# Patient Record
Sex: MALE | Race: WHITE | Employment: OTHER | URBAN - METROPOLITAN AREA
[De-identification: names, ages, dates, MRNs, and addresses within clinical notes are randomized per-mention and may not be internally consistent; named-entity substitution may affect disease eponyms.]

---

## 2017-01-10 ENCOUNTER — OFFICE VISIT (OUTPATIENT)
Dept: FAMILY MEDICINE CLINIC | Age: 82
End: 2017-01-10

## 2017-01-10 VITALS
WEIGHT: 281.2 LBS | RESPIRATION RATE: 16 BRPM | SYSTOLIC BLOOD PRESSURE: 120 MMHG | HEART RATE: 60 BPM | BODY MASS INDEX: 44.04 KG/M2 | DIASTOLIC BLOOD PRESSURE: 62 MMHG | TEMPERATURE: 97.8 F

## 2017-01-10 DIAGNOSIS — G47.33 OSA ON CPAP: ICD-10-CM

## 2017-01-10 DIAGNOSIS — I73.9 CLAUDICATION (HCC): ICD-10-CM

## 2017-01-10 DIAGNOSIS — M79.604 PAIN IN BOTH LOWER EXTREMITIES: ICD-10-CM

## 2017-01-10 DIAGNOSIS — R05.9 COUGH: ICD-10-CM

## 2017-01-10 DIAGNOSIS — M25.511 BILATERAL SHOULDER PAIN, UNSPECIFIED CHRONICITY: ICD-10-CM

## 2017-01-10 DIAGNOSIS — E11.65 CONTROLLED TYPE 2 DIABETES MELLITUS WITH HYPERGLYCEMIA, WITHOUT LONG-TERM CURRENT USE OF INSULIN (HCC): Primary | ICD-10-CM

## 2017-01-10 DIAGNOSIS — E66.01 MORBID OBESITY WITH BMI OF 40.0-44.9, ADULT (HCC): ICD-10-CM

## 2017-01-10 DIAGNOSIS — M79.605 PAIN IN BOTH LOWER EXTREMITIES: ICD-10-CM

## 2017-01-10 DIAGNOSIS — D50.9 IRON DEFICIENCY ANEMIA, UNSPECIFIED IRON DEFICIENCY ANEMIA TYPE: ICD-10-CM

## 2017-01-10 DIAGNOSIS — M25.512 BILATERAL SHOULDER PAIN, UNSPECIFIED CHRONICITY: ICD-10-CM

## 2017-01-10 DIAGNOSIS — N40.1 BENIGN NON-NODULAR PROSTATIC HYPERPLASIA WITH LOWER URINARY TRACT SYMPTOMS: ICD-10-CM

## 2017-01-10 DIAGNOSIS — Z79.01 ANTICOAGULATED ON COUMADIN: ICD-10-CM

## 2017-01-10 DIAGNOSIS — E78.2 MIXED HYPERLIPIDEMIA: ICD-10-CM

## 2017-01-10 DIAGNOSIS — I10 ESSENTIAL HYPERTENSION: ICD-10-CM

## 2017-01-10 DIAGNOSIS — Z99.89 OSA ON CPAP: ICD-10-CM

## 2017-01-10 PROCEDURE — 99214 OFFICE O/P EST MOD 30 MIN: CPT | Performed by: FAMILY MEDICINE

## 2017-01-10 RX ORDER — CEFDINIR 300 MG/1
300 CAPSULE ORAL 2 TIMES DAILY
Qty: 20 CAPSULE | Refills: 0 | Status: SHIPPED | OUTPATIENT
Start: 2017-01-10 | End: 2017-01-20

## 2017-01-10 ASSESSMENT — ENCOUNTER SYMPTOMS
VOMITING: 0
CONSTIPATION: 0
ABDOMINAL PAIN: 0
BLOOD IN STOOL: 0
NAUSEA: 0
CHEST TIGHTNESS: 0
ANAL BLEEDING: 0
SHORTNESS OF BREATH: 1
DIARRHEA: 0

## 2017-01-13 ENCOUNTER — CARE COORDINATION (OUTPATIENT)
Dept: CARE COORDINATION | Age: 82
End: 2017-01-13

## 2017-01-17 ENCOUNTER — ANTI-COAG VISIT (OUTPATIENT)
Dept: OTHER | Age: 82
End: 2017-01-17

## 2017-01-17 ENCOUNTER — TELEPHONE (OUTPATIENT)
Dept: FAMILY MEDICINE CLINIC | Age: 82
End: 2017-01-17

## 2017-01-17 VITALS
HEART RATE: 62 BPM | DIASTOLIC BLOOD PRESSURE: 50 MMHG | WEIGHT: 281 LBS | BODY MASS INDEX: 44.01 KG/M2 | SYSTOLIC BLOOD PRESSURE: 138 MMHG

## 2017-01-17 DIAGNOSIS — Z86.718 HISTORY OF DVT (DEEP VEIN THROMBOSIS): ICD-10-CM

## 2017-01-17 DIAGNOSIS — R19.7 DIARRHEA, UNSPECIFIED TYPE: Primary | ICD-10-CM

## 2017-01-17 DIAGNOSIS — I82.501 CHRONIC DEEP VEIN THROMBOSIS (DVT) OF RIGHT LOWER EXTREMITY, UNSPECIFIED VEIN (HCC): ICD-10-CM

## 2017-01-17 DIAGNOSIS — E89.0 HYPOTHYROIDISM, POSTABLATIVE: ICD-10-CM

## 2017-01-17 LAB — POC INR: 2.1 (ref 0.8–1.2)

## 2017-01-17 PROCEDURE — G8419 CALC BMI OUT NRM PARAM NOF/U: HCPCS | Performed by: NURSE PRACTITIONER

## 2017-01-17 PROCEDURE — 99999 PR OFFICE/OUTPT VISIT,PROCEDURE ONLY: CPT | Performed by: NURSE PRACTITIONER

## 2017-01-17 PROCEDURE — 4040F PNEUMOC VAC/ADMIN/RCVD: CPT | Performed by: NURSE PRACTITIONER

## 2017-01-17 PROCEDURE — 85610 PROTHROMBIN TIME: CPT | Performed by: NURSE PRACTITIONER

## 2017-01-17 PROCEDURE — G8427 DOCREV CUR MEDS BY ELIG CLIN: HCPCS | Performed by: NURSE PRACTITIONER

## 2017-01-17 RX ORDER — LEVOTHYROXINE SODIUM 0.07 MG/1
TABLET ORAL
Qty: 225 TABLET | Refills: 1 | Status: SHIPPED | OUTPATIENT
Start: 2017-01-17 | End: 2017-04-25 | Stop reason: SDUPTHER

## 2017-01-17 ASSESSMENT — ENCOUNTER SYMPTOMS
DIARRHEA: 1
BLOOD IN STOOL: 0
SHORTNESS OF BREATH: 0
CONSTIPATION: 0

## 2017-01-18 LAB — CLOSTRIDIUM DIFFICILE, PCR: NEGATIVE

## 2017-01-18 RX ORDER — WARFARIN SODIUM 5 MG/1
TABLET ORAL
Qty: 45 TABLET | Refills: 11 | Status: SHIPPED | OUTPATIENT
Start: 2017-01-18 | End: 2018-02-09 | Stop reason: SDUPTHER

## 2017-01-19 ENCOUNTER — TELEPHONE (OUTPATIENT)
Dept: FAMILY MEDICINE CLINIC | Age: 82
End: 2017-01-19

## 2017-01-27 ENCOUNTER — OFFICE VISIT (OUTPATIENT)
Dept: UROLOGY | Age: 82
End: 2017-01-27

## 2017-01-27 VITALS
HEIGHT: 67 IN | SYSTOLIC BLOOD PRESSURE: 120 MMHG | BODY MASS INDEX: 43.95 KG/M2 | DIASTOLIC BLOOD PRESSURE: 45 MMHG | WEIGHT: 280 LBS

## 2017-01-27 DIAGNOSIS — N40.1 BENIGN NON-NODULAR PROSTATIC HYPERPLASIA WITH LOWER URINARY TRACT SYMPTOMS: Primary | ICD-10-CM

## 2017-01-27 LAB
BILIRUBIN URINE: NEGATIVE
BLOOD URINE, POC: ABNORMAL
CHARACTER, URINE: CLEAR
COLOR, URINE: YELLOW
GLUCOSE URINE: NEGATIVE MG/DL
KETONES, URINE: ABNORMAL
LEUKOCYTE CLUMPS, URINE: ABNORMAL
NITRITE, URINE: NEGATIVE
PH, URINE: 5
POST VOID RESIDUAL (PVR): 0 ML
PROTEIN, URINE: 30 MG/DL
SPECIFIC GRAVITY, URINE: >= 1.03 (ref 1–1.03)
UROBILINOGEN, URINE: 0.2 EU/DL

## 2017-01-27 PROCEDURE — G8484 FLU IMMUNIZE NO ADMIN: HCPCS | Performed by: UROLOGY

## 2017-01-27 PROCEDURE — G8419 CALC BMI OUT NRM PARAM NOF/U: HCPCS | Performed by: UROLOGY

## 2017-01-27 PROCEDURE — 1036F TOBACCO NON-USER: CPT | Performed by: UROLOGY

## 2017-01-27 PROCEDURE — 51798 US URINE CAPACITY MEASURE: CPT | Performed by: UROLOGY

## 2017-01-27 PROCEDURE — G8427 DOCREV CUR MEDS BY ELIG CLIN: HCPCS | Performed by: UROLOGY

## 2017-01-27 PROCEDURE — 81003 URINALYSIS AUTO W/O SCOPE: CPT | Performed by: UROLOGY

## 2017-01-27 PROCEDURE — 1123F ACP DISCUSS/DSCN MKR DOCD: CPT | Performed by: UROLOGY

## 2017-01-27 PROCEDURE — 99203 OFFICE O/P NEW LOW 30 MIN: CPT | Performed by: UROLOGY

## 2017-01-27 PROCEDURE — 4040F PNEUMOC VAC/ADMIN/RCVD: CPT | Performed by: UROLOGY

## 2017-02-07 ENCOUNTER — CARE COORDINATION (OUTPATIENT)
Dept: CARE COORDINATION | Age: 82
End: 2017-02-07

## 2017-02-14 ENCOUNTER — ANTI-COAG VISIT (OUTPATIENT)
Dept: OTHER | Age: 82
End: 2017-02-14

## 2017-02-14 VITALS
WEIGHT: 277 LBS | SYSTOLIC BLOOD PRESSURE: 127 MMHG | BODY MASS INDEX: 43.38 KG/M2 | DIASTOLIC BLOOD PRESSURE: 60 MMHG | HEART RATE: 66 BPM

## 2017-02-14 DIAGNOSIS — Z86.718 HISTORY OF DVT (DEEP VEIN THROMBOSIS): ICD-10-CM

## 2017-02-14 LAB — POC INR: 1.9 (ref 0.8–1.2)

## 2017-02-14 PROCEDURE — 1036F TOBACCO NON-USER: CPT | Performed by: NURSE PRACTITIONER

## 2017-02-14 PROCEDURE — 1123F ACP DISCUSS/DSCN MKR DOCD: CPT | Performed by: NURSE PRACTITIONER

## 2017-02-14 PROCEDURE — G8427 DOCREV CUR MEDS BY ELIG CLIN: HCPCS | Performed by: NURSE PRACTITIONER

## 2017-02-14 PROCEDURE — 4040F PNEUMOC VAC/ADMIN/RCVD: CPT | Performed by: NURSE PRACTITIONER

## 2017-02-14 PROCEDURE — 85610 PROTHROMBIN TIME: CPT | Performed by: NURSE PRACTITIONER

## 2017-02-14 PROCEDURE — G8484 FLU IMMUNIZE NO ADMIN: HCPCS | Performed by: NURSE PRACTITIONER

## 2017-02-14 PROCEDURE — G8417 CALC BMI ABV UP PARAM F/U: HCPCS | Performed by: NURSE PRACTITIONER

## 2017-02-14 PROCEDURE — 99212 OFFICE O/P EST SF 10 MIN: CPT | Performed by: NURSE PRACTITIONER

## 2017-02-14 ASSESSMENT — ENCOUNTER SYMPTOMS
BACK PAIN: 1
SHORTNESS OF BREATH: 1
CONSTIPATION: 0
DIARRHEA: 0
BLOOD IN STOOL: 0

## 2017-03-01 ENCOUNTER — OFFICE VISIT (OUTPATIENT)
Dept: PULMONOLOGY | Age: 82
End: 2017-03-01

## 2017-03-01 VITALS
HEIGHT: 67 IN | OXYGEN SATURATION: 92 % | SYSTOLIC BLOOD PRESSURE: 132 MMHG | HEART RATE: 60 BPM | TEMPERATURE: 98.4 F | DIASTOLIC BLOOD PRESSURE: 68 MMHG | BODY MASS INDEX: 43.7 KG/M2 | WEIGHT: 278.4 LBS

## 2017-03-01 DIAGNOSIS — G47.33 OSA ON CPAP: ICD-10-CM

## 2017-03-01 DIAGNOSIS — J43.1 PANLOBULAR EMPHYSEMA (HCC): Primary | ICD-10-CM

## 2017-03-01 DIAGNOSIS — Z99.89 OSA ON CPAP: ICD-10-CM

## 2017-03-01 PROCEDURE — 1123F ACP DISCUSS/DSCN MKR DOCD: CPT | Performed by: INTERNAL MEDICINE

## 2017-03-01 PROCEDURE — G8484 FLU IMMUNIZE NO ADMIN: HCPCS | Performed by: INTERNAL MEDICINE

## 2017-03-01 PROCEDURE — G8427 DOCREV CUR MEDS BY ELIG CLIN: HCPCS | Performed by: INTERNAL MEDICINE

## 2017-03-01 PROCEDURE — G8417 CALC BMI ABV UP PARAM F/U: HCPCS | Performed by: INTERNAL MEDICINE

## 2017-03-01 PROCEDURE — G8926 SPIRO NO PERF OR DOC: HCPCS | Performed by: INTERNAL MEDICINE

## 2017-03-01 PROCEDURE — 4040F PNEUMOC VAC/ADMIN/RCVD: CPT | Performed by: INTERNAL MEDICINE

## 2017-03-01 PROCEDURE — 3023F SPIROM DOC REV: CPT | Performed by: INTERNAL MEDICINE

## 2017-03-01 PROCEDURE — 99213 OFFICE O/P EST LOW 20 MIN: CPT | Performed by: INTERNAL MEDICINE

## 2017-03-01 PROCEDURE — 1036F TOBACCO NON-USER: CPT | Performed by: INTERNAL MEDICINE

## 2017-03-01 ASSESSMENT — ENCOUNTER SYMPTOMS
SHORTNESS OF BREATH: 1
COUGH: 1
APNEA: 1

## 2017-03-06 ENCOUNTER — OFFICE VISIT (OUTPATIENT)
Dept: ENDOCRINOLOGY | Age: 82
End: 2017-03-06

## 2017-03-06 VITALS
BODY MASS INDEX: 44.26 KG/M2 | DIASTOLIC BLOOD PRESSURE: 55 MMHG | HEIGHT: 67 IN | WEIGHT: 282 LBS | SYSTOLIC BLOOD PRESSURE: 115 MMHG | RESPIRATION RATE: 20 BRPM | HEART RATE: 63 BPM

## 2017-03-06 DIAGNOSIS — C73 THYROID CANCER (HCC): ICD-10-CM

## 2017-03-06 DIAGNOSIS — E11.8 TYPE 2 DIABETES MELLITUS WITH COMPLICATION, WITHOUT LONG-TERM CURRENT USE OF INSULIN (HCC): ICD-10-CM

## 2017-03-06 DIAGNOSIS — E89.0 POST-SURGICAL HYPOTHYROIDISM: Primary | ICD-10-CM

## 2017-03-06 PROCEDURE — G8427 DOCREV CUR MEDS BY ELIG CLIN: HCPCS | Performed by: CLINICAL NURSE SPECIALIST

## 2017-03-06 PROCEDURE — G8484 FLU IMMUNIZE NO ADMIN: HCPCS | Performed by: CLINICAL NURSE SPECIALIST

## 2017-03-06 PROCEDURE — 99214 OFFICE O/P EST MOD 30 MIN: CPT | Performed by: CLINICAL NURSE SPECIALIST

## 2017-03-06 PROCEDURE — 1036F TOBACCO NON-USER: CPT | Performed by: CLINICAL NURSE SPECIALIST

## 2017-03-06 PROCEDURE — 1123F ACP DISCUSS/DSCN MKR DOCD: CPT | Performed by: CLINICAL NURSE SPECIALIST

## 2017-03-06 PROCEDURE — G8417 CALC BMI ABV UP PARAM F/U: HCPCS | Performed by: CLINICAL NURSE SPECIALIST

## 2017-03-06 PROCEDURE — 4040F PNEUMOC VAC/ADMIN/RCVD: CPT | Performed by: CLINICAL NURSE SPECIALIST

## 2017-03-06 ASSESSMENT — ENCOUNTER SYMPTOMS
DIARRHEA: 0
CONSTIPATION: 0
EYE REDNESS: 0
COUGH: 1
CHEST TIGHTNESS: 0
SHORTNESS OF BREATH: 1
VOICE CHANGE: 0
NAUSEA: 0
WHEEZING: 0
ABDOMINAL PAIN: 0

## 2017-03-07 ENCOUNTER — ANTI-COAG VISIT (OUTPATIENT)
Dept: OTHER | Age: 82
End: 2017-03-07

## 2017-03-07 VITALS
DIASTOLIC BLOOD PRESSURE: 67 MMHG | WEIGHT: 280 LBS | BODY MASS INDEX: 43.95 KG/M2 | SYSTOLIC BLOOD PRESSURE: 138 MMHG | HEART RATE: 64 BPM

## 2017-03-07 DIAGNOSIS — Z86.718 HISTORY OF DVT (DEEP VEIN THROMBOSIS): ICD-10-CM

## 2017-03-07 LAB — POC INR: 3.3 (ref 0.8–1.2)

## 2017-03-07 PROCEDURE — 85610 PROTHROMBIN TIME: CPT

## 2017-03-07 PROCEDURE — G8427 DOCREV CUR MEDS BY ELIG CLIN: HCPCS

## 2017-03-07 PROCEDURE — 99999 PR OFFICE/OUTPT VISIT,PROCEDURE ONLY: CPT

## 2017-03-07 PROCEDURE — 4040F PNEUMOC VAC/ADMIN/RCVD: CPT

## 2017-03-07 PROCEDURE — G8417 CALC BMI ABV UP PARAM F/U: HCPCS

## 2017-03-07 ASSESSMENT — ENCOUNTER SYMPTOMS
BLOOD IN STOOL: 0
DIARRHEA: 0
SHORTNESS OF BREATH: 0
CONSTIPATION: 0

## 2017-03-13 ENCOUNTER — CARE COORDINATION (OUTPATIENT)
Dept: CARE COORDINATION | Age: 82
End: 2017-03-13

## 2017-03-21 ENCOUNTER — ANTI-COAG VISIT (OUTPATIENT)
Dept: OTHER | Age: 82
End: 2017-03-21

## 2017-03-21 VITALS
HEART RATE: 60 BPM | SYSTOLIC BLOOD PRESSURE: 146 MMHG | BODY MASS INDEX: 43.32 KG/M2 | WEIGHT: 276 LBS | DIASTOLIC BLOOD PRESSURE: 57 MMHG

## 2017-03-21 DIAGNOSIS — Z86.718 HISTORY OF DVT (DEEP VEIN THROMBOSIS): Primary | ICD-10-CM

## 2017-03-21 DIAGNOSIS — I82.5Z1 CHRONIC DEEP VEIN THROMBOSIS (DVT) OF DISTAL VEIN OF RIGHT LOWER EXTREMITY (HCC): ICD-10-CM

## 2017-03-21 LAB
INR BLD: 2.6
POC INR: 2.6 (ref 0.8–1.2)

## 2017-03-21 ASSESSMENT — ENCOUNTER SYMPTOMS
DIARRHEA: 0
BLOOD IN STOOL: 0
SHORTNESS OF BREATH: 0
CONSTIPATION: 0

## 2017-04-03 RX ORDER — SIMVASTATIN 80 MG
TABLET ORAL
Qty: 90 TABLET | Refills: 1 | Status: SHIPPED | OUTPATIENT
Start: 2017-04-03 | End: 2017-06-26 | Stop reason: SDUPTHER

## 2017-04-04 ENCOUNTER — ANTI-COAG VISIT (OUTPATIENT)
Dept: OTHER | Age: 82
End: 2017-04-04

## 2017-04-04 VITALS
BODY MASS INDEX: 43.19 KG/M2 | WEIGHT: 275.2 LBS | SYSTOLIC BLOOD PRESSURE: 158 MMHG | HEART RATE: 74 BPM | DIASTOLIC BLOOD PRESSURE: 67 MMHG

## 2017-04-04 DIAGNOSIS — Z86.718 HISTORY OF DVT (DEEP VEIN THROMBOSIS): ICD-10-CM

## 2017-04-04 DIAGNOSIS — I82.5Z1 CHRONIC DEEP VEIN THROMBOSIS (DVT) OF DISTAL VEIN OF RIGHT LOWER EXTREMITY (HCC): ICD-10-CM

## 2017-04-04 LAB — POC INR: 2.7 (ref 0.8–1.2)

## 2017-04-04 ASSESSMENT — ENCOUNTER SYMPTOMS
SHORTNESS OF BREATH: 0
CONSTIPATION: 0
BLOOD IN STOOL: 0
DIARRHEA: 0

## 2017-04-12 ENCOUNTER — CARE COORDINATION (OUTPATIENT)
Dept: CARE COORDINATION | Age: 82
End: 2017-04-12

## 2017-04-19 ENCOUNTER — OFFICE VISIT (OUTPATIENT)
Dept: FAMILY MEDICINE CLINIC | Age: 82
End: 2017-04-19

## 2017-04-19 VITALS
HEART RATE: 64 BPM | DIASTOLIC BLOOD PRESSURE: 58 MMHG | SYSTOLIC BLOOD PRESSURE: 118 MMHG | BODY MASS INDEX: 43.63 KG/M2 | TEMPERATURE: 98.1 F | RESPIRATION RATE: 20 BRPM | WEIGHT: 278 LBS

## 2017-04-19 DIAGNOSIS — R05.9 COUGH: ICD-10-CM

## 2017-04-19 DIAGNOSIS — R09.82 POSTNASAL DRIP: ICD-10-CM

## 2017-04-19 DIAGNOSIS — J30.9 ALLERGIC RHINITIS, UNSPECIFIED ALLERGIC RHINITIS TRIGGER, UNSPECIFIED RHINITIS SEASONALITY: Primary | ICD-10-CM

## 2017-04-19 PROCEDURE — 4040F PNEUMOC VAC/ADMIN/RCVD: CPT | Performed by: NURSE PRACTITIONER

## 2017-04-19 PROCEDURE — G8427 DOCREV CUR MEDS BY ELIG CLIN: HCPCS | Performed by: NURSE PRACTITIONER

## 2017-04-19 PROCEDURE — G8417 CALC BMI ABV UP PARAM F/U: HCPCS | Performed by: NURSE PRACTITIONER

## 2017-04-19 PROCEDURE — 1123F ACP DISCUSS/DSCN MKR DOCD: CPT | Performed by: NURSE PRACTITIONER

## 2017-04-19 PROCEDURE — 1036F TOBACCO NON-USER: CPT | Performed by: NURSE PRACTITIONER

## 2017-04-19 PROCEDURE — 99213 OFFICE O/P EST LOW 20 MIN: CPT | Performed by: NURSE PRACTITIONER

## 2017-04-19 RX ORDER — GUAIFENESIN 100 MG/5ML
200 SYRUP ORAL 3 TIMES DAILY PRN
Qty: 300 ML | Refills: 0 | Status: SHIPPED | OUTPATIENT
Start: 2017-04-19 | End: 2017-04-29

## 2017-04-19 RX ORDER — CETIRIZINE HYDROCHLORIDE 10 MG/1
5 TABLET ORAL DAILY
Qty: 15 TABLET | Refills: 0 | Status: SHIPPED | OUTPATIENT
Start: 2017-04-19 | End: 2017-05-19

## 2017-04-19 ASSESSMENT — ENCOUNTER SYMPTOMS
CONSTIPATION: 0
VOMITING: 0
ABDOMINAL DISTENTION: 0
DIARRHEA: 0
SORE THROAT: 0
BLOOD IN STOOL: 0
RHINORRHEA: 1
PHOTOPHOBIA: 0
VOICE CHANGE: 0
WHEEZING: 0
APNEA: 0
TROUBLE SWALLOWING: 0
BACK PAIN: 1
SHORTNESS OF BREATH: 0
ABDOMINAL PAIN: 0
COUGH: 1
NAUSEA: 0
ANAL BLEEDING: 0

## 2017-04-24 ENCOUNTER — CARE COORDINATION (OUTPATIENT)
Dept: CARE COORDINATION | Age: 82
End: 2017-04-24

## 2017-04-25 ENCOUNTER — ANTI-COAG VISIT (OUTPATIENT)
Dept: OTHER | Age: 82
End: 2017-04-25

## 2017-04-25 VITALS
BODY MASS INDEX: 43.73 KG/M2 | DIASTOLIC BLOOD PRESSURE: 83 MMHG | SYSTOLIC BLOOD PRESSURE: 137 MMHG | HEART RATE: 66 BPM | WEIGHT: 278.6 LBS

## 2017-04-25 DIAGNOSIS — Z86.718 HISTORY OF DVT (DEEP VEIN THROMBOSIS): ICD-10-CM

## 2017-04-25 DIAGNOSIS — I82.5Z1 CHRONIC DEEP VEIN THROMBOSIS (DVT) OF DISTAL VEIN OF RIGHT LOWER EXTREMITY (HCC): ICD-10-CM

## 2017-04-25 DIAGNOSIS — E89.0 HYPOTHYROIDISM, POSTABLATIVE: ICD-10-CM

## 2017-04-25 LAB — POC INR: 4 (ref 0.8–1.2)

## 2017-04-25 RX ORDER — LEVOTHYROXINE SODIUM 0.07 MG/1
TABLET ORAL
Qty: 225 TABLET | Refills: 1 | Status: SHIPPED | OUTPATIENT
Start: 2017-04-25 | End: 2017-07-06 | Stop reason: ALTCHOICE

## 2017-04-25 ASSESSMENT — ENCOUNTER SYMPTOMS
DIARRHEA: 0
COUGH: 1
CONSTIPATION: 0
BLOOD IN STOOL: 0
SHORTNESS OF BREATH: 1

## 2017-05-04 ENCOUNTER — ANTI-COAG VISIT (OUTPATIENT)
Dept: OTHER | Age: 82
End: 2017-05-04

## 2017-05-04 VITALS
BODY MASS INDEX: 43.32 KG/M2 | HEART RATE: 66 BPM | SYSTOLIC BLOOD PRESSURE: 125 MMHG | WEIGHT: 276 LBS | DIASTOLIC BLOOD PRESSURE: 56 MMHG

## 2017-05-04 DIAGNOSIS — Z86.718 HISTORY OF DVT (DEEP VEIN THROMBOSIS): ICD-10-CM

## 2017-05-04 DIAGNOSIS — I82.5Z1 CHRONIC DEEP VEIN THROMBOSIS (DVT) OF DISTAL VEIN OF RIGHT LOWER EXTREMITY (HCC): ICD-10-CM

## 2017-05-04 LAB — POC INR: 2.2 (ref 0.8–1.2)

## 2017-05-04 ASSESSMENT — ENCOUNTER SYMPTOMS
BLOOD IN STOOL: 0
CONSTIPATION: 0
DIARRHEA: 0

## 2017-05-08 ENCOUNTER — TELEPHONE (OUTPATIENT)
Dept: FAMILY MEDICINE CLINIC | Age: 82
End: 2017-05-08

## 2017-05-10 ENCOUNTER — OFFICE VISIT (OUTPATIENT)
Dept: FAMILY MEDICINE CLINIC | Age: 82
End: 2017-05-10

## 2017-05-10 VITALS
BODY MASS INDEX: 44.1 KG/M2 | OXYGEN SATURATION: 94 % | SYSTOLIC BLOOD PRESSURE: 126 MMHG | RESPIRATION RATE: 24 BRPM | HEART RATE: 72 BPM | DIASTOLIC BLOOD PRESSURE: 58 MMHG | WEIGHT: 281 LBS | TEMPERATURE: 97.9 F

## 2017-05-10 DIAGNOSIS — R09.89 CHEST CONGESTION: ICD-10-CM

## 2017-05-10 DIAGNOSIS — R05.3 PERSISTENT COUGH FOR 3 WEEKS OR LONGER: Primary | ICD-10-CM

## 2017-05-10 DIAGNOSIS — M25.512 ACUTE PAIN OF BOTH SHOULDERS: ICD-10-CM

## 2017-05-10 DIAGNOSIS — M25.511 ACUTE PAIN OF BOTH SHOULDERS: ICD-10-CM

## 2017-05-10 DIAGNOSIS — R53.83 FATIGUE, UNSPECIFIED TYPE: ICD-10-CM

## 2017-05-10 DIAGNOSIS — R06.02 SHORTNESS OF BREATH: ICD-10-CM

## 2017-05-10 DIAGNOSIS — I10 ESSENTIAL HYPERTENSION: ICD-10-CM

## 2017-05-10 DIAGNOSIS — R63.5 WEIGHT GAIN: ICD-10-CM

## 2017-05-10 PROCEDURE — G8417 CALC BMI ABV UP PARAM F/U: HCPCS | Performed by: NURSE PRACTITIONER

## 2017-05-10 PROCEDURE — 99213 OFFICE O/P EST LOW 20 MIN: CPT | Performed by: NURSE PRACTITIONER

## 2017-05-10 PROCEDURE — G8427 DOCREV CUR MEDS BY ELIG CLIN: HCPCS | Performed by: NURSE PRACTITIONER

## 2017-05-10 PROCEDURE — 1036F TOBACCO NON-USER: CPT | Performed by: NURSE PRACTITIONER

## 2017-05-10 PROCEDURE — 4040F PNEUMOC VAC/ADMIN/RCVD: CPT | Performed by: NURSE PRACTITIONER

## 2017-05-10 PROCEDURE — 1123F ACP DISCUSS/DSCN MKR DOCD: CPT | Performed by: NURSE PRACTITIONER

## 2017-05-10 RX ORDER — CEFDINIR 300 MG/1
300 CAPSULE ORAL EVERY 12 HOURS
Qty: 20 CAPSULE | Refills: 0 | Status: SHIPPED | OUTPATIENT
Start: 2017-05-10 | End: 2017-05-20

## 2017-05-10 RX ORDER — LOSARTAN POTASSIUM 50 MG/1
50 TABLET ORAL 2 TIMES DAILY
Qty: 180 TABLET | Refills: 0 | Status: SHIPPED | OUTPATIENT
Start: 2017-05-10 | End: 2017-08-14

## 2017-05-10 ASSESSMENT — ENCOUNTER SYMPTOMS
DIARRHEA: 0
SHORTNESS OF BREATH: 1
NAUSEA: 0
ANAL BLEEDING: 0
PHOTOPHOBIA: 0
COUGH: 1
VOICE CHANGE: 0
TROUBLE SWALLOWING: 0
ABDOMINAL DISTENTION: 0
CONSTIPATION: 0
WHEEZING: 1
BLOOD IN STOOL: 0
VOMITING: 0
APNEA: 0
ABDOMINAL PAIN: 0

## 2017-05-11 ENCOUNTER — TELEPHONE (OUTPATIENT)
Dept: OTHER | Age: 82
End: 2017-05-11

## 2017-05-11 RX ORDER — DOXAZOSIN MESYLATE 4 MG/1
4 TABLET ORAL NIGHTLY
Qty: 90 TABLET | Refills: 2 | Status: SHIPPED | OUTPATIENT
Start: 2017-05-11 | End: 2018-01-30 | Stop reason: SDUPTHER

## 2017-05-18 ENCOUNTER — ANTI-COAG VISIT (OUTPATIENT)
Dept: OTHER | Age: 82
End: 2017-05-18

## 2017-05-18 VITALS
DIASTOLIC BLOOD PRESSURE: 60 MMHG | HEART RATE: 70 BPM | SYSTOLIC BLOOD PRESSURE: 142 MMHG | BODY MASS INDEX: 43.51 KG/M2 | WEIGHT: 277.2 LBS

## 2017-05-18 DIAGNOSIS — I82.5Z1 CHRONIC DEEP VEIN THROMBOSIS (DVT) OF DISTAL VEIN OF RIGHT LOWER EXTREMITY (HCC): ICD-10-CM

## 2017-05-18 DIAGNOSIS — Z86.718 HISTORY OF DVT (DEEP VEIN THROMBOSIS): ICD-10-CM

## 2017-05-18 LAB — POC INR: 2.3 (ref 0.8–1.2)

## 2017-05-18 ASSESSMENT — ENCOUNTER SYMPTOMS
DIARRHEA: 0
CONSTIPATION: 0
SHORTNESS OF BREATH: 0
BLOOD IN STOOL: 0

## 2017-06-06 ENCOUNTER — OFFICE VISIT (OUTPATIENT)
Dept: FAMILY MEDICINE CLINIC | Age: 82
End: 2017-06-06

## 2017-06-06 ENCOUNTER — TELEPHONE (OUTPATIENT)
Dept: FAMILY MEDICINE CLINIC | Age: 82
End: 2017-06-06

## 2017-06-06 VITALS
RESPIRATION RATE: 20 BRPM | TEMPERATURE: 98.4 F | BODY MASS INDEX: 44.1 KG/M2 | HEART RATE: 68 BPM | DIASTOLIC BLOOD PRESSURE: 62 MMHG | SYSTOLIC BLOOD PRESSURE: 124 MMHG | WEIGHT: 281 LBS | OXYGEN SATURATION: 92 %

## 2017-06-06 DIAGNOSIS — R06.02 SOB (SHORTNESS OF BREATH): ICD-10-CM

## 2017-06-06 DIAGNOSIS — R09.89 CHEST CONGESTION: ICD-10-CM

## 2017-06-06 DIAGNOSIS — R05.9 COUGH: Primary | ICD-10-CM

## 2017-06-06 PROCEDURE — 1036F TOBACCO NON-USER: CPT | Performed by: FAMILY MEDICINE

## 2017-06-06 PROCEDURE — 4040F PNEUMOC VAC/ADMIN/RCVD: CPT | Performed by: FAMILY MEDICINE

## 2017-06-06 PROCEDURE — G8427 DOCREV CUR MEDS BY ELIG CLIN: HCPCS | Performed by: FAMILY MEDICINE

## 2017-06-06 PROCEDURE — 99213 OFFICE O/P EST LOW 20 MIN: CPT | Performed by: FAMILY MEDICINE

## 2017-06-06 PROCEDURE — 1123F ACP DISCUSS/DSCN MKR DOCD: CPT | Performed by: FAMILY MEDICINE

## 2017-06-06 PROCEDURE — G8417 CALC BMI ABV UP PARAM F/U: HCPCS | Performed by: FAMILY MEDICINE

## 2017-06-06 ASSESSMENT — ENCOUNTER SYMPTOMS
SINUS PRESSURE: 0
RHINORRHEA: 0
COUGH: 1
NAUSEA: 0
EYE PAIN: 0
CONSTIPATION: 0
SHORTNESS OF BREATH: 1
VOMITING: 0
EYE REDNESS: 0
ABDOMINAL PAIN: 0
CHEST TIGHTNESS: 0
EYE ITCHING: 0
ANAL BLEEDING: 0
SORE THROAT: 1
BLOOD IN STOOL: 0
EYE DISCHARGE: 0
DIARRHEA: 0

## 2017-06-07 ENCOUNTER — TELEPHONE (OUTPATIENT)
Dept: FAMILY MEDICINE CLINIC | Age: 82
End: 2017-06-07

## 2017-06-07 ENCOUNTER — CARE COORDINATION (OUTPATIENT)
Dept: CARE COORDINATION | Age: 82
End: 2017-06-07

## 2017-06-07 ENCOUNTER — TELEPHONE (OUTPATIENT)
Dept: PULMONOLOGY | Age: 82
End: 2017-06-07

## 2017-06-09 ENCOUNTER — OFFICE VISIT (OUTPATIENT)
Dept: PULMONOLOGY | Age: 82
End: 2017-06-09

## 2017-06-09 VITALS
TEMPERATURE: 98 F | DIASTOLIC BLOOD PRESSURE: 66 MMHG | WEIGHT: 279.4 LBS | OXYGEN SATURATION: 94 % | BODY MASS INDEX: 43.85 KG/M2 | SYSTOLIC BLOOD PRESSURE: 116 MMHG | HEIGHT: 67 IN | HEART RATE: 68 BPM

## 2017-06-09 DIAGNOSIS — Z99.89 OSA ON CPAP: ICD-10-CM

## 2017-06-09 DIAGNOSIS — J41.1 MUCOPURULENT CHRONIC BRONCHITIS (HCC): ICD-10-CM

## 2017-06-09 DIAGNOSIS — J96.11 HYPOXEMIC RESPIRATORY FAILURE, CHRONIC (HCC): ICD-10-CM

## 2017-06-09 DIAGNOSIS — J43.1 PANLOBULAR EMPHYSEMA (HCC): ICD-10-CM

## 2017-06-09 DIAGNOSIS — G47.33 OSA ON CPAP: ICD-10-CM

## 2017-06-09 DIAGNOSIS — R05.9 COUGH: Primary | ICD-10-CM

## 2017-06-09 PROCEDURE — 4040F PNEUMOC VAC/ADMIN/RCVD: CPT | Performed by: INTERNAL MEDICINE

## 2017-06-09 PROCEDURE — 1036F TOBACCO NON-USER: CPT | Performed by: INTERNAL MEDICINE

## 2017-06-09 PROCEDURE — G8926 SPIRO NO PERF OR DOC: HCPCS | Performed by: INTERNAL MEDICINE

## 2017-06-09 PROCEDURE — 3023F SPIROM DOC REV: CPT | Performed by: INTERNAL MEDICINE

## 2017-06-09 PROCEDURE — 99213 OFFICE O/P EST LOW 20 MIN: CPT | Performed by: INTERNAL MEDICINE

## 2017-06-09 PROCEDURE — 1123F ACP DISCUSS/DSCN MKR DOCD: CPT | Performed by: INTERNAL MEDICINE

## 2017-06-09 PROCEDURE — G8427 DOCREV CUR MEDS BY ELIG CLIN: HCPCS | Performed by: INTERNAL MEDICINE

## 2017-06-09 PROCEDURE — G8417 CALC BMI ABV UP PARAM F/U: HCPCS | Performed by: INTERNAL MEDICINE

## 2017-06-09 RX ORDER — AZITHROMYCIN 250 MG/1
TABLET, FILM COATED ORAL
Qty: 1 PACKET | Refills: 0 | Status: SHIPPED | OUTPATIENT
Start: 2017-06-09 | End: 2017-06-15

## 2017-06-09 RX ORDER — PREDNISONE 10 MG/1
TABLET ORAL
Qty: 30 TABLET | Refills: 0 | Status: SHIPPED | OUTPATIENT
Start: 2017-06-09 | End: 2017-06-19

## 2017-06-09 ASSESSMENT — ENCOUNTER SYMPTOMS
APNEA: 1
STRIDOR: 0
SHORTNESS OF BREATH: 1
COUGH: 1

## 2017-06-15 ENCOUNTER — ANTI-COAG VISIT (OUTPATIENT)
Dept: OTHER | Age: 82
End: 2017-06-15

## 2017-06-15 VITALS
DIASTOLIC BLOOD PRESSURE: 66 MMHG | HEART RATE: 71 BPM | WEIGHT: 279.2 LBS | SYSTOLIC BLOOD PRESSURE: 120 MMHG | BODY MASS INDEX: 43.73 KG/M2

## 2017-06-15 DIAGNOSIS — Z86.718 HISTORY OF DVT (DEEP VEIN THROMBOSIS): ICD-10-CM

## 2017-06-15 DIAGNOSIS — I82.5Z1 CHRONIC DEEP VEIN THROMBOSIS (DVT) OF DISTAL VEIN OF RIGHT LOWER EXTREMITY (HCC): ICD-10-CM

## 2017-06-15 LAB — POC INR: 2.2 (ref 0.8–1.2)

## 2017-06-15 ASSESSMENT — ENCOUNTER SYMPTOMS
DIARRHEA: 0
BLOOD IN STOOL: 0
SHORTNESS OF BREATH: 1
CONSTIPATION: 0

## 2017-06-26 RX ORDER — SIMVASTATIN 80 MG
TABLET ORAL
Qty: 90 TABLET | Refills: 1 | Status: SHIPPED | OUTPATIENT
Start: 2017-06-26 | End: 2018-05-10 | Stop reason: SDUPTHER

## 2017-06-29 ENCOUNTER — ANTI-COAG VISIT (OUTPATIENT)
Dept: OTHER | Age: 82
End: 2017-06-29

## 2017-06-29 VITALS
WEIGHT: 278.8 LBS | DIASTOLIC BLOOD PRESSURE: 53 MMHG | HEART RATE: 62 BPM | SYSTOLIC BLOOD PRESSURE: 137 MMHG | BODY MASS INDEX: 43.67 KG/M2

## 2017-06-29 DIAGNOSIS — I82.5Z1 CHRONIC DEEP VEIN THROMBOSIS (DVT) OF DISTAL VEIN OF RIGHT LOWER EXTREMITY (HCC): ICD-10-CM

## 2017-06-29 DIAGNOSIS — Z86.718 HISTORY OF DVT (DEEP VEIN THROMBOSIS): ICD-10-CM

## 2017-06-29 LAB — POC INR: 2.4 (ref 0.8–1.2)

## 2017-06-29 ASSESSMENT — ENCOUNTER SYMPTOMS
CONSTIPATION: 0
BLOOD IN STOOL: 0
DIARRHEA: 0
SHORTNESS OF BREATH: 1

## 2017-07-03 ENCOUNTER — CARE COORDINATION (OUTPATIENT)
Dept: CARE COORDINATION | Age: 82
End: 2017-07-03

## 2017-07-06 ENCOUNTER — OFFICE VISIT (OUTPATIENT)
Dept: ENDOCRINOLOGY | Age: 82
End: 2017-07-06

## 2017-07-06 VITALS
SYSTOLIC BLOOD PRESSURE: 130 MMHG | RESPIRATION RATE: 14 BRPM | DIASTOLIC BLOOD PRESSURE: 60 MMHG | HEIGHT: 67 IN | HEART RATE: 65 BPM | BODY MASS INDEX: 43.71 KG/M2 | WEIGHT: 278.5 LBS

## 2017-07-06 DIAGNOSIS — E11.9 TYPE 2 DIABETES MELLITUS WITHOUT COMPLICATION, WITHOUT LONG-TERM CURRENT USE OF INSULIN (HCC): Primary | ICD-10-CM

## 2017-07-06 DIAGNOSIS — E78.00 PURE HYPERCHOLESTEROLEMIA: ICD-10-CM

## 2017-07-06 DIAGNOSIS — E89.0 POSTOPERATIVE HYPOTHYROIDISM: ICD-10-CM

## 2017-07-06 DIAGNOSIS — M62.81 MUSCLE WEAKNESS: ICD-10-CM

## 2017-07-06 DIAGNOSIS — C73 THYROID CANCER (HCC): ICD-10-CM

## 2017-07-06 PROCEDURE — G8417 CALC BMI ABV UP PARAM F/U: HCPCS | Performed by: INTERNAL MEDICINE

## 2017-07-06 PROCEDURE — G8427 DOCREV CUR MEDS BY ELIG CLIN: HCPCS | Performed by: INTERNAL MEDICINE

## 2017-07-06 PROCEDURE — 1036F TOBACCO NON-USER: CPT | Performed by: INTERNAL MEDICINE

## 2017-07-06 PROCEDURE — 4040F PNEUMOC VAC/ADMIN/RCVD: CPT | Performed by: INTERNAL MEDICINE

## 2017-07-06 PROCEDURE — 99214 OFFICE O/P EST MOD 30 MIN: CPT | Performed by: INTERNAL MEDICINE

## 2017-07-06 PROCEDURE — 1123F ACP DISCUSS/DSCN MKR DOCD: CPT | Performed by: INTERNAL MEDICINE

## 2017-07-06 RX ORDER — LEVOTHYROXINE SODIUM 175 UG/1
187 TABLET ORAL DAILY
Status: ON HOLD | COMMUNITY
End: 2017-08-11

## 2017-07-18 ENCOUNTER — HOSPITAL ENCOUNTER (OUTPATIENT)
Dept: PHARMACY | Age: 82
Setting detail: THERAPIES SERIES
Discharge: HOME | End: 2017-07-18
Payer: MEDICARE

## 2017-07-18 ENCOUNTER — TELEPHONE (OUTPATIENT)
Dept: PULMONOLOGY | Age: 82
End: 2017-07-18

## 2017-07-18 ENCOUNTER — TELEPHONE (OUTPATIENT)
Dept: PHARMACY | Age: 82
End: 2017-07-18

## 2017-07-18 VITALS
HEART RATE: 72 BPM | WEIGHT: 283 LBS | BODY MASS INDEX: 44.42 KG/M2 | DIASTOLIC BLOOD PRESSURE: 59 MMHG | SYSTOLIC BLOOD PRESSURE: 135 MMHG

## 2017-07-18 DIAGNOSIS — I82.5Z1 CHRONIC DEEP VEIN THROMBOSIS (DVT) OF DISTAL VEIN OF RIGHT LOWER EXTREMITY (HCC): ICD-10-CM

## 2017-07-18 LAB — POC INR: 2.7 (ref 0.8–1.2)

## 2017-07-18 PROCEDURE — 36416 COLLJ CAPILLARY BLOOD SPEC: CPT

## 2017-07-18 PROCEDURE — 85610 PROTHROMBIN TIME: CPT

## 2017-07-18 PROCEDURE — 99211 OFF/OP EST MAY X REQ PHY/QHP: CPT

## 2017-07-18 PROCEDURE — 99213 OFFICE O/P EST LOW 20 MIN: CPT

## 2017-07-18 ASSESSMENT — ENCOUNTER SYMPTOMS
BLOOD IN STOOL: 0
CONSTIPATION: 0
DIARRHEA: 0
SHORTNESS OF BREATH: 1

## 2017-07-19 ENCOUNTER — CARE COORDINATION (OUTPATIENT)
Dept: CARE COORDINATION | Age: 82
End: 2017-07-19

## 2017-07-19 ASSESSMENT — ENCOUNTER SYMPTOMS: DYSPNEA ASSOCIATED WITH: EXERTION

## 2017-07-31 ENCOUNTER — HOSPITAL ENCOUNTER (OUTPATIENT)
Dept: PHARMACY | Age: 82
Setting detail: THERAPIES SERIES
Discharge: HOME | End: 2017-07-31
Payer: MEDICARE

## 2017-07-31 VITALS
WEIGHT: 285.6 LBS | BODY MASS INDEX: 44.83 KG/M2 | DIASTOLIC BLOOD PRESSURE: 55 MMHG | HEART RATE: 67 BPM | SYSTOLIC BLOOD PRESSURE: 129 MMHG

## 2017-07-31 DIAGNOSIS — I82.5Z1 CHRONIC DEEP VEIN THROMBOSIS (DVT) OF DISTAL VEIN OF RIGHT LOWER EXTREMITY (HCC): ICD-10-CM

## 2017-07-31 LAB — POC INR: 2.2 (ref 0.8–1.2)

## 2017-07-31 PROCEDURE — 36416 COLLJ CAPILLARY BLOOD SPEC: CPT

## 2017-07-31 PROCEDURE — 99211 OFF/OP EST MAY X REQ PHY/QHP: CPT

## 2017-08-02 ENCOUNTER — OFFICE VISIT (OUTPATIENT)
Dept: PULMONOLOGY | Age: 82
End: 2017-08-02
Payer: MEDICARE

## 2017-08-02 VITALS
OXYGEN SATURATION: 96 % | BODY MASS INDEX: 46.28 KG/M2 | SYSTOLIC BLOOD PRESSURE: 124 MMHG | HEIGHT: 66 IN | TEMPERATURE: 98.7 F | DIASTOLIC BLOOD PRESSURE: 60 MMHG | WEIGHT: 288 LBS | HEART RATE: 71 BPM

## 2017-08-02 DIAGNOSIS — J41.1 MUCOPURULENT CHRONIC BRONCHITIS (HCC): Primary | ICD-10-CM

## 2017-08-02 PROCEDURE — G8417 CALC BMI ABV UP PARAM F/U: HCPCS | Performed by: INTERNAL MEDICINE

## 2017-08-02 PROCEDURE — 99213 OFFICE O/P EST LOW 20 MIN: CPT | Performed by: INTERNAL MEDICINE

## 2017-08-02 PROCEDURE — 1123F ACP DISCUSS/DSCN MKR DOCD: CPT | Performed by: INTERNAL MEDICINE

## 2017-08-02 PROCEDURE — G8926 SPIRO NO PERF OR DOC: HCPCS | Performed by: INTERNAL MEDICINE

## 2017-08-02 PROCEDURE — 3023F SPIROM DOC REV: CPT | Performed by: INTERNAL MEDICINE

## 2017-08-02 PROCEDURE — G8427 DOCREV CUR MEDS BY ELIG CLIN: HCPCS | Performed by: INTERNAL MEDICINE

## 2017-08-02 PROCEDURE — 4040F PNEUMOC VAC/ADMIN/RCVD: CPT | Performed by: INTERNAL MEDICINE

## 2017-08-02 PROCEDURE — 1036F TOBACCO NON-USER: CPT | Performed by: INTERNAL MEDICINE

## 2017-08-02 ASSESSMENT — ENCOUNTER SYMPTOMS
WHEEZING: 0
APNEA: 1
SHORTNESS OF BREATH: 1
COUGH: 1
CHOKING: 0

## 2017-08-07 ENCOUNTER — CARE COORDINATION (OUTPATIENT)
Dept: CARE COORDINATION | Age: 82
End: 2017-08-07

## 2017-08-09 ENCOUNTER — HOSPITAL ENCOUNTER (INPATIENT)
Age: 82
LOS: 2 days | Discharge: HOME HEALTH | DRG: 191 | End: 2017-08-11
Attending: FAMILY MEDICINE | Admitting: INTERNAL MEDICINE
Payer: MEDICARE

## 2017-08-09 ENCOUNTER — APPOINTMENT (OUTPATIENT)
Dept: GENERAL RADIOLOGY | Age: 82
DRG: 191 | End: 2017-08-09
Payer: MEDICARE

## 2017-08-09 ENCOUNTER — OFFICE VISIT (OUTPATIENT)
Dept: FAMILY MEDICINE CLINIC | Age: 82
End: 2017-08-09
Payer: MEDICARE

## 2017-08-09 VITALS
TEMPERATURE: 97.8 F | OXYGEN SATURATION: 98 % | DIASTOLIC BLOOD PRESSURE: 60 MMHG | RESPIRATION RATE: 24 BRPM | BODY MASS INDEX: 46.9 KG/M2 | HEART RATE: 76 BPM | SYSTOLIC BLOOD PRESSURE: 146 MMHG | WEIGHT: 290.6 LBS

## 2017-08-09 DIAGNOSIS — J44.1 COPD EXACERBATION (HCC): Primary | ICD-10-CM

## 2017-08-09 DIAGNOSIS — R05.9 COUGH: ICD-10-CM

## 2017-08-09 DIAGNOSIS — R13.10 DYSPHAGIA, UNSPECIFIED TYPE: ICD-10-CM

## 2017-08-09 LAB
ALBUMIN SERPL-MCNC: 3.7 G/DL (ref 3.5–5.1)
ALLEN TEST: POSITIVE
ALP BLD-CCNC: 42 U/L (ref 38–126)
ALT SERPL-CCNC: 17 U/L (ref 11–66)
ANION GAP SERPL CALCULATED.3IONS-SCNC: 10 MEQ/L (ref 8–16)
ANISOCYTOSIS: ABNORMAL
AST SERPL-CCNC: 23 U/L (ref 5–40)
BASE EXCESS (CALCULATED): 4.7 MMOL/L (ref -2.5–2.5)
BASOPHILS # BLD: 0.7 %
BASOPHILS ABSOLUTE: 0.1 THOU/MM3 (ref 0–0.1)
BILIRUB SERPL-MCNC: 0.4 MG/DL (ref 0.3–1.2)
BUN BLDV-MCNC: 11 MG/DL (ref 7–22)
CALCIUM SERPL-MCNC: 8.8 MG/DL (ref 8.5–10.5)
CHLORIDE BLD-SCNC: 99 MEQ/L (ref 98–111)
CO2: 29 MEQ/L (ref 23–33)
COLLECTED BY:: ABNORMAL
CREAT SERPL-MCNC: 0.8 MG/DL (ref 0.4–1.2)
DEVICE: ABNORMAL
EKG ATRIAL RATE: 65 BPM
EKG P AXIS: 68 DEGREES
EKG P-R INTERVAL: 234 MS
EKG Q-T INTERVAL: 384 MS
EKG QRS DURATION: 94 MS
EKG QTC CALCULATION (BAZETT): 399 MS
EKG R AXIS: 73 DEGREES
EKG T AXIS: 41 DEGREES
EKG VENTRICULAR RATE: 65 BPM
EOSINOPHIL # BLD: 2.3 %
EOSINOPHILS ABSOLUTE: 0.2 THOU/MM3 (ref 0–0.4)
GFR SERPL CREATININE-BSD FRML MDRD: > 90 ML/MIN/1.73M2
GLUCOSE BLD-MCNC: 198 MG/DL (ref 70–108)
GLUCOSE BLD-MCNC: 262 MG/DL (ref 70–108)
GLUCOSE BLD-MCNC: 294 MG/DL (ref 70–108)
HCO3: 31 MMOL/L (ref 23–28)
HCT VFR BLD CALC: 39.6 % (ref 42–52)
HEMOGLOBIN: 13.3 GM/DL (ref 14–18)
IFIO2: 2
INR BLD: 2.69 (ref 0.85–1.13)
LYMPHOCYTES # BLD: 20.6 %
LYMPHOCYTES ABSOLUTE: 1.5 THOU/MM3 (ref 1–4.8)
MCH RBC QN AUTO: 27.5 PG (ref 27–31)
MCHC RBC AUTO-ENTMCNC: 33.6 GM/DL (ref 33–37)
MCV RBC AUTO: 82 FL (ref 80–94)
MONOCYTES # BLD: 10 %
MONOCYTES ABSOLUTE: 0.7 THOU/MM3 (ref 0.4–1.3)
NUCLEATED RED BLOOD CELLS: 0 /100 WBC
O2 SATURATION: 97 %
OSMOLALITY CALCULATION: 280.6 MOSMOL/KG (ref 275–300)
PCO2: 51 MMHG (ref 35–45)
PDW BLD-RTO: 14.9 % (ref 11.5–14.5)
PH BLOOD GAS: 7.39 (ref 7.35–7.45)
PLATELET # BLD: 191 THOU/MM3 (ref 130–400)
PMV BLD AUTO: 8.9 MCM (ref 7.4–10.4)
PO2: 89 MMHG (ref 71–104)
POTASSIUM SERPL-SCNC: 4.3 MEQ/L (ref 3.5–5.2)
PROCALCITONIN: 0.04 NG/ML (ref 0.01–0.09)
RBC # BLD: 4.83 MILL/MM3 (ref 4.7–6.1)
RBC # BLD: NORMAL 10*6/UL
SEG NEUTROPHILS: 66.4 %
SEGMENTED NEUTROPHILS ABSOLUTE COUNT: 4.8 THOU/MM3 (ref 1.8–7.7)
SODIUM BLD-SCNC: 138 MEQ/L (ref 135–145)
SOURCE, BLOOD GAS: ABNORMAL
TOTAL PROTEIN: 6.6 G/DL (ref 6.1–8)
TROPONIN T: 0.01 NG/ML
TROPONIN T: 0.03 NG/ML
TSH SERPL DL<=0.05 MIU/L-ACNC: 3.76 UIU/ML (ref 0.4–4.2)
WBC # BLD: 7.3 THOU/MM3 (ref 4.8–10.8)

## 2017-08-09 PROCEDURE — 6370000000 HC RX 637 (ALT 250 FOR IP): Performed by: INTERNAL MEDICINE

## 2017-08-09 PROCEDURE — G8427 DOCREV CUR MEDS BY ELIG CLIN: HCPCS | Performed by: NURSE PRACTITIONER

## 2017-08-09 PROCEDURE — 6360000002 HC RX W HCPCS: Performed by: FAMILY MEDICINE

## 2017-08-09 PROCEDURE — 94660 CPAP INITIATION&MGMT: CPT

## 2017-08-09 PROCEDURE — 94644 CONT INHLJ TX 1ST HOUR: CPT

## 2017-08-09 PROCEDURE — 1036F TOBACCO NON-USER: CPT | Performed by: NURSE PRACTITIONER

## 2017-08-09 PROCEDURE — 2580000003 HC RX 258: Performed by: INTERNAL MEDICINE

## 2017-08-09 PROCEDURE — 4040F PNEUMOC VAC/ADMIN/RCVD: CPT | Performed by: NURSE PRACTITIONER

## 2017-08-09 PROCEDURE — 96374 THER/PROPH/DIAG INJ IV PUSH: CPT

## 2017-08-09 PROCEDURE — 94645 CONT INHLJ TX EACH ADDL HOUR: CPT

## 2017-08-09 PROCEDURE — 84145 PROCALCITONIN (PCT): CPT

## 2017-08-09 PROCEDURE — 3023F SPIROM DOC REV: CPT | Performed by: NURSE PRACTITIONER

## 2017-08-09 PROCEDURE — 94640 AIRWAY INHALATION TREATMENT: CPT

## 2017-08-09 PROCEDURE — 6360000002 HC RX W HCPCS: Performed by: INTERNAL MEDICINE

## 2017-08-09 PROCEDURE — 36415 COLL VENOUS BLD VENIPUNCTURE: CPT

## 2017-08-09 PROCEDURE — 84484 ASSAY OF TROPONIN QUANT: CPT

## 2017-08-09 PROCEDURE — 82803 BLOOD GASES ANY COMBINATION: CPT

## 2017-08-09 PROCEDURE — 1200000003 HC TELEMETRY R&B

## 2017-08-09 PROCEDURE — 94760 N-INVAS EAR/PLS OXIMETRY 1: CPT

## 2017-08-09 PROCEDURE — 93005 ELECTROCARDIOGRAM TRACING: CPT

## 2017-08-09 PROCEDURE — 85610 PROTHROMBIN TIME: CPT

## 2017-08-09 PROCEDURE — 99222 1ST HOSP IP/OBS MODERATE 55: CPT | Performed by: INTERNAL MEDICINE

## 2017-08-09 PROCEDURE — G8417 CALC BMI ABV UP PARAM F/U: HCPCS | Performed by: NURSE PRACTITIONER

## 2017-08-09 PROCEDURE — 71010 XR CHEST PORTABLE: CPT

## 2017-08-09 PROCEDURE — 80050 GENERAL HEALTH PANEL: CPT

## 2017-08-09 PROCEDURE — G8926 SPIRO NO PERF OR DOC: HCPCS | Performed by: NURSE PRACTITIONER

## 2017-08-09 PROCEDURE — 6370000000 HC RX 637 (ALT 250 FOR IP): Performed by: FAMILY MEDICINE

## 2017-08-09 PROCEDURE — 1123F ACP DISCUSS/DSCN MKR DOCD: CPT | Performed by: NURSE PRACTITIONER

## 2017-08-09 PROCEDURE — 99213 OFFICE O/P EST LOW 20 MIN: CPT | Performed by: NURSE PRACTITIONER

## 2017-08-09 PROCEDURE — 82948 REAGENT STRIP/BLOOD GLUCOSE: CPT

## 2017-08-09 PROCEDURE — 36600 WITHDRAWAL OF ARTERIAL BLOOD: CPT

## 2017-08-09 PROCEDURE — 99285 EMERGENCY DEPT VISIT HI MDM: CPT

## 2017-08-09 RX ORDER — SIMVASTATIN 40 MG
40 TABLET ORAL NIGHTLY
Status: DISCONTINUED | OUTPATIENT
Start: 2017-08-09 | End: 2017-08-11 | Stop reason: HOSPADM

## 2017-08-09 RX ORDER — IPRATROPIUM BROMIDE AND ALBUTEROL SULFATE 2.5; .5 MG/3ML; MG/3ML
1 SOLUTION RESPIRATORY (INHALATION)
Status: DISCONTINUED | OUTPATIENT
Start: 2017-08-09 | End: 2017-08-11 | Stop reason: HOSPADM

## 2017-08-09 RX ORDER — BUDESONIDE 0.5 MG/2ML
500 INHALANT ORAL 2 TIMES DAILY
Status: DISCONTINUED | OUTPATIENT
Start: 2017-08-09 | End: 2017-08-09

## 2017-08-09 RX ORDER — WARFARIN SODIUM 5 MG/1
5 TABLET ORAL ONCE
Status: COMPLETED | OUTPATIENT
Start: 2017-08-09 | End: 2017-08-09

## 2017-08-09 RX ORDER — FORMOTEROL FUMARATE 20 UG/2ML
20 SOLUTION RESPIRATORY (INHALATION) 2 TIMES DAILY
Status: DISCONTINUED | OUTPATIENT
Start: 2017-08-09 | End: 2017-08-11 | Stop reason: HOSPADM

## 2017-08-09 RX ORDER — IPRATROPIUM BROMIDE AND ALBUTEROL SULFATE 2.5; .5 MG/3ML; MG/3ML
1 SOLUTION RESPIRATORY (INHALATION) ONCE
Status: COMPLETED | OUTPATIENT
Start: 2017-08-09 | End: 2017-08-09

## 2017-08-09 RX ORDER — PREDNISONE 20 MG/1
40 TABLET ORAL DAILY
Status: DISCONTINUED | OUTPATIENT
Start: 2017-08-09 | End: 2017-08-10

## 2017-08-09 RX ORDER — ACETAMINOPHEN 325 MG/1
650 TABLET ORAL EVERY 4 HOURS PRN
Status: DISCONTINUED | OUTPATIENT
Start: 2017-08-09 | End: 2017-08-11 | Stop reason: HOSPADM

## 2017-08-09 RX ORDER — LOSARTAN POTASSIUM 50 MG/1
50 TABLET ORAL 2 TIMES DAILY
Status: DISCONTINUED | OUTPATIENT
Start: 2017-08-09 | End: 2017-08-11 | Stop reason: HOSPADM

## 2017-08-09 RX ORDER — DOXAZOSIN MESYLATE 4 MG/1
4 TABLET ORAL NIGHTLY
Status: DISCONTINUED | OUTPATIENT
Start: 2017-08-09 | End: 2017-08-11 | Stop reason: HOSPADM

## 2017-08-09 RX ORDER — BENZONATATE 100 MG/1
100 CAPSULE ORAL 3 TIMES DAILY PRN
Status: DISCONTINUED | OUTPATIENT
Start: 2017-08-09 | End: 2017-08-11 | Stop reason: HOSPADM

## 2017-08-09 RX ORDER — WARFARIN SODIUM 7.5 MG/1
7.5 TABLET ORAL
Status: ON HOLD | COMMUNITY
End: 2017-08-11

## 2017-08-09 RX ORDER — SODIUM CHLORIDE 0.9 % (FLUSH) 0.9 %
10 SYRINGE (ML) INJECTION PRN
Status: DISCONTINUED | OUTPATIENT
Start: 2017-08-09 | End: 2017-08-11 | Stop reason: HOSPADM

## 2017-08-09 RX ORDER — DEXTROSE MONOHYDRATE 25 G/50ML
12.5 INJECTION, SOLUTION INTRAVENOUS PRN
Status: DISCONTINUED | OUTPATIENT
Start: 2017-08-09 | End: 2017-08-11 | Stop reason: HOSPADM

## 2017-08-09 RX ORDER — SODIUM CHLORIDE 0.9 % (FLUSH) 0.9 %
10 SYRINGE (ML) INJECTION EVERY 12 HOURS SCHEDULED
Status: DISCONTINUED | OUTPATIENT
Start: 2017-08-09 | End: 2017-08-11 | Stop reason: HOSPADM

## 2017-08-09 RX ORDER — ONDANSETRON 2 MG/ML
4 INJECTION INTRAMUSCULAR; INTRAVENOUS EVERY 6 HOURS PRN
Status: DISCONTINUED | OUTPATIENT
Start: 2017-08-09 | End: 2017-08-11 | Stop reason: HOSPADM

## 2017-08-09 RX ORDER — METHYLPREDNISOLONE SODIUM SUCCINATE 125 MG/2ML
125 INJECTION, POWDER, LYOPHILIZED, FOR SOLUTION INTRAMUSCULAR; INTRAVENOUS ONCE
Status: COMPLETED | OUTPATIENT
Start: 2017-08-09 | End: 2017-08-09

## 2017-08-09 RX ORDER — GUAIFENESIN 600 MG/1
600 TABLET, EXTENDED RELEASE ORAL 2 TIMES DAILY
Status: DISCONTINUED | OUTPATIENT
Start: 2017-08-09 | End: 2017-08-11 | Stop reason: HOSPADM

## 2017-08-09 RX ORDER — DEXTROSE MONOHYDRATE 50 MG/ML
100 INJECTION, SOLUTION INTRAVENOUS PRN
Status: DISCONTINUED | OUTPATIENT
Start: 2017-08-09 | End: 2017-08-11 | Stop reason: HOSPADM

## 2017-08-09 RX ORDER — ASPIRIN 81 MG/1
81 TABLET ORAL DAILY
Status: DISCONTINUED | OUTPATIENT
Start: 2017-08-10 | End: 2017-08-11 | Stop reason: HOSPADM

## 2017-08-09 RX ORDER — NICOTINE POLACRILEX 4 MG
15 LOZENGE BUCCAL PRN
Status: DISCONTINUED | OUTPATIENT
Start: 2017-08-09 | End: 2017-08-11 | Stop reason: HOSPADM

## 2017-08-09 RX ORDER — DORZOLAMIDE HCL 20 MG/ML
1 SOLUTION/ DROPS OPHTHALMIC 3 TIMES DAILY
Status: DISCONTINUED | OUTPATIENT
Start: 2017-08-09 | End: 2017-08-11 | Stop reason: HOSPADM

## 2017-08-09 RX ADMIN — IPRATROPIUM BROMIDE AND ALBUTEROL SULFATE 1 AMPULE: .5; 3 SOLUTION RESPIRATORY (INHALATION) at 12:44

## 2017-08-09 RX ADMIN — Medication 10 ML: at 20:44

## 2017-08-09 RX ADMIN — LOSARTAN POTASSIUM 50 MG: 50 TABLET, FILM COATED ORAL at 20:43

## 2017-08-09 RX ADMIN — ALBUTEROL SULFATE 15 MG: 2.5 SOLUTION RESPIRATORY (INHALATION) at 12:55

## 2017-08-09 RX ADMIN — IPRATROPIUM BROMIDE AND ALBUTEROL SULFATE 1 AMPULE: .5; 3 SOLUTION RESPIRATORY (INHALATION) at 16:05

## 2017-08-09 RX ADMIN — BENZONATATE 100 MG: 100 CAPSULE ORAL at 21:22

## 2017-08-09 RX ADMIN — METFORMIN HYDROCHLORIDE 1000 MG: 500 TABLET ORAL at 17:53

## 2017-08-09 RX ADMIN — DOXAZOSIN 4 MG: 4 TABLET ORAL at 20:43

## 2017-08-09 RX ADMIN — Medication 3 UNITS: at 17:52

## 2017-08-09 RX ADMIN — IPRATROPIUM BROMIDE AND ALBUTEROL SULFATE 1 AMPULE: .5; 3 SOLUTION RESPIRATORY (INHALATION) at 12:45

## 2017-08-09 RX ADMIN — GUAIFENESIN 600 MG: 600 TABLET, EXTENDED RELEASE ORAL at 20:43

## 2017-08-09 RX ADMIN — WARFARIN SODIUM 5 MG: 5 TABLET ORAL at 20:42

## 2017-08-09 RX ADMIN — FORMOTEROL FUMARATE DIHYDRATE 20 MCG: 20 SOLUTION RESPIRATORY (INHALATION) at 22:15

## 2017-08-09 RX ADMIN — METOPROLOL TARTRATE 25 MG: 25 TABLET ORAL at 20:43

## 2017-08-09 RX ADMIN — INSULIN LISPRO 2 UNITS: 100 INJECTION, SOLUTION INTRAVENOUS; SUBCUTANEOUS at 20:43

## 2017-08-09 RX ADMIN — SIMVASTATIN 40 MG: 40 TABLET, FILM COATED ORAL at 20:43

## 2017-08-09 RX ADMIN — METHYLPREDNISOLONE SODIUM SUCCINATE 125 MG: 125 INJECTION, POWDER, FOR SOLUTION INTRAMUSCULAR; INTRAVENOUS at 12:59

## 2017-08-09 RX ADMIN — DORZOLAMIDE HYDROCHLORIDE 1 DROP: 20 SOLUTION/ DROPS OPHTHALMIC at 20:42

## 2017-08-09 RX ADMIN — DORZOLAMIDE HYDROCHLORIDE 1 DROP: 20 SOLUTION/ DROPS OPHTHALMIC at 17:54

## 2017-08-09 RX ADMIN — IPRATROPIUM BROMIDE AND ALBUTEROL SULFATE 1 AMPULE: .5; 3 SOLUTION RESPIRATORY (INHALATION) at 22:03

## 2017-08-09 RX ADMIN — PREDNISONE 40 MG: 20 TABLET ORAL at 17:53

## 2017-08-09 ASSESSMENT — ENCOUNTER SYMPTOMS
EYE PAIN: 0
TROUBLE SWALLOWING: 1
NAUSEA: 0
NAUSEA: 0
ABDOMINAL PAIN: 0
VOMITING: 0
SHORTNESS OF BREATH: 1
COUGH: 1
BACK PAIN: 1
WHEEZING: 1
PHOTOPHOBIA: 0
BACK PAIN: 0
CHEST TIGHTNESS: 1
VOICE CHANGE: 0
ABDOMINAL PAIN: 0
COLOR CHANGE: 0
APNEA: 0
WHEEZING: 1
ANAL BLEEDING: 0
DIARRHEA: 0
STRIDOR: 0
VOMITING: 0
SHORTNESS OF BREATH: 1
EYE REDNESS: 0
CONSTIPATION: 0
COUGH: 1
ABDOMINAL DISTENTION: 0
DIARRHEA: 0
BLOOD IN STOOL: 0

## 2017-08-09 ASSESSMENT — PAIN SCALES - GENERAL: PAINLEVEL_OUTOF10: 0

## 2017-08-10 LAB
GLUCOSE BLD-MCNC: 213 MG/DL (ref 70–108)
GLUCOSE BLD-MCNC: 240 MG/DL (ref 70–108)
GLUCOSE BLD-MCNC: 258 MG/DL (ref 70–108)
GLUCOSE BLD-MCNC: 281 MG/DL (ref 70–108)
INR BLD: 2.38 (ref 0.85–1.13)

## 2017-08-10 PROCEDURE — 6360000002 HC RX W HCPCS: Performed by: INTERNAL MEDICINE

## 2017-08-10 PROCEDURE — 6370000000 HC RX 637 (ALT 250 FOR IP): Performed by: INTERNAL MEDICINE

## 2017-08-10 PROCEDURE — 99232 SBSQ HOSP IP/OBS MODERATE 35: CPT | Performed by: INTERNAL MEDICINE

## 2017-08-10 PROCEDURE — 82948 REAGENT STRIP/BLOOD GLUCOSE: CPT

## 2017-08-10 PROCEDURE — 1200000003 HC TELEMETRY R&B

## 2017-08-10 PROCEDURE — 94640 AIRWAY INHALATION TREATMENT: CPT

## 2017-08-10 PROCEDURE — 85610 PROTHROMBIN TIME: CPT

## 2017-08-10 PROCEDURE — 99233 SBSQ HOSP IP/OBS HIGH 50: CPT | Performed by: INTERNAL MEDICINE

## 2017-08-10 PROCEDURE — 2580000003 HC RX 258: Performed by: INTERNAL MEDICINE

## 2017-08-10 PROCEDURE — 36415 COLL VENOUS BLD VENIPUNCTURE: CPT

## 2017-08-10 PROCEDURE — 6370000000 HC RX 637 (ALT 250 FOR IP)

## 2017-08-10 PROCEDURE — 2700000000 HC OXYGEN THERAPY PER DAY

## 2017-08-10 RX ORDER — METHYLPREDNISOLONE SODIUM SUCCINATE 40 MG/ML
40 INJECTION, POWDER, LYOPHILIZED, FOR SOLUTION INTRAMUSCULAR; INTRAVENOUS EVERY 12 HOURS
Status: DISCONTINUED | OUTPATIENT
Start: 2017-08-10 | End: 2017-08-11 | Stop reason: HOSPADM

## 2017-08-10 RX ORDER — WARFARIN SODIUM 7.5 MG/1
7.5 TABLET ORAL
Status: COMPLETED | OUTPATIENT
Start: 2017-08-10 | End: 2017-08-10

## 2017-08-10 RX ADMIN — GUAIFENESIN 600 MG: 600 TABLET, EXTENDED RELEASE ORAL at 09:09

## 2017-08-10 RX ADMIN — Medication 6 UNITS: at 09:09

## 2017-08-10 RX ADMIN — SIMVASTATIN 40 MG: 40 TABLET, FILM COATED ORAL at 20:28

## 2017-08-10 RX ADMIN — METOPROLOL TARTRATE 25 MG: 25 TABLET ORAL at 09:09

## 2017-08-10 RX ADMIN — Medication 9 UNITS: at 18:42

## 2017-08-10 RX ADMIN — DORZOLAMIDE HYDROCHLORIDE 1 DROP: 20 SOLUTION/ DROPS OPHTHALMIC at 20:28

## 2017-08-10 RX ADMIN — METOPROLOL TARTRATE 25 MG: 25 TABLET ORAL at 20:28

## 2017-08-10 RX ADMIN — LOSARTAN POTASSIUM 50 MG: 50 TABLET, FILM COATED ORAL at 09:09

## 2017-08-10 RX ADMIN — ASPIRIN 81 MG: 81 TABLET, COATED ORAL at 09:09

## 2017-08-10 RX ADMIN — PREDNISONE 40 MG: 20 TABLET ORAL at 09:09

## 2017-08-10 RX ADMIN — IPRATROPIUM BROMIDE AND ALBUTEROL SULFATE 1 AMPULE: .5; 3 SOLUTION RESPIRATORY (INHALATION) at 12:15

## 2017-08-10 RX ADMIN — IPRATROPIUM BROMIDE AND ALBUTEROL SULFATE 1 AMPULE: .5; 3 SOLUTION RESPIRATORY (INHALATION) at 20:32

## 2017-08-10 RX ADMIN — LEVOTHYROXINE SODIUM 187 MCG: 50 TABLET ORAL at 06:37

## 2017-08-10 RX ADMIN — LOSARTAN POTASSIUM 50 MG: 50 TABLET, FILM COATED ORAL at 20:28

## 2017-08-10 RX ADMIN — BENZONATATE 100 MG: 100 CAPSULE ORAL at 06:37

## 2017-08-10 RX ADMIN — DORZOLAMIDE HYDROCHLORIDE 1 DROP: 20 SOLUTION/ DROPS OPHTHALMIC at 16:32

## 2017-08-10 RX ADMIN — IPRATROPIUM BROMIDE AND ALBUTEROL SULFATE 1 AMPULE: .5; 3 SOLUTION RESPIRATORY (INHALATION) at 17:11

## 2017-08-10 RX ADMIN — FORMOTEROL FUMARATE DIHYDRATE 20 MCG: 20 SOLUTION RESPIRATORY (INHALATION) at 20:32

## 2017-08-10 RX ADMIN — Medication 6 UNITS: at 12:11

## 2017-08-10 RX ADMIN — DOXAZOSIN 4 MG: 4 TABLET ORAL at 20:28

## 2017-08-10 RX ADMIN — Medication 10 ML: at 20:28

## 2017-08-10 RX ADMIN — DORZOLAMIDE HYDROCHLORIDE 1 DROP: 20 SOLUTION/ DROPS OPHTHALMIC at 09:09

## 2017-08-10 RX ADMIN — FORMOTEROL FUMARATE DIHYDRATE 20 MCG: 20 SOLUTION RESPIRATORY (INHALATION) at 08:15

## 2017-08-10 RX ADMIN — WARFARIN SODIUM 7.5 MG: 7.5 TABLET ORAL at 18:41

## 2017-08-10 RX ADMIN — METFORMIN HYDROCHLORIDE 1000 MG: 500 TABLET ORAL at 16:37

## 2017-08-10 RX ADMIN — BENZONATATE 100 MG: 100 CAPSULE ORAL at 12:10

## 2017-08-10 RX ADMIN — METFORMIN HYDROCHLORIDE 1000 MG: 500 TABLET ORAL at 09:09

## 2017-08-10 RX ADMIN — INSULIN LISPRO 5 UNITS: 100 INJECTION, SOLUTION INTRAVENOUS; SUBCUTANEOUS at 20:28

## 2017-08-10 RX ADMIN — Medication 10 ML: at 09:17

## 2017-08-10 RX ADMIN — METHYLPREDNISOLONE SODIUM SUCCINATE 40 MG: 40 INJECTION, POWDER, FOR SOLUTION INTRAMUSCULAR; INTRAVENOUS at 18:41

## 2017-08-10 RX ADMIN — IPRATROPIUM BROMIDE AND ALBUTEROL SULFATE 1 AMPULE: .5; 3 SOLUTION RESPIRATORY (INHALATION) at 08:15

## 2017-08-10 RX ADMIN — GUAIFENESIN 600 MG: 600 TABLET, EXTENDED RELEASE ORAL at 20:28

## 2017-08-10 ASSESSMENT — PAIN DESCRIPTION - LOCATION: LOCATION: KNEE

## 2017-08-10 ASSESSMENT — PAIN DESCRIPTION - PROGRESSION: CLINICAL_PROGRESSION: GRADUALLY IMPROVING

## 2017-08-10 ASSESSMENT — PAIN DESCRIPTION - DESCRIPTORS: DESCRIPTORS: ACHING

## 2017-08-10 ASSESSMENT — PAIN DESCRIPTION - FREQUENCY: FREQUENCY: INTERMITTENT

## 2017-08-10 ASSESSMENT — PAIN SCALES - GENERAL
PAINLEVEL_OUTOF10: 0
PAINLEVEL_OUTOF10: 2

## 2017-08-10 ASSESSMENT — PAIN DESCRIPTION - PAIN TYPE: TYPE: ACUTE PAIN

## 2017-08-10 ASSESSMENT — PAIN DESCRIPTION - ORIENTATION: ORIENTATION: LEFT

## 2017-08-11 ENCOUNTER — TELEPHONE (OUTPATIENT)
Dept: FAMILY MEDICINE CLINIC | Age: 82
End: 2017-08-11

## 2017-08-11 VITALS
HEART RATE: 81 BPM | SYSTOLIC BLOOD PRESSURE: 157 MMHG | HEIGHT: 66 IN | BODY MASS INDEX: 45.76 KG/M2 | DIASTOLIC BLOOD PRESSURE: 69 MMHG | WEIGHT: 284.7 LBS | TEMPERATURE: 98.8 F | OXYGEN SATURATION: 92 % | RESPIRATION RATE: 18 BRPM

## 2017-08-11 LAB
GLUCOSE BLD-MCNC: 237 MG/DL (ref 70–108)
GLUCOSE BLD-MCNC: 244 MG/DL (ref 70–108)
INR BLD: 2.78 (ref 0.85–1.13)

## 2017-08-11 PROCEDURE — 6370000000 HC RX 637 (ALT 250 FOR IP): Performed by: INTERNAL MEDICINE

## 2017-08-11 PROCEDURE — 94640 AIRWAY INHALATION TREATMENT: CPT

## 2017-08-11 PROCEDURE — 36415 COLL VENOUS BLD VENIPUNCTURE: CPT

## 2017-08-11 PROCEDURE — 99239 HOSP IP/OBS DSCHRG MGMT >30: CPT | Performed by: INTERNAL MEDICINE

## 2017-08-11 PROCEDURE — 85610 PROTHROMBIN TIME: CPT

## 2017-08-11 PROCEDURE — 82948 REAGENT STRIP/BLOOD GLUCOSE: CPT

## 2017-08-11 PROCEDURE — 6360000002 HC RX W HCPCS: Performed by: INTERNAL MEDICINE

## 2017-08-11 PROCEDURE — 2580000003 HC RX 258: Performed by: INTERNAL MEDICINE

## 2017-08-11 RX ORDER — BENZONATATE 100 MG/1
100 CAPSULE ORAL 3 TIMES DAILY PRN
Qty: 30 CAPSULE | Refills: 0 | Status: SHIPPED | OUTPATIENT
Start: 2017-08-11 | End: 2017-08-18

## 2017-08-11 RX ORDER — GUAIFENESIN 600 MG/1
600 TABLET, EXTENDED RELEASE ORAL 2 TIMES DAILY
Qty: 60 TABLET | Refills: 1 | Status: SHIPPED | OUTPATIENT
Start: 2017-08-11 | End: 2017-08-11

## 2017-08-11 RX ORDER — PREDNISONE 20 MG/1
20 TABLET ORAL DAILY
Qty: 5 TABLET | Refills: 0 | Status: SHIPPED | OUTPATIENT
Start: 2017-08-11 | End: 2017-08-11

## 2017-08-11 RX ORDER — GUAIFENESIN 600 MG/1
600 TABLET, EXTENDED RELEASE ORAL 2 TIMES DAILY
Qty: 60 TABLET | Refills: 1 | Status: SHIPPED | OUTPATIENT
Start: 2017-08-11 | End: 2017-08-14

## 2017-08-11 RX ORDER — PREDNISONE 20 MG/1
20 TABLET ORAL DAILY
Qty: 5 TABLET | Refills: 0 | Status: SHIPPED | OUTPATIENT
Start: 2017-08-11 | End: 2017-08-16 | Stop reason: ALTCHOICE

## 2017-08-11 RX ORDER — BENZONATATE 100 MG/1
100 CAPSULE ORAL 3 TIMES DAILY PRN
Qty: 30 CAPSULE | Refills: 0 | Status: SHIPPED | OUTPATIENT
Start: 2017-08-11 | End: 2017-08-11

## 2017-08-11 RX ORDER — LEVOTHYROXINE SODIUM 0.07 MG/1
175 TABLET ORAL DAILY
COMMUNITY
End: 2017-10-31 | Stop reason: SDUPTHER

## 2017-08-11 RX ADMIN — IPRATROPIUM BROMIDE AND ALBUTEROL SULFATE 1 AMPULE: .5; 3 SOLUTION RESPIRATORY (INHALATION) at 14:06

## 2017-08-11 RX ADMIN — BENZONATATE 100 MG: 100 CAPSULE ORAL at 09:03

## 2017-08-11 RX ADMIN — DORZOLAMIDE HYDROCHLORIDE 1 DROP: 20 SOLUTION/ DROPS OPHTHALMIC at 09:03

## 2017-08-11 RX ADMIN — LOSARTAN POTASSIUM 50 MG: 50 TABLET, FILM COATED ORAL at 09:03

## 2017-08-11 RX ADMIN — METOPROLOL TARTRATE 25 MG: 25 TABLET ORAL at 09:03

## 2017-08-11 RX ADMIN — Medication 6 UNITS: at 13:01

## 2017-08-11 RX ADMIN — Medication 10 ML: at 09:12

## 2017-08-11 RX ADMIN — IPRATROPIUM BROMIDE AND ALBUTEROL SULFATE 1 AMPULE: .5; 3 SOLUTION RESPIRATORY (INHALATION) at 10:28

## 2017-08-11 RX ADMIN — FORMOTEROL FUMARATE DIHYDRATE 20 MCG: 20 SOLUTION RESPIRATORY (INHALATION) at 10:36

## 2017-08-11 RX ADMIN — ASPIRIN 81 MG: 81 TABLET, COATED ORAL at 09:03

## 2017-08-11 RX ADMIN — GUAIFENESIN 600 MG: 600 TABLET, EXTENDED RELEASE ORAL at 09:03

## 2017-08-11 RX ADMIN — METFORMIN HYDROCHLORIDE 1000 MG: 500 TABLET ORAL at 09:03

## 2017-08-11 RX ADMIN — METHYLPREDNISOLONE SODIUM SUCCINATE 40 MG: 40 INJECTION, POWDER, FOR SOLUTION INTRAMUSCULAR; INTRAVENOUS at 05:24

## 2017-08-11 RX ADMIN — Medication 6 UNITS: at 09:05

## 2017-08-11 RX ADMIN — LEVOTHYROXINE SODIUM 187 MCG: 50 TABLET ORAL at 05:24

## 2017-08-11 ASSESSMENT — PAIN SCALES - GENERAL: PAINLEVEL_OUTOF10: 0

## 2017-08-14 ENCOUNTER — HOSPITAL ENCOUNTER (OUTPATIENT)
Dept: PHARMACY | Age: 82
Setting detail: THERAPIES SERIES
Discharge: HOME | End: 2017-08-14
Payer: MEDICARE

## 2017-08-14 ENCOUNTER — TELEPHONE (OUTPATIENT)
Dept: PHARMACY | Facility: CLINIC | Age: 82
End: 2017-08-14

## 2017-08-14 ENCOUNTER — TELEPHONE (OUTPATIENT)
Dept: FAMILY MEDICINE CLINIC | Age: 82
End: 2017-08-14

## 2017-08-14 VITALS
DIASTOLIC BLOOD PRESSURE: 65 MMHG | WEIGHT: 282 LBS | BODY MASS INDEX: 45.52 KG/M2 | HEART RATE: 67 BPM | SYSTOLIC BLOOD PRESSURE: 131 MMHG

## 2017-08-14 DIAGNOSIS — I82.5Z1 CHRONIC DEEP VEIN THROMBOSIS (DVT) OF DISTAL VEIN OF RIGHT LOWER EXTREMITY (HCC): ICD-10-CM

## 2017-08-14 LAB — POC INR: 2.2 (ref 0.8–1.2)

## 2017-08-14 PROCEDURE — 99211 OFF/OP EST MAY X REQ PHY/QHP: CPT

## 2017-08-14 PROCEDURE — 36416 COLLJ CAPILLARY BLOOD SPEC: CPT

## 2017-08-14 PROCEDURE — 85610 PROTHROMBIN TIME: CPT

## 2017-08-14 RX ORDER — LISINOPRIL 20 MG/1
1 TABLET ORAL 2 TIMES DAILY
COMMUNITY
Start: 2017-08-04 | End: 2017-10-05

## 2017-08-14 RX ORDER — ASCORBIC ACID 500 MG
1000 TABLET ORAL DAILY
COMMUNITY
End: 2017-10-05

## 2017-08-14 RX ORDER — VIT C/B6/B5/MAGNESIUM/HERB 173 50-5-6-5MG
1 CAPSULE ORAL DAILY
COMMUNITY
End: 2018-01-30 | Stop reason: ALTCHOICE

## 2017-08-15 ENCOUNTER — CARE COORDINATION (OUTPATIENT)
Dept: CASE MANAGEMENT | Age: 82
End: 2017-08-15

## 2017-08-15 ENCOUNTER — TELEPHONE (OUTPATIENT)
Dept: PULMONOLOGY | Age: 82
End: 2017-08-15

## 2017-08-15 DIAGNOSIS — J43.1 PANLOBULAR EMPHYSEMA (HCC): Primary | ICD-10-CM

## 2017-08-16 ENCOUNTER — OFFICE VISIT (OUTPATIENT)
Dept: FAMILY MEDICINE CLINIC | Age: 82
End: 2017-08-16
Payer: MEDICARE

## 2017-08-16 VITALS
TEMPERATURE: 98.5 F | BODY MASS INDEX: 45.48 KG/M2 | WEIGHT: 283 LBS | SYSTOLIC BLOOD PRESSURE: 118 MMHG | HEART RATE: 72 BPM | HEIGHT: 66 IN | RESPIRATION RATE: 20 BRPM | DIASTOLIC BLOOD PRESSURE: 56 MMHG | OXYGEN SATURATION: 93 %

## 2017-08-16 DIAGNOSIS — D64.9 ANEMIA, UNSPECIFIED TYPE: ICD-10-CM

## 2017-08-16 DIAGNOSIS — J44.1 CHRONIC OBSTRUCTIVE PULMONARY DISEASE WITH ACUTE EXACERBATION (HCC): ICD-10-CM

## 2017-08-16 DIAGNOSIS — E66.01 MORBID OBESITY DUE TO EXCESS CALORIES (HCC): ICD-10-CM

## 2017-08-16 DIAGNOSIS — Z91.81 RISK FOR FALLS: ICD-10-CM

## 2017-08-16 DIAGNOSIS — Z09 HOSPITAL DISCHARGE FOLLOW-UP: Primary | ICD-10-CM

## 2017-08-16 PROCEDURE — 99496 TRANSJ CARE MGMT HIGH F2F 7D: CPT | Performed by: NURSE PRACTITIONER

## 2017-08-16 PROCEDURE — G8510 SCR DEP NEG, NO PLAN REQD: HCPCS | Performed by: NURSE PRACTITIONER

## 2017-08-16 ASSESSMENT — ENCOUNTER SYMPTOMS
COUGH: 1
VOICE CHANGE: 0
ABDOMINAL PAIN: 0
CONSTIPATION: 0
VOMITING: 0
BACK PAIN: 1
DIARRHEA: 0
BLOOD IN STOOL: 0
NAUSEA: 0
PHOTOPHOBIA: 0
APNEA: 0
ABDOMINAL DISTENTION: 0
ANAL BLEEDING: 0
WHEEZING: 0
TROUBLE SWALLOWING: 0
SHORTNESS OF BREATH: 1

## 2017-08-16 ASSESSMENT — PATIENT HEALTH QUESTIONNAIRE - PHQ9
2. FEELING DOWN, DEPRESSED OR HOPELESS: 0
1. LITTLE INTEREST OR PLEASURE IN DOING THINGS: 0
SUM OF ALL RESPONSES TO PHQ9 QUESTIONS 1 & 2: 0
SUM OF ALL RESPONSES TO PHQ QUESTIONS 1-9: 0

## 2017-08-18 ENCOUNTER — CARE COORDINATION (OUTPATIENT)
Dept: CASE MANAGEMENT | Age: 82
End: 2017-08-18

## 2017-08-21 ENCOUNTER — HOSPITAL ENCOUNTER (OUTPATIENT)
Age: 82
Setting detail: SPECIMEN
Discharge: HOME | End: 2017-08-21
Payer: MEDICARE

## 2017-08-21 LAB — INR BLD: 2.43 (ref 0.85–1.13)

## 2017-08-21 PROCEDURE — 85610 PROTHROMBIN TIME: CPT

## 2017-08-22 ENCOUNTER — CARE COORDINATION (OUTPATIENT)
Dept: CASE MANAGEMENT | Age: 82
End: 2017-08-22

## 2017-08-22 ENCOUNTER — ANTI-COAG VISIT (OUTPATIENT)
Dept: PHARMACY | Age: 82
End: 2017-08-22

## 2017-08-22 DIAGNOSIS — I82.5Z1 CHRONIC DEEP VEIN THROMBOSIS (DVT) OF DISTAL VEIN OF RIGHT LOWER EXTREMITY (HCC): ICD-10-CM

## 2017-08-24 ENCOUNTER — TELEPHONE (OUTPATIENT)
Dept: FAMILY MEDICINE CLINIC | Age: 82
End: 2017-08-24

## 2017-08-24 DIAGNOSIS — R30.0 DYSURIA: Primary | ICD-10-CM

## 2017-08-24 DIAGNOSIS — R35.0 FREQUENT URINATION: ICD-10-CM

## 2017-08-25 ENCOUNTER — HOSPITAL ENCOUNTER (OUTPATIENT)
Age: 82
Discharge: HOME | End: 2017-08-25
Payer: MEDICARE

## 2017-08-25 ENCOUNTER — OFFICE VISIT (OUTPATIENT)
Dept: PULMONOLOGY | Age: 82
End: 2017-08-25
Payer: MEDICARE

## 2017-08-25 VITALS
OXYGEN SATURATION: 94 % | WEIGHT: 286 LBS | SYSTOLIC BLOOD PRESSURE: 130 MMHG | DIASTOLIC BLOOD PRESSURE: 74 MMHG | HEIGHT: 66 IN | HEART RATE: 74 BPM | BODY MASS INDEX: 45.96 KG/M2

## 2017-08-25 DIAGNOSIS — R35.0 FREQUENT URINATION: ICD-10-CM

## 2017-08-25 DIAGNOSIS — R05.9 COUGH: Primary | ICD-10-CM

## 2017-08-25 DIAGNOSIS — R30.0 DYSURIA: ICD-10-CM

## 2017-08-25 DIAGNOSIS — J41.1 MUCOPURULENT CHRONIC BRONCHITIS (HCC): ICD-10-CM

## 2017-08-25 LAB
BACTERIA: ABNORMAL /HPF
BILIRUBIN URINE: NEGATIVE
BLOOD, URINE: ABNORMAL
CASTS 2: ABNORMAL /LPF
CASTS UA: ABNORMAL /LPF
CHARACTER, URINE: CLEAR
COLOR: YELLOW
CRYSTALS, UA: ABNORMAL
EPITHELIAL CELLS, UA: ABNORMAL /HPF
GLUCOSE URINE: NEGATIVE MG/DL
KETONES, URINE: NEGATIVE
LEUKOCYTE ESTERASE, URINE: ABNORMAL
MISCELLANEOUS 2: ABNORMAL
NITRITE, URINE: NEGATIVE
PH UA: 6
PROTEIN UA: NEGATIVE
RBC URINE: ABNORMAL /HPF
RENAL EPITHELIAL, UA: ABNORMAL
SPECIFIC GRAVITY, URINE: 1.01 (ref 1–1.03)
UROBILINOGEN, URINE: 0.2 EU/DL
WBC UA: ABNORMAL /HPF
YEAST: ABNORMAL

## 2017-08-25 PROCEDURE — 81001 URINALYSIS AUTO W/SCOPE: CPT

## 2017-08-25 PROCEDURE — 3023F SPIROM DOC REV: CPT | Performed by: INTERNAL MEDICINE

## 2017-08-25 PROCEDURE — 1111F DSCHRG MED/CURRENT MED MERGE: CPT | Performed by: INTERNAL MEDICINE

## 2017-08-25 PROCEDURE — 87077 CULTURE AEROBIC IDENTIFY: CPT

## 2017-08-25 PROCEDURE — G8926 SPIRO NO PERF OR DOC: HCPCS | Performed by: INTERNAL MEDICINE

## 2017-08-25 PROCEDURE — 4040F PNEUMOC VAC/ADMIN/RCVD: CPT | Performed by: INTERNAL MEDICINE

## 2017-08-25 PROCEDURE — G8427 DOCREV CUR MEDS BY ELIG CLIN: HCPCS | Performed by: INTERNAL MEDICINE

## 2017-08-25 PROCEDURE — 87086 URINE CULTURE/COLONY COUNT: CPT

## 2017-08-25 PROCEDURE — 87186 SC STD MICRODIL/AGAR DIL: CPT

## 2017-08-25 PROCEDURE — 1123F ACP DISCUSS/DSCN MKR DOCD: CPT | Performed by: INTERNAL MEDICINE

## 2017-08-25 PROCEDURE — 1036F TOBACCO NON-USER: CPT | Performed by: INTERNAL MEDICINE

## 2017-08-25 PROCEDURE — G8417 CALC BMI ABV UP PARAM F/U: HCPCS | Performed by: INTERNAL MEDICINE

## 2017-08-25 PROCEDURE — 99214 OFFICE O/P EST MOD 30 MIN: CPT | Performed by: INTERNAL MEDICINE

## 2017-08-25 RX ORDER — CIPROFLOXACIN 500 MG/1
500 TABLET, FILM COATED ORAL 2 TIMES DAILY
Qty: 20 TABLET | Refills: 0 | Status: SHIPPED | OUTPATIENT
Start: 2017-08-25 | End: 2017-09-04

## 2017-08-25 ASSESSMENT — ENCOUNTER SYMPTOMS
SHORTNESS OF BREATH: 1
CHEST TIGHTNESS: 1
COUGH: 1
APNEA: 1
WHEEZING: 1

## 2017-08-27 LAB
ORGANISM: ABNORMAL
URINE CULTURE REFLEX: ABNORMAL

## 2017-08-28 ENCOUNTER — CARE COORDINATION (OUTPATIENT)
Dept: CASE MANAGEMENT | Age: 82
End: 2017-08-28

## 2017-08-28 ENCOUNTER — TELEPHONE (OUTPATIENT)
Dept: FAMILY MEDICINE CLINIC | Age: 82
End: 2017-08-28

## 2017-08-29 ENCOUNTER — HOSPITAL ENCOUNTER (OUTPATIENT)
Age: 82
Setting detail: OUTPATIENT SURGERY
Discharge: HOME | End: 2017-08-29
Attending: INTERNAL MEDICINE | Admitting: INTERNAL MEDICINE
Payer: MEDICARE

## 2017-08-29 VITALS
DIASTOLIC BLOOD PRESSURE: 52 MMHG | RESPIRATION RATE: 24 BRPM | TEMPERATURE: 97.1 F | HEIGHT: 66 IN | BODY MASS INDEX: 45.88 KG/M2 | SYSTOLIC BLOOD PRESSURE: 155 MMHG | OXYGEN SATURATION: 96 % | WEIGHT: 285.5 LBS | HEART RATE: 84 BPM

## 2017-08-29 LAB
AFB SMEAR: NORMAL
CHARACTER, BODY FLUID: ABNORMAL
COLOR: ABNORMAL
EOSINOPHIL FLUID: 3 % (ref 0–5)
LYMPHOCYTES, BODY FLUID: 19 % (ref 25–100)
MONOCYTE, FLUID: 10 % (ref 25–100)
RBC FLUID: ABNORMAL /CUMM (ref 0–100)
SEGMENTED NEUTROPHILS, BODY FLUID: 68 % (ref 0–25)
SPECIMEN: ABNORMAL
WBC FLUID: 362 /MM3 (ref 0–500)

## 2017-08-29 PROCEDURE — 87206 SMEAR FLUORESCENT/ACID STAI: CPT

## 2017-08-29 PROCEDURE — 89051 BODY FLUID CELL COUNT: CPT

## 2017-08-29 PROCEDURE — 3609027000 HC BRONCHOSCOPY: Performed by: INTERNAL MEDICINE

## 2017-08-29 PROCEDURE — 2580000003 HC RX 258: Performed by: INTERNAL MEDICINE

## 2017-08-29 PROCEDURE — 87077 CULTURE AEROBIC IDENTIFY: CPT

## 2017-08-29 PROCEDURE — 6360000002 HC RX W HCPCS: Performed by: INTERNAL MEDICINE

## 2017-08-29 PROCEDURE — 87205 SMEAR GRAM STAIN: CPT

## 2017-08-29 PROCEDURE — 2500000003 HC RX 250 WO HCPCS

## 2017-08-29 PROCEDURE — 99152 MOD SED SAME PHYS/QHP 5/>YRS: CPT | Performed by: INTERNAL MEDICINE

## 2017-08-29 PROCEDURE — 31645 BRNCHSC W/THER ASPIR 1ST: CPT | Performed by: INTERNAL MEDICINE

## 2017-08-29 PROCEDURE — 7100000001 HC PACU RECOVERY - ADDTL 15 MIN: Performed by: INTERNAL MEDICINE

## 2017-08-29 PROCEDURE — 7100000000 HC PACU RECOVERY - FIRST 15 MIN: Performed by: INTERNAL MEDICINE

## 2017-08-29 PROCEDURE — 87070 CULTURE OTHR SPECIMN AEROBIC: CPT

## 2017-08-29 PROCEDURE — 6370000000 HC RX 637 (ALT 250 FOR IP)

## 2017-08-29 RX ORDER — MIDAZOLAM HYDROCHLORIDE 1 MG/ML
1 INJECTION INTRAMUSCULAR; INTRAVENOUS PRN
Status: DISCONTINUED | OUTPATIENT
Start: 2017-08-29 | End: 2017-08-29 | Stop reason: HOSPADM

## 2017-08-29 RX ORDER — SODIUM CHLORIDE 450 MG/100ML
INJECTION, SOLUTION INTRAVENOUS CONTINUOUS
Status: DISCONTINUED | OUTPATIENT
Start: 2017-08-29 | End: 2017-08-29 | Stop reason: HOSPADM

## 2017-08-29 RX ORDER — FENTANYL CITRATE 50 UG/ML
INJECTION, SOLUTION INTRAMUSCULAR; INTRAVENOUS PRN
Status: DISCONTINUED | OUTPATIENT
Start: 2017-08-29 | End: 2017-08-29 | Stop reason: HOSPADM

## 2017-08-29 RX ORDER — MIDAZOLAM HYDROCHLORIDE 1 MG/ML
INJECTION INTRAMUSCULAR; INTRAVENOUS PRN
Status: DISCONTINUED | OUTPATIENT
Start: 2017-08-29 | End: 2017-08-29 | Stop reason: HOSPADM

## 2017-08-29 RX ORDER — LIDOCAINE HYDROCHLORIDE 20 MG/ML
5 INJECTION, SOLUTION INFILTRATION; PERINEURAL ONCE
Status: DISCONTINUED | OUTPATIENT
Start: 2017-08-29 | End: 2017-08-29 | Stop reason: HOSPADM

## 2017-08-29 RX ORDER — OXYMETAZOLINE HYDROCHLORIDE 0.05 G/100ML
2 SPRAY NASAL 2 TIMES DAILY
Status: DISCONTINUED | OUTPATIENT
Start: 2017-08-29 | End: 2017-08-29 | Stop reason: HOSPADM

## 2017-08-29 RX ADMIN — SODIUM CHLORIDE: 4.5 INJECTION, SOLUTION INTRAVENOUS at 10:07

## 2017-08-29 ASSESSMENT — PAIN - FUNCTIONAL ASSESSMENT: PAIN_FUNCTIONAL_ASSESSMENT: 0-10

## 2017-08-29 ASSESSMENT — PAIN SCALES - GENERAL
PAINLEVEL_OUTOF10: 0
PAINLEVEL_OUTOF10: 0

## 2017-08-30 ENCOUNTER — CARE COORDINATION (OUTPATIENT)
Dept: CARE COORDINATION | Age: 82
End: 2017-08-30

## 2017-09-01 ENCOUNTER — OFFICE VISIT (OUTPATIENT)
Dept: PULMONOLOGY | Age: 82
End: 2017-09-01
Payer: MEDICARE

## 2017-09-01 VITALS
TEMPERATURE: 98.6 F | DIASTOLIC BLOOD PRESSURE: 64 MMHG | SYSTOLIC BLOOD PRESSURE: 124 MMHG | HEIGHT: 66 IN | WEIGHT: 285.2 LBS | BODY MASS INDEX: 45.83 KG/M2 | OXYGEN SATURATION: 94 % | HEART RATE: 83 BPM

## 2017-09-01 DIAGNOSIS — J43.1 PANLOBULAR EMPHYSEMA (HCC): Primary | ICD-10-CM

## 2017-09-01 PROCEDURE — G8417 CALC BMI ABV UP PARAM F/U: HCPCS | Performed by: INTERNAL MEDICINE

## 2017-09-01 PROCEDURE — G8427 DOCREV CUR MEDS BY ELIG CLIN: HCPCS | Performed by: INTERNAL MEDICINE

## 2017-09-01 PROCEDURE — 99212 OFFICE O/P EST SF 10 MIN: CPT | Performed by: INTERNAL MEDICINE

## 2017-09-01 PROCEDURE — 4040F PNEUMOC VAC/ADMIN/RCVD: CPT | Performed by: INTERNAL MEDICINE

## 2017-09-01 PROCEDURE — 1123F ACP DISCUSS/DSCN MKR DOCD: CPT | Performed by: INTERNAL MEDICINE

## 2017-09-01 PROCEDURE — 1036F TOBACCO NON-USER: CPT | Performed by: INTERNAL MEDICINE

## 2017-09-01 PROCEDURE — 1111F DSCHRG MED/CURRENT MED MERGE: CPT | Performed by: INTERNAL MEDICINE

## 2017-09-01 PROCEDURE — 3023F SPIROM DOC REV: CPT | Performed by: INTERNAL MEDICINE

## 2017-09-01 PROCEDURE — G8926 SPIRO NO PERF OR DOC: HCPCS | Performed by: INTERNAL MEDICINE

## 2017-09-02 LAB
GRAM STAIN RESULT: NORMAL
RESPIRATORY CULTURE: NORMAL

## 2017-09-05 ENCOUNTER — HOSPITAL ENCOUNTER (OUTPATIENT)
Dept: PHARMACY | Age: 82
Setting detail: THERAPIES SERIES
Discharge: HOME | End: 2017-09-05
Payer: MEDICARE

## 2017-09-05 VITALS
HEART RATE: 90 BPM | SYSTOLIC BLOOD PRESSURE: 151 MMHG | BODY MASS INDEX: 44.87 KG/M2 | WEIGHT: 278 LBS | DIASTOLIC BLOOD PRESSURE: 64 MMHG

## 2017-09-05 DIAGNOSIS — I82.5Z1 CHRONIC DEEP VEIN THROMBOSIS (DVT) OF DISTAL VEIN OF RIGHT LOWER EXTREMITY (HCC): ICD-10-CM

## 2017-09-05 LAB — POC INR: 1.9 (ref 0.8–1.2)

## 2017-09-05 PROCEDURE — 36416 COLLJ CAPILLARY BLOOD SPEC: CPT

## 2017-09-05 PROCEDURE — 85610 PROTHROMBIN TIME: CPT

## 2017-09-05 PROCEDURE — 99211 OFF/OP EST MAY X REQ PHY/QHP: CPT

## 2017-09-05 ASSESSMENT — ENCOUNTER SYMPTOMS
DIARRHEA: 0
SHORTNESS OF BREATH: 0
CONSTIPATION: 0
BLOOD IN STOOL: 0

## 2017-09-06 ENCOUNTER — CARE COORDINATION (OUTPATIENT)
Dept: CASE MANAGEMENT | Age: 82
End: 2017-09-06

## 2017-09-19 ENCOUNTER — HOSPITAL ENCOUNTER (OUTPATIENT)
Dept: PHARMACY | Age: 82
Setting detail: THERAPIES SERIES
Discharge: HOME | End: 2017-09-19
Payer: MEDICARE

## 2017-09-19 VITALS
DIASTOLIC BLOOD PRESSURE: 60 MMHG | HEART RATE: 68 BPM | SYSTOLIC BLOOD PRESSURE: 113 MMHG | WEIGHT: 279.6 LBS | BODY MASS INDEX: 45.13 KG/M2

## 2017-09-19 DIAGNOSIS — I82.5Z1 CHRONIC DEEP VEIN THROMBOSIS (DVT) OF DISTAL VEIN OF RIGHT LOWER EXTREMITY (HCC): ICD-10-CM

## 2017-09-19 LAB — POC INR: 1.8 (ref 0.8–1.2)

## 2017-09-19 PROCEDURE — 85610 PROTHROMBIN TIME: CPT

## 2017-09-19 PROCEDURE — 36416 COLLJ CAPILLARY BLOOD SPEC: CPT

## 2017-09-19 PROCEDURE — 99211 OFF/OP EST MAY X REQ PHY/QHP: CPT

## 2017-09-26 ENCOUNTER — HOSPITAL ENCOUNTER (OUTPATIENT)
Age: 82
Discharge: HOME | End: 2017-09-26
Payer: MEDICARE

## 2017-09-26 DIAGNOSIS — E11.8 TYPE 2 DIABETES MELLITUS WITH COMPLICATION, WITHOUT LONG-TERM CURRENT USE OF INSULIN (HCC): ICD-10-CM

## 2017-09-26 DIAGNOSIS — E89.0 POST-SURGICAL HYPOTHYROIDISM: ICD-10-CM

## 2017-09-26 LAB
ALBUMIN SERPL-MCNC: 4.2 G/DL (ref 3.5–5.1)
ALP BLD-CCNC: 42 U/L (ref 38–126)
ALT SERPL-CCNC: 15 U/L (ref 11–66)
ANION GAP SERPL CALCULATED.3IONS-SCNC: 17 MEQ/L (ref 8–16)
ANISOCYTOSIS: ABNORMAL
AST SERPL-CCNC: 17 U/L (ref 5–40)
AVERAGE GLUCOSE: 189 MG/DL (ref 70–126)
BASOPHILS # BLD: 0.5 %
BASOPHILS ABSOLUTE: 0 THOU/MM3 (ref 0–0.1)
BILIRUB SERPL-MCNC: 0.3 MG/DL (ref 0.3–1.2)
BILIRUBIN DIRECT: < 0.2 MG/DL (ref 0–0.3)
BUN BLDV-MCNC: 15 MG/DL (ref 7–22)
CALCIUM SERPL-MCNC: 8.9 MG/DL (ref 8.5–10.5)
CHLORIDE BLD-SCNC: 98 MEQ/L (ref 98–111)
CHOLESTEROL, TOTAL: 152 MG/DL (ref 100–199)
CO2: 28 MEQ/L (ref 23–33)
CREAT SERPL-MCNC: 0.8 MG/DL (ref 0.4–1.2)
EOSINOPHIL # BLD: 2.8 %
EOSINOPHILS ABSOLUTE: 0.2 THOU/MM3 (ref 0–0.4)
GFR SERPL CREATININE-BSD FRML MDRD: > 90 ML/MIN/1.73M2
GLUCOSE BLD-MCNC: 211 MG/DL (ref 70–108)
HBA1C MFR BLD: 8.3 % (ref 4.4–6.4)
HCT VFR BLD CALC: 40 % (ref 42–52)
HDLC SERPL-MCNC: 36 MG/DL
HEMOGLOBIN: 13.4 GM/DL (ref 14–18)
LDL CHOLESTEROL CALCULATED: 59 MG/DL
LYMPHOCYTES # BLD: 24.9 %
LYMPHOCYTES ABSOLUTE: 2.1 THOU/MM3 (ref 1–4.8)
MCH RBC QN AUTO: 27.1 PG (ref 27–31)
MCHC RBC AUTO-ENTMCNC: 33.4 GM/DL (ref 33–37)
MCV RBC AUTO: 81.3 FL (ref 80–94)
MONOCYTES # BLD: 11.2 %
MONOCYTES ABSOLUTE: 0.9 THOU/MM3 (ref 0.4–1.3)
NUCLEATED RED BLOOD CELLS: 0 /100 WBC
PDW BLD-RTO: 14.5 % (ref 11.5–14.5)
PLATELET # BLD: 197 THOU/MM3 (ref 130–400)
PMV BLD AUTO: 9.1 MCM (ref 7.4–10.4)
POTASSIUM SERPL-SCNC: 4.4 MEQ/L (ref 3.5–5.2)
RBC # BLD: 4.92 MILL/MM3 (ref 4.7–6.1)
RBC # BLD: NORMAL 10*6/UL
SEG NEUTROPHILS: 60.6 %
SEGMENTED NEUTROPHILS ABSOLUTE COUNT: 5 THOU/MM3 (ref 1.8–7.7)
SODIUM BLD-SCNC: 143 MEQ/L (ref 135–145)
T4 FREE: 1.43 NG/DL (ref 0.93–1.76)
TOTAL PROTEIN: 6.9 G/DL (ref 6.1–8)
TRIGL SERPL-MCNC: 285 MG/DL (ref 0–199)
TSH SERPL DL<=0.05 MIU/L-ACNC: 1.26 UIU/ML (ref 0.4–4.2)
WBC # BLD: 8.3 THOU/MM3 (ref 4.8–10.8)

## 2017-09-26 PROCEDURE — 84443 ASSAY THYROID STIM HORMONE: CPT

## 2017-09-26 PROCEDURE — 84439 ASSAY OF FREE THYROXINE: CPT

## 2017-09-26 PROCEDURE — 36415 COLL VENOUS BLD VENIPUNCTURE: CPT

## 2017-09-26 PROCEDURE — 82248 BILIRUBIN DIRECT: CPT

## 2017-09-26 PROCEDURE — 85025 COMPLETE CBC W/AUTO DIFF WBC: CPT

## 2017-09-26 PROCEDURE — 80053 COMPREHEN METABOLIC PANEL: CPT

## 2017-09-26 PROCEDURE — 83036 HEMOGLOBIN GLYCOSYLATED A1C: CPT

## 2017-09-26 PROCEDURE — 80061 LIPID PANEL: CPT

## 2017-09-27 ENCOUNTER — CARE COORDINATION (OUTPATIENT)
Dept: CARE COORDINATION | Age: 82
End: 2017-09-27

## 2017-09-27 ASSESSMENT — ENCOUNTER SYMPTOMS: DYSPNEA ASSOCIATED WITH: EXERTION

## 2017-10-02 ENCOUNTER — OFFICE VISIT (OUTPATIENT)
Dept: ENDOCRINOLOGY | Age: 82
End: 2017-10-02
Payer: MEDICARE

## 2017-10-02 VITALS
HEIGHT: 67 IN | SYSTOLIC BLOOD PRESSURE: 119 MMHG | WEIGHT: 281 LBS | HEART RATE: 67 BPM | BODY MASS INDEX: 44.1 KG/M2 | RESPIRATION RATE: 16 BRPM | DIASTOLIC BLOOD PRESSURE: 56 MMHG

## 2017-10-02 DIAGNOSIS — E89.0 POST-SURGICAL HYPOTHYROIDISM: ICD-10-CM

## 2017-10-02 DIAGNOSIS — E11.65 TYPE 2 DIABETES MELLITUS WITH HYPERGLYCEMIA, WITHOUT LONG-TERM CURRENT USE OF INSULIN (HCC): ICD-10-CM

## 2017-10-02 DIAGNOSIS — C73 THYROID CANCER (HCC): Primary | ICD-10-CM

## 2017-10-02 PROCEDURE — 99214 OFFICE O/P EST MOD 30 MIN: CPT | Performed by: CLINICAL NURSE SPECIALIST

## 2017-10-02 PROCEDURE — G8484 FLU IMMUNIZE NO ADMIN: HCPCS | Performed by: CLINICAL NURSE SPECIALIST

## 2017-10-02 PROCEDURE — 1123F ACP DISCUSS/DSCN MKR DOCD: CPT | Performed by: CLINICAL NURSE SPECIALIST

## 2017-10-02 PROCEDURE — G8417 CALC BMI ABV UP PARAM F/U: HCPCS | Performed by: CLINICAL NURSE SPECIALIST

## 2017-10-02 PROCEDURE — 4040F PNEUMOC VAC/ADMIN/RCVD: CPT | Performed by: CLINICAL NURSE SPECIALIST

## 2017-10-02 PROCEDURE — G8427 DOCREV CUR MEDS BY ELIG CLIN: HCPCS | Performed by: CLINICAL NURSE SPECIALIST

## 2017-10-02 PROCEDURE — 1036F TOBACCO NON-USER: CPT | Performed by: CLINICAL NURSE SPECIALIST

## 2017-10-02 ASSESSMENT — ENCOUNTER SYMPTOMS
TROUBLE SWALLOWING: 1
CONSTIPATION: 0
SHORTNESS OF BREATH: 0
CHEST TIGHTNESS: 0
DIARRHEA: 0
EYE REDNESS: 0
WHEEZING: 0
ABDOMINAL PAIN: 0
NAUSEA: 0
COUGH: 0
VOICE CHANGE: 0

## 2017-10-02 NOTE — PROGRESS NOTES
[facility] PROFESSIONAL SERVS  ENDOCRINE DIABETES METABOLISM HAMIDA  [address]  [address]  [address] [address]  Dept: [phone]  Dept Fax: [phone]  Loc: [phone]    Adilene Nicholas is a 80 y.o. male who presents today for follow up evaluation for type 2 diabetes, follicular thyroid cancer and hypothyroidism. last visit 7/2017 with Dr. Holly Dumont. Patient was diagnosed with follicular thyroid cancer in 2008 and was treated with radioactive iodine after undergoing thyroidectomy. He is maintained and reports daily & correct administration of levothyroxine 75 mcg daily. 9/26/2017 TSH 1.2, FT4 at 1.43.  3/2017. thyroglobulin Ab <0.9, thyroglobulin < 0.1. Thyroid US 8/2016 without evidence of residual or recurrent malignancy. Patient reports dysphagia - and states had recent bronchoscopy and following with pulmonary. History of COPD     Type 2 diabetes and is taking metformin 1000 mg at breakfast he reports. Minimal BS checks - four at fasting 193, 196, 201, 213. - A1c 8.3% 9/2017 - he does have some dietary indiscretions at times and minimal exercise/activity. Weight gain 3 lbs, BMI 44. Eye exam within past year without retinopathy or current visual disturbance. Denies foot problems, no numbness, tingling, rash or lesion. He declined foot exam today. He is taking statin for hyperlipidemia and complains of leg pain. Previous note indicated patient to hold statin two weeks and report effect to PCP, Dr. Robert Soto. He did not hold statin but states he will now. Chief Complaint   Patient presents with    Diabetes     Type 2     Eye Exam     6/15/16    Foot Problem     Denies any problems.      Hypothyroidism    Thyroid Problem     Thyroid cancer    Discuss Labs     9/26/17       HPI:     HPI    Past Medical History:   Diagnosis Date    Anxiety     CAD (coronary artery disease)     leaking valve    Chronic back pain     COPD (chronic obstructive pulmonary disease) (HCC)     Detached retina     Diabetes Take 1 tablet by mouth nightly Indications: Enlarged Prostate 90 tablet 2    Multiple Vitamins-Minerals (PRESERVISION/LUTEIN PO) Take by mouth daily Indications: Disease of the Eye       metFORMIN (GLUCOPHAGE) 1000 MG tablet Take 1 tablet by mouth two  times daily with meals 180 tablet 4    warfarin (COUMADIN) 5 MG tablet Take as directed by [facility]. #45=30 days (Patient taking differently: Take 5 mg by mouth three times a week Mondays, Wednesdays, and Fridays) 45 tablet 11    ferrous sulfate 325 (65 FE) MG tablet Take 325 mg by mouth 3 times daily (with meals) Indications: Iron Deficiency       Cholecalciferol (VITAMIN D3) 2000 UNITS CAPS Take 2,000 Units by mouth daily Indications: pt self-initiated      Misc Natural Products (APPLE CIDER VINEGAR) TABS Take 1 tablet by mouth daily Indications: pt self-initiated      dorzolamide (TRUSOPT) 2 % ophthalmic solution Place 1 drop into both eyes 3 times daily Indications: Disease of the Eye       glucose blood VI test strips (ACCU-CHEK CHE) strip Check blood sugar 2 x daily 100 each 5    albuterol (PROVENTIL HFA;VENTOLIN HFA) 108 (90 BASE) MCG/ACT inhaler Inhale 2 puffs into the lungs every 6 hours as needed for Wheezing      OXYGEN Inhale into the lungs as needed (Uses at night only) Indications: Difficulty Breathing, Oxygen Therapy 3 liters      metoprolol (LOPRESSOR) 25 MG tablet Take 25 mg by mouth 2 times daily Indications: Raised Blood Pressure       aspirin 81 MG EC tablet Take 81 mg by mouth daily Indications: Treatment to Prevent Blood Clotting Take with food.  simvastatin (ZOCOR) 80 MG tablet TAKE 1 TABLET BY MOUTH  NIGHTLY 90 tablet 1     No current facility-administered medications for this visit. Allergies   Allergen Reactions   [place] Vanessa Pinto prefers pt not use Macrobid due to pulmonary side effects.     Adhesive Tape Rash    Alphagan [Brimonidine Tartrate] Hives     Eyes red metformin 1000 mg with supper    Continue levothyroxine 75 mg daily. Take in AM with water at least one half hour before food or other medication        Return in about 4 weeks (around 10/30/2017).        Electronically signed by MAEGAN Mckeon on 10/2/2017 at 12:11 PM

## 2017-10-02 NOTE — MR AVS SNAPSHOT
After Visit Summary             Blanquita Hampton   10/2/2017 10:30 AM   Office Visit    Description:  Male : 1931   Provider:  MAEGAN Patel   Department:  Endocrine Diabetes Metabolism [facility]              Your Follow-Up and Future Appointments         Below is a list of your follow-up and future appointments. This may not be a complete list as you may have made appointments directly with providers that we are not aware of or your providers may have made some for you. Please call your providers to confirm appointments. It is important to keep your appointments. Please bring your current insurance card, photo ID, co-pay, and all medication bottles to your appointment. If self-pay, payment is expected at the time of service. Your To-Do List     Future Appointments Provider Department Dept Phone    10/3/2017 10:15 AM Lazara Echavarria [address] Medication Management [phone]    10/5/2017 10:30 AM Mckayla Alves [facility] [place] [phone]    Please arrive 15 minutes prior to appointment time, bring insurance card and photo ID. Please arrive 15 minutes prior to appointment time, bring insurance card and photo ID.    10/31/2017 11:00 AM MAEGAN Patel Endocrine Diabetes Metabolism [facility] [phone]    Please arrive 15 minutes prior to appointment, bring photo ID and insurance card. Please arrive 15 minutes prior to appointment, bring photo ID and insurance card. 2017 12:45 PM Mallory Krause MD [facility] [phone]    Please arrive 15 minutes prior to appointment, bring photo ID and insurance card. 2018 10:40 AM Ayo Gutierrez MD Endocrine Diabetes Metabolism [facility] [phone]    Please arrive 15 minutes prior to appointment, bring photo ID and insurance card. Please arrive 15 minutes prior to appointment, bring photo ID and insurance card.     2018 11:00 AM MD SYLVESTER Santamaria AM, II.[facility] [phone] Please arrive 15 minutes prior to appointment, bring photo ID and insurance card. Please arrive 15 minutes prior to appointment, bring photo ID and insurance card. Future Orders Complete By Expires    Thyroglobulin and anti-Thyroglobulin AB [HZT5581 Custom]  12/31/2017 10/3/2018     Head Neck Soft Tissue Thyroid [[id number] Custom]  12/31/2017 10/3/2018    Follow-Up    Return in about 4 weeks (around 10/30/2017). Information from Your Visit        Department     Name Address Phone Fax    Endocrine Diabetes Metabolism [facility] [address]  [address] [place] [address] Rd      You Were Seen for:         Comments    Thyroid cancer [facility])   [[id number]]         Vital Signs     Blood Pressure Pulse Respirations Height Weight Body Mass Index    119/56 (Site: Right Arm) 67 16 5' 7\" (1.702 m) 281 lb (127.5 kg) 44.01 kg/m2    Smoking Status                   Never Smoker           Additional Information about your Body Mass Index (BMI)           Your BMI as listed above is considered obese (30 or more). BMI is an estimate of body fat, calculated from your height and weight. The higher your BMI, the greater your risk of heart disease, high blood pressure, type 2 diabetes, stroke, gallstones, arthritis, sleep apnea, and certain cancers. BMI is not perfect. It may overestimate body fat in athletes and people who are more muscular. Even a small weight loss (between 5 and 10 percent of your current weight) by decreasing your calorie intake and becoming more physically active will help lower your risk of developing or worsening diseases associated with obesity. Learn more at: [employer]          Instructions    Hold cholesterol medication for two weeks - simvastatin (Zocor) 80 mg - report to PCP Dr. Mehdi Jones if muscle aches improve or stay the same.     Check blood sugar 3 times daily - before breakfast and alternate before other meals and bedtime Blood sugar goal 100 to 140  take metformin 1000 mg with supper    Continue levothyroxine 75 mg daily (thyroid medication) Take in AM with water at least one half hour before food or other medication    Thyroid ultrasound & thyroid labs before follow up    Follow up in 4 weeks            Medications and Orders      Your Current Medications Are              lisinopril (PRINIVIL;ZESTRIL) 20 MG tablet Take 1 tablet by mouth 2 times daily    Glycopyrrolate-Formoterol (BEVESPI AEROSPHERE) 9-4.8 MCG/ACT AERO Inhale 2 puffs into the lungs 2 times daily    Turmeric 500 MG CAPS Take 1 capsule by mouth daily    Ascorbic Acid (VITAMIN C) 500 MG tablet Take 1,000 mg by mouth daily    NONFORMULARY Take 4 capsules by mouth daily Ashitava Slim    Carboxymethylcellulose Sodium (THERATEARS OP) Apply 1-2 drops to eye daily as needed (dry eyes)    levothyroxine (SYNTHROID) 75 MCG tablet Take 187.5 mcg by mouth Daily    doxazosin (CARDURA) 4 MG tablet Take 1 tablet by mouth nightly Indications: Enlarged Prostate    Multiple Vitamins-Minerals (PRESERVISION/LUTEIN PO) Take by mouth daily Indications: Disease of the Eye     metFORMIN (GLUCOPHAGE) 1000 MG tablet Take 1 tablet by mouth two  times daily with meals    warfarin (COUMADIN) 5 MG tablet Take as directed by [facility].  #45=30 days    ferrous sulfate 325 (65 FE) MG tablet Take 325 mg by mouth 3 times daily (with meals) Indications: Iron Deficiency     Cholecalciferol (VITAMIN D3) 2000 UNITS CAPS Take 2,000 Units by mouth daily Indications: pt self-initiated    Misc Natural Products (APPLE CIDER VINEGAR) TABS Take 1 tablet by mouth daily Indications: pt self-initiated    dorzolamide (TRUSOPT) 2 % ophthalmic solution Place 1 drop into both eyes 3 times daily Indications: Disease of the Eye     glucose blood VI test strips (ACCU-CHEK CHE) strip Check blood sugar 2 x daily    albuterol (PROVENTIL HFA;VENTOLIN HFA) 108 (90 BASE) MCG/ACT inhaler Inhale 2 puffs into the lungs every 6 hours as needed for Wheezing    OXYGEN Inhale into the lungs as needed (Uses at night only) Indications: Difficulty Breathing, Oxygen Therapy 3 liters    metoprolol (LOPRESSOR) 25 MG tablet Take 25 mg by mouth 2 times daily Indications: Raised Blood Pressure     aspirin 81 MG EC tablet Take 81 mg by mouth daily Indications: Treatment to Prevent Blood Clotting Take with food. simvastatin (ZOCOR) 80 MG tablet TAKE 1 TABLET BY MOUTH  NIGHTLY      Allergies              Macrobid [Nitrofurantoin Azael Cyr prefers pt not use Macrobid due to pulmonary side effects. Adhesive Tape Rash    Alphagan [Brimonidine Tartrate] Hives    Eyes red         Additional Information        Basic Information     Date Of Birth Sex Race Ethnicity Preferred Language Preferred Written Language    7/17/1931 Male White Non-/Non  English English      Problem List as of 10/2/2017  Date Reviewed: 10/2/2017                ROBER on CPAP    Chronic bronchitis (HCC)    Stage 3 severe COPD by GOLD classification ([address]    Muscle weakness    Controlled type 2 diabetes mellitus with hyperglycemia, without long-term current use of insulin (HCC)    Anticoagulated on Coumadin    RUQ abdominal pain    Chronic deep vein thrombosis (DVT) of right lower extremity (HCC)    Essential hypertension    Chronic blood loss anemia    History of DVT (deep vein thrombosis)    BPH (benign prostatic hyperplasia)    Morbid obesity with BMI of 40.0-44.9, adult    COPD exacerbation ([address]      Your Goals as of 10/2/2017 at 11:40 AM              9/27/17 9/26/17 8/30/17       Care Coordination    Conditions and Symptoms   Improving    No change    Notes    I will follow my Zone Management tool to seek urgent or emergent care. Barriers: lack of support, overwhelmed by complexity of regimen and stress  Plan for overcoming my barriers:  Will discuss needs with office or CC  Confidence: 9/10 Anticipated Goal Completion Date: 9/19/17           Exercise    Exercise daily (30 min per time)              Result Component    HEMOGLOBIN A1C < 7.5     8.3         Weight    Weight < 200 lb (90.719 kg)             Immunizations as of 10/2/2017     Name Date    Influenza Virus Vaccine 9/29/2015, 11/11/2014, 10/31/2013, 11/5/2012, 11/1/2011    Influenza Whole 10/1/2010    Influenza, Triv, 3 years and older, IM 9/26/2016    Pneumococcal 13-valent Conjugate ([id number]) 12/19/2014    Pneumococcal Polysaccharide ([id number]) 1/1/2003    Td 6/8/2010      Preventive Care        Date Due    Tetanus Combination Vaccine (1 - Tdap) 6/9/2010    Yearly Flu Vaccine (1) 9/1/2017            [employer] Signup           Our records indicate that you have an active [employer] account. You can view your After Visit Summary by going to [email or web address] and logging in with your [employer] username and password. If you don't have a [employer] username and password but a parent or guardian has access to your record, the parent or guardian should login with their own [employer] username and password and access your record to view the After Visit Summary. Additional Information  If you have questions, please contact the physician practice where you receive care. Remember, [employer] is NOT to be used for urgent needs. For medical emergencies, dial 911. For questions regarding your [employer] account call [phone]. If you have a clinical question, please call your doctor's office.

## 2017-10-03 ENCOUNTER — HOSPITAL ENCOUNTER (OUTPATIENT)
Dept: PHARMACY | Age: 82
Setting detail: THERAPIES SERIES
Discharge: HOME | End: 2017-10-03
Payer: MEDICARE

## 2017-10-03 VITALS
WEIGHT: 278.4 LBS | BODY MASS INDEX: 43.6 KG/M2 | DIASTOLIC BLOOD PRESSURE: 59 MMHG | SYSTOLIC BLOOD PRESSURE: 116 MMHG | HEART RATE: 68 BPM

## 2017-10-03 DIAGNOSIS — I82.5Z1 CHRONIC DEEP VEIN THROMBOSIS (DVT) OF DISTAL VEIN OF RIGHT LOWER EXTREMITY (HCC): ICD-10-CM

## 2017-10-03 LAB — POC INR: 1.9 (ref 0.8–1.2)

## 2017-10-03 PROCEDURE — 99211 OFF/OP EST MAY X REQ PHY/QHP: CPT

## 2017-10-03 PROCEDURE — 36416 COLLJ CAPILLARY BLOOD SPEC: CPT

## 2017-10-03 PROCEDURE — 85610 PROTHROMBIN TIME: CPT

## 2017-10-03 ASSESSMENT — ENCOUNTER SYMPTOMS
CONSTIPATION: 0
DIARRHEA: 0
SHORTNESS OF BREATH: 0
BLOOD IN STOOL: 0

## 2017-10-03 NOTE — PROGRESS NOTES
Attending addendum:  I have reviewed NP note and relevant data in detail. I agree with findings and plans of care as documented in NP nurse note.

## 2017-10-03 NOTE — IP AVS SNAPSHOT
Please arrive 15 minutes prior to appointment, bring photo ID and insurance card. Please arrive 15 minutes prior to appointment, bring photo ID and insurance card. 1/26/2018 11:00 AM MD SYLVESTER Waggoner AM, II.[facility] [phone]    Please arrive 15 minutes prior to appointment, bring photo ID and insurance card. Please arrive 15 minutes prior to appointment, bring photo ID and insurance card. Information from Your Visit        Department     Name Address Phone Fax    [place] [facility] [address]  [address] [address]      You Were Seen for:         Comments    Chronic deep vein thrombosis (DVT) of distal vein of right lower extremity [facility])   [[id number]]         Anticoagulation Summary as of 10/3/2017              Today's INR 1.90! Next INR check 10/17/2017      Vital Signs     Blood Pressure Pulse Weight Body Mass Index Smoking Status       116/59 (Site: Left Arm, Position: Sitting, Cuff Size: Medium Adult) 68 278 lb 6.4 oz (126.3 kg) 43.6 kg/m2 Never Smoker       Additional Information about your Body Mass Index (BMI)           Your BMI as listed above is considered obese (30 or more). BMI is an estimate of body fat, calculated from your height and weight. The higher your BMI, the greater your risk of heart disease, high blood pressure, type 2 diabetes, stroke, gallstones, arthritis, sleep apnea, and certain cancers. BMI is not perfect. It may overestimate body fat in athletes and people who are more muscular. Even a small weight loss (between 5 and 10 percent of your current weight) by decreasing your calorie intake and becoming more physically active will help lower your risk of developing or worsening diseases associated with obesity.      Learn more at: [employer]             Medications and Orders      Your Current Medications Are 7      7.5 mg           8      7.5 mg         9      7.5 mg         10      7.5 mg         11      7.5 mg         12      7.5 mg         13      7.5 mg         14      7.5 mg           15      7.5 mg         16      7.5 mg         17      7.5 mg         18               19               20               21                 22               23               24               25               26               27               28                 29               30               31                    Date Details   10/03 This INR check       Date of next INR:  10/17/2017         How to take your warfarin dose              To take:  7.5 mg Take 1.5 of the 5 mg tablets.

## 2017-10-03 NOTE — PROGRESS NOTES
The Medication Management [facility]  Anticoagulation Clinic  [phone] (phone)           [phone] (fax)    Visit Date: 10/3/2017     Subjective:       Patient ID: Graham Mattson, 80 y.o., male    HPI  Patient seen in clinic for anticoagulation management due to DVT with a goal INR of 2.0-3.0. Patient last seen 2 week(s) ago. INR ordered and reviewed today. Patient denies any new Rx medications. Patient denies any OTC medications or products. Patient denies any missed doses. Patient denies change in diet. Patient denies change in tobacco/alcohol use. Patient denies upcoming surgeries. Review of Systems   Constitutional: Negative for activity change, appetite change and fatigue. HENT: Negative for nosebleeds. Respiratory: Negative for shortness of breath. Cardiovascular: Negative for chest pain and leg swelling. Gastrointestinal: Negative for blood in stool, constipation and diarrhea. Genitourinary: Negative for hematuria. Neurological: Negative for weakness, light-headedness and headaches. Hematological: Does not bruise/bleed easily.        Objective:   Physical Exam   Vitals:    10/03/17 1029   BP: (!) 116/59   Pulse: 68       Physical Exam    Assessment:      Lab Results   Component Value Date    INR 1.90 (H) 10/03/2017    INR 1.80 (H) 09/19/2017    INR 1.90 (H) 09/05/2017     INR subtherapeutic   Recent Labs      10/03/17   1027   INR  1.90*                               Plan:      Increase Coumadin to 7.5mg Daily. Recheck INR 2 weeks. Patient reminded to call the Anticoagulation Clinic with signs or symptoms of bleeding or with any medication changes. Patient given instructions utilizing the teach back method. Discharged ambulatory in no apparent distress.

## 2017-10-05 ENCOUNTER — OFFICE VISIT (OUTPATIENT)
Dept: PULMONOLOGY | Age: 82
End: 2017-10-05
Payer: MEDICARE

## 2017-10-05 VITALS
BODY MASS INDEX: 45.32 KG/M2 | OXYGEN SATURATION: 96 % | WEIGHT: 282 LBS | DIASTOLIC BLOOD PRESSURE: 58 MMHG | HEART RATE: 70 BPM | HEIGHT: 66 IN | SYSTOLIC BLOOD PRESSURE: 102 MMHG | TEMPERATURE: 98.3 F

## 2017-10-05 DIAGNOSIS — G47.33 OSA ON CPAP: Primary | ICD-10-CM

## 2017-10-05 DIAGNOSIS — J41.1 MUCOPURULENT CHRONIC BRONCHITIS (HCC): ICD-10-CM

## 2017-10-05 DIAGNOSIS — Z99.89 OSA ON CPAP: Primary | ICD-10-CM

## 2017-10-05 DIAGNOSIS — E66.01 MORBID OBESITY WITH BMI OF 40.0-44.9, ADULT (HCC): ICD-10-CM

## 2017-10-05 DIAGNOSIS — J96.11 CHRONIC RESPIRATORY FAILURE WITH HYPOXIA (HCC): ICD-10-CM

## 2017-10-05 PROCEDURE — 3023F SPIROM DOC REV: CPT | Performed by: PHYSICIAN ASSISTANT

## 2017-10-05 PROCEDURE — G8427 DOCREV CUR MEDS BY ELIG CLIN: HCPCS | Performed by: PHYSICIAN ASSISTANT

## 2017-10-05 PROCEDURE — G8926 SPIRO NO PERF OR DOC: HCPCS | Performed by: PHYSICIAN ASSISTANT

## 2017-10-05 PROCEDURE — G8484 FLU IMMUNIZE NO ADMIN: HCPCS | Performed by: PHYSICIAN ASSISTANT

## 2017-10-05 PROCEDURE — 4040F PNEUMOC VAC/ADMIN/RCVD: CPT | Performed by: PHYSICIAN ASSISTANT

## 2017-10-05 PROCEDURE — 99214 OFFICE O/P EST MOD 30 MIN: CPT | Performed by: PHYSICIAN ASSISTANT

## 2017-10-05 PROCEDURE — G8417 CALC BMI ABV UP PARAM F/U: HCPCS | Performed by: PHYSICIAN ASSISTANT

## 2017-10-05 PROCEDURE — 1123F ACP DISCUSS/DSCN MKR DOCD: CPT | Performed by: PHYSICIAN ASSISTANT

## 2017-10-05 PROCEDURE — 1036F TOBACCO NON-USER: CPT | Performed by: PHYSICIAN ASSISTANT

## 2017-10-05 RX ORDER — BUDESONIDE 0.25 MG/2ML
1 INHALANT ORAL 2 TIMES DAILY
COMMUNITY
End: 2020-06-09

## 2017-10-05 RX ORDER — ARFORMOTEROL TARTRATE 15 UG/2ML
1 SOLUTION RESPIRATORY (INHALATION) 2 TIMES DAILY
COMMUNITY
End: 2020-06-09

## 2017-10-05 NOTE — PROGRESS NOTES
simvastatin (ZOCOR) 80 MG tablet TAKE 1 TABLET BY MOUTH  NIGHTLY 90 tablet 1    doxazosin (CARDURA) 4 MG tablet Take 1 tablet by mouth nightly Indications: Enlarged Prostate 90 tablet 2    Multiple Vitamins-Minerals (PRESERVISION/LUTEIN PO) Take by mouth daily Indications: Disease of the Eye       metFORMIN (GLUCOPHAGE) 1000 MG tablet Take 1 tablet by mouth two  times daily with meals 180 tablet 4    warfarin (COUMADIN) 5 MG tablet Take as directed by [facility]. #45=30 days (Patient taking differently: Take 5 mg by mouth three times a week Mondays, Wednesdays, and Fridays) 45 tablet 11    ferrous sulfate 325 (65 FE) MG tablet Take 325 mg by mouth 3 times daily (with meals) Indications: Iron Deficiency       Cholecalciferol (VITAMIN D3) 2000 UNITS CAPS Take 2,000 Units by mouth daily Indications: pt self-initiated      Misc Natural Products (APPLE CIDER VINEGAR) TABS Take 1 tablet by mouth daily Indications: pt self-initiated      dorzolamide (TRUSOPT) 2 % ophthalmic solution Place 1 drop into both eyes 3 times daily Indications: Disease of the Eye       glucose blood VI test strips (ACCU-CHEK CHE) strip Check blood sugar 2 x daily 100 each 5    albuterol (PROVENTIL HFA;VENTOLIN HFA) 108 (90 BASE) MCG/ACT inhaler Inhale 2 puffs into the lungs every 6 hours as needed for Wheezing      OXYGEN Inhale into the lungs as needed (Uses at night only) Indications: Difficulty Breathing, Oxygen Therapy 3 liters      metoprolol (LOPRESSOR) 25 MG tablet Take 25 mg by mouth 2 times daily Indications: Raised Blood Pressure       aspirin 81 MG EC tablet Take 81 mg by mouth daily Indications: Treatment to Prevent Blood Clotting Take with food. No current facility-administered medications for this visit.         Family History   Problem Relation Age of Onset    Other Mother      Complications of Childbirth    Other Father [de-identified]     Natural causes        Review of Systems -   General ROS: stable / for earlier appointment if needed for worsening of sleep symptoms.   - He was instructed on weight loss  - Carmelina Shay was educated about my impression and plan. Patient verbalizes understanding.   We will see Jenna Austin back in: 1 year with download    Ciarra Quintero PA-C, MPAS  10/5/2017

## 2017-10-05 NOTE — MR AVS SNAPSHOT
insurance card. Please arrive 15 minutes prior to appointment, bring photo ID and insurance card. 10/8/2018 10:30 AM Brenda Cortez, [address] [facility]    Please arrive 15 minutes prior to appointment time, bring insurance card and photo ID. Please arrive 15 minutes prior to appointment time, bring insurance card and photo ID. Follow-Up    Return in about 1 year (around 10/5/2018). Information from Your Visit        Department     Name Address [address] [place] [address]  SYLVESTER MARINANDO BAKER.[place] [address] [phone]      You Were Seen for:         Comments    ROBER on CPAP   [[id number]]         Vital Signs     Blood Pressure Pulse Temperature Height Weight Oxygen Saturation    102/58 (Site: Right Arm) 70 98.3 °F (36.8 °C) 5' 6\" (1.676 m) 282 lb (127.9 kg) 96%    Body Mass Index Smoking Status                45.52 kg/m2 Never Smoker          Additional Information about your Body Mass Index (BMI)           Your BMI as listed above is considered obese (30 or more). BMI is an estimate of body fat, calculated from your height and weight. The higher your BMI, the greater your risk of heart disease, high blood pressure, type 2 diabetes, stroke, gallstones, arthritis, sleep apnea, and certain cancers. BMI is not perfect. It may overestimate body fat in athletes and people who are more muscular. Even a small weight loss (between 5 and 10 percent of your current weight) by decreasing your calorie intake and becoming more physically active will help lower your risk of developing or worsening diseases associated with obesity. Learn more at: [employer]          Instructions    Use Acapella (Green pickle) every 2-4 hours            Today's Medication Changes          These changes are accurate as of: 10/5/17 10:53 AM.  If you have any questions, ask your nurse or doctor. Inhale 2 puffs into the lungs every 6 hours as needed for Wheezing    OXYGEN Inhale into the lungs as needed (Uses at night only) Indications: Difficulty Breathing, Oxygen Therapy 3 liters    metoprolol (LOPRESSOR) 25 MG tablet Take 25 mg by mouth 2 times daily Indications: Raised Blood Pressure     aspirin 81 MG EC tablet Take 81 mg by mouth daily Indications: Treatment to Prevent Blood Clotting Take with food. Rodrigo Cyr prefers pt not use Macrobid due to pulmonary side effects. Adhesive Tape Rash    Alphagan [Brimonidine Tartrate] Hives    Eyes red      We Ordered/Performed the following           DME Order for CPAP as OP     Comments:    Heated Humidifier   Full Face Mask 1 per 3 months and Cushions/Seals 2 per month. Headgear 1 per 6 months, Chin Strap 1 per 6 months, Disposable Filters 2 per month, Non-disposable Filters 1 per 6 months, Tubing 1 per 3 months, Chambers 1 per 6 months, Tubing with Integrated Heating  Element 1 per 3 months and Other Related Supplies. Replace supplies and accessories as needed. Patient may choose another interface for compliance and comfort.    Comments: Pt Choice   Diagnosis: ROBER     Length of need: 18 Months         Additional Information        Basic Information     Date Of Birth Sex Race Ethnicity Preferred Language Preferred Written Language    7/17/1931 Male White Non-/Non  English English      Problem List as of 10/5/2017  Date Reviewed: 10/3/2017                ROBER on CPAP    Chronic bronchitis (HCC)    Stage 3 severe COPD by GOLD classification ([facility] [address]    Muscle weakness    Controlled type 2 diabetes mellitus with hyperglycemia, without long-term current use of insulin (HCC)    Anticoagulated on Coumadin    RUQ abdominal pain    Chronic deep vein thrombosis (DVT) of right lower extremity (HCC)    Essential hypertension    Chronic blood loss anemia

## 2017-10-10 ENCOUNTER — HOSPITAL ENCOUNTER (OUTPATIENT)
Dept: ULTRASOUND IMAGING | Age: 82
Discharge: HOME | End: 2017-10-10
Payer: MEDICARE

## 2017-10-10 DIAGNOSIS — C73 THYROID CANCER (HCC): ICD-10-CM

## 2017-10-10 PROCEDURE — 76536 US EXAM OF HEAD AND NECK: CPT

## 2017-10-11 ENCOUNTER — TELEPHONE (OUTPATIENT)
Dept: ENDOCRINOLOGY | Age: 82
End: 2017-10-11

## 2017-10-17 ENCOUNTER — HOSPITAL ENCOUNTER (OUTPATIENT)
Dept: PHARMACY | Age: 82
Setting detail: THERAPIES SERIES
Discharge: HOME | End: 2017-10-17
Payer: MEDICARE

## 2017-10-17 VITALS
WEIGHT: 277 LBS | DIASTOLIC BLOOD PRESSURE: 58 MMHG | BODY MASS INDEX: 44.71 KG/M2 | HEART RATE: 68 BPM | SYSTOLIC BLOOD PRESSURE: 126 MMHG

## 2017-10-17 DIAGNOSIS — I82.5Z1 CHRONIC DEEP VEIN THROMBOSIS (DVT) OF DISTAL VEIN OF RIGHT LOWER EXTREMITY (HCC): ICD-10-CM

## 2017-10-17 LAB — POC INR: 2.3 (ref 0.8–1.2)

## 2017-10-17 PROCEDURE — 36416 COLLJ CAPILLARY BLOOD SPEC: CPT

## 2017-10-17 PROCEDURE — 85610 PROTHROMBIN TIME: CPT

## 2017-10-17 PROCEDURE — 99211 OFF/OP EST MAY X REQ PHY/QHP: CPT

## 2017-10-17 ASSESSMENT — ENCOUNTER SYMPTOMS
SHORTNESS OF BREATH: 1
VOMITING: 0
DIARRHEA: 0
CONSTIPATION: 0
NAUSEA: 0
BLOOD IN STOOL: 0

## 2017-10-17 NOTE — PROGRESS NOTES
[facility]  Anticoagulation Clinic  [phone] (phone)           [phone] (fax)    Visit Date: 10/17/2017     Subjective:       Patient ID: Casey Farris, 80 y.o., male    HPI  Patient presents for 2 week INR check. 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 INR ordered and reviewed today. Patient reports the following:    Medication changes: none  Tablet strength per patient: 5mg  Patient reported dosing regimen over last 1 week: 7.5mg [id number]  Missed doses in the last 1-2 weeks: none  Extra doses in the last 1-2 weeks: none  Any problems with bleeding/bruising? No  Any recent falls? none   Any signs or symptoms of DVT/PE or stroke? No  Alcohol use: one glass of wine every day with dinner  Tobacco use: none  Diet changes as follows: No changes   Green leafy intake: patient states consistency throughout the weeks with broccoli and spinach  Upcoming surgeries or procedures: none  Appointment preference: AM 10:30 - 11:00am      Review of Systems   Constitutional: Negative for activity change, appetite change and fatigue. HENT: Negative for nosebleeds. Respiratory: Positive for shortness of breath (nothing \"out of the ordinary\" from baseline per the patient). Cardiovascular: Negative for chest pain and leg swelling. Gastrointestinal: Negative for blood in stool, constipation, diarrhea, nausea and vomiting. Genitourinary: Negative for hematuria. Neurological: Negative for weakness, light-headedness and headaches.    Hematological: Does not bruise/bleed easily.        Objective:   Physical Exam   Vitals:    10/17/17 1024   BP: (!) 126/58   Pulse: 68       Physical Exam    Assessment:      Lab Results   Component Value Date    INR 2.30 (H) 10/17/2017    INR 1.90 (H) 10/03/2017    INR 1.80 (H) 09/19/2017     INR therapeutic   Recent Labs      10/17/17   1023   INR  2.30*                               Plan:

## 2017-10-26 ENCOUNTER — HOSPITAL ENCOUNTER (OUTPATIENT)
Age: 82
Discharge: HOME | End: 2017-10-26
Payer: MEDICARE

## 2017-10-26 DIAGNOSIS — C73 THYROID CANCER (HCC): ICD-10-CM

## 2017-10-26 PROCEDURE — 36415 COLL VENOUS BLD VENIPUNCTURE: CPT

## 2017-10-26 PROCEDURE — 84432 ASSAY OF THYROGLOBULIN: CPT

## 2017-10-26 PROCEDURE — 86800 THYROGLOBULIN ANTIBODY: CPT

## 2017-10-27 LAB — THYROGLOBULIN: NORMAL

## 2017-10-31 ENCOUNTER — TELEPHONE (OUTPATIENT)
Dept: ENDOCRINOLOGY | Age: 82
End: 2017-10-31

## 2017-10-31 ENCOUNTER — OFFICE VISIT (OUTPATIENT)
Dept: ENDOCRINOLOGY | Age: 82
End: 2017-10-31
Payer: MEDICARE

## 2017-10-31 VITALS
DIASTOLIC BLOOD PRESSURE: 48 MMHG | BODY MASS INDEX: 46.22 KG/M2 | WEIGHT: 287.6 LBS | HEIGHT: 66 IN | HEART RATE: 80 BPM | SYSTOLIC BLOOD PRESSURE: 116 MMHG

## 2017-10-31 DIAGNOSIS — E11.9 TYPE 2 DIABETES MELLITUS WITHOUT COMPLICATION, WITHOUT LONG-TERM CURRENT USE OF INSULIN (HCC): ICD-10-CM

## 2017-10-31 DIAGNOSIS — E78.2 MIXED HYPERLIPIDEMIA: ICD-10-CM

## 2017-10-31 DIAGNOSIS — C73 THYROID CANCER (HCC): Primary | ICD-10-CM

## 2017-10-31 DIAGNOSIS — E89.0 POST-SURGICAL HYPOTHYROIDISM: ICD-10-CM

## 2017-10-31 PROCEDURE — G8427 DOCREV CUR MEDS BY ELIG CLIN: HCPCS | Performed by: CLINICAL NURSE SPECIALIST

## 2017-10-31 PROCEDURE — 99214 OFFICE O/P EST MOD 30 MIN: CPT | Performed by: CLINICAL NURSE SPECIALIST

## 2017-10-31 PROCEDURE — 4040F PNEUMOC VAC/ADMIN/RCVD: CPT | Performed by: CLINICAL NURSE SPECIALIST

## 2017-10-31 PROCEDURE — G8484 FLU IMMUNIZE NO ADMIN: HCPCS | Performed by: CLINICAL NURSE SPECIALIST

## 2017-10-31 PROCEDURE — 1036F TOBACCO NON-USER: CPT | Performed by: CLINICAL NURSE SPECIALIST

## 2017-10-31 PROCEDURE — G8417 CALC BMI ABV UP PARAM F/U: HCPCS | Performed by: CLINICAL NURSE SPECIALIST

## 2017-10-31 PROCEDURE — 1123F ACP DISCUSS/DSCN MKR DOCD: CPT | Performed by: CLINICAL NURSE SPECIALIST

## 2017-10-31 RX ORDER — LEVOTHYROXINE SODIUM 175 UG/1
175 TABLET ORAL DAILY
COMMUNITY
Start: 2017-10-31 | End: 2018-01-12 | Stop reason: SDUPTHER

## 2017-10-31 RX ORDER — LEVOTHYROXINE SODIUM 175 UG/1
175 TABLET ORAL DAILY
COMMUNITY
End: 2017-10-31

## 2017-10-31 RX ORDER — PEN NEEDLE, DIABETIC 31 GX5/16"
1 NEEDLE, DISPOSABLE MISCELLANEOUS DAILY
Qty: 100 EACH | Refills: 1 | Status: SHIPPED | OUTPATIENT
Start: 2017-10-31 | End: 2018-09-17 | Stop reason: SDUPTHER

## 2017-10-31 RX ORDER — PEN NEEDLE, DIABETIC 31 GX5/16"
1 NEEDLE, DISPOSABLE MISCELLANEOUS DAILY
Qty: 30 EACH | Refills: 1 | Status: SHIPPED | OUTPATIENT
Start: 2017-10-31 | End: 2017-11-06 | Stop reason: SDUPTHER

## 2017-10-31 RX ORDER — LANCETS
EACH MISCELLANEOUS
Qty: 200 EACH | Refills: 3 | Status: SHIPPED | OUTPATIENT
Start: 2017-10-31 | End: 2018-11-06 | Stop reason: SDUPTHER

## 2017-10-31 ASSESSMENT — ENCOUNTER SYMPTOMS
WHEEZING: 0
COUGH: 0
ABDOMINAL PAIN: 0
EYE REDNESS: 0
SHORTNESS OF BREATH: 0
DIARRHEA: 0
CONSTIPATION: 0
CHEST TIGHTNESS: 0
VOICE CHANGE: 0
NAUSEA: 0

## 2017-10-31 NOTE — PROGRESS NOTES
[facility] PROFESSIONAL SERVS  ENDOCRINE DIABETES METABOLISM [facility]  [address]  [address]  [address] [address]  Dept: [phone]  Dept Fax: [phone]  Loc: [phone]    Haritha Marshall is a 80 y.o. male who presents today for follow up evaluation for  follow up evaluation for type 2 diabetes, follicular thyroid cancer and hypothyroidism.  last visit 10/2017. Patient was diagnosed with follicular thyroid cancer in 2008 and was treated with radioactive iodine after undergoing thyroidectomy. He is maintained and reports daily & correct administration of levothyroxine 75 mcg tablet - 2 1/2 tablets daily  thyroid US 10/2017 without evidence of residual thyroid tissue or recurrent malignancy. Thyroglobulin Ab <0.9, thyroglobulin < 0.1 this month. Type 2 diabetes and is taking metformin 1000 mg at breakfast he reports. He is checking BS twice daily - they continue to be above goal 178 - 212 at fasting 192 - 200's  in PM. He reported at previous visit that he felt he needed insulin and was supposed to follow up with more frequent monitoring. He reports he did take prednisone for a couple of days a few weeks ago. He does not recall why they were prescribed. He does have some dietary indiscretions at times and minimal exercise/activity. Weight increase 6 lbs, BMI 46.4. Eye exam within past year without retinopathy or current visual disturbance. Denies foot problems, no numbness, tingling, rash or lesion. He declined foot exam today. He has stopped statin for hyperlipidemia as he experienced leg pain with improvement - following with PCP, Dr. Stephen Christianson.     Chief Complaint   Patient presents with    Other     thyroid cancer    Diabetes    Hypothyroidism       HPI:     HPI    Past Medical History:   Diagnosis Date    Anxiety     CAD (coronary artery disease)     leaking valve    Chronic back pain     COPD (chronic obstructive pulmonary disease) ([facility] [address]     Detached retina     Diabetes mellitus ([facility] [address] Turmeric 500 MG CAPS Take 1 capsule by mouth daily      Carboxymethylcellulose Sodium (THERATEARS OP) Apply 1-2 drops to eye daily as needed (dry eyes)      simvastatin (ZOCOR) 80 MG tablet TAKE 1 TABLET BY MOUTH  NIGHTLY 90 tablet 1    doxazosin (CARDURA) 4 MG tablet Take 1 tablet by mouth nightly Indications: Enlarged Prostate 90 tablet 2    Multiple Vitamins-Minerals (PRESERVISION/LUTEIN PO) Take by mouth daily Indications: Disease of the Eye       metFORMIN (GLUCOPHAGE) 1000 MG tablet Take 1 tablet by mouth two  times daily with meals 180 tablet 4    warfarin (COUMADIN) 5 MG tablet Take as directed by [facility]. #45=30 days (Patient taking differently: Take 5 mg by mouth three times a week Mondays, Wednesdays, and Fridays) 45 tablet 11    ferrous sulfate 325 (65 FE) MG tablet Take 325 mg by mouth 3 times daily (with meals) Indications: Iron Deficiency       Cholecalciferol (VITAMIN D3) 2000 UNITS CAPS Take 2,000 Units by mouth daily Indications: pt self-initiated      Misc Natural Products (APPLE CIDER VINEGAR) TABS Take 1 tablet by mouth daily Indications: pt self-initiated      dorzolamide (TRUSOPT) 2 % ophthalmic solution Place 1 drop into both eyes 3 times daily Indications: Disease of the Eye       glucose blood VI test strips (ACCU-CHEK CHE) strip Check blood sugar 2 x daily 100 each 5    albuterol (PROVENTIL HFA;VENTOLIN HFA) 108 (90 BASE) MCG/ACT inhaler Inhale 2 puffs into the lungs every 6 hours as needed for Wheezing      OXYGEN Inhale into the lungs as needed (Uses at night only) Indications: Difficulty Breathing, Oxygen Therapy 3 liters      metoprolol (LOPRESSOR) 25 MG tablet Take 25 mg by mouth 2 times daily Indications: Raised Blood Pressure       aspirin 81 MG EC tablet Take 81 mg by mouth daily Indications: Treatment to Prevent Blood Clotting Take with food. No current facility-administered medications for this visit.       Allergies   Allergen Reactions    Job Nyhan Dr. Lanette Kays prefers pt not use Macrobid due to pulmonary side effects.  Adhesive Tape Rash    Alphagan [Brimonidine Tartrate] Hives     Eyes red       Subjective:      Review of Systems   Constitutional: Negative for appetite change, chills, fatigue and unexpected weight change. HENT: Negative for hearing loss, tinnitus and voice change. Eyes: Negative for redness and visual disturbance. Respiratory: Negative for cough, chest tightness, shortness of breath and wheezing. Cardiovascular: Negative for chest pain, palpitations and leg swelling. Gastrointestinal: Negative for abdominal pain, constipation, diarrhea and nausea. Endocrine: Negative. Genitourinary: Negative for difficulty urinating, dysuria, frequency and hematuria. Musculoskeletal: Negative for gait problem, myalgias and neck pain. Skin: Negative for rash. Neurological: Negative for dizziness, tremors, facial asymmetry, weakness, numbness and headaches. Hematological: Negative for adenopathy. Psychiatric/Behavioral: Negative for agitation, confusion, sleep disturbance and suicidal ideas. The patient is not nervous/anxious and is not hyperactive. Objective:     Physical Exam   Constitutional: He is oriented to person, place, and time. He appears well-developed and well-nourished. No distress. HENT:   Head: Normocephalic and atraumatic. Right Ear: External ear normal.   Left Ear: External ear normal.   Nose: Nose normal.   Eyes: Conjunctivae and EOM are normal. Pupils are equal, round, and reactive to light. Right eye exhibits no discharge. Left eye exhibits no discharge. No scleral icterus. Neck: Normal range of motion. Neck supple. No thyromegaly present. Cardiovascular: Normal rate, regular rhythm, normal heart sounds and intact distal pulses. No murmur heard. Pulmonary/Chest: Effort normal and breath sounds normal. No stridor. No respiratory distress.  He has no wheezes. He has no rales. Abdominal: Soft. Bowel sounds are normal. There is no tenderness. Musculoskeletal: Normal range of motion. He exhibits no edema or tenderness. Lymphadenopathy:     He has no cervical adenopathy. Neurological: He is alert and oriented to person, place, and time. No cranial nerve deficit. Coordination normal.   Skin: Skin is warm and dry. He is not diaphoretic. Psychiatric: He has a normal mood and affect. His behavior is normal. Judgment and thought content normal.     Assessment:      BP (!) 116/48 (Site: Right Arm)   Pulse 80   Ht 5' 5.98\" (1.676 m)   Wt 287 lb 9.6 oz (130.5 kg)   BMI 46.44 kg/m²   1. 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 Post-surgical hypothyroidism -      3. Type 2 diabetes mellitus without complication, without long-term current use of insulin (HCC) - readings above goal - will start low dose once daily basal - discussed glycemic goals, monitoring - request readings one week sooner lows/concerns. Discussed symptoms, treatment and prevention of hypoglycemia     4. Mixed hyperlipidemia        Thyroid cancer -  Post surgical hypothyroidism    Type 2 diabetes mellitus - 9/2017 BUN 15, creat 0.8, GFR > 90 on ACE    Hypertension - controlled with medication.     Hyperlipidemia - simvastatin 80 mg daily - patient stopped since previous visit due to muscle aches - 9/2017 chol 152, trig 285, HDL 36, LDL 59,  9/2017 liver panel normal   following with PCP - will monitor        Plan:      Check blood sugar 3 times daily - before breakfast and alternate before other meals and bedtime  Blood sugar goal 100 to 140  CALL IN BLOOD SUGAR READINGS IN ONE WEEK SOONER FOR LOWS/CONCERNS    Start Lantus insulin 8 units once daily at bedtime    continue metformin 1000 mg with supper     Continue levothyroxine 75 mcg tablet -

## 2017-11-03 ENCOUNTER — CARE COORDINATION (OUTPATIENT)
Dept: CARE COORDINATION | Age: 82
End: 2017-11-03

## 2017-11-03 NOTE — CARE COORDINATION
Historical Provider, MD   Turmeric 500 MG CAPS Take 1 capsule by mouth daily    Historical Provider, MD   Carboxymethylcellulose Sodium (THERATEARS OP) Apply 1-2 drops to eye daily as needed (dry eyes)    Historical Provider, MD   simvastatin (ZOCOR) 80 MG tablet TAKE 1 TABLET BY MOUTH  NIGHTLY 6/26/17   Preeti Mora CNP   doxazosin (CARDURA) 4 MG tablet Take 1 tablet by mouth nightly Indications: Enlarged Prostate 5/11/17   Gideon Chacko MD   Multiple Vitamins-Minerals (PRESERVISION/LUTEIN PO) Take by mouth daily Indications: Disease of the Eye     Historical Provider, MD   metFORMIN (GLUCOPHAGE) 1000 MG tablet Take 1 tablet by mouth two  times daily with meals 3/28/17   MAEGAN El   warfarin (COUMADIN) 5 MG tablet Take as directed by [facility].  #45=30 days  Patient taking differently: Take 5 mg by mouth three times a week Mondays, Wednesdays, and Fridays 1/18/17   Ashlie Sorto CNP   ferrous sulfate 325 (65 FE) MG tablet Take 325 mg by mouth 3 times daily (with meals) Indications: Iron Deficiency     Historical Provider, MD   Cholecalciferol (VITAMIN D3) 2000 UNITS CAPS Take 2,000 Units by mouth daily Indications: pt self-initiated    Historical Provider, MD   Misc Natural Products (APPLE CIDER VINEGAR) TABS Take 1 tablet by mouth daily Indications: pt self-initiated    Historical Provider, MD   dorzolamide (TRUSOPT) 2 % ophthalmic solution Place 1 drop into both eyes 3 times daily Indications: Disease of the Eye     Historical Provider, MD   glucose blood VI test strips (ACCU-CHEK CHE) strip Check blood sugar 2 x daily 5/18/16   MAEGAN El   albuterol (PROVENTIL HFA;VENTOLIN HFA) 108 (90 BASE) MCG/ACT inhaler Inhale 2 puffs into the lungs every 6 hours as needed for Wheezing    Historical Provider, MD   OXYGEN Inhale into the lungs as needed (Uses at night only) Indications: Difficulty Breathing, Oxygen Therapy 3 liters    Historical Provider, MD   metoprolol

## 2017-11-06 ENCOUNTER — TELEPHONE (OUTPATIENT)
Dept: PHARMACY | Facility: CLINIC | Age: 82
End: 2017-11-06

## 2017-11-06 ASSESSMENT — ENCOUNTER SYMPTOMS: DYSPNEA ASSOCIATED WITH: EXERTION

## 2017-11-06 NOTE — TELEPHONE ENCOUNTER
Ruthann Mendes MD, comprehensive medication review completed with patient  - Noted pt's DBPs have been trending low (less than 60 mmHg x last 5 readings). If continues low, would patient benefit from change in regimen, such as changing from doxazosin to tamsulosin (appears using for BPH, tamsulosin more uroselective)? He does follow with urology, next OV 1/26/18    Has OV with you 11/17/17, please discuss with patient or advise as appropriate. Thank you! Sepideh Arias, PharmD, [facility] [address] Pharmacist  Direct: [phone]  Dept: [phone] (toll free [phone], option 7)   ==============================================================  CLINICAL PHARMACY NOTE - Medication Review  Patient outreach to review medications - Spoke with patient. SUBJECTIVE/OBJECTIVE:   Franchesca Amaya is a 80 y.o. male referred to a clinical pharmacy specialist given their history of COPD and/or HF and number of home medications.     Medications:  Medication Sig    Accu-Chek Softclix Lancets MISC Check sugars twice daily  DxE11.65    insulin glargine (LANTUS SOLOSTAR) 100 UNIT/ML injection pen Inject 8 Units into the skin nightly    Insulin Pen Needle (PEN NEEDLES 31GX5/16\") 31G X 8 MM MISC 1 each by Does not apply route daily Injecting once daily    Insulin Pen Needle (PEN NEEDLES 31GX5/16\") 31G X 8 MM MISC 1 each by Does not apply route daily Inject once daily    levothyroxine (SYNTHROID) 75 MCG tablet Take 2.5 tablets by mouth Daily    Arformoterol Tartrate (BROVANA) 15 MCG/2ML NEBU Take 1 ampule by nebulization 2 times daily    budesonide (PULMICORT) 0.25 MG/2ML nebulizer suspension Take 1 ampule by nebulization 2 times daily    Turmeric 500 MG CAPS Take 1 capsule by mouth daily    Carboxymethylcellulose Sodium (THERATEARS OP) Apply 1-2 drops to eye daily as needed (dry eyes)    simvastatin (ZOCOR) 80 MG tablet TAKE 1 TABLET BY MOUTH  NIGHTLY  - states no longer taking, was stopped by endocrinologist?  - per 10/31/17, improvement in leg pain    doxazosin (CARDURA) 4 MG tablet Take 1 tablet by mouth nightly Indications: Enlarged Prostate    Multiple Vitamins-Minerals (PRESERVISION/LUTEIN PO) Take by mouth daily Indications: Disease of the Eye   - 1 tab BID    metFORMIN (GLUCOPHAGE) 1000 MG tablet Take 1 tablet by mouth two  times daily with meals  - has been doing once daily for a few weeks  - states has started doing this on his own, just thought the dose of the medication was too high, based on information he was reading in magazines and onlines  - willing to increase back to BID if provider finds appropriate    warfarin (COUMADIN) 5 MG tablet Take as directed by [facility]. #45=30 days (Patient taking differently: Take 5 mg by mouth three times a week Mondays, Wednesdays, and Fridays)    ferrous sulfate 325 (65 FE) MG tablet Take 325 mg by mouth 3 times daily (with meals) Indications: Iron Deficiency   - taking BID, denies ADRs    Cholecalciferol (VITAMIN D3) 2000 UNITS CAPS Take 2,000 Units by mouth daily Indications: pt self-initiated    Misc Natural Products (APPLE CIDER VINEGAR) TABS Take 1 tablet by mouth daily Indications: pt self-initiated    dorzolamide (TRUSOPT) 2 % ophthalmic solution Place 1 drop into both eyes 3 times daily Indications: Disease of the Eye     glucose blood VI test strips (ACCU-CHEK CHE) strip Check blood sugar 2 x daily    albuterol (PROVENTIL HFA;VENTOLIN HFA) 108 (90 BASE) MCG/ACT inhaler Inhale 2 puffs into the lungs every 6 hours as needed for Wheezing  - Ventolin    OXYGEN Inhale into the lungs as needed (Uses at night only) Indications: Difficulty Breathing, Oxygen Therapy 3 liters    metoprolol (LOPRESSOR) 25 MG tablet Take 25 mg by mouth 2 times daily Indications: Raised Blood Pressure     aspirin 81 MG EC tablet Take 81 mg by mouth daily Indications: Treatment to Prevent Blood Clotting Take with food.      Additional Medications (including OTC/Herbal Supplements):  · Denies    Allergies: Allergies   Allergen Reactions   Kevin Caballero prefers pt not use Macrobid due to pulmonary side effects.  Adhesive Tape Rash    Alphagan [Brimonidine Tartrate] Hives     Eyes red     Vitals: Wt Readings from Last 3 Encounters:   10/31/17 287 lb 9.6 oz (130.5 kg)   10/17/17 277 lb (125.6 kg)   10/05/17 282 lb (127.9 kg)     BP Readings from Last 3 Encounters:   10/31/17 (!) 116/48   10/17/17 (!) 126/58   10/05/17 (!) 102/58     Pulse Readings from Last 3 Encounters:   10/31/17 80   10/17/17 68   10/05/17 70     Tobacco History:   History   Smoking Status    Never Smoker   Smokeless Tobacco    Never Used     Immunizations:   Most Recent Immunizations   Administered Date(s) Administered    Influenza Virus Vaccine 09/29/2015    Influenza Whole 10/01/2010    Influenza, Triv, 3 years and older, IM 09/26/2016    Pneumococcal 13-valent Conjugate ([id number]) 12/19/2014    Pneumococcal Polysaccharide ([id number]) 01/01/2003    Td 06/08/2010       Pulmonary Functions Testing Results:  Completed 10/14/2016. F/w pulmonology      Lab Results   Component Value Date    TSH 1.260 09/26/2017      Lab Results   Component Value Date    LABA1C 8.3 (H) 09/26/2017     Lab Results   Component Value Date    CREATININE 0.8 09/26/2017      Estimated Creatinine Clearance: 85 mL/min (based on SCr of 0.8 mg/dL). Component      Latest Ref Rng & Units 9/26/2017          10:00 AM   Est, Glom Filt Rate      ml/min/1.73m2 >90     Lab Results   Component Value Date    ALT 15 09/26/2017    AST 17 09/26/2017    ALKPHOS 42 09/26/2017    BILITOT 0.3 09/26/2017       ASSESSMENT/PLAN:   - Hypertension: Is at BP target of less than 140/90 mmHg. DBP trending low, pt denies ADRs of dizziness/lightheadedness. BP recheck at upcoming visit with PCP, future consideration to change from doxazosin to tamsulosin (more uroselective)?  Pt does follow with urology  - Lipids: Patient is older than 76years of age and does not appear to have ASCVD and therefore statin therapy is not recommended. Could consider statin since pt has DM, however per recent notes had muscle pain that resolved with statin discontinuation.   - Renal Dosing: No renal adjustments necessary.  - Non-formulary or medications with cost-effective alternatives: none reported/identified.  - Medication Interactions: The following clinically significant interactions were identified via [employer] Interaction Analysis as category D or higher:   · Turmeric, ASA, warfarin: bleeding risk. Edu to patient, recommended to discontinue turmeric, he will consider.  - Glycemic Goals: A1c less than 8% or PCP discretion Is not at blood glucose goal but follows with endocrinology. Recently started on Lantus. Unable to check blood sugars currently, waiting on testing supplies from mail order pharmacy. Denies s/sx hypoglycemia, no readings < 70 mg/dL when he was able to check. Chose to only take metformin 1000 mg once daily, would be willing to take BID if endo felt appropriate. He will discuss at his next appt after he is able to monitor sugars. Per last endo notes, aware pt is only taking 1000 mg once daily & plan to continue that dose.

## 2017-11-07 ENCOUNTER — HOSPITAL ENCOUNTER (OUTPATIENT)
Dept: PHARMACY | Age: 82
Setting detail: THERAPIES SERIES
Discharge: HOME | End: 2017-11-07
Payer: MEDICARE

## 2017-11-07 VITALS
BODY MASS INDEX: 45.47 KG/M2 | SYSTOLIC BLOOD PRESSURE: 119 MMHG | HEART RATE: 66 BPM | DIASTOLIC BLOOD PRESSURE: 64 MMHG | WEIGHT: 281.6 LBS

## 2017-11-07 DIAGNOSIS — I82.5Z1 CHRONIC DEEP VEIN THROMBOSIS (DVT) OF DISTAL VEIN OF RIGHT LOWER EXTREMITY (HCC): ICD-10-CM

## 2017-11-07 LAB — POC INR: 2 (ref 0.8–1.2)

## 2017-11-07 PROCEDURE — 36416 COLLJ CAPILLARY BLOOD SPEC: CPT

## 2017-11-07 PROCEDURE — 85610 PROTHROMBIN TIME: CPT

## 2017-11-07 PROCEDURE — 99211 OFF/OP EST MAY X REQ PHY/QHP: CPT

## 2017-11-07 ASSESSMENT — ENCOUNTER SYMPTOMS
BLOOD IN STOOL: 0
DIARRHEA: 0
CONSTIPATION: 0
SHORTNESS OF BREATH: 0

## 2017-11-10 DIAGNOSIS — Z79.4 UNCONTROLLED TYPE 2 DIABETES MELLITUS WITH HYPERGLYCEMIA, WITH LONG-TERM CURRENT USE OF INSULIN (HCC): ICD-10-CM

## 2017-11-10 DIAGNOSIS — E11.65 UNCONTROLLED TYPE 2 DIABETES MELLITUS WITH HYPERGLYCEMIA, WITH LONG-TERM CURRENT USE OF INSULIN (HCC): ICD-10-CM

## 2017-11-17 ENCOUNTER — OFFICE VISIT (OUTPATIENT)
Dept: FAMILY MEDICINE CLINIC | Age: 82
End: 2017-11-17
Payer: MEDICARE

## 2017-11-17 VITALS
RESPIRATION RATE: 24 BRPM | DIASTOLIC BLOOD PRESSURE: 58 MMHG | HEART RATE: 64 BPM | TEMPERATURE: 98.8 F | BODY MASS INDEX: 46.22 KG/M2 | SYSTOLIC BLOOD PRESSURE: 108 MMHG | WEIGHT: 286.2 LBS

## 2017-11-17 DIAGNOSIS — E11.8 TYPE 2 DIABETES MELLITUS WITH COMPLICATION, WITH LONG-TERM CURRENT USE OF INSULIN (HCC): Primary | ICD-10-CM

## 2017-11-17 DIAGNOSIS — I10 ESSENTIAL HYPERTENSION: ICD-10-CM

## 2017-11-17 DIAGNOSIS — Z23 NEEDS FLU SHOT: ICD-10-CM

## 2017-11-17 DIAGNOSIS — E78.2 MIXED HYPERLIPIDEMIA: ICD-10-CM

## 2017-11-17 DIAGNOSIS — Z79.4 TYPE 2 DIABETES MELLITUS WITH COMPLICATION, WITH LONG-TERM CURRENT USE OF INSULIN (HCC): Primary | ICD-10-CM

## 2017-11-17 DIAGNOSIS — E66.01 MORBID OBESITY WITH BMI OF 40.0-44.9, ADULT (HCC): ICD-10-CM

## 2017-11-17 DIAGNOSIS — N40.1 BENIGN PROSTATIC HYPERPLASIA WITH LOWER URINARY TRACT SYMPTOMS, SYMPTOM DETAILS UNSPECIFIED: ICD-10-CM

## 2017-11-17 DIAGNOSIS — Z79.01 ANTICOAGULATED ON COUMADIN: ICD-10-CM

## 2017-11-17 DIAGNOSIS — J41.1 MUCOPURULENT CHRONIC BRONCHITIS (HCC): ICD-10-CM

## 2017-11-17 PROCEDURE — 4040F PNEUMOC VAC/ADMIN/RCVD: CPT | Performed by: FAMILY MEDICINE

## 2017-11-17 PROCEDURE — 1123F ACP DISCUSS/DSCN MKR DOCD: CPT | Performed by: FAMILY MEDICINE

## 2017-11-17 PROCEDURE — 3023F SPIROM DOC REV: CPT | Performed by: FAMILY MEDICINE

## 2017-11-17 PROCEDURE — G8484 FLU IMMUNIZE NO ADMIN: HCPCS | Performed by: FAMILY MEDICINE

## 2017-11-17 PROCEDURE — G8926 SPIRO NO PERF OR DOC: HCPCS | Performed by: FAMILY MEDICINE

## 2017-11-17 PROCEDURE — 90688 IIV4 VACCINE SPLT 0.5 ML IM: CPT | Performed by: FAMILY MEDICINE

## 2017-11-17 PROCEDURE — G8417 CALC BMI ABV UP PARAM F/U: HCPCS | Performed by: FAMILY MEDICINE

## 2017-11-17 PROCEDURE — 99214 OFFICE O/P EST MOD 30 MIN: CPT | Performed by: FAMILY MEDICINE

## 2017-11-17 PROCEDURE — G8427 DOCREV CUR MEDS BY ELIG CLIN: HCPCS | Performed by: FAMILY MEDICINE

## 2017-11-17 PROCEDURE — 1036F TOBACCO NON-USER: CPT | Performed by: FAMILY MEDICINE

## 2017-11-17 PROCEDURE — G0008 ADMIN INFLUENZA VIRUS VAC: HCPCS | Performed by: FAMILY MEDICINE

## 2017-11-17 ASSESSMENT — ENCOUNTER SYMPTOMS
CHEST TIGHTNESS: 0
VOMITING: 0
CONSTIPATION: 0
NAUSEA: 0
ABDOMINAL PAIN: 0
BLOOD IN STOOL: 0
ANAL BLEEDING: 0
DIARRHEA: 0
SHORTNESS OF BREATH: 1

## 2017-11-17 NOTE — PROGRESS NOTES
06/09/2010    Flu vaccine (1) 09/01/2017    Zostavax vaccine  Addressed    Pneumococcal low/med risk  Completed

## 2017-11-17 NOTE — PROGRESS NOTES
After obtaining consent, and per orders of Dr. Susan Justice MD, injection of Fluzone 0.5mL IM given in Left deltoid by Mitesh Carter. Patient instructed to report any adverse reaction to me immediately. Patient tolerated well.

## 2017-11-17 NOTE — PROGRESS NOTES
[facility]  [address] [place] [address] Rd., [address] [id number]  Dept: [phone]  Dept Fax: [phone]    CHERISE Mattson is a 80 y.o.male    Pt feeling ok since last visit-     Pt continues with chronic cough for which he has seen Pulmonology for on a chronic,regular basis. No worsening symptoms at this time per pt report. Pt also recently had Bronchoscopy per Dr. Cassy Cruz (8/29/2017)- no worrisome findings. Glucometer readings at home are running \"high\". Pt recently ran out of Lancets  The home BP readings have not been checked regularly.      Patient Active Problem List   Diagnosis    COPD exacerbation ([facility] [address]    Primary thyroid follicular carcinoma    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    Breast lump    Morbid obesity with BMI of 40.0-44.9, adult    BPH (benign prostatic hyperplasia)    ROBER on CPAP    Essential hypertension    Chronic blood loss anemia    History of DVT (deep vein thrombosis)    Supplemental oxygen dependent    Shortness of breath    Hypoxemia    Chronic respiratory failure with hypoxia (HCC)    Mixed hyperlipidemia    Chronic deep vein thrombosis (DVT) of right lower extremity (HCC)    RUQ abdominal pain    Anticoagulated on Coumadin    Type 2 diabetes mellitus with complication, with long-term current use of insulin (HCC)    Muscle weakness    Chronic bronchitis (HCC)    Stage 3 severe COPD by GOLD classification ([facility])       Current Outpatient Prescriptions   Medication Sig Dispense Refill    glucose blood VI test strips (ACCU-CHEK CHE) strip Check blood sugar 2 x daily (dx E11.9) 200 each 1    Accu-Chek Softclix Lancets MISC Check sugars twice daily  DxE11.65 200 each 3    insulin glargine (LANTUS SOLOSTAR) 100 UNIT/ML injection pen Inject 8 Units into the skin nightly 5 Pen 1    Insulin Pen Needle (PEN NEEDLES 31GX5/16\") 31G X 8 MM MISC 1 each by Does not apply route daily Injecting once daily 100 each 1    levothyroxine (SYNTHROID) 75 MCG tablet Take 2.5 tablets by mouth Daily      Arformoterol Tartrate (BROVANA) 15 MCG/2ML NEBU Take 1 ampule by nebulization 2 times daily      budesonide (PULMICORT) 0.25 MG/2ML nebulizer suspension Take 1 ampule by nebulization 2 times daily      Turmeric 500 MG CAPS Take 1 capsule by mouth daily      Carboxymethylcellulose Sodium (THERATEARS OP) Apply 1-2 drops to eye daily as needed (dry eyes)      simvastatin (ZOCOR) 80 MG tablet TAKE 1 TABLET BY MOUTH  NIGHTLY 90 tablet 1    doxazosin (CARDURA) 4 MG tablet Take 1 tablet by mouth nightly Indications: Enlarged Prostate 90 tablet 2    Multiple Vitamins-Minerals (PRESERVISION/LUTEIN PO) Take 1 tablet by mouth 2 times daily       metFORMIN (GLUCOPHAGE) 1000 MG tablet Take 1 tablet by mouth two  times daily with meals (Patient taking differently: Take 1 tablet by mouth one time daily) 180 tablet 4    warfarin (COUMADIN) 5 MG tablet Take as directed by [facility].  #45=30 days (Patient taking differently: Take 5 mg by mouth Take as directed by [facility] coumadin clinic.) 45 tablet 11    ferrous sulfate 325 (65 FE) MG tablet Take 325 mg by mouth 2 times daily (with meals)       Cholecalciferol (VITAMIN D3) 2000 UNITS CAPS Take 2,000 Units by mouth daily Indications: pt self-initiated      Misc Natural Products (APPLE CIDER VINEGAR) TABS Take 1 tablet by mouth daily Indications: pt self-initiated      dorzolamide (TRUSOPT) 2 % ophthalmic solution Place 1 drop into both eyes 3 times daily Indications: Disease of the Eye       albuterol (PROVENTIL HFA;VENTOLIN HFA) 108 (90 BASE) MCG/ACT inhaler Inhale 2 puffs into the lungs every 6 hours as needed for Wheezing      OXYGEN Inhale into the lungs as needed (Uses at night only) Indications: Difficulty Breathing, Oxygen Therapy 3 liters      metoprolol (LOPRESSOR) 25 MG tablet Take 25 mg by mouth 2 times daily Indications: Raised Blood Pressure       aspirin 81 MG EC tablet Take 81 mg by mouth daily Indications: Treatment to [facility]       ASSESSMENT      1. Type 2 diabetes mellitus with complication, with long-term current use of insulin ([facility] [address]     2. Essential hypertension     3. Mixed hyperlipidemia     4. Mucopurulent chronic bronchitis ([facility] [address]     5. Morbid obesity with BMI of 40.0-44.9, adult     6. Benign prostatic hyperplasia with lower urinary tract symptoms, symptom details unspecified     7. Anticoagulated on Coumadin     8. Needs flu shot  INFLUENZA, QUADV, 3 YRS AND OLDER, IM, MDV, 0.5ML (FLUZONE QUADV)       PLAN      FLU SHOT today. Encouraged TETANUS SHOT (TdaP/ ADACEL/ [address]) per personal pharmacy. Pt declined SHINGLES SHOT at this time. (updated 11/17/2017)  Pt had COLONOSCOPY with Dr. Tamanna Almodovar on 3/22/05- ok- 10 year follow up overdue- pt declines at this time. (updated 11/17/2017)   Pt declines SFOBT/ FIT testing at this time (updated 11/17/2017)   Will hold on further CHETNA/PSA at this time due to age. (updated 11/17/2017)   DILATED EYE EXAM- done with Dr. Annie Aragon regularly- \"He's quite happy with me\"  Diabetes/ Thyroid management per Endocrinology (Dr. Richard Mcfarland, etc)   Follow up with Dr. Deborah Romero (CAD), Guerrero Spann (COPD), Dr. Sherel Mcburney (DM, Thyroid), Dr. Elijah Devries (Urinary symptoms) as scheduled. Will hold on lab tests at this time as will wait to see results as ordered by Dr. Sherel Mcburney. Continue current medicines   No refills needed today. Lancet samples provided to pt today.    Follow up in 3 months        Electronically signed by Bethany Garcia MD on 11/17/2017 at 1:51 PM

## 2017-11-17 NOTE — PATIENT INSTRUCTIONS
You may receive a survey about your visit with us today. The feedback from our patients helps us identify what is working well and where the service to all patients can be enhanced. Thank you! FLU SHOT today. Encouraged TETANUS SHOT (TdaP/ ADACEL/ [address]) per personal pharmacy. Pt declined SHINGLES SHOT at this time. (updated 11/17/2017)  Pt had COLONOSCOPY with Dr. Regla Rasmussen on 3/22/05- ok- 10 year follow up overdue- pt declines at this time. (updated 11/17/2017)   Pt declines SFOBT/ FIT testing at this time (updated 11/17/2017)   Will hold on further CHETNA/PSA at this time due to age. (updated 11/17/2017)   DILATED EYE EXAM- done with Dr. Colby Whipple regularly- \"He's quite happy with me\"  Diabetes/ Thyroid management per Endocrinology (Dr. Shyann Knight, etc)   Follow up with Dr. Heidy Sanchez (CAD), Maira aVldez (COPD), Dr. Stefanie Cameron (DM, Thyroid), Dr. Praveen Song (Urinary symptoms) as scheduled. Will hold on lab tests at this time as will wait to see results as ordered by Dr. Stefanie Cameron. Continue current medicines   No refills needed today. Lancet samples provided to pt today.    Follow up in 3 months

## 2017-11-21 DIAGNOSIS — Z79.4 UNCONTROLLED TYPE 2 DIABETES MELLITUS WITH HYPERGLYCEMIA, WITH LONG-TERM CURRENT USE OF INSULIN (HCC): ICD-10-CM

## 2017-11-21 DIAGNOSIS — E11.65 UNCONTROLLED TYPE 2 DIABETES MELLITUS WITH HYPERGLYCEMIA, WITH LONG-TERM CURRENT USE OF INSULIN (HCC): ICD-10-CM

## 2017-12-05 ENCOUNTER — HOSPITAL ENCOUNTER (OUTPATIENT)
Dept: PHARMACY | Age: 82
Setting detail: THERAPIES SERIES
Discharge: HOME | End: 2017-12-05
Payer: MEDICARE

## 2017-12-05 DIAGNOSIS — I82.5Z1 CHRONIC DEEP VEIN THROMBOSIS (DVT) OF DISTAL VEIN OF RIGHT LOWER EXTREMITY (HCC): ICD-10-CM

## 2017-12-05 LAB — POC INR: 4.1 (ref 0.8–1.2)

## 2017-12-05 PROCEDURE — 85610 PROTHROMBIN TIME: CPT

## 2017-12-05 PROCEDURE — 99211 OFF/OP EST MAY X REQ PHY/QHP: CPT

## 2017-12-05 PROCEDURE — 36416 COLLJ CAPILLARY BLOOD SPEC: CPT

## 2017-12-05 ASSESSMENT — ENCOUNTER SYMPTOMS
SHORTNESS OF BREATH: 0
BLOOD IN STOOL: 0
CONSTIPATION: 0
DIARRHEA: 0

## 2017-12-15 ENCOUNTER — TELEPHONE (OUTPATIENT)
Dept: FAMILY MEDICINE CLINIC | Age: 82
End: 2017-12-15

## 2017-12-15 ENCOUNTER — OFFICE VISIT (OUTPATIENT)
Dept: FAMILY MEDICINE CLINIC | Age: 82
End: 2017-12-15
Payer: MEDICARE

## 2017-12-15 ENCOUNTER — HOSPITAL ENCOUNTER (OUTPATIENT)
Age: 82
Discharge: HOME | End: 2017-12-15
Payer: MEDICARE

## 2017-12-15 ENCOUNTER — HOSPITAL ENCOUNTER (OUTPATIENT)
Dept: GENERAL RADIOLOGY | Age: 82
Discharge: HOME | End: 2017-12-15
Payer: MEDICARE

## 2017-12-15 VITALS
HEIGHT: 66 IN | WEIGHT: 286.7 LBS | BODY MASS INDEX: 46.07 KG/M2 | RESPIRATION RATE: 24 BRPM | HEART RATE: 66 BPM | SYSTOLIC BLOOD PRESSURE: 134 MMHG | OXYGEN SATURATION: 93 % | DIASTOLIC BLOOD PRESSURE: 60 MMHG | TEMPERATURE: 99.2 F

## 2017-12-15 DIAGNOSIS — J98.11 ATELECTASIS: ICD-10-CM

## 2017-12-15 DIAGNOSIS — R06.02 SOB (SHORTNESS OF BREATH): ICD-10-CM

## 2017-12-15 DIAGNOSIS — R94.31 ABNORMAL EKG: ICD-10-CM

## 2017-12-15 DIAGNOSIS — R07.9 CHEST PAIN, UNSPECIFIED TYPE: ICD-10-CM

## 2017-12-15 DIAGNOSIS — R06.02 SOB (SHORTNESS OF BREATH): Primary | ICD-10-CM

## 2017-12-15 DIAGNOSIS — R06.00 DYSPNEA, UNSPECIFIED TYPE: ICD-10-CM

## 2017-12-15 DIAGNOSIS — R05.9 COUGH: ICD-10-CM

## 2017-12-15 DIAGNOSIS — I48.91 NEW ONSET ATRIAL FIBRILLATION (HCC): ICD-10-CM

## 2017-12-15 DIAGNOSIS — R49.0 VOICE HOARSENESS: ICD-10-CM

## 2017-12-15 DIAGNOSIS — J20.9 ACUTE BRONCHITIS, UNSPECIFIED ORGANISM: Primary | ICD-10-CM

## 2017-12-15 DIAGNOSIS — M94.0 COSTOCHONDRITIS, ACUTE: ICD-10-CM

## 2017-12-15 LAB — PRO-BNP: 52.1 PG/ML (ref 0–1800)

## 2017-12-15 PROCEDURE — G8417 CALC BMI ABV UP PARAM F/U: HCPCS | Performed by: NURSE PRACTITIONER

## 2017-12-15 PROCEDURE — G8427 DOCREV CUR MEDS BY ELIG CLIN: HCPCS | Performed by: NURSE PRACTITIONER

## 2017-12-15 PROCEDURE — 1036F TOBACCO NON-USER: CPT | Performed by: NURSE PRACTITIONER

## 2017-12-15 PROCEDURE — 83880 ASSAY OF NATRIURETIC PEPTIDE: CPT

## 2017-12-15 PROCEDURE — 1123F ACP DISCUSS/DSCN MKR DOCD: CPT | Performed by: NURSE PRACTITIONER

## 2017-12-15 PROCEDURE — 36415 COLL VENOUS BLD VENIPUNCTURE: CPT

## 2017-12-15 PROCEDURE — G8484 FLU IMMUNIZE NO ADMIN: HCPCS | Performed by: NURSE PRACTITIONER

## 2017-12-15 PROCEDURE — 71020 XR CHEST STANDARD TWO VW: CPT

## 2017-12-15 PROCEDURE — 93000 ELECTROCARDIOGRAM COMPLETE: CPT | Performed by: NURSE PRACTITIONER

## 2017-12-15 PROCEDURE — 4040F PNEUMOC VAC/ADMIN/RCVD: CPT | Performed by: NURSE PRACTITIONER

## 2017-12-15 PROCEDURE — 99214 OFFICE O/P EST MOD 30 MIN: CPT | Performed by: NURSE PRACTITIONER

## 2017-12-15 RX ORDER — PREDNISONE 10 MG/1
TABLET ORAL
Qty: 30 TABLET | Refills: 0 | Status: SHIPPED | OUTPATIENT
Start: 2017-12-15 | End: 2018-01-02 | Stop reason: CLARIF

## 2017-12-15 RX ORDER — CEFDINIR 300 MG/1
300 CAPSULE ORAL EVERY 12 HOURS
Qty: 20 CAPSULE | Refills: 0 | Status: SHIPPED | OUTPATIENT
Start: 2017-12-15 | End: 2017-12-25

## 2017-12-15 ASSESSMENT — ENCOUNTER SYMPTOMS
SHORTNESS OF BREATH: 1
VOICE CHANGE: 1
VOMITING: 0
ABDOMINAL PAIN: 0
FACIAL SWELLING: 0
WHEEZING: 1
ANAL BLEEDING: 0
COUGH: 1
CONSTIPATION: 0
DIARRHEA: 0
SORE THROAT: 1
RHINORRHEA: 0
APNEA: 0
NAUSEA: 0
TROUBLE SWALLOWING: 1
BLOOD IN STOOL: 0
PHOTOPHOBIA: 0
ABDOMINAL DISTENTION: 0

## 2017-12-15 NOTE — TELEPHONE ENCOUNTER
C/O his throat feeling \"restricted\", like there is something stuck in his throat. This has been flaring up for the past several weeks, SOB getting worse recently. Also c/o some chest discomfort for the past 2 days off/on. Pt has had a cough and URI symptoms for the past few weeks and has been taking cough medicine and Hamida [place] Cold. No fever. He is able to eat and drink normally. Per BRADY the pt was scheduled an appt with her today at 4:30 PM. Per BRADY, orders for STAT CXR and STAT BNP were faxed to [address] [place] [address] and the pt will get them done prior to his appt.

## 2017-12-15 NOTE — PROGRESS NOTES
Patient is scheduled for a Stress Test, Tamiko Jackson for R07.9 chest pain unspecified    Patient has an appointment for Monday 12/18/2017 @8:30am with Dr. Fanny Anderson at [address]    Patient notified of appointment and verbalized understanding.
External ear normal.   Nose: Nose normal.   Mouth/Throat: Oropharynx is clear and moist. No oropharyngeal exudate. Posterior pharynx erythematous. Eyes: Conjunctivae and EOM are normal. Pupils are equal, round, and reactive to light. No scleral icterus. Neck: Normal range of motion. Neck supple. No JVD present. No thyromegaly present. Cardiovascular: Normal rate and normal heart sounds. Exam reveals no gallop and no friction rub. No murmur heard. Irregularly irregular rhythm   Pulmonary/Chest: Effort normal. No respiratory distress. He has no wheezes. He exhibits tenderness. Harsh, scattered rhonchi. Abdominal: Soft. Bowel sounds are normal. He exhibits no distension and no mass. There is no tenderness. Musculoskeletal: Normal range of motion. He exhibits edema and tenderness. No CVA tenderness to percussion. Tender to palpation left anterior chest region. 1+ edema to mid calves bilaterally. Lymphadenopathy:     He has no cervical adenopathy. Neurological: He is alert and oriented to person, place, and time. Coordination normal.   Skin: Skin is warm and dry. No rash noted. Psychiatric: He has a normal mood and affect. His behavior is normal.   Nursing note and vitals reviewed. Results for orders placed or performed during the hospital encounter of 12/15/17   Brain Natriuretic Peptide   Result Value Ref Range    Pro-BNP 52.1 0.0 - 1800.0 pg/mL       ASSESSMENT      1. Acute bronchitis, unspecified organism  cefdinir (OMNICEF) 300 MG capsule   2. Cough     3. Chest pain, unspecified type  EKG 12 Lead    Stress test, lexiscan    predniSONE (DELTASONE) 10 MG tablet   4. Voice hoarseness     5. Dyspnea, unspecified type  [facility] Seb Rousseau MD   6. Costochondritis, acute  predniSONE (DELTASONE) 10 MG tablet   7. Abnormal EKG     8.  New onset atrial fibrillation [facility] Seb Rousseau MD       PLAN

## 2017-12-15 NOTE — PATIENT INSTRUCTIONS
You may receive a survey about your visit with us today. The feedback from our patients helps us identify what is working well and where the service to all patients can be enhanced. Thank you! Orders Placed:  Orders Placed This Encounter   Procedures    Impraise of Ashley Archer MD     Referral Priority:   Urgent     Referral Type:   Consult for Advice and Opinion     Referral Reason:   Specialty Services Required     Referred to Provider:   Vijay Conway MD     Requested Specialty:   Cardiology     Number of Visits Requested:   1    Stress test, lexiscan     Standing Status:   Future     Standing Expiration Date:   12/15/2018     Scheduling Instructions:      Dyspnea     Order Specific Question:   Reason for Exam?     Answer:   Chest pain    EKG 12 Lead     Order Specific Question:   Reason for Exam?     Answer:   Chest pain     Medications Prescribed:  Orders Placed This Encounter   Medications    predniSONE (DELTASONE) 10 MG tablet     Sig: Take 4 tabs p.o. qd x 3 days then 3 tabs qd x 3 days then 2 tabs qd x 3 days then 1 tab qd x 3 days. Take with food. Dispense:  30 tablet     Refill:  0    cefdinir (OMNICEF) 300 MG capsule     Sig: Take 1 capsule by mouth every 12 hours for 10 days     Dispense:  20 capsule     Refill:  0       An EKG was obtained in the office today. The EKG revealed rate controlled a fib. Results (as noted above) were reviewed with the patient. Continue Coumadin as prescribed. Continue all current medications. Take Prednisone (10 mg. tablets) with food. Take 4 tablets (40 mg. total dose) daily on days 1 through 3. Take 3 tablets (30 mg. total dose) daily on days 4 through 6. Take 2 tablets (20 mg. total dose) daily on days 7 through 9. Take 1 tablet (10 mg. dose) daily on days 10 through 12. Avoid taking NSAIDs (Ibuprofen/Advil/Motrin, Naproxen/Naprosyn/Aleve, etc.) while taking Prednisone.   Monitor stools for signs of care is a key part of your treatment and safety. Be sure to make and go to all appointments, and call your doctor if you are having problems. It's also a good idea to know your test results and keep a list of the medicines you take. How can you care for yourself at home? Medicines  ? · Take your medicines exactly as prescribed. Call your doctor if you think you are having a problem with your medicine. You will get more details on the specific medicines your doctor prescribes. ? · If your doctor has given you a blood thinner to prevent a stroke, be sure you get instructions about how to take your medicine safely. Blood thinners can cause serious bleeding problems. ? · Do not take any vitamins, over-the-counter drugs, or herbal products without talking to your doctor first.   ? Lifestyle changes  ? · Do not smoke. Smoking can increase your chance of a stroke and heart attack. If you need help quitting, talk to your doctor about stop-smoking programs and medicines. These can increase your chances of quitting for good. ? · Eat a heart-healthy diet. ? · Stay at a healthy weight. Lose weight if you need to.   ? · Limit alcohol to 2 drinks a day for men and 1 drink a day for women. Too much alcohol can cause health problems. ? · Avoid colds and flu. Get a pneumococcal vaccine shot. If you have had one before, ask your doctor whether you need another dose. Get a flu shot every year. If you must be around people with colds or flu, wash your hands often. Activity  ? · If your doctor recommends it, get more exercise. Walking is a good choice. Bit by bit, increase the amount you walk every day. Try for at least 30 minutes on most days of the week. You also may want to swim, bike, or do other activities. Your doctor may suggest that you join a cardiac rehabilitation program so that you can have help increasing your physical activity safely. ? · Start light exercise if your doctor says it is okay.  Even a small amount will help you get stronger, have more energy, and manage stress. Walking is an easy way to get exercise. Start out by walking a little more than you did in the hospital. Gradually increase the amount you walk. ? · When you exercise, watch for signs that your heart is working too hard. You are pushing too hard if you cannot talk while you are exercising. If you become short of breath or dizzy or have chest pain, sit down and rest immediately. ? · Check your pulse regularly. Place two fingers on the artery at the palm side of your wrist, in line with your thumb. If your heartbeat seems uneven or fast, talk to your doctor. When should you call for help? Call 911 anytime you think you may need emergency care. For example, call if:  ? · You have symptoms of a heart attack. These may include:  ¨ Chest pain or pressure, or a strange feeling in the chest.  ¨ Sweating. ¨ Shortness of breath. ¨ Nausea or vomiting. ¨ Pain, pressure, or a strange feeling in the back, neck, jaw, or upper belly or in one or both shoulders or arms. ¨ Lightheadedness or sudden weakness. ¨ A fast or irregular heartbeat. After you call 911, the  may tell you to chew 1 adult-strength or 2 to 4 low-dose aspirin. Wait for an ambulance. Do not try to drive yourself. ? · You have symptoms of a stroke. These may include:  ¨ Sudden numbness, tingling, weakness, or loss of movement in your face, arm, or leg, especially on only one side of your body. ¨ Sudden vision changes. ¨ Sudden trouble speaking. ¨ Sudden confusion or trouble understanding simple statements. ¨ Sudden problems with walking or balance. ¨ A sudden, severe headache that is different from past headaches. ? · You passed out (lost consciousness). ?Call your doctor now or seek immediate medical care if:  ? · You have new or increased shortness of breath. ? · You feel dizzy or lightheaded, or you feel like you may faint.    ? · Your heart rate becomes irregular. ? · You can feel your heart flutter in your chest or skip heartbeats. Tell your doctor if these symptoms are new or worse. ? Watch closely for changes in your health, and be sure to contact your doctor if you have any problems. Where can you learn more? Go to https://chpepiceweb.Fragegg. org and sign in to your [employer] account. Enter U020 in the Group 47 box to learn more about \"Atrial Fibrillation: Care Instructions. \"     If you do not have an account, please click on the \"Sign Up Now\" link. Current as of: September 21, 2016  Content Version: 11.4  © 6053-1273 [employer]. Care instructions adapted under license by [employer] [facility] ([facility]). If you have questions about a medical condition or this instruction, always ask your healthcare professional. [place] [address] any warranty or liability for your use of this information. Patient Education        Cough: Care Instructions  Your Care Instructions    A cough is your body's response to something that bothers your throat or airways. Many things can cause a cough. You might cough because of a cold or the flu, bronchitis, or asthma. Smoking, postnasal drip, allergies, and stomach acid that backs up into your throat also can cause coughs. A cough is a symptom, not a disease. Most coughs stop when the cause, such as a cold, goes away. You can take a few steps at home to cough less and feel better. Follow-up care is a key part of your treatment and safety. Be sure to make and go to all appointments, and call your doctor if you are having problems. It's also a good idea to know your test results and keep a list of the medicines you take. How can you care for yourself at home? · Drink lots of water and other fluids. This helps thin the mucus and soothes a dry or sore throat. Honey or lemon juice in hot water or tea may ease a dry cough. · Take cough medicine as directed by your doctor.   · Prop up your head on pillows to help you breathe and ease a dry cough. · Try cough drops to soothe a dry or sore throat. Cough drops don't stop a cough. Medicine-flavored cough drops are no better than candy-flavored drops or hard candy. · Do not smoke. Avoid secondhand smoke. If you need help quitting, talk to your doctor about stop-smoking programs and medicines. These can increase your chances of quitting for good. When should you call for help? Call 911 anytime you think you may need emergency care. For example, call if:  ? · You have severe trouble breathing. ?Call your doctor now or seek immediate medical care if:  ? · You cough up blood. ? · You have new or worse trouble breathing. ? · You have a new or higher fever. ? · You have a new rash. ? Watch closely for changes in your health, and be sure to contact your doctor if:  ? · You cough more deeply or more often, especially if you notice more mucus or a change in the color of your mucus. ? · You have new symptoms, such as a sore throat, an earache, or sinus pain. ? · You do not get better as expected. Where can you learn more? Go to https://BudgetSimple.MoneyLion. org and sign in to your [employer] account. Enter D279 in the Cameo box to learn more about \"Cough: Care Instructions. \"     If you do not have an account, please click on the \"Sign Up Now\" link. Current as of: May 12, 2017  Content Version: 11.4  © 6202-1592 [employer]. Care instructions adapted under license by [facility] [address] If you have questions about a medical condition or this instruction, always ask your healthcare professional. [place] [address] any warranty or liability for your use of this information. Patient Education        Costochondritis: Care Instructions  Your Care Instructions  You have chest pain because the cartilage of your rib cage is inflamed. This problem is called costochondritis.  This type of chest wall pain may last from days to weeks. It is not a heart problem. Sometimes costochondritis occurs with a cold or the flu, and other times the exact cause is not known. Follow-up care is a key part of your treatment and safety. Be sure to make and go to all appointments, and call your doctor if you are having problems. It's also a good idea to know your test results and keep a list of the medicines you take. How can you care for yourself at home? · Take medicines for pain and inflammation exactly as directed. ¨ If the doctor gave you a prescription medicine, take it as prescribed. ¨ If you are not taking a prescription pain medicine, ask your doctor if you can take an over-the-counter medicine. ¨ Do not take two or more pain medicines at the same time unless the doctor told you to. Many pain medicines have acetaminophen, which is Tylenol. Too much acetaminophen (Tylenol) can be harmful. · It may help to use a warm compress or heating pad (set on low) on your chest. You can also try alternating heat and ice. Put ice or a cold pack on the area for 10 to 20 minutes at a time. Put a thin cloth between the ice and your skin. · Avoid any activity that strains the chest area. As your pain gets better, you can slowly return to your normal activities. · Do not use tape, an elastic bandage, a \"rib belt,\" or anything else that restricts your chest wall motion. When should you call for help? Call 911 anytime you think you may need emergency care. For example, call if:  ? · You have new or different chest pain or pressure. This may occur with:  ¨ Sweating. ¨ Shortness of breath. ¨ Nausea or vomiting. ¨ Pain that spreads from the chest to the neck, jaw, or one or both shoulders or arms. ¨ Dizziness or lightheadedness. ¨ A fast or uneven pulse. After calling 911, chew 1 adult-strength aspirin. Wait for an ambulance. Do not try to drive yourself. ? · You have severe trouble breathing.    ?Call your doctor now or seek

## 2017-12-18 ENCOUNTER — HOSPITAL ENCOUNTER (OUTPATIENT)
Dept: NON INVASIVE DIAGNOSTICS | Age: 82
Discharge: HOME | End: 2017-12-18
Payer: MEDICARE

## 2017-12-18 DIAGNOSIS — R07.9 CHEST PAIN, UNSPECIFIED TYPE: ICD-10-CM

## 2017-12-18 PROCEDURE — 6360000002 HC RX W HCPCS

## 2017-12-18 PROCEDURE — 78452 HT MUSCLE IMAGE SPECT MULT: CPT

## 2017-12-18 PROCEDURE — 3430000000 HC RX DIAGNOSTIC RADIOPHARMACEUTICAL: Performed by: NURSE PRACTITIONER

## 2017-12-18 PROCEDURE — A9500 TC99M SESTAMIBI: HCPCS | Performed by: NURSE PRACTITIONER

## 2017-12-18 PROCEDURE — 93017 CV STRESS TEST TRACING ONLY: CPT

## 2017-12-18 RX ADMIN — Medication 32.7 MILLICURIE: at 10:12

## 2017-12-18 RX ADMIN — Medication 9.6 MILLICURIE: at 08:34

## 2017-12-19 ENCOUNTER — HOSPITAL ENCOUNTER (OUTPATIENT)
Dept: PHARMACY | Age: 82
Setting detail: THERAPIES SERIES
Discharge: HOME | End: 2017-12-19
Payer: MEDICARE

## 2017-12-19 ENCOUNTER — TELEPHONE (OUTPATIENT)
Dept: FAMILY MEDICINE CLINIC | Age: 82
End: 2017-12-19

## 2017-12-19 ENCOUNTER — TELEPHONE (OUTPATIENT)
Dept: CARDIOLOGY CLINIC | Age: 82
End: 2017-12-19

## 2017-12-19 DIAGNOSIS — I82.5Z1 CHRONIC DEEP VEIN THROMBOSIS (DVT) OF DISTAL VEIN OF RIGHT LOWER EXTREMITY (HCC): ICD-10-CM

## 2017-12-19 LAB — POC INR: 3 (ref 0.8–1.2)

## 2017-12-19 PROCEDURE — 99211 OFF/OP EST MAY X REQ PHY/QHP: CPT

## 2017-12-19 PROCEDURE — 36416 COLLJ CAPILLARY BLOOD SPEC: CPT

## 2017-12-19 PROCEDURE — 85610 PROTHROMBIN TIME: CPT

## 2017-12-19 NOTE — TELEPHONE ENCOUNTER
[facility] [place] at [facility] called this morning, Dr Fanny Anderson performed stress test on pt yesterday and it was abnormal, they don't have him as a current patient. Result in epic  After looking in pts chart, referral was placed on 12/15/17 to Dr Raimundo Millan at [facility]. Pt has not been set up with appt yet so I called the nurse line at [facility] back. I spoke to one of the nurses at [facility] to schedule an appt, Dr Raimundo Millan first appt is 1/2/18. However, Dr Nancy Vernon has an opening tomorrow 12/20/17 at 115pm. This appt with Dr Nancy Vernon was taken and will call them back if pt is unable to make it.     Attempted to call pt but no answer and had to leave message to call back

## 2017-12-19 NOTE — TELEPHONE ENCOUNTER
Geofm Mayaguez from [facility] called back   appt made with Sadia Armendariz tomorrow

## 2017-12-20 ENCOUNTER — CARE COORDINATION (OUTPATIENT)
Dept: CARE COORDINATION | Age: 82
End: 2017-12-20

## 2017-12-20 ENCOUNTER — OFFICE VISIT (OUTPATIENT)
Dept: CARDIOLOGY CLINIC | Age: 82
End: 2017-12-20
Payer: MEDICARE

## 2017-12-20 VITALS
WEIGHT: 281.2 LBS | DIASTOLIC BLOOD PRESSURE: 62 MMHG | SYSTOLIC BLOOD PRESSURE: 144 MMHG | HEART RATE: 64 BPM | HEIGHT: 67 IN | BODY MASS INDEX: 44.13 KG/M2

## 2017-12-20 DIAGNOSIS — R94.39 ABNORMAL STRESS TEST: ICD-10-CM

## 2017-12-20 DIAGNOSIS — R06.09 DYSPNEA ON EXERTION: ICD-10-CM

## 2017-12-20 DIAGNOSIS — E66.09 CLASS 2 OBESITY DUE TO EXCESS CALORIES WITHOUT SERIOUS COMORBIDITY IN ADULT, UNSPECIFIED BMI: ICD-10-CM

## 2017-12-20 DIAGNOSIS — E78.00 PURE HYPERCHOLESTEROLEMIA: ICD-10-CM

## 2017-12-20 DIAGNOSIS — I10 ESSENTIAL HYPERTENSION: Primary | ICD-10-CM

## 2017-12-20 PROCEDURE — 1036F TOBACCO NON-USER: CPT | Performed by: NUCLEAR MEDICINE

## 2017-12-20 PROCEDURE — G8484 FLU IMMUNIZE NO ADMIN: HCPCS | Performed by: NUCLEAR MEDICINE

## 2017-12-20 PROCEDURE — 1123F ACP DISCUSS/DSCN MKR DOCD: CPT | Performed by: NUCLEAR MEDICINE

## 2017-12-20 PROCEDURE — 4040F PNEUMOC VAC/ADMIN/RCVD: CPT | Performed by: NUCLEAR MEDICINE

## 2017-12-20 PROCEDURE — G8417 CALC BMI ABV UP PARAM F/U: HCPCS | Performed by: NUCLEAR MEDICINE

## 2017-12-20 PROCEDURE — G8427 DOCREV CUR MEDS BY ELIG CLIN: HCPCS | Performed by: NUCLEAR MEDICINE

## 2017-12-20 PROCEDURE — 99204 OFFICE O/P NEW MOD 45 MIN: CPT | Performed by: NUCLEAR MEDICINE

## 2017-12-20 RX ORDER — ISOSORBIDE MONONITRATE 30 MG/1
30 TABLET, EXTENDED RELEASE ORAL DAILY
Qty: 90 TABLET | Refills: 1 | Status: SHIPPED | OUTPATIENT
Start: 2017-12-20 | End: 2018-08-14

## 2017-12-20 RX ORDER — ISOSORBIDE MONONITRATE 30 MG/1
30 TABLET, EXTENDED RELEASE ORAL DAILY
COMMUNITY
End: 2017-12-20 | Stop reason: SDUPTHER

## 2017-12-20 ASSESSMENT — ENCOUNTER SYMPTOMS
DIARRHEA: 0
SHORTNESS OF BREATH: 1
NAUSEA: 0
FACIAL SWELLING: 0
VOMITING: 0
ANAL BLEEDING: 0
CHEST TIGHTNESS: 1
CONSTIPATION: 0
ABDOMINAL DISTENTION: 0
DYSPNEA ASSOCIATED WITH: MINIMAL EXERTION
COLOR CHANGE: 0
ABDOMINAL PAIN: 0
RECTAL PAIN: 0
BLOOD IN STOOL: 0
BACK PAIN: 1
PHOTOPHOBIA: 0

## 2017-12-20 NOTE — CARE COORDINATION
Ambulatory Care Coordination Note  12/20/2017  CM Risk Score: 3  [place] Mortality Risk Score: 36.19    ACC: Frannie Rutledge, RN    Summary Note: Dry throat, cant cough anything up. Cough sound moist but he states nothing will come up. Has been a constant complaint for him for months. Had a bronch in September but it did not help that complaint. Has an appt with Dr Olamide Austin this afternoon due to an abnormal stress test. Is on Prednisone, BS running elevated. Around 200. BS in am around 176 but goes over 200 during the day. Is currently monitoring twice daily. Taking 8 units of Lantus. Care Coordination Interventions    Program Enrollment:  Complex Care  Referral from Primary Care Provider:  No  Suggested Interventions and Community Resources  Diabetes Education:  Completed  Fall Risk Prevention: In Process  Medi Set or Pill Pack:  Completed  Zone Management Tools: In Process         Goals Addressed             Most Recent     Conditions and Symptoms   No change (12/20/2017)             I will follow my Zone Management tool to seek urgent or emergent care. Barriers: lack of support, overwhelmed by complexity of regimen and stress  Plan for overcoming my barriers: Will discuss needs with office or CC  Confidence: 9/10  Anticipated Goal Completion Date: 9/19/17              Prior to Admission medications    Medication Sig Start Date End Date Taking? Authorizing Provider   predniSONE (DELTASONE) 10 MG tablet Take 4 tabs p.o. qd x 3 days then 3 tabs qd x 3 days then 2 tabs qd x 3 days then 1 tab qd x 3 days. Take with food.  12/15/17   Ila Kasper CNP   cefdinir (OMNICEF) 300 MG capsule Take 1 capsule by mouth every 12 hours for 10 days 12/15/17 12/25/17  Ila Kasper CNP   glucose blood VI test strips (ACCU-CHEK CHE) strip Check blood sugar 2 x daily (dx E11.9) 11/21/17   MAEGAN Claire   Accu-Chek Softclix Lancets MISC Check sugars twice daily  DxE11.65 10/31/17   MAEGAN Claire   insulin glargine (LANTUS SOLOSTAR) 100 UNIT/ML injection pen Inject 8 Units into the skin nightly 10/31/17   MAEGAN Esparza   Insulin Pen Needle (PEN NEEDLES 31GX5/16\") 31G X 8 MM MISC 1 each by Does not apply route daily Injecting once daily 10/31/17   MAEGAN Esparza   levothyroxine (SYNTHROID) 75 MCG tablet Take 2.5 tablets by mouth Daily 10/31/17   MAEGAN Esparza   Arformoterol Tartrate (BROVANA) 15 MCG/2ML NEBU Take 1 ampule by nebulization 2 times daily    Historical Provider, MD   budesonide (PULMICORT) 0.25 MG/2ML nebulizer suspension Take 1 ampule by nebulization 2 times daily    Historical Provider, MD   Turmeric 500 MG CAPS Take 1 capsule by mouth daily    Historical Provider, MD   Carboxymethylcellulose Sodium (THERATEARS OP) Apply 1-2 drops to eye daily as needed (dry eyes)    Historical Provider, MD   simvastatin (ZOCOR) 80 MG tablet TAKE 1 TABLET BY MOUTH  NIGHTLY 6/26/17   Gilmer Mora, CNP   doxazosin (CARDURA) 4 MG tablet Take 1 tablet by mouth nightly Indications: Enlarged Prostate 5/11/17   Jordan Ortez MD   Multiple Vitamins-Minerals (PRESERVISION/LUTEIN PO) Take 1 tablet by mouth 2 times daily     Historical Provider, MD   metFORMIN (GLUCOPHAGE) 1000 MG tablet Take 1 tablet by mouth two  times daily with meals  Patient taking differently: Take 1 tablet by mouth one time daily 3/28/17   MAEGAN Esparza   warfarin (COUMADIN) 5 MG tablet Take as directed by [facility]. #45=30 days  Patient taking differently: Take 5 mg by mouth Take as directed by [facility] coumadin clinic.  1/18/17   Kevin Peraza, BC   ferrous sulfate 325 (65 FE) MG tablet Take 325 mg by mouth 2 times daily (with meals)     Historical Provider, MD   Cholecalciferol (VITAMIN D3) 2000 UNITS CAPS Take 2,000 Units by mouth daily Indications: pt self-initiated    Historical Provider, MD   Misc Natural Products (APPLE CIDER VINEGAR) TABS Take 1 tablet by mouth daily Indications: pt self-initiated

## 2017-12-20 NOTE — PROGRESS NOTES
[facility]  [facility]  [address]  [address]  [place] [address]  Dept: [phone]  Dept Fax: [phone]  Loc: [phone]    Visit Date: 12/20/2017    Adrian Kaiser is a 80 y.o. male who presents today for:  Chief Complaint   Patient presents with   [facility] Cardiologist     referred by Dr. Nelson Rutledge Results     follow-up on stress test    Chest Pain    Hypertension    Obesity    Shortness of Breath    Hyperlipidemia     Here for the first time  Known multiple risk factors for CAd  Lately with some chest pain   Not exertional   Some associated dyspnea  In fact he has had dyspnea for a while  Very limited patient  Very obese man   Possible sleep apnea  Some prostate issues  Also a DVT lately   On coumadin   Does have signs of sleep apnea   Has gained weight lately   No smoking   Dm for many years  Family history of CAD    HPI:  HPI  Past Medical History:   Diagnosis Date    Anxiety     CAD (coronary artery disease)     leaking valve    Chronic back pain     COPD (chronic obstructive pulmonary disease) ([address]     Detached retina     Diabetes mellitus ([address]     NIDDM    DVT (deep venous thrombosis) ([address]     RLE    DVT (deep venous thrombosis) ([address] 11/9/15    LLE    Glaucoma     Hyperlipidemia     Hypertension     Mass of chest     benign chest mass, Dr Jocelyn Zacarias    Movement disorder     spinal stenosis    Obesity     Osteoarthritis     right ankle, right hand    Pneumonia     Sleep apnea 1993    on BiPap, Dr Borjas Rising Thyroid cancer [facility])     s/p thyroid resection    Urinary incontinence       Past Surgical History:   Procedure Laterality Date   [facility] SURGERY  9734,[id number], 2014    BRONCHOSCOPY N/A 8/29/2017    BRONCHOSCOPY AIRWAY ONLY performed by Berkley Jenkins MD at [facility]    EKG 12-LEAD  11/10/2015         FRACTURE SURGERY      right hand    OTHER SURGICAL HISTORY      spinal epidurals    RETINAL DETACHMENT SURGERY      SPINE SURGERY  2004    Lumbar laminectomy    THYROIDECTOMY       Family History   Problem Relation Age of Onset    Other Mother      Complications of Childbirth    Other Father [de-identified]     Natural causes     Social History   Substance Use Topics    Smoking status: Never Smoker    Smokeless tobacco: Never Used    Alcohol use 0.6 oz/week     1 Glasses of wine per week      Comment: Glass of wine with dinner. Current Outpatient Prescriptions   Medication Sig Dispense Refill    predniSONE (DELTASONE) 10 MG tablet Take 4 tabs p.o. qd x 3 days then 3 tabs qd x 3 days then 2 tabs qd x 3 days then 1 tab qd x 3 days. Take with food.  30 tablet 0    cefdinir (OMNICEF) 300 MG capsule Take 1 capsule by mouth every 12 hours for 10 days 20 capsule 0    glucose blood VI test strips (ACCU-CHEK CHE) strip Check blood sugar 2 x daily (dx E11.9) 200 each 1    Accu-Chek Softclix Lancets MISC Check sugars twice daily  DxE11.65 200 each 3    insulin glargine (LANTUS SOLOSTAR) 100 UNIT/ML injection pen Inject 8 Units into the skin nightly 5 Pen 1    Insulin Pen Needle (PEN NEEDLES 31GX5/16\") 31G X 8 MM MISC 1 each by Does not apply route daily Injecting once daily 100 each 1    levothyroxine (SYNTHROID) 175 MCG tablet Take 175 mcg by mouth Daily       Arformoterol Tartrate (BROVANA) 15 MCG/2ML NEBU Take 1 ampule by nebulization 2 times daily      budesonide (PULMICORT) 0.25 MG/2ML nebulizer suspension Take 1 ampule by nebulization 2 times daily      Turmeric 500 MG CAPS Take 1 capsule by mouth daily      Carboxymethylcellulose Sodium (THERATEARS OP) Apply 1-2 drops to eye daily as needed (dry eyes)      simvastatin (ZOCOR) 80 MG tablet TAKE 1 TABLET BY MOUTH  NIGHTLY 90 tablet 1    doxazosin (CARDURA) 4 MG tablet Take 1 tablet by mouth nightly Indications: Enlarged Prostate 90 tablet 2    Multiple Vitamins-Minerals (PRESERVISION/LUTEIN PO) Take 1 tablet by mouth 2 times daily       metFORMIN (GLUCOPHAGE) 1000 MG tablet Take 1 tablet by mouth two  times daily with meals (Patient taking differently: Take 1 tablet by mouth one time daily) 180 tablet 4    warfarin (COUMADIN) 5 MG tablet Take as directed by [facility]. #45=30 days (Patient taking differently: Take 5 mg by mouth Take as directed by [facility] coumadin clinic.) 45 tablet 11    ferrous sulfate 325 (65 FE) MG tablet Take 325 mg by mouth 2 times daily (with meals)       Cholecalciferol (VITAMIN D3) 2000 UNITS CAPS Take 2,000 Units by mouth daily Indications: pt self-initiated      Misc Natural Products (APPLE CIDER VINEGAR) TABS Take 1 tablet by mouth daily Indications: pt self-initiated      dorzolamide (TRUSOPT) 2 % ophthalmic solution Place 1 drop into both eyes 3 times daily Indications: Disease of the Eye       albuterol (PROVENTIL HFA;VENTOLIN HFA) 108 (90 BASE) MCG/ACT inhaler Inhale 2 puffs into the lungs every 6 hours as needed for Wheezing      OXYGEN Inhale into the lungs as needed (Uses at night only) Indications: Difficulty Breathing, Oxygen Therapy 3 liters      metoprolol (LOPRESSOR) 25 MG tablet Take 25 mg by mouth 2 times daily Indications: Raised Blood Pressure       aspirin 81 MG EC tablet Take 81 mg by mouth daily Indications: Treatment to Prevent Blood Clotting Take with food. No current facility-administered medications for this visit. Allergies   Allergen Reactions   Jeevan Perales prefers pt not use Macrobid due to pulmonary side effects.  Adhesive Tape Rash    Alphagan [Brimonidine Tartrate] Hives     Eyes red     Health Maintenance   Topic Date Due    DTaP/Tdap/Td vaccine (1 - Tdap) 06/09/2010    Zostavax vaccine  Addressed    Flu vaccine  Completed    Pneumococcal low/med risk  Completed       Subjective:  Review of Systems   Constitutional: Positive for fatigue. Negative for chills and diaphoresis. HENT: Negative for facial swelling and nosebleeds.     Eyes: Negative for hypertension     2. Pure hypercholesterolemia     3. Class 2 obesity due to excess calories without serious comorbidity in adult, unspecified BMI     4. Dyspnea on exertion     5. Abnormal stress test     very likely underlying CAD  Many years of DM   He is with abnormal stress test     Plan:  No Follow-up on file. We discussed options  He is very reluctant about a cath   He understands the risk of not having a cath and as well medical Rx options  Will be on a statin and a beta blocker  Add nitrates if BP allows  An echo   Continue risk factor modification and medical management  Thank you for allowing me to participate in the care of your patient. Please don't hesitate to contact me regarding any further issues related to the patient care    Orders Placed:  No orders of the defined types were placed in this encounter. Medications Prescribed:  No orders of the defined types were placed in this encounter. Discussed use, benefit, and side effects of prescribed medications. All patient questions answered. Pt voiced understanding. Instructed to continue current medications, diet and exercise. Continue risk factor modification and medical management. Patient agreed with treatment plan. Follow up as directed.     Electronically signed by Mc Montilla MD on 12/20/2017 at 1:26 PM

## 2017-12-21 ENCOUNTER — HOSPITAL ENCOUNTER (OUTPATIENT)
Dept: NON INVASIVE DIAGNOSTICS | Age: 82
Discharge: HOME | End: 2017-12-21
Payer: MEDICARE

## 2017-12-21 DIAGNOSIS — R06.09 DYSPNEA ON EXERTION: ICD-10-CM

## 2017-12-21 DIAGNOSIS — I10 ESSENTIAL HYPERTENSION: ICD-10-CM

## 2017-12-21 DIAGNOSIS — R94.39 ABNORMAL STRESS TEST: ICD-10-CM

## 2017-12-21 LAB
LV EF: 48 %
LVEF MODALITY: NORMAL

## 2017-12-21 PROCEDURE — 93306 TTE W/DOPPLER COMPLETE: CPT

## 2017-12-22 ENCOUNTER — TELEPHONE (OUTPATIENT)
Dept: CARDIOLOGY CLINIC | Age: 82
End: 2017-12-22

## 2018-01-02 ENCOUNTER — HOSPITAL ENCOUNTER (OUTPATIENT)
Dept: PHARMACY | Age: 83
Setting detail: THERAPIES SERIES
Discharge: HOME | End: 2018-01-02
Payer: MEDICARE

## 2018-01-02 DIAGNOSIS — I82.5Z1 CHRONIC DEEP VEIN THROMBOSIS (DVT) OF DISTAL VEIN OF RIGHT LOWER EXTREMITY (HCC): ICD-10-CM

## 2018-01-02 LAB — POC INR: 4.3 (ref 0.8–1.2)

## 2018-01-02 PROCEDURE — 85610 PROTHROMBIN TIME: CPT

## 2018-01-02 PROCEDURE — 36416 COLLJ CAPILLARY BLOOD SPEC: CPT

## 2018-01-02 PROCEDURE — 99211 OFF/OP EST MAY X REQ PHY/QHP: CPT

## 2018-01-09 ENCOUNTER — TELEPHONE (OUTPATIENT)
Dept: CARDIOLOGY CLINIC | Age: 83
End: 2018-01-09

## 2018-01-09 NOTE — TELEPHONE ENCOUNTER
Darya Quezada from Dr. Cohn Friends office called wanting to know if there is any reason pt should not be taking lisinopril   Was not on med list but pt states he has always taken it   Dr. Cohn Friends will refill if Dr. Love Memory is ok with that   Please call Doyce Friends office at [phone]

## 2018-01-10 RX ORDER — LISINOPRIL 20 MG/1
TABLET ORAL
Qty: 180 TABLET | Refills: 1 | Status: SHIPPED | OUTPATIENT
Start: 2018-01-10 | End: 2018-02-09

## 2018-01-12 ENCOUNTER — HOSPITAL ENCOUNTER (OUTPATIENT)
Dept: PHARMACY | Age: 83
Setting detail: THERAPIES SERIES
Discharge: HOME | End: 2018-01-12
Payer: MEDICARE

## 2018-01-12 ENCOUNTER — OFFICE VISIT (OUTPATIENT)
Dept: ENDOCRINOLOGY | Age: 83
End: 2018-01-12
Payer: MEDICARE

## 2018-01-12 VITALS
WEIGHT: 282.5 LBS | HEART RATE: 73 BPM | RESPIRATION RATE: 16 BRPM | BODY MASS INDEX: 44.34 KG/M2 | HEIGHT: 67 IN | DIASTOLIC BLOOD PRESSURE: 65 MMHG | SYSTOLIC BLOOD PRESSURE: 140 MMHG

## 2018-01-12 DIAGNOSIS — C73 THYROID CANCER (HCC): ICD-10-CM

## 2018-01-12 DIAGNOSIS — E66.01 MORBID OBESITY WITH BMI OF 40.0-44.9, ADULT (HCC): ICD-10-CM

## 2018-01-12 DIAGNOSIS — Z79.4 TYPE 2 DIABETES MELLITUS WITH COMPLICATION, WITH LONG-TERM CURRENT USE OF INSULIN (HCC): Primary | ICD-10-CM

## 2018-01-12 DIAGNOSIS — E89.0 POSTOPERATIVE HYPOTHYROIDISM: ICD-10-CM

## 2018-01-12 DIAGNOSIS — E11.8 TYPE 2 DIABETES MELLITUS WITH COMPLICATION, WITH LONG-TERM CURRENT USE OF INSULIN (HCC): Primary | ICD-10-CM

## 2018-01-12 DIAGNOSIS — I82.5Z1 CHRONIC DEEP VEIN THROMBOSIS (DVT) OF DISTAL VEIN OF RIGHT LOWER EXTREMITY (HCC): ICD-10-CM

## 2018-01-12 LAB — POC INR: 2.2 (ref 0.8–1.2)

## 2018-01-12 PROCEDURE — G8427 DOCREV CUR MEDS BY ELIG CLIN: HCPCS | Performed by: INTERNAL MEDICINE

## 2018-01-12 PROCEDURE — 99211 OFF/OP EST MAY X REQ PHY/QHP: CPT

## 2018-01-12 PROCEDURE — 4040F PNEUMOC VAC/ADMIN/RCVD: CPT | Performed by: INTERNAL MEDICINE

## 2018-01-12 PROCEDURE — 85610 PROTHROMBIN TIME: CPT

## 2018-01-12 PROCEDURE — G8417 CALC BMI ABV UP PARAM F/U: HCPCS | Performed by: INTERNAL MEDICINE

## 2018-01-12 PROCEDURE — 1123F ACP DISCUSS/DSCN MKR DOCD: CPT | Performed by: INTERNAL MEDICINE

## 2018-01-12 PROCEDURE — 99214 OFFICE O/P EST MOD 30 MIN: CPT | Performed by: INTERNAL MEDICINE

## 2018-01-12 PROCEDURE — 36416 COLLJ CAPILLARY BLOOD SPEC: CPT

## 2018-01-12 PROCEDURE — 1036F TOBACCO NON-USER: CPT | Performed by: INTERNAL MEDICINE

## 2018-01-12 PROCEDURE — G8484 FLU IMMUNIZE NO ADMIN: HCPCS | Performed by: INTERNAL MEDICINE

## 2018-01-12 RX ORDER — LEVOTHYROXINE SODIUM 175 UG/1
175 TABLET ORAL DAILY
Qty: 30 TABLET | Refills: 5 | Status: SHIPPED | OUTPATIENT
Start: 2018-01-12 | End: 2018-05-02 | Stop reason: SDUPTHER

## 2018-01-12 NOTE — PROGRESS NOTES
and no distress   Oropharynx: normal findings: lips normal without lesions, buccal mucosa normal, gums healthy and tongue midline and normal    Eyes:  negative findings: lids and lashes normal, conjunctivae and sclerae normal, corneas clear and pupils equal, round, reactive to light and accomodation       Neck: no adenopathy, no carotid bruit, no JVD and supple, symmetrical, trachea midline   Thyroid:  s/p thyroidectomy   Lung: clear to auscultation bilaterally   Heart:  regular rate and rhythm, S1, S2 normal, no murmur, click, rub or gallop and normal apical impulse   Abdomen: soft, non-tender; bowel sounds normal; no masses,  no organomegaly   Extremities: extremities normal, atraumatic, no cyanosis or edema and Homans sign is negative, no sign of DVT   Pulses: 2+ and symmetric   Skin: warm and dry, no hyperpigmentation, vitiligo, or suspicious lesions   Neuro: normal without focal findings, mental status, speech normal, alert and oriented x3, SELENA, cranial nerves 2-12 intact, muscle tone and strength normal and symmetric. Patient was asked to remove their socks and shoes and a full foot exam was performed. The following was found during the patient's foot exam:   [x]   Normal Exam  Monofilament sensation   []   Normal      [x]   Abnormal   Pulses   [x]   Normal      []   Abnormal    Comments:  Light touch diminished lower 1/2 legs and feet  LNS: no cervical/supraclavicular or submental adenopathy  Psych: awake, alert and oriented, with good eye contact and normal behavior. Affect is normal with no anxious or depressed mood. Insight is appropriate with no hallucinations/delusions or SI  M/S: no muscular hypertrophy or tenderness. There is no joint swelling/tenderness/redness  Assessment/Plan:     1. Type 2 diabetes mellitus with complication, with long-term current use of insulin ([address]): Uncontrolled. I will increase Lantus to 12 units.   The patient was advised to check blood sugar 3-4 times a day and send

## 2018-01-12 NOTE — LETTER
Endocrine Diabetes Metabolism [facility]  [address]  [address]  Phone: [phone]  Fax: [phone]    Heri Brownlee MD      01/13/18    Dr. Shirlene Dockery    Patient: William Herrmann  MR Number: [id number]  YOB: 1931  Date of Visit: 1/12/2018      Dear Dr. Park Docena you for the request for consultation for William Herrmann to me for the evaluation of   Chief Complaint   Patient presents with    Diabetes     type 2    Foot Problem     both big toes are very sensitive     Eye Exam     12/2017    Thyroid Problem     cancer    Hypothyroidism    Hyperlipidemia   . Below are the relevant portions of my assessment and plan of care. Subjective:   63-year-old male being followed for thyroid cancer, hypothyroidism and type 2 diabetes mellitus. He was diagnosed with follicular thyroid cancer in 2008 and underwent thyroidectomy followed by radioactive iodine therapy. The patient is currently being treated with 175 µg of Synthroid and she denies cold intolerance, constipation and depression. Patient has not palpated any lesions in his neck, with no hoarseness of the voice or any problems swallowing. Surveillance studies included thyroglobulin measurement in November of last year, which was undetectable. In addition, a thyroid ultrasound showed no evidence of disease recurrence. For diabetes is taking metformin 1000 mg twice a day and Lantus 8 units daily. The patient is checking blood sugars one to 2 times a day with predominantly hyperglycemia. He has mild polyuria and polydipsia. Vision has not changed and he had his most recent eye exam 2 months ago. The patient denies having any open sores or blisters in his feet. He is not very clear on his diet but states that he has cut down on portions. He is obviously not counting carbs. Patient is getting very minimal exercise due to joint problems. He is unable to lose weight.   There is no chest pain or shortness of breath. Past Medical History:   Diagnosis Date    Anxiety     CAD (coronary artery disease)     leaking valve    Chronic back pain     COPD (chronic obstructive pulmonary disease) (HCC)     Detached retina     Diabetes mellitus (HCC)     NIDDM    DVT [facility]deep venous thrombosis) ([facility])     RLE    DVT (deep venous thrombosis) ([facility] 11/9/15    LLE    Glaucoma     Hyperlipidemia     Hypertension     Mass of chest     benign chest mass, Dr Mary Ann Scales Movement disorder     spinal stenosis    Obesity     Osteoarthritis     right ankle, right hand    Pneumonia     Sleep apnea 1993    on BiPap, Dr Fina Blackburn Thyroid cancer [facility])     s/p thyroid resection    Urinary incontinence       Past Surgical History:   Procedure Laterality Date   [facility] SURGERY  2006,2011, 2014    BRONCHOSCOPY N/A 8/29/2017    BRONCHOSCOPY AIRWAY ONLY performed by Praveen Adan MD at [address] [place] [address] EKG 12-LEAD  11/10/2015         FRACTURE SURGERY      right hand    OTHER SURGICAL HISTORY      spinal epidurals    RETINAL DETACHMENT SURGERY      SPINE SURGERY  2004    Lumbar laminectomy    THYROIDECTOMY         Family History   Problem Relation Age of Onset    Other Mother      Complications of Childbirth    Other Father [de-identified]     Natural causes     Social History   Substance Use Topics    Smoking status: Never Smoker    Smokeless tobacco: Never Used    Alcohol use 0.6 oz/week     1 Glasses of wine per week      Comment: Glass of wine with dinner.       Current Outpatient Prescriptions   Medication Sig Dispense Refill    insulin glargine (LANTUS SOLOSTAR) 100 UNIT/ML injection pen Inject 12 Units into the skin nightly 5 pen 1    levothyroxine (SYNTHROID) 175 MCG tablet Take 1 tablet by mouth Daily 30 tablet 5    lisinopril (PRINIVIL;ZESTRIL) 20 MG tablet TAKE 1 TABLET BY MOUTH TWO  TIMES DAILY (Patient taking differently: TAKE 1 TABLET BY MOUTH ONCE DAILY) 180 tablet 1  isosorbide mononitrate (IMDUR) 30 MG extended release tablet Take 1 tablet by mouth daily 90 tablet 1    glucose blood VI test strips (ACCU-CHEK CHE) strip Check blood sugar 2 x daily (dx E11.9) 200 each 1    Accu-Chek Softclix Lancets MISC Check sugars twice daily  DxE11.65 200 each 3    Insulin Pen Needle (PEN NEEDLES 31GX5/16\") 31G X 8 MM MISC 1 each by Does not apply route daily Injecting once daily 100 each 1    Arformoterol Tartrate (BROVANA) 15 MCG/2ML NEBU Take 1 ampule by nebulization 2 times daily      budesonide (PULMICORT) 0.25 MG/2ML nebulizer suspension Take 1 ampule by nebulization 2 times daily      Turmeric 500 MG CAPS Take 1 capsule by mouth daily      Carboxymethylcellulose Sodium (THERATEARS OP) Apply 1-2 drops to eye daily as needed (dry eyes)      simvastatin (ZOCOR) 80 MG tablet TAKE 1 TABLET BY MOUTH  NIGHTLY 90 tablet 1    doxazosin (CARDURA) 4 MG tablet Take 1 tablet by mouth nightly Indications: Enlarged Prostate 90 tablet 2    Multiple Vitamins-Minerals (PRESERVISION/LUTEIN PO) Take 1 tablet by mouth 2 times daily       metFORMIN (GLUCOPHAGE) 1000 MG tablet Take 1 tablet by mouth two  times daily with meals (Patient taking differently: Take 1 tablet by mouth one time daily) 180 tablet 4    warfarin (COUMADIN) 5 MG tablet Take as directed by [facility].  #45=30 days (Patient taking differently: Take 5 mg by mouth Take as directed by [facility] coumadin [facility].) 45 tablet 11    ferrous sulfate 325 (65 FE) MG tablet Take 325 mg by mouth 2 times daily (with meals)       Cholecalciferol (VITAMIN D3) 2000 UNITS CAPS Take 2,000 Units by mouth daily Indications: pt self-initiated      Misc Natural Products (APPLE CIDER VINEGAR) TABS Take 1 tablet by mouth daily Indications: pt self-initiated      dorzolamide (TRUSOPT) 2 % ophthalmic solution Place 1 drop into both eyes 3 times daily Indications: Disease of the Eye Respiratory: negative for cough, shortness of breath and wheezing  Cardiovascular: negative for chest pain, irregular heart beat and palpitations    The remainder of systems were reviewed and negative. Objective:   BP (!) 140/65   Pulse 73   Resp 16   Ht 5' 7.01\" (1.702 m)   Wt 282 lb 8 oz (128.1 kg)   BMI 44.24 kg/m²      General:  alert, appears stated age, cooperative and no distress   Oropharynx: normal findings: lips normal without lesions, buccal mucosa normal, gums healthy and tongue midline and normal    Eyes:  negative findings: lids and lashes normal, conjunctivae and sclerae normal, corneas clear and pupils equal, round, reactive to light and accomodation       Neck: no adenopathy, no carotid bruit, no JVD and supple, symmetrical, trachea midline   Thyroid:  s/p thyroidectomy   Lung: clear to auscultation bilaterally   Heart:  regular rate and rhythm, S1, S2 normal, no murmur, click, rub or gallop and normal apical impulse   Abdomen: soft, non-tender; bowel sounds normal; no masses,  no organomegaly   Extremities: extremities normal, atraumatic, no cyanosis or edema and Homans sign is negative, no sign of DVT   Pulses: 2+ and symmetric   Skin: warm and dry, no hyperpigmentation, vitiligo, or suspicious lesions   Neuro: normal without focal findings, mental status, speech normal, alert and oriented x3, SELENA, cranial nerves 2-12 intact, muscle tone and strength normal and symmetric. Patient was asked to remove their socks and shoes and a full foot exam was performed. The following was found during the patient's foot exam:      Normal Exam  Monofilament sensation      Normal         Abnormal   Pulses      Normal         Abnormal    Comments:  Light touch diminished lower 1/2 legs and feet  LNS: no cervical/supraclavicular or submental adenopathy  Psych: awake, alert and oriented, with good eye contact and normal behavior. Affect is normal with no anxious or depressed mood.  Insight is

## 2018-01-23 ENCOUNTER — CARE COORDINATION (OUTPATIENT)
Dept: CARE COORDINATION | Age: 83
End: 2018-01-23

## 2018-01-30 ENCOUNTER — OFFICE VISIT (OUTPATIENT)
Dept: UROLOGY | Age: 83
End: 2018-01-30
Payer: MEDICARE

## 2018-01-30 VITALS
HEIGHT: 66 IN | WEIGHT: 280 LBS | BODY MASS INDEX: 45 KG/M2 | SYSTOLIC BLOOD PRESSURE: 138 MMHG | DIASTOLIC BLOOD PRESSURE: 62 MMHG

## 2018-01-30 DIAGNOSIS — N40.1 BENIGN PROSTATIC HYPERPLASIA WITH LOWER URINARY TRACT SYMPTOMS, SYMPTOM DETAILS UNSPECIFIED: Primary | ICD-10-CM

## 2018-01-30 LAB
BILIRUBIN URINE: NEGATIVE
BLOOD URINE, POC: NORMAL
CHARACTER, URINE: CLEAR
COLOR, URINE: YELLOW
GLUCOSE URINE: NEGATIVE MG/DL
KETONES, URINE: NEGATIVE
LEUKOCYTE CLUMPS, URINE: NORMAL
NITRITE, URINE: NEGATIVE
PH, URINE: 5.5
POST VOID RESIDUAL (PVR): 0 ML
PROTEIN, URINE: NEGATIVE MG/DL
SPECIFIC GRAVITY, URINE: >= 1.03 (ref 1–1.03)
UROBILINOGEN, URINE: 0.2 EU/DL

## 2018-01-30 PROCEDURE — 1123F ACP DISCUSS/DSCN MKR DOCD: CPT | Performed by: NURSE PRACTITIONER

## 2018-01-30 PROCEDURE — 99213 OFFICE O/P EST LOW 20 MIN: CPT | Performed by: NURSE PRACTITIONER

## 2018-01-30 PROCEDURE — G8484 FLU IMMUNIZE NO ADMIN: HCPCS | Performed by: NURSE PRACTITIONER

## 2018-01-30 PROCEDURE — G8428 CUR MEDS NOT DOCUMENT: HCPCS | Performed by: NURSE PRACTITIONER

## 2018-01-30 PROCEDURE — 4040F PNEUMOC VAC/ADMIN/RCVD: CPT | Performed by: NURSE PRACTITIONER

## 2018-01-30 PROCEDURE — 1036F TOBACCO NON-USER: CPT | Performed by: NURSE PRACTITIONER

## 2018-01-30 PROCEDURE — G8417 CALC BMI ABV UP PARAM F/U: HCPCS | Performed by: NURSE PRACTITIONER

## 2018-01-30 PROCEDURE — 81003 URINALYSIS AUTO W/O SCOPE: CPT | Performed by: NURSE PRACTITIONER

## 2018-01-30 PROCEDURE — 51798 US URINE CAPACITY MEASURE: CPT | Performed by: NURSE PRACTITIONER

## 2018-01-30 RX ORDER — DOXAZOSIN MESYLATE 4 MG/1
4 TABLET ORAL NIGHTLY
Qty: 90 TABLET | Refills: 2 | Status: SHIPPED | OUTPATIENT
Start: 2018-01-30 | End: 2018-11-14 | Stop reason: SDUPTHER

## 2018-01-30 ASSESSMENT — ENCOUNTER SYMPTOMS
ABDOMINAL PAIN: 0
NAUSEA: 0
VOMITING: 0

## 2018-01-30 NOTE — PROGRESS NOTES
Continue Cardura 4 mg nightly. Symptoms are stable. Increase water intake. If dysuria continues then he will let us know. Follow-up in 1 year or sooner if symptoms warrant.

## 2018-02-02 ENCOUNTER — APPOINTMENT (OUTPATIENT)
Dept: PHARMACY | Age: 83
End: 2018-02-02
Payer: MEDICARE

## 2018-02-08 ENCOUNTER — HOSPITAL ENCOUNTER (OUTPATIENT)
Dept: PHARMACY | Age: 83
Setting detail: THERAPIES SERIES
Discharge: HOME | End: 2018-02-08
Payer: MEDICARE

## 2018-02-08 DIAGNOSIS — I82.5Z1 CHRONIC DEEP VEIN THROMBOSIS (DVT) OF DISTAL VEIN OF RIGHT LOWER EXTREMITY (HCC): ICD-10-CM

## 2018-02-08 LAB — POC INR: 2.7 (ref 0.8–1.2)

## 2018-02-08 PROCEDURE — 36416 COLLJ CAPILLARY BLOOD SPEC: CPT

## 2018-02-08 PROCEDURE — 99211 OFF/OP EST MAY X REQ PHY/QHP: CPT

## 2018-02-08 PROCEDURE — 85610 PROTHROMBIN TIME: CPT

## 2018-02-09 ENCOUNTER — OFFICE VISIT (OUTPATIENT)
Dept: FAMILY MEDICINE CLINIC | Age: 83
End: 2018-02-09
Payer: MEDICARE

## 2018-02-09 VITALS
DIASTOLIC BLOOD PRESSURE: 56 MMHG | HEART RATE: 78 BPM | WEIGHT: 284.4 LBS | HEIGHT: 67 IN | BODY MASS INDEX: 44.64 KG/M2 | TEMPERATURE: 98.4 F | SYSTOLIC BLOOD PRESSURE: 112 MMHG | RESPIRATION RATE: 22 BRPM

## 2018-02-09 DIAGNOSIS — I82.5Z1 CHRONIC DEEP VEIN THROMBOSIS (DVT) OF DISTAL VEIN OF RIGHT LOWER EXTREMITY (HCC): ICD-10-CM

## 2018-02-09 DIAGNOSIS — J96.11 CHRONIC RESPIRATORY FAILURE WITH HYPOXIA (HCC): ICD-10-CM

## 2018-02-09 DIAGNOSIS — Z79.01 ANTICOAGULATED ON COUMADIN: ICD-10-CM

## 2018-02-09 DIAGNOSIS — Z79.4 TYPE 2 DIABETES MELLITUS WITH COMPLICATION, WITH LONG-TERM CURRENT USE OF INSULIN (HCC): Primary | ICD-10-CM

## 2018-02-09 DIAGNOSIS — I10 ESSENTIAL HYPERTENSION: ICD-10-CM

## 2018-02-09 DIAGNOSIS — G47.33 OSA ON CPAP: ICD-10-CM

## 2018-02-09 DIAGNOSIS — E78.2 MIXED HYPERLIPIDEMIA: ICD-10-CM

## 2018-02-09 DIAGNOSIS — E66.01 MORBID OBESITY WITH BMI OF 40.0-44.9, ADULT (HCC): ICD-10-CM

## 2018-02-09 DIAGNOSIS — Z99.89 OSA ON CPAP: ICD-10-CM

## 2018-02-09 DIAGNOSIS — I48.91 NEW ONSET ATRIAL FIBRILLATION (HCC): ICD-10-CM

## 2018-02-09 DIAGNOSIS — E11.8 TYPE 2 DIABETES MELLITUS WITH COMPLICATION, WITH LONG-TERM CURRENT USE OF INSULIN (HCC): Primary | ICD-10-CM

## 2018-02-09 PROCEDURE — G8427 DOCREV CUR MEDS BY ELIG CLIN: HCPCS | Performed by: FAMILY MEDICINE

## 2018-02-09 PROCEDURE — 99214 OFFICE O/P EST MOD 30 MIN: CPT | Performed by: FAMILY MEDICINE

## 2018-02-09 PROCEDURE — 1036F TOBACCO NON-USER: CPT | Performed by: FAMILY MEDICINE

## 2018-02-09 PROCEDURE — G8417 CALC BMI ABV UP PARAM F/U: HCPCS | Performed by: FAMILY MEDICINE

## 2018-02-09 PROCEDURE — 4040F PNEUMOC VAC/ADMIN/RCVD: CPT | Performed by: FAMILY MEDICINE

## 2018-02-09 PROCEDURE — 1123F ACP DISCUSS/DSCN MKR DOCD: CPT | Performed by: FAMILY MEDICINE

## 2018-02-09 PROCEDURE — G8484 FLU IMMUNIZE NO ADMIN: HCPCS | Performed by: FAMILY MEDICINE

## 2018-02-09 RX ORDER — WARFARIN SODIUM 5 MG/1
TABLET ORAL
Qty: 45 TABLET | Refills: 11 | Status: SHIPPED | OUTPATIENT
Start: 2018-02-09 | End: 2019-01-31 | Stop reason: SDUPTHER

## 2018-02-09 ASSESSMENT — ENCOUNTER SYMPTOMS
CHEST TIGHTNESS: 0
ANAL BLEEDING: 0
CONSTIPATION: 0
VOMITING: 0
DIARRHEA: 0
SHORTNESS OF BREATH: 1
ABDOMINAL PAIN: 0
BLOOD IN STOOL: 0
NAUSEA: 0

## 2018-02-09 NOTE — PROGRESS NOTES
levothyroxine (SYNTHROID) 175 MCG tablet Take 1 tablet by mouth Daily 30 tablet 5    isosorbide mononitrate (IMDUR) 30 MG extended release tablet Take 1 tablet by mouth daily 90 tablet 1    glucose blood VI test strips (ACCU-CHEK CHE) strip Check blood sugar 2 x daily (dx E11.9) 200 each 1    Accu-Chek Softclix Lancets MISC Check sugars twice daily  DxE11.65 200 each 3    Insulin Pen Needle (PEN NEEDLES 31GX5/16\") 31G X 8 MM MISC 1 each by Does not apply route daily Injecting once daily 100 each 1    Arformoterol Tartrate (BROVANA) 15 MCG/2ML NEBU Take 1 ampule by nebulization 2 times daily      budesonide (PULMICORT) 0.25 MG/2ML nebulizer suspension Take 1 ampule by nebulization 2 times daily      Carboxymethylcellulose Sodium (THERATEARS OP) Apply 1-2 drops to eye daily as needed (dry eyes)      simvastatin (ZOCOR) 80 MG tablet TAKE 1 TABLET BY MOUTH  NIGHTLY 90 tablet 1    Multiple Vitamins-Minerals (PRESERVISION/LUTEIN PO) Take 1 tablet by mouth 2 times daily       metFORMIN (GLUCOPHAGE) 1000 MG tablet Take 1 tablet by mouth two  times daily with meals (Patient taking differently: Take 1 tablet by mouth one time daily) 180 tablet 4    ferrous sulfate 325 (65 FE) MG tablet Take 325 mg by mouth 2 times daily (with meals)       Cholecalciferol (VITAMIN D3) 2000 UNITS CAPS Take 2,000 Units by mouth daily Indications: pt self-initiated      Misc Natural Products (APPLE CIDER VINEGAR) TABS Take 1 tablet by mouth daily Indications: pt self-initiated      dorzolamide (TRUSOPT) 2 % ophthalmic solution Place 1 drop into both eyes 3 times daily Indications: Disease of the Eye       albuterol (PROVENTIL HFA;VENTOLIN HFA) 108 (90 BASE) MCG/ACT inhaler Inhale 2 puffs into the lungs every 6 hours as needed for Wheezing      OXYGEN Inhale into the lungs as needed (Uses at night only) Indications: Difficulty Breathing, Oxygen Therapy 3 liters      metoprolol (LOPRESSOR) 25 MG tablet Take 25 mg by mouth 2 times as scheduled.   Encouraged increased water intake and limited  fruit juice intake to help with lightheaded feeling and to help control your sugars   Check H/H, TG, and SPOT MA at this time  Continue current medicines   Refill Coumadin   Follow up in 3 months      Electronically signed by Marques Perez MD on 2/9/2018 at 1:17 PM

## 2018-02-12 ENCOUNTER — HOSPITAL ENCOUNTER (OUTPATIENT)
Age: 83
Discharge: HOME | End: 2018-02-12
Payer: MEDICARE

## 2018-02-12 ENCOUNTER — TELEPHONE (OUTPATIENT)
Dept: FAMILY MEDICINE CLINIC | Age: 83
End: 2018-02-12

## 2018-02-12 DIAGNOSIS — I48.91 NEW ONSET ATRIAL FIBRILLATION (HCC): ICD-10-CM

## 2018-02-12 DIAGNOSIS — E11.8 TYPE 2 DIABETES MELLITUS WITH COMPLICATION, WITH LONG-TERM CURRENT USE OF INSULIN (HCC): ICD-10-CM

## 2018-02-12 DIAGNOSIS — Z79.01 ANTICOAGULATED ON COUMADIN: ICD-10-CM

## 2018-02-12 DIAGNOSIS — G47.33 OSA ON CPAP: ICD-10-CM

## 2018-02-12 DIAGNOSIS — E78.2 MIXED HYPERLIPIDEMIA: Primary | ICD-10-CM

## 2018-02-12 DIAGNOSIS — J96.11 CHRONIC RESPIRATORY FAILURE WITH HYPOXIA (HCC): ICD-10-CM

## 2018-02-12 DIAGNOSIS — E78.2 MIXED HYPERLIPIDEMIA: ICD-10-CM

## 2018-02-12 DIAGNOSIS — Z99.89 OSA ON CPAP: ICD-10-CM

## 2018-02-12 DIAGNOSIS — Z79.4 TYPE 2 DIABETES MELLITUS WITH COMPLICATION, WITH LONG-TERM CURRENT USE OF INSULIN (HCC): ICD-10-CM

## 2018-02-12 LAB
CREATININE, URINE: 168.2 MG/DL
HCT VFR BLD CALC: 41.5 % (ref 42–52)
HEMOGLOBIN: 14.1 GM/DL (ref 14–18)
MICROALBUMIN UR-MCNC: 3.77 MG/DL
MICROALBUMIN/CREAT UR-RTO: 22 MG/G (ref 0–30)
TRIGL SERPL-MCNC: 429 MG/DL (ref 0–199)

## 2018-02-12 PROCEDURE — 85014 HEMATOCRIT: CPT

## 2018-02-12 PROCEDURE — 36415 COLL VENOUS BLD VENIPUNCTURE: CPT

## 2018-02-12 PROCEDURE — 84478 ASSAY OF TRIGLYCERIDES: CPT

## 2018-02-12 PROCEDURE — 82043 UR ALBUMIN QUANTITATIVE: CPT

## 2018-02-12 PROCEDURE — 85018 HEMOGLOBIN: CPT

## 2018-02-12 RX ORDER — LISINOPRIL 20 MG/1
TABLET ORAL
Qty: 180 TABLET | Refills: 0 | COMMUNITY
Start: 2018-02-12 | End: 2018-05-07 | Stop reason: ALTCHOICE

## 2018-02-20 ENCOUNTER — CARE COORDINATION (OUTPATIENT)
Dept: CARE COORDINATION | Age: 83
End: 2018-02-20

## 2018-03-12 ENCOUNTER — HOSPITAL ENCOUNTER (OUTPATIENT)
Dept: PHARMACY | Age: 83
Setting detail: THERAPIES SERIES
Discharge: HOME | End: 2018-03-12
Payer: MEDICARE

## 2018-03-12 DIAGNOSIS — I82.5Z1 CHRONIC DEEP VEIN THROMBOSIS (DVT) OF DISTAL VEIN OF RIGHT LOWER EXTREMITY (HCC): ICD-10-CM

## 2018-03-12 LAB — POC INR: 2.5 (ref 0.8–1.2)

## 2018-03-12 PROCEDURE — 85610 PROTHROMBIN TIME: CPT

## 2018-03-12 PROCEDURE — 36416 COLLJ CAPILLARY BLOOD SPEC: CPT

## 2018-03-12 PROCEDURE — 99211 OFF/OP EST MAY X REQ PHY/QHP: CPT

## 2018-03-30 ENCOUNTER — TELEPHONE (OUTPATIENT)
Dept: FAMILY MEDICINE CLINIC | Age: 83
End: 2018-03-30

## 2018-03-30 ENCOUNTER — CARE COORDINATION (OUTPATIENT)
Dept: CARE COORDINATION | Age: 83
End: 2018-03-30

## 2018-03-30 DIAGNOSIS — R05.9 COUGH: Primary | ICD-10-CM

## 2018-03-30 NOTE — CARE COORDINATION
Take 1 tablet by mouth Daily 1/12/18  Yes Vidal Hodgkin, MD   isosorbide mononitrate (IMDUR) 30 MG extended release tablet Take 1 tablet by mouth daily 12/20/17  Yes Augustine Garcia MD   glucose blood VI test strips (ACCU-CHEK CHE) strip Check blood sugar 2 x daily (dx E11.9) 11/21/17  Yes MAGEAN Hurtado   Accu-Chek Softclix Lancets MISC Check sugars twice daily  DxE11.65 10/31/17  Yes MAEGAN Hurtado   Insulin Pen Needle (PEN NEEDLES 31GX5/16\") 31G X 8 MM MISC 1 each by Does not apply route daily Injecting once daily 10/31/17  Yes MAEGAN Hurtado   Arformoterol Tartrate (BROVANA) 15 MCG/2ML NEBU Take 1 ampule by nebulization 2 times daily   Yes Historical Provider, MD   budesonide (PULMICORT) 0.25 MG/2ML nebulizer suspension Take 1 ampule by nebulization 2 times daily   Yes Historical Provider, MD   Carboxymethylcellulose Sodium (THERATEARS OP) Apply 1-2 drops to eye daily as needed (dry eyes)   Yes Historical Provider, MD   simvastatin (ZOCOR) 80 MG tablet TAKE 1 TABLET BY MOUTH  NIGHTLY 6/26/17  Yes Julia Mora, CNP   Multiple Vitamins-Minerals (PRESERVISION/LUTEIN PO) Take 1 tablet by mouth 2 times daily    Yes Historical Provider, MD   metFORMIN (GLUCOPHAGE) 1000 MG tablet Take 1 tablet by mouth two  times daily with meals  Patient taking differently: Take 1 tablet by mouth one time daily 3/28/17  Yes MAEGAN Hurtado   ferrous sulfate 325 (65 FE) MG tablet Take 325 mg by mouth 2 times daily (with meals)    Yes Historical Provider, MD   Cholecalciferol (VITAMIN D3) 2000 UNITS CAPS Take 2,000 Units by mouth daily Indications: pt self-initiated   Yes Historical Provider, MD   Misc Natural Products (APPLE CIDER VINEGAR) TABS Take 1 tablet by mouth daily Indications: pt self-initiated   Yes Historical Provider, MD   dorzolamide (TRUSOPT) 2 % ophthalmic solution Place 1 drop into both eyes 3 times daily Indications: Disease of the Eye    Yes Historical Provider, MD   albuterol (PROVENTIL HFA;VENTOLIN HFA) 108 (90 BASE) MCG/ACT inhaler Inhale 2 puffs into the lungs every 6 hours as needed for Wheezing   Yes Historical Provider, MD   OXYGEN Inhale into the lungs as needed (Uses at night only) Indications: Difficulty Breathing, Oxygen Therapy 3 liters   Yes Historical Provider, MD   metoprolol (LOPRESSOR) 25 MG tablet Take 25 mg by mouth 2 times daily Indications: Raised Blood Pressure    Yes Historical Provider, MD   aspirin 81 MG EC tablet Take 81 mg by mouth daily Indications: Treatment to Prevent Blood Clotting Take with food.    Yes Historical Provider, MD       Future Appointments  Date Time Provider Gretchen Joslyn   4/9/2018 10:20 AM Flo Deleon RN [facility] [place]   4/19/2018 10:30 AM BC Cardenas [facility] [place]   5/7/2018 2:30 PM Senaida Mcardle, MD [address]   6/13/2018 12:30 PM Hali Mohamud MD [address] [facility] - [place]   10/8/2018 10:30 AM Della Garcia, [address]   1/30/2019 10:00 AM CHRISTINA SerratoEIN [place] [facility] - [place]

## 2018-04-09 ENCOUNTER — HOSPITAL ENCOUNTER (OUTPATIENT)
Dept: PHARMACY | Age: 83
Setting detail: THERAPIES SERIES
Discharge: HOME | End: 2018-04-09
Payer: MEDICARE

## 2018-04-09 DIAGNOSIS — I82.5Z1 CHRONIC DEEP VEIN THROMBOSIS (DVT) OF DISTAL VEIN OF RIGHT LOWER EXTREMITY (HCC): ICD-10-CM

## 2018-04-09 LAB — POC INR: 2.1 (ref 0.8–1.2)

## 2018-04-09 PROCEDURE — 85610 PROTHROMBIN TIME: CPT

## 2018-04-09 PROCEDURE — 36416 COLLJ CAPILLARY BLOOD SPEC: CPT

## 2018-04-09 PROCEDURE — 99211 OFF/OP EST MAY X REQ PHY/QHP: CPT

## 2018-04-09 RX ORDER — BRINZOLAMIDE 1 %
1 SUSPENSION, DROPS(FINAL DOSAGE FORM)(ML) OPHTHALMIC (EYE) 3 TIMES DAILY
Status: ON HOLD | COMMUNITY
Start: 2018-01-16 | End: 2020-03-12 | Stop reason: HOSPADM

## 2018-04-16 RX ORDER — LEVOTHYROXINE SODIUM 0.07 MG/1
TABLET ORAL
Qty: 225 TABLET | Refills: 1 | Status: SHIPPED | OUTPATIENT
Start: 2018-04-16 | End: 2018-04-19 | Stop reason: DRUGHIGH

## 2018-04-19 ENCOUNTER — TELEPHONE (OUTPATIENT)
Dept: ENDOCRINOLOGY | Age: 83
End: 2018-04-19

## 2018-04-19 ENCOUNTER — OFFICE VISIT (OUTPATIENT)
Dept: INTERNAL MEDICINE CLINIC | Age: 83
End: 2018-04-19
Payer: MEDICARE

## 2018-04-19 VITALS
BODY MASS INDEX: 43.79 KG/M2 | DIASTOLIC BLOOD PRESSURE: 65 MMHG | WEIGHT: 279 LBS | RESPIRATION RATE: 20 BRPM | HEART RATE: 71 BPM | SYSTOLIC BLOOD PRESSURE: 135 MMHG | HEIGHT: 67 IN

## 2018-04-19 DIAGNOSIS — C73 THYROID CANCER (HCC): ICD-10-CM

## 2018-04-19 DIAGNOSIS — E11.65 TYPE 2 DIABETES MELLITUS WITH HYPERGLYCEMIA, WITH LONG-TERM CURRENT USE OF INSULIN (HCC): ICD-10-CM

## 2018-04-19 DIAGNOSIS — E66.01 MORBID OBESITY (HCC): ICD-10-CM

## 2018-04-19 DIAGNOSIS — E89.0 POSTOPERATIVE HYPOTHYROIDISM: Primary | ICD-10-CM

## 2018-04-19 DIAGNOSIS — Z79.4 TYPE 2 DIABETES MELLITUS WITH HYPERGLYCEMIA, WITH LONG-TERM CURRENT USE OF INSULIN (HCC): ICD-10-CM

## 2018-04-19 PROCEDURE — 1036F TOBACCO NON-USER: CPT | Performed by: NURSE PRACTITIONER

## 2018-04-19 PROCEDURE — G8417 CALC BMI ABV UP PARAM F/U: HCPCS | Performed by: NURSE PRACTITIONER

## 2018-04-19 PROCEDURE — 1123F ACP DISCUSS/DSCN MKR DOCD: CPT | Performed by: NURSE PRACTITIONER

## 2018-04-19 PROCEDURE — 99214 OFFICE O/P EST MOD 30 MIN: CPT | Performed by: NURSE PRACTITIONER

## 2018-04-19 PROCEDURE — 4040F PNEUMOC VAC/ADMIN/RCVD: CPT | Performed by: NURSE PRACTITIONER

## 2018-04-19 PROCEDURE — G8427 DOCREV CUR MEDS BY ELIG CLIN: HCPCS | Performed by: NURSE PRACTITIONER

## 2018-04-19 ASSESSMENT — ENCOUNTER SYMPTOMS
CONSTIPATION: 0
TROUBLE SWALLOWING: 0
NAUSEA: 0
VOMITING: 0
COUGH: 1
SHORTNESS OF BREATH: 1
ABDOMINAL PAIN: 0
VOICE CHANGE: 0

## 2018-05-02 ENCOUNTER — HOSPITAL ENCOUNTER (OUTPATIENT)
Age: 83
Discharge: HOME | End: 2018-05-02
Payer: MEDICARE

## 2018-05-02 ENCOUNTER — HOSPITAL ENCOUNTER (OUTPATIENT)
Dept: GENERAL RADIOLOGY | Age: 83
Discharge: HOME | End: 2018-05-02
Payer: MEDICARE

## 2018-05-02 ENCOUNTER — TELEPHONE (OUTPATIENT)
Dept: ENDOCRINOLOGY | Age: 83
End: 2018-05-02

## 2018-05-02 DIAGNOSIS — E89.0 POSTOPERATIVE HYPOTHYROIDISM: ICD-10-CM

## 2018-05-02 DIAGNOSIS — E78.2 MIXED HYPERLIPIDEMIA: ICD-10-CM

## 2018-05-02 DIAGNOSIS — E89.0 POSTOPERATIVE HYPOTHYROIDISM: Primary | ICD-10-CM

## 2018-05-02 DIAGNOSIS — R06.02 SOB (SHORTNESS OF BREATH): ICD-10-CM

## 2018-05-02 DIAGNOSIS — C73 THYROID CANCER (HCC): ICD-10-CM

## 2018-05-02 DIAGNOSIS — Z79.4 TYPE 2 DIABETES MELLITUS WITH HYPERGLYCEMIA, WITH LONG-TERM CURRENT USE OF INSULIN (HCC): ICD-10-CM

## 2018-05-02 DIAGNOSIS — E11.65 TYPE 2 DIABETES MELLITUS WITH HYPERGLYCEMIA, WITH LONG-TERM CURRENT USE OF INSULIN (HCC): ICD-10-CM

## 2018-05-02 LAB
ALBUMIN SERPL-MCNC: 4.1 G/DL (ref 3.5–5.1)
ALP BLD-CCNC: 40 U/L (ref 38–126)
ALT SERPL-CCNC: 30 U/L (ref 11–66)
ANION GAP SERPL CALCULATED.3IONS-SCNC: 15 MEQ/L (ref 8–16)
ANISOCYTOSIS: ABNORMAL
AST SERPL-CCNC: 27 U/L (ref 5–40)
AVERAGE GLUCOSE: 168 MG/DL (ref 70–126)
BASOPHILS # BLD: 0.5 %
BASOPHILS ABSOLUTE: 0 THOU/MM3 (ref 0–0.1)
BILIRUB SERPL-MCNC: 0.5 MG/DL (ref 0.3–1.2)
BILIRUBIN DIRECT: < 0.2 MG/DL (ref 0–0.3)
BUN BLDV-MCNC: 13 MG/DL (ref 7–22)
CALCIUM SERPL-MCNC: 8.9 MG/DL (ref 8.5–10.5)
CHLORIDE BLD-SCNC: 102 MEQ/L (ref 98–111)
CHOLESTEROL, TOTAL: 278 MG/DL (ref 100–199)
CO2: 27 MEQ/L (ref 23–33)
CREAT SERPL-MCNC: 0.7 MG/DL (ref 0.4–1.2)
EOSINOPHIL # BLD: 1.7 %
EOSINOPHILS ABSOLUTE: 0.1 THOU/MM3 (ref 0–0.4)
GFR SERPL CREATININE-BSD FRML MDRD: > 90 ML/MIN/1.73M2
GLUCOSE BLD-MCNC: 193 MG/DL (ref 70–108)
HBA1C MFR BLD: 7.6 % (ref 4.4–6.4)
HCT VFR BLD CALC: 40 % (ref 42–52)
HDLC SERPL-MCNC: 36 MG/DL
HEMOGLOBIN: 13.6 GM/DL (ref 14–18)
LDL CHOLESTEROL CALCULATED: 170 MG/DL
LYMPHOCYTES # BLD: 24 %
LYMPHOCYTES ABSOLUTE: 1.6 THOU/MM3 (ref 1–4.8)
MCH RBC QN AUTO: 27.7 PG (ref 27–31)
MCHC RBC AUTO-ENTMCNC: 33.9 GM/DL (ref 33–37)
MCV RBC AUTO: 81.8 FL (ref 80–94)
MONOCYTES # BLD: 10.3 %
MONOCYTES ABSOLUTE: 0.7 THOU/MM3 (ref 0.4–1.3)
NUCLEATED RED BLOOD CELLS: 0 /100 WBC
PDW BLD-RTO: 14.6 % (ref 11.5–14.5)
PLATELET # BLD: 209 THOU/MM3 (ref 130–400)
PMV BLD AUTO: 8.4 FL (ref 7.4–10.4)
POTASSIUM SERPL-SCNC: 4.1 MEQ/L (ref 3.5–5.2)
RBC # BLD: 4.89 MILL/MM3 (ref 4.7–6.1)
SEG NEUTROPHILS: 63.5 %
SEGMENTED NEUTROPHILS ABSOLUTE COUNT: 4.3 THOU/MM3 (ref 1.8–7.7)
SODIUM BLD-SCNC: 144 MEQ/L (ref 135–145)
T4 FREE: 1.42 NG/DL (ref 0.93–1.76)
TOTAL PROTEIN: 7.1 G/DL (ref 6.1–8)
TRIGL SERPL-MCNC: 359 MG/DL (ref 0–199)
TSH SERPL DL<=0.05 MIU/L-ACNC: 2.35 UIU/ML (ref 0.4–4.2)
WBC # BLD: 6.8 THOU/MM3 (ref 4.8–10.8)

## 2018-05-02 PROCEDURE — 84439 ASSAY OF FREE THYROXINE: CPT

## 2018-05-02 PROCEDURE — 84443 ASSAY THYROID STIM HORMONE: CPT

## 2018-05-02 PROCEDURE — 83036 HEMOGLOBIN GLYCOSYLATED A1C: CPT

## 2018-05-02 PROCEDURE — 82248 BILIRUBIN DIRECT: CPT

## 2018-05-02 PROCEDURE — 71046 X-RAY EXAM CHEST 2 VIEWS: CPT

## 2018-05-02 PROCEDURE — 36415 COLL VENOUS BLD VENIPUNCTURE: CPT

## 2018-05-02 PROCEDURE — 80061 LIPID PANEL: CPT

## 2018-05-02 PROCEDURE — 85025 COMPLETE CBC W/AUTO DIFF WBC: CPT

## 2018-05-02 PROCEDURE — 80053 COMPREHEN METABOLIC PANEL: CPT

## 2018-05-02 RX ORDER — LEVOTHYROXINE SODIUM 175 UG/1
TABLET ORAL
Qty: 30 TABLET | Refills: 5 | Status: SHIPPED | OUTPATIENT
Start: 2018-05-02 | End: 2018-10-29 | Stop reason: SDUPTHER

## 2018-05-04 ENCOUNTER — TELEPHONE (OUTPATIENT)
Dept: INTERNAL MEDICINE CLINIC | Age: 83
End: 2018-05-04

## 2018-05-04 DIAGNOSIS — E78.2 MIXED HYPERLIPIDEMIA: Primary | ICD-10-CM

## 2018-05-07 ENCOUNTER — HOSPITAL ENCOUNTER (OUTPATIENT)
Dept: PHARMACY | Age: 83
Setting detail: THERAPIES SERIES
Discharge: HOME | End: 2018-05-07
Payer: MEDICARE

## 2018-05-07 ENCOUNTER — CARE COORDINATION (OUTPATIENT)
Dept: CARE COORDINATION | Age: 83
End: 2018-05-07

## 2018-05-07 ENCOUNTER — OFFICE VISIT (OUTPATIENT)
Dept: FAMILY MEDICINE CLINIC | Age: 83
End: 2018-05-07
Payer: MEDICARE

## 2018-05-07 VITALS
DIASTOLIC BLOOD PRESSURE: 56 MMHG | OXYGEN SATURATION: 94 % | RESPIRATION RATE: 24 BRPM | SYSTOLIC BLOOD PRESSURE: 116 MMHG | BODY MASS INDEX: 43.84 KG/M2 | HEART RATE: 72 BPM | WEIGHT: 280 LBS | TEMPERATURE: 98.3 F

## 2018-05-07 DIAGNOSIS — E78.2 MIXED HYPERLIPIDEMIA: ICD-10-CM

## 2018-05-07 DIAGNOSIS — Z79.01 ANTICOAGULATED ON COUMADIN: ICD-10-CM

## 2018-05-07 DIAGNOSIS — Z99.89 OSA ON CPAP: ICD-10-CM

## 2018-05-07 DIAGNOSIS — N40.1 BENIGN PROSTATIC HYPERPLASIA WITH LOWER URINARY TRACT SYMPTOMS, SYMPTOM DETAILS UNSPECIFIED: ICD-10-CM

## 2018-05-07 DIAGNOSIS — J44.9 STAGE 3 SEVERE COPD BY GOLD CLASSIFICATION (HCC): ICD-10-CM

## 2018-05-07 DIAGNOSIS — J96.11 CHRONIC RESPIRATORY FAILURE WITH HYPOXIA (HCC): ICD-10-CM

## 2018-05-07 DIAGNOSIS — E66.01 MORBID OBESITY WITH BMI OF 40.0-44.9, ADULT (HCC): ICD-10-CM

## 2018-05-07 DIAGNOSIS — G47.33 OSA ON CPAP: ICD-10-CM

## 2018-05-07 DIAGNOSIS — E11.8 TYPE 2 DIABETES MELLITUS WITH COMPLICATION, WITH LONG-TERM CURRENT USE OF INSULIN (HCC): Primary | ICD-10-CM

## 2018-05-07 DIAGNOSIS — Z79.4 TYPE 2 DIABETES MELLITUS WITH COMPLICATION, WITH LONG-TERM CURRENT USE OF INSULIN (HCC): Primary | ICD-10-CM

## 2018-05-07 DIAGNOSIS — I10 ESSENTIAL HYPERTENSION: ICD-10-CM

## 2018-05-07 DIAGNOSIS — I82.5Z1 CHRONIC DEEP VEIN THROMBOSIS (DVT) OF DISTAL VEIN OF RIGHT LOWER EXTREMITY (HCC): ICD-10-CM

## 2018-05-07 LAB — POC INR: 2.6 (ref 0.8–1.2)

## 2018-05-07 PROCEDURE — 99214 OFFICE O/P EST MOD 30 MIN: CPT | Performed by: FAMILY MEDICINE

## 2018-05-07 PROCEDURE — 1036F TOBACCO NON-USER: CPT | Performed by: FAMILY MEDICINE

## 2018-05-07 PROCEDURE — 4040F PNEUMOC VAC/ADMIN/RCVD: CPT | Performed by: FAMILY MEDICINE

## 2018-05-07 PROCEDURE — G8427 DOCREV CUR MEDS BY ELIG CLIN: HCPCS | Performed by: FAMILY MEDICINE

## 2018-05-07 PROCEDURE — 1123F ACP DISCUSS/DSCN MKR DOCD: CPT | Performed by: FAMILY MEDICINE

## 2018-05-07 PROCEDURE — G8926 SPIRO NO PERF OR DOC: HCPCS | Performed by: FAMILY MEDICINE

## 2018-05-07 PROCEDURE — 99211 OFF/OP EST MAY X REQ PHY/QHP: CPT

## 2018-05-07 PROCEDURE — 85610 PROTHROMBIN TIME: CPT

## 2018-05-07 PROCEDURE — G8417 CALC BMI ABV UP PARAM F/U: HCPCS | Performed by: FAMILY MEDICINE

## 2018-05-07 PROCEDURE — 36416 COLLJ CAPILLARY BLOOD SPEC: CPT

## 2018-05-07 PROCEDURE — 3023F SPIROM DOC REV: CPT | Performed by: FAMILY MEDICINE

## 2018-05-07 ASSESSMENT — ENCOUNTER SYMPTOMS
SHORTNESS OF BREATH: 0
BLOOD IN STOOL: 0
ANAL BLEEDING: 0
ABDOMINAL PAIN: 0
CHEST TIGHTNESS: 1
VOMITING: 0
NAUSEA: 0
CONSTIPATION: 0
DIARRHEA: 0

## 2018-05-10 RX ORDER — SIMVASTATIN 80 MG
TABLET ORAL
Qty: 90 TABLET | Refills: 1 | Status: SHIPPED | OUTPATIENT
Start: 2018-05-10 | End: 2018-08-24 | Stop reason: ALTCHOICE

## 2018-06-04 ENCOUNTER — HOSPITAL ENCOUNTER (OUTPATIENT)
Dept: PHARMACY | Age: 83
Setting detail: THERAPIES SERIES
Discharge: HOME | End: 2018-06-04
Payer: MEDICARE

## 2018-06-04 DIAGNOSIS — I82.5Z1 CHRONIC DEEP VEIN THROMBOSIS (DVT) OF DISTAL VEIN OF RIGHT LOWER EXTREMITY (HCC): ICD-10-CM

## 2018-06-04 LAB — POC INR: 3 (ref 0.8–1.2)

## 2018-06-04 PROCEDURE — 85610 PROTHROMBIN TIME: CPT

## 2018-06-04 PROCEDURE — 99211 OFF/OP EST MAY X REQ PHY/QHP: CPT

## 2018-06-04 PROCEDURE — 36416 COLLJ CAPILLARY BLOOD SPEC: CPT

## 2018-06-14 ENCOUNTER — OFFICE VISIT (OUTPATIENT)
Dept: ENT CLINIC | Age: 83
End: 2018-06-14
Payer: MEDICARE

## 2018-06-14 VITALS
HEART RATE: 72 BPM | TEMPERATURE: 97.9 F | RESPIRATION RATE: 12 BRPM | BODY MASS INDEX: 43.95 KG/M2 | HEIGHT: 67 IN | DIASTOLIC BLOOD PRESSURE: 72 MMHG | SYSTOLIC BLOOD PRESSURE: 150 MMHG | WEIGHT: 280 LBS

## 2018-06-14 DIAGNOSIS — R05.9 COUGH: Primary | ICD-10-CM

## 2018-06-14 DIAGNOSIS — R05.8 ACE-INHIBITOR COUGH: ICD-10-CM

## 2018-06-14 DIAGNOSIS — H61.22 IMPACTED CERUMEN OF LEFT EAR: ICD-10-CM

## 2018-06-14 DIAGNOSIS — T46.4X5A ACE-INHIBITOR COUGH: ICD-10-CM

## 2018-06-14 PROCEDURE — 4040F PNEUMOC VAC/ADMIN/RCVD: CPT | Performed by: OTOLARYNGOLOGY

## 2018-06-14 PROCEDURE — 99203 OFFICE O/P NEW LOW 30 MIN: CPT | Performed by: OTOLARYNGOLOGY

## 2018-06-14 PROCEDURE — 1123F ACP DISCUSS/DSCN MKR DOCD: CPT | Performed by: OTOLARYNGOLOGY

## 2018-06-14 PROCEDURE — G8417 CALC BMI ABV UP PARAM F/U: HCPCS | Performed by: OTOLARYNGOLOGY

## 2018-06-14 PROCEDURE — 31575 DIAGNOSTIC LARYNGOSCOPY: CPT | Performed by: OTOLARYNGOLOGY

## 2018-06-14 PROCEDURE — 1036F TOBACCO NON-USER: CPT | Performed by: OTOLARYNGOLOGY

## 2018-06-14 PROCEDURE — 69210 REMOVE IMPACTED EAR WAX UNI: CPT | Performed by: OTOLARYNGOLOGY

## 2018-06-14 PROCEDURE — G8427 DOCREV CUR MEDS BY ELIG CLIN: HCPCS | Performed by: OTOLARYNGOLOGY

## 2018-06-14 ASSESSMENT — ENCOUNTER SYMPTOMS
RHINORRHEA: 0
SINUS PRESSURE: 0
TROUBLE SWALLOWING: 0
VOICE CHANGE: 0
CHOKING: 0
WHEEZING: 0
COLOR CHANGE: 0
SORE THROAT: 0
COUGH: 0
CHEST TIGHTNESS: 0
NAUSEA: 0
STRIDOR: 0
DIARRHEA: 0
APNEA: 0
FACIAL SWELLING: 0
SHORTNESS OF BREATH: 0
ABDOMINAL PAIN: 0
VOMITING: 0

## 2018-07-02 ENCOUNTER — HOSPITAL ENCOUNTER (OUTPATIENT)
Dept: PHARMACY | Age: 83
Setting detail: THERAPIES SERIES
Discharge: HOME | End: 2018-07-02
Payer: MEDICARE

## 2018-07-02 ENCOUNTER — CARE COORDINATION (OUTPATIENT)
Dept: CARE COORDINATION | Age: 83
End: 2018-07-02

## 2018-07-02 DIAGNOSIS — I82.5Z1 CHRONIC DEEP VEIN THROMBOSIS (DVT) OF DISTAL VEIN OF RIGHT LOWER EXTREMITY (HCC): ICD-10-CM

## 2018-07-02 DIAGNOSIS — I48.91 NEW ONSET ATRIAL FIBRILLATION (HCC): Primary | ICD-10-CM

## 2018-07-02 LAB
HCT VFR BLD CALC: 40.5 % (ref 42–52)
HEMOGLOBIN: 13.3 GM/DL (ref 14–18)
INR BLD: 4.28 (ref 0.85–1.13)

## 2018-07-02 PROCEDURE — 85610 PROTHROMBIN TIME: CPT

## 2018-07-02 PROCEDURE — 85014 HEMATOCRIT: CPT

## 2018-07-02 PROCEDURE — 85018 HEMOGLOBIN: CPT

## 2018-07-02 PROCEDURE — 99211 OFF/OP EST MAY X REQ PHY/QHP: CPT

## 2018-07-02 NOTE — CARE COORDINATION
Attempted to follow up with Kirby Clemens. States that he is currently at the hospital getting blood work. Would appreciate a call on a different day.

## 2018-07-02 NOTE — PROGRESS NOTES
Medication Management [facility]  Anticoagulation Clinic  [phone] (phone)  [phone] (fax)    Mr. Skip Bliss is a 80 y.o.  male with history of DVT who presents today for anticoagulation monitoring and adjustment. Patient verifies current dosing regimen and tablet strength. No missed or extra doses. Already took today's coumadin. Patient denies s/s bleeding/bruising/swelling/chest pain. Usual SOB. No blood in urine or stool. No dietary changes. No changes in medication/OTC agents/Herbals. No change in alcohol use or tobacco use. No change in activity level. Patient denies headaches/dizziness/lightheadedness/falls. No vomiting/diarrhea or acute illness. No procedures scheduled in the future at this time. Assessment:   Lab Results   Component Value Date    INR 4.28 (H) 07/02/2018    INR 3.00 (H) 06/04/2018    INR 2.60 (H) 05/07/2018     INR supratherapeutic   Recent Labs      07/02/18   1414   INR  4.28*     Plan: POCT INR ordered and result reviewed with JOSE ALFREDO Guerrero PharmCarson WHARTON. Order received and verified to draw venous INR and H&H; drawn by . Result reviewed with JOSE ALFREDO Guerrero PharmCarosn WHARTON. Order received and verified to hold coumadin today and tomorrow, take coumadin 5 mg po on 7-5-18, then continue Coumadin 5 mg po MF, 7.5 mg po TWTHSS. Recheck INR 1 week. Patient reminded to call the Anticoagulation Clinic with any signs or symptoms of bleeding or with any medication changes. Patient given instructions utilizing the teach back method. Discharged via wheelchair in no apparent distress. After visit summary printed and reviewed with patient.       Medications reviewed and updated on home medication list Yes    Influenza vaccine:     [] given    [] declined   [x] received previously   [] plans to receive at a later time   [] refused    [x] documented in EPIC

## 2018-07-09 ENCOUNTER — HOSPITAL ENCOUNTER (OUTPATIENT)
Dept: PHARMACY | Age: 83
Setting detail: THERAPIES SERIES
Discharge: HOME | End: 2018-07-09
Payer: MEDICARE

## 2018-07-09 DIAGNOSIS — I82.5Z1 CHRONIC DEEP VEIN THROMBOSIS (DVT) OF DISTAL VEIN OF RIGHT LOWER EXTREMITY (HCC): ICD-10-CM

## 2018-07-09 LAB — POC INR: 1.4 (ref 0.8–1.2)

## 2018-07-09 PROCEDURE — 99211 OFF/OP EST MAY X REQ PHY/QHP: CPT

## 2018-07-09 PROCEDURE — 36416 COLLJ CAPILLARY BLOOD SPEC: CPT

## 2018-07-09 PROCEDURE — 85610 PROTHROMBIN TIME: CPT

## 2018-07-09 NOTE — PROGRESS NOTES
Medication Management [facility]  Anticoagulation Clinic  [phone] (phone)  [phone] (fax)    Mr. Akil Moore is a 80 y.o.  male with history of DVT who presents today for anticoagulation monitoring and adjustment. Patient verifies current dosing regimen and tablet strength. No missed or extra doses. Patient denies s/s bleeding/bruising/swelling/chest pain. Usual SOB. No blood in urine or stool. No dietary changes. No changes in medication/OTC agents/Herbals. No change in alcohol use or tobacco use. No change in activity level. Patient denies headaches/dizziness/lightheadedness/falls. No vomiting/diarrhea or acute illness. No procedures scheduled in the future at this time. Assessment:   Lab Results   Component Value Date    INR 1.40 (H) 07/09/2018    INR 4.28 (H) 07/02/2018    INR 3.00 (H) 06/04/2018     INR subtherapeutic   Recent Labs      07/09/18   1346   INR  1.40*     Plan: POCT INR ordered and result reviewed with JOSE ALFREDO Guerrero, Pharm. D. Order received and verified to take coumadin 7.5 mg po today, then continue Coumadin 5 mg po MF, 7.5 mg po TWTHSS. Recheck INR 10 days. Patient reminded to call the Anticoagulation Clinic with any signs or symptoms of bleeding or with any medication changes. Patient given instructions utilizing the teach back method. Discharged via wheelchair in no apparent distress. After visit summary printed and reviewed with patient.       Medications reviewed and updated on home medication list Yes    Influenza vaccine:     [] given    [] declined   [x] received previously   [] plans to receive at a later time   [] refused    [x] documented in EPIC

## 2018-07-19 ENCOUNTER — HOSPITAL ENCOUNTER (OUTPATIENT)
Dept: PHARMACY | Age: 83
Setting detail: THERAPIES SERIES
Discharge: HOME | End: 2018-07-19
Payer: MEDICARE

## 2018-07-19 LAB — POC INR: 4.5 (ref 0.8–1.2)

## 2018-07-19 PROCEDURE — 99211 OFF/OP EST MAY X REQ PHY/QHP: CPT

## 2018-07-19 PROCEDURE — 36416 COLLJ CAPILLARY BLOOD SPEC: CPT

## 2018-07-19 PROCEDURE — 85610 PROTHROMBIN TIME: CPT

## 2018-07-26 ENCOUNTER — CARE COORDINATION (OUTPATIENT)
Dept: CARE COORDINATION | Age: 83
End: 2018-07-26

## 2018-07-26 NOTE — CARE COORDINATION
Ambulatory Care Coordination Note  7/26/2018  CM Risk Score: 6  [place] Mortality Risk Score: 31.39    ACC: Doll Dandy    Summary Note: I spoke with the patient for continued Care Coordination follow up and education. Patient denies chest pain, tightness or palpations. Admits to SOB. Instructed patient on importance of early symptom recognition to prevent hospitalization and ED visits. We discussed Zone management tools for chronic disease(s) and patient denies current questions re: s/sx at this time. Patient continues to have a cough. Patient was seen in June by Dr. Wilbert Puente for the cough- a fiberoptic laryngoscopy was performed under topical anesthesia. Advised patient to call Dr. Aletha Moon office for any needs or concerns. Patient voiced understanding. I advised patient to contact PCP office if needed. No further needs at this time. Care Coordination Interventions    Program Enrollment:  Complex Care  Referral from Primary Care Provider:  No  Suggested Interventions and Community Resources  Diabetes Education:  Completed  Fall Risk Prevention: In Process  Medi Set or Pill Pack:  Completed  Zone Management Tools: In Process           Prior to Admission medications    Medication Sig Start Date End Date Taking?  Authorizing Provider   metFORMIN (GLUCOPHAGE) 1000 MG tablet TAKE 1 TABLET BY MOUTH TWO  TIMES DAILY WITH MEALS 7/12/18   MAEGAN Huerta CNP   simvastatin (ZOCOR) 80 MG tablet TAKE 1 TABLET BY MOUTH  NIGHTLY 5/10/18   MAEGAN Huerta CNP   levothyroxine (SYNTHROID) 175 MCG tablet Take 1 tab for  mon-sat and 1.5 tabs sunday 5/2/18   MAEGAN Milligan CNP   glucose blood VI test strips (ACCU-CHEK CHE) strip Check blood sugar 2 x daily (dx E11.9) 4/20/18   MAEGAN Milligan CNP   AZOPT 1 % ophthalmic suspension Place 1 drop into both eyes 3 times daily  1/16/18   Historical Provider, MD   warfarin (COUMADIN) 5 MG tablet Take as

## 2018-07-30 ENCOUNTER — HOSPITAL ENCOUNTER (OUTPATIENT)
Dept: PHARMACY | Age: 83
Setting detail: THERAPIES SERIES
Discharge: HOME | End: 2018-07-30
Payer: MEDICARE

## 2018-07-30 DIAGNOSIS — I82.591 CHRONIC DEEP VEIN THROMBOSIS (DVT) OF OTHER VEIN OF RIGHT LOWER EXTREMITY (HCC): ICD-10-CM

## 2018-07-30 LAB — POC INR: 2.5 (ref 0.8–1.2)

## 2018-07-30 PROCEDURE — 99211 OFF/OP EST MAY X REQ PHY/QHP: CPT

## 2018-07-30 PROCEDURE — 36416 COLLJ CAPILLARY BLOOD SPEC: CPT

## 2018-07-30 PROCEDURE — 85610 PROTHROMBIN TIME: CPT

## 2018-07-30 NOTE — PROGRESS NOTES
Medication Management [facility]  Anticoagulation Clinic  [phone] (phone)  [phone] (fax)      Mr. Tomasa Hopson is a 80 y.o.  male with history of recurrent DVT, per Dr. Cecilia Espinosa referral, who presents today for Warfarin monitoring and adjustment (11 day visit after decreasing dose by 7.5 mg/week). Patient verifies tablet strength. Followed printed instructions for dose. No missed (except as ordered last visit) or extra doses. Patient denies bleeding/bruising/chest pain. Has usual SHAH/feet swelling  No blood in urine or stool. No dietary changes. No changes in medication/OTC agents/herbals. No change in alcohol use or tobacco use. No change in activity level. Patient denies headaches/lightheadedness/falls. Has new dizziness with getting out of chair. No vomiting/diarrhea or acute illness. No procedures scheduled in the future at this time. Assessment:   Lab Results   Component Value Date    INR 2.50 (H) 07/30/2018    INR 4.50 (H) 07/19/2018    INR 1.40 (H) 07/09/2018     INR therapeutic - goal 2-3. Recent Labs      07/30/18   1357   INR  2.50*       Plan:  POCT INR ordered/performed/result reviewed. Continue PO Coumadin 7.5 mg MF, 5 mg TWThSS. Recheck INR in 2-3 weeks. (Report given - orders entered by JOSE ALFREDO Herrera, PharmD.)  Patient reminded to call the Anticoagulation Clinic with any signs or symptoms of bleeding or with any medication changes. Patient given instructions utilizing the teach back method. Discharged via transport chair in no apparent distress, per CMA. After visit summary printed and reviewed with patient.       Medications reviewed and updated on home medication list.    Influenza vaccine:     [] given    [x] declined   [x] received previously   [] plans to receive at a later time   [] refused    [x] documented in EPIC

## 2018-08-14 ENCOUNTER — OFFICE VISIT (OUTPATIENT)
Dept: FAMILY MEDICINE CLINIC | Age: 83
End: 2018-08-14
Payer: MEDICARE

## 2018-08-14 VITALS
HEIGHT: 67 IN | RESPIRATION RATE: 18 BRPM | SYSTOLIC BLOOD PRESSURE: 138 MMHG | BODY MASS INDEX: 43.35 KG/M2 | DIASTOLIC BLOOD PRESSURE: 70 MMHG | WEIGHT: 276.2 LBS | HEART RATE: 72 BPM | TEMPERATURE: 98.1 F

## 2018-08-14 DIAGNOSIS — E66.01 MORBID OBESITY WITH BMI OF 40.0-44.9, ADULT (HCC): ICD-10-CM

## 2018-08-14 DIAGNOSIS — J44.9 STAGE 3 SEVERE COPD BY GOLD CLASSIFICATION (HCC): ICD-10-CM

## 2018-08-14 DIAGNOSIS — Z79.01 ANTICOAGULATED ON COUMADIN: ICD-10-CM

## 2018-08-14 DIAGNOSIS — E78.2 MIXED HYPERLIPIDEMIA: ICD-10-CM

## 2018-08-14 DIAGNOSIS — N40.1 BENIGN PROSTATIC HYPERPLASIA WITH LOWER URINARY TRACT SYMPTOMS, SYMPTOM DETAILS UNSPECIFIED: ICD-10-CM

## 2018-08-14 DIAGNOSIS — I10 ESSENTIAL HYPERTENSION: ICD-10-CM

## 2018-08-14 DIAGNOSIS — E11.8 TYPE 2 DIABETES MELLITUS WITH COMPLICATION, WITH LONG-TERM CURRENT USE OF INSULIN (HCC): Primary | ICD-10-CM

## 2018-08-14 DIAGNOSIS — J41.1 MUCOPURULENT CHRONIC BRONCHITIS (HCC): ICD-10-CM

## 2018-08-14 DIAGNOSIS — D64.9 ANEMIA, UNSPECIFIED TYPE: ICD-10-CM

## 2018-08-14 DIAGNOSIS — Z79.4 TYPE 2 DIABETES MELLITUS WITH COMPLICATION, WITH LONG-TERM CURRENT USE OF INSULIN (HCC): Primary | ICD-10-CM

## 2018-08-14 PROCEDURE — 99214 OFFICE O/P EST MOD 30 MIN: CPT | Performed by: FAMILY MEDICINE

## 2018-08-14 PROCEDURE — 1101F PT FALLS ASSESS-DOCD LE1/YR: CPT | Performed by: FAMILY MEDICINE

## 2018-08-14 PROCEDURE — 4040F PNEUMOC VAC/ADMIN/RCVD: CPT | Performed by: FAMILY MEDICINE

## 2018-08-14 PROCEDURE — G8417 CALC BMI ABV UP PARAM F/U: HCPCS | Performed by: FAMILY MEDICINE

## 2018-08-14 PROCEDURE — G8427 DOCREV CUR MEDS BY ELIG CLIN: HCPCS | Performed by: FAMILY MEDICINE

## 2018-08-14 PROCEDURE — 1123F ACP DISCUSS/DSCN MKR DOCD: CPT | Performed by: FAMILY MEDICINE

## 2018-08-14 PROCEDURE — 3023F SPIROM DOC REV: CPT | Performed by: FAMILY MEDICINE

## 2018-08-14 PROCEDURE — 1036F TOBACCO NON-USER: CPT | Performed by: FAMILY MEDICINE

## 2018-08-14 PROCEDURE — G8926 SPIRO NO PERF OR DOC: HCPCS | Performed by: FAMILY MEDICINE

## 2018-08-14 ASSESSMENT — ENCOUNTER SYMPTOMS
VOMITING: 0
CHEST TIGHTNESS: 0
DIARRHEA: 0
NAUSEA: 0
COUGH: 1
ANAL BLEEDING: 0
BLOOD IN STOOL: 0
CONSTIPATION: 0
SHORTNESS OF BREATH: 1
ABDOMINAL PAIN: 0

## 2018-08-14 NOTE — PROGRESS NOTES
[facility]  [address]  Dept: [phone]  Dept Fax: [phone]    CHERISE Zimmerman is a 80 y. o.male    Pt presents for follow up of T2DM, HTN. Pt feeling ok since last visit- no new complaints, issues, or problems, except as noted below:     Pt continues to complain of abdominal girth- pt states all symptoms began after Lovenox many years ago- previous work-up including CT of Abdomen and Pelvis all negative. Pt admits to not watching diet or being able to exercise regularly. The home BP readings have been in the 130's / 80's range. Glucometer readings at home are running 155-200. Checking daily. Managed per Endo at this time. Pt saw ENT (Dr. Jalene Cooks) for persisting chronic cough- stopped Lisinopril- no change in cough. Pt continues to see Pulmonology regularly- to follow up 10/8/2018. Pt continues on Brovana and Pulmicort at this time. Weight decreased 4# since last visit.      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    Breast lump    Morbid obesity with BMI of 40.0-44.9, adult    BPH (benign prostatic hyperplasia)    ROBER on CPAP    Essential hypertension    Chronic blood loss anemia    History of DVT (deep vein thrombosis)    Supplemental oxygen dependent    Shortness of breath    Hypoxemia    Chronic respiratory failure with hypoxia (HCC)    Mixed hyperlipidemia    Chronic deep vein thrombosis (DVT) of right lower extremity ([address]    RUQ abdominal pain    Anticoagulated on Coumadin    Type 2 diabetes mellitus with complication, with long-term current use of insulin (HCC)    Muscle weakness    Chronic bronchitis (HCC)    Stage 3 severe COPD by GOLD classification ([address]    New onset atrial fibrillation (HCC)       Current Outpatient Prescriptions   Medication Sig Dispense Refill    metFORMIN (GLUCOPHAGE) 1000 MG tablet TAKE 1 TABLET BY MOUTH TWO  TIMES DAILY WITH aspirin 81 MG EC tablet Take 81 mg by mouth daily Indications: Treatment to Prevent Blood Clotting Take with food. No current facility-administered medications for this visit. Review of Systems   Constitutional: Negative for chills, diaphoresis, fatigue, fever and unexpected weight change. Eyes: Negative for visual disturbance. Respiratory: Positive for cough (chronic, stable) and shortness of breath (chronic, stable). Negative for chest tightness. Cardiovascular: Positive for leg swelling (occasional, chronic, stable). Negative for chest pain and palpitations. Gastrointestinal: Negative for abdominal pain, anal bleeding, blood in stool, constipation, diarrhea, nausea and vomiting. Genitourinary: Negative for dysuria and hematuria. Musculoskeletal: Negative for neck pain. Neurological: Positive for light-headedness (occasional). Negative for dizziness and headaches. OBJECTIVE     /70 (Site: Left Arm, Position: Sitting, Cuff Size: Large Adult)   Pulse 72   Temp 98.1 °F (36.7 °C) (Oral)   Resp 18   Ht 5' 7.01\" (1.702 m)   Wt 276 lb 3.2 oz (125.3 kg)   BMI 43.25 kg/m²     Wt Readings from Last 3 Encounters:   08/14/18 276 lb 3.2 oz (125.3 kg)   06/14/18 280 lb (127 kg)   05/07/18 280 lb (127 kg)     Body mass index is 43.25 kg/m². Physical Exam   Constitutional: He is oriented to person, place, and time. He appears well-developed and well-nourished. HENT:   Head: Normocephalic and atraumatic. Right Ear: External ear normal.   Left Ear: External ear normal.   Nose: Nose normal.   Mouth/Throat: Oropharynx is clear and moist.   Eyes: Pupils are equal, round, and reactive to light. Conjunctivae and EOM are normal.   Neck: Normal range of motion. Neck supple. Cardiovascular: Normal rate, regular rhythm and intact distal pulses. Pulmonary/Chest: Effort normal and breath sounds normal.   Scattered rhonchi which clear with cough. Abdominal: Soft.  Bowel sounds Endocrinologgy (DM, Thyroid), Urology (Urinary symptoms) as scheduled. Continue to work on diet and remain as active as possible for weight loss for optimal cardiovascular health. Check HGA1C, FLP, LFT's, and H/H at this time. Continue current medicines   No refills needed today per pt report.    Follow up in 3 months          Electronically signed by Estee Rivas MD on 8/14/2018 at 3:44 PM

## 2018-08-15 ENCOUNTER — HOSPITAL ENCOUNTER (OUTPATIENT)
Age: 83
Discharge: HOME | End: 2018-08-15
Payer: MEDICARE

## 2018-08-15 DIAGNOSIS — Z79.4 TYPE 2 DIABETES MELLITUS WITH COMPLICATION, WITH LONG-TERM CURRENT USE OF INSULIN (HCC): ICD-10-CM

## 2018-08-15 DIAGNOSIS — E11.8 TYPE 2 DIABETES MELLITUS WITH COMPLICATION, WITH LONG-TERM CURRENT USE OF INSULIN (HCC): ICD-10-CM

## 2018-08-15 DIAGNOSIS — D64.9 ANEMIA, UNSPECIFIED TYPE: ICD-10-CM

## 2018-08-15 DIAGNOSIS — E78.2 MIXED HYPERLIPIDEMIA: ICD-10-CM

## 2018-08-15 LAB
ALBUMIN SERPL-MCNC: 4 G/DL (ref 3.5–5.1)
ALP BLD-CCNC: 44 U/L (ref 38–126)
ALT SERPL-CCNC: 25 U/L (ref 11–66)
AST SERPL-CCNC: 27 U/L (ref 5–40)
AVERAGE GLUCOSE: 174 MG/DL (ref 70–126)
BILIRUB SERPL-MCNC: 0.5 MG/DL (ref 0.3–1.2)
BILIRUBIN DIRECT: < 0.2 MG/DL (ref 0–0.3)
CHOLESTEROL, TOTAL: 235 MG/DL (ref 100–199)
HBA1C MFR BLD: 7.8 % (ref 4.4–6.4)
HCT VFR BLD CALC: 42.5 % (ref 42–52)
HDLC SERPL-MCNC: 35 MG/DL
HEMOGLOBIN: 14.2 GM/DL (ref 14–18)
LDL CHOLESTEROL CALCULATED: 129 MG/DL
TOTAL PROTEIN: 6.7 G/DL (ref 6.1–8)
TRIGL SERPL-MCNC: 356 MG/DL (ref 0–199)

## 2018-08-15 PROCEDURE — 85014 HEMATOCRIT: CPT

## 2018-08-15 PROCEDURE — 85018 HEMOGLOBIN: CPT

## 2018-08-15 PROCEDURE — 36415 COLL VENOUS BLD VENIPUNCTURE: CPT

## 2018-08-15 PROCEDURE — 80061 LIPID PANEL: CPT

## 2018-08-15 PROCEDURE — 83036 HEMOGLOBIN GLYCOSYLATED A1C: CPT

## 2018-08-15 PROCEDURE — 80076 HEPATIC FUNCTION PANEL: CPT

## 2018-08-16 ENCOUNTER — HOSPITAL ENCOUNTER (OUTPATIENT)
Dept: PHARMACY | Age: 83
Setting detail: THERAPIES SERIES
Discharge: HOME | End: 2018-08-16
Payer: MEDICARE

## 2018-08-16 LAB
POC INR: 1.7 (ref 0.8–1.2)
POC INR: 1.8 (ref 0.8–1.2)

## 2018-08-16 PROCEDURE — 99211 OFF/OP EST MAY X REQ PHY/QHP: CPT

## 2018-08-16 PROCEDURE — 36416 COLLJ CAPILLARY BLOOD SPEC: CPT

## 2018-08-16 PROCEDURE — 85610 PROTHROMBIN TIME: CPT

## 2018-08-16 NOTE — PROGRESS NOTES
Medication Management [facility]  Anticoagulation Clinic  [phone] (phone)  [phone] (fax)      Mr. Chani Saleh is a 80 y.o.  male with history of DVT who presents today for anticoagulation monitoring and adjustment. Patient verifies current dosing regimen and tablet strength. No missed or extra doses. Patient denies s/s bleeding/bruising/swelling/SOB/chest pain  No blood in urine or stool. No dietary changes. No changes in medication/OTC agents/Herbals. No change in alcohol use or tobacco use. No change in activity level. Patient denies headaches/dizziness/lightheadedness/falls. No vomiting/diarrhea or acute illness. No Procedures scheduled in the future at this time. Assessment:   Lab Results   Component Value Date    INR 1.80 (H) 08/16/2018    INR 1.70 (H) 08/16/2018    INR 2.50 (H) 07/30/2018     INR subtherapeutic   Recent Labs      08/16/18   1437   INR  1.80*     Plan:  Take 7.5 mg today then increase Coumadin 7.5 mg MoWeFr and 5 mg SuTuThSa (6% increase). Recheck INR 2 weeks. Patient reminded to call the Anticoagulation Clinic with signs or symptoms of bleeding or with any medication changes. Patient given instructions utilizing the teach back method. Discharged ambulatory in no apparent distress. After visit summary printed and reviewed with patient.       Medications reviewed and updated on home medication list Yes    Influenza vaccine:     [] given    [] declined   [] received previously   [x] plans to receive at a later time   [] refused    [] documented in EPIC

## 2018-08-17 ENCOUNTER — TELEPHONE (OUTPATIENT)
Dept: FAMILY MEDICINE CLINIC | Age: 83
End: 2018-08-17

## 2018-08-17 DIAGNOSIS — E78.5 HYPERLIPIDEMIA, UNSPECIFIED HYPERLIPIDEMIA TYPE: Primary | ICD-10-CM

## 2018-08-17 NOTE — TELEPHONE ENCOUNTER
His numbers are actually better than previous values. Would recommend pt bring ALL of his medicines for nurse to check so we can sort out exactly which medicine pt is taking. Can also check at his pharmacy as to when medication was last filled. Thanks.  ES

## 2018-08-17 NOTE — TELEPHONE ENCOUNTER
Discussed with patient. He believes that Endo (was not entirely sure it was them) instructed him to discontinue the Simvastatin a few months ago. He was unsure of why they recommended this but he has not taken for several months. Please advise.

## 2018-08-21 NOTE — TELEPHONE ENCOUNTER
Copy of med list from Dr. Wilkins Ranks office scanned and attached to this encounter. This list does reference a change from simvastatin 80 mg over to lipitor 80 mg on 5/11/18 due to labs. Please review against med bottles on Thursday.

## 2018-08-23 NOTE — TELEPHONE ENCOUNTER
Patient in for nurse visit to review medications. He has brought bottles of each medication that he is currently taking. Confirmed that he is currently not taking Lipitor or Simvastatin. Dr. Ivanna Preston office switched him to Lipitor back in May due to his labs and rx was sent to [facility]. Confirmed with pharmacy that patient never picked this medication up--they currently have rx for 80 mg Lipitor, #90/3. Another discrepancy was with patient's thyroid medication. His medication bottle and our medication list states that he should be taking 175 mcg, 1 tab qd except for 1.5 tabs on Sunday. Patient states that he has been taking this as 1 tab daily for the past several months. His last TFTs were done on 5/2/18--results in epic. This was when his dose was supposed to have been changed. He was to have repeat labs in 4 weeks but patient does not think that he completed them--no results in chart. Patient confirmed that he is now taking 15 units of Lantus qhs so I did update this on his med list.   I also added Vit D3 5,000 units qd along with Super C bid. Otherwise, all other meds matched up. Please advise.

## 2018-08-24 RX ORDER — ATORVASTATIN CALCIUM 80 MG/1
80 TABLET, FILM COATED ORAL DAILY
COMMUNITY
End: 2018-10-22 | Stop reason: ALTCHOICE

## 2018-08-24 NOTE — TELEPHONE ENCOUNTER
Patient notified and instructed. Verbalized understanding. He will  medication from [facility] later today.

## 2018-08-30 ENCOUNTER — HOSPITAL ENCOUNTER (OUTPATIENT)
Dept: PHARMACY | Age: 83
Setting detail: THERAPIES SERIES
Discharge: HOME | End: 2018-08-30
Payer: MEDICARE

## 2018-08-30 DIAGNOSIS — I82.591 CHRONIC DEEP VEIN THROMBOSIS (DVT) OF OTHER VEIN OF RIGHT LOWER EXTREMITY (HCC): ICD-10-CM

## 2018-08-30 LAB — POC INR: 2 (ref 0.8–1.2)

## 2018-08-30 PROCEDURE — 99211 OFF/OP EST MAY X REQ PHY/QHP: CPT

## 2018-08-30 PROCEDURE — 36416 COLLJ CAPILLARY BLOOD SPEC: CPT

## 2018-08-30 PROCEDURE — 85610 PROTHROMBIN TIME: CPT

## 2018-08-30 NOTE — PROGRESS NOTES
Medication Management [facility]  Anticoagulation Clinic  [phone] (phone)  [phone] (fax)      Mr. Hector Morris is a 80 y.o.  male with history of DVT who presents today for anticoagulation monitoring and adjustment. Patient verifies current dosing regimen and tablet strength. No missed or extra doses. Patient denies s/s bleeding/bruising/swelling/chest pain SOB at baseline  No blood in urine or stool. No dietary changes. States he is taking atorvastatin daily now. No change in alcohol use or tobacco use. No change in activity level. Patient denies headaches/dizziness/falls. Lightheadedness if gets up or turns too quickly. We had discussion about fall prevention. No vomiting/diarrhea or acute illness. No Procedures scheduled in the future at this time. Assessment:   Lab Results   Component Value Date    INR 2.00 (H) 08/30/2018    INR 1.80 (H) 08/16/2018    INR 1.70 (H) 08/16/2018     INR therapeutic   Recent Labs      08/30/18   1045   INR  2.00*     Plan:  Continue Coumadin 7.5 mg MoWeFr and 5 mg SuTuThSa. Recheck INR 3 weeks. Patient reminded to call the Anticoagulation Clinic with any signs or symptoms of bleeding or with any medication changes. Patient given instructions utilizing the teach back method. Discharged ambulatory in no apparent distress. After visit summary printed and reviewed with patient.       Medications reviewed and updated on home medication list Yes    Influenza vaccine:     [] given    [] declined   [] received previously   [x] plans to receive at a later time   [] refused    [] documented in EPIC

## 2018-09-08 ENCOUNTER — CARE COORDINATION (OUTPATIENT)
Dept: CARE COORDINATION | Age: 83
End: 2018-09-08

## 2018-09-08 ENCOUNTER — TELEPHONE (OUTPATIENT)
Dept: FAMILY MEDICINE CLINIC | Age: 83
End: 2018-09-08

## 2018-09-08 DIAGNOSIS — R26.81 GAIT INSTABILITY: ICD-10-CM

## 2018-09-08 DIAGNOSIS — R53.1 WEAKNESS: Primary | ICD-10-CM

## 2018-09-08 ASSESSMENT — ENCOUNTER SYMPTOMS: DYSPNEA ASSOCIATED WITH: EXERTION

## 2018-09-08 NOTE — TELEPHONE ENCOUNTER
Patient would like an order for therapy with [facility]. Legs continue to be very weak which is affecting his mobility. Thank you.

## 2018-09-11 ENCOUNTER — HOSPITAL ENCOUNTER (OUTPATIENT)
Dept: ULTRASOUND IMAGING | Age: 83
Discharge: HOME | End: 2018-09-11
Payer: MEDICARE

## 2018-09-11 DIAGNOSIS — C73 THYROID CANCER (HCC): ICD-10-CM

## 2018-09-11 PROCEDURE — 76536 US EXAM OF HEAD AND NECK: CPT

## 2018-09-12 ENCOUNTER — TELEPHONE (OUTPATIENT)
Dept: FAMILY MEDICINE CLINIC | Age: 83
End: 2018-09-12

## 2018-09-12 NOTE — LETTER
[address] [address]  Phone: [phone]  Fax: [phone]    Isacc Hernandez MD        September 13, 2018     Patient: Shane Sanchez   YOB: 1931     To Whom It May Concern: It is my medical opinion that Shane Sanchez requires a disability parking placard for the following reasons:  Shortness of breath    Duration of need: 5 years    If you have any questions or concerns, please don't hesitate to call.     Sincerely,        Isacc Hernandez MD

## 2018-09-17 RX ORDER — PEN NEEDLE, DIABETIC 31 GX5/16"
1 NEEDLE, DISPOSABLE MISCELLANEOUS DAILY
Qty: 100 EACH | Refills: 3 | Status: ON HOLD | OUTPATIENT
Start: 2018-09-17 | End: 2019-03-19 | Stop reason: HOSPADM

## 2018-09-21 ENCOUNTER — HOSPITAL ENCOUNTER (OUTPATIENT)
Dept: PHARMACY | Age: 83
Setting detail: THERAPIES SERIES
Discharge: HOME | End: 2018-09-21
Payer: MEDICARE

## 2018-09-21 VITALS — HEART RATE: 63 BPM | DIASTOLIC BLOOD PRESSURE: 69 MMHG | SYSTOLIC BLOOD PRESSURE: 143 MMHG

## 2018-09-21 DIAGNOSIS — I48.91 NEW ONSET ATRIAL FIBRILLATION (HCC): Primary | ICD-10-CM

## 2018-09-21 DIAGNOSIS — I82.591 CHRONIC DEEP VEIN THROMBOSIS (DVT) OF OTHER VEIN OF RIGHT LOWER EXTREMITY (HCC): ICD-10-CM

## 2018-09-21 LAB
HCT VFR BLD CALC: 41.7 % (ref 42–52)
HEMOGLOBIN: 13.8 GM/DL (ref 14–18)
INR BLD: 3.83 (ref 0.85–1.13)

## 2018-09-21 PROCEDURE — 99211 OFF/OP EST MAY X REQ PHY/QHP: CPT

## 2018-09-21 PROCEDURE — 85610 PROTHROMBIN TIME: CPT

## 2018-09-21 PROCEDURE — 85018 HEMOGLOBIN: CPT

## 2018-09-21 PROCEDURE — 85014 HEMATOCRIT: CPT

## 2018-09-27 ENCOUNTER — HOSPITAL ENCOUNTER (OUTPATIENT)
Dept: PHARMACY | Age: 83
Setting detail: THERAPIES SERIES
Discharge: HOME | End: 2018-09-27
Payer: MEDICARE

## 2018-09-27 DIAGNOSIS — I82.591 CHRONIC DEEP VEIN THROMBOSIS (DVT) OF OTHER VEIN OF RIGHT LOWER EXTREMITY (HCC): ICD-10-CM

## 2018-09-27 LAB — POC INR: 2.7 (ref 0.8–1.2)

## 2018-09-27 PROCEDURE — 6360000002 HC RX W HCPCS: Performed by: INTERNAL MEDICINE

## 2018-09-27 PROCEDURE — G0463 HOSPITAL OUTPT CLINIC VISIT: HCPCS

## 2018-09-27 PROCEDURE — 36416 COLLJ CAPILLARY BLOOD SPEC: CPT

## 2018-09-27 PROCEDURE — 85610 PROTHROMBIN TIME: CPT

## 2018-09-27 PROCEDURE — G0008 ADMIN INFLUENZA VIRUS VAC: HCPCS | Performed by: INTERNAL MEDICINE

## 2018-09-27 PROCEDURE — 90686 IIV4 VACC NO PRSV 0.5 ML IM: CPT | Performed by: INTERNAL MEDICINE

## 2018-09-27 RX ADMIN — INFLUENZA A VIRUS A/MICHIGAN/45/2015 X-275 (H1N1) ANTIGEN (FORMALDEHYDE INACTIVATED), INFLUENZA A VIRUS A/SINGAPORE/INFIMH-16-0019/2016 IVR-186 (H3N2) ANTIGEN (FORMALDEHYDE INACTIVATED), INFLUENZA B VIRUS B/PHUKET/3073/2013 ANTIGEN (FORMALDEHYDE INACTIVATED), AND INFLUENZA B VIRUS B/MARYLAND/15/2016 BX-69A ANTIGEN (FORMALDEHYDE INACTIVATED) 0.5 ML: 15; 15; 15; 15 INJECTION, SUSPENSION INTRAMUSCULAR at 11:53

## 2018-09-27 NOTE — PROGRESS NOTES
Medication Management [facility]  Anticoagulation Clinic  [phone] (phone)  [phone] (fax)      Mr. Shailesh Tobar is a 80 y.o.  male with history of DVT who presents today for anticoagulation monitoring and adjustment. Patient verifies current dosing regimen and tablet strength. No missed or extra doses. Patient denies s/s bleeding/bruising/swelling/SOB/chest pain  No blood in urine or stool. No dietary changes. No changes in medication/OTC agents/Herbals. No change in alcohol use or tobacco use. Started physical therapy last week. It is 3 days per week. Patient denies headaches/dizziness/lightheadedness/falls. No vomiting/diarrhea or acute illness. No Procedures scheduled in the future at this time. Assessment:   Lab Results   Component Value Date    INR 2.70 (H) 09/27/2018    INR 3.83 (H) 09/21/2018    INR 2.00 (H) 08/30/2018     INR therapeutic   Recent Labs      09/27/18   1129   INR  2.70*       Plan:  Continue Coumadin 7.5 mg MoWeFr and 5 mg SuTuThSa. Recheck INR 2 weeks. Patient reminded to call the Anticoagulation Clinic with any signs or symptoms of bleeding or with any medication changes. Patient given instructions utilizing the teach back method. Pt received flu shot today in the left deltoid, tolerated procedure well. Discharged in blue transport chair in no apparent distress. After visit summary printed and reviewed with patient.       Medications reviewed and updated on home medication list Yes    Influenza vaccine:     [x] given    [] declined   [] received previously   [] plans to receive at a later time   [] refused    [] documented in EPIC

## 2018-10-03 ENCOUNTER — OFFICE VISIT (OUTPATIENT)
Dept: FAMILY MEDICINE CLINIC | Age: 83
End: 2018-10-03
Payer: MEDICARE

## 2018-10-03 VITALS
TEMPERATURE: 98.2 F | WEIGHT: 265 LBS | SYSTOLIC BLOOD PRESSURE: 130 MMHG | RESPIRATION RATE: 18 BRPM | DIASTOLIC BLOOD PRESSURE: 58 MMHG | HEART RATE: 70 BPM | BODY MASS INDEX: 41.49 KG/M2

## 2018-10-03 DIAGNOSIS — K14.6 TONGUE BURNING SENSATION: ICD-10-CM

## 2018-10-03 DIAGNOSIS — M54.50 ACUTE LEFT-SIDED LOW BACK PAIN WITHOUT SCIATICA: Primary | ICD-10-CM

## 2018-10-03 DIAGNOSIS — K12.0 CANKER SORES ORAL: ICD-10-CM

## 2018-10-03 PROCEDURE — G8417 CALC BMI ABV UP PARAM F/U: HCPCS | Performed by: NURSE PRACTITIONER

## 2018-10-03 PROCEDURE — G8482 FLU IMMUNIZE ORDER/ADMIN: HCPCS | Performed by: NURSE PRACTITIONER

## 2018-10-03 PROCEDURE — 4040F PNEUMOC VAC/ADMIN/RCVD: CPT | Performed by: NURSE PRACTITIONER

## 2018-10-03 PROCEDURE — 1101F PT FALLS ASSESS-DOCD LE1/YR: CPT | Performed by: NURSE PRACTITIONER

## 2018-10-03 PROCEDURE — 1036F TOBACCO NON-USER: CPT | Performed by: NURSE PRACTITIONER

## 2018-10-03 PROCEDURE — G8427 DOCREV CUR MEDS BY ELIG CLIN: HCPCS | Performed by: NURSE PRACTITIONER

## 2018-10-03 PROCEDURE — 1123F ACP DISCUSS/DSCN MKR DOCD: CPT | Performed by: NURSE PRACTITIONER

## 2018-10-03 PROCEDURE — 99213 OFFICE O/P EST LOW 20 MIN: CPT | Performed by: NURSE PRACTITIONER

## 2018-10-03 RX ORDER — METHYLPREDNISOLONE 4 MG/1
TABLET ORAL
Qty: 1 KIT | Refills: 0 | Status: SHIPPED | OUTPATIENT
Start: 2018-10-03 | End: 2018-10-09

## 2018-10-03 RX ORDER — TIZANIDINE 4 MG/1
4 TABLET ORAL EVERY 8 HOURS PRN
Qty: 30 TABLET | Refills: 0 | Status: SHIPPED | OUTPATIENT
Start: 2018-10-03 | End: 2018-10-11 | Stop reason: ALTCHOICE

## 2018-10-03 ASSESSMENT — PATIENT HEALTH QUESTIONNAIRE - PHQ9
SUM OF ALL RESPONSES TO PHQ QUESTIONS 1-9: 0
SUM OF ALL RESPONSES TO PHQ9 QUESTIONS 1 & 2: 0
SUM OF ALL RESPONSES TO PHQ QUESTIONS 1-9: 0
2. FEELING DOWN, DEPRESSED OR HOPELESS: 0
1. LITTLE INTEREST OR PLEASURE IN DOING THINGS: 0

## 2018-10-03 ASSESSMENT — ENCOUNTER SYMPTOMS
ABDOMINAL PAIN: 0
BOWEL INCONTINENCE: 0
BACK PAIN: 1
BLOOD IN STOOL: 0

## 2018-10-03 NOTE — PROGRESS NOTES
[facility]  [address] [address]  Dept: [phone]  Dept Fax: [phone]: [phone]     Visit Date:  10/3/2018      Patient:  Melissa Quintana  YOB: 1931    HPI:     Chief Complaint   Patient presents with    Back Pain     Here today for lower back/lumbar pain that started monday. Not taking anything for the pain.  Other     C/O tongue burning x 1 week. Back Pain   This is a recurrent problem. Episode onset: 3 days ago. The problem occurs constantly. The problem has been gradually worsening since onset. The pain is present in the lumbar spine. The pain does not radiate. The pain is at a severity of 10/10. The pain is the same all the time. The symptoms are aggravated by bending and position. Pertinent negatives include no abdominal pain, bladder incontinence, bowel incontinence, chest pain, dysuria, fever, headaches, leg pain, numbness, paresis, perianal numbness, tingling, weakness or weight loss. Risk factors include lack of exercise, obesity and sedentary lifestyle. He has tried home exercises (pt was unable to do PT today because of the pain) for the symptoms. Pt denies any trauma to his back and states it feels like a flare up of his chronic back pain. Pt has had multiple surgeries on his back and most recently had an epidural injection with Dr Sisi Anthony, but that has been a while ago. Pt is also having a burning sensation on his tongue. THis has been going on for 1 week. He is not eating like normal because of the discomfort. He denies any trauma to tongue.       Medications    Current Outpatient Prescriptions:     tiZANidine (ZANAFLEX) 4 MG tablet, Take 1 tablet by mouth every 8 hours as needed (back pain), Disp: 30 tablet, Rfl: 0    methylPREDNISolone (MEDROL DOSEPACK) 4 MG tablet, Take by mouth., Disp: 1 kit, Rfl: 0    Magic Mouthwash (MIRACLE MOUTHWASH), Swish and spit 5 mLs 4 times Right Upper Arm, Cuff Size: Large Adult)   Pulse 70   Temp 98.2 °F (36.8 °C) (Oral)   Resp 18   Wt 265 lb (120.2 kg)   BMI 41.49 kg/m²     Physical Exam   Constitutional: He is oriented to person, place, and time. He appears well-developed and well-nourished. No distress. HENT:   Head: Normocephalic and atraumatic. Mouth/Throat: Uvula is midline and oropharynx is clear and moist. Oral lesions present. Pulmonary/Chest: Effort normal. No respiratory distress. Musculoskeletal:        Lumbar back: He exhibits decreased range of motion, tenderness and pain. He exhibits no swelling. Arms:  Neurological: He is alert and oriented to person, place, and time. Coordination normal.   Skin: Skin is warm and dry. Psychiatric: He has a normal mood and affect. His behavior is normal. Judgment and thought content normal.   Vitals reviewed. Assessment/Plan:      Jonathan Amin was seen today for back pain and other. Diagnoses and all orders for this visit:    Acute left-sided low back pain without sciatica  -     tiZANidine (ZANAFLEX) 4 MG tablet; Take 1 tablet by mouth every 8 hours as needed (back pain)  -     methylPREDNISolone (MEDROL DOSEPACK) 4 MG tablet; Take by mouth. Tongue burning sensation  -     Magic Mouthwash (MIRACLE MOUTHWASH); Swish and spit 5 mLs 4 times daily as needed for Irritation or Pain Combine equal parts of liquid benadryl, mylanta, and viscous lidocaine    Canker sores oral  -     Magic Mouthwash (MIRACLE MOUTHWASH); Swish and spit 5 mLs 4 times daily as needed for Irritation or Pain Combine equal parts of liquid benadryl, mylanta, and viscous lidocaine    - Rest   - Monitor blood sugars while on medrol dose pack  - Will try zanaflex and steroid for back pain. Will refer back to Dr Francisco Hayes if pain not getting better. - Call office with any questions or concerns, or if symptoms are getting worse or changing    Return if symptoms worsen or fail to improve.     Patient

## 2018-10-08 ENCOUNTER — OFFICE VISIT (OUTPATIENT)
Dept: PULMONOLOGY | Age: 83
End: 2018-10-08
Payer: MEDICARE

## 2018-10-08 VITALS
DIASTOLIC BLOOD PRESSURE: 60 MMHG | SYSTOLIC BLOOD PRESSURE: 102 MMHG | OXYGEN SATURATION: 94 % | HEIGHT: 67 IN | BODY MASS INDEX: 41.28 KG/M2 | WEIGHT: 263 LBS | HEART RATE: 77 BPM

## 2018-10-08 DIAGNOSIS — G47.33 OSA ON CPAP: Primary | ICD-10-CM

## 2018-10-08 DIAGNOSIS — Z99.89 OSA ON CPAP: Primary | ICD-10-CM

## 2018-10-08 PROCEDURE — 1036F TOBACCO NON-USER: CPT | Performed by: PHYSICIAN ASSISTANT

## 2018-10-08 PROCEDURE — G8417 CALC BMI ABV UP PARAM F/U: HCPCS | Performed by: PHYSICIAN ASSISTANT

## 2018-10-08 PROCEDURE — G8482 FLU IMMUNIZE ORDER/ADMIN: HCPCS | Performed by: PHYSICIAN ASSISTANT

## 2018-10-08 PROCEDURE — 4040F PNEUMOC VAC/ADMIN/RCVD: CPT | Performed by: PHYSICIAN ASSISTANT

## 2018-10-08 PROCEDURE — G8427 DOCREV CUR MEDS BY ELIG CLIN: HCPCS | Performed by: PHYSICIAN ASSISTANT

## 2018-10-08 PROCEDURE — 1123F ACP DISCUSS/DSCN MKR DOCD: CPT | Performed by: PHYSICIAN ASSISTANT

## 2018-10-08 PROCEDURE — 99213 OFFICE O/P EST LOW 20 MIN: CPT | Performed by: PHYSICIAN ASSISTANT

## 2018-10-08 PROCEDURE — 1101F PT FALLS ASSESS-DOCD LE1/YR: CPT | Performed by: PHYSICIAN ASSISTANT

## 2018-10-08 ASSESSMENT — ENCOUNTER SYMPTOMS
HEARTBURN: 0
WHEEZING: 0
SINUS PAIN: 0
SPUTUM PRODUCTION: 1
ORTHOPNEA: 0
SORE THROAT: 0
SHORTNESS OF BREATH: 1
GASTROINTESTINAL NEGATIVE: 1
NAUSEA: 0
EYES NEGATIVE: 1
COUGH: 1

## 2018-10-08 NOTE — PROGRESS NOTES
[facility]      Lori Jimenez                                         [id number]  10/8/2018   Chief Complaint   Patient presents with    Sleep Apnea     ROBER 12-17 month download         Pt of Dr. Rowan Bell  PAP Download:   Original or initial  AHI: 77.6     Date of initial study: 8/5/08     [x] Compliant  100%   []  Noncompliant  %     PAP Type CPAP   Level  10   Avg Hrs/Day 9 hrs 47 min  AHI: 0.5   Recorded compliance date 9/3/18  Machine/Mfg: ResMEd Interface: Nasal    Provider:  [x]-HME  []Pavan  []Dev  []Dixie         [][facility] []Other:   Neck Size: 19.5  Mallampati 4  ESS:  6    Here is a scan of the most recent download:            Presentation:   Tiffanie Wolff presents for sleep medicine follow up for obstructive sleep apnea  Since the last visit, Tiffanie Wolff is doing reasonably well with their sleep machine. Other comments: he is doing well with PAP. Complains of dry mouth  Equipment issues: The pressure is  acceptable, the mask is acceptable and he  is  using the humidity. Sleep issues:  Do you feel better? Yes  More rested? Yes   Better concentration? yes    Progress History:   Since last visit any new medical issues? Yes URI  New ER or hospitlal visits? No  Any new or changes in medicines? No  Any new sleep medicines?  No        Past Medical History:   Diagnosis Date    Anxiety     CAD (coronary artery disease)     leaking valve    Chronic back pain     COPD (chronic obstructive pulmonary disease) (HCC)     Detached retina     Diabetes mellitus (HCC)     NIDDM    DVT [facility]deep venous thrombosis) ([facility])     RLE    DVT (deep venous thrombosis) ([facility] [address] 11/9/15    LLE    Glaucoma     Hyperlipidemia     Hypertension     Mass of chest     benign chest mass, Dr Inocencia Dunham    Movement disorder     spinal stenosis    Obesity     Osteoarthritis     right ankle, right hand    Pneumonia     Sleep apnea 1993    on BiPap, Dr Raheel Lamb Thyroid cancer [facility])     s/p thyroid 1 tab for  mon-sat and 1.5 tabs sunday) 30 tablet 5    glucose blood VI test strips (ACCU-CHEK CHE) strip Check blood sugar 2 x daily (dx E11.9) 200 each 1    AZOPT 1 % ophthalmic suspension Place 1 drop into both eyes 3 times daily       warfarin (COUMADIN) 5 MG tablet Take as directed by [facility]. #45=30 days 45 tablet 11    doxazosin (CARDURA) 4 MG tablet Take 1 tablet by mouth nightly 90 tablet 2    insulin glargine (LANTUS SOLOSTAR) 100 UNIT/ML injection pen Inject 12 Units into the skin nightly (Patient taking differently: Inject 15 Units into the skin nightly ) 5 pen 1    Accu-Chek Softclix Lancets MISC Check sugars twice daily  DxE11.65 200 each 3    Arformoterol Tartrate (BROVANA) 15 MCG/2ML NEBU Take 1 ampule by nebulization 2 times daily       budesonide (PULMICORT) 0.25 MG/2ML nebulizer suspension Take 1 ampule by nebulization 2 times daily       Multiple Vitamins-Minerals (PRESERVISION/LUTEIN PO) Take 1 tablet by mouth 2 times daily       ferrous sulfate 325 (65 FE) MG tablet Take 325 mg by mouth 2 times daily (with meals)       Misc Natural Products (APPLE CIDER VINEGAR) TABS Take 1 tablet by mouth daily Indications: pt self-initiated      albuterol (PROVENTIL HFA;VENTOLIN HFA) 108 (90 BASE) MCG/ACT inhaler Inhale 2 puffs into the lungs every 6 hours as needed for Wheezing      OXYGEN Inhale into the lungs as needed (Uses at night only) Indications: Difficulty Breathing, Oxygen Therapy 3 liters      metoprolol (LOPRESSOR) 25 MG tablet Take 25 mg by mouth 2 times daily Indications: Raised Blood Pressure       aspirin 81 MG EC tablet Take 81 mg by mouth daily Indications: Treatment to Prevent Blood Clotting Take with food. No current facility-administered medications for this visit.         Family History   Problem Relation Age of Onset    Other Mother         Complications of Childbirth    Other Father [de-identified]        Natural causes        Review of Systems -   Review of

## 2018-10-10 ENCOUNTER — HOSPITAL ENCOUNTER (OUTPATIENT)
Age: 83
Discharge: HOME | End: 2018-10-10
Payer: MEDICARE

## 2018-10-10 DIAGNOSIS — E78.5 HYPERLIPIDEMIA, UNSPECIFIED HYPERLIPIDEMIA TYPE: ICD-10-CM

## 2018-10-10 LAB
ALT SERPL-CCNC: 26 U/L (ref 11–66)
AST SERPL-CCNC: 20 U/L (ref 5–40)
CHOLESTEROL, TOTAL: 128 MG/DL (ref 100–199)
LDL CHOLESTEROL DIRECT: 69.29 MG/DL
TRIGL SERPL-MCNC: 180 MG/DL (ref 0–199)

## 2018-10-10 PROCEDURE — 83721 ASSAY OF BLOOD LIPOPROTEIN: CPT

## 2018-10-10 PROCEDURE — 84450 TRANSFERASE (AST) (SGOT): CPT

## 2018-10-10 PROCEDURE — 36415 COLL VENOUS BLD VENIPUNCTURE: CPT

## 2018-10-10 PROCEDURE — 84460 ALANINE AMINO (ALT) (SGPT): CPT

## 2018-10-10 PROCEDURE — 84478 ASSAY OF TRIGLYCERIDES: CPT

## 2018-10-10 PROCEDURE — 82465 ASSAY BLD/SERUM CHOLESTEROL: CPT

## 2018-10-11 ENCOUNTER — HOSPITAL ENCOUNTER (OUTPATIENT)
Dept: PHARMACY | Age: 83
Setting detail: THERAPIES SERIES
Discharge: HOME | End: 2018-10-11
Payer: MEDICARE

## 2018-10-11 DIAGNOSIS — I82.591 CHRONIC DEEP VEIN THROMBOSIS (DVT) OF OTHER VEIN OF RIGHT LOWER EXTREMITY (HCC): ICD-10-CM

## 2018-10-11 LAB — POC INR: 3.8 (ref 0.8–1.2)

## 2018-10-11 PROCEDURE — 85610 PROTHROMBIN TIME: CPT

## 2018-10-11 PROCEDURE — 36416 COLLJ CAPILLARY BLOOD SPEC: CPT

## 2018-10-11 PROCEDURE — 99211 OFF/OP EST MAY X REQ PHY/QHP: CPT

## 2018-10-17 ENCOUNTER — CARE COORDINATION (OUTPATIENT)
Dept: CARE COORDINATION | Age: 83
End: 2018-10-17

## 2018-10-17 NOTE — CARE COORDINATION
Wheezing    Historical Provider, MD   OXYGEN Inhale into the lungs as needed (Uses at night only) Indications: Difficulty Breathing, Oxygen Therapy 3 liters    Historical Provider, MD   metoprolol (LOPRESSOR) 25 MG tablet Take 25 mg by mouth 2 times daily Indications: Raised Blood Pressure     Historical Provider, MD   aspirin 81 MG EC tablet Take 81 mg by mouth daily Indications: Treatment to Prevent Blood Clotting Take with food.     Historical Provider, MD       Future Appointments  Date Time Provider Gretchen Joslyn   10/22/2018 9:15 AM Ted Whaley MD [address]   10/25/2018 10:00 AM EVELIO Bull [facility] [place] [address]   1/30/2019 10:00 AM Julio Cesar Sumner [facility] [place] [address]   10/8/2019 10:15 AM Blaise Fang, [address] [facility]

## 2018-10-17 NOTE — PATIENT INSTRUCTIONS
Patient Education        Dry Mouth: Care Instructions  Your Care Instructions    When you have dry mouth, or xerostomia (say \"zee-ruh-STO-lanny-uh\"), your mouth does not make enough saliva. Saliva helps you chew, swallow, and digest your food. It also neutralizes the acids that form in your mouth. If you have ongoing dry mouth, it can lead to mouth infections, gum disease, and tooth decay. It can also make it hard to swallow or talk. Your doctor or dentist may diagnose dry mouth. It is often a side effect of medicines like diuretics, decongestants, and antidepressants. Cancer treatments or damage to the salivary glands can also cause dry mouth. To help fight the effects of dry mouth, your dentist may apply extra fluoride to your teeth. This can help prevent tooth decay. He or she may also give you mouthwash to fight bacteria. You may need to have dental checkups more often. Treatment may include medicine to help you make more saliva. Or, if other medicines you take are making your mouth dry, your doctor may change the type or dosage of the medicine. Follow-up care is a key part of your treatment and safety. Be sure to make and go to all appointments, and call your doctor if you are having problems. It's also a good idea to know your test results and keep a list of the medicines you take. How can you care for yourself at home? · Take frequent sips of liquid throughout the day. Water is best.  · Use ice chips, sugar-free candy, or gum to help keep your mouth moist. (Candy or gum sweetened with xylitol can help prevent tooth decay.)  · Avoid spicy or salty foods. They may cause pain in a dry mouth. · Brush your teeth twice a day, morning and night. Floss once a day. · Schedule checkups and cleanings as often as your dentist recommends it. · Use an over-the-counter saliva substitute. · Avoid caffeine, tobacco, and alcohol. They can also make your mouth dry. · You may be given medicine for dry mouth.  Be safe

## 2018-10-22 ENCOUNTER — OFFICE VISIT (OUTPATIENT)
Dept: FAMILY MEDICINE CLINIC | Age: 83
End: 2018-10-22
Payer: MEDICARE

## 2018-10-22 VITALS
WEIGHT: 268.4 LBS | HEART RATE: 68 BPM | TEMPERATURE: 97.6 F | HEIGHT: 67 IN | RESPIRATION RATE: 20 BRPM | SYSTOLIC BLOOD PRESSURE: 136 MMHG | BODY MASS INDEX: 42.12 KG/M2 | DIASTOLIC BLOOD PRESSURE: 70 MMHG

## 2018-10-22 DIAGNOSIS — E78.2 MIXED HYPERLIPIDEMIA: ICD-10-CM

## 2018-10-22 DIAGNOSIS — I82.591 CHRONIC DEEP VEIN THROMBOSIS (DVT) OF OTHER VEIN OF RIGHT LOWER EXTREMITY (HCC): ICD-10-CM

## 2018-10-22 DIAGNOSIS — G47.33 OSA ON CPAP: ICD-10-CM

## 2018-10-22 DIAGNOSIS — N39.0 URINARY TRACT INFECTION WITH HEMATURIA, SITE UNSPECIFIED: ICD-10-CM

## 2018-10-22 DIAGNOSIS — J96.11 CHRONIC RESPIRATORY FAILURE WITH HYPOXIA (HCC): ICD-10-CM

## 2018-10-22 DIAGNOSIS — Z99.89 OSA ON CPAP: ICD-10-CM

## 2018-10-22 DIAGNOSIS — J44.9 STAGE 3 SEVERE COPD BY GOLD CLASSIFICATION (HCC): ICD-10-CM

## 2018-10-22 DIAGNOSIS — Z79.4 TYPE 2 DIABETES MELLITUS WITH COMPLICATION, WITH LONG-TERM CURRENT USE OF INSULIN (HCC): Primary | ICD-10-CM

## 2018-10-22 DIAGNOSIS — J41.1 MUCOPURULENT CHRONIC BRONCHITIS (HCC): ICD-10-CM

## 2018-10-22 DIAGNOSIS — E66.01 MORBID OBESITY WITH BMI OF 40.0-44.9, ADULT (HCC): ICD-10-CM

## 2018-10-22 DIAGNOSIS — E89.0 POSTOPERATIVE HYPOTHYROIDISM: ICD-10-CM

## 2018-10-22 DIAGNOSIS — R31.9 URINARY TRACT INFECTION WITH HEMATURIA, SITE UNSPECIFIED: ICD-10-CM

## 2018-10-22 DIAGNOSIS — Z79.01 ANTICOAGULATED ON COUMADIN: ICD-10-CM

## 2018-10-22 DIAGNOSIS — R39.15 URINARY URGENCY: ICD-10-CM

## 2018-10-22 DIAGNOSIS — E11.8 TYPE 2 DIABETES MELLITUS WITH COMPLICATION, WITH LONG-TERM CURRENT USE OF INSULIN (HCC): Primary | ICD-10-CM

## 2018-10-22 DIAGNOSIS — R82.998 LEUKOCYTES IN URINE: ICD-10-CM

## 2018-10-22 DIAGNOSIS — N40.1 BENIGN PROSTATIC HYPERPLASIA WITH LOWER URINARY TRACT SYMPTOMS, SYMPTOM DETAILS UNSPECIFIED: ICD-10-CM

## 2018-10-22 DIAGNOSIS — I10 ESSENTIAL HYPERTENSION: ICD-10-CM

## 2018-10-22 LAB
BILIRUBIN URINE: NEGATIVE
BLOOD URINE, POC: ABNORMAL
CHARACTER, URINE: ABNORMAL
COLOR, URINE: YELLOW
GLUCOSE URINE: NEGATIVE MG/DL
KETONES, URINE: NEGATIVE
LEUKOCYTE CLUMPS, URINE: ABNORMAL
NITRITE, URINE: NEGATIVE
PH, URINE: 5
PROTEIN, URINE: NEGATIVE MG/DL
SPECIFIC GRAVITY, URINE: 1.01 (ref 1–1.03)
UROBILINOGEN, URINE: 0.2 EU/DL

## 2018-10-22 PROCEDURE — G8417 CALC BMI ABV UP PARAM F/U: HCPCS | Performed by: FAMILY MEDICINE

## 2018-10-22 PROCEDURE — 99215 OFFICE O/P EST HI 40 MIN: CPT | Performed by: FAMILY MEDICINE

## 2018-10-22 PROCEDURE — 81003 URINALYSIS AUTO W/O SCOPE: CPT | Performed by: FAMILY MEDICINE

## 2018-10-22 PROCEDURE — G8482 FLU IMMUNIZE ORDER/ADMIN: HCPCS | Performed by: FAMILY MEDICINE

## 2018-10-22 PROCEDURE — 1101F PT FALLS ASSESS-DOCD LE1/YR: CPT | Performed by: FAMILY MEDICINE

## 2018-10-22 PROCEDURE — G8926 SPIRO NO PERF OR DOC: HCPCS | Performed by: FAMILY MEDICINE

## 2018-10-22 PROCEDURE — 1036F TOBACCO NON-USER: CPT | Performed by: FAMILY MEDICINE

## 2018-10-22 PROCEDURE — 4040F PNEUMOC VAC/ADMIN/RCVD: CPT | Performed by: FAMILY MEDICINE

## 2018-10-22 PROCEDURE — G8427 DOCREV CUR MEDS BY ELIG CLIN: HCPCS | Performed by: FAMILY MEDICINE

## 2018-10-22 PROCEDURE — 3023F SPIROM DOC REV: CPT | Performed by: FAMILY MEDICINE

## 2018-10-22 PROCEDURE — 1123F ACP DISCUSS/DSCN MKR DOCD: CPT | Performed by: FAMILY MEDICINE

## 2018-10-22 RX ORDER — CIPROFLOXACIN 250 MG/1
250 TABLET, FILM COATED ORAL 2 TIMES DAILY
Qty: 10 TABLET | Refills: 0 | Status: SHIPPED | OUTPATIENT
Start: 2018-10-22 | End: 2018-10-27

## 2018-10-22 RX ORDER — ATORVASTATIN CALCIUM 80 MG/1
80 TABLET, FILM COATED ORAL DAILY
COMMUNITY
End: 2018-12-27 | Stop reason: ALTCHOICE

## 2018-10-22 ASSESSMENT — ENCOUNTER SYMPTOMS
NAUSEA: 0
BLOOD IN STOOL: 0
ANAL BLEEDING: 0
ABDOMINAL PAIN: 0
VOMITING: 0
DIARRHEA: 0
BACK PAIN: 1
CONSTIPATION: 0
SHORTNESS OF BREATH: 1
CHEST TIGHTNESS: 0

## 2018-10-22 NOTE — PROGRESS NOTES
[facility]  [facility]  Dept: [phone]  Dept Fax: [phone]    SUBJECTIVE     Marquis Cannon is a 80 y. o.male    Pt presents for follow up of DM, HTN, and Hyperlipidemia. Pt feeling ok since lastvisit- no new complaints, issues, or problems, except as noted below:     Pt states \"I'm keeping active as much as I can\"- pt previously in PT to help with lower extremity strength, but had to stop due to increasing back pain. Pt previously saw Dr. Karen Swartz and has had surgery x 4 in past (3 per Dr. Dr. Karen Swartz). Pt willing to see Dr. Karen Swartz again due to lower back pain. Pt continues seeing Pulmonology regularly for long term cough and ROBER - recently saw Arnoldo Baez- to follow up annually. The home BP readings have been in the 130's / 70-80's range. Checking intermittently. Glucometer readings at home are running 140-200. Checking 2x/day (AM and Supper)     Pt complains of urinary urgency with limited amounts over the past week.      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    Breast lump    Morbid obesity with BMI of 40.0-44.9, adult    BPH (benign prostatic hyperplasia)    ROBER on CPAP    Essential hypertension    Chronic blood loss anemia    History of DVT (deep vein thrombosis)    Supplemental oxygen dependent    Shortness of breath    Chronic respiratory failure with hypoxia (HCC)    Mixed hyperlipidemia    Chronic deep vein thrombosis (DVT) of right lower extremity ([address]    Anticoagulated on Coumadin    Type 2 diabetes mellitus with complication, with long-term current use of insulin (HCC)    Muscle weakness    Chronic bronchitis (HCC)    Stage 3 severe COPD by GOLD classification ([address]    New onset atrial fibrillation (HCC)       Current Outpatient Prescriptions   Medication Sig Dispense Refill    atorvastatin (LIPITOR) 80 MG tablet Take 80 mg range of motion. Neck supple. Cardiovascular: Normal rate, regular rhythm and intact distal pulses. Pulmonary/Chest: Effort normal and breath sounds normal.   Abdominal: Soft. Bowel sounds are normal.   Musculoskeletal: Normal range of motion. Neurological: He is alert and oriented to person, place, and time. He has normal reflexes. Skin: Skin is warm and dry. Psychiatric: He has a normal mood and affect. His behavior is normal. Judgment and thought content normal.   Nursing note and vitals reviewed.       Results for POC orders placed in visit on 10/22/18   POCT Urinalysis No Micro (Auto)   Result Value Ref Range    Glucose, Ur Negative NEGATIVE mg/dl    Bilirubin Urine Negative     Ketones, Urine Negative NEGATIVE    Specific Gravity, Urine 1.015 1.002 - 1.03    Blood, UA POC Trace-intact NEGATIVE    pH, Urine 5.00 5.0 - 9.0    Protein, Urine Negative NEGATIVE mg/dl    Urobilinogen, Urine 0.20 0.0 - 1.0 eu/dl    Nitrite, Urine Negative NEGATIVE    Leukocyte Clumps, Urine Moderate (A) NEGATIVE    Color, Urine Yellow YELLOW-STR    Character, Urine Slightly Cloudy [id number]       Lab Results   Component Value Date    LABA1C 7.8 (H) 08/15/2018         Lab Results   Component Value Date    CHOL 128 10/10/2018    TRIG 180 10/10/2018    HDL 35 08/15/2018    LDLCALC 129 08/15/2018    LDLDIRECT 69.29 10/10/2018       Component      Latest Ref Rng & Units 8/15/2018   Albumin      3.5 - 5.1 g/dL 4.0   Bilirubin      0.3 - 1.2 mg/dL 0.5   Bilirubin, Direct      0.0 - 0.3 mg/dL <0.2   Alk Phos      38 - 126 U/L 44   AST      5 - 40 U/L 27   ALT      11 - 66 U/L 25   Total Protein      6.1 - 8.0 g/dL 6.7   Cholesterol, Total      100 - 199 mg/dL 235 (H)   Triglycerides      0 - 199 mg/dL 356 (H)   HDL Cholesterol      mg/dL 35   LDL Calculated      mg/dL 129   Hemoglobin A1C      4.4 - 6.4 % 7.8 (H)   AVERAGE GLUCOSE      70 - 126 mg/dL 174 (H)   Hemoglobin Quant      14.0 - 18.0 gm/dl 14.2   Hematocrit      42.0 - Completed       AAA ultrasound (Male, 65-75, smoked ever) indicated at this time? NO, age > 76  CT Lung Screen (55-80, 30 pk-yrs, smoking or quit <15 years) indicated at this time? NO, age > [de-identified]    Future Appointments  Date Time Provider Gretchen Jorgensen   10/25/2018 10:00 AM [address], [facility] [address]   12/10/2018 10:30 AM Everlyn Collet, MD [address]   1/30/2019 10:00 AM Julio Cesar Mcmahon Urology [facility] [place] [address]   10/8/2019 10:15 AM [address]       ASSESSMENT       Diagnosis Orders   1. Type 2 diabetes mellitus with complication, with long-term current use of insulin (HCC)  Hemoglobin A1C   2. Essential hypertension     3. Mixed hyperlipidemia  atorvastatin (LIPITOR) 80 MG tablet   4. Postoperative hypothyroidism     5. Morbid obesity with BMI of 40.0-44.9, adult     6. Urinary urgency  POCT Urinalysis No Micro (Auto)   7. Chronic deep vein thrombosis (DVT) of other vein of right lower extremity (HCC)     8. Chronic respiratory failure with hypoxia (HCC)     9. Mucopurulent chronic bronchitis ([address]     10. Stage 3 severe COPD by GOLD classification ([address]     11. ROBER on CPAP     12. Benign prostatic hyperplasia with lower urinary tract symptoms, symptom details unspecified     13. Anticoagulated on Coumadin     14. Urinary tract infection with hematuria, site unspecified  ciprofloxacin (CIPRO) 250 MG tablet    UA W/REFLEX CULTURE   15. Leukocytes in urine  Urine Culture       PLAN      Encouraged TETANUS SHOT (TdaP/ ADACEL/ BOOSTRIX) per personal pharmacy. Pt declined SHINGLES SHOT (Zostavax or Shingrix) at this time. (updated 10/22/2018)  Pt had COLONOSCOPY with Dr. Dallas Schneider on 3/22/05- ok- 10 year follow up overdue- pt declines at this time- \"not at my age\". (updated 10/22/2018)  Pt declines SFOBT/ FIT testing at this time (updated 10/22/2018)   Will hold on further CHETNA/PSA at this time due to age.   (updated 10/22/2018)   DILATED EYE

## 2018-10-23 LAB
ORGANISM: ABNORMAL
URINE CULTURE, ROUTINE: ABNORMAL

## 2018-10-25 ENCOUNTER — HOSPITAL ENCOUNTER (OUTPATIENT)
Dept: PHARMACY | Age: 83
Setting detail: THERAPIES SERIES
Discharge: HOME | End: 2018-10-25
Payer: MEDICARE

## 2018-10-25 VITALS — DIASTOLIC BLOOD PRESSURE: 59 MMHG | SYSTOLIC BLOOD PRESSURE: 131 MMHG | HEART RATE: 59 BPM

## 2018-10-25 DIAGNOSIS — I82.501 CHRONIC DEEP VEIN THROMBOSIS (DVT) OF RIGHT LOWER EXTREMITY, UNSPECIFIED VEIN (HCC): ICD-10-CM

## 2018-10-25 LAB
HCT VFR BLD CALC: 40 % (ref 42–52)
HEMOGLOBIN: 13 GM/DL (ref 14–18)
INR BLD: 4.45 (ref 0.85–1.13)

## 2018-10-25 PROCEDURE — 85014 HEMATOCRIT: CPT

## 2018-10-25 PROCEDURE — 85610 PROTHROMBIN TIME: CPT

## 2018-10-25 PROCEDURE — 99211 OFF/OP EST MAY X REQ PHY/QHP: CPT | Performed by: PHARMACIST

## 2018-10-25 PROCEDURE — 85018 HEMOGLOBIN: CPT

## 2018-10-25 NOTE — PROGRESS NOTES
of bleeding or with any medication changes. Patient given instructions utilizing the teach back method. Discharged in [place] [address] in no apparent distress. After visit summary printed and reviewed with patient.       Medications reviewed and updated on home medication list Yes    Influenza vaccine:     [] given    [] declined   [x] received previously   [] plans to receive at a later time   [] refused    [x] documented in EPIC

## 2018-10-29 DIAGNOSIS — E89.0 POSTOPERATIVE HYPOTHYROIDISM: ICD-10-CM

## 2018-10-29 RX ORDER — LEVOTHYROXINE SODIUM 175 UG/1
TABLET ORAL
Qty: 90 TABLET | Refills: 3 | Status: SHIPPED | OUTPATIENT
Start: 2018-10-29 | End: 2019-11-11 | Stop reason: SDUPTHER

## 2018-10-29 NOTE — TELEPHONE ENCOUNTER
Rx last written 5/2/18 for #30 with 5 refills. Last TFT's 5/2/18. Rx verified with the pt, ordered and set to EP to [facility]. Pt will check with the pharmacy.

## 2018-10-30 ENCOUNTER — TELEPHONE (OUTPATIENT)
Dept: PHARMACY | Age: 83
End: 2018-10-30

## 2018-11-02 ENCOUNTER — HOSPITAL ENCOUNTER (OUTPATIENT)
Dept: PHARMACY | Age: 83
Setting detail: THERAPIES SERIES
Discharge: HOME | End: 2018-11-02
Payer: MEDICARE

## 2018-11-02 ENCOUNTER — HOSPITAL ENCOUNTER (OUTPATIENT)
Age: 83
Discharge: HOME | End: 2018-11-02
Payer: MEDICARE

## 2018-11-02 DIAGNOSIS — N39.0 URINARY TRACT INFECTION WITH HEMATURIA, SITE UNSPECIFIED: ICD-10-CM

## 2018-11-02 DIAGNOSIS — R31.9 URINARY TRACT INFECTION WITH HEMATURIA, SITE UNSPECIFIED: ICD-10-CM

## 2018-11-02 DIAGNOSIS — I82.501 CHRONIC DEEP VEIN THROMBOSIS (DVT) OF RIGHT LOWER EXTREMITY, UNSPECIFIED VEIN (HCC): ICD-10-CM

## 2018-11-02 LAB
AMORPHOUS: ABNORMAL
BACTERIA: ABNORMAL /HPF
BILIRUBIN URINE: NEGATIVE
BLOOD, URINE: ABNORMAL
CASTS 2: ABNORMAL /LPF
CASTS UA: ABNORMAL /LPF
CHARACTER, URINE: ABNORMAL
COLOR: YELLOW
CRYSTALS, UA: ABNORMAL
EPITHELIAL CELLS, UA: ABNORMAL /HPF
GLUCOSE URINE: NEGATIVE MG/DL
KETONES, URINE: NEGATIVE
LEUKOCYTE ESTERASE, URINE: ABNORMAL
MISCELLANEOUS 2: ABNORMAL
NITRITE, URINE: NEGATIVE
PH UA: 5
POC INR: 2.3 (ref 0.8–1.2)
PROTEIN UA: NEGATIVE
RBC URINE: ABNORMAL /HPF
RENAL EPITHELIAL, UA: ABNORMAL
SPECIFIC GRAVITY, URINE: 1.02 (ref 1–1.03)
UROBILINOGEN, URINE: 0.2 EU/DL
WBC UA: ABNORMAL /HPF
YEAST: ABNORMAL

## 2018-11-02 PROCEDURE — 81001 URINALYSIS AUTO W/SCOPE: CPT

## 2018-11-02 PROCEDURE — 87086 URINE CULTURE/COLONY COUNT: CPT

## 2018-11-02 PROCEDURE — 36416 COLLJ CAPILLARY BLOOD SPEC: CPT | Performed by: PHARMACIST

## 2018-11-02 PROCEDURE — 99211 OFF/OP EST MAY X REQ PHY/QHP: CPT | Performed by: PHARMACIST

## 2018-11-02 PROCEDURE — 85610 PROTHROMBIN TIME: CPT | Performed by: PHARMACIST

## 2018-11-02 NOTE — PROGRESS NOTES
Medication Management [facility]  Anticoagulation Clinic  [phone] (phone)  [phone] (fax)      Mr. Mann Hu is a 80 y.o.  male with history of DVT who presents today for anticoagulation monitoring and adjustment. Patient verifies current dosing regimen and tablet strength. No missed or extra doses. Patient denies s/s bleeding/bruising/swelling/SOB/chest pain  No blood in urine or stool. No dietary changes. No changes in medication/OTC agents/Herbals. No change in alcohol use or tobacco use. No change in activity level. Patient denies headaches/dizziness/lightheadedness/falls. No vomiting/diarrhea or acute illness. No Procedures scheduled in the future at this time. Assessment:   Lab Results   Component Value Date    INR 2.30 (H) 11/02/2018    INR 4.45 (H) 10/25/2018    INR 3.80 (H) 10/11/2018     INR therapeutic   Recent Labs      11/02/18   1130   INR  2.30*         Plan:  Decrease Coumadin 5mg daily. Recheck INR in 10 days. Patient reminded to call the Anticoagulation Clinic with signs or symptoms of bleeding or with any medication changes. Patient given instructions utilizing the teach back method. Discharged ambulatory in no apparent distress. After visit summary printed and reviewed with patient.       Medications reviewed and updated on home medication list Yes    Influenza vaccine:     [] given    [x] declined   [x] received previously   [] plans to receive at a later time   [] refused    [] documented in EPIC

## 2018-11-03 LAB
ORGANISM: ABNORMAL
URINE CULTURE REFLEX: ABNORMAL

## 2018-11-05 ENCOUNTER — TELEPHONE (OUTPATIENT)
Dept: FAMILY MEDICINE CLINIC | Age: 83
End: 2018-11-05

## 2018-11-05 DIAGNOSIS — R35.0 URINARY FREQUENCY: Primary | ICD-10-CM

## 2018-11-06 ENCOUNTER — OFFICE VISIT (OUTPATIENT)
Dept: INTERNAL MEDICINE CLINIC | Age: 83
End: 2018-11-06
Payer: MEDICARE

## 2018-11-06 ENCOUNTER — TELEPHONE (OUTPATIENT)
Dept: INTERNAL MEDICINE CLINIC | Age: 83
End: 2018-11-06

## 2018-11-06 VITALS
SYSTOLIC BLOOD PRESSURE: 144 MMHG | DIASTOLIC BLOOD PRESSURE: 70 MMHG | HEART RATE: 62 BPM | WEIGHT: 267 LBS | HEIGHT: 67 IN | BODY MASS INDEX: 41.91 KG/M2

## 2018-11-06 DIAGNOSIS — E66.01 MORBID OBESITY WITH BMI OF 40.0-44.9, ADULT (HCC): ICD-10-CM

## 2018-11-06 DIAGNOSIS — C73 PRIMARY THYROID FOLLICULAR CARCINOMA (HCC): ICD-10-CM

## 2018-11-06 DIAGNOSIS — Z79.899 ON STATIN THERAPY: ICD-10-CM

## 2018-11-06 DIAGNOSIS — E78.2 MIXED HYPERLIPIDEMIA: ICD-10-CM

## 2018-11-06 DIAGNOSIS — Z79.4 TYPE 2 DIABETES MELLITUS WITH COMPLICATION, WITH LONG-TERM CURRENT USE OF INSULIN (HCC): Primary | ICD-10-CM

## 2018-11-06 DIAGNOSIS — E11.8 TYPE 2 DIABETES MELLITUS WITH COMPLICATION, WITH LONG-TERM CURRENT USE OF INSULIN (HCC): Primary | ICD-10-CM

## 2018-11-06 DIAGNOSIS — E89.0 POST-SURGICAL HYPOTHYROIDISM: ICD-10-CM

## 2018-11-06 LAB — GLUCOSE BLD-MCNC: 149 MG/DL

## 2018-11-06 PROCEDURE — 99214 OFFICE O/P EST MOD 30 MIN: CPT | Performed by: NURSE PRACTITIONER

## 2018-11-06 PROCEDURE — 82962 GLUCOSE BLOOD TEST: CPT | Performed by: NURSE PRACTITIONER

## 2018-11-06 RX ORDER — LANCETS
EACH MISCELLANEOUS
Qty: 200 EACH | Refills: 3 | Status: ON HOLD | OUTPATIENT
Start: 2018-11-06 | End: 2019-03-19 | Stop reason: HOSPADM

## 2018-11-06 NOTE — PROGRESS NOTES
7/17/1931    Chief Complaint   Patient presents with    Diabetes     Endo f/u     Hypothyroidism       This 81 y/o patient presents for follow up for papillary thyroid Ca, post op hypothyroidism, and type 2 diabetes. He also has a history of hyperlipidemia, COPD, ROBER, detached retina/glaucoma, afib and DVT in the past on warfarin. Thyroid Ca and Post surgical hypothyroidism - He underwent total thyroidectomy on 5/5/2011 by Dr. Steff Sutherland, subsequently followed by [facility]. He had a thyroid US 2017, and thyroglobulin was not elevated in 2017. He is on Synthroid, increased to 175 mcg in April from 75 mcg as per recommended dosing established by Dr. Gail Vora. He was last seen by Apurva Mcneil 4/19/18 and Dr. Gail Vora 1/12/18. He reports fatigue. No sandiness of his eyes, dry skin or thinning hair more than is normal for him. Diabetes - He states his glucose was 140 this am. He did not eat breakfast, this was 149 on recheck in our office. He states it has not been over 200 and has never had any daytime or nighttime hypoglycemia or symptoms of hypoglycemia at any point. He is taking Metformin 1000 units daily, Lantus was increased to 18 units once daily approximately one week ago by PCP due to A1C 7.8 in August, and he checks glucose once in the am. He has an order for a recheck A1C 12/3/18. He reports he is taking all of his medications as ordered. He saw Dr. Wilton Teixeira in August, this record is reviewed and states no diabetic retinopathy. This patient is also followed regularly by his PCP Dr. Marek Cordero. Hyperlipidemia - He has resumed his statin as directed in April. His lipids were checked in October and reviewed during this visit, improving with restart of Lipitor 80 mg. Current concerns - 80y.o. year old male who states he is feeling fairly well.  Although he reports fatigue, his energy level and strength is lower than usual and he cannot tolerate activity due to SOB since his pneumonia hospitalization and recent UTI. He is having a stress test tomorrow due to this. He continues with congestion, no sputum production, fever, chills, or cough. He has known COPD. He is no longer attending [facility] for exercise due to back pain, and intolerance of exercise. He states he has lost about 14# in the last few months and feels this is due to change in diet and his muscle mass is decreasing due to lack of exercise. He does do his own grocery shopping.  He is trying to watch what he eats from portion sizes and is consuming lean meats, veggies with his meals, typically with one large meal at dinner daily and otherwise a small meal such as a bagel at breakfast.          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    Breast lump    Morbid obesity with BMI of 40.0-44.9, adult    BPH (benign prostatic hyperplasia)    ROBER on CPAP    Essential hypertension    Chronic blood loss anemia    History of DVT (deep vein thrombosis)    Supplemental oxygen dependent    Shortness of breath    Chronic respiratory failure with hypoxia (HCC)    Mixed hyperlipidemia    Chronic deep vein thrombosis (DVT) of right lower extremity (HCC)    Anticoagulated on Coumadin    Type 2 diabetes mellitus with complication, with long-term current use of insulin (HCC)    Muscle weakness    Chronic bronchitis (HCC)    Stage 3 severe COPD by GOLD classification ([facility]    New onset atrial fibrillation (HCC)       Current Outpatient Prescriptions   Medication Sig Dispense Refill    metFORMIN (GLUCOPHAGE) 1000 MG tablet TAKE 1 TABLET BY MOUTH TWO  TIMES DAILY WITH MEALS 180 tablet 3    blood glucose test strips (ACCU-CHEK CHE) strip Check blood sugar 2 x daily (dx E11.9) 200 each 3    insulin glargine (LANTUS SOLOSTAR) 100 UNIT/ML injection pen Inject 18 Units into the skin nightly 15 pen 3    ACCU-CHEK SOFTCLIX LANCETS MISC Check sugars

## 2018-11-12 ENCOUNTER — HOSPITAL ENCOUNTER (OUTPATIENT)
Dept: PHARMACY | Age: 83
Setting detail: THERAPIES SERIES
Discharge: HOME | End: 2018-11-12
Payer: MEDICARE

## 2018-11-12 DIAGNOSIS — I82.501 CHRONIC DEEP VEIN THROMBOSIS (DVT) OF RIGHT LOWER EXTREMITY, UNSPECIFIED VEIN (HCC): ICD-10-CM

## 2018-11-12 LAB — POC INR: 1.7 (ref 0.8–1.2)

## 2018-11-12 PROCEDURE — 99211 OFF/OP EST MAY X REQ PHY/QHP: CPT

## 2018-11-12 PROCEDURE — 85610 PROTHROMBIN TIME: CPT

## 2018-11-12 PROCEDURE — 36416 COLLJ CAPILLARY BLOOD SPEC: CPT

## 2018-11-15 RX ORDER — DOXAZOSIN MESYLATE 4 MG/1
4 TABLET ORAL NIGHTLY
Qty: 90 TABLET | Refills: 3 | Status: SHIPPED | OUTPATIENT
Start: 2018-11-15 | End: 2019-01-30 | Stop reason: SDUPTHER

## 2018-11-21 ENCOUNTER — CARE COORDINATION (OUTPATIENT)
Dept: CARE COORDINATION | Age: 83
End: 2018-11-21

## 2018-11-21 ASSESSMENT — ENCOUNTER SYMPTOMS: DYSPNEA ASSOCIATED WITH: MINIMAL EXERTION

## 2018-11-21 NOTE — PATIENT INSTRUCTIONS
Patient Education        Weakness: Care Instructions  Your Care Instructions    Weakness is a lack of physical or muscle strength. You may feel that you need to make extra effort to move your arms, legs, or other muscles. Generalized weakness means that you feel weak in most areas of your body. Another type of weakness may affect just one muscle or group of muscles. You may feel weak and tired after you have done too much activity, such as taking an extra-long hike. This is not a serious problem. It often goes away on its own. Feeling weak can also be caused by medical conditions like thyroid problems, depression, or a virus. Sometimes the cause can be serious. Your doctor may want to do more tests to try to find the cause of the weakness. The doctor has checked you carefully, but problems can develop later. If you notice any problems or new symptoms, get medical treatment right away. Follow-up care is a key part of your treatment and safety. Be sure to make and go to all appointments, and call your doctor if you are having problems. It's also a good idea to know your test results and keep a list of the medicines you take. How can you care for yourself at home? · Rest when you feel tired. · Be safe with medicines. If your doctor prescribed medicine, take it exactly as prescribed. Call your doctor if you think you are having a problem with your medicine. You will get more details on the specific medicines your doctor prescribes. · Do not skip meals. Eating a balanced diet may increase your energy level. · Get some physical activity every day, but do not get too tired. When should you call for help? Call your doctor now or seek immediate medical care if:    · You have new or worse weakness.     · You are dizzy or lightheaded, or you feel like you may faint.    Watch closely for changes in your health, and be sure to contact your doctor if:    · You do not get better as expected.    Where can you learn

## 2018-11-21 NOTE — CARE COORDINATION
Ambulatory Care Coordination Note  11/21/2018  CM Risk Score: 6  [place] Mortality Risk Score: 25.31    ACC: Colby Lomax RN    Summary Note: Jules Tyler needs new prescription for oxygen to [address]. He does follow with [email or web address] [facility] so he will call her office. Number provided. He typically wears oxygen at 3L. Jules Tyler has been feeling weak and very fatigued recently. Appetite is decreased. He is trying to lose weight. Was recently treated with an UTI and has finished his antibiotic. Continues to have frequency. Denies pain or blood. He does have an order for a repeat UA that still needs to be completed. 12/4/18 Will have appt with Dr Geno Wilkerson for consultation for possible epidural due to back pain. Discussed with Med signs and symptoms that Jules Tyler should monitor for and report. Care Coordination Interventions    Program Enrollment:  Complex Care  Referral from Primary Care Provider:  No  Suggested Interventions and Community Resources  Diabetes Education:  Completed  Fall Risk Prevention: In Process  Medi Set or Pill Pack:  Completed  Physical Therapy:  Completed (Comment: Had to stop due to back pain)  Zone Management Tools: In Process         Goals Addressed             Most Recent     Conditions and Symptoms   No change (11/21/2018)             I will follow my Zone Management tool to seek urgent or emergent care. Barriers: lack of support, overwhelmed by complexity of regimen and stress  Plan for overcoming my barriers: Will discuss needs with office or CC  Confidence: 9/10  Anticipated Goal Completion Date: 9/19/17              Prior to Admission medications    Medication Sig Start Date End Date Taking?  Authorizing Provider   doxazosin (CARDURA) 4 MG tablet TAKE 1 TABLET BY MOUTH  NIGHTLY 11/15/18  Yes Regena Hatchet, APRN - CNP   metFORMIN (GLUCOPHAGE) 1000 MG tablet TAKE 1 TABLET BY MOUTH TWO  TIMES DAILY WITH MEALS 11/6/18  Yes MAEGAN Holden CNP   blood glucose test strips (ACCU-CHEK

## 2018-11-26 ENCOUNTER — HOSPITAL ENCOUNTER (OUTPATIENT)
Dept: PHARMACY | Age: 83
Setting detail: THERAPIES SERIES
Discharge: HOME | End: 2018-11-26
Payer: MEDICARE

## 2018-11-26 DIAGNOSIS — I82.501 CHRONIC DEEP VEIN THROMBOSIS (DVT) OF RIGHT LOWER EXTREMITY, UNSPECIFIED VEIN (HCC): ICD-10-CM

## 2018-11-26 LAB — POC INR: 1.4 (ref 0.8–1.2)

## 2018-11-26 PROCEDURE — 36416 COLLJ CAPILLARY BLOOD SPEC: CPT | Performed by: PHARMACIST

## 2018-11-26 PROCEDURE — 85610 PROTHROMBIN TIME: CPT | Performed by: PHARMACIST

## 2018-11-26 PROCEDURE — 99211 OFF/OP EST MAY X REQ PHY/QHP: CPT | Performed by: PHARMACIST

## 2018-12-03 DIAGNOSIS — Z79.4 TYPE 2 DIABETES MELLITUS WITH COMPLICATION, WITH LONG-TERM CURRENT USE OF INSULIN (HCC): Primary | ICD-10-CM

## 2018-12-03 DIAGNOSIS — E11.8 TYPE 2 DIABETES MELLITUS WITH COMPLICATION, WITH LONG-TERM CURRENT USE OF INSULIN (HCC): Primary | ICD-10-CM

## 2018-12-03 RX ORDER — PEN NEEDLE, DIABETIC 31 GX5/16"
1 NEEDLE, DISPOSABLE MISCELLANEOUS DAILY
Qty: 100 EACH | Refills: 3 | Status: CANCELLED | OUTPATIENT
Start: 2018-12-03

## 2018-12-06 ENCOUNTER — HOSPITAL ENCOUNTER (OUTPATIENT)
Dept: PHARMACY | Age: 83
Setting detail: THERAPIES SERIES
Discharge: HOME | End: 2018-12-06
Payer: MEDICARE

## 2018-12-06 DIAGNOSIS — I82.501 CHRONIC DEEP VEIN THROMBOSIS (DVT) OF RIGHT LOWER EXTREMITY, UNSPECIFIED VEIN (HCC): ICD-10-CM

## 2018-12-06 LAB — POC INR: 2.8 (ref 0.8–1.2)

## 2018-12-06 PROCEDURE — 36416 COLLJ CAPILLARY BLOOD SPEC: CPT | Performed by: PHARMACIST

## 2018-12-06 PROCEDURE — 85610 PROTHROMBIN TIME: CPT | Performed by: PHARMACIST

## 2018-12-06 PROCEDURE — 99211 OFF/OP EST MAY X REQ PHY/QHP: CPT | Performed by: PHARMACIST

## 2018-12-10 ENCOUNTER — OFFICE VISIT (OUTPATIENT)
Dept: FAMILY MEDICINE CLINIC | Age: 83
End: 2018-12-10
Payer: MEDICARE

## 2018-12-10 VITALS
RESPIRATION RATE: 24 BRPM | OXYGEN SATURATION: 93 % | SYSTOLIC BLOOD PRESSURE: 128 MMHG | WEIGHT: 268 LBS | DIASTOLIC BLOOD PRESSURE: 64 MMHG | BODY MASS INDEX: 41.97 KG/M2 | TEMPERATURE: 97.5 F | HEART RATE: 98 BPM

## 2018-12-10 DIAGNOSIS — E78.2 MIXED HYPERLIPIDEMIA: ICD-10-CM

## 2018-12-10 DIAGNOSIS — E89.0 POSTOPERATIVE HYPOTHYROIDISM: ICD-10-CM

## 2018-12-10 DIAGNOSIS — E66.01 MORBID OBESITY WITH BMI OF 40.0-44.9, ADULT (HCC): ICD-10-CM

## 2018-12-10 DIAGNOSIS — J44.9 STAGE 3 SEVERE COPD BY GOLD CLASSIFICATION (HCC): Primary | ICD-10-CM

## 2018-12-10 DIAGNOSIS — D64.9 ANEMIA, UNSPECIFIED TYPE: ICD-10-CM

## 2018-12-10 DIAGNOSIS — E11.8 TYPE 2 DIABETES MELLITUS WITH COMPLICATION, WITH LONG-TERM CURRENT USE OF INSULIN (HCC): ICD-10-CM

## 2018-12-10 DIAGNOSIS — N40.1 BENIGN PROSTATIC HYPERPLASIA WITH LOWER URINARY TRACT SYMPTOMS, SYMPTOM DETAILS UNSPECIFIED: ICD-10-CM

## 2018-12-10 DIAGNOSIS — I10 ESSENTIAL HYPERTENSION: ICD-10-CM

## 2018-12-10 DIAGNOSIS — I82.501 CHRONIC DEEP VEIN THROMBOSIS (DVT) OF RIGHT LOWER EXTREMITY, UNSPECIFIED VEIN (HCC): ICD-10-CM

## 2018-12-10 DIAGNOSIS — J41.1 MUCOPURULENT CHRONIC BRONCHITIS (HCC): ICD-10-CM

## 2018-12-10 DIAGNOSIS — Z79.4 TYPE 2 DIABETES MELLITUS WITH COMPLICATION, WITH LONG-TERM CURRENT USE OF INSULIN (HCC): ICD-10-CM

## 2018-12-10 PROCEDURE — 1123F ACP DISCUSS/DSCN MKR DOCD: CPT | Performed by: FAMILY MEDICINE

## 2018-12-10 PROCEDURE — G8417 CALC BMI ABV UP PARAM F/U: HCPCS | Performed by: FAMILY MEDICINE

## 2018-12-10 PROCEDURE — 4040F PNEUMOC VAC/ADMIN/RCVD: CPT | Performed by: FAMILY MEDICINE

## 2018-12-10 PROCEDURE — G8427 DOCREV CUR MEDS BY ELIG CLIN: HCPCS | Performed by: FAMILY MEDICINE

## 2018-12-10 PROCEDURE — G8926 SPIRO NO PERF OR DOC: HCPCS | Performed by: FAMILY MEDICINE

## 2018-12-10 PROCEDURE — 99214 OFFICE O/P EST MOD 30 MIN: CPT | Performed by: FAMILY MEDICINE

## 2018-12-10 PROCEDURE — 1036F TOBACCO NON-USER: CPT | Performed by: FAMILY MEDICINE

## 2018-12-10 PROCEDURE — G8482 FLU IMMUNIZE ORDER/ADMIN: HCPCS | Performed by: FAMILY MEDICINE

## 2018-12-10 PROCEDURE — 3023F SPIROM DOC REV: CPT | Performed by: FAMILY MEDICINE

## 2018-12-10 PROCEDURE — 1101F PT FALLS ASSESS-DOCD LE1/YR: CPT | Performed by: FAMILY MEDICINE

## 2018-12-10 ASSESSMENT — ENCOUNTER SYMPTOMS
VOMITING: 0
ABDOMINAL PAIN: 0
DIARRHEA: 0
CHEST TIGHTNESS: 0
NAUSEA: 0
BLOOD IN STOOL: 0
CONSTIPATION: 0
SHORTNESS OF BREATH: 1
ANAL BLEEDING: 0

## 2018-12-10 NOTE — PROGRESS NOTES
doxazosin (CARDURA) 4 MG tablet TAKE 1 TABLET BY MOUTH  NIGHTLY 90 tablet 3    metFORMIN (GLUCOPHAGE) 1000 MG tablet TAKE 1 TABLET BY MOUTH TWO  TIMES DAILY WITH MEALS 180 tablet 3    insulin glargine (LANTUS SOLOSTAR) 100 UNIT/ML injection pen Inject 18 Units into the skin nightly 15 pen 3    ACCU-CHEK SOFTCLIX LANCETS MISC Check sugars twice daily  DxE11.65 200 each 3    levothyroxine (SYNTHROID) 175 MCG tablet Take one tablet daily 90 tablet 3    atorvastatin (LIPITOR) 80 MG tablet Take 80 mg by mouth daily      Insulin Pen Needle (PEN NEEDLES 31GX5/16\") 31G X 8 MM MISC 1 each by Does not apply route daily Injecting once daily 100 each 3    Cholecalciferol (VITAMIN D3) 5000 units CAPS Take 1 capsule by mouth daily       Bioflavonoid Products (SUPER C-500 PO) Take 1 tablet by mouth 2 times daily      AZOPT 1 % ophthalmic suspension Place 1 drop into both eyes 3 times daily       warfarin (COUMADIN) 5 MG tablet Take as directed by [facility]. #45=30 days 45 tablet 11    Arformoterol Tartrate (BROVANA) 15 MCG/2ML NEBU Take 1 ampule by nebulization 2 times daily       budesonide (PULMICORT) 0.25 MG/2ML nebulizer suspension Take 1 ampule by nebulization 2 times daily       Multiple Vitamins-Minerals (PRESERVISION/LUTEIN PO) Take 1 tablet by mouth 2 times daily       ferrous sulfate 325 (65 FE) MG tablet Take 325 mg by mouth 2 times daily (with meals)       albuterol (PROVENTIL HFA;VENTOLIN HFA) 108 (90 BASE) MCG/ACT inhaler Inhale 2 puffs into the lungs every 6 hours as needed for Wheezing      OXYGEN Inhale into the lungs as needed (Uses at night only) Indications: Difficulty Breathing, Oxygen Therapy 3 liters      metoprolol (LOPRESSOR) 25 MG tablet Take 25 mg by mouth 2 times daily Indications: Raised Blood Pressure       aspirin 81 MG EC tablet Take 81 mg by mouth daily Indications: Treatment to Prevent Blood Clotting Take with food.       Misc Natural Products (APPLE CIDER VINEGAR) place, and time. He has normal reflexes. Skin: Skin is warm and dry. Psychiatric: He has a normal mood and affect. His behavior is normal. Judgment and thought content normal.   Nursing note and vitals reviewed.       Lab Results   Component Value Date    LABA1C 7.8 (H) 08/15/2018       Lab Results   Component Value Date    CHOL 128 10/10/2018    TRIG 180 10/10/2018    HDL 35 08/15/2018    LDLCALC 129 08/15/2018    LDLDIRECT 69.29 10/10/2018       Lab Results   Component Value Date     05/02/2018    K 4.1 05/02/2018     05/02/2018    CO2 27 05/02/2018    BUN 13 05/02/2018    CREATININE 0.7 05/02/2018    GLUCOSE 149 11/06/2018    CALCIUM 8.9 05/02/2018    PROT 6.7 08/15/2018    LABALBU 4.0 08/15/2018    BILITOT 0.5 08/15/2018    ALKPHOS 44 08/15/2018    AST 20 10/10/2018    ALT 26 10/10/2018    LABGLOM >90 05/02/2018       Lab Results   Component Value Date    LABMICR 3.77 02/12/2018       Lab Results   Component Value Date    TSH 2.350 05/02/2018    T4FREE 1.42 05/02/2018       Lab Results   Component Value Date    WBC 6.8 05/02/2018    HGB 13.0 (L) 10/25/2018    HCT 40.0 (L) 10/25/2018    MCV 81.8 05/02/2018     05/02/2018       Lab Results   Component Value Date    PSA 3.76 10/27/2010       Immunization History   Administered Date(s) Administered    Influenza Virus Vaccine 11/01/2011, 11/05/2012, 10/31/2013, 11/11/2014, 09/29/2015    Influenza Whole 10/01/2010    Influenza, Quadv, 3 Years and older, IM (Fluzone 3 yrs and older or Afluria 5 yrs and older) 11/17/2017    Influenza, Yisel Massy, 6 mo and older, IM, PF (Flulaval, Fluarix) 09/27/2018    Influenza, Triv, 3 Years and older, IM (Afluria (5 yrs and older) 09/26/2016    Pneumococcal 13-valent Conjugate ([id number]) 12/19/2014    Pneumococcal Polysaccharide ([id number]) 01/01/2003    Td, unspecified formulation 06/08/2010       Health Maintenance   Topic Date Due    DTaP/Tdap/Td vaccine (1 - Tdap) 06/09/2010    Shingles Vaccine (1 of hold on further CHETNA/PSA at this time due to age.  (updated 12/10/2018)   DILATED EYE EXAM- done with Dr. Kendrick Hernandez 5/31/2018- to follow up 12/13/2018   Will refer back to Endocrinology when available in town per pt preference. Recheck UA with reflex C&S at this time- order re-printed today, 12/10/2018. Continue max dose Lipitor at this time per Dr. Zen Ramires office. Follow up with Dr. Jace Roach), Robb De Paz (COPD and ROBER), Urology (Urinary symptoms) as scheduled. Continue to work on diet and remain as active as possible for weight loss for optimal cardiovascular health. Samples of Basaglar Pens provided to pt at this time. Check HGA1C at this time. Per last visit with Internal Medicine: Next A1C, liver enzymes and BMP in March 2019 prior the return appt with Dr. Wilhelm. Will check H/H and SPOT MA at the time of Dr. Baltazar Lin labs  Continue current medicines   No other refills needed today per pt report.    Follow up in 3 months     Electronically signed by Michelle Vasquez MD on 12/10/2018 at 11:39 AM

## 2018-12-10 NOTE — PATIENT INSTRUCTIONS
Encouraged TETANUS SHOT (TdaP/ ADACEL/ [address]) per personal pharmacy. Pt declined SHINGLES SHOT (Zostavax or Shingrix) at this time. (updated 12/10/2018)  Pt had COLONOSCOPY with Dr. Karma Trinidad on 3/22/05- ok- 10 year follow up overdue- pt declines at this time- \"not at my age\". (updated 12/10/2018)  Pt declines SFOBT/ FIT testing at this time (updated 12/10/2018)   Will hold on further CHETNA/PSA at this time due to age.  (updated 12/10/2018)   DILATED EYE EXAM- done with Dr. Shane Galaviz 5/31/2018- to follow up 12/13/2018   Will refer back to Endocrinology when available in town per pt preference. Recheck UA with reflex C&S at this time- order re-printed today, 12/10/2018. Continue max dose Lipitor at this time per Dr. Cornelius Dsos office. Follow up with Dr. Darvin Melendez), Camille De Paz (COPD and ROBER), Urology (Urinary symptoms) as scheduled. Continue to work on diet and remain as active as possible for weight loss for optimal cardiovascular health. Samples of Basaglar Pens provided to pt at this time. Check HGA1C at this time. Per last visit with Internal Medicine: Next A1C, liver enzymes and BMP in March 2019 prior the return appt with Dr. Ernestina Pearl. Will check H/H and SPOT MA at the time of Dr. Skip Noe labs  Continue current medicines   No other refills needed today per pt report.    Follow up in 3 months

## 2018-12-11 ENCOUNTER — HOSPITAL ENCOUNTER (OUTPATIENT)
Age: 83
Discharge: HOME | End: 2018-12-11
Payer: MEDICARE

## 2018-12-11 DIAGNOSIS — R35.0 URINARY FREQUENCY: ICD-10-CM

## 2018-12-11 DIAGNOSIS — Z79.4 TYPE 2 DIABETES MELLITUS WITH COMPLICATION, WITH LONG-TERM CURRENT USE OF INSULIN (HCC): ICD-10-CM

## 2018-12-11 DIAGNOSIS — E11.8 TYPE 2 DIABETES MELLITUS WITH COMPLICATION, WITH LONG-TERM CURRENT USE OF INSULIN (HCC): ICD-10-CM

## 2018-12-11 LAB
AMORPHOUS: ABNORMAL
AVERAGE GLUCOSE: 159 MG/DL (ref 70–126)
BACTERIA: ABNORMAL /HPF
BILIRUBIN URINE: NEGATIVE
BLOOD, URINE: NEGATIVE
CASTS 2: ABNORMAL /LPF
CASTS UA: ABNORMAL /LPF
CHARACTER, URINE: ABNORMAL
COLOR: YELLOW
CRYSTALS, UA: ABNORMAL
EPITHELIAL CELLS, UA: ABNORMAL /HPF
GLUCOSE URINE: NEGATIVE MG/DL
HBA1C MFR BLD: 7.3 % (ref 4.4–6.4)
KETONES, URINE: ABNORMAL
LEUKOCYTE ESTERASE, URINE: ABNORMAL
MISCELLANEOUS 2: ABNORMAL
NITRITE, URINE: NEGATIVE
PH UA: 5
PROTEIN UA: NEGATIVE
RBC URINE: ABNORMAL /HPF
RENAL EPITHELIAL, UA: ABNORMAL
SPECIFIC GRAVITY, URINE: 1.02 (ref 1–1.03)
UROBILINOGEN, URINE: 0.2 EU/DL
WBC UA: ABNORMAL /HPF
YEAST: ABNORMAL

## 2018-12-11 PROCEDURE — 83036 HEMOGLOBIN GLYCOSYLATED A1C: CPT

## 2018-12-11 PROCEDURE — 87106 FUNGI IDENTIFICATION YEAST: CPT

## 2018-12-11 PROCEDURE — 36415 COLL VENOUS BLD VENIPUNCTURE: CPT

## 2018-12-11 PROCEDURE — 87086 URINE CULTURE/COLONY COUNT: CPT

## 2018-12-11 PROCEDURE — 81001 URINALYSIS AUTO W/SCOPE: CPT

## 2018-12-12 ENCOUNTER — TELEPHONE (OUTPATIENT)
Dept: FAMILY MEDICINE CLINIC | Age: 83
End: 2018-12-12

## 2018-12-12 DIAGNOSIS — R82.998 LEUKOCYTES IN URINE: Primary | ICD-10-CM

## 2018-12-12 DIAGNOSIS — B37.9 YEAST INFECTION: ICD-10-CM

## 2018-12-12 NOTE — TELEPHONE ENCOUNTER
----- Message from Connor Alfonso MD sent at 12/12/2018  5:07 PM EST -----  Have pt begin Lotrimin AF- apply topically 2x/day to head of penis to help with possible yeast infection. Please use for 2 weeks. Recheck UA with reflex C&S in 2 weeks.   ES

## 2018-12-15 LAB
ORGANISM: ABNORMAL
URINE CULTURE REFLEX: ABNORMAL

## 2018-12-18 ENCOUNTER — HOSPITAL ENCOUNTER (OUTPATIENT)
Dept: INTERVENTIONAL RADIOLOGY/VASCULAR | Age: 83
Discharge: HOME | End: 2018-12-18
Payer: MEDICARE

## 2018-12-18 ENCOUNTER — TELEPHONE (OUTPATIENT)
Dept: FAMILY MEDICINE CLINIC | Age: 83
End: 2018-12-18

## 2018-12-18 DIAGNOSIS — I65.29 STENOSIS OF CAROTID ARTERY, UNSPECIFIED LATERALITY: ICD-10-CM

## 2018-12-18 DIAGNOSIS — R35.0 URINARY FREQUENCY: Primary | ICD-10-CM

## 2018-12-18 PROCEDURE — 93880 EXTRACRANIAL BILAT STUDY: CPT

## 2018-12-19 ENCOUNTER — TELEPHONE (OUTPATIENT)
Dept: PHARMACY | Age: 83
End: 2018-12-19

## 2018-12-19 RX ORDER — FLUCONAZOLE 200 MG/1
200 TABLET ORAL DAILY
Qty: 14 TABLET | Refills: 0 | Status: SHIPPED | OUTPATIENT
Start: 2018-12-19 | End: 2019-01-02

## 2018-12-19 RX ORDER — METHYLPREDNISOLONE ACETATE 80 MG/ML
80 INJECTION, SUSPENSION INTRA-ARTICULAR; INTRALESIONAL; INTRAMUSCULAR; SOFT TISSUE ONCE
Status: CANCELLED | OUTPATIENT
Start: 2018-12-19 | End: 2018-12-19

## 2018-12-19 RX ORDER — BUPIVACAINE HYDROCHLORIDE 2.5 MG/ML
5 INJECTION, SOLUTION EPIDURAL; INFILTRATION; INTRACAUDAL ONCE
Status: CANCELLED | OUTPATIENT
Start: 2018-12-19 | End: 2018-12-19

## 2018-12-20 ENCOUNTER — HOSPITAL ENCOUNTER (OUTPATIENT)
Dept: INTERVENTIONAL RADIOLOGY/VASCULAR | Age: 83
Discharge: HOME | End: 2018-12-20
Payer: MEDICARE

## 2018-12-20 VITALS
OXYGEN SATURATION: 93 % | SYSTOLIC BLOOD PRESSURE: 164 MMHG | RESPIRATION RATE: 20 BRPM | DIASTOLIC BLOOD PRESSURE: 68 MMHG | BODY MASS INDEX: 41.59 KG/M2 | HEIGHT: 67 IN | HEART RATE: 60 BPM | WEIGHT: 265 LBS

## 2018-12-20 PROCEDURE — 6360000002 HC RX W HCPCS

## 2018-12-20 PROCEDURE — 62323 NJX INTERLAMINAR LMBR/SAC: CPT | Performed by: RADIOLOGY

## 2018-12-20 PROCEDURE — 2709999900 HC NON-CHARGEABLE SUPPLY

## 2018-12-20 PROCEDURE — 6360000004 HC RX CONTRAST MEDICATION: Performed by: RADIOLOGY

## 2018-12-20 PROCEDURE — 2500000003 HC RX 250 WO HCPCS

## 2018-12-20 RX ORDER — METHYLPREDNISOLONE ACETATE 80 MG/ML
80 INJECTION, SUSPENSION INTRA-ARTICULAR; INTRALESIONAL; INTRAMUSCULAR; SOFT TISSUE ONCE
Status: COMPLETED | OUTPATIENT
Start: 2018-12-20 | End: 2018-12-20

## 2018-12-20 RX ORDER — BUPIVACAINE HYDROCHLORIDE 2.5 MG/ML
5 INJECTION, SOLUTION EPIDURAL; INFILTRATION; INTRACAUDAL ONCE
Status: COMPLETED | OUTPATIENT
Start: 2018-12-20 | End: 2018-12-20

## 2018-12-20 RX ADMIN — METHYLPREDNISOLONE ACETATE 80 MG: 80 INJECTION, SUSPENSION INTRA-ARTICULAR; INTRALESIONAL; INTRAMUSCULAR; SOFT TISSUE at 08:56

## 2018-12-20 RX ADMIN — BUPIVACAINE HYDROCHLORIDE 2 ML: 2.5 INJECTION, SOLUTION EPIDURAL; INFILTRATION; INTRACAUDAL at 08:53

## 2018-12-20 RX ADMIN — IOHEXOL 1 ML: 180 INJECTION INTRAVENOUS at 08:53

## 2018-12-20 ASSESSMENT — PAIN SCALES - GENERAL
PAINLEVEL_OUTOF10: 0
PAINLEVEL_OUTOF10: 4

## 2018-12-20 ASSESSMENT — PAIN - FUNCTIONAL ASSESSMENT: PAIN_FUNCTIONAL_ASSESSMENT: 0-10

## 2018-12-20 NOTE — PROGRESS NOTES
Patient in recovery, vitals are stable. Patient denies any new pain or numbness/tingling in his extremities. Injection site band-aid is dry and intact.

## 2018-12-20 NOTE — PROGRESS NOTES
_M___ Safety:       (Environmental)   [place] to environment   Ensure ID band is correct and in place/ allergy band as needed   Assess for fall risk   Initiate fall precautions as applicable (fall band, side rails, etc.)   Call light within reach   Bed in low position/ wheels locked    ____ Pain:        Assess pain level and characteristics   Administer analgesics as ordered   Assess effectiveness of pain management and report to MD as needed    ____ Knowledge Deficit:   Assess baseline knowledge   Provide teaching at level of understanding   Provide teaching via preferred learning method   Evaluate teaching effectiveness    ____ Hemodynamic/Respiratory Status:       (Pre and Post Procedure Monitoring)   Assess/Monitor vital signs and LOC   Assess Baseline SpO2 prior to any sedation   Obtain weight/height   Assess vital signs/ LOC until patient meets discharge criteria   Monitor procedure site and notify MD of any issues    ____ Infection-Risk of Central Venous Catheter:   Monitor for infection signs and symptoms (catheter site redness, temperature elevation, etc)   Assess for infection risks   Educate regarding infection prevention   Manage central venous catheter (flushes/ dressing changes per protocol)

## 2018-12-26 ENCOUNTER — CARE COORDINATION (OUTPATIENT)
Dept: CARE COORDINATION | Age: 83
End: 2018-12-26

## 2018-12-27 ENCOUNTER — HOSPITAL ENCOUNTER (OUTPATIENT)
Dept: PHARMACY | Age: 83
Setting detail: THERAPIES SERIES
Discharge: HOME | End: 2018-12-27
Payer: MEDICARE

## 2018-12-27 DIAGNOSIS — I82.501 CHRONIC DEEP VEIN THROMBOSIS (DVT) OF RIGHT LOWER EXTREMITY, UNSPECIFIED VEIN (HCC): ICD-10-CM

## 2018-12-27 LAB — POC INR: 4.3 (ref 0.8–1.2)

## 2018-12-27 PROCEDURE — 99211 OFF/OP EST MAY X REQ PHY/QHP: CPT

## 2018-12-27 PROCEDURE — 36416 COLLJ CAPILLARY BLOOD SPEC: CPT

## 2018-12-27 PROCEDURE — 85610 PROTHROMBIN TIME: CPT

## 2018-12-27 RX ORDER — ATORVASTATIN CALCIUM 80 MG/1
80 TABLET, FILM COATED ORAL NIGHTLY
COMMUNITY
End: 2019-02-06

## 2018-12-27 NOTE — PROGRESS NOTES
previously   [] plans to receive at a later time   [] refused    [x] documented in [facility], PharmD, BCPS  12/27/2018  11:33 AM

## 2018-12-31 ENCOUNTER — HOSPITAL ENCOUNTER (OUTPATIENT)
Dept: PHARMACY | Age: 83
Setting detail: THERAPIES SERIES
Discharge: HOME | End: 2018-12-31
Payer: MEDICARE

## 2018-12-31 DIAGNOSIS — I82.501 CHRONIC DEEP VEIN THROMBOSIS (DVT) OF RIGHT LOWER EXTREMITY, UNSPECIFIED VEIN (HCC): ICD-10-CM

## 2018-12-31 LAB — POC INR: 3.4 (ref 0.8–1.2)

## 2018-12-31 PROCEDURE — 36416 COLLJ CAPILLARY BLOOD SPEC: CPT

## 2018-12-31 PROCEDURE — 85610 PROTHROMBIN TIME: CPT

## 2018-12-31 PROCEDURE — 99211 OFF/OP EST MAY X REQ PHY/QHP: CPT

## 2019-01-10 ENCOUNTER — HOSPITAL ENCOUNTER (OUTPATIENT)
Dept: PHARMACY | Age: 84
Setting detail: THERAPIES SERIES
Discharge: HOME | End: 2019-01-10
Payer: MEDICARE

## 2019-01-10 DIAGNOSIS — I82.501 CHRONIC DEEP VEIN THROMBOSIS (DVT) OF RIGHT LOWER EXTREMITY, UNSPECIFIED VEIN (HCC): ICD-10-CM

## 2019-01-10 LAB — POC INR: 4.4 (ref 0.8–1.2)

## 2019-01-10 PROCEDURE — 99211 OFF/OP EST MAY X REQ PHY/QHP: CPT

## 2019-01-10 PROCEDURE — 85610 PROTHROMBIN TIME: CPT

## 2019-01-10 PROCEDURE — 36416 COLLJ CAPILLARY BLOOD SPEC: CPT

## 2019-01-21 ENCOUNTER — CARE COORDINATION (OUTPATIENT)
Dept: CARE COORDINATION | Age: 84
End: 2019-01-21

## 2019-01-21 ASSESSMENT — ENCOUNTER SYMPTOMS: DYSPNEA ASSOCIATED WITH: EXERTION

## 2019-01-22 ENCOUNTER — HOSPITAL ENCOUNTER (OUTPATIENT)
Dept: PHARMACY | Age: 84
Setting detail: THERAPIES SERIES
Discharge: HOME | End: 2019-01-22
Payer: MEDICARE

## 2019-01-22 ENCOUNTER — HOSPITAL ENCOUNTER (OUTPATIENT)
Age: 84
Discharge: HOME | End: 2019-01-22
Payer: MEDICARE

## 2019-01-22 DIAGNOSIS — E78.2 MIXED HYPERLIPIDEMIA: ICD-10-CM

## 2019-01-22 DIAGNOSIS — R82.998 LEUKOCYTES IN URINE: ICD-10-CM

## 2019-01-22 DIAGNOSIS — E89.0 POST-SURGICAL HYPOTHYROIDISM: ICD-10-CM

## 2019-01-22 DIAGNOSIS — B37.9 YEAST INFECTION: ICD-10-CM

## 2019-01-22 DIAGNOSIS — E11.8 TYPE 2 DIABETES MELLITUS WITH COMPLICATION, WITH LONG-TERM CURRENT USE OF INSULIN (HCC): ICD-10-CM

## 2019-01-22 DIAGNOSIS — Z79.899 ON STATIN THERAPY: ICD-10-CM

## 2019-01-22 DIAGNOSIS — Z79.4 TYPE 2 DIABETES MELLITUS WITH COMPLICATION, WITH LONG-TERM CURRENT USE OF INSULIN (HCC): ICD-10-CM

## 2019-01-22 LAB
ALBUMIN SERPL-MCNC: 3.7 G/DL (ref 3.5–5.1)
ALP BLD-CCNC: 47 U/L (ref 38–126)
ALT SERPL-CCNC: 15 U/L (ref 11–66)
ANION GAP SERPL CALCULATED.3IONS-SCNC: 13 MEQ/L (ref 8–16)
AST SERPL-CCNC: 17 U/L (ref 5–40)
AVERAGE GLUCOSE: 159 MG/DL (ref 70–126)
BACTERIA: ABNORMAL /HPF
BILIRUB SERPL-MCNC: 0.4 MG/DL (ref 0.3–1.2)
BILIRUBIN DIRECT: < 0.2 MG/DL (ref 0–0.3)
BILIRUBIN URINE: NEGATIVE
BLOOD, URINE: NEGATIVE
BUN BLDV-MCNC: 9 MG/DL (ref 7–22)
CALCIUM SERPL-MCNC: 8.3 MG/DL (ref 8.5–10.5)
CASTS 2: ABNORMAL /LPF
CASTS UA: ABNORMAL /LPF
CHARACTER, URINE: ABNORMAL
CHLORIDE BLD-SCNC: 103 MEQ/L (ref 98–111)
CO2: 28 MEQ/L (ref 23–33)
COLOR: YELLOW
CREAT SERPL-MCNC: 0.6 MG/DL (ref 0.4–1.2)
CRYSTALS, UA: ABNORMAL
EPITHELIAL CELLS, UA: ABNORMAL /HPF
GFR SERPL CREATININE-BSD FRML MDRD: > 90 ML/MIN/1.73M2
GLUCOSE BLD-MCNC: 175 MG/DL (ref 70–108)
GLUCOSE URINE: NEGATIVE MG/DL
HBA1C MFR BLD: 7.3 % (ref 4.4–6.4)
KETONES, URINE: ABNORMAL
LEUKOCYTE ESTERASE, URINE: ABNORMAL
MISCELLANEOUS 2: ABNORMAL
NITRITE, URINE: NEGATIVE
PH UA: 5.5
POC INR: 1.4 (ref 0.8–1.2)
POTASSIUM SERPL-SCNC: 3.9 MEQ/L (ref 3.5–5.2)
PROTEIN UA: NEGATIVE
RBC URINE: ABNORMAL /HPF
RENAL EPITHELIAL, UA: ABNORMAL
SODIUM BLD-SCNC: 144 MEQ/L (ref 135–145)
SPECIFIC GRAVITY, URINE: 1.02 (ref 1–1.03)
TOTAL PROTEIN: 6.2 G/DL (ref 6.1–8)
TSH SERPL DL<=0.05 MIU/L-ACNC: 1.49 UIU/ML (ref 0.4–4.2)
UROBILINOGEN, URINE: 0.2 EU/DL
WBC UA: ABNORMAL /HPF
YEAST: ABNORMAL

## 2019-01-22 PROCEDURE — 84443 ASSAY THYROID STIM HORMONE: CPT

## 2019-01-22 PROCEDURE — 86800 THYROGLOBULIN ANTIBODY: CPT

## 2019-01-22 PROCEDURE — 36416 COLLJ CAPILLARY BLOOD SPEC: CPT | Performed by: PHARMACIST

## 2019-01-22 PROCEDURE — 81001 URINALYSIS AUTO W/SCOPE: CPT

## 2019-01-22 PROCEDURE — 80053 COMPREHEN METABOLIC PANEL: CPT

## 2019-01-22 PROCEDURE — 87086 URINE CULTURE/COLONY COUNT: CPT

## 2019-01-22 PROCEDURE — 85610 PROTHROMBIN TIME: CPT | Performed by: PHARMACIST

## 2019-01-22 PROCEDURE — 82248 BILIRUBIN DIRECT: CPT

## 2019-01-22 PROCEDURE — 99211 OFF/OP EST MAY X REQ PHY/QHP: CPT | Performed by: PHARMACIST

## 2019-01-22 PROCEDURE — 84432 ASSAY OF THYROGLOBULIN: CPT

## 2019-01-22 PROCEDURE — 83036 HEMOGLOBIN GLYCOSYLATED A1C: CPT

## 2019-01-22 PROCEDURE — 36415 COLL VENOUS BLD VENIPUNCTURE: CPT

## 2019-01-23 LAB
ORGANISM: ABNORMAL
URINE CULTURE REFLEX: ABNORMAL

## 2019-01-25 LAB — THYROGLOBULIN: NORMAL

## 2019-01-30 ENCOUNTER — OFFICE VISIT (OUTPATIENT)
Dept: UROLOGY | Age: 84
End: 2019-01-30
Payer: MEDICARE

## 2019-01-30 VITALS
HEIGHT: 67 IN | SYSTOLIC BLOOD PRESSURE: 130 MMHG | WEIGHT: 268.6 LBS | BODY MASS INDEX: 42.16 KG/M2 | DIASTOLIC BLOOD PRESSURE: 60 MMHG

## 2019-01-30 DIAGNOSIS — N40.1 BENIGN PROSTATIC HYPERPLASIA WITH LOWER URINARY TRACT SYMPTOMS, SYMPTOM DETAILS UNSPECIFIED: Primary | ICD-10-CM

## 2019-01-30 LAB
BILIRUBIN URINE: NEGATIVE
BLOOD URINE, POC: ABNORMAL
CHARACTER, URINE: CLEAR
COLOR, URINE: YELLOW
GLUCOSE URINE: NEGATIVE MG/DL
KETONES, URINE: NEGATIVE
LEUKOCYTE CLUMPS, URINE: ABNORMAL
NITRITE, URINE: NEGATIVE
PH, URINE: 5.5
POST VOID RESIDUAL (PVR): 0 ML
PROTEIN, URINE: 30 MG/DL
SPECIFIC GRAVITY, URINE: >= 1.03 (ref 1–1.03)
UROBILINOGEN, URINE: 0.2 EU/DL

## 2019-01-30 PROCEDURE — G8427 DOCREV CUR MEDS BY ELIG CLIN: HCPCS | Performed by: NURSE PRACTITIONER

## 2019-01-30 PROCEDURE — 81003 URINALYSIS AUTO W/O SCOPE: CPT | Performed by: NURSE PRACTITIONER

## 2019-01-30 PROCEDURE — 1036F TOBACCO NON-USER: CPT | Performed by: NURSE PRACTITIONER

## 2019-01-30 PROCEDURE — 51798 US URINE CAPACITY MEASURE: CPT | Performed by: NURSE PRACTITIONER

## 2019-01-30 PROCEDURE — 4040F PNEUMOC VAC/ADMIN/RCVD: CPT | Performed by: NURSE PRACTITIONER

## 2019-01-30 PROCEDURE — G8482 FLU IMMUNIZE ORDER/ADMIN: HCPCS | Performed by: NURSE PRACTITIONER

## 2019-01-30 PROCEDURE — 1123F ACP DISCUSS/DSCN MKR DOCD: CPT | Performed by: NURSE PRACTITIONER

## 2019-01-30 PROCEDURE — G8417 CALC BMI ABV UP PARAM F/U: HCPCS | Performed by: NURSE PRACTITIONER

## 2019-01-30 PROCEDURE — 99213 OFFICE O/P EST LOW 20 MIN: CPT | Performed by: NURSE PRACTITIONER

## 2019-01-30 PROCEDURE — 1101F PT FALLS ASSESS-DOCD LE1/YR: CPT | Performed by: NURSE PRACTITIONER

## 2019-01-30 RX ORDER — DOXAZOSIN MESYLATE 4 MG/1
4 TABLET ORAL NIGHTLY
Qty: 90 TABLET | Refills: 2 | Status: ON HOLD | OUTPATIENT
Start: 2019-01-30 | End: 2019-03-19 | Stop reason: HOSPADM

## 2019-01-30 ASSESSMENT — ENCOUNTER SYMPTOMS
ABDOMINAL PAIN: 0
VOMITING: 0
NAUSEA: 0

## 2019-01-31 ENCOUNTER — HOSPITAL ENCOUNTER (OUTPATIENT)
Dept: PHARMACY | Age: 84
Setting detail: THERAPIES SERIES
Discharge: HOME | End: 2019-01-31
Payer: MEDICARE

## 2019-01-31 DIAGNOSIS — I82.5Y1 CHRONIC DEEP VEIN THROMBOSIS (DVT) OF PROXIMAL VEIN OF RIGHT LOWER EXTREMITY (HCC): ICD-10-CM

## 2019-01-31 DIAGNOSIS — Z79.01 ANTICOAGULATED ON COUMADIN: ICD-10-CM

## 2019-01-31 DIAGNOSIS — I48.91 NEW ONSET ATRIAL FIBRILLATION (HCC): ICD-10-CM

## 2019-01-31 LAB — POC INR: 2.6 (ref 0.8–1.2)

## 2019-01-31 PROCEDURE — 99212 OFFICE O/P EST SF 10 MIN: CPT

## 2019-01-31 PROCEDURE — 85610 PROTHROMBIN TIME: CPT

## 2019-01-31 PROCEDURE — 36416 COLLJ CAPILLARY BLOOD SPEC: CPT

## 2019-01-31 RX ORDER — WARFARIN SODIUM 5 MG/1
TABLET ORAL
Qty: 45 TABLET | Refills: 11 | Status: SHIPPED | OUTPATIENT
Start: 2019-01-31 | End: 2019-10-07 | Stop reason: SDUPTHER

## 2019-01-31 RX ORDER — WARFARIN SODIUM 5 MG/1
TABLET ORAL EVERY EVENING
Status: ON HOLD | COMMUNITY
End: 2019-03-15 | Stop reason: SDUPTHER

## 2019-02-06 ENCOUNTER — OFFICE VISIT (OUTPATIENT)
Dept: INTERNAL MEDICINE CLINIC | Age: 84
End: 2019-02-06
Payer: MEDICARE

## 2019-02-06 VITALS
OXYGEN SATURATION: 94 % | DIASTOLIC BLOOD PRESSURE: 60 MMHG | SYSTOLIC BLOOD PRESSURE: 110 MMHG | HEART RATE: 60 BPM | WEIGHT: 273 LBS | BODY MASS INDEX: 42.85 KG/M2 | HEIGHT: 67 IN

## 2019-02-06 DIAGNOSIS — E78.2 MIXED HYPERLIPIDEMIA: ICD-10-CM

## 2019-02-06 DIAGNOSIS — E89.0 POST-SURGICAL HYPOTHYROIDISM: ICD-10-CM

## 2019-02-06 DIAGNOSIS — E89.0 H/O TOTAL THYROIDECTOMY: ICD-10-CM

## 2019-02-06 DIAGNOSIS — Z79.4 TYPE 2 DIABETES MELLITUS WITH COMPLICATION, WITH LONG-TERM CURRENT USE OF INSULIN (HCC): Primary | ICD-10-CM

## 2019-02-06 DIAGNOSIS — E11.8 TYPE 2 DIABETES MELLITUS WITH COMPLICATION, WITH LONG-TERM CURRENT USE OF INSULIN (HCC): Primary | ICD-10-CM

## 2019-02-06 DIAGNOSIS — E66.01 MORBID OBESITY WITH BMI OF 40.0-44.9, ADULT (HCC): ICD-10-CM

## 2019-02-06 DIAGNOSIS — C73 PRIMARY THYROID FOLLICULAR CARCINOMA (HCC): ICD-10-CM

## 2019-02-06 PROCEDURE — G8417 CALC BMI ABV UP PARAM F/U: HCPCS | Performed by: INTERNAL MEDICINE

## 2019-02-06 PROCEDURE — G8427 DOCREV CUR MEDS BY ELIG CLIN: HCPCS | Performed by: INTERNAL MEDICINE

## 2019-02-06 PROCEDURE — 1101F PT FALLS ASSESS-DOCD LE1/YR: CPT | Performed by: INTERNAL MEDICINE

## 2019-02-06 PROCEDURE — 4040F PNEUMOC VAC/ADMIN/RCVD: CPT | Performed by: INTERNAL MEDICINE

## 2019-02-06 PROCEDURE — 1036F TOBACCO NON-USER: CPT | Performed by: INTERNAL MEDICINE

## 2019-02-06 PROCEDURE — G8482 FLU IMMUNIZE ORDER/ADMIN: HCPCS | Performed by: INTERNAL MEDICINE

## 2019-02-06 PROCEDURE — 99204 OFFICE O/P NEW MOD 45 MIN: CPT | Performed by: INTERNAL MEDICINE

## 2019-02-06 PROCEDURE — 1123F ACP DISCUSS/DSCN MKR DOCD: CPT | Performed by: INTERNAL MEDICINE

## 2019-02-06 RX ORDER — ASCORBIC ACID 500 MG
500 TABLET ORAL DAILY
Status: ON HOLD | COMMUNITY
End: 2019-03-19 | Stop reason: HOSPADM

## 2019-02-06 RX ORDER — ATORVASTATIN CALCIUM 80 MG/1
80 TABLET, FILM COATED ORAL NIGHTLY
COMMUNITY
End: 2020-01-23

## 2019-02-06 ASSESSMENT — PATIENT HEALTH QUESTIONNAIRE - PHQ9
1. LITTLE INTEREST OR PLEASURE IN DOING THINGS: 0
2. FEELING DOWN, DEPRESSED OR HOPELESS: 0
SUM OF ALL RESPONSES TO PHQ9 QUESTIONS 1 & 2: 0
SUM OF ALL RESPONSES TO PHQ QUESTIONS 1-9: 0
SUM OF ALL RESPONSES TO PHQ QUESTIONS 1-9: 0

## 2019-02-11 ENCOUNTER — HOSPITAL ENCOUNTER (OUTPATIENT)
Dept: PHARMACY | Age: 84
Setting detail: THERAPIES SERIES
Discharge: HOME | End: 2019-02-11
Payer: MEDICARE

## 2019-02-11 DIAGNOSIS — I82.5Y1 CHRONIC DEEP VEIN THROMBOSIS (DVT) OF PROXIMAL VEIN OF RIGHT LOWER EXTREMITY (HCC): ICD-10-CM

## 2019-02-11 LAB — POC INR: 1.8 (ref 0.8–1.2)

## 2019-02-11 PROCEDURE — 85610 PROTHROMBIN TIME: CPT

## 2019-02-11 PROCEDURE — 36416 COLLJ CAPILLARY BLOOD SPEC: CPT

## 2019-02-11 PROCEDURE — 99211 OFF/OP EST MAY X REQ PHY/QHP: CPT

## 2019-02-21 ENCOUNTER — HOSPITAL ENCOUNTER (OUTPATIENT)
Dept: PHARMACY | Age: 84
Setting detail: THERAPIES SERIES
Discharge: HOME | End: 2019-02-21
Payer: MEDICARE

## 2019-02-21 DIAGNOSIS — I82.5Y1 CHRONIC DEEP VEIN THROMBOSIS (DVT) OF PROXIMAL VEIN OF RIGHT LOWER EXTREMITY (HCC): ICD-10-CM

## 2019-02-21 LAB — POC INR: 2.3 (ref 0.8–1.2)

## 2019-02-21 PROCEDURE — 85610 PROTHROMBIN TIME: CPT

## 2019-02-21 PROCEDURE — 36416 COLLJ CAPILLARY BLOOD SPEC: CPT

## 2019-02-21 PROCEDURE — 99211 OFF/OP EST MAY X REQ PHY/QHP: CPT

## 2019-03-05 ENCOUNTER — CARE COORDINATION (OUTPATIENT)
Dept: CARE COORDINATION | Age: 84
End: 2019-03-05

## 2019-03-08 ENCOUNTER — HOSPITAL ENCOUNTER (OUTPATIENT)
Age: 84
Discharge: HOME | End: 2019-03-08
Payer: MEDICARE

## 2019-03-08 DIAGNOSIS — Z79.4 TYPE 2 DIABETES MELLITUS WITH COMPLICATION, WITH LONG-TERM CURRENT USE OF INSULIN (HCC): ICD-10-CM

## 2019-03-08 DIAGNOSIS — D64.9 ANEMIA, UNSPECIFIED TYPE: ICD-10-CM

## 2019-03-08 DIAGNOSIS — E11.8 TYPE 2 DIABETES MELLITUS WITH COMPLICATION, WITH LONG-TERM CURRENT USE OF INSULIN (HCC): ICD-10-CM

## 2019-03-08 LAB
CREATININE, URINE: 143.1 MG/DL
HCT VFR BLD CALC: 40.5 % (ref 42–52)
HEMOGLOBIN: 13.2 GM/DL (ref 14–18)
MICROALBUMIN UR-MCNC: 4.73 MG/DL
MICROALBUMIN/CREAT UR-RTO: 33 MG/G (ref 0–30)

## 2019-03-08 PROCEDURE — 85018 HEMOGLOBIN: CPT

## 2019-03-08 PROCEDURE — 85014 HEMATOCRIT: CPT

## 2019-03-08 PROCEDURE — 36415 COLL VENOUS BLD VENIPUNCTURE: CPT

## 2019-03-08 PROCEDURE — 82043 UR ALBUMIN QUANTITATIVE: CPT

## 2019-03-12 ENCOUNTER — HOSPITAL ENCOUNTER (OUTPATIENT)
Dept: PHARMACY | Age: 84
Setting detail: THERAPIES SERIES
Discharge: HOME | End: 2019-03-12
Payer: MEDICARE

## 2019-03-12 DIAGNOSIS — I82.5Y1 CHRONIC DEEP VEIN THROMBOSIS (DVT) OF PROXIMAL VEIN OF RIGHT LOWER EXTREMITY (HCC): ICD-10-CM

## 2019-03-12 LAB — POC INR: 2.4 (ref 0.8–1.2)

## 2019-03-12 PROCEDURE — 85610 PROTHROMBIN TIME: CPT

## 2019-03-12 PROCEDURE — 36416 COLLJ CAPILLARY BLOOD SPEC: CPT

## 2019-03-12 PROCEDURE — 99211 OFF/OP EST MAY X REQ PHY/QHP: CPT

## 2019-03-13 ENCOUNTER — OFFICE VISIT (OUTPATIENT)
Dept: FAMILY MEDICINE CLINIC | Age: 84
End: 2019-03-13
Payer: MEDICARE

## 2019-03-13 ENCOUNTER — TELEPHONE (OUTPATIENT)
Dept: PHARMACY | Age: 84
End: 2019-03-13

## 2019-03-13 VITALS
DIASTOLIC BLOOD PRESSURE: 70 MMHG | WEIGHT: 265 LBS | RESPIRATION RATE: 24 BRPM | SYSTOLIC BLOOD PRESSURE: 134 MMHG | TEMPERATURE: 98.3 F | OXYGEN SATURATION: 90 % | HEART RATE: 60 BPM | BODY MASS INDEX: 41.5 KG/M2

## 2019-03-13 DIAGNOSIS — E66.01 MORBID OBESITY WITH BMI OF 40.0-44.9, ADULT (HCC): ICD-10-CM

## 2019-03-13 DIAGNOSIS — E11.8 TYPE 2 DIABETES MELLITUS WITH COMPLICATION, WITH LONG-TERM CURRENT USE OF INSULIN (HCC): Primary | ICD-10-CM

## 2019-03-13 DIAGNOSIS — E89.0 POSTOPERATIVE HYPOTHYROIDISM: ICD-10-CM

## 2019-03-13 DIAGNOSIS — J41.1 MUCOPURULENT CHRONIC BRONCHITIS (HCC): ICD-10-CM

## 2019-03-13 DIAGNOSIS — Z79.4 TYPE 2 DIABETES MELLITUS WITH COMPLICATION, WITH LONG-TERM CURRENT USE OF INSULIN (HCC): Primary | ICD-10-CM

## 2019-03-13 DIAGNOSIS — J44.9 STAGE 3 SEVERE COPD BY GOLD CLASSIFICATION (HCC): ICD-10-CM

## 2019-03-13 DIAGNOSIS — Z99.89 OSA ON CPAP: ICD-10-CM

## 2019-03-13 DIAGNOSIS — I10 ESSENTIAL HYPERTENSION: ICD-10-CM

## 2019-03-13 DIAGNOSIS — G47.33 OSA ON CPAP: ICD-10-CM

## 2019-03-13 DIAGNOSIS — J20.9 ACUTE BRONCHITIS, UNSPECIFIED ORGANISM: ICD-10-CM

## 2019-03-13 DIAGNOSIS — Z79.01 ANTICOAGULATED ON COUMADIN: ICD-10-CM

## 2019-03-13 DIAGNOSIS — D64.9 ANEMIA, UNSPECIFIED TYPE: ICD-10-CM

## 2019-03-13 DIAGNOSIS — J98.01 BRONCHOSPASM, ACUTE: ICD-10-CM

## 2019-03-13 DIAGNOSIS — E78.2 MIXED HYPERLIPIDEMIA: ICD-10-CM

## 2019-03-13 DIAGNOSIS — N40.1 BENIGN PROSTATIC HYPERPLASIA WITH LOWER URINARY TRACT SYMPTOMS, SYMPTOM DETAILS UNSPECIFIED: ICD-10-CM

## 2019-03-13 PROCEDURE — 1101F PT FALLS ASSESS-DOCD LE1/YR: CPT | Performed by: FAMILY MEDICINE

## 2019-03-13 PROCEDURE — 4040F PNEUMOC VAC/ADMIN/RCVD: CPT | Performed by: FAMILY MEDICINE

## 2019-03-13 PROCEDURE — G8427 DOCREV CUR MEDS BY ELIG CLIN: HCPCS | Performed by: FAMILY MEDICINE

## 2019-03-13 PROCEDURE — 3023F SPIROM DOC REV: CPT | Performed by: FAMILY MEDICINE

## 2019-03-13 PROCEDURE — G8482 FLU IMMUNIZE ORDER/ADMIN: HCPCS | Performed by: FAMILY MEDICINE

## 2019-03-13 PROCEDURE — 99215 OFFICE O/P EST HI 40 MIN: CPT | Performed by: FAMILY MEDICINE

## 2019-03-13 PROCEDURE — 1123F ACP DISCUSS/DSCN MKR DOCD: CPT | Performed by: FAMILY MEDICINE

## 2019-03-13 PROCEDURE — G8417 CALC BMI ABV UP PARAM F/U: HCPCS | Performed by: FAMILY MEDICINE

## 2019-03-13 PROCEDURE — G8926 SPIRO NO PERF OR DOC: HCPCS | Performed by: FAMILY MEDICINE

## 2019-03-13 PROCEDURE — 1036F TOBACCO NON-USER: CPT | Performed by: FAMILY MEDICINE

## 2019-03-13 RX ORDER — CEFDINIR 300 MG/1
300 CAPSULE ORAL 2 TIMES DAILY
Qty: 20 CAPSULE | Refills: 0 | Status: ON HOLD | OUTPATIENT
Start: 2019-03-13 | End: 2019-03-19 | Stop reason: HOSPADM

## 2019-03-13 RX ORDER — PREDNISONE 20 MG/1
40 TABLET ORAL DAILY
Qty: 10 TABLET | Refills: 0 | Status: ON HOLD | OUTPATIENT
Start: 2019-03-13 | End: 2019-03-19

## 2019-03-13 ASSESSMENT — ENCOUNTER SYMPTOMS
ABDOMINAL PAIN: 0
EYE DISCHARGE: 0
BLOOD IN STOOL: 0
EYE REDNESS: 0
EYE PAIN: 0
COUGH: 1
ANAL BLEEDING: 0
SINUS PRESSURE: 1
WHEEZING: 1
SINUS PAIN: 1
CHEST TIGHTNESS: 0
VOMITING: 0
CONSTIPATION: 0
DIARRHEA: 0
RHINORRHEA: 1
NAUSEA: 1
SORE THROAT: 0
EYE ITCHING: 0
SHORTNESS OF BREATH: 1

## 2019-03-14 ENCOUNTER — APPOINTMENT (OUTPATIENT)
Dept: GENERAL RADIOLOGY | Age: 84
DRG: 189 | End: 2019-03-14
Payer: MEDICARE

## 2019-03-14 ENCOUNTER — HOSPITAL ENCOUNTER (INPATIENT)
Age: 84
LOS: 5 days | Discharge: NURSING, REHAB OR LONG-TERM CARE | DRG: 189 | End: 2019-03-19
Attending: FAMILY MEDICINE | Admitting: FAMILY MEDICINE
Payer: MEDICARE

## 2019-03-14 DIAGNOSIS — J44.9 STAGE 3 SEVERE COPD BY GOLD CLASSIFICATION (HCC): ICD-10-CM

## 2019-03-14 DIAGNOSIS — J96.01 ACUTE RESPIRATORY FAILURE WITH HYPOXIA AND HYPERCAPNIA (HCC): Primary | ICD-10-CM

## 2019-03-14 DIAGNOSIS — J98.01 BRONCHOSPASM, ACUTE: ICD-10-CM

## 2019-03-14 DIAGNOSIS — J20.9 ACUTE BRONCHITIS, UNSPECIFIED ORGANISM: ICD-10-CM

## 2019-03-14 DIAGNOSIS — J44.1 COPD EXACERBATION (HCC): ICD-10-CM

## 2019-03-14 DIAGNOSIS — J96.02 ACUTE RESPIRATORY FAILURE WITH HYPOXIA AND HYPERCAPNIA (HCC): Primary | ICD-10-CM

## 2019-03-14 PROBLEM — E87.29 RESPIRATORY ACIDOSIS: Status: ACTIVE | Noted: 2019-03-14

## 2019-03-14 LAB
ALBUMIN SERPL-MCNC: 4.3 G/DL (ref 3.5–5.1)
ALLEN TEST: POSITIVE
ALP BLD-CCNC: 52 U/L (ref 38–126)
ALT SERPL-CCNC: 19 U/L (ref 11–66)
ANION GAP SERPL CALCULATED.3IONS-SCNC: 12 MEQ/L (ref 8–16)
APTT: 38.6 SECONDS (ref 22–38)
AST SERPL-CCNC: 20 U/L (ref 5–40)
BASE EXCESS (CALCULATED): 3.6 MMOL/L (ref -2.5–2.5)
BASOPHILS # BLD: 0.2 %
BASOPHILS ABSOLUTE: 0 THOU/MM3 (ref 0–0.1)
BILIRUB SERPL-MCNC: 0.3 MG/DL (ref 0.3–1.2)
BUN BLDV-MCNC: 10 MG/DL (ref 7–22)
CALCIUM SERPL-MCNC: 8.5 MG/DL (ref 8.5–10.5)
CHLORIDE BLD-SCNC: 97 MEQ/L (ref 98–111)
CO2: 31 MEQ/L (ref 23–33)
COLLECTED BY:: ABNORMAL
CREAT SERPL-MCNC: 0.6 MG/DL (ref 0.4–1.2)
DEVICE: ABNORMAL
EKG ATRIAL RATE: 71 BPM
EKG Q-T INTERVAL: 376 MS
EKG QRS DURATION: 158 MS
EKG QTC CALCULATION (BAZETT): 414 MS
EKG R AXIS: 38 DEGREES
EKG T AXIS: -63 DEGREES
EKG VENTRICULAR RATE: 73 BPM
EOSINOPHIL # BLD: 0 %
EOSINOPHILS ABSOLUTE: 0 THOU/MM3 (ref 0–0.4)
ERYTHROCYTE [DISTWIDTH] IN BLOOD BY AUTOMATED COUNT: 14.5 % (ref 11.5–14.5)
ERYTHROCYTE [DISTWIDTH] IN BLOOD BY AUTOMATED COUNT: 44.5 FL (ref 35–45)
FLU A ANTIGEN: NEGATIVE
FLU B ANTIGEN: NEGATIVE
GFR SERPL CREATININE-BSD FRML MDRD: > 90 ML/MIN/1.73M2
GLUCOSE BLD-MCNC: 180 MG/DL (ref 70–108)
HCO3: 32 MMOL/L (ref 23–28)
HCT VFR BLD CALC: 41.3 % (ref 42–52)
HEMOGLOBIN: 13.2 GM/DL (ref 14–18)
IFIO2: 5
IMMATURE GRANS (ABS): 0.09 THOU/MM3 (ref 0–0.07)
IMMATURE GRANULOCYTES: 0.7 %
INR BLD: 2.78 (ref 0.85–1.13)
LACTIC ACID, SEPSIS: 4.3 MMOL/L (ref 0.5–1.9)
LACTIC ACID, SEPSIS: 6.6 MMOL/L (ref 0.5–1.9)
LYMPHOCYTES # BLD: 6.5 %
LYMPHOCYTES ABSOLUTE: 0.8 THOU/MM3 (ref 1–4.8)
MCH RBC QN AUTO: 27.2 PG (ref 26–33)
MCHC RBC AUTO-ENTMCNC: 32 GM/DL (ref 32.2–35.5)
MCV RBC AUTO: 85 FL (ref 80–94)
MONOCYTES # BLD: 3.2 %
MONOCYTES ABSOLUTE: 0.4 THOU/MM3 (ref 0.4–1.3)
NUCLEATED RED BLOOD CELLS: 0 /100 WBC
O2 SATURATION: 89 %
OSMOLALITY CALCULATION: 283 MOSMOL/KG (ref 275–300)
PCO2: 63 MMHG (ref 35–45)
PH BLOOD GAS: 7.31 (ref 7.35–7.45)
PLATELET # BLD: 216 THOU/MM3 (ref 130–400)
PMV BLD AUTO: 9.9 FL (ref 9.4–12.4)
PO2: 63 MMHG (ref 71–104)
POTASSIUM SERPL-SCNC: 4.2 MEQ/L (ref 3.5–5.2)
PRO-BNP: 267.5 PG/ML (ref 0–1800)
RBC # BLD: 4.86 MILL/MM3 (ref 4.7–6.1)
SEG NEUTROPHILS: 89.4 %
SEGMENTED NEUTROPHILS ABSOLUTE COUNT: 11.2 THOU/MM3 (ref 1.8–7.7)
SODIUM BLD-SCNC: 140 MEQ/L (ref 135–145)
SOURCE, BLOOD GAS: ABNORMAL
TOTAL PROTEIN: 6.8 G/DL (ref 6.1–8)
TROPONIN T: < 0.01 NG/ML
WBC # BLD: 12.5 THOU/MM3 (ref 4.8–10.8)

## 2019-03-14 PROCEDURE — 94644 CONT INHLJ TX 1ST HOUR: CPT

## 2019-03-14 PROCEDURE — 6360000002 HC RX W HCPCS: Performed by: FAMILY MEDICINE

## 2019-03-14 PROCEDURE — 99285 EMERGENCY DEPT VISIT HI MDM: CPT

## 2019-03-14 PROCEDURE — 96366 THER/PROPH/DIAG IV INF ADDON: CPT

## 2019-03-14 PROCEDURE — 87804 INFLUENZA ASSAY W/OPTIC: CPT

## 2019-03-14 PROCEDURE — 99223 1ST HOSP IP/OBS HIGH 75: CPT | Performed by: PHYSICIAN ASSISTANT

## 2019-03-14 PROCEDURE — 84484 ASSAY OF TROPONIN QUANT: CPT

## 2019-03-14 PROCEDURE — 1200000003 HC TELEMETRY R&B

## 2019-03-14 PROCEDURE — 85610 PROTHROMBIN TIME: CPT

## 2019-03-14 PROCEDURE — 80053 COMPREHEN METABOLIC PANEL: CPT

## 2019-03-14 PROCEDURE — 71045 X-RAY EXAM CHEST 1 VIEW: CPT

## 2019-03-14 PROCEDURE — 2709999900 HC NON-CHARGEABLE SUPPLY

## 2019-03-14 PROCEDURE — 6370000000 HC RX 637 (ALT 250 FOR IP): Performed by: PHYSICIAN ASSISTANT

## 2019-03-14 PROCEDURE — 87040 BLOOD CULTURE FOR BACTERIA: CPT

## 2019-03-14 PROCEDURE — 2700000000 HC OXYGEN THERAPY PER DAY

## 2019-03-14 PROCEDURE — 94660 CPAP INITIATION&MGMT: CPT

## 2019-03-14 PROCEDURE — 83605 ASSAY OF LACTIC ACID: CPT

## 2019-03-14 PROCEDURE — 83036 HEMOGLOBIN GLYCOSYLATED A1C: CPT

## 2019-03-14 PROCEDURE — 85025 COMPLETE CBC W/AUTO DIFF WBC: CPT

## 2019-03-14 PROCEDURE — 96375 TX/PRO/DX INJ NEW DRUG ADDON: CPT

## 2019-03-14 PROCEDURE — 94640 AIRWAY INHALATION TREATMENT: CPT

## 2019-03-14 PROCEDURE — 83880 ASSAY OF NATRIURETIC PEPTIDE: CPT

## 2019-03-14 PROCEDURE — 36415 COLL VENOUS BLD VENIPUNCTURE: CPT

## 2019-03-14 PROCEDURE — 94760 N-INVAS EAR/PLS OXIMETRY 1: CPT

## 2019-03-14 PROCEDURE — 96365 THER/PROPH/DIAG IV INF INIT: CPT

## 2019-03-14 PROCEDURE — 6360000002 HC RX W HCPCS: Performed by: PHYSICIAN ASSISTANT

## 2019-03-14 PROCEDURE — 93005 ELECTROCARDIOGRAM TRACING: CPT | Performed by: PHYSICIAN ASSISTANT

## 2019-03-14 PROCEDURE — 80069 RENAL FUNCTION PANEL: CPT

## 2019-03-14 PROCEDURE — 93010 ELECTROCARDIOGRAM REPORT: CPT | Performed by: INTERNAL MEDICINE

## 2019-03-14 PROCEDURE — 85730 THROMBOPLASTIN TIME PARTIAL: CPT

## 2019-03-14 PROCEDURE — 82803 BLOOD GASES ANY COMBINATION: CPT

## 2019-03-14 RX ORDER — ATORVASTATIN CALCIUM 80 MG/1
80 TABLET, FILM COATED ORAL NIGHTLY
Status: DISCONTINUED | OUTPATIENT
Start: 2019-03-15 | End: 2019-03-19 | Stop reason: HOSPADM

## 2019-03-14 RX ORDER — ACETAMINOPHEN 325 MG/1
650 TABLET ORAL EVERY 4 HOURS PRN
Status: DISCONTINUED | OUTPATIENT
Start: 2019-03-14 | End: 2019-03-19 | Stop reason: HOSPADM

## 2019-03-14 RX ORDER — DOCUSATE SODIUM 100 MG/1
100 CAPSULE, LIQUID FILLED ORAL 2 TIMES DAILY
Status: DISCONTINUED | OUTPATIENT
Start: 2019-03-15 | End: 2019-03-19

## 2019-03-14 RX ORDER — ONDANSETRON 2 MG/ML
4 INJECTION INTRAMUSCULAR; INTRAVENOUS EVERY 6 HOURS PRN
Status: DISCONTINUED | OUTPATIENT
Start: 2019-03-14 | End: 2019-03-19 | Stop reason: HOSPADM

## 2019-03-14 RX ORDER — DEXTROSE MONOHYDRATE 25 G/50ML
12.5 INJECTION, SOLUTION INTRAVENOUS PRN
Status: DISCONTINUED | OUTPATIENT
Start: 2019-03-14 | End: 2019-03-19 | Stop reason: HOSPADM

## 2019-03-14 RX ORDER — SODIUM CHLORIDE 0.9 % (FLUSH) 0.9 %
10 SYRINGE (ML) INJECTION PRN
Status: DISCONTINUED | OUTPATIENT
Start: 2019-03-14 | End: 2019-03-19 | Stop reason: HOSPADM

## 2019-03-14 RX ORDER — LEVOTHYROXINE SODIUM 0.12 MG/1
125 TABLET ORAL DAILY
Status: DISCONTINUED | OUTPATIENT
Start: 2019-03-15 | End: 2019-03-15

## 2019-03-14 RX ORDER — FERROUS SULFATE 325(65) MG
325 TABLET ORAL 2 TIMES DAILY WITH MEALS
Status: DISCONTINUED | OUTPATIENT
Start: 2019-03-15 | End: 2019-03-18

## 2019-03-14 RX ORDER — DOXAZOSIN MESYLATE 4 MG/1
4 TABLET ORAL NIGHTLY
Status: DISCONTINUED | OUTPATIENT
Start: 2019-03-15 | End: 2019-03-19 | Stop reason: HOSPADM

## 2019-03-14 RX ORDER — ASPIRIN 81 MG/1
81 TABLET ORAL DAILY
Status: DISCONTINUED | OUTPATIENT
Start: 2019-03-15 | End: 2019-03-19 | Stop reason: HOSPADM

## 2019-03-14 RX ORDER — FUROSEMIDE 10 MG/ML
80 INJECTION INTRAMUSCULAR; INTRAVENOUS ONCE
Status: COMPLETED | OUTPATIENT
Start: 2019-03-14 | End: 2019-03-14

## 2019-03-14 RX ORDER — METHYLPREDNISOLONE SODIUM SUCCINATE 125 MG/2ML
125 INJECTION, POWDER, LYOPHILIZED, FOR SOLUTION INTRAMUSCULAR; INTRAVENOUS ONCE
Status: COMPLETED | OUTPATIENT
Start: 2019-03-14 | End: 2019-03-14

## 2019-03-14 RX ORDER — IPRATROPIUM BROMIDE AND ALBUTEROL SULFATE 2.5; .5 MG/3ML; MG/3ML
1 SOLUTION RESPIRATORY (INHALATION) ONCE
Status: COMPLETED | OUTPATIENT
Start: 2019-03-14 | End: 2019-03-14

## 2019-03-14 RX ORDER — SODIUM CHLORIDE 0.9 % (FLUSH) 0.9 %
10 SYRINGE (ML) INJECTION EVERY 12 HOURS SCHEDULED
Status: DISCONTINUED | OUTPATIENT
Start: 2019-03-14 | End: 2019-03-19 | Stop reason: HOSPADM

## 2019-03-14 RX ORDER — FORMOTEROL FUMARATE 20 UG/2ML
20 SOLUTION RESPIRATORY (INHALATION) 2 TIMES DAILY
Status: DISCONTINUED | OUTPATIENT
Start: 2019-03-14 | End: 2019-03-17

## 2019-03-14 RX ORDER — BUDESONIDE 0.25 MG/2ML
250 INHALANT ORAL 2 TIMES DAILY
Status: DISCONTINUED | OUTPATIENT
Start: 2019-03-15 | End: 2019-03-19 | Stop reason: HOSPADM

## 2019-03-14 RX ORDER — DEXTROSE MONOHYDRATE 50 MG/ML
100 INJECTION, SOLUTION INTRAVENOUS PRN
Status: DISCONTINUED | OUTPATIENT
Start: 2019-03-14 | End: 2019-03-19 | Stop reason: HOSPADM

## 2019-03-14 RX ORDER — NICOTINE POLACRILEX 4 MG
15 LOZENGE BUCCAL PRN
Status: DISCONTINUED | OUTPATIENT
Start: 2019-03-14 | End: 2019-03-19 | Stop reason: HOSPADM

## 2019-03-14 RX ORDER — ALBUTEROL SULFATE 90 UG/1
2 AEROSOL, METERED RESPIRATORY (INHALATION) EVERY 6 HOURS PRN
Status: DISCONTINUED | OUTPATIENT
Start: 2019-03-14 | End: 2019-03-19 | Stop reason: HOSPADM

## 2019-03-14 RX ORDER — ANTIOX #8/OM3/DHA/EPA/LUT/ZEAX 250-2.5 MG
2 CAPSULE ORAL 2 TIMES DAILY
COMMUNITY
Start: 2019-03-14 | End: 2019-07-03

## 2019-03-14 RX ORDER — ALBUTEROL SULFATE 2.5 MG/3ML
15 SOLUTION RESPIRATORY (INHALATION) ONCE
Status: COMPLETED | OUTPATIENT
Start: 2019-03-14 | End: 2019-03-14

## 2019-03-14 RX ORDER — PREDNISONE 20 MG/1
40 TABLET ORAL DAILY
Status: DISCONTINUED | OUTPATIENT
Start: 2019-03-15 | End: 2019-03-15

## 2019-03-14 RX ORDER — LEVOFLOXACIN 5 MG/ML
750 INJECTION, SOLUTION INTRAVENOUS ONCE
Status: COMPLETED | OUTPATIENT
Start: 2019-03-14 | End: 2019-03-14

## 2019-03-14 RX ADMIN — FUROSEMIDE 80 MG: 10 INJECTION, SOLUTION INTRAMUSCULAR; INTRAVENOUS at 18:43

## 2019-03-14 RX ADMIN — METHYLPREDNISOLONE SODIUM SUCCINATE 125 MG: 125 INJECTION, POWDER, FOR SOLUTION INTRAMUSCULAR; INTRAVENOUS at 17:51

## 2019-03-14 RX ADMIN — LEVOFLOXACIN 750 MG: 5 INJECTION, SOLUTION INTRAVENOUS at 18:43

## 2019-03-14 RX ADMIN — IPRATROPIUM BROMIDE AND ALBUTEROL SULFATE 1 AMPULE: .5; 3 SOLUTION RESPIRATORY (INHALATION) at 16:56

## 2019-03-14 RX ADMIN — IPRATROPIUM BROMIDE AND ALBUTEROL SULFATE 1 AMPULE: .5; 3 SOLUTION RESPIRATORY (INHALATION) at 17:38

## 2019-03-14 RX ADMIN — ALBUTEROL SULFATE 15 MG/HR: 2.5 SOLUTION RESPIRATORY (INHALATION) at 18:04

## 2019-03-14 ASSESSMENT — PAIN SCALES - GENERAL
PAINLEVEL_OUTOF10: 3
PAINLEVEL_OUTOF10: 5
PAINLEVEL_OUTOF10: 3
PAINLEVEL_OUTOF10: 3
PAINLEVEL_OUTOF10: 0

## 2019-03-14 ASSESSMENT — PAIN DESCRIPTION - ONSET
ONSET: ON-GOING

## 2019-03-14 ASSESSMENT — PAIN DESCRIPTION - PROGRESSION
CLINICAL_PROGRESSION: NOT CHANGED
CLINICAL_PROGRESSION: GRADUALLY IMPROVING
CLINICAL_PROGRESSION: NOT CHANGED

## 2019-03-14 ASSESSMENT — ENCOUNTER SYMPTOMS
NAUSEA: 0
SHORTNESS OF BREATH: 1
DIARRHEA: 0
VOMITING: 0
RHINORRHEA: 0
EYE DISCHARGE: 0
SORE THROAT: 0
COUGH: 1
EYE REDNESS: 0
BACK PAIN: 0
WHEEZING: 1
ABDOMINAL PAIN: 1

## 2019-03-14 ASSESSMENT — PAIN DESCRIPTION - PAIN TYPE
TYPE: ACUTE PAIN
TYPE: ACUTE PAIN

## 2019-03-14 ASSESSMENT — PAIN DESCRIPTION - FREQUENCY
FREQUENCY: CONTINUOUS

## 2019-03-14 ASSESSMENT — PAIN DESCRIPTION - LOCATION
LOCATION: MOUTH
LOCATION: ABDOMEN;CHEST
LOCATION: ABDOMEN
LOCATION: ABDOMEN

## 2019-03-15 ENCOUNTER — APPOINTMENT (OUTPATIENT)
Dept: GENERAL RADIOLOGY | Age: 84
DRG: 189 | End: 2019-03-15
Payer: MEDICARE

## 2019-03-15 LAB
ALBUMIN SERPL-MCNC: 4.5 G/DL (ref 3.5–5.1)
ALLEN TEST: POSITIVE
ALLEN TEST: POSITIVE
ANION GAP SERPL CALCULATED.3IONS-SCNC: 14 MEQ/L (ref 8–16)
ANION GAP SERPL CALCULATED.3IONS-SCNC: 21 MEQ/L (ref 8–16)
AVERAGE GLUCOSE: 159 MG/DL (ref 70–126)
BACTERIA: ABNORMAL
BASE EXCESS (CALCULATED): 6.6 MMOL/L (ref -2.5–2.5)
BASE EXCESS (CALCULATED): 9.2 MMOL/L (ref -2.5–2.5)
BILIRUBIN URINE: NEGATIVE
BLOOD, URINE: ABNORMAL
BUN BLDV-MCNC: 10 MG/DL (ref 7–22)
BUN BLDV-MCNC: 11 MG/DL (ref 7–22)
CALCIUM SERPL-MCNC: 8.3 MG/DL (ref 8.5–10.5)
CALCIUM SERPL-MCNC: 8.3 MG/DL (ref 8.5–10.5)
CASTS: ABNORMAL /LPF
CASTS: ABNORMAL /LPF
CHARACTER, URINE: ABNORMAL
CHLORIDE BLD-SCNC: 94 MEQ/L (ref 98–111)
CHLORIDE BLD-SCNC: 97 MEQ/L (ref 98–111)
CO2: 26 MEQ/L (ref 23–33)
CO2: 31 MEQ/L (ref 23–33)
COLLECTED BY:: ABNORMAL
COLLECTED BY:: ABNORMAL
COLOR: YELLOW
CREAT SERPL-MCNC: 0.7 MG/DL (ref 0.4–1.2)
CREAT SERPL-MCNC: 0.8 MG/DL (ref 0.4–1.2)
CREATININE URINE: 101.7 MG/DL
CRYSTALS: ABNORMAL
DEVICE: ABNORMAL
DEVICE: ABNORMAL
EPITHELIAL CELLS, UA: ABNORMAL /HPF
ERYTHROCYTE [DISTWIDTH] IN BLOOD BY AUTOMATED COUNT: 14.6 % (ref 11.5–14.5)
ERYTHROCYTE [DISTWIDTH] IN BLOOD BY AUTOMATED COUNT: 44.5 FL (ref 35–45)
GFR SERPL CREATININE-BSD FRML MDRD: > 90 ML/MIN/1.73M2
GFR SERPL CREATININE-BSD FRML MDRD: > 90 ML/MIN/1.73M2
GLUCOSE BLD-MCNC: 140 MG/DL (ref 70–108)
GLUCOSE BLD-MCNC: 158 MG/DL (ref 70–108)
GLUCOSE BLD-MCNC: 165 MG/DL (ref 70–108)
GLUCOSE BLD-MCNC: 181 MG/DL (ref 70–108)
GLUCOSE BLD-MCNC: 196 MG/DL (ref 70–108)
GLUCOSE BLD-MCNC: 226 MG/DL (ref 70–108)
GLUCOSE, URINE: NEGATIVE MG/DL
HBA1C MFR BLD: 7.3 % (ref 4.4–6.4)
HCO3: 33 MMOL/L (ref 23–28)
HCO3: 36 MMOL/L (ref 23–28)
HCT VFR BLD CALC: 38.6 % (ref 42–52)
HEMOGLOBIN: 12.4 GM/DL (ref 14–18)
IFIO2: 35
IFIO2: 35
INR BLD: 3.45 (ref 0.85–1.13)
KETONES, URINE: ABNORMAL
LACTIC ACID, SEPSIS: 2.7 MMOL/L (ref 0.5–1.9)
LACTIC ACID, SEPSIS: 4.6 MMOL/L (ref 0.5–1.9)
LACTIC ACID, SEPSIS: 7.9 MMOL/L (ref 0.5–1.9)
LEUKOCYTE EST, POC: ABNORMAL
MAGNESIUM: 1.7 MG/DL (ref 1.6–2.4)
MCH RBC QN AUTO: 27.1 PG (ref 26–33)
MCHC RBC AUTO-ENTMCNC: 32.1 GM/DL (ref 32.2–35.5)
MCV RBC AUTO: 84.3 FL (ref 80–94)
MISCELLANEOUS LAB TEST RESULT: ABNORMAL
NITRITE, URINE: NEGATIVE
O2 SATURATION: 96 %
O2 SATURATION: 97 %
OSMOLALITY URINE: 488 MOSMOL/KG (ref 250–750)
PCO2: 52 MMHG (ref 35–45)
PCO2: 59 MMHG (ref 35–45)
PH BLOOD GAS: 7.39 (ref 7.35–7.45)
PH BLOOD GAS: 7.4 (ref 7.35–7.45)
PH UA: 5 (ref 5–9)
PHOSPHORUS: 3.4 MG/DL (ref 2.4–4.7)
PIP: 14 CMH2O
PLATELET # BLD: 217 THOU/MM3 (ref 130–400)
PMV BLD AUTO: 10.3 FL (ref 9.4–12.4)
PO2: 87 MMHG (ref 71–104)
PO2: 88 MMHG (ref 71–104)
POTASSIUM REFLEX MAGNESIUM: 3.7 MEQ/L (ref 3.5–5.2)
POTASSIUM SERPL-SCNC: 3.7 MEQ/L (ref 3.5–5.2)
PROCALCITONIN: 0.05 NG/ML (ref 0.01–0.09)
PROTEIN UA: 30 MG/DL
RBC # BLD: 4.58 MILL/MM3 (ref 4.7–6.1)
RBC URINE: > 200 /HPF
RENAL EPITHELIAL, UA: ABNORMAL
SET PEEP: 6 MMHG
SET PEEP: 6 MMHG
SET PRESS SUPP: 8 CMH2O
SODIUM BLD-SCNC: 141 MEQ/L (ref 135–145)
SODIUM BLD-SCNC: 142 MEQ/L (ref 135–145)
SOURCE, BLOOD GAS: ABNORMAL
SOURCE, BLOOD GAS: ABNORMAL
SPECIFIC GRAVITY UA: 1.02 (ref 1–1.03)
UROBILINOGEN, URINE: 0.2 EU/DL (ref 0–1)
WBC # BLD: 12 THOU/MM3 (ref 4.8–10.8)
WBC UA: ABNORMAL /HPF
YEAST: ABNORMAL

## 2019-03-15 PROCEDURE — 82948 REAGENT STRIP/BLOOD GLUCOSE: CPT

## 2019-03-15 PROCEDURE — 6370000000 HC RX 637 (ALT 250 FOR IP): Performed by: PHYSICIAN ASSISTANT

## 2019-03-15 PROCEDURE — 2709999900 HC NON-CHARGEABLE SUPPLY

## 2019-03-15 PROCEDURE — 84145 PROCALCITONIN (PCT): CPT

## 2019-03-15 PROCEDURE — 82803 BLOOD GASES ANY COMBINATION: CPT

## 2019-03-15 PROCEDURE — 36415 COLL VENOUS BLD VENIPUNCTURE: CPT

## 2019-03-15 PROCEDURE — 94640 AIRWAY INHALATION TREATMENT: CPT

## 2019-03-15 PROCEDURE — 80048 BASIC METABOLIC PNL TOTAL CA: CPT

## 2019-03-15 PROCEDURE — 6360000002 HC RX W HCPCS: Performed by: PHYSICIAN ASSISTANT

## 2019-03-15 PROCEDURE — 2580000003 HC RX 258: Performed by: PHYSICIAN ASSISTANT

## 2019-03-15 PROCEDURE — 94660 CPAP INITIATION&MGMT: CPT

## 2019-03-15 PROCEDURE — 6370000000 HC RX 637 (ALT 250 FOR IP): Performed by: INTERNAL MEDICINE

## 2019-03-15 PROCEDURE — 82570 ASSAY OF URINE CREATININE: CPT

## 2019-03-15 PROCEDURE — 1200000003 HC TELEMETRY R&B

## 2019-03-15 PROCEDURE — 99233 SBSQ HOSP IP/OBS HIGH 50: CPT | Performed by: INTERNAL MEDICINE

## 2019-03-15 PROCEDURE — 81001 URINALYSIS AUTO W/SCOPE: CPT

## 2019-03-15 PROCEDURE — 74018 RADEX ABDOMEN 1 VIEW: CPT

## 2019-03-15 PROCEDURE — 36600 WITHDRAWAL OF ARTERIAL BLOOD: CPT

## 2019-03-15 PROCEDURE — 6370000000 HC RX 637 (ALT 250 FOR IP): Performed by: FAMILY MEDICINE

## 2019-03-15 PROCEDURE — 2700000000 HC OXYGEN THERAPY PER DAY

## 2019-03-15 PROCEDURE — 85610 PROTHROMBIN TIME: CPT

## 2019-03-15 PROCEDURE — 83735 ASSAY OF MAGNESIUM: CPT

## 2019-03-15 PROCEDURE — 83935 ASSAY OF URINE OSMOLALITY: CPT

## 2019-03-15 PROCEDURE — 87106 FUNGI IDENTIFICATION YEAST: CPT

## 2019-03-15 PROCEDURE — 83605 ASSAY OF LACTIC ACID: CPT

## 2019-03-15 PROCEDURE — 94761 N-INVAS EAR/PLS OXIMETRY MLT: CPT

## 2019-03-15 PROCEDURE — 2500000003 HC RX 250 WO HCPCS: Performed by: FAMILY MEDICINE

## 2019-03-15 PROCEDURE — 99233 SBSQ HOSP IP/OBS HIGH 50: CPT | Performed by: FAMILY MEDICINE

## 2019-03-15 PROCEDURE — 87086 URINE CULTURE/COLONY COUNT: CPT

## 2019-03-15 PROCEDURE — 85027 COMPLETE CBC AUTOMATED: CPT

## 2019-03-15 RX ORDER — INSULIN GLARGINE 100 [IU]/ML
18 INJECTION, SOLUTION SUBCUTANEOUS NIGHTLY
Status: DISCONTINUED | OUTPATIENT
Start: 2019-03-15 | End: 2019-03-19 | Stop reason: HOSPADM

## 2019-03-15 RX ORDER — 0.9 % SODIUM CHLORIDE 0.9 %
500 INTRAVENOUS SOLUTION INTRAVENOUS ONCE
Status: COMPLETED | OUTPATIENT
Start: 2019-03-15 | End: 2019-03-15

## 2019-03-15 RX ORDER — SODIUM CHLORIDE 9 MG/ML
INJECTION, SOLUTION INTRAVENOUS CONTINUOUS
Status: DISCONTINUED | OUTPATIENT
Start: 2019-03-15 | End: 2019-03-16

## 2019-03-15 RX ORDER — 0.9 % SODIUM CHLORIDE 0.9 %
1000 INTRAVENOUS SOLUTION INTRAVENOUS ONCE
Status: DISCONTINUED | OUTPATIENT
Start: 2019-03-15 | End: 2019-03-19 | Stop reason: HOSPADM

## 2019-03-15 RX ORDER — FAMOTIDINE 20 MG/1
20 TABLET, FILM COATED ORAL 2 TIMES DAILY
Status: DISCONTINUED | OUTPATIENT
Start: 2019-03-15 | End: 2019-03-19

## 2019-03-15 RX ORDER — FUROSEMIDE 40 MG/1
40 TABLET ORAL 2 TIMES DAILY
Status: DISCONTINUED | OUTPATIENT
Start: 2019-03-15 | End: 2019-03-19 | Stop reason: HOSPADM

## 2019-03-15 RX ORDER — METHYLPREDNISOLONE SODIUM SUCCINATE 40 MG/ML
40 INJECTION, POWDER, LYOPHILIZED, FOR SOLUTION INTRAMUSCULAR; INTRAVENOUS EVERY 12 HOURS
Status: DISCONTINUED | OUTPATIENT
Start: 2019-03-16 | End: 2019-03-18

## 2019-03-15 RX ORDER — FUROSEMIDE 40 MG/1
40 TABLET ORAL DAILY
Status: DISCONTINUED | OUTPATIENT
Start: 2019-03-15 | End: 2019-03-15

## 2019-03-15 RX ORDER — CLOTRIMAZOLE 1 %
CREAM (GRAM) TOPICAL 2 TIMES DAILY
Status: DISCONTINUED | OUTPATIENT
Start: 2019-03-15 | End: 2019-03-15

## 2019-03-15 RX ORDER — LEVOTHYROXINE SODIUM 0.07 MG/1
75 TABLET ORAL DAILY
Status: DISCONTINUED | OUTPATIENT
Start: 2019-03-15 | End: 2019-03-19 | Stop reason: DRUGHIGH

## 2019-03-15 RX ORDER — METHYLPREDNISOLONE SODIUM SUCCINATE 40 MG/ML
40 INJECTION, POWDER, LYOPHILIZED, FOR SOLUTION INTRAMUSCULAR; INTRAVENOUS EVERY 8 HOURS
Status: DISCONTINUED | OUTPATIENT
Start: 2019-03-15 | End: 2019-03-15

## 2019-03-15 RX ORDER — IPRATROPIUM BROMIDE AND ALBUTEROL SULFATE 2.5; .5 MG/3ML; MG/3ML
1 SOLUTION RESPIRATORY (INHALATION)
Status: DISCONTINUED | OUTPATIENT
Start: 2019-03-15 | End: 2019-03-19

## 2019-03-15 RX ADMIN — CEFEPIME 2 G: 2 INJECTION, POWDER, FOR SOLUTION INTRAVENOUS at 12:30

## 2019-03-15 RX ADMIN — MICONAZOLE NITRATE: 2 POWDER TOPICAL at 21:01

## 2019-03-15 RX ADMIN — INSULIN GLARGINE 18 UNITS: 100 INJECTION, SOLUTION SUBCUTANEOUS at 21:01

## 2019-03-15 RX ADMIN — METOPROLOL TARTRATE 25 MG: 25 TABLET ORAL at 19:51

## 2019-03-15 RX ADMIN — DOXAZOSIN 4 MG: 4 TABLET ORAL at 19:52

## 2019-03-15 RX ADMIN — BUDESONIDE 250 MCG: 0.25 INHALANT RESPIRATORY (INHALATION) at 19:27

## 2019-03-15 RX ADMIN — PREDNISONE 40 MG: 20 TABLET ORAL at 08:51

## 2019-03-15 RX ADMIN — FERROUS SULFATE TAB 325 MG (65 MG ELEMENTAL FE) 325 MG: 325 (65 FE) TAB at 08:51

## 2019-03-15 RX ADMIN — LEVOTHYROXINE SODIUM 75 MCG: 75 TABLET ORAL at 06:44

## 2019-03-15 RX ADMIN — BUDESONIDE 250 MCG: 0.25 INHALANT RESPIRATORY (INHALATION) at 09:22

## 2019-03-15 RX ADMIN — ATORVASTATIN CALCIUM 80 MG: 80 TABLET, FILM COATED ORAL at 01:33

## 2019-03-15 RX ADMIN — FUROSEMIDE 40 MG: 40 TABLET ORAL at 21:01

## 2019-03-15 RX ADMIN — FORMOTEROL FUMARATE DIHYDRATE 20 MCG: 20 SOLUTION RESPIRATORY (INHALATION) at 19:27

## 2019-03-15 RX ADMIN — ATORVASTATIN CALCIUM 80 MG: 80 TABLET, FILM COATED ORAL at 19:51

## 2019-03-15 RX ADMIN — INSULIN LISPRO 2 UNITS: 100 INJECTION, SOLUTION INTRAVENOUS; SUBCUTANEOUS at 16:30

## 2019-03-15 RX ADMIN — INSULIN LISPRO 2 UNITS: 100 INJECTION, SOLUTION INTRAVENOUS; SUBCUTANEOUS at 00:58

## 2019-03-15 RX ADMIN — INSULIN LISPRO 2 UNITS: 100 INJECTION, SOLUTION INTRAVENOUS; SUBCUTANEOUS at 12:30

## 2019-03-15 RX ADMIN — Medication 10 ML: at 01:33

## 2019-03-15 RX ADMIN — DOCUSATE SODIUM 100 MG: 100 CAPSULE, LIQUID FILLED ORAL at 08:51

## 2019-03-15 RX ADMIN — DOCUSATE SODIUM 100 MG: 100 CAPSULE, LIQUID FILLED ORAL at 19:51

## 2019-03-15 RX ADMIN — FERROUS SULFATE TAB 325 MG (65 MG ELEMENTAL FE) 325 MG: 325 (65 FE) TAB at 16:30

## 2019-03-15 RX ADMIN — IPRATROPIUM BROMIDE AND ALBUTEROL SULFATE 1 AMPULE: .5; 3 SOLUTION RESPIRATORY (INHALATION) at 19:27

## 2019-03-15 RX ADMIN — METOPROLOL TARTRATE 25 MG: 25 TABLET ORAL at 08:50

## 2019-03-15 RX ADMIN — INSULIN LISPRO 2 UNITS: 100 INJECTION, SOLUTION INTRAVENOUS; SUBCUTANEOUS at 08:51

## 2019-03-15 RX ADMIN — DOXAZOSIN 4 MG: 4 TABLET ORAL at 01:33

## 2019-03-15 RX ADMIN — Medication 10 ML: at 19:52

## 2019-03-15 RX ADMIN — FAMOTIDINE 20 MG: 20 TABLET ORAL at 19:52

## 2019-03-15 RX ADMIN — SODIUM CHLORIDE 500 ML: 9 INJECTION, SOLUTION INTRAVENOUS at 01:32

## 2019-03-15 RX ADMIN — CEFEPIME 2 G: 2 INJECTION, POWDER, FOR SOLUTION INTRAVENOUS at 01:32

## 2019-03-15 RX ADMIN — DOCUSATE SODIUM 100 MG: 100 CAPSULE, LIQUID FILLED ORAL at 01:54

## 2019-03-15 RX ADMIN — SODIUM CHLORIDE: 9 INJECTION, SOLUTION INTRAVENOUS at 06:44

## 2019-03-15 RX ADMIN — ASPIRIN 81 MG: 81 TABLET, COATED ORAL at 08:50

## 2019-03-15 RX ADMIN — METOPROLOL TARTRATE 25 MG: 25 TABLET ORAL at 01:33

## 2019-03-15 RX ADMIN — FORMOTEROL FUMARATE DIHYDRATE 20 MCG: 20 SOLUTION RESPIRATORY (INHALATION) at 09:22

## 2019-03-15 ASSESSMENT — PAIN SCALES - GENERAL
PAINLEVEL_OUTOF10: 0

## 2019-03-16 ENCOUNTER — APPOINTMENT (OUTPATIENT)
Dept: GENERAL RADIOLOGY | Age: 84
DRG: 189 | End: 2019-03-16
Payer: MEDICARE

## 2019-03-16 ENCOUNTER — APPOINTMENT (OUTPATIENT)
Dept: CT IMAGING | Age: 84
DRG: 189 | End: 2019-03-16
Payer: MEDICARE

## 2019-03-16 LAB
ANION GAP SERPL CALCULATED.3IONS-SCNC: 11 MEQ/L (ref 8–16)
BUN BLDV-MCNC: 13 MG/DL (ref 7–22)
CALCIUM SERPL-MCNC: 8 MG/DL (ref 8.5–10.5)
CHLORIDE BLD-SCNC: 100 MEQ/L (ref 98–111)
CO2: 33 MEQ/L (ref 23–33)
CREAT SERPL-MCNC: 0.5 MG/DL (ref 0.4–1.2)
ERYTHROCYTE [DISTWIDTH] IN BLOOD BY AUTOMATED COUNT: 14.6 % (ref 11.5–14.5)
ERYTHROCYTE [DISTWIDTH] IN BLOOD BY AUTOMATED COUNT: 44.9 FL (ref 35–45)
GFR SERPL CREATININE-BSD FRML MDRD: > 90 ML/MIN/1.73M2
GLUCOSE BLD-MCNC: 130 MG/DL (ref 70–108)
GLUCOSE BLD-MCNC: 142 MG/DL (ref 70–108)
GLUCOSE BLD-MCNC: 142 MG/DL (ref 70–108)
GLUCOSE BLD-MCNC: 147 MG/DL (ref 70–108)
GLUCOSE BLD-MCNC: 211 MG/DL (ref 70–108)
HCT VFR BLD CALC: 38.3 % (ref 42–52)
HEMOGLOBIN: 12.6 GM/DL (ref 14–18)
INR BLD: 2.25 (ref 0.85–1.13)
LACTIC ACID: 1.5 MMOL/L (ref 0.5–2.2)
MAGNESIUM: 2 MG/DL (ref 1.6–2.4)
MCH RBC QN AUTO: 27.8 PG (ref 26–33)
MCHC RBC AUTO-ENTMCNC: 32.9 GM/DL (ref 32.2–35.5)
MCV RBC AUTO: 84.4 FL (ref 80–94)
PLATELET # BLD: 204 THOU/MM3 (ref 130–400)
PMV BLD AUTO: 9.7 FL (ref 9.4–12.4)
POTASSIUM SERPL-SCNC: 3.9 MEQ/L (ref 3.5–5.2)
RBC # BLD: 4.54 MILL/MM3 (ref 4.7–6.1)
SODIUM BLD-SCNC: 144 MEQ/L (ref 135–145)
WBC # BLD: 13.9 THOU/MM3 (ref 4.8–10.8)

## 2019-03-16 PROCEDURE — 83735 ASSAY OF MAGNESIUM: CPT

## 2019-03-16 PROCEDURE — 74177 CT ABD & PELVIS W/CONTRAST: CPT

## 2019-03-16 PROCEDURE — 6370000000 HC RX 637 (ALT 250 FOR IP): Performed by: PHARMACIST

## 2019-03-16 PROCEDURE — 6360000004 HC RX CONTRAST MEDICATION: Performed by: FAMILY MEDICINE

## 2019-03-16 PROCEDURE — 83605 ASSAY OF LACTIC ACID: CPT

## 2019-03-16 PROCEDURE — 6370000000 HC RX 637 (ALT 250 FOR IP): Performed by: INTERNAL MEDICINE

## 2019-03-16 PROCEDURE — 82948 REAGENT STRIP/BLOOD GLUCOSE: CPT

## 2019-03-16 PROCEDURE — 6370000000 HC RX 637 (ALT 250 FOR IP): Performed by: FAMILY MEDICINE

## 2019-03-16 PROCEDURE — 6370000000 HC RX 637 (ALT 250 FOR IP): Performed by: PHYSICIAN ASSISTANT

## 2019-03-16 PROCEDURE — 99232 SBSQ HOSP IP/OBS MODERATE 35: CPT | Performed by: INTERNAL MEDICINE

## 2019-03-16 PROCEDURE — 36415 COLL VENOUS BLD VENIPUNCTURE: CPT

## 2019-03-16 PROCEDURE — 2580000003 HC RX 258: Performed by: PHYSICIAN ASSISTANT

## 2019-03-16 PROCEDURE — 94660 CPAP INITIATION&MGMT: CPT

## 2019-03-16 PROCEDURE — 94760 N-INVAS EAR/PLS OXIMETRY 1: CPT

## 2019-03-16 PROCEDURE — 71046 X-RAY EXAM CHEST 2 VIEWS: CPT

## 2019-03-16 PROCEDURE — 85027 COMPLETE CBC AUTOMATED: CPT

## 2019-03-16 PROCEDURE — 85610 PROTHROMBIN TIME: CPT

## 2019-03-16 PROCEDURE — 99232 SBSQ HOSP IP/OBS MODERATE 35: CPT | Performed by: FAMILY MEDICINE

## 2019-03-16 PROCEDURE — 80048 BASIC METABOLIC PNL TOTAL CA: CPT

## 2019-03-16 PROCEDURE — 1200000003 HC TELEMETRY R&B

## 2019-03-16 PROCEDURE — 6360000002 HC RX W HCPCS: Performed by: INTERNAL MEDICINE

## 2019-03-16 PROCEDURE — 2709999900 HC NON-CHARGEABLE SUPPLY

## 2019-03-16 PROCEDURE — 99223 1ST HOSP IP/OBS HIGH 75: CPT | Performed by: INTERNAL MEDICINE

## 2019-03-16 PROCEDURE — 2700000000 HC OXYGEN THERAPY PER DAY

## 2019-03-16 PROCEDURE — 94640 AIRWAY INHALATION TREATMENT: CPT

## 2019-03-16 PROCEDURE — 6360000002 HC RX W HCPCS: Performed by: PHYSICIAN ASSISTANT

## 2019-03-16 RX ORDER — POTASSIUM CHLORIDE 20 MEQ/1
20 TABLET, EXTENDED RELEASE ORAL ONCE
Status: COMPLETED | OUTPATIENT
Start: 2019-03-16 | End: 2019-03-16

## 2019-03-16 RX ORDER — WARFARIN SODIUM 7.5 MG/1
7.5 TABLET ORAL
Status: COMPLETED | OUTPATIENT
Start: 2019-03-16 | End: 2019-03-16

## 2019-03-16 RX ADMIN — MICONAZOLE NITRATE: 2 POWDER TOPICAL at 09:10

## 2019-03-16 RX ADMIN — ASPIRIN 81 MG: 81 TABLET, COATED ORAL at 09:02

## 2019-03-16 RX ADMIN — INSULIN LISPRO 2 UNITS: 100 INJECTION, SOLUTION INTRAVENOUS; SUBCUTANEOUS at 17:18

## 2019-03-16 RX ADMIN — FERROUS SULFATE TAB 325 MG (65 MG ELEMENTAL FE) 325 MG: 325 (65 FE) TAB at 17:18

## 2019-03-16 RX ADMIN — METOPROLOL TARTRATE 25 MG: 25 TABLET ORAL at 09:01

## 2019-03-16 RX ADMIN — IPRATROPIUM BROMIDE AND ALBUTEROL SULFATE 1 AMPULE: .5; 3 SOLUTION RESPIRATORY (INHALATION) at 08:34

## 2019-03-16 RX ADMIN — IPRATROPIUM BROMIDE AND ALBUTEROL SULFATE 1 AMPULE: .5; 3 SOLUTION RESPIRATORY (INHALATION) at 13:27

## 2019-03-16 RX ADMIN — METHYLPREDNISOLONE SODIUM SUCCINATE 40 MG: 40 INJECTION, POWDER, FOR SOLUTION INTRAMUSCULAR; INTRAVENOUS at 06:10

## 2019-03-16 RX ADMIN — DOCUSATE SODIUM 100 MG: 100 CAPSULE, LIQUID FILLED ORAL at 09:02

## 2019-03-16 RX ADMIN — BUDESONIDE 250 MCG: 0.25 INHALANT RESPIRATORY (INHALATION) at 08:34

## 2019-03-16 RX ADMIN — ATORVASTATIN CALCIUM 80 MG: 80 TABLET, FILM COATED ORAL at 21:18

## 2019-03-16 RX ADMIN — IPRATROPIUM BROMIDE AND ALBUTEROL SULFATE 1 AMPULE: .5; 3 SOLUTION RESPIRATORY (INHALATION) at 22:16

## 2019-03-16 RX ADMIN — FUROSEMIDE 40 MG: 40 TABLET ORAL at 09:01

## 2019-03-16 RX ADMIN — CEFEPIME 2 G: 2 INJECTION, POWDER, FOR SOLUTION INTRAVENOUS at 12:55

## 2019-03-16 RX ADMIN — FAMOTIDINE 20 MG: 20 TABLET ORAL at 21:18

## 2019-03-16 RX ADMIN — METOPROLOL TARTRATE 25 MG: 25 TABLET ORAL at 21:18

## 2019-03-16 RX ADMIN — WARFARIN SODIUM 7.5 MG: 7.5 TABLET ORAL at 18:37

## 2019-03-16 RX ADMIN — INSULIN GLARGINE 18 UNITS: 100 INJECTION, SOLUTION SUBCUTANEOUS at 21:18

## 2019-03-16 RX ADMIN — DOCUSATE SODIUM 100 MG: 100 CAPSULE, LIQUID FILLED ORAL at 21:18

## 2019-03-16 RX ADMIN — DOXAZOSIN 4 MG: 4 TABLET ORAL at 21:18

## 2019-03-16 RX ADMIN — FUROSEMIDE 40 MG: 40 TABLET ORAL at 17:18

## 2019-03-16 RX ADMIN — POTASSIUM CHLORIDE 20 MEQ: 20 TABLET, EXTENDED RELEASE ORAL at 04:14

## 2019-03-16 RX ADMIN — LEVOTHYROXINE SODIUM 75 MCG: 75 TABLET ORAL at 06:10

## 2019-03-16 RX ADMIN — FORMOTEROL FUMARATE DIHYDRATE 20 MCG: 20 SOLUTION RESPIRATORY (INHALATION) at 08:34

## 2019-03-16 RX ADMIN — IOPAMIDOL 80 ML: 755 INJECTION, SOLUTION INTRAVENOUS at 16:47

## 2019-03-16 RX ADMIN — MICONAZOLE NITRATE: 2 POWDER TOPICAL at 21:18

## 2019-03-16 RX ADMIN — BUDESONIDE 250 MCG: 0.25 INHALANT RESPIRATORY (INHALATION) at 22:29

## 2019-03-16 RX ADMIN — METHYLPREDNISOLONE SODIUM SUCCINATE 40 MG: 40 INJECTION, POWDER, FOR SOLUTION INTRAMUSCULAR; INTRAVENOUS at 18:37

## 2019-03-16 RX ADMIN — Medication 10 ML: at 09:10

## 2019-03-16 RX ADMIN — INSULIN LISPRO 4 UNITS: 100 INJECTION, SOLUTION INTRAVENOUS; SUBCUTANEOUS at 12:02

## 2019-03-16 RX ADMIN — FORMOTEROL FUMARATE DIHYDRATE 20 MCG: 20 SOLUTION RESPIRATORY (INHALATION) at 22:23

## 2019-03-16 RX ADMIN — ONDANSETRON 4 MG: 2 INJECTION INTRAMUSCULAR; INTRAVENOUS at 04:14

## 2019-03-16 RX ADMIN — FERROUS SULFATE TAB 325 MG (65 MG ELEMENTAL FE) 325 MG: 325 (65 FE) TAB at 09:02

## 2019-03-16 RX ADMIN — CEFEPIME 2 G: 2 INJECTION, POWDER, FOR SOLUTION INTRAVENOUS at 01:57

## 2019-03-16 RX ADMIN — FAMOTIDINE 20 MG: 20 TABLET ORAL at 09:02

## 2019-03-16 ASSESSMENT — PAIN SCALES - GENERAL
PAINLEVEL_OUTOF10: 0

## 2019-03-16 ASSESSMENT — ENCOUNTER SYMPTOMS
CONSTIPATION: 0
COLOR CHANGE: 0

## 2019-03-17 LAB
ANION GAP SERPL CALCULATED.3IONS-SCNC: 11 MEQ/L (ref 8–16)
BUN BLDV-MCNC: 15 MG/DL (ref 7–22)
CALCIUM SERPL-MCNC: 8.3 MG/DL (ref 8.5–10.5)
CHLORIDE BLD-SCNC: 95 MEQ/L (ref 98–111)
CO2: 38 MEQ/L (ref 23–33)
CREAT SERPL-MCNC: 0.7 MG/DL (ref 0.4–1.2)
EKG ATRIAL RATE: 278 BPM
EKG Q-T INTERVAL: 320 MS
EKG QRS DURATION: 86 MS
EKG QTC CALCULATION (BAZETT): 450 MS
EKG R AXIS: 73 DEGREES
EKG T AXIS: 49 DEGREES
EKG VENTRICULAR RATE: 119 BPM
ERYTHROCYTE [DISTWIDTH] IN BLOOD BY AUTOMATED COUNT: 14.5 % (ref 11.5–14.5)
ERYTHROCYTE [DISTWIDTH] IN BLOOD BY AUTOMATED COUNT: 44.1 FL (ref 35–45)
GFR SERPL CREATININE-BSD FRML MDRD: > 90 ML/MIN/1.73M2
GLUCOSE BLD-MCNC: 162 MG/DL (ref 70–108)
GLUCOSE BLD-MCNC: 177 MG/DL (ref 70–108)
GLUCOSE BLD-MCNC: 189 MG/DL (ref 70–108)
GLUCOSE BLD-MCNC: 201 MG/DL (ref 70–108)
GLUCOSE BLD-MCNC: 281 MG/DL (ref 70–108)
HCT VFR BLD CALC: 43.9 % (ref 42–52)
HEMOGLOBIN: 14.2 GM/DL (ref 14–18)
INR BLD: 1.8 (ref 0.85–1.13)
MAGNESIUM: 2.1 MG/DL (ref 1.6–2.4)
MCH RBC QN AUTO: 27.3 PG (ref 26–33)
MCHC RBC AUTO-ENTMCNC: 32.3 GM/DL (ref 32.2–35.5)
MCV RBC AUTO: 84.3 FL (ref 80–94)
PLATELET # BLD: 206 THOU/MM3 (ref 130–400)
PMV BLD AUTO: 9.6 FL (ref 9.4–12.4)
POTASSIUM SERPL-SCNC: 3.7 MEQ/L (ref 3.5–5.2)
RBC # BLD: 5.21 MILL/MM3 (ref 4.7–6.1)
SODIUM BLD-SCNC: 144 MEQ/L (ref 135–145)
WBC # BLD: 11 THOU/MM3 (ref 4.8–10.8)

## 2019-03-17 PROCEDURE — 2700000000 HC OXYGEN THERAPY PER DAY

## 2019-03-17 PROCEDURE — 97535 SELF CARE MNGMENT TRAINING: CPT

## 2019-03-17 PROCEDURE — 99232 SBSQ HOSP IP/OBS MODERATE 35: CPT | Performed by: FAMILY MEDICINE

## 2019-03-17 PROCEDURE — 99232 SBSQ HOSP IP/OBS MODERATE 35: CPT | Performed by: INTERNAL MEDICINE

## 2019-03-17 PROCEDURE — 6370000000 HC RX 637 (ALT 250 FOR IP): Performed by: FAMILY MEDICINE

## 2019-03-17 PROCEDURE — 6360000002 HC RX W HCPCS: Performed by: INTERNAL MEDICINE

## 2019-03-17 PROCEDURE — 2580000003 HC RX 258: Performed by: PHYSICIAN ASSISTANT

## 2019-03-17 PROCEDURE — 94640 AIRWAY INHALATION TREATMENT: CPT

## 2019-03-17 PROCEDURE — 85610 PROTHROMBIN TIME: CPT

## 2019-03-17 PROCEDURE — 6360000002 HC RX W HCPCS: Performed by: PHYSICIAN ASSISTANT

## 2019-03-17 PROCEDURE — 1200000003 HC TELEMETRY R&B

## 2019-03-17 PROCEDURE — 83735 ASSAY OF MAGNESIUM: CPT

## 2019-03-17 PROCEDURE — 6370000000 HC RX 637 (ALT 250 FOR IP): Performed by: PHARMACIST

## 2019-03-17 PROCEDURE — 2709999900 HC NON-CHARGEABLE SUPPLY

## 2019-03-17 PROCEDURE — 36415 COLL VENOUS BLD VENIPUNCTURE: CPT

## 2019-03-17 PROCEDURE — 94660 CPAP INITIATION&MGMT: CPT

## 2019-03-17 PROCEDURE — 6370000000 HC RX 637 (ALT 250 FOR IP): Performed by: PHYSICIAN ASSISTANT

## 2019-03-17 PROCEDURE — 99232 SBSQ HOSP IP/OBS MODERATE 35: CPT | Performed by: NURSE PRACTITIONER

## 2019-03-17 PROCEDURE — 6370000000 HC RX 637 (ALT 250 FOR IP): Performed by: INTERNAL MEDICINE

## 2019-03-17 PROCEDURE — 80048 BASIC METABOLIC PNL TOTAL CA: CPT

## 2019-03-17 PROCEDURE — 93005 ELECTROCARDIOGRAM TRACING: CPT | Performed by: NURSE PRACTITIONER

## 2019-03-17 PROCEDURE — 6370000000 HC RX 637 (ALT 250 FOR IP): Performed by: NURSE PRACTITIONER

## 2019-03-17 PROCEDURE — 82948 REAGENT STRIP/BLOOD GLUCOSE: CPT

## 2019-03-17 PROCEDURE — 94760 N-INVAS EAR/PLS OXIMETRY 1: CPT

## 2019-03-17 PROCEDURE — 97165 OT EVAL LOW COMPLEX 30 MIN: CPT

## 2019-03-17 PROCEDURE — 85027 COMPLETE CBC AUTOMATED: CPT

## 2019-03-17 PROCEDURE — 93010 ELECTROCARDIOGRAM REPORT: CPT | Performed by: INTERNAL MEDICINE

## 2019-03-17 RX ORDER — WARFARIN SODIUM 7.5 MG/1
7.5 TABLET ORAL
Status: COMPLETED | OUTPATIENT
Start: 2019-03-17 | End: 2019-03-17

## 2019-03-17 RX ORDER — POTASSIUM CHLORIDE 20 MEQ/1
20 TABLET, EXTENDED RELEASE ORAL ONCE
Status: COMPLETED | OUTPATIENT
Start: 2019-03-17 | End: 2019-03-17

## 2019-03-17 RX ORDER — CODEINE PHOSPHATE AND GUAIFENESIN 10; 100 MG/5ML; MG/5ML
5 SOLUTION ORAL EVERY 4 HOURS PRN
Status: DISCONTINUED | OUTPATIENT
Start: 2019-03-17 | End: 2019-03-19

## 2019-03-17 RX ORDER — VERAPAMIL HYDROCHLORIDE 120 MG/1
120 TABLET, FILM COATED ORAL DAILY
Status: DISCONTINUED | OUTPATIENT
Start: 2019-03-17 | End: 2019-03-18

## 2019-03-17 RX ADMIN — GUAIFENESIN AND CODEINE PHOSPHATE 5 ML: 10; 100 LIQUID ORAL at 11:25

## 2019-03-17 RX ADMIN — METOPROLOL TARTRATE 25 MG: 25 TABLET ORAL at 10:09

## 2019-03-17 RX ADMIN — MICONAZOLE NITRATE: 2 POWDER TOPICAL at 20:21

## 2019-03-17 RX ADMIN — LEVOTHYROXINE SODIUM 75 MCG: 75 TABLET ORAL at 05:28

## 2019-03-17 RX ADMIN — MAGNESIUM HYDROXIDE 30 ML: 400 SUSPENSION ORAL at 20:19

## 2019-03-17 RX ADMIN — IPRATROPIUM BROMIDE AND ALBUTEROL SULFATE 1 AMPULE: .5; 3 SOLUTION RESPIRATORY (INHALATION) at 21:33

## 2019-03-17 RX ADMIN — FUROSEMIDE 40 MG: 40 TABLET ORAL at 10:08

## 2019-03-17 RX ADMIN — VERAPAMIL HYDROCHLORIDE 120 MG: 120 TABLET, FILM COATED ORAL at 12:45

## 2019-03-17 RX ADMIN — POTASSIUM CHLORIDE 20 MEQ: 20 TABLET, EXTENDED RELEASE ORAL at 10:08

## 2019-03-17 RX ADMIN — DOCUSATE SODIUM 100 MG: 100 CAPSULE, LIQUID FILLED ORAL at 10:10

## 2019-03-17 RX ADMIN — METHYLPREDNISOLONE SODIUM SUCCINATE 40 MG: 40 INJECTION, POWDER, FOR SOLUTION INTRAMUSCULAR; INTRAVENOUS at 05:28

## 2019-03-17 RX ADMIN — MICONAZOLE NITRATE: 2 POWDER TOPICAL at 10:11

## 2019-03-17 RX ADMIN — CEFEPIME 2 G: 2 INJECTION, POWDER, FOR SOLUTION INTRAVENOUS at 12:45

## 2019-03-17 RX ADMIN — Medication 10 ML: at 20:20

## 2019-03-17 RX ADMIN — INSULIN LISPRO 3 UNITS: 100 INJECTION, SOLUTION INTRAVENOUS; SUBCUTANEOUS at 20:21

## 2019-03-17 RX ADMIN — METOPROLOL TARTRATE 25 MG: 25 TABLET ORAL at 20:19

## 2019-03-17 RX ADMIN — BUDESONIDE 250 MCG: 0.25 INHALANT RESPIRATORY (INHALATION) at 21:42

## 2019-03-17 RX ADMIN — WARFARIN SODIUM 7.5 MG: 7.5 TABLET ORAL at 17:02

## 2019-03-17 RX ADMIN — METHYLPREDNISOLONE SODIUM SUCCINATE 40 MG: 40 INJECTION, POWDER, FOR SOLUTION INTRAMUSCULAR; INTRAVENOUS at 17:02

## 2019-03-17 RX ADMIN — FAMOTIDINE 20 MG: 20 TABLET ORAL at 20:19

## 2019-03-17 RX ADMIN — CEFEPIME 2 G: 2 INJECTION, POWDER, FOR SOLUTION INTRAVENOUS at 00:45

## 2019-03-17 RX ADMIN — ASPIRIN 81 MG: 81 TABLET, COATED ORAL at 10:09

## 2019-03-17 RX ADMIN — DOCUSATE SODIUM 100 MG: 100 CAPSULE, LIQUID FILLED ORAL at 20:19

## 2019-03-17 RX ADMIN — BUDESONIDE 250 MCG: 0.25 INHALANT RESPIRATORY (INHALATION) at 09:10

## 2019-03-17 RX ADMIN — IPRATROPIUM BROMIDE AND ALBUTEROL SULFATE 1 AMPULE: .5; 3 SOLUTION RESPIRATORY (INHALATION) at 12:33

## 2019-03-17 RX ADMIN — INSULIN LISPRO 2 UNITS: 100 INJECTION, SOLUTION INTRAVENOUS; SUBCUTANEOUS at 10:08

## 2019-03-17 RX ADMIN — INSULIN LISPRO 4 UNITS: 100 INJECTION, SOLUTION INTRAVENOUS; SUBCUTANEOUS at 12:45

## 2019-03-17 RX ADMIN — Medication 10 ML: at 00:45

## 2019-03-17 RX ADMIN — IPRATROPIUM BROMIDE AND ALBUTEROL SULFATE 1 AMPULE: .5; 3 SOLUTION RESPIRATORY (INHALATION) at 09:10

## 2019-03-17 RX ADMIN — FAMOTIDINE 20 MG: 20 TABLET ORAL at 10:09

## 2019-03-17 RX ADMIN — ATORVASTATIN CALCIUM 80 MG: 80 TABLET, FILM COATED ORAL at 20:19

## 2019-03-17 RX ADMIN — FORMOTEROL FUMARATE DIHYDRATE 20 MCG: 20 SOLUTION RESPIRATORY (INHALATION) at 09:10

## 2019-03-17 RX ADMIN — FUROSEMIDE 40 MG: 40 TABLET ORAL at 17:02

## 2019-03-17 RX ADMIN — INSULIN LISPRO 2 UNITS: 100 INJECTION, SOLUTION INTRAVENOUS; SUBCUTANEOUS at 17:02

## 2019-03-17 RX ADMIN — INSULIN GLARGINE 18 UNITS: 100 INJECTION, SOLUTION SUBCUTANEOUS at 22:33

## 2019-03-17 RX ADMIN — DOXAZOSIN 4 MG: 4 TABLET ORAL at 20:19

## 2019-03-17 RX ADMIN — FERROUS SULFATE TAB 325 MG (65 MG ELEMENTAL FE) 325 MG: 325 (65 FE) TAB at 17:02

## 2019-03-17 RX ADMIN — FERROUS SULFATE TAB 325 MG (65 MG ELEMENTAL FE) 325 MG: 325 (65 FE) TAB at 10:08

## 2019-03-17 RX ADMIN — IPRATROPIUM BROMIDE AND ALBUTEROL SULFATE 1 AMPULE: .5; 3 SOLUTION RESPIRATORY (INHALATION) at 17:32

## 2019-03-17 RX ADMIN — Medication 10 ML: at 10:07

## 2019-03-17 ASSESSMENT — PAIN SCALES - GENERAL
PAINLEVEL_OUTOF10: 0
PAINLEVEL_OUTOF10: 0

## 2019-03-18 LAB
ANION GAP SERPL CALCULATED.3IONS-SCNC: 9 MEQ/L (ref 8–16)
BUN BLDV-MCNC: 17 MG/DL (ref 7–22)
CALCIUM SERPL-MCNC: 8 MG/DL (ref 8.5–10.5)
CHLORIDE BLD-SCNC: 95 MEQ/L (ref 98–111)
CO2: 37 MEQ/L (ref 23–33)
CREAT SERPL-MCNC: 0.7 MG/DL (ref 0.4–1.2)
ERYTHROCYTE [DISTWIDTH] IN BLOOD BY AUTOMATED COUNT: 14.1 % (ref 11.5–14.5)
ERYTHROCYTE [DISTWIDTH] IN BLOOD BY AUTOMATED COUNT: 42.5 FL (ref 35–45)
GFR SERPL CREATININE-BSD FRML MDRD: > 90 ML/MIN/1.73M2
GLUCOSE BLD-MCNC: 175 MG/DL (ref 70–108)
GLUCOSE BLD-MCNC: 178 MG/DL (ref 70–108)
GLUCOSE BLD-MCNC: 187 MG/DL (ref 70–108)
GLUCOSE BLD-MCNC: 204 MG/DL (ref 70–108)
GLUCOSE BLD-MCNC: 235 MG/DL (ref 70–108)
HCT VFR BLD CALC: 42.6 % (ref 42–52)
HEMOGLOBIN: 14.1 GM/DL (ref 14–18)
INR BLD: 2.1 (ref 0.85–1.13)
LV EF: 55 %
LVEF MODALITY: NORMAL
MAGNESIUM: 2.4 MG/DL (ref 1.6–2.4)
MCH RBC QN AUTO: 27.6 PG (ref 26–33)
MCHC RBC AUTO-ENTMCNC: 33.1 GM/DL (ref 32.2–35.5)
MCV RBC AUTO: 83.4 FL (ref 80–94)
PLATELET # BLD: 223 THOU/MM3 (ref 130–400)
PMV BLD AUTO: 9.9 FL (ref 9.4–12.4)
POTASSIUM SERPL-SCNC: 3.5 MEQ/L (ref 3.5–5.2)
RBC # BLD: 5.11 MILL/MM3 (ref 4.7–6.1)
SODIUM BLD-SCNC: 141 MEQ/L (ref 135–145)
WBC # BLD: 12.5 THOU/MM3 (ref 4.8–10.8)

## 2019-03-18 PROCEDURE — 6370000000 HC RX 637 (ALT 250 FOR IP): Performed by: NURSE PRACTITIONER

## 2019-03-18 PROCEDURE — 80048 BASIC METABOLIC PNL TOTAL CA: CPT

## 2019-03-18 PROCEDURE — 99232 SBSQ HOSP IP/OBS MODERATE 35: CPT | Performed by: INTERNAL MEDICINE

## 2019-03-18 PROCEDURE — 94761 N-INVAS EAR/PLS OXIMETRY MLT: CPT

## 2019-03-18 PROCEDURE — 94640 AIRWAY INHALATION TREATMENT: CPT

## 2019-03-18 PROCEDURE — 6370000000 HC RX 637 (ALT 250 FOR IP): Performed by: INTERNAL MEDICINE

## 2019-03-18 PROCEDURE — 6370000000 HC RX 637 (ALT 250 FOR IP): Performed by: PHARMACIST

## 2019-03-18 PROCEDURE — 2580000003 HC RX 258: Performed by: INTERNAL MEDICINE

## 2019-03-18 PROCEDURE — 6370000000 HC RX 637 (ALT 250 FOR IP): Performed by: PHYSICIAN ASSISTANT

## 2019-03-18 PROCEDURE — 6360000002 HC RX W HCPCS: Performed by: PHYSICIAN ASSISTANT

## 2019-03-18 PROCEDURE — 82948 REAGENT STRIP/BLOOD GLUCOSE: CPT

## 2019-03-18 PROCEDURE — 94660 CPAP INITIATION&MGMT: CPT

## 2019-03-18 PROCEDURE — 36415 COLL VENOUS BLD VENIPUNCTURE: CPT

## 2019-03-18 PROCEDURE — 1200000003 HC TELEMETRY R&B

## 2019-03-18 PROCEDURE — 99232 SBSQ HOSP IP/OBS MODERATE 35: CPT | Performed by: NURSE PRACTITIONER

## 2019-03-18 PROCEDURE — 93307 TTE W/O DOPPLER COMPLETE: CPT

## 2019-03-18 PROCEDURE — 2709999900 HC NON-CHARGEABLE SUPPLY

## 2019-03-18 PROCEDURE — 6360000002 HC RX W HCPCS

## 2019-03-18 PROCEDURE — 6370000000 HC RX 637 (ALT 250 FOR IP): Performed by: FAMILY MEDICINE

## 2019-03-18 PROCEDURE — 97110 THERAPEUTIC EXERCISES: CPT

## 2019-03-18 PROCEDURE — 2580000003 HC RX 258: Performed by: PHYSICIAN ASSISTANT

## 2019-03-18 PROCEDURE — 83735 ASSAY OF MAGNESIUM: CPT

## 2019-03-18 PROCEDURE — 85027 COMPLETE CBC AUTOMATED: CPT

## 2019-03-18 PROCEDURE — 2700000000 HC OXYGEN THERAPY PER DAY

## 2019-03-18 PROCEDURE — APPSS30 APP SPLIT SHARED TIME 16-30 MINUTES: Performed by: NURSE PRACTITIONER

## 2019-03-18 PROCEDURE — 97162 PT EVAL MOD COMPLEX 30 MIN: CPT

## 2019-03-18 PROCEDURE — 85610 PROTHROMBIN TIME: CPT

## 2019-03-18 PROCEDURE — 6360000002 HC RX W HCPCS: Performed by: INTERNAL MEDICINE

## 2019-03-18 RX ORDER — ACETAMINOPHEN 325 MG/1
650 TABLET ORAL EVERY 4 HOURS PRN
Status: CANCELLED | OUTPATIENT
Start: 2019-03-18

## 2019-03-18 RX ORDER — ONDANSETRON 2 MG/ML
4 INJECTION INTRAMUSCULAR; INTRAVENOUS EVERY 6 HOURS PRN
Status: CANCELLED | OUTPATIENT
Start: 2019-03-18

## 2019-03-18 RX ORDER — IPRATROPIUM BROMIDE AND ALBUTEROL SULFATE 2.5; .5 MG/3ML; MG/3ML
1 SOLUTION RESPIRATORY (INHALATION)
Status: CANCELLED | OUTPATIENT
Start: 2019-03-18

## 2019-03-18 RX ORDER — GUAIFENESIN 600 MG/1
600 TABLET, EXTENDED RELEASE ORAL 2 TIMES DAILY
Status: CANCELLED | OUTPATIENT
Start: 2019-03-18

## 2019-03-18 RX ORDER — ZIPRASIDONE MESYLATE 20 MG/ML
INJECTION, POWDER, LYOPHILIZED, FOR SOLUTION INTRAMUSCULAR
Status: COMPLETED
Start: 2019-03-18 | End: 2019-03-18

## 2019-03-18 RX ORDER — BUDESONIDE 0.25 MG/2ML
250 INHALANT ORAL 2 TIMES DAILY
Status: CANCELLED | OUTPATIENT
Start: 2019-03-18

## 2019-03-18 RX ORDER — FUROSEMIDE 40 MG/1
40 TABLET ORAL 2 TIMES DAILY
Status: CANCELLED | OUTPATIENT
Start: 2019-03-18

## 2019-03-18 RX ORDER — SODIUM CHLORIDE 0.9 % (FLUSH) 0.9 %
10 SYRINGE (ML) INJECTION PRN
Status: CANCELLED | OUTPATIENT
Start: 2019-03-18

## 2019-03-18 RX ORDER — LACTULOSE 10 G/15ML
20 SOLUTION ORAL 3 TIMES DAILY
Status: CANCELLED | OUTPATIENT
Start: 2019-03-18

## 2019-03-18 RX ORDER — DEXTROSE MONOHYDRATE 50 MG/ML
100 INJECTION, SOLUTION INTRAVENOUS PRN
Status: CANCELLED | OUTPATIENT
Start: 2019-03-18

## 2019-03-18 RX ORDER — BISACODYL 10 MG
10 SUPPOSITORY, RECTAL RECTAL DAILY PRN
Status: CANCELLED | OUTPATIENT
Start: 2019-03-18

## 2019-03-18 RX ORDER — PREDNISONE 20 MG/1
40 TABLET ORAL DAILY
Status: DISCONTINUED | OUTPATIENT
Start: 2019-03-19 | End: 2019-03-19 | Stop reason: HOSPADM

## 2019-03-18 RX ORDER — POLYETHYLENE GLYCOL 3350 17 G/17G
17 POWDER, FOR SOLUTION ORAL DAILY
Status: CANCELLED | OUTPATIENT
Start: 2019-03-19

## 2019-03-18 RX ORDER — ATORVASTATIN CALCIUM 80 MG/1
80 TABLET, FILM COATED ORAL NIGHTLY
Status: CANCELLED | OUTPATIENT
Start: 2019-03-18

## 2019-03-18 RX ORDER — LEVOTHYROXINE SODIUM 0.07 MG/1
75 TABLET ORAL DAILY
Status: CANCELLED | OUTPATIENT
Start: 2019-03-19

## 2019-03-18 RX ORDER — LANOLIN ALCOHOL/MO/W.PET/CERES
9 CREAM (GRAM) TOPICAL NIGHTLY
Status: DISCONTINUED | OUTPATIENT
Start: 2019-03-18 | End: 2019-03-19 | Stop reason: HOSPADM

## 2019-03-18 RX ORDER — ASPIRIN 81 MG/1
81 TABLET ORAL DAILY
Status: CANCELLED | OUTPATIENT
Start: 2019-03-19

## 2019-03-18 RX ORDER — INSULIN GLARGINE 100 [IU]/ML
18 INJECTION, SOLUTION SUBCUTANEOUS NIGHTLY
Status: CANCELLED | OUTPATIENT
Start: 2019-03-18

## 2019-03-18 RX ORDER — LANOLIN ALCOHOL/MO/W.PET/CERES
9 CREAM (GRAM) TOPICAL NIGHTLY
Status: CANCELLED | OUTPATIENT
Start: 2019-03-18

## 2019-03-18 RX ORDER — PREDNISONE 20 MG/1
40 TABLET ORAL DAILY
Status: CANCELLED | OUTPATIENT
Start: 2019-03-19

## 2019-03-18 RX ORDER — ZIPRASIDONE MESYLATE 20 MG/ML
10 INJECTION, POWDER, LYOPHILIZED, FOR SOLUTION INTRAMUSCULAR ONCE
Status: COMPLETED | OUTPATIENT
Start: 2019-03-18 | End: 2019-03-18

## 2019-03-18 RX ORDER — DOXAZOSIN MESYLATE 4 MG/1
4 TABLET ORAL NIGHTLY
Status: CANCELLED | OUTPATIENT
Start: 2019-03-18

## 2019-03-18 RX ORDER — GUAIFENESIN 600 MG/1
600 TABLET, EXTENDED RELEASE ORAL 2 TIMES DAILY
Status: DISCONTINUED | OUTPATIENT
Start: 2019-03-18 | End: 2019-03-19 | Stop reason: HOSPADM

## 2019-03-18 RX ORDER — WARFARIN SODIUM 7.5 MG/1
7.5 TABLET ORAL ONCE
Status: COMPLETED | OUTPATIENT
Start: 2019-03-18 | End: 2019-03-18

## 2019-03-18 RX ORDER — NICOTINE POLACRILEX 4 MG
15 LOZENGE BUCCAL PRN
Status: CANCELLED | OUTPATIENT
Start: 2019-03-18

## 2019-03-18 RX ORDER — BISACODYL 10 MG
10 SUPPOSITORY, RECTAL RECTAL DAILY PRN
Status: DISCONTINUED | OUTPATIENT
Start: 2019-03-18 | End: 2019-03-19 | Stop reason: HOSPADM

## 2019-03-18 RX ORDER — POLYETHYLENE GLYCOL 3350 17 G/17G
17 POWDER, FOR SOLUTION ORAL DAILY
Status: DISCONTINUED | OUTPATIENT
Start: 2019-03-18 | End: 2019-03-19 | Stop reason: HOSPADM

## 2019-03-18 RX ORDER — LACTULOSE 10 G/15ML
20 SOLUTION ORAL 3 TIMES DAILY
Status: DISCONTINUED | OUTPATIENT
Start: 2019-03-18 | End: 2019-03-19

## 2019-03-18 RX ORDER — SODIUM CHLORIDE 0.9 % (FLUSH) 0.9 %
10 SYRINGE (ML) INJECTION EVERY 12 HOURS SCHEDULED
Status: CANCELLED | OUTPATIENT
Start: 2019-03-18

## 2019-03-18 RX ORDER — ALBUTEROL SULFATE 90 UG/1
2 AEROSOL, METERED RESPIRATORY (INHALATION) EVERY 6 HOURS PRN
Status: CANCELLED | OUTPATIENT
Start: 2019-03-18

## 2019-03-18 RX ORDER — FAMOTIDINE 20 MG/1
20 TABLET, FILM COATED ORAL 2 TIMES DAILY
Status: CANCELLED | OUTPATIENT
Start: 2019-03-18

## 2019-03-18 RX ORDER — CODEINE PHOSPHATE AND GUAIFENESIN 10; 100 MG/5ML; MG/5ML
5 SOLUTION ORAL EVERY 4 HOURS PRN
Status: CANCELLED | OUTPATIENT
Start: 2019-03-18

## 2019-03-18 RX ORDER — DEXTROSE MONOHYDRATE 25 G/50ML
12.5 INJECTION, SOLUTION INTRAVENOUS PRN
Status: CANCELLED | OUTPATIENT
Start: 2019-03-18

## 2019-03-18 RX ADMIN — FUROSEMIDE 40 MG: 40 TABLET ORAL at 07:52

## 2019-03-18 RX ADMIN — BISACODYL 10 MG: 10 SUPPOSITORY RECTAL at 15:18

## 2019-03-18 RX ADMIN — INSULIN LISPRO 2 UNITS: 100 INJECTION, SOLUTION INTRAVENOUS; SUBCUTANEOUS at 16:58

## 2019-03-18 RX ADMIN — MAGNESIUM CITRATE 296 ML: 1.75 LIQUID ORAL at 12:52

## 2019-03-18 RX ADMIN — METOPROLOL TARTRATE 25 MG: 25 TABLET ORAL at 20:12

## 2019-03-18 RX ADMIN — IPRATROPIUM BROMIDE AND ALBUTEROL SULFATE 1 AMPULE: .5; 3 SOLUTION RESPIRATORY (INHALATION) at 16:55

## 2019-03-18 RX ADMIN — WARFARIN SODIUM 7.5 MG: 7.5 TABLET ORAL at 16:58

## 2019-03-18 RX ADMIN — ZIPRASIDONE MESYLATE 10 MG: 20 INJECTION, POWDER, LYOPHILIZED, FOR SOLUTION INTRAMUSCULAR at 21:09

## 2019-03-18 RX ADMIN — DOCUSATE SODIUM 100 MG: 100 CAPSULE, LIQUID FILLED ORAL at 07:52

## 2019-03-18 RX ADMIN — VERAPAMIL HYDROCHLORIDE 120 MG: 120 TABLET, FILM COATED ORAL at 07:52

## 2019-03-18 RX ADMIN — MICONAZOLE NITRATE: 2 POWDER TOPICAL at 20:25

## 2019-03-18 RX ADMIN — IPRATROPIUM BROMIDE AND ALBUTEROL SULFATE 1 AMPULE: .5; 3 SOLUTION RESPIRATORY (INHALATION) at 07:57

## 2019-03-18 RX ADMIN — INSULIN LISPRO 2 UNITS: 100 INJECTION, SOLUTION INTRAVENOUS; SUBCUTANEOUS at 07:53

## 2019-03-18 RX ADMIN — INSULIN LISPRO 1 UNITS: 100 INJECTION, SOLUTION INTRAVENOUS; SUBCUTANEOUS at 20:13

## 2019-03-18 RX ADMIN — DOXAZOSIN 4 MG: 4 TABLET ORAL at 20:12

## 2019-03-18 RX ADMIN — ATORVASTATIN CALCIUM 80 MG: 80 TABLET, FILM COATED ORAL at 20:11

## 2019-03-18 RX ADMIN — FAMOTIDINE 20 MG: 20 TABLET ORAL at 21:13

## 2019-03-18 RX ADMIN — INSULIN GLARGINE 18 UNITS: 100 INJECTION, SOLUTION SUBCUTANEOUS at 21:15

## 2019-03-18 RX ADMIN — BUDESONIDE 250 MCG: 0.25 INHALANT RESPIRATORY (INHALATION) at 07:57

## 2019-03-18 RX ADMIN — POLYETHYLENE GLYCOL 3350 17 G: 17 POWDER, FOR SOLUTION ORAL at 09:15

## 2019-03-18 RX ADMIN — MICONAZOLE NITRATE: 2 POWDER TOPICAL at 09:15

## 2019-03-18 RX ADMIN — WATER 1.2 ML: 1 INJECTION INTRAMUSCULAR; INTRAVENOUS; SUBCUTANEOUS at 21:11

## 2019-03-18 RX ADMIN — FUROSEMIDE 40 MG: 40 TABLET ORAL at 17:00

## 2019-03-18 RX ADMIN — FERROUS SULFATE TAB 325 MG (65 MG ELEMENTAL FE) 325 MG: 325 (65 FE) TAB at 07:49

## 2019-03-18 RX ADMIN — METOPROLOL TARTRATE 25 MG: 25 TABLET ORAL at 07:49

## 2019-03-18 RX ADMIN — Medication 10 ML: at 20:31

## 2019-03-18 RX ADMIN — CEFEPIME 2 G: 2 INJECTION, POWDER, FOR SOLUTION INTRAVENOUS at 00:28

## 2019-03-18 RX ADMIN — METHYLPREDNISOLONE SODIUM SUCCINATE 40 MG: 40 INJECTION, POWDER, FOR SOLUTION INTRAMUSCULAR; INTRAVENOUS at 06:16

## 2019-03-18 RX ADMIN — LEVOTHYROXINE SODIUM 75 MCG: 75 TABLET ORAL at 06:16

## 2019-03-18 RX ADMIN — LACTULOSE 20 G: 20 SOLUTION ORAL at 16:58

## 2019-03-18 RX ADMIN — INSULIN LISPRO 4 UNITS: 100 INJECTION, SOLUTION INTRAVENOUS; SUBCUTANEOUS at 12:51

## 2019-03-18 RX ADMIN — Medication 9 MG: at 20:11

## 2019-03-18 RX ADMIN — GUAIFENESIN 600 MG: 600 TABLET, EXTENDED RELEASE ORAL at 16:57

## 2019-03-18 RX ADMIN — ASPIRIN 81 MG: 81 TABLET, COATED ORAL at 07:52

## 2019-03-18 RX ADMIN — IPRATROPIUM BROMIDE AND ALBUTEROL SULFATE 1 AMPULE: .5; 3 SOLUTION RESPIRATORY (INHALATION) at 11:47

## 2019-03-18 RX ADMIN — Medication 10 ML: at 07:53

## 2019-03-18 RX ADMIN — FAMOTIDINE 20 MG: 20 TABLET ORAL at 07:52

## 2019-03-18 ASSESSMENT — PAIN SCALES - GENERAL
PAINLEVEL_OUTOF10: 0
PAINLEVEL_OUTOF10: 0

## 2019-03-19 ENCOUNTER — TELEPHONE (OUTPATIENT)
Dept: INTERNAL MEDICINE CLINIC | Age: 84
End: 2019-03-19

## 2019-03-19 VITALS
HEART RATE: 95 BPM | DIASTOLIC BLOOD PRESSURE: 73 MMHG | HEIGHT: 67 IN | RESPIRATION RATE: 18 BRPM | BODY MASS INDEX: 39.24 KG/M2 | TEMPERATURE: 98.2 F | OXYGEN SATURATION: 91 % | WEIGHT: 250 LBS | SYSTOLIC BLOOD PRESSURE: 126 MMHG

## 2019-03-19 PROBLEM — K92.1 MELENA: Status: ACTIVE | Noted: 2019-03-19

## 2019-03-19 PROBLEM — I48.92 PAROXYSMAL ATRIAL FLUTTER (HCC): Status: ACTIVE | Noted: 2019-03-19

## 2019-03-19 LAB
GLUCOSE BLD-MCNC: 142 MG/DL (ref 70–108)
GLUCOSE BLD-MCNC: 152 MG/DL (ref 70–108)
GLUCOSE BLD-MCNC: 220 MG/DL (ref 70–108)
HCT VFR BLD CALC: 47.3 % (ref 42–52)
HEMOCCULT STL QL: NEGATIVE
HEMOGLOBIN: 15 GM/DL (ref 14–18)
INR BLD: 2.37 (ref 0.85–1.13)
ORGANISM: ABNORMAL
URINE CULTURE, ROUTINE: ABNORMAL
URINE CULTURE, ROUTINE: ABNORMAL

## 2019-03-19 PROCEDURE — 2700000000 HC OXYGEN THERAPY PER DAY

## 2019-03-19 PROCEDURE — 85014 HEMATOCRIT: CPT

## 2019-03-19 PROCEDURE — 82272 OCCULT BLD FECES 1-3 TESTS: CPT

## 2019-03-19 PROCEDURE — 6370000000 HC RX 637 (ALT 250 FOR IP): Performed by: FAMILY MEDICINE

## 2019-03-19 PROCEDURE — 85610 PROTHROMBIN TIME: CPT

## 2019-03-19 PROCEDURE — 94640 AIRWAY INHALATION TREATMENT: CPT

## 2019-03-19 PROCEDURE — 2709999900 HC NON-CHARGEABLE SUPPLY

## 2019-03-19 PROCEDURE — 94761 N-INVAS EAR/PLS OXIMETRY MLT: CPT

## 2019-03-19 PROCEDURE — 6370000000 HC RX 637 (ALT 250 FOR IP): Performed by: PHYSICIAN ASSISTANT

## 2019-03-19 PROCEDURE — 6360000002 HC RX W HCPCS: Performed by: PHYSICIAN ASSISTANT

## 2019-03-19 PROCEDURE — 36415 COLL VENOUS BLD VENIPUNCTURE: CPT

## 2019-03-19 PROCEDURE — 99239 HOSP IP/OBS DSCHRG MGMT >30: CPT | Performed by: INTERNAL MEDICINE

## 2019-03-19 PROCEDURE — 82948 REAGENT STRIP/BLOOD GLUCOSE: CPT

## 2019-03-19 PROCEDURE — 6370000000 HC RX 637 (ALT 250 FOR IP): Performed by: NURSE PRACTITIONER

## 2019-03-19 PROCEDURE — APPSS30 APP SPLIT SHARED TIME 16-30 MINUTES: Performed by: NURSE PRACTITIONER

## 2019-03-19 PROCEDURE — 6370000000 HC RX 637 (ALT 250 FOR IP): Performed by: INTERNAL MEDICINE

## 2019-03-19 PROCEDURE — 85018 HEMOGLOBIN: CPT

## 2019-03-19 PROCEDURE — 99233 SBSQ HOSP IP/OBS HIGH 50: CPT | Performed by: INTERNAL MEDICINE

## 2019-03-19 PROCEDURE — 2580000003 HC RX 258: Performed by: PHYSICIAN ASSISTANT

## 2019-03-19 PROCEDURE — 99232 SBSQ HOSP IP/OBS MODERATE 35: CPT | Performed by: INTERNAL MEDICINE

## 2019-03-19 RX ORDER — METOPROLOL TARTRATE 50 MG/1
50 TABLET, FILM COATED ORAL 2 TIMES DAILY
Qty: 180 TABLET | Refills: 1 | DISCHARGE
Start: 2019-03-19 | End: 2019-08-05 | Stop reason: DRUGHIGH

## 2019-03-19 RX ORDER — LANOLIN ALCOHOL/MO/W.PET/CERES
9 CREAM (GRAM) TOPICAL NIGHTLY
Refills: 3 | DISCHARGE
Start: 2019-03-19 | End: 2019-04-26 | Stop reason: ALTCHOICE

## 2019-03-19 RX ORDER — FUROSEMIDE 40 MG/1
40 TABLET ORAL 2 TIMES DAILY
Qty: 60 TABLET | Refills: 3 | DISCHARGE
Start: 2019-03-19 | End: 2019-11-25 | Stop reason: ALTCHOICE

## 2019-03-19 RX ORDER — LACTULOSE 10 G/15ML
20 SOLUTION ORAL 3 TIMES DAILY PRN
Refills: 1 | DISCHARGE
Start: 2019-03-19 | End: 2019-04-26 | Stop reason: ALTCHOICE

## 2019-03-19 RX ORDER — GUAIFENESIN 600 MG/1
600 TABLET, EXTENDED RELEASE ORAL 2 TIMES DAILY
DISCHARGE
Start: 2019-03-19 | End: 2019-06-20

## 2019-03-19 RX ORDER — POLYETHYLENE GLYCOL 3350 17 G/17G
17 POWDER, FOR SOLUTION ORAL DAILY
Qty: 527 G | Refills: 1 | DISCHARGE
Start: 2019-03-20 | End: 2019-04-19

## 2019-03-19 RX ORDER — PREDNISONE 20 MG/1
40 TABLET ORAL DAILY
Qty: 10 TABLET | Refills: 0 | DISCHARGE
Start: 2019-03-19 | End: 2019-03-24

## 2019-03-19 RX ORDER — HALOPERIDOL 5 MG/ML
1 INJECTION INTRAMUSCULAR EVERY 6 HOURS PRN
Status: DISCONTINUED | OUTPATIENT
Start: 2019-03-19 | End: 2019-03-19 | Stop reason: HOSPADM

## 2019-03-19 RX ORDER — SUCRALFATE 1 G/1
1 TABLET ORAL EVERY 6 HOURS SCHEDULED
Status: DISCONTINUED | OUTPATIENT
Start: 2019-03-19 | End: 2019-03-19 | Stop reason: HOSPADM

## 2019-03-19 RX ORDER — DOXAZOSIN MESYLATE 4 MG/1
4 TABLET ORAL NIGHTLY
Qty: 30 TABLET | Refills: 3 | DISCHARGE
Start: 2019-03-19 | End: 2019-08-15 | Stop reason: SDUPTHER

## 2019-03-19 RX ORDER — WARFARIN SODIUM 5 MG/1
5 TABLET ORAL ONCE
Status: COMPLETED | OUTPATIENT
Start: 2019-03-19 | End: 2019-03-19

## 2019-03-19 RX ORDER — PANTOPRAZOLE SODIUM 40 MG/1
40 TABLET, DELAYED RELEASE ORAL DAILY
Qty: 30 TABLET | Refills: 3 | DISCHARGE
Start: 2019-03-19 | End: 2019-04-26 | Stop reason: ALTCHOICE

## 2019-03-19 RX ORDER — IPRATROPIUM BROMIDE AND ALBUTEROL SULFATE 2.5; .5 MG/3ML; MG/3ML
1 SOLUTION RESPIRATORY (INHALATION)
Status: DISCONTINUED | OUTPATIENT
Start: 2019-03-19 | End: 2019-03-19 | Stop reason: HOSPADM

## 2019-03-19 RX ORDER — LACTULOSE 10 G/15ML
20 SOLUTION ORAL 3 TIMES DAILY PRN
Status: DISCONTINUED | OUTPATIENT
Start: 2019-03-19 | End: 2019-03-19 | Stop reason: HOSPADM

## 2019-03-19 RX ORDER — PANTOPRAZOLE SODIUM 40 MG/1
40 TABLET, DELAYED RELEASE ORAL
Status: DISCONTINUED | OUTPATIENT
Start: 2019-03-19 | End: 2019-03-19 | Stop reason: HOSPADM

## 2019-03-19 RX ADMIN — INSULIN LISPRO 2 UNITS: 100 INJECTION, SOLUTION INTRAVENOUS; SUBCUTANEOUS at 12:02

## 2019-03-19 RX ADMIN — BUDESONIDE 250 MCG: 0.25 INHALANT RESPIRATORY (INHALATION) at 08:23

## 2019-03-19 RX ADMIN — POLYETHYLENE GLYCOL 3350 17 G: 17 POWDER, FOR SOLUTION ORAL at 10:48

## 2019-03-19 RX ADMIN — GUAIFENESIN 600 MG: 600 TABLET, EXTENDED RELEASE ORAL at 10:49

## 2019-03-19 RX ADMIN — Medication 10 ML: at 10:50

## 2019-03-19 RX ADMIN — PREDNISONE 40 MG: 20 TABLET ORAL at 10:49

## 2019-03-19 RX ADMIN — FUROSEMIDE 40 MG: 40 TABLET ORAL at 10:49

## 2019-03-19 RX ADMIN — SUCRALFATE 1 G: 1 TABLET ORAL at 10:49

## 2019-03-19 RX ADMIN — IPRATROPIUM BROMIDE AND ALBUTEROL SULFATE 1 AMPULE: .5; 3 SOLUTION RESPIRATORY (INHALATION) at 12:21

## 2019-03-19 RX ADMIN — PANTOPRAZOLE SODIUM 40 MG: 40 TABLET, DELAYED RELEASE ORAL at 17:49

## 2019-03-19 RX ADMIN — MICONAZOLE NITRATE: 2 POWDER TOPICAL at 10:50

## 2019-03-19 RX ADMIN — IPRATROPIUM BROMIDE AND ALBUTEROL SULFATE 1 AMPULE: .5; 3 SOLUTION RESPIRATORY (INHALATION) at 08:23

## 2019-03-19 RX ADMIN — METOPROLOL TARTRATE 25 MG: 25 TABLET ORAL at 10:49

## 2019-03-19 RX ADMIN — SUCRALFATE 1 G: 1 TABLET ORAL at 12:05

## 2019-03-19 RX ADMIN — PANTOPRAZOLE SODIUM 40 MG: 40 TABLET, DELAYED RELEASE ORAL at 10:49

## 2019-03-19 RX ADMIN — WARFARIN SODIUM 5 MG: 5 TABLET ORAL at 17:48

## 2019-03-19 RX ADMIN — LACTULOSE 20 G: 20 SOLUTION ORAL at 13:55

## 2019-03-19 RX ADMIN — INSULIN LISPRO 4 UNITS: 100 INJECTION, SOLUTION INTRAVENOUS; SUBCUTANEOUS at 17:48

## 2019-03-19 RX ADMIN — LEVOTHYROXINE SODIUM 75 MCG: 75 TABLET ORAL at 06:09

## 2019-03-19 RX ADMIN — SUCRALFATE 1 G: 1 TABLET ORAL at 17:49

## 2019-03-19 RX ADMIN — FUROSEMIDE 40 MG: 40 TABLET ORAL at 17:48

## 2019-03-19 ASSESSMENT — PAIN SCALES - GENERAL
PAINLEVEL_OUTOF10: 0
PAINLEVEL_OUTOF10: 0

## 2019-03-20 ENCOUNTER — TELEPHONE (OUTPATIENT)
Dept: FAMILY MEDICINE CLINIC | Age: 84
End: 2019-03-20

## 2019-03-20 ENCOUNTER — HOSPITAL ENCOUNTER (OUTPATIENT)
Dept: PHARMACY | Age: 84
Setting detail: THERAPIES SERIES
Discharge: HOME | End: 2019-03-20
Payer: MEDICARE

## 2019-03-20 ENCOUNTER — TELEPHONE (OUTPATIENT)
Dept: PHARMACY | Age: 84
End: 2019-03-20

## 2019-03-20 DIAGNOSIS — I82.5Y1 CHRONIC DEEP VEIN THROMBOSIS (DVT) OF PROXIMAL VEIN OF RIGHT LOWER EXTREMITY (HCC): ICD-10-CM

## 2019-03-20 LAB
BLOOD CULTURE, ROUTINE: NORMAL
BLOOD CULTURE, ROUTINE: NORMAL
POC INR: 2.8 (ref 0.8–1.2)

## 2019-03-20 PROCEDURE — 99211 OFF/OP EST MAY X REQ PHY/QHP: CPT | Performed by: PHARMACIST

## 2019-03-20 PROCEDURE — 36416 COLLJ CAPILLARY BLOOD SPEC: CPT | Performed by: PHARMACIST

## 2019-03-20 PROCEDURE — 85610 PROTHROMBIN TIME: CPT | Performed by: PHARMACIST

## 2019-03-27 ENCOUNTER — APPOINTMENT (OUTPATIENT)
Dept: PHARMACY | Age: 84
End: 2019-03-27
Payer: MEDICARE

## 2019-04-01 ENCOUNTER — OFFICE VISIT (OUTPATIENT)
Dept: INTERNAL MEDICINE CLINIC | Age: 84
End: 2019-04-01
Payer: MEDICARE

## 2019-04-01 VITALS
BODY MASS INDEX: 38.69 KG/M2 | RESPIRATION RATE: 20 BRPM | OXYGEN SATURATION: 98 % | HEART RATE: 68 BPM | DIASTOLIC BLOOD PRESSURE: 56 MMHG | WEIGHT: 246.5 LBS | SYSTOLIC BLOOD PRESSURE: 126 MMHG | HEIGHT: 67 IN

## 2019-04-01 DIAGNOSIS — Z09 HOSPITAL DISCHARGE FOLLOW-UP: Primary | ICD-10-CM

## 2019-04-01 PROCEDURE — 1111F DSCHRG MED/CURRENT MED MERGE: CPT | Performed by: NURSE PRACTITIONER

## 2019-04-01 PROCEDURE — G8427 DOCREV CUR MEDS BY ELIG CLIN: HCPCS | Performed by: NURSE PRACTITIONER

## 2019-04-01 PROCEDURE — 99214 OFFICE O/P EST MOD 30 MIN: CPT | Performed by: NURSE PRACTITIONER

## 2019-04-01 PROCEDURE — 1036F TOBACCO NON-USER: CPT | Performed by: NURSE PRACTITIONER

## 2019-04-01 PROCEDURE — G8417 CALC BMI ABV UP PARAM F/U: HCPCS | Performed by: NURSE PRACTITIONER

## 2019-04-01 PROCEDURE — 4040F PNEUMOC VAC/ADMIN/RCVD: CPT | Performed by: NURSE PRACTITIONER

## 2019-04-01 PROCEDURE — 1123F ACP DISCUSS/DSCN MKR DOCD: CPT | Performed by: NURSE PRACTITIONER

## 2019-04-01 RX ORDER — POTASSIUM CHLORIDE 1.5 G/1.77G
20 POWDER, FOR SOLUTION ORAL DAILY
COMMUNITY
End: 2019-08-05 | Stop reason: CLARIF

## 2019-04-01 NOTE — PROGRESS NOTES
Have the medications prescribed at discharge been filled? yes  Does the patient understand what the medications are for? yes  Has the patient experienced any new symptoms or have previous symptoms worsened? no  Is the patient experiencing any pain? no If yes, is the pain well controlled? n/a  We want to be sure the patient has the best recovery possible. Does the patient understand all the discharge instructions? yes  Did someone talk to the patient and/or family about the patient needs prior to being discharged?  yes  Did the patient's doctor communicate well? no  Did the patient's nurse communicate well? no  Was the patient satisfied with the services and care received at [address] [place] [address]? yes  [facility] Transitions Initial Follow Up Call    Call within 2 business days of discharge: Yes     Patient: Jeni Mejia Patient : 1931   MRN: [id number]  Reason for Admission: pneumonia  Discharge Date: 3/19/19 RARS: Readmission Risk Score: 32       Spoke with: patient    Discharge department/facility: [facility]    Non-face-to-face services provided:  Scheduled appointment with Timoteo Ruth CNP    Follow Up  Future Appointments   Date Time Provider Gretchen Jorgensen   2019 11:15 AM Ady Rodriguez MD E[facility]   2019 10:45 AM Caryle Hill, APRN - CNP [facility] [address]   7/3/2019  2:15 PM YASHIRA Teague   2019 10:15 AM Chris Amaro MD [facility]   2020 10:30 AM Julio Cesar Khan [facility] [address]

## 2019-04-01 NOTE — PROGRESS NOTES
Post-Discharge Transitional Care Management Services or Hospital Follow Up      Oliver Ramsey   YOB: 1931    Date of Office Visit:  4/1/2019  Date of Hospital Admission: 3/14/19  Date of Hospital Discharge: 3/19/19  Risk of hospital readmission (high >=14%. Medium >=10%) :Readmission Risk Score: 32      Care management risk score Rising risk (score 2-5) and Complex Care (Scores >=6): 7     Amy Fontanez was recently hospitalized with pneumonia. Discharged 1 week ago. Generally he lives at home alone but [facility] weeks physical therapy. He feels he is getting his strength back and is able to walk short distances with walker independently. Has worn oxygen at bedtime prior to hospitalization, but is now wearing continuously. He has a history of COPD. He states his breathing has improved significantly. Rhonchi auscultated throughout bilaterally. Skin and mucous membranes pink but dry. 246 pounds today, was 250 pounds on 3/19 and 261 pounds 3/15/19. Encouraged to drink more water. He states he only likes [place] water, encouraged and offered to call his children myself and ask them to bring him some. He states he will call them. He was initially from [place]. Reports that [place] then to [employer] many years ago. He was a member of the [employer]. He has 2 sons that are living. Follows with [facility] for history of DVTs and atrial flutter. Dr. Negra Cunha managing while at [facility] and Dr. Ray Stanton is his PCP and follows as an outpatient. Dr. Fredy Skinner sees Amy Fontanez for diabetes. Last appt 2/6/19. He reports that he has been diabetic about 20 years, and has been on insulin since that time. He is taking Humalog via sliding scale, metformin 1000 mg BID, and Lantus 18 units daily. Hgb A1C 7.3% in 1/2019. Checking QID. Readings reviewed and running 125-270.          Non face to face  following discharge, date last encounter closed (first attempt may have been earlier): 3/20/2019  2:15 PM    Call initiated 2 business days of discharge: Yes    Patient Active Problem List   Diagnosis    COPD exacerbation ([facility]    Primary thyroid follicular carcinoma    Postoperative hypothyroidism    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    Breast lump    Morbid obesity with BMI of 40.0-44.9, adult    BPH (benign prostatic hyperplasia)    ROBER (obstructive sleep apnea)    Essential hypertension    Chronic blood loss anemia    History of DVT (deep vein thrombosis)    Supplemental oxygen dependent    Shortness of breath    Chronic respiratory failure with hypoxia (HCC)    Mixed hyperlipidemia    Acute on chronic respiratory failure with hypoxia and hypercapnia (HCC)    Chronic deep vein thrombosis (DVT) of right lower extremity (HCC)    Anticoagulated on Coumadin    Type 2 diabetes mellitus with complication, with long-term current use of insulin (HCC)    Muscle weakness    [facility] bronchitis ([facility])    Stage 3 severe COPD by GOLD classification ([facility]    New onset atrial fibrillation ([facility]    COPD (chronic obstructive pulmonary disease) (HCC)    Respiratory acidosis    Acute respiratory failure with hypoxia and hypercapnia (HCC)    Supratherapeutic INR    Acute diastolic congestive heart failure (HCC)    NSVT (nonsustained ventricular tachycardia) (HCC)    Melena    Paroxysmal atrial flutter (HCC)       Allergies   Allergen Reactions    Levaquin [Levofloxacin] Other (See Comments)     Redness/flushing   Solomon Driver prefers pt not use Macrobid due to pulmonary side effects.     Adhesive Tape Rash    Alphagan [Brimonidine Tartrate] Hives     Eyes red       Medications listed as ordered at the time of discharge from hospital   Asya Wilson   [facility] Medication Instructions [facility]:    Printed on:04/01/19 6145   Medication Information                      albuterol (PROVENTIL HFA;VENTOLIN HFA) 108 (90 BASE) MCG/ACT inhaler  Inhale 2 puffs into the lungs every 6 hours as needed for Wheezing             Arformoterol Tartrate (BROVANA) 15 MCG/2ML NEBU  Take 1 ampule by nebulization 2 times daily              atorvastatin (LIPITOR) 80 MG tablet  Take 80 mg by mouth nightly             AZOPT 1 % ophthalmic suspension  Place 1 drop into both eyes 3 times daily              bisacodyl (DULCOLAX) 5 MG EC tablet  Take 1 tablet by mouth daily as needed for Constipation             budesonide (PULMICORT) 0.25 MG/2ML nebulizer suspension  Take 1 ampule by nebulization 2 times daily              Cholecalciferol (VITAMIN D3) 1000 units CAPS  Take by mouth daily             doxazosin (CARDURA) 4 MG tablet  Take 1 tablet by mouth nightly             furosemide (LASIX) 40 MG tablet  Take 1 tablet by mouth 2 times daily             guaiFENesin (MUCINEX) 600 MG extended release tablet  Take 1 tablet by mouth 2 times daily             insulin glargine (LANTUS SOLOSTAR) 100 UNIT/ML injection pen  Inject 18 Units into the skin nightly             insulin lispro (HUMALOG) 100 UNIT/ML injection vial  Inject 0-12 Units into the skin 3 times daily (with meals)             lactulose (CHRONULAC) 10 GM/15ML solution  Take 30 mLs by mouth 3 times daily as needed (CONSTIPATION)             levothyroxine (SYNTHROID) 175 MCG tablet  Take one tablet daily             melatonin 3 MG TABS tablet  Take 3 tablets by mouth nightly             metFORMIN (GLUCOPHAGE) 500 MG tablet  Take 2 tablets by mouth 2 times daily (with meals)             metoprolol tartrate (LOPRESSOR) 50 MG tablet  Take 1 tablet by mouth 2 times daily             miconazole (MICOTIN) 2 % powder  Apply topically 2 times daily.              Multiple Vitamins-Minerals (PRESERVISION AREDS 2) CAPS  Take 2 tablets by mouth 2 times daily             OXYGEN  Inhale into the lungs as needed (Uses at night only) Indications: Difficulty Breathing, Oxygen Therapy 3 liters             pantoprazole (PROTONIX) 40 MG tablet  Take 1 Take as directed by [facility]. #45=30 days  Taking 5mg on Sunday, Tuesday, Wednesday, Thursday, and Saturday.) 45 tablet 11    insulin glargine (LANTUS SOLOSTAR) 100 UNIT/ML injection pen Inject 18 Units into the skin nightly 15 pen 3    levothyroxine (SYNTHROID) 175 MCG tablet Take one tablet daily 90 tablet 3    AZOPT 1 % ophthalmic suspension Place 1 drop into both eyes 3 times daily       Arformoterol Tartrate (BROVANA) 15 MCG/2ML NEBU Take 1 ampule by nebulization 2 times daily       budesonide (PULMICORT) 0.25 MG/2ML nebulizer suspension Take 1 ampule by nebulization 2 times daily       albuterol (PROVENTIL HFA;VENTOLIN HFA) 108 (90 BASE) MCG/ACT inhaler Inhale 2 puffs into the lungs every 6 hours as needed for Wheezing      OXYGEN Inhale into the lungs as needed (Uses at night only) Indications: Difficulty Breathing, Oxygen Therapy 3 liters          Medications patient taking as of now reconciled against medications ordered at time of hospital discharge: Yes    Chief Complaint   Patient presents with    [facility]     discharged from [address] [place] [address] on 3/19/19 for pneumonia. NAYELY questions asked by Tray Perez       History of Present illness - Follow up of Hospital diagnosis(es): pneumonia    Inpatient course: Discharge summary reviewed- see chart. A comprehensive review of systems was negative except for what was noted in the HPI. Vitals:    04/01/19 1049   BP: (!) 126/56   Pulse: 68   Resp: 20   SpO2: 98%   Weight: 246 lb 8 oz (111.8 kg)   Height: 5' 7.01\" (1.702 m)     Body mass index is 38.6 kg/m².    Wt Readings from Last 3 Encounters:   04/01/19 246 lb 8 oz (111.8 kg)   03/19/19 250 lb (113.4 kg)   03/13/19 265 lb (120.2 kg)     BP Readings from Last 3 Encounters:   04/01/19 (!) 126/56   03/19/19 126/73   03/13/19 134/70        Physical Exam:  General Appearance: alert and oriented to person, place and time, well-developed and well-nourished, in no acute distress  Skin: warm and dry, no rash or erythema  Head: normocephalic and atraumatic  Eyes: pupils equal, round, and reactive to light, extraocular eye movements intact, conjunctivae normal  ENT: tympanic membrane, external ear and ear canal normal bilaterally, oropharynx clear and moist with dry, pink mucous membranes  Neck: neck supple and non tender without mass, no thyromegaly or thyroid nodules, no cervical lymphadenopathy   Pulmonary/Chest: clear to auscultation bilaterally- no wheezes, rales or rhonchi, normal air movement, no respiratory distress and rhonchi present- throughout bilaterally  Cardiovascular: normal rate, normal S1 and S2, no gallops, intact distal pulses and no JVD  Abdomen: soft, non-tender, non-distended, normal bowel sounds, no masses or organomegaly  Genitourinary: denies dysuria or hematuria  Extremities: trace + edema-  bilateral lower extremities  Musculoskeletal: normal range of motion, no joint swelling, deformity or tenderness. Generalized weakness, in a wheelchair. Feels as though he is getting stronger.    Neurologic: reflexes normal and symmetric, no cranial nerve deficit, gait, coordination and speech normal    Assessment/Plan:  Camilla Ashton was seen today for follow-up from hospital.    Diagnoses and all orders for this visit:    Hospital discharge follow-up  -     RI DISCHARGE MEDS RECONCILED W/ CURRENT OUTPATIENT MED LIST        Medical Decision Making: moderate complexity      Continue oxygen at 2L/NC continuously to maintain oxygen saturations > 92%  Continue medications as prescribed  Monitor blood sugars 4x daily and record  Monitor fluid status- encourage oral intake- monitor daily weights  Follow up here as previously scheduled

## 2019-04-01 NOTE — PATIENT INSTRUCTIONS
Increase fluid intake    Please check with son to see if he can bring in [address] water as this is all he likes    Monitor fluid status- appears dry today

## 2019-04-05 ENCOUNTER — OFFICE VISIT (OUTPATIENT)
Dept: ENT CLINIC | Age: 84
End: 2019-04-05
Payer: MEDICARE

## 2019-04-05 VITALS
HEIGHT: 67 IN | RESPIRATION RATE: 14 BRPM | HEART RATE: 72 BPM | TEMPERATURE: 97.8 F | SYSTOLIC BLOOD PRESSURE: 102 MMHG | WEIGHT: 245.9 LBS | DIASTOLIC BLOOD PRESSURE: 58 MMHG | BODY MASS INDEX: 38.6 KG/M2

## 2019-04-05 DIAGNOSIS — J34.89 NASAL DRYNESS: ICD-10-CM

## 2019-04-05 DIAGNOSIS — R05.8 ACE-INHIBITOR COUGH: Primary | ICD-10-CM

## 2019-04-05 DIAGNOSIS — R13.10 DYSPHAGIA, UNSPECIFIED TYPE: ICD-10-CM

## 2019-04-05 DIAGNOSIS — T46.4X5A ACE-INHIBITOR COUGH: Primary | ICD-10-CM

## 2019-04-05 PROCEDURE — 1036F TOBACCO NON-USER: CPT | Performed by: OTOLARYNGOLOGY

## 2019-04-05 PROCEDURE — G8417 CALC BMI ABV UP PARAM F/U: HCPCS | Performed by: OTOLARYNGOLOGY

## 2019-04-05 PROCEDURE — 1123F ACP DISCUSS/DSCN MKR DOCD: CPT | Performed by: OTOLARYNGOLOGY

## 2019-04-05 PROCEDURE — 31575 DIAGNOSTIC LARYNGOSCOPY: CPT | Performed by: OTOLARYNGOLOGY

## 2019-04-05 PROCEDURE — 99213 OFFICE O/P EST LOW 20 MIN: CPT | Performed by: OTOLARYNGOLOGY

## 2019-04-05 PROCEDURE — G8427 DOCREV CUR MEDS BY ELIG CLIN: HCPCS | Performed by: OTOLARYNGOLOGY

## 2019-04-05 PROCEDURE — 1111F DSCHRG MED/CURRENT MED MERGE: CPT | Performed by: OTOLARYNGOLOGY

## 2019-04-05 PROCEDURE — 4040F PNEUMOC VAC/ADMIN/RCVD: CPT | Performed by: OTOLARYNGOLOGY

## 2019-04-05 RX ORDER — PNV NO.95/FERROUS FUM/FOLIC AC 28MG-0.8MG
TABLET ORAL 2 TIMES DAILY
COMMUNITY
End: 2020-07-17

## 2019-04-05 ASSESSMENT — ENCOUNTER SYMPTOMS
ABDOMINAL PAIN: 0
NAUSEA: 0
VOMITING: 0
SORE THROAT: 0
SINUS PRESSURE: 0
DIARRHEA: 0
TROUBLE SWALLOWING: 0
COUGH: 0
VOICE CHANGE: 0
CHOKING: 0
APNEA: 0
STRIDOR: 0
FACIAL SWELLING: 0
WHEEZING: 0
COLOR CHANGE: 0
SHORTNESS OF BREATH: 0
RHINORRHEA: 0
CHEST TIGHTNESS: 0

## 2019-04-05 NOTE — PROGRESS NOTES
[facility], NOSE AND THROAT  [facility]  [address]  [address] [address]  Dept: [phone]  Dept Fax: [phone]  Loc: [phone]    Chadwick Rodriguez is a 80 y.o. male who was referred byNo ref. provider found for:  Chief Complaint   Patient presents with   [facility]     Patient here for hoarsness, dryness, cough, last seen 6/14/18 for a cough. .    HPI:     Chadwick Rodriguez is a 80 y.o. male who presents today for hoarseness, dryness, cough, last seen 6/14/18 for cough. He is in a nursing home right now. Has pneumonia started 2 weeks ago. He seems to have a void in his throat. States he cannot cough anything up. It is dry and he has no phlegm. States he gets a rattle in his throat all the time. He had had that 3 months before the pneumonia started. He has not changed any of his medications. He has lost his voice. He did not have a barium swallow recently. He says he is not having any trouble swallowing. His nose is dry and crusty. He had his thyroid removed and he has had Left vocal cord paresis ever since. He was an . History: Allergies   Allergen Reactions    Levaquin [Levofloxacin] Other (See Comments)     Redness/flushing   Solomon Driver prefers pt not use Macrobid due to pulmonary side effects.  Adhesive Tape Rash    Alphagan [Brimonidine Tartrate] Hives     Eyes red     Current Outpatient Medications   Medication Sig Dispense Refill    aspirin 81 MG tablet Take 81 mg by mouth daily      Ferrous Sulfate (IRON) 325 (65 Fe) MG TABS Take by mouth      potassium chloride (KLOR-CON) 20 MEQ packet Take 20 mEq by mouth daily      guaiFENesin (MUCINEX) 600 MG extended release tablet Take 1 tablet by mouth 2 times daily      miconazole (MICOTIN) 2 % powder Apply topically 2 times daily.  45 g 1    bisacodyl (DULCOLAX) 5 MG EC tablet Take 1 tablet by mouth daily as needed for Constipation      lactulose (CHRONULAC) 10 GM/15ML solution Take 30 mLs by mouth 3 times daily as needed (CONSTIPATION)  1    polyethylene glycol (GLYCOLAX) packet Take 17 g by mouth daily 527 g 1    melatonin 3 MG TABS tablet Take 3 tablets by mouth nightly  3    metFORMIN (GLUCOPHAGE) 500 MG tablet Take 2 tablets by mouth 2 times daily (with meals) 60 tablet 3    doxazosin (CARDURA) 4 MG tablet Take 1 tablet by mouth nightly 30 tablet 3    metoprolol tartrate (LOPRESSOR) 50 MG tablet Take 1 tablet by mouth 2 times daily 180 tablet 1    Multiple Vitamins-Minerals (PRESERVISION AREDS 2) CAPS Take 2 tablets by mouth 2 times daily      atorvastatin (LIPITOR) 80 MG tablet Take 80 mg by mouth nightly      Cholecalciferol (VITAMIN D3) 1000 units CAPS Take by mouth daily      warfarin (COUMADIN) 5 MG tablet Take as directed by [facility]. #45=30 days (Patient taking differently: Take 7.5 mg by mouth Take as directed by [facility].  #45=30 days  Taking 5mg on Sunday, Tuesday, Wednesday, Thursday, and Saturday.) 45 tablet 11    insulin glargine (LANTUS SOLOSTAR) 100 UNIT/ML injection pen Inject 18 Units into the skin nightly 15 pen 3    levothyroxine (SYNTHROID) 175 MCG tablet Take one tablet daily 90 tablet 3    AZOPT 1 % ophthalmic suspension Place 1 drop into both eyes 3 times daily       Arformoterol Tartrate (BROVANA) 15 MCG/2ML NEBU Take 1 ampule by nebulization 2 times daily       budesonide (PULMICORT) 0.25 MG/2ML nebulizer suspension Take 1 ampule by nebulization 2 times daily       albuterol (PROVENTIL HFA;VENTOLIN HFA) 108 (90 BASE) MCG/ACT inhaler Inhale 2 puffs into the lungs every 6 hours as needed for Wheezing      OXYGEN Inhale into the lungs as needed (Uses at night only) Indications: Difficulty Breathing, Oxygen Therapy 3 liters      insulin lispro (HUMALOG) 100 UNIT/ML injection vial Inject 0-12 Units into the skin 3 times daily (with meals) 1 vial 3    pantoprazole (PROTONIX) 40 MG tablet Take 1 tablet by mouth daily 30 tablet 3    furosemide (LASIX) 40 MG tablet Take 1 tablet by mouth 2 times daily 60 tablet 3     No current facility-administered medications for this visit. Past Medical History:   Diagnosis Date    Anxiety     CAD (coronary artery disease)     leaking valve    Chronic back pain     COPD (chronic obstructive pulmonary disease) (HCC)     Detached retina     Diabetes mellitus (HCC)     NIDDM    DVT (deep venous thrombosis) (HCC)     RLE    DVT (deep venous thrombosis) ([facility] [address] 11/9/15    LLE    Glaucoma     Hyperlipidemia     Hypertension     Mass of chest     benign chest mass, Dr Veena Andrade Movement disorder     spinal stenosis    Obesity     Osteoarthritis     right ankle, right hand    Pneumonia     Sleep apnea 1993    on BiPap, Dr Yaquelin Padilla Thyroid cancer [facility])     s/p thyroid resection    Urinary incontinence       Past Surgical History:   Procedure Laterality Date   [facility] SURGERY  2006,2011, 2014    BRONCHOSCOPY N/A 8/29/2017    BRONCHOSCOPY AIRWAY ONLY performed by Libertad Aleman MD at [address] [place] [address] EKG 12-LEAD  11/10/2015         FRACTURE SURGERY      right hand    OTHER SURGICAL HISTORY      spinal epidurals    RETINAL DETACHMENT SURGERY      SPINE SURGERY  2004    Lumbar laminectomy    THYROIDECTOMY       Family History   Problem Relation Age of Onset    Other Mother         Complications of Childbirth    Other Father [de-identified]        Natural causes     Social History     Tobacco Use    Smoking status: Never Smoker    Smokeless tobacco: Never Used   Substance Use Topics    Alcohol use: Yes     Alcohol/week: 0.6 oz     Types: 1 Glasses of wine per week     Comment: Glass of wine with dinner. Subjective:       Review of Systems   Constitutional: Negative for activity change, appetite change, chills, diaphoresis, fatigue, fever and unexpected weight change.    HENT: Negative for congestion, dental problem, ear discharge, ear pain, facial swelling, hearing loss, mouth sores, nosebleeds, postnasal drip, rhinorrhea, sinus pressure, sneezing, sore throat, tinnitus, trouble swallowing and voice change. Eyes: Negative for visual disturbance. Respiratory: Negative for apnea, cough, choking, chest tightness, shortness of breath, wheezing and stridor. Cardiovascular: Negative for chest pain, palpitations and leg swelling. Gastrointestinal: Negative for abdominal pain, diarrhea, nausea and vomiting. Endocrine: Negative for cold intolerance, heat intolerance, polydipsia and polyuria. Genitourinary: Negative for dysuria, enuresis and hematuria. Musculoskeletal: Negative for arthralgias, gait problem, neck pain and neck stiffness. Skin: Negative for color change and rash. Allergic/Immunologic: Negative for environmental allergies, food allergies and immunocompromised state. Neurological: Negative for dizziness, syncope, facial asymmetry, speech difficulty, light-headedness and headaches. Hematological: Negative for adenopathy. Does not bruise/bleed easily. Psychiatric/Behavioral: Negative for confusion and sleep disturbance. The patient is not nervous/anxious. Objective:     BP (!) 102/58 (Site: Right Upper Arm, Position: Sitting)   Pulse 72   Temp 97.8 °F (36.6 °C) (Oral)   Resp 14   Ht 5' 7\" (1.702 m)   Wt 245 lb 14.4 oz (111.5 kg)   BMI 38.51 kg/m²     Physical Exam      PROCEDURE: FIBEROPTIC LARYNGOSCOPY    A fiberoptic laryngoscopy was performed under topical anesthesia, after using Afrin and Lidocaine spray in the nasal fossa. The nasal fossa, nasopharynx, hypopharynx and larynx were carefully examined. Base of tongue was symmetrical. Epiglottis appeared normal and was not retrodisplaced. True vocal cords had normal mobility. There was no erythema. No mucosal lesions or masses were noted. No pooling in the pyriform sinuses. Abnormal finding: Paresis of Left vocal cord. Data:  All of the past medical history, past surgical history, family history,social history, allergies and current medications were reviewed with the patient. Assessment & Plan   Diagnoses and all orders for this visit:     Diagnosis Orders   1. ACE-inhibitor cough  LA LARYNGOSCOPY FLEXIBLE DIAGNOSTIC   2. Dysphagia, unspecified type  LA LARYNGOSCOPY FLEXIBLE DIAGNOSTIC   3. Nasal dryness  LA LARYNGOSCOPY FLEXIBLE DIAGNOSTIC    Nasal Culture       The findings were explained and his questions were answered. Options were discussed including having the nursing home add humidity to his O2 canula. He will need to rinse mouth and drink water after using inhalers and nebulizer. I took a culture of the nasopharynx. (normal tree)  He agreed. I, Malik Lewis CMA ([facility]), am scribing for, and in the presence of Dr. Mechelle Abad. Electronically signed by Melchor Solis CMA ([facility]) on 4/5/19 at 1:00 PM.     (Please note that portions of this note were completed with a voice recognition program. Efforts were made to edit the dictations butoccasionally words are mis-transcribed.)    I agree to the above documentation placed by my scribe. I have personally evaluated this patient. Additional findings are as noted. I reviewed the scribe's note and agree with the documented findings and plan of care. Any areas of disagreement are corrected. I agree with the chief complaint, past medical history, past surgical history, allergies, medications, social and family history as documented unless otherwise noted below.      Electronically signed by Rubi Kumar MD on 5/7/2019 at 1:47 AM

## 2019-04-05 NOTE — LETTER
discussed including having the nursing home add humidity to his O2 canula. He will need to rinse mouth and drink water after using inhalers and nebulizer. I took a culture of the nasopharynx. (normal tree)  He agreed. If you have questions, please do not hesitate to call me. I look forward to following Brendan Moore along with you.     Sincerely,          Heath Espinosa MD

## 2019-04-07 LAB
GRAM STAIN RESULT: NORMAL
THROAT/NOSE CULTURE: NORMAL

## 2019-04-11 LAB — INR BLD: 5

## 2019-04-15 ENCOUNTER — ANTI-COAG VISIT (OUTPATIENT)
Dept: PHARMACY | Age: 84
End: 2019-04-15

## 2019-04-15 ENCOUNTER — TELEPHONE (OUTPATIENT)
Dept: PHARMACY | Age: 84
End: 2019-04-15

## 2019-04-15 ENCOUNTER — HOSPITAL ENCOUNTER (OUTPATIENT)
Age: 84
Setting detail: SPECIMEN
Discharge: HOME | End: 2019-04-15
Payer: MEDICARE

## 2019-04-15 DIAGNOSIS — I82.5Y1 CHRONIC DEEP VEIN THROMBOSIS (DVT) OF PROXIMAL VEIN OF RIGHT LOWER EXTREMITY (HCC): ICD-10-CM

## 2019-04-15 LAB — INR BLD: 1.43 (ref 0.85–1.13)

## 2019-04-15 PROCEDURE — 85610 PROTHROMBIN TIME: CPT

## 2019-04-15 NOTE — TELEPHONE ENCOUNTER
[facility]. Per RN, patient's last INR was 5 on 4/11/19. Patient held coumadin x 2 days and then is supposed to be taking 2.5 mg daily. Patient was previously on 5 mg [id number], 7.5 mg T prior to 4/11/19. Home health to draw patient today.

## 2019-04-18 ENCOUNTER — HOSPITAL ENCOUNTER (OUTPATIENT)
Age: 84
Discharge: HOME | End: 2019-04-18
Payer: MEDICARE

## 2019-04-18 ENCOUNTER — CARE COORDINATION (OUTPATIENT)
Dept: CASE MANAGEMENT | Age: 84
End: 2019-04-18

## 2019-04-18 LAB
ALBUMIN SERPL-MCNC: 3.4 G/DL (ref 3.5–5.1)
ALP BLD-CCNC: 53 U/L (ref 38–126)
ALT SERPL-CCNC: 13 U/L (ref 11–66)
ANION GAP SERPL CALCULATED.3IONS-SCNC: 15 MEQ/L (ref 8–16)
AST SERPL-CCNC: 18 U/L (ref 5–40)
BILIRUB SERPL-MCNC: 0.4 MG/DL (ref 0.3–1.2)
BILIRUBIN DIRECT: < 0.2 MG/DL (ref 0–0.3)
BUN BLDV-MCNC: 9 MG/DL (ref 7–22)
CALCIUM SERPL-MCNC: 8.4 MG/DL (ref 8.5–10.5)
CHLORIDE BLD-SCNC: 96 MEQ/L (ref 98–111)
CHOLESTEROL, TOTAL: 112 MG/DL (ref 100–199)
CO2: 33 MEQ/L (ref 23–33)
CREAT SERPL-MCNC: 0.9 MG/DL (ref 0.4–1.2)
ERYTHROCYTE [DISTWIDTH] IN BLOOD BY AUTOMATED COUNT: 13.9 % (ref 11.5–14.5)
ERYTHROCYTE [DISTWIDTH] IN BLOOD BY AUTOMATED COUNT: 40.8 FL (ref 35–45)
GFR SERPL CREATININE-BSD FRML MDRD: 80 ML/MIN/1.73M2
GLUCOSE BLD-MCNC: 162 MG/DL (ref 70–108)
HCT VFR BLD CALC: 38.4 % (ref 42–52)
HDLC SERPL-MCNC: 35 MG/DL
HEMOGLOBIN: 12.9 GM/DL (ref 14–18)
LDL CHOLESTEROL CALCULATED: 33 MG/DL
MCH RBC QN AUTO: 27.6 PG (ref 26–33)
MCHC RBC AUTO-ENTMCNC: 33.6 GM/DL (ref 32.2–35.5)
MCV RBC AUTO: 82.1 FL (ref 80–94)
PLATELET # BLD: 291 THOU/MM3 (ref 130–400)
PMV BLD AUTO: 9.2 FL (ref 9.4–12.4)
POTASSIUM SERPL-SCNC: 3.7 MEQ/L (ref 3.5–5.2)
RBC # BLD: 4.68 MILL/MM3 (ref 4.7–6.1)
SODIUM BLD-SCNC: 144 MEQ/L (ref 135–145)
TOTAL PROTEIN: 6.2 G/DL (ref 6.1–8)
TRIGL SERPL-MCNC: 222 MG/DL (ref 0–199)
WBC # BLD: 9.2 THOU/MM3 (ref 4.8–10.8)

## 2019-04-18 PROCEDURE — 36415 COLL VENOUS BLD VENIPUNCTURE: CPT

## 2019-04-18 PROCEDURE — 80061 LIPID PANEL: CPT

## 2019-04-18 PROCEDURE — 85027 COMPLETE CBC AUTOMATED: CPT

## 2019-04-18 PROCEDURE — 82248 BILIRUBIN DIRECT: CPT

## 2019-04-18 PROCEDURE — 80053 COMPREHEN METABOLIC PANEL: CPT

## 2019-04-18 NOTE — CARE COORDINATION
Care Transition Discharge from [facility] Follow Up     Call within 2 business days of discharge: No    Non-face-to-face services provided:  Obtained and reviewed discharge summary and/or continuity of care documents      Spoke with Boston Marr    Discharged From: [facility]    Date of discharge: 4/12/19    1. How have you been feeling since you were discharged from the post acute facility? Patient states he is feeling fine. No complaints. Denies sob or pain. Patient performs ADL's independently. Appetite good. Any intervention needed? no     2. Do you have all of your medications? yes     3. Do you have any questions about your medicatons? no     4. Have you scheduled your follow up appointment? Yes. Patient seen prior to discharged and scheduled to see PCP 4/26/19.        5. Is Home Health involved: yes      [address]:  [facility]        Has someone from home health been in contact with you?  yes            Does anyone in your home depend on you for help? no    6. Do you feel like you have everything you need to keep you well at home?         yes        DME? reyna Lopez    Post acute care coordinator signing off.  YULIYA CosbyN RN  Care Transition Coordinator  [phone]      Follow up appointments:    Future Appointments   Date Time Provider Gretchen Jorgensen   4/26/2019  3:00 PM MAEGAN Sykes CNP [facility] Medicine [address]   5/8/2019 10:45 AM MAEGAN Godwin CNP [facility]   7/3/2019  2:15 PM Thomas Dixon PA-C Pulm Med [address]   7/31/2019 10:15 AM Donya Zapata MD [facility] [address]   1/31/2020 10:30 AM Tyesha Schaefer Urology [address]

## 2019-04-22 ENCOUNTER — HOSPITAL ENCOUNTER (OUTPATIENT)
Age: 84
Setting detail: SPECIMEN
Discharge: HOME | End: 2019-04-22
Payer: MEDICARE

## 2019-04-22 ENCOUNTER — ANTI-COAG VISIT (OUTPATIENT)
Dept: PHARMACY | Age: 84
End: 2019-04-22

## 2019-04-22 DIAGNOSIS — I82.5Y1 CHRONIC DEEP VEIN THROMBOSIS (DVT) OF PROXIMAL VEIN OF RIGHT LOWER EXTREMITY (HCC): ICD-10-CM

## 2019-04-22 LAB — INR BLD: 1.2 (ref 0.85–1.13)

## 2019-04-22 PROCEDURE — 85610 PROTHROMBIN TIME: CPT

## 2019-04-22 NOTE — PROGRESS NOTES
Medication Management [address]  [phone] (phone)  [phone] (fax)    INR drawn by [facility]. Nursing assessment reviewed and appreciated. Nursing assessment within the normal limits with the exception of the following:  Patient took 5 mg on W instead of 2.5 mg. Assessment:  Lab Results   Component Value Date    INR 1.20 (H) 04/22/2019    INR 1.43 (H) 04/15/2019    INR 5.00 04/11/2019     INR subtherapeutic   Recent Labs     04/22/19  1120   INR 1.20*         Plan:  Coumadin 7.5 mg then increase Coumadin 5 mg  [id number]. Recheck INR in 1 week. Assessment and plan reviewed with Yudelka MendozaD. Patient reminded to call the Anticoagulation Clinic with any signs or symptoms of bleeding or with any medication changes. Patient given instructions utilizing the teach back method.

## 2019-04-26 ENCOUNTER — OFFICE VISIT (OUTPATIENT)
Dept: FAMILY MEDICINE CLINIC | Age: 84
End: 2019-04-26
Payer: MEDICARE

## 2019-04-26 VITALS
RESPIRATION RATE: 16 BRPM | DIASTOLIC BLOOD PRESSURE: 60 MMHG | HEART RATE: 72 BPM | WEIGHT: 253 LBS | SYSTOLIC BLOOD PRESSURE: 116 MMHG | TEMPERATURE: 99.3 F | BODY MASS INDEX: 39.63 KG/M2

## 2019-04-26 DIAGNOSIS — I10 ESSENTIAL HYPERTENSION: ICD-10-CM

## 2019-04-26 DIAGNOSIS — N39.0 URINARY TRACT INFECTION WITH HEMATURIA, SITE UNSPECIFIED: ICD-10-CM

## 2019-04-26 DIAGNOSIS — E11.8 TYPE 2 DIABETES MELLITUS WITH COMPLICATION, WITH LONG-TERM CURRENT USE OF INSULIN (HCC): ICD-10-CM

## 2019-04-26 DIAGNOSIS — R31.9 URINARY TRACT INFECTION WITH HEMATURIA, SITE UNSPECIFIED: ICD-10-CM

## 2019-04-26 DIAGNOSIS — R35.0 URINARY FREQUENCY: Primary | ICD-10-CM

## 2019-04-26 DIAGNOSIS — Z79.4 TYPE 2 DIABETES MELLITUS WITH COMPLICATION, WITH LONG-TERM CURRENT USE OF INSULIN (HCC): ICD-10-CM

## 2019-04-26 DIAGNOSIS — J43.1 PANLOBULAR EMPHYSEMA (HCC): ICD-10-CM

## 2019-04-26 LAB
BILIRUBIN URINE: NEGATIVE
BLOOD URINE, POC: ABNORMAL
CHARACTER, URINE: ABNORMAL
COLOR, URINE: YELLOW
GLUCOSE URINE: NEGATIVE MG/DL
KETONES, URINE: ABNORMAL
LEUKOCYTE CLUMPS, URINE: ABNORMAL
NITRITE, URINE: NEGATIVE
PH, URINE: 5.5 (ref 5–9)
PROTEIN, URINE: 30 MG/DL
SPECIFIC GRAVITY, URINE: 1.02 (ref 1–1.03)
UROBILINOGEN, URINE: 1 EU/DL (ref 0–1)

## 2019-04-26 PROCEDURE — 3023F SPIROM DOC REV: CPT | Performed by: NURSE PRACTITIONER

## 2019-04-26 PROCEDURE — G8417 CALC BMI ABV UP PARAM F/U: HCPCS | Performed by: NURSE PRACTITIONER

## 2019-04-26 PROCEDURE — G8427 DOCREV CUR MEDS BY ELIG CLIN: HCPCS | Performed by: NURSE PRACTITIONER

## 2019-04-26 PROCEDURE — 1036F TOBACCO NON-USER: CPT | Performed by: NURSE PRACTITIONER

## 2019-04-26 PROCEDURE — 81003 URINALYSIS AUTO W/O SCOPE: CPT | Performed by: NURSE PRACTITIONER

## 2019-04-26 PROCEDURE — 99214 OFFICE O/P EST MOD 30 MIN: CPT | Performed by: NURSE PRACTITIONER

## 2019-04-26 PROCEDURE — G8926 SPIRO NO PERF OR DOC: HCPCS | Performed by: NURSE PRACTITIONER

## 2019-04-26 PROCEDURE — 4040F PNEUMOC VAC/ADMIN/RCVD: CPT | Performed by: NURSE PRACTITIONER

## 2019-04-26 PROCEDURE — 1123F ACP DISCUSS/DSCN MKR DOCD: CPT | Performed by: NURSE PRACTITIONER

## 2019-04-26 RX ORDER — SULFAMETHOXAZOLE AND TRIMETHOPRIM 800; 160 MG/1; MG/1
1 TABLET ORAL 2 TIMES DAILY
Qty: 14 TABLET | Refills: 0 | Status: SHIPPED | OUTPATIENT
Start: 2019-04-26 | End: 2019-05-03

## 2019-04-26 ASSESSMENT — ENCOUNTER SYMPTOMS
FACIAL SWELLING: 0
SINUS PAIN: 0
VOMITING: 0
DIARRHEA: 0
SORE THROAT: 0
COUGH: 0
SHORTNESS OF BREATH: 1
TROUBLE SWALLOWING: 0
BACK PAIN: 0
BLOOD IN STOOL: 0
COLOR CHANGE: 0
NAUSEA: 0
WHEEZING: 0
ABDOMINAL PAIN: 0
EYE PAIN: 0

## 2019-04-26 NOTE — PATIENT INSTRUCTIONS
Patient Education        Urinary Tract Infections in Men: Care Instructions  Your Care Instructions    A urinary tract infection, or UTI, is a general term for an infection anywhere between the kidneys and the tip of the penis. UTIs can also be a result of a prostate problem. Most cause pain or burning when you urinate. Most UTIs are caused by bacteria and can be cured with antibiotics. It is important to complete your treatment so that the infection does not get worse. Follow-up care is a key part of your treatment and safety. Be sure to make and go to all appointments, and call your doctor if you are having problems. It's also a good idea to know your test results and keep a list of the medicines you take. How can you care for yourself at home? · Take your antibiotics as prescribed. Do not stop taking them just because you feel better. You need to take the full course of antibiotics. · Take your medicines exactly as prescribed. Your doctor may have prescribed a medicine, such as phenazopyridine (Pyridium), to help relieve pain when you urinate. This turns your urine orange. You may stop taking it when your symptoms get better. But be sure to take all of your antibiotics, which treat the infection. · Drink extra water for the next day or two. This will help make the urine less concentrated and help wash out the bacteria causing the infection. (If you have kidney, heart, or liver disease and have to limit your fluids, talk with your doctor before you increase your fluid intake.)  · Avoid drinks that are carbonated or have caffeine. They can irritate the bladder. · Urinate often. Try to empty your bladder each time. · To relieve pain, take a hot bath or lay a heating pad (set on low) over your lower belly or genital area. Never go to sleep with a heating pad in place. To help prevent UTIs  · Drink plenty of fluids, enough so that your urine is light yellow or clear like water.  If you have kidney, heart, or liver disease and have to limit fluids, talk with your doctor before you increase the amount of fluids you drink. · Urinate when you have the urge. Do not hold your urine for a long time. Urinate before you go to sleep. · Keep your penis clean. Catheter care  If you have a drainage tube (catheter) in place, the following steps will help you care for it. · Always wash your hands before and after touching your catheter. · Check the area around the urethra for inflammation or signs of infection. Signs of infection include irritated, swollen, red, or tender skin, or pus around the catheter. · Clean the area around the catheter with soap and water two times a day. Dry with a clean towel afterward. · Do not apply powder or lotion to the skin around the catheter. To empty the urine collection bag  · Wash your hands with soap and water. · Without touching the drain spout, remove the spout from its sleeve at the bottom of the collection bag. Open the valve on the spout. · Let the urine flow out of the bag and into the toilet or a container. Do not let the tubing or drain spout touch anything. · After you empty the bag, clean the end of the drain spout with tissue and water. Close the valve and put the drain spout back into its sleeve at the bottom of the collection bag. · Wash your hands with soap and water. When should you call for help? Call your doctor now or seek immediate medical care if:    · Symptoms such as a fever, chills, nausea, or vomiting get worse or happen for the first time.     · You have new pain in your back just below your rib cage. This is called flank pain.     · There is new blood or pus in your urine.     · You are not able to take or keep down your antibiotics.    Watch closely for changes in your health, and be sure to contact your doctor if:    · You are not getting better after taking an antibiotic for 2 days.     · Your symptoms go away but then come back.    Where can you learn

## 2019-04-26 NOTE — PROGRESS NOTES
[facility] Family [facility] [address] [id number]  Dept: [phone]  Dept Fax: [phone]    SUBJECTIVE     Ariane Winter is a 80 y. o.male  Chief Complaint   Patient presents with    Check-Up     Recent d/c from [facility]. Previously admitted to hospital for weakness and SOB. Home for the past week. Doing better. Receiving home health from [address] [place] [address]. Pt presents for follow up after being [facility]. Follow up DM, COPD, Hypothyroidism. Blood sugar- Checking in -170's. Up a little since being in the hospital.  Follows up next week with Internal medicine. Taking insulin as directed. Pt has been having home health, nursing in the home 3 days weekly. PT came for a home visit and stated that he didn't need to have it anymore. Pt did well with PT while at the nursing home as well. Pt reports that his fatigue is better. Pt reports that since he has been out of the hospital he has been urinating more frequently. He states this happens after being out of the hospital, but this time it is lasting longer. He denies any pain or blood in his urine, but he has started to wear a diaper because he can not control his urine.        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 #45=30 days (Patient taking differently: Take 7.5 mg by mouth Take as directed by [facility]. #45=30 days  Taking 5mg on Sunday, Tuesday, Wednesday, Thursday, and Saturday.) 45 tablet 11    insulin glargine (LANTUS SOLOSTAR) 100 UNIT/ML injection pen Inject 18 Units into the skin nightly 15 pen 3    levothyroxine (SYNTHROID) 175 MCG tablet Take one tablet daily 90 tablet 3    AZOPT 1 % ophthalmic suspension Place 1 drop into both eyes 3 times daily       Arformoterol Tartrate (BROVANA) 15 MCG/2ML NEBU Take 1 ampule by nebulization 2 times daily       budesonide (PULMICORT) 0.25 MG/2ML nebulizer suspension Take 1 ampule by nebulization 2 times daily       albuterol (PROVENTIL HFA;VENTOLIN HFA) 108 (90 BASE) MCG/ACT inhaler Inhale 2 puffs into the lungs every 6 hours as needed for Wheezing      OXYGEN Inhale into the lungs as needed (Uses at night only) Indications: Difficulty Breathing, Oxygen Therapy 3 liters       No current facility-administered medications for this visit. Review of Systems   Constitutional: Negative for chills, fatigue and fever. HENT: Negative for congestion, facial swelling, sinus pain, sore throat and trouble swallowing. Eyes: Negative for pain and visual disturbance. Respiratory: Positive for shortness of breath (COPD). Negative for cough and wheezing. Cardiovascular: Negative for chest pain and palpitations. Gastrointestinal: Negative for abdominal pain, blood in stool, diarrhea, nausea and vomiting. Genitourinary: Positive for frequency and urgency. Negative for difficulty urinating, dysuria and hematuria. Musculoskeletal: Negative for back pain, gait problem and neck pain. Skin: Negative for color change and rash. Neurological: Negative for dizziness, weakness and headaches.    Psychiatric/Behavioral: Negative for sleep disturbance. The patient is not nervous/anxious. OBJECTIVE     /60 (Site: Left Upper Arm, Position: Sitting, Cuff Size: Large Adult)   Pulse 72   Temp 99.3 °F (37.4 °C) (Oral)   Resp 16   Wt 253 lb (114.8 kg)   BMI 39.63 kg/m²     Wt Readings from Last 3 Encounters:   04/26/19 253 lb (114.8 kg)   04/05/19 245 lb 14.4 oz (111.5 kg)   04/01/19 246 lb 8 oz (111.8 kg)     Body mass index is 39.63 kg/m². Physical Exam   Constitutional: He is oriented to person, place, and time. He appears well-developed and well-nourished. No distress. HENT:   Head: Normocephalic and atraumatic. Right Ear: Hearing, tympanic membrane, external ear and ear canal normal.   Left Ear: Hearing, tympanic membrane, external ear and ear canal normal.   Nose: Nose normal.   Mouth/Throat: Uvula is midline, oropharynx is clear and moist and mucous membranes are normal.   Eyes: Conjunctivae are normal. Right eye exhibits no discharge. Left eye exhibits no discharge. Neck: Normal range of motion. Neck supple. No tracheal deviation present. Cardiovascular: Normal rate, regular rhythm and normal heart sounds. No murmur heard. Pulmonary/Chest: Effort normal and breath sounds normal. No respiratory distress. Abdominal: Soft. Normal appearance and bowel sounds are normal. There is no tenderness. There is no CVA tenderness. Musculoskeletal: Normal range of motion. He exhibits no edema. Neurological: He is alert and oriented to person, place, and time. No cranial nerve deficit. Coordination normal.   Skin: Skin is warm and dry. No rash noted. Psychiatric: He has a normal mood and affect. His behavior is normal. Judgment and thought content normal.   Vitals reviewed.       Lab Results   Component Value Date    LABA1C 7.3 (H) 03/14/2019       Lab Results   Component Value Date    CHOL 112 04/18/2019    TRIG 222 (H) 04/18/2019    HDL 35 04/18/2019    LDLCALC 33 04/18/2019    LDLDIRECT 69.29 10/10/2018 Immunization History   Administered Date(s) Administered    Influenza Virus Vaccine 11/01/2011, 11/05/2012, 10/31/2013, 11/11/2014, 09/29/2015    Influenza Whole 10/01/2010    Influenza, Penny Shorews, 3 Years and older, IM (Fluzone 3 yrs and older or Afluria 5 yrs and older) 11/17/2017    Influenza, Penny Shorews, 6 mo and older, IM, PF (Flulaval, Fluarix) 09/27/2018    Influenza, Triv, 3 Years and older, IM (Afluria (5 yrs and older) 09/26/2016    Pneumococcal 13-valent Conjugate ([id number]) 12/19/2014    Pneumococcal Polysaccharide ([id number]) 01/01/2003    Td, unspecified formulation 06/08/2010       Health Maintenance   Topic Date Due    DTaP/Tdap/Td vaccine (1 - Tdap) 06/09/2010    Shingles Vaccine (1 of 2) 08/14/2019 (Originally [birth year]/1981)    TSH testing  01/22/2020    Potassium monitoring  04/18/2020    Creatinine monitoring  04/18/2020    Flu vaccine  Completed    Pneumococcal 65+ years Vaccine  Completed       Future Appointments   Date Time Provider \[facility]\""   5/8/2019 11:00 AM MAEGAN Godwin CNP [facility] Physic [facility] - [place]   7/3/2019  2:15 PM Thomas Dixon PA-C [facility] [email or web address]   7/31/2019 10:15 AM Donya Zapata MD SRPX Physic [facility] [place]   8/15/2019 12:15 PM Filiberto Ahuja MD [facility]   1/31/2020 10:30 [facility] [address]     Results for POC orders placed in visit on 04/26/19   POCT Urinalysis No Micro (Auto)   Result Value Ref Range    Glucose, Ur Negative NEGATIVE mg/dl    Bilirubin Urine Negative     Ketones, Urine Trace (A) NEGATIVE    Specific Gravity, Urine 1.020 1.002 - 1.03    Blood, UA POC Trace-intact NEGATIVE    pH, Urine 5.50 5.0 - 9.0    Protein, Urine 30 (A) NEGATIVE mg/dl    Urobilinogen, Urine 1.00 0.0 - 1.0 eu/dl    Nitrite, Urine Negative NEGATIVE    Leukocyte Clumps, Urine Large (A) NEGATIVE    Color, Urine Yellow YELLOW-STR    Character, Urine Cloudy (A) [email or web address]       ASSESSMENT Felix Stable Makayla Quarles was seen today for check-up. Diagnoses and all orders for this visit:    Urinary frequency  -     POCT Urinalysis No Micro (Auto)  -     Urine Culture    Urinary tract infection with hematuria, site unspecified  -     sulfamethoxazole-trimethoprim (BACTRIM DS;SEPTRA DS) 800-160 MG per tablet; Take 1 tablet by mouth 2 times daily for 7 days    Essential hypertension    Panlobular emphysema (HCC)    Type 2 diabetes mellitus with complication, with long-term current use of insulin (HCC)      - Rest and increase clear fluids  - Start Bactrim for UTI  - Call office with any questions or concerns, or if symptoms are getting worse or changing  - No medication refills today  - Continue home health as planned  - follow up with Dr Gabby Gutierrez in 3 months  - Patient given educational materials - see patient instructions. Discussed use, benefit, and side effects of prescribed medications. All patient questions answered. Pt voiced understanding. Reviewed healthmaintenance.      Electronically signed by MAEGAN Schneider CNP on 4/26/2019 at 3:53 PM

## 2019-04-28 LAB
ORGANISM: ABNORMAL
URINE CULTURE, ROUTINE: ABNORMAL

## 2019-04-29 ENCOUNTER — ANTI-COAG VISIT (OUTPATIENT)
Dept: PHARMACY | Age: 84
End: 2019-04-29

## 2019-04-29 ENCOUNTER — HOSPITAL ENCOUNTER (OUTPATIENT)
Age: 84
Setting detail: SPECIMEN
Discharge: HOME | End: 2019-04-29
Payer: MEDICARE

## 2019-04-29 DIAGNOSIS — I82.5Y1 CHRONIC DEEP VEIN THROMBOSIS (DVT) OF PROXIMAL VEIN OF RIGHT LOWER EXTREMITY (HCC): ICD-10-CM

## 2019-04-29 LAB — INR BLD: 1.73 (ref 0.85–1.13)

## 2019-04-29 PROCEDURE — 85610 PROTHROMBIN TIME: CPT

## 2019-04-29 NOTE — PROGRESS NOTES
Medication Management [address]  [phone] (phone)  [phone] (fax)    INR drawn by [facility]. Nursing assessment reviewed and appreciated. Nursing assessment within the normal limits. Patient verifies current dosing regimen and tablet strength. No missed or extra doses. Patient denies s/s bleeding/bruising/swelling/SOB/chest pain  No blood in urine or stool. Patient reports is eating more since leaving nursing home. No changes in OTC agents/Herbals. Patient started Bactrim 1 DS BID x 7 days. It was prescribed 4/26/19, but patient states he started this 4/28/19. Patient is set to finish course 5/4/19  No change in alcohol use or tobacco use. No change in activity level. Patient denies headaches/dizziness/lightheadedness/falls. No vomiting/diarrhea or acute illness. No Procedures scheduled in the future at this time    Assessment:  Lab Results   Component Value Date    INR 1.73 (H) 04/29/2019    INR 1.20 (H) 04/22/2019    INR 1.43 (H) 04/15/2019     INR subtherapeutic   Recent Labs     04/29/19  1125   INR 1.73*     Patient is subtherapeutic today. He has recent history of labile INRs. Will have to empirically reduce patient's current home dose due to Bactrim interaction. Plan:  Coumadin 5 mg x 1 dose today 4/29/19, then take Coumadin 2.5 mg daily while on Bactrim 4/30/19-5/4/19, then take Coumadin 5 mg x 1 dose 5/5/19. Recheck INR in 1 weeks on 5/6/19. Patient reminded to call the Anticoagulation Clinic with any signs or symptoms of bleeding or with any medication changes. Patient given instructions utilizing the teach back method.     Esme Castillo, PharmD, BCPS  4/29/2019  3:37 PM

## 2019-05-06 ENCOUNTER — HOSPITAL ENCOUNTER (OUTPATIENT)
Age: 84
Setting detail: SPECIMEN
Discharge: HOME | End: 2019-05-06
Payer: MEDICARE

## 2019-05-06 ENCOUNTER — TELEPHONE (OUTPATIENT)
Dept: PHARMACY | Age: 84
End: 2019-05-06

## 2019-05-06 LAB — INR BLD: 1.13 (ref 0.85–1.13)

## 2019-05-06 PROCEDURE — 85610 PROTHROMBIN TIME: CPT

## 2019-05-07 ENCOUNTER — ANTI-COAG VISIT (OUTPATIENT)
Dept: PHARMACY | Age: 84
End: 2019-05-07

## 2019-05-07 NOTE — PROGRESS NOTES
Medication Management [address]  [phone] (phone)  [phone] (fax)    INR drawn by [facility]. Nursing assessment reviewed and appreciated. Nursing assessment within the normal limits with the exception of the following:  Bactrim therapy complete. Took only 2.5mg yesterday    Assessment:  Lab Results   Component Value Date    INR 1.13 05/06/2019    INR 1.73 (H) 04/29/2019    INR 1.20 (H) 04/22/2019     INR subtherapeutic   Recent Labs     05/06/19  1530   INR 1.13         Plan:  Coumadin 7.5mg today and tomorrow, then continue Coumadin 5mg daily. Recheck INR in 6 days, on 5/14/19. Patient reminded to call the Anticoagulation Clinic with any signs or symptoms of bleeding or with any medication changes. Patient given instructions utilizing the teach back method.

## 2019-05-08 ENCOUNTER — OFFICE VISIT (OUTPATIENT)
Dept: INTERNAL MEDICINE CLINIC | Age: 84
End: 2019-05-08
Payer: MEDICARE

## 2019-05-08 VITALS
BODY MASS INDEX: 39.77 KG/M2 | OXYGEN SATURATION: 96 % | DIASTOLIC BLOOD PRESSURE: 58 MMHG | WEIGHT: 253.4 LBS | HEIGHT: 67 IN | HEART RATE: 67 BPM | SYSTOLIC BLOOD PRESSURE: 130 MMHG | RESPIRATION RATE: 18 BRPM

## 2019-05-08 DIAGNOSIS — Z79.4 TYPE 2 DIABETES MELLITUS WITH COMPLICATION, WITH LONG-TERM CURRENT USE OF INSULIN (HCC): Primary | ICD-10-CM

## 2019-05-08 DIAGNOSIS — E11.8 TYPE 2 DIABETES MELLITUS WITH COMPLICATION, WITH LONG-TERM CURRENT USE OF INSULIN (HCC): Primary | ICD-10-CM

## 2019-05-08 PROCEDURE — G8417 CALC BMI ABV UP PARAM F/U: HCPCS | Performed by: NURSE PRACTITIONER

## 2019-05-08 PROCEDURE — 99214 OFFICE O/P EST MOD 30 MIN: CPT | Performed by: NURSE PRACTITIONER

## 2019-05-08 PROCEDURE — 4040F PNEUMOC VAC/ADMIN/RCVD: CPT | Performed by: NURSE PRACTITIONER

## 2019-05-08 PROCEDURE — 1123F ACP DISCUSS/DSCN MKR DOCD: CPT | Performed by: NURSE PRACTITIONER

## 2019-05-08 PROCEDURE — 1036F TOBACCO NON-USER: CPT | Performed by: NURSE PRACTITIONER

## 2019-05-08 PROCEDURE — G8427 DOCREV CUR MEDS BY ELIG CLIN: HCPCS | Performed by: NURSE PRACTITIONER

## 2019-05-08 NOTE — PROGRESS NOTES
2019     Chong Costa (:  1931) is a 80 y.o. male, here for evaluation of the following medical concerns: Diabetes    Diabetes-  Dianclaudia Hahn states he was diagnosed with diabetes approximately 20 years ago. He has been on insulin since that time. He states his diabetes is well controlled. Last A1C 7.3% in 3/2019. He is taking metformin 1000 mg BID and Lantus 18 units daily. Last urinary microalbumin/crt ratio 33 in 3/2019. States hypoglycemic events not present. Reports checking glucose 1 time per day, with range of glucose readings 140-180. He did not bring meter for download. Was just discharged from [facility] 1 week ago. He reports that he is doing well at home, and has home health. He denies any falls. He is not attempting dietary modification for diabetes management and/or weight loss. He is 80years old. Has not followed with diabetes clinic recently. Does report difficulty with obtaining medications. Last eye exam within 1 year reportedly. BP reccs <140/90 (<130/80 in CVD risk). On aspirin 81 mg daily and atorvastatin 80 mg daily. Denies polyuria, polydipsia, polyphagia. Denies neuropathic symptoms including numbness, tingling. Does not have any wounds to feet. He is off of the oxygen that he was on previously when I saw him. Follows with Dr. Markus Montaño routinely for cardiac issues. Voices financial concerns. States that he has not had the money to replace a tire on his car, so he has not been able to get out of the house. However in order to make it to appointment today, he had to replace it, now he has to figure out how to pay for it. Encouraged him to discuss these issues with his children. He states he is too proud to ask for their help, but agrees to at least make them aware of the situation he is currently in financially. Review of Systems   Constitutional: Negative for appetite change, chills, fatigue and fever. Respiratory: Positive for shortness of breath (on exertion).  Negative for cough, choking, chest tightness and wheezing. Cardiovascular: Negative for chest pain, palpitations and leg swelling. Gastrointestinal: Negative for abdominal distention, abdominal pain, blood in stool, constipation, diarrhea, nausea and vomiting. Endocrine: Negative for polydipsia, polyphagia and polyuria. Genitourinary: Negative for difficulty urinating, dysuria, frequency and hematuria. Musculoskeletal: Negative for arthralgias and myalgias. Skin: Negative for color change, pallor, rash and wound. Neurological: Positive for weakness (feels that he is getting stronger- PT). Negative for dizziness, light-headedness and headaches. Prior to Visit Medications    Medication Sig Taking?  Authorizing Provider   aspirin 81 MG tablet Take 81 mg by mouth daily Yes Historical Provider, MD   Ferrous Sulfate (IRON) 325 (65 Fe) MG TABS Take by mouth 2 times daily  Yes Historical Provider, MD   potassium chloride (KLOR-CON) 20 MEQ packet Take 20 mEq by mouth daily Yes Historical Provider, MD   guaiFENesin (MUCINEX) 600 MG extended release tablet Take 1 tablet by mouth 2 times daily Yes Patricia Winkler MD   furosemide (LASIX) 40 MG tablet Take 1 tablet by mouth 2 times daily Yes Patricia Winkler MD   metFORMIN (GLUCOPHAGE) 500 MG tablet Take 2 tablets by mouth 2 times daily (with meals) Yes Patricia Winkler MD   doxazosin (CARDURA) 4 MG tablet Take 1 tablet by mouth nightly Yes Patricia Winkler MD   metoprolol tartrate (LOPRESSOR) 50 MG tablet Take 1 tablet by mouth 2 times daily  Patient taking differently: Take 25 mg by mouth 2 times daily  Yes Patricia Winkler MD   Multiple Vitamins-Minerals (PRESERVISION AREDS 2) CAPS Take 2 tablets by mouth 2 times daily Yes Historical Provider, MD   atorvastatin (LIPITOR) 80 MG tablet Take 80 mg by mouth nightly Yes Historical Provider, MD   Cholecalciferol (VITAMIN D3) 1000 units CAPS Take by mouth daily Yes Historical Provider, MD   warfarin (COUMADIN) 5 MG tablet Take as ear normal.   Left Ear: External ear normal.   Nose: Nose normal.   Mouth/Throat: Oropharynx is clear and moist.   Eyes: Pupils are equal, round, and reactive to light. EOM are normal.   Neck: Normal range of motion. Neck supple. No JVD present. No thyromegaly present. Cardiovascular: Normal rate, regular rhythm and normal heart sounds. Exam reveals no gallop and no friction rub. No murmur heard. Pulmonary/Chest: Effort normal and breath sounds normal. No respiratory distress. He has no wheezes. He has no rales. Abdominal: Soft. Bowel sounds are normal. He exhibits no distension. There is no tenderness. Musculoskeletal: Normal range of motion. He exhibits no edema. Lymphadenopathy:     He has no cervical adenopathy. Neurological: He is alert and oriented to person, place, and time. Skin: Skin is warm and dry. Capillary refill takes less than 2 seconds. Psychiatric: He has a normal mood and affect. His behavior is normal. Judgment and thought content normal.       ASSESSMENT/PLAN:  1. Type 2 diabetes mellitus with complication, with long-term [facility]    Call me in 1 week if morning blood sugars greater than 150    Return in about 3 months (around 8/8/2019). An electronic signature was used to authenticate this note.     --MAEGAN Topete - CNP on 5/22/2019 at 9:29 AM

## 2019-05-14 ENCOUNTER — ANTI-COAG VISIT (OUTPATIENT)
Dept: PHARMACY | Age: 84
End: 2019-05-14

## 2019-05-14 ENCOUNTER — HOSPITAL ENCOUNTER (OUTPATIENT)
Age: 84
Setting detail: SPECIMEN
Discharge: HOME | End: 2019-05-14
Payer: MEDICARE

## 2019-05-14 DIAGNOSIS — I82.5Y1 CHRONIC DEEP VEIN THROMBOSIS (DVT) OF PROXIMAL VEIN OF RIGHT LOWER EXTREMITY (HCC): ICD-10-CM

## 2019-05-14 LAB — INR BLD: 1.41 (ref 0.85–1.13)

## 2019-05-14 PROCEDURE — 85610 PROTHROMBIN TIME: CPT

## 2019-05-14 NOTE — PROGRESS NOTES
Medication Management [facility]  Anticoagulation Clinic  [phone] (phone)  [phone] (fax)    INR drawn by [facility]. Nursing assessment reviewed and appreciated. Nursing assessment within normal limits. Patient verifies current dosing regimen and tablet strength. No missed or extra doses. Patient denies s/s bleeding/bruising/swelling/SOB/chest pain  No changes in medication/OTC agents/Herbals. Assessment:  Lab Results   Component Value Date    INR 1.41 (H) 05/14/2019    INR 1.13 05/06/2019    INR 1.73 (H) 04/29/2019     INR subtherapeutic   Recent Labs     05/14/19  1220   INR 1.41*         Plan:  Take coumadin 7.5 mg x 1 day and then Continue Coumadin 7.5 mg MF; 5 mg TWThSSu. Recheck INR 1 weeks. Patient reminded to call the Anticoagulation Clinic with signs or symptoms of bleeding or with any medication changes. Patient given instructions utilizing the teach back method.      Sohan Alonzo, PharmD, BCPS  PGY2 Ambulatory Care Resident

## 2019-05-21 ENCOUNTER — ANTI-COAG VISIT (OUTPATIENT)
Dept: PHARMACY | Age: 84
End: 2019-05-21

## 2019-05-21 ENCOUNTER — HOSPITAL ENCOUNTER (OUTPATIENT)
Age: 84
Setting detail: SPECIMEN
Discharge: HOME | End: 2019-05-21
Payer: MEDICARE

## 2019-05-21 LAB — INR BLD: 2.42 (ref 0.85–1.13)

## 2019-05-21 PROCEDURE — 85610 PROTHROMBIN TIME: CPT

## 2019-05-21 NOTE — PROGRESS NOTES
Medication Management [address]   [phone] (phone)  [phone] (fax)    INR drawn by [facility]. Nursing assessment reviewed and appreciated. Nursing assessment within the normal limits with the exception of the following:  Has his usual SOB    Assessment:  Lab Results   Component Value Date    INR 2.42 (H) 05/21/2019    INR 1.41 (H) 05/14/2019    INR 1.13 05/06/2019     INR therapeutic   Recent Labs     05/21/19  1105   INR 2.42*         Plan:  Continue Coumadin 7.5mg MF and 5mg TWThSaS. Recheck INR in 1 weeks, on 5/28/19. Patient reminded to call the Anticoagulation Clinic with any signs or symptoms of bleeding or with any medication changes. Patient given instructions utilizing the teach back method. Discharged ambulatory in no apparent distress.

## 2019-05-22 PROBLEM — K92.1 MELENA: Status: RESOLVED | Noted: 2019-03-19 | Resolved: 2019-05-22

## 2019-05-22 PROBLEM — I48.91 NEW ONSET ATRIAL FIBRILLATION (HCC): Status: RESOLVED | Noted: 2017-12-15 | Resolved: 2019-05-22

## 2019-05-22 PROBLEM — J44.9 COPD (CHRONIC OBSTRUCTIVE PULMONARY DISEASE) (HCC): Status: RESOLVED | Noted: 2019-03-14 | Resolved: 2019-05-22

## 2019-05-22 PROBLEM — E87.29 RESPIRATORY ACIDOSIS: Status: RESOLVED | Noted: 2019-03-14 | Resolved: 2019-05-22

## 2019-05-22 ASSESSMENT — ENCOUNTER SYMPTOMS
ABDOMINAL PAIN: 0
CONSTIPATION: 0
ABDOMINAL DISTENTION: 0
SHORTNESS OF BREATH: 1
COUGH: 0
CHOKING: 0
COLOR CHANGE: 0
CHEST TIGHTNESS: 0
DIARRHEA: 0
WHEEZING: 0
NAUSEA: 0
VOMITING: 0
BLOOD IN STOOL: 0

## 2019-05-28 ENCOUNTER — ANTI-COAG VISIT (OUTPATIENT)
Dept: PHARMACY | Age: 84
End: 2019-05-28

## 2019-05-28 ENCOUNTER — HOSPITAL ENCOUNTER (OUTPATIENT)
Age: 84
Setting detail: SPECIMEN
Discharge: HOME | End: 2019-05-28
Payer: MEDICARE

## 2019-05-28 ENCOUNTER — TELEPHONE (OUTPATIENT)
Dept: FAMILY MEDICINE CLINIC | Age: 84
End: 2019-05-28

## 2019-05-28 DIAGNOSIS — R30.0 DYSURIA: Primary | ICD-10-CM

## 2019-05-28 DIAGNOSIS — R35.0 URINARY FREQUENCY: ICD-10-CM

## 2019-05-28 DIAGNOSIS — I82.501 CHRONIC DEEP VEIN THROMBOSIS (DVT) OF RIGHT LOWER EXTREMITY, UNSPECIFIED VEIN (HCC): ICD-10-CM

## 2019-05-28 LAB — INR BLD: 2.03 (ref 0.85–1.13)

## 2019-05-28 PROCEDURE — 85610 PROTHROMBIN TIME: CPT

## 2019-05-28 NOTE — PROGRESS NOTES
Medication Management [facility]  Anticoagulation Clinic  [phone] (phone)  [phone] (fax)    INR drawn by [facility]. Nursing assessment reviewed and appreciated. Nursing assessment within normal limits. Patient's last home health visit is today. Scheduled appointment for patient on 6/11/19. Patient verifies current dosing regimen and tablet strength. No missed or extra doses. Patient denies s/s bleeding/bruising/swelling/SOB/chest pain  No changes in medication/OTC agents/Herbals. lasix    Assessment:  Lab Results   Component Value Date    INR 2.03 (H) 05/28/2019    INR 2.42 (H) 05/21/2019    INR 1.41 (H) 05/14/2019     INR therapeutic   Recent Labs     05/28/19  0955   INR 2.03*         Plan:  Continue Coumadin 7.5 mg MF; 5 mg TWThSSu. Recheck INR 2 weeks. Patient reminded to call the Anticoagulation Clinic with signs or symptoms of bleeding or with any medication changes. Patient given instructions utilizing the teach back method.      Agnieszka Sarmiento, PharmD, BCPS  PGY2 Ambulatory Care Resident

## 2019-05-28 NOTE — TELEPHONE ENCOUNTER
[facility] from [facility] is calling stating the patient is c/o burning when urinating along with frequent. She is not sure if Dr. Ade Mei would want to see of if she can get a order for urinet.  Please advise to [facility] at [phone]

## 2019-05-30 ENCOUNTER — HOSPITAL ENCOUNTER (OUTPATIENT)
Age: 84
Discharge: HOME | End: 2019-05-30
Payer: MEDICARE

## 2019-05-30 DIAGNOSIS — R30.0 DYSURIA: ICD-10-CM

## 2019-05-30 DIAGNOSIS — R35.0 URINARY FREQUENCY: ICD-10-CM

## 2019-05-30 LAB
BACTERIA: ABNORMAL /HPF
BILIRUBIN URINE: NEGATIVE
BLOOD, URINE: ABNORMAL
CASTS 2: ABNORMAL /LPF
CASTS UA: ABNORMAL /LPF
CHARACTER, URINE: CLEAR
COLOR: YELLOW
CRYSTALS, UA: ABNORMAL
EPITHELIAL CELLS, UA: ABNORMAL /HPF
GLUCOSE URINE: NEGATIVE MG/DL
KETONES, URINE: NEGATIVE
LEUKOCYTE ESTERASE, URINE: ABNORMAL
MISCELLANEOUS 2: ABNORMAL
NITRITE, URINE: NEGATIVE
PH UA: 5.5 (ref 5–9)
PROTEIN UA: NEGATIVE
RBC URINE: ABNORMAL /HPF
RENAL EPITHELIAL, UA: ABNORMAL
SPECIFIC GRAVITY, URINE: 1.01 (ref 1–1.03)
UROBILINOGEN, URINE: 1 EU/DL (ref 0–1)
WBC UA: ABNORMAL /HPF
YEAST: ABNORMAL

## 2019-05-30 PROCEDURE — 81001 URINALYSIS AUTO W/SCOPE: CPT

## 2019-05-30 PROCEDURE — 87086 URINE CULTURE/COLONY COUNT: CPT

## 2019-05-30 NOTE — TELEPHONE ENCOUNTER
Note scanned into media from [facility]. Pt was discharged from home [facility] on 5/28/19 same day they called office with urinary sx's. Called pt.  Still with urinary symptoms, will go to [facility] for lab test.   Order printed and faxed

## 2019-05-31 ENCOUNTER — TELEPHONE (OUTPATIENT)
Dept: FAMILY MEDICINE CLINIC | Age: 84
End: 2019-05-31

## 2019-05-31 LAB — URINE CULTURE REFLEX: NORMAL

## 2019-05-31 NOTE — TELEPHONE ENCOUNTER
----- Message from Mynor Farley MD sent at 5/30/2019  6:01 PM EDT -----  Notify pt-   UA reviewed. Trace Blood, moderate LE, and 25-50 WBC on micro, negative Nitrites. Culture pending. How is pt feeling at this time? Given allergies to Macrobid and Levaquin- would like to wait until culture completed if possible. Let me know.   ES

## 2019-05-31 NOTE — TELEPHONE ENCOUNTER
Actually, Urine Culture reviewed and negative for any infection, only contamination. Would recommend increasing water intake gently, using Cranberry Juice occasionally.     Can recheck next week if symptoms persist.  ES

## 2019-05-31 NOTE — TELEPHONE ENCOUNTER
Patient notified of results. States that the incontinence and discomfort are \"pretty bad\" and he would like it addressed soon if possible. Please advise.

## 2019-06-11 ENCOUNTER — HOSPITAL ENCOUNTER (OUTPATIENT)
Dept: PHARMACY | Age: 84
Setting detail: THERAPIES SERIES
Discharge: HOME | End: 2019-06-11
Payer: MEDICARE

## 2019-06-11 DIAGNOSIS — I82.501 CHRONIC DEEP VEIN THROMBOSIS (DVT) OF RIGHT LOWER EXTREMITY, UNSPECIFIED VEIN (HCC): ICD-10-CM

## 2019-06-11 LAB
HCT VFR BLD CALC: 38.8 % (ref 42–52)
HEMOGLOBIN: 12.7 GM/DL (ref 14–18)
INR BLD: 5.12 (ref 0.85–1.13)

## 2019-06-11 PROCEDURE — 85018 HEMOGLOBIN: CPT

## 2019-06-11 PROCEDURE — 85014 HEMATOCRIT: CPT

## 2019-06-11 PROCEDURE — 36416 COLLJ CAPILLARY BLOOD SPEC: CPT

## 2019-06-11 PROCEDURE — 85610 PROTHROMBIN TIME: CPT

## 2019-06-11 PROCEDURE — 99211 OFF/OP EST MAY X REQ PHY/QHP: CPT

## 2019-06-11 NOTE — PROGRESS NOTES
Medication Management [facility]  Anticoagulation Clinic  [phone] (phone)  [phone] (fax)      Mr. Danelle Eden is a 80 y.o.  male with history of DVT who presents today for anticoagulation monitoring and adjustment. Patient verifies current dosing regimen and tablet strength. No missed or extra doses. Patient denies s/s bleeding/bruising/swelling/SOB/chest pain - SOB all the time due to limited lung capacity (55%)  No blood in urine or stool. No dietary changes. No changes in medication/OTC agents/Herbals. - d/c multivitamins  No change in alcohol use or tobacco use. No change in activity level. - limited activity level; cannot walk very far - legs feel the same but has to stop and rest  Patient denies headaches/dizziness/lightheadedness/falls. - lightheadedness when getting up suddenly, turning around quickly; something new  No vomiting/diarrhea or acute illness. No Procedures scheduled in the future at this time. Assessment:   Lab Results   Component Value Date    INR 5.12 (HH) 06/11/2019    INR 2.03 (H) 05/28/2019    INR 2.42 (H) 05/21/2019     INR supratherapeutic   Recent Labs     06/11/19  1119   INR 5.12*       Plan:  Hold Coumadin 6/11/19 and 6/12/19. .  Recheck INR 6/13/19. Patient reminded to call the Anticoagulation Clinic with any signs or symptoms of bleeding or with any medication changes. Patient given instructions utilizing the teach back method. Discharged ambulatory in no apparent distress. After visit summary printed and reviewed with patient.       Medications reviewed and updated on home medication list Yes    Influenza vaccine:     [] given    [x] declined   [] received previously   [] plans to receive at a later time   [] refused    [] documented in [address], PharmD, BCPS  6/11/2019 12:16 PM

## 2019-06-13 ENCOUNTER — HOSPITAL ENCOUNTER (OUTPATIENT)
Dept: PHARMACY | Age: 84
Setting detail: THERAPIES SERIES
Discharge: HOME | End: 2019-06-13
Payer: MEDICARE

## 2019-06-13 DIAGNOSIS — I82.501 CHRONIC DEEP VEIN THROMBOSIS (DVT) OF RIGHT LOWER EXTREMITY, UNSPECIFIED VEIN (HCC): ICD-10-CM

## 2019-06-13 LAB — POC INR: 2.2 (ref 0.8–1.2)

## 2019-06-13 PROCEDURE — 99211 OFF/OP EST MAY X REQ PHY/QHP: CPT

## 2019-06-13 PROCEDURE — 36416 COLLJ CAPILLARY BLOOD SPEC: CPT

## 2019-06-13 PROCEDURE — 85610 PROTHROMBIN TIME: CPT

## 2019-06-13 NOTE — PROGRESS NOTES
Medication Management [facility]  Anticoagulation Clinic  [phone] (phone)  [phone] (fax)      Mr. Melida Sanchez is a 80 y.o.  male with history of DVT who presents today for anticoagulation monitoring and adjustment. Patient verifies current dosing regimen and tablet strength. No missed or extra doses. Patient denies s/s bleeding/bruising/swelling/SOB/chest pain  No blood in urine or stool. Eating less than he used to  No changes in medication/OTC agents/Herbals. No change in alcohol use or tobacco use. No change in activity level. Patient denies headaches/dizziness/lightheadedness/falls. No vomiting/diarrhea or acute illness. No Procedures scheduled in the future at this time. Assessment:   Lab Results   Component Value Date    INR 2.20 (H) 06/13/2019    INR 5.12 (HH) 06/11/2019    INR 2.03 (H) 05/28/2019     INR therapeutic   Recent Labs     06/13/19  1413   INR 2.20*     Plan:  Continue Coumadin 7.5 mg MoFr and 5 mg [id number]. Recheck INR in 1 weeks. Patient reminded to call the Anticoagulation Clinic with any signs or symptoms of bleeding or with any medication changes. Patient given instructions utilizing the teach back method. Discharged in transport chair in no apparent distress. After visit summary printed and reviewed with patient.       Medications reviewed and updated on home medication list Yes    Influenza vaccine:     [] given    [] declined   [x] received previously   [] plans to receive at a later time   [] refused    [x] documented in EPIC

## 2019-06-18 ENCOUNTER — CARE COORDINATION (OUTPATIENT)
Dept: CASE MANAGEMENT | Age: 84
End: 2019-06-18

## 2019-06-20 ENCOUNTER — HOSPITAL ENCOUNTER (OUTPATIENT)
Dept: PHARMACY | Age: 84
Setting detail: THERAPIES SERIES
Discharge: HOME | End: 2019-06-20
Payer: MEDICARE

## 2019-06-20 ENCOUNTER — TELEPHONE (OUTPATIENT)
Dept: FAMILY MEDICINE CLINIC | Age: 84
End: 2019-06-20

## 2019-06-20 DIAGNOSIS — I82.501 CHRONIC DEEP VEIN THROMBOSIS (DVT) OF RIGHT LOWER EXTREMITY, UNSPECIFIED VEIN (HCC): ICD-10-CM

## 2019-06-20 LAB — POC INR: 2.1 (ref 0.8–1.2)

## 2019-06-20 PROCEDURE — 36416 COLLJ CAPILLARY BLOOD SPEC: CPT

## 2019-06-20 PROCEDURE — 85610 PROTHROMBIN TIME: CPT

## 2019-06-20 PROCEDURE — 99211 OFF/OP EST MAY X REQ PHY/QHP: CPT

## 2019-06-20 NOTE — TELEPHONE ENCOUNTER
Becca Jackson calls requesting an appointment today. He is experiencing dizzy spells off and on for the past week. No opening on the schedule today. Please advise.     DOLV  4/26/19  DONV  8/15/19

## 2019-06-20 NOTE — PROGRESS NOTES
Medication Management [facility]  Anticoagulation Clinic  [phone] (phone)  [phone] (fax)      Mr. Alexei Renee is a 80 y.o.  male with history of DVT who presents today for anticoagulation monitoring and adjustment. Patient verifies current dosing regimen and tablet strength. No missed or extra doses. Patient denies s/s bleeding/bruising/swelling/chest pain. Baseline SOB that is unchanged  No blood in urine or stool. No dietary changes. No changes in OTC agents/Herbals. Azopt 1% changed from BID to TID. Patient no longer on Mucinex. Lopressor increased from 25 mg BID to 50 mg BID per patient. No change in alcohol use or tobacco use. Less activity than usual.  Patient denies headaches/falls. Patient reports having some dizziness/lightheadedness that has increased in frequency. Patient plans to see PCP for this. No vomiting/diarrhea or acute illness. No Procedures scheduled in the future at this time. Assessment:   Lab Results   Component Value Date    INR 2.10 (H) 06/20/2019    INR 2.20 (H) 06/13/2019    INR 5.12 (HH) 06/11/2019     INR therapeutic   Recent Labs     06/20/19  1051   INR 2.10*     Patient presents with therapeutic INR today. Patient has been fairly well-controlled on current regimen and has had four of his past five INRs therapeutic. Plan:  Continue Coumadin 7.5 mg MF and 5 mg [id number]. Recheck INR in 2 weeks. Patient reminded to call the Anticoagulation Clinic with any signs or symptoms of bleeding or with any medication changes. Patient given instructions utilizing the teach back method. Discharged via wheelchair in no apparent distress. After visit summary printed and reviewed with patient.       Medications reviewed and updated on home medication list Yes    Influenza vaccine:     [] given    [x] declined   [x] received previously   [] plans to receive at a later time   [] refused    [x] documented in [facility], PharmD,

## 2019-06-21 ENCOUNTER — OFFICE VISIT (OUTPATIENT)
Dept: FAMILY MEDICINE CLINIC | Age: 84
End: 2019-06-21
Payer: MEDICARE

## 2019-06-21 VITALS
DIASTOLIC BLOOD PRESSURE: 64 MMHG | RESPIRATION RATE: 14 BRPM | BODY MASS INDEX: 40.71 KG/M2 | HEART RATE: 72 BPM | TEMPERATURE: 99.6 F | WEIGHT: 260 LBS | SYSTOLIC BLOOD PRESSURE: 144 MMHG

## 2019-06-21 DIAGNOSIS — R42 DIZZINESS: ICD-10-CM

## 2019-06-21 DIAGNOSIS — R30.0 DYSURIA: ICD-10-CM

## 2019-06-21 DIAGNOSIS — R31.9 HEMATURIA, UNSPECIFIED TYPE: ICD-10-CM

## 2019-06-21 DIAGNOSIS — R35.0 URINARY FREQUENCY: Primary | ICD-10-CM

## 2019-06-21 PROCEDURE — 4040F PNEUMOC VAC/ADMIN/RCVD: CPT | Performed by: NURSE PRACTITIONER

## 2019-06-21 PROCEDURE — G8417 CALC BMI ABV UP PARAM F/U: HCPCS | Performed by: NURSE PRACTITIONER

## 2019-06-21 PROCEDURE — 1123F ACP DISCUSS/DSCN MKR DOCD: CPT | Performed by: NURSE PRACTITIONER

## 2019-06-21 PROCEDURE — 1036F TOBACCO NON-USER: CPT | Performed by: NURSE PRACTITIONER

## 2019-06-21 PROCEDURE — G8427 DOCREV CUR MEDS BY ELIG CLIN: HCPCS | Performed by: NURSE PRACTITIONER

## 2019-06-21 PROCEDURE — 99214 OFFICE O/P EST MOD 30 MIN: CPT | Performed by: NURSE PRACTITIONER

## 2019-06-21 PROCEDURE — 81003 URINALYSIS AUTO W/O SCOPE: CPT | Performed by: NURSE PRACTITIONER

## 2019-06-21 RX ORDER — PHENAZOPYRIDINE HYDROCHLORIDE 100 MG/1
100 TABLET, FILM COATED ORAL 2 TIMES DAILY
Qty: 4 TABLET | Refills: 0 | Status: SHIPPED | OUTPATIENT
Start: 2019-06-21 | End: 2019-06-23

## 2019-06-21 RX ORDER — MECLIZINE HCL 12.5 MG/1
12.5 TABLET ORAL 3 TIMES DAILY PRN
Qty: 30 TABLET | Refills: 0 | Status: SHIPPED | OUTPATIENT
Start: 2019-06-21 | End: 2019-07-01

## 2019-06-21 ASSESSMENT — ENCOUNTER SYMPTOMS
SHORTNESS OF BREATH: 1
BLOOD IN STOOL: 0
ABDOMINAL PAIN: 0
CHEST TIGHTNESS: 0
EYES NEGATIVE: 1

## 2019-06-21 NOTE — PATIENT INSTRUCTIONS
Increase water intake slowly  Patient Education        Dizziness: Care Instructions  Your Care Instructions  Dizziness is the feeling of unsteadiness or fuzziness in your head. It is different than having vertigo, which is a feeling that the room is spinning or that you are moving or falling. It is also different from lightheadedness, which is the feeling that you are about to faint. It can be hard to know what causes dizziness. Some people feel dizzy when they have migraine headaches. Sometimes bouts of flu can make you feel dizzy. Some medical conditions, such as heart problems or high blood pressure, can make you feel dizzy. Many medicines can cause dizziness, including medicines for high blood pressure, pain, or anxiety. If a medicine causes your symptoms, your doctor may recommend that you stop or change the medicine. If it is a problem with your heart, you may need medicine to help your heart work better. If there is no clear reason for your symptoms, your doctor may suggest watching and waiting for a while to see if the dizziness goes away on its own. Follow-up care is a key part of your treatment and safety. Be sure to make and go to all appointments, and call your doctor if you are having problems. It's also a good idea to know your test results and keep a list of the medicines you take. How can you care for yourself at home? · If your doctor recommends or prescribes medicine, take it exactly as directed. Call your doctor if you think you are having a problem with your medicine. · Do not drive while you feel dizzy. · Try to prevent falls. Steps you can take include:  ? Using nonskid mats, adding grab bars near the tub, and using night-lights. ? Clearing your home so that walkways are free of anything you might trip on.  ? Letting family and friends know that you have been feeling dizzy. This will help them know how to help you. When should you call for help?   Call 911 anytime you think you may need emergency care. For example, call if:    · You passed out (lost consciousness).     · You have dizziness along with symptoms of a heart attack. These may include:  ? Chest pain or pressure, or a strange feeling in the chest.  ? Sweating. ? Shortness of breath. ? Nausea or vomiting. ? Pain, pressure, or a strange feeling in the back, neck, jaw, or upper belly or in one or both shoulders or arms. ? Lightheadedness or sudden weakness. ? A fast or irregular heartbeat.     · You have symptoms of a stroke. These may include:  ? Sudden numbness, tingling, weakness, or loss of movement in your face, arm, or leg, especially on only one side of your body. ? Sudden vision changes. ? Sudden trouble speaking. ? Sudden confusion or trouble understanding simple statements. ? Sudden problems with walking or balance. ? A sudden, severe headache that is different from past headaches.    Call your doctor now or seek immediate medical care if:    · You feel dizzy and have a fever, headache, or ringing in your ears.     · You have new or increased nausea and vomiting.     · Your dizziness does not go away or comes back.    Watch closely for changes in your health, and be sure to contact your doctor if:    · You do not get better as expected. Where can you learn more? Go to https://[employer]RadMit. org and sign in to your [facility] account. Enter I810 in the [facility] box to learn more about \"Dizziness: Care Instructions. \"     If you do not have an account, please click on the \"Sign Up Now\" link. Current as of: September 23, 2018  Content Version: 12.0  © 7711-0462 Healthwise, Incorporated. Care instructions adapted under license by [facility] ([facility]). If you have questions about a medical condition or this instruction, always ask your healthcare professional. [place] [address] any warranty or liability for your use of this information.

## 2019-06-23 LAB
ORGANISM: ABNORMAL
URINE CULTURE, ROUTINE: ABNORMAL

## 2019-06-24 ENCOUNTER — TELEPHONE (OUTPATIENT)
Dept: FAMILY MEDICINE CLINIC | Age: 84
End: 2019-06-24

## 2019-06-24 NOTE — TELEPHONE ENCOUNTER
----- Message from MAEGAN Cano CNP sent at 6/24/2019  8:46 AM EDT -----  Urine culture is negative for any specific bacteria. Follow up with urology as ordered.  TS

## 2019-06-26 ENCOUNTER — CARE COORDINATION (OUTPATIENT)
Dept: CARE COORDINATION | Age: 84
End: 2019-06-26

## 2019-06-26 NOTE — CARE COORDINATION
Name: Mary Ann Burns    ### Patient Details  YOB: 1931  MRN: [id number]    ### Encounter Details  Encounter ID: [id number]  Arrival Date: N/A  Discharge Date: N/A    ### Related interaction  CHF-COPD-Diabetes High Touch UA (Welcome Call) ([email or web address])    ### Required Interventions and Feedback     CarePATH Update         *Patient Status changed in CarePATH to[de-identified]     Patient Declined (selected by Brianna Boateng on 06/26/2019 10:21 AM EDT)    Additional Enrollment Status Details[de-identified]     Patient Declined (edited by Brianna Boateng on 06/26/2019 10:23 AM EDT)     Call Status         *Call Status:     Patient Not Reached After 1st Attempt - Left Voicemail (edited by Brianna Boateng on 06/26/2019 10:21 AM EDT)    Additional Call Status Details[de-identified]     Left message on voice mail with explanation of the benefits of enrolling in [facility] which was approved for the patient by their PCP. Please call the office (phone number given) for more information and let us know if you would like to enroll in the Tele-Care Program.     Upcoming Tele-Care call Deleted. Patient should get No further calls. Patient Status changed to Declined in Epic and Cipher.      (edited by Brianna Boateng on 06/26/2019 10:23 AM EDT)    Pastor Smith.  [address] [place] [address], [address] [place] [address] Nurse /

## 2019-07-03 ENCOUNTER — OFFICE VISIT (OUTPATIENT)
Dept: PULMONOLOGY | Age: 84
End: 2019-07-03
Payer: MEDICARE

## 2019-07-03 ENCOUNTER — HOSPITAL ENCOUNTER (OUTPATIENT)
Dept: PHARMACY | Age: 84
Setting detail: THERAPIES SERIES
Discharge: HOME | End: 2019-07-03
Payer: MEDICARE

## 2019-07-03 VITALS
OXYGEN SATURATION: 95 % | HEIGHT: 67 IN | SYSTOLIC BLOOD PRESSURE: 120 MMHG | DIASTOLIC BLOOD PRESSURE: 64 MMHG | HEART RATE: 72 BPM | WEIGHT: 259.2 LBS | BODY MASS INDEX: 40.68 KG/M2

## 2019-07-03 DIAGNOSIS — G47.33 OSA (OBSTRUCTIVE SLEEP APNEA): Primary | ICD-10-CM

## 2019-07-03 DIAGNOSIS — J44.9 STAGE 3 SEVERE COPD BY GOLD CLASSIFICATION (HCC): ICD-10-CM

## 2019-07-03 DIAGNOSIS — R32 URINARY INCONTINENCE, UNSPECIFIED TYPE: ICD-10-CM

## 2019-07-03 DIAGNOSIS — R42 DIZZINESS: ICD-10-CM

## 2019-07-03 DIAGNOSIS — I82.501 CHRONIC DEEP VEIN THROMBOSIS (DVT) OF RIGHT LOWER EXTREMITY, UNSPECIFIED VEIN (HCC): ICD-10-CM

## 2019-07-03 DIAGNOSIS — J96.11 CHRONIC HYPOXEMIC RESPIRATORY FAILURE (HCC): ICD-10-CM

## 2019-07-03 LAB — POC INR: 2.5 (ref 0.8–1.2)

## 2019-07-03 PROCEDURE — 1036F TOBACCO NON-USER: CPT | Performed by: PHYSICIAN ASSISTANT

## 2019-07-03 PROCEDURE — 85610 PROTHROMBIN TIME: CPT

## 2019-07-03 PROCEDURE — G8926 SPIRO NO PERF OR DOC: HCPCS | Performed by: PHYSICIAN ASSISTANT

## 2019-07-03 PROCEDURE — 1123F ACP DISCUSS/DSCN MKR DOCD: CPT | Performed by: PHYSICIAN ASSISTANT

## 2019-07-03 PROCEDURE — G8427 DOCREV CUR MEDS BY ELIG CLIN: HCPCS | Performed by: PHYSICIAN ASSISTANT

## 2019-07-03 PROCEDURE — 99211 OFF/OP EST MAY X REQ PHY/QHP: CPT

## 2019-07-03 PROCEDURE — 36416 COLLJ CAPILLARY BLOOD SPEC: CPT

## 2019-07-03 PROCEDURE — 99214 OFFICE O/P EST MOD 30 MIN: CPT | Performed by: PHYSICIAN ASSISTANT

## 2019-07-03 PROCEDURE — 4040F PNEUMOC VAC/ADMIN/RCVD: CPT | Performed by: PHYSICIAN ASSISTANT

## 2019-07-03 PROCEDURE — G8417 CALC BMI ABV UP PARAM F/U: HCPCS | Performed by: PHYSICIAN ASSISTANT

## 2019-07-03 PROCEDURE — 3023F SPIROM DOC REV: CPT | Performed by: PHYSICIAN ASSISTANT

## 2019-07-03 ASSESSMENT — ENCOUNTER SYMPTOMS
BACK PAIN: 0
DIARRHEA: 0
EYES NEGATIVE: 1
ALLERGIC/IMMUNOLOGIC NEGATIVE: 1
COUGH: 0
SHORTNESS OF BREATH: 1
WHEEZING: 0
STRIDOR: 0
CHEST TIGHTNESS: 0
NAUSEA: 0

## 2019-07-03 NOTE — PROGRESS NOTES
mouth nightly      Cholecalciferol (VITAMIN D3) 1000 units CAPS Take by mouth daily      warfarin (COUMADIN) 5 MG tablet Take as directed by [facility]. #45=30 days (Patient taking differently: Take 7.5 mg by mouth Take as directed by [facility]. #45=30 days  Taking 5mg on Sunday, Tuesday, Wednesday, Thursday, and Saturday.) 45 tablet 11    insulin glargine (LANTUS SOLOSTAR) 100 UNIT/ML injection pen Inject 18 Units into the skin nightly 15 pen 3    levothyroxine (SYNTHROID) 175 MCG tablet Take one tablet daily 90 tablet 3    AZOPT 1 % ophthalmic suspension Place 1 drop into both eyes 3 times daily Indications: Disease of the Eye       Arformoterol Tartrate (BROVANA) 15 MCG/2ML NEBU Take 1 ampule by nebulization 2 times daily       budesonide (PULMICORT) 0.25 MG/2ML nebulizer suspension Take 1 ampule by nebulization 2 times daily       albuterol (PROVENTIL HFA;VENTOLIN HFA) 108 (90 BASE) MCG/ACT inhaler Inhale 2 puffs into the lungs every 6 hours as needed for Wheezing      OXYGEN Inhale into the lungs as needed (Uses at night only) Indications: Difficulty Breathing, Oxygen Therapy 3 liters       No current facility-administered medications for this visit. Family History   Problem Relation Age of Onset    Other Mother         Complications of Childbirth    Other Father [de-identified]        Natural causes        Review of Systems -   Review of Systems   Constitutional: Negative for activity change, appetite change, chills and fever. HENT: Negative for congestion and postnasal drip. Eyes: Negative. Respiratory: Positive for shortness of breath. Negative for cough, chest tightness, wheezing and stridor. Cardiovascular: Negative for chest pain and leg swelling. Gastrointestinal: Negative for diarrhea and nausea. Endocrine: Negative. Genitourinary: Negative. Musculoskeletal: Negative. Negative for arthralgias and back pain. Skin: Negative. current recommended pressure to get optimal results and clinical improvement  - Recommend 7-9 hours of sleep with PAP  - He was advised to call [employer] company regarding supplies if needed.   -He call my office for earlier appointment if needed for worsening of sleep symptoms.   - He was instructed on weight loss  - Jasvir was educated about my impression and plan. Patient verbalizesunderstanding.   We will see Steve Sharma back in: 1 year with download and 4 months for COPD    Information added by my medical assistant/LPN was reviewed today         Aayush Cardozo PA-C, MPAS  7/3/2019

## 2019-07-09 ENCOUNTER — OFFICE VISIT (OUTPATIENT)
Dept: UROLOGY | Age: 84
End: 2019-07-09
Payer: MEDICARE

## 2019-07-09 VITALS
SYSTOLIC BLOOD PRESSURE: 136 MMHG | BODY MASS INDEX: 40.34 KG/M2 | HEIGHT: 67 IN | DIASTOLIC BLOOD PRESSURE: 72 MMHG | WEIGHT: 257 LBS

## 2019-07-09 DIAGNOSIS — R20.8 BURNING SENSATION: Primary | ICD-10-CM

## 2019-07-09 DIAGNOSIS — N40.1 BENIGN PROSTATIC HYPERPLASIA WITH LOWER URINARY TRACT SYMPTOMS, SYMPTOM DETAILS UNSPECIFIED: ICD-10-CM

## 2019-07-09 DIAGNOSIS — N41.0 ACUTE PROSTATITIS: ICD-10-CM

## 2019-07-09 LAB
BILIRUBIN URINE: NEGATIVE
BLOOD URINE, POC: ABNORMAL
CHARACTER, URINE: CLEAR
COLOR, URINE: YELLOW
GLUCOSE URINE: NEGATIVE MG/DL
KETONES, URINE: NEGATIVE
LEUKOCYTE CLUMPS, URINE: ABNORMAL
NITRITE, URINE: NEGATIVE
PH, URINE: 6.5 (ref 5–9)
POST VOID RESIDUAL (PVR): 141 ML
PROTEIN, URINE: NEGATIVE MG/DL
SPECIFIC GRAVITY, URINE: 1.01 (ref 1–1.03)
UROBILINOGEN, URINE: 0.2 EU/DL (ref 0–1)

## 2019-07-09 PROCEDURE — G8417 CALC BMI ABV UP PARAM F/U: HCPCS | Performed by: NURSE PRACTITIONER

## 2019-07-09 PROCEDURE — G8427 DOCREV CUR MEDS BY ELIG CLIN: HCPCS | Performed by: NURSE PRACTITIONER

## 2019-07-09 PROCEDURE — 4040F PNEUMOC VAC/ADMIN/RCVD: CPT | Performed by: NURSE PRACTITIONER

## 2019-07-09 PROCEDURE — 51798 US URINE CAPACITY MEASURE: CPT | Performed by: NURSE PRACTITIONER

## 2019-07-09 PROCEDURE — 1036F TOBACCO NON-USER: CPT | Performed by: NURSE PRACTITIONER

## 2019-07-09 PROCEDURE — 99214 OFFICE O/P EST MOD 30 MIN: CPT | Performed by: NURSE PRACTITIONER

## 2019-07-09 PROCEDURE — 81003 URINALYSIS AUTO W/O SCOPE: CPT | Performed by: NURSE PRACTITIONER

## 2019-07-09 PROCEDURE — 1123F ACP DISCUSS/DSCN MKR DOCD: CPT | Performed by: NURSE PRACTITIONER

## 2019-07-09 RX ORDER — DOXYCYCLINE HYCLATE 100 MG
100 TABLET ORAL 2 TIMES DAILY
Qty: 28 TABLET | Refills: 0 | Status: SHIPPED | OUTPATIENT
Start: 2019-07-09 | End: 2019-07-23

## 2019-07-09 ASSESSMENT — ENCOUNTER SYMPTOMS
ABDOMINAL PAIN: 0
VOMITING: 0
NAUSEA: 0

## 2019-07-10 ENCOUNTER — TELEPHONE (OUTPATIENT)
Dept: PHARMACY | Age: 84
End: 2019-07-10

## 2019-07-10 NOTE — TELEPHONE ENCOUNTER
Spoke with Mr. Newell and let him know that doxycycline should not affect his INR and he can continue to take his regular dose of Coumadin. He voiced understanding.       James Girard, PharmD, BCPS  7/10/2019 4:06 PM

## 2019-07-11 LAB
ORGANISM: ABNORMAL
URINE CULTURE, ROUTINE: ABNORMAL

## 2019-07-24 ENCOUNTER — CARE COORDINATION (OUTPATIENT)
Dept: CARE COORDINATION | Age: 84
End: 2019-07-24

## 2019-07-24 ENCOUNTER — HOSPITAL ENCOUNTER (OUTPATIENT)
Dept: PHARMACY | Age: 84
Setting detail: THERAPIES SERIES
Discharge: HOME | End: 2019-07-24
Payer: MEDICARE

## 2019-07-24 DIAGNOSIS — I82.501 CHRONIC DEEP VEIN THROMBOSIS (DVT) OF RIGHT LOWER EXTREMITY, UNSPECIFIED VEIN (HCC): ICD-10-CM

## 2019-07-24 LAB — POC INR: 3.9 (ref 0.8–1.2)

## 2019-07-24 PROCEDURE — 99211 OFF/OP EST MAY X REQ PHY/QHP: CPT

## 2019-07-24 PROCEDURE — 36416 COLLJ CAPILLARY BLOOD SPEC: CPT

## 2019-07-24 PROCEDURE — 85610 PROTHROMBIN TIME: CPT

## 2019-07-31 ENCOUNTER — OFFICE VISIT (OUTPATIENT)
Dept: INTERNAL MEDICINE CLINIC | Age: 84
End: 2019-07-31
Payer: MEDICARE

## 2019-07-31 VITALS
SYSTOLIC BLOOD PRESSURE: 165 MMHG | HEART RATE: 70 BPM | DIASTOLIC BLOOD PRESSURE: 79 MMHG | HEIGHT: 67 IN | WEIGHT: 253 LBS | BODY MASS INDEX: 39.71 KG/M2

## 2019-07-31 DIAGNOSIS — R53.1 WEAKNESS: ICD-10-CM

## 2019-07-31 DIAGNOSIS — E11.8 TYPE 2 DIABETES MELLITUS WITH COMPLICATION, WITH LONG-TERM CURRENT USE OF INSULIN (HCC): Primary | ICD-10-CM

## 2019-07-31 DIAGNOSIS — Z79.4 TYPE 2 DIABETES MELLITUS WITH COMPLICATION, WITH LONG-TERM CURRENT USE OF INSULIN (HCC): Primary | ICD-10-CM

## 2019-07-31 LAB
CHP ED QC CHECK: ABNORMAL
GLUCOSE BLD-MCNC: 224 MG/DL
HBA1C MFR BLD: 7.7 % (ref 4.3–5.7)

## 2019-07-31 PROCEDURE — G8427 DOCREV CUR MEDS BY ELIG CLIN: HCPCS | Performed by: NURSE PRACTITIONER

## 2019-07-31 PROCEDURE — 4040F PNEUMOC VAC/ADMIN/RCVD: CPT | Performed by: NURSE PRACTITIONER

## 2019-07-31 PROCEDURE — G8417 CALC BMI ABV UP PARAM F/U: HCPCS | Performed by: NURSE PRACTITIONER

## 2019-07-31 PROCEDURE — 83036 HEMOGLOBIN GLYCOSYLATED A1C: CPT | Performed by: NURSE PRACTITIONER

## 2019-07-31 PROCEDURE — 99214 OFFICE O/P EST MOD 30 MIN: CPT | Performed by: NURSE PRACTITIONER

## 2019-07-31 PROCEDURE — 1036F TOBACCO NON-USER: CPT | Performed by: NURSE PRACTITIONER

## 2019-07-31 PROCEDURE — 1123F ACP DISCUSS/DSCN MKR DOCD: CPT | Performed by: NURSE PRACTITIONER

## 2019-07-31 PROCEDURE — 82962 GLUCOSE BLOOD TEST: CPT | Performed by: NURSE PRACTITIONER

## 2019-07-31 ASSESSMENT — ENCOUNTER SYMPTOMS
ABDOMINAL PAIN: 0
SINUS PRESSURE: 0
TROUBLE SWALLOWING: 0
SINUS PAIN: 0
SORE THROAT: 0
BLOOD IN STOOL: 0
DIARRHEA: 0
CONSTIPATION: 0
SHORTNESS OF BREATH: 1
ABDOMINAL DISTENTION: 1
NAUSEA: 0
VOMITING: 0
PHOTOPHOBIA: 0
COUGH: 0
WHEEZING: 0

## 2019-07-31 NOTE — PROGRESS NOTES
2019     Malik Cartagena (:  1931) is a 80 y.o. male, here for evaluation of the following medical concerns: diabetes    Diabetes-  Clearance Si states he was diagnosed with diabetes approximately 20 years ago. He has been on insulin since that time. He states his diabetes is well controlled. A1C 7.7% today in office, last A1C 7.3% in 3/2019. He is taking metformin 1000 mg BID and Lantus 18 units daily. Last urinary microalbumin/crt ratio 33 in 3/2019. States hypoglycemic events not present. Reports checking glucose 1 time per day, with range of glucose readings 140-180 per meter download. He is not doing well at home, having trouble getting around. He denies any falls. He is not attempting dietary modification for diabetes management and/or weight loss. He is 80years old. Has not followed with diabetes clinic recently. Does report difficulty with obtaining medications. Last eye exam within 1 year reportedly. BP reccs <140/90 (<130/80 in CVD risk). On aspirin 81 mg daily and atorvastatin 80 mg daily.     Denies polyuria, polydipsia, polyphagia. Denies neuropathic symptoms including numbness, tingling. Does not have any wounds to feet.      Follows with Dr. Lazara Calderon routinely for cardiac issues. Voices financial concerns. Encouraged him to discuss these issues with his children at last visit, but he has not. He states he is too proud to ask for their help. Malik Cartagena will require the following home care treatments or therapies: nursing, aides, and PT/OT. Home care will be necessary because of increased weakness, difficulty ambulating, difficulty performing ADLs, vital sign monitoring, medication compliance, and disease teaching. The patient is in agreement to receiving home care. Review of Systems   Constitutional: Negative for chills, fatigue and fever. HENT: Negative for congestion, sinus pressure, sinus pain, sore throat, tinnitus and trouble swallowing.     Eyes: Negative for photophobia [facility]. #45=30 days  Patient taking differently: Take 7.5 mg by mouth Take as directed by [facility]. #45=30 days  Taking 5mg on Sunday, Tuesday, Wednesday, Thursday, and Saturday. Yes Siddhartha Velazco MD   insulin glargine (LANTUS SOLOSTAR) 100 UNIT/ML injection pen Inject 18 Units into the skin nightly Yes MAEGAN Baires CNP   levothyroxine (SYNTHROID) 175 MCG tablet Take one tablet daily Yes Teodora Florentino MD   AZOPT 1 % ophthalmic suspension Place 1 drop into both eyes 3 times daily Indications: Disease of the Eye  Yes Historical Provider, MD   Arformoterol Tartrate (BROVANA) 15 MCG/2ML NEBU Take 1 ampule by nebulization 2 times daily  Yes Historical Provider, MD   budesonide (PULMICORT) 0.25 MG/2ML nebulizer suspension Take 1 ampule by nebulization 2 times daily  Yes Historical Provider, MD   albuterol (PROVENTIL HFA;VENTOLIN HFA) 108 (90 BASE) MCG/ACT inhaler Inhale 2 puffs into the lungs every 6 hours as needed for Wheezing Yes Historical Provider, MD   OXYGEN Inhale into the lungs as needed (Uses at night only) Indications: Difficulty Breathing, Oxygen Therapy 3 liters Yes Historical Provider, MD        Social History     Tobacco Use    Smoking status: Never Smoker    Smokeless tobacco: Never Used   Substance Use Topics    Alcohol use: Yes     Alcohol/week: 1.0 standard drinks     Types: 1 Glasses of wine per week     Comment: Glass of wine with dinner. Vitals:    07/31/19 1055   BP: (!) 165/79   Site: Right Upper Arm   Position: Sitting   Cuff Size: Large Adult   Pulse: 70   Weight: 253 lb (114.8 kg)   Height: 5' 7\" (1.702 m)     Estimated body mass index is 39.63 kg/m² as calculated from the following:    Height as of this encounter: 5' 7\" (1.702 m). Weight as of this encounter: 253 lb (114.8 kg). Physical Exam   Constitutional: He is oriented to person, place, and time. He appears well-developed and well-nourished.  He appears distressed

## 2019-08-01 ENCOUNTER — CARE COORDINATION (OUTPATIENT)
Dept: CARE COORDINATION | Age: 84
End: 2019-08-01

## 2019-08-05 ENCOUNTER — OFFICE VISIT (OUTPATIENT)
Dept: FAMILY MEDICINE CLINIC | Age: 84
End: 2019-08-05
Payer: MEDICARE

## 2019-08-05 ENCOUNTER — HOSPITAL ENCOUNTER (OUTPATIENT)
Age: 84
Discharge: HOME | End: 2019-08-05
Payer: MEDICARE

## 2019-08-05 ENCOUNTER — HOSPITAL ENCOUNTER (OUTPATIENT)
Dept: GENERAL RADIOLOGY | Age: 84
Discharge: HOME | End: 2019-08-05
Payer: MEDICARE

## 2019-08-05 VITALS
RESPIRATION RATE: 20 BRPM | BODY MASS INDEX: 40.41 KG/M2 | DIASTOLIC BLOOD PRESSURE: 52 MMHG | HEART RATE: 76 BPM | TEMPERATURE: 98.8 F | SYSTOLIC BLOOD PRESSURE: 124 MMHG | WEIGHT: 258 LBS

## 2019-08-05 DIAGNOSIS — M53.3 COCCYX PAIN: ICD-10-CM
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
## 2019-08-05 PROCEDURE — 1036F TOBACCO NON-USER: CPT | Performed by: NURSE PRACTITIONER

## 2019-08-05 PROCEDURE — 99213 OFFICE O/P EST LOW 20 MIN: CPT | Performed by: NURSE PRACTITIONER

## 2019-08-05 PROCEDURE — G8427 DOCREV CUR MEDS BY ELIG CLIN: HCPCS | Performed by: NURSE PRACTITIONER

## 2019-08-05 PROCEDURE — 1123F ACP DISCUSS/DSCN MKR DOCD: CPT | Performed by: NURSE PRACTITIONER

## 2019-08-05 PROCEDURE — 72220 X-RAY EXAM SACRUM TAILBONE: CPT

## 2019-08-05 PROCEDURE — 4040F PNEUMOC VAC/ADMIN/RCVD: CPT | Performed by: NURSE PRACTITIONER

## 2019-08-05 PROCEDURE — G8417 CALC BMI ABV UP PARAM F/U: HCPCS | Performed by: NURSE PRACTITIONER

## 2019-08-05 ASSESSMENT — ENCOUNTER SYMPTOMS
ABDOMINAL PAIN: 0
EYES NEGATIVE: 1
SHORTNESS OF BREATH: 0
BLOOD IN STOOL: 0
CHEST TIGHTNESS: 0

## 2019-08-05 NOTE — PROGRESS NOTES
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 C/O pain in right buttock, feeling worse. Using Advil. SUBJECTIVE     Jayda Armenta is a 80 y.o.male      Pt complains of right buttock and tailbone pain following a fall last Friday. He did hit the back of his head when he went down and has some tenderness to scalp with palpation. He thinks he lost consciousness for a minute and was confused for a few minutes when he came too. Denies any dizziness or confusion since it happened. Rates pain 5/10 today. Is current with [facility] home health. Review of Systems   Constitutional: Negative for chills, fatigue, fever and unexpected weight change. HENT: Negative. Eyes: Negative. Respiratory: Negative for chest tightness and shortness of breath. Cardiovascular: Negative for chest pain, palpitations and leg swelling. Gastrointestinal: Negative for abdominal pain and blood in stool. Genitourinary: Negative for dysuria. Musculoskeletal: Negative for joint swelling. Right buttock and tailbone pain   Skin: Negative for rash. Neurological: Negative for dizziness. Psychiatric/Behavioral: Negative. All other systems reviewed and are negative. OBJECTIVE     BP (!) 124/52 (Site: Left Upper Arm)   Pulse 76   Temp 98.8 °F (37.1 °C) (Oral)   Resp 20   Wt 258 lb (117 kg)   BMI 40.41 kg/m²     Physical Exam   Constitutional: He is oriented to person, place, and time. He appears well-developed and well-nourished. HENT:   Head: Normocephalic and atraumatic. Right Ear: External ear normal.   Left Ear: External ear normal.   Nose: Nose normal.   Mouth/Throat: Oropharynx is clear and moist.   Eyes: Pupils are equal, round, and reactive to light. Conjunctivae and EOM are normal.   Neck: Normal range of motion. Neck supple.    Cardiovascular: Normal rate, regular

## 2019-08-05 NOTE — PATIENT INSTRUCTIONS
Patient Education        Tailbone Injury: Care Instructions  Your Care Instructions    Injuries to the tailbone (coccyx) can occur when you slip or fall and hit your tailbone. A tailbone injury causes pain when you sit, especially when you slump or sit on a hard seat. Straining to have a bowel movement can also be very painful. Tailbone injuries can take several months to heal, but home treatment can ease the pain. Follow-up care is a key part of your treatment and safety. Be sure to make and go to all appointments, and call your doctor if you are having problems. It's also a good idea to know your test results and keep a list of the medicines you take. How can you care for yourself at home? · Take pain medicines exactly as directed. ? If the doctor gave you a prescription medicine for pain, take it as prescribed. ? If you are not taking a prescription pain medicine, ask your doctor if you can take an over-the-counter medicine to reduce pain and swelling. Read and follow all instructions on the label. · Put ice or a cold pack on your tailbone for 10 to 20 minutes at a time. Try to do this every 1 to 2 hours for the next 3 days (when you are awake) or until the swelling goes down. Put a thin cloth between the ice and your skin. · You can switch between using ice and heat 2 to 3 days after the injury. Take a warm bath for 20 minutes, 3 or 4 times a day. You can use a doughnut-shaped pillow or towel in the tub to pad the hard tub surface. · Do not sit on hard, unpadded surfaces. Sit on a doughnut-shaped pillow to take pressure off the tailbone area. · Avoid constipation, because straining to have a bowel movement will increase your tailbone pain. ? Include fruits, vegetables, beans, and whole grains in your diet each day. These foods are high in fiber. ? Drink plenty of fluids, enough so that your urine is light yellow or clear like water.  If you have kidney, heart, or liver disease and have to limit

## 2019-08-08 ENCOUNTER — HOSPITAL ENCOUNTER (OUTPATIENT)
Age: 84
Setting detail: SPECIMEN
Discharge: HOME | End: 2019-08-08
Payer: MEDICARE

## 2019-08-08 LAB — INR BLD: 2.53 (ref 0.85–1.13)

## 2019-08-08 PROCEDURE — 85610 PROTHROMBIN TIME: CPT

## 2019-08-12 ENCOUNTER — ANTI-COAG VISIT (OUTPATIENT)
Dept: PHARMACY | Age: 84
End: 2019-08-12

## 2019-08-12 DIAGNOSIS — I82.501 CHRONIC DEEP VEIN THROMBOSIS (DVT) OF RIGHT LOWER EXTREMITY, UNSPECIFIED VEIN (HCC): ICD-10-CM

## 2019-08-14 NOTE — PROGRESS NOTES
04/18/2019    LABALBU 3.4 (L) 04/18/2019    BILITOT 0.4 04/18/2019    ALKPHOS 53 04/18/2019    AST 18 04/18/2019    ALT 13 04/18/2019    LABGLOM 80 (A) 04/18/2019     Component      Latest Ref Rng & Units 3/8/2019          10:52 AM   Microalbumin, Random Urine      mg/dL 4.73   Creatinine, Urine      mg/dL 143.1   Microalb/Creat Ratio      0 - 30 mg/g 33 (H)       Lab Results   Component Value Date    TSH 1.490 01/22/2019    T4FREE 1.42 05/02/2018       Lab Results   Component Value Date    WBC 9.2 04/18/2019    HGB 12.7 (L) 06/11/2019    HCT 38.8 (L) 06/11/2019    MCV 82.1 04/18/2019     04/18/2019       Lab Results   Component Value Date    PSA 3.76 10/27/2010       Immunization History   Administered Date(s) Administered    Influenza 10/31/2013    Influenza Virus Vaccine 11/01/2011, 11/05/2012, 11/11/2014, 09/29/2015    Influenza Whole 10/01/2010    Influenza, Fili Moe, 3 Years and older, IM (Fluzone 3 yrs and older or Afluria 5 yrs and older) 11/17/2017    Influenza, Fili Moe, 6 mo and older, IM, PF (Flulaval, Fluarix) 09/27/2018    Influenza, Triv, 3 Years and older, IM (Afluria (5 yrs and older) 09/26/2016    Pneumococcal Conjugate 13-valent ([id number]) 12/19/2014    Pneumococcal Polysaccharide ([id number]) 01/01/2003    Td, unspecified formulation 06/08/2010       Health Maintenance   Topic Date Due    Annual Wellness Visit (AWV)  07/17/1994    DTaP/Tdap/Td vaccine (1 - Tdap) 06/09/2010    Shingles Vaccine (2 of 3) 08/15/2020 (Originally 9/30/2014)    Flu vaccine (1) 09/01/2019    TSH testing  01/22/2020    Potassium monitoring  04/18/2020    Creatinine monitoring  04/18/2020    Pneumococcal 65+ years Vaccine  Completed       AAA ultrasound (Male, 73-68, smoked ever) indicated at this time? NO, age > 71  CT Lung Screen (46-80, 30 pk-yrs, smoking or quit <15 years) indicated at this time? NO, age [de-identified]    Future Appointments   Date Time Provider Gretchen Jorgensen   8/26/2019  1:40 PM Leif Horn declines at this time. Continue max dose Lipitor at this time per Dr. Rickey Wilson office- Dr. Kadi Gonzales. Follow up with Dr. Andrea Coy, [place] [facility] (COPD and ROBER), Urology (Urinary symptoms) as scheduled. Continue to work on diet and remain as active as possible for weight loss for optimal cardiovascular health.   Follow up with Diabetes Clinic for Diabetes Management 10/30/2019 as scheduled. After discussion with pt, will start Lisinopril 5 mg- 1 pill daily (#90/3 refills) due to elevated Microalbumin.     Check H/H and BMP in 3 months to assure stability  Continue current medicines   No refills needed today per pt report.   Follow up in 3 months     Electronically signed by Mukesh Gallegos MD on 8/15/2019 at 5:26 PM

## 2019-08-15 ENCOUNTER — OFFICE VISIT (OUTPATIENT)
Dept: FAMILY MEDICINE CLINIC | Age: 84
End: 2019-08-15
Payer: MEDICARE

## 2019-08-15 ENCOUNTER — TELEPHONE (OUTPATIENT)
Dept: UROLOGY | Age: 84
End: 2019-08-15

## 2019-08-15 ENCOUNTER — HOSPITAL ENCOUNTER (OUTPATIENT)
Dept: PHARMACY | Age: 84
Setting detail: THERAPIES SERIES
Discharge: HOME | End: 2019-08-15
Payer: MEDICARE

## 2019-08-15 VITALS
DIASTOLIC BLOOD PRESSURE: 68 MMHG | BODY MASS INDEX: 40.6 KG/M2 | SYSTOLIC BLOOD PRESSURE: 128 MMHG | TEMPERATURE: 98.5 F | WEIGHT: 259.2 LBS | RESPIRATION RATE: 18 BRPM | HEART RATE: 72 BPM

## 2019-08-15 DIAGNOSIS — I82.501 CHRONIC DEEP VEIN THROMBOSIS (DVT) OF RIGHT LOWER EXTREMITY, UNSPECIFIED VEIN (HCC): ICD-10-CM

## 2019-08-15 DIAGNOSIS — I10 ESSENTIAL HYPERTENSION: ICD-10-CM

## 2019-08-15 DIAGNOSIS — I48.92 PAROXYSMAL ATRIAL FLUTTER (HCC): ICD-10-CM

## 2019-08-15 DIAGNOSIS — E89.0 POSTOPERATIVE HYPOTHYROIDISM: ICD-10-CM

## 2019-08-15 DIAGNOSIS — J41.1 MUCOPURULENT CHRONIC BRONCHITIS (HCC): ICD-10-CM

## 2019-08-15 DIAGNOSIS — J44.9 STAGE 3 SEVERE COPD BY GOLD CLASSIFICATION (HCC): ICD-10-CM

## 2019-08-15 DIAGNOSIS — D64.9 ANEMIA, UNSPECIFIED TYPE: ICD-10-CM

## 2019-08-15 DIAGNOSIS — N40.1 BENIGN PROSTATIC HYPERPLASIA WITH LOWER URINARY TRACT SYMPTOMS, SYMPTOM DETAILS UNSPECIFIED: ICD-10-CM

## 2019-08-15 DIAGNOSIS — Z79.4 TYPE 2 DIABETES MELLITUS WITH COMPLICATION, WITH LONG-TERM CURRENT USE OF INSULIN (HCC): Primary | ICD-10-CM

## 2019-08-15 DIAGNOSIS — G47.33 OSA (OBSTRUCTIVE SLEEP APNEA): ICD-10-CM

## 2019-08-15 DIAGNOSIS — E11.8 TYPE 2 DIABETES MELLITUS WITH COMPLICATION, WITH LONG-TERM CURRENT USE OF INSULIN (HCC): Primary | ICD-10-CM

## 2019-08-15 DIAGNOSIS — I82.591 CHRONIC DEEP VEIN THROMBOSIS (DVT) OF OTHER VEIN OF RIGHT LOWER EXTREMITY (HCC): ICD-10-CM

## 2019-08-15 DIAGNOSIS — E78.2 MIXED HYPERLIPIDEMIA: ICD-10-CM

## 2019-08-15 LAB — POC INR: 4.4 (ref 0.8–1.2)

## 2019-08-15 PROCEDURE — 4040F PNEUMOC VAC/ADMIN/RCVD: CPT | Performed by: FAMILY MEDICINE

## 2019-08-15 PROCEDURE — 85610 PROTHROMBIN TIME: CPT | Performed by: PHARMACIST

## 2019-08-15 PROCEDURE — 3023F SPIROM DOC REV: CPT | Performed by: FAMILY MEDICINE

## 2019-08-15 PROCEDURE — 1036F TOBACCO NON-USER: CPT | Performed by: FAMILY MEDICINE

## 2019-08-15 PROCEDURE — 99214 OFFICE O/P EST MOD 30 MIN: CPT | Performed by: FAMILY MEDICINE

## 2019-08-15 PROCEDURE — 1123F ACP DISCUSS/DSCN MKR DOCD: CPT | Performed by: FAMILY MEDICINE

## 2019-08-15 PROCEDURE — G8427 DOCREV CUR MEDS BY ELIG CLIN: HCPCS | Performed by: FAMILY MEDICINE

## 2019-08-15 PROCEDURE — G8926 SPIRO NO PERF OR DOC: HCPCS | Performed by: FAMILY MEDICINE

## 2019-08-15 PROCEDURE — 99211 OFF/OP EST MAY X REQ PHY/QHP: CPT | Performed by: PHARMACIST

## 2019-08-15 PROCEDURE — G8417 CALC BMI ABV UP PARAM F/U: HCPCS | Performed by: FAMILY MEDICINE

## 2019-08-15 PROCEDURE — 36416 COLLJ CAPILLARY BLOOD SPEC: CPT | Performed by: PHARMACIST

## 2019-08-15 RX ORDER — DOXAZOSIN MESYLATE 4 MG/1
4 TABLET ORAL NIGHTLY
Qty: 30 TABLET | Refills: 5 | Status: SHIPPED | OUTPATIENT
Start: 2019-08-15 | End: 2020-01-03

## 2019-08-15 RX ORDER — LISINOPRIL 5 MG/1
5 TABLET ORAL DAILY
Qty: 90 TABLET | Refills: 3 | Status: SHIPPED | OUTPATIENT
Start: 2019-08-15 | End: 2019-11-08 | Stop reason: SDUPTHER

## 2019-08-15 ASSESSMENT — ENCOUNTER SYMPTOMS
VOMITING: 0
ANAL BLEEDING: 0
NAUSEA: 0
SHORTNESS OF BREATH: 0
ABDOMINAL PAIN: 0
DIARRHEA: 0
CHEST TIGHTNESS: 0
BLOOD IN STOOL: 0
CONSTIPATION: 0

## 2019-08-20 ENCOUNTER — HOSPITAL ENCOUNTER (OUTPATIENT)
Age: 84
Discharge: HOME | End: 2019-08-20
Payer: MEDICARE

## 2019-08-20 LAB
ANION GAP SERPL CALCULATED.3IONS-SCNC: 14 MEQ/L (ref 8–16)
BUN BLDV-MCNC: 12 MG/DL (ref 7–22)
CALCIUM SERPL-MCNC: 8.5 MG/DL (ref 8.5–10.5)
CHLORIDE BLD-SCNC: 100 MEQ/L (ref 98–111)
CO2: 28 MEQ/L (ref 23–33)
CREAT SERPL-MCNC: 0.8 MG/DL (ref 0.4–1.2)
ERYTHROCYTE [DISTWIDTH] IN BLOOD BY AUTOMATED COUNT: 14.2 % (ref 11.5–14.5)
ERYTHROCYTE [DISTWIDTH] IN BLOOD BY AUTOMATED COUNT: 43.8 FL (ref 35–45)
GFR SERPL CREATININE-BSD FRML MDRD: > 90 ML/MIN/1.73M2
GLUCOSE BLD-MCNC: 173 MG/DL (ref 70–108)
HCT VFR BLD CALC: 37.2 % (ref 42–52)
HEMOGLOBIN: 12 GM/DL (ref 14–18)
MCH RBC QN AUTO: 27.4 PG (ref 26–33)
MCHC RBC AUTO-ENTMCNC: 32.3 GM/DL (ref 32.2–35.5)
MCV RBC AUTO: 84.9 FL (ref 80–94)
PLATELET # BLD: 224 THOU/MM3 (ref 130–400)
PMV BLD AUTO: 10.3 FL (ref 9.4–12.4)
POTASSIUM SERPL-SCNC: 3.6 MEQ/L (ref 3.5–5.2)
RBC # BLD: 4.38 MILL/MM3 (ref 4.7–6.1)
SODIUM BLD-SCNC: 142 MEQ/L (ref 135–145)
T4 FREE: 1.25 NG/DL (ref 0.93–1.76)
TSH SERPL DL<=0.05 MIU/L-ACNC: 1.86 UIU/ML (ref 0.4–4.2)
WBC # BLD: 6.4 THOU/MM3 (ref 4.8–10.8)

## 2019-08-20 PROCEDURE — 85027 COMPLETE CBC AUTOMATED: CPT

## 2019-08-20 PROCEDURE — 80048 BASIC METABOLIC PNL TOTAL CA: CPT

## 2019-08-20 PROCEDURE — 84439 ASSAY OF FREE THYROXINE: CPT

## 2019-08-20 PROCEDURE — 36415 COLL VENOUS BLD VENIPUNCTURE: CPT

## 2019-08-20 PROCEDURE — 84443 ASSAY THYROID STIM HORMONE: CPT

## 2019-08-26 ENCOUNTER — HOSPITAL ENCOUNTER (OUTPATIENT)
Dept: PHARMACY | Age: 84
Setting detail: THERAPIES SERIES
Discharge: HOME | End: 2019-08-26
Payer: MEDICARE

## 2019-08-26 DIAGNOSIS — I82.501 CHRONIC DEEP VEIN THROMBOSIS (DVT) OF RIGHT LOWER EXTREMITY, UNSPECIFIED VEIN (HCC): ICD-10-CM

## 2019-08-26 PROBLEM — H35.373 PUCKERING OF MACULA, BILATERAL: Status: ACTIVE | Noted: 2019-08-26

## 2019-08-26 PROBLEM — Z96.1 PRESENCE OF INTRAOCULAR LENS: Status: ACTIVE | Noted: 2019-08-26

## 2019-08-26 PROBLEM — H31.091 OTHER CHORIORETINAL SCARS, RIGHT EYE: Status: ACTIVE | Noted: 2019-08-26

## 2019-08-26 PROBLEM — H40.1190 PRIMARY OPEN-ANGLE GLAUCOMA: Status: ACTIVE | Noted: 2019-08-26

## 2019-08-26 LAB — POC INR: 4.4 (ref 0.8–1.2)

## 2019-08-26 PROCEDURE — 85610 PROTHROMBIN TIME: CPT

## 2019-08-26 PROCEDURE — 36416 COLLJ CAPILLARY BLOOD SPEC: CPT

## 2019-08-26 PROCEDURE — 99211 OFF/OP EST MAY X REQ PHY/QHP: CPT

## 2019-08-26 RX ORDER — MV-MIN/FA/VIT K/LUTEIN/ZEAXANT 200MCG-5MG
CAPSULE ORAL
COMMUNITY
Start: 2019-06-13 | End: 2020-08-03 | Stop reason: CLARIF

## 2019-08-29 ENCOUNTER — TELEPHONE (OUTPATIENT)
Dept: FAMILY MEDICINE CLINIC | Age: 84
End: 2019-08-29

## 2019-08-29 RX ORDER — ATORVASTATIN CALCIUM 80 MG/1
80 TABLET, FILM COATED ORAL NIGHTLY
Qty: 90 TABLET | Refills: 3 | Status: CANCELLED | OUTPATIENT
Start: 2019-08-29

## 2019-09-05 ENCOUNTER — HOSPITAL ENCOUNTER (OUTPATIENT)
Dept: PHARMACY | Age: 84
Setting detail: THERAPIES SERIES
Discharge: HOME | End: 2019-09-05
Payer: MEDICARE

## 2019-09-05 DIAGNOSIS — I82.501 CHRONIC DEEP VEIN THROMBOSIS (DVT) OF RIGHT LOWER EXTREMITY, UNSPECIFIED VEIN (HCC): ICD-10-CM

## 2019-09-05 LAB — POC INR: 1.7 (ref 0.8–1.2)

## 2019-09-05 PROCEDURE — 99211 OFF/OP EST MAY X REQ PHY/QHP: CPT

## 2019-09-05 PROCEDURE — 36416 COLLJ CAPILLARY BLOOD SPEC: CPT

## 2019-09-05 PROCEDURE — 85610 PROTHROMBIN TIME: CPT

## 2019-09-12 ENCOUNTER — TELEPHONE (OUTPATIENT)
Dept: FAMILY MEDICINE CLINIC | Age: 84
End: 2019-09-12

## 2019-09-17 ENCOUNTER — HOSPITAL ENCOUNTER (OUTPATIENT)
Dept: PHARMACY | Age: 84
Setting detail: THERAPIES SERIES
Discharge: HOME | End: 2019-09-17
Payer: MEDICARE

## 2019-09-17 DIAGNOSIS — I82.501 CHRONIC DEEP VEIN THROMBOSIS (DVT) OF RIGHT LOWER EXTREMITY, UNSPECIFIED VEIN (HCC): ICD-10-CM

## 2019-09-17 LAB — POC INR: 2.4 (ref 0.8–1.2)

## 2019-09-17 PROCEDURE — 85610 PROTHROMBIN TIME: CPT

## 2019-09-17 PROCEDURE — 36416 COLLJ CAPILLARY BLOOD SPEC: CPT

## 2019-09-17 PROCEDURE — 99211 OFF/OP EST MAY X REQ PHY/QHP: CPT

## 2019-10-01 ENCOUNTER — HOSPITAL ENCOUNTER (OUTPATIENT)
Dept: PHARMACY | Age: 84
Setting detail: THERAPIES SERIES
Discharge: HOME | End: 2019-10-01
Payer: MEDICARE

## 2019-10-01 DIAGNOSIS — I82.591 CHRONIC DEEP VEIN THROMBOSIS (DVT) OF OTHER VEIN OF RIGHT LOWER EXTREMITY (HCC): ICD-10-CM

## 2019-10-01 LAB — POC INR: 2.7 (ref 0.8–1.2)

## 2019-10-01 PROCEDURE — 85610 PROTHROMBIN TIME: CPT | Performed by: PHARMACIST

## 2019-10-01 PROCEDURE — 99211 OFF/OP EST MAY X REQ PHY/QHP: CPT | Performed by: PHARMACIST

## 2019-10-01 PROCEDURE — 36416 COLLJ CAPILLARY BLOOD SPEC: CPT | Performed by: PHARMACIST

## 2019-10-01 PROCEDURE — G0008 ADMIN INFLUENZA VIRUS VAC: HCPCS | Performed by: INTERNAL MEDICINE

## 2019-10-01 PROCEDURE — 6360000002 HC RX W HCPCS: Performed by: INTERNAL MEDICINE

## 2019-10-01 PROCEDURE — 90686 IIV4 VACC NO PRSV 0.5 ML IM: CPT | Performed by: INTERNAL MEDICINE

## 2019-10-01 RX ADMIN — INFLUENZA A VIRUS A/BRISBANE/02/2018 IVR-190 (H1N1) ANTIGEN (PROPIOLACTONE INACTIVATED), INFLUENZA A VIRUS A/KANSAS/14/2017 X-327 (H3N2) ANTIGEN (PROPIOLACTONE INACTIVATED), INFLUENZA B VIRUS B/MARYLAND/15/2016 ANTIGEN (PROPIOLACTONE INACTIVATED), INFLUENZA B VIRUS B/PHUKET/3073/2013 BVR-1B ANTIGEN (PROPIOLACTONE INACTIVATED) 0.5 ML: 15; 15; 15; 15 INJECTION, SUSPENSION INTRAMUSCULAR at 11:52

## 2019-10-03 ENCOUNTER — OFFICE VISIT (OUTPATIENT)
Dept: CARDIOLOGY CLINIC | Age: 84
End: 2019-10-03
Payer: MEDICARE

## 2019-10-03 VITALS
WEIGHT: 256.4 LBS | BODY MASS INDEX: 40.24 KG/M2 | DIASTOLIC BLOOD PRESSURE: 78 MMHG | SYSTOLIC BLOOD PRESSURE: 134 MMHG | HEART RATE: 64 BPM | HEIGHT: 67 IN

## 2019-10-03 DIAGNOSIS — E78.2 MIXED HYPERLIPIDEMIA: ICD-10-CM

## 2019-10-03 DIAGNOSIS — I50.32 CHRONIC DIASTOLIC CONGESTIVE HEART FAILURE (HCC): Primary | ICD-10-CM

## 2019-10-03 DIAGNOSIS — I10 ESSENTIAL HYPERTENSION: ICD-10-CM

## 2019-10-03 PROCEDURE — 1036F TOBACCO NON-USER: CPT | Performed by: INTERNAL MEDICINE

## 2019-10-03 PROCEDURE — G8482 FLU IMMUNIZE ORDER/ADMIN: HCPCS | Performed by: INTERNAL MEDICINE

## 2019-10-03 PROCEDURE — 99214 OFFICE O/P EST MOD 30 MIN: CPT | Performed by: INTERNAL MEDICINE

## 2019-10-03 PROCEDURE — G8427 DOCREV CUR MEDS BY ELIG CLIN: HCPCS | Performed by: INTERNAL MEDICINE

## 2019-10-03 PROCEDURE — 1123F ACP DISCUSS/DSCN MKR DOCD: CPT | Performed by: INTERNAL MEDICINE

## 2019-10-03 PROCEDURE — G8417 CALC BMI ABV UP PARAM F/U: HCPCS | Performed by: INTERNAL MEDICINE

## 2019-10-03 PROCEDURE — 4040F PNEUMOC VAC/ADMIN/RCVD: CPT | Performed by: INTERNAL MEDICINE

## 2019-10-07 DIAGNOSIS — Z79.01 ANTICOAGULATED ON COUMADIN: ICD-10-CM

## 2019-10-07 DIAGNOSIS — I48.91 NEW ONSET ATRIAL FIBRILLATION (HCC): ICD-10-CM

## 2019-10-07 RX ORDER — WARFARIN SODIUM 5 MG/1
TABLET ORAL
Qty: 90 TABLET | Refills: 3 | Status: ON HOLD | OUTPATIENT
Start: 2019-10-07 | End: 2020-05-19 | Stop reason: HOSPADM

## 2019-10-21 ENCOUNTER — TELEPHONE (OUTPATIENT)
Dept: PHARMACY | Age: 84
End: 2019-10-21

## 2019-10-21 DIAGNOSIS — I82.591 CHRONIC DEEP VEIN THROMBOSIS (DVT) OF OTHER VEIN OF RIGHT LOWER EXTREMITY (HCC): Primary | ICD-10-CM

## 2019-10-22 ENCOUNTER — APPOINTMENT (OUTPATIENT)
Dept: PHARMACY | Age: 84
End: 2019-10-22
Payer: MEDICARE

## 2019-10-28 ENCOUNTER — HOSPITAL ENCOUNTER (OUTPATIENT)
Dept: PHARMACY | Age: 84
Setting detail: THERAPIES SERIES
Discharge: HOME | End: 2019-10-28
Payer: MEDICARE

## 2019-10-28 DIAGNOSIS — I82.5Y1 CHRONIC DEEP VEIN THROMBOSIS (DVT) OF PROXIMAL VEIN OF RIGHT LOWER EXTREMITY (HCC): ICD-10-CM

## 2019-10-28 LAB — POC INR: 2.2 (ref 0.8–1.2)

## 2019-10-28 PROCEDURE — 85610 PROTHROMBIN TIME: CPT

## 2019-10-28 PROCEDURE — 36416 COLLJ CAPILLARY BLOOD SPEC: CPT

## 2019-10-28 PROCEDURE — 99211 OFF/OP EST MAY X REQ PHY/QHP: CPT

## 2019-10-30 ENCOUNTER — OFFICE VISIT (OUTPATIENT)
Dept: INTERNAL MEDICINE CLINIC | Age: 84
End: 2019-10-30
Payer: MEDICARE

## 2019-10-30 VITALS
HEART RATE: 70 BPM | HEIGHT: 67 IN | RESPIRATION RATE: 12 BRPM | DIASTOLIC BLOOD PRESSURE: 80 MMHG | BODY MASS INDEX: 40.65 KG/M2 | SYSTOLIC BLOOD PRESSURE: 130 MMHG | WEIGHT: 259 LBS

## 2019-10-30 DIAGNOSIS — Z79.4 TYPE 2 DIABETES MELLITUS WITH COMPLICATION, WITH LONG-TERM CURRENT USE OF INSULIN (HCC): Primary | ICD-10-CM

## 2019-10-30 DIAGNOSIS — E11.8 TYPE 2 DIABETES MELLITUS WITH COMPLICATION, WITH LONG-TERM CURRENT USE OF INSULIN (HCC): Primary | ICD-10-CM

## 2019-10-30 LAB — HBA1C MFR BLD: 7.3 % (ref 4.3–5.7)

## 2019-10-30 PROCEDURE — G8427 DOCREV CUR MEDS BY ELIG CLIN: HCPCS | Performed by: NURSE PRACTITIONER

## 2019-10-30 PROCEDURE — 1123F ACP DISCUSS/DSCN MKR DOCD: CPT | Performed by: NURSE PRACTITIONER

## 2019-10-30 PROCEDURE — 1036F TOBACCO NON-USER: CPT | Performed by: NURSE PRACTITIONER

## 2019-10-30 PROCEDURE — 4040F PNEUMOC VAC/ADMIN/RCVD: CPT | Performed by: NURSE PRACTITIONER

## 2019-10-30 PROCEDURE — G8417 CALC BMI ABV UP PARAM F/U: HCPCS | Performed by: NURSE PRACTITIONER

## 2019-10-30 PROCEDURE — G8482 FLU IMMUNIZE ORDER/ADMIN: HCPCS | Performed by: NURSE PRACTITIONER

## 2019-10-30 PROCEDURE — 99214 OFFICE O/P EST MOD 30 MIN: CPT | Performed by: NURSE PRACTITIONER

## 2019-11-01 RX ORDER — BLOOD-GLUCOSE METER
EACH MISCELLANEOUS
Qty: 1 KIT | Refills: 0 | Status: ON HOLD | OUTPATIENT
Start: 2019-11-01 | End: 2020-03-12 | Stop reason: HOSPADM

## 2019-11-04 ENCOUNTER — OFFICE VISIT (OUTPATIENT)
Dept: PULMONOLOGY | Age: 84
End: 2019-11-04
Payer: MEDICARE

## 2019-11-04 VITALS
HEART RATE: 71 BPM | OXYGEN SATURATION: 94 % | BODY MASS INDEX: 40.68 KG/M2 | RESPIRATION RATE: 20 BRPM | HEIGHT: 67 IN | WEIGHT: 259.2 LBS | DIASTOLIC BLOOD PRESSURE: 60 MMHG | SYSTOLIC BLOOD PRESSURE: 138 MMHG

## 2019-11-04 DIAGNOSIS — J44.9 STAGE 3 SEVERE COPD BY GOLD CLASSIFICATION (HCC): Primary | ICD-10-CM

## 2019-11-04 DIAGNOSIS — E66.01 MORBID OBESITY WITH BMI OF 40.0-44.9, ADULT (HCC): ICD-10-CM

## 2019-11-04 DIAGNOSIS — Z99.89 OSA ON CPAP: ICD-10-CM

## 2019-11-04 DIAGNOSIS — E66.2 OBESITY HYPOVENTILATION SYNDROME (HCC): ICD-10-CM

## 2019-11-04 DIAGNOSIS — G47.33 OSA ON CPAP: ICD-10-CM

## 2019-11-04 DIAGNOSIS — J96.11 CHRONIC HYPOXEMIC RESPIRATORY FAILURE (HCC): ICD-10-CM

## 2019-11-04 PROCEDURE — G8417 CALC BMI ABV UP PARAM F/U: HCPCS | Performed by: PHYSICIAN ASSISTANT

## 2019-11-04 PROCEDURE — 99214 OFFICE O/P EST MOD 30 MIN: CPT | Performed by: PHYSICIAN ASSISTANT

## 2019-11-04 PROCEDURE — 4040F PNEUMOC VAC/ADMIN/RCVD: CPT | Performed by: PHYSICIAN ASSISTANT

## 2019-11-04 PROCEDURE — 1123F ACP DISCUSS/DSCN MKR DOCD: CPT | Performed by: PHYSICIAN ASSISTANT

## 2019-11-04 PROCEDURE — 94618 PULMONARY STRESS TESTING: CPT | Performed by: PHYSICIAN ASSISTANT

## 2019-11-04 PROCEDURE — G8482 FLU IMMUNIZE ORDER/ADMIN: HCPCS | Performed by: PHYSICIAN ASSISTANT

## 2019-11-04 PROCEDURE — G8926 SPIRO NO PERF OR DOC: HCPCS | Performed by: PHYSICIAN ASSISTANT

## 2019-11-04 PROCEDURE — 3023F SPIROM DOC REV: CPT | Performed by: PHYSICIAN ASSISTANT

## 2019-11-04 PROCEDURE — 1036F TOBACCO NON-USER: CPT | Performed by: PHYSICIAN ASSISTANT

## 2019-11-04 PROCEDURE — G8427 DOCREV CUR MEDS BY ELIG CLIN: HCPCS | Performed by: PHYSICIAN ASSISTANT

## 2019-11-04 ASSESSMENT — ENCOUNTER SYMPTOMS
NAUSEA: 0
WHEEZING: 0
COUGH: 0
DIARRHEA: 0
BACK PAIN: 0
ALLERGIC/IMMUNOLOGIC NEGATIVE: 1
STRIDOR: 0
CHEST TIGHTNESS: 0
SHORTNESS OF BREATH: 1
EYES NEGATIVE: 1

## 2019-11-05 ENCOUNTER — CARE COORDINATION (OUTPATIENT)
Dept: CARE COORDINATION | Age: 84
End: 2019-11-05

## 2019-11-05 RX ORDER — POTASSIUM CHLORIDE 750 MG/1
10 TABLET, EXTENDED RELEASE ORAL 2 TIMES DAILY
Status: ON HOLD | COMMUNITY
End: 2020-03-24 | Stop reason: SDUPTHER

## 2019-11-05 SDOH — HEALTH STABILITY: PHYSICAL HEALTH: ON AVERAGE, HOW MANY MINUTES DO YOU ENGAGE IN EXERCISE AT THIS LEVEL?: 0 MIN

## 2019-11-05 SDOH — SOCIAL STABILITY: SOCIAL NETWORK
DO YOU BELONG TO ANY CLUBS OR ORGANIZATIONS SUCH AS CHURCH GROUPS UNIONS, FRATERNAL OR ATHLETIC GROUPS, OR SCHOOL GROUPS?: NO

## 2019-11-05 SDOH — SOCIAL STABILITY: SOCIAL NETWORK: IN A TYPICAL WEEK, HOW MANY TIMES DO YOU TALK ON THE PHONE WITH FAMILY, FRIENDS, OR NEIGHBORS?: ONCE A WEEK

## 2019-11-05 SDOH — HEALTH STABILITY: PHYSICAL HEALTH: ON AVERAGE, HOW MANY DAYS PER WEEK DO YOU ENGAGE IN MODERATE TO STRENUOUS EXERCISE (LIKE A BRISK WALK)?: 0 DAYS

## 2019-11-05 SDOH — ECONOMIC STABILITY: FOOD INSECURITY: WITHIN THE PAST 12 MONTHS, YOU WORRIED THAT YOUR FOOD WOULD RUN OUT BEFORE YOU GOT MONEY TO BUY MORE.: NEVER TRUE

## 2019-11-05 SDOH — HEALTH STABILITY: MENTAL HEALTH
STRESS IS WHEN SOMEONE FEELS TENSE, NERVOUS, ANXIOUS, OR CAN'T SLEEP AT NIGHT BECAUSE THEIR MIND IS TROUBLED. HOW STRESSED ARE YOU?: NOT AT ALL

## 2019-11-05 SDOH — ECONOMIC STABILITY: INCOME INSECURITY: HOW HARD IS IT FOR YOU TO PAY FOR THE VERY BASICS LIKE FOOD, HOUSING, MEDICAL CARE, AND HEATING?: NOT HARD AT ALL

## 2019-11-05 SDOH — ECONOMIC STABILITY: TRANSPORTATION INSECURITY
IN THE PAST 12 MONTHS, HAS THE LACK OF TRANSPORTATION KEPT YOU FROM MEDICAL APPOINTMENTS OR FROM GETTING MEDICATIONS?: NO

## 2019-11-05 SDOH — SOCIAL STABILITY: SOCIAL NETWORK: HOW OFTEN DO YOU ATTENT MEETINGS OF THE CLUB OR ORGANIZATION YOU BELONG TO?: NEVER

## 2019-11-05 SDOH — SOCIAL STABILITY: SOCIAL NETWORK: HOW OFTEN DO YOU GET TOGETHER WITH FRIENDS OR RELATIVES?: ONCE A WEEK

## 2019-11-05 SDOH — ECONOMIC STABILITY: FOOD INSECURITY: WITHIN THE PAST 12 MONTHS, THE FOOD YOU BOUGHT JUST DIDN'T LAST AND YOU DIDN'T HAVE MONEY TO GET MORE.: NEVER TRUE

## 2019-11-05 SDOH — ECONOMIC STABILITY: TRANSPORTATION INSECURITY
IN THE PAST 12 MONTHS, HAS LACK OF TRANSPORTATION KEPT YOU FROM MEETINGS, WORK, OR FROM GETTING THINGS NEEDED FOR DAILY LIVING?: NO

## 2019-11-05 SDOH — SOCIAL STABILITY: SOCIAL NETWORK: ARE YOU MARRIED, WIDOWED, DIVORCED, SEPARATED, NEVER MARRIED, OR LIVING WITH A PARTNER?: DIVORCED

## 2019-11-05 ASSESSMENT — ENCOUNTER SYMPTOMS: DYSPNEA ASSOCIATED WITH: EXERTION

## 2019-11-08 DIAGNOSIS — Z79.4 TYPE 2 DIABETES MELLITUS WITH COMPLICATION, WITH LONG-TERM CURRENT USE OF INSULIN (HCC): ICD-10-CM

## 2019-11-08 DIAGNOSIS — E11.8 TYPE 2 DIABETES MELLITUS WITH COMPLICATION, WITH LONG-TERM CURRENT USE OF INSULIN (HCC): ICD-10-CM

## 2019-11-08 RX ORDER — LISINOPRIL 5 MG/1
5 TABLET ORAL DAILY
Qty: 90 TABLET | Refills: 3 | Status: ON HOLD | OUTPATIENT
Start: 2019-11-08 | End: 2020-05-19 | Stop reason: SDUPTHER

## 2019-11-11 DIAGNOSIS — E89.0 POSTOPERATIVE HYPOTHYROIDISM: ICD-10-CM

## 2019-11-11 RX ORDER — LEVOTHYROXINE SODIUM 175 UG/1
TABLET ORAL
Qty: 90 TABLET | Refills: 3 | Status: SHIPPED | OUTPATIENT
Start: 2019-11-11 | End: 2020-09-25 | Stop reason: SDUPTHER

## 2019-11-12 ASSESSMENT — ENCOUNTER SYMPTOMS
WHEEZING: 0
SORE THROAT: 0
SHORTNESS OF BREATH: 1
ABDOMINAL DISTENTION: 1
NAUSEA: 0
COUGH: 0
CONSTIPATION: 0
SINUS PRESSURE: 0
VOMITING: 0
DIARRHEA: 0
SINUS PAIN: 0
ABDOMINAL PAIN: 0
BLOOD IN STOOL: 0
TROUBLE SWALLOWING: 0
PHOTOPHOBIA: 0

## 2019-11-13 ENCOUNTER — HOSPITAL ENCOUNTER (OUTPATIENT)
Age: 84
Discharge: HOME | End: 2019-11-13
Payer: MEDICARE

## 2019-11-13 DIAGNOSIS — D64.9 ANEMIA, UNSPECIFIED TYPE: ICD-10-CM

## 2019-11-13 DIAGNOSIS — I10 ESSENTIAL HYPERTENSION: ICD-10-CM

## 2019-11-13 LAB
ANION GAP SERPL CALCULATED.3IONS-SCNC: 14 MEQ/L (ref 8–16)
BUN BLDV-MCNC: 9 MG/DL (ref 7–22)
CALCIUM SERPL-MCNC: 8.8 MG/DL (ref 8.5–10.5)
CHLORIDE BLD-SCNC: 99 MEQ/L (ref 98–111)
CO2: 32 MEQ/L (ref 23–33)
CREAT SERPL-MCNC: 0.8 MG/DL (ref 0.4–1.2)
GFR SERPL CREATININE-BSD FRML MDRD: > 90 ML/MIN/1.73M2
GLUCOSE BLD-MCNC: 165 MG/DL (ref 70–108)
HCT VFR BLD CALC: 39.5 % (ref 42–52)
HEMOGLOBIN: 13.2 GM/DL (ref 14–18)
POTASSIUM SERPL-SCNC: 4 MEQ/L (ref 3.5–5.2)
SODIUM BLD-SCNC: 145 MEQ/L (ref 135–145)

## 2019-11-13 PROCEDURE — 36415 COLL VENOUS BLD VENIPUNCTURE: CPT

## 2019-11-13 PROCEDURE — 85014 HEMATOCRIT: CPT

## 2019-11-13 PROCEDURE — 85018 HEMOGLOBIN: CPT

## 2019-11-13 PROCEDURE — 80048 BASIC METABOLIC PNL TOTAL CA: CPT

## 2019-11-14 ENCOUNTER — CARE COORDINATION (OUTPATIENT)
Dept: CARE COORDINATION | Age: 84
End: 2019-11-14

## 2019-11-15 ENCOUNTER — CARE COORDINATION (OUTPATIENT)
Dept: CARE COORDINATION | Age: 84
End: 2019-11-15

## 2019-11-18 ENCOUNTER — HOSPITAL ENCOUNTER (OUTPATIENT)
Dept: PHARMACY | Age: 84
Setting detail: THERAPIES SERIES
Discharge: HOME | End: 2019-11-18
Payer: MEDICARE

## 2019-11-18 DIAGNOSIS — I82.5Y1 CHRONIC DEEP VEIN THROMBOSIS (DVT) OF PROXIMAL VEIN OF RIGHT LOWER EXTREMITY (HCC): ICD-10-CM

## 2019-11-18 LAB — POC INR: 2 (ref 0.8–1.2)

## 2019-11-18 PROCEDURE — 99211 OFF/OP EST MAY X REQ PHY/QHP: CPT

## 2019-11-18 PROCEDURE — 85610 PROTHROMBIN TIME: CPT

## 2019-11-18 PROCEDURE — 36416 COLLJ CAPILLARY BLOOD SPEC: CPT

## 2019-11-20 ENCOUNTER — CARE COORDINATION (OUTPATIENT)
Dept: CARE COORDINATION | Age: 84
End: 2019-11-20

## 2019-11-25 ENCOUNTER — OFFICE VISIT (OUTPATIENT)
Dept: FAMILY MEDICINE CLINIC | Age: 84
End: 2019-11-25
Payer: MEDICARE

## 2019-11-25 VITALS
TEMPERATURE: 98.3 F | RESPIRATION RATE: 20 BRPM | DIASTOLIC BLOOD PRESSURE: 50 MMHG | SYSTOLIC BLOOD PRESSURE: 120 MMHG | WEIGHT: 259 LBS | BODY MASS INDEX: 40.57 KG/M2 | HEART RATE: 76 BPM

## 2019-11-25 DIAGNOSIS — E66.2 OBESITY HYPOVENTILATION SYNDROME (HCC): ICD-10-CM

## 2019-11-25 DIAGNOSIS — J44.9 STAGE 3 SEVERE COPD BY GOLD CLASSIFICATION (HCC): ICD-10-CM

## 2019-11-25 DIAGNOSIS — I48.92 PAROXYSMAL ATRIAL FLUTTER (HCC): ICD-10-CM

## 2019-11-25 DIAGNOSIS — I10 ESSENTIAL HYPERTENSION: ICD-10-CM

## 2019-11-25 DIAGNOSIS — H35.373 PUCKERING OF MACULA, BILATERAL: ICD-10-CM

## 2019-11-25 DIAGNOSIS — J41.1 MUCOPURULENT CHRONIC BRONCHITIS (HCC): ICD-10-CM

## 2019-11-25 DIAGNOSIS — E89.0 POSTOPERATIVE HYPOTHYROIDISM: ICD-10-CM

## 2019-11-25 DIAGNOSIS — Z79.4 TYPE 2 DIABETES MELLITUS WITH COMPLICATION, WITH LONG-TERM CURRENT USE OF INSULIN (HCC): Primary | ICD-10-CM

## 2019-11-25 DIAGNOSIS — G47.33 OSA (OBSTRUCTIVE SLEEP APNEA): ICD-10-CM

## 2019-11-25 DIAGNOSIS — N40.1 BENIGN PROSTATIC HYPERPLASIA WITH LOWER URINARY TRACT SYMPTOMS, SYMPTOM DETAILS UNSPECIFIED: ICD-10-CM

## 2019-11-25 DIAGNOSIS — E78.2 MIXED HYPERLIPIDEMIA: ICD-10-CM

## 2019-11-25 DIAGNOSIS — Z79.01 ANTICOAGULATED ON COUMADIN: ICD-10-CM

## 2019-11-25 DIAGNOSIS — E11.8 TYPE 2 DIABETES MELLITUS WITH COMPLICATION, WITH LONG-TERM CURRENT USE OF INSULIN (HCC): Primary | ICD-10-CM

## 2019-11-25 DIAGNOSIS — D64.9 ANEMIA, UNSPECIFIED TYPE: ICD-10-CM

## 2019-11-25 PROCEDURE — 3023F SPIROM DOC REV: CPT | Performed by: FAMILY MEDICINE

## 2019-11-25 PROCEDURE — G8482 FLU IMMUNIZE ORDER/ADMIN: HCPCS | Performed by: FAMILY MEDICINE

## 2019-11-25 PROCEDURE — G8417 CALC BMI ABV UP PARAM F/U: HCPCS | Performed by: FAMILY MEDICINE

## 2019-11-25 PROCEDURE — 4040F PNEUMOC VAC/ADMIN/RCVD: CPT | Performed by: FAMILY MEDICINE

## 2019-11-25 PROCEDURE — G8926 SPIRO NO PERF OR DOC: HCPCS | Performed by: FAMILY MEDICINE

## 2019-11-25 PROCEDURE — 99214 OFFICE O/P EST MOD 30 MIN: CPT | Performed by: FAMILY MEDICINE

## 2019-11-25 PROCEDURE — 1036F TOBACCO NON-USER: CPT | Performed by: FAMILY MEDICINE

## 2019-11-25 PROCEDURE — 1123F ACP DISCUSS/DSCN MKR DOCD: CPT | Performed by: FAMILY MEDICINE

## 2019-11-25 PROCEDURE — G8427 DOCREV CUR MEDS BY ELIG CLIN: HCPCS | Performed by: FAMILY MEDICINE

## 2019-11-25 ASSESSMENT — ENCOUNTER SYMPTOMS
ANAL BLEEDING: 0
NAUSEA: 0
CHEST TIGHTNESS: 0
CONSTIPATION: 0
ABDOMINAL PAIN: 0
SHORTNESS OF BREATH: 1
DIARRHEA: 0
BLOOD IN STOOL: 0
VOMITING: 0

## 2019-11-26 ENCOUNTER — HOSPITAL ENCOUNTER (OUTPATIENT)
Dept: PULMONOLOGY | Age: 84
Discharge: HOME | End: 2019-11-26
Payer: MEDICARE

## 2019-11-26 ENCOUNTER — TELEPHONE (OUTPATIENT)
Dept: FAMILY MEDICINE CLINIC | Age: 84
End: 2019-11-26

## 2019-11-26 PROCEDURE — 94726 PLETHYSMOGRAPHY LUNG VOLUMES: CPT

## 2019-11-26 PROCEDURE — 94729 DIFFUSING CAPACITY: CPT

## 2019-11-26 PROCEDURE — 94060 EVALUATION OF WHEEZING: CPT

## 2019-12-06 ENCOUNTER — CARE COORDINATION (OUTPATIENT)
Dept: CARE COORDINATION | Age: 84
End: 2019-12-06

## 2019-12-16 ENCOUNTER — HOSPITAL ENCOUNTER (OUTPATIENT)
Dept: PHARMACY | Age: 84
Setting detail: THERAPIES SERIES
Discharge: HOME | End: 2019-12-16
Payer: MEDICARE

## 2019-12-16 DIAGNOSIS — I82.5Y1 CHRONIC DEEP VEIN THROMBOSIS (DVT) OF PROXIMAL VEIN OF RIGHT LOWER EXTREMITY (HCC): ICD-10-CM

## 2019-12-16 LAB — POC INR: 2.2 (ref 0.8–1.2)

## 2019-12-16 PROCEDURE — 85610 PROTHROMBIN TIME: CPT

## 2019-12-16 PROCEDURE — 99211 OFF/OP EST MAY X REQ PHY/QHP: CPT

## 2019-12-16 PROCEDURE — 36416 COLLJ CAPILLARY BLOOD SPEC: CPT

## 2019-12-17 ENCOUNTER — HOSPITAL ENCOUNTER (OUTPATIENT)
Dept: CARDIAC REHAB | Age: 84
Setting detail: THERAPIES SERIES
Discharge: HOME | End: 2019-12-17
Payer: MEDICARE

## 2019-12-17 VITALS — WEIGHT: 252.38 LBS | HEIGHT: 66 IN | BODY MASS INDEX: 40.56 KG/M2

## 2019-12-17 PROCEDURE — 97150 GROUP THERAPEUTIC PROCEDURES: CPT

## 2019-12-24 ENCOUNTER — CARE COORDINATION (OUTPATIENT)
Dept: CARE COORDINATION | Age: 84
End: 2019-12-24

## 2019-12-26 ENCOUNTER — HOSPITAL ENCOUNTER (OUTPATIENT)
Dept: CARDIAC REHAB | Age: 84
Setting detail: THERAPIES SERIES
Discharge: HOME | End: 2019-12-26
Payer: MEDICARE

## 2019-12-26 PROCEDURE — 97150 GROUP THERAPEUTIC PROCEDURES: CPT

## 2019-12-27 ENCOUNTER — CARE COORDINATION (OUTPATIENT)
Dept: CARE COORDINATION | Age: 84
End: 2019-12-27

## 2019-12-31 ENCOUNTER — HOSPITAL ENCOUNTER (OUTPATIENT)
Dept: CARDIAC REHAB | Age: 84
Setting detail: THERAPIES SERIES
Discharge: HOME | End: 2019-12-31
Payer: MEDICARE

## 2019-12-31 PROCEDURE — 97150 GROUP THERAPEUTIC PROCEDURES: CPT

## 2020-01-02 ENCOUNTER — CARE COORDINATION (OUTPATIENT)
Dept: CARE COORDINATION | Age: 85
End: 2020-01-02

## 2020-01-02 ENCOUNTER — HOSPITAL ENCOUNTER (OUTPATIENT)
Dept: CARDIAC REHAB | Age: 85
Setting detail: THERAPIES SERIES
Discharge: HOME | End: 2020-01-02
Payer: MEDICARE

## 2020-01-02 ENCOUNTER — TELEPHONE (OUTPATIENT)
Dept: INTERNAL MEDICINE CLINIC | Age: 85
End: 2020-01-02

## 2020-01-02 PROCEDURE — 97150 GROUP THERAPEUTIC PROCEDURES: CPT

## 2020-01-02 NOTE — TELEPHONE ENCOUNTER
Reji Andrade  called asking if we have  lantus samples. Pt has an appt on 1/6/20. I called rebel back and advised her we have no samples and can check with his PCP for samples.

## 2020-01-02 NOTE — CARE COORDINATION
Visited pt in his home to assist him with ordering his lantis. Pt was unsure how to do this and didn't have a prescription for it he said. Pt asked that I call Josiane Osei officefor another sample. . I called that office and got a msg back form Leonid Pineda that they do not have any samples but she can call a script in. Pt uses [facility]. Will f/u with this tomorrow.

## 2020-01-03 ENCOUNTER — CARE COORDINATION (OUTPATIENT)
Dept: CARE COORDINATION | Age: 85
End: 2020-01-03

## 2020-01-03 RX ORDER — INSULIN GLARGINE 100 [IU]/ML
INJECTION, SOLUTION SUBCUTANEOUS
Qty: 30 ML | Refills: 3 | Status: SHIPPED | OUTPATIENT
Start: 2020-01-03 | End: 2020-09-29 | Stop reason: SDUPTHER

## 2020-01-03 RX ORDER — DOXAZOSIN MESYLATE 4 MG/1
4 TABLET ORAL NIGHTLY
Qty: 90 TABLET | Refills: 1 | Status: SHIPPED | OUTPATIENT
Start: 2020-01-03 | End: 2020-01-31 | Stop reason: SDUPTHER

## 2020-01-06 ENCOUNTER — CARE COORDINATION (OUTPATIENT)
Dept: CARE COORDINATION | Age: 85
End: 2020-01-06

## 2020-01-06 ENCOUNTER — OFFICE VISIT (OUTPATIENT)
Dept: INTERNAL MEDICINE CLINIC | Age: 85
End: 2020-01-06
Payer: MEDICARE

## 2020-01-06 VITALS
SYSTOLIC BLOOD PRESSURE: 129 MMHG | HEART RATE: 68 BPM | RESPIRATION RATE: 12 BRPM | WEIGHT: 256 LBS | HEIGHT: 66 IN | BODY MASS INDEX: 41.14 KG/M2 | OXYGEN SATURATION: 98 % | DIASTOLIC BLOOD PRESSURE: 60 MMHG

## 2020-01-06 PROCEDURE — 4040F PNEUMOC VAC/ADMIN/RCVD: CPT | Performed by: NURSE PRACTITIONER

## 2020-01-06 PROCEDURE — G8427 DOCREV CUR MEDS BY ELIG CLIN: HCPCS | Performed by: NURSE PRACTITIONER

## 2020-01-06 PROCEDURE — 1036F TOBACCO NON-USER: CPT | Performed by: NURSE PRACTITIONER

## 2020-01-06 PROCEDURE — 1123F ACP DISCUSS/DSCN MKR DOCD: CPT | Performed by: NURSE PRACTITIONER

## 2020-01-06 PROCEDURE — 99214 OFFICE O/P EST MOD 30 MIN: CPT | Performed by: NURSE PRACTITIONER

## 2020-01-06 PROCEDURE — G8482 FLU IMMUNIZE ORDER/ADMIN: HCPCS | Performed by: NURSE PRACTITIONER

## 2020-01-06 PROCEDURE — G8417 CALC BMI ABV UP PARAM F/U: HCPCS | Performed by: NURSE PRACTITIONER

## 2020-01-06 RX ORDER — FUROSEMIDE 40 MG/1
40 TABLET ORAL 2 TIMES DAILY
Refills: 4 | COMMUNITY
Start: 2019-12-10 | End: 2020-07-24 | Stop reason: SDUPTHER

## 2020-01-06 ASSESSMENT — ENCOUNTER SYMPTOMS
CONSTIPATION: 0
ABDOMINAL PAIN: 0
SHORTNESS OF BREATH: 1
ABDOMINAL DISTENTION: 1
NAUSEA: 0
DIARRHEA: 0
SINUS PAIN: 0
SORE THROAT: 0
PHOTOPHOBIA: 0
BLOOD IN STOOL: 0
VOMITING: 0
WHEEZING: 0
SINUS PRESSURE: 0
TROUBLE SWALLOWING: 0
DYSPNEA ASSOCIATED WITH: EXERTION
COUGH: 0

## 2020-01-06 NOTE — CARE COORDINATION
Ambulatory Care Coordination Note  1/6/2020  CM Risk Score: 7  Charlson 10 Year Mortality Risk Score: 100%     ACC: Deirdre Mcmillan, RN    Summary Note: Jocelyn Quinones is being followed by care coordination for education and assistance in managing his DM, COPD, CHF. Met with Jocelyn Quinones today following PCP appt. Pt reports that his Lantus insulin has not come in mail yet. Script was sent on 1/3. Informed pt that it should come within the next couple of days and if it doesn't advised him to f/u with pharmacy. Pt was provided with Tresiba samples in office today as office did not have any Lantus. Pt is hoping this gets him through until his Lantus comes. Reports BS's have been controlled. Denies hypo or hyperglycemia. COPD-reports breathing is at baseline. Denies cough or congestion  CHF-weighing few times per wk. Encouraged again to weigh daily. Reports wts have been stable. Has mild ble edema, but reports that this is \"normal\" for him. Encouraged to limit sodium. Pt reports appetite has been down. Encouraged to not skip meals. Eating snack in evening vs large meal.  Denies problems getting groceries. SW continues to work with pt to help with resource needs. Plan of care:  Continue working with [facility] if scripts do not arrive in next couple of days. Monitor wts daily. Call if having 3 pound wt gain in 1 day or 5 pounds in 1 wk. Limit sodium to 2gms or less per day. Utilize Tresiba samples   Call with any new concerns. Diabetes Assessment    Medic Alert ID:  No  Meal Planning:  Avoidance of concentrated sweets   How often do you test your blood sugar?:  Other (Comment: once/week)   Do you have barriers with adherence to non-pharmacologic self-management interventions?  (Nutrition/Exercise/Self-Monitoring):  Yes   Have you ever had to go to the ED for symptoms of low blood sugar?:  No       Do you have hyperglycemia symptoms?:  No   Do you have hypoglycemia symptoms?:  No   Last Blood Sugar Value: MD Amish   atorvastatin (LIPITOR) 80 MG tablet Take 80 mg by mouth nightly    Historical Provider, MD   AZOPT 1 % ophthalmic suspension Place 1 drop into both eyes 3 times daily Indications: Disease of the Eye  1/16/18   Historical Provider, MD   Arformoterol Tartrate (BROVANA) 15 MCG/2ML NEBU Take 1 ampule by nebulization 2 times daily Indications: Prevention of COPD Worsening     Historical Provider, MD   budesonide (PULMICORT) 0.25 MG/2ML nebulizer suspension Take 1 ampule by nebulization 2 times daily     Historical Provider, MD   albuterol (PROVENTIL HFA;VENTOLIN HFA) 108 (90 BASE) MCG/ACT inhaler Inhale 2 puffs into the lungs every 6 hours as needed for Wheezing    Historical Provider, MD   OXYGEN Inhale into the lungs as needed (Uses at night only) Indications: Difficulty Breathing, Oxygen Therapy 3 liters    Historical Provider, MD       Future Appointments   Date Time Provider Gretchen Jorgensen   1/7/2020  1:00 PM STR CARDIAC EXERCISE RM [facility] PUL [place] HOD   1/9/2020  1:00 PM STR CARDIAC EXERCISE RM [facility] [place]   1/14/2020  1:00 PM STR CARDIAC EXERCISE RM [facility] [place] [place] [facility]   1/16/2020  1:00 PM STR CARDIAC EXERCISE RM [facility] [place] [place] [facility]   1/20/2020  1:00 PM Cecy Ibarra, [id number] [facility] STR MED MGMT [facility] [place]   1/21/2020  1:00 PM STR CARDIAC EXERCISE RM [facility] [place] [place] [facility]   1/23/2020  1:00 PM STR CARDIAC EXERCISE RM [facility] [place] [place] [facility]   1/28/2020  1:00 PM STR CARDIAC EXERCISE RM [facility] [place] [facility]   1/30/2020  1:00 PM STR CARDIAC EXERCISE RM [facility] [place] [facility]   1/31/2020 10:30 AM MAEGAN Mejias CNP [place] [facility] [place]   2/4/2020  1:00 PM STR CARDIAC EXERCISE RM [facility] [place] [place]   2/6/2020  1:00 PM STR CARDIAC EXERCISE [facility] [place] [facility]   2/11/2020  1:00 PM STR CARDIAC EXERCISE RM [facility] [place]   2/13/2020  1:00 PM STR CARDIAC EXERCISE RM STRZ CAR PUL [facility]   2/18/2020  1:00 PM STR CARDIAC EXERCISE RM [facility] [place] [place]   2/20/2020 1:00 PM STR CARDIAC EXERCISE RM [facility] CAR [facility] [place] [facility]   2/25/2020  1:00 PM STR CARDIAC EXERCISE RM [facility] [place] [place] [facility]   2/27/2020 12:45 PM Juliana Lopez MD [address]   2/27/2020  1:00 PM STR CARDIAC EXERCISE RM [facility] [place] [facility]   3/3/2020  1:00 PM STR CARDIAC EXERCISE RM [facility] [place] [place] [facility]   3/4/2020 10:45 AM Sadaf Perez PA-C Pulm Med [facility] [place]   3/5/2020  1:00 PM STR CARDIAC EXERCISE RM [facility] [place] [place] [facility]   3/10/2020  1:00 PM STR CARDIAC EXERCISE RM [facility] [place] [place] [facility]   3/12/2020  1:00 PM STR CARDIAC EXERCISE RM [facility] [place] [facility]   3/17/2020  1:00 PM STR CARDIAC EXERCISE RM [facility] [place] [place] [facility]   3/19/2020  1:00 PM STR CARDIAC EXERCISE RM [facility] [place] [facility]   3/24/2020  1:00 PM STR CARDIAC EXERCISE RM [facility] [place] [facility]   3/26/2020  1:00 PM STR CARDIAC EXERCISE RM [facility] [place] [facility]   3/31/2020  1:00 PM STR CARDIAC EXERCISE RM [facility] [place] [place] [facility]   4/2/2020  1:00 PM STR CARDIAC EXERCISE RM [facility] [place] [place] [facility]   4/3/2020 11:00 AM Natan Doss MD [facility] Heart [facility] [place]   4/6/2020  1:40 PM Zander File, APRN - CNP [facility] Physic [facility] [place]   4/7/2020  1:00 PM STR CARDIAC EXERCISE RM STR[facility] [place] [place]   4/9/2020  1:00 PM STR CARDIAC EXERCISE RM STR[facility] CAR [place] [place]   4/14/2020  1:00 PM STR CARDIAC EXERCISE RM STR[facility] [place]   4/16/2020  1:00 PM STR CARDIAC EXERCISE RM STR[facility] CAR [place] [place]   4/21/2020  1:00 PM STR CARDIAC EXERCISE RM STRZ CAR [facility] [place]   4/23/2020  1:00 PM STR CARDIAC EXERCISE RM STR[facility] CAR [facility]UL [place]   4/28/2020  1:00 PM STR CARDIAC EXERCISE RM STR[facility] CAR [facility] [place]   4/29/2020 11:00 AM Zander File, APRN - CNP [facility] [place]   7/6/2020 10:45 AM Sadaf Perez PA-C Pulm Med [facility] [place]

## 2020-01-06 NOTE — PROGRESS NOTES
2020     Jojo Pool (:  [id number]) is a 80 y.o. male, here for evaluation of the following medical concerns: diabetes and physical deconditioning      I last seen Jesus Doss 3 months ago. He follows routinely with Dr. Marya Godinez. Has not had any falls. No ER visits or hospital admissions since last visit.      Diabetes-  Jesus Doss states he was diagnosed with diabetes approximately 20 years ago. He has been on insulin since that time. He states his diabetes is well controlled.  A1C 7.3% in 10/2019, not due to be rechecked until 2020. He is taking metformin 1000 mg BID and Lantus 18 units daily. Last urinary microalbumin/crt ratio 33 in 3/2019. States hypoglycemic events not present. Reports checking glucose 1 time per day, with range of glucose readings 140-180 per patient report. Did not bring meter today for download, forgot. He is not doing well at home, having trouble getting around. He denies any falls. He is not attempting dietary modification for diabetes management and/or weight loss. He is 80years old. Has not followed with diabetes clinic recently. Does report difficulty with obtaining medications. Last eye exam within 1 year reportedly. BP reccs <140/90 (<130/80 in CVD risk). On aspirin 81 mg daily and atorvastatin 80 mg daily.     Denies polyuria, polydipsia, polyphagia. Denies neuropathic symptoms including numbness, tingling. Does not have any wounds to feet.      Follows with Dr. Jeffrey Cote routinely for cardiac issues.      Kassidy Alaniz require the following home care treatments or therapies: nursing, aides, and PT/OT. [facility] care will be necessary because of increased weakness, difficulty ambulating, difficulty performing ADLs, vital sign monitoring, medication compliance, and disease teaching. The patient is in agreement to receiving home care.       Jesus Doss reports that he is considering moving back to [facility] with his sister and brother. He feels very alone here.  His children are very C PO) Take by mouth daily Yes Historical Provider, MD   Cholecalciferol (VITAMIN D3 PO) Take by mouth daily Yes Historical Provider, MD   Multiple Vitamins-Minerals (MULTIVITAMIN PO) Take by mouth daily Yes Historical Provider, MD   levothyroxine (SYNTHROID) 175 MCG tablet TAKE 1 TABLET BY MOUTH  DAILY Yes Baljinder Butler MD   lisinopril (PRINIVIL;ZESTRIL) 5 MG tablet Take 1 tablet by mouth daily Yes Baljinder Butler MD   potassium chloride (KLOR-CON M) 10 MEQ extended release tablet Take 10 mEq by mouth 2 times daily Yes Historical Provider, MD   Blood Glucose Monitoring Suppl (ACCU-CHEK CHE PLUS) w/Device KIT Check blood sugar 2 times daily Yes MAEGAN Jaime CNP   SOFT TOUCH LANCETS MISC Use to check blood sugars 2 times daily Yes MAEGAN Jaime CNP   warfarin (COUMADIN) 5 MG tablet Take as directed by [facility]. #90 tabs = 90 days Yes Clinton Jones MD   Insulin Pen Needle (B-D ULTRAFINE III SHORT PEN) 31G X 8 MM MISC To use with Lantus pens to inject SQ qd. Dx: E11.9 Yes Baljinder Butler MD   aspirin 81 MG tablet Take 81 mg by mouth daily Pt states he stopped taking this approx 2-3 weeks ago.  Yes Historical Provider, MD   Multiple Vitamins-Minerals (PRESERVISION AREDS 2+MULTI VIT) CAPS take 1 by Oral route 2 times every day Yes Historical Provider, MD   metoprolol tartrate (LOPRESSOR) 25 MG tablet Take 25 mg by mouth 2 times daily  Yes Historical Provider, MD   blood glucose test strips (ACCU-CHEK CHE PLUS) strip TEST TWICE DAILY Yes MAEGAN Jaime CNP   Ferrous Sulfate (IRON) 325 (65 Fe) MG TABS Take by mouth 2 times daily  Yes Historical Provider, MD   metFORMIN (GLUCOPHAGE) 500 MG tablet Take 2 tablets by mouth 2 times daily (with meals) Yes Louie Martin MD   atorvastatin (LIPITOR) 80 MG tablet Take 80 mg by mouth nightly Yes Historical Provider, MD   AZOPT 1 % ophthalmic suspension Place 1 drop into both eyes 3 times daily Indications:

## 2020-01-07 ENCOUNTER — HOSPITAL ENCOUNTER (OUTPATIENT)
Dept: CARDIAC REHAB | Age: 85
Setting detail: THERAPIES SERIES
Discharge: HOME | End: 2020-01-07
Payer: MEDICARE

## 2020-01-07 PROCEDURE — 97150 GROUP THERAPEUTIC PROCEDURES: CPT

## 2020-01-09 ENCOUNTER — HOSPITAL ENCOUNTER (OUTPATIENT)
Dept: CARDIAC REHAB | Age: 85
Setting detail: THERAPIES SERIES
Discharge: HOME | End: 2020-01-09
Payer: MEDICARE

## 2020-01-09 ENCOUNTER — CARE COORDINATION (OUTPATIENT)
Dept: CARE COORDINATION | Age: 85
End: 2020-01-09

## 2020-01-09 PROCEDURE — 97150 GROUP THERAPEUTIC PROCEDURES: CPT

## 2020-01-09 NOTE — CARE COORDINATION
Called pt to inquire if he received his lantis rx by mail through [facility]. Left  for pt to return my call to [phone].

## 2020-01-14 ENCOUNTER — HOSPITAL ENCOUNTER (OUTPATIENT)
Dept: CARDIAC REHAB | Age: 85
Setting detail: THERAPIES SERIES
Discharge: HOME | End: 2020-01-14
Payer: MEDICARE

## 2020-01-14 PROCEDURE — 97150 GROUP THERAPEUTIC PROCEDURES: CPT

## 2020-01-16 ENCOUNTER — CARE COORDINATION (OUTPATIENT)
Dept: CARE COORDINATION | Age: 85
End: 2020-01-16

## 2020-01-16 ENCOUNTER — HOSPITAL ENCOUNTER (OUTPATIENT)
Dept: CARDIAC REHAB | Age: 85
Setting detail: THERAPIES SERIES
Discharge: HOME | End: 2020-01-16
Payer: MEDICARE

## 2020-01-16 PROCEDURE — 97150 GROUP THERAPEUTIC PROCEDURES: CPT

## 2020-01-16 NOTE — PROGRESS NOTES
GOAL not Met:     Exercise Intervention Initial Assessment      () Transportation needed        (x) EDUCATION needed          (x) Motivation needed   Exercise Intervention/ Education   Plan      () [employer] set up        (x) EDUCATION:  Home activity class to be given        (x) Positive encouragement, support and motivation to be given   Exercise Intervention/ Education Reassessment      () Pt is attending  () Pt is not attending      () Pt has had class  () Pt has not had class      () Pt is progressing  () Pt not progressing Exercise Intervention/ Education Reassessment      () Pt is attending  () Pt is not attending      () Pt has had class  () Pt has not had class      () Pt is progressing  () Pt not progressing Exercise Intervention/ Education Reassessment      () Pt is attending  () Pt is not attending      () Pt has had class  () Pt has not had class      () Pt is progressing  () Pt not progressing Exercise Intervention/ Edcuation Discharge      () Pt attended most  () Pt cancelled a lot    () Pt has had class  Date: See above  () Pt did not have class    () Pt progressed and did well  () Pt had difficulty-               NUTRITION   INITIAL ASSESSMENT      Height:  56   Weight: 252.6 lbs  BMI: 40.8    30 day goal: 250 Lbs (2-4lbs)    (x) Overweight  () Underweight  () Normal  () Teeth issues  () GI issues  (x) Nutrition knowledge needed  (x) DM   NUTRITION   PLAN        Current Weight: 252.6 lbs      Goal achieved?: no  Next 30 day goal: 250 Lbs   NUTRITION  RE ASSESSMENT      Current Weight:  lbs      Goal achieved?:  Next 30 day goal: Lbs NUTRITION  RE ASSESSMENT      Current Weight:  lbs      Goal achieved?:  Next 30 day goal: Lbs NUTRITION  RE ASSESSMENT      Current Weight:  lbs      Goal achieved?:  Next 30 day goal: Lbs   NUTRITION DISCHARGE        Current Weight:  lbs  BMI:    Goal achieved?:     Nutrition Goals  Initial Assessment    (x) Pt is DM.   Increase nutrition knowledge and glucose choices   Nutrition Intervention/ Education  Reassessment      () Pt has had class  () Pt has not had class      () Pt had questions  () Pt denies having questions Nutrition Intervention/ Education  Reassessment      () Pt has had class  () Pt has not had class      () Pt had questions  () Pt denies having questions Nutrition Intervention/ Education  Reassessment      () Pt has had class  () Pt has not had class      () Pt had questions  () Pt denies having questions Nutrition Intervention/ Education   Discharge      () Pt has had class  Date: See above  () Pt has not had class - Reason:    () Pt had questions that were answered   () Pt denies having questions             PSYCHO SOCIAL INITIAL ASSESSMENT      Depression:  () Self Reported  () In History  () S/Sx noted  (x) Denies  () On Medication  () Follows physician      (x) Give PHQ-9 Depression screening tool                  Dyspnea:  (x) Give pt UCSD SOB survey      (x) Pt gets SOB during activity and ADLs. (x) Pt has support from home for the program        () Pt does not have support for the program   PSYCHO SOCIAL  PLAN      (x) Attend Stress/ Depression/ Anxiety Class                Pre Rehab PHQ-9   Score: 4  1-4 Normal  5-9 Mild  10-14 Mod  15-19 Mod-Sev  >19 Severe        Pre Rehab UCSD SOB Score: 26      (x) Attend classes and attend rehab to increase strength and endurance      -Attend Psychosocial class          () Speak with the patient/ family about ability to commit to the program with undue stress/ anxiety   PSYCHO SOCIAL  RE ASSESSMENT    () Pt scored >10 on PHQ-9.   Spoke with pt privately about issues and *             () Pt recommended to contact physician for assistance                See below for ADLs        () Pt has had class  () Pt has not had class        Re assessment of support:  Good, pt has been attending PSYCHO SOCIAL  RE ASSESSMENT    () Pt is coping well  () Pt needs more assistance-              () Pt recommended to pain is under control  () Pt encouraged to contact physician for pain control   Pain   Reassessment      () N/A    () Pts pain is under control  () Pt encouraged to contact physician for pain control Pain   Reassessment      () N/A    () Pts pain is under control  () Pt encouraged to contact physician for pain control Pain   Reassessment      () N/A    () Pts pain is under control  () Pt encouraged to contact physician for pain control Pain   Discharge      () N/A    () Pts pain is under control  () Pt encouraged to contact physician for pain control           OXYGEN   INITIAL ASSESSMENT    RESTING:  (x) RA  () O2:  L/min  () Continuous  () Breath Actuated  93% SpO2 / 20 bpm    Prescribed Oxygen Use:  No home O2 at rest or with exertion,  Does use 3LPM at night bled into home CPAP/BIPAP.      DME Company for O2:  [facility]     OXYGEN   PLAN      RESTING:  (x) Monitor needs, administer supplemental oxygen if SpO2 <88% OXYGEN   RE ASSESSMENT    RESTING:  (x) Pt SpO2 >87%  () Pt needs higher flow  () Pt needs less flow OXYGEN   RE ASSESSMENT    RESTING:  (x) Pt SpO2 >87%  () Pt needs higher flow  () Pt needs less flow OXYGEN   RE ASSESSMENT    RESTING:  (x) Pt SpO2 >87%  () Pt needs higher flow  () Pt needs less flow OXYGEN   DISCHARGE      RESTING:  () Pt SpO2 remained >87% on * L/min at rest    () Pts doctor and company contacted for change in Rx   Oxygen Goals   Initial Assessment    (x) Wean, Titrate down, or get off O2 if possible   Oxygen Goals   Plan      (x) Closely monitor SpO2 and HR using pulse oximetry for saturation and HR response, and titrate if appropriate   Oxygen Goals  Reassessment    () RA    On exertion:  () Pt maintaining FiO2  () Pt tolerating lower O2 needs  () Pt needing more O2   Oxygen Goals  Reassessment    () RA    On exertion:  () Pt maintaining FiO2  () Pt tolerating lower O2 needs  () Pt needing more O2 Oxygen Goals  Reassessment    () RA    On exertion:  () Pt maintaining FiO2  () Pt time        () Pt has had class  () Pt has not had class MEDICATIONS  DISCHARGE        Pt reports taking their meds properly  % of the time        ()Pt had med class  Date:  () Pt has not had class       Pt has:  (x) Metered Dose Inhaler  () Dry Powder Inhaler  (x) Nebulizer   -Review correct use, timing, technique and cleaning of equipment during Medication class () Pt has had class  () Pt has not had class () Pt has had class  () Pt has not had class () Pt has had class  () Pt has not had class () Pt had class  Date: See above  () Pt has not had class   Medication Goals  Initial Assessment        (x) Take meds properly 100% of the time    (x) Learn about / Review Rxs, and devices Medication Goals  Intervention & Education  Plan      -Follow Rx instructions and ask if questions    -Attend Medication Education class   Medication Goals  Re assessment          () Readdressed questions on any medications    () Pt has had class  () Pt has not had class Medication Goals  Re assessment          () Readdressed questions on any medications    () Pt has had class  () Pt has not had class Medication Goals  Re assessment          () Readdressed questions on any medications    () Pt has had class  () Pt has not had class Medication Goals  Discharge          % proper med usage see above      () Pt had class  Date: See above  () Pt has not had class             ADL  INITIAL ASSESSMENT      (x) Impaired ADL ability  () Need for Assist Devices  (x) Generalized weakness, low functional capacity  (x) Stairs in house               ADL  PLAN        -Initiate WI, RT, and chair squats  -Breathing retraining, PLB, and pacing      -Encourage home activity               ADL  RE ASSESSMENT      () Pt is progressing  () Pt is not progressing        () Pt has initiated home activity  () Pt has not yet initiated  home activity-      () ADLs getting easier  () ADLs not getting easier   ADL  RE ASSESSMENT      () Pt is progressing  () Pt is not progressing        () Pt has initiated home activity  () Pt has not yet initiated  home activity-      () ADLs getting easier  () ADLs not getting easier ADL  RE ASSESSMENT      () Pt is progressing  () Pt is not progressing        () Pt has initiated home activity  () Pt has not yet initiated  home activity-      () ADLs getting easier  () ADLs not getting easier ADL  DISCHARGE        () Pt showed strength and endurance gains  () Pt did not progress      () Pt doing recommended home activity  () Pt not doing home activitiy         ADL Goals   Initial Assessment      (x) Decrease SOB with ADLs              (x) Decreased SOB overall      ADL Goals Intervention/ Education Plan      -Initiate SD, RT and chair squats and increase pts strength and endurance        -Pacing, Breathing retraining       ADL Goals Reassessment        () ADLs getting easier  () ADLs not getting easier          () Pt states activities are getting easier/ able to go longer/ not get as SOB   ADL Goals Reassessment        () ADLs getting easier  () ADLs not getting easier          () Pt states activities are getting easier/ able to go longer/ not get as SOB ADL Goals Reassessment        () ADLs getting easier  () ADLs not getting easier          () Pt states activities are getting easier/ able to go longer/ not get as SOB   ADL Goals   Discharge        Goal achieved?  () Yes  () No            Goal achieved?  () Yes  () No          Outcomes:  See Health and Functioning from the CAT tool and Strength and Endurance from 6 MWD above             EXACERBAT'N PREVENTION & MANAGEMENT  INITIAL ASSESSMENT      Pt reports;  () 0 respiratory infections  () 1   () >2  (x) Hospitalized in last 12 months   EXACERBAT'N PREVENTION & MANAGEMENT  PLAN        Address via Disease Self Management and Exacerbation prevention class:    -Hydration for mucus    -Hand Hygiene          -Evaluation of Sputum    -When to call MD  -S/Sx to report  -Decrease exposure to patient      Cosigned and dated below: Sergio Thomas MD 30 day   Discharge Review:    (x) Discharge patient                            Cosigned and dated below:     Cosigned by: Muna Styles MD at 12/17/2019  3:48 PM   Revision History      Date/Time User Provider Type Action   12/17/2019  3:48 PM Muna Styles MD Physician Cosign   12/17/2019  3:21 PM Ashley Rodney, [address] Respiratory Therapist Sign

## 2020-01-20 ENCOUNTER — CARE COORDINATION (OUTPATIENT)
Dept: CARE COORDINATION | Age: 85
End: 2020-01-20

## 2020-01-20 ENCOUNTER — HOSPITAL ENCOUNTER (OUTPATIENT)
Dept: PHARMACY | Age: 85
Setting detail: THERAPIES SERIES
Discharge: HOME | End: 2020-01-20
Payer: MEDICARE

## 2020-01-20 ENCOUNTER — TELEPHONE (OUTPATIENT)
Dept: PHARMACY | Facility: CLINIC | Age: 85
End: 2020-01-20

## 2020-01-20 LAB — POC INR: 2.2 (ref 0.8–1.2)

## 2020-01-20 PROCEDURE — 36416 COLLJ CAPILLARY BLOOD SPEC: CPT

## 2020-01-20 PROCEDURE — 99211 OFF/OP EST MAY X REQ PHY/QHP: CPT

## 2020-01-20 PROCEDURE — 85610 PROTHROMBIN TIME: CPT

## 2020-01-20 RX ORDER — SIMVASTATIN 80 MG
80 TABLET ORAL NIGHTLY
COMMUNITY
End: 2020-01-23

## 2020-01-20 ASSESSMENT — ENCOUNTER SYMPTOMS: DYSPNEA ASSOCIATED WITH: EXERTION

## 2020-01-20 NOTE — CARE COORDINATION
Ambulatory Care Coordination Note  1/20/2020  CM Risk Score: 7  Charlson 10 Year Mortality Risk Score: 100%     ACC: Gage Moulton, RN    Summary Note: Rosalva Henderson is being followed by care coordination for education and assistance in managing his COPD, DM, CHF. Spoke with Rosalva Henderson today. Attempted to review medications with pt. Pt appears to be out of potassium. Unsure for how long. Getting low on furosemide. Reports that he is out of testing supplies. Reports that he is taking Simvastatin instead of Atorvastatin. Pt had bottles of meds out during converstation. I asked pt who prescribing provider was for Simvastatin. Pt reports Asha Diana. Upon review of medications appears this med was last prescribed in 2018. Pharmacy referral made. Concerns that pt having difficult managing his medications on his own. Does not use pill box. Discussed having family member assist.  Pt states that he does not have anyone. Son is physician and pt reports that he is too busy. Asked about daughter in law. Pt reports that she works at [employer] and would not be able to assist either. Pt on very limited income as he pays alimony. Reports that he would not be able to do pill packs as he would not be able pay for his meds all at once. COPD: reports that breathing is at baseline. Denies cough or congestion. CHF: monitoring wts. Reports that wt has not fluctuated. Denies worsening edema or SOB. DM: currently out of testing supplies. Attempted to figure out if pt is out of lancets or test strips, but pt was having difficult tme explaining which he was out of. After several attempts of trying to figure out if pt out of strips or lancets I informed pt that I would just check with pharmacy to see if they both are due to be filled. Contacted [facility]. Spoke with Kavita Ng in pharmacy. They have furosemide and potassium script on file as well as lancets and test strips.   Unfortunately insurance info on file is not active. Needs new insurance card. Contacted Med. Informed him meds and testing supplies can be filled, but not until he brings new ins card in. Pt reports that he will try to go tomorrow. Updated OZZIE. She will try to meet with pt this wk. Plan of Care:  Look in to options for assistance with medications. Help setting a pill box up or blister packs. Pharmacy referral for med review. Continue working with SW  Weigh self daily  Limit sodium  Reports 3 pound wt gain in 1 day or 5 pounds in 1 wk. Obtain testing supplies and check BS's daily  Diabetes Assessment    Medic Alert ID:  No  Meal Planning:  Avoidance of concentrated sweets   How often do you test your blood sugar?:  Other (Comment: once/week)   Do you have barriers with adherence to non-pharmacologic self-management interventions?  (Nutrition/Exercise/Self-Monitoring):  Yes   Have you ever had to go to the ED for symptoms of low blood sugar?:  No       Do you have hyperglycemia symptoms?:  No   Do you have hypoglycemia symptoms?:  No      ,   Congestive Heart Failure Assessment    Do you understand a low sodium diet?:  Yes  Do you understand how to read food labels?:  Yes  How many restaurant meals do you eat per week?:  0  Do you salt your food before tasting it?:  No         Symptoms:   CHF associated angina: Neg, CHF associated dyspnea on exertion: Pos, CHF associated fatigue: Neg, CHF associated leg swelling: Neg, CHF associated orthostatic hypotension: Neg, CHF associated PND: Neg, CHF associated shortness of breath: Neg, CHF associated weakness: Neg      Symptom course:  stable  Patient-reported weight (lb):  250  Weight trend:  stable  Salt intake watch compared to last visit:  stable      and   COPD Assessment    Does the patient understand envrionmental exposure?:  Yes  Is the patient able to verbalize Rescue vs. Long Acting medications?:  No  Does the patient have a nebulizer?:  Yes  Does the patient use a space with inhaled medications?:  No            Symptoms:   None:  Yes      Symptom course:  stable  Breathlessness:  exertion  Increase use of rapid acting/rescue inhaled medications?:  No  Change in chronic cough?:  No/At Baseline  Change in sputum?:  No/At Baseline  Sputum characteristics:  Unable to Specify  Self Monitoring - SaO2:  No  Have you had a recent diagnosis of pneumonia either by PCP or at a hospital?:  No                Diabetes Assessment    Medic Alert ID:  No  Meal Planning:  Avoidance of concentrated sweets   How often do you test your blood sugar?:  Other (Comment: once/week)   Do you have barriers with adherence to non-pharmacologic self-management interventions?  (Nutrition/Exercise/Self-Monitoring):  Yes   Have you ever had to go to the ED for symptoms of low blood sugar?:  No       Do you have hyperglycemia symptoms?:  No   Do you have hypoglycemia symptoms?:  No      ,   Congestive Heart Failure Assessment    Do you understand a low sodium diet?:  Yes  Do you understand how to read food labels?:  Yes  How many restaurant meals do you eat per week?:  0  Do you salt your food before tasting it?:  No         Symptoms:   CHF associated angina: Neg, CHF associated dyspnea on exertion: Pos, CHF associated fatigue: Neg, CHF associated leg swelling: Neg, CHF associated orthostatic hypotension: Neg, CHF associated PND: Neg, CHF associated shortness of breath: Neg, CHF associated weakness: Neg      Symptom course:  stable  Patient-reported weight (lb):  250  Weight trend:  stable  Salt intake watch compared to last visit:  stable      and   COPD Assessment    Does the patient understand envrionmental exposure?:  Yes  Is the patient able to verbalize Rescue vs. Long Acting medications?:  No  Does the patient have a nebulizer?:  Yes  Does the patient use a space with inhaled medications?:  No            Symptoms:   None:  Yes      Symptom course:  stable  Breathlessness:  exertion  Increase use of rapid (COUMADIN) 5 MG tablet Take as directed by [facility]. #90 tabs = 90 days 10/7/19  Yes Corey Galo MD   Multiple Vitamins-Minerals (PRESERVISION AREDS 2+MULTI VIT) CAPS take 1 by Oral route 2 times every day 6/13/19  Yes Historical Provider, MD   metoprolol tartrate (LOPRESSOR) 25 MG tablet Take 25 mg by mouth 2 times daily  8/5/19  Yes Historical Provider, MD   Ferrous Sulfate (IRON) 325 (65 Fe) MG TABS Take by mouth 2 times daily    Yes Historical Provider, MD   Arformoterol Tartrate (BROVANA) 15 MCG/2ML NEBU Take 1 ampule by nebulization 2 times daily Indications: Prevention of COPD Worsening    Yes Historical Provider, MD   budesonide (PULMICORT) 0.25 MG/2ML nebulizer suspension Take 1 ampule by nebulization 2 times daily    Yes Historical Provider, MD   albuterol (PROVENTIL HFA;VENTOLIN HFA) 108 (90 BASE) MCG/ACT inhaler Inhale 2 puffs into the lungs every 6 hours as needed for Wheezing   Yes Historical Provider, MD   OXYGEN Inhale into the lungs as needed (Uses at night only) Indications: Difficulty Breathing, Oxygen Therapy 3 liters   Yes Historical Provider, MD   potassium chloride (KLOR-CON M) 10 MEQ extended release tablet Take 10 mEq by mouth 2 times daily    Historical Provider, MD   Blood Glucose Monitoring Suppl (ACCU-CHEK CHE PLUS) w/Device KIT Check blood sugar 2 times daily 11/1/19   MAEGAN Temple CNP   SOFT TOUCH LANCETS MISC Use to check blood sugars 2 times daily 11/1/19   MAEGAN Temple CNP   Insulin Pen Needle (B-D ULTRAFINE III SHORT PEN) 31G X 8 MM MISC To use with Lantus pens to inject SQ qd. Dx: E11.9 10/4/19   Gary Zuñiga MD   aspirin 81 MG tablet Take 81 mg by mouth daily Pt states he stopped taking this approx 2-3 weeks ago.     Historical Provider, MD   blood glucose test strips (ACCU-CHEK CHE PLUS) strip TEST TWICE DAILY 7/30/19   MAEGAN Temple CNP   atorvastatin (LIPITOR) 80 MG tablet Take 80 mg by mouth nightly Historical Provider, MD   AZOPT 1 % ophthalmic suspension Place 1 drop into both eyes 3 times daily Indications: Disease of the Eye  1/16/18   Historical Provider, MD       Future Appointments   Date Time Provider Gretchen Jorgensen   1/21/2020  1:00 PM STR CARDIAC EXERCISE RM [facility] [place]   1/23/2020  1:00 PM STR CARDIAC EXERCISE RM [facility] [place]   1/28/2020  1:00 PM STR CARDIAC EXERCISE RM [facility] [place]   1/30/2020  1:00 PM STR CARDIAC EXERCISE RM [facility] [place]   1/31/2020 10:30 AM Julio Cesar Bird Urology [facility] [place]   2/4/2020  1:00 PM STR CARDIAC EXERCISE RM [facility] [place]   2/6/2020  1:00 PM STR CARDIAC EXERCISE RM [facility] [place]   2/11/2020  1:00 PM STR CARDIAC EXERCISE RM [facility] [place]   2/13/2020  1:00 PM STR CARDIAC EXERCISE RM [facility] [place]   2/18/2020  1:00 PM STR CARDIAC EXERCISE RM [facility] CAR PUL [address] [place] [address]   2/20/2020  1:00 PM STR CARDIAC EXERCISE RM [facility] [place]   2/24/2020  1:00 PM Pieter Reeves, [facility] STR MED MGMT [facility] [place]   2/25/2020  1:00 PM STR CARDIAC EXERCISE RM STRZ CAR PUL [address] [place] [address]   2/27/2020 12:45 PM Mandy Reynolds MD Fam Medicine [facility] - [place]   2/27/2020  1:00 PM STR CARDIAC EXERCISE RM [facility] [place]   3/3/2020  1:00 PM STR CARDIAC EXERCISE RM [facility] [place]   3/4/2020 10:45 AM Lenard Vieira PA-C Pulm Med [facility] [place]   3/5/2020  1:00 PM STR CARDIAC EXERCISE RM [facility] [place]   3/10/2020  1:00 PM STR CARDIAC EXERCISE RM [facility] [place]   3/12/2020  1:00 PM STR CARDIAC EXERCISE RM [facility] [place]   3/17/2020  1:00 PM STR CARDIAC EXERCISE RM [facility] [place]   3/19/2020  1:00 PM STR CARDIAC EXERCISE RM [facility] [place]   3/24/2020  1:00 PM STR CARDIAC EXERCISE RM [facility] CAR [facility] [place]   3/26/2020  1:00 PM STR CARDIAC EXERCISE RM [facility] [place]   3/31/2020  1:00 PM STR CARDIAC EXERCISE RM [facility] CAR [facility] [place]   4/2/2020  1:00 PM STR CARDIAC EXERCISE RM STRZ CAR PUL [place] HOD   4/3/2020 11:00 AM Licha Samayoa MD [facility] - [address] [facility]   4/6/2020  1:40 PM MAEGAN Garber CNP [facility] Physic P - [place]   4/7/2020  1:00 PM STR CARDIAC EXERCISE RM STR[facility] CAR PUL [address] [facility] HOD   4/9/2020  1:00 PM STR CARDIAC EXERCISE RM STR[facility] CAR PUL [address] [facility] HOD   4/14/2020  1:00 PM STR CARDIAC EXERCISE RM STR[facility] CAR PUL [address] [facility] HOD   4/16/2020  1:00 PM STR CARDIAC EXERCISE RM STR[facility] CAR PUL [address] [facility] HOD   4/21/2020  1:00 PM STR CARDIAC EXERCISE RM STR[facility] CAR PUL [address] [facility] HOD   4/23/2020  1:00 PM STR CARDIAC EXERCISE RM STR[facility] CAR PUL [address] [facility] HOD   4/28/2020  1:00 PM STR CARDIAC EXERCISE RM STR[facility] CAR PUL [address] [facility] HOD   4/29/2020 11:00 AM MAEGAN Garber CNP [facility] Physic [facility] - [address] [facility]   7/6/2020 10:45 AM Nicolette Khan PA-C Pulm Med [facility] - [address] [place] [address]

## 2020-01-20 NOTE — TELEPHONE ENCOUNTER
----- Message from Fabrice Moore RN sent at 1/20/2020  3:49 PM EST -----  Regarding: medication review  Found some concerns when doing medication reconciliation today. Pt is managing medications on his own. Out of a couple of meds that I am calling to get refilled. Atorvastatin listed, but pt reports that he is taking simvastatin (although he reports last prescriber was Black Chisholm CNP) last I can see written for this was in 2018. Discussed pill packs with pt. States that he would not be able to afford getting his medications filled all at once as he is on a very limited income. Please reach out to review and would appreciate input of any suggestions you may have to help this gentleman manage his medications. Reports that he does not have anyone that has time to help him fill pill box.

## 2020-01-21 ENCOUNTER — HOSPITAL ENCOUNTER (OUTPATIENT)
Dept: CARDIAC REHAB | Age: 85
Setting detail: THERAPIES SERIES
Discharge: HOME | End: 2020-01-21
Payer: MEDICARE

## 2020-01-21 PROCEDURE — 97150 GROUP THERAPEUTIC PROCEDURES: CPT

## 2020-01-21 NOTE — PROGRESS NOTES
PULMONARY REHABILITATION / RTR PROGRAM  EDUCATION SESSION      Gwendolyn Pereyra  Session: _____    COPD ZONES - Discussed red, yellow, and green COPD zones with Ketty Weiss. I gave pt a copy of COPD zones and told them to put it somewhere in their house where they will be reminded to evaluate \"What color am I today? \". I went over signs and symptoms for each zone and what they need to do depending on which zone they are in. CAT SCORE - I explained the purpose and procedure of the CAT score to Ketty Weiss. A CAT score was done today and the total was 11. I told the patient that the CAT score needs to be monitored at home and explained what it means if the score increases or decreases. A CAT score copy was given to the patient. BREATHING RETRAINING - I went over pursed lip breathing and diaphragmatic breathing with the pt. I had pt try both breathing techniques and explained to him/her the importance and benefits of these 2 breathing techniques. Pt had no questions at this time.      Time spent: 30 minutes

## 2020-01-22 ENCOUNTER — SCHEDULED TELEPHONE ENCOUNTER (OUTPATIENT)
Dept: PHARMACY | Facility: CLINIC | Age: 85
End: 2020-01-22

## 2020-01-22 ENCOUNTER — CARE COORDINATION (OUTPATIENT)
Dept: CARE COORDINATION | Age: 85
End: 2020-01-22

## 2020-01-22 NOTE — TELEPHONE ENCOUNTER
reports having and using the Pulmicort and Brovana. He is not able to locate a rescue inhaler and states he can not remember the last time he had the medication. Discussed benefits of carrying this on him, and Dorise Severe admits that he should be. [address] has no prescription on file for albuterol. Will reach out to pulmonology.

## 2020-01-22 NOTE — CARE COORDINATION
Called pt to inquire if I could come to his house when he gets his 3:30 pharmacy call today. Pt stated, \"that would be fine, I'd appreciate that. \"  Will try to help pt understand dosage.

## 2020-01-22 NOTE — TELEPHONE ENCOUNTER
CLINICAL PHARMACY NOTE - Medication Review  Patient outreach to review medications - Spoke with patient. SUBJECTIVE/OBJECTIVE:   Edis Bae is a 80 y.o. male referred to a clinical pharmacy specialist by care coordination. Medications:  Medication Sig Notes    metFORMIN (GLUCOPHAGE) 500 MG tablet Take 500 mg by mouth 2 times daily (with meals)     simvastatin (ZOCOR) 80 MG tablet Take 80 mg by mouth nightly Patient states that he has the bottle at his house    furosemide (LASIX) 40 MG tablet TK 1 T PO BID     LANTUS SOLOSTAR 100 UNIT/ML injection pen INJECT SUBCUTANEOUSLY 18  UNITS NIGHTLY     doxazosin (CARDURA) 4 MG tablet TAKE 1 TABLET BY MOUTH  NIGHTLY     Ascorbic Acid (VITAMIN C PO) Take by mouth daily     Cholecalciferol (VITAMIN D3 PO) Take by mouth daily     Multiple Vitamins-Minerals (MULTIVITAMIN PO) Take by mouth daily     levothyroxine (SYNTHROID) 175 MCG tablet TAKE 1 TABLET BY MOUTH  DAILY     lisinopril (PRINIVIL;ZESTRIL) 5 MG tablet Take 1 tablet by mouth daily     potassium chloride (KLOR-CON M) 10 MEQ extended release tablet Take 10 mEq by mouth 2 times daily     Blood Glucose Monitoring Suppl (ACCU-CHEK CHE PLUS) w/Device KIT Check blood sugar 2 times daily     SOFT TOUCH LANCETS MISC Use to check blood sugars 2 times daily     warfarin (COUMADIN) 5 MG tablet Take as directed by [facility]. #90 tabs = 90 days     Insulin Pen Needle (B-D ULTRAFINE III SHORT PEN) 31G X 8 MM MISC To use with Lantus pens to inject SQ qd. Dx: E11.9     aspirin 81 MG tablet Take 81 mg by mouth daily Pt states he stopped taking this approx 2-3 weeks ago.  \"Stopped taking 3 months ago\"     Multiple Vitamins-Minerals (PRESERVISION AREDS 2+MULTI VIT) CAPS take 1 by Oral route 2 times every day     metoprolol tartrate (LOPRESSOR) 25 MG tablet Take 25 mg by mouth 2 times daily  \"I have not taking that for 2 weeks now\", patient will call mail order pharmacy for refill     blood ASCVD Risk score (Thomas Peace., et al., 2013) failed to calculate for the following reasons: The 2013 ASCVD risk score is only valid for ages 36 to 78     Lab Results   Component Value Date    CREATININE 0.8 11/13/2019       CrCl cannot be calculated (Unknown ideal weight.). Most recent eGFR >90 (11/13/2019)    Social History:   Social History     Tobacco Use    Smoking status: Never Smoker    Smokeless tobacco: Never Used   Substance Use Topics    Alcohol use: Yes     Alcohol/week: 1.0 standard drinks     Types: 1 Glasses of wine per week     Comment: Glass of wine with dinner. Immunizations:   Most Recent Immunizations   Administered Date(s) Administered    Influenza 10/31/2013    Influenza Virus Vaccine 09/29/2015    Influenza Whole 10/01/2010    Influenza, Quadv, 6 mo and older, IM, PF (Flulaval, Fluarix) 09/27/2018    Influenza, Quadv, IM, (6 mo and older Fluzone, Flulaval, Fluarix and 3 yrs and older Afluria) 11/17/2017    Influenza, Quadv, IM, PF (6 mo and older Fluzone, Flulaval, Fluarix, and 3 yrs and older Afluria) 10/01/2019    Influenza, Triv, 3 Years and older, IM (Afluria (5 yrs and older) 09/26/2016    Pneumococcal Conjugate 13-valent (Ktesxyr20) 12/19/2014    Pneumococcal Polysaccharide ([id number]) 01/01/2003    Td, unspecified formulation 06/08/2010    Zoster Live (Zostavax) 08/05/2014     Last Echo:  55% (03/18/2019)    ASSESSMENT/PLAN:   - General Assessment:   · Adherence: patient stated that he is very adherent with his medications   · Cost: writer asked if the patient in need of cost assistance, but the patient stated that he want to remain independent   · Current pharmacy/pharmacies: home delivery pharmacy and [place] [address] ([address] [place] [address], [address]. [address] [place], [place])  · Drug interactions:  The following clinically significant interactions were identified via [employer] Interaction Analysis as category D or higher:   · Levothyroxine - Iron: Iron Preparations may decrease the serum concentration of 12:45 PM Ivonne Parham MD [address]   2/27/2020  1:00 PM STR CARDIAC EXERCISE RM STRZ CAR PUL [address] HOD   3/3/2020  1:00 PM STR CARDIAC EXERCISE RM STRZ CAR PUL [address] \[facility]\""   3/4/2020 10:45 AM Alia Poe PA-C Pulm Med [facility]   3/5/2020  1:00 PM STR CARDIAC EXERCISE RM STRZ CAR PUL [facility] [facility]   3/10/2020  1:00 PM STR CARDIAC EXERCISE RM STRZ CAR PUL [address] HOD   3/12/2020  1:00 PM STR CARDIAC EXERCISE RM STRZ CAR PUL [address] HOD   3/17/2020  1:00 PM STR CARDIAC EXERCISE RM STRZ CAR PUL [facility]   3/19/2020  1:00 PM STR CARDIAC EXERCISE RM STRZ CAR PUL [facility]   3/24/2020  1:00 PM STR CARDIAC EXERCISE RM STRZ CAR PUL [facility]   3/26/2020  1:00 PM STR CARDIAC EXERCISE RM STRZ CAR PUL [facility]   3/31/2020  1:00 PM STR CARDIAC EXERCISE RM STRZ CAR PUL [address] HOD   4/2/2020  1:00 PM STR CARDIAC EXERCISE RM STRZ CAR PUL [facility]   4/3/2020 11:00 AM Alfa Sears MD SRPX Heart [facility] [address] [facility]   4/6/2020  1:40 PM MAEGAN Brock - CNP SRPX Physic [facility] - [address] [facility]   4/7/2020  1:00 PM STR CARDIAC EXERCISE RM STRZ CAR PUL [address] [facility] HOD   4/9/2020  1:00 PM STR CARDIAC EXERCISE RM STRZ CAR PUL [address] HOD   4/14/2020  1:00 PM STR CARDIAC EXERCISE RM STRZ CAR PUL [address] [facility] HOD   4/16/2020  1:00 PM STR CARDIAC EXERCISE RM STRZ CAR PUL [address] HOD   4/21/2020  1:00 PM STR CARDIAC EXERCISE RM STRZ CAR PUL [facility] [facility] HOD   4/23/2020  1:00 PM STR CARDIAC EXERCISE RM STRZ CAR PUL [facility]   4/28/2020  1:00 PM STR CARDIAC EXERCISE RM STRZ CAR PUL [facility]   4/29/2020 11:00 AM MAEGAN Brock - CNP SRPX Physic [facility] - [address] [place] [address]   7/6/2020 10:45 AM Alia Poe PA-C Pulm Med [facility] - [address], PharmD Candidate ONU

## 2020-01-23 ENCOUNTER — CARE COORDINATION (OUTPATIENT)
Dept: CARE COORDINATION | Age: 85
End: 2020-01-23

## 2020-01-23 ENCOUNTER — HOSPITAL ENCOUNTER (OUTPATIENT)
Dept: CARDIAC REHAB | Age: 85
Setting detail: THERAPIES SERIES
Discharge: HOME | End: 2020-01-23
Payer: MEDICARE

## 2020-01-23 PROCEDURE — 97150 GROUP THERAPEUTIC PROCEDURES: CPT

## 2020-01-23 NOTE — CARE COORDINATION
I spoke with Jocelyn Mar about getting him some assistance on his high cost medications. I am going to send him some information that I will need him to return to me. I am also faxing his doctor the information needed.         Daniel Goid[facility]  [facility]   Medication Assistance  [facility] and [email or web address]    [phone]  (T)[phone]

## 2020-01-23 NOTE — TELEPHONE ENCOUNTER
Niru Marshall with Mr. Kalee Marie and he does not have an albuterol inhaler. It is listed on is medication list, but he can not remember using it. If you find appropriate, can you please send refill for albuterol to [facility] on [address]? Also, I note this COPD patient does not use a LAMA. If you think patient would benefit from a LAMA, patient is working with patient medication . She could attempt to sign patient up for Spiriva assistance to see if affordable? I do note cost is a concern for him and he already takes many medications including Pulmicort and Brovana. He is currently going to cardiac/pulm rehab. As you find appropriate. Please let me know if I can complete any further outreach. Thank you,  Josi Wilson, PharmD, [address], [address] [place] [address] Pharmacist  O: [phone]  Department, toll free: [phone], option 7     ===================================================  Received call back from Toney Singh at Dr. Wagner Durbin office. She states that per Dr. Tricia Wayne, patient no longer has to take aspirin or a statin-- removed from mediation list.  She also states she sent a prescription refill into [place] pharmacy for Lopressor. Called patient and informed him. He is thankful for this information and states he just picked up the Lopressor from his pharmacy.

## 2020-01-24 RX ORDER — ALBUTEROL SULFATE 90 UG/1
2 AEROSOL, METERED RESPIRATORY (INHALATION) EVERY 6 HOURS PRN
Qty: 1 INHALER | Refills: 3 | Status: SHIPPED | OUTPATIENT
Start: 2020-01-24

## 2020-01-24 NOTE — TELEPHONE ENCOUNTER
I sent scripts for both albuterol and Spiriva to pharmacy. Cost has been an issue which has limited his medications.   I'm glad to here he is working with patient assistance

## 2020-01-28 ENCOUNTER — HOSPITAL ENCOUNTER (OUTPATIENT)
Dept: CARDIAC REHAB | Age: 85
Setting detail: THERAPIES SERIES
Discharge: HOME | End: 2020-01-28
Payer: MEDICARE

## 2020-01-28 PROCEDURE — 97150 GROUP THERAPEUTIC PROCEDURES: CPT

## 2020-01-29 ENCOUNTER — CARE COORDINATION (OUTPATIENT)
Dept: CARE COORDINATION | Age: 85
End: 2020-01-29

## 2020-01-29 ASSESSMENT — ENCOUNTER SYMPTOMS: DYSPNEA ASSOCIATED WITH: EXERTION

## 2020-01-29 NOTE — CARE COORDINATION
or less per day. Monitor for s/s of edema, increased SOB, abd bloating, or wt gain of 3 pounds or more in 1 day or 5 pounds in 1 wk. Call with any questions, concerns or needs. Diabetes Assessment    Medic Alert ID:  No  Meal Planning:  Avoidance of concentrated sweets   How often do you test your blood sugar?:  Other (Comment: once/week)   Do you have barriers with adherence to non-pharmacologic self-management interventions?  (Nutrition/Exercise/Self-Monitoring):  Yes   Have you ever had to go to the ED for symptoms of low blood sugar?:  No       Do you have hyperglycemia symptoms?:  No   Do you have hypoglycemia symptoms?:  No   Last Blood Sugar Value:  164   Blood Sugar Monitoring Regimen:  Once a Day   Blood Sugar Trends:  No Change      ,   Congestive Heart Failure Assessment    Do you understand a low sodium diet?:  Yes  Do you understand how to read food labels?:  Yes  How many restaurant meals do you eat per week?:  0  Do you salt your food before tasting it?:  No         Symptoms:   CHF associated angina: Neg, CHF associated dyspnea on exertion: Pos, CHF associated fatigue: Neg, CHF associated leg swelling: Neg, CHF associated orthostatic hypotension: Neg, CHF associated PND: Neg, CHF associated shortness of breath: Neg, CHF associated weakness: Neg (Comment: reports some swelling in ble if up on feet a lot during the day. )      Symptom course:  stable  Patient-reported weight (lb):  252  Weight trend:  fluctuating minimally  Salt intake watch compared to last visit:  stable      and   COPD Assessment    Does the patient understand envrionmental exposure?:  Yes  Is the patient able to verbalize Rescue vs. Long Acting medications?:  No  Does the patient have a nebulizer?:  Yes  Does the patient use a space with inhaled medications?:  No            Symptoms:   None:  Yes      Symptom course:  stable  Breathlessness:  exertion  Increase use of rapid acting/rescue inhaled medications?:  No  Change in complete paperwork from [place] for med assistance once received    Barriers: lack of support and overwhelmed by complexity of regimen  Plan for overcoming my barriers: support and education from myself, PCP, specialists, ,   Confidence: 8/10  Anticipated Goal Completion Date: 4/29/20       Self Monitoring   On track     Self-Monitored Blood Glucose - I will check my blood sugar Other: twice daily  I will notify my provider of any trends of increasing or decreasing blood sugars over a 1 month period. I will notify my provider if I have any blood sugar readings less than 70 more than 2 times a month. Daily Weights - I will weight myself as directed - Daily and write down weights  I will notify my provider of any increase in weight by 3 or more pounds in 2 days OR 5 or more pounds in a week. None Recently Recorded    Barriers: lack of motivation, lack of support, overwhelmed by complexity of regimen and lack of education  Plan for overcoming my barriers: educate on zone mgmt. Keep BS and Wt log. Support from [facility], PCP, specialists  Confidence: 8/10  Anticipated Goal Completion Date: 2/1/20              Prior to Admission medications    Medication Sig Start Date End Date Taking?  Authorizing Provider   metFORMIN (GLUCOPHAGE) 500 MG tablet Take 500 mg by mouth 2 times daily (with meals)   Yes Historical Provider, MD   furosemide (LASIX) 40 MG tablet TK 1 T PO BID 12/10/19  Yes Historical Provider, MD   doxazosin (CARDURA) 4 MG tablet TAKE 1 TABLET BY MOUTH  NIGHTLY 1/3/20  Yes Brunilda Aparicio, APRN - CNP   Ascorbic Acid (VITAMIN C PO) Take by mouth daily   Yes Historical Provider, MD   Cholecalciferol (VITAMIN D3 PO) Take by mouth daily   Yes Historical Provider, MD   Multiple Vitamins-Minerals (MULTIVITAMIN PO) Take by mouth daily   Yes Historical Provider, MD   levothyroxine (SYNTHROID) 175 MCG tablet TAKE 1 TABLET BY MOUTH  DAILY 11/11/19  Yes Shaheen Tena MD   lisinopril (PRINIVIL;ZESTRIL) 5 MG tablet Take 1 tablet by mouth daily 11/8/19  Yes Keith Galvan MD   potassium chloride (KLOR-CON M) 10 MEQ extended release tablet Take 10 mEq by mouth 2 times daily   Yes Historical Provider, MD   warfarin (COUMADIN) 5 MG tablet Take as directed by [facility]. #90 tabs = 90 days 10/7/19  Yes Lewie Dandy, MD   Multiple Vitamins-Minerals (PRESERVISION AREDS 2+MULTI VIT) CAPS take 1 by Oral route 2 times every day 6/13/19  Yes Historical Provider, MD   metoprolol tartrate (LOPRESSOR) 25 MG tablet Take 25 mg by mouth 2 times daily  8/5/19  Yes Historical Provider, MD   AZOPT 1 % ophthalmic suspension Place 1 drop into both eyes 3 times daily Indications: Disease of the Eye  1/16/18  Yes Historical Provider, MD   Arformoterol Tartrate (BROVANA) 15 MCG/2ML NEBU Take 1 ampule by nebulization 2 times daily Indications: Prevention of COPD Worsening    Yes Historical Provider, MD   budesonide (PULMICORT) 0.25 MG/2ML nebulizer suspension Take 1 ampule by nebulization 2 times daily    Yes Historical Provider, MD   albuterol sulfate  (90 Base) MCG/ACT inhaler Inhale 2 puffs into the lungs every 6 hours as needed for Wheezing  Patient not taking: Reported on 1/29/2020 1/24/20   Lazarus Echevaria, PA-C   tiotropium (SPIRIVA RESPIMAT) 2.5 MCG/ACT AERS inhaler Inhale 2 puffs into the lungs daily  Patient not taking: Reported on 1/27/2020 1/24/20   Lazarus Echevaria, PA-C   LANTUS SOLOSTAR 100 UNIT/ML injection pen INJECT SUBCUTANEOUSLY 18  UNITS NIGHTLY 1/3/20   MAEGAN Beyer CNP   Blood Glucose Monitoring Suppl (ACCU-CHEK CHE PLUS) w/Device KIT Check blood sugar 2 times daily 11/1/19   MAEGAN Beyer CNP   SOFT TOUCH LANCETS MISC Use to check blood sugars 2 times daily 11/1/19   MAEGAN Beyer CNP   Insulin Pen Needle (B-D ULTRAFINE III SHORT PEN) 31G X 8 MM MISC To use with Lantus pens to inject SQ qd.  Dx: E11.9 10/4/19   Elzbieta Weaver CARDIAC EXERCISE RM STRZ CAR PUL [facility] HOD   4/3/2020 11:00 AM Octavio Izquierdo MD SRPX Heart [facility]   4/6/2020  1:40 PM MAEGAN Rizzo CNPX Physic [facility]   4/7/2020  1:00 PM STR CARDIAC EXERCISE RM STRZ CAR PUL [facility] HOD   4/9/2020  1:00 PM STR CARDIAC EXERCISE RM STRZ CAR PUL [facility] HOD   4/14/2020  1:00 PM STR CARDIAC EXERCISE RM STRZ CAR PUL [facility] HOD   4/16/2020  1:00 PM STR CARDIAC EXERCISE RM STRZ CAR PUL [facility] HOD   4/21/2020  1:00 PM STR CARDIAC EXERCISE RM STRZ CAR PUL [facility] HOD   4/23/2020  1:00 PM STR CARDIAC EXERCISE RM STRZ CAR PUL [facility] HOD   4/28/2020  1:00 PM STR CARDIAC EXERCISE RM STRZ CAR PUL [facility] HOD   4/29/2020 11:00 AM MAEGAN Rizzo CNP SRPX Physic [facility]   7/6/2020 10:45 AM Sadi Figueroa PA-C Pul Med [facility]

## 2020-01-30 ENCOUNTER — HOSPITAL ENCOUNTER (OUTPATIENT)
Dept: CARDIAC REHAB | Age: 85
Setting detail: THERAPIES SERIES
Discharge: HOME | End: 2020-01-30
Payer: MEDICARE

## 2020-01-30 PROCEDURE — 97150 GROUP THERAPEUTIC PROCEDURES: CPT

## 2020-01-31 ENCOUNTER — TELEPHONE (OUTPATIENT)
Dept: UROLOGY | Age: 85
End: 2020-01-31

## 2020-01-31 ENCOUNTER — OFFICE VISIT (OUTPATIENT)
Dept: UROLOGY | Age: 85
End: 2020-01-31
Payer: MEDICARE

## 2020-01-31 VITALS
SYSTOLIC BLOOD PRESSURE: 122 MMHG | BODY MASS INDEX: 40.65 KG/M2 | DIASTOLIC BLOOD PRESSURE: 50 MMHG | WEIGHT: 259 LBS | HEIGHT: 67 IN

## 2020-01-31 LAB
BILIRUBIN, POC: NEGATIVE
BLOOD URINE, POC: NORMAL
CLARITY, POC: CLEAR
COLOR, POC: YELLOW
GLUCOSE URINE, POC: NEGATIVE
KETONES, POC: NEGATIVE
LEUKOCYTE EST, POC: NORMAL
NITRITE, POC: NEGATIVE
PH, POC: 6
POST VOID RESIDUAL (PVR): 0 ML
PROTEIN, POC: NEGATIVE
SPECIFIC GRAVITY, POC: 1.02
UROBILINOGEN, POC: 0.2

## 2020-01-31 PROCEDURE — 99214 OFFICE O/P EST MOD 30 MIN: CPT | Performed by: NURSE PRACTITIONER

## 2020-01-31 PROCEDURE — 51798 US URINE CAPACITY MEASURE: CPT | Performed by: NURSE PRACTITIONER

## 2020-01-31 PROCEDURE — 1036F TOBACCO NON-USER: CPT | Performed by: NURSE PRACTITIONER

## 2020-01-31 PROCEDURE — G8417 CALC BMI ABV UP PARAM F/U: HCPCS | Performed by: NURSE PRACTITIONER

## 2020-01-31 PROCEDURE — G8482 FLU IMMUNIZE ORDER/ADMIN: HCPCS | Performed by: NURSE PRACTITIONER

## 2020-01-31 PROCEDURE — 1123F ACP DISCUSS/DSCN MKR DOCD: CPT | Performed by: NURSE PRACTITIONER

## 2020-01-31 PROCEDURE — 4040F PNEUMOC VAC/ADMIN/RCVD: CPT | Performed by: NURSE PRACTITIONER

## 2020-01-31 PROCEDURE — 81003 URINALYSIS AUTO W/O SCOPE: CPT | Performed by: NURSE PRACTITIONER

## 2020-01-31 PROCEDURE — G8427 DOCREV CUR MEDS BY ELIG CLIN: HCPCS | Performed by: NURSE PRACTITIONER

## 2020-01-31 RX ORDER — DOXAZOSIN MESYLATE 4 MG/1
4 TABLET ORAL NIGHTLY
Qty: 90 TABLET | Refills: 3 | Status: SHIPPED | OUTPATIENT
Start: 2020-01-31 | End: 2020-02-28

## 2020-01-31 RX ORDER — DOXYCYCLINE HYCLATE 100 MG
100 TABLET ORAL 2 TIMES DAILY
Qty: 28 TABLET | Refills: 0 | Status: SHIPPED | OUTPATIENT
Start: 2020-01-31 | End: 2020-02-14

## 2020-01-31 ASSESSMENT — ENCOUNTER SYMPTOMS
BACK PAIN: 0
ABDOMINAL PAIN: 0

## 2020-01-31 NOTE — PROGRESS NOTES
Assessment:   Acute cystitis  BPH with LUTs    Plan:     Pt with dysuria, increased frequency/urgency, nocturia of the last 1 week. Pt is on coumadin and has allergy to flouroquinolone. Will start doxycycline empirically and send urine culture. Pt to call if symptoms fail to improve. F/u in 4-6 weeks with PVR. If infection clears and symptoms persist consider increasing doxazosin to 8 mg nightly. F/u in 4-6 weeks with PVR.

## 2020-01-31 NOTE — TELEPHONE ENCOUNTER
Please notify Dr. Rios Has office (doses pt's coumadin) that we are starting doxycycline in case that changes dosing on his coumadin at all.

## 2020-02-01 ENCOUNTER — TELEPHONE (OUTPATIENT)
Dept: UROLOGY | Age: 85
End: 2020-02-01

## 2020-02-01 LAB
ORGANISM: ABNORMAL
URINE CULTURE, ROUTINE: ABNORMAL

## 2020-02-01 NOTE — TELEPHONE ENCOUNTER
Please let pt know urine culture showed mixed growth but given symptoms recommend completing full course of antibiotics. Please make sure coumadin clinic is aware he is on doxycycline. Thank-you.

## 2020-02-03 ENCOUNTER — TELEPHONE (OUTPATIENT)
Dept: PHARMACY | Age: 85
End: 2020-02-03

## 2020-02-03 NOTE — TELEPHONE ENCOUNTER
Received call from urology that patient was started on doxycycline 100 mg PO BID x 14 days on 1/31/20. Spoke with patient, instructed him to continue home dose of Coumadin 5 mg daily, but to reschedule appointment for 2/6/20 @ 11:20 am for closer monitoring. Patient voiced understanding. Patient reminded to call the Anticoagulation Clinic with any signs or symptoms of bleeding or with any medication changes. Patient given instructions utilizing the teach back method.     Hiwot Dwyer, PharmD, BCPS  2/3/2020  12:42 PM

## 2020-02-04 ENCOUNTER — HOSPITAL ENCOUNTER (OUTPATIENT)
Dept: CARDIAC REHAB | Age: 85
Setting detail: THERAPIES SERIES
Discharge: HOME | End: 2020-02-04
Payer: MEDICARE

## 2020-02-04 PROCEDURE — 97150 GROUP THERAPEUTIC PROCEDURES: CPT

## 2020-02-05 ENCOUNTER — CARE COORDINATION (OUTPATIENT)
Dept: CARE COORDINATION | Age: 85
End: 2020-02-05

## 2020-02-05 NOTE — CARE COORDINATION
I received Med's information in the mail for assistance on his medications. I am faxing and mailing his applications to the manufacturers for assistance.         Vianney Corona [facility]   Medication Assistance  [address] and [employer]    (D)[phone] (F)[phone]

## 2020-02-05 NOTE — TELEPHONE ENCOUNTER
Misti Domínguez from coumadin clinic called back they  got message that we started him on doxycycline they are checking his coumadin again  on 2-5-20.

## 2020-02-06 ENCOUNTER — HOSPITAL ENCOUNTER (OUTPATIENT)
Dept: CARDIAC REHAB | Age: 85
Setting detail: THERAPIES SERIES
Discharge: HOME | End: 2020-02-06
Payer: MEDICARE

## 2020-02-06 ENCOUNTER — HOSPITAL ENCOUNTER (OUTPATIENT)
Dept: PHARMACY | Age: 85
Setting detail: THERAPIES SERIES
Discharge: HOME | End: 2020-02-06
Payer: MEDICARE

## 2020-02-06 LAB — POC INR: 1.6 (ref 0.8–1.2)

## 2020-02-06 PROCEDURE — 36416 COLLJ CAPILLARY BLOOD SPEC: CPT

## 2020-02-06 PROCEDURE — 85610 PROTHROMBIN TIME: CPT

## 2020-02-06 PROCEDURE — 97150 GROUP THERAPEUTIC PROCEDURES: CPT

## 2020-02-06 PROCEDURE — 99211 OFF/OP EST MAY X REQ PHY/QHP: CPT

## 2020-02-10 ENCOUNTER — CARE COORDINATION (OUTPATIENT)
Dept: CARE COORDINATION | Age: 85
End: 2020-02-10

## 2020-02-10 NOTE — CARE COORDINATION
Ambulatory Care Coordination Note  2/10/2020  CM Risk Score: 7  Charlson 10 Year Mortality Risk Score: 100%     ACC: Fuadne Figures    Summary Note: I spoke with the patient for continued Care Coordination follow up and education. Patient states he is doing okay. BS this morning was 165. Patient denied any symptoms of hypo or hyperglycemia. Diabetic diet and importance of diet adherence reviewed with patient. Patient was also encouraged to work to remain active and reviewed how this also helps with BS control. Patient denies chest pain or changes in breathing. No coughing or wheezing. Admits to edema in ankles. Encouraged elevation. Patient remains active with pulm rehab. Patient states he weight is the same, but did not give a reading. Advised patient to contact PCP office if weight gain of 3 lbs in 1 day or 5 lbs in 1 week. Encouraged smaller portions, limit fluids and a low sodium diet. Patient was approved through [employer] for [employer]. Je Parker notified patient of this. Educated on how to identify sx's that are worse than the baseline and the importance of early symptom recognition and reporting to prevent exacerbation which may lead to ED visits and hospital admissions. Encouraged to use PCP office versus ED treatment for sx's of chronic conditions. Patient voiced understanding. I advised patient to contact PCP office if needed. No further needs at this time. Care Coordination Interventions    Program Enrollment:  Complex Care  Referral from Primary Care Provider:  No  Suggested Interventions and Community Resources  Medication Assistance Program:  In Process (Comment: Stephania Wesley mailed forms to pt for completion 1/24)  Medi Set or Pill Pack:  Declined (Comment: pill packs would be very beneficial for pt, but concern re:abilty to be able to afford total mthly cost of his meds.   reports that he has no family that could assist with pill box)  Pharmacist:  Completed (Comment: referral made today 1/20/20)  Social Work:  Completed (Comment: SW referral made today 11/14 for social needs and to provide resource material)  Transportation Support:  Completed  Zone Management Tools:  Completed         Goals Addressed    None         Prior to Admission medications    Medication Sig Start Date End Date Taking?  Authorizing Provider   doxycycline hyclate (VIBRA-TABS) 100 MG tablet Take 1 tablet by mouth 2 times daily for 14 days 1/31/20 2/14/20  Elva Bones, APRN - CNP   doxazosin (CARDURA) 4 MG tablet Take 1 tablet by mouth nightly 1/31/20   MAEGAN Fall CNP   albuterol sulfate  (90 Base) MCG/ACT inhaler Inhale 2 puffs into the lungs every 6 hours as needed for Wheezing 1/24/20   Moe Eid PA-C   tiotropium (SPIRIVA RESPIMAT) 2.5 MCG/ACT AERS inhaler Inhale 2 puffs into the lungs daily  Patient not taking: Reported on 2/6/2020 1/24/20   Moe Eid PA-C   metFORMIN (GLUCOPHAGE) 500 MG tablet Take 500 mg by mouth 2 times daily (with meals)    Historical Provider, MD   furosemide (LASIX) 40 MG tablet Take 40 mg by mouth 2 times daily  12/10/19   Historical Provider, MD   LANADALID SOLOSTAR 100 UNIT/ML injection pen INJECT SUBCUTANEOUSLY 18  UNITS NIGHTLY 1/3/20   MAEGAN Pisano CNP   Ascorbic Acid (VITAMIN C PO) Take 1 tablet by mouth daily     Historical Provider, MD   Cholecalciferol (VITAMIN D3 PO) Take 1 tablet by mouth daily     Historical Provider, MD   levothyroxine (SYNTHROID) 175 MCG tablet TAKE 1 TABLET BY MOUTH  DAILY 11/11/19   Solomon Kessler MD   lisinopril (PRINIVIL;ZESTRIL) 5 MG tablet Take 1 tablet by mouth daily 11/8/19   Solomon Kessler MD   potassium chloride (KLOR-CON M) 10 MEQ extended release tablet Take 10 mEq by mouth 2 times daily    Historical Provider, MD   Blood Glucose Monitoring Suppl (ACCU-CHEK CHE PLUS) w/Device KIT Check blood sugar 2 times daily 11/1/19   MAEGAN Pisano CNP

## 2020-02-11 ENCOUNTER — HOSPITAL ENCOUNTER (OUTPATIENT)
Dept: CARDIAC REHAB | Age: 85
Setting detail: THERAPIES SERIES
Discharge: HOME | End: 2020-02-11
Payer: MEDICARE

## 2020-02-11 PROCEDURE — 97150 GROUP THERAPEUTIC PROCEDURES: CPT

## 2020-02-11 NOTE — PROGRESS NOTES
PULMONARY REHABILITATION / RTR PROGRAM  EDUCATION SESSION      Asmita Foots  Session: _____      ADVANCED DIRECTIVES/PALLIATIVE CARE-  Advance directive information given. Discussed living will, DPOA, and code status. Palliative care information given and discussed what palliative care is, how it can be utilized, and the services we provide. Patient engaged in conversation and denies any further questions. Time Spent: 15 minutes.

## 2020-02-13 ENCOUNTER — HOSPITAL ENCOUNTER (OUTPATIENT)
Dept: CARDIAC REHAB | Age: 85
Setting detail: THERAPIES SERIES
Discharge: HOME | End: 2020-02-13
Payer: MEDICARE

## 2020-02-13 ENCOUNTER — CARE COORDINATION (OUTPATIENT)
Dept: CARE COORDINATION | Age: 85
End: 2020-02-13

## 2020-02-13 NOTE — PROGRESS NOTES
[facility] for COPD   INDIVIDUAL TREATMENT PLAN (ITP)     Name: Chung Veliz   :  1931  Acct Number: [[id number]]   AGE: 80 y.o.     Diagnosis:  Severe COPD, which is GOLD Stage 3                                               PFT:  Post Bronchodilator FEV1/FVC: 48  FEV1: 41    Patient Goals:  (x) Breathe better  (x) Increase my endurance (x) Have more energy  (x) Rely less on others  (x) Ease ADLs  (x) Understand and use meds correctly  (x) Control cough  (x) Make fewer visits to hospital  () Quit smoking  (x) Feel less anxious / have more confidence    Glossary:  CA=Pulmonary Rehab  PF=Physical Fitness TM=Treadmill  AD=Schwinn Airdyne  UBE=UpperBody Ergometer  RT=Resistance Training  PLB=Pursed Lip Breathing    INDIVIDUAL TREATMENT PLAN    INITIAL ASSESSMENT    Day #1 INDIVIDUAL TREATMENT PLAN    PLAN      Day #2-30 INDIVIDUAL TREATMENT PLAN    RE ASSESSMENT    Day #31-60 INDIVIDUAL TREATMENT PLAN    RE ASSESSMENT    Day #61-90 INDIVIDUAL TREATMENT PLAN    RE ASSESSMENT    Day # INDIVIDUAL TREATMENT PLAN    DISCHARGE      Last Day        Date: 19     Date: 20   Date: 20  Pt did not attend this session   Date:    Date:    Date:    EXERCISE   INITIAL ASSESSMENT      Prescribed Oxygen Use  None      Oxygen Titration  (x) Assess for desaturation            Current Home Exercise:  None   EXERCISE  PLAN        Current Oxygen Use  NONE      Oxygen Titration  (x) Maintaining >87% Spo2  () Not maintaining -  L/min needed on       Current Home Exercise:  None   EXERCISE  RE ASSESSMENT      Current Oxygen Use  RA      Oxygen Titration  (x) Maintaining >87% Spo2  () Not maintaining -  L/min needed on       Current Home Exercise:  unkn EXERCISE  RE ASSESSMENT      Current Oxygen Use  Activity:  L/min  () Continuous  () Breath Actuated      Oxygen Titration  () Maintaining >87% Spo2  () Not maintaining -  L/min needed on       Current Home Exercise:  Frequency:  x/wk  Intensity:  /10  Duration:  min  Mode: EXERCISE  RE ASSESSMENT      Current Oxygen Use  Activity:  L/min  () Continuous  () Breath Actuated      Oxygen Titration  () Maintaining >87% Spo2  () Not maintaining -  L/min needed on       Current Home Exercise:  Frequency:  x/wk  Intensity:  /10  Duration:  min  Mode:   EXERCISE  DISCHARGE        Final Oxygen Use  Activity:  L/min  () Continuous  () Breath Actuated      Oxygen Titration  () Maintaining >87% Spo2  () Not maintaining -  L/min needed on       Current Home Exercise:  Frequency:  x/wk  Intensity:  /10  Duration:  min  Mode:       6 Minute Walk Test (6MWT):  O2 used: none   410 ft walked = 1.5 METs  SpO2: 90-96%  HR: 72-83   RPD: 3  RPE: 5  Number of Breaks: 1  Avg time for break:  1min and 41 secs  Assist device used: none         6 Minute Walk Test (6MWT):  O2 used:   ft walked =  METs  SpO2:  %  HR:    RPD:   RPE:  Number of Breaks: Avg time for break:  secs  Assist device used:             OUTCOMES:  6MWD Improvement:  > 98'?  () Yes  () No     Exercise Prescription    THR: 66 - 105 bpm    Frequency:  2-3x/wk in [place], 1-3x/wk home activity    Intensity:  METs (from 6MWT)      Time:  15-30 total minutes up to 55 minutes max    Type:  Aerobic (TM, AD, UBE, NuStep), 6 ST, RT 1-10# 8-15 reps    Progression:  Increase 30s - 2 min/mode for Aerobic activity, and increase Intensity 2-5% each week if RPE <5, RPD <5, SpO2 >87% and pt is within [facility] above. Exercise Prescription    (x) Pt exercising within THR    Frequency:  2-3x/wk in AL, 0x/wk home activity    Intensity:  3-5 on Fady Dyspnea/ Fatigue scale    Time:  15-30 total minutes up to 55 minutes max    Type:  Aerobic (TM, AD, UBE, NuStep), 6 ST, RT 1-10# 8-15 reps    Progression:  Increase 30s - 2 min/mode for Aerobic activity, and increase Intensity 2-5% each week if RPE <5, RPD <5, SpO2 >87% and pt is within [facility] above.    Exercise Prescription    (x) Pt exercising within THR    Frequency:  2-3x/wk in [place], 2-5x/wk home activity    Intensity:  3-5 on Fady Dyspnea/ Fatigue scale    Time:  30-45 total minutes up to 55 minutes max    Type:  Aerobic (TM, AD, UBE, NuStep), 6 ST, RT 1-10# 8-15 reps    Progression:  Increase 30s - 2 min/mode for Aerobic activity, and increase Intensity 2-5% each week if RPE <5, RPD <5, SpO2 >87% and pt is within [facility] above. Exercise Prescription    () Pt exercising within THR    Frequency:  2-3x/wk in [place], 2-5x/wk home activity    Intensity:  3-5 on Fady Dyspnea/ Fatigue scale    Time:  30-45 total minutes up to 55 minutes max    Type:  Aerobic (TM, AD, UBE, NuStep), 6 ST, RT 1-10# 8-15 reps    Progression:  Increase 30s - 2 min/mode for Aerobic activity up to 35 minutes, and increase Intensity 2-5% each week if RPE <5, RPD <5, SpO2 >87% and pt is within [facility] above. Exercise Prescription    () Pt exercising within THR    Frequency:  2-3x/wk in [place], 2-5x/wk home activity    Intensity:  3-5 on Fady Dyspnea/ Fatigue scale    Time:  30-45 total minutes up to 55 minutes max    Type:  Aerobic (TM, AD, UBE, NuStep), 6 ST, RT 1-10# 8-15 reps    Progression:  Increase 30s - 2 min/mode for Aerobic activity up to 35 minutes, and increase Intensity 2-5% each week if RPE <5, RPD <5, SpO2 >87% and pt is within [facility] above. Home Program          (x) Given to patient with current levels, recommendation and guidelines.                                        Initial Levels:  TM:0.6mph,5min  AD:0.4level,5min  NuStep: 6 W, 5 min  UBE:0.2level, 5 min  RT 2#, 8-15 reps   Current Levels:  NuStep: 36 W, 10 min x2  UBE: 20 becerra level, 5 min  RT  2#, 8-15 reps   Current Levels:    NuStep:38  W, 10 min x 2   UBE: 30 Becerra,  5 min  RT 3 #, 8-15 reps Current Levels:  TM:  mph,  min  AD:  level,  min  NuStep:  W,  min  UBE:  level, 5 min  RT  #, 8-15 reps Current Levels:  TM:  mph,  min  AD:  level,  min  NuStep:  W,  min  UBE:  level, 5 min  RT  #, 8-15 reps   Final Levels:  TM:  mph, min  AD:  level,  min  NuStep:  W,  min  UBE:  level, 5 min  RT  #, 8-15 reps     Physical Limitations  Initial Assessment    (x) Weakness  () Inflexible  () Poor posture  () Gait abnormality  (x) Balance  () Orthopedic Issues   Physical Limitation  Plan      (x) Strengthen through squats and RT  (x) Appropriate stretches shown  (x)Verbal cues and reminders for posture and gait given  (x) Proper modalities chosen for ortho issues  (x) Give Home activity / stretches/ Warmup/ [place] info   Physical Limitations Reassessment      (x) Pt progressing  () Pt not progressing Physical Limitations Reassessment      () Pt progressing  () Pt not progressing Physical Limitations Reassessment      () Pt progressing  () Pt not progressing     Physical Limitations  Discharge      () Issues Addressed  () PT/OT suggested       Exercise Goals   Initial Assessment:    (x) Start to, and commit to exercising regularly     (x) Learn about home activity recommendations        (x) Increase PF shown by increasing 6MWD   Exercise Goals   Plan      -Initiate NC 2x/week  -Encourage home exercise    -Attend Home Activity EDUCATION class      -Slowly, safely, progress in NC    Exercise Goals   Reassessment      (x) Pt is coming regularly  () Pt is sporadic    (x) Pt has had class  () Pt has not had class        (x) Pt is progressing  () Pt is not progressing Exercise Goals   Reassessment      () Pt is coming regularly  () Pt is sporadic    () Pt has had class  () Pt has not had class        () Pt is progressing  () Pt is not progressing Exercise Goals   Reassessment      () Pt is coming regularly  () Pt is sporadic    () Pt has had class  () Pt has not had class        () Pt is progressing  () Pt is not progressing   Exercise Goals Discharge      () GOAL Met?  () GOAL not met -      () Pt had EDUCATION class  Date:   () Pt did not have class    () GOAL Met  See 6MWD above  () GOAL not Met:     Exercise Intervention Initial Assessment      () Transportation needed        (x) EDUCATION needed          (x) Motivation needed   Exercise Intervention/ Education   Plan      () [facility] set up        (x) EDUCATION:  Home activity class to be given        (x) Positive encouragement, support and motivation to be given   Exercise Intervention/ Education Reassessment      (x) Pt is attending  () Pt is not attending      () Pt has had class  () Pt has not had class      (x) Pt is progressing  () Pt not progressing Exercise Intervention/ Education Reassessment      () Pt is attending  () Pt is not attending      () Pt has had class  () Pt has not had class      () Pt is progressing  () Pt not progressing Exercise Intervention/ Education Reassessment      () Pt is attending  () Pt is not attending      () Pt has had class  () Pt has not had class      () Pt is progressing  () Pt not progressing Exercise Intervention/ Edcuation Discharge      () Pt attended most  () Pt cancelled a lot    () Pt has had class  Date: See above  () Pt did not have class    () Pt progressed and did well  () Pt had difficulty-               NUTRITION   INITIAL ASSESSMENT      Height:  56   Weight: 252.6 lbs  BMI: 40.8    30 day goal: 250 Lbs (2-4lbs)    (x) Overweight  () Underweight  () Normal  () Teeth issues  () GI issues  (x) Nutrition knowledge needed  (x) DM   NUTRITION   PLAN        Current Weight: 252.6 lbs      Goal achieved?: no  Next 30 day goal: 250 Lbs   NUTRITION  RE ASSESSMENT      Current Weight:  252.6 lbs      Goal achieved?: no  Next 30 day goal: 250 Lbs NUTRITION  RE ASSESSMENT      Current Weight:  lbs      Goal achieved?:  Next 30 day goal: Lbs NUTRITION  RE ASSESSMENT      Current Weight:  lbs      Goal achieved?:  Next 30 day goal: Lbs   NUTRITION DISCHARGE        Current Weight:  lbs  BMI:    Goal achieved?:     Nutrition Goals  Initial Assessment    (x) Pt is DM.   Increase nutrition knowledge and glucose control through diet and exercise      (x) Learn weight strategies to lose weight        (x) Learn about general nutrition          -Achievable weight goal for the end of the program:  240 Lbs (2-4lbs per month recommended)   Nutrition Goals   Plan      (x) DM:  Attend nutrition class and learn about quality carbohydrates and exercises role in DM    (x) Attend nutrition class          (x) Attend nutrition class          (x) Initiate exercise and give pt hints and tips for weight and will have class for information   Nutrition Goals  Reassessment      (x) DM: Pt has had class  () DM: Pt has not had class           (x) Pt has had class  () Pt has not had class       (x) Pt has had class  () Pt has not had class       () Pt progressing  (x) Pt not progressing     Nutrition Goals  Reassessment      () DM: Pt has had class  () DM: Pt has not had class           () Pt has had class  () Pt has not had class       () Pt has had class  () Pt has not had class       () Pt progressing  () Pt not progressing     Nutrition Goals  Reassessment      () DM: Pt has had class  () DM: Pt has not had class           () Pt has had class  () Pt has not had class       () Pt has had class  () Pt has not had class       () Pt progressing  () Pt not progressing     Nutrition Goals  Discharge      () Pt had nutrition class  Date:  () Pt has not had class        () Pt has had class  Date: See above  () Pt has not had class    () Pt has class  Date: See above  () Pt has not had class      () Final weight goal achieved  () Pt did not reach desired weight goal - (readdressed nutrition and exercise strategies for future goal attainment)        Nutrition Intervention/ Education   Initial Assessment    (x) Pt needs Nutrition education class        () Pt states does not need class       Nutrition Intervention/ Education  Plan      (x) Pt will have Nutrition class          (x) Encourage pt to continue to learn and incorporate self knowledge in to diet choices   Nutrition Intervention/ physician TOBACCO USE  RE ASSESSMENT    () N/A      Any change in Tobacco use status () N  ()Y      () Pt attended counseling education session            () Pt has contacted physician TOBACCO USE  DISCHARGE            Current Tobacco Use Status:         () Pt attended TC counseling education session  Date:           () Pt contacted physician        NRT product/ medication  :     Tobacco Use Goal  Initial Assessment      (x) Complete Cessation or Maintain Cessation of tobacco products   Tobacco Use Goal Intervention and Education Plan    (x Encourage pt to fight the urge, call quit line, use techniques learned from friends/ class    () Attend Tobacco Cessation class if needed   Tobacco Use  Reassessment          (x) Pt remains tobacco free            () Pt has had TC counseling session        () Pt still smoking Tobacco Use  Reassessment          () Pt remains tobacco free            () Pt has had TC counseling session        () Pt still smoking Tobacco Use  Reassessment          () Pt remains tobacco free            () Pt has had TC counseling session        () Pt still smoking Tobacco Use  Discharge          () Pt remains tobacco free            () Pt had TC counseling  Date:  See above        () Pt still smoking - gave resources for quitting             MEDICATIONS  (Oral & Inhaled)  INITIAL ASSESSMENT    Pt reports taking his meds properly 100% of the time      (x) Pt is on inhaled medications   MEDICATIONS  PLAN        -Review Rxs purpose, schedule, side effects and importance of compliance during Medication class. Also address Cpap, Vents.        MEDICATIONS  RE ASSESSMENT      Pt reports taking their meds properly  % of the time        (x) Pt has had class  () Pt has not had class MEDICATIONS  RE ASSESSMENT      Pt reports taking their meds properly  % of the time        () Pt has had class  () Pt has not had class MEDICATIONS  RE ASSESSMENT      Pt reports taking their meds properly  % of the time        () progressing        () Pt has initiated home activity  () Pt has not yet initiated  home activity-      () ADLs getting easier  () ADLs not getting easier ADL  RE ASSESSMENT      () Pt is progressing  () Pt is not progressing        () Pt has initiated home activity  () Pt has not yet initiated  home activity-      () ADLs getting easier  () ADLs not getting easier ADL  DISCHARGE        () Pt showed strength and endurance gains  () Pt did not progress      () Pt doing recommended home activity  () Pt not doing home activitiy         ADL Goals   Initial Assessment      (x) Decrease SOB with ADLs              (x) Decreased SOB overall      ADL Goals Intervention/ Education Plan      -Initiate [place], RT and chair squats and increase pts strength and endurance        -Pacing, Breathing retraining       ADL Goals Reassessment        (x) ADLs getting easier  () ADLs not getting easier          (x Pt states activities are getting easier/ able to go longer/ not get as SOB   ADL Goals Reassessment        () ADLs getting easier  () ADLs not getting easier          () Pt states activities are getting easier/ able to go longer/ not get as SOB ADL Goals Reassessment        () ADLs getting easier  () ADLs not getting easier          () Pt states activities are getting easier/ able to go longer/ not get as SOB   ADL Goals   Discharge        Goal achieved?  () Yes  () No            Goal achieved?  () Yes  () No          Outcomes:  See Health and Functioning from the CAT tool and Strength and Endurance from 6 MWD above             EXACERBAT'N PREVENTION & MANAGEMENT  INITIAL ASSESSMENT      Pt reports;  () 0 respiratory infections  () 1   () >2  (x) Hospitalized in last 12 months   EXACERBAT'N PREVENTION & MANAGEMENT  PLAN        Address via Disease Self Management and Exacerbation prevention class:    -Hydration for mucus    -Hand Hygiene          -Evaluation of Sputum    -When to call MD  -S/Sx to report  -Decrease exposure to not staying for education Exacerbation Prevention & Management Goals  Reassessment        () Pt has had class  () Pt has not had class        () Pt has had all classes  () Pt has had some classes  () Pt has had little/ not staying for education Exacerbation Prevention & Management Goals  Reassessment        () Pt has had class  () Pt has not had class        () Pt has had all classes  () Pt has had most classes  () Pt has had some of the classes  () Pt has had little/ not staying for education Exacerbation Prevention & Management Goals  Discharge        () Pt had class  Date:  See above  () Pt did not have class        () Pt attended all relevant classes and all questions were answered                   PHYSICIAN INTERACTION    MD Initial Assessment Review    (x) Initial  Assessment Reviewed  (x) Treatment Plan and Goals support patient needs/ abilities                  Cosigned and dated below:   Everett Salter MD 30 day and Plan Review:      (x) I have seen the patient in rehab during this 30 day reassessment  (x) I agree with the plan  (x) Continue with progression and instruction  () Continue, but with the following changes:      Cosigned and dated below: Everett Salter MD 30 day Reassessment Review:    (x) I have seen the patient in rehab during this 30 day reassessment  (x) Continue with the current program  () Continue, but with the following changes:  () Discharge patient      Cosigned and dated below: Everett Salter MD 30 day Reassessment Review:    (x) I have seen the patient in rehab during this 30 day reassessment  (x) Continue with the current program  () Continue, but with the following changes:  () Discharge patient      Cosigned and dated below: PHYSICIAN JENNI MORRIS 30 day Reassessment Review:    (x) I have seen the patient in rehab during this 30 day reassessment  (x) Continue with the current program  () Continue, but with the following changes:  () Discharge patient      Cosigned and dated below: Lucrecia Joe MD 30 day   Discharge Review:    (x) Discharge patient                            Cosigned and dated below:     Cosigned by: Yumiko Clay MD at 12/17/2019  3:48 PM   Revision History      Date/Time User Provider Type Action   12/17/2019  3:48 PM Yumiko Clay MD Physician Cosign   12/17/2019  3:21 PM Jia Francisco RCP Respiratory Therapist Sign     Cosigned by: Yumiko Clay MD at 1/21/2020 10:10 PM   Revision History      Date/Time User Provider Type Action   1/21/2020 10:10 PM Yumiko Clay MD Physician Cosign   1/16/2020  1:13 PM Chioma Contreras Exercise Physiologist Sign

## 2020-02-13 NOTE — CARE COORDINATION
I called GISELA Chance to check on the status of Med's application for assistance on his Spiriva inhaler. They are still processing his application and was told to call back Monday to check the status.            [facility] Powell [facility]  [address]   Medication Assistance  [place] [address]    (V)[phone]  (A)[phone]

## 2020-02-18 ENCOUNTER — HOSPITAL ENCOUNTER (OUTPATIENT)
Dept: CARDIAC REHAB | Age: 85
Setting detail: THERAPIES SERIES
Discharge: HOME | End: 2020-02-18
Payer: MEDICARE

## 2020-02-18 PROCEDURE — 97150 GROUP THERAPEUTIC PROCEDURES: CPT

## 2020-02-19 ENCOUNTER — CARE COORDINATION (OUTPATIENT)
Dept: CARE COORDINATION | Age: 85
End: 2020-02-19

## 2020-02-19 NOTE — CARE COORDINATION
I was checking on Med's application for assistance on his Spiriva and Proventil inhaler. [facility] stated that they sent him a letter letting him know he was over the income guidelines. [employer] the  for his proventil said his application is still pending and to call back in 2 weeks.         [facility]  [address]   Medication Assistance  [place] [place] [address]    O)[phone] (k)[phone]

## 2020-02-20 ENCOUNTER — HOSPITAL ENCOUNTER (OUTPATIENT)
Dept: CARDIAC REHAB | Age: 85
Setting detail: THERAPIES SERIES
Discharge: HOME | End: 2020-02-20
Payer: MEDICARE

## 2020-02-20 PROCEDURE — 97150 GROUP THERAPEUTIC PROCEDURES: CPT

## 2020-02-24 ENCOUNTER — HOSPITAL ENCOUNTER (OUTPATIENT)
Dept: PHARMACY | Age: 85
Setting detail: THERAPIES SERIES
Discharge: HOME | End: 2020-02-24
Payer: MEDICARE

## 2020-02-24 LAB — POC INR: 1.9 (ref 0.8–1.2)

## 2020-02-24 PROCEDURE — 36416 COLLJ CAPILLARY BLOOD SPEC: CPT

## 2020-02-24 PROCEDURE — 99211 OFF/OP EST MAY X REQ PHY/QHP: CPT

## 2020-02-24 PROCEDURE — 85610 PROTHROMBIN TIME: CPT

## 2020-02-25 ENCOUNTER — HOSPITAL ENCOUNTER (OUTPATIENT)
Dept: CARDIAC REHAB | Age: 85
Setting detail: THERAPIES SERIES
Discharge: HOME | End: 2020-02-25
Payer: MEDICARE

## 2020-02-25 PROCEDURE — 97150 GROUP THERAPEUTIC PROCEDURES: CPT

## 2020-02-27 ENCOUNTER — HOSPITAL ENCOUNTER (OUTPATIENT)
Dept: CARDIAC REHAB | Age: 85
Setting detail: THERAPIES SERIES
End: 2020-02-27
Payer: MEDICARE

## 2020-02-27 ENCOUNTER — NURSE ONLY (OUTPATIENT)
Dept: LAB | Age: 85
End: 2020-02-27

## 2020-02-27 ENCOUNTER — OFFICE VISIT (OUTPATIENT)
Dept: FAMILY MEDICINE CLINIC | Age: 85
End: 2020-02-27
Payer: MEDICARE

## 2020-02-27 VITALS
WEIGHT: 263 LBS | SYSTOLIC BLOOD PRESSURE: 124 MMHG | DIASTOLIC BLOOD PRESSURE: 62 MMHG | HEIGHT: 67 IN | TEMPERATURE: 99 F | RESPIRATION RATE: 14 BRPM | BODY MASS INDEX: 41.28 KG/M2 | HEART RATE: 66 BPM

## 2020-02-27 LAB
HCT VFR BLD CALC: 40.4 % (ref 42–52)
HEMOGLOBIN: 12.7 GM/DL (ref 14–18)

## 2020-02-27 PROCEDURE — 3023F SPIROM DOC REV: CPT | Performed by: FAMILY MEDICINE

## 2020-02-27 PROCEDURE — 4040F PNEUMOC VAC/ADMIN/RCVD: CPT | Performed by: FAMILY MEDICINE

## 2020-02-27 PROCEDURE — 1036F TOBACCO NON-USER: CPT | Performed by: FAMILY MEDICINE

## 2020-02-27 PROCEDURE — 1123F ACP DISCUSS/DSCN MKR DOCD: CPT | Performed by: FAMILY MEDICINE

## 2020-02-27 PROCEDURE — G8482 FLU IMMUNIZE ORDER/ADMIN: HCPCS | Performed by: FAMILY MEDICINE

## 2020-02-27 PROCEDURE — G8926 SPIRO NO PERF OR DOC: HCPCS | Performed by: FAMILY MEDICINE

## 2020-02-27 PROCEDURE — G8427 DOCREV CUR MEDS BY ELIG CLIN: HCPCS | Performed by: FAMILY MEDICINE

## 2020-02-27 PROCEDURE — 99214 OFFICE O/P EST MOD 30 MIN: CPT | Performed by: FAMILY MEDICINE

## 2020-02-27 PROCEDURE — G8417 CALC BMI ABV UP PARAM F/U: HCPCS | Performed by: FAMILY MEDICINE

## 2020-02-27 ASSESSMENT — ENCOUNTER SYMPTOMS
ABDOMINAL PAIN: 0
BLOOD IN STOOL: 0
ANAL BLEEDING: 0
VOMITING: 0
NAUSEA: 0
CONSTIPATION: 0
DIARRHEA: 0
SHORTNESS OF BREATH: 1
CHEST TIGHTNESS: 0

## 2020-02-27 NOTE — PROGRESS NOTES
[facility]  [facility]  Dept: [phone]  Dept Fax: [phone]    CHERISE Guerrier is a 80 y.o.male    Pt presents for follow up of T2DM, HTN, Hyperlipidemia, and Chronic Bronchitis.     Pt feeling ok since last visit- no new complaints, issues, or problems, except as noted below:     Pt continues in Pulmonary Rehab at this time- pt continues seeing 2x/week. Going well at this time- notes \"breathing not too bad\"     The home BP readings have been in the 120's / 60's range. Checking daily  Glucometer readings at home are running \"~150\". Checking daily. Pt complains of dysuria for past 4 weeks. Pt previously treated per Urology with Doxycycline- to follow up tomorrow. Wt Readings from Last 3 Encounters:   02/27/20 263 lb (119.3 kg)   01/31/20 259 lb (117.5 kg)   01/06/20 256 lb (116.1 kg)   Weight increased 4# since last visit 3 months ago.      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    Breast lump    BPH (benign prostatic hyperplasia)    ROBER (obstructive sleep apnea)    Essential hypertension    Shortness of breath    Chronic respiratory failure with hypoxia (HCC)    Mixed hyperlipidemia    Chronic deep vein thrombosis (DVT) of right lower extremity (HCC)    Anticoagulated on Coumadin    Type 2 diabetes mellitus with complication, with long-term current use of insulin (HCC)    Muscle weakness    Chronic bronchitis (HCC)    Stage 3 severe COPD by GOLD classification (HCC)    Acute diastolic congestive heart failure (HCC)    Paroxysmal atrial flutter (HCC)    Urinary incontinence    Serous detachment of retinal pigment epithelium    Senile nuclear sclerosis    Puckering of macula, bilateral    Presence of intraocular lens    Primary open-angle glaucoma    Other chorioretinal scars, right eye    Nonexudative senile macular degeneration of  Influenza, Quadv, 6 mo and older, IM, PF (Flulaval, Fluarix) 09/27/2018    Influenza, Quadv, IM, (6 mo and older Fluzone, Flulaval, Fluarix and 3 yrs and older Afluria) 11/17/2017    Influenza, Quadv, IM, PF (6 mo and older Fluzone, Flulaval, Fluarix, and 3 yrs and older Afluria) 10/01/2019    Influenza, Triv, 3 Years and older, IM (Afluria (5 yrs and older) 09/26/2016    Pneumococcal Conjugate 13-valent ([id number]) 12/19/2014    Pneumococcal Polysaccharide ([id number]) 01/01/2003    Td, unspecified formulation 06/08/2010    Zoster Live (Zostavax) 08/05/2014       Health Maintenance   Topic Date Due    DTaP/Tdap/Td vaccine (1 - Tdap) 07[birth year]/1942    Hepatitis B vaccine (1 of 3 - Risk 3-dose series) 07/17/1950    Annual Wellness Visit (AWV)  05/29/2019    Shingles Vaccine (2 of 3) 08/15/2020 (Originally 9/30/2014)    TSH testing  08/20/2020    Potassium monitoring  11/13/2020    Creatinine monitoring  11/13/2020    Flu vaccine  Completed    Pneumococcal 65+ years Vaccine  Completed    Hepatitis A vaccine  Aged Out    Hib vaccine  Aged Out    Meningococcal (ACWY) vaccine  Aged Out       AAA ultrasound (Male, 65-75, smoked ever) indicated at this time? NO, age > 71  CT Lung Screen (55-80, 30 pk-yrs, smoking or quit <15 years) indicated at this time? NO, age [de-identified]    Future Appointments   Date Time Provider Gretchen Jorgensen   2/28/2020 10:45 AM MAEGAN Fall - [address] Urology [place]   3/3/2020  1:00 PM STR CARDIAC EXERCISE RM STRZ CAR [place]UL [place]   3/4/2020 10:45 AM Delmer Mckeon PA-C Pulm Med P - [place]   3/5/2020  1:00 PM STR CARDIAC EXERCISE RM STRZ CAR [place]UL [place]   3/9/2020  1:20 PM Neo Guerin RN STR MED MGMT [facility] [place]   3/10/2020  1:00 PM STR CARDIAC EXERCISE RM STR[facility] [place] [facility] [place] [facility]   3/12/2020  1:00 PM STR CARDIAC EXERCISE RM STRZ CAR [place]   3/17/2020  1:00 PM STR CARDIAC EXERCISE RM STRZ CAR PUL [facility]   3/19/2020  1:00 PM STR CARDIAC EXERCISE RM COLONOSCOPY with Dr. Naomi Chilel on 3/22/05- ok- 10 year follow up overdue- pt declines at this time- \"I'm too old for that\". (updated 2/27/2020)  Pt declines SFOBT/ FIT testing at this time (updated 2/27/2020)  Will hold on further CHETNA/PSA at this time due to age.  (updated 2/27/2020)  DILATED EYE EXAM- done with Dr. Mtz Meth 12/2019- to follow up every 6 months. (updated 2/27/2020)  Check UA in office today- will send for culture if indicated. Pt would like to hold at this time as symptoms present for 4 weeks and has appt with Urology tomorrow. Please check to see if you are still seeing Dr. Tracey Choe   Follow up with Dr. Rik Dancer), [place] Soham and ROBER), Urology (Urinary symptoms) as scheduled. (updated 2/27/2020)  Continue to work on diet and remain as active as possible for weight loss for optimal cardiovascular health.   Follow up with Diabetes Clinic for Diabetes Management 4/6/2020 as scheduled  Check HGA1C and H/H at this time as not completed previously. Check HGA1C, FLP, LFT's, SPOT MA, and H/H in 3 months.    Continue current medicines   No refills needed today per pt report.   Follow up in 3 months          Electronically signed by Maverick Caraballo MD on 2/27/2020 at 1:57 PM

## 2020-02-28 ENCOUNTER — CARE COORDINATION (OUTPATIENT)
Dept: CARE COORDINATION | Age: 85
End: 2020-02-28

## 2020-02-28 ENCOUNTER — OFFICE VISIT (OUTPATIENT)
Dept: UROLOGY | Age: 85
End: 2020-02-28
Payer: MEDICARE

## 2020-02-28 VITALS
BODY MASS INDEX: 40.92 KG/M2 | HEIGHT: 67 IN | WEIGHT: 260.7 LBS | SYSTOLIC BLOOD PRESSURE: 118 MMHG | DIASTOLIC BLOOD PRESSURE: 64 MMHG

## 2020-02-28 LAB
AVERAGE GLUCOSE: 162 MG/DL (ref 70–126)
BILIRUBIN URINE: NEGATIVE
BLOOD URINE, POC: NEGATIVE
CHARACTER, URINE: CLEAR
COLOR, URINE: YELLOW
GLUCOSE URINE: NEGATIVE MG/DL
HBA1C MFR BLD: 7.4 % (ref 4.4–6.4)
KETONES, URINE: NEGATIVE
LEUKOCYTE CLUMPS, URINE: ABNORMAL
NITRITE, URINE: NEGATIVE
PH, URINE: 5.5 (ref 5–9)
POST VOID RESIDUAL (PVR): 120 ML
PROTEIN, URINE: NEGATIVE MG/DL
SPECIFIC GRAVITY, URINE: 1.02 (ref 1–1.03)
UROBILINOGEN, URINE: 0.2 EU/DL (ref 0–1)

## 2020-02-28 PROCEDURE — 1036F TOBACCO NON-USER: CPT | Performed by: NURSE PRACTITIONER

## 2020-02-28 PROCEDURE — 51798 US URINE CAPACITY MEASURE: CPT | Performed by: NURSE PRACTITIONER

## 2020-02-28 PROCEDURE — 99214 OFFICE O/P EST MOD 30 MIN: CPT | Performed by: NURSE PRACTITIONER

## 2020-02-28 PROCEDURE — 1123F ACP DISCUSS/DSCN MKR DOCD: CPT | Performed by: NURSE PRACTITIONER

## 2020-02-28 PROCEDURE — 4040F PNEUMOC VAC/ADMIN/RCVD: CPT | Performed by: NURSE PRACTITIONER

## 2020-02-28 PROCEDURE — 81003 URINALYSIS AUTO W/O SCOPE: CPT | Performed by: NURSE PRACTITIONER

## 2020-02-28 PROCEDURE — G8427 DOCREV CUR MEDS BY ELIG CLIN: HCPCS | Performed by: NURSE PRACTITIONER

## 2020-02-28 PROCEDURE — G8417 CALC BMI ABV UP PARAM F/U: HCPCS | Performed by: NURSE PRACTITIONER

## 2020-02-28 PROCEDURE — G8482 FLU IMMUNIZE ORDER/ADMIN: HCPCS | Performed by: NURSE PRACTITIONER

## 2020-02-28 RX ORDER — DOXAZOSIN 8 MG/1
8 TABLET ORAL NIGHTLY
Qty: 30 TABLET | Refills: 1 | Status: SHIPPED | OUTPATIENT
Start: 2020-02-28 | End: 2020-07-24 | Stop reason: SDUPTHER

## 2020-02-28 ASSESSMENT — ENCOUNTER SYMPTOMS
ABDOMINAL PAIN: 0
BACK PAIN: 0

## 2020-02-28 NOTE — PROGRESS NOTES
[address] [facility] [id number]  Dept: [phone]  Loc: [phone]    Visit Date: 2/28/2020        HPI:     Rachael Albright is a 80 y.o. male who presents today for:  Chief Complaint   Patient presents with    Benign Prostatic Hypertrophy    Follow-up     PVR       Benign Prostatic Hypertrophy   Irritative symptoms include frequency and urgency. Pertinent negatives include no chills or hematuria. Pt seen today for complaint of dysuria, frequency, urgency. He completed a 2 week course of doxycycline and reports symptoms continue. He is also having nocturia of 4 x per night. He takes Cardura 4 mg nightly.         Current Outpatient Medications   Medication Sig Dispense Refill    doxazosin (CARDURA) 8 MG tablet Take 1 tablet by mouth nightly 30 tablet 1    albuterol sulfate  (90 Base) MCG/ACT inhaler Inhale 2 puffs into the lungs every 6 hours as needed for Wheezing 1 Inhaler 3    tiotropium (SPIRIVA RESPIMAT) 2.5 MCG/ACT AERS inhaler Inhale 2 puffs into the lungs daily 1 Inhaler 11    metFORMIN (GLUCOPHAGE) 500 MG tablet Take 500 mg by mouth 2 times daily (with meals)      furosemide (LASIX) 40 MG tablet Take 40 mg by mouth 2 times daily   4    LANTUS SOLOSTAR 100 UNIT/ML injection pen INJECT SUBCUTANEOUSLY 18  UNITS NIGHTLY 30 mL 3    Ascorbic Acid (VITAMIN C PO) Take 1 tablet by mouth daily       Cholecalciferol (VITAMIN D3 PO) Take 1 tablet by mouth daily       levothyroxine (SYNTHROID) 175 MCG tablet TAKE 1 TABLET BY MOUTH  DAILY 90 tablet 3    lisinopril (PRINIVIL;ZESTRIL) 5 MG tablet Take 1 tablet by mouth daily 90 tablet 3    potassium chloride (KLOR-CON M) 10 MEQ extended release tablet Take 10 mEq by mouth 2 times daily      Blood Glucose Monitoring Suppl (ACCU-CHEK CHE PLUS) w/Device KIT Check blood sugar 2 times daily 1 kit 0    SOFT TOUCH LANCETS MISC Use to check blood sugars 2 times daily 100 each 3    warfarin (COUMADIN) 5 MG tablet Take as directed by [facility]. #90 tabs = 90 days 90 tablet 3    Insulin Pen Needle (B-D ULTRAFINE III SHORT PEN) 31G X 8 MM MISC To use with Lantus pens to inject SQ qd. Dx: E11.9 90 each 3    Multiple Vitamins-Minerals (PRESERVISION AREDS 2+MULTI VIT) CAPS take 1 by Oral route 2 times every day      metoprolol tartrate (LOPRESSOR) 25 MG tablet Take 25 mg by mouth 2 times daily       blood glucose test strips (ACCU-CHEK CHE PLUS) strip TEST TWICE DAILY 150 strip 5    Ferrous Sulfate (IRON) 325 (65 Fe) MG TABS Take by mouth 2 times daily       AZOPT 1 % ophthalmic suspension Place 1 drop into both eyes 3 times daily Indications: Disease of the Eye       Arformoterol Tartrate (BROVANA) 15 MCG/2ML NEBU Take 1 ampule by nebulization 2 times daily Indications: Prevention of COPD Worsening       budesonide (PULMICORT) 0.25 MG/2ML nebulizer suspension Take 1 ampule by nebulization 2 times daily       OXYGEN Inhale into the lungs as needed (Uses at night only) Indications: Difficulty Breathing, Oxygen Therapy 3 liters       No current facility-administered medications for this visit. Past Medical History  Josue Randall  has a past medical history of Anxiety, CAD (coronary artery disease), Chronic back pain, COPD (chronic obstructive pulmonary disease) ([address] Detached retina, Diabetes mellitus ([address] DVT (deep venous thrombosis) ([address] DVT (deep venous thrombosis) ([address] Glaucoma, Hyperlipidemia, Hypertension, Mass of chest, Movement disorder, Obesity, Osteoarthritis, Pneumonia, Sleep apnea, Thyroid cancer ([address] and Urinary incontinence. Past Surgical History  The patient  has a past surgical history that includes Retinal detachment surgery; Thyroidectomy; Spine surgery (2004); other surgical history; fracture surgery; EKG 12 Lead (11/10/2015); back surgery ([id number], 2014); and bronchoscopy (N/A, 8/29/2017).     Family History  This patient's family history includes Other in his mother; Other (age of onset: [de-identified]) in his father. Social History  Inocente  reports that he has never smoked. He has never used smokeless tobacco. He reports current alcohol use of about 1.0 standard drinks of alcohol per week. He reports that he does not use drugs. Subjective:      Review of Systems   Constitutional: Negative for activity change, appetite change, chills, diaphoresis, fatigue, fever and unexpected weight change. Gastrointestinal: Negative for abdominal pain. Genitourinary: Positive for frequency and urgency. Negative for decreased urine volume, difficulty urinating, flank pain and hematuria. Musculoskeletal: Negative for back pain. Objective:   /64   Ht 5' 7\" (1.702 m)   Wt 260 lb 11.2 oz (118.3 kg)   BMI 40.83 kg/m²     Physical Exam  Constitutional:       General: He is not in acute distress. Appearance: Normal appearance. He is not ill-appearing or diaphoretic. HENT:      Head: Normocephalic and atraumatic. Eyes:      General: No scleral icterus. Right eye: No discharge. Left eye: No discharge. Cardiovascular:      Rate and Rhythm: Normal rate and regular rhythm. Pulmonary:      Effort: No respiratory distress. Abdominal:      General: Bowel sounds are normal.      Tenderness: There is no abdominal tenderness. Skin:     General: Skin is warm. Neurological:      Mental Status: He is alert. Mental status is at baseline.    Psychiatric:         Mood and Affect: Mood normal.         POC  Results for POC orders placed in visit on 02/28/20   POCT Urinalysis No Micro (Auto)   Result Value Ref Range    Glucose, Ur Negative NEGATIVE mg/dl    Bilirubin Urine Negative     Ketones, Urine Negative NEGATIVE    Specific Gravity, Urine 1.020 1.002 - 1.03    Blood, UA POC Negative NEGATIVE    pH, Urine 5.50 5.0 - 9.0    Protein, Urine Negative NEGATIVE mg/dl    Urobilinogen, Urine 0.20 0.0 - 1.0 eu/dl    Nitrite, Urine Negative NEGATIVE    Leukocyte Clumps, Urine Small (A) NEGATIVE    Color, Urine Yellow YELLOW-STR    Character, Urine Clear CLR-SL.AMALIA   poct post void residual   Result Value Ref Range    post void residual 120 ml         Patients recent PSA values are as follows  Lab Results   Component Value Date    PSA 3.76 10/27/2010        Recent BUN/Creatinine:  Lab Results   Component Value Date    BUN 9 11/13/2019    CREATININE 0.8 11/13/2019         Assessment:   BPH with LUTs  Dysuria    Plan:     Pt continues with symptoms despite treatment with doxycycline. Pt's urine dip today with only leuks. Increase Cardura to 8 mg nightly. F/u in 2 weeks with PVR.

## 2020-03-03 ENCOUNTER — HOSPITAL ENCOUNTER (OUTPATIENT)
Dept: CARDIAC REHAB | Age: 85
Setting detail: THERAPIES SERIES
Discharge: HOME | End: 2020-03-03
Payer: MEDICARE

## 2020-03-03 ENCOUNTER — CARE COORDINATION (OUTPATIENT)
Dept: CARE COORDINATION | Age: 85
End: 2020-03-03

## 2020-03-03 PROCEDURE — 97150 GROUP THERAPEUTIC PROCEDURES: CPT

## 2020-03-04 ENCOUNTER — OFFICE VISIT (OUTPATIENT)
Dept: PULMONOLOGY | Age: 85
End: 2020-03-04
Payer: MEDICARE

## 2020-03-04 VITALS
BODY MASS INDEX: 41.65 KG/M2 | TEMPERATURE: 97.6 F | HEART RATE: 63 BPM | WEIGHT: 265.4 LBS | SYSTOLIC BLOOD PRESSURE: 124 MMHG | DIASTOLIC BLOOD PRESSURE: 72 MMHG | OXYGEN SATURATION: 95 % | HEIGHT: 67 IN

## 2020-03-04 PROCEDURE — G8926 SPIRO NO PERF OR DOC: HCPCS | Performed by: PHYSICIAN ASSISTANT

## 2020-03-04 PROCEDURE — G8417 CALC BMI ABV UP PARAM F/U: HCPCS | Performed by: PHYSICIAN ASSISTANT

## 2020-03-04 PROCEDURE — 1123F ACP DISCUSS/DSCN MKR DOCD: CPT | Performed by: PHYSICIAN ASSISTANT

## 2020-03-04 PROCEDURE — 4040F PNEUMOC VAC/ADMIN/RCVD: CPT | Performed by: PHYSICIAN ASSISTANT

## 2020-03-04 PROCEDURE — G8482 FLU IMMUNIZE ORDER/ADMIN: HCPCS | Performed by: PHYSICIAN ASSISTANT

## 2020-03-04 PROCEDURE — G8427 DOCREV CUR MEDS BY ELIG CLIN: HCPCS | Performed by: PHYSICIAN ASSISTANT

## 2020-03-04 PROCEDURE — 1036F TOBACCO NON-USER: CPT | Performed by: PHYSICIAN ASSISTANT

## 2020-03-04 PROCEDURE — 3023F SPIROM DOC REV: CPT | Performed by: PHYSICIAN ASSISTANT

## 2020-03-04 PROCEDURE — 99213 OFFICE O/P EST LOW 20 MIN: CPT | Performed by: PHYSICIAN ASSISTANT

## 2020-03-04 ASSESSMENT — ENCOUNTER SYMPTOMS
ALLERGIC/IMMUNOLOGIC NEGATIVE: 1
BACK PAIN: 0
WHEEZING: 0
EYES NEGATIVE: 1
SHORTNESS OF BREATH: 1
CHEST TIGHTNESS: 0
DIARRHEA: 0
NAUSEA: 0
STRIDOR: 0
COUGH: 0

## 2020-03-04 NOTE — PROGRESS NOTES
[facility]    : Stef Sawyer, 80 y.o.   : 1931  3/4/2020    Pt of Dr. Bertha Johnson   Patient presents with    Follow-up     4 month follow up COPD         HPI  Roselind Alpers is here for follow up for of COPD. He is back on Brovana and Pulmicort with benefit. He is still SOB with exertion. He is not active and struggling to find help around the house. He is struggles trying to do house work. He is enrolled in [place] re[facility]. He is wearing PAP well for ROBER. He is on O2 bled into PAP. Progress History:   Since last visit any new medical issues? No  New ER or hospitlal visits? No  Any new or changes in medicines? No  Using inhalers? Yes Brovana, Pulmicort  Are they helpful?  Yes   Past Medical hx   PMH:  Past Medical History:   Diagnosis Date    Anxiety     CAD (coronary artery disease)     leaking valve    Chronic back pain     COPD (chronic obstructive pulmonary disease) (HCC)     Detached retina     Diabetes mellitus (HCC)     NIDDM    DVT [facility]deep venous thrombosis) ([facility]     RLE    DVT (deep venous thrombosis) ([address] 11/9/15    LLE    Glaucoma     Hyperlipidemia     Hypertension     Mass of chest     benign chest mass, Dr Neda Gutierrez Movement disorder     spinal stenosis    Obesity     Osteoarthritis     right ankle, right hand    Pneumonia     Sleep apnea     on BiPap, Dr Annika Malin Thyroid cancer [facility])     s/p thyroid resection    Urinary incontinence      SURGICAL HISTORY:  Past Surgical History:   Procedure Laterality Date   [facility] SURGERY  ,,     BRONCHOSCOPY N/A 2017    BRONCHOSCOPY AIRWAY ONLY performed by Kierra Gil MD at [address] [place] [address] EKG 12-LEAD  11/10/2015         FRACTURE SURGERY      right hand    OTHER SURGICAL HISTORY      spinal epidurals    RETINAL DETACHMENT SURGERY      SPINE SURGERY  2004    Lumbar laminectomy    THYROIDECTOMY       SOCIAL HISTORY:  Social History     Tobacco Use    Smoking status: Never Smoker    Smokeless tobacco: Never Used   Substance Use Topics    Alcohol use: Yes     Alcohol/week: 1.0 standard drinks     Types: 1 Glasses of wine per week     Comment: Glass of wine with dinner.  Drug use: No     ALLERGIES:  Allergies   Allergen Reactions    Macrobid [Nitrofurantoin Lizette Scrivener      Dr. Ahmet Ritter prefers pt not use Macrobid due to pulmonary side effects.     Levaquin [Levofloxacin] Other (See Comments)     Redness/flushing    Adhesive Tape Rash    Alphagan [Brimonidine Tartrate] Hives     Eyes red     FAMILY HISTORY:  Family History   Problem Relation Age of Onset    Other Mother         Complications of Childbirth    Other Father [de-identified]        Natural causes     CURRENT MEDICATIONS:  Current Outpatient Medications   Medication Sig Dispense Refill    doxazosin (CARDURA) 8 MG tablet Take 1 tablet by mouth nightly 30 tablet 1    albuterol sulfate  (90 Base) MCG/ACT inhaler Inhale 2 puffs into the lungs every 6 hours as needed for Wheezing 1 Inhaler 3    tiotropium (SPIRIVA RESPIMAT) 2.5 MCG/ACT AERS inhaler Inhale 2 puffs into the lungs daily 1 Inhaler 11    metFORMIN (GLUCOPHAGE) 500 MG tablet Take 500 mg by mouth 2 times daily (with meals)      furosemide (LASIX) 40 MG tablet Take 40 mg by mouth 2 times daily   4    LANTUS SOLOSTAR 100 UNIT/ML injection pen INJECT SUBCUTANEOUSLY 18  UNITS NIGHTLY 30 mL 3    Cholecalciferol (VITAMIN D3 PO) Take 1 tablet by mouth daily       levothyroxine (SYNTHROID) 175 MCG tablet TAKE 1 TABLET BY MOUTH  DAILY 90 tablet 3    lisinopril (PRINIVIL;ZESTRIL) 5 MG tablet Take 1 tablet by mouth daily 90 tablet 3    potassium chloride (KLOR-CON M) 10 MEQ extended release tablet Take 10 mEq by mouth 2 times daily      Blood Glucose Monitoring Suppl (ACCU-CHEK CHE PLUS) w/Device KIT Check blood sugar 2 times daily 1 kit 0    SOFT TOUCH LANCETS MISC Use to check blood sugars 2 times daily 100 each 3    warfarin (COUMADIN) 5 MG tablet Take as directed by [facility]. #90 tabs = 90 days 90 tablet 3    Insulin Pen Needle (B-D ULTRAFINE III SHORT PEN) 31G X 8 MM MISC To use with Lantus pens to inject SQ qd. Dx: E11.9 90 each 3    Multiple Vitamins-Minerals (PRESERVISION AREDS 2+MULTI VIT) CAPS take 1 by Oral route 2 times every day      metoprolol tartrate (LOPRESSOR) 25 MG tablet Take 25 mg by mouth 2 times daily       blood glucose test strips (ACCU-CHEK CHE PLUS) strip TEST TWICE DAILY 150 strip 5    Ferrous Sulfate (IRON) 325 (65 Fe) MG TABS Take by mouth 2 times daily       AZOPT 1 % ophthalmic suspension Place 1 drop into both eyes 3 times daily Indications: Disease of the Eye       Arformoterol Tartrate (BROVANA) 15 MCG/2ML NEBU Take 1 ampule by nebulization 2 times daily Indications: Prevention of COPD Worsening       budesonide (PULMICORT) 0.25 MG/2ML nebulizer suspension Take 1 ampule by nebulization 2 times daily       OXYGEN Inhale into the lungs as needed (Uses at night only) Indications: Difficulty Breathing, Oxygen Therapy 3 liters      Ascorbic Acid (VITAMIN C PO) Take 1 tablet by mouth daily        No current facility-administered medications for this visit. Ana Rosa LOYOLA   Review of Systems   Constitutional: Negative for activity change, appetite change, chills and fever. HENT: Negative for congestion and postnasal drip. Eyes: Negative. Respiratory: Positive for shortness of breath. Negative for cough, chest tightness, wheezing and stridor. Cardiovascular: Positive for leg swelling. Negative for chest pain. Gastrointestinal: Negative for diarrhea and nausea. Endocrine: Negative. Genitourinary: Negative. Musculoskeletal: Negative. Negative for arthralgias and back pain. Skin: Negative. Allergic/Immunologic: Negative. Neurological: Negative. Negative for dizziness and light-headedness. Psychiatric/Behavioral: Negative.     All other systems reviewed and

## 2020-03-05 ENCOUNTER — TELEPHONE (OUTPATIENT)
Dept: UROLOGY | Age: 85
End: 2020-03-05

## 2020-03-05 ENCOUNTER — APPOINTMENT (OUTPATIENT)
Dept: CT IMAGING | Age: 85
End: 2020-03-05
Payer: MEDICARE

## 2020-03-05 ENCOUNTER — HOSPITAL ENCOUNTER (EMERGENCY)
Age: 85
Discharge: HOME | End: 2020-03-05
Payer: MEDICARE

## 2020-03-05 ENCOUNTER — HOSPITAL ENCOUNTER (OUTPATIENT)
Dept: CARDIAC REHAB | Age: 85
Setting detail: THERAPIES SERIES
End: 2020-03-05
Payer: MEDICARE

## 2020-03-05 VITALS
RESPIRATION RATE: 18 BRPM | HEART RATE: 66 BPM | WEIGHT: 260 LBS | BODY MASS INDEX: 40.81 KG/M2 | DIASTOLIC BLOOD PRESSURE: 55 MMHG | OXYGEN SATURATION: 96 % | TEMPERATURE: 97.7 F | HEIGHT: 67 IN | SYSTOLIC BLOOD PRESSURE: 112 MMHG

## 2020-03-05 LAB
ANION GAP SERPL CALCULATED.3IONS-SCNC: 16 MEQ/L (ref 8–16)
BACTERIA: ABNORMAL /HPF
BASOPHILS # BLD: 0.7 %
BASOPHILS ABSOLUTE: 0.1 THOU/MM3 (ref 0–0.1)
BILIRUBIN URINE: NEGATIVE
BLOOD, URINE: ABNORMAL
BUN BLDV-MCNC: 8 MG/DL (ref 7–22)
CALCIUM SERPL-MCNC: 9.1 MG/DL (ref 8.5–10.5)
CASTS 2: ABNORMAL /LPF
CASTS UA: ABNORMAL /LPF
CHARACTER, URINE: ABNORMAL
CHLORIDE BLD-SCNC: 96 MEQ/L (ref 98–111)
CO2: 26 MEQ/L (ref 23–33)
COLOR: YELLOW
CREAT SERPL-MCNC: 0.9 MG/DL (ref 0.4–1.2)
CRYSTALS, UA: ABNORMAL
EKG ATRIAL RATE: 63 BPM
EKG P AXIS: 92 DEGREES
EKG P-R INTERVAL: 278 MS
EKG Q-T INTERVAL: 406 MS
EKG QRS DURATION: 98 MS
EKG QTC CALCULATION (BAZETT): 415 MS
EKG R AXIS: 80 DEGREES
EKG T AXIS: 54 DEGREES
EKG VENTRICULAR RATE: 63 BPM
EOSINOPHIL # BLD: 0.7 %
EOSINOPHILS ABSOLUTE: 0.1 THOU/MM3 (ref 0–0.4)
EPITHELIAL CELLS, UA: ABNORMAL /HPF
ERYTHROCYTE [DISTWIDTH] IN BLOOD BY AUTOMATED COUNT: 14.5 % (ref 11.5–14.5)
ERYTHROCYTE [DISTWIDTH] IN BLOOD BY AUTOMATED COUNT: 42.5 FL (ref 35–45)
GFR SERPL CREATININE-BSD FRML MDRD: 80 ML/MIN/1.73M2
GLUCOSE BLD-MCNC: 166 MG/DL (ref 70–108)
GLUCOSE URINE: NEGATIVE MG/DL
HCT VFR BLD CALC: 38.4 % (ref 42–52)
HEMOGLOBIN: 12.8 GM/DL (ref 14–18)
IMMATURE GRANS (ABS): 0.05 THOU/MM3 (ref 0–0.07)
IMMATURE GRANULOCYTES: 0.5 %
KETONES, URINE: NEGATIVE
LEUKOCYTE ESTERASE, URINE: ABNORMAL
LYMPHOCYTES # BLD: 12.8 %
LYMPHOCYTES ABSOLUTE: 1.3 THOU/MM3 (ref 1–4.8)
MCH RBC QN AUTO: 27.4 PG (ref 26–33)
MCHC RBC AUTO-ENTMCNC: 33.3 GM/DL (ref 32.2–35.5)
MCV RBC AUTO: 82.2 FL (ref 80–94)
MISCELLANEOUS 2: ABNORMAL
MONOCYTES # BLD: 7.2 %
MONOCYTES ABSOLUTE: 0.7 THOU/MM3 (ref 0.4–1.3)
NITRITE, URINE: NEGATIVE
NUCLEATED RED BLOOD CELLS: 0 /100 WBC
OSMOLALITY CALCULATION: 277.8 MOSMOL/KG (ref 275–300)
PH UA: 5.5 (ref 5–9)
PLATELET # BLD: 215 THOU/MM3 (ref 130–400)
PMV BLD AUTO: 9.5 FL (ref 9.4–12.4)
POTASSIUM REFLEX MAGNESIUM: 3.6 MEQ/L (ref 3.5–5.2)
PRO-BNP: 112.3 PG/ML (ref 0–1800)
PROTEIN UA: NEGATIVE
RBC # BLD: 4.67 MILL/MM3 (ref 4.7–6.1)
RBC URINE: > 100 /HPF
RENAL EPITHELIAL, UA: ABNORMAL
SEG NEUTROPHILS: 78.1 %
SEGMENTED NEUTROPHILS ABSOLUTE COUNT: 7.8 THOU/MM3 (ref 1.8–7.7)
SODIUM BLD-SCNC: 138 MEQ/L (ref 135–145)
SPECIFIC GRAVITY, URINE: 1.01 (ref 1–1.03)
UROBILINOGEN, URINE: 0.2 EU/DL (ref 0–1)
WBC # BLD: 10 THOU/MM3 (ref 4.8–10.8)
WBC UA: ABNORMAL /HPF
YEAST: ABNORMAL

## 2020-03-05 PROCEDURE — 81001 URINALYSIS AUTO W/SCOPE: CPT

## 2020-03-05 PROCEDURE — 36415 COLL VENOUS BLD VENIPUNCTURE: CPT

## 2020-03-05 PROCEDURE — 93005 ELECTROCARDIOGRAM TRACING: CPT | Performed by: NURSE PRACTITIONER

## 2020-03-05 PROCEDURE — 6360000004 HC RX CONTRAST MEDICATION: Performed by: NURSE PRACTITIONER

## 2020-03-05 PROCEDURE — 87106 FUNGI IDENTIFICATION YEAST: CPT

## 2020-03-05 PROCEDURE — 2709999900 HC NON-CHARGEABLE SUPPLY

## 2020-03-05 PROCEDURE — 85025 COMPLETE CBC W/AUTO DIFF WBC: CPT

## 2020-03-05 PROCEDURE — 80048 BASIC METABOLIC PNL TOTAL CA: CPT

## 2020-03-05 PROCEDURE — 99283 EMERGENCY DEPT VISIT LOW MDM: CPT

## 2020-03-05 PROCEDURE — 74177 CT ABD & PELVIS W/CONTRAST: CPT

## 2020-03-05 PROCEDURE — 93010 ELECTROCARDIOGRAM REPORT: CPT | Performed by: NUCLEAR MEDICINE

## 2020-03-05 PROCEDURE — 51702 INSERT TEMP BLADDER CATH: CPT

## 2020-03-05 PROCEDURE — 83880 ASSAY OF NATRIURETIC PEPTIDE: CPT

## 2020-03-05 PROCEDURE — 87086 URINE CULTURE/COLONY COUNT: CPT

## 2020-03-05 RX ADMIN — IOPAMIDOL 80 ML: 755 INJECTION, SOLUTION INTRAVENOUS at 11:26

## 2020-03-05 ASSESSMENT — PAIN SCALES - GENERAL: PAINLEVEL_OUTOF10: 10

## 2020-03-05 ASSESSMENT — ENCOUNTER SYMPTOMS
CONSTIPATION: 0
STRIDOR: 0
WHEEZING: 0
VOMITING: 0
CHEST TIGHTNESS: 0
DIARRHEA: 0
SHORTNESS OF BREATH: 1
COUGH: 0
NAUSEA: 0
ABDOMINAL PAIN: 1

## 2020-03-05 NOTE — ED NOTES
Pt resting in bed. Neighbor remains at bedside. VSS. Updated on POC. Denies needs at this time. Call light in reach.      Wendie Lopez RN  03/05/20 [id number]

## 2020-03-05 NOTE — ED NOTES
Pt presents to the ER for uninary retention. Pt states that he was able to urinate a little last night and this morning he cannot urinate at all.      Roverto Stake  03/05/20 [id number]

## 2020-03-05 NOTE — ED PROVIDER NOTES
[facility]  eMERGENCY dEPARTMENT eNCOUnter          CHIEF COMPLAINT       Chief Complaint   Patient presents with    Urinary Retention       Nurses Notes reviewed and I agree except as noted in the HPI. HISTORY OF PRESENT ILLNESS    Jojo Pool is a 80 y.o. male who presents to the Emergency Department for the evaluation of urinary retention that has been ongoing for the last few weeks, but an inability to urinate since yesterday evening. Patient also admits to lower abdominal pain with some minimal testicular pain, scrotal swelling, SOB, dizziness, and lightheadedness. He denies chest pain, hematuria, and cloudiness. He is on oxygen at night. He sees Josee Castillo (urologist) for urinary retention but denies having a catheter in the past. No further concerns or complaints at this time. The HPI was provided by the patient. REVIEW OF SYSTEMS     Review of Systems   Constitutional: Negative for chills, diaphoresis and fever. Respiratory: Positive for shortness of breath. Negative for cough, chest tightness, wheezing and stridor. Cardiovascular: Negative for chest pain, palpitations and leg swelling. Gastrointestinal: Positive for abdominal pain (Lower). Negative for constipation, diarrhea, nausea and vomiting. Genitourinary: Positive for difficulty urinating (Retention, no urination since yesterday), scrotal swelling and testicular pain. Negative for dysuria, flank pain, frequency, hematuria, penile pain, penile swelling and urgency. Musculoskeletal: Negative for arthralgias. Neurological: Positive for dizziness and light-headedness. Negative for syncope, weakness, numbness and headaches.        PAST MEDICAL HISTORY    has a past medical history of Anxiety, CAD (coronary artery disease), Chronic back pain, COPD (chronic obstructive pulmonary disease) ([address] Detached retina, Diabetes mellitus ([facility] [address] DVT (deep venous thrombosis) ([facility] [address] DVT (deep venous by mouth 2 times daily (with meals)    METOPROLOL TARTRATE (LOPRESSOR) 25 MG TABLET    Take 25 mg by mouth 2 times daily     MULTIPLE VITAMINS-MINERALS (PRESERVISION AREDS 2+MULTI VIT) CAPS    take 1 by Oral route 2 times every day    OXYGEN    Inhale into the lungs as needed (Uses at night only) Indications: Difficulty Breathing, Oxygen Therapy 3 liters    POTASSIUM CHLORIDE (KLOR-CON M) 10 MEQ EXTENDED RELEASE TABLET    Take 10 mEq by mouth 2 times daily    SOFT TOUCH LANCETS MISC    Use to check blood sugars 2 times daily    TIOTROPIUM (SPIRIVA RESPIMAT) 2.5 MCG/ACT AERS INHALER    Inhale 2 puffs into the lungs daily    WARFARIN (COUMADIN) 5 MG TABLET    Take as directed by [facility]. #90 tabs = 90 days       ALLERGIES     is allergic to macrobid [nitrofurantoin monohyd macro]; levaquin [levofloxacin]; adhesive tape; and alphagan [brimonidine tartrate]. FAMILY HISTORY     He indicated that his mother is . He indicated that his father is . family history includes Other in his mother; Other (age of onset: [de-identified]) in his father. SOCIAL HISTORY      reports that he has never smoked. He has never used smokeless tobacco. He reports current alcohol use of about 1.0 standard drinks of alcohol per week. He reports that he does not use drugs. PHYSICAL EXAM     INITIAL VITALS:  height is 5' 7\" (1.702 m) and weight is 260 lb (117.9 kg). His oral temperature is 97.7 °F (36.5 °C). His blood pressure is 114/46 (abnormal) and his pulse is 66. His respiration is 18 and oxygen saturation is 95%. Physical Exam  Vitals signs and nursing note reviewed. Constitutional:       Appearance: He is well-developed. He is not toxic-appearing or diaphoretic. HENT:      Head: Normocephalic and atraumatic.       Right Ear: Tympanic membrane and external ear normal.      Left Ear: Tympanic membrane and external ear normal.      Nose: Nose normal.      Mouth/Throat:      Pharynx: No oropharyngeal exudate or posterior oropharyngeal erythema. Eyes:      Conjunctiva/sclera: Conjunctivae normal.   Neck:      Musculoskeletal: Normal range of motion and neck supple. Vascular: No JVD. Cardiovascular:      Rate and Rhythm: Normal rate and regular rhythm. Pulses: Normal pulses. Heart sounds: Normal heart sounds. No murmur. No friction rub. No gallop. Pulmonary:      Effort: Pulmonary effort is normal. No respiratory distress. Breath sounds: Normal breath sounds. No decreased breath sounds, wheezing, rhonchi or rales. Abdominal:      General: Bowel sounds are normal. There is no distension. Palpations: Abdomen is soft. Tenderness: There is abdominal tenderness in the suprapubic area. There is no guarding or rebound. Musculoskeletal: Normal range of motion. Skin:     General: Skin is warm and dry. Findings: No rash. Neurological:      Mental Status: He is alert and oriented to person, place, and time. Motor: No abnormal muscle tone.       Coordination: Coordination normal.       DIFFERENTIAL DIAGNOSIS:   Urinary retention, cystitis, bladder injury, malignancy    DIAGNOSTIC RESULTS     EKG: All EKG's are interpreted by the Emergency Department Physician who either signs or Co-signs this chart in the absence of a cardiologist.    EKG 12 Lead (Preliminary result)    Component (Lab Inquiry)   Collection Time Result Time Ventricular Rate Atrial Rate P-R Interval QRS Duration Q-T Interval   03/05/20 09:42:16 03/05/20 09:44:03 63 63 278 98 406       Collection Time Result Time QTc Calculation (Bazett) P Axis R Axis T Axis   03/05/20 09:42:16 03/05/20 09:44:03 415 92 80 54         Preliminary result                Narrative:    Sinus rhythm with 1st degree A-V block  Incomplete right bundle branch block  Anteroseptal infarct (cited on or before 25-MAR-2016)  Abnormal ECG  When compared with ECG of 17-MAR-2019 10:31,  Sinus rhythm has replaced Atrial flutter  Ventricular (117.9 kg)      Height: 5' 7\" (1.702 m)          9:27 AM: The patient was seen and evaluated for urinary retention for the last few weeks and inability to urinate since last night. He complains of lower abdominal pain as well. On exam, he has suprapubic tenderness. Bladder scan and catheter ordered at this time. MDM:  Patient seen and evaluated for acute urinary retention. Patient's bladder found to be grossly distended. Billy catheter was placed by nursing staff, 1800 mL's urine drained. Patient reported immediately believe. Patient had gross hematuria on initial urine return, I believe this is likely due to a traumatic cath. CT scan of the abdomen was completed, no acute findings noted. Patient's condition noted to improve throughout ED stay. I discussed the case with Brandon Verdugo urology NP who agreed with my plan and work-up. Patient to be discharged with follow-up with urology as an outpatient. Patient instructed to return to the emergency department if symptoms become more severe, if he does not have any urinary output through his catheter for greater than 2 hours. Patient provided with a leg bag. All findings were discussed with patient, he is amenable to plan for discharge and departed the ED in stable condition    CRITICAL CARE:   None    CONSULTS:  Dane Verdugo CNP    PROCEDURES:  None    FINAL IMPRESSION      1. Urinary retention          DISPOSITION/PLAN   Discharge    PATIENT REFERRED TO:  MAEGAN Smith CNP  [facility].  [address]    Schedule an appointment as soon as possible for a visit in 1 day  For further evaluation of acute urinary retention      DISCHARGE MEDICATIONS:  New Prescriptions    No medications on file       (Please note that portions of this note were completed with a voice recognition program.  Efforts were made to edit the dictations but occasionally words are mis-transcribed.)    The patient was given an opportunity to see the Emergency Attending. The patient voiced understanding that I was a Mid-LevelProvider and was in agreement with being seen independently by myself. Scribe:  Davi Cui 3/5/20 9:27 AM Scribing for and in the presence of Fadi Hewitt CNP. Signed by: Idalmis Morin, 03/05/20 12:16 PM    Provider:  I personally performed the services described in the documentation, reviewed and edited the documentation which was dictated to the scribe in my presence, and it accurately records my words and actions.     Fadi Hewitt CNP 3/5/20 12:16 PM     MAEGAN Bui CNP  03/05/20 0235

## 2020-03-05 NOTE — TELEPHONE ENCOUNTER
Pt scheduled with Dr. Austin Tomlinson. Dr. Reji Headley is unavailable next week and pt was notifed and was fine with that.    Thanks

## 2020-03-05 NOTE — TELEPHONE ENCOUNTER
Pt in ER currently with urinary retention of 1800 mls with placement of urinary catheter. He will be discharged with catheter in place and will need scheduled for appt with office cystoscopy and possible voiding trial in the next week.

## 2020-03-06 ENCOUNTER — TELEPHONE (OUTPATIENT)
Dept: FAMILY MEDICINE CLINIC | Age: 85
End: 2020-03-06

## 2020-03-06 ENCOUNTER — CARE COORDINATION (OUTPATIENT)
Dept: CARE COORDINATION | Age: 85
End: 2020-03-06

## 2020-03-06 NOTE — LETTER
[facility]  [address] [facility], [address]  Phone: [phone]  Fax: [phone]    March 6, 2020    Gwendolyn Pereyra  [address] [place] [id number]    Dear Ketty Weiss,    Thank you for choosing our [facility] on 3/5/20. Dr. Baljinder Butler wanted to make sure that you understand your discharge instructions and that you were able to fill any prescriptions that may have been ordered for you. Please contact the office at the above phone number if advised you to follow up with Dr. Baljinder Butler, or if you have any further questions or needs. Also, did you know -                             [employer] practices can often offer you an appointment on the same day that you call for acute issues. *We have some [address] [facility] offices that offer Walk-in appointments; check our website for availability in your community, [email or web address].      *Evisits are now available for patients through [address] If you do not have MyChart and are interested, please contact the office and a staff member may assist you or go to [email or web address].     Sincerely,   Baljinder Butler MD and your [facility]

## 2020-03-08 LAB
ORGANISM: ABNORMAL
URINE CULTURE REFLEX: ABNORMAL

## 2020-03-09 ENCOUNTER — HOSPITAL ENCOUNTER (OUTPATIENT)
Dept: PHARMACY | Age: 85
Setting detail: THERAPIES SERIES
Discharge: HOME | End: 2020-03-09
Payer: MEDICARE

## 2020-03-09 LAB — POC INR: 2.2 (ref 0.8–1.2)

## 2020-03-09 PROCEDURE — 36416 COLLJ CAPILLARY BLOOD SPEC: CPT

## 2020-03-09 PROCEDURE — 99211 OFF/OP EST MAY X REQ PHY/QHP: CPT

## 2020-03-09 PROCEDURE — 85610 PROTHROMBIN TIME: CPT

## 2020-03-09 NOTE — PROGRESS NOTES
Pharmacy Note  ED Culture Follow-up    Génesis Hughes is a 80 y.o. male. Allergies: Macrobid [nitrofurantoin monohyd macro]; Levaquin [levofloxacin]; Adhesive tape; and Alphagan [brimonidine tartrate]     Labs:  Lab Results   Component Value Date    BUN 8 03/05/2020    CREATININE 0.9 03/05/2020    WBC 10.0 03/05/2020     Estimated Creatinine Clearance: 70 mL/min (based on SCr of 0.9 mg/dL). Current antimicrobials:   none    ASSESSMENT:  Micro results:   Urine culture: positive for candida parapsilosis CFU 50,000 - 90,000      PLAN:  Need for intervention: Faxing to urology    Discussed with:   NAHID Guaman  Chosen treatment:    Faxing to urology     Patient response:   No need to contact patient    Called/sent in prescription to: Not applicable    Please call with any questions.  Docia Severs, PharmD, BCPS  3/9/2020 4:33 PM

## 2020-03-10 ENCOUNTER — PROCEDURE VISIT (OUTPATIENT)
Dept: UROLOGY | Age: 85
End: 2020-03-10
Payer: MEDICARE

## 2020-03-10 ENCOUNTER — TELEPHONE (OUTPATIENT)
Dept: UROLOGY | Age: 85
End: 2020-03-10

## 2020-03-10 ENCOUNTER — HOSPITAL ENCOUNTER (OUTPATIENT)
Dept: CARDIAC REHAB | Age: 85
Setting detail: THERAPIES SERIES
End: 2020-03-10
Payer: MEDICARE

## 2020-03-10 VITALS
DIASTOLIC BLOOD PRESSURE: 64 MMHG | BODY MASS INDEX: 40.97 KG/M2 | SYSTOLIC BLOOD PRESSURE: 126 MMHG | HEIGHT: 67 IN | WEIGHT: 261 LBS

## 2020-03-10 PROCEDURE — 1036F TOBACCO NON-USER: CPT | Performed by: UROLOGY

## 2020-03-10 PROCEDURE — G8417 CALC BMI ABV UP PARAM F/U: HCPCS | Performed by: UROLOGY

## 2020-03-10 PROCEDURE — 99214 OFFICE O/P EST MOD 30 MIN: CPT | Performed by: UROLOGY

## 2020-03-10 PROCEDURE — G8482 FLU IMMUNIZE ORDER/ADMIN: HCPCS | Performed by: UROLOGY

## 2020-03-10 PROCEDURE — G8427 DOCREV CUR MEDS BY ELIG CLIN: HCPCS | Performed by: UROLOGY

## 2020-03-10 PROCEDURE — 1123F ACP DISCUSS/DSCN MKR DOCD: CPT | Performed by: UROLOGY

## 2020-03-10 PROCEDURE — 4040F PNEUMOC VAC/ADMIN/RCVD: CPT | Performed by: UROLOGY

## 2020-03-10 PROCEDURE — 52000 CYSTOURETHROSCOPY: CPT | Performed by: UROLOGY

## 2020-03-10 NOTE — PROGRESS NOTES
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    Sleep apnea 1993    on BiPap, Dr Rocha Confer Thyroid cancer [facility])     s/p thyroid resection    Urinary incontinence      Past Surgical History:   Procedure Laterality Date   [facility] SURGERY  [id number], 2014    BRONCHOSCOPY N/A 8/29/2017    BRONCHOSCOPY AIRWAY ONLY performed by Won Vu MD at [facility] EKG 12-LEAD  11/10/2015         FRACTURE SURGERY      right hand    OTHER SURGICAL HISTORY      spinal epidurals    RETINAL DETACHMENT SURGERY      SPINE SURGERY  2004    Lumbar laminectomy    THYROIDECTOMY      TURP N/A 3/17/2020    CYSTOSCOPY WITH GREENLIGHT PHOTOVAPORIZATION OF PROSTATE performed by Edwar Sims MD at [facility] TURP N/A 3/24/2020    PLASMA BUTTON TURP WITH CLOT EVACUATION performed by Edwar Sims MD at [facility] History   Problem Relation Age of Onset    Other Mother         Complications of Childbirth    Other Father [de-identified]        Natural causes     Outpatient Medications Marked as Taking for the 3/10/20 encounter (Procedure visit) with Edwar Sims MD   Medication Sig Dispense Refill    doxazosin (CARDURA) 8 MG tablet Take 1 tablet by mouth nightly 30 tablet 1    albuterol sulfate  (90 Base) MCG/ACT inhaler Inhale 2 puffs into the lungs every 6 hours as needed for Wheezing 1 Inhaler 3    tiotropium (SPIRIVA RESPIMAT) 2.5 MCG/ACT AERS inhaler Inhale 2 puffs into the lungs daily 1 Inhaler 11    [DISCONTINUED] metFORMIN (GLUCOPHAGE) 500 MG tablet Take 500 mg by mouth 2 times daily (with meals)      furosemide (LASIX) 40 MG tablet Take 40 mg by mouth 2 times daily   4    LANTUS SOLOSTAR 100 UNIT/ML injection pen INJECT SUBCUTANEOUSLY 18  UNITS NIGHTLY 30 mL 3    Cholecalciferol (VITAMIN D3 PO) Take 1 tablet by mouth daily       [DISCONTINUED] Ascorbic Acid (VITAMIN C PO) Take 1 tablet by mouth daily       levothyroxine (SYNTHROID) 175 MCG tablet TAKE 1 TABLET BY MOUTH DAILY 90 tablet 3    lisinopril (PRINIVIL;ZESTRIL) 5 MG tablet Take 1 tablet by mouth daily 90 tablet 3    [DISCONTINUED] potassium chloride (KLOR-CON M) 10 MEQ extended release tablet Take 10 mEq by mouth 2 times daily      SOFT TOUCH LANCETS MISC Use to check blood sugars 2 times daily 100 each 3    [DISCONTINUED] Blood Glucose Monitoring Suppl (ACCU-CHEK CHE PLUS) w/Device KIT Check blood sugar 2 times daily 1 kit 0    warfarin (COUMADIN) 5 MG tablet Take as directed by [facility]. #90 tabs = 90 days 90 tablet 3    [DISCONTINUED] Insulin Pen Needle (B-D ULTRAFINE III SHORT PEN) 31G X 8 MM MISC To use with Lantus pens to inject SQ qd. Dx: E11.9 90 each 3    Multiple Vitamins-Minerals (PRESERVISION AREDS 2+MULTI VIT) CAPS take 1 by Oral route 2 times every day      metoprolol tartrate (LOPRESSOR) 25 MG tablet Take 25 mg by mouth 2 times daily       blood glucose test strips (ACCU-CHEK CHE PLUS) strip TEST TWICE DAILY 150 strip 5    Ferrous Sulfate (IRON) 325 (65 Fe) MG TABS Take by mouth 2 times daily       [DISCONTINUED] AZOPT 1 % ophthalmic suspension Place 1 drop into both eyes 3 times daily Indications: Disease of the Eye       Arformoterol Tartrate (BROVANA) 15 MCG/2ML NEBU Take 1 ampule by nebulization 2 times daily Indications: Prevention of COPD Worsening       budesonide (PULMICORT) 0.25 MG/2ML nebulizer suspension Take 1 ampule by nebulization 2 times daily       OXYGEN Inhale 3 L into the lungs nightly Indications: Difficulty Breathing, Oxygen Therapy And prn         Macrobid [nitrofurantoin monohyd macro]; Levaquin [levofloxacin];  Adhesive tape; and Alphagan [brimonidine tartrate]  Social History     Tobacco Use   Smoking Status Never Smoker   Smokeless Tobacco Never Used      (If patient a smoker, smoking cessation counseling offered)   Social History     Substance and Sexual Activity   Alcohol Use Yes    Alcohol/week: 1.0 standard drinks    Types: 1 Glasses of wine per week    Comment: Glass of wine with dinner. REVIEW OF SYSTEMS:  Constitutional: negative  Eyes: negative  Respiratory: negative  Cardiovascular: negative  Gastrointestinal: negative  Genitourinary: see HPI  Musculoskeletal: negative  Skin: negative   Neurological: negative  Hematological/Lymphatic: negative  Psychological: negative    Physical Exam:    This a 80 y.o. male  Vitals:    03/10/20 1019   BP: 126/64     Body mass index is 40.88 kg/m². Constitutional: Patient in no acute distress;       Assessment and Plan        1. Urinary retention    2. Benign prostatic hyperplasia with lower urinary tract symptoms, symptom details unspecified               Plan:      High volume retention with indwelling catheter  Concerned about neurogenic bladder secondary to chronic outflow obstruction  Will arrange for cystoscopy greenlight  Catheter placed at end of visit. Prescriptions Ordered:  No orders of the defined types were placed in this encounter. Orders Placed:  Orders Placed This Encounter   Procedures    Culture, Urine     Order Specific Question:   Specify (ex-cath, midstream, cysto, etc)?      Answer:   midstream   Celsa Ng MD

## 2020-03-10 NOTE — PROGRESS NOTES
[facility] Facility-Based Therapy for COPD   INDIVIDUAL TREATMENT PLAN (ITP)     Name: Sunny Lafleur   :  1931  Acct Number: [de-identified]   AGE: 80 y.o. Diagnosis:  Severe COPD, which is GOLD Stage 3                                               PFT:  Post Bronchodilator FEV1/FVC: 48  FEV1: 41    Patient Goals:  (x) Breathe better  (x) Increase my endurance (x) Have more energy  (x) Rely less on others  (x) Ease ADLs  (x) Understand and use meds correctly  (x) Control cough  (x) Make fewer visits to hospital  () Quit smoking  (x) Feel less anxious / have more confidence    Glossary:  MD=Pulmonary Rehab  PF=Physical Fitness TM=Treadmill  AD=Schwinn Airdyne  UBE=UpperBody Ergometer  RT=Resistance Training  PLB=Pursed Lip Breathing    INDIVIDUAL TREATMENT PLAN    INITIAL ASSESSMENT    Day #1 INDIVIDUAL TREATMENT PLAN    PLAN      Day #2-30 INDIVIDUAL TREATMENT PLAN    RE ASSESSMENT    Day #31-60 INDIVIDUAL TREATMENT PLAN    RE ASSESSMENT    Day #61-90 INDIVIDUAL TREATMENT PLAN    RE ASSESSMENT    Day # INDIVIDUAL TREATMENT PLAN    DISCHARGE      Last Day        Date: 19     Date: 20   Date: 20  Pt did not attend this session   Date: 3/10/2020  Patient not in attendance for reassessment.    Date:    Date:    EXERCISE   INITIAL ASSESSMENT      Prescribed Oxygen Use  None      Oxygen Titration  (x) Assess for desaturation            Current Home Exercise:  None   EXERCISE  PLAN        Current Oxygen Use  NONE      Oxygen Titration  (x) Maintaining >87% Spo2  () Not maintaining -  L/min needed on       Current Home Exercise:  None   EXERCISE  RE ASSESSMENT      Current Oxygen Use  RA      Oxygen Titration  (x) Maintaining >87% Spo2  () Not maintaining -  L/min needed on       Current Home Exercise:  unkn EXERCISE  RE ASSESSMENT      Current Oxygen Use  None, room air      Oxygen Titration  (x) Maintaining >87% Spo2  () Not maintaining -  L/min needed on Current Home Exercise:  Unknown, patient not in attendance. EXERCISE  RE ASSESSMENT      Current Oxygen Use  Activity:  L/min  () Continuous  () Breath Actuated      Oxygen Titration  () Maintaining >87% Spo2  () Not maintaining -  L/min needed on       Current Home Exercise:  Frequency:  x/wk  Intensity:  /10  Duration:  min  Mode:   EXERCISE  DISCHARGE        Final Oxygen Use  Activity:  L/min  () Continuous  () Breath Actuated      Oxygen Titration  () Maintaining >87% Spo2  () Not maintaining -  L/min needed on       Current Home Exercise:  Frequency:  x/wk  Intensity:  /10  Duration:  min  Mode:       6 Minute Walk Test (6MWT):  O2 used: none   410 ft walked = 1.5 METs  SpO2: 90-96%  HR: 72-83   RPD: 3  RPE: 5  Number of Breaks: 1  Avg time for break:  1min and 41 secs  Assist device used: none         6 Minute Walk Test (6MWT):  O2 used:   ft walked =  METs  SpO2:  %  HR:    RPD:   RPE:  Number of Breaks: Avg time for break:  secs  Assist device used:             OUTCOMES:  6MWD Improvement:  > 98'?  () Yes  () No     Exercise Prescription    THR: 66 - 105 bpm    Frequency:  2-3x/wk in [place], 1-3x/wk home activity    Intensity:  METs (from 6MWT)      Time:  15-30 total minutes up to 55 minutes max    Type:  Aerobic (TM, AD, UBE, NuStep), 6 ST, RT 1-10# 8-15 reps    Progression:  Increase 30s - 2 min/mode for Aerobic activity, and increase Intensity 2-5% each week if RPE <5, RPD <5, SpO2 >87% and pt is within [facility] above. Exercise Prescription    (x) Pt exercising within THR    Frequency:  2-3x/wk in [place], 0x/wk home activity    Intensity:  3-5 on Fady Dyspnea/ Fatigue scale    Time:  15-30 total minutes up to 55 minutes max    Type:  Aerobic (TM, AD, UBE, NuStep), 6 ST, RT 1-10# 8-15 reps    Progression:  Increase 30s - 2 min/mode for Aerobic activity, and increase Intensity 2-5% each week if RPE <5, RPD <5, SpO2 >87% and pt is within [facility] above.    Exercise Prescription    (x) Pt exercising within diet choices   Nutrition Intervention/ Education  Reassessment      (x) Pt has had class  () Pt has not had class      () Pt had questions  (x) Pt denies having questions Nutrition Intervention/ Education  Reassessment      (x) Pt has had class  () Pt has not had class      () Pt had questions  (x) Pt denies having questions Nutrition Intervention/ Education  Reassessment      () Pt has had class  () Pt has not had class      () Pt had questions  () Pt denies having questions Nutrition Intervention/ Education   Discharge      () Pt has had class  Date: See above  () Pt has not had class - Reason:    () Pt had questions that were answered   () Pt denies having questions             PSYCHO SOCIAL INITIAL ASSESSMENT      Depression:  () Self Reported  () In History  () S/Sx noted  (x) Denies  () On Medication  () Follows physician      (x) Give PHQ-9 Depression screening tool                  Dyspnea:  (x) Give pt UCSD SOB survey      (x) Pt gets SOB during activity and ADLs. (x) Pt has support from home for the program        () Pt does not have support for the program   PSYCHO SOCIAL  PLAN      (x) Attend Stress/ Depression/ Anxiety Class                Pre Rehab PHQ-9   Score: 4  1-4 Normal  5-9 Mild  10-14 Mod  15-19 Mod-Sev  >19 Severe        Pre Rehab UCSD SOB Score: 26      (x) Attend classes and attend rehab to increase strength and endurance      -Attend Psychosocial class          () Speak with the patient/ family about ability to commit to the program with undue stress/ anxiety   PSYCHO SOCIAL  RE ASSESSMENT    () Pt scored >10 on PHQ-9.   Spoke with pt privately about issues and *             () Pt recommended to contact physician for assistance                See below for ADLs        (x) Pt has had class  () Pt has not had class        Re assessment of support:  Good, pt has been attending PSYCHO SOCIAL  RE ASSESSMENT    (x) Pt is coping well  () Pt needs more assistance-              () Pt CAT score:  Unknown, patient not in attendance Psychosocial Goals  Reassessment      () Pt has had class  () Pt has not had class      () Pt has had class  () Pt has not had class      () Pt has had class  () Pt has not had class      () Pt is progressing  () Pt is not progressing              120 day CAT score:  / 40   Psychosocial Goals   Discharge      (x) Pt had class            (x) Pt had class            (x) Pt had class  Date: See above  () Pt did not have class      Post rehab CAT below                  Post Rehab   CAT score:  / 40              OUTCOMES    PHQ-9:  Did pt drop a severity level or maintain in the minimal range?  () Y  () N    UCSD SOB  Did pt decrease their number by 5 or more?  () Y  () N    CAT scores:  Did pt drop by 2 or more points from Pre to Post?  () Y  () N        Psychosocial Intervention/ Education   Initial Assessment    () Pt is being treated for depression/ anxiety        (x) Pt would benefit from [place]s Psychosocial Issues Class (Stress, Depression, Anxiety, and Intimacy   Psychosocial  Intervention/ Education  Plan      (x) Pt to contact physician if any severe changes occur in mood        (x) Pt will attend [place]s class   Psychosocial Intervention/ Education Reassessment      (x) Pt is coping well  () Pt is not coping well         (x) Pt has had class  () Pt has not had class Psychosocial Intervention/ Education Reassessment      (x) Pt is coping well  () Pt is not coping well         () Pt has had class  (x) Pt has not had class Psychosocial Intervention/ Education Reassessment      () Pt is coping well  () Pt is not coping well         () Pt has had class  () Pt has not had class   Psychosocial Intervention/ Education Discharge      () Pt did not need any changes   () Pt needed changes to treatment      (x) Pt had class  Date: See above  () Pt did not have class         Pain   Initial Assessment    () N/A  Location: both legs  Duration:all the time   Intensity: 5 /10  Type: constant ache   Pain   Plan      () N/A    (x) Pts pain is under control  () Pt encouraged to contact physician for pain control   Pain   Reassessment      () N/A    (x) Pts pain is under control  () Pt encouraged to contact physician for pain control Pain   Reassessment      () N/A    (x) Pts pain is under control  () Pt encouraged to contact physician for pain control Pain   Reassessment      () N/A    () Pts pain is under control  () Pt encouraged to contact physician for pain control Pain   Discharge      () N/A    () Pts pain is under control  () Pt encouraged to contact physician for pain control           OXYGEN   INITIAL ASSESSMENT    RESTING:  (x) RA  () O2:  L/min  () Continuous  () Breath Actuated  93% SpO2 / 20 bpm    Prescribed Oxygen Use:  No home O2 at rest or with exertion,  Does use 3LPM at night bled into home CPAP/BIPAP.      DME Company for O2:  [facility]     OXYGEN   PLAN      RESTING:  (x) Monitor needs, administer supplemental oxygen if SpO2 <88% OXYGEN   RE ASSESSMENT    RESTING:  (x) Pt SpO2 >87%  () Pt needs higher flow  () Pt needs less flow OXYGEN   RE ASSESSMENT    RESTING:  (x) Pt SpO2 >87%  () Pt needs higher flow  () Pt needs less flow OXYGEN   RE ASSESSMENT    RESTING:  (x) Pt SpO2 >87%  () Pt needs higher flow  () Pt needs less flow OXYGEN   DISCHARGE      RESTING:  () Pt SpO2 remained >87% on * L/min at rest    () Pts doctor and company contacted for change in Rx   Oxygen Goals   Initial Assessment    (x) Wean, Titrate down, or get off O2 if possible   Oxygen Goals   Plan      (x) Closely monitor SpO2 and HR using pulse oximetry for saturation and HR response, and titrate if appropriate   Oxygen Goals  Reassessment    (x) RA    On exertion:  () Pt maintaining FiO2  () Pt tolerating lower O2 needs  () Pt needing more O2   Oxygen Goals  Reassessment    (x) RA    On exertion:  () Pt maintaining FiO2  () Pt tolerating lower O2 needs  () Pt needing more O2 Oxygen not had class MEDICATIONS  RE ASSESSMENT      Pt reports taking their meds properly  % of the time        () Pt has had class  () Pt has not had class MEDICATIONS  DISCHARGE        Pt reports taking their meds properly  % of the time        ()Pt had med class  Date:  () Pt has not had class       Pt has:  (x) Metered Dose Inhaler  () Dry Powder Inhaler  (x) Nebulizer   -Review correct use, timing, technique and cleaning of equipment during Medication class (x) Pt has had class  () Pt has not had class (x) Pt has had class  () Pt has not had class () Pt has had class  () Pt has not had class () Pt had class  Date: See above  () Pt has not had class   Medication Goals  Initial Assessment        (x) Take meds properly 100% of the time    (x) Learn about / Review Rxs, and devices Medication Goals  Intervention & Education  Plan      -Follow Rx instructions and ask if questions    -Attend Medication Education class   Medication Goals  Re assessment          (x) Readdressed questions on any medications    (x) Pt has had class  () Pt has not had class Medication Goals  Re assessment          (x) Readdressed questions on any medications    (x) Pt has had class  () Pt has not had class Medication Goals  Re assessment          () Readdressed questions on any medications    () Pt has had class  () Pt has not had class Medication Goals  Discharge          % proper med usage see above      () Pt had class  Date: See above  () Pt has not had class             ADL  INITIAL ASSESSMENT      (x) Impaired ADL ability  () Need for Assist Devices  (x) Generalized weakness, low functional capacity  (x) Stairs in house               ADL  PLAN        -Initiate HI, RT, and chair squats  -Breathing retraining, PLB, and pacing      -Encourage home activity               ADL  RE ASSESSMENT      (x) Pt is progressing  () Pt is not progressing    Unkn    () Pt has initiated home activity  () Pt has not yet initiated  home activity-      (x) ADLs getting easier  () ADLs not getting easier   ADL  RE ASSESSMENT      (x) Pt is progressing  () Pt is not progressing        () Pt has initiated home activity  () Pt has not yet initiated  home activity-      (x) ADLs getting easier  () ADLs not getting easier ADL  RE ASSESSMENT      () Pt is progressing  () Pt is not progressing        () Pt has initiated home activity  () Pt has not yet initiated  home activity-      () ADLs getting easier  () ADLs not getting easier ADL  DISCHARGE        () Pt showed strength and endurance gains  () Pt did not progress      () Pt doing recommended home activity  () Pt not doing home activitiy         ADL Goals   Initial Assessment      (x) Decrease SOB with ADLs              (x) Decreased SOB overall      ADL Goals Intervention/ Education Plan      -Initiate DE, RT and chair squats and increase pts strength and endurance        -Pacing, Breathing retraining       ADL Goals Reassessment        (x) ADLs getting easier  () ADLs not getting easier          (x Pt states activities are getting easier/ able to go longer/ not get as SOB   ADL Goals Reassessment        (x) ADLs getting easier  () ADLs not getting easier          (x) Pt states activities are getting easier/ able to go longer/ not get as SOB ADL Goals Reassessment        () ADLs getting easier  () ADLs not getting easier          () Pt states activities are getting easier/ able to go longer/ not get as SOB   ADL Goals   Discharge        Goal achieved?  () Yes  () No            Goal achieved?  () Yes  () No          Outcomes:  See Health and Functioning from the CAT tool and Strength and Endurance from 6 MWD above             EXACERBAT'N PREVENTION & MANAGEMENT  INITIAL ASSESSMENT      Pt reports;  () 0 respiratory infections  () 1   () >2  (x) Hospitalized in last 12 months   EXACERBAT'N PREVENTION & MANAGEMENT  PLAN        Address via Disease Self Management and Exacerbation prevention class:    -Hydration for during this 30 day reassessment  (x) Continue with the current program  () Continue, but with the following changes:  () Discharge patient      Cosigned and dated below: Thor Lay MD 30 day   Discharge Review:    (x) Discharge patient                            Cosigned and dated below:         Date/Time User Provider Type Action   12/17/2019  3:48 PM Janene Olguin MD Physician Cosign   12/17/2019  3:21 PM Elisha Roberts, [address] Respiratory Therapist Sign       Date/Time User Provider Type Action   1/21/2020 10:10 PM Janene Olguin MD Physician Cosign   1/16/2020  1:13 PM Oneal John Exercise Physiologist Sign       Date/Time User Provider Type Action   2/14/2020  1:03 PM Janene Olguin MD Physician Cosign   2/13/2020  1:15 PM Abiel Chinchilla Exercise Physiologist Sign

## 2020-03-10 NOTE — TELEPHONE ENCOUNTER
Patient seen by Dr. العراقي Officer and needs to be scheduled for a cystoscopy with Greenlight photovaporization of prostate under a general anesthetic. (Surgery not scheduled yet). Could you please ask Dr. Ly Ng if the patient is okay to proceed with surgical intervention? Thank you.

## 2020-03-11 ENCOUNTER — APPOINTMENT (OUTPATIENT)
Dept: GENERAL RADIOLOGY | Age: 85
DRG: 666 | End: 2020-03-11
Payer: MEDICARE

## 2020-03-11 ENCOUNTER — HOSPITAL ENCOUNTER (INPATIENT)
Age: 85
LOS: 13 days | Discharge: NURSING, REHAB OR LONG-TERM CARE | DRG: 666 | End: 2020-03-25
Attending: INTERNAL MEDICINE | Admitting: INTERNAL MEDICINE
Payer: MEDICARE

## 2020-03-11 ENCOUNTER — APPOINTMENT (OUTPATIENT)
Dept: CT IMAGING | Age: 85
DRG: 666 | End: 2020-03-11
Payer: MEDICARE

## 2020-03-11 PROBLEM — R33.9 URINARY RETENTION: Status: ACTIVE | Noted: 2020-03-11

## 2020-03-11 LAB
ALBUMIN SERPL-MCNC: 4.1 G/DL (ref 3.5–5.1)
ALP BLD-CCNC: 49 U/L (ref 38–126)
ALT SERPL-CCNC: 23 U/L (ref 11–66)
AMMONIA: 26 UMOL/L (ref 11–60)
AMORPHOUS: ABNORMAL
ANION GAP SERPL CALCULATED.3IONS-SCNC: 16 MEQ/L (ref 8–16)
AST SERPL-CCNC: 30 U/L (ref 5–40)
BACTERIA: ABNORMAL /HPF
BASOPHILS # BLD: 0.4 %
BASOPHILS ABSOLUTE: 0 THOU/MM3 (ref 0–0.1)
BILIRUB SERPL-MCNC: 0.4 MG/DL (ref 0.3–1.2)
BILIRUBIN DIRECT: < 0.2 MG/DL (ref 0–0.3)
BILIRUBIN URINE: NEGATIVE
BLOOD, URINE: ABNORMAL
BUN BLDV-MCNC: 11 MG/DL (ref 7–22)
CALCIUM SERPL-MCNC: 8.9 MG/DL (ref 8.5–10.5)
CASTS 2: ABNORMAL /LPF
CASTS UA: ABNORMAL /LPF
CHARACTER, URINE: ABNORMAL
CHLORIDE BLD-SCNC: 96 MEQ/L (ref 98–111)
CO2: 27 MEQ/L (ref 23–33)
COLOR: YELLOW
CREAT SERPL-MCNC: 0.8 MG/DL (ref 0.4–1.2)
CRYSTALS, UA: ABNORMAL
EKG ATRIAL RATE: 65 BPM
EKG Q-T INTERVAL: 398 MS
EKG QRS DURATION: 90 MS
EKG QTC CALCULATION (BAZETT): 417 MS
EKG R AXIS: 81 DEGREES
EKG T AXIS: 3 DEGREES
EKG VENTRICULAR RATE: 66 BPM
EOSINOPHIL # BLD: 1.4 %
EOSINOPHILS ABSOLUTE: 0.1 THOU/MM3 (ref 0–0.4)
EPITHELIAL CELLS, UA: ABNORMAL /HPF
ERYTHROCYTE [DISTWIDTH] IN BLOOD BY AUTOMATED COUNT: 14.8 % (ref 11.5–14.5)
ERYTHROCYTE [DISTWIDTH] IN BLOOD BY AUTOMATED COUNT: 46.1 FL (ref 35–45)
GFR SERPL CREATININE-BSD FRML MDRD: > 90 ML/MIN/1.73M2
GLUCOSE BLD-MCNC: 105 MG/DL (ref 70–108)
GLUCOSE BLD-MCNC: 137 MG/DL (ref 70–108)
GLUCOSE BLD-MCNC: 146 MG/DL (ref 70–108)
GLUCOSE URINE: NEGATIVE MG/DL
HCT VFR BLD CALC: 38.8 % (ref 42–52)
HEMOGLOBIN: 12.6 GM/DL (ref 14–18)
IMMATURE GRANS (ABS): 0.05 THOU/MM3 (ref 0–0.07)
IMMATURE GRANULOCYTES: 0.5 %
INR BLD: 2.17 (ref 0.85–1.13)
KETONES, URINE: NEGATIVE
LACTIC ACID, SEPSIS: 1.5 MMOL/L (ref 0.5–1.9)
LACTIC ACID, SEPSIS: 2.6 MMOL/L (ref 0.5–1.9)
LEUKOCYTE ESTERASE, URINE: ABNORMAL
LIPASE: 21.6 U/L (ref 5.6–51.3)
LYMPHOCYTES # BLD: 13.7 %
LYMPHOCYTES ABSOLUTE: 1.3 THOU/MM3 (ref 1–4.8)
MAGNESIUM: 2 MG/DL (ref 1.6–2.4)
MCH RBC QN AUTO: 27.6 PG (ref 26–33)
MCHC RBC AUTO-ENTMCNC: 32.5 GM/DL (ref 32.2–35.5)
MCV RBC AUTO: 85.1 FL (ref 80–94)
MISCELLANEOUS 2: ABNORMAL
MONOCYTES # BLD: 7.5 %
MONOCYTES ABSOLUTE: 0.7 THOU/MM3 (ref 0.4–1.3)
NITRITE, URINE: POSITIVE
NUCLEATED RED BLOOD CELLS: 0 /100 WBC
OSMOLALITY CALCULATION: 279.6 MOSMOL/KG (ref 275–300)
PH UA: 5.5 (ref 5–9)
PLATELET # BLD: 234 THOU/MM3 (ref 130–400)
PMV BLD AUTO: 9.6 FL (ref 9.4–12.4)
POTASSIUM SERPL-SCNC: 3.8 MEQ/L (ref 3.5–5.2)
PROTEIN UA: 30
RBC # BLD: 4.56 MILL/MM3 (ref 4.7–6.1)
RBC URINE: > 100 /HPF
RENAL EPITHELIAL, UA: ABNORMAL
SEG NEUTROPHILS: 76.5 %
SEGMENTED NEUTROPHILS ABSOLUTE COUNT: 7.3 THOU/MM3 (ref 1.8–7.7)
SODIUM BLD-SCNC: 139 MEQ/L (ref 135–145)
SPECIFIC GRAVITY, URINE: 1.01 (ref 1–1.03)
TOTAL PROTEIN: 7 G/DL (ref 6.1–8)
TROPONIN T: 0.07 NG/ML
UROBILINOGEN, URINE: 0.2 EU/DL (ref 0–1)
WBC # BLD: 9.5 THOU/MM3 (ref 4.8–10.8)
WBC UA: ABNORMAL /HPF
YEAST: ABNORMAL

## 2020-03-11 PROCEDURE — 6360000002 HC RX W HCPCS: Performed by: PHYSICIAN ASSISTANT

## 2020-03-11 PROCEDURE — 93005 ELECTROCARDIOGRAM TRACING: CPT | Performed by: PHYSICIAN ASSISTANT

## 2020-03-11 PROCEDURE — 36415 COLL VENOUS BLD VENIPUNCTURE: CPT

## 2020-03-11 PROCEDURE — 6360000002 HC RX W HCPCS: Performed by: INTERNAL MEDICINE

## 2020-03-11 PROCEDURE — 82140 ASSAY OF AMMONIA: CPT

## 2020-03-11 PROCEDURE — 82948 REAGENT STRIP/BLOOD GLUCOSE: CPT

## 2020-03-11 PROCEDURE — 99285 EMERGENCY DEPT VISIT HI MDM: CPT

## 2020-03-11 PROCEDURE — 2580000003 HC RX 258: Performed by: PHYSICIAN ASSISTANT

## 2020-03-11 PROCEDURE — 74177 CT ABD & PELVIS W/CONTRAST: CPT

## 2020-03-11 PROCEDURE — 84484 ASSAY OF TROPONIN QUANT: CPT

## 2020-03-11 PROCEDURE — 96365 THER/PROPH/DIAG IV INF INIT: CPT

## 2020-03-11 PROCEDURE — 6370000000 HC RX 637 (ALT 250 FOR IP): Performed by: INTERNAL MEDICINE

## 2020-03-11 PROCEDURE — 94760 N-INVAS EAR/PLS OXIMETRY 1: CPT

## 2020-03-11 PROCEDURE — G0378 HOSPITAL OBSERVATION PER HR: HCPCS

## 2020-03-11 PROCEDURE — 87186 SC STD MICRODIL/AGAR DIL: CPT

## 2020-03-11 PROCEDURE — 82248 BILIRUBIN DIRECT: CPT

## 2020-03-11 PROCEDURE — 81001 URINALYSIS AUTO W/SCOPE: CPT

## 2020-03-11 PROCEDURE — 87086 URINE CULTURE/COLONY COUNT: CPT

## 2020-03-11 PROCEDURE — 83690 ASSAY OF LIPASE: CPT

## 2020-03-11 PROCEDURE — 99220 PR INITIAL OBSERVATION CARE/DAY 70 MINUTES: CPT | Performed by: INTERNAL MEDICINE

## 2020-03-11 PROCEDURE — 87077 CULTURE AEROBIC IDENTIFY: CPT

## 2020-03-11 PROCEDURE — 51702 INSERT TEMP BLADDER CATH: CPT

## 2020-03-11 PROCEDURE — 83605 ASSAY OF LACTIC ACID: CPT

## 2020-03-11 PROCEDURE — 70450 CT HEAD/BRAIN W/O DYE: CPT

## 2020-03-11 PROCEDURE — 80053 COMPREHEN METABOLIC PANEL: CPT

## 2020-03-11 PROCEDURE — 6360000004 HC RX CONTRAST MEDICATION: Performed by: PHYSICIAN ASSISTANT

## 2020-03-11 PROCEDURE — 2580000003 HC RX 258: Performed by: INTERNAL MEDICINE

## 2020-03-11 PROCEDURE — 94640 AIRWAY INHALATION TREATMENT: CPT

## 2020-03-11 PROCEDURE — 93010 ELECTROCARDIOGRAM REPORT: CPT | Performed by: NUCLEAR MEDICINE

## 2020-03-11 PROCEDURE — 87040 BLOOD CULTURE FOR BACTERIA: CPT

## 2020-03-11 PROCEDURE — 71046 X-RAY EXAM CHEST 2 VIEWS: CPT

## 2020-03-11 PROCEDURE — 85610 PROTHROMBIN TIME: CPT

## 2020-03-11 PROCEDURE — 83735 ASSAY OF MAGNESIUM: CPT

## 2020-03-11 PROCEDURE — 96375 TX/PRO/DX INJ NEW DRUG ADDON: CPT

## 2020-03-11 PROCEDURE — 85025 COMPLETE CBC W/AUTO DIFF WBC: CPT

## 2020-03-11 RX ORDER — FLUCONAZOLE 200 MG/1
200 TABLET ORAL DAILY
Status: DISCONTINUED | OUTPATIENT
Start: 2020-03-11 | End: 2020-03-24

## 2020-03-11 RX ORDER — ACETAMINOPHEN 650 MG/1
650 SUPPOSITORY RECTAL EVERY 6 HOURS PRN
Status: DISCONTINUED | OUTPATIENT
Start: 2020-03-11 | End: 2020-03-25 | Stop reason: HOSPADM

## 2020-03-11 RX ORDER — MORPHINE SULFATE 4 MG/ML
4 INJECTION, SOLUTION INTRAMUSCULAR; INTRAVENOUS ONCE
Status: COMPLETED | OUTPATIENT
Start: 2020-03-11 | End: 2020-03-11

## 2020-03-11 RX ORDER — ACETAMINOPHEN 325 MG/1
650 TABLET ORAL EVERY 6 HOURS PRN
Status: DISCONTINUED | OUTPATIENT
Start: 2020-03-11 | End: 2020-03-25 | Stop reason: HOSPADM

## 2020-03-11 RX ORDER — SODIUM CHLORIDE 0.9 % (FLUSH) 0.9 %
10 SYRINGE (ML) INJECTION EVERY 12 HOURS SCHEDULED
Status: DISCONTINUED | OUTPATIENT
Start: 2020-03-11 | End: 2020-03-25 | Stop reason: HOSPADM

## 2020-03-11 RX ORDER — LISINOPRIL 5 MG/1
5 TABLET ORAL DAILY
Status: DISCONTINUED | OUTPATIENT
Start: 2020-03-12 | End: 2020-03-25 | Stop reason: HOSPADM

## 2020-03-11 RX ORDER — DEXTROSE MONOHYDRATE 50 MG/ML
100 INJECTION, SOLUTION INTRAVENOUS PRN
Status: DISCONTINUED | OUTPATIENT
Start: 2020-03-11 | End: 2020-03-25 | Stop reason: HOSPADM

## 2020-03-11 RX ORDER — FUROSEMIDE 40 MG/1
40 TABLET ORAL 2 TIMES DAILY
Status: DISCONTINUED | OUTPATIENT
Start: 2020-03-11 | End: 2020-03-25 | Stop reason: HOSPADM

## 2020-03-11 RX ORDER — ALBUTEROL SULFATE 90 UG/1
2 AEROSOL, METERED RESPIRATORY (INHALATION) EVERY 6 HOURS PRN
Status: DISCONTINUED | OUTPATIENT
Start: 2020-03-11 | End: 2020-03-21 | Stop reason: CLARIF

## 2020-03-11 RX ORDER — ONDANSETRON 2 MG/ML
4 INJECTION INTRAMUSCULAR; INTRAVENOUS ONCE
Status: COMPLETED | OUTPATIENT
Start: 2020-03-11 | End: 2020-03-11

## 2020-03-11 RX ORDER — POLYETHYLENE GLYCOL 3350 17 G/17G
17 POWDER, FOR SOLUTION ORAL DAILY PRN
Status: DISCONTINUED | OUTPATIENT
Start: 2020-03-11 | End: 2020-03-25 | Stop reason: HOSPADM

## 2020-03-11 RX ORDER — SODIUM CHLORIDE 9 MG/ML
INJECTION, SOLUTION INTRAVENOUS CONTINUOUS
Status: DISCONTINUED | OUTPATIENT
Start: 2020-03-11 | End: 2020-03-15

## 2020-03-11 RX ORDER — SODIUM CHLORIDE 0.9 % (FLUSH) 0.9 %
10 SYRINGE (ML) INJECTION PRN
Status: DISCONTINUED | OUTPATIENT
Start: 2020-03-11 | End: 2020-03-25 | Stop reason: HOSPADM

## 2020-03-11 RX ORDER — POTASSIUM CHLORIDE 20 MEQ/1
10 TABLET, EXTENDED RELEASE ORAL 2 TIMES DAILY
Status: DISCONTINUED | OUTPATIENT
Start: 2020-03-11 | End: 2020-03-20

## 2020-03-11 RX ORDER — LEVOTHYROXINE SODIUM 0.07 MG/1
75 TABLET ORAL DAILY
Status: DISCONTINUED | OUTPATIENT
Start: 2020-03-11 | End: 2020-03-11

## 2020-03-11 RX ORDER — PROMETHAZINE HYDROCHLORIDE 25 MG/1
12.5 TABLET ORAL EVERY 6 HOURS PRN
Status: DISCONTINUED | OUTPATIENT
Start: 2020-03-11 | End: 2020-03-25 | Stop reason: HOSPADM

## 2020-03-11 RX ORDER — ARFORMOTEROL TARTRATE 15 UG/2ML
15 SOLUTION RESPIRATORY (INHALATION) 2 TIMES DAILY
Status: DISCONTINUED | OUTPATIENT
Start: 2020-03-11 | End: 2020-03-25 | Stop reason: HOSPADM

## 2020-03-11 RX ORDER — DOXAZOSIN MESYLATE 4 MG/1
8 TABLET ORAL NIGHTLY
Status: DISCONTINUED | OUTPATIENT
Start: 2020-03-11 | End: 2020-03-11 | Stop reason: CLARIF

## 2020-03-11 RX ORDER — ONDANSETRON 2 MG/ML
4 INJECTION INTRAMUSCULAR; INTRAVENOUS EVERY 6 HOURS PRN
Status: DISCONTINUED | OUTPATIENT
Start: 2020-03-11 | End: 2020-03-25 | Stop reason: HOSPADM

## 2020-03-11 RX ORDER — DOXAZOSIN MESYLATE 4 MG/1
8 TABLET ORAL NIGHTLY
Status: DISCONTINUED | OUTPATIENT
Start: 2020-03-12 | End: 2020-03-25 | Stop reason: HOSPADM

## 2020-03-11 RX ORDER — DEXTROSE MONOHYDRATE 25 G/50ML
12.5 INJECTION, SOLUTION INTRAVENOUS PRN
Status: DISCONTINUED | OUTPATIENT
Start: 2020-03-11 | End: 2020-03-25 | Stop reason: HOSPADM

## 2020-03-11 RX ORDER — BUDESONIDE 0.25 MG/2ML
250 INHALANT ORAL 2 TIMES DAILY
Status: DISCONTINUED | OUTPATIENT
Start: 2020-03-11 | End: 2020-03-25 | Stop reason: HOSPADM

## 2020-03-11 RX ORDER — NICOTINE POLACRILEX 4 MG
15 LOZENGE BUCCAL PRN
Status: DISCONTINUED | OUTPATIENT
Start: 2020-03-11 | End: 2020-03-25 | Stop reason: HOSPADM

## 2020-03-11 RX ADMIN — BUDESONIDE 250 MCG: 0.25 INHALANT RESPIRATORY (INHALATION) at 20:57

## 2020-03-11 RX ADMIN — ARFORMOTEROL TARTRATE 15 MCG: 15 SOLUTION RESPIRATORY (INHALATION) at 20:56

## 2020-03-11 RX ADMIN — SODIUM CHLORIDE 100 ML/HR: 9 INJECTION, SOLUTION INTRAVENOUS at 11:43

## 2020-03-11 RX ADMIN — FLUCONAZOLE 200 MG: 200 TABLET ORAL at 20:14

## 2020-03-11 RX ADMIN — POTASSIUM CHLORIDE 10 MEQ: 1500 TABLET, EXTENDED RELEASE ORAL at 20:17

## 2020-03-11 RX ADMIN — SODIUM CHLORIDE: 9 INJECTION, SOLUTION INTRAVENOUS at 20:21

## 2020-03-11 RX ADMIN — IOPAMIDOL 80 ML: 755 INJECTION, SOLUTION INTRAVENOUS at 13:18

## 2020-03-11 RX ADMIN — FUROSEMIDE 40 MG: 40 TABLET ORAL at 20:15

## 2020-03-11 RX ADMIN — CEFTRIAXONE SODIUM 1 G: 1 INJECTION, POWDER, FOR SOLUTION INTRAMUSCULAR; INTRAVENOUS at 15:13

## 2020-03-11 RX ADMIN — METFORMIN HYDROCHLORIDE 1000 MG: 500 TABLET, FILM COATED ORAL at 20:16

## 2020-03-11 RX ADMIN — DOXAZOSIN MESYLATE 8 MG: 4 TABLET ORAL at 20:15

## 2020-03-11 RX ADMIN — ONDANSETRON 4 MG: 2 INJECTION INTRAMUSCULAR; INTRAVENOUS at 11:43

## 2020-03-11 RX ADMIN — METOPROLOL TARTRATE 25 MG: 25 TABLET, FILM COATED ORAL at 20:16

## 2020-03-11 RX ADMIN — MORPHINE SULFATE 4 MG: 4 INJECTION, SOLUTION INTRAMUSCULAR; INTRAVENOUS at 11:44

## 2020-03-11 ASSESSMENT — PAIN SCALES - GENERAL
PAINLEVEL_OUTOF10: 10
PAINLEVEL_OUTOF10: 10
PAINLEVEL_OUTOF10: 0
PAINLEVEL_OUTOF10: 0
PAINLEVEL_OUTOF10: 1

## 2020-03-11 ASSESSMENT — ENCOUNTER SYMPTOMS
BLOOD IN STOOL: 0
EYE PAIN: 0
COUGH: 0
SORE THROAT: 0
CONSTIPATION: 0
DIARRHEA: 0
SHORTNESS OF BREATH: 0
ABDOMINAL DISTENTION: 1
SINUS PRESSURE: 0
VOMITING: 0
ABDOMINAL PAIN: 0
SINUS PAIN: 0
NAUSEA: 0

## 2020-03-11 ASSESSMENT — PAIN DESCRIPTION - ORIENTATION: ORIENTATION: LOWER

## 2020-03-11 ASSESSMENT — PAIN DESCRIPTION - PAIN TYPE
TYPE: ACUTE PAIN
TYPE: ACUTE PAIN

## 2020-03-11 ASSESSMENT — PAIN DESCRIPTION - LOCATION: LOCATION: ABDOMEN

## 2020-03-11 NOTE — H&P
frequency, urgency and dysuria. The Billy catheter was readjusted in the ER which made the patient pain better however still has significant pain-we were asked to admit for pain control overnight. Past Medical History:        Diagnosis Date    Anxiety     CAD (coronary artery disease)     leaking valve    Chronic back pain     COPD (chronic obstructive pulmonary disease) (HCC)     Detached retina     Diabetes mellitus (HCC)     NIDDM    DVT (deep venous thrombosis) (HCC)     RLE    DVT (deep venous thrombosis) ([address] 11/9/15    LLE    Glaucoma     Hyperlipidemia     Hypertension     Mass of chest     benign chest mass, Dr Juliet Ventura Movement disorder     spinal stenosis    Obesity     Osteoarthritis     right ankle, right hand    Pneumonia     Sleep apnea 1993    on BiPap, Dr Kimberlee Carr Thyroid cancer [facility])     s/p thyroid resection    Urinary incontinence        Past Surgical History:        Procedure Laterality Date   [facility] SURGERY  2006,2011, 2014    BRONCHOSCOPY N/A 8/29/2017    BRONCHOSCOPY AIRWAY ONLY performed by Maria Esther Polanco MD at [address] [place] [address] EKG 12-LEAD  11/10/2015         FRACTURE SURGERY      right hand    OTHER SURGICAL HISTORY      spinal epidurals    RETINAL DETACHMENT SURGERY      SPINE SURGERY  2004    Lumbar laminectomy    THYROIDECTOMY         Home Medications:   No current facility-administered medications on file prior to encounter.       Current Outpatient Medications on File Prior to Encounter   Medication Sig Dispense Refill    doxazosin (CARDURA) 8 MG tablet Take 1 tablet by mouth nightly 30 tablet 1    albuterol sulfate  (90 Base) MCG/ACT inhaler Inhale 2 puffs into the lungs every 6 hours as needed for Wheezing 1 Inhaler 3    metFORMIN (GLUCOPHAGE) 500 MG tablet Take 500 mg by mouth 2 times daily (with meals)      furosemide (LASIX) 40 MG tablet Take 40 mg by mouth 2 times daily   4    LANTUS SOLOSTAR 100 UNIT/ML injection pen INJECT SUBCUTANEOUSLY 18  UNITS NIGHTLY 30 mL 3    Cholecalciferol (VITAMIN D3 PO) Take 1 tablet by mouth daily       levothyroxine (SYNTHROID) 175 MCG tablet TAKE 1 TABLET BY MOUTH  DAILY 90 tablet 3    lisinopril (PRINIVIL;ZESTRIL) 5 MG tablet Take 1 tablet by mouth daily 90 tablet 3    potassium chloride (KLOR-CON M) 10 MEQ extended release tablet Take 10 mEq by mouth 2 times daily      SOFT TOUCH LANCETS MISC Use to check blood sugars 2 times daily 100 each 3    warfarin (COUMADIN) 5 MG tablet Take as directed by [facility]. #90 tabs = 90 days 90 tablet 3    Insulin Pen Needle (B-D ULTRAFINE III SHORT PEN) 31G X 8 MM MISC To use with Lantus pens to inject SQ qd. Dx: E11.9 90 each 3    Multiple Vitamins-Minerals (PRESERVISION AREDS 2+MULTI VIT) CAPS take 1 by Oral route 2 times every day      metoprolol tartrate (LOPRESSOR) 25 MG tablet Take 25 mg by mouth 2 times daily       blood glucose test strips (ACCU-CHEK CHE PLUS) strip TEST TWICE DAILY 150 strip 5    Ferrous Sulfate (IRON) 325 (65 Fe) MG TABS Take by mouth 2 times daily       AZOPT 1 % ophthalmic suspension Place 1 drop into both eyes 3 times daily Indications: Disease of the Eye       Arformoterol Tartrate (BROVANA) 15 MCG/2ML NEBU Take 1 ampule by nebulization 2 times daily Indications: Prevention of COPD Worsening       budesonide (PULMICORT) 0.25 MG/2ML nebulizer suspension Take 1 ampule by nebulization 2 times daily       OXYGEN Inhale 3 L into the lungs nightly Indications: Difficulty Breathing, Oxygen Therapy And prn      tiotropium (SPIRIVA RESPIMAT) 2.5 MCG/ACT AERS inhaler Inhale 2 puffs into the lungs daily 1 Inhaler 11    Blood Glucose Monitoring Suppl (ACCU-CHEK CHE PLUS) w/Device KIT Check blood sugar 2 times daily 1 kit 0       Allergies:    Macrobid [nitrofurantoin monohyd macro]; Levaquin [levofloxacin];  Adhesive tape; and Alphagan [brimonidine tartrate]    Social History:    reports that he has never

## 2020-03-11 NOTE — ED NOTES
Called [facility] and informed Albina that the patient is on their way to the unit. Patient transported via bed in a stable condition.      Shelbi Guerrero  03/11/20 8435

## 2020-03-11 NOTE — ED NOTES
Pt resting in bed upon entering room with lights dimmed. Pt informed of admission status and provided with ice chips per request. Vs reassessed and stable will continue to monitor and update on the POC.  Call light in reach      [facility], RN  03/11/20 1943

## 2020-03-11 NOTE — ED NOTES
ED to inpatient nurses report    Chief Complaint   Patient presents with    Abdominal Pain     urinary catheter problem    Altered Mental Status      Present to ED from home  LOC: alert and orientated to name, place, date  Vital signs   Vitals:    03/11/20 1106 03/11/20 1333 03/11/20 1430   BP: (!) 155/71 120/63 118/69   Pulse: 70 61 54   Resp: 20 18 18   Temp: 98.1 °F (36.7 °C)     SpO2: 94% 96% 94%   Weight: 260 lb (117.9 kg)     Height: 5' 7\" (1.702 m)        Oxygen Baseline 94% r/a, pt reports that he is on 2 liters o2 at night    Current needs required at night pt requires 2 liters o2  LDAs: 20 gauge left forearm  Peripheral IV 03/11/20 Left Forearm (Active)   Site Assessment Clean;Dry; Intact 3/11/2020 11:36 AM   Line Status Capped 3/11/2020 11:36 AM   Dressing Status Clean;Dry; Intact 3/11/2020 11:36 AM     Mobility: Requires assistance * 1  Pending ED orders: n/a  Present condition: stable    Electronically signed by Sarah Guillory RN on 3/11/2020 at 3:06 PM     Sarah Guillory RN  03/11/20 3810

## 2020-03-11 NOTE — ED NOTES
Pt resting in bed upon entering room with lights dimmed. Pt appears in no distress, respirations easy and unlabored. Pt reports pain is 1/10. Pt informed of admission room and informed that transport was paged. Will continue to monitor.       Katherine Dill RN  03/11/20 1134

## 2020-03-11 NOTE — ED NOTES
Pt in radiology for further testing will monitor for pt's return to room.      Bina Bledsoe RN  03/11/20 5325

## 2020-03-11 NOTE — ED NOTES
Friend at the bedside also reports pt has experienced new confusion in the last 48 hours.       Giovanni Balderas, RN  03/11/20 1200

## 2020-03-11 NOTE — ED PROVIDER NOTES
[facility]), and Urinary incontinence. SURGICAL HISTORY      has a past surgical history that includes Retinal detachment surgery; Thyroidectomy; Spine surgery (2004); other surgical history; fracture surgery; EKG 12 Lead (11/10/2015); back surgery (8805,7005, 2014); and bronchoscopy (N/A, 8/29/2017). CURRENT MEDICATIONS       Current Discharge Medication List      CONTINUE these medications which have NOT CHANGED    Details   doxazosin (CARDURA) 8 MG tablet Take 1 tablet by mouth nightly  Qty: 30 tablet, Refills: 1      albuterol sulfate  (90 Base) MCG/ACT inhaler Inhale 2 puffs into the lungs every 6 hours as needed for Wheezing  Qty: 1 Inhaler, Refills: 3      metFORMIN (GLUCOPHAGE) 500 MG tablet Take 500 mg by mouth 2 times daily (with meals)      furosemide (LASIX) 40 MG tablet Take 40 mg by mouth 2 times daily   Refills: 4      LANTUS SOLOSTAR 100 UNIT/ML injection pen INJECT SUBCUTANEOUSLY 18  UNITS NIGHTLY  Qty: 30 mL, Refills: 3    Associated Diagnoses: Type 2 diabetes mellitus with complication, with long-term current use of insulin ([facility])      Cholecalciferol (VITAMIN D3 PO) Take 1 tablet by mouth daily       levothyroxine (SYNTHROID) 175 MCG tablet TAKE 1 TABLET BY MOUTH  DAILY  Qty: 90 tablet, Refills: 3    Associated Diagnoses: Postoperative hypothyroidism      lisinopril (PRINIVIL;ZESTRIL) 5 MG tablet Take 1 tablet by mouth daily  Qty: 90 tablet, Refills: 3    Associated Diagnoses: Type 2 diabetes mellitus with complication, with long-term current use of insulin ([facility])      potassium chloride (KLOR-CON M) 10 MEQ extended release tablet Take 10 mEq by mouth 2 times daily      SOFT TOUCH LANCETS MISC Use to check blood sugars 2 times daily  Qty: 100 each, Refills: 3    Associated Diagnoses: Type 2 diabetes mellitus with complication, with long-term current use of insulin ([facility])      warfarin (COUMADIN) 5 MG tablet Take as directed by [facility].  #90 tabs = 90 days  Qty: 90 tablet, Refills: 3    Associated Diagnoses: Anticoagulated on Coumadin; New onset atrial fibrillation (HCC)      Insulin Pen Needle (B-D ULTRAFINE III SHORT PEN) 31G X 8 MM MISC To use with Lantus pens to inject SQ qd. Dx: E11.9  Qty: 90 each, Refills: 3      Multiple Vitamins-Minerals (PRESERVISION AREDS 2+MULTI VIT) CAPS take 1 by Oral route 2 times every day      metoprolol tartrate (LOPRESSOR) 25 MG tablet Take 25 mg by mouth 2 times daily       blood glucose test strips (ACCU-CHEK CHE PLUS) strip TEST TWICE DAILY  Qty: 150 strip, Refills: 5    Comments: **Patient requests 90 days supply**      Ferrous Sulfate (IRON) 325 (65 Fe) MG TABS Take by mouth 2 times daily       AZOPT 1 % ophthalmic suspension Place 1 drop into both eyes 3 times daily Indications: Disease of the Eye       Arformoterol Tartrate (BROVANA) 15 MCG/2ML NEBU Take 1 ampule by nebulization 2 times daily Indications: Prevention of COPD Worsening       budesonide (PULMICORT) 0.25 MG/2ML nebulizer suspension Take 1 ampule by nebulization 2 times daily       OXYGEN Inhale 3 L into the lungs nightly Indications: Difficulty Breathing, Oxygen Therapy And prn      tiotropium (SPIRIVA RESPIMAT) 2.5 MCG/ACT AERS inhaler Inhale 2 puffs into the lungs daily  Qty: 1 Inhaler, Refills: 11      Blood Glucose Monitoring Suppl (ACCU-CHEK CHE PLUS) w/Device KIT Check blood sugar 2 times daily  Qty: 1 kit, Refills: 0    Associated Diagnoses: Type 2 diabetes mellitus with complication, with long-term current use of insulin (HCC)             ALLERGIES     is allergic to macrobid [nitrofurantoin monohyd macro]; levaquin [levofloxacin]; adhesive tape; and alphagan [brimonidine tartrate]. HISTORY     He indicated that his mother is . He indicated that his father is . family history includes Other in his mother; Other (age of onset: [de-identified]) in his father. SOCIALHISTORY      reports that he has never smoked.  He has never used smokeless tobacco. He reports following components:       Result Value    RBC 4.56 (*)     Hemoglobin 12.6 (*)     Hematocrit 38.8 (*)     RDW-CV 14.8 (*)     RDW-SD 46.1 (*)     All other components within normal limits   BASIC METABOLIC PANEL - Abnormal; Notable for the following components:    Chloride 96 (*)     Glucose 146 (*)     All other components within normal limits   TROPONIN - Abnormal; Notable for the following components:    Troponin T 0.067 (*)     All other components within normal limits   LACTATE, SEPSIS - Abnormal; Notable for the following components:    Lactic Acid, Sepsis 2.6 (*)     All other components within normal limits   URINE WITH REFLEXED MICRO - Abnormal; Notable for the following components:    Blood, Urine LARGE (*)     Protein, UA 30 (*)     Nitrite, Urine POSITIVE (*)     Leukocyte Esterase, Urine MODERATE (*)     Character, Urine CLOUDY (*)     All other components within normal limits   CULTURE, REFLEXED, URINE   CULTURE, BLOOD 1   CULTURE, BLOOD 2   HEPATIC FUNCTION PANEL   LIPASE   MAGNESIUM   LACTATE, SEPSIS   AMMONIA   ANION GAP   GLOMERULAR FILTRATION RATE, ESTIMATED   OSMOLALITY       EMERGENCY DEPARTMENT COURSE:   :    Vitals:    03/11/20 1333 03/11/20 1430 03/11/20 1556 03/11/20 1638   BP: 120/63 118/69 106/72 (!) 145/64   Pulse: 61 54 55 58   Resp: 18 18 18 16   Temp:    97.4 °F (36.3 °C)   TempSrc:    Oral   SpO2: 96% 94% 92% 94%   Weight:    257 lb 9.6 oz (116.8 kg)   Height:    5' 7\" (1.702 m)     Patient was seen history physical exam was performed. Patient was given morphine and Zofran. Patient is feeling better. The patient's troponin is elevated as is lactic acid. The patient's been having dyspnea on exertion for several months. I am concerned this may be a ACS situation. Patient is also been having confusion also according to the neighbor. He is a current alert oriented x3.   See disposition below    CRITICAL CARE:  None    CONSULTS:  None    PROCEDURES:  None    FINAL IMPRESSION 1. Abdominal pain, unspecified abdominal location    2. Elevated troponin    3.  Elevated lactic acid level          DISPOSITION/PLAN   Discharge    PATIENT REFERRED TO:  Tucker Watts MD  [address] [facility] [address]            DISCHARGE MEDICATIONS:  Current Discharge Medication List          (Please note that portions of this note were completed with a voice recognitionprogram.  Efforts were made to edit the dictations but occasionally words are mis-transcribed.)    Tracee Rivera, [address]  03/11/20 8738

## 2020-03-11 NOTE — ED TRIAGE NOTES
Pt to ed per ems concerned with lower abd pain that started last night after his recent catheter was pulled and pt reports that he feels like it was pulled out of place. Pt reports bloody urine has been draining however the pain remains. Upon assisting pt into gown catheter noted to be detached from the urinary bag and pt's clothing was drenched in urine. Pt reports pain is 10/10 and reports taking home medications prior to arrival.  TEZ Blount into assess and discuss the POC.

## 2020-03-12 ENCOUNTER — HOSPITAL ENCOUNTER (OUTPATIENT)
Dept: CARDIAC REHAB | Age: 85
Setting detail: THERAPIES SERIES
End: 2020-03-12
Payer: MEDICARE

## 2020-03-12 PROBLEM — R33.9 URINE RETENTION: Status: ACTIVE | Noted: 2020-03-12

## 2020-03-12 LAB
ANION GAP SERPL CALCULATED.3IONS-SCNC: 14 MEQ/L (ref 8–16)
BUN BLDV-MCNC: 10 MG/DL (ref 7–22)
CALCIUM SERPL-MCNC: 8.1 MG/DL (ref 8.5–10.5)
CHLORIDE BLD-SCNC: 101 MEQ/L (ref 98–111)
CO2: 26 MEQ/L (ref 23–33)
CREAT SERPL-MCNC: 0.9 MG/DL (ref 0.4–1.2)
ERYTHROCYTE [DISTWIDTH] IN BLOOD BY AUTOMATED COUNT: 14.9 % (ref 11.5–14.5)
ERYTHROCYTE [DISTWIDTH] IN BLOOD BY AUTOMATED COUNT: 46.8 FL (ref 35–45)
GFR SERPL CREATININE-BSD FRML MDRD: 80 ML/MIN/1.73M2
GLUCOSE BLD-MCNC: 120 MG/DL (ref 70–108)
GLUCOSE BLD-MCNC: 121 MG/DL (ref 70–108)
GLUCOSE BLD-MCNC: 132 MG/DL (ref 70–108)
GLUCOSE BLD-MCNC: 139 MG/DL (ref 70–108)
GLUCOSE BLD-MCNC: 141 MG/DL (ref 70–108)
HCT VFR BLD CALC: 36.7 % (ref 42–52)
HEMOGLOBIN: 11.7 GM/DL (ref 14–18)
INR BLD: 2.01 (ref 0.85–1.13)
MCH RBC QN AUTO: 27.6 PG (ref 26–33)
MCHC RBC AUTO-ENTMCNC: 31.9 GM/DL (ref 32.2–35.5)
MCV RBC AUTO: 86.6 FL (ref 80–94)
ORGANISM: ABNORMAL
PLATELET # BLD: 209 THOU/MM3 (ref 130–400)
PMV BLD AUTO: 9.5 FL (ref 9.4–12.4)
POTASSIUM REFLEX MAGNESIUM: 3.7 MEQ/L (ref 3.5–5.2)
RBC # BLD: 4.24 MILL/MM3 (ref 4.7–6.1)
SODIUM BLD-SCNC: 141 MEQ/L (ref 135–145)
URINE CULTURE, ROUTINE: ABNORMAL
WBC # BLD: 9.5 THOU/MM3 (ref 4.8–10.8)

## 2020-03-12 PROCEDURE — 6360000002 HC RX W HCPCS: Performed by: INTERNAL MEDICINE

## 2020-03-12 PROCEDURE — 2580000003 HC RX 258: Performed by: INTERNAL MEDICINE

## 2020-03-12 PROCEDURE — 6370000000 HC RX 637 (ALT 250 FOR IP): Performed by: NURSE PRACTITIONER

## 2020-03-12 PROCEDURE — 99232 SBSQ HOSP IP/OBS MODERATE 35: CPT | Performed by: INTERNAL MEDICINE

## 2020-03-12 PROCEDURE — 6370000000 HC RX 637 (ALT 250 FOR IP): Performed by: INTERNAL MEDICINE

## 2020-03-12 PROCEDURE — 94760 N-INVAS EAR/PLS OXIMETRY 1: CPT

## 2020-03-12 PROCEDURE — 96366 THER/PROPH/DIAG IV INF ADDON: CPT

## 2020-03-12 PROCEDURE — 94640 AIRWAY INHALATION TREATMENT: CPT

## 2020-03-12 PROCEDURE — G0378 HOSPITAL OBSERVATION PER HR: HCPCS

## 2020-03-12 PROCEDURE — 1200000003 HC TELEMETRY R&B

## 2020-03-12 PROCEDURE — 80048 BASIC METABOLIC PNL TOTAL CA: CPT

## 2020-03-12 PROCEDURE — 82948 REAGENT STRIP/BLOOD GLUCOSE: CPT

## 2020-03-12 PROCEDURE — 99223 1ST HOSP IP/OBS HIGH 75: CPT | Performed by: INTERNAL MEDICINE

## 2020-03-12 PROCEDURE — 1200000000 HC SEMI PRIVATE

## 2020-03-12 PROCEDURE — 36415 COLL VENOUS BLD VENIPUNCTURE: CPT

## 2020-03-12 PROCEDURE — 99213 OFFICE O/P EST LOW 20 MIN: CPT | Performed by: NURSE PRACTITIONER

## 2020-03-12 PROCEDURE — 85610 PROTHROMBIN TIME: CPT

## 2020-03-12 PROCEDURE — 85027 COMPLETE CBC AUTOMATED: CPT

## 2020-03-12 RX ORDER — WARFARIN SODIUM 5 MG/1
5 TABLET ORAL
Status: COMPLETED | OUTPATIENT
Start: 2020-03-12 | End: 2020-03-12

## 2020-03-12 RX ORDER — OXYBUTYNIN CHLORIDE 5 MG/1
TABLET ORAL
Qty: 300 TABLET | OUTPATIENT
Start: 2020-03-12

## 2020-03-12 RX ORDER — OXYBUTYNIN CHLORIDE 5 MG/1
5 TABLET ORAL EVERY 8 HOURS PRN
Status: DISCONTINUED | OUTPATIENT
Start: 2020-03-12 | End: 2020-03-13

## 2020-03-12 RX ORDER — OXYBUTYNIN CHLORIDE 5 MG/1
5 TABLET ORAL EVERY 8 HOURS PRN
Qty: 10 TABLET | Refills: 0 | Status: ON HOLD | OUTPATIENT
Start: 2020-03-12 | End: 2020-05-19 | Stop reason: HOSPADM

## 2020-03-12 RX ORDER — DOXYCYCLINE HYCLATE 100 MG
100 TABLET ORAL 2 TIMES DAILY
Qty: 20 TABLET | Refills: 0 | Status: SHIPPED | OUTPATIENT
Start: 2020-03-12 | End: 2020-03-24 | Stop reason: HOSPADM

## 2020-03-12 RX ORDER — ATROPA BELLADONNA AND OPIUM 16.2; 3 MG/1; MG/1
30 SUPPOSITORY RECTAL EVERY 8 HOURS PRN
Status: DISCONTINUED | OUTPATIENT
Start: 2020-03-12 | End: 2020-03-25 | Stop reason: HOSPADM

## 2020-03-12 RX ADMIN — LEVOTHYROXINE SODIUM 175 MCG: 150 TABLET ORAL at 06:09

## 2020-03-12 RX ADMIN — WARFARIN SODIUM 5 MG: 5 TABLET ORAL at 17:54

## 2020-03-12 RX ADMIN — TIOTROPIUM BROMIDE INHALATION SPRAY 2 PUFF: 3.12 SPRAY, METERED RESPIRATORY (INHALATION) at 06:23

## 2020-03-12 RX ADMIN — BUDESONIDE 250 MCG: 0.25 INHALANT RESPIRATORY (INHALATION) at 06:19

## 2020-03-12 RX ADMIN — FUROSEMIDE 40 MG: 40 TABLET ORAL at 10:26

## 2020-03-12 RX ADMIN — LISINOPRIL 5 MG: 5 TABLET ORAL at 10:28

## 2020-03-12 RX ADMIN — ARFORMOTEROL TARTRATE 15 MCG: 15 SOLUTION RESPIRATORY (INHALATION) at 06:19

## 2020-03-12 RX ADMIN — FUROSEMIDE 40 MG: 40 TABLET ORAL at 20:30

## 2020-03-12 RX ADMIN — ACETAMINOPHEN 650 MG: 325 TABLET ORAL at 10:30

## 2020-03-12 RX ADMIN — OXYBUTYNIN CHLORIDE 5 MG: 5 TABLET ORAL at 15:39

## 2020-03-12 RX ADMIN — POTASSIUM CHLORIDE 10 MEQ: 1500 TABLET, EXTENDED RELEASE ORAL at 10:26

## 2020-03-12 RX ADMIN — ARFORMOTEROL TARTRATE 15 MCG: 15 SOLUTION RESPIRATORY (INHALATION) at 21:45

## 2020-03-12 RX ADMIN — Medication 1000 MG: at 17:54

## 2020-03-12 RX ADMIN — SODIUM CHLORIDE: 9 INJECTION, SOLUTION INTRAVENOUS at 16:33

## 2020-03-12 RX ADMIN — FLUCONAZOLE 200 MG: 200 TABLET ORAL at 10:27

## 2020-03-12 RX ADMIN — SODIUM CHLORIDE: 9 INJECTION, SOLUTION INTRAVENOUS at 06:11

## 2020-03-12 RX ADMIN — POTASSIUM CHLORIDE 10 MEQ: 1500 TABLET, EXTENDED RELEASE ORAL at 20:35

## 2020-03-12 RX ADMIN — CEFTRIAXONE SODIUM 1 G: 1 INJECTION, POWDER, FOR SOLUTION INTRAMUSCULAR; INTRAVENOUS at 16:34

## 2020-03-12 RX ADMIN — DOXAZOSIN MESYLATE 8 MG: 4 TABLET ORAL at 20:30

## 2020-03-12 RX ADMIN — METOPROLOL TARTRATE 25 MG: 25 TABLET, FILM COATED ORAL at 20:30

## 2020-03-12 RX ADMIN — BUDESONIDE 250 MCG: 0.25 INHALANT RESPIRATORY (INHALATION) at 21:45

## 2020-03-12 RX ADMIN — METOPROLOL TARTRATE 25 MG: 25 TABLET, FILM COATED ORAL at 10:26

## 2020-03-12 RX ADMIN — Medication 1000 MG: at 10:26

## 2020-03-12 ASSESSMENT — PAIN SCALES - GENERAL
PAINLEVEL_OUTOF10: 10
PAINLEVEL_OUTOF10: 0
PAINLEVEL_OUTOF10: 10
PAINLEVEL_OUTOF10: 0
PAINLEVEL_OUTOF10: 0
PAINLEVEL_OUTOF10: 5
PAINLEVEL_OUTOF10: 0

## 2020-03-12 ASSESSMENT — PAIN DESCRIPTION - DESCRIPTORS: DESCRIPTORS: SHARP

## 2020-03-12 ASSESSMENT — PAIN DESCRIPTION - LOCATION
LOCATION: RECTUM
LOCATION: RECTUM

## 2020-03-12 ASSESSMENT — PAIN SCALES - WONG BAKER: WONGBAKER_NUMERICALRESPONSE: 0

## 2020-03-12 ASSESSMENT — PAIN DESCRIPTION - ORIENTATION: ORIENTATION: LOWER

## 2020-03-12 ASSESSMENT — PAIN DESCRIPTION - PAIN TYPE: TYPE: ACUTE PAIN

## 2020-03-12 ASSESSMENT — PAIN DESCRIPTION - ONSET: ONSET: ON-GOING

## 2020-03-12 ASSESSMENT — PAIN DESCRIPTION - FREQUENCY: FREQUENCY: INTERMITTENT

## 2020-03-12 ASSESSMENT — PAIN DESCRIPTION - PROGRESSION: CLINICAL_PROGRESSION: NOT CHANGED

## 2020-03-12 ASSESSMENT — PAIN - FUNCTIONAL ASSESSMENT: PAIN_FUNCTIONAL_ASSESSMENT: ACTIVITIES ARE NOT PREVENTED

## 2020-03-12 NOTE — CONSULTS
[facility] [employer]    Patient's Name/Date of Birth: Edis Samayoa / 7/17/1931 (18 y.o.)    Date: March 12, 2020     Referring Provider: Kj Pompa*    CHIEF COMPLAINT: Preop evaluation      HPI: This is a pleasant 80 y.o. male presents for pre-op evaluation for BPH/TURP. Has has indwelling Billy and dealing with significant pain. Hx of COPD, DM II, HTN, pAF on Coumadin, chronic diastolic CHF. ECG shows anteroseptal infarct with NSR. EF 55%, grade 1 DD. 2017 - moderate amount of ischemia in the RCA/LCX territories. 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 Cr 0.9. No MI or stent. No chest pain, angina, SHAH, orthopnea, PND, sob at rest, palpitations, LE edema, or syncope. Can do FOS without any major issues. Very confused, demented. He does not want any procedures. Echo:   Results for orders placed during the hospital encounter of 03/14/19   Echocardiogram 2D/ M-Mode/ Colorflow/ Do    Narrative Transthoracic Echocardiography Report (TTE)     Demographics      Patient Name     Myke Liao Gender                Male      MR #             [id number]    Race                                                      Ethnicity      Account #        [de-identified]    Room Number           0025      Accession Number [id number]    Date of Study         03/18/2019      Date of Birth    07/17/1931   Referring Physician   Estelita Harkins MD      Age              80 year(s)   Scherer Sina, Nuzhat Mark MD                                 Physician     Procedure    Type of Study      TTE procedure:ECHOCARDIOGRAM COMPLETE 2D W DOPPLER W COLOR.      Procedure Date  Date: 03/18/2019 Start: 09:59 AM    Study fraction is visually estimated at 55%. Unable to determine wall motion abnormalities due to poor image quality. Doppler parameters were consistent with abnormal left ventricular   relaxation (grade 1 diastolic dysfunction). Right Atrium   Mildly enlarged right atrium size. Right Ventricle   Mildly dilated right ventricle. Pericardial Effusion   No evidence of any pericardial effusion. Fat pad present. Aorta / Great Vessels   The aorta is within normal limits.      M-Mode/2D Measurements & Calculations      LV Diastolic    LV Systolic Dimension: 3.6  AV Cusp Separation: 1.7 cmLA   Dimension: 4.9  cm                          Dimension: 4.7 cmAO Root   cm              LV Volume Diastolic: 435 ml Dimension: 3.4 cm   LV FS:26.5 %    LV Volume Systolic: 82.7 ml   LV PW           LV EDV/LV EDV Index: 463   Diastolic: 1 cm UG/22 Q^3QW ESV/LV ESV   Septum          Index: 54.4 ml/24 m^2       RV Diastolic Dimension: 3 cm   Diastolic: 0.9  EF Calculated: 51.9 %   cm                                          LA/Aorta: 1.38     Doppler Measurements & Calculations      MV Peak E-Wave: 90.3     AV Peak Velocity: 128  LVOT Peak Velocity: 76.6   cm/s                     cm/s                   cm/s                            AV Peak Gradient: 6.55 LVOT Peak Gradient: 2 mmHg   MV Peak Gradient: 3.26   mmHg   mmHg                                            TV Peak E-Wave: 64.3 cm/s                                                   TV Peak A-Wave: 38.6 cm/s   MV Deceleration Time:   246 msec                                        TV Peak Gradient: 1.65                                                   mmHg                                                   TR Velocity:217 cm/s                                                   TR Gradient:18.84 mmHg                            AV DVI (Vmax):0.6      PV Peak Velocity: 62.2                                                   cm/s PV Peak Gradient: 1.55                                                   mmHg     [id number]  [id number]        All labs, EKG's, diagnostic testing and images as well as cardiac cath, stress testing were reviewed during this encounter    Past Medical History:   Diagnosis Date    Anxiety     CAD (coronary artery disease)     leaking valve    Chronic back pain     [facility]     Detached retina     Diabetes mellitus ([facility]     NIDDM    DVT (deep venous thrombosis) ([facility]     RLE    DVT (deep venous thrombosis) ([facility] 11/9/15    LLE    Glaucoma     Hyperlipidemia     Hypertension     Mass of chest     benign chest mass, Dr Darlyn Severance    Movement disorder     spinal stenosis    Obesity     Osteoarthritis     right ankle, right hand    Pneumonia     Sleep apnea 1993    on BiPap, Dr Edwar Jones Thyroid cancer [facility])     s/p thyroid resection    Urinary incontinence      Past Surgical History:   Procedure Laterality Date   [facility] SURGERY  2006,2011, 2014    BRONCHOSCOPY N/A 8/29/2017    BRONCHOSCOPY AIRWAY ONLY performed by Tanya Florentino MD at [facility]    EKG 12-LEAD  11/10/2015         FRACTURE SURGERY      right hand    OTHER SURGICAL HISTORY      spinal epidurals    RETINAL DETACHMENT SURGERY      SPINE SURGERY  2004    Lumbar laminectomy    THYROIDECTOMY       Current Facility-Administered Medications   Medication Dose Route Frequency Provider Last Rate Last Dose    oxybutynin (DITROPAN) tablet 5 mg  5 mg Oral Q8H PRN [facility] MAEGAN DINERO - CNP   5 mg at 03/12/20 1539    opium-belladonna (B&O SUPPRETTES) 16.2-30 MG suppository 30 mg  30 mg Rectal Q8H PRN [facility] BAR, APRN - CNP        0.9 % sodium chloride infusion   Intravenous Continuous Puthiyapurayil Eloise Flores  mL/hr at 03/12/20 1633      sodium chloride flush 0.9 % injection 10 mL  10 mL Intravenous 2 times per day Allison Luong MD        sodium chloride flush 0.9 % injection 10 mL  10 mL Intravenous PRN Alliosn Luong MD        acetaminophen (TYLENOL) tablet 650 mg  650 mg Oral Q6H PRN Allison Luong MD   650 mg at 03/12/20 1030    Or    acetaminophen (TYLENOL) suppository 650 mg  650 mg Rectal Q6H PRN Allison Luong MD        polyethylene glycol (GLYCOLAX) packet 17 g  17 g Oral Daily PRN Allison Luong MD        promethazine (PHENERGAN) tablet 12.5 mg  12.5 mg Oral Q6H PRN Allison Luong MD        Or    ondansetron (ZOFRAN) injection 4 mg  4 mg Intravenous Q6H PRN Allison Luong MD        albuterol sulfate  (90 Base) MCG/ACT inhaler 2 puff  2 puff Inhalation Q6H PRN Allison Luong MD        Arformoterol Tartrate (BROVANA) nebulizer solution 15 mcg  15 mcg Nebulization BID Allison Suárez MD   15 mcg at 03/12/20 0619    budesonide (PULMICORT) nebulizer suspension 250 mcg  250 mcg Nebulization BID Allison Luong MD   250 mcg at 03/12/20 3010    furosemide (LASIX) tablet 40 mg  40 mg Oral BID Allison Luong MD   40 mg at 03/12/20 1026    tiotropium (SPIRIVA RESPIMAT) 2.5 MCG/ACT inhaler 2 puff  2 puff Inhalation Daily [address], MD   2 puff at 03/12/20 0623    potassium chloride (KLOR-CON M) extended release tablet 10 mEq  10 mEq Oral BID Allison Suárez MD   10 mEq at 03/12/20 1026    metoprolol tartrate (LOPRESSOR) tablet 25 mg  25 mg Oral  Martínez Perrin MD   25 mg at 03/12/20 1026    lisinopril (PRINIVIL;ZESTRIL) Childbirth    Other Father [de-identified]        Natural causes     Social History     Socioeconomic History    Marital status:      Spouse name: Not on file    Number of children: 2    Years of education: Not on file    Highest education level: Not on file   Occupational History    Not on file   Social Needs    Financial resource strain: Not hard at all   [place] insecurity     Worry: Never true     Inability: Never true   [email or web address] needs     Medical: No     Non-medical: No   Tobacco Use    Smoking status: Never Smoker    Smokeless tobacco: Never Used   Substance and Sexual Activity    Alcohol use: Yes     Alcohol/week: 1.0 standard drinks     Types: 1 Glasses of wine per week     Comment: Glass of wine with dinner.  Drug use: No    Sexual activity: Never   Lifestyle    Physical activity     Days per week: 0 days     Minutes per session: 0 min    Stress: Not at all   Relationships    Social connections     Talks on phone: Once a week     Gets together: Once a week     Attends [employer] service: Not on file     Active member of club or organization: No     Attends meetings of clubs or organizations: Never     Relationship status:     Intimate partner violence     Fear of current or ex partner: Not on file     Emotionally abused: Not on file     Physically abused: Not on file     Forced sexual activity: Not on file   Other Topics Concern    Not on file   Social History Narrative    Not on file     ROS:   Constitutional: Denies any recent wt change. Eyes:  Denies any blurring or double vision, no glaucoma  Ears/Nose/Mouth/Throat:  Denies any chronic sinus/rhinitis, bleeding gums  Cardiovascular:  As described above.     Respiratory:  Denies any frequent cough, wheezing or coughing up blood  Genitourinary:  Denies difficulty with urination and kidney stones  Gastrointestinal:  Denies any chronic problems with abdominal pain, nausea, vomiting or diarrhea  Musculoskeletal:  Denies any joint pain, back pain, or difficulty walking  Integumentary:  Denies any rash  Neurological:  No numbness or tingling  Endocrine:  Denies any polydipsia. Hematologic/Lymphatic:  Denies any hemorrhage or lymphatic drainage problems. Labs:  CBC:   Recent Labs     03/11/20  1135 03/12/20  0530   WBC 9.5 9.5   HGB 12.6* 11.7*   HCT 38.8* 36.7*   MCV 85.1 86.6    209     BMP:   Recent Labs     03/11/20  1135 03/12/20  0530    141   K 3.8 3.7   CL 96* 101   CO2 27 26   BUN 11 10   CREATININE 0.8 0.9   MG 2.0  --      Accucheck Glucoses:   Recent Labs     03/11/20  1809 03/11/20  2023 03/12/20  0828 03/12/20  1201 03/12/20  1714   POCGLU 105 137* 132* 139* 120*     Cardiac Enzymes: No results for input(s): CKTOTAL, CKMB, CKMBINDEX, TROPONINI in the last 72 hours. PT/INR:   Recent Labs     03/11/20  1816 03/12/20  0530   INR 2.17* 2.01*     APTT: No results for input(s): APTT in the last 72 hours.   Liver Profile:  Lab Results   Component Value Date    AST 30 03/11/2020    ALT 23 03/11/2020    BILIDIR <0.2 03/11/2020    BILITOT 0.4 03/11/2020    ALKPHOS 49 03/11/2020     Lab Results   Component Value Date    CHOL 112 04/18/2019    HDL 35 04/18/2019    HDL 33 11/30/2011    TRIG 222 04/18/2019     TSH:   Lab Results   Component Value Date    TSH 1.860 08/20/2019     UA:   Lab Results   Component Value Date    NITRITE negative 01/31/2020    COLORU YELLOW 03/11/2020    PHUR 5.5 03/11/2020    LABCAST 4-8 HYALINE 03/15/2019    LABCAST NONE SEEN 03/15/2019    WBCUA 10-15 03/11/2020    RBCUA > 100 03/11/2020    YEAST NONE SEEN 03/11/2020    BACTERIA MODERATE 03/11/2020    CLARITYU clear 01/31/2020    SPECGRAV 1.025 01/31/2020    SPECGRAV 1.016 03/15/2019    LEUKOCYTESUR MODERATE 03/11/2020    UROBILINOGEN 0.2 03/11/2020    BILIRUBINUR NEGATIVE 03/11/2020    BILIRUBINUR negative 01/31/2020    BLOODU LARGE 03/11/2020    GLUCOSEU NEGATIVE 03/11/2020    AMORPHOUS URATES 03/11/2020         Physical Exam:  Vitals:    03/12/20 possibility of cameron-operative cardiac event, including but not limited to MI, VT/VF, cardiac arrest, and death, and wishes to proceed with the procedure. Thank you for allowing us to participate in the care of this patient. Please do not hesitate to call us with questions.     Electronically signed by Scar James MD on 3/12/2020 at 6:31 PM    Interventional Cardiology - [facility]

## 2020-03-12 NOTE — CONSULTS
Urology Consult Note      Reason for Consult:  \"urinary retention\"  Requesting Physician:  Dr. Shakira Mcbride    History Obtained From:  patient, electronic medical record    Chief Complaint:     HISTORY OF PRESENT ILLNESS:                The patient is a 80 y.o. male with recent dysuria, increased LUTS, and urinary retention. Had felipe placed by ER on 3/5/20 with return of 1800 mls of urine. He saw Dr. Mingo Villarreal in the office 3/10/20 and catheter was continued. Cardura increased to 8 mg nightly with f/u in 2 weeks. Considering Greenlight laser TURP if no improvement in retention with increased Cardura. Pt presented to ER yesterday secondary to his catheter coming apart from the back and he was unable to put it back together. He was admitted for observation and urology consulted. CT with prominent prostate and catheter in bladder. Pt reports occasional pain in rectal area. Denies significant abdominal pain. No fever/chills. Notes burning at tip of penis. No hematuria.      Past Medical History:        Diagnosis Date    Anxiety     CAD (coronary artery disease)     leaking valve    Chronic back pain     COPD (chronic obstructive pulmonary disease) (HCC)     Detached retina     Diabetes mellitus (HCC)     NIDDM    [facility] thrombosis[facility] ([facility]     RLE    DVT (deep venous thrombosis) ([facility] [address] 11/9/15    LLE    Glaucoma     Hyperlipidemia     Hypertension     Mass of chest     benign chest mass, Dr Hilary Spencer Movement disorder     spinal stenosis    Obesity     Osteoarthritis     right ankle, right hand    Pneumonia     Sleep apnea 1993    on BiPap, Dr Chente Spencer Thyroid cancer [facility])     s/p thyroid resection    Urinary incontinence      Past Surgical History:        Procedure Laterality Date   [facility] SURGERY  [id number], 2014    BRONCHOSCOPY N/A 8/29/2017    BRONCHOSCOPY AIRWAY ONLY performed by Robinson Wilson MD at [facility]    EKG 12-LEAD  11/10/2015         FRACTURE SURGERY      right at this facility use dose modulation, iterative reconstruction, and/or weight-based dosing when appropriate to reduce radiation dose to as low as reasonably achievable.       FINDINGS:       Lung bases are clear. Mild fatty infiltration of the liver. Pancreas gallbladder adrenal glands spleen and kidneys are unremarkable no hydronephrosis. Normal caliber abdominal aorta. Atherosclerotic changes. Scattered stool in the colon. Multiple colonic diverticuli. No evidence for diverticulitis. Decompressed sigmoid colonic loop closely abutting a decompressed bladder limiting evaluation   Normal appendix. There is prominent prostate gland with coarse calcifications. Correlation with serology is advised. Billy catheter bulb appears to be within a collapsed bladder. Correlation is advised. There is mild infiltration in the pelvis which is nonspecific. No acute osseous findings. Degenerative changes lumbar spine and pelvis. Infrarenal IVC filter.           Impression       1. Scattered stool in the colon. Multiple colonic diverticuli. No evidence for diverticulitis. 2. There is prominent prostate gland with coarse calcifications.  Billy catheter bulb appears to be within a collapsed bladder. Decompressed sigmoid colonic loop closely abutting a decompressed bladder limiting evaluation. There is mild infiltration in    the pelvis which is nonspecific. Correlation with urinalysis is advised. Underlying fistula cannot be entirely excluded. Appearance is similar to prior.         IMPRESSION:   BPH with Urinary retention  Enteric GNB uti  Recent candiduria  Catheter malfunction    Plan:      F/u as scheduled 3/17/20. Oxybutynin for bladder spasms--stop 48 hours prior to appt. Continue cardura 8 mg nightly. Broad spectrum anbx at d/c--follow culture results. [id number] [address] for d/c from urology standpoint.       Thank you for including us in the care of [facility], APRN  03/12/20 11:44

## 2020-03-12 NOTE — PROGRESS NOTES
Clinical Pharmacy Note    Sonia Lerma is a 80 y.o. male for whom pharmacy has been asked to manage warfarin therapy. Reason for Admission: urinary retention - with felipe catheter     Consulting Physician: Dr. Korin Tolentino  Warfarin dose prior to admission: 7.5 mg W and 5 mg [facility]  Warfarin indication: recurrent DVT  Target INR range: 2-3   Outpatient warfarin provider: [facility]     Past Medical History:   Diagnosis Date    Anxiety     CAD (coronary artery disease)     leaking valve    Chronic back pain     [facility] obstructive [facility] disease) [facility]     Detached retina     Diabetes mellitus ([facility]     NIDDM    DVT (deep venous thrombosis) [facility]     RLE    DVT (deep venous thrombosis) ([facility] 11/9/15    LLE    Glaucoma     Hyperlipidemia     Hypertension     Mass of chest     benign chest mass, Dr Rudy Trejo    Movement disorder     spinal stenosis    Obesity     Osteoarthritis     right ankle, right hand    Pneumonia     Sleep apnea 1993    on BiPap, Dr Jo Holland Thyroid cancer [facility])     s/p thyroid resection    Urinary incontinence                 Recent Labs     03/11/20  1816   INR 2.17*     Recent Labs     03/11/20  1135   HGB 12.6*   HCT 38.8*          Current warfarin drug-drug interactions: fluconazole, levothyroxine (HM)       Date INR Warfarin Dose   3/11/2020 2.17 None (7.5 mg taken today prior to admission)                                   Daily PT/INR until stable within therapeutic range. Thank you for the consult.      Duane Hamman, PharmD, BCPS  3/11/2020 8:25 PM

## 2020-03-12 NOTE — PROGRESS NOTES
Hospitalist -progress    Patient: Yoli Morel    Unit/Bed:8B-22/022-A  YOB: 1931  MRN: [id number]   Acct: [de-identified]   PCP: Gary Zuñiga MD    Date of Service: Pt seen/examined on 03/12/20  and Admitted to Observation with expected LOS less than two midnights due to medical therapy. Chief Complaint: Billy catheter fell off    Assessment and Plan:-  1. Urine retention secondary to BPH and with chronic UTI secondary to indwelling Billy catheter-Billy catheter readjusted in the ER still has some pain-admit under observation for pain control, Cardura dose was was increased by the urologist recently-we will see what the cultures are growing so far we have Candida with Diflucan oral.  Also added empirically ceftriaxone for frequency and urgency  3/12--discussed with urologist damien for discharge from the standpoint with discussion of TURP as outpatient. POA would like patients clearance for surgery done while in the hospital and possible TURP while he is in the hospital.  Discussed with the urology NP--> plans to do TURP as inpatient will consult cardiology for clearance-last echo in 2019 with ejection fraction 55% and grade 1 diastolic dysfunction.-Does have chronically elevated troponin, latest EKG normal sinus rhythm, old anteroseptal infarct. 2. History of chronic bronchitis and stage III severe COPD--currently not in exacerbation-continue home inhalers-on Brovana, Pulmicort nebulizer, tiotropium, albuterol HFA  3. History of type 2 diabetes-on the Glucophage continue will add insulin sliding scale and check sugars every meal as well as at night  4. History of hypothyroidism on levothyroxine-continue  5. History of benign essential hypertension-on lisinopril, Lopressor-continue  6. History of proximal atrial fibrillation-on Lopressor as well as Coumadin consult pharmacy for dosing  7.  History of chronic diastolic heart failure-on Lasix oral-continue currently compensated      History Of INR 2.17* 2.01*     No results for input(s): Robert Shepard in the last 72 hours. Urinalysis:    Lab Results   Component Value Date    NITRU POSITIVE 03/11/2020    WBCUA 10-15 03/11/2020    BACTERIA MODERATE 03/11/2020    RBCUA > 100 03/11/2020    BLOODU LARGE 03/11/2020    SPECGRAV 1.025 01/31/2020    SPECGRAV 1.016 03/15/2019    GLUCOSEU NEGATIVE 03/11/2020       Radiology:   CT ABDOMEN PELVIS W IV CONTRAST Additional Contrast? None   Final Result      1. Scattered stool in the colon. Multiple colonic diverticuli. No evidence for diverticulitis. 2. There is prominent prostate gland with coarse calcifications. Billy catheter bulb appears to be within a collapsed bladder. Decompressed sigmoid colonic loop closely abutting a decompressed bladder limiting evaluation. There is mild infiltration in    the pelvis which is nonspecific. Correlation with urinalysis is advised. Underlying fistula cannot be entirely excluded. Appearance is similar to prior. **This report has been created using voice recognition software. It may contain minor errors which are inherent in voice recognition technology. **      Final report electronically signed by Dr. Kuldeep Anderson on 3/11/2020 1:42 PM      CT HEAD WO CONTRAST   Final Result   No acute intracranial findings. **This report has been created using voice recognition software. It may contain minor errors which are inherent in voice recognition technology. **      Final report electronically signed by Dr. Kuldeep Anderson on 3/11/2020 1:34 PM      XR CHEST STANDARD (2 VW)   Final Result   1. 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 2. Mild cardiac megaly. Metallic sternotomy sutures and vascular clips from prior surgery. 3. Tiny bilateral pleural effusions. Mild bibasilar atelectasis/pneumonia. **This report has been created using voice recognition software.   It may contain minor errors which are inherent in voice recognition administration of intravenous and oral contrast. Coronal and sagittal reconstructions were obtained. All CT scans at this facility use dose modulation, iterative reconstruction, and/or weight-based dosing when appropriate to reduce radiation dose to as low as reasonably achievable. FINDINGS: Lung bases are clear. Mild fatty infiltration of the liver. Pancreas gallbladder adrenal glands spleen and kidneys are unremarkable no hydronephrosis. Normal caliber abdominal aorta. Atherosclerotic changes. Scattered stool in the colon. Multiple colonic diverticuli. No evidence for diverticulitis. Decompressed sigmoid colonic loop closely abutting a decompressed bladder limiting evaluation Normal appendix. There is prominent prostate gland with coarse calcifications. Correlation with serology is advised. Billy catheter bulb appears to be within a collapsed bladder. Correlation is advised. There is mild infiltration in the pelvis which is nonspecific. No acute osseous findings. Degenerative changes lumbar spine and pelvis. Infrarenal IVC filter. 1. Scattered stool in the colon. Multiple colonic diverticuli. No evidence for diverticulitis. 2. There is prominent prostate gland with coarse calcifications. Billy catheter bulb appears to be within a collapsed bladder. Decompressed sigmoid colonic loop closely abutting a decompressed bladder limiting evaluation. There is mild infiltration in the pelvis which is nonspecific. Correlation with urinalysis is advised. Underlying fistula cannot be entirely excluded. Appearance is similar to prior. **This report has been created using voice recognition software. It may contain minor errors which are inherent in voice recognition technology. ** Final report electronically signed by Dr. Senthil Grace on 3/11/2020 1:42 PM        Electronically signed by Marilyn Cárdenas MD on 3/12/2020 at 7:38 PM

## 2020-03-12 NOTE — TELEPHONE ENCOUNTER
Prescription Refill Request    [facility] is requesting a refill of ditropan, 5 mg, taken every 8 hours prn  Last prescribed on 03/12/2020    Last office visit 03/10/2020 with provider Dr Whit Monroe  Next office visit 03/17/2020 with provider Arlene Martinez CNP    Please send refill to [facility]    Thank you.

## 2020-03-12 NOTE — CARE COORDINATION
DISCHARGE/PLANNING EVALUATION  3/12/20, 11:50 AM EDT    Reason for Referral:  \"please assess for discharge/home needs\"  Mental Status:  Alert and oriented   Decision Making:  Makes his decisions, his son, Radha Camacho, is listed as his POA  Family/Social/Home Environment:  SW spoke to patient. He lives alone in a one story home with basement where his laundry room is located. He states climbing the steps has been getting more difficult. He takes care of all cleaning and this is getting too difficult as well. He states he has contacted several agencies ([place] [facility]) and he makes too much money for this assist.  He has a bathtub shower with seat and grab bar. He has a wheeled walker and cane. He uses o2 at night and has a c-pap and gets this thru [facility]. He has 2 sons, one lives locally the other lives out of state  Current Services including food security, transportation and housekeeping:   See above  Current Equipment:  See above  Payment Source:  Medicare and [employer]  Concerns or Barriers to Discharge: We discussed his  needs. OZZIE advised that this will be something checked on for him  Post acute provider list with quality measures, geographic area and applicable managed care information provided. Questions regarding selection process answered: We discussed HH and he was agreeable to this again, has had [facility]. OZZIE spoke to Areli with [facility] and they last seen 2019    Teach Back Method used with patient regarding care plan and needs  Patient verbalize understanding of the plan of care and contribute to goal setting. Patient goals, treatment preferences and discharge plan: At this time he would like to return home. He would benefit from [facility] again. Will refer once ordered.     Electronically signed by ZEKE Cantu on 3/12/2020 at 11:50 AM

## 2020-03-13 ENCOUNTER — TELEPHONE (OUTPATIENT)
Dept: UROLOGY | Age: 85
End: 2020-03-13

## 2020-03-13 LAB
ALLEN TEST: POSITIVE
ANION GAP SERPL CALCULATED.3IONS-SCNC: 13 MEQ/L (ref 8–16)
BASE EXCESS (CALCULATED): 3.6 MMOL/L (ref -2.5–2.5)
BASOPHILS # BLD: 0.6 %
BASOPHILS ABSOLUTE: 0 THOU/MM3 (ref 0–0.1)
BUN BLDV-MCNC: 7 MG/DL (ref 7–22)
CALCIUM SERPL-MCNC: 7.9 MG/DL (ref 8.5–10.5)
CHLORIDE BLD-SCNC: 104 MEQ/L (ref 98–111)
CO2: 24 MEQ/L (ref 23–33)
COLLECTED BY:: ABNORMAL
CREAT SERPL-MCNC: 0.8 MG/DL (ref 0.4–1.2)
DEVICE: ABNORMAL
EOSINOPHIL # BLD: 1.7 %
EOSINOPHILS ABSOLUTE: 0.1 THOU/MM3 (ref 0–0.4)
ERYTHROCYTE [DISTWIDTH] IN BLOOD BY AUTOMATED COUNT: 14.7 % (ref 11.5–14.5)
ERYTHROCYTE [DISTWIDTH] IN BLOOD BY AUTOMATED COUNT: 46.7 FL (ref 35–45)
GFR SERPL CREATININE-BSD FRML MDRD: > 90 ML/MIN/1.73M2
GLUCOSE BLD-MCNC: 114 MG/DL (ref 70–108)
GLUCOSE BLD-MCNC: 124 MG/DL (ref 70–108)
GLUCOSE BLD-MCNC: 128 MG/DL (ref 70–108)
GLUCOSE BLD-MCNC: 132 MG/DL (ref 70–108)
GLUCOSE BLD-MCNC: 133 MG/DL (ref 70–108)
HCO3: 29 MMOL/L (ref 23–28)
HCT VFR BLD CALC: 38.2 % (ref 42–52)
HEMOGLOBIN: 12.1 GM/DL (ref 14–18)
IMMATURE GRANS (ABS): 0.03 THOU/MM3 (ref 0–0.07)
IMMATURE GRANULOCYTES: 0.4 %
INR BLD: 1.59 (ref 0.85–1.13)
LYMPHOCYTES # BLD: 21.1 %
LYMPHOCYTES ABSOLUTE: 1.6 THOU/MM3 (ref 1–4.8)
MAGNESIUM: 1.9 MG/DL (ref 1.6–2.4)
MCH RBC QN AUTO: 27.5 PG (ref 26–33)
MCHC RBC AUTO-ENTMCNC: 31.7 GM/DL (ref 32.2–35.5)
MCV RBC AUTO: 86.8 FL (ref 80–94)
MONOCYTES # BLD: 9.2 %
MONOCYTES ABSOLUTE: 0.7 THOU/MM3 (ref 0.4–1.3)
NUCLEATED RED BLOOD CELLS: 0 /100 WBC
O2 SATURATION: 92 %
ORGANISM: ABNORMAL
PCO2: 44 MMHG (ref 35–45)
PH BLOOD GAS: 7.42 (ref 7.35–7.45)
PLATELET # BLD: 224 THOU/MM3 (ref 130–400)
PMV BLD AUTO: 9.6 FL (ref 9.4–12.4)
PO2: 64 MMHG (ref 71–104)
POTASSIUM REFLEX MAGNESIUM: 3.3 MEQ/L (ref 3.5–5.2)
RBC # BLD: 4.4 MILL/MM3 (ref 4.7–6.1)
SEG NEUTROPHILS: 67 %
SEGMENTED NEUTROPHILS ABSOLUTE COUNT: 5.2 THOU/MM3 (ref 1.8–7.7)
SODIUM BLD-SCNC: 141 MEQ/L (ref 135–145)
SOURCE, BLOOD GAS: ABNORMAL
URINE CULTURE REFLEX: ABNORMAL
WBC # BLD: 7.7 THOU/MM3 (ref 4.8–10.8)

## 2020-03-13 PROCEDURE — 83735 ASSAY OF MAGNESIUM: CPT

## 2020-03-13 PROCEDURE — 94761 N-INVAS EAR/PLS OXIMETRY MLT: CPT

## 2020-03-13 PROCEDURE — 6370000000 HC RX 637 (ALT 250 FOR IP): Performed by: PHARMACIST

## 2020-03-13 PROCEDURE — 82948 REAGENT STRIP/BLOOD GLUCOSE: CPT

## 2020-03-13 PROCEDURE — 36415 COLL VENOUS BLD VENIPUNCTURE: CPT

## 2020-03-13 PROCEDURE — 85610 PROTHROMBIN TIME: CPT

## 2020-03-13 PROCEDURE — 6360000002 HC RX W HCPCS: Performed by: INTERNAL MEDICINE

## 2020-03-13 PROCEDURE — 6370000000 HC RX 637 (ALT 250 FOR IP): Performed by: PSYCHIATRY & NEUROLOGY

## 2020-03-13 PROCEDURE — 36600 WITHDRAWAL OF ARTERIAL BLOOD: CPT

## 2020-03-13 PROCEDURE — 2580000003 HC RX 258: Performed by: INTERNAL MEDICINE

## 2020-03-13 PROCEDURE — APPNB60 APP NON BILLABLE TIME 46-60 MINS: Performed by: PHYSICIAN ASSISTANT

## 2020-03-13 PROCEDURE — 99233 SBSQ HOSP IP/OBS HIGH 50: CPT | Performed by: PHYSICIAN ASSISTANT

## 2020-03-13 PROCEDURE — 99232 SBSQ HOSP IP/OBS MODERATE 35: CPT | Performed by: NURSE PRACTITIONER

## 2020-03-13 PROCEDURE — 6370000000 HC RX 637 (ALT 250 FOR IP): Performed by: INTERNAL MEDICINE

## 2020-03-13 PROCEDURE — 94660 CPAP INITIATION&MGMT: CPT

## 2020-03-13 PROCEDURE — 2700000000 HC OXYGEN THERAPY PER DAY

## 2020-03-13 PROCEDURE — 1200000003 HC TELEMETRY R&B

## 2020-03-13 PROCEDURE — 82803 BLOOD GASES ANY COMBINATION: CPT

## 2020-03-13 PROCEDURE — 90792 PSYCH DIAG EVAL W/MED SRVCS: CPT | Performed by: PSYCHIATRY & NEUROLOGY

## 2020-03-13 PROCEDURE — 85025 COMPLETE CBC W/AUTO DIFF WBC: CPT

## 2020-03-13 PROCEDURE — 94640 AIRWAY INHALATION TREATMENT: CPT

## 2020-03-13 PROCEDURE — 51798 US URINE CAPACITY MEASURE: CPT

## 2020-03-13 PROCEDURE — 80048 BASIC METABOLIC PNL TOTAL CA: CPT

## 2020-03-13 RX ORDER — WARFARIN SODIUM 7.5 MG/1
7.5 TABLET ORAL
Status: COMPLETED | OUTPATIENT
Start: 2020-03-13 | End: 2020-03-13

## 2020-03-13 RX ORDER — QUETIAPINE FUMARATE 25 MG/1
25 TABLET, FILM COATED ORAL NIGHTLY PRN
Status: DISCONTINUED | OUTPATIENT
Start: 2020-03-13 | End: 2020-03-25 | Stop reason: HOSPADM

## 2020-03-13 RX ORDER — DIPHENHYDRAMINE HYDROCHLORIDE 50 MG/ML
25 INJECTION INTRAMUSCULAR; INTRAVENOUS EVERY 6 HOURS PRN
Status: DISCONTINUED | OUTPATIENT
Start: 2020-03-13 | End: 2020-03-13

## 2020-03-13 RX ORDER — LANOLIN ALCOHOL/MO/W.PET/CERES
3 CREAM (GRAM) TOPICAL NIGHTLY PRN
Status: DISCONTINUED | OUTPATIENT
Start: 2020-03-13 | End: 2020-03-25 | Stop reason: HOSPADM

## 2020-03-13 RX ORDER — DIPHENHYDRAMINE HYDROCHLORIDE 50 MG/ML
25 INJECTION INTRAMUSCULAR; INTRAVENOUS EVERY 6 HOURS PRN
Status: DISCONTINUED | OUTPATIENT
Start: 2020-03-13 | End: 2020-03-25 | Stop reason: HOSPADM

## 2020-03-13 RX ADMIN — METOPROLOL TARTRATE 25 MG: 25 TABLET, FILM COATED ORAL at 20:49

## 2020-03-13 RX ADMIN — POTASSIUM CHLORIDE 10 MEQ: 1500 TABLET, EXTENDED RELEASE ORAL at 20:49

## 2020-03-13 RX ADMIN — POTASSIUM CHLORIDE 10 MEQ: 1500 TABLET, EXTENDED RELEASE ORAL at 10:26

## 2020-03-13 RX ADMIN — FUROSEMIDE 40 MG: 40 TABLET ORAL at 20:48

## 2020-03-13 RX ADMIN — Medication 2 PUFF: at 06:37

## 2020-03-13 RX ADMIN — Medication 1000 MG: at 10:25

## 2020-03-13 RX ADMIN — ARFORMOTEROL TARTRATE 15 MCG: 15 SOLUTION RESPIRATORY (INHALATION) at 06:36

## 2020-03-13 RX ADMIN — TIOTROPIUM BROMIDE INHALATION SPRAY 2 PUFF: 3.12 SPRAY, METERED RESPIRATORY (INHALATION) at 06:36

## 2020-03-13 RX ADMIN — BUDESONIDE 250 MCG: 0.25 INHALANT RESPIRATORY (INHALATION) at 06:36

## 2020-03-13 RX ADMIN — SODIUM CHLORIDE: 9 INJECTION, SOLUTION INTRAVENOUS at 12:22

## 2020-03-13 RX ADMIN — CEFTRIAXONE SODIUM 1 G: 1 INJECTION, POWDER, FOR SOLUTION INTRAMUSCULAR; INTRAVENOUS at 15:42

## 2020-03-13 RX ADMIN — LEVOTHYROXINE SODIUM 175 MCG: 150 TABLET ORAL at 06:25

## 2020-03-13 RX ADMIN — FLUCONAZOLE 200 MG: 200 TABLET ORAL at 10:18

## 2020-03-13 RX ADMIN — METOPROLOL TARTRATE 25 MG: 25 TABLET, FILM COATED ORAL at 10:25

## 2020-03-13 RX ADMIN — FUROSEMIDE 40 MG: 40 TABLET ORAL at 10:24

## 2020-03-13 RX ADMIN — QUETIAPINE FUMARATE 25 MG: 25 TABLET ORAL at 20:45

## 2020-03-13 RX ADMIN — LISINOPRIL 5 MG: 5 TABLET ORAL at 10:18

## 2020-03-13 RX ADMIN — WARFARIN SODIUM 7.5 MG: 7.5 TABLET ORAL at 18:02

## 2020-03-13 RX ADMIN — DOXAZOSIN MESYLATE 8 MG: 4 TABLET ORAL at 20:47

## 2020-03-13 RX ADMIN — BUDESONIDE 250 MCG: 0.25 INHALANT RESPIRATORY (INHALATION) at 17:26

## 2020-03-13 RX ADMIN — Medication 1000 MG: at 18:03

## 2020-03-13 ASSESSMENT — PAIN DESCRIPTION - PAIN TYPE: TYPE: ACUTE PAIN

## 2020-03-13 ASSESSMENT — PAIN DESCRIPTION - ORIENTATION: ORIENTATION: RIGHT

## 2020-03-13 ASSESSMENT — PAIN SCALES - GENERAL
PAINLEVEL_OUTOF10: 3
PAINLEVEL_OUTOF10: 0

## 2020-03-13 ASSESSMENT — PAIN DESCRIPTION - DESCRIPTORS: DESCRIPTORS: SORE;TENDER

## 2020-03-13 ASSESSMENT — PAIN DESCRIPTION - LOCATION: LOCATION: GROIN

## 2020-03-13 NOTE — PROGRESS NOTES
RN called and updated Son, Mei Lara of patient's new onset confusion and suicidal ideations. Notified son of sitter present in room, suicide precautions, psych consult and plan of care. Mei Lara verbalized understanding.

## 2020-03-13 NOTE — FLOWSHEET NOTE
03/13/20 0328   Provider Notification   Reason for Communication Review case   Provider Name jethro carrasco   Provider Notification Nurse Practitioner   Method of Communication Secure Message   Response Waiting for response   Notification Time 0087     Notified that patient had felipe placed for urinary retention and in a confused stated pulled on it and disconnected the tubing, new onset bleeding noted at site. Awaiting response. Order for bladder scan. 0430: notified of bladder scan results and current urine appearance. No new orders, however, notified to continue to watch his urine closely and to notify if it stops draining.

## 2020-03-13 NOTE — PROGRESS NOTES
Hospitalist Progress Note    Patient:  Cat Zeng    Unit/Bed:8B-22/022-A  YOB: 1931  MRN: [id number]   Acct: [de-identified]   PCP: Ale Lynch MD  Code Status: Limited  Date of Admission: 3/11/2020    Expected Discharge: TURP 3/17. Disposition: TBD, SW assisting. Assessment/Plan:    Urinary retention: Secondary to BPH. Urology consult appreciated - plan for TURP 3/17. Doxazosin increased per urology. Cardiology consulted for pre-op clearance. Chronic cystitis: Secondary to chronic felipe. Culture (+) E coli. Sensitive to ceftriaxone, will continue. Candida reported previously, s/p diflucan x3 days. Chronic bronchitis, stage III severe COPD: without exacerbation. Continue home inhalers - on Brovana, Pulmicort nebulizer, tiotropium, albuterol. NIDDMII: on metformin, SSI. Accu-checks. Adjust insulin per ssi requirements. Hx hypothyroidism: levothyroxine cont. Essential HTN: on lisinopril, lopressor. Hx PAF: rate controlled. On coumadin, pharm to dose. Chronic diastolic HF: compensated. Echo 3/2019 EF 94%, grade I diastolic dysfunction. Lasix PO continued. AMS: per nursing, pt became disoriented overnight and had suicidal thoughts. Per family, no known hx but pt lives alone. Pt is AOx4 at this time, denies depression or suicidal ideations. Psych consulted. CT head with no acute findings. Likely early signs of dementia. Physical deconditioning: PT/OT, Sw to assist with placement. Chief Complaint: felipe cath fell off    HPI / Hospital Course: this is a 80-year-old gentleman with past medical history as described below who has indwelling Felipe catheter placed for his BPH for which he follows Levi Mcmullen the urologist as outpatient. Recently was seen by urologist in the clinic and his Cardura dose was doubled and was asked to continue his Felipe catheter with a PVR to be done as outpatient in 3 weeks time.   Today his Felipe catheter was displaced 3/13/2020 1758  Last data filed at 3/13/2020 1614  Gross per 24 hour   Intake 3167.83 ml   Output 3775 ml   Net -607.17 ml       Diet:  DIET CARB CONTROL; Safety Tray; Safety Tray (Disposables)    Exam:  BP (!) 109/55   Pulse 59   Temp 98.5 °F (36.9 °C) (Oral)   Resp 16   Ht 5' 7\" (1.702 m)   Wt 261 lb (118.4 kg)   SpO2 94%   BMI 40.88 kg/m²   General:   Pleasant male. NAD. HEENT:  normocephalic and atraumatic. No scleral icterus. PERR. Neck: supple. No JVD. No thyromegaly. Lungs: clear to auscultation. No retractions  Cardiac: RRR without murmur. Abdomen: soft. Nontender. Bowel sounds positive. Extremities:  No clubbing, cyanosis, or edema x 4. Vasculature: capillary refill < 3 seconds. Palpable LE pulses bilaterally. Skin:  warm and dry. Psych:  Alert and oriented x3. Affect appropriate  Lymph:  No supraclavicular adenopathy. Neurologic:  No focal deficit. No seizures. Data: (All radiographs, tracings, PFTs, and imaging are personally viewed and interpreted unless otherwise noted)  Labs:   Recent Labs     03/11/20  1135 03/12/20  0530 03/13/20  0947   WBC 9.5 9.5 7.7   HGB 12.6* 11.7* 12.1*   HCT 38.8* 36.7* 38.2*    209 224     Recent Labs     03/11/20  1135 03/12/20  0530 03/13/20  0947    141 141   K 3.8 3.7 3.3*   CL 96* 101 104   CO2 27 26 24   BUN 11 10 7   CREATININE 0.8 0.9 0.8   CALCIUM 8.9 8.1* 7.9*     Recent Labs     03/11/20  1135   AST 30   ALT 23   BILIDIR <0.2   BILITOT 0.4   ALKPHOS 49     Recent Labs     03/11/20  1816 03/12/20  0530 03/13/20  0434   INR 2.17* 2.01* 1.59*     No results for input(s): Nicol Davis in the last 72 hours.   Urinalysis:   Lab Results   Component Value Date    NITRU POSITIVE 03/11/2020    WBCUA 10-15 03/11/2020    BACTERIA MODERATE 03/11/2020    RBCUA > 100 03/11/2020    BLOODU LARGE 03/11/2020    SPECGRAV 1.025 01/31/2020    SPECGRAV 1.016 03/15/2019    GLUCOSEU NEGATIVE 03/11/2020     Urine culture:   Lab Results recognition software. It may contain minor errors which are inherent in voice recognition technology. **      Final report electronically signed by Dr. Hector Tyler on 3/11/2020 1:34 PM      XR CHEST STANDARD (2 VW)   Final Result   1. 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 2. Mild cardiac megaly. Metallic sternotomy sutures and vascular clips from prior surgery. 3. Tiny bilateral pleural effusions. Mild bibasilar atelectasis/pneumonia. **This report has been created using voice recognition software. It may contain minor errors which are inherent in voice recognition technology. **      Final report electronically signed by Dr. Manas Herron on 3/11/2020 1:22 PM        Xr Chest Standard (2 Vw)    Result Date: 3/11/2020  PROCEDURE: XR CHEST (2 VW) CLINICAL INFORMATION: Cough COMPARISON: 3/16/2019 TECHNIQUE: AP upright and lateral views of the chest were obtained. 1. 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 2. Mild cardiac megaly. Metallic sternotomy sutures and vascular clips from prior surgery. 3. Tiny bilateral pleural effusions. Mild bibasilar atelectasis/pneumonia. **This report has been created using voice recognition software. It may contain minor errors which are inherent in voice recognition technology. ** Final report electronically signed by Dr. Manas Herron on 3/11/2020 1:22 PM    Ct Head Wo Contrast    Result Date: 3/11/2020  PROCEDURE: CT HEAD WO CONTRAST CLINICAL INFORMATION: confusion. COMPARISON: No prior study. TECHNIQUE: Noncontrast 5 mm axial images were obtained through the brain. All CT scans at this facility use dose modulation, iterative reconstruction, and/or weight-based dosing when appropriate to reduce radiation dose to as low as reasonably achievable. FINDINGS: Generalized volume loss and small vessel ischemic changes. No acute hemorrhage or midline shift. Ventricles are within normal limits. Paranasal sinuses are clear.  Mastoid air

## 2020-03-13 NOTE — FLOWSHEET NOTE
03/13/20 0646   Provider Notification   Reason for Communication Review case   Provider Name sindi Graf   Provider Notification Physician Assistant   Method of Communication Secure Message   Response See orders   Notification Time 8461     Notified of no lab orders for this a.m. order received for BMP and CBC

## 2020-03-13 NOTE — PROGRESS NOTES
[address]  [address] [address]  Dept: [phone]  Loc: [phone]  Visit Date: 3/11/2020  Urology Progress Note    Chief Complaint: AMS    Subjective: \"I'm out of my head today\"  Patient is resting in bed, felipe catheter draining clear, yellow urine, +flatus, tolerating regular diet, denies any nausea or vomiting. There are complaints of no pain at this time. Vitals:  /73   Pulse 55   Temp 98.3 °F (36.8 °C) (Oral)   Resp 16   Ht 5' 7\" (1.702 m)   Wt 261 lb (118.4 kg)   SpO2 92%   BMI 40.88 kg/m²   Temp  Av.5 °F (36.9 °C)  Min: 98 °F (36.7 °C)  Max: 99.3 °F (37.4 °C)    Intake/Output Summary (Last 24 hours) at 3/13/2020 0926  Last data filed at 3/13/2020 0317  Gross per 24 hour   Intake 3473.23 ml   Output 3150 ml   Net 323.23 ml       Social History     Socioeconomic History    Marital status:      Spouse name: Not on file    Number of children: 2    Years of education: Not on file    Highest education level: Not on file   Occupational History    Not on file   Social Needs    Financial resource strain: Not hard at all   [place] insecurity     Worry: Never true     Inability: Never true   [employer] needs     Medical: No     Non-medical: No   Tobacco Use    Smoking status: Never Smoker    Smokeless tobacco: Never Used   Substance and Sexual Activity    Alcohol use: Yes     Alcohol/week: 1.0 standard drinks     Types: 1 Glasses of wine per week     Comment: Glass of wine with dinner.     Drug use: No    Sexual activity: Never   Lifestyle    Physical activity     Days per week: 0 days     Minutes per session: 0 min    Stress: Not at all   Relationships    Social connections     Talks on phone: Once a week     Gets together: Once a week     Attends Yazidism service: Not on file     Active member of club or organization: No     Attends meetings of clubs or organizations: Never     Relationship

## 2020-03-13 NOTE — CARE COORDINATION
3/13/20, 2:12 PM EDT    DISCHARGE PLANNING EVALUATION      SW received another order \"discharg needs\". Please see full assessment completed on March 12.  SW discussed the case with his nurse. She states he is not safe to be home alone due to weakness. We discussed options. She will ask for PT OT evaluations and discuss with his son when he comes in to see the patient, as he is busy at this time. The patient is scheduled for surgery on Tues. OZZIE will follow up with patient/son regarding final plan.

## 2020-03-13 NOTE — PROGRESS NOTES
Multiple times this shift, the patient has woken up and been slightly confused and attempting to get out of bed. When RN asks where patient is trying to go, he states he would like to leave the hospital closet. When RN asks where patient is he states [facility]. Patient alert and oriented x4. When told the patient is in his hospital assigned room, he looks around and states \"oh, it looked a little different than before. I'm sorry\".

## 2020-03-13 NOTE — PROGRESS NOTES
Bed alarm going off patient found to be sitting at side of bed. Urinary felipe catheter bleeding at sight. Felipe bag tubing disconnected from insertion site. New felipe bag attached. Patient disoriented to place and situation asking what he is doing in [facility] office\" and Kee Wynne is the point in all this, what are we doing\". Patient reoriented to hospital and situation. Patient c/o rectum discomfort at times, but not currently. Denies urethral pain. Patient asking \"is the end of life near\" when asked why he is asking that, he replies \"two beautiful women wasting their lives away, its a shame\" patient further stating RN and techs have a hidden agenda to destroy life and that staff is doing the devils work and its what \"we are destined to do\". Patient calm and confused. PA notified of patient new onset confusion and suicidal ideations. Suicide precautions initiated and sitter placed at bedside. VSS.

## 2020-03-13 NOTE — FLOWSHEET NOTE
03/13/20 0335   Provider Notification   Reason for Communication Review case   Provider Name sindi Choudhary   Provider Notification Physician Assistant   Method of Communication Secure Message   Response Waiting for response   Notification Time 0335     Notified of new onset confusion. And states he wants to die because he is tired of living. Order for ABGs, Psychiatry consult, suicide sitter and sitter precautions. 0430: notified of ABG results. No new orders.

## 2020-03-13 NOTE — CONSULTS
strip, TEST TWICE DAILY  Ferrous Sulfate (IRON) 325 (65 Fe) MG TABS, Take by mouth 2 times daily   Arformoterol Tartrate (BROVANA) 15 MCG/2ML NEBU, Take 1 ampule by nebulization 2 times daily Indications: Prevention of COPD Worsening   budesonide (PULMICORT) 0.25 MG/2ML nebulizer suspension, Take 1 ampule by nebulization 2 times daily   OXYGEN, Inhale 3 L into the lungs nightly Indications: Difficulty Breathing, Oxygen Therapy And prn  tiotropium (SPIRIVA RESPIMAT) 2.5 MCG/ACT AERS inhaler, Inhale 2 puffs into the lungs daily  [DISCONTINUED] metFORMIN (GLUCOPHAGE) 500 MG tablet, Take 500 mg by mouth 2 times daily (with meals)  [DISCONTINUED] Ascorbic Acid (VITAMIN C PO), Take 1 tablet by mouth daily   [DISCONTINUED] Blood Glucose Monitoring Suppl (ACCU-CHEK CHE PLUS) w/Device KIT, Check blood sugar 2 times daily  [DISCONTINUED] Insulin Pen Needle (B-D ULTRAFINE III SHORT PEN) 31G X 8 MM MISC, To use with Lantus pens to inject SQ qd. Dx: E11.9  [DISCONTINUED] AZOPT 1 % ophthalmic suspension, Place 1 drop into both eyes 3 times daily Indications: Disease of the Eye     Allergies:  Macrobid [nitrofurantoin monohyd macro]; Levaquin [levofloxacin]; Adhesive tape; and Alphagan [brimonidine tartrate]    Social History:     · RESIDENCE: Lives alone in [address] [place] [address].  Originally from [facility]  · CHILDREN: 2 adult sons  · :  after 36 years of marriage  · OCCUPATION: not working   · SUBSTANCE ABUSE: denies   · PATIENT ASSETS: family supportive       Family Psychiatric and Medical History:         Problem Relation Age of Onset    Other Mother         Complications of Childbirth    Other Father [de-identified]        Natural causes         PHYSICAL EXAM:  Vitals:  BP (!) 143/58   Pulse 57   Temp 98.7 °F (37.1 °C) (Oral)   Resp 16   Ht 5' 7\" (1.702 m)   Wt 261 lb (118.4 kg)   SpO2 92%   BMI 40.88 kg/m²       Mental Status Examination:  Level of consciousness:  Within normal limits  Appearance:  wearing hospital attire, in bed,

## 2020-03-13 NOTE — FLOWSHEET NOTE
03/13/20 0727   HEENT   Mucous Membrane Dry; Intact; Other (Comment)  (swollen)   Voice Difficulty talking   Tongue Dry;Swollen     Patient c/o dry mouth. Water provided - patient appeared to choke on water. Patient c/o difficulty talking. Upon examination tongue noted to be very dry and swollen. Dayshift RN notified and she stated she would contact the doctor. O2 saturation above 90%.

## 2020-03-13 NOTE — PROGRESS NOTES
Clinical Pharmacy Note    Warfarin consult follow-up    Recent Labs     03/13/20  0434   INR 1.59*     Recent Labs     03/11/20  1135 03/12/20  0530 03/13/20  0947   HGB 12.6* 11.7* 12.1*   HCT 38.8* 36.7* 38.2*    209 224       Significant Drug-Drug Interactions:  New warfarin drug-drug interactions: none  Discontinued drug-drug interactions: none  Current warfarin drug-drug interactions: fluconazole, levothyroxine (HM)      Date INR Warfarin Dose   3/11/2020 2.17 None (7.5 mg taken today prior to admission)   3/12/2020   2.01  5 mg   3/13/2020   1.59 7.5 mg                                         Notes:                     Daily PT/INR until stable within therapeutic range.

## 2020-03-13 NOTE — TELEPHONE ENCOUNTER
Patient currently admitted, who per Dr. Polly Dumont and Yvette HYATT needs scheduled for surgery on 03-. Cardiac clearance done by Dr. Manuel Marquez.   Surgery consent on arrival.

## 2020-03-13 NOTE — FLOWSHEET NOTE
03/13/20 0630   Provider Notification   Reason for Communication Evaluate   Provider Name Catracho   Provider Notification Physician   Method of Communication Secure Message   Response Waiting for response   Notification Time 0630     Notified of consult for new onset confusion and suicidal ideations.

## 2020-03-13 NOTE — CARE COORDINATION
3/13/20, 1:10 PM EDT    DISCHARGE ONGOING EVALUATION:     Prema Pina day: 1  Location: [facility] Reason for admit: Urinary retention [R33.9]  Urine retention [R33.9]   Treatment Plan of Care: Urology following and planning for TURP next Tuesday. Billy remains. Pt had episode of new onset confusion and stating how he is done with life. Sitter placed at bedside last night but now removed. Pt currently alert and appropriate with this RN. Barriers to Discharge: Medical readiness. PCP: Shaheen Tena MD  Readmission Risk Score: 26%  Patient Goals/Plan/Treatment Preferences: Uncertain pending surgical outcomes and pt needs.

## 2020-03-13 NOTE — PLAN OF CARE
Problem: Cardiovascular  Goal: Hemodynamic stability  Outcome: Met This Shift  Note: Patient VSS. Continuous telemetry. Will continue to monitor vital Q4H and PRN. Problem: Urinary Retention:  Goal: Able to perform urinary catheter care  Description: Able to perform urinary catheter care  Outcome: Ongoing  Note: Patient with urinary felipe in place. Adequate output. Bladder spasms med per [facility] Urology following. Will continue to monitor urine output and retention. Problem: Discharge Planning:  Goal: Discharged to appropriate level of care  Description: Discharged to appropriate level of care  Outcome: Ongoing  Note: Patient from home with family support. Plans to return home with family support at discharge. Awaiting cardiology clearance for TURP. Will continue to monitor for discharge needs. Problem: Neurological  Goal: Maximum potential motor/sensory/cognitive function  Outcome: Ongoing  Note: Multiple times this shift, the patient has woken up and been slightly confused and attempting to get out of bed. When RN asks where patient is trying to go, he states he would like to leave the hospital closet. When RN asks where patient is he states [facility]. Patient alert and oriented x4. When told the patient is in his hospital assigned room, he looks around and states \"oh, it looked a little different than before. I'm sorry\". Denies numbness/tingling. Gait steady with standby assist.      Problem: RESPIRATORY  Goal: Clear lung sounds  Outcome: Met This Shift  Note: Patient wore hospital CPAP this evening (d/t patient forgetting parts to his home cpap). Lung sounds clear/diminished. O2 saturation above 92% on room air. Patient participated in plan of care and contributed to goal setting.

## 2020-03-14 LAB
ANION GAP SERPL CALCULATED.3IONS-SCNC: 10 MEQ/L (ref 8–16)
BASOPHILS # BLD: 0.8 %
BASOPHILS ABSOLUTE: 0.1 THOU/MM3 (ref 0–0.1)
BUN BLDV-MCNC: 7 MG/DL (ref 7–22)
CALCIUM SERPL-MCNC: 7.7 MG/DL (ref 8.5–10.5)
CHLORIDE BLD-SCNC: 103 MEQ/L (ref 98–111)
CO2: 25 MEQ/L (ref 23–33)
CREAT SERPL-MCNC: 0.7 MG/DL (ref 0.4–1.2)
EOSINOPHIL # BLD: 2.3 %
EOSINOPHILS ABSOLUTE: 0.2 THOU/MM3 (ref 0–0.4)
ERYTHROCYTE [DISTWIDTH] IN BLOOD BY AUTOMATED COUNT: 15.4 % (ref 11.5–14.5)
ERYTHROCYTE [DISTWIDTH] IN BLOOD BY AUTOMATED COUNT: 50.4 FL (ref 35–45)
GFR SERPL CREATININE-BSD FRML MDRD: > 90 ML/MIN/1.73M2
GLUCOSE BLD-MCNC: 106 MG/DL (ref 70–108)
GLUCOSE BLD-MCNC: 111 MG/DL (ref 70–108)
GLUCOSE BLD-MCNC: 117 MG/DL (ref 70–108)
GLUCOSE BLD-MCNC: 121 MG/DL (ref 70–108)
GLUCOSE BLD-MCNC: 136 MG/DL (ref 70–108)
HCT VFR BLD CALC: 38.7 % (ref 42–52)
HEMOGLOBIN: 11.9 GM/DL (ref 14–18)
IMMATURE GRANS (ABS): 0.04 THOU/MM3 (ref 0–0.07)
IMMATURE GRANULOCYTES: 0.5 %
INR BLD: 1.41 (ref 0.85–1.13)
LYMPHOCYTES # BLD: 27.8 %
LYMPHOCYTES ABSOLUTE: 2.2 THOU/MM3 (ref 1–4.8)
MCH RBC QN AUTO: 27.7 PG (ref 26–33)
MCHC RBC AUTO-ENTMCNC: 30.7 GM/DL (ref 32.2–35.5)
MCV RBC AUTO: 90.2 FL (ref 80–94)
MONOCYTES # BLD: 9.5 %
MONOCYTES ABSOLUTE: 0.8 THOU/MM3 (ref 0.4–1.3)
NUCLEATED RED BLOOD CELLS: 0 /100 WBC
PLATELET # BLD: 205 THOU/MM3 (ref 130–400)
PMV BLD AUTO: 9.6 FL (ref 9.4–12.4)
POTASSIUM SERPL-SCNC: 3.8 MEQ/L (ref 3.5–5.2)
RBC # BLD: 4.29 MILL/MM3 (ref 4.7–6.1)
SEG NEUTROPHILS: 59.1 %
SEGMENTED NEUTROPHILS ABSOLUTE COUNT: 4.7 THOU/MM3 (ref 1.8–7.7)
SODIUM BLD-SCNC: 138 MEQ/L (ref 135–145)
WBC # BLD: 7.9 THOU/MM3 (ref 4.8–10.8)

## 2020-03-14 PROCEDURE — 2700000000 HC OXYGEN THERAPY PER DAY

## 2020-03-14 PROCEDURE — 6370000000 HC RX 637 (ALT 250 FOR IP): Performed by: PSYCHIATRY & NEUROLOGY

## 2020-03-14 PROCEDURE — 2580000003 HC RX 258: Performed by: INTERNAL MEDICINE

## 2020-03-14 PROCEDURE — 94760 N-INVAS EAR/PLS OXIMETRY 1: CPT

## 2020-03-14 PROCEDURE — 85610 PROTHROMBIN TIME: CPT

## 2020-03-14 PROCEDURE — 85025 COMPLETE CBC W/AUTO DIFF WBC: CPT

## 2020-03-14 PROCEDURE — 97116 GAIT TRAINING THERAPY: CPT

## 2020-03-14 PROCEDURE — 94640 AIRWAY INHALATION TREATMENT: CPT

## 2020-03-14 PROCEDURE — 6370000000 HC RX 637 (ALT 250 FOR IP): Performed by: INTERNAL MEDICINE

## 2020-03-14 PROCEDURE — 99233 SBSQ HOSP IP/OBS HIGH 50: CPT | Performed by: FAMILY MEDICINE

## 2020-03-14 PROCEDURE — 1200000003 HC TELEMETRY R&B

## 2020-03-14 PROCEDURE — 97161 PT EVAL LOW COMPLEX 20 MIN: CPT

## 2020-03-14 PROCEDURE — 6360000002 HC RX W HCPCS: Performed by: INTERNAL MEDICINE

## 2020-03-14 PROCEDURE — 94660 CPAP INITIATION&MGMT: CPT

## 2020-03-14 PROCEDURE — 82948 REAGENT STRIP/BLOOD GLUCOSE: CPT

## 2020-03-14 PROCEDURE — 36415 COLL VENOUS BLD VENIPUNCTURE: CPT

## 2020-03-14 PROCEDURE — 80048 BASIC METABOLIC PNL TOTAL CA: CPT

## 2020-03-14 RX ORDER — WARFARIN SODIUM 7.5 MG/1
7.5 TABLET ORAL
Status: DISCONTINUED | OUTPATIENT
Start: 2020-03-14 | End: 2020-03-14

## 2020-03-14 RX ADMIN — FUROSEMIDE 40 MG: 40 TABLET ORAL at 21:28

## 2020-03-14 RX ADMIN — TIOTROPIUM BROMIDE INHALATION SPRAY 2 PUFF: 3.12 SPRAY, METERED RESPIRATORY (INHALATION) at 05:36

## 2020-03-14 RX ADMIN — ARFORMOTEROL TARTRATE 15 MCG: 15 SOLUTION RESPIRATORY (INHALATION) at 16:56

## 2020-03-14 RX ADMIN — CEFTRIAXONE SODIUM 1 G: 1 INJECTION, POWDER, FOR SOLUTION INTRAMUSCULAR; INTRAVENOUS at 14:55

## 2020-03-14 RX ADMIN — DOXAZOSIN MESYLATE 8 MG: 4 TABLET ORAL at 21:28

## 2020-03-14 RX ADMIN — Medication 1000 MG: at 09:56

## 2020-03-14 RX ADMIN — METOPROLOL TARTRATE 25 MG: 25 TABLET, FILM COATED ORAL at 09:56

## 2020-03-14 RX ADMIN — ARFORMOTEROL TARTRATE 15 MCG: 15 SOLUTION RESPIRATORY (INHALATION) at 05:32

## 2020-03-14 RX ADMIN — POTASSIUM CHLORIDE 10 MEQ: 1500 TABLET, EXTENDED RELEASE ORAL at 21:27

## 2020-03-14 RX ADMIN — METOPROLOL TARTRATE 25 MG: 25 TABLET, FILM COATED ORAL at 21:27

## 2020-03-14 RX ADMIN — Medication 1000 MG: at 17:28

## 2020-03-14 RX ADMIN — POTASSIUM CHLORIDE 10 MEQ: 1500 TABLET, EXTENDED RELEASE ORAL at 09:56

## 2020-03-14 RX ADMIN — FUROSEMIDE 40 MG: 40 TABLET ORAL at 09:56

## 2020-03-14 RX ADMIN — LEVOTHYROXINE SODIUM 175 MCG: 150 TABLET ORAL at 04:56

## 2020-03-14 RX ADMIN — QUETIAPINE FUMARATE 25 MG: 25 TABLET ORAL at 21:29

## 2020-03-14 RX ADMIN — BUDESONIDE 250 MCG: 0.25 INHALANT RESPIRATORY (INHALATION) at 16:56

## 2020-03-14 RX ADMIN — LISINOPRIL 5 MG: 5 TABLET ORAL at 09:56

## 2020-03-14 RX ADMIN — BUDESONIDE 250 MCG: 0.25 INHALANT RESPIRATORY (INHALATION) at 05:36

## 2020-03-14 RX ADMIN — FLUCONAZOLE 200 MG: 200 TABLET ORAL at 09:56

## 2020-03-14 ASSESSMENT — PAIN DESCRIPTION - LOCATION: LOCATION: GROIN

## 2020-03-14 ASSESSMENT — PAIN SCALES - GENERAL
PAINLEVEL_OUTOF10: 0
PAINLEVEL_OUTOF10: 3
PAINLEVEL_OUTOF10: 0
PAINLEVEL_OUTOF10: 5
PAINLEVEL_OUTOF10: 0

## 2020-03-14 ASSESSMENT — PAIN DESCRIPTION - PAIN TYPE: TYPE: ACUTE PAIN

## 2020-03-14 NOTE — PROGRESS NOTES
asked to admit for pain control overnight. For further details and updates please refer to assessment and plan at the beginning of the note. ROS (10 point review of systems completed. Pertinent positives noted. Otherwise ROS is negative) : Immobility  PMH:  Per HPI and reviewed in the chart  SHX: Reviewed in the chart, also the patient  FHX: Reviewed in the chart, also with the patient  Allergies: Reviewed in the chart  Medications:     sodium chloride 100 mL/hr at 03/13/20 1222    dextrose        sodium chloride flush  10 mL Intravenous 2 times per day    Arformoterol Tartrate  15 mcg Nebulization BID    budesonide  250 mcg Nebulization BID    furosemide  40 mg Oral BID    tiotropium  2 puff Inhalation Daily    potassium chloride  10 mEq Oral BID    metoprolol tartrate  25 mg Oral BID    lisinopril  5 mg Oral Daily    warfarin (COUMADIN) daily dosing (placeholder)   Other RX Placeholder    levothyroxine  175 mcg Oral Daily    cefTRIAXone (ROCEPHIN) IV  1 g Intravenous Q24H    fluconazole  200 mg Oral Daily    insulin lispro  0-18 Units Subcutaneous TID WC    insulin lispro  0-9 Units Subcutaneous Nightly    doxazosin  8 mg Oral Nightly    metFORMIN  1,000 mg Oral BID WC   Subjective: Mentioned had a good sleep, improved overall, patient will be kept inside the hospital for the TURP procedure as of 3/17/2020. Patient been cleared by cardiology team, urology team following. Nursing notes, labs, vitals reviewed. Vital Signs:   Vitals reviewed and compared with the previous ones  /70   Pulse 51   Temp 97.3 °F (36.3 °C) (Oral)   Resp 18   Ht 5' 7\" (1.702 m)   Wt 261 lb (118.4 kg)   SpO2 98%   BMI 40.88 kg/m²      Intake/Output Summary (Last 24 hours) at 3/14/2020 6408  Last data filed at 3/14/2020 0355  Gross per 24 hour   Intake 2644.22 ml   Output 3725 ml   Net -1080.78 ml        General:   Age-appropriate, in no acute distress, morbid obese  HEENT:  normocephalic and atraumatic. No scleral icterus. PERRLA. Neck: supple. No thyromegaly. Lungs: clear to auscultation. No abnormal breathing sounds appreciated. Cardiac: S1, S2, RRR without murmur. No JVD. Abdomen: soft. Nontender. Increased abdominal girth, bowel sounds positive. Urinary catheter in place. Extremities:  No clubbing, cyanosis, or edema in all extremities. Vasculature: capillary refill < 3 seconds. Pedal pulses intact bilaterally. Skin:  warm and dry. Not clammy. Scattered SKs on the back. Psych:  Alert to time, person and place. Communicable. Affect appropriate  Lymph:  No supraclavicular ,and no anterior cervical lymphadenopathy. Neurologic:  No focal deficit. CN II-XII grossly intact. Motor and Sensory capacity intact in all extremities. Labs:   Recent Labs     03/11/20  1135 03/12/20  0530 03/13/20  0947   WBC 9.5 9.5 7.7   HGB 12.6* 11.7* 12.1*   HCT 38.8* 36.7* 38.2*    209 224     Recent Labs     03/11/20  1135 03/12/20  0530 03/13/20  0947    141 141   K 3.8 3.7 3.3*   CL 96* 101 104   CO2 27 26 24   BUN 11 10 7   CREATININE 0.8 0.9 0.8   CALCIUM 8.9 8.1* 7.9*     Recent Labs     03/11/20  1135   AST 30   ALT 23   BILIDIR <0.2   BILITOT 0.4   ALKPHOS 49     Recent Labs     03/12/20  0530 03/13/20  0434 03/14/20  0456   INR 2.01* 1.59* 1.41*     No results for input(s): CKTOTAL, TROPONINI in the last 72 hours. Microbiology:    Blood culture #1:   Lab Results   Component Value Date    BC No growth-preliminary  03/11/2020       Blood culture #2:No results found for: Buffy Breaux    Organism:  Lab Results   Component Value Date    ORG Escherichia coli 03/11/2020         Lab Results   Component Value Date    LABGRAM  04/05/2019     Few segmented neutrophils observed. Rare epithelial cells observed. Few gram positive cocci occurring singly and in pairs. Rare gram negative bacilli.          MRSA culture only:No results found for: [facility]    Urine culture:   Lab Results   Component Value Date LABURIN  03/10/2020     No growth-preliminary Mixed growth. The mixture of organisms present represents both organisms that may cause urinary tract infections and organisms that are not a common cause of urinary tract infections and are possibly skin tree or distal urethral tree. Respiratory culture: No results found for: CULTRESP    Aerobic and Anaerobic :  No results found for: LABAERO  No results found for: LABANAE    Urinalysis:      Lab Results   Component Value Date    NITRU POSITIVE 03/11/2020    WBCUA 10-15 03/11/2020    BACTERIA MODERATE 03/11/2020    RBCUA > 100 03/11/2020    BLOODU LARGE 03/11/2020    SPECGRAV 1.025 01/31/2020    SPECGRAV 1.016 03/15/2019    GLUCOSEU NEGATIVE 03/11/2020       Radiology:  CT ABDOMEN PELVIS W IV CONTRAST Additional Contrast? None   Final Result      1. Scattered stool in the colon. Multiple colonic diverticuli. No evidence for diverticulitis. 2. There is prominent prostate gland with coarse calcifications. Billy catheter bulb appears to be within a collapsed bladder. Decompressed sigmoid colonic loop closely abutting a decompressed bladder limiting evaluation. There is mild infiltration in    the pelvis which is nonspecific. Correlation with urinalysis is advised. Underlying fistula cannot be entirely excluded. Appearance is similar to prior. **This report has been created using voice recognition software. It may contain minor errors which are inherent in voice recognition technology. **      Final report electronically signed by Dr. Stephany Fulton on 3/11/2020 1:42 PM      CT HEAD WO CONTRAST   Final Result   No acute intracranial findings. **This report has been created using voice recognition software. It may contain minor errors which are inherent in voice recognition technology. **      Final report electronically signed by Dr. Stephany Fulton on 3/11/2020 1:34 PM      XR CHEST STANDARD (2 VW)   Final Result   1.  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 2. Mild cardiac megaly. Metallic sternotomy sutures and vascular clips from prior surgery. 3. Tiny bilateral pleural effusions. Mild bibasilar atelectasis/pneumonia. **This report has been created using voice recognition software. It may contain minor errors which are inherent in voice recognition technology. **      Final report electronically signed by Dr. Fay Sol on 3/11/2020 1:22 PM        Xr Chest Standard (2 Vw)    Result Date: 3/11/2020  PROCEDURE: XR CHEST (2 VW) CLINICAL INFORMATION: Cough COMPARISON: 3/16/2019 TECHNIQUE: AP upright and lateral views of the chest were obtained. 1. 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 2. Mild cardiac megaly. Metallic sternotomy sutures and vascular clips from prior surgery. 3. Tiny bilateral pleural effusions. Mild bibasilar atelectasis/pneumonia. **This report has been created using voice recognition software. It may contain minor errors which are inherent in voice recognition technology. ** Final report electronically signed by Dr. Fay Sol on 3/11/2020 1:22 PM    Ct Head Wo Contrast    Result Date: 3/11/2020  PROCEDURE: CT HEAD WO CONTRAST CLINICAL INFORMATION: confusion. COMPARISON: No prior study. TECHNIQUE: Noncontrast 5 mm axial images were obtained through the brain. All CT scans at this facility use dose modulation, iterative reconstruction, and/or weight-based dosing when appropriate to reduce radiation dose to as low as reasonably achievable. FINDINGS: Generalized volume loss and small vessel ischemic changes. No acute hemorrhage or midline shift. Ventricles are within normal limits. Paranasal sinuses are clear. Mastoid air cells are patent. Skull: Unremarkable. Soft tissues: Unremarkable. No acute intracranial findings. **This report has been created using voice recognition software. It may contain minor errors which are inherent in voice recognition technology. ** Final report electronically signed by Dr. Sheffield Leader on 3/11/2020 1:34 PM    Ct Abdomen Pelvis W Iv Contrast Additional Contrast? None    Result Date: 3/11/2020  PROCEDURE: CT ABDOMEN PELVIS W IV CONTRAST CLINICAL INFORMATION: abdominal pain . COMPARISON: March 5, 2020 TECHNIQUE: 5 mm axial CT images were obtained through the abdomen and pelvis after the administration of intravenous and oral contrast. Coronal and sagittal reconstructions were obtained. All CT scans at this facility use dose modulation, iterative reconstruction, and/or weight-based dosing when appropriate to reduce radiation dose to as low as reasonably achievable. FINDINGS: Lung bases are clear. Mild fatty infiltration of the liver. Pancreas gallbladder adrenal glands spleen and kidneys are unremarkable no hydronephrosis. Normal caliber abdominal aorta. Atherosclerotic changes. Scattered stool in the colon. Multiple colonic diverticuli. No evidence for diverticulitis. Decompressed sigmoid colonic loop closely abutting a decompressed bladder limiting evaluation Normal appendix. There is prominent prostate gland with coarse calcifications. Correlation with serology is advised. Billy catheter bulb appears to be within a collapsed bladder. Correlation is advised. There is mild infiltration in the pelvis which is nonspecific. No acute osseous findings. Degenerative changes lumbar spine and pelvis. Infrarenal IVC filter. 1. Scattered stool in the colon. Multiple colonic diverticuli. No evidence for diverticulitis. 2. There is prominent prostate gland with coarse calcifications. Billy catheter bulb appears to be within a collapsed bladder. Decompressed sigmoid colonic loop closely abutting a decompressed bladder limiting evaluation. There is mild infiltration in the pelvis which is nonspecific. Correlation with urinalysis is advised. Underlying fistula cannot be entirely excluded. Appearance is similar to prior.  **This report has been created using voice

## 2020-03-14 NOTE — PLAN OF CARE
Problem: Urinary Retention:  Goal: Able to perform urinary catheter care  Description: Able to perform urinary catheter care  Outcome: Ongoing     Problem: Discharge Planning:  Goal: Discharged to appropriate level of care  Description: Discharged to appropriate level of care  Outcome: Ongoing     Problem: RESPIRATORY  Goal: Clear lung sounds  Outcome: Ongoing     Problem: DISCHARGE BARRIERS  Goal: Patient's continuum of care needs are met  Outcome: Ongoing     Problem: Neurological  Goal: Maximum potential motor/sensory/cognitive function  Outcome: Ongoing     Problem: Cardiovascular  Goal: Hemodynamic stability  Outcome: Ongoing     Problem: Suicide risk  Goal: Provide patient with safe environment  Description: Provide patient with safe environment  Outcome: Ongoing     Problem: Pain:  Goal: Pain level will decrease  Description: Pain level will decrease  Outcome: Ongoing     Problem: Falls - Risk of:  Goal: Will remain free from falls  Description: Will remain free from falls  Outcome: Ongoing  Goal: Absence of physical injury  Description: Absence of physical injury  Outcome: Ongoing

## 2020-03-14 NOTE — PROGRESS NOTES
[address] [address]  INPATIENT PHYSICAL THERAPY  EVALUATION  [facility] SUR[facility] 8B - 8B-22/022-A    Time In: 4676  Time Out: 9326  Timed Code Treatment Minutes: 10 Minutes  Minutes: 19          Date: 3/14/2020  Patient Name: Krish Sol,  Gender:  male        MRN: [id number]  : 1931  (80 y.o.)  Referral Date : 20   Referring Practitioner: Cherri Feliciano PA-C  Diagnosis: Urinary retention  Additional Pertinent Hx: 81 y/o male admitted with urinary retention secondary to BPH. Restrictions/Precautions:  Restrictions/Precautions: Up as Tolerated    Subjective:  Chart Reviewed: Yes  Patient assessed for rehabilitation services?: Yes  Family / Caregiver Present: No  Subjective: RN approved PT session. Patient sitting EOB upon PT arrival, pleasant and agreeable to therapy. General:  Follows Commands: Within Functional Limits                   Pain:  Yes. Pain Assessment  Pain Level: 3  Pain Type: Acute pain  Pain Location: Groin       Social/Functional History:    Lives With: Alone  Type of Home: House  Home Layout: One level, Laundry in basement  Home Access: Stairs to enter with rails  Entrance Stairs - Number of Steps: 3  Entrance Stairs - Rails: Right  Home Equipment: Rolling walker     Bathroom Shower/Tub: Tub/Shower unit  Bathroom Equipment: Shower chair, Grab bars in shower, Hand-held shower       ADL Assistance: Independent  Homemaking Assistance: Independent  Ambulation Assistance: Independent(does not normally use an AD in the home, but states that he does outside his home)  Transfer Assistance: Independent    Active : Yes     Additional Comments: Pt states that he goes to cardiac rehab 2x/week here at [facility].     OBJECTIVE:  Range of Motion:  Right Lower Extremity: WNL  Left Lower Extremity: WNL    Strength:  Right Lower Extremity: Impaired - grossly 4-/5  Left Lower Extremity: Impaired - grossly 4-/5    Balance:  Static Standing Balance: Stand By Assistance  Dynamic Standing Plan of Care, General Safety, Functional Mobility Training    Equipment Recommendations:  Equipment Needed: No(pt has a RW)    Plan:  Times per week: 3-5x GM  Current Treatment Recommendations: Strengthening, Gait Training, Patient/Caregiver Education & Training, Stair training, Equipment Evaluation, Education, & procurement, Balance Training, Functional Mobility Training, Transfer Training, Endurance Training, Safety Education & Training    Goals:  Patient goals : Go home  Short term goals  Time Frame for Short term goals: by discharge  Short term goal 1: Patient will complete bed mobility with mod I in order to get into/out of bed. Short term goal 2: Patient will transfer sit <--> stand with mod I in order to get up to ambulate. Short term goal 3: Patient will ambulate >50' with LRAD and mod I for household mobility. Short term goal 4: Patient will negotiate 3 steps with R hand rail and SBA for home entry. Long term goals  Time Frame for Long term goals : no LTGs due to short ELOS    Following session, patient left in safe position with all fall risk precautions in place.     Mitesh Brandt, PT, DPT

## 2020-03-15 LAB
ANION GAP SERPL CALCULATED.3IONS-SCNC: 12 MEQ/L (ref 8–16)
BASOPHILS # BLD: 0.9 %
BASOPHILS ABSOLUTE: 0.1 THOU/MM3 (ref 0–0.1)
BUN BLDV-MCNC: 8 MG/DL (ref 7–22)
CALCIUM SERPL-MCNC: 8.1 MG/DL (ref 8.5–10.5)
CHLORIDE BLD-SCNC: 100 MEQ/L (ref 98–111)
CO2: 27 MEQ/L (ref 23–33)
CREAT SERPL-MCNC: 0.7 MG/DL (ref 0.4–1.2)
EOSINOPHIL # BLD: 2.3 %
EOSINOPHILS ABSOLUTE: 0.2 THOU/MM3 (ref 0–0.4)
ERYTHROCYTE [DISTWIDTH] IN BLOOD BY AUTOMATED COUNT: 15.2 % (ref 11.5–14.5)
ERYTHROCYTE [DISTWIDTH] IN BLOOD BY AUTOMATED COUNT: 47.7 FL (ref 35–45)
GFR SERPL CREATININE-BSD FRML MDRD: > 90 ML/MIN/1.73M2
GLUCOSE BLD-MCNC: 105 MG/DL (ref 70–108)
GLUCOSE BLD-MCNC: 108 MG/DL (ref 70–108)
GLUCOSE BLD-MCNC: 121 MG/DL (ref 70–108)
GLUCOSE BLD-MCNC: 122 MG/DL (ref 70–108)
GLUCOSE BLD-MCNC: 166 MG/DL (ref 70–108)
HCT VFR BLD CALC: 37.2 % (ref 42–52)
HEMOGLOBIN: 12 GM/DL (ref 14–18)
IMMATURE GRANS (ABS): 0.03 THOU/MM3 (ref 0–0.07)
IMMATURE GRANULOCYTES: 0.4 %
INR BLD: 1.4 (ref 0.85–1.13)
LYMPHOCYTES # BLD: 20.7 %
LYMPHOCYTES ABSOLUTE: 1.4 THOU/MM3 (ref 1–4.8)
MCH RBC QN AUTO: 28 PG (ref 26–33)
MCHC RBC AUTO-ENTMCNC: 32.3 GM/DL (ref 32.2–35.5)
MCV RBC AUTO: 86.7 FL (ref 80–94)
MONOCYTES # BLD: 8.8 %
MONOCYTES ABSOLUTE: 0.6 THOU/MM3 (ref 0.4–1.3)
NUCLEATED RED BLOOD CELLS: 0 /100 WBC
PLATELET # BLD: 215 THOU/MM3 (ref 130–400)
PMV BLD AUTO: 10 FL (ref 9.4–12.4)
POTASSIUM SERPL-SCNC: 3.3 MEQ/L (ref 3.5–5.2)
RBC # BLD: 4.29 MILL/MM3 (ref 4.7–6.1)
SEG NEUTROPHILS: 66.9 %
SEGMENTED NEUTROPHILS ABSOLUTE COUNT: 4.6 THOU/MM3 (ref 1.8–7.7)
SODIUM BLD-SCNC: 139 MEQ/L (ref 135–145)
WBC # BLD: 6.9 THOU/MM3 (ref 4.8–10.8)

## 2020-03-15 PROCEDURE — 94760 N-INVAS EAR/PLS OXIMETRY 1: CPT

## 2020-03-15 PROCEDURE — 36415 COLL VENOUS BLD VENIPUNCTURE: CPT

## 2020-03-15 PROCEDURE — 6370000000 HC RX 637 (ALT 250 FOR IP): Performed by: PHYSICIAN ASSISTANT

## 2020-03-15 PROCEDURE — 80048 BASIC METABOLIC PNL TOTAL CA: CPT

## 2020-03-15 PROCEDURE — 82948 REAGENT STRIP/BLOOD GLUCOSE: CPT

## 2020-03-15 PROCEDURE — 85610 PROTHROMBIN TIME: CPT

## 2020-03-15 PROCEDURE — 6360000002 HC RX W HCPCS: Performed by: INTERNAL MEDICINE

## 2020-03-15 PROCEDURE — 99233 SBSQ HOSP IP/OBS HIGH 50: CPT | Performed by: FAMILY MEDICINE

## 2020-03-15 PROCEDURE — 2700000000 HC OXYGEN THERAPY PER DAY

## 2020-03-15 PROCEDURE — 94640 AIRWAY INHALATION TREATMENT: CPT

## 2020-03-15 PROCEDURE — 6370000000 HC RX 637 (ALT 250 FOR IP): Performed by: INTERNAL MEDICINE

## 2020-03-15 PROCEDURE — 1200000000 HC SEMI PRIVATE

## 2020-03-15 PROCEDURE — 85025 COMPLETE CBC W/AUTO DIFF WBC: CPT

## 2020-03-15 PROCEDURE — 2580000003 HC RX 258: Performed by: INTERNAL MEDICINE

## 2020-03-15 RX ADMIN — METOPROLOL TARTRATE 25 MG: 25 TABLET, FILM COATED ORAL at 09:33

## 2020-03-15 RX ADMIN — METOPROLOL TARTRATE 25 MG: 25 TABLET, FILM COATED ORAL at 20:40

## 2020-03-15 RX ADMIN — BUDESONIDE 250 MCG: 0.25 INHALANT RESPIRATORY (INHALATION) at 16:52

## 2020-03-15 RX ADMIN — SODIUM CHLORIDE, PRESERVATIVE FREE 10 ML: 5 INJECTION INTRAVENOUS at 20:39

## 2020-03-15 RX ADMIN — TIOTROPIUM BROMIDE INHALATION SPRAY 2 PUFF: 3.12 SPRAY, METERED RESPIRATORY (INHALATION) at 07:18

## 2020-03-15 RX ADMIN — LISINOPRIL 5 MG: 5 TABLET ORAL at 09:32

## 2020-03-15 RX ADMIN — ARFORMOTEROL TARTRATE 15 MCG: 15 SOLUTION RESPIRATORY (INHALATION) at 07:15

## 2020-03-15 RX ADMIN — CEFTRIAXONE SODIUM 1 G: 1 INJECTION, POWDER, FOR SOLUTION INTRAMUSCULAR; INTRAVENOUS at 15:08

## 2020-03-15 RX ADMIN — FUROSEMIDE 40 MG: 40 TABLET ORAL at 09:33

## 2020-03-15 RX ADMIN — LEVOTHYROXINE SODIUM 175 MCG: 150 TABLET ORAL at 06:34

## 2020-03-15 RX ADMIN — POTASSIUM CHLORIDE 10 MEQ: 1500 TABLET, EXTENDED RELEASE ORAL at 20:39

## 2020-03-15 RX ADMIN — Medication 1000 MG: at 09:33

## 2020-03-15 RX ADMIN — Medication 1000 MG: at 17:38

## 2020-03-15 RX ADMIN — POTASSIUM CHLORIDE 10 MEQ: 1500 TABLET, EXTENDED RELEASE ORAL at 09:33

## 2020-03-15 RX ADMIN — ARFORMOTEROL TARTRATE 15 MCG: 15 SOLUTION RESPIRATORY (INHALATION) at 16:49

## 2020-03-15 RX ADMIN — FLUCONAZOLE 200 MG: 200 TABLET ORAL at 09:32

## 2020-03-15 RX ADMIN — BUDESONIDE 250 MCG: 0.25 INHALANT RESPIRATORY (INHALATION) at 07:08

## 2020-03-15 RX ADMIN — DOXAZOSIN MESYLATE 8 MG: 4 TABLET ORAL at 20:40

## 2020-03-15 RX ADMIN — FUROSEMIDE 40 MG: 40 TABLET ORAL at 22:14

## 2020-03-15 RX ADMIN — Medication 3 MG: at 00:03

## 2020-03-15 ASSESSMENT — PAIN DESCRIPTION - ONSET: ONSET: ON-GOING

## 2020-03-15 ASSESSMENT — PAIN DESCRIPTION - PROGRESSION: CLINICAL_PROGRESSION: NOT CHANGED

## 2020-03-15 ASSESSMENT — PAIN - FUNCTIONAL ASSESSMENT: PAIN_FUNCTIONAL_ASSESSMENT: ACTIVITIES ARE NOT PREVENTED

## 2020-03-15 ASSESSMENT — PAIN DESCRIPTION - FREQUENCY: FREQUENCY: INTERMITTENT

## 2020-03-15 ASSESSMENT — PAIN DESCRIPTION - PAIN TYPE: TYPE: ACUTE PAIN

## 2020-03-15 ASSESSMENT — PAIN DESCRIPTION - LOCATION: LOCATION: PENIS

## 2020-03-15 ASSESSMENT — PAIN DESCRIPTION - DESCRIPTORS: DESCRIPTORS: OTHER (COMMENT)

## 2020-03-15 ASSESSMENT — PAIN SCALES - GENERAL
PAINLEVEL_OUTOF10: 5
PAINLEVEL_OUTOF10: 0

## 2020-03-15 NOTE — PROGRESS NOTES
in the clinic and his Cardura dose was doubled and was asked to continue his Billy catheter with a PVR to be done as outpatient in 3 weeks time. Today his Billy catheter was displaced which caused a lot of pain that brought him to the ER. He does have frequency, urgency and dysuria. The Billy catheter was readjusted in the ER which made the patient pain better however still has significant pain-we were asked to admit for pain control overnight. For further details and updates please refer to assessment and plan at the beginning of the note. ROS (10 point review of systems completed. Pertinent positives noted. Otherwise ROS is negative) : Immobility  PMH:  Per HPI and reviewed in the chart  SHX: Reviewed in the chart, also the patient  FHX: Reviewed in the chart, also with the patient  Allergies: Reviewed in the chart  Medications:     sodium chloride 30 mL/hr at 03/14/20 1121    dextrose        sodium chloride flush  10 mL Intravenous 2 times per day    Arformoterol Tartrate  15 mcg Nebulization BID    budesonide  250 mcg Nebulization BID    furosemide  40 mg Oral BID    tiotropium  2 puff Inhalation Daily    potassium chloride  10 mEq Oral BID    metoprolol tartrate  25 mg Oral BID    lisinopril  5 mg Oral Daily    levothyroxine  175 mcg Oral Daily    cefTRIAXone (ROCEPHIN) IV  1 g Intravenous Q24H    fluconazole  200 mg Oral Daily    insulin lispro  0-18 Units Subcutaneous TID WC    insulin lispro  0-9 Units Subcutaneous Nightly    doxazosin  8 mg Oral Nightly    metFORMIN  1,000 mg Oral BID WC   Subjective: Mentioned had a good sleep, improved overall, patient will be kept inside the hospital for the TURP procedure as of 3/17/2020. Patient been cleared by cardiology team, urology team following. Nursing notes, labs, vitals reviewed.     Vital Signs:   Vitals reviewed and compared with the previous ones  /61   Pulse 52   Temp 97.6 °F (36.4 °C) (Oral)   Resp 19   Ht 5' 7\" (1.702 m) software. It may contain minor errors which are inherent in voice recognition technology. **      Final report electronically signed by Dr. Stephany Fulton on 3/11/2020 1:34 PM      XR CHEST STANDARD (2 VW)   Final Result   1. 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 2. Mild cardiac megaly. Metallic sternotomy sutures and vascular clips from prior surgery. 3. Tiny bilateral pleural effusions. Mild bibasilar atelectasis/pneumonia. **This report has been created using voice recognition software. It may contain minor errors which are inherent in voice recognition technology. **      Final report electronically signed by Dr. Roxie Neville on 3/11/2020 1:22 PM        Xr Chest Standard (2 Vw)    Result Date: 3/11/2020  PROCEDURE: XR CHEST (2 VW) CLINICAL INFORMATION: Cough COMPARISON: 3/16/2019 TECHNIQUE: AP upright and lateral views of the chest were obtained. 1. 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 2. Mild cardiac megaly. Metallic sternotomy sutures and vascular clips from prior surgery. 3. Tiny bilateral pleural effusions. Mild bibasilar atelectasis/pneumonia. **This report has been created using voice recognition software. It may contain minor errors which are inherent in voice recognition technology. ** Final report electronically signed by Dr. Roxie Neville on 3/11/2020 1:22 PM    Ct Head Wo Contrast    Result Date: 3/11/2020  PROCEDURE: CT HEAD WO CONTRAST CLINICAL INFORMATION: confusion. COMPARISON: No prior study. TECHNIQUE: Noncontrast 5 mm axial images were obtained through the brain. All CT scans at this facility use dose modulation, iterative reconstruction, and/or weight-based dosing when appropriate to reduce radiation dose to as low as reasonably achievable. FINDINGS: Generalized volume loss and small vessel ischemic changes. No acute hemorrhage or midline shift. Ventricles are within normal limits. Paranasal sinuses are clear.  Mastoid air cells are patent. Skull: Unremarkable. Soft tissues: Unremarkable. No acute intracranial findings. **This report has been created using voice recognition software. It may contain minor errors which are inherent in voice recognition technology. ** Final report electronically signed by Dr. Xavier Acosta on 3/11/2020 1:34 PM    Ct Abdomen Pelvis W Iv Contrast Additional Contrast? None    Result Date: 3/11/2020  PROCEDURE: CT ABDOMEN PELVIS W IV CONTRAST CLINICAL INFORMATION: abdominal pain . COMPARISON: March 5, 2020 TECHNIQUE: 5 mm axial CT images were obtained through the abdomen and pelvis after the administration of intravenous and oral contrast. Coronal and sagittal reconstructions were obtained. All CT scans at this facility use dose modulation, iterative reconstruction, and/or weight-based dosing when appropriate to reduce radiation dose to as low as reasonably achievable. FINDINGS: Lung bases are clear. Mild fatty infiltration of the liver. Pancreas gallbladder adrenal glands spleen and kidneys are unremarkable no hydronephrosis. Normal caliber abdominal aorta. Atherosclerotic changes. Scattered stool in the colon. Multiple colonic diverticuli. No evidence for diverticulitis. Decompressed sigmoid colonic loop closely abutting a decompressed bladder limiting evaluation Normal appendix. There is prominent prostate gland with coarse calcifications. Correlation with serology is advised. Billy catheter bulb appears to be within a collapsed bladder. Correlation is advised. There is mild infiltration in the pelvis which is nonspecific. No acute osseous findings. Degenerative changes lumbar spine and pelvis. Infrarenal IVC filter. 1. Scattered stool in the colon. Multiple colonic diverticuli. No evidence for diverticulitis. 2. There is prominent prostate gland with coarse calcifications. Billy catheter bulb appears to be within a collapsed bladder.  Decompressed sigmoid colonic loop closely abutting a decompressed bladder limiting evaluation. There is mild infiltration in the pelvis which is nonspecific. Correlation with urinalysis is advised. Underlying fistula cannot be entirely excluded. Appearance is similar to prior. **This report has been created using voice recognition software. It may contain minor errors which are inherent in voice recognition technology. ** Final report electronically signed by Dr. Xavier Acosta on 3/11/2020 1:42 PM      Discussed plan with patient. Patient verbalized understanding and agree. All questions addressed with concerns. Please excuse my TYPOS!     Electronically signed by Eunice Love MD on 3/15/2020 at 7:32 AM

## 2020-03-15 NOTE — PLAN OF CARE
Problem: Urinary Retention:  Goal: Able to perform urinary catheter care  Description: Able to perform urinary catheter care  Outcome: Ongoing     Problem: Discharge Planning:  Goal: Discharged to appropriate level of care  Description: Discharged to appropriate level of care  Outcome: Ongoing  Note: Plans to discharge home     Problem: RESPIRATORY  Goal: Clear lung sounds  3/15/2020 1423 by Zakia Gardner RN  Outcome: Ongoing  Note: Patient lung sounds diminished. 3/15/2020 0720 by Taj Montelongo RCP  Outcome: Ongoing  3/15/2020 0719 by Taj Montelongo RCP  Outcome: Ongoing     Problem: DISCHARGE BARRIERS  Goal: Patient's continuum of care needs are met  Outcome: Ongoing  Note: Pt plans to discharge home     Problem: Neurological  Goal: Maximum potential motor/sensory/cognitive function  Outcome: Ongoing     Problem: Cardiovascular  Goal: Hemodynamic stability  Outcome: Ongoing     Problem: Suicide risk  Goal: Provide patient with safe environment  Description: Provide patient with safe environment  Outcome: Ongoing  Note: Pt in safe environment. Problem: Pain:  Goal: Pain level will decrease  Description: Pain level will decrease  Outcome: Ongoing  Note: Pt denies pain this shift. Will continue to monitor. Problem: Falls - Risk of:  Goal: Will remain free from falls  Description: Will remain free from falls  Outcome: Ongoing  Note: Pt remains free from falls/injury this shift. Wearing nonslip socks, uses call light appropriately, utilizes hourly rounding. Frequently used items within reach. Goal: Absence of physical injury  Description: Absence of physical injury  Outcome: Ongoing  Note: Pt remains free from falls/injury this shift. Wearing nonslip socks, uses call light appropriately, utilizes hourly rounding. Frequently used items within reach. Care plan reviewed with patient. Patient verbalize understanding of the plan of care and contribute to goal setting.

## 2020-03-15 NOTE — PLAN OF CARE
Problem: RESPIRATORY  Goal: Clear lung sounds  3/15/2020 0720 by Yashira Leal RCP  Outcome: Ongoing   Continue therapy to improve lung sounds.

## 2020-03-16 LAB
ANION GAP SERPL CALCULATED.3IONS-SCNC: 12 MEQ/L (ref 8–16)
BASOPHILS # BLD: 0.9 %
BASOPHILS ABSOLUTE: 0.1 THOU/MM3 (ref 0–0.1)
BUN BLDV-MCNC: 10 MG/DL (ref 7–22)
CALCIUM SERPL-MCNC: 8.4 MG/DL (ref 8.5–10.5)
CHLORIDE BLD-SCNC: 104 MEQ/L (ref 98–111)
CO2: 28 MEQ/L (ref 23–33)
CREAT SERPL-MCNC: 0.7 MG/DL (ref 0.4–1.2)
EOSINOPHIL # BLD: 2.5 %
EOSINOPHILS ABSOLUTE: 0.2 THOU/MM3 (ref 0–0.4)
ERYTHROCYTE [DISTWIDTH] IN BLOOD BY AUTOMATED COUNT: 15 % (ref 11.5–14.5)
ERYTHROCYTE [DISTWIDTH] IN BLOOD BY AUTOMATED COUNT: 47.3 FL (ref 35–45)
GFR SERPL CREATININE-BSD FRML MDRD: > 90 ML/MIN/1.73M2
GLUCOSE BLD-MCNC: 109 MG/DL (ref 70–108)
GLUCOSE BLD-MCNC: 114 MG/DL (ref 70–108)
GLUCOSE BLD-MCNC: 123 MG/DL (ref 70–108)
HCT VFR BLD CALC: 37.1 % (ref 42–52)
HEMOGLOBIN: 11.9 GM/DL (ref 14–18)
IMMATURE GRANS (ABS): 0.03 THOU/MM3 (ref 0–0.07)
IMMATURE GRANULOCYTES: 0.4 %
INR BLD: 1.33 (ref 0.85–1.13)
LYMPHOCYTES # BLD: 27.5 %
LYMPHOCYTES ABSOLUTE: 2.2 THOU/MM3 (ref 1–4.8)
MCH RBC QN AUTO: 27.9 PG (ref 26–33)
MCHC RBC AUTO-ENTMCNC: 32.1 GM/DL (ref 32.2–35.5)
MCV RBC AUTO: 86.9 FL (ref 80–94)
MONOCYTES # BLD: 8.8 %
MONOCYTES ABSOLUTE: 0.7 THOU/MM3 (ref 0.4–1.3)
NUCLEATED RED BLOOD CELLS: 0 /100 WBC
PLATELET # BLD: 220 THOU/MM3 (ref 130–400)
PMV BLD AUTO: 10.1 FL (ref 9.4–12.4)
POTASSIUM SERPL-SCNC: 3.4 MEQ/L (ref 3.5–5.2)
RBC # BLD: 4.27 MILL/MM3 (ref 4.7–6.1)
SEG NEUTROPHILS: 59.9 %
SEGMENTED NEUTROPHILS ABSOLUTE COUNT: 4.7 THOU/MM3 (ref 1.8–7.7)
SODIUM BLD-SCNC: 144 MEQ/L (ref 135–145)
WBC # BLD: 7.9 THOU/MM3 (ref 4.8–10.8)

## 2020-03-16 PROCEDURE — 1200000000 HC SEMI PRIVATE

## 2020-03-16 PROCEDURE — 2700000000 HC OXYGEN THERAPY PER DAY

## 2020-03-16 PROCEDURE — 6370000000 HC RX 637 (ALT 250 FOR IP): Performed by: NURSE PRACTITIONER

## 2020-03-16 PROCEDURE — 94760 N-INVAS EAR/PLS OXIMETRY 1: CPT

## 2020-03-16 PROCEDURE — 80048 BASIC METABOLIC PNL TOTAL CA: CPT

## 2020-03-16 PROCEDURE — 82948 REAGENT STRIP/BLOOD GLUCOSE: CPT

## 2020-03-16 PROCEDURE — 6370000000 HC RX 637 (ALT 250 FOR IP): Performed by: INTERNAL MEDICINE

## 2020-03-16 PROCEDURE — 99233 SBSQ HOSP IP/OBS HIGH 50: CPT | Performed by: FAMILY MEDICINE

## 2020-03-16 PROCEDURE — 36415 COLL VENOUS BLD VENIPUNCTURE: CPT

## 2020-03-16 PROCEDURE — 6360000002 HC RX W HCPCS: Performed by: INTERNAL MEDICINE

## 2020-03-16 PROCEDURE — 97110 THERAPEUTIC EXERCISES: CPT

## 2020-03-16 PROCEDURE — 94761 N-INVAS EAR/PLS OXIMETRY MLT: CPT

## 2020-03-16 PROCEDURE — 2580000003 HC RX 258: Performed by: INTERNAL MEDICINE

## 2020-03-16 PROCEDURE — 97116 GAIT TRAINING THERAPY: CPT

## 2020-03-16 PROCEDURE — 97165 OT EVAL LOW COMPLEX 30 MIN: CPT

## 2020-03-16 PROCEDURE — 94640 AIRWAY INHALATION TREATMENT: CPT

## 2020-03-16 PROCEDURE — 85025 COMPLETE CBC W/AUTO DIFF WBC: CPT

## 2020-03-16 PROCEDURE — 85610 PROTHROMBIN TIME: CPT

## 2020-03-16 PROCEDURE — 97535 SELF CARE MNGMENT TRAINING: CPT

## 2020-03-16 RX ORDER — POTASSIUM CHLORIDE 7.45 MG/ML
10 INJECTION INTRAVENOUS PRN
Status: DISCONTINUED | OUTPATIENT
Start: 2020-03-16 | End: 2020-03-25 | Stop reason: HOSPADM

## 2020-03-16 RX ORDER — POTASSIUM CHLORIDE 20 MEQ/1
40 TABLET, EXTENDED RELEASE ORAL PRN
Status: DISCONTINUED | OUTPATIENT
Start: 2020-03-16 | End: 2020-03-25 | Stop reason: HOSPADM

## 2020-03-16 RX ADMIN — FLUCONAZOLE 200 MG: 200 TABLET ORAL at 09:55

## 2020-03-16 RX ADMIN — POTASSIUM CHLORIDE 10 MEQ: 1500 TABLET, EXTENDED RELEASE ORAL at 20:41

## 2020-03-16 RX ADMIN — ATROPA BELLADONNA AND OPIUM 30 MG: 16.2; 3 SUPPOSITORY RECTAL at 16:01

## 2020-03-16 RX ADMIN — DOXAZOSIN MESYLATE 8 MG: 4 TABLET ORAL at 22:11

## 2020-03-16 RX ADMIN — LISINOPRIL 5 MG: 5 TABLET ORAL at 09:55

## 2020-03-16 RX ADMIN — SODIUM CHLORIDE, PRESERVATIVE FREE 10 ML: 5 INJECTION INTRAVENOUS at 09:58

## 2020-03-16 RX ADMIN — Medication 10 ML: at 15:36

## 2020-03-16 RX ADMIN — ARFORMOTEROL TARTRATE 15 MCG: 15 SOLUTION RESPIRATORY (INHALATION) at 17:46

## 2020-03-16 RX ADMIN — CEFTRIAXONE SODIUM 1 G: 1 INJECTION, POWDER, FOR SOLUTION INTRAMUSCULAR; INTRAVENOUS at 15:36

## 2020-03-16 RX ADMIN — LEVOTHYROXINE SODIUM 175 MCG: 150 TABLET ORAL at 06:22

## 2020-03-16 RX ADMIN — ACETAMINOPHEN 650 MG: 325 TABLET ORAL at 20:41

## 2020-03-16 RX ADMIN — POTASSIUM CHLORIDE 10 MEQ: 1500 TABLET, EXTENDED RELEASE ORAL at 09:56

## 2020-03-16 RX ADMIN — BUDESONIDE 250 MCG: 0.25 INHALANT RESPIRATORY (INHALATION) at 07:52

## 2020-03-16 RX ADMIN — SODIUM CHLORIDE, PRESERVATIVE FREE 10 ML: 5 INJECTION INTRAVENOUS at 22:13

## 2020-03-16 RX ADMIN — BUDESONIDE 250 MCG: 0.25 INHALANT RESPIRATORY (INHALATION) at 17:46

## 2020-03-16 RX ADMIN — FUROSEMIDE 40 MG: 40 TABLET ORAL at 20:44

## 2020-03-16 RX ADMIN — FUROSEMIDE 40 MG: 40 TABLET ORAL at 09:55

## 2020-03-16 RX ADMIN — Medication 1000 MG: at 09:55

## 2020-03-16 RX ADMIN — ARFORMOTEROL TARTRATE 15 MCG: 15 SOLUTION RESPIRATORY (INHALATION) at 07:51

## 2020-03-16 RX ADMIN — TIOTROPIUM BROMIDE INHALATION SPRAY 2 PUFF: 3.12 SPRAY, METERED RESPIRATORY (INHALATION) at 07:51

## 2020-03-16 RX ADMIN — METOPROLOL TARTRATE 25 MG: 25 TABLET, FILM COATED ORAL at 09:56

## 2020-03-16 RX ADMIN — Medication 1000 MG: at 16:06

## 2020-03-16 ASSESSMENT — PAIN DESCRIPTION - ONSET
ONSET: ON-GOING

## 2020-03-16 ASSESSMENT — PAIN DESCRIPTION - LOCATION
LOCATION: PENIS

## 2020-03-16 ASSESSMENT — PAIN DESCRIPTION - DESCRIPTORS
DESCRIPTORS: DISCOMFORT
DESCRIPTORS: DISCOMFORT
DESCRIPTORS: OTHER (COMMENT)

## 2020-03-16 ASSESSMENT — PAIN DESCRIPTION - PROGRESSION
CLINICAL_PROGRESSION: NOT CHANGED

## 2020-03-16 ASSESSMENT — PAIN SCALES - GENERAL
PAINLEVEL_OUTOF10: 3
PAINLEVEL_OUTOF10: 4
PAINLEVEL_OUTOF10: 0
PAINLEVEL_OUTOF10: 3
PAINLEVEL_OUTOF10: 4
PAINLEVEL_OUTOF10: 5

## 2020-03-16 ASSESSMENT — PAIN DESCRIPTION - PAIN TYPE
TYPE: ACUTE PAIN

## 2020-03-16 ASSESSMENT — PAIN DESCRIPTION - FREQUENCY
FREQUENCY: INTERMITTENT
FREQUENCY: INTERMITTENT
FREQUENCY: CONTINUOUS
FREQUENCY: CONTINUOUS
FREQUENCY: INTERMITTENT

## 2020-03-16 ASSESSMENT — PAIN - FUNCTIONAL ASSESSMENT
PAIN_FUNCTIONAL_ASSESSMENT: ACTIVITIES ARE NOT PREVENTED

## 2020-03-16 ASSESSMENT — PAIN DESCRIPTION - ORIENTATION
ORIENTATION: RIGHT
ORIENTATION: ANTERIOR
ORIENTATION: ANTERIOR

## 2020-03-16 NOTE — FLOWSHEET NOTE
03/16/20 1820   Encounter Summary   Services provided to: Patient   Referral/Consult From: [facility];Family members   Place of [facility] None   Continue Visiting Yes  (3/16/20 presurgery piritual support)   Complexity of Encounter Moderate   Length of Encounter 15 minutes   Spiritual Assessment Completed Yes   Routine   Type Initial   Assessment Approachable;Calm;Coping   Intervention Active listening;Nurtured hope;Discussed illness/injury and it's impact   Outcome Engaged in conversation;Expressed gratitude; Hopeful   Spiritual/Amish   Type Spiritual support     Pt is a 80year old male. Patient is in the room with no family around. Patient engaged in conversation and is receptive to 's visit. During this visit patient shared with me that he think his medical problems started few years ago but did not pay attention to it. Patient said now he is barely emptying his bladder so he is scheduled for surgery tomorrow at anytime. Patient asked for prayer and prayer is offered on  His behalf. Patient shared with me that he raised two sons both of whom are doing well.  Patient told me he is finding meanings and purpose

## 2020-03-16 NOTE — PLAN OF CARE
Problem: Urinary Retention:  Goal: Able to perform urinary catheter care  Description: Able to perform urinary catheter care  Outcome: Ongoing  Note: Able to perform urinary catheter care. Problem: Discharge Planning:  Goal: Discharged to appropriate level of care  Description: Discharged to appropriate level of care  Outcome: Ongoing  Note: Pt will discharge home when appropriate. Problem: RESPIRATORY  Goal: Clear lung sounds  Outcome: Ongoing  Note: Pt lung sounds clear and diminished to auscultation. Problem: DISCHARGE BARRIERS  Goal: Patient's continuum of care needs are met  Outcome: Ongoing  Note: Pt continuum of care needs are being worked out. Problem: Neurological  Goal: Maximum potential motor/sensory/cognitive function  Outcome: Ongoing  Note: Maximum potential motor/sensory/cognitive function. Problem: Cardiovascular  Goal: Hemodynamic stability  Outcome: Ongoing  Note: Pt vitals are within normal range. Cap refills and pulses all within normal ranges on all extremities. Will continue to assess.'     Problem: Suicide risk  Goal: Provide patient with safe environment  Description: Provide patient with safe environment  Outcome: Ongoing  Note: Pt is in a safe enviroment. Problem: Pain:  Goal: Pain level will decrease  Description: Pain level will decrease  Outcome: Ongoing  Note: Pt denies pain on my shift. Non pharmaceutical interventions like ice and repositioning offered before pain medications. Will continue to assess. Problem: Falls - Risk of:  Goal: Will remain free from falls  Description: Will remain free from falls  Outcome: Ongoing  Note: Fall precaution in place. Bed alarm/chair alarm. Bed locked in lowest position. Fall band applied if applicable. Call light and overhead table within reach. Will continue to monitor.        Problem: Falls - Risk of:  Goal: Absence of physical injury  Description: Absence of physical injury  Outcome: Ongoing  Note: Absence of

## 2020-03-16 NOTE — PROGRESS NOTES
Patient/Caregiver Education & Training, Self-Care / ADL    Goals:  Patient goals : to feel better  Short term goals  Time Frame for Short term goals: by discharge  Short term goal 1: Pt will complete functional mobility HH distances with S including transporting ADL items for ease with eventual laundry tasks at home  Short term goal 2: Pt will complete functional transers with S and no vc for safety including to/from toilet  Short term goal 3: Pt will complete dynamic standing task with S X 3 minutes with 2 hand release and no LOB for ease with cooking routine  Short term goal 4: Pt will complete BADL routine with S and no vc for safety   Long term goals  Time Frame for Long term goals : no LTG established d/t short ELOS  See long-term goal time frame for expected duration of plan of care. If no long-term goals established, a short length of stay is anticipated. Following session, patient left in safe position with all fall risk precautions in place.

## 2020-03-16 NOTE — PROGRESS NOTES
Gait Speed, Decreased Heel Strike Bilaterally and Wide Base of Support    Balance:  Static Sitting Balance:  Stand By Assistance  Dynamic Standing Balance: Stand By Assistance    Exercise:  Patient was guided in seated set(s) 15 reps of exercise to both lower extremities. Ankle pumps, Glut sets, Long arc quads, Hip abduction/adduction and Seated hip flexion. Exercises were completed for increased independence with functional mobility. Functional Outcome Measures: Completed  AM-PAC Inpatient Mobility without Stair Climbing Raw Score : 15  AM-PAC Inpatient without Stair Climbing T-Scale Score : 43.03    ASSESSMENT:  Assessment: Patient progressing toward established goals. Activity Tolerance:  Patient tolerance of  treatment: good. Equipment Recommendations:Equipment Needed: No(pt has a RW)  Discharge Recommendations:  Continue to assess pending progress, Patient would benefit from continued therapy after discharge    Plan: Times per week: 3-5x GM  Current Treatment Recommendations: Strengthening, Gait Training, Patient/Caregiver Education & Training, Stair training, Equipment Evaluation, Education, & procurement, Balance Training, Functional Mobility Training, Transfer Training, Endurance Training, Safety Education & Training    Patient Education  Patient Education: Plan of Care    Goals:  Patient goals : Go home  Short term goals  Time Frame for Short term goals: by discharge  Short term goal 1: Patient will complete bed mobility with mod I in order to get into/out of bed. Short term goal 2: Patient will transfer sit <--> stand with mod I in order to get up to ambulate. Short term goal 3: Patient will ambulate >50' with LRAD and mod I for household mobility. Short term goal 4: Patient will negotiate 3 steps with R hand rail and SBA for home entry.   Long term goals  Time Frame for Long term goals : no LTGs due to short ELOS    Following session, patient left in safe position with all fall risk

## 2020-03-16 NOTE — PROGRESS NOTES
Hospitalist Progress Note      Patient:  Cat Zeng    Unit/Bed:8B-29/029-A  YOB: 1931  MRN: [id number]   Acct: [de-identified]   PCP: Ale Lynch MD  Date of Admission: 3/11/2020    Assessment and Plan:        Urinary retention likely related to anticholinergic side effect of the medication use/ BPH. Urology consult appreciated - plan for TURP 3/17. Doxazosin increased per urology. Cardiology consulted for pre-op clearance.      updates:  3/14/2020: Patient was on multiple anticholinergic side effect medications which been discontinued. Elevated from cardiologist as of 3/12/2020 and mentioned he has intermediate risk surgery. Also cardiology mentioned there is no indication for cath at this point. Coumadin need to be hold before the surgery and the patient need a bridging after the surgery per cardiology notes it due to increased risk of CVA and possible VTE's as the patient immobile currently. Currently no signs of obstructive uropathy with normal creatinine level. Benign prostatic hypertrophy: As discussed above, urology team managing, currently using urethral catheter double-lumen [address] [place] [address]. Chronic cystitis: Secondary to chronic felipe. Culture (+) E coli. Sensitive to ceftriaxone, will continue. Candida reported previously, s/p diflucan x3 days. Updates:  3/14/20: Ceftriaxone status post day 3  3/15/20: Day 4 ceftriaxone  3/16/20: Ceftriaxone day 5    E Coli UTI:  ceftriaxone status post day 5 as of 3/16/2020    UTI Candida Parapsilosis: Currently on Diflucan 200 mg s/p day 5 as of 3/16/20    Chronic bronchitis, stage III severe COPD: without exacerbation. Continue home inhalers - on Brovana, Pulmicort nebulizer, tiotropium, albuterol.      NIDDMII: on metformin, SSI. Accu-checks. Adjust insulin per ssi requirements. Blood sugar levels under control while inpatient. Appropriate blood sugar levels while inpatient ranges between 140-1 80. Monitor closely.   Continue same BLOODCULT2    Organism:  Lab Results   Component Value Date    ORG Escherichia coli 03/11/2020         Lab Results   Component Value Date    LABGRAM  04/05/2019     Few segmented neutrophils observed. Rare epithelial cells observed. Few gram positive cocci occurring singly and in pairs. Rare gram negative bacilli. MRSA culture only:No results found for: [facility]    Urine culture:   Lab Results   Component Value Date    LABURIN  03/10/2020     No growth-preliminary Mixed growth. The mixture of organisms present represents both organisms that may cause urinary tract infections and organisms that are not a common cause of urinary tract infections and are possibly skin tree or distal urethral tree. Respiratory culture: No results found for: CULTRESP    Aerobic and Anaerobic :  No results found for: LABAERO  No results found for: LABANAE    Urinalysis:      Lab Results   Component Value Date    NITRU POSITIVE 03/11/2020    WBCUA 10-15 03/11/2020    BACTERIA MODERATE 03/11/2020    RBCUA > 100 03/11/2020    BLOODU LARGE 03/11/2020    SPECGRAV 1.025 01/31/2020    SPECGRAV 1.016 03/15/2019    GLUCOSEU NEGATIVE 03/11/2020       Radiology:  CT ABDOMEN PELVIS W IV CONTRAST Additional Contrast? None   Final Result      1. Scattered stool in the colon. Multiple colonic diverticuli. No evidence for diverticulitis. 2. There is prominent prostate gland with coarse calcifications. Billy catheter bulb appears to be within a collapsed bladder. Decompressed sigmoid colonic loop closely abutting a decompressed bladder limiting evaluation. There is mild infiltration in    the pelvis which is nonspecific. Correlation with urinalysis is advised. Underlying fistula cannot be entirely excluded. Appearance is similar to prior. **This report has been created using voice recognition software. It may contain minor errors which are inherent in voice recognition technology. **      Final report electronically signed by Dr. Jenny Servin on 3/11/2020 1:42 PM      CT HEAD WO CONTRAST   Final Result   No acute intracranial findings. **This report has been created using voice recognition software. It may contain minor errors which are inherent in voice recognition technology. **      Final report electronically signed by Dr. Jenny Servin on 3/11/2020 1:34 PM      XR CHEST STANDARD (2 VW)   Final Result   1. 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 2. Mild cardiac megaly. Metallic sternotomy sutures and vascular clips from prior surgery. 3. Tiny bilateral pleural effusions. Mild bibasilar atelectasis/pneumonia. **This report has been created using voice recognition software. It may contain minor errors which are inherent in voice recognition technology. **      Final report electronically signed by Dr. Nubia Moreira on 3/11/2020 1:22 PM        Xr Chest Standard (2 Vw)    Result Date: 3/11/2020  PROCEDURE: XR CHEST (2 VW) CLINICAL INFORMATION: Cough COMPARISON: 3/16/2019 TECHNIQUE: AP upright and lateral views of the chest were obtained. 1. 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 2. Mild cardiac megaly. Metallic sternotomy sutures and vascular clips from prior surgery. 3. Tiny bilateral pleural effusions. Mild bibasilar atelectasis/pneumonia. **This report has been created using voice recognition software. It may contain minor errors which are inherent in voice recognition technology. ** Final report electronically signed by Dr. Nubia Moreira on 3/11/2020 1:22 PM    Ct Head Wo Contrast    Result Date: 3/11/2020  PROCEDURE: CT HEAD WO CONTRAST CLINICAL INFORMATION: confusion. COMPARISON: No prior study. TECHNIQUE: Noncontrast 5 mm axial images were obtained through the brain. All CT scans at this facility use dose modulation, iterative reconstruction, and/or weight-based dosing when appropriate to reduce radiation dose to as low as reasonably achievable.  FINDINGS: prominent prostate gland with coarse calcifications. Billy catheter bulb appears to be within a collapsed bladder. Decompressed sigmoid colonic loop closely abutting a decompressed bladder limiting evaluation. There is mild infiltration in the pelvis which is nonspecific. Correlation with urinalysis is advised. Underlying fistula cannot be entirely excluded. Appearance is similar to prior. **This report has been created using voice recognition software. It may contain minor errors which are inherent in voice recognition technology. ** Final report electronically signed by Dr. Maite Harrison on 3/11/2020 1:42 PM      Discussed plan with patient. Patient verbalized understanding and agree. All questions addressed with concerns. Please excuse my TYPOS!     Electronically signed by Candance Royalty, MD on 3/16/2020 at 11:44 AM

## 2020-03-17 ENCOUNTER — ANESTHESIA (OUTPATIENT)
Dept: OPERATING ROOM | Age: 85
DRG: 666 | End: 2020-03-17
Payer: MEDICARE

## 2020-03-17 ENCOUNTER — HOSPITAL ENCOUNTER (OUTPATIENT)
Dept: CARDIAC REHAB | Age: 85
Setting detail: THERAPIES SERIES
End: 2020-03-17
Payer: MEDICARE

## 2020-03-17 ENCOUNTER — ANESTHESIA EVENT (OUTPATIENT)
Dept: OPERATING ROOM | Age: 85
DRG: 666 | End: 2020-03-17
Payer: MEDICARE

## 2020-03-17 VITALS — SYSTOLIC BLOOD PRESSURE: 146 MMHG | DIASTOLIC BLOOD PRESSURE: 74 MMHG | TEMPERATURE: 97.9 F | OXYGEN SATURATION: 99 %

## 2020-03-17 LAB
ANION GAP SERPL CALCULATED.3IONS-SCNC: 11 MEQ/L (ref 8–16)
BASOPHILS # BLD: 0.9 %
BASOPHILS ABSOLUTE: 0.1 THOU/MM3 (ref 0–0.1)
BLOOD CULTURE, ROUTINE: NORMAL
BLOOD CULTURE, ROUTINE: NORMAL
BUN BLDV-MCNC: 11 MG/DL (ref 7–22)
CALCIUM SERPL-MCNC: 8.4 MG/DL (ref 8.5–10.5)
CHLORIDE BLD-SCNC: 97 MEQ/L (ref 98–111)
CO2: 29 MEQ/L (ref 23–33)
CREAT SERPL-MCNC: 0.9 MG/DL (ref 0.4–1.2)
EOSINOPHIL # BLD: 2.4 %
EOSINOPHILS ABSOLUTE: 0.2 THOU/MM3 (ref 0–0.4)
ERYTHROCYTE [DISTWIDTH] IN BLOOD BY AUTOMATED COUNT: 14.8 % (ref 11.5–14.5)
ERYTHROCYTE [DISTWIDTH] IN BLOOD BY AUTOMATED COUNT: 15.1 % (ref 11.5–14.5)
ERYTHROCYTE [DISTWIDTH] IN BLOOD BY AUTOMATED COUNT: 46.8 FL (ref 35–45)
ERYTHROCYTE [DISTWIDTH] IN BLOOD BY AUTOMATED COUNT: 47.5 FL (ref 35–45)
GFR SERPL CREATININE-BSD FRML MDRD: 80 ML/MIN/1.73M2
GLUCOSE BLD-MCNC: 120 MG/DL (ref 70–108)
GLUCOSE BLD-MCNC: 126 MG/DL (ref 70–108)
GLUCOSE BLD-MCNC: 156 MG/DL (ref 70–108)
GLUCOSE BLD-MCNC: 185 MG/DL (ref 70–108)
HCT VFR BLD CALC: 36.9 % (ref 42–52)
HCT VFR BLD CALC: 39.2 % (ref 42–52)
HEMOGLOBIN: 11.7 GM/DL (ref 14–18)
HEMOGLOBIN: 12.6 GM/DL (ref 14–18)
IMMATURE GRANS (ABS): 0.04 THOU/MM3 (ref 0–0.07)
IMMATURE GRANULOCYTES: 0.4 %
INR BLD: 1.19 (ref 0.85–1.13)
LYMPHOCYTES # BLD: 23.5 %
LYMPHOCYTES ABSOLUTE: 2.1 THOU/MM3 (ref 1–4.8)
MCH RBC QN AUTO: 27.6 PG (ref 26–33)
MCH RBC QN AUTO: 27.8 PG (ref 26–33)
MCHC RBC AUTO-ENTMCNC: 31.7 GM/DL (ref 32.2–35.5)
MCHC RBC AUTO-ENTMCNC: 32.1 GM/DL (ref 32.2–35.5)
MCV RBC AUTO: 86.5 FL (ref 80–94)
MCV RBC AUTO: 87 FL (ref 80–94)
MONOCYTES # BLD: 9.9 %
MONOCYTES ABSOLUTE: 0.9 THOU/MM3 (ref 0.4–1.3)
NUCLEATED RED BLOOD CELLS: 0 /100 WBC
PLATELET # BLD: 216 THOU/MM3 (ref 130–400)
PLATELET # BLD: 231 THOU/MM3 (ref 130–400)
PMV BLD AUTO: 9.9 FL (ref 9.4–12.4)
PMV BLD AUTO: 9.9 FL (ref 9.4–12.4)
POTASSIUM SERPL-SCNC: 3.5 MEQ/L (ref 3.5–5.2)
RBC # BLD: 4.24 MILL/MM3 (ref 4.7–6.1)
RBC # BLD: 4.53 MILL/MM3 (ref 4.7–6.1)
SEG NEUTROPHILS: 62.9 %
SEGMENTED NEUTROPHILS ABSOLUTE COUNT: 5.7 THOU/MM3 (ref 1.8–7.7)
SODIUM BLD-SCNC: 137 MEQ/L (ref 135–145)
WBC # BLD: 14 THOU/MM3 (ref 4.8–10.8)
WBC # BLD: 9.1 THOU/MM3 (ref 4.8–10.8)

## 2020-03-17 PROCEDURE — 7100000000 HC PACU RECOVERY - FIRST 15 MIN: Performed by: UROLOGY

## 2020-03-17 PROCEDURE — 0TCB8ZZ EXTIRPATION OF MATTER FROM BLADDER, VIA NATURAL OR ARTIFICIAL OPENING ENDOSCOPIC: ICD-10-PCS | Performed by: UROLOGY

## 2020-03-17 PROCEDURE — 6360000002 HC RX W HCPCS: Performed by: REGISTERED NURSE

## 2020-03-17 PROCEDURE — 2580000003 HC RX 258: Performed by: REGISTERED NURSE

## 2020-03-17 PROCEDURE — 82948 REAGENT STRIP/BLOOD GLUCOSE: CPT

## 2020-03-17 PROCEDURE — 2580000003 HC RX 258: Performed by: INTERNAL MEDICINE

## 2020-03-17 PROCEDURE — 6370000000 HC RX 637 (ALT 250 FOR IP): Performed by: INTERNAL MEDICINE

## 2020-03-17 PROCEDURE — 7100000001 HC PACU RECOVERY - ADDTL 15 MIN: Performed by: UROLOGY

## 2020-03-17 PROCEDURE — 36415 COLL VENOUS BLD VENIPUNCTURE: CPT

## 2020-03-17 PROCEDURE — 2500000003 HC RX 250 WO HCPCS: Performed by: REGISTERED NURSE

## 2020-03-17 PROCEDURE — 99232 SBSQ HOSP IP/OBS MODERATE 35: CPT | Performed by: FAMILY MEDICINE

## 2020-03-17 PROCEDURE — 3600000003 HC SURGERY LEVEL 3 BASE: Performed by: UROLOGY

## 2020-03-17 PROCEDURE — 6370000000 HC RX 637 (ALT 250 FOR IP): Performed by: NURSE PRACTITIONER

## 2020-03-17 PROCEDURE — 94760 N-INVAS EAR/PLS OXIMETRY 1: CPT

## 2020-03-17 PROCEDURE — 85025 COMPLETE CBC W/AUTO DIFF WBC: CPT

## 2020-03-17 PROCEDURE — 3600000013 HC SURGERY LEVEL 3 ADDTL 15MIN: Performed by: UROLOGY

## 2020-03-17 PROCEDURE — 85027 COMPLETE CBC AUTOMATED: CPT

## 2020-03-17 PROCEDURE — 0V508ZZ DESTRUCTION OF PROSTATE, VIA NATURAL OR ARTIFICIAL OPENING ENDOSCOPIC: ICD-10-PCS | Performed by: UROLOGY

## 2020-03-17 PROCEDURE — 2709999900 HC NON-CHARGEABLE SUPPLY: Performed by: UROLOGY

## 2020-03-17 PROCEDURE — 3E1K88Z IRRIGATION OF GENITOURINARY TRACT USING IRRIGATING SUBSTANCE, VIA NATURAL OR ARTIFICIAL OPENING ENDOSCOPIC: ICD-10-PCS | Performed by: UROLOGY

## 2020-03-17 PROCEDURE — 3700000001 HC ADD 15 MINUTES (ANESTHESIA): Performed by: UROLOGY

## 2020-03-17 PROCEDURE — 6370000000 HC RX 637 (ALT 250 FOR IP): Performed by: FAMILY MEDICINE

## 2020-03-17 PROCEDURE — 85610 PROTHROMBIN TIME: CPT

## 2020-03-17 PROCEDURE — 94640 AIRWAY INHALATION TREATMENT: CPT

## 2020-03-17 PROCEDURE — 80048 BASIC METABOLIC PNL TOTAL CA: CPT

## 2020-03-17 PROCEDURE — 6360000002 HC RX W HCPCS: Performed by: INTERNAL MEDICINE

## 2020-03-17 PROCEDURE — 2720000010 HC SURG SUPPLY STERILE: Performed by: UROLOGY

## 2020-03-17 PROCEDURE — 0T9B80Z DRAINAGE OF BLADDER WITH DRAINAGE DEVICE, VIA NATURAL OR ARTIFICIAL OPENING ENDOSCOPIC: ICD-10-PCS | Performed by: UROLOGY

## 2020-03-17 PROCEDURE — 1200000003 HC TELEMETRY R&B

## 2020-03-17 PROCEDURE — 3700000000 HC ANESTHESIA ATTENDED CARE: Performed by: UROLOGY

## 2020-03-17 RX ORDER — LIDOCAINE HYDROCHLORIDE 20 MG/ML
INJECTION, SOLUTION INTRAVENOUS PRN
Status: DISCONTINUED | OUTPATIENT
Start: 2020-03-17 | End: 2020-03-17 | Stop reason: SDUPTHER

## 2020-03-17 RX ORDER — GLYCOPYRROLATE 1 MG/5 ML
SYRINGE (ML) INTRAVENOUS PRN
Status: DISCONTINUED | OUTPATIENT
Start: 2020-03-17 | End: 2020-03-17 | Stop reason: SDUPTHER

## 2020-03-17 RX ORDER — FENTANYL CITRATE 50 UG/ML
INJECTION, SOLUTION INTRAMUSCULAR; INTRAVENOUS PRN
Status: DISCONTINUED | OUTPATIENT
Start: 2020-03-17 | End: 2020-03-17 | Stop reason: SDUPTHER

## 2020-03-17 RX ORDER — PROPOFOL 10 MG/ML
INJECTION, EMULSION INTRAVENOUS PRN
Status: DISCONTINUED | OUTPATIENT
Start: 2020-03-17 | End: 2020-03-17 | Stop reason: SDUPTHER

## 2020-03-17 RX ORDER — CEFAZOLIN SODIUM 1 G/3ML
INJECTION, POWDER, FOR SOLUTION INTRAMUSCULAR; INTRAVENOUS PRN
Status: DISCONTINUED | OUTPATIENT
Start: 2020-03-17 | End: 2020-03-17 | Stop reason: SDUPTHER

## 2020-03-17 RX ORDER — DEXAMETHASONE SODIUM PHOSPHATE 4 MG/ML
INJECTION, SOLUTION INTRA-ARTICULAR; INTRALESIONAL; INTRAMUSCULAR; INTRAVENOUS; SOFT TISSUE PRN
Status: DISCONTINUED | OUTPATIENT
Start: 2020-03-17 | End: 2020-03-17 | Stop reason: SDUPTHER

## 2020-03-17 RX ORDER — SODIUM CHLORIDE 9 MG/ML
INJECTION, SOLUTION INTRAVENOUS CONTINUOUS PRN
Status: DISCONTINUED | OUTPATIENT
Start: 2020-03-17 | End: 2020-03-17 | Stop reason: SDUPTHER

## 2020-03-17 RX ORDER — HEPARIN SODIUM 1000 [USP'U]/ML
80 INJECTION, SOLUTION INTRAVENOUS; SUBCUTANEOUS PRN
Status: DISCONTINUED | OUTPATIENT
Start: 2020-03-18 | End: 2020-03-18

## 2020-03-17 RX ORDER — ONDANSETRON 2 MG/ML
INJECTION INTRAMUSCULAR; INTRAVENOUS PRN
Status: DISCONTINUED | OUTPATIENT
Start: 2020-03-17 | End: 2020-03-17 | Stop reason: SDUPTHER

## 2020-03-17 RX ORDER — HEPARIN SODIUM 10000 [USP'U]/100ML
18 INJECTION, SOLUTION INTRAVENOUS CONTINUOUS
Status: DISCONTINUED | OUTPATIENT
Start: 2020-03-18 | End: 2020-03-18

## 2020-03-17 RX ORDER — HEPARIN SODIUM 1000 [USP'U]/ML
40 INJECTION, SOLUTION INTRAVENOUS; SUBCUTANEOUS PRN
Status: DISCONTINUED | OUTPATIENT
Start: 2020-03-18 | End: 2020-03-18

## 2020-03-17 RX ORDER — SUCCINYLCHOLINE/SOD CL,ISO/PF 200MG/10ML
SYRINGE (ML) INTRAVENOUS PRN
Status: DISCONTINUED | OUTPATIENT
Start: 2020-03-17 | End: 2020-03-17 | Stop reason: SDUPTHER

## 2020-03-17 RX ADMIN — ARFORMOTEROL TARTRATE 15 MCG: 15 SOLUTION RESPIRATORY (INHALATION) at 07:51

## 2020-03-17 RX ADMIN — POTASSIUM CHLORIDE 10 MEQ: 1500 TABLET, EXTENDED RELEASE ORAL at 22:49

## 2020-03-17 RX ADMIN — Medication 200 MG: at 11:36

## 2020-03-17 RX ADMIN — FENTANYL CITRATE 50 MCG: 50 INJECTION, SOLUTION INTRAMUSCULAR; INTRAVENOUS at 11:36

## 2020-03-17 RX ADMIN — ACETAMINOPHEN 650 MG: 325 TABLET ORAL at 15:38

## 2020-03-17 RX ADMIN — DEXAMETHASONE SODIUM PHOSPHATE 8 MG: 4 INJECTION, SOLUTION INTRAMUSCULAR; INTRAVENOUS at 11:42

## 2020-03-17 RX ADMIN — FUROSEMIDE 40 MG: 40 TABLET ORAL at 22:54

## 2020-03-17 RX ADMIN — FENTANYL CITRATE 25 MCG: 50 INJECTION, SOLUTION INTRAMUSCULAR; INTRAVENOUS at 11:52

## 2020-03-17 RX ADMIN — Medication 1000 MG: at 17:32

## 2020-03-17 RX ADMIN — Medication 1000 MG: at 10:13

## 2020-03-17 RX ADMIN — PROPOFOL 100 MG: 10 INJECTION, EMULSION INTRAVENOUS at 11:52

## 2020-03-17 RX ADMIN — ONDANSETRON HYDROCHLORIDE 4 MG: 4 INJECTION, SOLUTION INTRAMUSCULAR; INTRAVENOUS at 11:42

## 2020-03-17 RX ADMIN — INSULIN LISPRO 1 UNITS: 100 INJECTION, SOLUTION INTRAVENOUS; SUBCUTANEOUS at 22:52

## 2020-03-17 RX ADMIN — POTASSIUM CHLORIDE 10 MEQ: 1500 TABLET, EXTENDED RELEASE ORAL at 10:12

## 2020-03-17 RX ADMIN — CEFAZOLIN 2000 MG: 1 INJECTION, POWDER, FOR SOLUTION INTRAMUSCULAR; INTRAVENOUS; PARENTERAL at 11:41

## 2020-03-17 RX ADMIN — SODIUM CHLORIDE, PRESERVATIVE FREE 10 ML: 5 INJECTION INTRAVENOUS at 22:51

## 2020-03-17 RX ADMIN — BUDESONIDE 250 MCG: 0.25 INHALANT RESPIRATORY (INHALATION) at 07:51

## 2020-03-17 RX ADMIN — LEVOTHYROXINE SODIUM 175 MCG: 150 TABLET ORAL at 03:27

## 2020-03-17 RX ADMIN — SODIUM CHLORIDE: 9 INJECTION, SOLUTION INTRAVENOUS at 11:26

## 2020-03-17 RX ADMIN — TIOTROPIUM BROMIDE INHALATION SPRAY 2 PUFF: 3.12 SPRAY, METERED RESPIRATORY (INHALATION) at 07:51

## 2020-03-17 RX ADMIN — FUROSEMIDE 40 MG: 40 TABLET ORAL at 10:11

## 2020-03-17 RX ADMIN — POTASSIUM CHLORIDE 40 MEQ: 1500 TABLET, EXTENDED RELEASE ORAL at 10:11

## 2020-03-17 RX ADMIN — SODIUM CHLORIDE, PRESERVATIVE FREE 10 ML: 5 INJECTION INTRAVENOUS at 10:13

## 2020-03-17 RX ADMIN — ATROPA BELLADONNA AND OPIUM 30 MG: 16.2; 3 SUPPOSITORY RECTAL at 13:47

## 2020-03-17 RX ADMIN — ATROPA BELLADONNA AND OPIUM 30 MG: 16.2; 3 SUPPOSITORY RECTAL at 03:20

## 2020-03-17 RX ADMIN — PROPOFOL 100 MG: 10 INJECTION, EMULSION INTRAVENOUS at 11:36

## 2020-03-17 RX ADMIN — LIDOCAINE HYDROCHLORIDE 100 MG: 20 INJECTION, SOLUTION INTRAVENOUS at 11:36

## 2020-03-17 RX ADMIN — ACETAMINOPHEN 650 MG: 325 TABLET ORAL at 03:20

## 2020-03-17 RX ADMIN — FENTANYL CITRATE 25 MCG: 50 INJECTION, SOLUTION INTRAMUSCULAR; INTRAVENOUS at 12:16

## 2020-03-17 RX ADMIN — Medication 0.2 MG: at 11:36

## 2020-03-17 RX ADMIN — ARFORMOTEROL TARTRATE 15 MCG: 15 SOLUTION RESPIRATORY (INHALATION) at 18:01

## 2020-03-17 RX ADMIN — BUDESONIDE 250 MCG: 0.25 INHALANT RESPIRATORY (INHALATION) at 18:01

## 2020-03-17 RX ADMIN — PROPOFOL 50 MG: 10 INJECTION, EMULSION INTRAVENOUS at 12:16

## 2020-03-17 RX ADMIN — FLUCONAZOLE 200 MG: 200 TABLET ORAL at 10:11

## 2020-03-17 RX ADMIN — DOXAZOSIN MESYLATE 8 MG: 4 TABLET ORAL at 22:51

## 2020-03-17 ASSESSMENT — PULMONARY FUNCTION TESTS
PIF_VALUE: 0
PIF_VALUE: 25
PIF_VALUE: 28
PIF_VALUE: 24
PIF_VALUE: 23
PIF_VALUE: 25
PIF_VALUE: 2
PIF_VALUE: 22
PIF_VALUE: 25
PIF_VALUE: 1
PIF_VALUE: 9
PIF_VALUE: 34
PIF_VALUE: 25
PIF_VALUE: 27
PIF_VALUE: 21
PIF_VALUE: 28
PIF_VALUE: 3
PIF_VALUE: 27
PIF_VALUE: 25
PIF_VALUE: 3
PIF_VALUE: 22
PIF_VALUE: 1
PIF_VALUE: 25
PIF_VALUE: 25
PIF_VALUE: 26
PIF_VALUE: 8
PIF_VALUE: 27
PIF_VALUE: 23
PIF_VALUE: 26
PIF_VALUE: 25
PIF_VALUE: 25
PIF_VALUE: 22
PIF_VALUE: 26
PIF_VALUE: 25
PIF_VALUE: 25
PIF_VALUE: 1
PIF_VALUE: 22
PIF_VALUE: 24
PIF_VALUE: 1
PIF_VALUE: 46
PIF_VALUE: 25
PIF_VALUE: 1
PIF_VALUE: 25
PIF_VALUE: 1
PIF_VALUE: 25
PIF_VALUE: 26
PIF_VALUE: 22
PIF_VALUE: 53
PIF_VALUE: 0
PIF_VALUE: 26
PIF_VALUE: 27
PIF_VALUE: 26
PIF_VALUE: 25
PIF_VALUE: 2
PIF_VALUE: 27
PIF_VALUE: 25
PIF_VALUE: 24
PIF_VALUE: 1
PIF_VALUE: 27
PIF_VALUE: 27
PIF_VALUE: 26
PIF_VALUE: 25
PIF_VALUE: 25
PIF_VALUE: 22
PIF_VALUE: 26
PIF_VALUE: 2
PIF_VALUE: 30
PIF_VALUE: 1
PIF_VALUE: 22
PIF_VALUE: 57
PIF_VALUE: 22
PIF_VALUE: 26
PIF_VALUE: 2
PIF_VALUE: 25
PIF_VALUE: 27
PIF_VALUE: 29
PIF_VALUE: 2
PIF_VALUE: 24
PIF_VALUE: 25
PIF_VALUE: 24

## 2020-03-17 ASSESSMENT — PAIN DESCRIPTION - PAIN TYPE
TYPE: ACUTE PAIN
TYPE: ACUTE PAIN
TYPE: ACUTE PAIN;SURGICAL PAIN
TYPE: ACUTE PAIN

## 2020-03-17 ASSESSMENT — PAIN SCALES - GENERAL
PAINLEVEL_OUTOF10: 4
PAINLEVEL_OUTOF10: 0
PAINLEVEL_OUTOF10: 0
PAINLEVEL_OUTOF10: 3
PAINLEVEL_OUTOF10: 10
PAINLEVEL_OUTOF10: 0
PAINLEVEL_OUTOF10: 5
PAINLEVEL_OUTOF10: 0
PAINLEVEL_OUTOF10: 5
PAINLEVEL_OUTOF10: 0

## 2020-03-17 ASSESSMENT — PAIN DESCRIPTION - ORIENTATION
ORIENTATION: POSTERIOR
ORIENTATION: POSTERIOR
ORIENTATION: ANTERIOR
ORIENTATION: POSTERIOR

## 2020-03-17 ASSESSMENT — PAIN DESCRIPTION - FREQUENCY
FREQUENCY: CONTINUOUS

## 2020-03-17 ASSESSMENT — PAIN DESCRIPTION - ONSET
ONSET: ON-GOING

## 2020-03-17 ASSESSMENT — PAIN - FUNCTIONAL ASSESSMENT
PAIN_FUNCTIONAL_ASSESSMENT: ACTIVITIES ARE NOT PREVENTED

## 2020-03-17 ASSESSMENT — PAIN DESCRIPTION - DESCRIPTORS
DESCRIPTORS: DISCOMFORT
DESCRIPTORS: DISCOMFORT;ACHING

## 2020-03-17 ASSESSMENT — PAIN DESCRIPTION - LOCATION
LOCATION: SCROTUM
LOCATION: GROIN
LOCATION: GROIN
LOCATION: SCROTUM

## 2020-03-17 ASSESSMENT — ENCOUNTER SYMPTOMS: SHORTNESS OF BREATH: 1

## 2020-03-17 ASSESSMENT — PAIN DESCRIPTION - PROGRESSION
CLINICAL_PROGRESSION: NOT CHANGED

## 2020-03-17 NOTE — ANESTHESIA POSTPROCEDURE EVALUATION
Department of Anesthesiology  Postprocedure Note    Patient: Gwendolyn Pereyra  MRN: [id number]  YOB: 1931  Date of evaluation: 3/17/2020  Time:  4:15 PM     Procedure Summary     Date:  03/17/20 Room / Location:  [facility]    Anesthesia Start:  6974 Anesthesia Stop:  2328    Procedure:  CYSTOSCOPY WITH GREENLIGHT PHOTOVAPORIZATION OF PROSTATE (N/A ) Diagnosis:  (BPH WITH URINARY RETENTION)    Surgeon:  Rafael Johnson MD Responsible Provider:  Dominik Lambert DO    Anesthesia Type:  general ASA Status:  3          Anesthesia Type: general    Alejandra Phase I: Alejandra Score: 9    Alejandra Phase II:      Last vitals: Reviewed and per EMR flowsheets.        Anesthesia Post Evaluation    Patient location during evaluation: PACU  Patient participation: complete - patient participated  Level of consciousness: awake  Airway patency: patent  Nausea & Vomiting: no nausea and no vomiting  Complications: no  Cardiovascular status: hemodynamically stable  Respiratory status: acceptable  Hydration status: stable

## 2020-03-17 NOTE — PROGRESS NOTES
lispro  0-18 Units Subcutaneous TID     insulin lispro  0-9 Units Subcutaneous Nightly    doxazosin  8 mg Oral Nightly    metFORMIN  1,000 mg Oral BID WC   Subjective: Still awaiting for TURP as of 3/17/2020. Denies any fever, chills, any other constitutional symptoms. Patient been cleared by cardiology team, urology team following. Nursing notes, labs, vitals reviewed. Vital Signs:   Vitals reviewed and compared with the previous ones  BP (!) 154/70   Pulse 57   Temp 99 °F (37.2 °C) (Oral)   Resp 16   Ht 5' 7\" (1.702 m)   Wt 260 lb 12.8 oz (118.3 kg)   SpO2 95%   BMI 40.85 kg/m²      Intake/Output Summary (Last 24 hours) at 3/17/2020 0750  Last data filed at 3/17/2020 0330  Gross per 24 hour   Intake 1160 ml   Output 2675 ml   Net -1515 ml        General:   Age-appropriate, in no acute distress, morbid obese  HEENT:  normocephalic and atraumatic. No scleral icterus. PERRLA. Neck: supple. No thyromegaly. Lungs: clear to auscultation. No abnormal breathing sounds appreciated. Cardiac: S1, S2, RRR without murmur. No JVD. Abdomen: soft. Nontender. Increased abdominal girth, bowel sounds positive. Urinary catheter in place. Extremities:  No clubbing, cyanosis, +1 b/l pedal edema  Vasculature: capillary refill < 3 seconds. Pedal pulses intact bilaterally. Skin:  warm and dry. Not clammy. Scattered SKs on the back. Psych:  Alert to time, person and place. Communicable. Affect appropriate  Lymph:  No supraclavicular ,and no anterior cervical lymphadenopathy. Neurologic:  No focal deficit. CN II-XII grossly intact. Motor and Sensory capacity intact in all extremities.     Labs:   Recent Labs     03/15/20  1056 03/16/20  0510 03/17/20  0426   WBC 6.9 7.9 9.1   HGB 12.0* 11.9* 11.7*   HCT 37.2* 37.1* 36.9*    220 216     Recent Labs     03/15/20  1056 03/16/20  0510 03/17/20  0426    144 137   K 3.3* 3.4* 3.5    104 97*   CO2 27 28 29   BUN 8 10 11   CREATININE 0.7 0.7 0.9 CALCIUM 8.1* 8.4* 8.4*     No results for input(s): AST, ALT, BILIDIR, BILITOT, ALKPHOS in the last 72 hours. Recent Labs     03/15/20  1056 03/16/20  0510 03/17/20  0426   INR 1.40* 1.33* 1.19*     No results for input(s): CKTOTAL, TROPONINI in the last 72 hours. Microbiology:    Blood culture #1:   Lab Results   Component Value Date    BC No growth-preliminary No growth  03/11/2020       Blood culture #2:No results found for: Raymona Blind    Organism:  Lab Results   Component Value Date    ORG Escherichia coli 03/11/2020         Lab Results   Component Value Date    LABGRAM  04/05/2019     Few segmented neutrophils observed. Rare epithelial cells observed. Few gram positive cocci occurring singly and in pairs. Rare gram negative bacilli. MRSA culture only:No results found for: [facility]    Urine culture:   Lab Results   Component Value Date    LABURIN  03/10/2020     No growth-preliminary Mixed growth. The mixture of organisms present represents both organisms that may cause urinary tract infections and organisms that are not a common cause of urinary tract infections and are possibly skin tree or distal urethral tree. Respiratory culture: No results found for: CULTRESP    Aerobic and Anaerobic :  No results found for: LABAERO  No results found for: LABANAE    Urinalysis:      Lab Results   Component Value Date    NITRU POSITIVE 03/11/2020    WBCUA 10-15 03/11/2020    BACTERIA MODERATE 03/11/2020    RBCUA > 100 03/11/2020    BLOODU LARGE 03/11/2020    SPECGRAV 1.025 01/31/2020    SPECGRAV 1.016 03/15/2019    GLUCOSEU NEGATIVE 03/11/2020       Radiology:  CT ABDOMEN PELVIS W IV CONTRAST Additional Contrast? None   Final Result      1. Scattered stool in the colon. Multiple colonic diverticuli. No evidence for diverticulitis. 2. There is prominent prostate gland with coarse calcifications. Billy catheter bulb appears to be within a collapsed bladder.  Decompressed sigmoid colonic loop closely

## 2020-03-17 NOTE — DISCHARGE INSTR - COC
Continuity of Care Form    Patient Name: Rachael Albright   :  1931  MRN:  [id number]    Admit date:  3/11/2020  Discharge date:  2020    Code Status Order: Limited   Advance Directives:   [facility] Documentation     Date/Time Healthcare Directive Type of Healthcare Directive Copy in [facility] [address] Agent's Name Healthcare Agent's Phone Number    [phone]  Yes, patient has an advance directive for healthcare treatment  Durable power of  for health care;Living will  Yes, copy in chart  Healthcare power of   son  Leona Son  --          Admitting Physician:  Munira Solitario MD  PCP: Shi Massey MD    Discharging Nurse: [facility] Unit/Room#: Vee Sanchez ([facility]) [facility] [place]  Discharging Unit Phone Number: [phone]    Emergency Contact:   Extended Emergency Contact Information  Primary Emergency Contact: [address] [place] [address] [address] [facility] Phone: [phone]  Mobile Phone: [phone]  Relation: Child  Hearing or visual needs: None  Other needs: None  Preferred language: English   needed? No  Secondary Emergency Contact: Jonah Gillespie   [facility] of [address] [facility] Phone: [phone]  Mobile Phone: [phone]  Relation: Child  Hearing or visual needs: None  Other needs: None  Preferred language: English   needed?  No    Past Surgical History:  Past Surgical History:   Procedure Laterality Date   [facility] SURGERY  3027,9847, 2014    BRONCHOSCOPY N/A 2017    BRONCHOSCOPY AIRWAY ONLY performed by Taylor Baron MD at [address] [place] [address] EKG 12-LEAD  11/10/2015         FRACTURE SURGERY      right hand    OTHER SURGICAL HISTORY      spinal epidurals    RETINAL DETACHMENT SURGERY      SPINE SURGERY  2004    Lumbar laminectomy    THYROIDECTOMY         Immunization History:   Immunization History   Administered Date(s) Administered    Influenza 10/31/2013    Influenza Virus failure ([facility]) I50.32    Obesity hypoventilation syndrome ([facility]) E66.2    Urinary retention R33.9    Urine retention R33.9    Abdominal pain R10.9    Altered mental status R41.82       Isolation/Infection:   Isolation          No Isolation        Patient Infection Status     None to display          Nurse Assessment:  Last Vital Signs: BP (!) 145/62   Pulse 55   Temp 97 °F (36.1 °C) (Temporal)   Resp 18   Ht 5' 7\" (1.702 m)   Wt 260 lb 12.8 oz (118.3 kg)   SpO2 94%   BMI 40.85 kg/m²     Last documented pain score (0-10 scale): Pain Level: 0  Last Weight:   Wt Readings from Last 1 Encounters:   03/15/20 260 lb 12.8 oz (118.3 kg)     Mental Status:  oriented    IV Access:  - None    Nursing Mobility/ADLs:  Walking   Assisted  Transfer  Assisted  Bathing  Assisted  Dressing  Assisted  Toileting  Assisted  Feeding  Assisted  Med Admin  Assisted  Med Delivery   none    Wound Care Documentation and Therapy:        Elimination:  Continence:   · Bowel: Yes  · Bladder: felipe  Urinary Catheter: Indication for Use of Catheter: Urology/Urologist seeing this patient or inserted indwelling catheter   Colostomy/Ileostomy/Ileal Conduit: No       Date of Last BM: 03/25/2020    Intake/Output Summary (Last 24 hours) at 3/17/2020 1307  Last data filed at 3/17/2020 1250  Gross per 24 hour   Intake 1550 ml   Output 1975 ml   Net -425 ml     I/O last 3 completed shifts: In: 1160 [P.O.:1160]  Out: 2675 [Urine:2675]    Safety Concerns:     None    Impairments/Disabilities:      None    Nutrition Therapy:  Current Nutrition Therapy:   - Oral Diet:  General    Routes of Feeding: Oral  Liquids: Thin Liquids  Daily Fluid Restriction: no  Last Modified Barium Swallow with Video (Video Swallowing Test): not done    Treatments at the Time of Hospital Discharge:   Respiratory Treatments: none  Oxygen Therapy:  is on oxygen at 3 L/min per nasal cannula.   Ventilator:    - No ventilator support    Rehab Therapies: Physical Therapy and Occupational Therapy  Weight Bearing Status/Restrictions: No weight bearing restirctions  Other Medical Equipment (for information only, NOT a DME order):  walker  Other Treatments: none    Patient's personal belongings (please select all that are sent with patient):  None    RN SIGNATURE:  Electronically signed by Sven Roy RN on 3/25/20 at 2:15 PM EDT    CASE MANAGEMENT/SOCIAL WORK SECTION    Inpatient Status Date: 3/12/2020    Readmission Risk Assessment Score:  Readmission Risk              Risk of Unplanned Readmission:        36           Discharging to Facility/ Agency   · Name: [facility]  · [address] [place], [address]  · Phone: [phone]  · Fax: [phone]    Dialysis Facility (if applicable)   · Name:   · Address:   · Dialysis Schedule:  · Phone:  · Fax:    / signature: Electronically signed by ZEKE Martinez on 3/17/20 at 1:09 PM EDT    PHYSICIAN SECTION    Prognosis: Guarded    Condition at Discharge: guardid    Rehab Potential (if transferring to Rehab): Guarded    Recommended Labs or Other Treatments After Discharge:     Physician Certification: I certify the above information and transfer of Samira Serrano  is necessary for the continuing treatment of the diagnosis listed and that he requires [facility] for greater 30 days.      Update Admission H&P: No change in H&P    PHYSICIAN SIGNATURE:  Electronically signed by Ashley Grossman MD on 3/25/20 at 12:08 PM EDT

## 2020-03-17 NOTE — PLAN OF CARE
Problem: Urinary Retention:  Goal: Able to perform urinary catheter care  Description: Able to perform urinary catheter care  Outcome: Ongoing  Note: Pt voiding within normal limits. Accurate I&O being monitored. Will continue to monitor. Problem: Discharge Planning:  Goal: Discharged to appropriate level of care  Description: Discharged to appropriate level of care  Outcome: Ongoing  Note: Pt plans to go to [facility] upon discharge. Problem: RESPIRATORY  Goal: Clear lung sounds  Outcome: Ongoing  Note: Clear lung sounds this shift. Breathing treatments ongoing. Will continue to monitor. Problem: DISCHARGE BARRIERS  Goal: Patient's continuum of care needs are met  Outcome: Ongoing  Note: Pt active in care. Problem: Cardiovascular  Goal: Hemodynamic stability  Outcome: Ongoing  Note: BP within normal limits. Heart sounds within normal limits. Will continue to monitor. Problem: Pain:  Goal: Pain level will decrease  Description: Pain level will decrease  Outcome: Ongoing  Note: Pt states a pain level of 6/10 after surgery. Pain goal of 3/10. PRN medication given as ordered. Pain relief interventions include rest and reposition. Problem: Falls - Risk of:  Goal: Will remain free from falls  Description: Will remain free from falls  Outcome: Ongoing  Note: Pt will remain from falls. Falling star program. Chair/ bed alarm set. Will continue to monitor. Problem: Falls - Risk of:  Goal: Absence of physical injury  Description: Absence of physical injury  Outcome: Ongoing  Note: Pt remained free of injury from fall this shift. Problem: Musculor/Skeletal Functional Status  Goal: Highest potential functional level  Outcome: Ongoing  Note: Pt up with assist with a walker. Tolerates well. PT / OT working with pt. Will continue to monitor. Care plan reviewed with patient. Patient verbalize understanding of the plan of care and contribute to goal setting.

## 2020-03-17 NOTE — CARE COORDINATION
3/17/20, 12:27 PM EDT    DISCHARGE ONGOING EVALUATION:     Johnnie Barlow day: 5  [facility] Reason for admit: Urinary retention [R33.9]  Urine retention [R33.9]   Treatment Plan of Care: For Transurethral resection today. Plan for [facility] at discharge. Barriers to Discharge: Medical readiness. PCP: Baljinder Butler MD  Readmission Risk Score: 36%  Patient Goals/Plan/Treatment Preferences: Accepted at [facility] at discharge. SW following.

## 2020-03-17 NOTE — PLAN OF CARE
to the bathroom, tolerating well. PT/OT working with patient as ordered. Patient uses walker as an assistance. Care plan reviewed with patient. Patient verbalize understanding of the plan of care and contribute to goal setting.

## 2020-03-17 NOTE — ANESTHESIA PRE PROCEDURE
11/1/19  Yes MAEGAN Temple CNP   warfarin (COUMADIN) 5 MG tablet Take as directed by [facility].  #90 tabs = 90 days 10/7/19  Yes Corey Galo MD   Multiple Vitamins-Minerals (PRESERVISION AREDS 2+MULTI VIT) CAPS take 1 by Oral route 2 times every day 6/13/19  Yes Historical Provider, MD   metoprolol tartrate (LOPRESSOR) 25 MG tablet Take 25 mg by mouth 2 times daily  8/5/19  Yes Historical Provider, MD   blood glucose test strips (ACCU-CHEK CHE PLUS) strip TEST TWICE DAILY 7/30/19  Yes MAEGAN Temple CNP   Ferrous Sulfate (IRON) 325 (65 Fe) MG TABS Take by mouth 2 times daily    Yes Historical Provider, MD   Arformoterol Tartrate (BROVANA) 15 MCG/2ML NEBU Take 1 ampule by nebulization 2 times daily Indications: Prevention of COPD Worsening    Yes Historical Provider, MD   budesonide (PULMICORT) 0.25 MG/2ML nebulizer suspension Take 1 ampule by nebulization 2 times daily    Yes Historical Provider, MD   OXYGEN Inhale 3 L into the lungs nightly Indications: Difficulty Breathing, Oxygen Therapy And prn   Yes Historical Provider, MD   tiotropium (SPIRIVA RESPIMAT) 2.5 MCG/ACT AERS inhaler Inhale 2 puffs into the lungs daily 1/24/20   Josh Higgins PA-C       Current medications:    Current Facility-Administered Medications   Medication Dose Route Frequency Provider Last Rate Last Dose    potassium chloride (KLOR-CON M) extended release tablet 40 mEq  40 mEq Oral PRN Vee Palomo MD   40 mEq at 03/17/20 1011    Or    potassium bicarb-citric acid (EFFER-K) effervescent tablet 40 mEq  40 mEq Oral PRN Vee Palomo MD        Or    potassium chloride 10 mEq/100 mL IVPB (Peripheral Line)  10 mEq Intravenous PRN Vee Palomo MD        QUEtiapine (SEROQUEL) tablet 25 mg  25 mg Oral Nightly PRN Corrina Feliciano MD   25 mg at 03/14/20 2129    diphenhydrAMINE (BENADRYL) injection 25 mg  25 mg Intravenous Q6H PRN Julianna Vazquez PA-C        melatonin tablet 3 mg  3 mg Oral Nightly PRN Julianna Vazquez PA-C   3 mg at 03/15/20 0003    opium-belladonna (B&O SUPPRETTES) 16.2-30 MG suppository 30 mg  30 mg Rectal Q8H PRN MAEGAN Manning - CNP   30 mg at 03/17/20 0320    sodium chloride flush 0.9 % injection 10 mL  10 mL Intravenous 2 times per day Ofe Bui MD   10 mL at 03/17/20 1013    sodium chloride flush 0.9 % injection 10 mL  10 mL Intravenous PRN Ofe Bui MD   10 mL at 03/16/20 1536    acetaminophen (TYLENOL) tablet 650 mg  650 mg Oral Q6H PRN Allison Luong MD   650 mg at 03/17/20 0320    Or    acetaminophen (TYLENOL) suppository 650 mg  650 mg Rectal Q6H PRN Allison Luong MD        polyethylene glycol (GLYCOLAX) packet 17 g  17 g Oral Daily PRN Allison Luong MD        promethazine (PHENERGAN) tablet 12.5 mg  12.5 mg Oral Q6H PRN Allison Luong MD        Or    ondansetron (ZOFRAN) injection 4 mg  4 mg Intravenous Q6H PRN Allison Luong MD        albuterol sulfate  (90 Base) MCG/ACT inhaler 2 puff  2 puff Inhalation Q6H PRN Allison Linares MD   2 puff at 03/13/20 0637    Arformoterol Tartrate (BROVANA) nebulizer solution 15 mcg  15 mcg Nebulization BID Alliosn Luong MD   15 mcg at 03/17/20 0751    budesonide (PULMICORT) nebulizer suspension 250 mcg  250 mcg Nebulization BID Allison Luong MD   250 mcg at 03/17/20 0751    furosemide (LASIX) tablet 40 mg  40 mg Oral BID Puthiyapurayil Viaestuardo Linares MD   40 mg at 03/17/20 1011    tiotropium (SPIRIVA RESPIMAT) 2.5 MCG/ACT inhaler 2 puff  2 puff Inhalation Daily Ofe Bui MD   2 puff at 03/17/20 0751    potassium chloride (KLOR-CON M) extended release tablet 10 mEq  10 mEq Oral BID Allison Lynch MD   10 mEq at 03/17/20 1012    [Held by provider] metoprolol tartrate (LOPRESSOR) tablet 25 mg  25 mg Oral BID Allison Lynch MD   25 mg at 03/16/20 0956    [Held by provider] lisinopril (PRINIVIL;ZESTRIL) tablet 5 mg  5 mg Oral Daily [address], MD   5 mg at 03/16/20 0955    levothyroxine (SYNTHROID) tablet 175 mcg  175 mcg Oral Daily [address], MD   175 mcg at 03/17/20 0327    cefTRIAXone (ROCEPHIN) 1 g IVPB in 50 mL D5W minibag  1 g Intravenous Q24H [address] MD   Stopped at 03/16/20 1649    fluconazole (DIFLUCAN) tablet 200 mg  200 mg Oral Daily [address], MD   200 mg at 03/17/20 1011    insulin lispro (HUMALOG) injection vial 0-18 Units  0-18 Units Subcutaneous TID WC Allison Luong MD        insulin lispro (HUMALOG) injection vial 0-9 Units  0-9 Units Subcutaneous Nightly Allison Luong MD        glucose (GLUTOSE) 40 % oral gel 15 g  15 g Oral PRN Allison Luong MD        dextrose 50 % IV solution  12.5 g Intravenous PRN Allison Luong MD        glucagon (rDNA) injection 1 mg  1 mg Intramuscular PRN Allison Luong MD        dextrose 5 % solution  100 mL/hr Intravenous PRN Allison Luong MD        doxazosin (CARDURA) tablet 8 mg  8 mg Oral Nightly Allison Luong MD   8 mg at 03/16/20 2211    metFORMIN (GLUCOPHAGE) tablet 1,000 mg  1,000 mg Oral BID  Allison Luong MD   1,000 mg at 03/17/20 1013       Allergies:     Allergies   Allergen Reactions   Rosa Nickel      Dr. Minh Way prefers pt not use Macrobid due to pulmonary side effects.    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    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 E29.1    Breast lump N63.0    BPH (benign prostatic hyperplasia) N40.0    ROBER (obstructive sleep apnea) G47.33    Essential hypertension I10    Shortness of breath R06.02    Chronic respiratory failure with hypoxia ([facility]) J96.11    Mixed hyperlipidemia E78.2    Chronic deep vein thrombosis (DVT) of right lower extremity ([facility]) I82.501    Anticoagulated on Coumadin Z79.01    Type 2 diabetes mellitus with complication, with long-term current use of insulin ([facility]) E11.8, Z79.4    Muscle weakness M62.81    Chronic bronchitis ([facility]) J42    Stage 3 severe COPD by GOLD classification ([facility]) J44.9    Acute diastolic congestive heart failure ([facility]) I50.31    Paroxysmal atrial flutter ([facility]) I48.92    Urinary incontinence R32    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    Senile nuclear sclerosis H25.10    Puckering of macula, 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    Presence of intraocular lens Z96.1    Primary open-angle glaucoma 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    Chronic diastolic congestive heart failure ([facility]) I50.32    Obesity hypoventilation syndrome ([facility]) E66.2    Urinary retention R33.9    Urine retention R33.9    Abdominal pain R10.9    Altered mental status R41.82       Past Medical History:        Diagnosis Date    Anxiety     CAD (coronary artery disease)     leaking valve    Chronic back pain     COPD (chronic obstructive pulmonary disease) ([facility])     Detached retina     Diabetes mellitus ([facility])     NIDDM    DVT (deep venous thrombosis) ([facility])     RLE    DVT (deep venous thrombosis) ([facility] [address] 11/9/15    LLE    Glaucoma     Hyperlipidemia     Hypertension     Mass of chest     benign chest mass, Dr Yaquelin Mcallister    Movement disorder     spinal stenosis    Obesity     Osteoarthritis     right ankle, right hand    Pneumonia     Sleep apnea 1993    on BiPap, Dr Jet Wesley Thyroid cancer [facility])     s/p thyroid resection    Urinary incontinence        Past Surgical History:        Procedure Laterality Date   [facility] SURGERY  2006,2011, 2014    BRONCHOSCOPY N/A 8/29/2017    BRONCHOSCOPY AIRWAY ONLY performed by Faith Gutierres MD at [address] [place] [address] EKG 12-LEAD  11/10/2015         FRACTURE SURGERY      right hand    OTHER SURGICAL HISTORY      spinal epidurals    RETINAL DETACHMENT SURGERY      SPINE SURGERY  2004    Lumbar laminectomy    THYROIDECTOMY         Social History:    Social History     Tobacco Use    Smoking status: Never Smoker    Smokeless tobacco: Never Used   Substance Use Topics    Alcohol use: Yes     Alcohol/week: 1.0 standard drinks     Types: 1 Glasses of wine per week     Comment: Glass of wine with dinner. Counseling given: Not Answered      Vital Signs (Current):   Vitals:    03/16/20 2035 03/17/20 0305 03/17/20 0752 03/17/20 0915   BP: 126/69 (!) 154/70  (!) 145/62   Pulse: 62 57  55   Resp: 16 16 18 18   Temp: 98.2 °F (36.8 °C) 99 °F (37.2 °C)  98 °F (36.7 °C)   TempSrc: Oral Oral  Oral   SpO2: 95% 95% 95% 94%   Weight:       Height:                                                  BP Readings from Last 3 Encounters:   03/17/20 (!) 145/62   03/10/20 126/64   03/05/20 (!) 112/55       NPO Status:                                                                                 BMI:   Wt Readings from Last 3 Encounters:   03/15/20 260 lb 12.8 oz (118.3 kg)   03/10/20 261 lb (118.4 kg)   03/05/20 260 lb (117.9 kg)     Body mass index is 40.85 kg/m².     CBC:   Lab Results   Component Value Date    WBC 9.1 03/17/2020    RBC 4.24 03/17/2020    HGB 11.7 03/17/2020    HCT 36.9 03/17/2020    MCV 87.0 03/17/2020    RDW 14.6 05/02/2018     03/17/2020       CMP:   Lab Results   Component Value Date     03/17/2020    K 3.5 03/17/2020    K 3.3 03/13/2020    CL 97 03/17/2020    CO2 29 03/17/2020    BUN 11 03/17/2020    CREATININE 0.9 03/17/2020    LABGLOM 80 03/17/2020    GLUCOSE 120 03/17/2020    GLUCOSE 130 11/30/2011    PROT 7.0 03/11/2020    PROT 6.9 02/05/2011    CALCIUM 8.4 03/17/2020    BILITOT 0.4 03/11/2020    ALKPHOS 49 03/11/2020    AST 30 03/11/2020    ALT 23 03/11/2020       POC Tests:   Recent Labs     03/17/20  0945   POCGLU 126*       Coags:   Lab Results   Component Value Date    INR 1.19 03/17/2020    APTT 38.6 03/14/2019       HCG (If Applicable): No results found for: PREGTESTUR, PREGSERUM, HCG, HCGQUANT     ABGs: No results found for: PHART, PO2ART, BGT1ITL, USH2MFI, BEART, Y3ISNYKH     Type & Screen (If Applicable):  No results found for: LABABO, [address]    Anesthesia Evaluation  Patient summary reviewed and Nursing notes reviewed no history of anesthetic complications:   Airway: Mallampati: III  TM distance: <3 FB   Neck ROM: limited  Mouth opening: > = 3 FB Dental:          Pulmonary:   (+) pneumonia:  COPD:  shortness of breath: chronic,  sleep apnea: on CPAP,  decreased breath sounds,                             Cardiovascular:  Exercise tolerance: poor (<4 METS),   (+) hypertension:, CAD:, CHF:,       ECG reviewed  Rhythm: regular  Rate: normal  Echocardiogram reviewed                  Neuro/Psych:               GI/Hepatic/Renal:   (+) morbid obesity          Endo/Other:    (+) Diabetes, hypothyroidism::., .                 Abdominal:   (+) obese,     Abdomen: soft. Vascular:   + DVT, . Anesthesia Plan      general     ASA 3       Induction: intravenous. MIPS: Postoperative opioids intended and Prophylactic antiemetics administered.   Anesthetic plan and risks discussed with patient. Plan discussed with CRNA.                   [facility], DO   3/17/2020

## 2020-03-18 PROBLEM — E66.01 MORBID OBESITY (HCC): Status: ACTIVE | Noted: 2019-11-04

## 2020-03-18 PROBLEM — I48.0 PAROXYSMAL ATRIAL FIBRILLATION (HCC): Status: ACTIVE | Noted: 2017-12-15

## 2020-03-18 LAB
ANION GAP SERPL CALCULATED.3IONS-SCNC: 14 MEQ/L (ref 8–16)
APTT: 34.4 SECONDS (ref 22–38)
APTT: 37.7 SECONDS (ref 22–38)
BASOPHILS # BLD: 0.1 %
BASOPHILS ABSOLUTE: 0 THOU/MM3 (ref 0–0.1)
BUN BLDV-MCNC: 12 MG/DL (ref 7–22)
CALCIUM SERPL-MCNC: 8.5 MG/DL (ref 8.5–10.5)
CHLORIDE BLD-SCNC: 98 MEQ/L (ref 98–111)
CO2: 28 MEQ/L (ref 23–33)
CREAT SERPL-MCNC: 0.8 MG/DL (ref 0.4–1.2)
EOSINOPHIL # BLD: 0 %
EOSINOPHILS ABSOLUTE: 0 THOU/MM3 (ref 0–0.4)
ERYTHROCYTE [DISTWIDTH] IN BLOOD BY AUTOMATED COUNT: 14.8 % (ref 11.5–14.5)
ERYTHROCYTE [DISTWIDTH] IN BLOOD BY AUTOMATED COUNT: 46.8 FL (ref 35–45)
GFR SERPL CREATININE-BSD FRML MDRD: > 90 ML/MIN/1.73M2
GLUCOSE BLD-MCNC: 121 MG/DL (ref 70–108)
GLUCOSE BLD-MCNC: 131 MG/DL (ref 70–108)
GLUCOSE BLD-MCNC: 131 MG/DL (ref 70–108)
GLUCOSE BLD-MCNC: 156 MG/DL (ref 70–108)
HCT VFR BLD CALC: 38.1 % (ref 42–52)
HEMOGLOBIN: 12.2 GM/DL (ref 14–18)
IMMATURE GRANS (ABS): 0.09 THOU/MM3 (ref 0–0.07)
IMMATURE GRANULOCYTES: 0.6 %
INR BLD: 1.21 (ref 0.85–1.13)
LYMPHOCYTES # BLD: 7.7 %
LYMPHOCYTES ABSOLUTE: 1.2 THOU/MM3 (ref 1–4.8)
MCH RBC QN AUTO: 27.9 PG (ref 26–33)
MCHC RBC AUTO-ENTMCNC: 32 GM/DL (ref 32.2–35.5)
MCV RBC AUTO: 87.2 FL (ref 80–94)
MONOCYTES # BLD: 5.4 %
MONOCYTES ABSOLUTE: 0.8 THOU/MM3 (ref 0.4–1.3)
NUCLEATED RED BLOOD CELLS: 0 /100 WBC
PLATELET # BLD: 241 THOU/MM3 (ref 130–400)
PMV BLD AUTO: 10 FL (ref 9.4–12.4)
POTASSIUM SERPL-SCNC: 3.7 MEQ/L (ref 3.5–5.2)
RBC # BLD: 4.37 MILL/MM3 (ref 4.7–6.1)
SEG NEUTROPHILS: 86.2 %
SEGMENTED NEUTROPHILS ABSOLUTE COUNT: 13.2 THOU/MM3 (ref 1.8–7.7)
SODIUM BLD-SCNC: 140 MEQ/L (ref 135–145)
WBC # BLD: 15.3 THOU/MM3 (ref 4.8–10.8)

## 2020-03-18 PROCEDURE — 6370000000 HC RX 637 (ALT 250 FOR IP): Performed by: PHYSICIAN ASSISTANT

## 2020-03-18 PROCEDURE — APPNB30 APP NON BILLABLE TIME 0-30 MINS: Performed by: NURSE PRACTITIONER

## 2020-03-18 PROCEDURE — 85610 PROTHROMBIN TIME: CPT

## 2020-03-18 PROCEDURE — 80048 BASIC METABOLIC PNL TOTAL CA: CPT

## 2020-03-18 PROCEDURE — 97530 THERAPEUTIC ACTIVITIES: CPT

## 2020-03-18 PROCEDURE — 82948 REAGENT STRIP/BLOOD GLUCOSE: CPT

## 2020-03-18 PROCEDURE — 6370000000 HC RX 637 (ALT 250 FOR IP): Performed by: NURSE PRACTITIONER

## 2020-03-18 PROCEDURE — 6360000002 HC RX W HCPCS: Performed by: INTERNAL MEDICINE

## 2020-03-18 PROCEDURE — 99232 SBSQ HOSP IP/OBS MODERATE 35: CPT | Performed by: INTERNAL MEDICINE

## 2020-03-18 PROCEDURE — 51798 US URINE CAPACITY MEASURE: CPT

## 2020-03-18 PROCEDURE — 2700000000 HC OXYGEN THERAPY PER DAY

## 2020-03-18 PROCEDURE — 94760 N-INVAS EAR/PLS OXIMETRY 1: CPT

## 2020-03-18 PROCEDURE — 6370000000 HC RX 637 (ALT 250 FOR IP): Performed by: INTERNAL MEDICINE

## 2020-03-18 PROCEDURE — 85730 THROMBOPLASTIN TIME PARTIAL: CPT

## 2020-03-18 PROCEDURE — 36415 COLL VENOUS BLD VENIPUNCTURE: CPT

## 2020-03-18 PROCEDURE — 2580000003 HC RX 258: Performed by: INTERNAL MEDICINE

## 2020-03-18 PROCEDURE — 6370000000 HC RX 637 (ALT 250 FOR IP): Performed by: PSYCHIATRY & NEUROLOGY

## 2020-03-18 PROCEDURE — 97535 SELF CARE MNGMENT TRAINING: CPT

## 2020-03-18 PROCEDURE — 94640 AIRWAY INHALATION TREATMENT: CPT

## 2020-03-18 PROCEDURE — 85025 COMPLETE CBC W/AUTO DIFF WBC: CPT

## 2020-03-18 PROCEDURE — 1200000003 HC TELEMETRY R&B

## 2020-03-18 PROCEDURE — 99024 POSTOP FOLLOW-UP VISIT: CPT | Performed by: NURSE PRACTITIONER

## 2020-03-18 RX ORDER — DOCUSATE SODIUM 100 MG/1
100 CAPSULE, LIQUID FILLED ORAL 2 TIMES DAILY
Status: DISCONTINUED | OUTPATIENT
Start: 2020-03-18 | End: 2020-03-25 | Stop reason: HOSPADM

## 2020-03-18 RX ORDER — WARFARIN SODIUM 5 MG/1
5 TABLET ORAL ONCE
Status: DISCONTINUED | OUTPATIENT
Start: 2020-03-18 | End: 2020-03-18

## 2020-03-18 RX ORDER — POLYETHYLENE GLYCOL 3350 17 G/17G
17 POWDER, FOR SOLUTION ORAL DAILY
Status: DISCONTINUED | OUTPATIENT
Start: 2020-03-18 | End: 2020-03-25 | Stop reason: HOSPADM

## 2020-03-18 RX ADMIN — FUROSEMIDE 40 MG: 40 TABLET ORAL at 09:29

## 2020-03-18 RX ADMIN — LEVOTHYROXINE SODIUM 175 MCG: 150 TABLET ORAL at 05:56

## 2020-03-18 RX ADMIN — POLYETHYLENE GLYCOL 3350 17 G: 17 POWDER, FOR SOLUTION ORAL at 10:48

## 2020-03-18 RX ADMIN — DOXAZOSIN MESYLATE 8 MG: 4 TABLET ORAL at 20:08

## 2020-03-18 RX ADMIN — DOCUSATE SODIUM 100 MG: 100 CAPSULE, LIQUID FILLED ORAL at 20:14

## 2020-03-18 RX ADMIN — SODIUM CHLORIDE, PRESERVATIVE FREE 10 ML: 5 INJECTION INTRAVENOUS at 20:15

## 2020-03-18 RX ADMIN — Medication 3 MG: at 00:30

## 2020-03-18 RX ADMIN — POTASSIUM CHLORIDE 10 MEQ: 1500 TABLET, EXTENDED RELEASE ORAL at 20:09

## 2020-03-18 RX ADMIN — SODIUM CHLORIDE, PRESERVATIVE FREE 10 ML: 5 INJECTION INTRAVENOUS at 09:15

## 2020-03-18 RX ADMIN — DOCUSATE SODIUM 100 MG: 100 CAPSULE, LIQUID FILLED ORAL at 10:48

## 2020-03-18 RX ADMIN — ARFORMOTEROL TARTRATE 15 MCG: 15 SOLUTION RESPIRATORY (INHALATION) at 05:04

## 2020-03-18 RX ADMIN — BUDESONIDE 250 MCG: 0.25 INHALANT RESPIRATORY (INHALATION) at 05:04

## 2020-03-18 RX ADMIN — QUETIAPINE FUMARATE 25 MG: 25 TABLET ORAL at 00:31

## 2020-03-18 RX ADMIN — TIOTROPIUM BROMIDE INHALATION SPRAY 2 PUFF: 3.12 SPRAY, METERED RESPIRATORY (INHALATION) at 05:08

## 2020-03-18 RX ADMIN — POTASSIUM CHLORIDE 10 MEQ: 1500 TABLET, EXTENDED RELEASE ORAL at 09:14

## 2020-03-18 RX ADMIN — Medication 1000 MG: at 09:14

## 2020-03-18 RX ADMIN — ARFORMOTEROL TARTRATE 15 MCG: 15 SOLUTION RESPIRATORY (INHALATION) at 17:44

## 2020-03-18 RX ADMIN — Medication 1000 MG: at 18:05

## 2020-03-18 RX ADMIN — FLUCONAZOLE 200 MG: 200 TABLET ORAL at 09:29

## 2020-03-18 RX ADMIN — METOPROLOL TARTRATE 25 MG: 25 TABLET, FILM COATED ORAL at 20:07

## 2020-03-18 RX ADMIN — CEFTRIAXONE SODIUM 1 G: 1 INJECTION, POWDER, FOR SOLUTION INTRAMUSCULAR; INTRAVENOUS at 09:15

## 2020-03-18 RX ADMIN — BUDESONIDE 250 MCG: 0.25 INHALANT RESPIRATORY (INHALATION) at 17:44

## 2020-03-18 RX ADMIN — ATROPA BELLADONNA AND OPIUM 30 MG: 16.2; 3 SUPPOSITORY RECTAL at 14:54

## 2020-03-18 RX ADMIN — ENOXAPARIN SODIUM 120 MG: 120 INJECTION SUBCUTANEOUS at 20:07

## 2020-03-18 RX ADMIN — ACETAMINOPHEN 650 MG: 325 TABLET ORAL at 18:20

## 2020-03-18 RX ADMIN — ENOXAPARIN SODIUM 120 MG: 120 INJECTION SUBCUTANEOUS at 10:47

## 2020-03-18 RX ADMIN — FUROSEMIDE 40 MG: 40 TABLET ORAL at 20:07

## 2020-03-18 ASSESSMENT — PAIN SCALES - GENERAL
PAINLEVEL_OUTOF10: 0
PAINLEVEL_OUTOF10: 3
PAINLEVEL_OUTOF10: 0

## 2020-03-18 NOTE — PLAN OF CARE
Problem: Urinary Retention:  Goal: Able to perform urinary catheter care  Description: Able to perform urinary catheter care  3/18/2020 1756 by Bertha Santiago RN  Outcome: Ongoing  Note: Cath replaced today because urinary retention. Accurate I&Os. CBI running with no pain. Pt active in care. Will continue to monitor. Problem: Discharge Planning:  Goal: Discharged to appropriate level of care  Description: Discharged to appropriate level of care  3/18/2020 1756 by Bertha Santiago RN  Outcome: Ongoing  Note: Pt plans to go to [facility] after discharge. Problem: RESPIRATORY  Goal: Clear lung sounds  3/18/2020 1756 by Bertha Santiago RN  Outcome: Ongoing  Note: Lung sounds remained clear this shift. Breathing treatments ongoing and up and walking in [place]. Will continue to monitor. Problem: DISCHARGE BARRIERS  Goal: Patient's continuum of care needs are met  3/18/2020 1756 by Bertha Santiago RN  Outcome: Ongoing  Note: Pt plans to go to [facility] after discharge. Problem: Cardiovascular  Goal: Hemodynamic stability  3/18/2020 1756 by Bertha Santiago RN  Outcome: Ongoing  Note: Pts blood pressure within normal limits. Heart sounds within normal limits this shift. Will continue to monitor. Problem: Pain:  Goal: Pain level will decrease  Description: Pain level will decrease  3/18/2020 1756 by Bertha Santiago RN  Outcome: Ongoing  Note: Pt states a pain level of 0/10. A pain goal of 0/10. Non pharm interventions include rest and reposition. Will continue to monitor. Problem: Falls - Risk of:  Goal: Will remain free from falls  Description: Will remain free from falls  3/18/2020 1756 by Bertha Santiago RN  Outcome: Ongoing  Note: Pt remained free from falls this shift. Fall precautions continued falling  star magnet,  socks and bed/ chair alarm in place. Will continue to monitor.      Problem: Falls - Risk of:  Goal: Absence of physical injury  Description: Absence of physical injury  3/18/2020 1756 by Bertha Santiago RN  Outcome: Ongoing  Note: Pt remained free from falls this shift. Fall precautions continued falling  star magnet,  socks and bed/ chair alarm in place. Will continue to monitor. Problem: Musculor/Skeletal Functional Status  Goal: Highest potential functional level  3/18/2020 1756 by Gray Moreira, RN  Outcome: Ongoing  Note: Pt up with 1 assist with a walker. Pt/OT on board, worked with him today walked in the halls. Pt tolerated well. Will continue to monitor. Care plan reviewed with patient. Patient verbalize understanding of the plan of care and contribute to goal setting.

## 2020-03-18 NOTE — CARE COORDINATION
3/18/20, 11:50 AM EDT    DISCHARGE ONGOING EVALUATION:     Shruthi Swati day: 6  Location: [facility]29/029-A Reason for admit: Urinary retention [R33.9]  Urine retention [R33.9]   Treatment Plan of Care: TURP yesterday. CBI continues. Some pink tinged urine noted. But pt getting up in room some. IV Rocephin. Coumadin restarting today. Potential discharge today. Barriers to Discharge: No  PCP: Nito Tang MD  Readmission Risk Score: 29%  Patient Goals/Plan/Treatment Preferences: To be admitted to [facility]. Pt son to transport. SW following.

## 2020-03-18 NOTE — PLAN OF CARE
Problem: Urinary Retention:  Goal: Able to perform urinary catheter care  Description: Able to perform urinary catheter care  3/18/2020 0207 by Ruthy Mclain RN  Outcome: Ongoing  Note: Urinary output within normal limits. Accurate I&Os being completed. Patient has CBI running with no pain. Problem: Discharge Planning:  Goal: Discharged to appropriate level of care  Description: Discharged to appropriate level of care  3/18/2020 0207 by Ruthy Mclain RN  Outcome: Ongoing  Note: Patient plans to return home at discharge. Patient has no new needs at home. Problem: RESPIRATORY  Goal: Clear lung sounds  3/18/2020 0207 by Ruthy Mclain RN  Outcome: Ongoing  Note: Lung sounds clear this shift      Problem: DISCHARGE BARRIERS  Goal: Patient's continuum of care needs are met  3/18/2020 0207 by Ruthy Mclain RN  Outcome: Ongoing  Note: Patient actively participates in their care. Problem: Cardiovascular  Goal: Hemodynamic stability  3/18/2020 0207 by Ruthy Mclain RN  Outcome: Ongoing  Note: Patient is hemodynamically stable, blood pressure is within normal parameters, heart sounds within normal limits. Problem: Pain:  Goal: Pain level will decrease  Description: Pain level will decrease  3/18/2020 0207 by Ruthy Mclain RN  Outcome: Ongoing  Note: Patient voices pain 0/10. Patients pain goal is 0/10. PRN pain medications given as ordered. Patients pain goal is a 0. Non-pharmacological interventions include: rest.       Problem: Falls - Risk of:  Goal: Will remain free from falls  Description: Will remain free from falls  3/18/2020 0207 by Ruthy Mclain RN  Outcome: Ongoing  Note: Patient absent of falls this shift, fall band intact, bed alarm set, falling star magnet in place.        Problem: Falls - Risk of:  Goal: Absence of physical injury  Description: Absence of physical injury  3/18/2020 0207 by Ruthy Mclain RN  Outcome: Ongoing  Note: Patient absent of physical injury this shift.       Problem: Musculor/Skeletal Functional Status  Goal: Highest potential functional level  3/18/2020 0207 by Marion Dominique RN  Outcome: Ongoing  Note: Patient up with x1 assistance to the bathroom, tolerating well. PT/OT working with patient as ordered. Care plan reviewed with patient. Patient verbalize understanding of the plan of care and contribute to goal setting.

## 2020-03-18 NOTE — PROGRESS NOTES
Urology Progress Note    Chief Complaint: AMS    Subjective:   Patient is resting in bed, CBI running, felipe draining clear urine, -flatus, -BM, tolerating clear liquid diet, reports some nausea overnight, no vomiting. There are no complaints of pain or spasms at this time. Denies chest pain, shortness of breath, calf pain, fever, or chills. Vitals:  /69   Pulse 56   Temp 97.7 °F (36.5 °C) (Oral)   Resp 16   Ht 5' 7\" (1.702 m)   Wt 258 lb 7 oz (117.2 kg)   SpO2 95%   BMI 40.48 kg/m²   Temp  Av.7 °F (36.5 °C)  Min: 81.7 °F (27.6 °C)  Max: 98.1 °F (36.7 °C)    Intake/Output Summary (Last 24 hours) at 3/18/2020 1039  Last data filed at 3/18/2020 0915  Gross per 24 hour   Intake 1260 ml   Output -470 ml   Net 1730 ml       Social History     Socioeconomic History    Marital status:      Spouse name: Not on file    Number of children: 2    Years of education: Not on file    Highest education level: Not on file   Occupational History    Not on file   Social Needs    Financial resource strain: Not hard at all   Alphonso-Gray insecurity     Worry: Never true     Inability: Never true   [employer] needs     Medical: No     Non-medical: No   Tobacco Use    Smoking status: Never Smoker    Smokeless tobacco: Never Used   Substance and Sexual Activity    Alcohol use: Yes     Alcohol/week: 1.0 standard drinks     Types: 1 Glasses of wine per week     Comment: Glass of wine with dinner.     Drug use: No    Sexual activity: Never   Lifestyle    Physical activity     Days per week: 0 days     Minutes per session: 0 min    Stress: Not at all   Relationships    Social connections     Talks on phone: Once a week     Gets together: Once a week     Attends [employer] service: Not on file     Active member of club or organization: No     Attends meetings of clubs or organizations: Never     Relationship status:     Intimate partner violence     Fear of current or ex partner:

## 2020-03-18 NOTE — PROGRESS NOTES
Clinical Pharmacy Note     Warfarin consult follow-up         Recent Labs     03/18/20  0454   INR 1.21*            Recent Labs     03/17/20  0426 03/17/20  1518 03/18/20  0454   HGB 11.7* 12.6* 12.2*   HCT 36.9* 39.2* 38.1*    231 241         Significant Drug-Drug Interactions:  New warfarin drug-drug interactions: Lovenox 1 mg/kg q12h  Discontinued drug-drug interactions: none  Current drug-drug interactions: levothyroxine, fluconazole 200 mg daily     Date INR Warfarin Dose   3/11/2020 2.17 None (7.5 mg taken today prior to admission)   3/12/2020   2.01  5 mg   3/13/2020   1.59 7.5 mg    3/14/2020   1.41   3/14-3/17    No Coumadin    3/18/2020 1.21  5 mg                  Notes:                   Daily PT/INR until stable within therapeutic range.    Potential Discharge today  [facility]  3/18/2020  9:38 AM

## 2020-03-18 NOTE — PROGRESS NOTES
Hospitalist Progress Note    Patient:  Vilma Guerrier  YOB: 1931  MRN: [id number]   PCP: Maverick Caraballo MD         Acct: [de-identified]  Unit/Bed: [facility]    Date of Admission: 3/11/2020      ASSESSMENT     1. Urinary retention with chronic indwelling felipe catheter s/p TURP on 3/17 with failed attempt at voiding trial on 3/18 now receiving CBI  2. Catheter related UTI: Catheter present prior to admission. Urine cx grew Candida parapsilosis and E. Coli. Currently on Ceftriaxone and fluconazole  3. Essential HTN:  4. Hyperlipidemia: not on statin  5. COPD: not in exacerbation. Continue home meds. 6. Morbid obesity: BMI 40.6   7. Hypokalemia: Replace prn  8. History of DVT on enoxaparin and Coumadin  9. DM Type 2: Glucophage  10. Acquired hypothyroidism: levothyroxine  11. Paroxysymal atrial fibrillation: Lopressor  12. History of chronic diastolic CHF    PLAN     1. Continue CBI. 2. Felipe catheter re-inserted today  3. Continue to monitor  4. Plan to discharge tomorrow if patient remains stable. Anticipated Discharge in :  tomorrow    Code Status: Limited    Electronically signed by Rodríguez Zavala MD on 3/18/2020 at 4:39 PM      Chief Complaint     Dislodgement of indwelling Felipe catheter with subsequent pain    SUBJECTIVE     The patient is a 80 y.o. male w/ PMH of BPH with chronic indwelling Felipe catheter who was recently seen in Dr. Chapis Oliveira clinic for evaluation of urinary retention- as a result his Cardura dose was doubled and he continued his Felipe catheter with a PVER to be done as an outpatient in 3 weeks. At the time of admission on 3/11/2020, his Felipe catheter became displaced. As a result he came into the ED to be evaluated. He was also having significant pain and mentioned symptoms of increased urgency, frequency and dysuria. He was then admitted and had a TURP performed on 3/17.   He has since had a voiding trial which he failed and his Felipe catheter was re-inserted with gross hematuria noted today. CBI was initiated mostly because the patient is currently on an enoxaparin to Coumadin bridge. He is also constipated. Constipated    OBJECTIVE     Medications:  Reviewed    Infusion Medications    dextrose       Scheduled Medications    cefTRIAXone (ROCEPHIN) IV  1 g Intravenous Q24H    enoxaparin  1 mg/kg Subcutaneous Q12H    polyethylene glycol  17 g Oral Daily    docusate sodium  100 mg Oral BID    sodium chloride flush  10 mL Intravenous 2 times per day    Arformoterol Tartrate  15 mcg Nebulization BID    budesonide  250 mcg Nebulization BID    furosemide  40 mg Oral BID    tiotropium  2 puff Inhalation Daily    potassium chloride  10 mEq Oral BID    metoprolol tartrate  25 mg Oral BID    [Held by provider] lisinopril  5 mg Oral Daily    levothyroxine  175 mcg Oral Daily    fluconazole  200 mg Oral Daily    insulin lispro  0-18 Units Subcutaneous TID WC    insulin lispro  0-9 Units Subcutaneous Nightly    doxazosin  8 mg Oral Nightly    metFORMIN  1,000 mg Oral BID WC     PRN Meds: potassium chloride **OR** potassium alternative oral replacement **OR** potassium chloride, QUEtiapine, diphenhydrAMINE, melatonin, opium-belladonna, sodium chloride flush, acetaminophen **OR** acetaminophen, polyethylene glycol, promethazine **OR** ondansetron, albuterol sulfate HFA, glucose, dextrose, glucagon (rDNA), dextrose    Ins and outs:      Intake/Output Summary (Last 24 hours) at 3/18/2020 1639  Last data filed at 3/18/2020 1628  Gross per 24 hour   Intake 760 ml   Output 1480 ml   Net -720 ml       Physical Examination     BP (!) 153/70   Pulse 59   Temp 98.1 °F (36.7 °C) (Oral)   Resp 16   Ht 5' 7\" (1.702 m)   Wt 258 lb 7 oz (117.2 kg)   SpO2 95%   BMI 40.48 kg/m²     General appearance: No apparent distress. HEENT: Extraocular motion intact. Neck: Supple  Respiratory:  CTA bilaterally without rales/wheezes/rhonchi.   Cardiovascular: RRR with normal report electronically signed by Dr. Nicolas Silverman on 3/11/2020 1:22 PM    Ct Head Wo Contrast    Result Date: 3/11/2020  PROCEDURE: CT HEAD WO CONTRAST CLINICAL INFORMATION: confusion. COMPARISON: No prior study. TECHNIQUE: Noncontrast 5 mm axial images were obtained through the brain. All CT scans at this facility use dose modulation, iterative reconstruction, and/or weight-based dosing when appropriate to reduce radiation dose to as low as reasonably achievable. FINDINGS: Generalized volume loss and small vessel ischemic changes. No acute hemorrhage or midline shift. Ventricles are within normal limits. Paranasal sinuses are clear. Mastoid air cells are patent. Skull: Unremarkable. Soft tissues: Unremarkable. No acute intracranial findings. **This report has been created using voice recognition software. It may contain minor errors which are inherent in voice recognition technology. ** Final report electronically signed by Dr. Kuldeep Anderson on 3/11/2020 1:34 PM    Ct Abdomen Pelvis W Iv Contrast Additional Contrast? None    Result Date: 3/11/2020  PROCEDURE: CT ABDOMEN PELVIS W IV CONTRAST CLINICAL INFORMATION: abdominal pain . COMPARISON: March 5, 2020 TECHNIQUE: 5 mm axial CT images were obtained through the abdomen and pelvis after the administration of intravenous and oral contrast. Coronal and sagittal reconstructions were obtained. All CT scans at this facility use dose modulation, iterative reconstruction, and/or weight-based dosing when appropriate to reduce radiation dose to as low as reasonably achievable. FINDINGS: Lung bases are clear. Mild fatty infiltration of the liver. Pancreas gallbladder adrenal glands spleen and kidneys are unremarkable no hydronephrosis. Normal caliber abdominal aorta. Atherosclerotic changes. Scattered stool in the colon. Multiple colonic diverticuli. No evidence for diverticulitis. Decompressed sigmoid colonic loop closely abutting a decompressed bladder limiting evaluation atherosclerotic. IVC filter is in place. No adenopathy is identified. Pelvis There is a large amount of stool in the colon compatible with constipation. There is  uncomplicated diverticulosis. There is a loop of sigmoid colon contiguous with the decompressed bladder which has a Billy catheter present there is no gas seen in the bladder which is difficult to assess due to the fact is completely decompressed. Prostate gland is enlarged and contains calcifications. There are no abnormal fluid collections within the abdomen. There are no acute abnormalities in the abdomen or pelvis. The urinary bladder is completely decompressed with a Billy catheter. There is a loop of sigmoid colon contiguous with the bladder. A fistula cannot be entirely excluded but there is  no evidence of that at this time **This report has been created using voice recognition software. It may contain minor errors which are inherent in voice recognition technology. ** Final report electronically signed by Dr. Lanetta Boast on 3/5/2020 11:44 AM      DVT prophylaxis: [x] Lovenox                                 [] SCDs                                 [] SQ Heparin                                 [] Encourage ambulation           [x] Already on Anticoagulation     Disposition:    [x] Home       [] TCU       [] Rehab       [] Psych       [] SNF       [] [place]       [] Other-

## 2020-03-18 NOTE — PROGRESS NOTES
Safety Education & Training, Patient/Caregiver Education & Training, Self-Care / ADL    Patient Education  Patient Education: Role of OT, Plan of Care, ADL's and Importance of Increasing Activity    Goals  Short term goals  Time Frame for Short term goals: by discharge  Short term goal 1: Pt will complete functional mobility HH distances with S including transporting ADL items for ease with eventual laundry tasks at home  Short term goal 2: Pt will complete functional transers with S and no vc for safety including to/from toilet  Short term goal 3: Pt will complete dynamic standing task with S X 3 minutes with 2 hand release and no LOB for ease with cooking routine  Short term goal 4: Pt will complete BADL routine with S and no vc for safety   Long term goals  Time Frame for Long term goals : no LTG established d/t short ELOS    Following session, patient left in safe position with all fall risk precautions in place.

## 2020-03-18 NOTE — PROGRESS NOTES
Reassessed pt. Went into retention after catheter removed . 2 way catheter placed with 775 ml of urine returned, it is light fruit punch in color, no clots. Pt restarted on Lovenox this am due to hx of DVT. Will exchange catheter for 3 way and restart CBI over night. Ok to resume Lovenox, HOLD COUMADIN, UNTIL RE-EVALUATION TOMORROW. Hold discharge for now.     Thank you  Electronically signed by MAEGAN Phelps CNP on 3/18/2020 at 1:54 PM

## 2020-03-19 ENCOUNTER — APPOINTMENT (OUTPATIENT)
Dept: CT IMAGING | Age: 85
DRG: 666 | End: 2020-03-19
Payer: MEDICARE

## 2020-03-19 ENCOUNTER — APPOINTMENT (OUTPATIENT)
Dept: MRI IMAGING | Age: 85
DRG: 666 | End: 2020-03-19
Payer: MEDICARE

## 2020-03-19 ENCOUNTER — HOSPITAL ENCOUNTER (OUTPATIENT)
Dept: CARDIAC REHAB | Age: 85
Setting detail: THERAPIES SERIES
End: 2020-03-19
Payer: MEDICARE

## 2020-03-19 ENCOUNTER — APPOINTMENT (OUTPATIENT)
Dept: GENERAL RADIOLOGY | Age: 85
DRG: 666 | End: 2020-03-19
Payer: MEDICARE

## 2020-03-19 LAB
ANION GAP SERPL CALCULATED.3IONS-SCNC: 14 MEQ/L (ref 8–16)
APTT: 38.4 SECONDS (ref 22–38)
BUN BLDV-MCNC: 15 MG/DL (ref 7–22)
CALCIUM SERPL-MCNC: 8.3 MG/DL (ref 8.5–10.5)
CHLORIDE BLD-SCNC: 98 MEQ/L (ref 98–111)
CO2: 30 MEQ/L (ref 23–33)
CREAT SERPL-MCNC: 0.7 MG/DL (ref 0.4–1.2)
ERYTHROCYTE [DISTWIDTH] IN BLOOD BY AUTOMATED COUNT: 15.2 % (ref 11.5–14.5)
ERYTHROCYTE [DISTWIDTH] IN BLOOD BY AUTOMATED COUNT: 46.9 FL (ref 35–45)
GFR SERPL CREATININE-BSD FRML MDRD: > 90 ML/MIN/1.73M2
GLUCOSE BLD-MCNC: 117 MG/DL (ref 70–108)
GLUCOSE BLD-MCNC: 124 MG/DL (ref 70–108)
GLUCOSE BLD-MCNC: 126 MG/DL (ref 70–108)
GLUCOSE BLD-MCNC: 127 MG/DL (ref 70–108)
HCT VFR BLD CALC: 36.6 % (ref 42–52)
HEMOGLOBIN: 11.6 GM/DL (ref 14–18)
INR BLD: 1.25 (ref 0.85–1.13)
MCH RBC QN AUTO: 27.2 PG (ref 26–33)
MCHC RBC AUTO-ENTMCNC: 31.7 GM/DL (ref 32.2–35.5)
MCV RBC AUTO: 85.7 FL (ref 80–94)
PLATELET # BLD: 233 THOU/MM3 (ref 130–400)
PMV BLD AUTO: 10.2 FL (ref 9.4–12.4)
POTASSIUM SERPL-SCNC: 3.4 MEQ/L (ref 3.5–5.2)
RBC # BLD: 4.27 MILL/MM3 (ref 4.7–6.1)
SODIUM BLD-SCNC: 142 MEQ/L (ref 135–145)
WBC # BLD: 10.7 THOU/MM3 (ref 4.8–10.8)

## 2020-03-19 PROCEDURE — 92610 EVALUATE SWALLOWING FUNCTION: CPT

## 2020-03-19 PROCEDURE — 99233 SBSQ HOSP IP/OBS HIGH 50: CPT | Performed by: INTERNAL MEDICINE

## 2020-03-19 PROCEDURE — 2580000003 HC RX 258: Performed by: INTERNAL MEDICINE

## 2020-03-19 PROCEDURE — 99222 1ST HOSP IP/OBS MODERATE 55: CPT | Performed by: PSYCHIATRY & NEUROLOGY

## 2020-03-19 PROCEDURE — 2700000000 HC OXYGEN THERAPY PER DAY

## 2020-03-19 PROCEDURE — 6370000000 HC RX 637 (ALT 250 FOR IP): Performed by: INTERNAL MEDICINE

## 2020-03-19 PROCEDURE — 85610 PROTHROMBIN TIME: CPT

## 2020-03-19 PROCEDURE — 70450 CT HEAD/BRAIN W/O DYE: CPT

## 2020-03-19 PROCEDURE — 6370000000 HC RX 637 (ALT 250 FOR IP): Performed by: PSYCHIATRY & NEUROLOGY

## 2020-03-19 PROCEDURE — 94640 AIRWAY INHALATION TREATMENT: CPT

## 2020-03-19 PROCEDURE — 92523 SPEECH SOUND LANG COMPREHEN: CPT

## 2020-03-19 PROCEDURE — 6360000004 HC RX CONTRAST MEDICATION: Performed by: INTERNAL MEDICINE

## 2020-03-19 PROCEDURE — 1200000003 HC TELEMETRY R&B

## 2020-03-19 PROCEDURE — 85730 THROMBOPLASTIN TIME PARTIAL: CPT

## 2020-03-19 PROCEDURE — 70551 MRI BRAIN STEM W/O DYE: CPT

## 2020-03-19 PROCEDURE — 70496 CT ANGIOGRAPHY HEAD: CPT

## 2020-03-19 PROCEDURE — 85027 COMPLETE CBC AUTOMATED: CPT

## 2020-03-19 PROCEDURE — 99233 SBSQ HOSP IP/OBS HIGH 50: CPT | Performed by: NURSE PRACTITIONER

## 2020-03-19 PROCEDURE — 97129 THER IVNTJ 1ST 15 MIN: CPT

## 2020-03-19 PROCEDURE — 6360000002 HC RX W HCPCS: Performed by: INTERNAL MEDICINE

## 2020-03-19 PROCEDURE — 6370000000 HC RX 637 (ALT 250 FOR IP): Performed by: PHYSICIAN ASSISTANT

## 2020-03-19 PROCEDURE — 6370000000 HC RX 637 (ALT 250 FOR IP): Performed by: PHARMACIST

## 2020-03-19 PROCEDURE — 36415 COLL VENOUS BLD VENIPUNCTURE: CPT

## 2020-03-19 PROCEDURE — 94760 N-INVAS EAR/PLS OXIMETRY 1: CPT

## 2020-03-19 PROCEDURE — 71046 X-RAY EXAM CHEST 2 VIEWS: CPT

## 2020-03-19 PROCEDURE — 82948 REAGENT STRIP/BLOOD GLUCOSE: CPT

## 2020-03-19 PROCEDURE — 80048 BASIC METABOLIC PNL TOTAL CA: CPT

## 2020-03-19 PROCEDURE — 70498 CT ANGIOGRAPHY NECK: CPT

## 2020-03-19 RX ORDER — WARFARIN SODIUM 5 MG/1
5 TABLET ORAL
Status: COMPLETED | OUTPATIENT
Start: 2020-03-19 | End: 2020-03-19

## 2020-03-19 RX ORDER — 0.9 % SODIUM CHLORIDE 0.9 %
500 INTRAVENOUS SOLUTION INTRAVENOUS ONCE
Status: COMPLETED | OUTPATIENT
Start: 2020-03-19 | End: 2020-03-19

## 2020-03-19 RX ORDER — ASPIRIN 300 MG/1
300 SUPPOSITORY RECTAL ONCE
Status: DISCONTINUED | OUTPATIENT
Start: 2020-03-19 | End: 2020-03-19

## 2020-03-19 RX ORDER — ASPIRIN 81 MG/1
81 TABLET, CHEWABLE ORAL ONCE
Status: COMPLETED | OUTPATIENT
Start: 2020-03-19 | End: 2020-03-19

## 2020-03-19 RX ADMIN — DOXAZOSIN MESYLATE 8 MG: 4 TABLET ORAL at 21:02

## 2020-03-19 RX ADMIN — TIOTROPIUM BROMIDE INHALATION SPRAY 2 PUFF: 3.12 SPRAY, METERED RESPIRATORY (INHALATION) at 08:12

## 2020-03-19 RX ADMIN — FUROSEMIDE 40 MG: 40 TABLET ORAL at 08:29

## 2020-03-19 RX ADMIN — QUETIAPINE FUMARATE 25 MG: 25 TABLET ORAL at 00:27

## 2020-03-19 RX ADMIN — ASPIRIN 81 MG 81 MG: 81 TABLET ORAL at 15:11

## 2020-03-19 RX ADMIN — ARFORMOTEROL TARTRATE 15 MCG: 15 SOLUTION RESPIRATORY (INHALATION) at 18:11

## 2020-03-19 RX ADMIN — CEFTRIAXONE SODIUM 1 G: 1 INJECTION, POWDER, FOR SOLUTION INTRAMUSCULAR; INTRAVENOUS at 08:30

## 2020-03-19 RX ADMIN — FUROSEMIDE 40 MG: 40 TABLET ORAL at 20:57

## 2020-03-19 RX ADMIN — FLUCONAZOLE 200 MG: 200 TABLET ORAL at 08:29

## 2020-03-19 RX ADMIN — POTASSIUM CHLORIDE 10 MEQ: 1500 TABLET, EXTENDED RELEASE ORAL at 08:31

## 2020-03-19 RX ADMIN — MAGNESIUM HYDROXIDE 30 ML: 2400 SUSPENSION ORAL at 08:39

## 2020-03-19 RX ADMIN — ENOXAPARIN SODIUM 120 MG: 120 INJECTION SUBCUTANEOUS at 21:00

## 2020-03-19 RX ADMIN — IOPAMIDOL 80 ML: 755 INJECTION, SOLUTION INTRAVENOUS at 10:29

## 2020-03-19 RX ADMIN — SODIUM CHLORIDE, PRESERVATIVE FREE 10 ML: 5 INJECTION INTRAVENOUS at 08:30

## 2020-03-19 RX ADMIN — DOCUSATE SODIUM 100 MG: 100 CAPSULE, LIQUID FILLED ORAL at 21:00

## 2020-03-19 RX ADMIN — QUETIAPINE FUMARATE 25 MG: 25 TABLET ORAL at 21:00

## 2020-03-19 RX ADMIN — SODIUM CHLORIDE, PRESERVATIVE FREE 10 ML: 5 INJECTION INTRAVENOUS at 21:00

## 2020-03-19 RX ADMIN — ENOXAPARIN SODIUM 120 MG: 120 INJECTION SUBCUTANEOUS at 09:06

## 2020-03-19 RX ADMIN — LEVOTHYROXINE SODIUM 175 MCG: 150 TABLET ORAL at 08:29

## 2020-03-19 RX ADMIN — BUDESONIDE 250 MCG: 0.25 INHALANT RESPIRATORY (INHALATION) at 08:12

## 2020-03-19 RX ADMIN — DOCUSATE SODIUM 100 MG: 100 CAPSULE, LIQUID FILLED ORAL at 08:29

## 2020-03-19 RX ADMIN — SODIUM CHLORIDE 500 ML: 9 INJECTION, SOLUTION INTRAVENOUS at 15:11

## 2020-03-19 RX ADMIN — Medication 3 MG: at 00:27

## 2020-03-19 RX ADMIN — POLYETHYLENE GLYCOL 3350 17 G: 17 POWDER, FOR SOLUTION ORAL at 08:29

## 2020-03-19 RX ADMIN — METOPROLOL TARTRATE 25 MG: 25 TABLET, FILM COATED ORAL at 21:02

## 2020-03-19 RX ADMIN — ARFORMOTEROL TARTRATE 15 MCG: 15 SOLUTION RESPIRATORY (INHALATION) at 08:12

## 2020-03-19 RX ADMIN — BUDESONIDE 250 MCG: 0.25 INHALANT RESPIRATORY (INHALATION) at 18:11

## 2020-03-19 RX ADMIN — Medication 1000 MG: at 08:31

## 2020-03-19 RX ADMIN — WARFARIN SODIUM 5 MG: 5 TABLET ORAL at 17:51

## 2020-03-19 RX ADMIN — POTASSIUM CHLORIDE 10 MEQ: 1500 TABLET, EXTENDED RELEASE ORAL at 21:02

## 2020-03-19 ASSESSMENT — PAIN SCALES - GENERAL
PAINLEVEL_OUTOF10: 0

## 2020-03-19 NOTE — PROGRESS NOTES
[facility]  SPEECH THERAPY  [facility] MED SURG 8B  Speech - Language - Cognitive Evaluation,   Bedside Swallow Evaluation, & Treatment     SLP Individual Minutes  Time In: 1310  Time Out: 9166  Minutes: 38  Timed Code Treatment Minutes: 8 Minutes      10 minutes BSE  20 minutes Cognition Evaluation   8 minute Cog Tx    Date: 3/19/2020  Patient Name: Jojo Pool      CSN: [id number]   : 1931  (80 y.o.)  Gender: male   Referring Physician:  Amanda Zhou MD  Diagnosis: UTI  Secondary Diagnosis: Stroke alert, dysphagia   Precautions: Fall Risk  History of Present Illness/Injury: Per chart review; The patient is a 80 y.o. male w/ PMH of BPH with chronic indwelling Billy catheter who was recently seen in Dr. Yunior Martinez clinic for evaluation of urinary retention- as a result his Cardura dose was doubled and he continued his Billy catheter with a PVER to be done as an outpatient in 3 weeks. At the time of admission on 3/11/2020, his Billy catheter became displaced. As a result he came into the ED to be evaluated. He was also having significant pain and mentioned symptoms of increased urgency, frequency and dysuria. He was then admitted and had a TURP performed on 3/17. He has since had a voiding trial which he failed and his Billy catheter was re-inserted with gross hematuria noted today. CBI was initiated mostly because the patient is currently on an enoxaparin to Coumadin bridge. He is also constipated. Per discussion with RN, the pt had a code stroke called d/t left side weakness (leg). RN felt a cognition and swallow evaluation would be appropriate on this date.    Past Medical History:   Diagnosis Date    Anxiety     CAD (coronary artery disease)     leaking valve    Chronic back pain     [facility]) ([facility])     Detached retina     Diabetes mellitus ([facility] [address]     NIDDM    DVT (deep venous thrombosis) ([facility])     RLE    DVT (deep venous thrombosis) ([facility] [address] 11/9/15

## 2020-03-19 NOTE — PROGRESS NOTES
Hospitalist Progress Note    Patient:  Edis Samayoa  YOB: 1931  MRN: [id number]   PCP: Sandee Bowers MD        Acct: [de-identified]  Unit/Bed: [facility]    Date of Admission: 3/11/2020      ASSESSMENT     1. Urinary retention with chronic indwelling felipe catheter s/p TURP on 3/17 with failed attempt at voiding trial on 3/18 now receiving CBI  2. Catheter related UTI: Catheter present prior to admission. Urine cx grew Candida parapsilosis and E. Coli. Currently on Ceftriaxone and fluconazole  3. Post-op left lower extremity weakness: Stroke being considered. CTA of head and neck unremarkable for significant stenosis. tpA deferred due to recent turp and currently on anticoagulation. 4. Pill dysphagia: Raised possibility of stroke, however patient was ingesting large potassium pills. ST evaluation pending  5. Essential HTN:  6. Hyperlipidemia: not on statin  7. COPD: not in exacerbation. Continue home meds. 8. Morbid obesity: BMI 40.6   9. Hypokalemia: Replace prn  10. History of DVT on enoxaparin and Coumadin  11. DM Type 2: Glucophage  12. Acquired hypothyroidism: levothyroxine  13. Paroxysymal atrial fibrillation: Lopressor  14. History of chronic diastolic CHF    PLAN     1. Neurology now following patient regarding left lower extremity weakness. CTA of the head and neck unremarkable for significant stenosis. Case discussed with Dr. Lindsay Santos Neurology and patient will be closely monitored. Give aspirin x 1. Continue enoxaparin bid and monitor closely for evidence of hematuria as patient just had TURP performed. Patient is not a candidate for tPA and/or thrombectomy. Consider MRI of the brain. Given recent urological surgery and patient being on anticoagulatns would also consider CT scan of the lumbar spine. 2. Place PT/OT/ST  3. Continue to monitor  4. Plan to discharge tomorrow if patient remains stable.      Anticipated Discharge in :  TBD  Code Status: Limited    Electronically signed mg Oral BID    tiotropium  2 puff Inhalation Daily    metoprolol tartrate  25 mg Oral BID    [Held by provider] lisinopril  5 mg Oral Daily    levothyroxine  175 mcg Oral Daily    fluconazole  200 mg Oral Daily    insulin lispro  0-18 Units Subcutaneous TID WC    insulin lispro  0-9 Units Subcutaneous Nightly    doxazosin  8 mg Oral Nightly    [Held by provider] metFORMIN  1,000 mg Oral BID WC     PRN Meds: oxybutynin, potassium chloride **OR** potassium alternative oral replacement **OR** potassium chloride, QUEtiapine, diphenhydrAMINE, melatonin, opium-belladonna, sodium chloride flush, acetaminophen **OR** acetaminophen, polyethylene glycol, promethazine **OR** ondansetron, albuterol sulfate HFA, glucose, dextrose, glucagon (rDNA), dextrose    Ins and outs:      Intake/Output Summary (Last 24 hours) at 3/20/2020 1129  Last data filed at 3/20/2020 0504  Gross per 24 hour   Intake 480 ml   Output 4600 ml   Net -4120 ml       Physical Examination     BP (!) 134/52   Pulse 57   Temp 97.9 °F (36.6 °C) (Oral)   Resp 18   Ht 5' 7\" (1.702 m)   Wt 258 lb 3.2 oz (117.1 kg)   SpO2 90%   BMI 40.44 kg/m²     General appearance: No apparent distress. HEENT: Extraocular motion intact. Patient's speech does not seem dysarthric  Neck: Supple  Respiratory:  CTA bilaterally without rales/wheezes/rhonchi. Cardiovascular: RRR with normal S1/S2 without murmurs, rubs or gallops. Abdomen: Soft, non-tender, distended with audible bowel sounds. Musculoskeletal: Left lower extremity weakness  Neurologic: Left lower extremity weakness CN: II-XII intact. Psychiatric: Alert and oriented  Vascular: Dorsalis pedis palpable bilaterally. Radial pulses palpable bilaterally. No peripheral edema  Skin:  No visible rashes or lesions.   Billy catheter in place with pink tinged urine    Labs     Recent Labs     03/18/20  0454 03/19/20  0312 03/20/20  0455   WBC 15.3* 10.7 7.8   HGB 12.2* 11.6* 11.6*   HCT 38.1* 36.6* 36.3*   PLT 241 233 222     Recent Labs     03/18/20  0454 03/19/20  0312 03/20/20  0455    142 143   K 3.7 3.4* 3.3*   CL 98 98 100   CO2 28 30 29   BUN 12 15 14   CREATININE 0.8 0.7 0.7   CALCIUM 8.5 8.3* 8.3*     No results for input(s): AST, ALT, BILIDIR, BILITOT, ALKPHOS in the last 72 hours. Recent Labs     03/18/20  0454 03/19/20  0312 03/20/20  0455   INR 1.21* 1.25* 1.20*     No results for input(s): CKTOTAL, TROPONINI in the last 72 hours. Urinalysis:      Lab Results   Component Value Date    NITRU POSITIVE 03/11/2020    WBCUA 10-15 03/11/2020    BACTERIA MODERATE 03/11/2020    RBCUA > 100 03/11/2020    BLOODU LARGE 03/11/2020    SPECGRAV 1.025 01/31/2020    SPECGRAV 1.016 03/15/2019    GLUCOSEU NEGATIVE 03/11/2020       Diagnostic imaging/procedures       Cta Head W Wo Contrast (code Stroke Nihss 4 Or Above)    Result Date: 3/19/2020  PROCEDURE: CTA HEAD W WO CONTRAST, CTA NECK W WO CONTRAST CLINICAL INFORMATION: left lower extremity weakness . Sudden onset. COMPARISON: CT head from the same date and 3/11/2020. TECHNIQUE: Helical CT from the aortic arch through the head in arterial phase during intravenous administration of 80 mL Isovue-370 injected in the left forearm with multiplanar reconstructions to include volumetric maximum intensity projection sequences. FINDINGS: CTA HEAD: There is mural calcification in the cavernous and clinoid segments of internal carotid arteries with areas of mild stenosis. No aneurysmal dilatation is identified. The bilateral middle cerebral and anterior cerebral arteries are patent without focal abnormality identified. The basilar artery is patent without focal stenosis or visualized aneurysm. The P1 segment of the left posterior cerebral artery is small likely on the basis of normal anatomic variation. The P2 segment is patent with contribution  from a left posterior communicating artery. The right posterior true artery is patent without focal abnormality.  Superior from the base of the skull to the vertex without IV contrast. All CT scans at this facility use dose modulation, iterative reconstruction, and/or weight-based dosing when appropriate to reduce radiation dose to as low as reasonably achievable. FINDINGS:  No acute intracranial hemorrhage or mass effect is seen. There is mild diffuse atrophy. There is moderate patchy periventricular and subcortical white matter hypoattenuation likely demonstrating sequela from moderate chronic small vessel ischemic changes. There is no midline shift. The basal cisterns and posterior fossa appear within normal limits. Mineralization at the level of the basal ganglia are demonstrated. This is unchanged from prior examination. The visualized paranasal sinuses and mastoid air cells appear well-pneumatized. Parasellar carotid artery vascular calcifications are noted. The visualized globes and orbits appear grossly intact. No acute abnormalities of the calvarium are seen. 1. No acute intracranial hemorrhage or mass effect. 2. Mild diffuse atrophy. 3. Moderate chronic small vessel ischemic changes. **This report has been created using voice recognition software. It may contain minor errors which are inherent in voice recognition technology. ** Final report electronically signed by Dr. Chalo Simeon on 3/19/2020 10:38 AM    Ct Head Wo Contrast    Result Date: 3/11/2020  PROCEDURE: CT HEAD WO CONTRAST CLINICAL INFORMATION: confusion. COMPARISON: No prior study. TECHNIQUE: Noncontrast 5 mm axial images were obtained through the brain. All CT scans at this facility use dose modulation, iterative reconstruction, and/or weight-based dosing when appropriate to reduce radiation dose to as low as reasonably achievable. FINDINGS: Generalized volume loss and small vessel ischemic changes. No acute hemorrhage or midline shift. Ventricles are within normal limits. Paranasal sinuses are clear. Mastoid air cells are patent. Skull: Unremarkable.  Soft tissues: Unremarkable. No acute intracranial findings. **This report has been created using voice recognition software. It may contain minor errors which are inherent in voice recognition technology. ** Final report electronically signed by Dr. Apurva Fernandez on 3/11/2020 1:34 PM    Cta Neck W Wo Contrast (code Stroke Nihss 4 Or Above)    Result Date: 3/19/2020  PROCEDURE: CTA HEAD W WO CONTRAST, CTA NECK W WO CONTRAST CLINICAL INFORMATION: left lower extremity weakness . Sudden onset. COMPARISON: CT head from the same date and 3/11/2020. TECHNIQUE: Helical CT from the aortic arch through the head in arterial phase during intravenous administration of 80 mL Isovue-370 injected in the left forearm with multiplanar reconstructions to include volumetric maximum intensity projection sequences. FINDINGS: CTA HEAD: There is mural calcification in the cavernous and clinoid segments of internal carotid arteries with areas of mild stenosis. No aneurysmal dilatation is identified. The bilateral middle cerebral and anterior cerebral arteries are patent without focal abnormality identified. The basilar artery is patent without focal stenosis or visualized aneurysm. The P1 segment of the left posterior cerebral artery is small likely on the basis of normal anatomic variation. The P2 segment is patent with contribution  from a left posterior communicating artery. The right posterior true artery is patent without focal abnormality. Superior cerebellar arteries appear patent. Dural venous sinuses appear patent without focal filling defect. No focal areas of abnormal parenchymal enhancement are identified. CTA NECK: There is a typical 3 vessel arch. The brachiocephalic and left subclavian arteries are patent without flow-limiting stenosis. The common carotid arteries are patent without focal stenosis.  There is calcified mural plaque at the carotid bulbs bilaterally and tandem areas of mural calcification in the proximal internal administration of intravenous and oral contrast. Coronal and sagittal reconstructions were obtained. All CT scans at this facility use dose modulation, iterative reconstruction, and/or weight-based dosing when appropriate to reduce radiation dose to as low as reasonably achievable. FINDINGS: Lung bases are clear. Mild fatty infiltration of the liver. Pancreas gallbladder adrenal glands spleen and kidneys are unremarkable no hydronephrosis. Normal caliber abdominal aorta. Atherosclerotic changes. Scattered stool in the colon. Multiple colonic diverticuli. No evidence for diverticulitis. Decompressed sigmoid colonic loop closely abutting a decompressed bladder limiting evaluation Normal appendix. There is prominent prostate gland with coarse calcifications. Correlation with serology is advised. Billy catheter bulb appears to be within a collapsed bladder. Correlation is advised. There is mild infiltration in the pelvis which is nonspecific. No acute osseous findings. Degenerative changes lumbar spine and pelvis. Infrarenal IVC filter. 1. Scattered stool in the colon. Multiple colonic diverticuli. No evidence for diverticulitis. 2. There is prominent prostate gland with coarse calcifications. Billy catheter bulb appears to be within a collapsed bladder. Decompressed sigmoid colonic loop closely abutting a decompressed bladder limiting evaluation. There is mild infiltration in the pelvis which is nonspecific. Correlation with urinalysis is advised. Underlying fistula cannot be entirely excluded. Appearance is similar to prior. **This report has been created using voice recognition software. It may contain minor errors which are inherent in voice recognition technology. ** Final report electronically signed by Dr. Jenny Servin on 3/11/2020 1:42 PM    Ct Abdomen Pelvis W Iv Contrast Additional Contrast? None    Result Date: 3/5/2020  PROCEDURE: CT ABDOMEN PELVIS W IV CONTRAST CLINICAL INFORMATION: gross hematuria . COMPARISON: 3/16/2019 TECHNIQUE: 2-D multiplanar postcontrast images of the abdomen and pelvis Isovue-370 IV contrast. All CT scans at this facility use dose modulation, iterative reconstruction, and/or weight-based dosing when appropriate to reduce radiation dose to as low as reasonably achievable. FINDINGS: Lung bases Lung bases are clear undersurface the heart shows minimal coronary artery atherosclerotic calcifications. Abdomen pelvis The liver is unremarkable. Spleen is normal. The adrenals are normal. The pancreas is unremarkable. Gallbladder is normal. Kidneys enhance symmetrically. There is no hydronephrosis. There are small extrarenal pelves bilaterally. Aorta is heavily atherosclerotic. IVC filter is in place. No adenopathy is identified. Pelvis There is a large amount of stool in the colon compatible with constipation. There is  uncomplicated diverticulosis. There is a loop of sigmoid colon contiguous with the decompressed bladder which has a Billy catheter present there is no gas seen in the bladder which is difficult to assess due to the fact is completely decompressed. Prostate gland is enlarged and contains calcifications. There are no abnormal fluid collections within the abdomen. There are no acute abnormalities in the abdomen or pelvis. The urinary bladder is completely decompressed with a Billy catheter. There is a loop of sigmoid colon contiguous with the bladder. A fistula cannot be entirely excluded but there is  no evidence of that at this time **This report has been created using voice recognition software. It may contain minor errors which are inherent in voice recognition technology. ** Final report electronically signed by Dr. Kathie Toro on 3/5/2020 11:44 AM      DVT prophylaxis: [x] Lovenox                                 [] SCDs                                 [] SQ Heparin                                 [] Encourage ambulation           [x] Already on Anticoagulation

## 2020-03-19 NOTE — PROGRESS NOTES
[facility] Facility-Based Therapy for COPD   INDIVIDUAL TREATMENT PLAN (ITP)     Name: Abby Holguin   :  1931  Acct Number: [de-identified]   AGE: 80 y.o. Diagnosis:  Severe COPD, which is GOLD Stage 3                                               PFT:  Post Bronchodilator FEV1/FVC: 48  FEV1: 41    Patient Goals:  (x) Breathe better  (x) Increase my endurance (x) Have more energy  (x) Rely less on others  (x) Ease ADLs  (x) Understand and use meds correctly  (x) Control cough  (x) Make fewer visits to hospital  () Quit smoking  (x) Feel less anxious / have more confidence    Glossary:  NH=Pulmonary Rehab  PF=Physical Fitness TM=Treadmill  AD=Schwinn Airdyne  UBE=UpperBody Ergometer  RT=Resistance Training  PLB=Pursed Lip Breathing    NOTE 3/19/2020: Per Administration, Pulmonary Rehab is postponed until further notice due to COVID-19. We will contact patient when we resume rehab sessions. Spoke with patient and he understood. INDIVIDUAL TREATMENT PLAN    INITIAL ASSESSMENT    Day #1 INDIVIDUAL TREATMENT PLAN    PLAN      Day #2-30 INDIVIDUAL TREATMENT PLAN    RE ASSESSMENT    Day #31-60 INDIVIDUAL TREATMENT PLAN    RE ASSESSMENT    Day #61-90 INDIVIDUAL TREATMENT PLAN    RE ASSESSMENT    Day # INDIVIDUAL TREATMENT PLAN    DISCHARGE      Last Day        Date: 19     Date: 20   Date: 20  Pt did not attend this session   Date: 3/10/2020  Patient not in attendance for reassessment.    Date:    Date:    EXERCISE   INITIAL ASSESSMENT      Prescribed Oxygen Use  None      Oxygen Titration  (x) Assess for desaturation            Current Home Exercise:  None   EXERCISE  PLAN        Current Oxygen Use  NONE      Oxygen Titration  (x) Maintaining >87% Spo2  () Not maintaining -  L/min needed on       Current Home Exercise:  None   EXERCISE  RE ASSESSMENT      Current Oxygen Use  RA      Oxygen Titration  (x) Maintaining >87% Spo2  () Not maintaining -  L/min not had class        Re assessment of support:  Good, pt has been attending PSYCHO SOCIAL  RE ASSESSMENT    (x) Pt is coping well  () Pt needs more assistance-              () Pt recommended to contact physician for assistance                See below          () Pt has had class  (x) Pt has not had           Re assessment of support:  Good, pt has been attending PSYCHO SOCIAL  RE ASSESSMENT    () Pt is coping well  () Pt needs more assistance-              () Pt recommended to contact physician for assistance                See below          () Pt has had class  () Pt has not had class        Re assessment of support:  Good, pt has been attending   PSYCHO SOCIAL DISCHARGE      (x) Pt attended class     Date:              Post Rehab PHQ-9   Depression Score:                Post Rehab [facility] SOB  Score:      (x) Pt is less SOB with ADLs            () Pt had class  Date: See above  () Pt did not have class        () Pt completed program  () Pt had to stop         Psychosocial Goals   Initial Assessment    (x) Maintain/ Decrease Depression Levels      (x) Maintain/ Decrease Anxiety Levels        (x) Learn about Emotional Health          (x) Increase QoL   (CAT tool)          (x) Give the CAT tool for HR QoL      Pre Rehab  CAT score:  20/ 40       Psychosocial Goals  Plan      (x) Attend Stress/ Anxiety/ Dyspnea - Panic control class      (x) Attend Stress/ Anxiety/ Dyspnea - Panic control class      (x) Attend Stress/ Anxiety/ Dyspnea - Panic control class      (x) Initiate LA, get stronger, more endurance and more confidence              30 day CAT score:  14/ 40     Psychosocial Goals  Reassessment      (x) Pt has had class  () Pt has not had class      (x) Pt has had class  () Pt has not had class      (x) Pt has had class  () Pt has not had class      (x) Pt is progressing  () Pt is not progressing              60 day CAT score:  ?/ 40 Psychosocial Goals  Reassessment      () Pt has had class  (x) Pt has not had has had class  () Pt has not had class MEDICATIONS  RE ASSESSMENT      Pt reports taking their meds properly  % of the time        (x) Pt has had class-1/14/20  () Pt has not had class MEDICATIONS  RE ASSESSMENT      Pt reports taking their meds properly  % of the time        () Pt has had class  () Pt has not had class MEDICATIONS  DISCHARGE        Pt reports taking their meds properly  % of the time        ()Pt had med class  Date:  () Pt has not had class       Pt has:  (x) Metered Dose Inhaler  () Dry Powder Inhaler  (x) Nebulizer   -Review correct use, timing, technique and cleaning of equipment during Medication class (x) Pt has had class  () Pt has not had class (x) Pt has had class  () Pt has not had class () Pt has had class  () Pt has not had class () Pt had class  Date: See above  () Pt has not had class   Medication Goals  Initial Assessment        (x) Take meds properly 100% of the time    (x) Learn about / Review Rxs, and devices Medication Goals  Intervention & Education  Plan      -Follow Rx instructions and ask if questions    -Attend Medication Education class   Medication Goals  Re assessment          (x) Readdressed questions on any medications    (x) Pt has had class  () Pt has not had class Medication Goals  Re assessment          (x) Readdressed questions on any medications    (x) Pt has had class  () Pt has not had class Medication Goals  Re assessment          () Readdressed questions on any medications    () Pt has had class  () Pt has not had class Medication Goals  Discharge          % proper med usage see above      () Pt had class  Date: See above  () Pt has not had class             ADL  INITIAL ASSESSMENT      (x) Impaired ADL ability  () Need for Assist Devices  (x) Generalized weakness, low functional capacity  (x) Stairs in house               ADL  PLAN        -Initiate TX, RT, and chair squats  -Breathing retraining, PLB, and pacing      -Encourage home activity ADL  RE ASSESSMENT      (x) Pt is progressing  () Pt is not progressing    Unkn    () Pt has initiated home activity  () Pt has not yet initiated  home activity-      (x) ADLs getting easier  () ADLs not getting easier   ADL  RE ASSESSMENT      (x) Pt is progressing  () Pt is not progressing        () Pt has initiated home activity  () Pt has not yet initiated  home activity-      (x) ADLs getting easier  () ADLs not getting easier ADL  RE ASSESSMENT      () Pt is progressing  () Pt is not progressing        () Pt has initiated home activity  () Pt has not yet initiated  home activity-      () ADLs getting easier  () ADLs not getting easier ADL  DISCHARGE        () Pt showed strength and endurance gains  () Pt did not progress      () Pt doing recommended home activity  () Pt not doing home activitiy         ADL Goals   Initial Assessment      (x) Decrease SOB with ADLs              (x) Decreased SOB overall      ADL Goals Intervention/ Education Plan      -Initiate MI, RT and chair squats and increase pts strength and endurance        -Pacing, Breathing retraining       ADL Goals Reassessment        (x) ADLs getting easier  () ADLs not getting easier          (x Pt states activities are getting easier/ able to go longer/ not get as SOB   ADL Goals Reassessment        (x) ADLs getting easier  () ADLs not getting easier          (x) Pt states activities are getting easier/ able to go longer/ not get as SOB ADL Goals Reassessment        () ADLs getting easier  () ADLs not getting easier          () Pt states activities are getting easier/ able to go longer/ not get as SOB   ADL Goals   Discharge        Goal achieved?  () Yes  () No            Goal achieved?  () Yes  () No          Outcomes:  See Health and Functioning from the CAT tool and Strength and Endurance from 6 MWD above             EXACERBAT'N PREVENTION & MANAGEMENT  INITIAL ASSESSMENT      Pt reports;  () 0 respiratory infections  () 1   () >2  (x) demonstrate disease self management strategies      -Attend all appropriate classes                   Exacerbation Prevention & Management Goals  Reassessment        (x) Pt has had class  () Pt has not had class        (x) Pt has had all classes  () Pt has had some classes  () Pt has had little/ not staying for education Exacerbation Prevention & Management Goals  Reassessment        (x) Pt has had class  () Pt has not had class        (x) Pt has had all classes  () Pt has had some classes  () Pt has had little/ not staying for education Exacerbation Prevention & Management Goals  Reassessment        () Pt has had class  () Pt has not had class        () Pt has had all classes  () Pt has had most classes  () Pt has had some of the classes  () Pt has had little/ not staying for education Exacerbation Prevention & Management Goals  Discharge        () Pt had class  Date:  See above  () Pt did not have class        () Pt attended all relevant classes and all questions were answered                   PHYSICIAN INTERACTION    MD Initial Assessment Review    (x) Initial  Assessment Reviewed  (x) Treatment Plan and Goals support patient needs/ abilities                  Cosigned and dated below:   Belinda Sen MD 30 day and Plan Review:      (x) I have seen the patient in rehab during this 30 day reassessment  (x) I agree with the plan  (x) Continue with progression and instruction  () Continue, but with the following changes:      Cosigned and dated below: Belinda Sen MD 30 day Reassessment Review:    (x) I have seen the patient in rehab during this 30 day reassessment  (x) Continue with the current program  () Continue, but with the following changes:  () Discharge patient      Cosigned and dated below: PHYSICIAN INTERACTION    MD 30 day Reassessment Review:    (x) I have seen the patient in rehab during this 30 day reassessment  (x) Continue with the current program  () Continue, but with the

## 2020-03-19 NOTE — PROGRESS NOTES
extremities. No arm drift. Normal finger to nose test bilaterally. Extremities:    No cyanosis, clubbing, or edema present. Neurological:    Alert and oriented to person, place, time. No facial droop. PERRLA  Unable to lift left leg off the bed. Left pedal push and pull weaker than right. Moving left leg lateral and medial but unable to perform left straight leg raise off the bed. Normal right leg raise and pedal push/pull. Normal ROM to bilateral upper extremities. No arm drift. Normal hand grasp bilaterally. Normal finger to nose test bilaterally. Labs:  WBC:    Lab Results   Component Value Date    WBC 10.7 03/19/2020     Hemoglobin/Hematocrit:    Lab Results   Component Value Date    HGB 11.6 03/19/2020    HCT 36.6 03/19/2020     BMP:    Lab Results   Component Value Date     03/19/2020    K 3.4 03/19/2020    K 3.3 03/13/2020    CL 98 03/19/2020    CO2 30 03/19/2020    BUN 15 03/19/2020    LABALBU 4.1 03/11/2020    LABALBU 4.3 11/30/2011    CREATININE 0.7 03/19/2020    CALCIUM 8.3 03/19/2020    LABGLOM >90 03/19/2020       Impression:  BPH with urinary retention S/p Greenlight laser TURP by Dr. Kay Earing 3/17/2020  E coli UTI--3/11/2020 On Rocephin (s)  Recent Candida in urine 3/5/20--on Diflucan with recent procedure. New onset Left leg weakness and numbness and tingling  Acute hypoxic respiratory insufficiency--On 3L O2--+ cough. PAF on coumadin  Hx of DM, HTN, HLP, COPD    Plan:    POD # 2 Greenlight laser TURP by Dr. Kay Earing on 3/17/2020. Hx of 1800 ml urinary retention prior to surgery. Pt failed voiding trial yesterday with return of 775 mls of fruit punch colored urine returned with catheter placement. Urine currently peach colored without clots with CBI clamped. Continue catheter at d/c and until back on full anticoagulants. [id number] [address] for coumadin/lovenox.      Pt reports new onset inability to perform left leg raise and numbness and tingling in left lower extremity that started

## 2020-03-19 NOTE — PLAN OF CARE
Problem: RESPIRATORY  Goal: Clear lung sounds  3/19/2020 0851 by Selina Anderson RCP  Outcome: Ongoing  Improve aeration of lungs, decrease WOB

## 2020-03-19 NOTE — CONSULTS
NEUROLOGY CONSULT NOTE      Requesting Physician: Wilbur Black MD    Reason for Consult:  Evaluate for stroke    History of Present Illness:  Curt Siegel is a 80 y.o. male admitted to [address] [place] [address] on 3/11/2020. He initially presented to [facility] with urinary retention and displaced Billy catheter. He did undergo cystoscopy and his coumadin was stopped. Neurology was consulted for new onset left leg weakness and dysarthria. Symptoms are moderate and constant. Onset was unknown, as he woke up from sleep with his symptoms. He went to sleep the night prior normal. Modifiers are he recently underwent cystoscopy and his coumadin was stopped. He has history of atrial fibrillation for which he takes coumadin. Frequency is once, no history of similar symptoms in the past. Duration of symptoms is ongoing. Reports no chest pain. . Reports no neck pain. No vision changes. No dysphagia. No fever. No rash. No weight loss. History provided by patient and review of medical record.     Past Medical History:        Diagnosis Date    Anxiety     CAD (coronary artery disease)     leaking valve    Chronic back pain     [facility] obstructive [facility]     Detached retina     Diabetes mellitus (HCC)     NIDDM    DVT (deep venous thrombosis) ([facility])     RLE    DVT (deep venous thrombosis) ([facility] [address] 11/9/15    LLE    Glaucoma     Hyperlipidemia     Hypertension     Mass of chest     benign chest mass, Dr Jung Rangel Movement disorder     spinal stenosis    Obesity     Osteoarthritis     right ankle, right hand    Pneumonia     Sleep apnea 1993    on BiPap, Dr Lindsey Malin Thyroid cancer [facility])     s/p thyroid resection    Urinary incontinence            Procedure Laterality Date   [facility] SURGERY  [id number], 2014    BRONCHOSCOPY N/A 8/29/2017    BRONCHOSCOPY AIRWAY ONLY performed by Kuldeep Kidd MD at [facility] End[facility]    EKG 12-LEAD  11/10/2015         FRACTURE SURGERY      right hand    OTHER SURGICAL HISTORY      spinal epidurals    RETINAL DETACHMENT SURGERY      SPINE SURGERY  2004    Lumbar laminectomy    THYROIDECTOMY      TURP N/A 3/17/2020    CYSTOSCOPY WITH GREENLIGHT PHOTOVAPORIZATION OF PROSTATE performed by Cheryl Dalal MD at [address]: Allergies   Allergen Reactions   [facility] prefers pt not use Macrobid due to pulmonary side effects.     Levaquin [Levofloxacin] Other (See Comments)     Redness/flushing    Adhesive Tape Rash    Alphagan [Brimonidine Tartrate] Hives     Eyes red        Current Medications:   cefTRIAXone (ROCEPHIN) 1 g IVPB in 50 mL D5W minibag, Q24H  enoxaparin (LOVENOX) injection 120 mg, Q12H  polyethylene glycol (GLYCOLAX) packet 17 g, Daily  docusate sodium (COLACE) capsule 100 mg, BID  potassium chloride (KLOR-CON M) extended release tablet 40 mEq, PRN    Or  potassium bicarb-citric acid (EFFER-K) effervescent tablet 40 mEq, PRN    Or  potassium chloride 10 mEq/100 mL IVPB (Peripheral Line), PRN  QUEtiapine (SEROQUEL) tablet 25 mg, Nightly PRN  diphenhydrAMINE (BENADRYL) injection 25 mg, Q6H PRN  melatonin tablet 3 mg, Nightly PRN  opium-belladonna (B&O SUPPRETTES) 16.2-30 MG suppository 30 mg, Q8H PRN  sodium chloride flush 0.9 % injection 10 mL, 2 times per day  sodium chloride flush 0.9 % injection 10 mL, PRN  acetaminophen (TYLENOL) tablet 650 mg, Q6H PRN    Or  acetaminophen (TYLENOL) suppository 650 mg, Q6H PRN  polyethylene glycol (GLYCOLAX) packet 17 g, Daily PRN  promethazine (PHENERGAN) tablet 12.5 mg, Q6H PRN    Or  ondansetron (ZOFRAN) injection 4 mg, Q6H PRN  albuterol sulfate  (90 Base) MCG/ACT inhaler 2 puff, Q6H PRN  Arformoterol Tartrate (BROVANA) nebulizer solution 15 mcg, BID  budesonide (PULMICORT) nebulizer suspension 250 mcg, BID  furosemide (LASIX) tablet 40 mg, BID  tiotropium (SPIRIVA RESPIMAT) 2.5 MCG/ACT inhaler 2 puff, Daily  potassium chloride (KLOR-CON M) extended release tablet 10 mEq, BID  metoprolol tartrate (LOPRESSOR) tablet 25 mg, BID  [Held by provider] lisinopril (PRINIVIL;ZESTRIL) tablet 5 mg, Daily  levothyroxine (SYNTHROID) tablet 175 mcg, Daily  fluconazole (DIFLUCAN) tablet 200 mg, Daily  insulin lispro (HUMALOG) injection vial 0-18 Units, TID WC  insulin lispro (HUMALOG) injection vial 0-9 Units, Nightly  glucose (GLUTOSE) 40 % oral gel 15 g, PRN  dextrose 50 % IV solution, PRN  glucagon (rDNA) injection 1 mg, PRN  dextrose 5 % solution, PRN  doxazosin (CARDURA) tablet 8 mg, Nightly  metFORMIN (GLUCOPHAGE) tablet 1,000 mg, BID WC         Social History:  Social History     Tobacco Use   Smoking Status Never Smoker   Smokeless Tobacco Never Used     Social History     Substance and Sexual Activity   Alcohol Use Yes    Alcohol/week: 1.0 standard drinks    Types: 1 Glasses of wine per week    Comment: Glass of wine with dinner. Social History     Substance and Sexual Activity   Drug Use No         Family History:       Problem Relation Age of Onset    Other Mother         Complications of Childbirth    Other Father [de-identified]        Natural causes       Review of Systems:  All systems reviewed and are all negative except what is mentioned in history of present illness. Physical Exam:  BP (!) 119/54   Pulse 54   Temp 98.1 °F (36.7 °C) (Oral)   Resp 18   Ht 5' 7\" (1.702 m)   Wt 258 lb 7 oz (117.2 kg)   SpO2 96%   BMI 40.48 kg/m²  I Body mass index is 40.48 kg/m². I   Wt Readings from Last 1 Encounters:   03/18/20 258 lb 7 oz (117.2 kg)           General appearance - alert, well appearing, and in no distress, oriented to  person, place, and time and overweight  Mental status -Level of Alertness: awake  Orientation: person, place, time  Memory: normal  Fund of Knowledge: normal  Attention/Concentration: normal  Language: Dysarthric. Mood is normal  Neck - supple, no neck adenopathy, carotids upstroke normal bilaterally, no carotid bruits.  There is no LAP in the neck. There is no thyroid enlargement. Neurological -   Cranial [id number]:   Cranial nerve II: Normal. There is full visual fields  Cranial nerve III: Pupils: equal, round, reactive to light   Cranial nerves III, IV, VI: Extraocular Movements: intact   Cranial nerve V: Facial sensation: intact   Cranial nerve VII:Facial strength: intact   Cranial nerve VIII: Hearing: intact   Cranial nerve IX: Palate Elevation intact bilaterally  Cranial nerve XI: Shoulder shrug intact bilaterally  Cranial nerve XII: Tongue midline   neck supple without rigidity  DTR's are decreased distal and symmetric  Babinski sign is negative on bilaterally. Motor exam is 5/5 in the bilateral upper and weakness is variable in bilateral lower extremities. Normal muscle tone . There is no muscle atrophy. There is no muscle fasciculation   Sensory is intact forlight touch. Coordination: finger to nose intact  Gait and station not tested  Abnormal movement none. Long tracts are deferred. Skin - no rashes or lesions, warm and dry to touch  Superficial temporal artery pulses are normal.   Musculoskeletal: Has no hand arthritis, no limitation of ROM in any of the four extremities. no joint tenderness, deformity or swelling. There is no leg edema. The Heart was regular in rate and rhythm. No heart murmur  Chest- decreased air entry,  good effort. Abdomen: soft, intact bowel sounds.            Labs:    CBC:   Recent Labs     03/17/20  1518 03/18/20  0454 03/19/20  0312   WBC 14.0* 15.3* 10.7   HGB 12.6* 12.2* 11.6*    241 233   MCV 86.5 87.2 85.7   MCH 27.8 27.9 27.2   MCHC 32.1* 32.0* 31.7*     CMP:  Recent Labs     03/17/20  0426 03/18/20  0454 03/19/20  0312    140 142   K 3.5 3.7 3.4*   CL 97* 98 98   CO2 29 28 30   BUN 11 12 15   CREATININE 0.9 0.8 0.7   LABGLOM 80* >90 >90   GLUCOSE 120* 156* 124*   CALCIUM 8.4* 8.5 8.3*     Reviewed   Results for orders placed during the hospital encounter of 03/11/20 CTA HEAD W WO CONTRAST (CODE STROKE NIHSS 4 or Above)    Narrative PROCEDURE: CTA HEAD W WO CONTRAST, CTA NECK W WO CONTRAST    CLINICAL INFORMATION: left lower extremity weakness . Sudden onset. COMPARISON: CT head from the same date and 3/11/2020. TECHNIQUE: Helical CT from the aortic arch through the head in arterial phase during intravenous administration of 80 mL Isovue-370 injected in the left forearm with multiplanar reconstructions to include volumetric maximum intensity projection   sequences. FINDINGS:     CTA HEAD:  There is mural calcification in the cavernous and clinoid segments of internal carotid arteries with areas of mild stenosis. No aneurysmal dilatation is identified. The bilateral middle cerebral and anterior cerebral arteries are patent without focal   abnormality identified. The basilar artery is patent without focal stenosis or visualized aneurysm. The P1 segment of the left posterior cerebral artery is small likely on the basis of normal anatomic variation. The P2 segment is patent with contribution   from a left posterior communicating artery. The right posterior true artery is patent without focal abnormality. Superior cerebellar arteries appear patent. Dural venous sinuses appear patent without focal filling defect. No focal areas of abnormal   parenchymal enhancement are identified. CTA NECK:  There is a typical 3 vessel arch. The brachiocephalic and left subclavian arteries are patent without flow-limiting stenosis. The common carotid arteries are patent without focal stenosis. There is calcified mural plaque at the carotid bulbs bilaterally   and tandem areas of mural calcification in the proximal internal carotid arteries, left greater than right. On the right side, the narrowest luminal diameter in the proximal right internal carotid artery is 2.8 mm with a point more distal, where the   walls are parallel, measuring 4.4 mm.  On the left side, the narrowest luminal surfaces of the aerodigestive tract are symmetric without focal nodular thickening or visualized mass. No cervical   lymphadenopathy is identified. There is a small lymph node in the left parotid gland. Parotid glands are otherwise unremarkable. Submandibular glands are unremarkable. Thyroid gland is normal in appearance. Visualized portions of the lungs are clear. There are mild-to-moderate degenerative changes of the cervical spine. Impression    1. Mural calcifications in the cavernous and clinoid segments of internal carotid arteries with areas of mild stenosis. 2. Calcified mural plaque at the carotid bulbs and proximal bilateral internal carotid arteries with 36% stenosis on the right and 59% stenosis on the left by NASCET criteria. **This report has been created using voice recognition software. It may contain minor errors which are inherent in voice recognition technology. **    Final report electronically signed by Dr. Ollie Culp MD on 3/19/2020 11:04 AM     Reviewed   Results for orders placed during the hospital encounter of 03/11/20   CT HEAD WO CONTRAST (CODE STROKE)    Narrative CT SCAN OF THE BRAIN WITHOUT CONTRAST    CLINICAL INFORMATION: Left lower extremity weakness. COMPARISON: 3/11/2020    TECHNIQUE:  Scans were obtained from the base of the skull to the vertex without IV contrast. All CT scans at this facility use dose modulation, iterative reconstruction, and/or weight-based dosing when appropriate to reduce radiation dose to as low as   reasonably achievable. FINDINGS:      No acute intracranial hemorrhage or mass effect is seen. There is mild diffuse atrophy. There is moderate patchy periventricular and subcortical white matter hypoattenuation likely demonstrating sequela from moderate chronic small vessel ischemic   changes. There is no midline shift. The basal cisterns and posterior fossa appear within normal limits.  Mineralization at the level of the basal

## 2020-03-19 NOTE — PLAN OF CARE
Problem: Urinary Retention:  Goal: Able to perform urinary catheter care  Description: Able to perform urinary catheter care  3/19/2020 0109 by Joshua Glass RN  Outcome: Ongoing  Note: Urinary output within normal limits. Accurate I&Os being completed. Problem: Discharge Planning:  Goal: Discharged to appropriate level of care  Description: Discharged to appropriate level of care  3/19/2020 0109 by Joshua Glass RN  Outcome: Ongoing  Note: Patient plans to go to [facility] at discharge. Problem: RESPIRATORY  Goal: Clear lung sounds  3/19/2020 0109 by Joshua Glass RN  Outcome: Ongoing  Note: Lung sounds clear this shift     Problem: DISCHARGE BARRIERS  Goal: Patient's continuum of care needs are met  3/19/2020 0109 by Joshua Glass RN  Outcome: Ongoing  Note: Patient actively participates in their care. Problem: Cardiovascular  Goal: Hemodynamic stability  3/19/2020 0109 by Joshua Glass RN  Outcome: Ongoing  Note: Patient is hemodynamically stable, blood pressure is within normal parameters, heart sounds within normal limits. Problem: Pain:  Goal: Pain level will decrease  Description: Pain level will decrease  3/19/2020 0109 by Joshua Glass RN  Outcome: Ongoing  Note: Patient voices pain 0/10. Patients pain goal is 0/10. PRN pain medications given as ordered. Patients pain goal is a 0. Non-pharmacological interventions include: rest.       Problem: Falls - Risk of:  Goal: Will remain free from falls  Description: Will remain free from falls  3/19/2020 0109 by Joshua Glass RN  Outcome: Ongoing  Note: Patient absent of falls this shift, fall band intact, bed alarm set, falling star magnet in place. Problem: Musculor/Skeletal Functional Status  Goal: Highest potential functional level  3/19/2020 0109 by Joshua Glass RN  Outcome: Ongoing  Note: Patient up with x1 assistance to the bathroom, tolerating well. Care plan reviewed with patient.   Patient verbalize

## 2020-03-19 NOTE — PROGRESS NOTES
Clinical Pharmacy Note    Warfarin consult follow-up    Recent Labs     03/19/20  0312   INR 1.25*     Recent Labs     03/17/20  1518 03/18/20  0454 03/19/20  0312   HGB 12.6* 12.2* 11.6*   HCT 39.2* 38.1* 36.6*    241 233       Significant Drug-Drug Interactions:  New warfarin drug-drug interactions: none  Discontinued drug-drug interactions: none  Current drug-drug interactions: levothyroxine, fluconazole 200mg daily, enoxaparin 1mg/kg q12h     Date INR Warfarin Dose   3/11/2020 2.17 None (7.5 mg taken today prior to admission)   3/12/2020   2.01  5 mg   3/13/2020   1.59 7.5 mg    3/14/2020   1.41   3/14-3/17    No Coumadin    3/18/2020 1.21   held    3/19/2020  1.25  5 mg                      Notes:                   OK per [employer] CNP/Dr Vences Post urology to resume warfarin  Daily PT/INR until stable within therapeutic range.       John Davis, PharmD, BCPS 3/19/2020 2:26 PM

## 2020-03-20 PROBLEM — Z85.850 HISTORY OF THYROID CANCER: Status: ACTIVE | Noted: 2020-03-20

## 2020-03-20 LAB
ANION GAP SERPL CALCULATED.3IONS-SCNC: 14 MEQ/L (ref 8–16)
BUN BLDV-MCNC: 14 MG/DL (ref 7–22)
CALCIUM SERPL-MCNC: 8.3 MG/DL (ref 8.5–10.5)
CHLORIDE BLD-SCNC: 100 MEQ/L (ref 98–111)
CO2: 29 MEQ/L (ref 23–33)
CREAT SERPL-MCNC: 0.7 MG/DL (ref 0.4–1.2)
ERYTHROCYTE [DISTWIDTH] IN BLOOD BY AUTOMATED COUNT: 15 % (ref 11.5–14.5)
ERYTHROCYTE [DISTWIDTH] IN BLOOD BY AUTOMATED COUNT: 47 FL (ref 35–45)
FLU A ANTIGEN: NEGATIVE
FLU B ANTIGEN: NEGATIVE
GFR SERPL CREATININE-BSD FRML MDRD: > 90 ML/MIN/1.73M2
GLUCOSE BLD-MCNC: 115 MG/DL (ref 70–108)
GLUCOSE BLD-MCNC: 123 MG/DL (ref 70–108)
GLUCOSE BLD-MCNC: 137 MG/DL (ref 70–108)
GLUCOSE BLD-MCNC: 148 MG/DL (ref 70–108)
HCT VFR BLD CALC: 36.3 % (ref 42–52)
HEMOGLOBIN: 11.6 GM/DL (ref 14–18)
INR BLD: 1.2 (ref 0.85–1.13)
MCH RBC QN AUTO: 27.5 PG (ref 26–33)
MCHC RBC AUTO-ENTMCNC: 32 GM/DL (ref 32.2–35.5)
MCV RBC AUTO: 86 FL (ref 80–94)
PLATELET # BLD: 222 THOU/MM3 (ref 130–400)
PMV BLD AUTO: 9.9 FL (ref 9.4–12.4)
POTASSIUM SERPL-SCNC: 3.3 MEQ/L (ref 3.5–5.2)
RBC # BLD: 4.22 MILL/MM3 (ref 4.7–6.1)
SODIUM BLD-SCNC: 143 MEQ/L (ref 135–145)
WBC # BLD: 7.8 THOU/MM3 (ref 4.8–10.8)

## 2020-03-20 PROCEDURE — 94640 AIRWAY INHALATION TREATMENT: CPT

## 2020-03-20 PROCEDURE — 6370000000 HC RX 637 (ALT 250 FOR IP): Performed by: INTERNAL MEDICINE

## 2020-03-20 PROCEDURE — 85027 COMPLETE CBC AUTOMATED: CPT

## 2020-03-20 PROCEDURE — 87798 DETECT AGENT NOS DNA AMP: CPT

## 2020-03-20 PROCEDURE — 97110 THERAPEUTIC EXERCISES: CPT

## 2020-03-20 PROCEDURE — 87804 INFLUENZA ASSAY W/OPTIC: CPT

## 2020-03-20 PROCEDURE — 6370000000 HC RX 637 (ALT 250 FOR IP): Performed by: NURSE PRACTITIONER

## 2020-03-20 PROCEDURE — 99024 POSTOP FOLLOW-UP VISIT: CPT | Performed by: NURSE PRACTITIONER

## 2020-03-20 PROCEDURE — 85610 PROTHROMBIN TIME: CPT

## 2020-03-20 PROCEDURE — 2580000003 HC RX 258: Performed by: INTERNAL MEDICINE

## 2020-03-20 PROCEDURE — 6370000000 HC RX 637 (ALT 250 FOR IP): Performed by: PHARMACIST

## 2020-03-20 PROCEDURE — 2700000000 HC OXYGEN THERAPY PER DAY

## 2020-03-20 PROCEDURE — 6360000002 HC RX W HCPCS: Performed by: INTERNAL MEDICINE

## 2020-03-20 PROCEDURE — 99232 SBSQ HOSP IP/OBS MODERATE 35: CPT | Performed by: INTERNAL MEDICINE

## 2020-03-20 PROCEDURE — 97535 SELF CARE MNGMENT TRAINING: CPT

## 2020-03-20 PROCEDURE — 36415 COLL VENOUS BLD VENIPUNCTURE: CPT

## 2020-03-20 PROCEDURE — 82948 REAGENT STRIP/BLOOD GLUCOSE: CPT

## 2020-03-20 PROCEDURE — 94760 N-INVAS EAR/PLS OXIMETRY 1: CPT

## 2020-03-20 PROCEDURE — 1200000003 HC TELEMETRY R&B

## 2020-03-20 PROCEDURE — 87632 RESP VIRUS 6-11 TARGETS: CPT

## 2020-03-20 PROCEDURE — APPNB30 APP NON BILLABLE TIME 0-30 MINS: Performed by: NURSE PRACTITIONER

## 2020-03-20 PROCEDURE — 80048 BASIC METABOLIC PNL TOTAL CA: CPT

## 2020-03-20 PROCEDURE — 6370000000 HC RX 637 (ALT 250 FOR IP): Performed by: FAMILY MEDICINE

## 2020-03-20 RX ORDER — WARFARIN SODIUM 7.5 MG/1
7.5 TABLET ORAL
Status: COMPLETED | OUTPATIENT
Start: 2020-03-20 | End: 2020-03-20

## 2020-03-20 RX ORDER — OXYBUTYNIN CHLORIDE 5 MG/1
5 TABLET ORAL EVERY 8 HOURS PRN
Status: DISCONTINUED | OUTPATIENT
Start: 2020-03-20 | End: 2020-03-25 | Stop reason: HOSPADM

## 2020-03-20 RX ADMIN — ARFORMOTEROL TARTRATE 15 MCG: 15 SOLUTION RESPIRATORY (INHALATION) at 16:28

## 2020-03-20 RX ADMIN — LEVOTHYROXINE SODIUM 175 MCG: 150 TABLET ORAL at 05:08

## 2020-03-20 RX ADMIN — BUDESONIDE 250 MCG: 0.25 INHALANT RESPIRATORY (INHALATION) at 05:45

## 2020-03-20 RX ADMIN — DOCUSATE SODIUM 100 MG: 100 CAPSULE, LIQUID FILLED ORAL at 09:58

## 2020-03-20 RX ADMIN — FLUCONAZOLE 200 MG: 200 TABLET ORAL at 09:57

## 2020-03-20 RX ADMIN — POLYETHYLENE GLYCOL 3350 17 G: 17 POWDER, FOR SOLUTION ORAL at 10:05

## 2020-03-20 RX ADMIN — CEFTRIAXONE SODIUM 1 G: 1 INJECTION, POWDER, FOR SOLUTION INTRAMUSCULAR; INTRAVENOUS at 09:58

## 2020-03-20 RX ADMIN — WARFARIN SODIUM 7.5 MG: 7.5 TABLET ORAL at 17:39

## 2020-03-20 RX ADMIN — POTASSIUM CHLORIDE 10 MEQ: 1500 TABLET, EXTENDED RELEASE ORAL at 09:56

## 2020-03-20 RX ADMIN — FUROSEMIDE 40 MG: 40 TABLET ORAL at 09:56

## 2020-03-20 RX ADMIN — SODIUM CHLORIDE, PRESERVATIVE FREE 10 ML: 5 INJECTION INTRAVENOUS at 10:00

## 2020-03-20 RX ADMIN — TIOTROPIUM BROMIDE INHALATION SPRAY 2 PUFF: 3.12 SPRAY, METERED RESPIRATORY (INHALATION) at 05:46

## 2020-03-20 RX ADMIN — SODIUM CHLORIDE, PRESERVATIVE FREE 10 ML: 5 INJECTION INTRAVENOUS at 20:17

## 2020-03-20 RX ADMIN — METOPROLOL TARTRATE 25 MG: 25 TABLET, FILM COATED ORAL at 10:04

## 2020-03-20 RX ADMIN — DOCUSATE SODIUM 100 MG: 100 CAPSULE, LIQUID FILLED ORAL at 20:16

## 2020-03-20 RX ADMIN — METOPROLOL TARTRATE 25 MG: 25 TABLET, FILM COATED ORAL at 20:16

## 2020-03-20 RX ADMIN — POTASSIUM CHLORIDE 40 MEQ: 1500 TABLET, EXTENDED RELEASE ORAL at 09:57

## 2020-03-20 RX ADMIN — OXYBUTYNIN CHLORIDE 5 MG: 5 TABLET ORAL at 11:57

## 2020-03-20 RX ADMIN — DOXAZOSIN MESYLATE 8 MG: 4 TABLET ORAL at 20:16

## 2020-03-20 RX ADMIN — FUROSEMIDE 40 MG: 40 TABLET ORAL at 20:16

## 2020-03-20 RX ADMIN — INSULIN LISPRO 2 UNITS: 100 INJECTION, SOLUTION INTRAVENOUS; SUBCUTANEOUS at 21:40

## 2020-03-20 RX ADMIN — ENOXAPARIN SODIUM 120 MG: 120 INJECTION SUBCUTANEOUS at 21:41

## 2020-03-20 RX ADMIN — BUDESONIDE 250 MCG: 0.25 INHALANT RESPIRATORY (INHALATION) at 16:28

## 2020-03-20 RX ADMIN — ENOXAPARIN SODIUM 120 MG: 120 INJECTION SUBCUTANEOUS at 09:56

## 2020-03-20 RX ADMIN — ARFORMOTEROL TARTRATE 15 MCG: 15 SOLUTION RESPIRATORY (INHALATION) at 05:42

## 2020-03-20 ASSESSMENT — PAIN SCALES - GENERAL
PAINLEVEL_OUTOF10: 0

## 2020-03-20 NOTE — PROGRESS NOTES
Hospitalist Progress Note    Patient:  Michael Whaley  YOB: 1931  MRN: [id number]   PCP: Tucker Watts MD        Acct: [de-identified]  Unit/Bed: -29/029-A    Date of Admission: 3/11/2020      ASSESSMENT     1. Urinary retention with chronic indwelling felipe catheter s/p TURP on 3/17 with failed attempt at voiding trial on 3/18 now receiving CBI for gross hematuria. Urology following. 2. Catheter related UTI: Catheter present prior to admission. Urine cx grew Candida parapsilosis and E. Coli. Currently on Ceftriaxone and fluconazole  3. Post-op left lower extremity weakness, transient, resolved: No evidence for stroke. CTA of head and neck unremarkable for significant stenosis. Consideration for tpA deferred due to recent TURP and currently on anticoagulation. No evidence for stroke on MRI, Neurology following. PT/OT. Weakness improved  4. Pill dysphagia, improved: Raised possibility of stroke, however patient was ingesting large potassium pills which could cause some discomfort. ST evaluation pending  5. Essential HTN:  6. Hyperlipidemia: not on statin  7. COPD: not in exacerbation. Continue home meds. 8. Morbid obesity: BMI 40.6   9. Hypokalemia: Replace prn  10. History of DVT on enoxaparin and Coumadin bridge. Pharmacy dosing Coumadin  11. DM Type 2: Glucophage, ISS  12. Acquired hypothyroidism: levothyroxine  13. Paroxysymal atrial fibrillation: Lopressor  14. History of chronic diastolic CHF    PLAN     1. Left lower extremity weakness may have been positional in nature related to position in bed. No evidence for stroke. 2. CBI necessary again overnight. Urology to manage  3.  Patient otherwise stable    Anticipated Discharge in :  TBD  Code Status: Limited    Electronically signed by Aziza Najera MD on 3/20/2020 at 12:30 PM      Chief Complaint     Dislodgement of indwelling Felipe catheter with subsequent pain    SUBJECTIVE     The patient is an 80 y.o. male w/ PMH of BPH with chronic indwelling Billy catheter who was recently seen in Dr. Celestina Villagomez clinic for evaluation of urinary retention- as a result his Cardura dose was doubled and he continued his Billy catheter with a PVER to be done as an outpatient in 3 weeks. At the time of admission on 3/11/2020, his Billy catheter became displaced. As a result he came into the ED to be evaluated. He was also having significant pain and mentioned symptoms of increased urgency, frequency and dysuria. He was then admitted and had a TURP performed on 3/17. He has since had a voiding trial which he failed and his Billy catheter was re-inserted with gross hematuria noted today. CBI was initiated mostly because the patient is currently on an enoxaparin to Coumadin bridge. He is also constipated.       -Left lower extremity weakness resolved. CBI required again overnight for hematuria.  Patient has a sore thorat    OBJECTIVE     Medications:  Reviewed    Infusion Medications    dextrose       Scheduled Medications    [START ON 3/21/2020] potassium bicarb-citric acid  20 mEq Oral Daily    phenol  1 spray Mouth/Throat TID AC    warfarin (COUMADIN) daily dosing (placeholder)   Other RX Placeholder    cefTRIAXone (ROCEPHIN) IV  1 g Intravenous Q24H    enoxaparin  1 mg/kg Subcutaneous Q12H    polyethylene glycol  17 g Oral Daily    docusate sodium  100 mg Oral BID    sodium chloride flush  10 mL Intravenous 2 times per day    Arformoterol Tartrate  15 mcg Nebulization BID    budesonide  250 mcg Nebulization BID    furosemide  40 mg Oral BID    tiotropium  2 puff Inhalation Daily    metoprolol tartrate  25 mg Oral BID    [Held by provider] lisinopril  5 mg Oral Daily    levothyroxine  175 mcg Oral Daily    fluconazole  200 mg Oral Daily    insulin lispro  0-18 Units Subcutaneous TID     insulin lispro  0-9 Units Subcutaneous Nightly    doxazosin  8 mg Oral Nightly    [Held by provider] metFORMIN  1,000 mg Oral BID      PRN Meds: oxybutynin, menthol, potassium chloride **OR** potassium alternative oral replacement **OR** potassium chloride, QUEtiapine, diphenhydrAMINE, melatonin, opium-belladonna, sodium chloride flush, acetaminophen **OR** acetaminophen, polyethylene glycol, promethazine **OR** ondansetron, albuterol sulfate HFA, glucose, dextrose, glucagon (rDNA), dextrose    Ins and outs:      Intake/Output Summary (Last 24 hours) at 3/20/2020 1230  Last data filed at 3/20/2020 1146  Gross per 24 hour   Intake 480 ml   Output 4825 ml   Net -4345 ml       Physical Examination     BP (!) 134/52   Pulse 57   Temp 97.9 °F (36.6 °C) (Oral)   Resp 18   Ht 5' 7\" (1.702 m)   Wt 258 lb 3.2 oz (117.1 kg)   SpO2 90%   BMI 40.44 kg/m²     General appearance: No apparent distress. HEENT: Extraocular motion intact. Patient's speech does not seem dysarthric  Neck: Supple  Respiratory:  CTA bilaterally without rales/wheezes/rhonchi. Cardiovascular: RRR with normal S1/S2 without murmurs, rubs or gallops. Abdomen: Soft, non-tender, distended with audible bowel sounds. Musculoskeletal: No weakness at the left lower extremity  Neurologic: No weakness at left lowe extremity. No focal deficits identified. CN: II-XII intact. Psychiatric: Alert and oriented  Vascular: Dorsalis pedis palpable bilaterally. Radial pulses palpable bilaterally. No peripheral edema  Skin:  No visible rashes or lesions. Billy catheter in place with pink tinged urine    Labs     Recent Labs     03/18/20  0454 03/19/20 0312 03/20/20  0455   WBC 15.3* 10.7 7.8   HGB 12.2* 11.6* 11.6*   HCT 38.1* 36.6* 36.3*    233 222     Recent Labs     03/18/20  0454 03/19/20 0312 03/20/20  0455    142 143   K 3.7 3.4* 3.3*   CL 98 98 100   CO2 28 30 29   BUN 12 15 14   CREATININE 0.8 0.7 0.7   CALCIUM 8.5 8.3* 8.3*     No results for input(s): AST, ALT, BILIDIR, BILITOT, ALKPHOS in the last 72 hours.   Recent Labs     03/18/20  0454 03/19/20  0312 03/20/20  0455   INR 1.21* 1.25* 1.20* No results for input(s): Izzy Medici in the last 72 hours. Urinalysis:      Lab Results   Component Value Date    NITRU POSITIVE 03/11/2020    WBCUA 10-15 03/11/2020    BACTERIA MODERATE 03/11/2020    RBCUA > 100 03/11/2020    BLOODU LARGE 03/11/2020    SPECGRAV 1.025 01/31/2020    SPECGRAV 1.016 03/15/2019    GLUCOSEU NEGATIVE 03/11/2020       Diagnostic imaging/procedures       Cta Head W Wo Contrast (code Stroke Nihss 4 Or Above)    Result Date: 3/19/2020  PROCEDURE: CTA HEAD W WO CONTRAST, CTA NECK W WO CONTRAST CLINICAL INFORMATION: left lower extremity weakness . Sudden onset. COMPARISON: CT head from the same date and 3/11/2020. TECHNIQUE: Helical CT from the aortic arch through the head in arterial phase during intravenous administration of 80 mL Isovue-370 injected in the left forearm with multiplanar reconstructions to include volumetric maximum intensity projection sequences. FINDINGS: CTA HEAD: There is mural calcification in the cavernous and clinoid segments of internal carotid arteries with areas of mild stenosis. No aneurysmal dilatation is identified. The bilateral middle cerebral and anterior cerebral arteries are patent without focal abnormality identified. The basilar artery is patent without focal stenosis or visualized aneurysm. The P1 segment of the left posterior cerebral artery is small likely on the basis of normal anatomic variation. The P2 segment is patent with contribution  from a left posterior communicating artery. The right posterior true artery is patent without focal abnormality. Superior cerebellar arteries appear patent. Dural venous sinuses appear patent without focal filling defect. No focal areas of abnormal parenchymal enhancement are identified. CTA NECK: There is a typical 3 vessel arch. The brachiocephalic and left subclavian arteries are patent without flow-limiting stenosis. The common carotid arteries are patent without focal stenosis.  There is calcified x-ray  FINDINGS: The heart size is within the upper limits of normal. There is mild elevation of the left hemidiaphragm. No definite focal consolidation is seen. There is improved aeration of the right lung base when compared to prior examination. No pleural effusion or pneumothorax is seen. No acute osseous findings are demonstrated. 1. No definite focal consolidation is seen. There is improved aeration of the right lung base when compared to prior examination. **This report has been created using voice recognition software. It may contain minor errors which are inherent in voice recognition technology. ** Final report electronically signed by Dr. Mohit Lucio on 3/19/2020 10:46 AM    Xr Chest Standard (2 Vw)    Result Date: 3/11/2020  PROCEDURE: XR CHEST (2 VW) CLINICAL INFORMATION: Cough COMPARISON: 3/16/2019 TECHNIQUE: AP upright and lateral views of the chest were obtained. 1. 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 2. Mild cardiac megaly. Metallic sternotomy sutures and vascular clips from prior surgery. 3. Tiny bilateral pleural effusions. Mild bibasilar atelectasis/pneumonia. **This report has been created using voice recognition software. It may contain minor errors which are inherent in voice recognition technology. ** Final report electronically signed by Dr. Luan Kline on 3/11/2020 1:22 PM    Ct Head Wo Contrast (code Stroke)    Result Date: 3/19/2020  CT SCAN OF THE BRAIN WITHOUT CONTRAST CLINICAL INFORMATION: Left lower extremity weakness. COMPARISON: 3/11/2020 TECHNIQUE:  Scans were obtained from the base of the skull to the vertex without IV contrast. All CT scans at this facility use dose modulation, iterative reconstruction, and/or weight-based dosing when appropriate to reduce radiation dose to as low as reasonably achievable. FINDINGS:  No acute intracranial hemorrhage or mass effect is seen. There is mild diffuse atrophy.  There is moderate patchy periventricular and subcortical white matter hypoattenuation likely demonstrating sequela from moderate chronic small vessel ischemic changes. There is no midline shift. The basal cisterns and posterior fossa appear within normal limits. Mineralization at the level of the basal ganglia are demonstrated. This is unchanged from prior examination. The visualized paranasal sinuses and mastoid air cells appear well-pneumatized. Parasellar carotid artery vascular calcifications are noted. The visualized globes and orbits appear grossly intact. No acute abnormalities of the calvarium are seen. 1. No acute intracranial hemorrhage or mass effect. 2. Mild diffuse atrophy. 3. Moderate chronic small vessel ischemic changes. **This report has been created using voice recognition software. It may contain minor errors which are inherent in voice recognition technology. ** Final report electronically signed by Dr. Garo Perez on 3/19/2020 10:38 AM    Ct Head Wo Contrast    Result Date: 3/11/2020  PROCEDURE: CT HEAD WO CONTRAST CLINICAL INFORMATION: confusion. COMPARISON: No prior study. TECHNIQUE: Noncontrast 5 mm axial images were obtained through the brain. All CT scans at this facility use dose modulation, iterative reconstruction, and/or weight-based dosing when appropriate to reduce radiation dose to as low as reasonably achievable. FINDINGS: Generalized volume loss and small vessel ischemic changes. No acute hemorrhage or midline shift. Ventricles are within normal limits. Paranasal sinuses are clear. Mastoid air cells are patent. Skull: Unremarkable. Soft tissues: Unremarkable. No acute intracranial findings. **This report has been created using voice recognition software. It may contain minor errors which are inherent in voice recognition technology. ** Final report electronically signed by Dr. Charlotte Massey on 3/11/2020 1:34 PM    Cta Neck W Wo Contrast (code Stroke Nihss 4 Or Above)    Result Date: 3/19/2020  PROCEDURE: CTA HEAD W WO CONTRAST, CTA NECK W WO CONTRAST CLINICAL INFORMATION: left lower extremity weakness . Sudden onset. COMPARISON: CT head from the same date and 3/11/2020. TECHNIQUE: Helical CT from the aortic arch through the head in arterial phase during intravenous administration of 80 mL Isovue-370 injected in the left forearm with multiplanar reconstructions to include volumetric maximum intensity projection sequences. FINDINGS: CTA HEAD: There is mural calcification in the cavernous and clinoid segments of internal carotid arteries with areas of mild stenosis. No aneurysmal dilatation is identified. The bilateral middle cerebral and anterior cerebral arteries are patent without focal abnormality identified. The basilar artery is patent without focal stenosis or visualized aneurysm. The P1 segment of the left posterior cerebral artery is small likely on the basis of normal anatomic variation. The P2 segment is patent with contribution  from a left posterior communicating artery. The right posterior true artery is patent without focal abnormality. Superior cerebellar arteries appear patent. Dural venous sinuses appear patent without focal filling defect. No focal areas of abnormal parenchymal enhancement are identified. CTA NECK: There is a typical 3 vessel arch. The brachiocephalic and left subclavian arteries are patent without flow-limiting stenosis. The common carotid arteries are patent without focal stenosis. There is calcified mural plaque at the carotid bulbs bilaterally and tandem areas of mural calcification in the proximal internal carotid arteries, left greater than right. On the right side, the narrowest luminal diameter in the proximal right internal carotid artery is 2.8 mm with a point more distal, where the walls are parallel, measuring 4.4 mm. On the left side, the narrowest luminal diameter measures 1.8 mm with a point more distal, where the walls are parallel, measuring 4.4 mm.  Mid and distal cervical segments of internal carotid arteries are patent without focal stenosis. The vertebral arteries are codominant. They are patent throughout their course without focal stenosis. There is streak artifact from dental amalgam which partially obscures oral and perioral soft tissues. Given this caveat, mucosal surfaces of the aerodigestive tract are symmetric without focal nodular thickening or visualized mass. No cervical lymphadenopathy is identified. There is a small lymph node in the left parotid gland. Parotid glands are otherwise unremarkable. Submandibular glands are unremarkable. Thyroid gland is normal in appearance. Visualized portions of the lungs are clear. There are mild-to-moderate degenerative changes of the cervical spine. 1. Mural calcifications in the cavernous and clinoid segments of internal carotid arteries with areas of mild stenosis. 2. Calcified mural plaque at the carotid bulbs and proximal bilateral internal carotid arteries with 36% stenosis on the right and 59% stenosis on the left by NASCET criteria. **This report has been created using voice recognition software. It may contain minor errors which are inherent in voice recognition technology. ** Final report electronically signed by Dr. Dayna Smith MD on 3/19/2020 11:04 AM    Ct Abdomen Pelvis W Iv Contrast Additional Contrast? None    Result Date: 3/11/2020  PROCEDURE: CT ABDOMEN PELVIS W IV CONTRAST CLINICAL INFORMATION: abdominal pain . COMPARISON: March 5, 2020 TECHNIQUE: 5 mm axial CT images were obtained through the abdomen and pelvis after the administration of intravenous and oral contrast. Coronal and sagittal reconstructions were obtained. All CT scans at this facility use dose modulation, iterative reconstruction, and/or weight-based dosing when appropriate to reduce radiation dose to as low as reasonably achievable. FINDINGS: Lung bases are clear. Mild fatty infiltration of the liver.  Pancreas gallbladder adrenal glands

## 2020-03-20 NOTE — PROGRESS NOTES
Exercises were completed for increased independence with functional mobility. Functional Outcome Measures: Not completed       ASSESSMENT:  Assessment: Patient progressing toward established goals. Activity Tolerance:  Patient tolerance of  treatment: good. Equipment Recommendations:Equipment Needed: No(pt has a RW)  Discharge Recommendations:  Continue to assess pending progress, Patient would benefit from continued therapy after discharge    Plan: Times per week: 3-5x GM  Current Treatment Recommendations: Strengthening, Gait Training, Patient/Caregiver Education & Training, Stair training, Equipment Evaluation, Education, & procurement, Balance Training, Functional Mobility Training, Transfer Training, Endurance Training, Safety Education & Training    Patient Education  Patient Education: Plan of Care, Home Exercise Program, Verbal Exercise Instruction    Goals:  Patient goals : Go home  Short term goals  Time Frame for Short term goals: by discharge  Short term goal 1: Patient will complete bed mobility with mod I in order to get into/out of bed. Short term goal 2: Patient will transfer sit <--> stand with mod I in order to get up to ambulate. Short term goal 3: Patient will ambulate >50' with LRAD and mod I for household mobility. Short term goal 4: Patient will negotiate 3 steps with R hand rail and SBA for home entry. Long term goals  Time Frame for Long term goals : no LTGs due to short ELOS    Following session, patient left in safe position with all fall risk precautions in place.

## 2020-03-20 NOTE — PROGRESS NOTES
[facility]  Occupational Therapy  Daily Note  Time:    Time In: 7901  Time Out: 1450  Timed Code Treatment Minutes: 23 Minutes  Minutes: 23          Date: 3/20/2020  Patient Name: Trisha Amato,   Gender: male      Room: -29/029-A  MRN: [id number]  : 1931  (80 y.o.)  Referring Practitioner: Julio Young PA-C  Diagnosis: urinary retention   Additional Pertinent Hx: 81 y/o male admitted with urinary retention secondary to BPH. planning TURP 3-17    Restrictions/Precautions:  Restrictions/Precautions: Up as Tolerated    SUBJECTIVE: Pt sitting up in bedside chair and agreeable to OT Session. Pt was a code stroke on 3/19/20 d/t increased weakness in left LE. CT and MRI completed and negative. PAIN: 0/10:      COGNITION: WNL    ADL:   Grooming: Contact Guard Assistance. standing at sink x 3 min for oral care. BALANCE:  Standing Balance: Contact Guard Assistance. dynamic standing during grooming tasks with 0-1 UE support at times    BED MOBILITY:  Not Tested    TRANSFERS:  Sit to Stand:  Contact Guard Assistance. from bedside chair   Stand to Sit: [address] into bedside chair    FUNCTIONAL MOBILITY:  Assistive Device: Rolling Walker  Assist Level:  Contact Guard Assistance. Distance: To and from bathroom and into hallways  Pt with no LOB during      ADDITIONAL ACTIVITIES:  Completed bilateral UE strengthening ex to increase strength and endurance for ADL tasks. Pt using 5# fluid bag to complete chest press, hsoulder flex and elbow flex/ext x 10 reps with increased fatigue and SOB noted after ex. ASSESSMENT:     Activity Tolerance:  Patient tolerance of  treatment: fair.  Increased fatigue and SOB with activity requiring rest breaks during      Discharge Recommendations: Continue to assess pending progress, Patient would benefit from continued therapy after discharge  Equipment Recommendations: Equipment Needed: No  Plan: Times per week: 3-5X  Current Treatment Recommendations: Strengthening, Balance Training, Functional Mobility Training, Endurance Training, Safety Education & Training, Patient/Caregiver Education & Training, Self-Care / ADL    Patient Education  Patient Education: increasing activity throughout weekend    Goals  Short term goals  Time Frame for Short term goals: by discharge  Short term goal 1: Pt will complete functional mobility HH distances with S including transporting ADL items for ease with eventual laundry tasks at home  Short term goal 2: Pt will complete functional transers with S and no vc for safety including to/from toilet  Short term goal 3: Pt will complete dynamic standing task with S X 3 minutes with 2 hand release and no LOB for ease with cooking routine  Short term goal 4: Pt will complete BADL routine with S and no vc for safety   Long term goals  Time Frame for Long term goals : no LTG established d/t short ELOS    Following session, patient left in safe position with all fall risk precautions in place.

## 2020-03-20 NOTE — PROGRESS NOTES
Attends [employer] service: Not on file     Active member of club or organization: No     Attends meetings of clubs or organizations: Never     Relationship status:     Intimate partner violence     Fear of current or ex partner: Not on file     Emotionally abused: Not on file     Physically abused: Not on file     Forced sexual activity: Not on file   Other Topics Concern    Not on file   Social History Narrative    Not on file     Family History   Problem Relation Age of Onset    Other Mother         Complications of Childbirth    Other Father [de-identified]        Natural causes     Allergies   Allergen Reactions   Prema Grams [Nitrofurantoin Merl Sella      Dr. Danica Mart prefers pt not use Macrobid due to pulmonary side effects.  Levaquin [Levofloxacin] Other (See Comments)     Redness/flushing    Adhesive Tape Rash    Alphagan [Brimonidine Tartrate] Hives     Eyes red         Constitutional: Alert and oriented times x3, no acute distress, and cooperative to examination with appropriate mood and affect. HEENT:   Head:         Normocephalic and atraumatic. Mucous membranes are normal.   Eyes:         EOM are normal. No scleral icterus. Nose:    The external appearance of the nose is normal  Ears: The ears appear normal to external inspection. Cardiovascular:       Normal rate, regular rhythm. Pulmonary/Chest:  Normal respiratory rate and rhthym. No use of accessory muscles. Lungs diminished t/o, RLL with expiratory wheeze. Abdominal:          Soft. No tenderness. Active bowel sounds. Genitalia:    Billy catheter draining dark bloody urine, no clots noted, CBI running. Musculoskeletal:     He exhibits no edema or tenderness of lower extremities. pedal push equal strong bilaterally. Pedal push R>L. Denies numbness or tingling. Negative Homans. Neurological:    Alert and oriented.      Labs:  WBC:    Lab Results   Component Value Date    WBC 7.8 03/20/2020     Hemoglobin/Hematocrit:

## 2020-03-20 NOTE — PLAN OF CARE
Problem: RESPIRATORY  Goal: Clear lung sounds  3/20/2020 0913 by Raynald Kanner, RCP  Outcome: Ongoing     Continue tx to improve lung aeration.

## 2020-03-20 NOTE — PROGRESS NOTES
Called into pt room for pt stating he had to pee. Upon entering pt's Billy was completely full, and found that CBI was running wide open, when previously checked at 0025 was clamped. Re-clamped CBI, and emptied Billy bag. Pt stated he may have unclamped it when repositioning in bed. Instructed pt to not unclamp. Billy draining pink tinged urine.

## 2020-03-21 LAB
GLUCOSE BLD-MCNC: 122 MG/DL (ref 70–108)
GLUCOSE BLD-MCNC: 127 MG/DL (ref 70–108)
GLUCOSE BLD-MCNC: 133 MG/DL (ref 70–108)
GLUCOSE BLD-MCNC: 161 MG/DL (ref 70–108)
INR BLD: 1.21 (ref 0.85–1.13)
PLATELET # BLD: 233 THOU/MM3 (ref 130–400)
POTASSIUM SERPL-SCNC: 3.5 MEQ/L (ref 3.5–5.2)

## 2020-03-21 PROCEDURE — 6370000000 HC RX 637 (ALT 250 FOR IP): Performed by: INTERNAL MEDICINE

## 2020-03-21 PROCEDURE — 6370000000 HC RX 637 (ALT 250 FOR IP): Performed by: NURSE PRACTITIONER

## 2020-03-21 PROCEDURE — 6360000002 HC RX W HCPCS: Performed by: INTERNAL MEDICINE

## 2020-03-21 PROCEDURE — 82948 REAGENT STRIP/BLOOD GLUCOSE: CPT

## 2020-03-21 PROCEDURE — 94640 AIRWAY INHALATION TREATMENT: CPT

## 2020-03-21 PROCEDURE — 85610 PROTHROMBIN TIME: CPT

## 2020-03-21 PROCEDURE — 36415 COLL VENOUS BLD VENIPUNCTURE: CPT

## 2020-03-21 PROCEDURE — 2580000003 HC RX 258: Performed by: INTERNAL MEDICINE

## 2020-03-21 PROCEDURE — 94760 N-INVAS EAR/PLS OXIMETRY 1: CPT

## 2020-03-21 PROCEDURE — 99232 SBSQ HOSP IP/OBS MODERATE 35: CPT | Performed by: INTERNAL MEDICINE

## 2020-03-21 PROCEDURE — 85049 AUTOMATED PLATELET COUNT: CPT

## 2020-03-21 PROCEDURE — 84132 ASSAY OF SERUM POTASSIUM: CPT

## 2020-03-21 PROCEDURE — 1200000003 HC TELEMETRY R&B

## 2020-03-21 RX ORDER — WARFARIN SODIUM 7.5 MG/1
7.5 TABLET ORAL ONCE
Status: COMPLETED | OUTPATIENT
Start: 2020-03-21 | End: 2020-03-21

## 2020-03-21 RX ORDER — ALBUTEROL SULFATE 2.5 MG/3ML
2.5 SOLUTION RESPIRATORY (INHALATION) EVERY 6 HOURS PRN
Status: DISCONTINUED | OUTPATIENT
Start: 2020-03-21 | End: 2020-03-25 | Stop reason: HOSPADM

## 2020-03-21 RX ADMIN — Medication 2 PUFF: at 03:58

## 2020-03-21 RX ADMIN — POTASSIUM BICARBONATE 20 MEQ: 782 TABLET, EFFERVESCENT ORAL at 07:41

## 2020-03-21 RX ADMIN — WARFARIN SODIUM 7.5 MG: 7.5 TABLET ORAL at 18:13

## 2020-03-21 RX ADMIN — INSULIN LISPRO 2 UNITS: 100 INJECTION, SOLUTION INTRAVENOUS; SUBCUTANEOUS at 22:57

## 2020-03-21 RX ADMIN — DOCUSATE SODIUM 100 MG: 100 CAPSULE, LIQUID FILLED ORAL at 07:41

## 2020-03-21 RX ADMIN — METOPROLOL TARTRATE 25 MG: 25 TABLET, FILM COATED ORAL at 21:58

## 2020-03-21 RX ADMIN — TIOTROPIUM BROMIDE INHALATION SPRAY 2 PUFF: 3.12 SPRAY, METERED RESPIRATORY (INHALATION) at 08:56

## 2020-03-21 RX ADMIN — FUROSEMIDE 40 MG: 40 TABLET ORAL at 07:41

## 2020-03-21 RX ADMIN — ARFORMOTEROL TARTRATE 15 MCG: 15 SOLUTION RESPIRATORY (INHALATION) at 08:51

## 2020-03-21 RX ADMIN — PHENOL 1 SPRAY: 1.4 SPRAY ORAL at 17:56

## 2020-03-21 RX ADMIN — BUDESONIDE 250 MCG: 0.25 INHALANT RESPIRATORY (INHALATION) at 08:56

## 2020-03-21 RX ADMIN — ARFORMOTEROL TARTRATE 15 MCG: 15 SOLUTION RESPIRATORY (INHALATION) at 22:20

## 2020-03-21 RX ADMIN — ATROPA BELLADONNA AND OPIUM 30 MG: 16.2; 3 SUPPOSITORY RECTAL at 11:59

## 2020-03-21 RX ADMIN — FUROSEMIDE 40 MG: 40 TABLET ORAL at 21:56

## 2020-03-21 RX ADMIN — DOCUSATE SODIUM 100 MG: 100 CAPSULE, LIQUID FILLED ORAL at 21:56

## 2020-03-21 RX ADMIN — POLYETHYLENE GLYCOL 3350 17 G: 17 POWDER, FOR SOLUTION ORAL at 07:41

## 2020-03-21 RX ADMIN — FLUCONAZOLE 200 MG: 200 TABLET ORAL at 07:41

## 2020-03-21 RX ADMIN — LEVOTHYROXINE SODIUM 175 MCG: 150 TABLET ORAL at 06:20

## 2020-03-21 RX ADMIN — ENOXAPARIN SODIUM 120 MG: 120 INJECTION SUBCUTANEOUS at 21:57

## 2020-03-21 RX ADMIN — SODIUM CHLORIDE, PRESERVATIVE FREE 10 ML: 5 INJECTION INTRAVENOUS at 21:57

## 2020-03-21 RX ADMIN — ENOXAPARIN SODIUM 120 MG: 120 INJECTION SUBCUTANEOUS at 11:59

## 2020-03-21 RX ADMIN — DOXAZOSIN MESYLATE 8 MG: 4 TABLET ORAL at 21:57

## 2020-03-21 RX ADMIN — PHENOL 1 SPRAY: 1.4 SPRAY ORAL at 07:42

## 2020-03-21 RX ADMIN — CEFTRIAXONE SODIUM 1 G: 1 INJECTION, POWDER, FOR SOLUTION INTRAMUSCULAR; INTRAVENOUS at 07:41

## 2020-03-21 RX ADMIN — PHENOL 1 SPRAY: 1.4 SPRAY ORAL at 12:00

## 2020-03-21 RX ADMIN — BUDESONIDE 250 MCG: 0.25 INHALANT RESPIRATORY (INHALATION) at 22:21

## 2020-03-21 ASSESSMENT — PAIN SCALES - GENERAL
PAINLEVEL_OUTOF10: 0

## 2020-03-21 NOTE — FLOWSHEET NOTE
Paged urology and MD Fowlero about pt's small/moderate bleeding at catheter site continually throughout day thus far. In report, received info regarding this, but wanted MD to be aware. No orders received.   Julia Nguyen

## 2020-03-21 NOTE — PLAN OF CARE
Problem: Urinary Retention:  Goal: Able to perform urinary catheter care  Description: Able to perform urinary catheter care  3/20/2020 2226 by Db Herbert RN  Outcome: Ongoing  Note: Patient has CBI running at slow rate, urine is a strawberry red color. Patient has no complaints at this time. Problem: Discharge Planning:  Goal: Discharged to appropriate level of care  Description: Discharged to appropriate level of care  Outcome: Ongoing  Note: Patient will be [facility] to [facility] when ready. Problem: DISCHARGE BARRIERS  Goal: Patient's continuum of care needs are met  3/20/2020 2226 by Db Herbert RN  Outcome: Ongoing  Note: Patient's care needs are being met. Problem: Cardiovascular  Goal: Hemodynamic stability  Outcome: Ongoing  Note: Vitals within range for patient, will continue to monitor. Problem: Pain:  Goal: Pain level will decrease  Description: Pain level will decrease  Outcome: Ongoing  Note: No complaints of pain this shift, will continue to monitor. Problem: Falls - Risk of:  Goal: Will remain free from falls  Description: Will remain free from falls  Outcome: Ongoing  Note: Patient alert and oriented x4. Bed alarmed armed. Bed wheels locked. Bedside table in reach. Patient up with assistance when ambulating. Patient verbalizes and demonstrates the use of the call light. Hourly rounding being completed. Problem: Falls - Risk of:  Goal: Absence of physical injury  Description: Absence of physical injury  Outcome: Ongoing  Note: Patient free of accidental injury. Patient alert and oriented x4. Bed alarmed armed. Bed wheels locked. Bedside table in reach. Patient up with assistance when ambulating. Patient verbalizes and demonstrates the use of the call light. Hourly rounding being completed.       Problem: Musculor/Skeletal Functional Status  Goal: Highest potential functional level  3/20/2020 2226 by Db Herbert RN  Outcome: Ongoing  Note: Patient is up with one assist and walker, PT/OT on case. Care plan reviewed with patient. Patient verbalizes understanding of the plan of care and contribute to goal setting.

## 2020-03-21 NOTE — PROGRESS NOTES
Hospitalist Progress Note    Patient:  Samira Serrano  YOB: 1931  MRN: [id number]   PCP: Jamal Centeno MD        Acct: [de-identified]  Unit/Bed: [facility]    Date of Admission: 3/11/2020      ASSESSMENT     1. Urinary retention with chronic indwelling felipe catheter s/p TURP on 3/17 with failed attempt at voiding trial on 3/18 now receiving CBI for gross hematuria. Urology following. Still having gross hematuria, Hgb pending this am  2. Catheter related UTI: Catheter present prior to admission. Urine cx grew Candida parapsilosis and E. Coli. Currently on Ceftriaxone and fluconazole  3. Post-op left lower extremity weakness, transient, resolved: No evidence for stroke. CTA of head and neck unremarkable for significant stenosis. Consideration for tpA deferred due to recent TURP and currently on anticoagulation. No evidence for stroke on MRI, Neurology following. PT/OT. Weakness improved. Left lower extremity weakness may have been positional in nature related to position in bed. 4. Pill dysphagia, improved: Raised possibility of stroke, however patient was ingesting large potassium pills which could cause some discomfort. ST evaluation pending  5. Essential HTN:  6. Hyperlipidemia: not on statin  7. COPD: not in exacerbation. Continue home meds. 8. Morbid obesity: BMI 40.6   9. Hypokalemia: Replace prn  10. History of DVT on enoxaparin and Coumadin bridge. Pharmacy dosing Coumadin  11. DM Type 2: Glucophage, ISS  12. Acquired hypothyroidism: levothyroxine  13. Paroxysymal atrial fibrillation: Lopressor  14. History of chronic diastolic CHF  15. Sore throat: upper viral respiratory tract infection? Influenza negative. Symptomatic treatment  16. Constipation: milk of magnesia. Miralax, docusate    PLAN     1. Still having gross hematuria. Otherwise patient stable. Awaiting recommendations from Urology  2. Would probably monitor patient while his INR becomes therapeutic prior to discharge.  INR only 1.21 today    Anticipated Discharge in :  TBD  Code Status: Limited    Electronically signed by Amanda Zhou MD on 3/21/2020 at 10:21 AM      Chief Complaint     Dislodgement of indwelling Billy catheter with subsequent pain    SUBJECTIVE     The patient is an 80 y.o. male w/ PMH of BPH with chronic indwelling Billy catheter who was recently seen in Dr. Yunior jamil for evaluation of urinary retention- as a result his Cardura dose was doubled and he continued his Billy catheter with a PVER to be done as an outpatient in 3 weeks. At the time of admission on 3/11/2020, his Billy catheter became displaced. As a result he came into the ED to be evaluated. He was also having significant pain and mentioned symptoms of increased urgency, frequency and dysuria. He was then admitted and had a TURP performed on 3/17. He has since had a voiding trial which he failed and his Billy catheter was re-inserted with gross hematuria noted today. CBI was initiated mostly because the patient is currently on an enoxaparin to Coumadin bridge. He is also constipated. - Patient still has sore throat. Influenza negative. Viral respiratory panel not obtained. It still does not appear that the patient has had a bowel movement.  Per nurse patient has also had small amounts of bleeding at catheter site    OBJECTIVE     Medications:  Reviewed    Infusion Medications    dextrose       Scheduled Medications    magnesium hydroxide  30 mL Oral Once    potassium bicarb-citric acid  20 mEq Oral Daily    phenol  1 spray Mouth/Throat TID AC    warfarin (COUMADIN) daily dosing (placeholder)   Other RX Placeholder    cefTRIAXone (ROCEPHIN) IV  1 g Intravenous Q24H    enoxaparin  1 mg/kg Subcutaneous Q12H    polyethylene glycol  17 g Oral Daily    docusate sodium  100 mg Oral BID    sodium chloride flush  10 mL Intravenous 2 times per day    Arformoterol Tartrate  15 mcg Nebulization BID    budesonide  250 mcg Nebulization BID 10.7 7.8  --    HGB 11.6* 11.6*  --    HCT 36.6* 36.3*  --     222 233     Recent Labs     03/19/20 0312 03/20/20  0455 03/21/20  0556    143  --    K 3.4* 3.3* 3.5   CL 98 100  --    CO2 30 29  --    BUN 15 14  --    CREATININE 0.7 0.7  --    CALCIUM 8.3* 8.3*  --      No results for input(s): AST, ALT, BILIDIR, BILITOT, ALKPHOS in the last 72 hours. Recent Labs     03/19/20 0312 03/20/20  0455 03/21/20  0556   INR 1.25* 1.20* 1.21*     No results for input(s): CKTOTAL, TROPONINI in the last 72 hours. Urinalysis:      Lab Results   Component Value Date    NITRU POSITIVE 03/11/2020    WBCUA 10-15 03/11/2020    BACTERIA MODERATE 03/11/2020    RBCUA > 100 03/11/2020    BLOODU LARGE 03/11/2020    SPECGRAV 1.025 01/31/2020    SPECGRAV 1.016 03/15/2019    GLUCOSEU NEGATIVE 03/11/2020       Diagnostic imaging/procedures       Cta Head W Wo Contrast (code Stroke Nihss 4 Or Above)    Result Date: 3/19/2020  PROCEDURE: CTA HEAD W WO CONTRAST, CTA NECK W WO CONTRAST CLINICAL INFORMATION: left lower extremity weakness . Sudden onset. COMPARISON: CT head from the same date and 3/11/2020. TECHNIQUE: Helical CT from the aortic arch through the head in arterial phase during intravenous administration of 80 mL Isovue-370 injected in the left forearm with multiplanar reconstructions to include volumetric maximum intensity projection sequences. FINDINGS: CTA HEAD: There is mural calcification in the cavernous and clinoid segments of internal carotid arteries with areas of mild stenosis. No aneurysmal dilatation is identified. The bilateral middle cerebral and anterior cerebral arteries are patent without focal abnormality identified. The basilar artery is patent without focal stenosis or visualized aneurysm. The P1 segment of the left posterior cerebral artery is small likely on the basis of normal anatomic variation. The P2 segment is patent with contribution  from a left posterior communicating artery.  The right posterior true artery is patent without focal abnormality. Superior cerebellar arteries appear patent. Dural venous sinuses appear patent without focal filling defect. No focal areas of abnormal parenchymal enhancement are identified. CTA NECK: There is a typical 3 vessel arch. The brachiocephalic and left subclavian arteries are patent without flow-limiting stenosis. The common carotid arteries are patent without focal stenosis. There is calcified mural plaque at the carotid bulbs bilaterally and tandem areas of mural calcification in the proximal internal carotid arteries, left greater than right. On the right side, the narrowest luminal diameter in the proximal right internal carotid artery is 2.8 mm with a point more distal, where the walls are parallel, measuring 4.4 mm. On the left side, the narrowest luminal diameter measures 1.8 mm with a point more distal, where the walls are parallel, measuring 4.4 mm. Mid and distal cervical segments of internal carotid arteries are patent without focal stenosis. The vertebral arteries are codominant. They are patent throughout their course without focal stenosis. There is streak artifact from dental amalgam which partially obscures oral and perioral soft tissues. Given this caveat, mucosal surfaces of the aerodigestive tract are symmetric without focal nodular thickening or visualized mass. No cervical lymphadenopathy is identified. There is a small lymph node in the left parotid gland. Parotid glands are otherwise unremarkable. Submandibular glands are unremarkable. Thyroid gland is normal in appearance. Visualized portions of the lungs are clear. There are mild-to-moderate degenerative changes of the cervical spine. 1. Mural calcifications in the cavernous and clinoid segments of internal carotid arteries with areas of mild stenosis.  2. Calcified mural plaque at the carotid bulbs and proximal bilateral internal carotid arteries with 36% stenosis on the right and 59% stenosis on the left by NASCET criteria. **This report has been created using voice recognition software. It may contain minor errors which are inherent in voice recognition technology. ** Final report electronically signed by Dr. Florentino Malin MD on 3/19/2020 11:04 AM    Xr Chest Standard (2 Vw)    Result Date: 3/19/2020  PROCEDURE: XR CHEST (2 VW) CLINICAL INFORMATION: hypoxia, cough COMPARISON: 3/11/2020 TECHNIQUE:  AP and lateral chest x-ray  FINDINGS: The heart size is within the upper limits of normal. There is mild elevation of the left hemidiaphragm. No definite focal consolidation is seen. There is improved aeration of the right lung base when compared to prior examination. No pleural effusion or pneumothorax is seen. No acute osseous findings are demonstrated. 1. No definite focal consolidation is seen. There is improved aeration of the right lung base when compared to prior examination. **This report has been created using voice recognition software. It may contain minor errors which are inherent in voice recognition technology. ** Final report electronically signed by Dr. Jitendra Liriano on 3/19/2020 10:46 AM    Xr Chest Standard (2 Vw)    Result Date: 3/11/2020  PROCEDURE: XR CHEST (2 VW) CLINICAL INFORMATION: Cough COMPARISON: 3/16/2019 TECHNIQUE: AP upright and lateral views of the chest were obtained. 1. 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 2. Mild cardiac megaly. Metallic sternotomy sutures and vascular clips from prior surgery. 3. Tiny bilateral pleural effusions. Mild bibasilar atelectasis/pneumonia. **This report has been created using voice recognition software. It may contain minor errors which are inherent in voice recognition technology. ** Final report electronically signed by Dr. Naun Locke on 3/11/2020 1:22 PM    Ct Head Wo Contrast (code Stroke)    Result Date: 3/19/2020  CT SCAN OF THE BRAIN WITHOUT CONTRAST CLINICAL INFORMATION: Left lower extremity clear. Mastoid air cells are patent. Skull: Unremarkable. Soft tissues: Unremarkable. No acute intracranial findings. **This report has been created using voice recognition software. It may contain minor errors which are inherent in voice recognition technology. ** Final report electronically signed by Dr. Luc Daniels on 3/11/2020 1:34 PM    Cta Neck W Wo Contrast (code Stroke Nihss 4 Or Above)    Result Date: 3/19/2020  PROCEDURE: CTA HEAD W WO CONTRAST, CTA NECK W WO CONTRAST CLINICAL INFORMATION: left lower extremity weakness . Sudden onset. COMPARISON: CT head from the same date and 3/11/2020. TECHNIQUE: Helical CT from the aortic arch through the head in arterial phase during intravenous administration of 80 mL Isovue-370 injected in the left forearm with multiplanar reconstructions to include volumetric maximum intensity projection sequences. FINDINGS: CTA HEAD: There is mural calcification in the cavernous and clinoid segments of internal carotid arteries with areas of mild stenosis. No aneurysmal dilatation is identified. The bilateral middle cerebral and anterior cerebral arteries are patent without focal abnormality identified. The basilar artery is patent without focal stenosis or visualized aneurysm. The P1 segment of the left posterior cerebral artery is small likely on the basis of normal anatomic variation. The P2 segment is patent with contribution  from a left posterior communicating artery. The right posterior true artery is patent without focal abnormality. Superior cerebellar arteries appear patent. Dural venous sinuses appear patent without focal filling defect. No focal areas of abnormal parenchymal enhancement are identified. CTA NECK: There is a typical 3 vessel arch. The brachiocephalic and left subclavian arteries are patent without flow-limiting stenosis. The common carotid arteries are patent without focal stenosis.  There is calcified mural plaque at the carotid bulbs bilaterally and PELVIS W IV CONTRAST CLINICAL INFORMATION: gross hematuria . COMPARISON: 3/16/2019 TECHNIQUE: 2-D multiplanar postcontrast images of the abdomen and pelvis Isovue-370 IV contrast. All CT scans at this facility use dose modulation, iterative reconstruction, and/or weight-based dosing when appropriate to reduce radiation dose to as low as reasonably achievable. FINDINGS: Lung bases Lung bases are clear undersurface the heart shows minimal coronary artery atherosclerotic calcifications. Abdomen pelvis The liver is unremarkable. Spleen is normal. The adrenals are normal. The pancreas is unremarkable. Gallbladder is normal. Kidneys enhance symmetrically. There is no hydronephrosis. There are small extrarenal pelves bilaterally. Aorta is heavily atherosclerotic. IVC filter is in place. No adenopathy is identified. Pelvis There is a large amount of stool in the colon compatible with constipation. There is  uncomplicated diverticulosis. There is a loop of sigmoid colon contiguous with the decompressed bladder which has a Billy catheter present there is no gas seen in the bladder which is difficult to assess due to the fact is completely decompressed. Prostate gland is enlarged and contains calcifications. There are no abnormal fluid collections within the abdomen. There are no acute abnormalities in the abdomen or pelvis. The urinary bladder is completely decompressed with a Billy catheter. There is a loop of sigmoid colon contiguous with the bladder. A fistula cannot be entirely excluded but there is  no evidence of that at this time **This report has been created using voice recognition software. It may contain minor errors which are inherent in voice recognition technology. ** Final report electronically signed by Dr. Karan Zhao on 3/5/2020 11:44 AM    Mri Brain Wo Contrast    Result Date: 3/20/2020  PROCEDURE: MRI BRAIN WO CONTRAST INDICATION:  left leg weakness, dysarthria, stroke.  COMPARISON: CT head from the same date and 3/11/2020. TECHNIQUE: Multiplanar and multiple spin echo MRI images were obtained of the brain without contrast. FINDINGS: There is moderate to severe volume loss. There are moderate patchy areas of T2/FLAIR prolongation in the periventricular, subcortical deep white matter. No intra or extra-axial mass is identified. No focal areas of restricted diffusion are present. The major vascular flow voids appear patent. Patient is status post right-sided lens extraction. Orbits are otherwise unremarkable. There is a mucous retention cyst in the left maxillary sinus. There are small mastoid effusions bilaterally. 1. No evidence of acute intracranial abnormality. 2. Moderate chronic microvascular angiopathy superimposed on moderate to severe volume loss. **This report has been created using voice recognition software. It may contain minor errors which are inherent in voice recognition technology. ** Final report electronically signed by Dr. Quynh Orozco MD on 3/20/2020 8:24 AM        DVT prophylaxis: [x] Lovenox                                 [] SCDs                                 [] SQ Heparin                                 [] Encourage ambulation           [x] Already on Anticoagulation     Disposition:    [x] Home       [] TCU       [] Rehab       [] Psych       [] SNF       [] [place]       [] Other-

## 2020-03-22 LAB
GLUCOSE BLD-MCNC: 148 MG/DL (ref 70–108)
GLUCOSE BLD-MCNC: 148 MG/DL (ref 70–108)
GLUCOSE BLD-MCNC: 157 MG/DL (ref 70–108)
GLUCOSE BLD-MCNC: 157 MG/DL (ref 70–108)
INR BLD: 1.41 (ref 0.85–1.13)

## 2020-03-22 PROCEDURE — 85610 PROTHROMBIN TIME: CPT

## 2020-03-22 PROCEDURE — 6360000002 HC RX W HCPCS: Performed by: INTERNAL MEDICINE

## 2020-03-22 PROCEDURE — 6370000000 HC RX 637 (ALT 250 FOR IP): Performed by: INTERNAL MEDICINE

## 2020-03-22 PROCEDURE — 6370000000 HC RX 637 (ALT 250 FOR IP): Performed by: PHYSICIAN ASSISTANT

## 2020-03-22 PROCEDURE — 99232 SBSQ HOSP IP/OBS MODERATE 35: CPT | Performed by: INTERNAL MEDICINE

## 2020-03-22 PROCEDURE — 94640 AIRWAY INHALATION TREATMENT: CPT

## 2020-03-22 PROCEDURE — 94761 N-INVAS EAR/PLS OXIMETRY MLT: CPT

## 2020-03-22 PROCEDURE — 1200000003 HC TELEMETRY R&B

## 2020-03-22 PROCEDURE — 2580000003 HC RX 258: Performed by: INTERNAL MEDICINE

## 2020-03-22 PROCEDURE — 82948 REAGENT STRIP/BLOOD GLUCOSE: CPT

## 2020-03-22 PROCEDURE — 36415 COLL VENOUS BLD VENIPUNCTURE: CPT

## 2020-03-22 PROCEDURE — 6370000000 HC RX 637 (ALT 250 FOR IP): Performed by: FAMILY MEDICINE

## 2020-03-22 RX ORDER — WARFARIN SODIUM 7.5 MG/1
7.5 TABLET ORAL ONCE
Status: COMPLETED | OUTPATIENT
Start: 2020-03-22 | End: 2020-03-22

## 2020-03-22 RX ADMIN — BUDESONIDE 250 MCG: 0.25 INHALANT RESPIRATORY (INHALATION) at 08:14

## 2020-03-22 RX ADMIN — FUROSEMIDE 40 MG: 40 TABLET ORAL at 07:59

## 2020-03-22 RX ADMIN — FUROSEMIDE 40 MG: 40 TABLET ORAL at 20:58

## 2020-03-22 RX ADMIN — PHENOL 1 SPRAY: 1.4 SPRAY ORAL at 06:30

## 2020-03-22 RX ADMIN — FLUCONAZOLE 200 MG: 200 TABLET ORAL at 07:59

## 2020-03-22 RX ADMIN — ENOXAPARIN SODIUM 120 MG: 120 INJECTION SUBCUTANEOUS at 20:59

## 2020-03-22 RX ADMIN — BUDESONIDE 250 MCG: 0.25 INHALANT RESPIRATORY (INHALATION) at 18:14

## 2020-03-22 RX ADMIN — DOCUSATE SODIUM 100 MG: 100 CAPSULE, LIQUID FILLED ORAL at 20:59

## 2020-03-22 RX ADMIN — IPRATROPIUM BROMIDE 0.5 MG: 0.5 SOLUTION RESPIRATORY (INHALATION) at 13:25

## 2020-03-22 RX ADMIN — SODIUM CHLORIDE, PRESERVATIVE FREE 10 ML: 5 INJECTION INTRAVENOUS at 21:00

## 2020-03-22 RX ADMIN — INSULIN LISPRO 2 UNITS: 100 INJECTION, SOLUTION INTRAVENOUS; SUBCUTANEOUS at 20:59

## 2020-03-22 RX ADMIN — DOXAZOSIN MESYLATE 8 MG: 4 TABLET ORAL at 22:37

## 2020-03-22 RX ADMIN — CEFTRIAXONE SODIUM 1 G: 1 INJECTION, POWDER, FOR SOLUTION INTRAMUSCULAR; INTRAVENOUS at 07:59

## 2020-03-22 RX ADMIN — PHENOL 1 SPRAY: 1.4 SPRAY ORAL at 12:14

## 2020-03-22 RX ADMIN — SODIUM CHLORIDE, PRESERVATIVE FREE 10 ML: 5 INJECTION INTRAVENOUS at 07:59

## 2020-03-22 RX ADMIN — DOCUSATE SODIUM 100 MG: 100 CAPSULE, LIQUID FILLED ORAL at 07:58

## 2020-03-22 RX ADMIN — ARFORMOTEROL TARTRATE 15 MCG: 15 SOLUTION RESPIRATORY (INHALATION) at 18:14

## 2020-03-22 RX ADMIN — ARFORMOTEROL TARTRATE 15 MCG: 15 SOLUTION RESPIRATORY (INHALATION) at 08:14

## 2020-03-22 RX ADMIN — POTASSIUM BICARBONATE 20 MEQ: 782 TABLET, EFFERVESCENT ORAL at 07:59

## 2020-03-22 RX ADMIN — LEVOTHYROXINE SODIUM 175 MCG: 150 TABLET ORAL at 06:30

## 2020-03-22 RX ADMIN — IPRATROPIUM BROMIDE 0.5 MG: 0.5 SOLUTION RESPIRATORY (INHALATION) at 18:14

## 2020-03-22 RX ADMIN — ENOXAPARIN SODIUM 120 MG: 120 INJECTION SUBCUTANEOUS at 08:00

## 2020-03-22 RX ADMIN — IPRATROPIUM BROMIDE 0.5 MG: 0.5 SOLUTION RESPIRATORY (INHALATION) at 08:10

## 2020-03-22 RX ADMIN — Medication 3 MG: at 20:58

## 2020-03-22 RX ADMIN — PHENOL 1 SPRAY: 1.4 SPRAY ORAL at 16:40

## 2020-03-22 RX ADMIN — METOPROLOL TARTRATE 25 MG: 25 TABLET, FILM COATED ORAL at 22:38

## 2020-03-22 RX ADMIN — WARFARIN SODIUM 7.5 MG: 7.5 TABLET ORAL at 17:47

## 2020-03-22 ASSESSMENT — PAIN SCALES - GENERAL
PAINLEVEL_OUTOF10: 0

## 2020-03-22 NOTE — PROGRESS NOTES
Hospitalist Progress Note    Patient:  Guerline Lazaro  YOB: 1931  MRN: [id number]   PCP: Richmond Ortega MD        Acct: [de-identified]  Unit/Bed: 8B-29/029-A    Date of Admission: 3/11/2020      ASSESSMENT     1. Urinary retention with chronic indwelling felipe catheter s/p TURP on 3/17 with failed attempt at voiding trial on 3/18 now receiving CBI for gross hematuria. Urology following. Still having gross hematuria, Hgb pending this am. Patient to be discharged with felipe catheter  2. Catheter related UTI: Catheter present prior to admission. Urine cx grew Candida parapsilosis and E. Coli. Currently on Ceftriaxone and fluconazole  3. Post-op left lower extremity weakness, transient, resolved: No evidence for stroke. CTA of head and neck unremarkable for significant stenosis. Consideration for tpA deferred due to recent TURP and currently on anticoagulation. No evidence for stroke on MRI, Neurology following. PT/OT. Weakness improved. Left lower extremity weakness may have been positional in nature related to position in bed. 4. Pill dysphagia, improved: Raised possibility of stroke, however patient was ingesting large potassium pills which could cause some discomfort. ST evaluation pending  5. Essential HTN:  6. Hyperlipidemia: not on statin  7. COPD: not in exacerbation. Continue home meds. 8. Morbid obesity: BMI 40.6   9. Hypokalemia: Replace prn  10. History of DVT on enoxaparin and Coumadin bridge. Pharmacy dosing Coumadin  11. DM Type 2: Glucophage, ISS  12. Acquired hypothyroidism: levothyroxine  13. Paroxysymal atrial fibrillation: Lopressor  14. History of chronic diastolic CHF  15. Sore throat: upper viral respiratory tract infection? Influenza negative. Symptomatic treatment  16. Constipation: milk of magnesia. Miralax, docusate    PLAN     1. Urology continues to follow gross hematuria. Would probably monitor patient while his INR becomes therapeutic prior to discharge.  INR still low the abdomen and pelvis Isovue-370 IV contrast. All CT scans at this facility use dose modulation, iterative reconstruction, and/or weight-based dosing when appropriate to reduce radiation dose to as low as reasonably achievable. FINDINGS: Lung bases Lung bases are clear undersurface the heart shows minimal coronary artery atherosclerotic calcifications. Abdomen pelvis The liver is unremarkable. Spleen is normal. The adrenals are normal. The pancreas is unremarkable. Gallbladder is normal. Kidneys enhance symmetrically. There is no hydronephrosis. There are small extrarenal pelves bilaterally. Aorta is heavily atherosclerotic. IVC filter is in place. No adenopathy is identified. Pelvis There is a large amount of stool in the colon compatible with constipation. There is  uncomplicated diverticulosis. There is a loop of sigmoid colon contiguous with the decompressed bladder which has a Billy catheter present there is no gas seen in the bladder which is difficult to assess due to the fact is completely decompressed. Prostate gland is enlarged and contains calcifications. There are no abnormal fluid collections within the abdomen. There are no acute abnormalities in the abdomen or pelvis. The urinary bladder is completely decompressed with a Billy catheter. There is a loop of sigmoid colon contiguous with the bladder. A fistula cannot be entirely excluded but there is  no evidence of that at this time **This report has been created using voice recognition software. It may contain minor errors which are inherent in voice recognition technology. ** Final report electronically signed by Dr. Mady Abbott on 3/5/2020 11:44 AM    Mri Brain Wo Contrast    Result Date: 3/20/2020  PROCEDURE: MRI BRAIN WO CONTRAST INDICATION:  left leg weakness, dysarthria, stroke. COMPARISON: CT head from the same date and 3/11/2020.  TECHNIQUE: Multiplanar and multiple spin echo MRI images were obtained of the brain without contrast. FINDINGS:

## 2020-03-22 NOTE — PLAN OF CARE
Problem: Urinary Retention:  Goal: Able to perform urinary catheter care  Description: Able to perform urinary catheter care  3/22/2020 0119 by Tejas Avila RN  Outcome: Ongoing  Note: Pt unable to perform urinary catheter care on his own. Problem: Discharge Planning:  Goal: Discharged to appropriate level of care  Description: Discharged to appropriate level of care  3/22/2020 0119 by Tejas Avila RN  Outcome: Ongoing  Note: Pt will discharge home when appropriate. Problem: DISCHARGE BARRIERS  Goal: Patient's continuum of care needs are met  3/22/2020 0119 by Tejas Avila RN  Outcome: Ongoing  Note: Pt continuum of care needs are being worked out. Problem: Cardiovascular  Goal: Hemodynamic stability  3/22/2020 0119 by Tejas Avila RN  Outcome: Ongoing  Note: Pt vitals are within normal range. Cap refills and pulses all within normal ranges on all extremities. Will continue to assess. Problem: Pain:  Goal: Pain level will decrease  Description: Pain level will decrease  3/22/2020 0119 by Tejas Avila RN  Outcome: Ongoing  Note: Pt denies pain on my shift. Non pharmaceutical interventions like ice and repositioning offered before pain medications. Will continue to assess. Problem: Falls - Risk of:  Goal: Will remain free from falls  Description: Will remain free from falls  3/22/2020 0119 by Tejas Avila RN  Outcome: Ongoing  Note: Fall precaution in place. Bed alarm/chair alarm. Bed locked in lowest position. Fall band applied if applicable. Call light and overhead table within reach. Will continue to monitor. Problem: Falls - Risk of:  Goal: Absence of physical injury  Description: Absence of physical injury  3/22/2020 0119 by Tejas Avila RN  Outcome: Ongoing  Note: Absence of physical injury.        Problem: Musculor/Skeletal Functional Status  Goal: Highest potential functional level  3/22/2020 0119 by Tejas Avila RN  Outcome: Ongoing  Note:

## 2020-03-22 NOTE — PROGRESS NOTES
Corey Ramires MD  Urology Progress Note    Subjective: Curt Siegel is a 80 y.o. male. s/p CYSTOSCOPY WITH GREENLIGHT PHOTOVAPORIZATION OF PROSTATE on 3/11/2020 - 3/17/2020    His/Her current Diet is: DIET CARB CONTROL;.    Since the previous note, the patient reports the following:  No acute issues overnight. No fevers or chills. No nausea or vomiting. No chest pain or shortness of breath. No calf pain. Pain Controlled. Ambulating. Tolerating PO Diet. On irrigation  Urine bloody      Vitals and Labs:  Patient Vitals for the past 24 hrs:   BP Temp Temp src Pulse Resp SpO2   03/21/20 1719 (!) 111/56 97.7 °F (36.5 °C) Oral 51 16 94 %   03/21/20 1138 (!) 106/58 97.6 °F (36.4 °C) Oral 59 18 92 %   03/21/20 0851 -- -- -- -- -- 93 %   03/21/20 0732 134/65 97.7 °F (36.5 °C) Oral 55 16 94 %   03/21/20 0340 (!) 108/55 97.7 °F (36.5 °C) Oral 57 16 96 %   03/21/20 0000 121/63 98 °F (36.7 °C) Oral 55 16 95 %     I/O last 3 completed shifts: In: 960 [P.O.:950; I.V.:10]  Out: 3426 [Urine:3425; Stool:1]    Recent Labs     03/19/20 0312 03/20/20  0455 03/21/20  0556   WBC 10.7 7.8  --    HGB 11.6* 11.6*  --    HCT 36.6* 36.3*  --    MCV 85.7 86.0  --     222 233     Recent Labs     03/19/20  0312 03/20/20  0455 03/21/20  0556    143  --    K 3.4* 3.3* 3.5   CL 98 100  --    CO2 30 29  --    BUN 15 14  --    CREATININE 0.7 0.7  --        No results for input(s): COLORU, PHUR, LABCAST, WBCUA, RBCUA, MUCUS, TRICHOMONAS, YEAST, BACTERIA, CLARITYU, SPECGRAV, LEUKOCYTESUR, UROBILINOGEN, BILIRUBINUR, BLOODU in the last 72 hours. Invalid input(s): NITRATE, GLUCOSEUKETONESUAMORPHOUS    Physical Exam:  No acute distress. Awake, alert and oriented. Neck is supple  Regular rate and rhythm. Normal peripheral pulses  No accessory muscles of inspiration. Symmetric chest rise  Abdomen soft, non-tender, non-distended. No CVA tenderness. No calf pain. Minimal/no edema in bilateral lower extremities.   Skin is warm, dry  Psych: mood, affect normal    Additional Lab/Culture results:     Imaging Reviewed:     Rosy Irene MD independently reviewed the images and verified the radiology reports from:    Xr Chest Standard (2 Vw)    Result Date: 3/11/2020  PROCEDURE: XR CHEST (2 VW) CLINICAL INFORMATION: Cough COMPARISON: 3/16/2019 TECHNIQUE: AP upright and lateral views of the chest were obtained. 1. 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 2. Mild cardiac megaly. Metallic sternotomy sutures and vascular clips from prior surgery. 3. Tiny bilateral pleural effusions. Mild bibasilar atelectasis/pneumonia. **This report has been created using voice recognition software. It may contain minor errors which are inherent in voice recognition technology. ** Final report electronically signed by Dr. Sarah Campbell on 3/11/2020 1:22 PM    Ct Head Wo Contrast    Result Date: 3/11/2020  PROCEDURE: CT HEAD WO CONTRAST CLINICAL INFORMATION: confusion. COMPARISON: No prior study. TECHNIQUE: Noncontrast 5 mm axial images were obtained through the brain. All CT scans at this facility use dose modulation, iterative reconstruction, and/or weight-based dosing when appropriate to reduce radiation dose to as low as reasonably achievable. FINDINGS: Generalized volume loss and small vessel ischemic changes. No acute hemorrhage or midline shift. Ventricles are within normal limits. Paranasal sinuses are clear. Mastoid air cells are patent. Skull: Unremarkable. Soft tissues: Unremarkable. No acute intracranial findings. **This report has been created using voice recognition software. It may contain minor errors which are inherent in voice recognition technology. ** Final report electronically signed by Dr. Varsha Harrell on 3/11/2020 1:34 PM    Ct Abdomen Pelvis W Iv Contrast Additional Contrast? None    Result Date: 3/11/2020  PROCEDURE: CT ABDOMEN PELVIS W IV CONTRAST CLINICAL INFORMATION: abdominal pain .  COMPARISON: March 5, 2020 TECHNIQUE: 5 mm axial CT images were obtained through the abdomen and pelvis after the administration of intravenous and oral contrast. Coronal and sagittal reconstructions were obtained. All CT scans at this facility use dose modulation, iterative reconstruction, and/or weight-based dosing when appropriate to reduce radiation dose to as low as reasonably achievable. FINDINGS: Lung bases are clear. Mild fatty infiltration of the liver. Pancreas gallbladder adrenal glands spleen and kidneys are unremarkable no hydronephrosis. Normal caliber abdominal aorta. Atherosclerotic changes. Scattered stool in the colon. Multiple colonic diverticuli. No evidence for diverticulitis. Decompressed sigmoid colonic loop closely abutting a decompressed bladder limiting evaluation Normal appendix. There is prominent prostate gland with coarse calcifications. Correlation with serology is advised. Billy catheter bulb appears to be within a collapsed bladder. Correlation is advised. There is mild infiltration in the pelvis which is nonspecific. No acute osseous findings. Degenerative changes lumbar spine and pelvis. Infrarenal IVC filter. 1. Scattered stool in the colon. Multiple colonic diverticuli. No evidence for diverticulitis. 2. There is prominent prostate gland with coarse calcifications. Billy catheter bulb appears to be within a collapsed bladder. Decompressed sigmoid colonic loop closely abutting a decompressed bladder limiting evaluation. There is mild infiltration in the pelvis which is nonspecific. Correlation with urinalysis is advised. Underlying fistula cannot be entirely excluded. Appearance is similar to prior. **This report has been created using voice recognition software. It may contain minor errors which are inherent in voice recognition technology. ** Final report electronically signed by Dr. Maxi Miller on 3/11/2020 1:42 PM      Impression:    Patient Active Problem List   Diagnosis    Diabetes mellitus ([facility] [address]

## 2020-03-22 NOTE — PLAN OF CARE
Problem: Urinary Retention:  Goal: Able to perform urinary catheter care  Description: Able to perform urinary catheter care  3/22/2020 1056 by Montrell Cardona RN  Outcome: Ongoing  Note: CBI continues, catheter care provided. Problem: Discharge Planning:  Goal: Discharged to appropriate level of care  Description: Discharged to appropriate level of care  3/22/2020 1056 by Montrell Cardona RN  Outcome: Ongoing  Note: Plans to go to [facility] at discharge. Problem: RESPIRATORY  Goal: Clear lung sounds  3/22/2020 1056 by Montrell Cardona RN  Outcome: Ongoing  Note: Denies shortness of breath. Will monitor. Problem: DISCHARGE BARRIERS  Goal: Patient's continuum of care needs are met  3/22/2020 1056 by Montrell Cardona RN  Outcome: Ongoing  Note: Plans to go to [facility] at discharge. Problem: Cardiovascular  Goal: Hemodynamic stability  3/22/2020 1056 by Montrell Cardona RN  Outcome: Ongoing  Note: VS monitored. Problem: Pain:  Goal: Pain level will decrease  Description: Pain level will decrease  3/22/2020 1056 by Montrell Cardona RN  Outcome: Ongoing  Note: Pt has pain voiced this shift at 0/10. Pt pain goal is 0/10. RN continues to deliver PRN pain medications as ordered. RN tries to complete none invasive and none prescribed methods to help reduce pain like rest and repositioned. Pain re-assessed frequently. RN also updating MD on pain needs and medication. Bed alarm set after pain meds given. Fall band intact. Problem: Falls - Risk of:  Goal: Will remain free from falls  Description: Will remain free from falls  3/22/2020 1056 by Montrell Cardona RN  Outcome: Ongoing  Note: Pt absent of  falls this shift. RN visual checks on pt hourly with rounds. Call light in reach, bed in lowest position. Fall band intact. Bed alarm set. Pt educated to not get up per self to reduce the risk for falls.        Problem: Falls - Risk of:  Goal: Absence of physical injury  Description: Absence of physical injury  3/22/2020 1056 by Ez Rosario RN  Outcome: Ongoing  Note: Pt absent of  falls this shift. RN visual checks on pt hourly with rounds. Call light in reach, bed in lowest position. Fall band intact. Bed alarm set. Pt educated to not get up per self to reduce the risk for falls. Problem: Musculor/Skeletal Functional Status  Goal: Highest potential functional level  3/22/2020 1056 by Ez Rosario RN  Outcome: Ongoing  Note: Therapy continues. Care plan reviewed with patient. Patient verbalize understanding of the plan of care and contribute to goal setting.

## 2020-03-22 NOTE — PROGRESS NOTES
Clinical Pharmacy Note    Warfarin consult follow-up    Recent Labs     03/22/20  0610   INR 1.41*     Recent Labs     03/20/20  0455 03/21/20  0556   HGB 11.6*  --    HCT 36.3*  --     233       Significant Drug-Drug Interactions:  New warfarin drug-drug interactions: none  Discontinued drug-drug interactions: none  Current drug-drug interactions: levothyroxine, fluconazole 200mg daily (3/11/20), enoxaparin 1mg/kg q12h     Date INR Warfarin Dose   3/11/2020 2.17 None (7.5 mg taken today prior to admission)   3/12/2020   2.01  5 mg   3/13/2020   1.59 7.5 mg    3/14/2020   1.41  held    3/14-3/17    No Coumadin    3/18/2020 1.21   held    3/19/2020  1.25  5 mg    3/20/2020  1.20  7.5 mg    3/21/2020   1.21  7.5mg    3/22/2020   1.41  7.5mg       Notes:                     Daily PT/INR until stable within therapeutic range. Yanet Freeman, Pharm. D., BCPS, BCCCP 3/22/2020 8:18 AM

## 2020-03-23 ENCOUNTER — APPOINTMENT (OUTPATIENT)
Dept: PHARMACY | Age: 85
End: 2020-03-23
Payer: MEDICARE

## 2020-03-23 LAB
ANION GAP SERPL CALCULATED.3IONS-SCNC: 13 MEQ/L (ref 8–16)
BUN BLDV-MCNC: 14 MG/DL (ref 7–22)
CALCIUM SERPL-MCNC: 9 MG/DL (ref 8.5–10.5)
CHLORIDE BLD-SCNC: 98 MEQ/L (ref 98–111)
CO2: 29 MEQ/L (ref 23–33)
CREAT SERPL-MCNC: 0.8 MG/DL (ref 0.4–1.2)
ERYTHROCYTE [DISTWIDTH] IN BLOOD BY AUTOMATED COUNT: 14.6 % (ref 11.5–14.5)
ERYTHROCYTE [DISTWIDTH] IN BLOOD BY AUTOMATED COUNT: 45.1 FL (ref 35–45)
GFR SERPL CREATININE-BSD FRML MDRD: > 90 ML/MIN/1.73M2
GLUCOSE BLD-MCNC: 130 MG/DL (ref 70–108)
GLUCOSE BLD-MCNC: 141 MG/DL (ref 70–108)
GLUCOSE BLD-MCNC: 161 MG/DL (ref 70–108)
GLUCOSE BLD-MCNC: 161 MG/DL (ref 70–108)
HCT VFR BLD CALC: 35.5 % (ref 42–52)
HEMOGLOBIN: 11.5 GM/DL (ref 14–18)
INR BLD: 1.56 (ref 0.85–1.13)
MCH RBC QN AUTO: 27.7 PG (ref 26–33)
MCHC RBC AUTO-ENTMCNC: 32.4 GM/DL (ref 32.2–35.5)
MCV RBC AUTO: 85.5 FL (ref 80–94)
PLATELET # BLD: 226 THOU/MM3 (ref 130–400)
PMV BLD AUTO: 10.1 FL (ref 9.4–12.4)
POTASSIUM SERPL-SCNC: 3.4 MEQ/L (ref 3.5–5.2)
RBC # BLD: 4.15 MILL/MM3 (ref 4.7–6.1)
SODIUM BLD-SCNC: 140 MEQ/L (ref 135–145)
WBC # BLD: 7 THOU/MM3 (ref 4.8–10.8)

## 2020-03-23 PROCEDURE — 99232 SBSQ HOSP IP/OBS MODERATE 35: CPT | Performed by: INTERNAL MEDICINE

## 2020-03-23 PROCEDURE — 82948 REAGENT STRIP/BLOOD GLUCOSE: CPT

## 2020-03-23 PROCEDURE — 6360000002 HC RX W HCPCS: Performed by: INTERNAL MEDICINE

## 2020-03-23 PROCEDURE — 80048 BASIC METABOLIC PNL TOTAL CA: CPT

## 2020-03-23 PROCEDURE — 97129 THER IVNTJ 1ST 15 MIN: CPT

## 2020-03-23 PROCEDURE — 6370000000 HC RX 637 (ALT 250 FOR IP): Performed by: INTERNAL MEDICINE

## 2020-03-23 PROCEDURE — 51798 US URINE CAPACITY MEASURE: CPT

## 2020-03-23 PROCEDURE — 6370000000 HC RX 637 (ALT 250 FOR IP): Performed by: PHARMACIST

## 2020-03-23 PROCEDURE — 97116 GAIT TRAINING THERAPY: CPT

## 2020-03-23 PROCEDURE — 97110 THERAPEUTIC EXERCISES: CPT

## 2020-03-23 PROCEDURE — 94760 N-INVAS EAR/PLS OXIMETRY 1: CPT

## 2020-03-23 PROCEDURE — 85610 PROTHROMBIN TIME: CPT

## 2020-03-23 PROCEDURE — 2700000000 HC OXYGEN THERAPY PER DAY

## 2020-03-23 PROCEDURE — 94640 AIRWAY INHALATION TREATMENT: CPT

## 2020-03-23 PROCEDURE — 36415 COLL VENOUS BLD VENIPUNCTURE: CPT

## 2020-03-23 PROCEDURE — 1200000003 HC TELEMETRY R&B

## 2020-03-23 PROCEDURE — 2580000003 HC RX 258: Performed by: INTERNAL MEDICINE

## 2020-03-23 PROCEDURE — 99232 SBSQ HOSP IP/OBS MODERATE 35: CPT | Performed by: NURSE PRACTITIONER

## 2020-03-23 PROCEDURE — 97530 THERAPEUTIC ACTIVITIES: CPT

## 2020-03-23 PROCEDURE — 97130 THER IVNTJ EA ADDL 15 MIN: CPT

## 2020-03-23 PROCEDURE — 85027 COMPLETE CBC AUTOMATED: CPT

## 2020-03-23 RX ORDER — WARFARIN SODIUM 5 MG/1
5 TABLET ORAL
Status: COMPLETED | OUTPATIENT
Start: 2020-03-23 | End: 2020-03-23

## 2020-03-23 RX ADMIN — SODIUM CHLORIDE, PRESERVATIVE FREE 10 ML: 5 INJECTION INTRAVENOUS at 09:15

## 2020-03-23 RX ADMIN — PHENOL 1 SPRAY: 1.4 SPRAY ORAL at 04:14

## 2020-03-23 RX ADMIN — INSULIN LISPRO 2 UNITS: 100 INJECTION, SOLUTION INTRAVENOUS; SUBCUTANEOUS at 21:03

## 2020-03-23 RX ADMIN — POTASSIUM BICARBONATE 20 MEQ: 782 TABLET, EFFERVESCENT ORAL at 09:06

## 2020-03-23 RX ADMIN — METOPROLOL TARTRATE 25 MG: 25 TABLET, FILM COATED ORAL at 09:10

## 2020-03-23 RX ADMIN — METOPROLOL TARTRATE 25 MG: 25 TABLET, FILM COATED ORAL at 20:59

## 2020-03-23 RX ADMIN — IPRATROPIUM BROMIDE 0.5 MG: 0.5 SOLUTION RESPIRATORY (INHALATION) at 08:32

## 2020-03-23 RX ADMIN — DOXAZOSIN MESYLATE 8 MG: 4 TABLET ORAL at 21:00

## 2020-03-23 RX ADMIN — IPRATROPIUM BROMIDE 0.5 MG: 0.5 SOLUTION RESPIRATORY (INHALATION) at 12:28

## 2020-03-23 RX ADMIN — DOCUSATE SODIUM 100 MG: 100 CAPSULE, LIQUID FILLED ORAL at 20:59

## 2020-03-23 RX ADMIN — WARFARIN SODIUM 5 MG: 5 TABLET ORAL at 18:34

## 2020-03-23 RX ADMIN — ENOXAPARIN SODIUM 120 MG: 120 INJECTION SUBCUTANEOUS at 09:06

## 2020-03-23 RX ADMIN — ARFORMOTEROL TARTRATE 15 MCG: 15 SOLUTION RESPIRATORY (INHALATION) at 18:22

## 2020-03-23 RX ADMIN — FLUCONAZOLE 200 MG: 200 TABLET ORAL at 09:06

## 2020-03-23 RX ADMIN — SODIUM CHLORIDE, PRESERVATIVE FREE 10 ML: 5 INJECTION INTRAVENOUS at 20:59

## 2020-03-23 RX ADMIN — LEVOTHYROXINE SODIUM 175 MCG: 150 TABLET ORAL at 04:14

## 2020-03-23 RX ADMIN — BUDESONIDE 250 MCG: 0.25 INHALANT RESPIRATORY (INHALATION) at 08:51

## 2020-03-23 RX ADMIN — BUDESONIDE 250 MCG: 0.25 INHALANT RESPIRATORY (INHALATION) at 18:30

## 2020-03-23 RX ADMIN — FUROSEMIDE 40 MG: 40 TABLET ORAL at 20:59

## 2020-03-23 RX ADMIN — ARFORMOTEROL TARTRATE 15 MCG: 15 SOLUTION RESPIRATORY (INHALATION) at 08:42

## 2020-03-23 RX ADMIN — FUROSEMIDE 40 MG: 40 TABLET ORAL at 09:06

## 2020-03-23 RX ADMIN — IPRATROPIUM BROMIDE 0.5 MG: 0.5 SOLUTION RESPIRATORY (INHALATION) at 18:13

## 2020-03-23 RX ADMIN — CEFTRIAXONE SODIUM 1 G: 1 INJECTION, POWDER, FOR SOLUTION INTRAMUSCULAR; INTRAVENOUS at 09:43

## 2020-03-23 ASSESSMENT — PAIN SCALES - GENERAL
PAINLEVEL_OUTOF10: 0

## 2020-03-23 NOTE — OP NOTE
Patient:  Puneet Gabriel  MRN: [id number]  YOB: 1931    FACILITY: [facility]    DATE:  3/17/2020    SURGEON: eNla Painter MD     ASSISTANT: none    PREOPERATIVE DIAGNOSIS: BPH WITH URINARY RETENTION and bladder stones    POSTOPERATIVE DIAGNOSIS: as above    PROCEDURE PERFORMED:     1. Cystourethroscopy  2. Greenlight photovaporization of prostate        3. cystolithalopaxy of bladder stone with holmium laser lithotripsy    ANESTHESIA: General    ESTIMATED BLOOD LOSS: 5 ml    COMPLICATIONS: None immediate    DRAINS: 22 fr 3 way urethral felipe    SPECIMENS: none    INDICATIONS FOR PROCEDURE:  The patient is a 80 y.o. male who presents today with BPH WITH URINARY RETENTION here for CYSTOSCOPY WITH GREENLIGHT PHOTOVAPORIZATION OF PROSTATE. After risks, benefits and alternatives of the procedure were discussed with the patient, the patient elected to proceed. OPERATIVE SUMMARY:  The risks and benefits of the procedure were explained to the patient in the preoperative area. After informed consent was obtained, the patient was taken back to the operating room. The patient was transferred to the operating table and placed in a supine position. General anesthesia was induced and the patient was placed in the dorsal lithotomy position. He was prepped and draped in a sterile fashion and a time-out was performed to confirm patient identity and procedure. Prior to induction of anesthesia the patient was administered preoperative antibiotics and EPC cuffs were on and functioning. Our continuous flow sheath with obturator and lens was inserted through the patient's urethra and into the bladder. Upon entering the bladder we located both ureteral orifices, they were at a safe distance from the vesical neck. There were a few stones noted in the bladder. The stone was about 3 cm. We used a holmium laser to break the stone into small pieces and grabbed the pieces with an [employer] evacuator.  On evaluation of the prostate the patient was noted to have trilobar hyperplasia. The GreenLight fiber was then inserted after we removed our obturator and placed our working bridge through out continous flow sheath. GreenLight photovaporization was initiated beginning with the median lobe. After the median lobe was taken down, we then turned our attention to the patient's left lateral lobe. At the 5 O'clock position we made a groove from the vesical neck down to the level of the verumontanum, which was used as our distal landmark to protect the external sphincter. We vaporized adenomatous tissue down to the level of the capsule. During this part of the procedure the power level was increased to 180 becerra. This was used as a guide of depth, and carried around in a counter-clockwise fashion to the 12 O'clock position. The process was then repeated on the contralateral side starting at the 7 O'clock position. We vaporized adenomatous tissue down to the level of the capsule, which again was used as a guide of depth, and carried around in a clockwise fashion to the 12 O'clock position. Finally we turned our attention to the apical tissue. Extensive photovaporization of the prostate was performed. We then incised the bladder neck with our laser. At this time the irrigation was turned off and the prostatic urethra appeared to be wide open and no longer obstructed. Hemostasis was achieved and persistent. Both ureteral orifices were noted to be uninvolved in the resection. There was no scatter of laser fiber into the bladder. We did not go distal to the verumontanum. We worked at 80 becerra of power at the bladder neck and near the apex of the prostate. The scope was removed and a 22-[place] 3-way Billy catheter was inserted and irrigated to confirm position. Continuous bladder irrigation with normal saline was then initiated and 3 L of normal saline were allowed to infuse before the catheter was clamped.  The

## 2020-03-23 NOTE — PROGRESS NOTES
[id number] [place] [id number]  INPATIENT SPEECH THERAPY  [facility] 8B  DAILY NOTE    TIME   SLP Individual Minutes  Time In: 1310  Time Out: 4560  Minutes: 25  Timed Code Treatment Minutes: 25 Minutes       Date: 3/23/2020  Patient Name: Leo Graham      CSN: [id number]   : 1931  (80 y.o.)  Gender: male   Referring Physician:  Chalo Mcclain MD  Diagnosis: UTI  Secondary Diagnosis: Cognitive deficits, dysphagia  Precautions: Fall risk, aspiration precautions  Current Diet: Regular diet with thin liquids  Swallowing Strategies: Full Upright Position, Small Bite/Sip, Medication in Applesauce and Alternate Solids and Liquids    Date of Last MBS: Not Applicable    Pain:  No pain reported. Subjective:  Pt resting in recliner demario upon arrival, alert and pleasant. Active engagement in structured therapeutic interventions. Short-Term Goals:  SHORT TERM GOAL #1:  Goal 1: Patient will tolerate regular solids, thin liquids, and medication with no overt s/s of aspiration/penetration to demonstrate safe meal consumption   INTERVENTIONS: Pt reported overall success with established diet of regular solids with thin liquids at lunch, denying any issues/complications for safety of PO intake. Will continue to address. SHORT TERM GOAL #2:  Goal 2: Patient will complete STM (immediate/delayed/working) memory tasks with 70% accuracy given mod A to demonstrate retention on personal and newly learned information. INTERVENTIONS: Orientation: 5/6 independent, 1/6 logical cues for date    Functional 5-unit grocery list generated for retention with strategy of repetition/rehearsal (x3 trials) implemented to aid in success.   Immediate memory: 3/5 independent, 1/5 min cues, 1/5 mod cues  Delay x10 minutes: 2/5 independent, 1/5 mod cues, 2/5 max cues  *high level of confabulatory responses  **education provided to pt into strategies to implement in home setting to optimize functional recall measures; pt indicated he uses his \"calendar\" and \"pen and paper\" to assist with retention of MD appointments, social engagements, etc.     M[address] #3:  Goal 3: Patient will complete moderately complex and complex problem solving/safety and executive functioning tasks with 80% accuracy given min A to improve return to least restrictive environment & safe completion of ADL's. INTERVENTIONS: Telling time via analog clocks: 12/12 independent    Adding amounts of money, coin values, auditory presentation: 7/10 independent, 1/10 min cues, 2/10 mod cues  *increased processing time required for mental math configuration     SHORT TERM GOAL #4:  Goal 4: Patient will complete moderately complex orgnaization (naming/sequencing) with 80% accuracy given min A to improve lexical retrieval   INTERVENTIONS: Divergent thinking task with pt directed to name 10 items in 1 minute time frame:  -Trial 1, bathroom: 8 items, 1 minute, no cues; x2 members named given additional time + min cues  -Trial 2, garage: 8 items, 1 minute, no cues: x2 members named given min cues  *deficits for working memory as evidenced by pt repeating previously named stimuli    Long-Term Goals:  No LTG established given short ELOS       EDUCATION:  Learner: Patient  Education:  Reviewed ST goals and Plan of Care  Evaluation of Education: Verbalizes understanding, Needs further instruction and Family not present    ASSESSMENT/PLAN:  Activity Tolerance:  Patient tolerance of  treatment: good. Cooperative with ST POC. Assessment/Plan: Patient progressing toward established goals. Continues to require skilled care of licensed speech pathologist to progress toward achievement of established goals and plan of care. .     Plan for Next Session: dietary analysis, executive function, sequencing       Meghan Alonzo M.A., [address] [place] [address]

## 2020-03-23 NOTE — PROGRESS NOTES
Clinical Pharmacy Note    Warfarin consult follow-up    Recent Labs     03/23/20  0537   INR 1.56*     Recent Labs     03/21/20  0556 03/23/20  0537   HGB  --  11.5*   HCT  --  35.5*    226       Significant Drug-Drug Interactions:  New warfarin drug-drug interactions: none  Discontinued drug-drug interactions: none  Current drug-drug interactions: levothyroxine, fluconazole 200mg daily (3/11/20), enoxaparin 1mg/kg q12h     Date INR Warfarin Dose   3/11/2020 2.17 None (7.5 mg taken today prior to admission)   3/12/2020   2.01  5 mg   3/13/2020   1.59 7.5 mg    3/14/2020   1.41  held    3/14-3/17    No Coumadin    3/18/2020 1.21   held    3/19/2020  1.25  5 mg    3/20/2020  1.20  7.5 mg    3/21/2020   1.21  7.5mg    3/22/2020   1.41  7.5mg    3/23/2020  1.56  5 mg           Notes:                     Daily PT/INR until stable within therapeutic range.

## 2020-03-23 NOTE — PROGRESS NOTES
meetings of clubs or organizations: Never     Relationship status:     Intimate partner violence     Fear of current or ex partner: Not on file     Emotionally abused: Not on file     Physically abused: Not on file     Forced sexual activity: Not on file   Other Topics Concern    Not on file   Social History Narrative    Not on file     Family History   Problem Relation Age of Onset    Other Mother         Complications of Childbirth    Other Father [de-identified]        Natural causes     Allergies   Allergen Reactions   Kathie Schgwenr [Nitrofurantoin [address] prefers pt not use Macrobid due to pulmonary side effects.  Levaquin [Levofloxacin] Other (See Comments)     Redness/flushing    Adhesive Tape Rash    Alphagan [Brimonidine Tartrate] Hives     Eyes red         Constitutional: Alert and oriented times x3, no acute distress, and cooperative to examination with appropriate mood and affect. HEENT:   Head:         Normocephalic and atraumatic. Mucous membranes are normal.   Eyes:         EOM are normal. No scleral icterus. Nose:    The external appearance of the nose is normal  Ears: The ears appear normal to external inspection. Pulmonary/Chest:  Normal respiratory rate and rhthym. No use of accessory muscles. Lungs clear bilaterally. Abdominal:          Soft. No tenderness. Active bowel sounds. Genitalia:    Billy catheter draining dark red urine with small clots in tubing. Musculoskeletal:    Normal range of motion. He exhibits no edema or tenderness of lower extremities. Extremities:    No cyanosis, clubbing, or edema present. Neurological:    Alert and oriented.      Labs:  WBC:    Lab Results   Component Value Date    WBC 7.0 03/23/2020     Hemoglobin/Hematocrit:    Lab Results   Component Value Date    HGB 11.5 03/23/2020    HCT 35.5 03/23/2020     BMP:    Lab Results   Component Value Date     03/23/2020    K 3.4 03/23/2020    K 3.3 03/13/2020    CL 98

## 2020-03-23 NOTE — PROGRESS NOTES
Hospitalist Progress Note    Patient:  Charlie Ramirez  YOB: 1931  MRN: [id number]   PCP: Khurram Mclean MD        Acct: [de-identified]  Unit/Bed: [facility]29029A    Date of Admission: 3/11/2020      ASSESSMENT     1. Urinary retention with chronic indwelling felipe catheter s/p TURP on 3/17 with failed attempt at voiding trial on 3/18 now receiving CBI for gross hematuria. Urology following. Still having gross hematuria, Hgb pending this am. Patient to be discharged with felipe catheter  2. Catheter related UTI: Catheter present prior to admission. Urine cx grew Candida parapsilosis and E. coli. Currently on Ceftriaxone and fluconazole  3. Post-op left lower extremity weakness, transient, resolved: No evidence for stroke. CTA of head and neck unremarkable for significant stenosis. Consideration for tpA deferred due to recent TURP and currently on anticoagulation. No evidence for stroke on MRI, Neurology following. PT/OT. Weakness improved. Left lower extremity weakness may have been positional in nature related to position in bed. 4. Pill dysphagia, improved: Raised possibility of stroke, however patient was ingesting large potassium pills which could cause some discomfort. ST evaluation pending  5. Essential HTN:  6. Hyperlipidemia: not on statin  7. COPD: not in exacerbation. Continue home meds. 8. Morbid obesity: BMI 40.6   9. Hypokalemia: Replace prn  10. History of DVT on enoxaparin and Coumadin bridge. Pharmacy dosing Coumadin  11. DM Type 2: Glucophage, ISS  12. Acquired hypothyroidism: levothyroxine  13. Paroxysymal atrial fibrillation: Lopressor  14. History of chronic diastolic CHF  15. Sore throat: upper viral respiratory tract infection? Influenza negative. Symptomatic treatment  16. Constipation: milk of magnesia. Miralax, docusate    PLAN   1. Urology continues to follow gross hematuria. Considering clot evacuation/plasma button with fulguration if gross hematuria persists.  Will continue INFORMATION: Left lower extremity weakness. COMPARISON: 3/11/2020 TECHNIQUE:  Scans were obtained from the base of the skull to the vertex without IV contrast. All CT scans at this facility use dose modulation, iterative reconstruction, and/or weight-based dosing when appropriate to reduce radiation dose to as low as reasonably achievable. FINDINGS:  No acute intracranial hemorrhage or mass effect is seen. There is mild diffuse atrophy. There is moderate patchy periventricular and subcortical white matter hypoattenuation likely demonstrating sequela from moderate chronic small vessel ischemic changes. There is no midline shift. The basal cisterns and posterior fossa appear within normal limits. Mineralization at the level of the basal ganglia are demonstrated. This is unchanged from prior examination. The visualized paranasal sinuses and mastoid air cells appear well-pneumatized. Parasellar carotid artery vascular calcifications are noted. The visualized globes and orbits appear grossly intact. No acute abnormalities of the calvarium are seen. 1. No acute intracranial hemorrhage or mass effect. 2. Mild diffuse atrophy. 3. Moderate chronic small vessel ischemic changes. **This report has been created using voice recognition software. It may contain minor errors which are inherent in voice recognition technology. ** Final report electronically signed by Dr. Mohit Lucio on 3/19/2020 10:38 AM    Ct Head Wo Contrast    Result Date: 3/11/2020  PROCEDURE: CT HEAD WO CONTRAST CLINICAL INFORMATION: confusion. COMPARISON: No prior study. TECHNIQUE: Noncontrast 5 mm axial images were obtained through the brain. All CT scans at this facility use dose modulation, iterative reconstruction, and/or weight-based dosing when appropriate to reduce radiation dose to as low as reasonably achievable. FINDINGS: Generalized volume loss and small vessel ischemic changes. No acute hemorrhage or midline shift.  Ventricles are within normal limits. Paranasal sinuses are clear. Mastoid air cells are patent. Skull: Unremarkable. Soft tissues: Unremarkable. No acute intracranial findings. **This report has been created using voice recognition software. It may contain minor errors which are inherent in voice recognition technology. ** Final report electronically signed by Dr. Luc Daniels on 3/11/2020 1:34 PM    Cta Neck W Wo Contrast (code Stroke Nihss 4 Or Above)    Result Date: 3/19/2020  PROCEDURE: CTA HEAD W WO CONTRAST, CTA NECK W WO CONTRAST CLINICAL INFORMATION: left lower extremity weakness . Sudden onset. COMPARISON: CT head from the same date and 3/11/2020. TECHNIQUE: Helical CT from the aortic arch through the head in arterial phase during intravenous administration of 80 mL Isovue-370 injected in the left forearm with multiplanar reconstructions to include volumetric maximum intensity projection sequences. FINDINGS: CTA HEAD: There is mural calcification in the cavernous and clinoid segments of internal carotid arteries with areas of mild stenosis. No aneurysmal dilatation is identified. The bilateral middle cerebral and anterior cerebral arteries are patent without focal abnormality identified. The basilar artery is patent without focal stenosis or visualized aneurysm. The P1 segment of the left posterior cerebral artery is small likely on the basis of normal anatomic variation. The P2 segment is patent with contribution  from a left posterior communicating artery. The right posterior true artery is patent without focal abnormality. Superior cerebellar arteries appear patent. Dural venous sinuses appear patent without focal filling defect. No focal areas of abnormal parenchymal enhancement are identified. CTA NECK: There is a typical 3 vessel arch. The brachiocephalic and left subclavian arteries are patent without flow-limiting stenosis. The common carotid arteries are patent without focal stenosis.  There is calcified mural plaque at the carotid bulbs bilaterally and tandem areas of mural calcification in the proximal internal carotid arteries, left greater than right. On the right side, the narrowest luminal diameter in the proximal right internal carotid artery is 2.8 mm with a point more distal, where the walls are parallel, measuring 4.4 mm. On the left side, the narrowest luminal diameter measures 1.8 mm with a point more distal, where the walls are parallel, measuring 4.4 mm. Mid and distal cervical segments of internal carotid arteries are patent without focal stenosis. The vertebral arteries are codominant. They are patent throughout their course without focal stenosis. There is streak artifact from dental amalgam which partially obscures oral and perioral soft tissues. Given this caveat, mucosal surfaces of the aerodigestive tract are symmetric without focal nodular thickening or visualized mass. No cervical lymphadenopathy is identified. There is a small lymph node in the left parotid gland. Parotid glands are otherwise unremarkable. Submandibular glands are unremarkable. Thyroid gland is normal in appearance. Visualized portions of the lungs are clear. There are mild-to-moderate degenerative changes of the cervical spine. 1. Mural calcifications in the cavernous and clinoid segments of internal carotid arteries with areas of mild stenosis. 2. Calcified mural plaque at the carotid bulbs and proximal bilateral internal carotid arteries with 36% stenosis on the right and 59% stenosis on the left by NASCET criteria. **This report has been created using voice recognition software. It may contain minor errors which are inherent in voice recognition technology. ** Final report electronically signed by Dr. José Miguel Clifton MD on 3/19/2020 11:04 AM    Ct Abdomen Pelvis W Iv Contrast Additional Contrast? None    Result Date: 3/11/2020  PROCEDURE: CT ABDOMEN PELVIS W IV CONTRAST CLINICAL INFORMATION: abdominal pain .  COMPARISON: March 5, 2020 TECHNIQUE: 5 mm axial CT images were obtained through the abdomen and pelvis after the administration of intravenous and oral contrast. Coronal and sagittal reconstructions were obtained. All CT scans at this facility use dose modulation, iterative reconstruction, and/or weight-based dosing when appropriate to reduce radiation dose to as low as reasonably achievable. FINDINGS: Lung bases are clear. Mild fatty infiltration of the liver. Pancreas gallbladder adrenal glands spleen and kidneys are unremarkable no hydronephrosis. Normal caliber abdominal aorta. Atherosclerotic changes. Scattered stool in the colon. Multiple colonic diverticuli. No evidence for diverticulitis. Decompressed sigmoid colonic loop closely abutting a decompressed bladder limiting evaluation Normal appendix. There is prominent prostate gland with coarse calcifications. Correlation with serology is advised. Billy catheter bulb appears to be within a collapsed bladder. Correlation is advised. There is mild infiltration in the pelvis which is nonspecific. No acute osseous findings. Degenerative changes lumbar spine and pelvis. Infrarenal IVC filter. 1. Scattered stool in the colon. Multiple colonic diverticuli. No evidence for diverticulitis. 2. There is prominent prostate gland with coarse calcifications. Billy catheter bulb appears to be within a collapsed bladder. Decompressed sigmoid colonic loop closely abutting a decompressed bladder limiting evaluation. There is mild infiltration in the pelvis which is nonspecific. Correlation with urinalysis is advised. Underlying fistula cannot be entirely excluded. Appearance is similar to prior. **This report has been created using voice recognition software. It may contain minor errors which are inherent in voice recognition technology. ** Final report electronically signed by Dr. Tho Wayne on 3/11/2020 1:42 PM    Ct Abdomen Pelvis W Iv Contrast Additional Contrast? None    Result Date: 3/5/2020  PROCEDURE: CT ABDOMEN PELVIS W IV CONTRAST CLINICAL INFORMATION: gross hematuria . COMPARISON: 3/16/2019 TECHNIQUE: 2-D multiplanar postcontrast images of the abdomen and pelvis Isovue-370 IV contrast. All CT scans at this facility use dose modulation, iterative reconstruction, and/or weight-based dosing when appropriate to reduce radiation dose to as low as reasonably achievable. FINDINGS: Lung bases Lung bases are clear undersurface the heart shows minimal coronary artery atherosclerotic calcifications. Abdomen pelvis The liver is unremarkable. Spleen is normal. The adrenals are normal. The pancreas is unremarkable. Gallbladder is normal. Kidneys enhance symmetrically. There is no hydronephrosis. There are small extrarenal pelves bilaterally. Aorta is heavily atherosclerotic. IVC filter is in place. No adenopathy is identified. Pelvis There is a large amount of stool in the colon compatible with constipation. There is  uncomplicated diverticulosis. There is a loop of sigmoid colon contiguous with the decompressed bladder which has a Billy catheter present there is no gas seen in the bladder which is difficult to assess due to the fact is completely decompressed. Prostate gland is enlarged and contains calcifications. There are no abnormal fluid collections within the abdomen. There are no acute abnormalities in the abdomen or pelvis. The urinary bladder is completely decompressed with a Billy catheter. There is a loop of sigmoid colon contiguous with the bladder. A fistula cannot be entirely excluded but there is  no evidence of that at this time **This report has been created using voice recognition software. It may contain minor errors which are inherent in voice recognition technology. ** Final report electronically signed by Dr. Karan Zhao on 3/5/2020 11:44 AM    Mri Brain Wo Contrast    Result Date: 3/20/2020  PROCEDURE: MRI BRAIN WO CONTRAST INDICATION:  left leg weakness, dysarthria, stroke. COMPARISON: CT head from the same date and 3/11/2020. TECHNIQUE: Multiplanar and multiple spin echo MRI images were obtained of the brain without contrast. FINDINGS: There is moderate to severe volume loss. There are moderate patchy areas of T2/FLAIR prolongation in the periventricular, subcortical deep white matter. No intra or extra-axial mass is identified. No focal areas of restricted diffusion are present. The major vascular flow voids appear patent. Patient is status post right-sided lens extraction. Orbits are otherwise unremarkable. There is a mucous retention cyst in the left maxillary sinus. There are small mastoid effusions bilaterally. 1. No evidence of acute intracranial abnormality. 2. Moderate chronic microvascular angiopathy superimposed on moderate to severe volume loss. **This report has been created using voice recognition software. It may contain minor errors which are inherent in voice recognition technology. ** Final report electronically signed by Dr. Tiera Callahan MD on 3/20/2020 8:24 AM      DVT prophylaxis: [x] Lovenox                                 [] SCDs                                 [] SQ Heparin                                 [] Encourage ambulation           [x] Already on Anticoagulation     Disposition:    [x] Home       [] TCU       [] Rehab       [] Psych       [] SNF       [] [place]       [] Other-

## 2020-03-23 NOTE — PLAN OF CARE
Problem: Urinary Retention:  Goal: Able to perform urinary catheter care  Description: Able to perform urinary catheter care  Outcome: Ongoing  Note: CBI infusing. Urology following. Problem: Discharge Planning:  Goal: Discharged to appropriate level of care  Description: Discharged to appropriate level of care  Outcome: Ongoing  Note: Pt plans to return to [facility] at d.c      Problem: RESPIRATORY  Goal: Clear lung sounds  3/23/2020 1219 by Vinay Rodriguez RN  Outcome: Ongoing     Problem: DISCHARGE BARRIERS  Goal: Patient's continuum of care needs are met  Outcome: Ongoing     Problem: Cardiovascular  Goal: Hemodynamic stability  Outcome: Ongoing     Problem: Pain:  Goal: Pain level will decrease  Description: Pain level will decrease  Outcome: Ongoing  Note: Patient denies pain at this time. Will continue to monitor. Problem: Falls - Risk of:  Goal: Will remain free from falls  Description: Will remain free from falls  Outcome: Ongoing     Problem: Falls - Risk of:  Goal: Absence of physical injury  Description: Absence of physical injury  Outcome: Ongoing     Problem: Musculor/Skeletal Functional Status  Goal: Highest potential functional level  Outcome: Ongoing  Note: Patient is weak. Patient is working with therapy at this time. Care plan reviewed with patient. Patient  verbalize understanding of the plan of care and contribute to goal setting.

## 2020-03-23 NOTE — PROGRESS NOTES
Received call from RN. Unable to hand irrigate felipe catheter but CBI has been running at brisk rate with return. Patient reports suprapubic pressure. Having clots around urethra at times. 22 fr 3 way catheter removed, and 24 fr 3 way placed. Able to irrigate catheter but unable to return any clots or saline. CBI running at slow rate with return and small clots. Discussed with Dr. Alexandr Zambrano. Patient ate lunch. Keep NPO for now    Getting bladder scan. Electronically signed by MAEGAN Henson CNP on 3/23/2020 at 6:12 PM      ADDENDUM:  @ [phone]    CBI running at slow rate with pink tinged urine returned. Patient denies pain at rest. He does report some pressure upon palpation of lower abdomen. Catheter irrigated and sterile water able to be flushed into catheter but spasms when pulling back. Bladder scan showed 0 ml. Discussed with Dr Alexandr Zambrano. Keep CBI going at slow rate during the night. Call with any problems with catheter. NPO after midnight. Message via perfect serve sent to Dr. Fady Wright, pts son, regarding plan for surgery tomorrow am due to ongoing hematuria. He was appreciative of message.      Electronically signed by MAEGAN Henson CNP on 3/23/2020 at 8:35 PM

## 2020-03-23 NOTE — PROGRESS NOTES
his cath cite     Balance:  static standing w/ UE at support wtih CGA and he was dependent for cameron care he was able to stand 2 trials for ~ 4 min each time     Exercise:  Patient was guided in 2 set(s) 10 reps of exercise to both lower extremities. Ankle pumps, Glut sets, Quad sets, Heelslides, Short arc quads, Long arc quads and Hip abduction/adduction. Exercises were completed for increased independence with functional mobility. Functional Outcome Measures: Completed  AM-PAC Inpatient Mobility without Stair Climbing Raw Score : 15  AM-PAC Inpatient without Stair Climbing T-Scale Score : 43.03    ASSESSMENT:  Assessment: Patient progressing toward established goals. and pt demonstrated generalized weakness and decreased balance he would greatly benefit from cont skilled therapy   Activity Tolerance:  Patient tolerance of  treatment: fair. Equipment Recommendations:Equipment Needed: No(pt has a RW)  Discharge Recommendations:    Continue to assess pending progress, Patient would benefit from continued therapy after discharge(pt may benefit from a skilled therapy stay prior to return home )    Plan: Times per week: 3-5x GM  Current Treatment Recommendations: Strengthening, Gait Training, Patient/Caregiver Education & Training, Stair training, Equipment Evaluation, Education, & procurement, Balance Training, Functional Mobility Training, Transfer Training, Endurance Training, Safety Education & Training    Patient Education  Patient Education: Plan of Care    Goals:  Patient goals : Go home  Short term goals  Time Frame for Short term goals: by discharge  Short term goal 1: Patient will complete bed mobility with mod I in order to get into/out of bed. Short term goal 2: Patient will transfer sit <--> stand with mod I in order to get up to ambulate. Short term goal 3: Patient will ambulate >50' with LRAD and mod I for household mobility.   Short term goal 4: Patient will negotiate 3 steps with R hand rail and SBA for home entry. Long term goals  Time Frame for Long term goals : no LTGs due to short ELOS    Following session, patient left in safe position with all fall risk precautions in place.

## 2020-03-23 NOTE — PROGRESS NOTES
Recommendations: Strengthening, Balance Training, Functional Mobility Training, Endurance Training, Safety Education & Training, Patient/Caregiver Education & Training, Self-Care / ADL    Patient Education  Patient Education: Energy Conservation, Home Exercise Program and Importance of Increasing Activity    Goals  Short term goals  Time Frame for Short term goals: by discharge  Short term goal 1: Pt will complete functional mobility HH distances with S including transporting ADL items for ease with eventual laundry tasks at home  Short term goal 2: Pt will complete functional transers with S and no vc for safety including to/from toilet  Short term goal 3: Pt will complete dynamic standing task with S X 3 minutes with 2 hand release and no LOB for ease with cooking routine  Short term goal 4: Pt will complete BADL routine with S and no vc for safety   Long term goals  Time Frame for Long term goals : no LTG established d/t short ELOS    Following session, patient left in safe position with all fall risk precautions in place.

## 2020-03-23 NOTE — CARE COORDINATION
3/23/20, 1:28 PM EDT    DISCHARGE ONGOING EVALUATION:     Benjamin Gruber day: 11  Location: [facility]29/029-A Reason for admit: Urinary retention [R33.9]  Urine retention [R33.9]   Treatment Plan of Care: Pt continued to require CBI and irrigation over the weekend. This morning still passing clots even around catheter. Original surgery 3-17-20, Urology planning to take pt back to surgery tomorrow. Ok to continue coumadin and lovenox. Barriers to Discharge: Readiness  PCP: Solomon Kessler MD  Readmission Risk Score: 30%  Patient Goals/Plan/Treatment Preferences: To enter 8aweek at discharge.

## 2020-03-24 ENCOUNTER — ANESTHESIA (OUTPATIENT)
Dept: OPERATING ROOM | Age: 85
DRG: 666 | End: 2020-03-24
Payer: MEDICARE

## 2020-03-24 ENCOUNTER — APPOINTMENT (OUTPATIENT)
Dept: CARDIAC REHAB | Age: 85
End: 2020-03-24
Payer: MEDICARE

## 2020-03-24 ENCOUNTER — TELEPHONE (OUTPATIENT)
Dept: PHARMACY | Age: 85
End: 2020-03-24

## 2020-03-24 ENCOUNTER — ANESTHESIA EVENT (OUTPATIENT)
Dept: OPERATING ROOM | Age: 85
DRG: 666 | End: 2020-03-24
Payer: MEDICARE

## 2020-03-24 VITALS
OXYGEN SATURATION: 96 % | SYSTOLIC BLOOD PRESSURE: 166 MMHG | RESPIRATION RATE: 13 BRPM | DIASTOLIC BLOOD PRESSURE: 80 MMHG

## 2020-03-24 LAB
ANION GAP SERPL CALCULATED.3IONS-SCNC: 14 MEQ/L (ref 8–16)
BUN BLDV-MCNC: 12 MG/DL (ref 7–22)
CALCIUM SERPL-MCNC: 8.8 MG/DL (ref 8.5–10.5)
CHLORIDE BLD-SCNC: 98 MEQ/L (ref 98–111)
CO2: 28 MEQ/L (ref 23–33)
CREAT SERPL-MCNC: 0.7 MG/DL (ref 0.4–1.2)
ERYTHROCYTE [DISTWIDTH] IN BLOOD BY AUTOMATED COUNT: 14.6 % (ref 11.5–14.5)
ERYTHROCYTE [DISTWIDTH] IN BLOOD BY AUTOMATED COUNT: 45.7 FL (ref 35–45)
GFR SERPL CREATININE-BSD FRML MDRD: > 90 ML/MIN/1.73M2
GLUCOSE BLD-MCNC: 142 MG/DL (ref 70–108)
GLUCOSE BLD-MCNC: 181 MG/DL (ref 70–108)
GLUCOSE BLD-MCNC: 198 MG/DL (ref 70–108)
GLUCOSE BLD-MCNC: 199 MG/DL (ref 70–108)
HCT VFR BLD CALC: 34.1 % (ref 42–52)
HEMOGLOBIN: 10.9 GM/DL (ref 14–18)
INR BLD: 1.55 (ref 0.85–1.13)
MCH RBC QN AUTO: 27.5 PG (ref 26–33)
MCHC RBC AUTO-ENTMCNC: 32 GM/DL (ref 32.2–35.5)
MCV RBC AUTO: 85.9 FL (ref 80–94)
PLATELET # BLD: 231 THOU/MM3 (ref 130–400)
PMV BLD AUTO: 10.1 FL (ref 9.4–12.4)
POTASSIUM SERPL-SCNC: 3.4 MEQ/L (ref 3.5–5.2)
RBC # BLD: 3.97 MILL/MM3 (ref 4.7–6.1)
SODIUM BLD-SCNC: 140 MEQ/L (ref 135–145)
WBC # BLD: 8.6 THOU/MM3 (ref 4.8–10.8)

## 2020-03-24 PROCEDURE — 6360000002 HC RX W HCPCS: Performed by: NURSE ANESTHETIST, CERTIFIED REGISTERED

## 2020-03-24 PROCEDURE — 6370000000 HC RX 637 (ALT 250 FOR IP): Performed by: PHARMACIST

## 2020-03-24 PROCEDURE — 6360000002 HC RX W HCPCS: Performed by: INTERNAL MEDICINE

## 2020-03-24 PROCEDURE — 0VB08ZZ EXCISION OF PROSTATE, VIA NATURAL OR ARTIFICIAL OPENING ENDOSCOPIC: ICD-10-PCS | Performed by: UROLOGY

## 2020-03-24 PROCEDURE — 2580000003 HC RX 258: Performed by: UROLOGY

## 2020-03-24 PROCEDURE — 7100000001 HC PACU RECOVERY - ADDTL 15 MIN: Performed by: UROLOGY

## 2020-03-24 PROCEDURE — 3600000013 HC SURGERY LEVEL 3 ADDTL 15MIN: Performed by: UROLOGY

## 2020-03-24 PROCEDURE — 2720000010 HC SURG SUPPLY STERILE: Performed by: UROLOGY

## 2020-03-24 PROCEDURE — 2709999900 HC NON-CHARGEABLE SUPPLY: Performed by: UROLOGY

## 2020-03-24 PROCEDURE — 94760 N-INVAS EAR/PLS OXIMETRY 1: CPT

## 2020-03-24 PROCEDURE — 3700000001 HC ADD 15 MINUTES (ANESTHESIA): Performed by: UROLOGY

## 2020-03-24 PROCEDURE — 3700000000 HC ANESTHESIA ATTENDED CARE: Performed by: UROLOGY

## 2020-03-24 PROCEDURE — 0TCB8ZZ EXTIRPATION OF MATTER FROM BLADDER, VIA NATURAL OR ARTIFICIAL OPENING ENDOSCOPIC: ICD-10-PCS | Performed by: UROLOGY

## 2020-03-24 PROCEDURE — 6360000002 HC RX W HCPCS: Performed by: ANESTHESIOLOGY

## 2020-03-24 PROCEDURE — 2700000000 HC OXYGEN THERAPY PER DAY

## 2020-03-24 PROCEDURE — 6360000002 HC RX W HCPCS: Performed by: UROLOGY

## 2020-03-24 PROCEDURE — 3600000003 HC SURGERY LEVEL 3 BASE: Performed by: UROLOGY

## 2020-03-24 PROCEDURE — 94640 AIRWAY INHALATION TREATMENT: CPT

## 2020-03-24 PROCEDURE — 6370000000 HC RX 637 (ALT 250 FOR IP): Performed by: UROLOGY

## 2020-03-24 PROCEDURE — 7100000000 HC PACU RECOVERY - FIRST 15 MIN: Performed by: UROLOGY

## 2020-03-24 PROCEDURE — 6370000000 HC RX 637 (ALT 250 FOR IP): Performed by: INTERNAL MEDICINE

## 2020-03-24 PROCEDURE — 36415 COLL VENOUS BLD VENIPUNCTURE: CPT

## 2020-03-24 PROCEDURE — 2580000003 HC RX 258: Performed by: NURSE ANESTHETIST, CERTIFIED REGISTERED

## 2020-03-24 PROCEDURE — 80048 BASIC METABOLIC PNL TOTAL CA: CPT

## 2020-03-24 PROCEDURE — 1200000003 HC TELEMETRY R&B

## 2020-03-24 PROCEDURE — 99232 SBSQ HOSP IP/OBS MODERATE 35: CPT | Performed by: INTERNAL MEDICINE

## 2020-03-24 PROCEDURE — 2580000003 HC RX 258: Performed by: INTERNAL MEDICINE

## 2020-03-24 PROCEDURE — 85027 COMPLETE CBC AUTOMATED: CPT

## 2020-03-24 PROCEDURE — 82948 REAGENT STRIP/BLOOD GLUCOSE: CPT

## 2020-03-24 PROCEDURE — 85610 PROTHROMBIN TIME: CPT

## 2020-03-24 PROCEDURE — 2500000003 HC RX 250 WO HCPCS: Performed by: NURSE ANESTHETIST, CERTIFIED REGISTERED

## 2020-03-24 RX ORDER — SODIUM CHLORIDE 9 MG/ML
INJECTION, SOLUTION INTRAVENOUS CONTINUOUS PRN
Status: DISCONTINUED | OUTPATIENT
Start: 2020-03-24 | End: 2020-03-24 | Stop reason: SDUPTHER

## 2020-03-24 RX ORDER — POTASSIUM CHLORIDE 750 MG/1
20 TABLET, EXTENDED RELEASE ORAL 2 TIMES DAILY
Qty: 60 TABLET | Refills: 3 | Status: SHIPPED | OUTPATIENT
Start: 2020-03-24 | End: 2020-03-24

## 2020-03-24 RX ORDER — ONDANSETRON 2 MG/ML
INJECTION INTRAMUSCULAR; INTRAVENOUS PRN
Status: DISCONTINUED | OUTPATIENT
Start: 2020-03-24 | End: 2020-03-24 | Stop reason: SDUPTHER

## 2020-03-24 RX ORDER — CEFAZOLIN SODIUM 1 G/3ML
INJECTION, POWDER, FOR SOLUTION INTRAMUSCULAR; INTRAVENOUS PRN
Status: DISCONTINUED | OUTPATIENT
Start: 2020-03-24 | End: 2020-03-24 | Stop reason: SDUPTHER

## 2020-03-24 RX ORDER — FLUCONAZOLE 200 MG/1
200 TABLET ORAL ONCE
Status: COMPLETED | OUTPATIENT
Start: 2020-03-24 | End: 2020-03-24

## 2020-03-24 RX ORDER — CEFDINIR 300 MG/1
300 CAPSULE ORAL 2 TIMES DAILY
Qty: 10 CAPSULE | Refills: 0 | Status: SHIPPED | OUTPATIENT
Start: 2020-03-25 | End: 2020-03-24 | Stop reason: SDUPTHER

## 2020-03-24 RX ORDER — SUCCINYLCHOLINE/SOD CL,ISO/PF 200MG/10ML
SYRINGE (ML) INTRAVENOUS PRN
Status: DISCONTINUED | OUTPATIENT
Start: 2020-03-24 | End: 2020-03-24 | Stop reason: SDUPTHER

## 2020-03-24 RX ORDER — DEXAMETHASONE SODIUM PHOSPHATE 4 MG/ML
INJECTION, SOLUTION INTRA-ARTICULAR; INTRALESIONAL; INTRAMUSCULAR; INTRAVENOUS; SOFT TISSUE PRN
Status: DISCONTINUED | OUTPATIENT
Start: 2020-03-24 | End: 2020-03-24 | Stop reason: SDUPTHER

## 2020-03-24 RX ORDER — LIDOCAINE HYDROCHLORIDE 20 MG/ML
INJECTION, SOLUTION INTRAVENOUS PRN
Status: DISCONTINUED | OUTPATIENT
Start: 2020-03-24 | End: 2020-03-24 | Stop reason: SDUPTHER

## 2020-03-24 RX ORDER — WARFARIN SODIUM 7.5 MG/1
7.5 TABLET ORAL ONCE
Status: COMPLETED | OUTPATIENT
Start: 2020-03-24 | End: 2020-03-24

## 2020-03-24 RX ORDER — PROPOFOL 10 MG/ML
INJECTION, EMULSION INTRAVENOUS PRN
Status: DISCONTINUED | OUTPATIENT
Start: 2020-03-24 | End: 2020-03-24 | Stop reason: SDUPTHER

## 2020-03-24 RX ORDER — CEFDINIR 300 MG/1
300 CAPSULE ORAL 2 TIMES DAILY
Qty: 14 CAPSULE | Refills: 0 | Status: SHIPPED | OUTPATIENT
Start: 2020-03-25 | End: 2020-04-01

## 2020-03-24 RX ORDER — LABETALOL 20 MG/4 ML (5 MG/ML) INTRAVENOUS SYRINGE
5 EVERY 10 MIN PRN
Status: DISCONTINUED | OUTPATIENT
Start: 2020-03-24 | End: 2020-03-24 | Stop reason: HOSPADM

## 2020-03-24 RX ORDER — FENTANYL CITRATE 50 UG/ML
25 INJECTION, SOLUTION INTRAMUSCULAR; INTRAVENOUS EVERY 5 MIN PRN
Status: DISCONTINUED | OUTPATIENT
Start: 2020-03-24 | End: 2020-03-24 | Stop reason: HOSPADM

## 2020-03-24 RX ORDER — FENTANYL CITRATE 50 UG/ML
INJECTION, SOLUTION INTRAMUSCULAR; INTRAVENOUS PRN
Status: DISCONTINUED | OUTPATIENT
Start: 2020-03-24 | End: 2020-03-24 | Stop reason: SDUPTHER

## 2020-03-24 RX ORDER — PSEUDOEPHEDRINE HCL 30 MG
100 TABLET ORAL 2 TIMES DAILY
Qty: 60 CAPSULE | Refills: 0 | Status: SHIPPED | OUTPATIENT
Start: 2020-03-24 | End: 2020-07-24 | Stop reason: SDUPTHER

## 2020-03-24 RX ORDER — MEPERIDINE HYDROCHLORIDE 25 MG/ML
12.5 INJECTION INTRAMUSCULAR; INTRAVENOUS; SUBCUTANEOUS EVERY 5 MIN PRN
Status: DISCONTINUED | OUTPATIENT
Start: 2020-03-24 | End: 2020-03-24 | Stop reason: HOSPADM

## 2020-03-24 RX ORDER — POTASSIUM CHLORIDE 750 MG/1
TABLET, EXTENDED RELEASE ORAL
Qty: 360 TABLET | Refills: 3 | Status: ON HOLD | OUTPATIENT
Start: 2020-03-24 | End: 2020-05-19 | Stop reason: SDUPTHER

## 2020-03-24 RX ORDER — EPHEDRINE SULFATE/0.9% NACL/PF 50 MG/5 ML
SYRINGE (ML) INTRAVENOUS PRN
Status: DISCONTINUED | OUTPATIENT
Start: 2020-03-24 | End: 2020-03-24 | Stop reason: SDUPTHER

## 2020-03-24 RX ORDER — FENTANYL CITRATE 50 UG/ML
50 INJECTION, SOLUTION INTRAMUSCULAR; INTRAVENOUS EVERY 5 MIN PRN
Status: DISCONTINUED | OUTPATIENT
Start: 2020-03-24 | End: 2020-03-24 | Stop reason: HOSPADM

## 2020-03-24 RX ORDER — ONDANSETRON 2 MG/ML
4 INJECTION INTRAMUSCULAR; INTRAVENOUS
Status: DISCONTINUED | OUTPATIENT
Start: 2020-03-24 | End: 2020-03-24 | Stop reason: HOSPADM

## 2020-03-24 RX ORDER — POLYETHYLENE GLYCOL 3350 17 G/17G
17 POWDER, FOR SOLUTION ORAL DAILY PRN
Qty: 527 G | Refills: 1 | Status: SHIPPED | OUTPATIENT
Start: 2020-03-24 | End: 2020-04-23

## 2020-03-24 RX ADMIN — INSULIN LISPRO 2 UNITS: 100 INJECTION, SOLUTION INTRAVENOUS; SUBCUTANEOUS at 20:15

## 2020-03-24 RX ADMIN — PROPOFOL 50 MG: 10 INJECTION, EMULSION INTRAVENOUS at 08:10

## 2020-03-24 RX ADMIN — Medication 20 MG: at 07:52

## 2020-03-24 RX ADMIN — METOPROLOL TARTRATE 25 MG: 25 TABLET, FILM COATED ORAL at 20:14

## 2020-03-24 RX ADMIN — Medication 10 MG: at 07:51

## 2020-03-24 RX ADMIN — PHENOL 1 SPRAY: 1.4 SPRAY ORAL at 05:26

## 2020-03-24 RX ADMIN — FENTANYL CITRATE 25 MCG: 50 INJECTION, SOLUTION INTRAMUSCULAR; INTRAVENOUS at 10:10

## 2020-03-24 RX ADMIN — IPRATROPIUM BROMIDE 0.5 MG: 0.5 SOLUTION RESPIRATORY (INHALATION) at 16:49

## 2020-03-24 RX ADMIN — FLUCONAZOLE 200 MG: 200 TABLET ORAL at 18:01

## 2020-03-24 RX ADMIN — LIDOCAINE HYDROCHLORIDE 100 MG: 20 INJECTION, SOLUTION INTRAVENOUS at 07:44

## 2020-03-24 RX ADMIN — SODIUM CHLORIDE, PRESERVATIVE FREE 10 ML: 5 INJECTION INTRAVENOUS at 20:14

## 2020-03-24 RX ADMIN — FENTANYL CITRATE 100 MCG: 50 INJECTION, SOLUTION INTRAMUSCULAR; INTRAVENOUS at 07:44

## 2020-03-24 RX ADMIN — CEFAZOLIN 2000 MG: 1 INJECTION, POWDER, FOR SOLUTION INTRAMUSCULAR; INTRAVENOUS; PARENTERAL at 07:57

## 2020-03-24 RX ADMIN — POLYETHYLENE GLYCOL 3350 17 G: 17 POWDER, FOR SOLUTION ORAL at 11:38

## 2020-03-24 RX ADMIN — ARFORMOTEROL TARTRATE 15 MCG: 15 SOLUTION RESPIRATORY (INHALATION) at 16:43

## 2020-03-24 RX ADMIN — PHENOL 1 SPRAY: 1.4 SPRAY ORAL at 11:56

## 2020-03-24 RX ADMIN — IPRATROPIUM BROMIDE 0.5 MG: 0.5 SOLUTION RESPIRATORY (INHALATION) at 12:02

## 2020-03-24 RX ADMIN — BUDESONIDE 250 MCG: 0.25 INHALANT RESPIRATORY (INHALATION) at 16:36

## 2020-03-24 RX ADMIN — FLUCONAZOLE 200 MG: 200 TABLET ORAL at 11:38

## 2020-03-24 RX ADMIN — FENTANYL CITRATE 25 MCG: 50 INJECTION, SOLUTION INTRAMUSCULAR; INTRAVENOUS at 10:05

## 2020-03-24 RX ADMIN — DOCUSATE SODIUM 100 MG: 100 CAPSULE, LIQUID FILLED ORAL at 11:38

## 2020-03-24 RX ADMIN — FUROSEMIDE 40 MG: 40 TABLET ORAL at 11:39

## 2020-03-24 RX ADMIN — CEFTRIAXONE SODIUM 1 G: 1 INJECTION, POWDER, FOR SOLUTION INTRAMUSCULAR; INTRAVENOUS at 11:41

## 2020-03-24 RX ADMIN — FENTANYL CITRATE 25 MCG: 50 INJECTION, SOLUTION INTRAMUSCULAR; INTRAVENOUS at 10:20

## 2020-03-24 RX ADMIN — Medication 20 MG: at 07:54

## 2020-03-24 RX ADMIN — PHENOL 1 SPRAY: 1.4 SPRAY ORAL at 15:43

## 2020-03-24 RX ADMIN — POTASSIUM BICARBONATE 40 MEQ: 782 TABLET, EFFERVESCENT ORAL at 11:37

## 2020-03-24 RX ADMIN — METOPROLOL TARTRATE 25 MG: 25 TABLET, FILM COATED ORAL at 11:41

## 2020-03-24 RX ADMIN — LEVOTHYROXINE SODIUM 175 MCG: 150 TABLET ORAL at 05:25

## 2020-03-24 RX ADMIN — DOXAZOSIN MESYLATE 8 MG: 4 TABLET ORAL at 20:13

## 2020-03-24 RX ADMIN — FUROSEMIDE 40 MG: 40 TABLET ORAL at 20:14

## 2020-03-24 RX ADMIN — SODIUM CHLORIDE: 9 INJECTION, SOLUTION INTRAVENOUS at 07:35

## 2020-03-24 RX ADMIN — Medication 160 MG: at 07:44

## 2020-03-24 RX ADMIN — ONDANSETRON HYDROCHLORIDE 4 MG: 4 INJECTION, SOLUTION INTRAMUSCULAR; INTRAVENOUS at 09:23

## 2020-03-24 RX ADMIN — PROPOFOL 100 MG: 10 INJECTION, EMULSION INTRAVENOUS at 07:44

## 2020-03-24 RX ADMIN — DEXAMETHASONE SODIUM PHOSPHATE 8 MG: 4 INJECTION, SOLUTION INTRAMUSCULAR; INTRAVENOUS at 08:02

## 2020-03-24 RX ADMIN — Medication 1000 MG: at 18:10

## 2020-03-24 RX ADMIN — WARFARIN SODIUM 7.5 MG: 7.5 TABLET ORAL at 18:02

## 2020-03-24 RX ADMIN — DOCUSATE SODIUM 100 MG: 100 CAPSULE, LIQUID FILLED ORAL at 20:14

## 2020-03-24 ASSESSMENT — PAIN DESCRIPTION - PAIN TYPE
TYPE: SURGICAL PAIN
TYPE: SURGICAL PAIN

## 2020-03-24 ASSESSMENT — PULMONARY FUNCTION TESTS
PIF_VALUE: 25
PIF_VALUE: 31
PIF_VALUE: 27
PIF_VALUE: 0
PIF_VALUE: 24
PIF_VALUE: 25
PIF_VALUE: 23
PIF_VALUE: 27
PIF_VALUE: 27
PIF_VALUE: 23
PIF_VALUE: 27
PIF_VALUE: 25
PIF_VALUE: 8
PIF_VALUE: 24
PIF_VALUE: 0
PIF_VALUE: 28
PIF_VALUE: 26
PIF_VALUE: 25
PIF_VALUE: 0
PIF_VALUE: 27
PIF_VALUE: 25
PIF_VALUE: 27
PIF_VALUE: 0
PIF_VALUE: 10
PIF_VALUE: 0
PIF_VALUE: 25
PIF_VALUE: 25
PIF_VALUE: 26
PIF_VALUE: 25
PIF_VALUE: 27
PIF_VALUE: 28
PIF_VALUE: 25
PIF_VALUE: 0
PIF_VALUE: 0
PIF_VALUE: 24
PIF_VALUE: 26
PIF_VALUE: 23
PIF_VALUE: 24
PIF_VALUE: 1
PIF_VALUE: 30
PIF_VALUE: 24
PIF_VALUE: 29
PIF_VALUE: 25
PIF_VALUE: 26
PIF_VALUE: 24
PIF_VALUE: 0
PIF_VALUE: 24
PIF_VALUE: 24
PIF_VALUE: 0
PIF_VALUE: 1
PIF_VALUE: 23
PIF_VALUE: 25
PIF_VALUE: 27
PIF_VALUE: 22
PIF_VALUE: 1
PIF_VALUE: 0
PIF_VALUE: 24
PIF_VALUE: 0
PIF_VALUE: 24
PIF_VALUE: 27
PIF_VALUE: 36
PIF_VALUE: 24
PIF_VALUE: 8
PIF_VALUE: 27
PIF_VALUE: 24
PIF_VALUE: 31
PIF_VALUE: 30
PIF_VALUE: 23
PIF_VALUE: 25
PIF_VALUE: 24
PIF_VALUE: 25
PIF_VALUE: 28
PIF_VALUE: 24
PIF_VALUE: 23
PIF_VALUE: 27
PIF_VALUE: 25
PIF_VALUE: 23
PIF_VALUE: 24
PIF_VALUE: 23
PIF_VALUE: 24
PIF_VALUE: 1
PIF_VALUE: 28
PIF_VALUE: 31
PIF_VALUE: 24
PIF_VALUE: 23
PIF_VALUE: 24
PIF_VALUE: 22
PIF_VALUE: 24
PIF_VALUE: 26
PIF_VALUE: 23
PIF_VALUE: 0
PIF_VALUE: 24
PIF_VALUE: 31
PIF_VALUE: 24
PIF_VALUE: 23
PIF_VALUE: 25
PIF_VALUE: 24
PIF_VALUE: 25
PIF_VALUE: 23
PIF_VALUE: 31
PIF_VALUE: 23
PIF_VALUE: 23
PIF_VALUE: 9
PIF_VALUE: 27
PIF_VALUE: 29
PIF_VALUE: 25
PIF_VALUE: 0
PIF_VALUE: 23
PIF_VALUE: 24
PIF_VALUE: 25
PIF_VALUE: 37
PIF_VALUE: 25
PIF_VALUE: 31
PIF_VALUE: 22
PIF_VALUE: 24

## 2020-03-24 ASSESSMENT — PAIN SCALES - GENERAL
PAINLEVEL_OUTOF10: 6
PAINLEVEL_OUTOF10: 0
PAINLEVEL_OUTOF10: 3
PAINLEVEL_OUTOF10: 6
PAINLEVEL_OUTOF10: 0
PAINLEVEL_OUTOF10: 6

## 2020-03-24 ASSESSMENT — PAIN DESCRIPTION - LOCATION
LOCATION: PENIS
LOCATION: ABDOMEN

## 2020-03-24 ASSESSMENT — ENCOUNTER SYMPTOMS: SHORTNESS OF BREATH: 1

## 2020-03-24 NOTE — PLAN OF CARE
Noe Irizarry RN  Outcome: Ongoing  Note: Absence of physical injury. Problem: Musculor/Skeletal Functional Status  Goal: Highest potential functional level  3/24/2020 0144 by Noe Irizarry RN  Outcome: Ongoing  Note: Highest potential functional level on my shift. Pt verbalizes understanding of care plan. Pt contributes to goal settings.

## 2020-03-24 NOTE — PROGRESS NOTES
Laney Gandhi 60  PHYSICAL THERAPY MISSED TREATMENT NOTE  ACUTE CARE    Date: 3/24/2020  Patient Name: Edis Samayoa        MRN: [id number]   : 1931  (80 y.o.)  Gender: male   Referring Practitioner: Sy Chaparro PA-C  Referring Practitioner: Sy Chaparro PA-C  Diagnosis: urinary retention   Diagnosis: Urinary retention         REASON FOR MISSED TREATMENT:  Missed Treat. Pt just getting back to the floor from surgery, will check back as time allows.

## 2020-03-24 NOTE — PROGRESS NOTES
docusate    PLAN   1. S/p cystoscopy today. Urology enquired about discontinuation of Lovenox. Ok with discontinuation. Continue to monitor INR. Plan to discharge when Urology is ok with discharge      Anticipated Discharge in :  TBD  Code Status: Limited    Electronically signed by Nuha Hansen MD on 3/24/2020 at 2:58 PM      Chief Complaint     Dislodgement of indwelling Billy catheter with subsequent pain    SUBJECTIVE     The patient is an 80 y.o. male w/ PMH of BPH with chronic indwelling Billy catheter who was recently seen in Dr. Andreas Rosa clinic for evaluation of urinary retention- as a result his Cardura dose was doubled and he continued his Billy catheter with a PVER to be done as an outpatient in 3 weeks. At the time of admission on 3/11/2020, his Billy catheter became displaced. As a result he came into the ED to be evaluated. He was also having significant pain and mentioned symptoms of increased urgency, frequency and dysuria. He was then admitted and had a TURP performed on 3/17. He has since had a voiding trial which he failed and his Billy catheter was re-inserted with gross hematuria noted today. CBI was initiated mostly because the patient is currently on an enoxaparin to Coumadin bridge. He is also constipated.       -No acute issues. Still having blood clots around catheter site. Had cystoscopy today with no evidence for active bleeding.      OBJECTIVE     Medications:  Reviewed    Infusion Medications    dextrose       Scheduled Medications    potassium bicarb-citric acid  40 mEq Oral Daily    warfarin  7.5 mg Oral Once    magnesium hydroxide  30 mL Oral Once    ipratropium  0.5 mg Nebulization TID    phenol  1 spray Mouth/Throat TID AC    warfarin (COUMADIN) daily dosing (placeholder)   Other RX Placeholder    cefTRIAXone (ROCEPHIN) IV  1 g Intravenous Q24H    enoxaparin  1 mg/kg Subcutaneous Q12H    polyethylene glycol  17 g Oral Daily    docusate sodium  100 mg Oral BID    sodium chloride flush  10 mL Intravenous 2 times per day    Arformoterol Tartrate  15 mcg Nebulization BID    budesonide  250 mcg Nebulization BID    furosemide  40 mg Oral BID    metoprolol tartrate  25 mg Oral BID    [Held by provider] lisinopril  5 mg Oral Daily    levothyroxine  175 mcg Oral Daily    fluconazole  200 mg Oral Daily    insulin lispro  0-18 Units Subcutaneous TID     insulin lispro  0-9 Units Subcutaneous Nightly    doxazosin  8 mg Oral Nightly    [Held by provider] metFORMIN  1,000 mg Oral BID      PRN Meds: albuterol, oxybutynin, menthol, potassium chloride **OR** potassium alternative oral replacement **OR** potassium chloride, QUEtiapine, diphenhydrAMINE, melatonin, opium-belladonna, sodium chloride flush, acetaminophen **OR** acetaminophen, polyethylene glycol, promethazine **OR** ondansetron, glucose, dextrose, glucagon (rDNA), dextrose    Ins and outs:      Intake/Output Summary (Last 24 hours) at 3/24/2020 1458  Last data filed at 3/24/2020 1040  Gross per 24 hour   Intake 1550 ml   Output 1325 ml   Net 225 ml       Physical Examination     BP (!) 118/59   Pulse 75   Temp 98.4 °F (36.9 °C) (Oral)   Resp 18   Ht 5' 7\" (1.702 m)   Wt 258 lb 3.2 oz (117.1 kg)   SpO2 94%   BMI 40.44 kg/m²     General appearance: No apparent distress. HEENT: Extraocular motion intact. Patient's speech does not seem dysarthric  Neck: Supple  Respiratory:  CTA bilaterally without rales/wheezes/rhonchi. Cardiovascular: RRR with normal S1/S2 without murmurs, rubs or gallops. Abdomen: Soft, non-tender, distended with audible bowel sounds. Musculoskeletal: No weakness at the left lower extremity  Neurologic: No weakness at left lowe extremity. No focal deficits identified. CN: II-XII intact. Psychiatric: Alert and oriented  Vascular: Dorsalis pedis palpable bilaterally. Radial pulses palpable bilaterally. No peripheral edema  Skin:  No visible rashes or lesions.   Billy catheter in place arteries with 36% stenosis on the right and 59% stenosis on the left by NASCET criteria. **This report has been created using voice recognition software. It may contain minor errors which are inherent in voice recognition technology. ** Final report electronically signed by Dr. José Miguel Clifton MD on 3/19/2020 11:04 AM    Xr Chest Standard (2 Vw)    Result Date: 3/19/2020  PROCEDURE: XR CHEST (2 VW) CLINICAL INFORMATION: hypoxia, cough COMPARISON: 3/11/2020 TECHNIQUE:  AP and lateral chest x-ray  FINDINGS: The heart size is within the upper limits of normal. There is mild elevation of the left hemidiaphragm. No definite focal consolidation is seen. There is improved aeration of the right lung base when compared to prior examination. No pleural effusion or pneumothorax is seen. No acute osseous findings are demonstrated. 1. No definite focal consolidation is seen. There is improved aeration of the right lung base when compared to prior examination. **This report has been created using voice recognition software. It may contain minor errors which are inherent in voice recognition technology. ** Final report electronically signed by Dr. Ugo Fernandez on 3/19/2020 10:46 AM    Xr Chest Standard (2 Vw)    Result Date: 3/11/2020  PROCEDURE: XR CHEST (2 VW) CLINICAL INFORMATION: Cough COMPARISON: 3/16/2019 TECHNIQUE: AP upright and lateral views of the chest were obtained. 1. 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 2. Mild cardiac megaly. Metallic sternotomy sutures and vascular clips from prior surgery. 3. Tiny bilateral pleural effusions. Mild bibasilar atelectasis/pneumonia. **This report has been created using voice recognition software. It may contain minor errors which are inherent in voice recognition technology. ** Final report electronically signed by Dr. Reshma Pena on 3/11/2020 1:22 PM    Ct Head Wo Contrast (code Stroke)    Result Date: 3/19/2020  CT SCAN OF THE BRAIN WITHOUT CONTRAST CLINICAL INFORMATION: Left lower extremity weakness. COMPARISON: 3/11/2020 TECHNIQUE:  Scans were obtained from the base of the skull to the vertex without IV contrast. All CT scans at this facility use dose modulation, iterative reconstruction, and/or weight-based dosing when appropriate to reduce radiation dose to as low as reasonably achievable. FINDINGS:  No acute intracranial hemorrhage or mass effect is seen. There is mild diffuse atrophy. There is moderate patchy periventricular and subcortical white matter hypoattenuation likely demonstrating sequela from moderate chronic small vessel ischemic changes. There is no midline shift. The basal cisterns and posterior fossa appear within normal limits. Mineralization at the level of the basal ganglia are demonstrated. This is unchanged from prior examination. The visualized paranasal sinuses and mastoid air cells appear well-pneumatized. Parasellar carotid artery vascular calcifications are noted. The visualized globes and orbits appear grossly intact. No acute abnormalities of the calvarium are seen. 1. No acute intracranial hemorrhage or mass effect. 2. Mild diffuse atrophy. 3. Moderate chronic small vessel ischemic changes. **This report has been created using voice recognition software. It may contain minor errors which are inherent in voice recognition technology. ** Final report electronically signed by Dr. Cheryl Cai on 3/19/2020 10:38 AM    Ct Head Wo Contrast    Result Date: 3/11/2020  PROCEDURE: CT HEAD WO CONTRAST CLINICAL INFORMATION: confusion. COMPARISON: No prior study. TECHNIQUE: Noncontrast 5 mm axial images were obtained through the brain. All CT scans at this facility use dose modulation, iterative reconstruction, and/or weight-based dosing when appropriate to reduce radiation dose to as low as reasonably achievable. FINDINGS: Generalized volume loss and small vessel ischemic changes. No acute hemorrhage or midline shift.  Ventricles are within normal limits. Paranasal sinuses are clear. Mastoid air cells are patent. Skull: Unremarkable. Soft tissues: Unremarkable. No acute intracranial findings. **This report has been created using voice recognition software. It may contain minor errors which are inherent in voice recognition technology. ** Final report electronically signed by Dr. Elidia Moncada on 3/11/2020 1:34 PM    Cta Neck W Wo Contrast (code Stroke Nihss 4 Or Above)    Result Date: 3/19/2020  PROCEDURE: CTA HEAD W WO CONTRAST, CTA NECK W WO CONTRAST CLINICAL INFORMATION: left lower extremity weakness . Sudden onset. COMPARISON: CT head from the same date and 3/11/2020. TECHNIQUE: Helical CT from the aortic arch through the head in arterial phase during intravenous administration of 80 mL Isovue-370 injected in the left forearm with multiplanar reconstructions to include volumetric maximum intensity projection sequences. FINDINGS: CTA HEAD: There is mural calcification in the cavernous and clinoid segments of internal carotid arteries with areas of mild stenosis. No aneurysmal dilatation is identified. The bilateral middle cerebral and anterior cerebral arteries are patent without focal abnormality identified. The basilar artery is patent without focal stenosis or visualized aneurysm. The P1 segment of the left posterior cerebral artery is small likely on the basis of normal anatomic variation. The P2 segment is patent with contribution  from a left posterior communicating artery. The right posterior true artery is patent without focal abnormality. Superior cerebellar arteries appear patent. Dural venous sinuses appear patent without focal filling defect. No focal areas of abnormal parenchymal enhancement are identified. CTA NECK: There is a typical 3 vessel arch. The brachiocephalic and left subclavian arteries are patent without flow-limiting stenosis. The common carotid arteries are patent without focal stenosis.  There is calcified mural plaque at the carotid bulbs bilaterally and tandem areas of mural calcification in the proximal internal carotid arteries, left greater than right. On the right side, the narrowest luminal diameter in the proximal right internal carotid artery is 2.8 mm with a point more distal, where the walls are parallel, measuring 4.4 mm. On the left side, the narrowest luminal diameter measures 1.8 mm with a point more distal, where the walls are parallel, measuring 4.4 mm. Mid and distal cervical segments of internal carotid arteries are patent without focal stenosis. The vertebral arteries are codominant. They are patent throughout their course without focal stenosis. There is streak artifact from dental amalgam which partially obscures oral and perioral soft tissues. Given this caveat, mucosal surfaces of the aerodigestive tract are symmetric without focal nodular thickening or visualized mass. No cervical lymphadenopathy is identified. There is a small lymph node in the left parotid gland. Parotid glands are otherwise unremarkable. Submandibular glands are unremarkable. Thyroid gland is normal in appearance. Visualized portions of the lungs are clear. There are mild-to-moderate degenerative changes of the cervical spine. 1. Mural calcifications in the cavernous and clinoid segments of internal carotid arteries with areas of mild stenosis. 2. Calcified mural plaque at the carotid bulbs and proximal bilateral internal carotid arteries with 36% stenosis on the right and 59% stenosis on the left by NASCET criteria. **This report has been created using voice recognition software. It may contain minor errors which are inherent in voice recognition technology. ** Final report electronically signed by Dr. Leander Machuca MD on 3/19/2020 11:04 AM    Ct Abdomen Pelvis W Iv Contrast Additional Contrast? None    Result Date: 3/11/2020  PROCEDURE: CT ABDOMEN PELVIS W IV CONTRAST CLINICAL INFORMATION: abdominal pain . COMPARISON: March 5, 2020 TECHNIQUE: 5 mm axial CT images were obtained through the abdomen and pelvis after the administration of intravenous and oral contrast. Coronal and sagittal reconstructions were obtained. All CT scans at this facility use dose modulation, iterative reconstruction, and/or weight-based dosing when appropriate to reduce radiation dose to as low as reasonably achievable. FINDINGS: Lung bases are clear. Mild fatty infiltration of the liver. Pancreas gallbladder adrenal glands spleen and kidneys are unremarkable no hydronephrosis. Normal caliber abdominal aorta. Atherosclerotic changes. Scattered stool in the colon. Multiple colonic diverticuli. No evidence for diverticulitis. Decompressed sigmoid colonic loop closely abutting a decompressed bladder limiting evaluation Normal appendix. There is prominent prostate gland with coarse calcifications. Correlation with serology is advised. Billy catheter bulb appears to be within a collapsed bladder. Correlation is advised. There is mild infiltration in the pelvis which is nonspecific. No acute osseous findings. Degenerative changes lumbar spine and pelvis. Infrarenal IVC filter. 1. Scattered stool in the colon. Multiple colonic diverticuli. No evidence for diverticulitis. 2. There is prominent prostate gland with coarse calcifications. Billy catheter bulb appears to be within a collapsed bladder. Decompressed sigmoid colonic loop closely abutting a decompressed bladder limiting evaluation. There is mild infiltration in the pelvis which is nonspecific. Correlation with urinalysis is advised. Underlying fistula cannot be entirely excluded. Appearance is similar to prior. **This report has been created using voice recognition software. It may contain minor errors which are inherent in voice recognition technology. ** Final report electronically signed by Dr. Jose Luis Tracey on 3/11/2020 1:42 PM    Ct Abdomen Pelvis W Iv Contrast Additional Contrast? None    Result Date: 3/5/2020  PROCEDURE: CT ABDOMEN PELVIS W IV CONTRAST CLINICAL INFORMATION: gross hematuria . COMPARISON: 3/16/2019 TECHNIQUE: 2-D multiplanar postcontrast images of the abdomen and pelvis Isovue-370 IV contrast. All CT scans at this facility use dose modulation, iterative reconstruction, and/or weight-based dosing when appropriate to reduce radiation dose to as low as reasonably achievable. FINDINGS: Lung bases Lung bases are clear undersurface the heart shows minimal coronary artery atherosclerotic calcifications. Abdomen pelvis The liver is unremarkable. Spleen is normal. The adrenals are normal. The pancreas is unremarkable. Gallbladder is normal. Kidneys enhance symmetrically. There is no hydronephrosis. There are small extrarenal pelves bilaterally. Aorta is heavily atherosclerotic. IVC filter is in place. No adenopathy is identified. Pelvis There is a large amount of stool in the colon compatible with constipation. There is  uncomplicated diverticulosis. There is a loop of sigmoid colon contiguous with the decompressed bladder which has a Billy catheter present there is no gas seen in the bladder which is difficult to assess due to the fact is completely decompressed. Prostate gland is enlarged and contains calcifications. There are no abnormal fluid collections within the abdomen. There are no acute abnormalities in the abdomen or pelvis. The urinary bladder is completely decompressed with a Billy catheter. There is a loop of sigmoid colon contiguous with the bladder. A fistula cannot be entirely excluded but there is  no evidence of that at this time **This report has been created using voice recognition software. It may contain minor errors which are inherent in voice recognition technology. ** Final report electronically signed by Dr. Marcial Vigil on 3/5/2020 11:44 AM    Mri Brain Wo Contrast    Result Date: 3/20/2020  PROCEDURE: MRI BRAIN WO CONTRAST INDICATION:  left leg weakness, dysarthria, stroke. COMPARISON: CT head from the same date and 3/11/2020. TECHNIQUE: Multiplanar and multiple spin echo MRI images were obtained of the brain without contrast. FINDINGS: There is moderate to severe volume loss. There are moderate patchy areas of T2/FLAIR prolongation in the periventricular, subcortical deep white matter. No intra or extra-axial mass is identified. No focal areas of restricted diffusion are present. The major vascular flow voids appear patent. Patient is status post right-sided lens extraction. Orbits are otherwise unremarkable. There is a mucous retention cyst in the left maxillary sinus. There are small mastoid effusions bilaterally. 1. No evidence of acute intracranial abnormality. 2. Moderate chronic microvascular angiopathy superimposed on moderate to severe volume loss. **This report has been created using voice recognition software. It may contain minor errors which are inherent in voice recognition technology. ** Final report electronically signed by Dr. Quynh Orozco MD on 3/20/2020 8:24 AM        DVT prophylaxis: [x] Lovenox                                 [] SCDs                                 [] SQ Heparin                                 [] Encourage ambulation           [x] Already on Anticoagulation     Disposition:    [x] Home       [] TCU       [] Rehab       [] Psych       [] SNF       [] [place]       [] Other-

## 2020-03-24 NOTE — PLAN OF CARE
Problem: Pain:  Goal: Pain level will decrease  Description: Pain level will decrease  3/24/2020 1434 by Heather Cruz RN  Outcome: Met This Shift  Note: Pt. Denies pain when asked. Pt. States his pain level is 0/10. Pain goal is to have no pain. Problem: Urinary Retention:  Goal: Able to perform urinary catheter care  Description: Able to perform urinary catheter care  3/24/2020 1434 by Heather Cruz RN  Outcome: Ongoing  Note: Ongoing Billy care, urine continues to be clear with CBI running.   3/24/2020 0144 by Rickey Montgomery RN  Outcome: Ongoing  Note: Pt unable to perform urinary catheter care on his own. Will help on my shift. Problem: Discharge Planning:  Goal: Discharged to appropriate level of care  Description: Discharged to appropriate level of care  3/24/2020 1434 by Heather Cruz RN  Outcome: Ongoing  Note: Pt. Plans discharge to nursing home. Social Work plans for [facility]. Problem: RESPIRATORY  Goal: Clear lung sounds  3/24/2020 1434 by Heather Cruz RN  Outcome: Ongoing  Note: Clear lung sounds noted, pt. Is not short of breath, pt. Receiving breathing treatments. Problem: DISCHARGE BARRIERS  Goal: Patient's continuum of care needs are met  3/24/2020 1434 by Heather Cruz RN  Outcome: Ongoing  Note: Social Work working with pt. To over come barriers to nursing home discharge. Problem: Cardiovascular  Goal: Hemodynamic stability  3/24/2020 1434 by Heather Cruz RN  Outcome: Ongoing  Note: Pt. Denies any chest pain, vital signs stable, telemetry monitoring no arrhythmias noted. Problem: Falls - Risk of:  Goal: Will remain free from falls  Description: Will remain free from falls  3/24/2020 1434 by Heather Cruz RN  Outcome: Ongoing  Note: Pt. Hasn't had any falls, bed alarm on, pt. Using call light when needed, and pt. Has 3/4 bed rails up.       Problem: Falls - Risk of:  Goal: Absence of physical injury  Description: Absence of physical injury  3/24/2020 1434 by Heather Cruz RN  Outcome: Ongoing  Note: Pt. Free from falls, no injuries noted. Problem: Musculor/Skeletal Functional Status  Goal: Highest potential functional level  3/24/2020 1434 by Dianna Velasquez RN  Outcome: Ongoing  Note: Pt. Working with PT and OT to improve mobility and function. Care plan reviewed with patient. Patient verbalizes understanding of the plan of care and contributes to goal setting.

## 2020-03-24 NOTE — TELEPHONE ENCOUNTER
Received call from Barry Vernon with Urology. She is asking if pt's Coumadin can be held for an additional 5 days to allow healing s/p TURP. Asked that she contact referring MD, Dr. Leoncio Vazquez. Asked that she notify [facility].

## 2020-03-24 NOTE — ANESTHESIA PRE PROCEDURE
Department of Anesthesiology  Preprocedure Note       Name:  Leo Graham   Age:  80 y.o.  :  1931                                          MRN:  [id number]         Date:  3/24/2020      Surgeon: Wilfrido Rivera):  Carol Mayes MD    Procedure: PLASMA BUTTON TURP WITH CLOT EVACUATION (N/A )    Medications prior to admission:   Prior to Admission medications    Medication Sig Start Date End Date Taking? Authorizing Provider   oxybutynin (DITROPAN) 5 MG tablet Take 1 tablet by mouth every 8 hours as needed (bladder spasms) Stop taking 48 hours prior to follow up appt.  3/12/20 3/15/20  MAEGAN Ordonez CNP   metFORMIN (GLUCOPHAGE) 1000 MG tablet Take 1,000 mg by mouth 2 times daily (with meals)    Historical Provider, MD   doxazosin (CARDURA) 8 MG tablet Take 1 tablet by mouth nightly 20   MAEGAN Lr CNP   albuterol sulfate  (90 Base) MCG/ACT inhaler Inhale 2 puffs into the lungs every 6 hours as needed for Wheezing 20   Sadia ClientYASHIRA   tiotropium (SPIRIVA RESPIMAT) 2.5 MCG/ACT AERS inhaler Inhale 2 puffs into the lungs daily 20   Sadia Client, YASHIRA   furosemide (LASIX) 40 MG tablet Take 40 mg by mouth 2 times daily  12/10/19   Historical Provider, MD   LANTUS SOLOSTAR 100 UNIT/ML injection pen INJECT SUBCUTANEOUSLY 18  UNITS NIGHTLY 1/3/20   MAEGAN Villanueva CNP   Cholecalciferol (VITAMIN D3 PO) Take 1 tablet by mouth daily     Historical Provider, MD   levothyroxine (SYNTHROID) 175 MCG tablet TAKE 1 TABLET BY MOUTH  DAILY 19   Nito Tang MD   lisinopril (PRINIVIL;ZESTRIL) 5 MG tablet Take 1 tablet by mouth daily 19   Nito Tang MD   potassium chloride (KLOR-CON M) 10 MEQ extended release tablet Take 10 mEq by mouth 2 times daily    Historical Provider, MD   SOFT TOUCH LANCETS MISC Use to check blood sugars 2 times daily 19   AlMAEGAN Rollins - CNP   warfarin (COUMADIN) 5 MG tablet Take as directed by [address] tabs = 90 days 10/7/19   Karen Lowery MD   Multiple Vitamins-Minerals (PRESERVISION AREDS 2+MULTI VIT) CAPS take 1 by Oral route 2 times every day 6/13/19   Historical Provider, MD   metoprolol tartrate (LOPRESSOR) 25 MG tablet Take 25 mg by mouth 2 times daily  8/5/19   Historical Provider, MD   blood glucose test strips (ACCU-CHEK CHE PLUS) strip TEST TWICE DAILY 7/30/19   Timoteo Meckel, APRN - CNP   Ferrous Sulfate (IRON) 325 (65 Fe) MG TABS Take by mouth 2 times daily     Historical Provider, MD   Arformoterol Tartrate (BROVANA) 15 MCG/2ML NEBU Take 1 ampule by nebulization 2 times daily Indications: Prevention of COPD Worsening     Historical Provider, MD   budesonide (PULMICORT) 0.25 MG/2ML nebulizer suspension Take 1 ampule by nebulization 2 times daily     Historical Provider, MD   OXYGEN Inhale 3 L into the lungs nightly Indications: Difficulty Breathing, Oxygen Therapy And prn    Historical Provider, MD       Current medications:    No current facility-administered medications for this visit. Current Outpatient Medications   Medication Sig Dispense Refill    oxybutynin (DITROPAN) 5 MG tablet Take 1 tablet by mouth every 8 hours as needed (bladder spasms) Stop taking 48 hours prior to follow up appt.  10 tablet 0     Facility-Administered Medications Ordered in Other Visits   Medication Dose Route Frequency Provider Last Rate Last Dose    magnesium hydroxide (MILK OF MAGNESIA) 400 MG/5ML suspension 30 mL  30 mL Oral Once John Perez MD        ipratropium (ATROVENT) 0.02 % nebulizer solution 0.5 mg  0.5 mg Nebulization TID Allison Luong MD   0.5 mg at 03/23/20 1813    albuterol (PROVENTIL) nebulizer solution 2.5 mg  2.5 mg Nebulization Q6H PRN Allison Luong MD        oxybutynin (DITROPAN) tablet 5 mg  5 mg Oral Q8H PRN MAEGAN Johnson CNP   5 mg at 03/20/20 1157   

## 2020-03-25 VITALS
WEIGHT: 258.2 LBS | HEIGHT: 67 IN | OXYGEN SATURATION: 95 % | RESPIRATION RATE: 18 BRPM | SYSTOLIC BLOOD PRESSURE: 130 MMHG | DIASTOLIC BLOOD PRESSURE: 60 MMHG | TEMPERATURE: 98 F | BODY MASS INDEX: 40.53 KG/M2 | HEART RATE: 59 BPM

## 2020-03-25 LAB
GLUCOSE BLD-MCNC: 142 MG/DL (ref 70–108)
GLUCOSE BLD-MCNC: 151 MG/DL (ref 70–108)
INR BLD: 2.05 (ref 0.85–1.13)
MISC. #1 REFERENCE GROUP TEST: NORMAL
PLATELET # BLD: 239 THOU/MM3 (ref 130–400)

## 2020-03-25 PROCEDURE — 94760 N-INVAS EAR/PLS OXIMETRY 1: CPT

## 2020-03-25 PROCEDURE — 36415 COLL VENOUS BLD VENIPUNCTURE: CPT

## 2020-03-25 PROCEDURE — 6370000000 HC RX 637 (ALT 250 FOR IP): Performed by: UROLOGY

## 2020-03-25 PROCEDURE — 85610 PROTHROMBIN TIME: CPT

## 2020-03-25 PROCEDURE — 85049 AUTOMATED PLATELET COUNT: CPT

## 2020-03-25 PROCEDURE — 6360000002 HC RX W HCPCS: Performed by: UROLOGY

## 2020-03-25 PROCEDURE — 6370000000 HC RX 637 (ALT 250 FOR IP): Performed by: PHARMACIST

## 2020-03-25 PROCEDURE — APPNB30 APP NON BILLABLE TIME 0-30 MINS: Performed by: NURSE PRACTITIONER

## 2020-03-25 PROCEDURE — 94640 AIRWAY INHALATION TREATMENT: CPT

## 2020-03-25 PROCEDURE — 97535 SELF CARE MNGMENT TRAINING: CPT

## 2020-03-25 PROCEDURE — 99024 POSTOP FOLLOW-UP VISIT: CPT | Performed by: NURSE PRACTITIONER

## 2020-03-25 PROCEDURE — 82948 REAGENT STRIP/BLOOD GLUCOSE: CPT

## 2020-03-25 PROCEDURE — 99239 HOSP IP/OBS DSCHRG MGMT >30: CPT | Performed by: FAMILY MEDICINE

## 2020-03-25 RX ORDER — ATROPA BELLADONNA AND OPIUM 16.2; 3 MG/1; MG/1
30 SUPPOSITORY RECTAL EVERY 8 HOURS PRN
Qty: 5 SUPPOSITORY | Refills: 0 | Status: ON HOLD | DISCHARGE
Start: 2020-03-25 | End: 2020-05-19 | Stop reason: HOSPADM

## 2020-03-25 RX ORDER — WARFARIN SODIUM 4 MG/1
4 TABLET ORAL ONCE
Status: COMPLETED | OUTPATIENT
Start: 2020-03-25 | End: 2020-03-25

## 2020-03-25 RX ORDER — OXYBUTYNIN CHLORIDE 5 MG/1
5 TABLET ORAL EVERY 8 HOURS PRN
Qty: 90 TABLET | Refills: 0 | DISCHARGE
Start: 2020-03-25 | End: 2020-09-25 | Stop reason: SDUPTHER

## 2020-03-25 RX ADMIN — METOPROLOL TARTRATE 25 MG: 25 TABLET, FILM COATED ORAL at 09:40

## 2020-03-25 RX ADMIN — BUDESONIDE 250 MCG: 0.25 INHALANT RESPIRATORY (INHALATION) at 06:27

## 2020-03-25 RX ADMIN — WARFARIN SODIUM 4 MG: 4 TABLET ORAL at 16:24

## 2020-03-25 RX ADMIN — POTASSIUM BICARBONATE 40 MEQ: 782 TABLET, EFFERVESCENT ORAL at 09:40

## 2020-03-25 RX ADMIN — IPRATROPIUM BROMIDE 0.5 MG: 0.5 SOLUTION RESPIRATORY (INHALATION) at 06:27

## 2020-03-25 RX ADMIN — LEVOTHYROXINE SODIUM 175 MCG: 150 TABLET ORAL at 05:57

## 2020-03-25 RX ADMIN — Medication 1000 MG: at 09:41

## 2020-03-25 RX ADMIN — IPRATROPIUM BROMIDE 0.5 MG: 0.5 SOLUTION RESPIRATORY (INHALATION) at 12:08

## 2020-03-25 RX ADMIN — PHENOL 1 SPRAY: 1.4 SPRAY ORAL at 05:57

## 2020-03-25 RX ADMIN — ARFORMOTEROL TARTRATE 15 MCG: 15 SOLUTION RESPIRATORY (INHALATION) at 06:27

## 2020-03-25 ASSESSMENT — PAIN SCALES - GENERAL
PAINLEVEL_OUTOF10: 0
PAINLEVEL_OUTOF10: 0

## 2020-03-25 NOTE — PROGRESS NOTES
Forced sexual activity: Not on file   Other Topics Concern    Not on file   Social History Narrative    Not on file     Family History   Problem Relation Age of Onset    Other Mother         Complications of Childbirth    Other Father [de-identified]        Natural causes     Allergies   Allergen Reactions   Loida Hinds prefers pt not use Macrobid due to pulmonary side effects.  Levaquin [Levofloxacin] Other (See Comments)     Redness/flushing    Adhesive Tape Rash    Alphagan [Brimonidine Tartrate] Hives     Eyes red         Constitutional: Alert and oriented times x3, no acute distress, and cooperative to examination with appropriate mood and affect. HEENT:   Head:         Normocephalic and atraumatic. Mucous membranes are normal.   Eyes:         EOM are normal. No scleral icterus. Nose:    The external appearance of the nose is normal  Ears: The ears appear normal to external inspection. Cardiovascular:       Normal rate, regular rhythm. Pulmonary/Chest:  Normal respiratory rate and rhthym. No use of accessory muscles. Lungs clear bilaterally. Abdominal:          Soft. No tenderness. Active bowel sounds. Genitalia:    Billy catheter draining clear urine, CBI running slowly  Musculoskeletal:    Normal range of motion. He exhibits no edema or tenderness of lower extremities. Extremities:    No cyanosis, clubbing, or edema present. Neurological:    Alert and oriented.      Labs:  WBC:    Lab Results   Component Value Date    WBC 8.6 03/24/2020     Hemoglobin/Hematocrit:    Lab Results   Component Value Date    HGB 10.9 03/24/2020    HCT 34.1 03/24/2020     BMP:    Lab Results   Component Value Date     03/24/2020    K 3.4 03/24/2020    K 3.3 03/13/2020    CL 98 03/24/2020    CO2 28 03/24/2020    BUN 12 03/24/2020    LABALBU 4.1 03/11/2020    LABALBU 4.3 11/30/2011    CREATININE 0.7 03/24/2020    CALCIUM 8.8 03/24/2020    LABGLOM >90 03/24/2020 Impression:  BPH with Urinary retention  S/p Greenlight TURP 3/17/20  UTI - E. Coli  Recent candiduria   Hx of DVT    Plan:    POD # 1 plasma button TURP with Dr. Annmarie Farley. CBI running slow, urine clear. Denies pain. Tolerating diet. Will stop the CBI. Keep felipe catheter in at discharge. Will plan on following up in the office in 2-3 weeks with Dr. Annmarie Farley. Okay for discharge from Urology standpoint. Call us with any questions or concerns.     MAEGAN Melissa-CNP  03/25/20 10:57 AM  Urology

## 2020-03-25 NOTE — PROGRESS NOTES
[facility] 8B  Occupational Therapy  Daily Note  Time:   Time In: 5347  Time Out: 0919  Timed Code Treatment Minutes: 40 Minutes  Minutes: 40          Date: 3/25/2020  Patient Name: Charlie Ramirez,   Gender: male      Room: -29/029-A  MRN: [id number]  : 1931  (80 y.o.)  Referring Practitioner: Sharri Hathaway PA-C  Diagnosis: urinary retention   Additional Pertinent Hx: 81 y/o male admitted with urinary retention secondary to BPH. planning TURP 3-17    Restrictions/Precautions:  Restrictions/Precautions: Up as Tolerated    SUBJECTIVE: received in chair, min confusion noted at times, cooperative to ADLs    PAIN: Discomfort at catheter site    COGNITION: Slow Processing, Decreased Insight, Inattention, Decreased Problem Solving, Decreased Safety Awareness and Impulsive    ADL:   Grooming: Stand By Assistance. standing sinkside, cues for safety  Bathing: Minimal Assistance, with set-up, with verbal cues  and with increased time for completion. standing and sitting sinkside, cues for safety throughout and increasing independence  Upper Extremity Dressing: Minimal Assistance. changing gowns  Lower Extremity Dressing: Maximum Assistance. donning socks, CGA doffing  Toilet Transfer: Contact Guard Assistance and with verbal cues . STS. BALANCE:  Sitting Balance:  Stand By Assistance. deep dynamic reaching otherwise SUP d/t impulsivity  Standing Balance: Stand By Assistance. x12 mins    TRANSFERS:  Sit to Stand:  Stand By Assistance. Stand to Sit: [address], with verbal cues. FUNCTIONAL MOBILITY:  Assistive Device: Rolling Walker  Assist Level:  Stand By Assistance. Distance: To and from bathroom     ASSESSMENT:     Activity Tolerance:  Patient tolerance of  treatment: good.         Discharge Recommendations: Continue to assess pending progress, Patient would benefit from continued therapy after discharge  Equipment Recommendations: Equipment Needed:

## 2020-03-25 NOTE — PROGRESS NOTES
[facility] THERAPY MISSED TREATMENT NOTE  [facility] MED SURG 8B      Date: 3/25/2020  Patient Name: Edis Bae        MRN: [id number]    : 1931  (80 y.o.)    REASON FOR MISSED TREATMENT: Patient respectfully declined skilled ST session on this date. Per pt and RN, he plans to d/c home within the next hour. The pt stated he would prefer to prepare for his son to pick him up, requesting clothes and RN assistance with contacting his son. RN notified. St will re-attempt as medically appropriate and/or available. [place] Barbaraann Homans MA., BQP-NIS

## 2020-03-25 NOTE — OP NOTE
Patient:  Curt Siegel  MRN: [id number]  YOB: 1931    FACILITY: [facility]    DATE: 3/24/2020    SURGEON: Corey Ramires MD     ASSISTANT: none    PREOPERATIVE DIAGNOSIS: gross hematuria    POSTOPERATIVE DIAGNOSIS: gross hematuria    PROCEDURE PERFORMED: PLASMA BUTTON TURP WITH CLOT EVACUATION    ANESTHESIA: General    ESTIMATED BLOOD LOSS:  5 ml    COMPLICATIONS: None immediate    DRAINS: [address] urethral felipe    SPECIMENS: none    INDICATIONS FOR PROCEDURE:  The patient is a 80 y.o. male who presents today with gross hematuria here for PLASMA BUTTON TURP WITH CLOT EVACUATION. After risks, benefits and alternatives of the procedure were discussed with the patient, the patient elected to proceed. DETAILS OF THE PROCEDURE:  The patient was correctly identified in the preoperative holding area. he was brought back to the operating room and placed in the dorsal lithotomy position. EPC cuffs were on, in place, and fully functional. General endotracheal anesthesia was administered. he was given antibiotic prophylaxis. The patient was then prepped and draped in the usual sterile fashion. After appropriate time-out was performed with all parties agreeing, the visual obturator was inserted into the bladder. A thorough and complete cystoscopy was then performed which showed clots in the bladder that were evacuated with an iWantoo evacuator and catheter tip syringe. After removing this, we noted some residual anterior prostate adenoma as well some bleeding of the prostate bed. The resectoscope was then inserted and resected anterior adenoma that was ball valving. a lot of this was organized clot that was layered over the top of the adenoma. We then used a buttonto make sure all bleeding areas were fulgurated and hemostasis was visualized. The bladder was then drained and the cystoscope was removed. A 22Fr 3-way catheter  catheter was inserted.   The patient tolerated the procedure well and was sent to PACU for postoperative monitoring. DISPOSITION:  The patient was sent to the floor for post operative monitoring      Follow up: Will arrange appropriate follow up.

## 2020-03-25 NOTE — DISCHARGE SUMMARY
Hospitalist Discharge Summary      Care transferred to me as of 3/25/2020 the day of discharge. Patient: Curt Siegel  YOB: 1931  MRN: [id number]   Acct: [de-identified]    Primary Care Physician: Clare Crum MD    Admit date  3/11/2020    Discharge date:  3/25/2020 2:47 PM  Discharge Diagnoses: Active Hospital Problems    Diagnosis Date Noted    Acquired hypothyroidism [E03.9] 02/08/2012     Priority: Medium    Gross hematuria [R31.0]     Sore throat [J02.9]     Constipation [K59.00]     Weakness of left lower extremity [R29.898]     Pill dysphagia [R13.10]     Chronic indwelling Billy catheter [Z96.0]     Urinary tract infection associated with indwelling urethral catheter ([facility] [T83.511A, N39.0]     Hypokalemia [E87.6]     Altered mental status [R41.82]     Urine retention [R33.9] 03/12/2020    Urinary retention [R33.9] 03/11/2020    Morbid obesity ([facility] [E66.01] 11/04/2019    Chronic diastolic CHF (congestive heart failure) (HCC) [I50.32] 10/03/2019    Paroxysmal atrial fibrillation ([facility] [I48.0] 12/15/2017    Chronic obstructive pulmonary disease ([facility] [J44.9]     History of DVT (deep vein thrombosis) [Z86.718] 11/09/2015    Diabetes mellitus ([facility] [E11.9]     Hyperlipidemia [E78.5]        Code Status:  Limited     Chief Complaint on presentation :-Billy catheter pain      Initial admission HPI as below:-  this is a 60-year-old gentleman with past medical history as described below who has indwelling Billy catheter placed for his BPH for which he follows Ayse Small the urologist as outpatient. Recently was seen by urologist in the clinic and his Cardura dose was doubled and was asked to continue his Billy catheter with a PVR to be done as outpatient in 3 weeks time. Today his Billy catheter was displaced which caused a lot of pain that brought him to the ER. He does have frequency, urgency and dysuria.   The Billy catheter was readjusted in the ER which made the without obvious deformity. Pupils equal, round, and reactive to light. Extra ocular muscles intact. Conjunctivae/corneas clear. 3. Neck: Supple, with full range of motion. No jugular venous distention. Trachea midline. 4. Respiratory:  Normal respiratory effort. Clear to auscultation, bilaterally without Rales/Wheezes/Rhonchi. 5. Cardiovascular: Regular rate and rhythm with normal S1/S2 without murmurs, rubs or gallops. 6. Abdomen: Soft, creased abdominal girth, non-tender, non-distended with normal bowel sounds. 7. Musculoskeletal:  No clubbing, cyanosis or edema bilaterally. 8. Skin: Skin color, texture, turgor normal.  No rashes or lesions. 9. Neurologic:  Neurovascularly intact without any focal sensory/motor deficits. Cranial nerves: II-XII intact, grossly non-focal.  10. Psychiatric: Alert and oriented, thought content appropriate, normal insight  11. Capillary Refill: Brisk,< 3 seconds   12. Peripheral Pulses: +2 palpable, equal bilaterally   Billy catheter in place at discharge. Discharge Medications:-      Medication List      START taking these medications    cefdinir 300 MG capsule  Commonly known as:  OMNICEF  Take 1 capsule by mouth 2 times daily for 7 days     docusate 100 MG Caps  Commonly known as:  COLACE, DULCOLAX  Take 100 mg by mouth 2 times daily Prescription to be refilled by primary care physician     opium-belladonna 16.2-30 MG suppository  Commonly known as:  B&O SUPPRETTES  Place 1 suppository rectally every 8 hours as needed for Pain (bladder spasms) for up to 3 days. * oxybutynin 5 MG tablet  Commonly known as:  DITROPAN  Take 1 tablet by mouth every 8 hours as needed (bladder spasms) Stop taking 48 hours prior to follow up appt.      * oxybutynin 5 MG tablet  Commonly known as:  DITROPAN  Take 1 tablet by mouth every 8 hours as needed (bladder spasms)     polyethylene glycol packet  Commonly known as:  GLYCOLAX  Take 17 g by mouth daily as needed for Constipation potassium chloride 10 MEQ extended release tablet  Commonly known as:  KLOR-CON M  TAKE 2 TABLETS BY MOUTH TWICE DAILY         * This list has 2 medication(s) that are the same as other medications prescribed for you. Read the directions carefully, and ask your doctor or other care provider to review them with you. CHANGE how you take these medications    metFORMIN 1000 MG tablet  Commonly known as:  GLUCOPHAGE  What changed:  Another medication with the same name was removed. Continue taking this medication, and follow the directions you see here. CONTINUE taking these medications    albuterol sulfate  (90 Base) MCG/ACT inhaler  Inhale 2 puffs into the lungs every 6 hours as needed for Wheezing     blood glucose test strips strip  Commonly known as:  Accu-Chek Meryl Plus  TEST TWICE DAILY     Brovana 15 MCG/2ML Nebu  Generic drug:  Arformoterol Tartrate     doxazosin 8 MG tablet  Commonly known as:  Cardura  Take 1 tablet by mouth nightly     furosemide 40 MG tablet  Commonly known as:  LASIX     Iron 325 (65 Fe) MG Tabs     Lantus SoloStar 100 UNIT/ML injection pen  Generic drug:  insulin glargine  INJECT SUBCUTANEOUSLY 18  UNITS NIGHTLY     levothyroxine 175 MCG tablet  Commonly known as:  SYNTHROID  TAKE 1 TABLET BY MOUTH  DAILY     lisinopril 5 MG tablet  Commonly known as:  PRINIVIL;ZESTRIL  Take 1 tablet by mouth daily     metoprolol tartrate 25 MG tablet  Commonly known as:  LOPRESSOR     OXYGEN     PreserVision AREDS 2+Multi Vit Caps     Pulmicort 0.25 MG/2ML nebulizer suspension  Generic drug:  budesonide     SOFT TOUCH LANCETS Misc  Use to check blood sugars 2 times daily     tiotropium 2.5 MCG/ACT Aers inhaler  Commonly known as:  Spiriva Respimat  Inhale 2 puffs into the lungs daily     VITAMIN D3 PO     warfarin 5 MG tablet  Commonly known as:  COUMADIN  Take as directed. If you are unsure how to take this medication, talk to your nurse or doctor.   Original instructions: 03/24/20  7:59 PM   Result Value Ref Range    POC Glucose 181 (H) 70 - 108 mg/dl   Protime-INR    Collection Time: 03/25/20  3:54 AM   Result Value Ref Range    INR 2.05 (H) 0.85 - 1.13   Platelet count    Collection Time: 03/25/20  3:54 AM   Result Value Ref Range    Platelets 783 879 - 003 thou/mm3   POCT glucose    Collection Time: 03/25/20  9:01 AM   Result Value Ref Range    POC Glucose 142 (H) 70 - 108 mg/dl   POCT glucose    Collection Time: 03/25/20 12:26 PM   Result Value Ref Range    POC Glucose 151 (H) 70 - 108 mg/dl        Microbiology:    Blood culture #1:   Lab Results   Component Value Date    BC No growth-preliminary No growth  03/11/2020       Blood culture #2:No results found for: BLOODCULT2    Organism:    Lab Results   Component Value Date    LABGRAM  04/05/2019     Few segmented neutrophils observed. Rare epithelial cells observed. Few gram positive cocci occurring singly and in pairs. Rare gram negative bacilli. MRSA culture only:No results found for: [facility]    Urine culture:   Lab Results   Component Value Date    LABURIN  03/10/2020     No growth-preliminary Mixed growth. The mixture of organisms present represents both organisms that may cause urinary tract infections and organisms that are not a common cause of urinary tract infections and are possibly skin tree or distal urethral tree.       Lab Results   Component Value Date    ORG Escherichia coli 03/11/2020        Respiratory culture: No results found for: CULTRESP    Aerobic and Anaerobic :  No results found for: LABAERO  No results found for: LABANAE    Urinalysis:      Lab Results   Component Value Date    NITRU POSITIVE 03/11/2020    WBCUA 10-15 03/11/2020    BACTERIA MODERATE 03/11/2020    RBCUA > 100 03/11/2020    BLOODU LARGE 03/11/2020    SPECGRAV 1.025 01/31/2020    SPECGRAV 1.016 03/15/2019    GLUCOSEU NEGATIVE 03/11/2020       Radiology:-  Xr Chest Standard (2 Vw)    Result Date: 3/11/2020  PROCEDURE: XR CHEST (2 VW) CLINICAL INFORMATION: Cough COMPARISON: 3/16/2019 TECHNIQUE: AP upright and lateral views of the chest were obtained. 1. 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 2. Mild cardiac megaly. Metallic sternotomy sutures and vascular clips from prior surgery. 3. Tiny bilateral pleural effusions. Mild bibasilar atelectasis/pneumonia. **This report has been created using voice recognition software. It may contain minor errors which are inherent in voice recognition technology. ** Final report electronically signed by Dr. Mami Frausto on 3/11/2020 1:22 PM    Ct Head Wo Contrast    Result Date: 3/11/2020  PROCEDURE: CT HEAD WO CONTRAST CLINICAL INFORMATION: confusion. COMPARISON: No prior study. TECHNIQUE: Noncontrast 5 mm axial images were obtained through the brain. All CT scans at this facility use dose modulation, iterative reconstruction, and/or weight-based dosing when appropriate to reduce radiation dose to as low as reasonably achievable. FINDINGS: Generalized volume loss and small vessel ischemic changes. No acute hemorrhage or midline shift. Ventricles are within normal limits. Paranasal sinuses are clear. Mastoid air cells are patent. Skull: Unremarkable. Soft tissues: Unremarkable. No acute intracranial findings. **This report has been created using voice recognition software. It may contain minor errors which are inherent in voice recognition technology. ** Final report electronically signed by Dr. Jaja Valdez on 3/11/2020 1:34 PM    Ct Abdomen Pelvis W Iv Contrast Additional Contrast? None    Result Date: 3/11/2020  PROCEDURE: CT ABDOMEN PELVIS W IV CONTRAST CLINICAL INFORMATION: abdominal pain . COMPARISON: March 5, 2020 TECHNIQUE: 5 mm axial CT images were obtained through the abdomen and pelvis after the administration of intravenous and oral contrast. Coronal and sagittal reconstructions were obtained.  All CT scans at this facility use dose modulation, iterative reconstruction, Hospice [] Pain management   [x]    []TCU   [x] PT/OT  OTHERS:-     Disposition: SNF  Condition at Discharge: guardid    Discharge Medications:      Keith Phillips   Home Medication Instructions LDS:[id number]    Printed on:03/25/20 7176   Medication Information                      albuterol sulfate  (90 Base) MCG/ACT inhaler  Inhale 2 puffs into the lungs every 6 hours as needed for Wheezing             Arformoterol Tartrate (BROVANA) 15 MCG/2ML NEBU  Take 1 ampule by nebulization 2 times daily Indications: Prevention of COPD Worsening              blood glucose test strips (ACCU-CHEK CHE PLUS) strip  TEST TWICE DAILY             budesonide (PULMICORT) 0.25 MG/2ML nebulizer suspension  Take 1 ampule by nebulization 2 times daily              cefdinir (OMNICEF) 300 MG capsule  Take 1 capsule by mouth 2 times daily for 7 days             Cholecalciferol (VITAMIN D3 PO)  Take 1 tablet by mouth daily              docusate sodium (COLACE, DULCOLAX) 100 MG CAPS  Take 100 mg by mouth 2 times daily Prescription to be refilled by primary care physician             doxazosin (CARDURA) 8 MG tablet  Take 1 tablet by mouth nightly             Ferrous Sulfate (IRON) 325 (65 Fe) MG TABS  Take by mouth 2 times daily              furosemide (LASIX) 40 MG tablet  Take 40 mg by mouth 2 times daily              LANTUS SOLOSTAR 100 UNIT/ML injection pen  INJECT SUBCUTANEOUSLY 18  UNITS NIGHTLY             levothyroxine (SYNTHROID) 175 MCG tablet  TAKE 1 TABLET BY MOUTH  DAILY             lisinopril (PRINIVIL;ZESTRIL) 5 MG tablet  Take 1 tablet by mouth daily             metFORMIN (GLUCOPHAGE) 1000 MG tablet  Take 1,000 mg by mouth 2 times daily (with meals)             metoprolol tartrate (LOPRESSOR) 25 MG tablet  Take 25 mg by mouth 2 times daily              Multiple Vitamins-Minerals (PRESERVISION AREDS 2+MULTI VIT) CAPS  take 1 by Oral route 2 times every day             opium-belladonna (B&O SUPPRETTES) 16.2-30 MG suppository  Place 1 suppository rectally every 8 hours as needed for Pain (bladder spasms) for up to 3 days. oxybutynin (DITROPAN) 5 MG tablet  Take 1 tablet by mouth every 8 hours as needed (bladder spasms) Stop taking 48 hours prior to follow up appt. oxybutynin (DITROPAN) 5 MG tablet  Take 1 tablet by mouth every 8 hours as needed (bladder spasms)             OXYGEN  Inhale 3 L into the lungs nightly Indications: Difficulty Breathing, Oxygen Therapy And prn             polyethylene glycol (GLYCOLAX) packet  Take 17 g by mouth daily as needed for Constipation             potassium chloride (KLOR-CON M) 10 MEQ extended release tablet  TAKE 2 TABLETS BY MOUTH TWICE DAILY             SOFT TOUCH LANCETS MISC  Use to check blood sugars 2 times daily             tiotropium (SPIRIVA RESPIMAT) 2.5 MCG/ACT AERS inhaler  Inhale 2 puffs into the lungs daily             warfarin (COUMADIN) 5 MG tablet  Take as directed by [facility].  #90 tabs = 90 days                      Time Spent:- 45 minutes    Electronically signed by Irina Isbell MD on 3/25/2020 at 2:47 PM  Discharging Hospitalist

## 2020-03-25 NOTE — CARE COORDINATION
3/25/20, 12:03 PM EDT    Patient goals/plan/ treatment preferences discussed by  and . Patient goals/plan/ treatment preferences reviewed with patient/ family. Patient/ family verbalize understanding of discharge plan and are in agreement with goal/plan/treatment preferences. Understanding was demonstrated using the teach back method. AVS provided by RN at time of discharge, which includes all necessary medical information pertaining to the patients current course of illness, treatment, post-discharge goals of care, and treatment preferences. Services After Discharge  Services At/After Discharge: [facility], PT, OT, Nursing Services   IMM Letter  IMM Letter date given[de-identified] 03/25/20  IMM Letter time given[de-identified] 7324       [facility], [facility] Medicare bed. Nelly at [facility] informed of discharge, they will have a bed available around 4-4:30. Will fax AVS and MAR when ready and RN aware to call report. Spoke with patient son, Dr Newell informed of discharge. He will be in after 4-4:30 (after office hours) and bring clothes.

## 2020-03-26 ENCOUNTER — APPOINTMENT (OUTPATIENT)
Dept: CARDIAC REHAB | Age: 85
End: 2020-03-26
Payer: MEDICARE

## 2020-03-27 ENCOUNTER — CARE COORDINATION (OUTPATIENT)
Dept: CARE COORDINATION | Age: 85
End: 2020-03-27

## 2020-03-27 NOTE — CARE COORDINATION
Pt currently at [facility].  notified and will monitor for d/c from facility. Will d/c from care coordination at this time.

## 2020-03-31 ENCOUNTER — APPOINTMENT (OUTPATIENT)
Dept: CARDIAC REHAB | Age: 85
End: 2020-03-31
Payer: MEDICARE

## 2020-04-01 ENCOUNTER — CARE COORDINATION (OUTPATIENT)
Dept: CARE COORDINATION | Age: 85
End: 2020-04-01

## 2020-04-02 ENCOUNTER — TELEPHONE (OUTPATIENT)
Dept: UROLOGY | Age: 85
End: 2020-04-02

## 2020-04-02 NOTE — TELEPHONE ENCOUNTER
Lokesh Snowden called into office with message stating that the were having catheter issues. Patient has been having pain/ spasms with the catheter. Stated that it is draining. He is currently taking oxybutynin every 8 hours PRN. Spoke with Miko Downey,  he would like for him to start taking oxybutynin 3 x daily, he wants for patient to get in sooner to have fill and spill Tuesday the 4/7. The Appointment was made and orders and appointment were given to [address] to the Nurse Dami Katz.

## 2020-04-07 ENCOUNTER — OFFICE VISIT (OUTPATIENT)
Dept: UROLOGY | Age: 85
End: 2020-04-07
Payer: MEDICARE

## 2020-04-07 VITALS — TEMPERATURE: 98.5 F | WEIGHT: 243.7 LBS | HEIGHT: 67 IN | BODY MASS INDEX: 38.25 KG/M2

## 2020-04-07 PROCEDURE — 99024 POSTOP FOLLOW-UP VISIT: CPT | Performed by: UROLOGY

## 2020-04-07 PROCEDURE — 51700 IRRIGATION OF BLADDER: CPT | Performed by: UROLOGY

## 2020-04-07 NOTE — PROGRESS NOTES
Patient has given me verbal consent to perform fill and spill procedure Yes    With catheter in place 600 cc water instilled into bladder. Balloon deflated, catheter removed without difficulty. Pt then voided 50 cc. Per Dr. Lianna Delaney patient is to have a catheter put back in and follow up with Dr Lianna Delaney in 3 months. Patient is to have felipe catheter changes every month at nursing home. Patient has given me verbal consent to perform catheter placement Yes      Following Dr. Kannan Tse of [facility]. Inserted 16 Fr  Catheter without difficulty. Patient's urethra was cleansed with betadine swab. 16 Fr regular felipe was inserted without difficulty and inflated balloon with 10 ml of water. Felipe Catheter was hooked up to leg bag with straps. Patient instructed on catheter care including draining catheter bag and keeping catheter bag above the knee to prevent pulling on catheter causing blood.

## 2020-04-08 ENCOUNTER — CARE COORDINATION (OUTPATIENT)
Dept: CARE COORDINATION | Age: 85
End: 2020-04-08

## 2020-04-13 NOTE — PROGRESS NOTES
Pt with clear urine  Void trial performed. Patient failed voiding trial]  Catheter reinserted  Will follow up for monthly catheter change  Did discuss with Dr Newell his son the possibility of SPT, CIC or indwelling. Will continue indwelling for now.

## 2020-04-16 ENCOUNTER — CARE COORDINATION (OUTPATIENT)
Dept: CARE COORDINATION | Age: 85
End: 2020-04-16

## 2020-04-22 ENCOUNTER — CARE COORDINATION (OUTPATIENT)
Dept: CARE COORDINATION | Age: 85
End: 2020-04-22

## 2020-04-28 ENCOUNTER — HOSPITAL ENCOUNTER (OUTPATIENT)
Dept: CARDIAC REHAB | Age: 85
Setting detail: THERAPIES SERIES
Discharge: HOME | End: 2020-04-28
Payer: MEDICARE

## 2020-04-30 ENCOUNTER — CARE COORDINATION (OUTPATIENT)
Dept: CARE COORDINATION | Age: 85
End: 2020-04-30

## 2020-04-30 NOTE — CARE COORDINATION
I spoke with [place] at [facility] who confirmed the patient remains at the facility. I will continue to follow at this time.      Future Appointments   Date Time Provider Gretchen Watsoni   5/29/2020 10:30 AM Chelsie Austin MD [facility] [place]   6/3/2020  1:40 PM MAEGAN Curtis - CNP [facility] Physic [facility] [place]   6/10/2020 12:45 PM Francetta Kanner, MD [address] [facility]   7/6/2020 10:45 AM Johnette Boeck, PA-C Pulm Med P - [place]   7/7/2020  2:45 PM MD SYLVESTER Mc[place] Urology [facility] [place]   9/8/2020 11:45 AM Johnette Boeck, [address] [place] [address]

## 2020-05-05 ENCOUNTER — HOSPITAL ENCOUNTER (EMERGENCY)
Age: 85
Discharge: HOME | End: 2020-05-05
Payer: MEDICARE

## 2020-05-05 VITALS
HEART RATE: 73 BPM | RESPIRATION RATE: 18 BRPM | BODY MASS INDEX: 38.14 KG/M2 | SYSTOLIC BLOOD PRESSURE: 106 MMHG | WEIGHT: 243 LBS | DIASTOLIC BLOOD PRESSURE: 82 MMHG | HEIGHT: 67 IN | TEMPERATURE: 99 F | OXYGEN SATURATION: 97 %

## 2020-05-05 LAB
AMORPHOUS: ABNORMAL
ANION GAP SERPL CALCULATED.3IONS-SCNC: 12 MEQ/L (ref 8–16)
BACTERIA: ABNORMAL /HPF
BASOPHILS # BLD: 0.4 %
BASOPHILS ABSOLUTE: 0 THOU/MM3 (ref 0–0.1)
BILIRUBIN URINE: NEGATIVE
BLOOD, URINE: ABNORMAL
BUN BLDV-MCNC: 9 MG/DL (ref 7–22)
CALCIUM SERPL-MCNC: 8.5 MG/DL (ref 8.5–10.5)
CASTS UA: ABNORMAL /LPF
CHARACTER, URINE: ABNORMAL
CHLORIDE BLD-SCNC: 98 MEQ/L (ref 98–111)
CO2: 29 MEQ/L (ref 23–33)
COLOR: ABNORMAL
CREAT SERPL-MCNC: 0.7 MG/DL (ref 0.4–1.2)
CRYSTALS, UA: ABNORMAL
EOSINOPHIL # BLD: 2.1 %
EOSINOPHILS ABSOLUTE: 0.2 THOU/MM3 (ref 0–0.4)
EPITHELIAL CELLS, UA: ABNORMAL /HPF
ERYTHROCYTE [DISTWIDTH] IN BLOOD BY AUTOMATED COUNT: 13.9 % (ref 11.5–14.5)
ERYTHROCYTE [DISTWIDTH] IN BLOOD BY AUTOMATED COUNT: 41.2 FL (ref 35–45)
GFR SERPL CREATININE-BSD FRML MDRD: > 90 ML/MIN/1.73M2
GLUCOSE BLD-MCNC: 112 MG/DL (ref 70–108)
GLUCOSE URINE: NEGATIVE MG/DL
HCT VFR BLD CALC: 32.1 % (ref 42–52)
HEMOGLOBIN: 10.4 GM/DL (ref 14–18)
IMMATURE GRANS (ABS): 0.05 THOU/MM3 (ref 0–0.07)
IMMATURE GRANULOCYTES: 0.5 %
INR BLD: 2.48 (ref 0.85–1.13)
KETONES, URINE: NEGATIVE
LEUKOCYTE ESTERASE, URINE: ABNORMAL
LYMPHOCYTES # BLD: 13.5 %
LYMPHOCYTES ABSOLUTE: 1.2 THOU/MM3 (ref 1–4.8)
MCH RBC QN AUTO: 26.8 PG (ref 26–33)
MCHC RBC AUTO-ENTMCNC: 32.4 GM/DL (ref 32.2–35.5)
MCV RBC AUTO: 82.7 FL (ref 80–94)
MONOCYTES # BLD: 2.1 %
MONOCYTES ABSOLUTE: 0.2 THOU/MM3 (ref 0.4–1.3)
NITRITE, URINE: NEGATIVE
NUCLEATED RED BLOOD CELLS: 0 /100 WBC
OSMOLALITY CALCULATION: 277 MOSMOL/KG (ref 275–300)
PH UA: 6 (ref 5–9)
PLATELET # BLD: 258 THOU/MM3 (ref 130–400)
PMV BLD AUTO: 9.6 FL (ref 9.4–12.4)
POTASSIUM REFLEX MAGNESIUM: 3.9 MEQ/L (ref 3.5–5.2)
PROTEIN UA: 100
RBC # BLD: 3.88 MILL/MM3 (ref 4.7–6.1)
RBC URINE: > 100 /HPF
RENAL EPITHELIAL, UA: ABNORMAL
SEG NEUTROPHILS: 81.4 %
SEGMENTED NEUTROPHILS ABSOLUTE COUNT: 7.5 THOU/MM3 (ref 1.8–7.7)
SODIUM BLD-SCNC: 139 MEQ/L (ref 135–145)
SPECIFIC GRAVITY, URINE: 1.01 (ref 1–1.03)
UROBILINOGEN, URINE: 0.2 EU/DL (ref 0–1)
WBC # BLD: 9.2 THOU/MM3 (ref 4.8–10.8)
WBC UA: ABNORMAL /HPF
YEAST: ABNORMAL

## 2020-05-05 PROCEDURE — 2709999900 HC NON-CHARGEABLE SUPPLY

## 2020-05-05 PROCEDURE — 36415 COLL VENOUS BLD VENIPUNCTURE: CPT

## 2020-05-05 PROCEDURE — 85025 COMPLETE CBC W/AUTO DIFF WBC: CPT

## 2020-05-05 PROCEDURE — 81001 URINALYSIS AUTO W/SCOPE: CPT

## 2020-05-05 PROCEDURE — 87086 URINE CULTURE/COLONY COUNT: CPT

## 2020-05-05 PROCEDURE — 80048 BASIC METABOLIC PNL TOTAL CA: CPT

## 2020-05-05 PROCEDURE — 99284 EMERGENCY DEPT VISIT MOD MDM: CPT

## 2020-05-05 PROCEDURE — 85610 PROTHROMBIN TIME: CPT

## 2020-05-05 PROCEDURE — 87186 SC STD MICRODIL/AGAR DIL: CPT

## 2020-05-05 PROCEDURE — 51702 INSERT TEMP BLADDER CATH: CPT

## 2020-05-05 PROCEDURE — 87077 CULTURE AEROBIC IDENTIFY: CPT

## 2020-05-05 PROCEDURE — U0003 INFECTIOUS AGENT DETECTION BY NUCLEIC ACID (DNA OR RNA); SEVERE ACUTE RESPIRATORY SYNDROME CORONAVIRUS 2 (SARS-COV-2) (CORONAVIRUS DISEASE [COVID-19]), AMPLIFIED PROBE TECHNIQUE, MAKING USE OF HIGH THROUGHPUT TECHNOLOGIES AS DESCRIBED BY CMS-2020-01-R: HCPCS

## 2020-05-05 ASSESSMENT — ENCOUNTER SYMPTOMS
ABDOMINAL PAIN: 0
APNEA: 0
FACIAL SWELLING: 0
DIARRHEA: 0
NAUSEA: 0
BACK PAIN: 0
VOMITING: 0
WHEEZING: 0
COUGH: 0
TROUBLE SWALLOWING: 0
RHINORRHEA: 0
CONSTIPATION: 0
CHEST TIGHTNESS: 0
COLOR CHANGE: 0
SINUS PAIN: 0
ABDOMINAL DISTENTION: 0
PHOTOPHOBIA: 0
SORE THROAT: 0
SHORTNESS OF BREATH: 0
SINUS PRESSURE: 0

## 2020-05-05 ASSESSMENT — PAIN DESCRIPTION - PAIN TYPE: TYPE: ACUTE PAIN

## 2020-05-05 ASSESSMENT — PAIN DESCRIPTION - LOCATION: LOCATION: PERINEUM;PENIS

## 2020-05-05 ASSESSMENT — PAIN SCALES - GENERAL: PAINLEVEL_OUTOF10: 3

## 2020-05-05 NOTE — ED NOTES
Coude catheter placed. Urine output noted. Urine appears to be bright red with clots and gradually clearing to a pink tinged urine with some clots as catheter continues to flow.       Crow Wu RN  05/05/20 1231

## 2020-05-05 NOTE — ED TRIAGE NOTES
Pt presents to the ED via EMS with c/o urinary catheter complication. Per nursing home staff pt had 500 ml of clear urine in catheter bag at 0100. Later nursing home staff  Noticed 200 ml of bright red blood and pt was having pain in his perineum area. Pt had a TURP 2 months ago. Pt denies abdominal pain. VSS, respirations even and unlabored.

## 2020-05-05 NOTE — ED PROVIDER NOTES
is no shifting dullness, hepatomegaly, splenomegaly or mass. Tenderness: There is no abdominal tenderness. Hernia: No hernia is present. There is no hernia in the right inguinal area or left inguinal area. Genitourinary:     Penis: Circumcised. Tenderness present. No discharge, swelling or lesions. Scrotum/Testes: Normal.   Musculoskeletal: Normal range of motion. General: No swelling, tenderness, deformity or signs of injury. Right lower leg: No edema. Left lower leg: No edema. Skin:     General: Skin is warm and dry. Capillary Refill: Capillary refill takes less than 2 seconds. Coloration: Skin is not jaundiced or pale. Neurological:      General: No focal deficit present. Mental Status: He is alert and oriented to person, place, and time. Mental status is at baseline. GCS: GCS eye subscore is 4. GCS verbal subscore is 5. GCS motor subscore is 6. Cranial Nerves: Cranial nerves are intact. Sensory: Sensation is intact. Psychiatric:         Mood and Affect: Mood normal.         Behavior: Behavior normal. Behavior is cooperative. DIFFERENTIAL DIAGNOSIS:   Urinary retention, BPH, acute blood loss anemia, supratherapeutic INR    DIAGNOSTIC RESULTS     EKG: All EKG's are interpreted by the Emergency Department Physician who either signs or Co-signs this chart in the absence of a cardiologist.    None    RADIOLOGY: non-plainfilm images(s) such as CT, Ultrasound and MRI are read by the radiologist.    No orders to display       LABS:     Labs Reviewed   CULTURE, REFLEXED, URINE - Abnormal; Notable for the following components:       Result Value    Urine Culture Reflex   (*)     Value: Current antibiotic therapy ineffective in vitro for isolate.      Organism Klebsiella pneumoniae (*)     All other components within normal limits    Narrative:     Source: cath urine       Site: catheter -felipe cath          Current Antibiotics: Cefdinir   CBC with a 25 [place] three-way catheter and continuous bladder irrigation was completed. Irrigated to clear, no further clots noted. The patient was seen in the department by Samantha Newell from urology who agreed with my plan and work-up, recommended that three-way catheter be left in place, clamped on irrigation port and patient can follow-up with the urology office. [facility] asked that patient be swabbed for COVID-19, patient not experiencing any symptoms of fever, cough, shortness of breath, outpatient swab was obtained and sent    CRITICAL CARE:   None    CONSULTS:  Carin Diaz CNP: urology  Samantha Newell CNP urology    PROCEDURES:  None    FINAL IMPRESSION      1. Gross hematuria    2. Acute urinary retention    3. Billy catheter problem, initial encounter [facility])          DISPOSITION/PLAN   Discharge    PATIENT REFERRED TO:  MAEGAN Jaime - CNP  [address] [facility]  [address]    Schedule an appointment as soon as possible for a visit in 1 week  For re-evaluation      DISCHARGE MEDICATIONS:  Discharge Medication List as of 5/5/2020 10:38 AM          (Please note that portions of this note were completed with a voice recognition program.  Efforts were made to edit the dictations but occasionally words are mis-transcribed.)    The patient was given an opportunity to see the Emergency Attending. The patient voiced understanding that I was a Mid-LevelProvider and was in agreement with being seen independently by myself.           Kellee Layton, MAEGAN - CNP  05/13/20 Hammad, MAEGAN - CNP  06/04/20 8101

## 2020-05-05 NOTE — ED NOTES
Reassessment of the patients Urinary Catheter Problem   is unchanged, the patients pain reassessment is a 0/10, Side rails up times 2, call light in reach, will continue to monitor.      Abril Aburto RN  05/05/20 5255

## 2020-05-06 ENCOUNTER — CARE COORDINATION (OUTPATIENT)
Dept: CARE COORDINATION | Age: 85
End: 2020-05-06

## 2020-05-06 ENCOUNTER — TELEPHONE (OUTPATIENT)
Dept: FAMILY MEDICINE CLINIC | Age: 85
End: 2020-05-06

## 2020-05-06 ENCOUNTER — TELEPHONE (OUTPATIENT)
Dept: UROLOGY | Age: 85
End: 2020-05-06

## 2020-05-06 RX ORDER — CEFDINIR 300 MG/1
300 CAPSULE ORAL 2 TIMES DAILY
Qty: 20 CAPSULE | Refills: 0
Start: 2020-05-06 | End: 2020-05-09

## 2020-05-06 RX ORDER — SULFAMETHOXAZOLE AND TRIMETHOPRIM 800; 160 MG/1; MG/1
1 TABLET ORAL 2 TIMES DAILY
Qty: 20 TABLET | Refills: 0 | Status: CANCELLED | OUTPATIENT
Start: 2020-05-06 | End: 2020-05-16

## 2020-05-07 LAB — SARS-COV-2: NOT DETECTED

## 2020-05-08 ENCOUNTER — TELEPHONE (OUTPATIENT)
Dept: FAMILY MEDICINE CLINIC | Age: 85
End: 2020-05-08

## 2020-05-09 ENCOUNTER — TELEPHONE (OUTPATIENT)
Dept: UROLOGY | Age: 85
End: 2020-05-09

## 2020-05-09 RX ORDER — SULFAMETHOXAZOLE AND TRIMETHOPRIM 800; 160 MG/1; MG/1
1 TABLET ORAL 2 TIMES DAILY
Qty: 14 TABLET | Refills: 0
Start: 2020-05-09 | End: 2020-05-16

## 2020-05-11 NOTE — PROGRESS NOTES
Pharmacy Note  ED Culture Follow-up    Harjit Womack is a 80 y.o. male. Allergies: Macrobid [nitrofurantoin monohyd macro]; Levaquin [levofloxacin]; Adhesive tape; and Alphagan [brimonidine tartrate]     Labs:  Lab Results   Component Value Date    BUN 9 05/05/2020    CREATININE 0.7 05/05/2020    WBC 9.2 05/05/2020     Estimated Creatinine Clearance: 86 mL/min (based on SCr of 0.7 mg/dL). Current antimicrobials:   Bactrim per [facility]    ASSESSMENT:  Micro results:   Urine culture: positive for Klebsiella pneumoniae      PLAN:  Need for intervention: No   Discussed with: Samira Gaxiola PA-C  Chosen treatment:    Patient will be treated by specialist    Patient response:   No need to contact patient    Called/sent in prescription to: Not applicable    Please call with any questions.  [id number] Aime Chaudhary, PharmD 5/11/2020  7:27 PM

## 2020-05-14 ENCOUNTER — CARE COORDINATION (OUTPATIENT)
Dept: CARE COORDINATION | Age: 85
End: 2020-05-14

## 2020-05-17 ENCOUNTER — HOSPITAL ENCOUNTER (INPATIENT)
Age: 85
LOS: 1 days | Discharge: NURSING, REHAB OR LONG-TERM CARE | DRG: 699 | End: 2020-05-19
Attending: EMERGENCY MEDICINE | Admitting: INTERNAL MEDICINE
Payer: MEDICARE

## 2020-05-17 ENCOUNTER — APPOINTMENT (OUTPATIENT)
Dept: CT IMAGING | Age: 85
DRG: 699 | End: 2020-05-17
Payer: MEDICARE

## 2020-05-17 PROBLEM — R33.8 ACUTE URINARY RETENTION: Status: ACTIVE | Noted: 2020-05-17

## 2020-05-17 LAB
ALBUMIN SERPL-MCNC: 3.4 G/DL (ref 3.5–5.1)
ALP BLD-CCNC: 55 U/L (ref 38–126)
ALT SERPL-CCNC: 6 U/L (ref 11–66)
ANION GAP SERPL CALCULATED.3IONS-SCNC: 17 MEQ/L (ref 8–16)
APTT: 39.4 SECONDS (ref 22–38)
AST SERPL-CCNC: 14 U/L (ref 5–40)
BASOPHILS # BLD: 0.3 %
BASOPHILS ABSOLUTE: 0.1 THOU/MM3 (ref 0–0.1)
BILIRUB SERPL-MCNC: 0.3 MG/DL (ref 0.3–1.2)
BUN BLDV-MCNC: 8 MG/DL (ref 7–22)
CALCIUM SERPL-MCNC: 8.6 MG/DL (ref 8.5–10.5)
CHLORIDE BLD-SCNC: 93 MEQ/L (ref 98–111)
CO2: 22 MEQ/L (ref 23–33)
CREAT SERPL-MCNC: 1.2 MG/DL (ref 0.4–1.2)
EOSINOPHIL # BLD: 0 %
EOSINOPHILS ABSOLUTE: 0 THOU/MM3 (ref 0–0.4)
ERYTHROCYTE [DISTWIDTH] IN BLOOD BY AUTOMATED COUNT: 14.4 % (ref 11.5–14.5)
ERYTHROCYTE [DISTWIDTH] IN BLOOD BY AUTOMATED COUNT: 42.3 FL (ref 35–45)
GFR SERPL CREATININE-BSD FRML MDRD: 57 ML/MIN/1.73M2
GLUCOSE BLD-MCNC: 122 MG/DL (ref 70–108)
GLUCOSE BLD-MCNC: 144 MG/DL (ref 70–108)
GLUCOSE BLD-MCNC: 183 MG/DL (ref 70–108)
HCT VFR BLD CALC: 29 % (ref 42–52)
HEMOGLOBIN: 9.5 GM/DL (ref 14–18)
IMMATURE GRANS (ABS): 0.23 THOU/MM3 (ref 0–0.07)
IMMATURE GRANULOCYTES: 0.8 %
INR BLD: 3.72 (ref 0.85–1.13)
LYMPHOCYTES # BLD: 4.8 %
LYMPHOCYTES ABSOLUTE: 1.4 THOU/MM3 (ref 1–4.8)
MCH RBC QN AUTO: 27 PG (ref 26–33)
MCHC RBC AUTO-ENTMCNC: 32.8 GM/DL (ref 32.2–35.5)
MCV RBC AUTO: 82.4 FL (ref 80–94)
MONOCYTES # BLD: 7.7 %
MONOCYTES ABSOLUTE: 2.2 THOU/MM3 (ref 0.4–1.3)
NUCLEATED RED BLOOD CELLS: 0 /100 WBC
ORGANISM: ABNORMAL
OSMOLALITY CALCULATION: 267.5 MOSMOL/KG (ref 275–300)
PLATELET # BLD: 266 THOU/MM3 (ref 130–400)
PLATELET ESTIMATE: ADEQUATE
PMV BLD AUTO: 9.9 FL (ref 9.4–12.4)
POTASSIUM REFLEX MAGNESIUM: 4 MEQ/L (ref 3.5–5.2)
RBC # BLD: 3.52 MILL/MM3 (ref 4.7–6.1)
SCAN OF BLOOD SMEAR: NORMAL
SCAN OF BLOOD SMEAR: NORMAL
SEG NEUTROPHILS: 86.4 %
SEGMENTED NEUTROPHILS ABSOLUTE COUNT: 25 THOU/MM3 (ref 1.8–7.7)
SODIUM BLD-SCNC: 132 MEQ/L (ref 135–145)
TOTAL PROTEIN: 6.3 G/DL (ref 6.1–8)
URINE CULTURE REFLEX: ABNORMAL
URINE CULTURE REFLEX: ABNORMAL
WBC # BLD: 28.9 THOU/MM3 (ref 4.8–10.8)

## 2020-05-17 PROCEDURE — 94640 AIRWAY INHALATION TREATMENT: CPT

## 2020-05-17 PROCEDURE — 99223 1ST HOSP IP/OBS HIGH 75: CPT | Performed by: INTERNAL MEDICINE

## 2020-05-17 PROCEDURE — 99024 POSTOP FOLLOW-UP VISIT: CPT | Performed by: NURSE PRACTITIONER

## 2020-05-17 PROCEDURE — 96375 TX/PRO/DX INJ NEW DRUG ADDON: CPT

## 2020-05-17 PROCEDURE — 6370000000 HC RX 637 (ALT 250 FOR IP): Performed by: INTERNAL MEDICINE

## 2020-05-17 PROCEDURE — 2709999900 HC NON-CHARGEABLE SUPPLY

## 2020-05-17 PROCEDURE — 6360000002 HC RX W HCPCS: Performed by: INTERNAL MEDICINE

## 2020-05-17 PROCEDURE — 85730 THROMBOPLASTIN TIME PARTIAL: CPT

## 2020-05-17 PROCEDURE — 6360000004 HC RX CONTRAST MEDICATION: Performed by: EMERGENCY MEDICINE

## 2020-05-17 PROCEDURE — 94761 N-INVAS EAR/PLS OXIMETRY MLT: CPT

## 2020-05-17 PROCEDURE — 96365 THER/PROPH/DIAG IV INF INIT: CPT

## 2020-05-17 PROCEDURE — 85025 COMPLETE CBC W/AUTO DIFF WBC: CPT

## 2020-05-17 PROCEDURE — 2580000003 HC RX 258: Performed by: INTERNAL MEDICINE

## 2020-05-17 PROCEDURE — 6360000002 HC RX W HCPCS: Performed by: EMERGENCY MEDICINE

## 2020-05-17 PROCEDURE — 74177 CT ABD & PELVIS W/CONTRAST: CPT

## 2020-05-17 PROCEDURE — 36415 COLL VENOUS BLD VENIPUNCTURE: CPT

## 2020-05-17 PROCEDURE — 96374 THER/PROPH/DIAG INJ IV PUSH: CPT

## 2020-05-17 PROCEDURE — G0378 HOSPITAL OBSERVATION PER HR: HCPCS

## 2020-05-17 PROCEDURE — 99284 EMERGENCY DEPT VISIT MOD MDM: CPT

## 2020-05-17 PROCEDURE — 85610 PROTHROMBIN TIME: CPT

## 2020-05-17 PROCEDURE — 82948 REAGENT STRIP/BLOOD GLUCOSE: CPT

## 2020-05-17 PROCEDURE — 80053 COMPREHEN METABOLIC PANEL: CPT

## 2020-05-17 RX ORDER — POLYETHYLENE GLYCOL 3350 17 G/17G
17 POWDER, FOR SOLUTION ORAL DAILY PRN
Status: DISCONTINUED | OUTPATIENT
Start: 2020-05-17 | End: 2020-05-19 | Stop reason: HOSPADM

## 2020-05-17 RX ORDER — ACETAMINOPHEN 325 MG/1
650 TABLET ORAL EVERY 6 HOURS PRN
Status: DISCONTINUED | OUTPATIENT
Start: 2020-05-17 | End: 2020-05-19 | Stop reason: HOSPADM

## 2020-05-17 RX ORDER — ARFORMOTEROL TARTRATE 15 UG/2ML
15 SOLUTION RESPIRATORY (INHALATION) 2 TIMES DAILY
Status: DISCONTINUED | OUTPATIENT
Start: 2020-05-17 | End: 2020-05-19 | Stop reason: HOSPADM

## 2020-05-17 RX ORDER — ALBUTEROL SULFATE 90 UG/1
2 AEROSOL, METERED RESPIRATORY (INHALATION) EVERY 6 HOURS PRN
Status: DISCONTINUED | OUTPATIENT
Start: 2020-05-17 | End: 2020-05-17 | Stop reason: ALTCHOICE

## 2020-05-17 RX ORDER — MORPHINE SULFATE 4 MG/ML
4 INJECTION, SOLUTION INTRAMUSCULAR; INTRAVENOUS ONCE
Status: COMPLETED | OUTPATIENT
Start: 2020-05-17 | End: 2020-05-17

## 2020-05-17 RX ORDER — BUDESONIDE 0.25 MG/2ML
250 INHALANT ORAL 2 TIMES DAILY
Status: DISCONTINUED | OUTPATIENT
Start: 2020-05-17 | End: 2020-05-19 | Stop reason: HOSPADM

## 2020-05-17 RX ORDER — INSULIN GLARGINE 100 [IU]/ML
18 INJECTION, SOLUTION SUBCUTANEOUS NIGHTLY
Status: DISCONTINUED | OUTPATIENT
Start: 2020-05-17 | End: 2020-05-19 | Stop reason: HOSPADM

## 2020-05-17 RX ORDER — FUROSEMIDE 40 MG/1
40 TABLET ORAL 2 TIMES DAILY
Status: DISCONTINUED | OUTPATIENT
Start: 2020-05-17 | End: 2020-05-19 | Stop reason: HOSPADM

## 2020-05-17 RX ORDER — SODIUM CHLORIDE 0.9 % (FLUSH) 0.9 %
10 SYRINGE (ML) INJECTION PRN
Status: DISCONTINUED | OUTPATIENT
Start: 2020-05-17 | End: 2020-05-19 | Stop reason: HOSPADM

## 2020-05-17 RX ORDER — SODIUM CHLORIDE 0.9 % (FLUSH) 0.9 %
10 SYRINGE (ML) INJECTION EVERY 12 HOURS SCHEDULED
Status: DISCONTINUED | OUTPATIENT
Start: 2020-05-17 | End: 2020-05-19 | Stop reason: HOSPADM

## 2020-05-17 RX ORDER — SULFAMETHOXAZOLE AND TRIMETHOPRIM 800; 160 MG/1; MG/1
1 TABLET ORAL 2 TIMES DAILY
Status: ON HOLD | COMMUNITY
End: 2020-05-19 | Stop reason: HOSPADM

## 2020-05-17 RX ORDER — DOXAZOSIN MESYLATE 4 MG/1
8 TABLET ORAL NIGHTLY
Status: DISCONTINUED | OUTPATIENT
Start: 2020-05-17 | End: 2020-05-19 | Stop reason: HOSPADM

## 2020-05-17 RX ORDER — ONDANSETRON 2 MG/ML
4 INJECTION INTRAMUSCULAR; INTRAVENOUS EVERY 6 HOURS PRN
Status: DISCONTINUED | OUTPATIENT
Start: 2020-05-17 | End: 2020-05-19 | Stop reason: HOSPADM

## 2020-05-17 RX ORDER — OXYBUTYNIN CHLORIDE 5 MG/1
5 TABLET ORAL EVERY 8 HOURS PRN
Status: DISCONTINUED | OUTPATIENT
Start: 2020-05-17 | End: 2020-05-19 | Stop reason: HOSPADM

## 2020-05-17 RX ORDER — ACETAMINOPHEN 650 MG/1
650 SUPPOSITORY RECTAL EVERY 6 HOURS PRN
Status: DISCONTINUED | OUTPATIENT
Start: 2020-05-17 | End: 2020-05-19 | Stop reason: HOSPADM

## 2020-05-17 RX ORDER — LIDOCAINE 40 MG/G
CREAM TOPICAL EVERY 6 HOURS PRN
COMMUNITY
Start: 2020-06-26

## 2020-05-17 RX ORDER — ACETAMINOPHEN 325 MG/1
650 TABLET ORAL EVERY 4 HOURS PRN
COMMUNITY
Start: 2020-06-26

## 2020-05-17 RX ORDER — LISINOPRIL 5 MG/1
5 TABLET ORAL DAILY
Status: DISCONTINUED | OUTPATIENT
Start: 2020-05-17 | End: 2020-05-19

## 2020-05-17 RX ORDER — SENNA PLUS 8.6 MG/1
1 TABLET ORAL DAILY PRN
Status: DISCONTINUED | OUTPATIENT
Start: 2020-05-17 | End: 2020-05-19 | Stop reason: HOSPADM

## 2020-05-17 RX ORDER — HYDROCORTISONE ACETATE 25 MG/1
25 SUPPOSITORY RECTAL 2 TIMES DAILY
COMMUNITY
End: 2020-07-07 | Stop reason: ALTCHOICE

## 2020-05-17 RX ORDER — PROMETHAZINE HYDROCHLORIDE 25 MG/1
12.5 TABLET ORAL EVERY 6 HOURS PRN
Status: DISCONTINUED | OUTPATIENT
Start: 2020-05-17 | End: 2020-05-19 | Stop reason: HOSPADM

## 2020-05-17 RX ADMIN — INSULIN GLARGINE 18 UNITS: 100 INJECTION, SOLUTION SUBCUTANEOUS at 21:09

## 2020-05-17 RX ADMIN — DOXAZOSIN 8 MG: 4 TABLET ORAL at 21:04

## 2020-05-17 RX ADMIN — ACETAMINOPHEN 650 MG: 325 TABLET ORAL at 20:07

## 2020-05-17 RX ADMIN — IOPAMIDOL 80 ML: 755 INJECTION, SOLUTION INTRAVENOUS at 12:02

## 2020-05-17 RX ADMIN — DEXTROSE MONOHYDRATE 1 G: 5 INJECTION INTRAVENOUS at 18:44

## 2020-05-17 RX ADMIN — MORPHINE SULFATE 4 MG: 4 INJECTION, SOLUTION INTRAMUSCULAR; INTRAVENOUS at 10:46

## 2020-05-17 RX ADMIN — BUDESONIDE 250 MCG: 0.25 INHALANT RESPIRATORY (INHALATION) at 16:39

## 2020-05-17 RX ADMIN — FUROSEMIDE 40 MG: 40 TABLET ORAL at 21:04

## 2020-05-17 RX ADMIN — ARFORMOTEROL TARTRATE 15 MCG: 15 SOLUTION RESPIRATORY (INHALATION) at 16:39

## 2020-05-17 RX ADMIN — OXYBUTYNIN CHLORIDE 5 MG: 5 TABLET ORAL at 21:04

## 2020-05-17 RX ADMIN — SODIUM CHLORIDE, PRESERVATIVE FREE 10 ML: 5 INJECTION INTRAVENOUS at 21:06

## 2020-05-17 ASSESSMENT — PAIN DESCRIPTION - PAIN TYPE
TYPE: ACUTE PAIN

## 2020-05-17 ASSESSMENT — PAIN SCALES - GENERAL
PAINLEVEL_OUTOF10: 4
PAINLEVEL_OUTOF10: 10
PAINLEVEL_OUTOF10: 4
PAINLEVEL_OUTOF10: 10
PAINLEVEL_OUTOF10: 2
PAINLEVEL_OUTOF10: 0
PAINLEVEL_OUTOF10: 0

## 2020-05-17 ASSESSMENT — PAIN DESCRIPTION - ORIENTATION
ORIENTATION: LOWER;MID

## 2020-05-17 ASSESSMENT — PAIN DESCRIPTION - DESCRIPTORS
DESCRIPTORS: ACHING
DESCRIPTORS: ACHING
DESCRIPTORS: DISCOMFORT

## 2020-05-17 ASSESSMENT — PAIN DESCRIPTION - LOCATION
LOCATION: ABDOMEN

## 2020-05-17 NOTE — ED NOTES
ED to inpatient nurses report    Chief Complaint   Patient presents with    Other     post turp catheter problem - bleeding      Present to ED from [facility]  LOC: alert and orientated to name, place, date  Vital signs   Vitals:    05/17/20 0927 05/17/20 1120 05/17/20 1255   BP: (!) 108/59 118/66 (!) 104/49   Pulse: 81 80 72   Resp: 20 20 16   Temp: 98.7 °F (37.1 °C)     TempSrc: Oral     SpO2: 96% 96% 96%      Oxygen Baseline room air    Current needs required pt has continuous bladder irrigation running Bipap/Cpap No  LDAs:   Peripheral IV 05/17/20 Left Antecubital (Active)   Site Assessment Clean; Intact;Dry 5/17/2020 12:56 PM   Line Status Normal saline locked 5/17/2020 12:56 PM   Dressing Status Intact;Dry;Clean 5/17/2020 12:56 PM   Dressing Intervention New 5/17/2020  9:37 AM     Mobility: Independent  Pending ED orders: none  Present condition: stable    Electronically signed by Salo Solorio RN on 5/17/2020 at 1:29 PM       Salo Solorio RN  05/17/20 5000 Maria Teresa Merida RN  05/17/20 9664

## 2020-05-17 NOTE — ED PROVIDER NOTES
[facility] COMPLAINT       Chief Complaint   Patient presents with    Other     post turp catheter problem - bleeding       Nurses Notes reviewed and I agree except as noted in the HPI. HISTORY OF PRESENT ILLNESS    Soren Acosta is a 80 y.o. male. Patient presents to the ED complaining Billy catheter problem. Patient had a TURP procedure 03/24/2020 with Dr. Rosaline Garcia, Urologist. Patient has had the catheter since the surgery. Patient was seen here 05/05/2020 for gross hematuria in his Billy. Patient is in a ECF and was going to have his catheter changed today. Nursing staff was irrigating his catheter and noticed large clots. Patient was sent here for further evaluation. Patient is also complaining of lower abdominal pain. Patient is on Warfarin for chronic a fib. Patient denies nausea, vomiting, fever, or cough. REVIEW OF SYSTEMS       No fever no coughing no vomiting no chest pain or shortness of breath. Remainder of review of systems is otherwise reviewed as negative. PAST MEDICAL HISTORY    has a past medical history of Anxiety, CAD (coronary artery disease), Chronic back pain, COPD (chronic obstructive pulmonary disease) ([address] Detached retina, Diabetes mellitus ([address] DVT (deep venous thrombosis) ([address] DVT (deep venous thrombosis) ([address] Glaucoma, Hyperlipidemia, Hypertension, Mass of chest, Movement disorder, Obesity, Osteoarthritis, Pneumonia, Primary thyroid follicular carcinoma, Sleep apnea, Thyroid cancer ([address] and Urinary incontinence. SURGICAL HISTORY      has a past surgical history that includes Retinal detachment surgery; Thyroidectomy; Spine surgery (2004); other surgical history; fracture surgery; EKG 12 Lead (11/10/2015); back surgery (9786,1626, 2014); bronchoscopy (N/A, 8/29/2017); TURP (N/A, 3/17/2020); and TURP (N/A, 3/24/2020).     CURRENT MEDICATIONS       Previous Medications    ALBUTEROL SULFATE HFA 108 (90 BASE) MCG/ACT INHALER    Inhale 2 puffs into the lungs every 6 hours as needed for Wheezing    ARFORMOTEROL TARTRATE (BROVANA) 15 MCG/2ML NEBU    Take 1 ampule by nebulization 2 times daily Indications: Prevention of COPD Worsening     BLOOD GLUCOSE TEST STRIPS (ACCU-CHEK CHE PLUS) STRIP    TEST TWICE DAILY    BUDESONIDE (PULMICORT) 0.25 MG/2ML NEBULIZER SUSPENSION    Take 1 ampule by nebulization 2 times daily     CHOLECALCIFEROL (VITAMIN D3 PO)    Take 1 tablet by mouth daily     DOCUSATE SODIUM (COLACE, DULCOLAX) 100 MG CAPS    Take 100 mg by mouth 2 times daily Prescription to be refilled by primary care physician    DOXAZOSIN (CARDURA) 8 MG TABLET    Take 1 tablet by mouth nightly    FERROUS SULFATE (IRON) 325 (65 FE) MG TABS    Take by mouth 2 times daily     FUROSEMIDE (LASIX) 40 MG TABLET    Take 40 mg by mouth 2 times daily     LANTUS SOLOSTAR 100 UNIT/ML INJECTION PEN    INJECT SUBCUTANEOUSLY 18  UNITS NIGHTLY    LEVOTHYROXINE (SYNTHROID) 175 MCG TABLET    TAKE 1 TABLET BY MOUTH  DAILY    LISINOPRIL (PRINIVIL;ZESTRIL) 5 MG TABLET    Take 1 tablet by mouth daily    METFORMIN (GLUCOPHAGE) 1000 MG TABLET    Take 1,000 mg by mouth 2 times daily (with meals)    METOPROLOL TARTRATE (LOPRESSOR) 25 MG TABLET    Take 25 mg by mouth 2 times daily     MULTIPLE VITAMINS-MINERALS (PRESERVISION AREDS 2+MULTI VIT) CAPS    take 1 by Oral route 2 times every day    OPIUM-BELLADONNA (B&O SUPPRETTES) 16.2-30 MG SUPPOSITORY    Place 1 suppository rectally every 8 hours as needed for Pain (bladder spasms) for up to 3 days. OXYBUTYNIN (DITROPAN) 5 MG TABLET    Take 1 tablet by mouth every 8 hours as needed (bladder spasms) Stop taking 48 hours prior to follow up appt.     OXYBUTYNIN (DITROPAN) 5 MG TABLET    Take 1 tablet by mouth every 8 hours as needed (bladder spasms)    OXYGEN    Inhale 3 L into the lungs nightly Indications: Difficulty Breathing, Oxygen Therapy And prn    POTASSIUM CHLORIDE (KLOR-CON M) 10 MEQ METABOLIC PANEL W/ REFLEX TO MG FOR LOW K - Abnormal; Notable for the following components:    Glucose 183 (*)     Sodium 132 (*)     Chloride 93 (*)     CO2 22 (*)     Alb 3.4 (*)     ALT 6 (*)     All other components within normal limits   PROTIME-INR - Abnormal; Notable for the following components:    INR 3.72 (*)     All other components within normal limits   APTT - Abnormal; Notable for the following components:    aPTT 39.4 (*)     All other components within normal limits   ANION GAP - Abnormal; Notable for the following components:    Anion Gap 17.0 (*)     All other components within normal limits   GLOMERULAR FILTRATION RATE, ESTIMATED - Abnormal; Notable for the following components:    Est, Glom Filt Rate 57 (*)     All other components within normal limits   OSMOLALITY - Abnormal; Notable for the following components:    Osmolality Calc 267.5 (*)     All other components within normal limits   CBC WITH AUTO DIFFERENTIAL - Abnormal; Notable for the following components:    WBC 28.9 (*)     RBC 3.52 (*)     Hemoglobin 9.5 (*)     Hematocrit 29.0 (*)     Segs Absolute 25.0 (*)     Monocytes Absolute 2.2 (*)     Immature Grans (Abs) 0.23 (*)     All other components within normal limits   SCAN OF BLOOD SMEAR   SCAN OF BLOOD SMEAR   URINE RT REFLEX TO CULTURE   PERIPHERAL BLOOD SMEAR, PATH REVIEW       EMERGENCY DEPARTMENT COURSE:   Vitals:    Vitals:    05/17/20 0927 05/17/20 1120 05/17/20 1255   BP: (!) 108/59 118/66 (!) 104/49   Pulse: 81 80 72   Resp: 20 20 16   Temp: 98.7 °F (37.1 °C)     TempSrc: Oral     SpO2: 96% 96% 96%     This patient presents to the emergency department in acute urinary retention. He was very uncomfortable. He had gross blood and clots throughout the tube. They tried to manually irrigate it and it was not working out. So we placed a three-way catheter. We were going to do overnight bag irrigation through the three-way. It kept occluding though.   They kept having to

## 2020-05-17 NOTE — H&P
auscultation, bilaterally without Rales/Wheezes/Rhonchi. Cardiovascular: Regular rate and rhythm with normal S1/S2 without murmurs, rubs or gallops. Abdomen: Soft, non-tender, non-distended with normal bowel sounds. : felipe in place draining clear urine, no clots or hematuria noted  Musculoskeletal:  No clubbing, cyanosis or edema bilaterally. Skin: Skin color, texture, turgor normal.  No rashes or lesions. Neurologic:  Neurovascularly intact without any focal sensory/motor deficits. Cranial nerves: II-XII intact, grossly non-focal.  Psychiatric: Alert and oriented, thought content appropriate, normal insight  Capillary Refill: Brisk,< 3 seconds   Peripheral Pulses: +2 palpable, equal bilaterally     Labs:   Recent Labs     05/17/20  0942   WBC 31.0*   HGB 10.0*   HCT 30.3*        Recent Labs     05/17/20  0942   *   K 4.0   CL 93*   CO2 22*   BUN 8   CREATININE 1.2   CALCIUM 8.6     Recent Labs     05/17/20  0942   AST 14   ALT 6*   BILITOT 0.3   ALKPHOS 55     Recent Labs     05/17/20  1013   INR 3.72*     No results for input(s): CKTOTAL, TROPONINI in the last 72 hours. Urinalysis:    Lab Results   Component Value Date    NITRU NEGATIVE 05/05/2020    WBCUA 25-50 05/05/2020    BACTERIA MODERATE 05/05/2020    RBCUA > 100 05/05/2020    BLOODU LARGE 05/05/2020    SPECGRAV 1.025 01/31/2020    SPECGRAV 1.016 03/15/2019    GLUCOSEU NEGATIVE 05/05/2020       Radiology:   CT ABDOMEN PELVIS W IV CONTRAST Additional Contrast? None    (Results Pending)     No results found.     Electronically signed by Supa Barney MD on 5/17/2020 at 11:56 AM

## 2020-05-17 NOTE — ED NOTES
Pt presents to ED with c/o hematuria. Pt states the hematuria has been going on \"for awhile\" put states he started having lower abdominal pain yesterday. Pt rates the pain at 10/10. Pt denies any other complaints. Pt is alert and oriented X4. Resp even and unlabored. Per nursing home report, they were going to change his catheter today but when they were irrigating his catheter they noticed a few large clots and the bleeding increased, so they sent him in to be seen. Call light in reach. Will continue to monitor.       Yariel Colvin RN  05/17/20 3715

## 2020-05-17 NOTE — ED NOTES
Called 6K and informed [facility] that the patient will be on their way to the unit shortly. Patient transported via bed in a stable condition.      Prudence Halsted  05/17/20 2810

## 2020-05-17 NOTE — ED NOTES
Continuous bladder irrigation started per Dr. Esequiel Delarosa, clear fluid return noted.       Inocencia Montejo RN  05/17/20 7426

## 2020-05-18 LAB
ANION GAP SERPL CALCULATED.3IONS-SCNC: 11 MEQ/L (ref 8–16)
BACTERIA: ABNORMAL /HPF
BASOPHILS # BLD: 0.2 %
BASOPHILS ABSOLUTE: 0.1 THOU/MM3 (ref 0–0.1)
BILIRUBIN URINE: NEGATIVE
BLOOD, URINE: ABNORMAL
BUN BLDV-MCNC: 7 MG/DL (ref 7–22)
CALCIUM SERPL-MCNC: 8.4 MG/DL (ref 8.5–10.5)
CASTS 2: ABNORMAL /LPF
CASTS UA: ABNORMAL /LPF
CHARACTER, URINE: ABNORMAL
CHLORIDE BLD-SCNC: 99 MEQ/L (ref 98–111)
CO2: 26 MEQ/L (ref 23–33)
COLOR: YELLOW
CREAT SERPL-MCNC: 0.7 MG/DL (ref 0.4–1.2)
CRYSTALS, UA: ABNORMAL
DIFFERENTIAL TYPE: ABNORMAL
EOSINOPHIL # BLD: 0 %
EOSINOPHILS ABSOLUTE: 0 THOU/MM3 (ref 0–0.4)
EPITHELIAL CELLS, UA: ABNORMAL /HPF
ERYTHROCYTE [DISTWIDTH] IN BLOOD BY AUTOMATED COUNT: 14.2 % (ref 11.5–14.5)
ERYTHROCYTE [DISTWIDTH] IN BLOOD BY AUTOMATED COUNT: 14.3 % (ref 11.5–14.5)
ERYTHROCYTE [DISTWIDTH] IN BLOOD BY AUTOMATED COUNT: 40.2 FL (ref 35–45)
ERYTHROCYTE [DISTWIDTH] IN BLOOD BY AUTOMATED COUNT: 41.7 FL (ref 35–45)
GFR SERPL CREATININE-BSD FRML MDRD: > 90 ML/MIN/1.73M2
GLUCOSE BLD-MCNC: 121 MG/DL (ref 70–108)
GLUCOSE BLD-MCNC: 130 MG/DL (ref 70–108)
GLUCOSE BLD-MCNC: 179 MG/DL (ref 70–108)
GLUCOSE BLD-MCNC: 90 MG/DL (ref 70–108)
GLUCOSE BLD-MCNC: 94 MG/DL (ref 70–108)
GLUCOSE URINE: NEGATIVE MG/DL
HCT VFR BLD CALC: 28 % (ref 42–52)
HCT VFR BLD CALC: 30.3 % (ref 42–52)
HEMOGLOBIN: 10 GM/DL (ref 14–18)
HEMOGLOBIN: 9.6 GM/DL (ref 14–18)
IMMATURE GRANS (ABS): 0.35 THOU/MM3 (ref 0–0.07)
IMMATURE GRANULOCYTES: 1.1 %
INR BLD: 3.18 (ref 0.85–1.13)
KETONES, URINE: NEGATIVE
LEUKOCYTE ESTERASE, URINE: ABNORMAL
LYMPHOCYTES # BLD: 3.5 %
LYMPHOCYTES ABSOLUTE: 1.1 THOU/MM3 (ref 1–4.8)
MCH RBC QN AUTO: 26.8 PG (ref 26–33)
MCH RBC QN AUTO: 27 PG (ref 26–33)
MCHC RBC AUTO-ENTMCNC: 33 GM/DL (ref 32.2–35.5)
MCHC RBC AUTO-ENTMCNC: 34.3 GM/DL (ref 32.2–35.5)
MCV RBC AUTO: 78.2 FL (ref 80–94)
MCV RBC AUTO: 81.7 FL (ref 80–94)
MISCELLANEOUS 2: ABNORMAL
MONOCYTES # BLD: 6.2 %
MONOCYTES ABSOLUTE: 1.9 THOU/MM3 (ref 0.4–1.3)
NITRITE, URINE: NEGATIVE
NUCLEATED RED BLOOD CELLS: 0 /100 WBC
PATHOLOGIST REVIEW: ABNORMAL
PH UA: 7.5 (ref 5–9)
PLATELET # BLD: 243 THOU/MM3 (ref 130–400)
PLATELET # BLD: 300 THOU/MM3 (ref 130–400)
PLATELET ESTIMATE: ADEQUATE
PMV BLD AUTO: 9.7 FL (ref 9.4–12.4)
PMV BLD AUTO: 9.8 FL (ref 9.4–12.4)
POTASSIUM REFLEX MAGNESIUM: 4.1 MEQ/L (ref 3.5–5.2)
PROTEIN UA: 100
RBC # BLD: 3.58 MILL/MM3 (ref 4.7–6.1)
RBC # BLD: 3.71 MILL/MM3 (ref 4.7–6.1)
RBC URINE: > 200 /HPF
RENAL EPITHELIAL, UA: ABNORMAL
REVIEWED BY: NORMAL
SARS-COV-2, NAAT: NOT DETECTED
SEG NEUTROPHILS: 89 %
SEGMENTED NEUTROPHILS ABSOLUTE COUNT: 27.6 THOU/MM3 (ref 1.8–7.7)
SMEAR REVIEW: NORMAL
SODIUM BLD-SCNC: 136 MEQ/L (ref 135–145)
SPECIFIC GRAVITY, URINE: 1.01 (ref 1–1.03)
UROBILINOGEN, URINE: 0.2 EU/DL (ref 0–1)
WBC # BLD: 14.5 THOU/MM3 (ref 4.8–10.8)
WBC # BLD: 31 THOU/MM3 (ref 4.8–10.8)
WBC UA: > 200 /HPF
YEAST: ABNORMAL

## 2020-05-18 PROCEDURE — 82948 REAGENT STRIP/BLOOD GLUCOSE: CPT

## 2020-05-18 PROCEDURE — 85027 COMPLETE CBC AUTOMATED: CPT

## 2020-05-18 PROCEDURE — U0002 COVID-19 LAB TEST NON-CDC: HCPCS

## 2020-05-18 PROCEDURE — 2580000003 HC RX 258: Performed by: PHYSICIAN ASSISTANT

## 2020-05-18 PROCEDURE — 99232 SBSQ HOSP IP/OBS MODERATE 35: CPT | Performed by: PHYSICIAN ASSISTANT

## 2020-05-18 PROCEDURE — 87077 CULTURE AEROBIC IDENTIFY: CPT

## 2020-05-18 PROCEDURE — 97166 OT EVAL MOD COMPLEX 45 MIN: CPT

## 2020-05-18 PROCEDURE — 36415 COLL VENOUS BLD VENIPUNCTURE: CPT

## 2020-05-18 PROCEDURE — 6370000000 HC RX 637 (ALT 250 FOR IP): Performed by: INTERNAL MEDICINE

## 2020-05-18 PROCEDURE — 99024 POSTOP FOLLOW-UP VISIT: CPT | Performed by: NURSE PRACTITIONER

## 2020-05-18 PROCEDURE — 94761 N-INVAS EAR/PLS OXIMETRY MLT: CPT

## 2020-05-18 PROCEDURE — 80048 BASIC METABOLIC PNL TOTAL CA: CPT

## 2020-05-18 PROCEDURE — 81001 URINALYSIS AUTO W/SCOPE: CPT

## 2020-05-18 PROCEDURE — 2580000003 HC RX 258: Performed by: INTERNAL MEDICINE

## 2020-05-18 PROCEDURE — 1200000003 HC TELEMETRY R&B

## 2020-05-18 PROCEDURE — 87086 URINE CULTURE/COLONY COUNT: CPT

## 2020-05-18 PROCEDURE — 85610 PROTHROMBIN TIME: CPT

## 2020-05-18 PROCEDURE — 87186 SC STD MICRODIL/AGAR DIL: CPT

## 2020-05-18 PROCEDURE — 97530 THERAPEUTIC ACTIVITIES: CPT

## 2020-05-18 PROCEDURE — 94640 AIRWAY INHALATION TREATMENT: CPT

## 2020-05-18 PROCEDURE — 6360000002 HC RX W HCPCS: Performed by: INTERNAL MEDICINE

## 2020-05-18 RX ORDER — 0.9 % SODIUM CHLORIDE 0.9 %
250 INTRAVENOUS SOLUTION INTRAVENOUS ONCE
Status: COMPLETED | OUTPATIENT
Start: 2020-05-18 | End: 2020-05-18

## 2020-05-18 RX ORDER — 0.9 % SODIUM CHLORIDE 0.9 %
500 INTRAVENOUS SOLUTION INTRAVENOUS ONCE
Status: COMPLETED | OUTPATIENT
Start: 2020-05-18 | End: 2020-05-18

## 2020-05-18 RX ADMIN — IPRATROPIUM BROMIDE 0.5 MG: 0.5 SOLUTION RESPIRATORY (INHALATION) at 18:21

## 2020-05-18 RX ADMIN — IPRATROPIUM BROMIDE 0.5 MG: 0.5 SOLUTION RESPIRATORY (INHALATION) at 12:50

## 2020-05-18 RX ADMIN — LISINOPRIL 5 MG: 5 TABLET ORAL at 08:03

## 2020-05-18 RX ADMIN — FUROSEMIDE 40 MG: 40 TABLET ORAL at 08:04

## 2020-05-18 RX ADMIN — LEVOTHYROXINE SODIUM 175 MCG: 150 TABLET ORAL at 06:06

## 2020-05-18 RX ADMIN — IPRATROPIUM BROMIDE 0.5 MG: 0.5 SOLUTION RESPIRATORY (INHALATION) at 08:18

## 2020-05-18 RX ADMIN — ACETAMINOPHEN 650 MG: 325 TABLET ORAL at 19:53

## 2020-05-18 RX ADMIN — ARFORMOTEROL TARTRATE 15 MCG: 15 SOLUTION RESPIRATORY (INHALATION) at 08:18

## 2020-05-18 RX ADMIN — BUDESONIDE 250 MCG: 0.25 INHALANT RESPIRATORY (INHALATION) at 18:21

## 2020-05-18 RX ADMIN — ARFORMOTEROL TARTRATE 15 MCG: 15 SOLUTION RESPIRATORY (INHALATION) at 18:21

## 2020-05-18 RX ADMIN — METOPROLOL TARTRATE 25 MG: 25 TABLET ORAL at 08:03

## 2020-05-18 RX ADMIN — INSULIN GLARGINE 18 UNITS: 100 INJECTION, SOLUTION SUBCUTANEOUS at 19:54

## 2020-05-18 RX ADMIN — SODIUM CHLORIDE 250 ML: 9 INJECTION, SOLUTION INTRAVENOUS at 16:00

## 2020-05-18 RX ADMIN — DOXAZOSIN 8 MG: 4 TABLET ORAL at 19:53

## 2020-05-18 RX ADMIN — SODIUM CHLORIDE, PRESERVATIVE FREE 10 ML: 5 INJECTION INTRAVENOUS at 08:02

## 2020-05-18 RX ADMIN — SODIUM CHLORIDE 500 ML: 9 INJECTION, SOLUTION INTRAVENOUS at 18:31

## 2020-05-18 RX ADMIN — SODIUM CHLORIDE, PRESERVATIVE FREE 10 ML: 5 INJECTION INTRAVENOUS at 19:53

## 2020-05-18 RX ADMIN — BUDESONIDE 250 MCG: 0.25 INHALANT RESPIRATORY (INHALATION) at 08:18

## 2020-05-18 ASSESSMENT — PAIN SCALES - GENERAL
PAINLEVEL_OUTOF10: 4
PAINLEVEL_OUTOF10: 0

## 2020-05-18 NOTE — PROGRESS NOTES
[facility]  INPATIENT OCCUPATIONAL THERAPY  [facility] RENAL TELEMETRY 6K  EVALUATION    Time:   Time In: 1402  Time Out: 1423  Timed Code Treatment Minutes: 12 Minutes  Minutes: 21          Date: 2020  Patient Name: Ifeoma Saeed,   Gender: male      MRN: [id number]  : 1931  (80 y.o.)  Referring Practitioner: Thomas Wellington MD  Diagnosis: acute urinary retention  Additional Pertinent Hx: Per H&P: 43-year-old gentleman with past medical history of BPH status post TURP with chronic indwelling Billy came to ER due to Billy complications. Patient was seen last week at ER due to gross hematuria that was stabilized patient was discharged back to [facility]. Patient states that today at [facility] while he was having his Billy changed by nurse while she was trying to irrigate there were a lot of resistance with large clots forming. Restrictions/Precautions:  Restrictions/Precautions: General Precautions, Fall Risk    Subjective  Chart Reviewed: Yes, Orders, Progress Notes, History and Physical  Patient assessed for rehabilitation services?: Yes  Family / Caregiver Present: No    Subjective: Pt seated in bedside chair upon arrival, reporting getting uncomfortable with sitting and requesting to stand. Pt agreeable to OT session. Pain:  Pain Assessment  Patient Currently in Pain: Yes(pt c/o pain at penis from catheter although reported resolved with standing/walking)    Social/Functional History:  Lives With: Other (comment)  Type of Home: Facility([facility]  Home Equipment: Rolling walker           ADL Assistance: Independent  Ambulation Assistance: Independent  Transfer Assistance: Independent          Additional Comments: Pt reported has been at [facility] for rehab since TURP 20. Pt reported was scheduled to discharge home from [facility] on 20. Pt reported staff does not assist with ADLs/mobility stating \"I pretty much do things on my own. \" Prior to TURP in  pt was living at home alone, independent with all tasks. VISION:Corrected    HEARING:  WFL    COGNITION: Decreased Recall and Impaired Memory   Repeated self multiple times during session without recognition; contradicted self re: discharge needs (at one point stated felt safe to go home, then later stated needed to go back to rehab as couldn't take care of everything at home by himself)    RANGE OF MOTION:  Bilateral Upper Extremity:  WFL    STRENGTH:  Bilateral Upper Extremity:  Impaired - grossly deconditioned    ADL:   No ADL's completed this session. .-pt declined need at this time    BALANCE:  Sitting Balance:  Independent. Standing Balance: Stand By Assistance. Pt tolerated standing ~4-5 minutes before mobility initiated; additional ~2 minutes while looking out windows    BED MOBILITY:  Not Tested    TRANSFERS:  Sit to Stand:  Supervision. Stand to Sit: Supervision. Completed X2 trials    FUNCTIONAL MOBILITY:  Assistive Device: Rolling Walker  Assist Level:  Stand By Assistance. Distance: in room, around nursing module 1.5x  Steady pace, no LOB. Good safety awareness throughout. Functional mobility completed to increase overall activity tolerance needed for ADL tasks. Activity Tolerance:  Patient tolerance of  treatment: good. Encouraged pt to continue to ask to stand/go on walks if becomes uncomfortable sitting in chair with pt verbalizing understanding. Assessment:  Assessment: Pt presents requiring increased assistance for ADLs/IADLs, transfers, and functional mobility compared to PLOF. Pt will continue to benefit from OT services to improve independence with these tasks, in addition to overall strength/endurance to facilitate return to PLOF.      Performance deficits / Impairments: Decreased functional mobility , Decreased ADL status, Decreased endurance, Decreased high-level IADLs, Decreased strength  Prognosis: Good  REQUIRES OT FOLLOW UP: Yes  Decision Making: Medium Complexity    Treatment

## 2020-05-18 NOTE — CONSULTS
[address] RENAL TELEMETRY 6K  [address] [facility] [address]  Dept: [phone]  Loc: [phone]  Visit Date: 5/17/2020    Urology Consult Note    Reason for Consult:  Clot hematuria  Requesting Physician:  Tnaner Dickson DO    History Obtained From:  patient, electronic medical record    Chief Complaint: Clot hematuria    HISTORY OF PRESENT ILLNESS:                The patient is a 80 y.o. male with significant past medical history of BPH, bladder stones, Afib, DVT, COPD, and HTN who presents from [facility] after felipe catheter change, clot formation, and catheter complications this AM.  He is s/p Greenlight TURP with Dr. Raina Balbuena on 3/17. He states his catheter was unable to be irrigated and was not draining. He had suprapubic discomfort. A 3-way catheter was inserted in ER and manual irrigation reportedly produced quite significant amount of large blood clots. Attempt was made to start CBI but apparently continued to clot off. Prior to my arrival, no further clots were able to be withdrawn manually and CBI is flowing well. He states complete relief of discomfort. He denies fever, chills, shortness of breath, cough, N/V or chest pain. No urine collected.   Bladder scan post manual irrigation showed 20 ml residual.      Past Medical History:        Diagnosis Date    Anxiety     CAD (coronary artery disease)     leaking valve    Chronic back pain     [address] (chronic obstructive [facility] disease) [facility]     Detached retina     Diabetes mellitus (HCC)     NIDDM    D[facility]deep venous thrombosis) ([facility])     RLE    DVT (deep venous thrombosis) ([facility] [address] 11/9/15    LLE    Glaucoma     Hyperlipidemia     Hypertension     Mass of chest     benign chest mass, Dr Richie Vallecillo    Movement disorder     spinal stenosis    Obesity     Osteoarthritis     right ankle, right hand    Pneumonia     Primary thyroid follicular carcinoma 2/9/0117    Sleep apnea 1993    on BiPap,  organizations: Never     Relationship status:     Intimate partner violence     Fear of current or ex partner: Not on file     Emotionally abused: Not on file     Physically abused: Not on file     Forced sexual activity: Not on file   Other Topics Concern    Not on file   Social History Narrative    Not on file     Family History:       Problem Relation Age of Onset    Other Mother         Complications of Childbirth    Other Father [de-identified]        Natural causes       ROS:  Constitutional: Negative for chills, fatigue, fever, or weight loss. Eyes: Denies reported visual changes. ENT: Denies headache, difficulty swallowing, earache, and nosebleeds. Cardiovascular: Negative for chest pain, palpitations, tachycardia or edema. Respiratory: Denies cough or SOB. GI:The patient denies flank pain, anorexia, nausea or vomiting. Suprapubic discomfort resolved. : see HPI. Musculoskeletal: Patient denies low back pain or painful or reduced range of ROM. Neurological: The patient denies any symptoms of neurological impairment or TIA. Psychiatric: Denies anxiety or depression. Skin: Denies rash or lesions. All remaining ROS negative. PHYSICAL EXAM:  VITALS:  BP (!) 108/51   Pulse 72   Temp 98.8 °F (37.1 °C) (Oral)   Resp 18   SpO2 95% . Nursing note and vitals reviewed. Constitutional: Alert and oriented times x3, no acute distress, and cooperative to examination with appropriate mood and affect. HEENT:   Head:         Normocephalic and atraumatic. Mouth/Throat:          Mucous membranes are normal.   Eyes:         EOM are normal. No scleral icterus. Nose:    The external appearance of the nose is normal  Ears: The ears appear normal to external inspection. Cardiovascular:       Normal rate, regular rhythm. Pulmonary/Chest:  Normal respiratory rate and rhthym. No use of accessory muscles. Lungs clear bilaterally. Abdominal:          Soft. Large, round. No tenderness.  Active bowel

## 2020-05-18 NOTE — CARE COORDINATION
DISCHARGE PLANNING EVALUATION: OP/OBSERVATION        5/18/20, 12:09 PM EDT    Harjit Womack       Admitted from: ED 5/17/2020/ 0928   Location: [address] [place] [address] Reason for admit: Acute urinary retention [R33.8]   Admit order signed?: yes    Procedure: (s/p Greenlight TURP 3/17/20)    Pertinent Info/Orders/Treatment Plan: Admitted with gross hematuria with clot retention. Leukocytosis. UTI? CBI currently clamped. Urine clear yellow. Pain management. I&O. Repeat urine culture. Off coumadin. ASA only for Afib. PCP: Ngoc Goodman MD    Patient Goals/Plan/Treatment Preferences: [facility] - see SW notes regarding requirements for return [facility]. Per urology, \"Failed voiding trial in office, plan for monthly catheter changes, if not done at [facility], needs appt at [address]. Follow up in July with Dr Ismael Sampson as previously scheduled. \"      Transportation/Food Security/Housekeeping Addressed:  No issues identified.

## 2020-05-18 NOTE — PROGRESS NOTES
acute exacerbation. Continue current meds. 7. Hypothyroidism: continue synthroid. 8. HFpEF: Grade 1 diastolic dysfunction with EF 55% on echo 3/2019. Continue ACEI/BB/Lasix. No acute decompensation. 9. History of DM-2: Holding home metformin. Continue low dose SSI. Hypoglycemia protocol in place. Carb control diet. Chief Complaint: Urinary Retention    Initial H and P:-    80-year-old gentleman with past medical history of BPH status post TURP with chronic indwelling Felipe came to ER due to Felipe complications. Patient was seen last week at ER due to gross hematuria that was stabilized patient was discharged home. Patient states that today at [facility] while he was having his Felipe changed by nurse while she was trying to irrigate there were a lot of resistance with large clots forming. Patient states this did cause him slight discomfort. He denies any fevers or chills. No nausea, vomiting, diarrhea. No chest pain or shortness of breath. No other complaints.     In ER, patient's vitals were stable labs were significant for leukocytosis. Urology was consulted and patient was admitted. Subjective (past 24 hours):   Patient reports that his suprapubic discomfort has improved. He continues to complain of penile pain with blood at catheter site. No blood in the felipe bag. Denies chest pain, SOB, fever, chills, n/v/d/c. Urology evaluating patient and recommending repeating urine culture for further evaluation regarding possible UTI. No further questions comments or concerns at this time. Past medical history, family history, social history and allergies reviewed again and is unchanged since admission. ROS (12 point review of systems completed. Pertinent positives noted.  Otherwise ROS is negative)     Medications:  Reviewed    Infusion Medications   Scheduled Medications    Arformoterol Tartrate  15 mcg Nebulization BID    budesonide  250 mcg Nebulization BID    doxazosin  8 mg Oral Nightly    furosemide  40 mg Oral BID    insulin glargine  18 Units Subcutaneous Nightly    levothyroxine  175 mcg Oral Daily    lisinopril  5 mg Oral Daily    metoprolol tartrate  25 mg Oral BID    insulin lispro  0-6 Units Subcutaneous TID     insulin lispro  0-3 Units Subcutaneous Nightly    sodium chloride flush  10 mL Intravenous 2 times per day    ipratropium  0.5 mg Nebulization TID     PRN Meds: oxybutynin, sodium chloride flush, acetaminophen **OR** acetaminophen, promethazine **OR** ondansetron, polyethylene glycol, senna, albuterol      Intake/Output Summary (Last 24 hours) at 5/18/2020 1425  Last data filed at 5/18/2020 0900  Gross per 24 hour   Intake 340 ml   Output 7800 ml   Net -7460 ml       Diet:  DIET CARDIAC; No Added Salt (3-4 GM)    Exam:  BP (!) 110/56   Pulse 70   Temp 98.7 °F (37.1 °C) (Oral)   Resp 18   Wt 223 lb 11.2 oz (101.5 kg)   SpO2 96%   BMI 35.04 kg/m²   General appearance: No apparent distress, appears stated age and cooperative. HEENT: Pupils equal, round, and reactive to light. Conjunctivae/corneas clear. Neck: Supple, with full range of motion. No jugular venous distention. Trachea midline. Respiratory:  Normal respiratory effort. Clear to auscultation, bilaterally without Rales/Wheezes/Rhonchi. Cardiovascular: Regular rate and rhythm with normal S1/S2 without murmurs, rubs or gallops. Abdomen: Soft, non-tender, non-distended with normal bowel sounds. : Three way felipe catheter in place. Penile swelling with dried blood around urethra, minimal TTP. Musculoskeletal: passive and active ROM x 4 extremities. Skin: Skin color, texture, turgor normal.  No rashes or lesions. Neurologic:  Neurovascularly intact without any focal sensory/motor deficits.  Cranial nerves: II-XII intact, grossly non-focal.  Psychiatric: Alert and oriented, thought content appropriate, normal insight  Capillary Refill: Brisk,< 3 seconds   Peripheral Pulses: +2 palpable, equal bilaterally

## 2020-05-18 NOTE — PROGRESS NOTES
Active member of club or organization: No     Attends meetings of clubs or organizations: Never     Relationship status:     Intimate partner violence     Fear of current or ex partner: Not on file     Emotionally abused: Not on file     Physically abused: Not on file     Forced sexual activity: Not on file   Other Topics Concern    Not on file   Social History Narrative    Not on file     Family History   Problem Relation Age of Onset    Other Mother         Complications of Childbirth    Other Father [de-identified]        Natural causes     Allergies   Allergen Reactions   Ross Dust [Nitrofurantoin Shanelle Hudson prefers pt not use Macrobid due to pulmonary side effects.  Levaquin [Levofloxacin] Other (See Comments)     Redness/flushing    Adhesive Tape Rash    Alphagan [Brimonidine Tartrate] Hives     Eyes red       Objective:    Constitutional: Alert and oriented times x3, no acute distress, and cooperative to examination with appropriate mood and affect. HEENT:   Head:         Normocephalic and atraumatic. Mucous membranes are normal.   Eyes:         EOM are normal. No scleral icterus. Nose:    The external appearance of the nose is normal  Ears: The ears appear normal to external inspection. Pulmonary/Chest:  Normal respiratory rate and rhthym. No use of accessory muscles. Lungs clear bilaterally. Abdominal:          Soft. No tenderness. Active bowel sounds. Genitalia:    Billy catheter draining yellow urine. Penile swelling noted CBI stopped. Minimal dried blood noted around urethra. Musculoskeletal:    Normal range of motion. Extremities:    No cyanosis, clubbing, or edema present. Neurological:    Alert and oriented.      Labs:  WBC:    Lab Results   Component Value Date    WBC 14.5 05/18/2020     Hemoglobin/Hematocrit:    Lab Results   Component Value Date    HGB 9.6 05/18/2020    HCT 28.0 05/18/2020     BMP:    Lab Results   Component Value Date    NA 136 05/18/2020    K 4.1 05/18/2020    CL 99 05/18/2020    CO2 26 05/18/2020    BUN 7 05/18/2020    LABALBU 3.4 05/17/2020    LABALBU 4.3 11/30/2011    CREATININE 0.7 05/18/2020    CALCIUM 8.4 05/18/2020    LABGLOM >90 05/18/2020       Impression:    Gross hematuria with clot retention  Leukocytosis  UTI? BPH s/p Greenlight TURP 3/17/20    Plan:    No further hematuria noted, after hand irrigated in the ER and CBI ran over night. Currently CBI clamped. Urine clear yellow. Some suprapubic pain, with penile swelling. Recommend getting urine collected for repeat urine culture. Treated with Bactrim for Klebsiella pneumoniae for 7 days on 5/9/2020. CT showed questionable thickening of bladder wall, decompression vs cystitis. , cause of hematuria? Leukocytosis? May need ID recommendations for antibiotics, ESBL . Off coumadin, on ASA only for afib. Failed voiding trial in office, plan for monthly catheter changes, if not done at [facility], needs appt at [facility]. Follow up in July with Dr Nick Lepe as previously scheduled.      MAEGAN Julien  05/18/20 11:47 AM  Urology

## 2020-05-18 NOTE — PROGRESS NOTES
Patient confused at bedside report, pulling at catheter. Bedside sitter initiated. During assessment, 08:20, patient A&Ox4, no longer pulling at catheter. Educated patient how our beds alarm and used teach back method on call light use. Patient expressed concern about medications and catheter/CBI, informed him of the medications he is receiving and about catheter/CBI. Bedside sitter d/c'd. No further episodes of confusion, will continue to monitor.

## 2020-05-18 NOTE — PROGRESS NOTES
Patient has CBI running, with clear yellow urine. Received call from Eddie, notified of urine. OK'd to clamp CBI. Clamped at 08:20.

## 2020-05-19 VITALS
HEART RATE: 60 BPM | BODY MASS INDEX: 36.15 KG/M2 | OXYGEN SATURATION: 95 % | DIASTOLIC BLOOD PRESSURE: 53 MMHG | TEMPERATURE: 98 F | SYSTOLIC BLOOD PRESSURE: 111 MMHG | RESPIRATION RATE: 16 BRPM | WEIGHT: 230.82 LBS

## 2020-05-19 LAB
ANION GAP SERPL CALCULATED.3IONS-SCNC: 13 MEQ/L (ref 8–16)
BUN BLDV-MCNC: 7 MG/DL (ref 7–22)
CALCIUM SERPL-MCNC: 8.3 MG/DL (ref 8.5–10.5)
CHLORIDE BLD-SCNC: 100 MEQ/L (ref 98–111)
CO2: 24 MEQ/L (ref 23–33)
CREAT SERPL-MCNC: 0.7 MG/DL (ref 0.4–1.2)
ERYTHROCYTE [DISTWIDTH] IN BLOOD BY AUTOMATED COUNT: 14.3 % (ref 11.5–14.5)
ERYTHROCYTE [DISTWIDTH] IN BLOOD BY AUTOMATED COUNT: 42.5 FL (ref 35–45)
GFR SERPL CREATININE-BSD FRML MDRD: > 90 ML/MIN/1.73M2
GLUCOSE BLD-MCNC: 111 MG/DL (ref 70–108)
GLUCOSE BLD-MCNC: 128 MG/DL (ref 70–108)
GLUCOSE BLD-MCNC: 137 MG/DL (ref 70–108)
GLUCOSE BLD-MCNC: 156 MG/DL (ref 70–108)
HCT VFR BLD CALC: 29 % (ref 42–52)
HEMOGLOBIN: 9.2 GM/DL (ref 14–18)
INR BLD: 2.38 (ref 0.85–1.13)
MCH RBC QN AUTO: 25.9 PG (ref 26–33)
MCHC RBC AUTO-ENTMCNC: 31.7 GM/DL (ref 32.2–35.5)
MCV RBC AUTO: 81.7 FL (ref 80–94)
PLATELET # BLD: 260 THOU/MM3 (ref 130–400)
PMV BLD AUTO: 9.7 FL (ref 9.4–12.4)
POTASSIUM SERPL-SCNC: 3.4 MEQ/L (ref 3.5–5.2)
RBC # BLD: 3.55 MILL/MM3 (ref 4.7–6.1)
SODIUM BLD-SCNC: 137 MEQ/L (ref 135–145)
WBC # BLD: 9.5 THOU/MM3 (ref 4.8–10.8)

## 2020-05-19 PROCEDURE — 99239 HOSP IP/OBS DSCHRG MGMT >30: CPT | Performed by: PHYSICIAN ASSISTANT

## 2020-05-19 PROCEDURE — 97110 THERAPEUTIC EXERCISES: CPT

## 2020-05-19 PROCEDURE — 6370000000 HC RX 637 (ALT 250 FOR IP): Performed by: PHYSICIAN ASSISTANT

## 2020-05-19 PROCEDURE — 85027 COMPLETE CBC AUTOMATED: CPT

## 2020-05-19 PROCEDURE — 85610 PROTHROMBIN TIME: CPT

## 2020-05-19 PROCEDURE — 36415 COLL VENOUS BLD VENIPUNCTURE: CPT

## 2020-05-19 PROCEDURE — 94640 AIRWAY INHALATION TREATMENT: CPT

## 2020-05-19 PROCEDURE — 94761 N-INVAS EAR/PLS OXIMETRY MLT: CPT

## 2020-05-19 PROCEDURE — 6370000000 HC RX 637 (ALT 250 FOR IP): Performed by: INTERNAL MEDICINE

## 2020-05-19 PROCEDURE — 6360000002 HC RX W HCPCS: Performed by: INTERNAL MEDICINE

## 2020-05-19 PROCEDURE — 80048 BASIC METABOLIC PNL TOTAL CA: CPT

## 2020-05-19 PROCEDURE — 2580000003 HC RX 258: Performed by: INTERNAL MEDICINE

## 2020-05-19 PROCEDURE — 82948 REAGENT STRIP/BLOOD GLUCOSE: CPT

## 2020-05-19 PROCEDURE — 97162 PT EVAL MOD COMPLEX 30 MIN: CPT

## 2020-05-19 RX ORDER — LISINOPRIL 5 MG/1
2.5 TABLET ORAL DAILY
Qty: 90 TABLET | Refills: 3 | DISCHARGE
Start: 2020-05-19 | End: 2020-07-24 | Stop reason: SDUPTHER

## 2020-05-19 RX ORDER — POTASSIUM CHLORIDE 750 MG/1
TABLET, EXTENDED RELEASE ORAL
Qty: 360 TABLET | Refills: 3 | DISCHARGE
Start: 2020-05-19 | End: 2020-07-24 | Stop reason: SDUPTHER

## 2020-05-19 RX ORDER — POTASSIUM CHLORIDE 20 MEQ/1
40 TABLET, EXTENDED RELEASE ORAL PRN
Status: DISCONTINUED | OUTPATIENT
Start: 2020-05-19 | End: 2020-05-19 | Stop reason: HOSPADM

## 2020-05-19 RX ORDER — POTASSIUM CHLORIDE 7.45 MG/ML
10 INJECTION INTRAVENOUS PRN
Status: DISCONTINUED | OUTPATIENT
Start: 2020-05-19 | End: 2020-05-19 | Stop reason: HOSPADM

## 2020-05-19 RX ORDER — LISINOPRIL 2.5 MG/1
2.5 TABLET ORAL DAILY
Status: DISCONTINUED | OUTPATIENT
Start: 2020-05-20 | End: 2020-05-19 | Stop reason: HOSPADM

## 2020-05-19 RX ADMIN — LEVOTHYROXINE SODIUM 175 MCG: 150 TABLET ORAL at 05:34

## 2020-05-19 RX ADMIN — LISINOPRIL 5 MG: 5 TABLET ORAL at 09:12

## 2020-05-19 RX ADMIN — BUDESONIDE 250 MCG: 0.25 INHALANT RESPIRATORY (INHALATION) at 07:25

## 2020-05-19 RX ADMIN — ARFORMOTEROL TARTRATE 15 MCG: 15 SOLUTION RESPIRATORY (INHALATION) at 07:20

## 2020-05-19 RX ADMIN — METOPROLOL TARTRATE 25 MG: 25 TABLET ORAL at 09:12

## 2020-05-19 RX ADMIN — POTASSIUM BICARBONATE 40 MEQ: 782 TABLET, EFFERVESCENT ORAL at 10:28

## 2020-05-19 RX ADMIN — ACETAMINOPHEN 650 MG: 325 TABLET ORAL at 10:35

## 2020-05-19 RX ADMIN — FUROSEMIDE 40 MG: 40 TABLET ORAL at 09:12

## 2020-05-19 RX ADMIN — BUDESONIDE 250 MCG: 0.25 INHALANT RESPIRATORY (INHALATION) at 17:05

## 2020-05-19 RX ADMIN — IPRATROPIUM BROMIDE 0.5 MG: 0.5 SOLUTION RESPIRATORY (INHALATION) at 13:15

## 2020-05-19 RX ADMIN — IPRATROPIUM BROMIDE 0.5 MG: 0.5 SOLUTION RESPIRATORY (INHALATION) at 17:05

## 2020-05-19 RX ADMIN — IPRATROPIUM BROMIDE 0.5 MG: 0.5 SOLUTION RESPIRATORY (INHALATION) at 07:15

## 2020-05-19 RX ADMIN — ARFORMOTEROL TARTRATE 15 MCG: 15 SOLUTION RESPIRATORY (INHALATION) at 17:05

## 2020-05-19 RX ADMIN — SODIUM CHLORIDE, PRESERVATIVE FREE 10 ML: 5 INJECTION INTRAVENOUS at 09:12

## 2020-05-19 ASSESSMENT — PAIN SCALES - GENERAL
PAINLEVEL_OUTOF10: 5
PAINLEVEL_OUTOF10: 0

## 2020-05-19 NOTE — DISCHARGE INSTR - COC
Immunization History   Administered Date(s) Administered    Influenza 10/31/2013    Influenza Virus Vaccine 11/01/2011, 11/05/2012, 11/11/2014, 09/29/2015    Influenza Whole 10/01/2010    Influenza, Quadv, 6 mo and older, IM, PF (Flulaval, Fluarix) 09/27/2018    Influenza, Quadv, IM, (6 mo and older Fluzone, Flulaval, Fluarix and 3 yrs and older Afluria) 11/17/2017    Influenza, Quadv, IM, PF (6 mo and older Fluzone, Flulaval, Fluarix, and 3 yrs and older Afluria) 10/01/2019    Influenza, Triv, 3 Years and older, IM (Afluria (5 yrs and older) 09/26/2016    Pneumococcal Conjugate 13-valent ([id number]) 12/19/2014    Pneumococcal Polysaccharide ([id number]) 01/01/2003    Td, unspecified formulation 06/08/2010    Zoster Live (Zostavax) 08/05/2014       Active Problems:  Patient Active Problem List   Diagnosis Code    Diabetes mellitus ([facility] E11.9    Hyperlipidemia E78.5    Acquired hypothyroidism E03.9    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 E29.1    Breast lump N63.0    BPH (benign prostatic hyperplasia) N40.0    ROBER (obstructive sleep apnea) G47.33    Essential hypertension I10    History of DVT (deep vein thrombosis) Z86.718    Shortness of breath R06.02    Chronic respiratory failure with hypoxia ([facility]) J96.11    Mixed hyperlipidemia E78.2    Chronic deep vein thrombosis (DVT) of right lower extremity ([facility]) I82.501    Anticoagulated on Coumadin Z79.01    Type 2 diabetes mellitus with complication, with long-term current use of insulin ([facility]) E11.8, Z79.4    Muscle weakness M62.81    Chronic bronchitis ([facility]) J42    Chronic obstructive pulmonary disease ([facility]) J44.9    Paroxysmal atrial fibrillation ([facility]) I48.0    Acute diastolic congestive heart failure ([facility]) I50.31    Paroxysmal atrial flutter (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

## 2020-05-19 NOTE — CARE COORDINATION
5/19/20, 1:18 PM EDT    Patient goals/plan/ treatment preferences discussed by  and . Patient goals/plan/ treatment preferences reviewed with patient/ family. Patient/ family verbalize understanding of discharge plan and are in agreement with goal/plan/treatment preferences. Understanding was demonstrated using the teach back method. AVS provided by RN at time of discharge, which includes all necessary medical information pertaining to the patients current course of illness, treatment, post-discharge goals of care, and treatment preferences. Services After Discharge  Services At/After Discharge: Skilled Therapy, Nursing Services, Aide Services([facility])   IMM Letter  IMM Letter given to Patient/Family/Significant other/Guardian/POA/by[de-identified] cm  IMM Letter date given[de-identified] 05/19/20  IMM Letter time given[de-identified] 80     OZZIE spoke with Sanjeev at [facility], they will need to review VILMA (AVS) prior to pts return. OZZIE faxed VILMA (AVS) for their review. Await call back with acceptance. OZZIE spoke with Dr. Joyce Anderson (son), he will transport pt between 5:30-6 pm.  Pt will return [facility] under his Medicare. SHIRLEY Rogers is aware. 2:52 PM update:  OZZIE received call from Sanjeev with [facility], they are able to accept pt today. SHIRLEY Rogers aware.

## 2020-05-19 NOTE — DISCHARGE SUMMARY
Hospitalist Discharge Summary        Patient: Laurent Nunez  YOB: 1931  MRN: [id number]   Acct: [de-identified]    Primary Care Physician: Timmy Beatty MD    Admit date  5/17/2020    Discharge date:  5/19/2020 11:47 AM    Chief Complaint on presentation :-  Urinary Retention    Discharge Assessment and Plan:-   1. Acute Urinary Retention secondary to catheter complication of blood clots: S/p Greenlight TURP 3/17/20. Three way cath inserted and manual irrigation produced large clots in ED. CBI then started and flowing well. 1. Urology consulted and recommended urine reflex to culture, CT abd/pelvis, supratherapeutic INR increasing risk for hematuria/clot return. UA with large blood and large leukocytes, no nitrites. Urine culture with 50-90k CFU of gram positive cocci. 2. Treated w/ Bactrim x7 days for Klebsiella pneumonia UTI on 5/9/20. CT abd/pelvis with questionable thickening of bladder wall. Hx of ESBL producing UTI in past. Administered 1 dose IV Rocephin in ED. Leukocytosis 28.9 on admission, resolved. ID consulted due to history, recommend no Abx on d/c.  3. To f/u with Dr. Hai Parisi as scheduled. 2.  Leukocytosis: Resolved. WBC 28.9 on admission, administered one dose of IV Rocephin,9.5. Has remained afebrile. UA with large leuks and blood but no nitrites. CT abdomen/pelvis showing bladder wall thickening, can't exclude cystitis. Urine culture as above. ID recommendations as above. 3. Benign Essential HTN: Continue Lisinopril/Metoprolol with holding parameters. BP has been low-normal. Decrease Lisinopril dose. Recommend monitoring BP prior to admission of medications with holding parameters at [facility]. 4. Paroxysmal A-fib: On Coumadin, prior to admission. INR 3.72, has trended down. Family requesting coumadin not be resumed due to frequent hematuria. Will continue BB and ASA. 5. History of DVT: Previously on Coumadin, will be d/c home on ASA. Family requesting no [address]    6. History of bag.   8. Musculoskeletal:  No clubbing or cyanosis. Trace BLE edema. 9. Skin: Skin color, texture, turgor normal.  No rashes or lesions. 10. Neurologic:  Neurovascularly intact without any focal sensory/motor deficits. Cranial nerves: II-XII intact, grossly non-focal.  11. Psychiatric: Alert and oriented, thought content appropriate, normal insight  12. Capillary Refill: Brisk,< 3 seconds   13. Peripheral Pulses: +2 palpable, equal bilaterally       Discharge Medications:-      Medication List      CHANGE how you take these medications    lisinopril 5 MG tablet  Commonly known as:  PRINIVIL;ZESTRIL  Take 0.5 tablets by mouth daily  What changed:  how much to take     metoprolol tartrate 25 MG tablet  Commonly known as:  LOPRESSOR  Take 1 tablet by mouth 2 times daily Hold for SBP <110 or HR <60  What changed:  additional instructions     oxybutynin 5 MG tablet  Commonly known as:  DITROPAN  Take 1 tablet by mouth every 8 hours as needed (bladder spasms)  What changed:    · when to take this  · Another medication with the same name was removed. Continue taking this medication, and follow the directions you see here. potassium chloride 10 MEQ extended release tablet  Commonly known as:  KLOR-CON M  TAKE 2 TABLETS BY MOUTH TWICE DAILY  What changed:  See the new instructions.         CONTINUE taking these medications    acetaminophen 325 MG tablet  Commonly known as:  TYLENOL     albuterol sulfate  (90 Base) MCG/ACT inhaler  Inhale 2 puffs into the lungs every 6 hours as needed for Wheezing     blood glucose test strips strip  Commonly known as:  Accu-Chek Meryl Plus  TEST TWICE DAILY     Brovana 15 MCG/2ML Nebu  Generic drug:  Arformoterol Tartrate     docusate 100 MG Caps  Commonly known as:  COLACE, DULCOLAX  Take 100 mg by mouth 2 times daily Prescription to be refilled by primary care physician     doxazosin 8 MG tablet  Commonly known as:  Cardura  Take 1 tablet by mouth nightly     furosemide 40 MG Monocytes 6.2 %    Eosinophils 0.0 %    Basophils 0.2 %    Immature Granulocytes 1.1 %    Platelet Estimate ADEQUATE Adequate    Segs Absolute 27.6 (H) 1.8 - 7.7 thou/mm3    Lymphocytes Absolute 1.1 1.0 - 4.8 thou/mm3    Monocytes Absolute 1.9 (H) 0.4 - 1.3 thou/mm3    Eosinophils Absolute 0.0 0.0 - 0.4 thou/mm3    Basophils Absolute 0.1 0.0 - 0.1 thou/mm3    Immature Grans (Abs) 0.35 (H) 0.00 - 0.07 thou/mm3    nRBC 0 /100 wbc   Comprehensive Metabolic Panel w/ Reflex to MG    Collection Time: 05/17/20  9:42 AM   Result Value Ref Range    Glucose 183 (H) 70 - 108 mg/dL    CREATININE 1.2 0.4 - 1.2 mg/dL    BUN 8 7 - 22 mg/dL    Sodium 132 (L) 135 - 145 meq/L    Potassium reflex Magnesium 4.0 3.5 - 5.2 meq/L    Chloride 93 (L) 98 - 111 meq/L    CO2 22 (L) 23 - 33 meq/L    Calcium 8.6 8.5 - 10.5 mg/dL    AST 14 5 - 40 U/L    Alkaline Phosphatase 55 38 - 126 U/L    Total Protein 6.3 6.1 - 8.0 g/dL    Alb 3.4 (L) 3.5 - 5.1 g/dL    Total Bilirubin 0.3 0.3 - 1.2 mg/dL    ALT 6 (L) 11 - 66 U/L   Anion Gap    Collection Time: 05/17/20  9:42 AM   Result Value Ref Range    Anion Gap 17.0 (H) 8.0 - 16.0 meq/L   Glomerular Filtration Rate, Estimated    Collection Time: 05/17/20  9:42 AM   Result Value Ref Range    Est, Glom Filt Rate 57 (A) ml/min/1.73m2   Osmolality    Collection Time: 05/17/20  9:42 AM   Result Value Ref Range    Osmolality Calc 267.5 (L) 275.0 - 300 mOsmol/kg   Scan of Blood Smear    Collection Time: 05/17/20  9:42 AM   Result Value Ref Range    SCAN OF BLOOD SMEAR see below    Protime-INR    Collection Time: 05/17/20 10:13 AM   Result Value Ref Range    INR 3.72 (H) 0.85 - 1.13   APTT    Collection Time: 05/17/20 10:13 AM   Result Value Ref Range    aPTT 39.4 (H) 22.0 - 38.0 seconds   CBC auto differential    Collection Time: 05/17/20 12:17 PM   Result Value Ref Range    WBC 28.9 (H) 4.8 - 10.8 thou/mm3    RBC 3.52 (L) 4.70 - 6.10 mill/mm3    Hemoglobin 9.5 (L) 14.0 - 18.0 gm/dl    Hematocrit 29.0 (L) 42.0

## 2020-05-19 NOTE — PLAN OF CARE
Problem: Falls - Risk of:  Goal: Will remain free from falls  Description: Will remain free from falls  Outcome: Ongoing  Note: Falling star placed outside of patient's room. 2/4 bed rails up. Non-skid socks provided. Call light and personal items with in reach. Bed alarm on. Problem: Pain:  Goal: Pain level will decrease  Description: Pain level will decrease  Outcome: Ongoing  Note: Patient rates pain on 0-10 pain scale. States pain is a 0 on 0-10 scale. States goal is 0. Repositioned for comfort. Will continue to reassess. Problem: Discharge Planning:  Goal: Discharged to appropriate level of care  Description: Discharged to appropriate level of care  Outcome: Ongoing  Note: Plans to discharge to [place] [address] when medically stable. Problem: Skin Integrity:  Goal: Will show no infection signs and symptoms  Description: Will show no infection signs and symptoms  Outcome: Ongoing  Note: No new skin breakdown noted at this time. Will continue to reassess. Patient turns and repositions self in bed frequent       Problem: Urinary Elimination:  Goal: Signs and symptoms of infection will decrease  Description: Signs and symptoms of infection will decrease  Outcome: Ongoing  Note: I/O last 3 completed shifts: In: 1646.9 [P.O.:940; I.V.:706.9]  Out: 3165 [Urine:3165]  No intake/output data recorded. Monitoring intake and output. Billy catheter in place. Draining clear yellow urine. Urology following. Care plan reviewed with patient. No concerns voiced.

## 2020-05-19 NOTE — PROGRESS NOTES
Assistance: Independent  Transfer Assistance: Independent          Additional Comments: Pt reported [facility] been [facility] for [facility] since TUR 03/24/20. Pt reported was scheduled to discharge home from [facility] on 5/19/20. Pt reported staff does not assist with ADLs/mobility stating \"I pretty much do things on my own. \" Prior to TUR in 03/20 pt was living at home alone, independent with all tasks. OBJECTIVE:  Range of Motion:  Right Lower Extremity: WFL  Left Lower Extremity: WFL    Strength:  Right Lower Extremity: Impaired - grossly 4/5  Left Lower Extremity: Impaired - grossly 4/5    Balance:  Static Sitting Balance:  Independent  Static Standing Balance: Stand By Assistance  Dynamic Standing Balance: Stand By Assistance, Contact Guard Assistance    Bed Mobility:  Not Tested    Transfers:  Sit to Stand: Stand By Assistance  Stand to Sit:Stand By Assistance    Ambulation:  Stand By Assistance, [employer] Assistance  Distance: 250'  Surface: Level Tile  Device:Rolling Walker  Gait Deviations:  Slow Giovanna, Decreased Gait Speed and Decreased Heel Strike Bilaterally, mild shakiness of lower extremities    Exercise:  Patient was guided in 1 set(s) 10-20 reps of exercise to both lower extremities. Seated: long arc quad x 10, hip abduction x 10, ankle pumps x 20, march x 10. Supine: hip abduction x 10, heel slides x 10. Cues to stay on task  Exercises were completed for increased independence with functional mobility. Functional Outcome Measures: Completed  -PAC Inpatient Mobility without Stair Climbing Raw Score : 15  AM-PAC Inpatient without Stair Climbing T-Scale Score : 43.03    ASSESSMENT:  Activity Tolerance:  Patient tolerance of  treatment: good. Treatment Initiated: Treatment and education initiated within context of evaluation.   Evaluation time included review of current medical information, gathering information related to past medical, social and functional history, completion of

## 2020-05-19 NOTE — CONSULTS
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    Chronic diastolic CHF (congestive heart failure) ([facility]) I50.32    Morbid obesity ([facility]) E66.01    Urinary retention R33.9    Urine retention R33.9    Abdominal pain R10.9    Altered mental status R41.82    Chronic indwelling Felipe catheter Z96.0    Urinary tract infection associated with indwelling urethral catheter ([facility]) T83.511A, N39.0    Hypokalemia E87.6    Weakness of left lower extremity R29.898    Pill dysphagia R13.10    History of thyroid cancer Z85.850    Gross hematuria R31.0    Constipation K59.00    Acute urinary retention R33.8           Impression and Recommendation:   Urinary retention due to felipe malfunction: replaced, hematuria resolved. Leukocytosis: resolved  Bacteriuria related to felipe: no need to treat at this time  HTN  COPD  DM  Continue therapy. [id number] [address] with discharge plan to [facility] if ok with other attendings       Thank you Eugenia Cloud PA-C for allowing me to participate in this patient's care.     Miranda Power MD,FACP 5/19/2020 11:02 AM

## 2020-05-20 ENCOUNTER — CARE COORDINATION (OUTPATIENT)
Dept: CARE COORDINATION | Age: 85
End: 2020-05-20

## 2020-05-20 LAB
ORGANISM: ABNORMAL
URINE CULTURE REFLEX: ABNORMAL

## 2020-05-20 NOTE — CARE COORDINATION
I spoke with Justin Rodrigues at [facility] who confirmed the patient remains at the facility. Patient is short term rehab. I will continue to follow at this time.      Future Appointments   Date Time Provider Gretchen Watsoni   5/29/2020 10:30 AM Kimberly Coker MD [facility] Heart [facility] [place]   6/3/2020  1:40 PM MAEGAN Maurer - CNP [facility] Physic [facility] [place]   6/10/2020 12:45 PM Timmy Beatty MD [address]   7/6/2020 10:45 AM Osman Hartley PA-C Pulm Med P - [place]   7/7/2020  2:45 PM MD SYLVESTER Krishna [place] Urology P - [place]   9/8/2020 11:45 AM Osman Hartley, [address]

## 2020-05-21 ENCOUNTER — TELEPHONE (OUTPATIENT)
Dept: UROLOGY | Age: 85
End: 2020-05-21

## 2020-05-21 NOTE — TELEPHONE ENCOUNTER
----- Message from MAEGAN Foley CNP sent at 5/21/2020  9:24 AM EDT -----  Per Dr Srikanth Massey, pt not to be on antibiotics at discharge.

## 2020-05-24 ENCOUNTER — CARE COORDINATION (OUTPATIENT)
Dept: CARE COORDINATION | Age: 85
End: 2020-05-24

## 2020-06-01 ENCOUNTER — CARE COORDINATION (OUTPATIENT)
Dept: CARE COORDINATION | Age: 85
End: 2020-06-01

## 2020-06-01 NOTE — CARE COORDINATION
I spoke with Delmis at [facility] who confirms the patient will be transferring to [place]. The date is pending between June 5-8, 2020. Son has not chosen what facility at this time. I will continue to follow.

## 2020-06-09 RX ORDER — BUDESONIDE 0.5 MG/2ML
INHALANT ORAL
Qty: 60 AMPULE | Refills: 11 | Status: SHIPPED | OUTPATIENT
Start: 2020-06-09 | End: 2020-07-24 | Stop reason: SDUPTHER

## 2020-06-09 RX ORDER — ARFORMOTEROL TARTRATE 15 UG/2ML
SOLUTION RESPIRATORY (INHALATION)
Qty: 120 ML | Refills: 11 | Status: SHIPPED | OUTPATIENT
Start: 2020-06-09 | End: 2020-07-28 | Stop reason: SDUPTHER

## 2020-06-10 ENCOUNTER — CARE COORDINATION (OUTPATIENT)
Dept: CASE MANAGEMENT | Age: 85
End: 2020-06-10

## 2020-06-12 ENCOUNTER — CARE COORDINATION (OUTPATIENT)
Dept: CARE COORDINATION | Age: 85
End: 2020-06-12

## 2020-06-12 NOTE — CARE COORDINATION
I spoke with Oliver Omalley at [facility] who confirmed the patient will be long term. Will notify Juan R Clemens at this time.

## 2020-06-30 RX ORDER — HYDROCORTISONE 25 MG/G
CREAM TOPICAL
Qty: 28 G | Refills: 11 | Status: SHIPPED | OUTPATIENT
Start: 2020-06-30 | End: 2020-07-01 | Stop reason: SDUPTHER

## 2020-06-30 NOTE — TELEPHONE ENCOUNTER
PA was denied. The denial was faxed to [address], [address] at [phone]. Rx for Proctozone 2.5 % cream pended to [facility].

## 2020-07-01 RX ORDER — HYDROCORTISONE 25 MG/G
CREAM TOPICAL
Qty: 28 G | Refills: 11 | Status: SHIPPED | OUTPATIENT
Start: 2020-07-01 | End: 2020-08-10

## 2020-07-06 ENCOUNTER — OFFICE VISIT (OUTPATIENT)
Dept: FAMILY MEDICINE CLINIC | Age: 85
End: 2020-07-06
Payer: MEDICARE

## 2020-07-06 VITALS
HEART RATE: 92 BPM | SYSTOLIC BLOOD PRESSURE: 116 MMHG | TEMPERATURE: 99 F | WEIGHT: 238 LBS | DIASTOLIC BLOOD PRESSURE: 56 MMHG | BODY MASS INDEX: 37.28 KG/M2 | RESPIRATION RATE: 16 BRPM

## 2020-07-06 PROCEDURE — 3051F HG A1C>EQUAL 7.0%<8.0%: CPT | Performed by: FAMILY MEDICINE

## 2020-07-06 PROCEDURE — G8926 SPIRO NO PERF OR DOC: HCPCS | Performed by: FAMILY MEDICINE

## 2020-07-06 PROCEDURE — 3023F SPIROM DOC REV: CPT | Performed by: FAMILY MEDICINE

## 2020-07-06 PROCEDURE — 99214 OFFICE O/P EST MOD 30 MIN: CPT | Performed by: FAMILY MEDICINE

## 2020-07-06 PROCEDURE — G8417 CALC BMI ABV UP PARAM F/U: HCPCS | Performed by: FAMILY MEDICINE

## 2020-07-06 PROCEDURE — 1123F ACP DISCUSS/DSCN MKR DOCD: CPT | Performed by: FAMILY MEDICINE

## 2020-07-06 PROCEDURE — 4040F PNEUMOC VAC/ADMIN/RCVD: CPT | Performed by: FAMILY MEDICINE

## 2020-07-06 PROCEDURE — 1036F TOBACCO NON-USER: CPT | Performed by: FAMILY MEDICINE

## 2020-07-06 PROCEDURE — G8427 DOCREV CUR MEDS BY ELIG CLIN: HCPCS | Performed by: FAMILY MEDICINE

## 2020-07-06 RX ORDER — POLYETHYLENE GLYCOL 3350 17 G/17G
17 POWDER, FOR SOLUTION ORAL DAILY PRN
COMMUNITY

## 2020-07-06 ASSESSMENT — ENCOUNTER SYMPTOMS
ABDOMINAL PAIN: 0
CHEST TIGHTNESS: 0
DIARRHEA: 0
SHORTNESS OF BREATH: 1
ANAL BLEEDING: 0
BLOOD IN STOOL: 0
CONSTIPATION: 1
VOMITING: 0
NAUSEA: 0

## 2020-07-06 NOTE — PATIENT INSTRUCTIONS
Encouraged annual FLU VACCINE after October 1st.    Encouraged TETANUS SHOT (TdaP/ ADACEL/ Lige Remak) per personal pharmacy. Pt declines SHINGLES SHOT (Shingrix) at this time. (updated 7/6/2020)  Pt had COLONOSCOPY with Dr. Ramon Bryant on 3/22/05- ok- 10 year follow up overdue- pt declines at this time- \"I'm too old for that\". (updated 7/6/2020)  Pt declines SFOBT/ FIT testing at this time (updated 7/6/2020)  CHETNA/ PSA management per [facility]- to follow up via Video Visit tomorrow.     DILATED EYE EXAM- done with Dr. Shahab Stewart 12/2019- to follow up every 6 months- please check on date of next appt.     Continue to follow up with Pulmonology- next appt 9/8/2020  Follow up with Dr. Mickie Wellington), Malcom Jha and ROBER), Urology (Urinary symptoms) as scheduled. (updated 7/6/2020)  Check HGA1C, FLP, BMP, SPOT MA, TSH/ FREE T4 and CBC at this time. (updated 7/6/2020)  Check HGA1C again in 3 months  (updated 7/6/2020)  Continue current medicines   No refills needed today per pt report.   Follow up in 3 months

## 2020-07-06 NOTE — PROGRESS NOTES
[facility]  [address] [place] [address] Rd[address] [id number]  Dept: [phone]  Dept Fax: [phone]    CHERISE Harvey is a 80 y.o.male    Pt presents for follow up of T2DM, HTN, Hyperlipidemia, and Chronic Bronchitis. Pt feeling ok since last visit- interval history and any new issues noted below:     Pt now living at [facility] full time from this point forward. Pt continues seeing Urology- pt with indwelling Billy Catheter due to history of BPH and Urinary Retention. Pt has been to ED x 4 since last visit due to complications of the indwelling catheter and urinary retention. Pt underwent [facility] TURP 3/17/20 per Dr. Sam Wilkins. Pt to have follow up video visit tomorrow with Urology. Glucometer readings at home are checked per [facility]- pt unsure of exactly what sugars are running at current time. The home BP readings have been \"quite good\" per pt report- values being checked per [facility], however pt unsure of actual values. Pt continues seeing Pulmonology on a regular basis. Pt continues on multiple inhalers- breathing stable overall per pt report. Pt also complains of possible scrotal swelling     Wt Readings from Last 3 Encounters:   07/06/20 238 lb (108 kg)   05/19/20 230 lb 13.2 oz (104.7 kg)   05/05/20 243 lb (110.2 kg)   Weight decreased  33# since last visit 4-5 months ago. Appetite \"so-so\" at this time. Pt has deliberately \"cut back\" in an effort to lose weight.       Patient Active Problem List   Diagnosis    Diabetes mellitus ([address]    Hyperlipidemia    Acquired hypothyroidism    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    Breast lump    BPH (benign prostatic hyperplasia)    ROBER (obstructive sleep apnea)    Essential hypertension    History of DVT (deep vein thrombosis)    Shortness of breath    Chronic respiratory failure with hypoxia (HCC)    Mixed hyperlipidemia    Chronic deep vein thrombosis (DVT) of right lower extremity ([address]    Anticoagulated on Coumadin    Type 2 diabetes mellitus with complication, with long-term current use of insulin (HCC)    Muscle weakness    Chronic bronchitis (HCC)    Chronic obstructive pulmonary disease (HCC)    Paroxysmal atrial fibrillation (HCC)    Acute diastolic congestive heart failure (HCC)    Paroxysmal atrial flutter (HCC)    Urinary incontinence    Serous detachment of retinal pigment epithelium    Senile nuclear sclerosis    Puckering of macula, bilateral    Presence of intraocular lens    Primary open-angle glaucoma    Other chorioretinal scars, right eye    Nonexudative senile macular degeneration of retina    Chronic diastolic CHF (congestive heart failure) (HCC)    Morbid obesity ([address]    Urinary retention    Urine retention    Abdominal pain    Altered mental status    Chronic indwelling Billy catheter    Urinary tract infection associated with indwelling urethral catheter (HCC)    Hypokalemia    Weakness of left lower extremity    Pill dysphagia    History of thyroid cancer    Gross hematuria    Constipation    Acute urinary retention       Current Outpatient Medications   Medication Sig Dispense Refill    polyethylene glycol (MIRALAX) 17 g packet Take 17 g by mouth daily as needed for Constipation      Arformoterol Tartrate (BROVANA IN) Inhale into the lungs 1 dose inhale orally two times per day      tiotropium (SPIRIVA) 18 MCG inhalation capsule Inhale 18 mcg into the lungs daily      BROVANA 15 MCG/2ML NEBU USE 1 VIAL  IN  NEBULIZER TWICE  DAILY - Morning And Evening 120 mL 11    budesonide (PULMICORT) 0.5 MG/2ML nebulizer suspension USE 1 VIAL  IN  NEBULIZER TWICE  DAILY - Rinse Mouth After Treatment 60 ampule 11    lisinopril (PRINIVIL;ZESTRIL) 5 MG tablet Take 0.5 tablets by mouth daily 90 tablet 3    metoprolol tartrate (LOPRESSOR) 25 MG tablet Take 1 tablet by mouth 2 times daily Hold for SBP <110 or HR <60 60 tablet 3    potassium chloride (KLOR-CON M) 10 MEQ extended release tablet TAKE 2 TABLETS BY MOUTH TWICE DAILY (Patient taking differently: TAKE 1 TABLETS BY MOUTH TWICE DAILY) 360 tablet 3    acetaminophen (TYLENOL) 325 MG tablet Take 650 mg by mouth every 4 hours as needed for Pain      hydrocortisone (ANUSOL-HC) 25 MG suppository Place 25 mg rectally 2 times daily      lidocaine (LMX) 4 % cream Apply topically as needed for Pain Apply topically as needed.       docusate sodium (COLACE, DULCOLAX) 100 MG CAPS Take 100 mg by mouth 2 times daily Prescription to be refilled by primary care physician 60 capsule 0    metFORMIN (GLUCOPHAGE) 1000 MG tablet Take 1,000 mg by mouth 2 times daily (with meals)      doxazosin (CARDURA) 8 MG tablet Take 1 tablet by mouth nightly 30 tablet 1    albuterol sulfate  (90 Base) MCG/ACT inhaler Inhale 2 puffs into the lungs every 6 hours as needed for Wheezing 1 Inhaler 3    furosemide (LASIX) 40 MG tablet Take 40 mg by mouth 2 times daily   4    LANTUS SOLOSTAR 100 UNIT/ML injection pen INJECT SUBCUTANEOUSLY 18  UNITS NIGHTLY 30 mL 3    Cholecalciferol (VITAMIN D3 PO) Take 1 tablet by mouth daily 400units      levothyroxine (SYNTHROID) 175 MCG tablet TAKE 1 TABLET BY MOUTH  DAILY 90 tablet 3    SOFT TOUCH LANCETS MISC Use to check blood sugars 2 times daily 100 each 3    Multiple Vitamins-Minerals (PRESERVISION AREDS 2+MULTI VIT) CAPS take 1 by Oral route 2 times every day      blood glucose test strips (ACCU-CHEK CHE PLUS) strip TEST TWICE DAILY 150 strip 5    Ferrous Sulfate (IRON) 325 (65 Fe) MG TABS Take by mouth 2 times daily Nursing Home med list has Ferrous Gluconate 324mg      OXYGEN Inhale 3 L into the lungs nightly Indications: Difficulty Breathing, Oxygen Therapy And prn      hydrocortisone (PROCTOZONE-HC) 2.5 % CREA rectal cream Apply a small amount to rectal area twice daily as needed (Patient not taking: Reported on 7/6/2020) 28 g 11    oxybutynin (DITROPAN) 5 MG tablet Take 1 tablet by mouth every 8 hours as needed (bladder spasms) 90 tablet 0     No current facility-administered medications for this visit. Review of Systems   Constitutional: Negative for chills, diaphoresis, fatigue, fever and unexpected weight change. Eyes: Negative for visual disturbance. Respiratory: Positive for shortness of breath (chronic- stable). Negative for chest tightness. Cardiovascular: Positive for leg swelling (bilateral ankles- stable overall. ). Negative for chest pain and palpitations. Gastrointestinal: Positive for constipation (occasional- on Miralax prn). Negative for abdominal pain, anal bleeding, blood in stool, diarrhea, nausea and vomiting. Genitourinary: Negative for dysuria and hematuria. Indwelling Urinary Catheter   Musculoskeletal: Negative for neck pain. Neurological: Negative for dizziness, light-headedness and headaches. OBJECTIVE     BP (!) 116/56   Pulse 92   Temp 99 °F (37.2 °C)   Resp 16   Wt 238 lb (108 kg)   BMI 37.28 kg/m²   Body mass index is 37.28 kg/m². BP Readings from Last 3 Encounters:   07/06/20 (!) 116/56   05/19/20 (!) 111/53   05/05/20 106/82         Physical Exam  Vitals signs and nursing note reviewed. Exam conducted with a chaperone present. Constitutional:       General: He is not in acute distress. Appearance: Normal appearance. He is not ill-appearing, toxic-appearing or diaphoretic. HENT:      Head: Normocephalic and atraumatic. Right Ear: External ear normal.      Left Ear: External ear normal.      Nose: Nose normal. No rhinorrhea. Mouth/Throat:      Mouth: Mucous membranes are moist.      Pharynx: Oropharynx is clear. Eyes:      General: No scleral icterus. Right eye: No discharge. Left eye: No discharge. Extraocular Movements: Extraocular movements intact. Conjunctiva/sclera: Conjunctivae normal.      Pupils: Pupils are equal, round, and reactive to light.    Neck:      Musculoskeletal: Normal range of motion. Cardiovascular:      Rate and Rhythm: Normal rate and regular rhythm. Heart sounds: Normal heart sounds. No murmur. No friction rub. No gallop. Pulmonary:      Effort: Pulmonary effort is normal. No respiratory distress. Breath sounds: Normal breath sounds. No stridor. No wheezing, rhonchi or rales. Chest:      Chest wall: No tenderness. Abdominal:      General: Abdomen is flat. Bowel sounds are normal. There is no distension. Palpations: Abdomen is soft. There is no mass. Tenderness: There is no abdominal tenderness. There is no guarding or rebound. Hernia: No hernia is present. Genitourinary:     Comments: Mild scrotal edema- no erythema, no warmth, no testicular tenderness. Pt complains of mild discomfort with indwelling urinary catheter,. Musculoskeletal: Normal range of motion. General: No swelling, deformity or signs of injury. Skin:     General: Skin is warm and dry. Coloration: Skin is not jaundiced or pale. Findings: No bruising, erythema, lesion or rash. Neurological:      General: No focal deficit present. Mental Status: He is alert and oriented to person, place, and time. Mental status is at baseline. Motor: No weakness. Coordination: Coordination normal.      Gait: Gait normal.   Psychiatric:         Mood and Affect: Mood normal.         Behavior: Behavior normal.         Thought Content: Thought content normal.         Judgment: Judgment normal.         Lab Results   Component Value Date    LABA1C 7.4 (H) 02/27/2020       Lab Results   Component Value Date    CHOL 112 04/18/2019    TRIG 222 (H) 04/18/2019    HDL 35 04/18/2019    LDLCALC 33 04/18/2019    LDLDIRECT 69.29 10/10/2018       The ASCVD Risk score (Ples Mcardle., et al., 2013) failed to calculate for the following reasons:     The 2013 ASCVD risk score is only valid for ages 36 to 78    Lab Results   Component Value Date     05/19/2020    K 3.4 (L) 05/19/2020     05/19/2020    CO2 24 05/19/2020    BUN 7 05/19/2020    CREATININE 0.7 05/19/2020    GLUCOSE 111 (H) 05/19/2020    CALCIUM 8.3 (L) 05/19/2020    PROT 6.3 05/17/2020    LABALBU 3.4 (L) 05/17/2020    BILITOT 0.3 05/17/2020    ALKPHOS 55 05/17/2020    AST 14 05/17/2020    ALT 6 (L) 05/17/2020    LABGLOM >90 05/19/2020       Lab Results   Component Value Date    LABMICR 4.73 03/08/2019       Lab Results   Component Value Date    TSH 1.860 08/20/2019    T4FREE 1.25 08/20/2019       Lab Results   Component Value Date    WBC 9.5 05/19/2020    HGB 9.2 (L) 05/19/2020    HCT 29.0 (L) 05/19/2020    MCV 81.7 05/19/2020     05/19/2020       Lab Results   Component Value Date    PSA 3.76 10/27/2010       Immunization History   Administered Date(s) Administered    Influenza 10/31/2013    Influenza Virus Vaccine 11/01/2011, 11/05/2012, 11/11/2014, 09/29/2015    Influenza Whole 10/01/2010    Influenza, Quadv, 6 mo and older, IM, PF (Flulaval, Fluarix) 09/27/2018    Influenza, Quadv, IM, (6 mo and older Fluzone, Flulaval, Fluarix and 3 yrs and older Afluria) 11/17/2017    Influenza, Quadv, IM, PF (6 mo and older Fluzone, Flulaval, Fluarix, and 3 yrs and older Afluria) 10/01/2019    Influenza, Triv, 3 Years and older, IM (Afluria (5 yrs and older) 09/26/2016    Pneumococcal Conjugate 13-valent ([id number]) 12/19/2014    Pneumococcal Polysaccharide ([id number]) 01/01/2003    Td, unspecified formulation 06/08/2010    Zoster Live (Zostavax) 08/05/2014       Health Maintenance   Topic Date Due    DTaP/Tdap/Td vaccine (1 - Tdap) 07/17/1950    Annual Wellness Visit (AWV)  05/29/2019    Shingles Vaccine (2 of 3) 08/15/2020 (Originally 9/30/2014)    TSH testing  08/20/2020    Flu vaccine (1) 09/01/2020    Potassium monitoring  05/19/2021    Creatinine monitoring  05/19/2021    Pneumococcal 65+ years Vaccine  Completed    Hepatitis A vaccine  Aged Out    Hib vaccine  Aged Out    Meningococcal (ACWY) vaccine  Aged Out       AAA ultrasound (Male, 73-68, smoked ever) indicated at this time? NO, age > 71  CT Lung Screen (46-80, 30 pk-yrs, smoking or quit <15 years) indicated at this [address] [place] [address], age [de-identified]    Future Appointments   Date Time Provider Gretchen Joslyn   7/7/2020  2:45 PM MD [facility] Georges [facility]   8/4/2020 11:30 AM Breanna Conley MD [address] [facility]   9/8/2020 11:45 AM Oriana Brandon, [address]   10/19/2020 11:00 AM Yg Reynolds, [id number]       ASSESSMENT       Diagnosis Orders   1. Type 2 diabetes mellitus with complication, with long-term current use of insulin (HCC)  Hemoglobin V8L    Basic Metabolic Panel    Microalbumin / Creatinine Urine Ratio    T4, Free    TSH without Reflex    Hemoglobin A1C   2. Essential hypertension  Basic Metabolic Panel    T4, Free    TSH without Reflex   3. Mixed hyperlipidemia  Lipid Panel   4. Mucopurulent chronic bronchitis ([address]     5. Postoperative hypothyroidism  T4, Free    TSH without Reflex   6. Stage 3 severe COPD by GOLD classification ([facility]     7. ROBER (obstructive sleep apnea)     8. Paroxysmal atrial flutter (HCC)     9. Benign prostatic hyperplasia with lower urinary tract symptoms, symptom details unspecified     10. Morbid obesity with BMI of 40.0-44.9, adult     11. Anemia, unspecified type  CBC Auto Differential       PLAN      Encouraged annual FLU VACCINE after October 1st.    Encouraged TETANUS SHOT (TdaP/ ADACEL/ [address]) per personal pharmacy. Pt declines SHINGLES SHOT (Shingrix) at this time. (updated 7/6/2020)  Pt had COLONOSCOPY with Dr. Gamaliel Lua on 3/22/05- ok- 10 year follow up overdue- pt declines at this time- \"I'm too old for that\". (updated 7/6/2020)  Pt declines SFOBT/ FIT testing at this time (updated 7/6/2020)  CHETNA/ PSA management per [facility]- to follow up via Video Visit tomorrow.     DILATED EYE EXAM- done with Dr. Richie Meyer 12/2019- to follow up every 6 months-

## 2020-07-07 ENCOUNTER — APPOINTMENT (OUTPATIENT)
Dept: ULTRASOUND IMAGING | Age: 85
End: 2020-07-07
Payer: MEDICARE

## 2020-07-07 ENCOUNTER — HOSPITAL ENCOUNTER (EMERGENCY)
Age: 85
Discharge: HOME | End: 2020-07-08
Attending: EMERGENCY MEDICINE
Payer: MEDICARE

## 2020-07-07 ENCOUNTER — TELEPHONE (OUTPATIENT)
Dept: RESPIRATORY THERAPY | Age: 85
End: 2020-07-07

## 2020-07-07 ENCOUNTER — VIRTUAL VISIT (OUTPATIENT)
Dept: UROLOGY | Age: 85
End: 2020-07-07
Payer: MEDICARE

## 2020-07-07 ENCOUNTER — TELEPHONE (OUTPATIENT)
Dept: UROLOGY | Age: 85
End: 2020-07-07

## 2020-07-07 LAB
ALBUMIN SERPL-MCNC: 3.7 G/DL (ref 3.5–5.1)
ALP BLD-CCNC: 54 U/L (ref 38–126)
ALT SERPL-CCNC: 6 U/L (ref 11–66)
ANION GAP SERPL CALCULATED.3IONS-SCNC: 13 MEQ/L (ref 8–16)
AST SERPL-CCNC: 11 U/L (ref 5–40)
BACTERIA: ABNORMAL /HPF
BASOPHILS # BLD: 0.7 %
BASOPHILS ABSOLUTE: 0.1 THOU/MM3 (ref 0–0.1)
BILIRUB SERPL-MCNC: < 0.2 MG/DL (ref 0.3–1.2)
BILIRUBIN URINE: NEGATIVE
BLOOD, URINE: ABNORMAL
BUN BLDV-MCNC: 8 MG/DL (ref 7–22)
CALCIUM SERPL-MCNC: 8.3 MG/DL (ref 8.5–10.5)
CASTS 2: ABNORMAL /LPF
CASTS UA: ABNORMAL /LPF
CHARACTER, URINE: ABNORMAL
CHLORIDE BLD-SCNC: 99 MEQ/L (ref 98–111)
CO2: 29 MEQ/L (ref 23–33)
COLOR: YELLOW
CREAT SERPL-MCNC: 0.6 MG/DL (ref 0.4–1.2)
CRYSTALS, UA: ABNORMAL
EOSINOPHIL # BLD: 2.6 %
EOSINOPHILS ABSOLUTE: 0.2 THOU/MM3 (ref 0–0.4)
EPITHELIAL CELLS, UA: ABNORMAL /HPF
ERYTHROCYTE [DISTWIDTH] IN BLOOD BY AUTOMATED COUNT: 16 % (ref 11.5–14.5)
ERYTHROCYTE [DISTWIDTH] IN BLOOD BY AUTOMATED COUNT: 49 FL (ref 35–45)
GFR SERPL CREATININE-BSD FRML MDRD: > 90 ML/MIN/1.73M2
GLUCOSE BLD-MCNC: 138 MG/DL (ref 70–108)
GLUCOSE URINE: NEGATIVE MG/DL
HCT VFR BLD CALC: 34.3 % (ref 42–52)
HEMOGLOBIN: 10.8 GM/DL (ref 14–18)
IMMATURE GRANS (ABS): 0.03 THOU/MM3 (ref 0–0.07)
IMMATURE GRANULOCYTES: 0.3 %
INR BLD: 1.02 (ref 0.85–1.13)
KETONES, URINE: ABNORMAL
LEUKOCYTE ESTERASE, URINE: ABNORMAL
LYMPHOCYTES # BLD: 21.4 %
LYMPHOCYTES ABSOLUTE: 2 THOU/MM3 (ref 1–4.8)
MCH RBC QN AUTO: 26.4 PG (ref 26–33)
MCHC RBC AUTO-ENTMCNC: 31.5 GM/DL (ref 32.2–35.5)
MCV RBC AUTO: 83.9 FL (ref 80–94)
MISCELLANEOUS 2: ABNORMAL
MONOCYTES # BLD: 8.9 %
MONOCYTES ABSOLUTE: 0.8 THOU/MM3 (ref 0.4–1.3)
NITRITE, URINE: POSITIVE
NUCLEATED RED BLOOD CELLS: 0 /100 WBC
OSMOLALITY CALCULATION: 281.8 MOSMOL/KG (ref 275–300)
PH UA: 6.5 (ref 5–9)
PLATELET # BLD: 231 THOU/MM3 (ref 130–400)
PMV BLD AUTO: 9.6 FL (ref 9.4–12.4)
POTASSIUM REFLEX MAGNESIUM: 3.7 MEQ/L (ref 3.5–5.2)
PROTEIN UA: 300
RBC # BLD: 4.09 MILL/MM3 (ref 4.7–6.1)
RBC URINE: ABNORMAL /HPF
RENAL EPITHELIAL, UA: ABNORMAL
SEG NEUTROPHILS: 66.1 %
SEGMENTED NEUTROPHILS ABSOLUTE COUNT: 6.2 THOU/MM3 (ref 1.8–7.7)
SODIUM BLD-SCNC: 141 MEQ/L (ref 135–145)
SPECIFIC GRAVITY, URINE: 1.02 (ref 1–1.03)
TOTAL PROTEIN: 5.8 G/DL (ref 6.1–8)
UROBILINOGEN, URINE: 1 EU/DL (ref 0–1)
WBC # BLD: 9.4 THOU/MM3 (ref 4.8–10.8)
WBC UA: > 100 /HPF
YEAST: ABNORMAL

## 2020-07-07 PROCEDURE — 85025 COMPLETE CBC W/AUTO DIFF WBC: CPT

## 2020-07-07 PROCEDURE — 6370000000 HC RX 637 (ALT 250 FOR IP): Performed by: EMERGENCY MEDICINE

## 2020-07-07 PROCEDURE — 80053 COMPREHEN METABOLIC PANEL: CPT

## 2020-07-07 PROCEDURE — 85610 PROTHROMBIN TIME: CPT

## 2020-07-07 PROCEDURE — 87086 URINE CULTURE/COLONY COUNT: CPT

## 2020-07-07 PROCEDURE — 99442 PR PHYS/QHP TELEPHONE EVALUATION 11-20 MIN: CPT | Performed by: UROLOGY

## 2020-07-07 PROCEDURE — 96372 THER/PROPH/DIAG INJ SC/IM: CPT

## 2020-07-07 PROCEDURE — 87186 SC STD MICRODIL/AGAR DIL: CPT

## 2020-07-07 PROCEDURE — 99285 EMERGENCY DEPT VISIT HI MDM: CPT

## 2020-07-07 PROCEDURE — 36415 COLL VENOUS BLD VENIPUNCTURE: CPT

## 2020-07-07 PROCEDURE — 87077 CULTURE AEROBIC IDENTIFY: CPT

## 2020-07-07 PROCEDURE — 6360000002 HC RX W HCPCS: Performed by: EMERGENCY MEDICINE

## 2020-07-07 PROCEDURE — 81001 URINALYSIS AUTO W/SCOPE: CPT

## 2020-07-07 PROCEDURE — 76870 US EXAM SCROTUM: CPT

## 2020-07-07 RX ORDER — IBUPROFEN 800 MG/1
800 TABLET ORAL
Qty: 270 TABLET | Refills: 1 | Status: SHIPPED | OUTPATIENT
Start: 2020-07-07 | End: 2020-09-25 | Stop reason: SDUPTHER

## 2020-07-07 RX ORDER — DOXYCYCLINE HYCLATE 100 MG
100 TABLET ORAL 2 TIMES DAILY
Qty: 28 TABLET | Refills: 0 | Status: SHIPPED | OUTPATIENT
Start: 2020-07-07 | End: 2020-07-10

## 2020-07-07 RX ORDER — HYDROXYZINE PAMOATE 25 MG/1
25 CAPSULE ORAL 3 TIMES DAILY PRN
Status: DISCONTINUED | OUTPATIENT
Start: 2020-07-07 | End: 2020-07-08 | Stop reason: HOSPADM

## 2020-07-07 RX ORDER — MORPHINE SULFATE 4 MG/ML
4 INJECTION, SOLUTION INTRAMUSCULAR; INTRAVENOUS ONCE
Status: DISCONTINUED | OUTPATIENT
Start: 2020-07-07 | End: 2020-07-07

## 2020-07-07 RX ORDER — SULFAMETHOXAZOLE AND TRIMETHOPRIM 800; 160 MG/1; MG/1
1 TABLET ORAL 2 TIMES DAILY
Qty: 14 TABLET | Refills: 0 | Status: SHIPPED | OUTPATIENT
Start: 2020-07-07 | End: 2020-07-10

## 2020-07-07 RX ORDER — MORPHINE SULFATE 4 MG/ML
4 INJECTION, SOLUTION INTRAMUSCULAR; INTRAVENOUS ONCE
Status: COMPLETED | OUTPATIENT
Start: 2020-07-07 | End: 2020-07-07

## 2020-07-07 RX ORDER — ATROPA BELLADONNA AND OPIUM 16.2; 3 MG/1; MG/1
30 SUPPOSITORY RECTAL EVERY 8 HOURS PRN
Status: DISCONTINUED | OUTPATIENT
Start: 2020-07-07 | End: 2020-07-08 | Stop reason: HOSPADM

## 2020-07-07 RX ADMIN — HYDROXYZINE PAMOATE 25 MG: 25 CAPSULE ORAL at 22:23

## 2020-07-07 RX ADMIN — ATROPA BELLADONNA AND OPIUM 30 MG: 16.2; 3 SUPPOSITORY RECTAL at 22:23

## 2020-07-07 RX ADMIN — MORPHINE SULFATE 4 MG: 4 INJECTION, SOLUTION INTRAMUSCULAR; INTRAVENOUS at 17:50

## 2020-07-07 ASSESSMENT — PAIN SCALES - GENERAL
PAINLEVEL_OUTOF10: 2
PAINLEVEL_OUTOF10: 0
PAINLEVEL_OUTOF10: 9
PAINLEVEL_OUTOF10: 9
PAINLEVEL_OUTOF10: 1
PAINLEVEL_OUTOF10: 2

## 2020-07-07 ASSESSMENT — PAIN DESCRIPTION - LOCATION: LOCATION: SCROTUM

## 2020-07-07 ASSESSMENT — PAIN DESCRIPTION - PAIN TYPE
TYPE: ACUTE PAIN
TYPE: ACUTE PAIN

## 2020-07-07 NOTE — TELEPHONE ENCOUNTER
Called Malena at [facility] to Schedule Appt. She stated pt is requesting to go to ER for his scrotal pain.  They were going to call the [facility] and have then take him to [address] [place] [address] ER

## 2020-07-07 NOTE — TELEPHONE ENCOUNTER
PULMONARY REHABILITATION REFERRAL  COPD Exacerbation    Pt was enrolled in pulmonary rehab before we had to shut down for COVID-19. Pulmonary rehab now re-starting so I was calling pt to see if they wanted to re-start pulmonary rehab. There was no answer so I left a message asking the pt to call us back.

## 2020-07-07 NOTE — PROGRESS NOTES
Einar Burkitt is a 80 y.o. male evaluated via telephone on 7/7/2020. Consent:  He and/or health care decision maker is aware that that he may receive a bill for this telephone service, depending on his insurance coverage, and has provided verbal consent to proceed: Yes      Documentation:  I communicated with the patient and/or health care decision maker about testicular pain. Details of this discussion including any medical advice provided: testicular pain    Worsening testicular pain  Has chronic felipe  Scrotal ultrasound  Doxycycline 100 mg bid x 14  Motrin - poss epididymitis    Follow up IN OFFICE in about two weeks with kaya        I affirm this is a Patient Initiated Episode with a Patient who has not had a related appointment within my department in the past 7 days or scheduled within the next 24 hours.     Patient identification was verified at the start of the visit: Yes    Total Time: minutes: 11-20 minutes    Note: not billable if this call serves to triage the patient into an appointment for the relevant concern      Chris Charles

## 2020-07-07 NOTE — ED TRIAGE NOTES
Pt presents to ER via squad from [facility] with scrotal swelling that started yesterday. Pain is rated 9 out of 10. Pt has chronic felipe.

## 2020-07-07 NOTE — ED NOTES
Received handoff from Nunu Khan, [facility]. Pt resting in bed comfortably. VSS. Call light in place. Respirs easy and unlabored.       Ani Scale  07/07/20 6419

## 2020-07-08 VITALS
DIASTOLIC BLOOD PRESSURE: 72 MMHG | HEIGHT: 67 IN | WEIGHT: 250 LBS | OXYGEN SATURATION: 95 % | HEART RATE: 59 BPM | SYSTOLIC BLOOD PRESSURE: 145 MMHG | RESPIRATION RATE: 16 BRPM | TEMPERATURE: 99.1 F | BODY MASS INDEX: 39.24 KG/M2

## 2020-07-08 NOTE — ED PROVIDER NOTES
surgery ([id number], 2014); bronchoscopy (N/A, 8/29/2017); TURP (N/A, 3/17/2020); and TURP (N/A, 3/24/2020). CURRENT MEDICATIONS       Previous Medications    ACETAMINOPHEN (TYLENOL) 325 MG TABLET    Take 650 mg by mouth every 4 hours as needed for Pain    ALBUTEROL SULFATE  (90 BASE) MCG/ACT INHALER    Inhale 2 puffs into the lungs every 6 hours as needed for Wheezing    ARFORMOTEROL TARTRATE (BROVANA IN)    Inhale into the lungs 1 dose inhale orally two times per day    BLOOD GLUCOSE TEST STRIPS (ACCU-CHEK CHE PLUS) STRIP    TEST TWICE DAILY    BROVANA 15 MCG/2ML NEBU    USE 1 VIAL  IN  NEBULIZER TWICE  DAILY - Morning And Evening    BUDESONIDE (PULMICORT) 0.5 MG/2ML NEBULIZER SUSPENSION    USE 1 VIAL  IN  NEBULIZER TWICE  DAILY - Rinse Mouth After Treatment    CHOLECALCIFEROL (VITAMIN D3 PO)    Take 1 tablet by mouth daily 400units    DOCUSATE SODIUM (COLACE, DULCOLAX) 100 MG CAPS    Take 100 mg by mouth 2 times daily Prescription to be refilled by primary care physician    DOXAZOSIN (CARDURA) 8 MG TABLET    Take 1 tablet by mouth nightly    DOXYCYCLINE HYCLATE (VIBRA-TABS) 100 MG TABLET    Take 1 tablet by mouth 2 times daily for 14 days    FERROUS SULFATE (IRON) 325 (65 FE) MG TABS    Take by mouth 2 times daily Nursing Home med list has Ferrous Gluconate 324mg    FUROSEMIDE (LASIX) 40 MG TABLET    Take 40 mg by mouth 2 times daily     HYDROCORTISONE (PROCTOZONE-HC) 2.5 % CREA RECTAL CREAM    Apply a small amount to rectal area twice daily as needed    IBUPROFEN (ADVIL;MOTRIN) 800 MG TABLET    Take 1 tablet by mouth 3 times daily (with meals)    LANTUS SOLOSTAR 100 UNIT/ML INJECTION PEN    INJECT SUBCUTANEOUSLY 18  UNITS NIGHTLY    LEVOTHYROXINE (SYNTHROID) 175 MCG TABLET    TAKE 1 TABLET BY MOUTH  DAILY    LIDOCAINE (LMX) 4 % CREAM    Apply topically as needed for Pain Apply topically as needed.     LISINOPRIL (PRINIVIL;ZESTRIL) 5 MG TABLET    Take 0.5 tablets by mouth daily    METFORMIN (GLUCOPHAGE) I do not see any evidence of scrotal edema and he did not seem to have any localized tenderness. No obvious deformity. There is a three-way urinary catheter in place. It appears to be flowing. Musculoskeletal:  2/4 distal pulses, no pitting edema  Integument: warm and dry,        DIAGNOSTIC RESULTS     RADIOLOGY: non-plain film images(s) such as CT, Ultrasound and MRI are read by the radiologist.  Scrotal ultrasound interpreted by the radiologist with no evidence of epididymitis or testicular torsion. Small hydroceles. LABS:   Labs Reviewed   CBC WITH AUTO DIFFERENTIAL - Abnormal; Notable for the following components:       Result Value    RBC 4.09 (*)     Hemoglobin 10.8 (*)     Hematocrit 34.3 (*)     MCHC 31.5 (*)     RDW-CV 16.0 (*)     RDW-SD 49.0 (*)     All other components within normal limits   COMPREHENSIVE METABOLIC PANEL W/ REFLEX TO MG FOR LOW K - Abnormal; Notable for the following components:    Glucose 138 (*)     Calcium 8.3 (*)     Total Protein 5.8 (*)     Total Bilirubin <0.2 (*)     ALT 6 (*)     All other components within normal limits   URINE WITH REFLEXED MICRO - Abnormal; Notable for the following components:    Ketones, Urine TRACE (*)     Blood, Urine LARGE (*)     Protein,  (*)     Nitrite, Urine POSITIVE (*)     Leukocyte Esterase, Urine MODERATE (*)     Character, Urine TURBID (*)     All other components within normal limits   CULTURE, REFLEXED, URINE   PROTIME-INR   ANION GAP   GLOMERULAR FILTRATION RATE, ESTIMATED   OSMOLALITY       EMERGENCY DEPARTMENT COURSE:   Vitals:    Vitals:    07/07/20 1915 07/07/20 1958 07/07/20 2227 07/07/20 2250   BP: 120/85 (!) 154/78 108/71 (!) 147/69   Pulse: 60 58 63 63   Resp: 18 18 16 17   Temp:       TempSrc:       SpO2: 95% 94% 94% 94%   Weight:       Height: This patient has intermittent spasmodic pain that sounds like it could actually be bladder spasms from his catheter.   However was initially described as scrotal pain and/or swelling. We have ruled out torsion. No evidence of epididymitis however his urine is positive for leukocytes nitrates and blood. The catheter appears to be functional there is no evidence of urinary retention. I initially gave him some morphine for pain but did not really seem to help much. Since his pain was persisting I spoke to urology to get some further guidance. I was advised to give him a BNO suppository as well as oral Vistaril.,  This actually did seem to help with his pain and he was more comfortable. At this point the plan is to discharge him back to the assisted living. I spoke to the patient's son by telephone on 2 occasions during this visit. CRITICAL CARE:   none    CONSULTS:  Urology    PROCEDURES:  None    FINAL IMPRESSION      1. Painful bladder spasm    2.  Urinary tract infection associated with catheterization of urinary tract, unspecified indwelling urinary catheter type, initial encounter [facility])          DISPOSITION/PLAN   Discharged    DISCHARGE MEDICATIONS:  New Prescriptions    SULFAMETHOXAZOLE-TRIMETHOPRIM (BACTRIM DS) 800-160 MG PER TABLET    Take 1 tablet by mouth 2 times daily for 7 days       (Please note that portions of this note were completed with a voice recognition program.  Efforts were made to edit the dictations but occasionally words are mis-transcribed.)    Thuan Deng, [address] [place] [address] Meño,   07/08/20 0006

## 2020-07-08 NOTE — ED NOTES
Pt resting in bed, eyes closed. Television on, call light within reach. Respirs easy and unlabored, VSS.      Darshana Vicente  07/08/20 5810

## 2020-07-08 NOTE — ED NOTES
Patient resting in bed at this time. Pain medication per order given. Will reassess pain level.       Kavita Gerard RN  07/07/20 7858

## 2020-07-08 NOTE — ED NOTES
Pt denies any pain at this time. VSS. Respirs unlabored. Pt states he feels like his scrotum is less swollen. Denies any needs at this time.       Bartolo Maldonado  07/07/20 3723

## 2020-07-09 ENCOUNTER — TELEPHONE (OUTPATIENT)
Dept: FAMILY MEDICINE CLINIC | Age: 85
End: 2020-07-09

## 2020-07-09 LAB
AVERAGE GLUCOSE: NORMAL
CHOLESTEROL, TOTAL: 185 MG/DL
CHOLESTEROL/HDL RATIO: ABNORMAL
CREATININE, URINE: 24
HBA1C MFR BLD: 6.1 %
HDLC SERPL-MCNC: 34 MG/DL (ref 35–70)
LDL CHOLESTEROL CALCULATED: 103 MG/DL (ref 0–160)
MICROALBUMIN/CREAT 24H UR: 12.4 MG/G{CREAT}
MICROALBUMIN/CREAT UR-RTO: 516.7
TRIGL SERPL-MCNC: 241 MG/DL
VLDLC SERPL CALC-MCNC: ABNORMAL MG/DL

## 2020-07-09 NOTE — LETTER
[address] [facility] [place]  [address] [facility], [address]  Phone: [phone]  Fax: [phone]    July 9, 2020    Dahlia Whiting  [phone]    Dear Freddie Gallegos,    Thank you for choosing our [facility] on 7/8/20. Dr. Claudine Diaz wanted to make sure that you understand your discharge instructions and that you were able to fill any prescriptions that may have been ordered for you. Please contact the office at the above phone number if advised you to follow up with Dr. Claudine Diaz, or if you have any further questions or needs. Also, did you know -                             [facility]) practices can often offer you an appointment on the same day that you call for acute issues. *We have some [place] offices that offer Walk-in appointments; check our website for availability in your community, www. [employer].      *Evisits are now available for patients through [address] If you do not have MyChart and are interested, please contact the office and a staff member may assist you or go to [email or web address].     Sincerely,   Claudine Diaz MD and your [facility]

## 2020-07-10 ENCOUNTER — TELEPHONE (OUTPATIENT)
Dept: UROLOGY | Age: 85
End: 2020-07-10

## 2020-07-10 ENCOUNTER — TELEPHONE (OUTPATIENT)
Dept: FAMILY MEDICINE CLINIC | Age: 85
End: 2020-07-10

## 2020-07-10 LAB
ORGANISM: ABNORMAL
URINE CULTURE REFLEX: ABNORMAL

## 2020-07-10 RX ORDER — HYDROCODONE BITARTRATE AND ACETAMINOPHEN 5; 325 MG/1; MG/1
1-2 TABLET ORAL EVERY 6 HOURS PRN
Qty: 30 TABLET | Refills: 0 | Status: SHIPPED | OUTPATIENT
Start: 2020-07-10 | End: 2020-07-15

## 2020-07-10 RX ORDER — CEFUROXIME AXETIL 250 MG/1
250 TABLET ORAL 2 TIMES DAILY
Qty: 14 TABLET | Refills: 0 | Status: SHIPPED | OUTPATIENT
Start: 2020-07-10 | End: 2020-07-17

## 2020-07-10 NOTE — TELEPHONE ENCOUNTER
Emelina ernst called stating that pt's son is requesting that ES call something for the pt's pain. She states that the pt is very swollen inhis scrotum and in pain. She states that they are giving him tylenol around the clock with no help.  She states that his son wanted this to go to  directly    [address] [place] [address]     call [employer] [phone]

## 2020-07-10 NOTE — TELEPHONE ENCOUNTER
Morgan Coto from [place] [address] office wanted to make the office aware of the urine results. The patient was prescribed doxycycline and Bactrim DS. Patient was in the ER on 07/07/2020. Patient is at [facility] [phone]. Deniz Tirado from [facility] stated he is only on Bactrim and did not get the prescription for the doxycycline. Which antibiotics should he be on? Please advise.  Thank you

## 2020-07-10 NOTE — TELEPHONE ENCOUNTER
Kacy Katz at [facility] was advised to stop Doxy and the Bactrim because his infection is resistant to both. Start Ceftin. Prescription faxed to [facility]. She voiced understanding.

## 2020-07-10 NOTE — TELEPHONE ENCOUNTER
Stop both the Doxy and the Bactrim because his infection is resistant to both.   Start Ceftin  Electronically signed by MAEGAN Faulkner CNP on 7/10/2020 at 1:36 PM

## 2020-07-13 ENCOUNTER — TELEPHONE (OUTPATIENT)
Dept: FAMILY MEDICINE CLINIC | Age: 85
End: 2020-07-13

## 2020-07-13 NOTE — TELEPHONE ENCOUNTER
[facility] to let physician know that they started care on patient today. Home care for therapy only, strength and balance.

## 2020-07-16 ENCOUNTER — TELEPHONE (OUTPATIENT)
Dept: FAMILY MEDICINE CLINIC | Age: 85
End: 2020-07-16

## 2020-07-16 NOTE — TELEPHONE ENCOUNTER
Nurse Jovany Davis, at [facility] notified. Lab order faxed. They will notify patient.

## 2020-07-17 ENCOUNTER — OFFICE VISIT (OUTPATIENT)
Dept: UROLOGY | Age: 85
End: 2020-07-17
Payer: MEDICARE

## 2020-07-17 VITALS — WEIGHT: 240.5 LBS | TEMPERATURE: 97.4 F | HEIGHT: 67 IN | BODY MASS INDEX: 37.75 KG/M2

## 2020-07-17 PROCEDURE — G8417 CALC BMI ABV UP PARAM F/U: HCPCS | Performed by: UROLOGY

## 2020-07-17 PROCEDURE — 99214 OFFICE O/P EST MOD 30 MIN: CPT | Performed by: UROLOGY

## 2020-07-17 PROCEDURE — G8427 DOCREV CUR MEDS BY ELIG CLIN: HCPCS | Performed by: UROLOGY

## 2020-07-17 PROCEDURE — 1123F ACP DISCUSS/DSCN MKR DOCD: CPT | Performed by: UROLOGY

## 2020-07-17 PROCEDURE — 1036F TOBACCO NON-USER: CPT | Performed by: UROLOGY

## 2020-07-17 PROCEDURE — 4040F PNEUMOC VAC/ADMIN/RCVD: CPT | Performed by: UROLOGY

## 2020-07-17 RX ORDER — HYDROCORTISONE ACETATE 25 MG/1
25 SUPPOSITORY RECTAL 2 TIMES DAILY
COMMUNITY
End: 2020-08-10

## 2020-07-17 RX ORDER — DOXYCYCLINE HYCLATE 50 MG/1
324 CAPSULE, GELATIN COATED ORAL 2 TIMES DAILY
COMMUNITY
End: 2020-08-18 | Stop reason: SDUPTHER

## 2020-07-17 RX ORDER — HYDROCODONE BITARTRATE AND ACETAMINOPHEN 5; 325 MG/1; MG/1
1 TABLET ORAL EVERY 6 HOURS PRN
COMMUNITY
End: 2020-10-19 | Stop reason: ALTCHOICE

## 2020-07-17 NOTE — PROGRESS NOTES
José Miguel Rivera MD        [address] [place] [address] [phone]  Dept: [phone]  Dept Fax: [phone]: [address] [place] [address] Urology Office Note -     Patient:  Bhanu Calderon  YOB: 1931    The patient is a 80 y.o. male who presents today for evaluation of the following problems: urinary retention  Chief Complaint   Patient presents with    Follow-up     hospital f/u scrotal pain, pt has UTI        HISTORY OF PRESENT ILLNESS:     Urinary retention   Onset was  Months ago  Overall, the problem(s) are worse. Severity is described as moderate. Associated Symptoms: No dysuria, no gross hematuria. Current Pain Severity: 3    Has chronic felipe catheter  Three way cath in place  BPH/neurogenic bladder      Secondary Diagnosis:    Right epididymitis- improving with doxycycline 100 mg bid             Requested/reviewed records from Shane Rivera MD office and/or outside physician/EMR    (Patient's old records have been requested, reviewed and pertinent findings summarized in today's note.)    Procedures Today: N/A    Last several PSA's:  Lab Results   Component Value Date    PSA 3.76 10/27/2010       Last total testosterone:  No results found for: TESTOSTERONE    Urinalysis today:  No results found for this visit on 07/17/20. Last BUN and creatinine:  Lab Results   Component Value Date    BUN 8 07/07/2020     Lab Results   Component Value Date    CREATININE 0.6 07/07/2020       Imaging Reviewed during this Office Visit:   José Miguel Rivera MD independently reviewed the images and verified the radiology reports from:    Us Scrotum And Testicles    Result Date: 7/7/2020  PROCEDURE: US SCROTUM AND TESTICLES CLINICAL INFORMATION: scrotal pain . COMPARISON: No prior study. TECHNIQUE: Grayscale and color Doppler sonographic imaging of the scrotum was performed in the longitudinal and transverse planes.  FINDINGS: Findings On the right, the testicle has normal echogenicity. There is no testicular mass. There is normal color flow and Doppler waveforms. There is a small hydrocele. The epididymis is normal.Right Testicle- 4.3 x 3.0 x 2.4 cm Right Epididymis- 0.7 x 0.7 x 0.9 cm there is a suspected small appendix testes measuring 1.8 x 1.8 x 2.7 mm along the margin of the right testicle. On the left, the testicle has normal echogenicity. There is no testicular mass. There is normal color flow and Doppler waveforms. There is small hydrocele present. The epididymis is normal.Left Testicle - 5.2 x 3.2 x 2.5 cm Left Epididymis - 0.6 x 0.7 x 0.8 cm. There is suspected appendix testes visualized along the margin of the left testicle the detail of which is limited. 1. No evidence of testicular torsion or epididymal pathology. 2. Small hydroceles left greater than right. 3. Suspected visualization of appendix testes structures. **This report has been created using voice recognition software. It may contain minor errors which are inherent in voice recognition technology. ** Final report electronically signed by Dr. Roland Sicard on 7/7/2020 7:51 PM      PAST MEDICAL, FAMILY AND SOCIAL HISTORY:  Past Medical History:   Diagnosis Date    Anxiety     CAD (coronary artery disease)     leaking valve    Chronic back pain     COPD (chronic obstructive pulmonary disease) ([address]     Detached retina     Diabetes mellitus (HCC)     NIDDM    DVT (deep venous thrombosis) ([facility])     RLE    DVT (deep venous thrombosis) ([address] 11/9/15    LLE    Glaucoma     Hyperlipidemia     Hypertension     Mass of chest     benign chest mass, Dr Zaira Dow    Movement disorder     spinal stenosis    Obesity     Osteoarthritis     right ankle, right hand    Pneumonia     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    Sleep apnea 1993    on BiPap, Dr Marrufo Bucoda Thyroid cancer [facility])     s/p thyroid resection    Urinary incontinence      Past Surgical History:   Procedure Laterality Date   [facility] SURGERY  7733,7923, 2014    BRONCHOSCOPY N/A 8/29/2017    BRONCHOSCOPY AIRWAY ONLY performed by Salinas Leyva MD at [facility] EKG 12-LEAD  11/10/2015         FRACTURE SURGERY      right hand    OTHER SURGICAL HISTORY      spinal epidurals    RETINAL DETACHMENT SURGERY      SPINE SURGERY  2004    Lumbar laminectomy    THYROIDECTOMY      TURP N/A 3/17/2020    CYSTOSCOPY WITH GREENLIGHT PHOTOVAPORIZATION OF PROSTATE performed by Nela Morris MD at [facility] TURP N/A 3/24/2020    PLASMA BUTTON TURP WITH CLOT EVACUATION performed by Nela Morris MD at [address] [place] [address] History   Problem Relation Age of Onset    Other Mother         Complications of Childbirth    Other Father [de-identified]        Natural causes     Outpatient Medications Marked as Taking for the 7/17/20 encounter (Office Visit) with Nela Morris MD   Medication Sig Dispense Refill    ferrous gluconate (FERGON) 324 (38 Fe) MG tablet Take 324 mg by mouth 2 times daily      hydrocortisone (ANUSOL-HC) 25 MG suppository Place 25 mg rectally 2 times daily      HYDROcodone-acetaminophen (NORCO) 5-325 MG per tablet Take 1 tablet by mouth every 6 hours as needed for Pain.       cefUROXime (CEFTIN) 250 MG tablet Take 1 tablet by mouth 2 times daily for 7 days 14 tablet 0    ibuprofen (ADVIL;MOTRIN) 800 MG tablet Take 1 tablet by mouth 3 times daily (with meals) 270 tablet 1    polyethylene glycol (MIRALAX) 17 g packet Take 17 g by mouth daily as needed for Constipation      Arformoterol Tartrate (BROVANA IN) Inhale into the lungs 1 dose inhale orally two times per day      tiotropium (SPIRIVA) 18 MCG inhalation capsule Inhale 18 mcg into the lungs daily      BROVANA 15 MCG/2ML NEBU USE 1 VIAL  IN  NEBULIZER TWICE  DAILY - Morning And Evening 120 mL 11    budesonide (PULMICORT) 0.5 MG/2ML nebulizer suspension USE 1 VIAL  IN  NEBULIZER TWICE  DAILY - Rinse Mouth After Treatment 60 ampule 11    lisinopril (PRINIVIL;ZESTRIL) 5 MG tablet Take 0.5 tablets by mouth daily 90 tablet 3    metoprolol tartrate (LOPRESSOR) 25 MG tablet Take 1 tablet by mouth 2 times daily Hold for SBP <110 or HR <60 60 tablet 3    potassium chloride (KLOR-CON M) 10 MEQ extended release tablet TAKE 2 TABLETS BY MOUTH TWICE DAILY (Patient taking differently: TAKE 1 TABLETS BY MOUTH TWICE DAILY) 360 tablet 3    acetaminophen (TYLENOL) 325 MG tablet Take 650 mg by mouth every 4 hours as needed for Pain      lidocaine (LMX) 4 % cream Apply topically as needed for Pain Apply topically as needed.  oxybutynin (DITROPAN) 5 MG tablet Take 1 tablet by mouth every 8 hours as needed (bladder spasms) 90 tablet 0    docusate sodium (COLACE, DULCOLAX) 100 MG CAPS Take 100 mg by mouth 2 times daily Prescription to be refilled by primary care physician 60 capsule 0    metFORMIN (GLUCOPHAGE) 1000 MG tablet Take 1,000 mg by mouth 2 times daily (with meals)      doxazosin (CARDURA) 8 MG tablet Take 1 tablet by mouth nightly 30 tablet 1    albuterol sulfate  (90 Base) MCG/ACT inhaler Inhale 2 puffs into the lungs every 6 hours as needed for Wheezing 1 Inhaler 3    furosemide (LASIX) 40 MG tablet Take 40 mg by mouth 2 times daily   4    LANTUS SOLOSTAR 100 UNIT/ML injection pen INJECT SUBCUTANEOUSLY 18  UNITS NIGHTLY 30 mL 3    levothyroxine (SYNTHROID) 175 MCG tablet TAKE 1 TABLET BY MOUTH  DAILY 90 tablet 3    SOFT TOUCH LANCETS MISC Use to check blood sugars 2 times daily 100 each 3    Multiple Vitamins-Minerals (PRESERVISION AREDS 2+MULTI VIT) CAPS take 1 by Oral route 2 times every day      blood glucose test strips (ACCU-CHEK CHE PLUS) strip TEST TWICE DAILY 150 strip 5       Latex; Macrobid [nitrofurantoin monohyd macro]; Levaquin [levofloxacin];  Adhesive tape; and Alphagan [brimonidine tartrate]  Social History     Tobacco Use   Smoking Status Never Smoker   Smokeless Tobacco Never Used      (If patient a smoker,

## 2020-07-20 ENCOUNTER — HOSPITAL ENCOUNTER (EMERGENCY)
Age: 85
Discharge: HOME | End: 2020-07-20
Payer: MEDICARE

## 2020-07-20 ENCOUNTER — TELEPHONE (OUTPATIENT)
Dept: UROLOGY | Age: 85
End: 2020-07-20

## 2020-07-20 VITALS
WEIGHT: 250 LBS | BODY MASS INDEX: 39.24 KG/M2 | HEIGHT: 67 IN | OXYGEN SATURATION: 93 % | SYSTOLIC BLOOD PRESSURE: 105 MMHG | HEART RATE: 78 BPM | TEMPERATURE: 98.2 F | RESPIRATION RATE: 18 BRPM | DIASTOLIC BLOOD PRESSURE: 54 MMHG

## 2020-07-20 LAB
ALBUMIN SERPL-MCNC: 3.6 G/DL (ref 3.5–5.1)
ALP BLD-CCNC: 52 U/L (ref 38–126)
ALT SERPL-CCNC: 6 U/L (ref 11–66)
ANION GAP SERPL CALCULATED.3IONS-SCNC: 12 MEQ/L (ref 8–16)
APTT: 23.1 SECONDS (ref 22–38)
AST SERPL-CCNC: 10 U/L (ref 5–40)
BACTERIA: ABNORMAL /HPF
BASOPHILS # BLD: 0.3 %
BASOPHILS ABSOLUTE: 0 THOU/MM3 (ref 0–0.1)
BILIRUB SERPL-MCNC: 0.4 MG/DL (ref 0.3–1.2)
BILIRUBIN URINE: NEGATIVE
BLOOD, URINE: ABNORMAL
BUN BLDV-MCNC: 10 MG/DL (ref 7–22)
CALCIUM SERPL-MCNC: 8.1 MG/DL (ref 8.5–10.5)
CASTS 2: ABNORMAL /LPF
CASTS UA: ABNORMAL /LPF
CHARACTER, URINE: ABNORMAL
CHLORIDE BLD-SCNC: 101 MEQ/L (ref 98–111)
CO2: 29 MEQ/L (ref 23–33)
COLOR: ABNORMAL
CREAT SERPL-MCNC: 0.9 MG/DL (ref 0.4–1.2)
CRYSTALS, UA: ABNORMAL
CRYSTALS, UA: ABNORMAL
EOSINOPHIL # BLD: 1.7 %
EOSINOPHILS ABSOLUTE: 0.1 THOU/MM3 (ref 0–0.4)
EPITHELIAL CELLS, UA: ABNORMAL /HPF
ERYTHROCYTE [DISTWIDTH] IN BLOOD BY AUTOMATED COUNT: 16.8 % (ref 11.5–14.5)
ERYTHROCYTE [DISTWIDTH] IN BLOOD BY AUTOMATED COUNT: 52.3 FL (ref 35–45)
GFR SERPL CREATININE-BSD FRML MDRD: 79 ML/MIN/1.73M2
GLUCOSE BLD-MCNC: 112 MG/DL (ref 70–108)
GLUCOSE URINE: NEGATIVE MG/DL
HCT VFR BLD CALC: 35.8 % (ref 42–52)
HEMOGLOBIN: 11 GM/DL (ref 14–18)
IMMATURE GRANS (ABS): 0.08 THOU/MM3 (ref 0–0.07)
IMMATURE GRANULOCYTES: 1.2 %
INR BLD: 0.99 (ref 0.85–1.13)
KETONES, URINE: NEGATIVE
LEUKOCYTE ESTERASE, URINE: ABNORMAL
LYMPHOCYTES # BLD: 6.2 %
LYMPHOCYTES ABSOLUTE: 0.4 THOU/MM3 (ref 1–4.8)
MCH RBC QN AUTO: 26.3 PG (ref 26–33)
MCHC RBC AUTO-ENTMCNC: 30.7 GM/DL (ref 32.2–35.5)
MCV RBC AUTO: 85.6 FL (ref 80–94)
MISCELLANEOUS 2: ABNORMAL
MISCELLANEOUS LAB TEST RESULT: ABNORMAL
MONOCYTES # BLD: 0.8 %
MONOCYTES ABSOLUTE: 0.1 THOU/MM3 (ref 0.4–1.3)
NITRITE, URINE: NEGATIVE
NUCLEATED RED BLOOD CELLS: 0 /100 WBC
OSMOLALITY CALCULATION: 282.9 MOSMOL/KG (ref 275–300)
PH UA: 6 (ref 5–9)
PLATELET # BLD: 216 THOU/MM3 (ref 130–400)
PMV BLD AUTO: 9.6 FL (ref 9.4–12.4)
POTASSIUM REFLEX MAGNESIUM: 4.2 MEQ/L (ref 3.5–5.2)
PROTEIN UA: >= 300
RBC # BLD: 4.18 MILL/MM3 (ref 4.7–6.1)
RBC URINE: > 200 /HPF
RENAL EPITHELIAL, UA: ABNORMAL
SEG NEUTROPHILS: 89.8 %
SEGMENTED NEUTROPHILS ABSOLUTE COUNT: 5.8 THOU/MM3 (ref 1.8–7.7)
SODIUM BLD-SCNC: 142 MEQ/L (ref 135–145)
SPECIFIC GRAVITY, URINE: 1.02 (ref 1–1.03)
TOTAL PROTEIN: 6 G/DL (ref 6.1–8)
TSH SERPL DL<=0.05 MIU/L-ACNC: 2.21 UIU/ML (ref 0.4–4.2)
UROBILINOGEN, URINE: 0.2 EU/DL (ref 0–1)
WBC # BLD: 6.5 THOU/MM3 (ref 4.8–10.8)
WBC UA: ABNORMAL /HPF
YEAST: ABNORMAL

## 2020-07-20 PROCEDURE — 84443 ASSAY THYROID STIM HORMONE: CPT

## 2020-07-20 PROCEDURE — 51700 IRRIGATION OF BLADDER: CPT

## 2020-07-20 PROCEDURE — 6370000000 HC RX 637 (ALT 250 FOR IP)

## 2020-07-20 PROCEDURE — 51798 US URINE CAPACITY MEASURE: CPT

## 2020-07-20 PROCEDURE — 99285 EMERGENCY DEPT VISIT HI MDM: CPT

## 2020-07-20 PROCEDURE — 81001 URINALYSIS AUTO W/SCOPE: CPT

## 2020-07-20 PROCEDURE — 85610 PROTHROMBIN TIME: CPT

## 2020-07-20 PROCEDURE — 51701 INSERT BLADDER CATHETER: CPT

## 2020-07-20 PROCEDURE — 85025 COMPLETE CBC W/AUTO DIFF WBC: CPT

## 2020-07-20 PROCEDURE — 80053 COMPREHEN METABOLIC PANEL: CPT

## 2020-07-20 PROCEDURE — 85730 THROMBOPLASTIN TIME PARTIAL: CPT

## 2020-07-20 PROCEDURE — 36415 COLL VENOUS BLD VENIPUNCTURE: CPT

## 2020-07-20 PROCEDURE — 99283 EMERGENCY DEPT VISIT LOW MDM: CPT | Performed by: NURSE PRACTITIONER

## 2020-07-20 RX ORDER — LIDOCAINE HYDROCHLORIDE 20 MG/ML
SOLUTION OROPHARYNGEAL
Status: COMPLETED
Start: 2020-07-20 | End: 2020-07-20

## 2020-07-20 RX ORDER — CEFUROXIME AXETIL 250 MG/1
250 TABLET ORAL 2 TIMES DAILY
Qty: 10 TABLET | Refills: 0 | Status: SHIPPED | OUTPATIENT
Start: 2020-07-20 | End: 2020-07-20 | Stop reason: SDUPTHER

## 2020-07-20 RX ORDER — CEFUROXIME AXETIL 250 MG/1
250 TABLET ORAL 2 TIMES DAILY
Qty: 10 TABLET | Refills: 0 | Status: SHIPPED | OUTPATIENT
Start: 2020-07-20 | End: 2020-07-25

## 2020-07-20 RX ORDER — LIDOCAINE HYDROCHLORIDE 20 MG/ML
15 SOLUTION OROPHARYNGEAL ONCE
Status: COMPLETED | OUTPATIENT
Start: 2020-07-20 | End: 2020-07-20

## 2020-07-20 RX ADMIN — LIDOCAINE HYDROCHLORIDE 3 ML: 20 SOLUTION ORAL; TOPICAL at 13:28

## 2020-07-20 RX ADMIN — LIDOCAINE HYDROCHLORIDE 3 ML: 20 SOLUTION OROPHARYNGEAL at 13:28

## 2020-07-20 ASSESSMENT — PAIN DESCRIPTION - PAIN TYPE
TYPE: ACUTE PAIN

## 2020-07-20 ASSESSMENT — ENCOUNTER SYMPTOMS
EYES NEGATIVE: 1
RECTAL PAIN: 0
SHORTNESS OF BREATH: 0
NAUSEA: 0
COLOR CHANGE: 0
COUGH: 0
DIARRHEA: 0
VOMITING: 0
ABDOMINAL PAIN: 1

## 2020-07-20 ASSESSMENT — PAIN SCALES - GENERAL
PAINLEVEL_OUTOF10: 5
PAINLEVEL_OUTOF10: 7
PAINLEVEL_OUTOF10: 5
PAINLEVEL_OUTOF10: 3

## 2020-07-20 ASSESSMENT — PAIN DESCRIPTION - LOCATION
LOCATION: GROIN
LOCATION: GROIN
LOCATION: ABDOMEN
LOCATION: GROIN

## 2020-07-20 ASSESSMENT — PAIN DESCRIPTION - ORIENTATION: ORIENTATION: LOWER

## 2020-07-20 ASSESSMENT — PAIN DESCRIPTION - DESCRIPTORS: DESCRIPTORS: DISCOMFORT

## 2020-07-20 NOTE — ED NOTES
Patient presents to ED via EMS with complaint of blood in catheter s/p pulling catheter out while sleeping last night and replaced by [facility] staff this morning about 0700. Patient complains of abdominal discomfort. Patient denies nausea. Catheter has blood urine with clots noted in collection bag.      Tiffany Barahona RN  07/20/20 0993

## 2020-07-20 NOTE — ED NOTES
No new urine output noted. Patient catheter flushed with 240 cc saline, 300 cc bloody urine returned. Patient tolerates well. Patient complains of groin pain, asking for cream.  Vee Melendez., CNP informed.      Elke Keller, SHIRLEY  07/20/20 1132

## 2020-07-20 NOTE — ED NOTES
Blood noted in cathter tubing to be pink at this time. Collection bag urine collected for specimen. 100ml bloody urine remains in standard collection bag at this time.      Viki North RN  07/20/20 1795

## 2020-07-20 NOTE — ED NOTES
Patient resting on cart with complaint of groin pain. Billy catheter bag emptied of bloody urine and saline at this time, 1000 ml. Patient updated on plan of care and pending discharge. Call light in reach.      Chetan Saenz RN  07/20/20 3855

## 2020-07-20 NOTE — ED NOTES
Patient incontinent of stool, patient cleansed, linens changed. Incontinence pad placed.      Dayanara Browning RN  07/20/20 7284

## 2020-07-20 NOTE — ED NOTES
Patient resting on cart with complaint of lower abdominal discomfort. Patient catheter urine bloody in tubing and collection bag. Patient updated on plan of care. Call light in reach. Side rails up times 2.      Dayanara Browning RN  07/20/20 1024

## 2020-07-20 NOTE — TELEPHONE ENCOUNTER
Joni Goode at [facility] was advised Ok to hand irrigate as needed with 100 ml of sterile saline for hematuria. She voiced understanding stated they already sent him to the urgent care. He started shaking and was c/o a lot of pain.

## 2020-07-20 NOTE — TELEPHONE ENCOUNTER
Shabnam Paul at [facility] [phone] stated   the patient got the catheter caught and pulled it out. The catheter was replaced at 0700. It is patent with bright red blood. He is not passing clots. He c/o penile pain. He denies fever or chills. His BP 88/52. They will notify the [facility] physicians. He is not on any blood thinners. I advised her to increase his fluid intake. Please advise.  Thank you

## 2020-07-20 NOTE — ED PROVIDER NOTES
[address]. 76.       Chief Complaint   Patient presents with    Hematuria    Urinary Catheter Problem     pulled out last night, replaced at 7am        Nurses Notes reviewed and I agree except as noted in the HPI. HISTORY OF PRESENT ILLNESS    Vasquez Solares is a 80 y.o. male who presents to the Emergency Department for the evaluation of hematuria and abdominal discomfort. Patient lives at [facility] and staff states he pulled his urinary catheter sometime during the night. When staff replaced the catheter at 7am there was blood filling the catheter and EMS was called. The patient does not recall pulling his catheter out. Patient states he has some lower abdominal discomfort this morning. The HPI was provided by the patient. REVIEW OF SYSTEMS     Review of Systems   Constitutional: Negative for fever. HENT: Negative. Eyes: Negative. Respiratory: Negative for cough and shortness of breath. Cardiovascular: Negative for chest pain. Gastrointestinal: Positive for abdominal pain. Negative for diarrhea, nausea, rectal pain and vomiting. Genitourinary: Positive for hematuria. Negative for dysuria and urgency. Skin: Negative for color change and pallor. Neurological: Negative for dizziness and light-headedness. PAST MEDICAL HISTORY    has a past medical history of Anxiety, CAD (coronary artery disease), Chronic back pain, COPD (chronic obstructive pulmonary disease) ([address] Detached retina, Diabetes mellitus ([address] DVT (deep venous thrombosis) ([address] DVT (deep venous thrombosis) ([address] Glaucoma, Hyperlipidemia, Hypertension, Mass of chest, Movement disorder, Obesity, Osteoarthritis, Pneumonia, Primary thyroid follicular carcinoma, Sleep apnea, Thyroid cancer ([address] and Urinary incontinence. SURGICAL HISTORY      has a past surgical history that includes Retinal detachment surgery; Thyroidectomy;  Spine surgery Prescription to be refilled by primary care physician  Qty: 60 capsule, Refills: 0      metFORMIN (GLUCOPHAGE) 1000 MG tablet Take 1,000 mg by mouth 2 times daily (with meals)      doxazosin (CARDURA) 8 MG tablet Take 1 tablet by mouth nightly  Qty: 30 tablet, Refills: 1      albuterol sulfate  (90 Base) MCG/ACT inhaler Inhale 2 puffs into the lungs every 6 hours as needed for Wheezing  Qty: 1 Inhaler, Refills: 3      furosemide (LASIX) 40 MG tablet Take 40 mg by mouth 2 times daily   Refills: 4      LANTUS SOLOSTAR 100 UNIT/ML injection pen INJECT SUBCUTANEOUSLY 18  UNITS NIGHTLY  Qty: 30 mL, Refills: 3    Associated Diagnoses: Type 2 diabetes mellitus with complication, with long-term current use of insulin ([facility])      Cholecalciferol (VITAMIN D3 PO) Take 1 tablet by mouth daily 400units      levothyroxine (SYNTHROID) 175 MCG tablet TAKE 1 TABLET BY MOUTH  DAILY  Qty: 90 tablet, Refills: 3    Associated Diagnoses: Postoperative hypothyroidism      Multiple Vitamins-Minerals (PRESERVISION AREDS 2+MULTI VIT) CAPS take 1 by Oral route 2 times every day      hydrocortisone (ANUSOL-HC) 25 MG suppository Place 25 mg rectally 2 times daily      polyethylene glycol (MIRALAX) 17 g packet Take 17 g by mouth daily as needed for Constipation      hydrocortisone (PROCTOZONE-HC) 2.5 % CREA rectal cream Apply a small amount to rectal area twice daily as needed  Qty: 28 g, Refills: 11      !! BROVANA 15 MCG/2ML NEBU USE 1 VIAL  IN  NEBULIZER TWICE  DAILY - Morning And Evening  Qty: 120 mL, Refills: 11      SOFT TOUCH LANCETS MISC Use to check blood sugars 2 times daily  Qty: 100 each, Refills: 3    Associated Diagnoses: Type 2 diabetes mellitus with complication, with long-term current use of insulin ([facility])      blood glucose test strips (ACCU-CHEK CHE PLUS) strip TEST TWICE DAILY  Qty: 150 strip, Refills: 5    Comments: **Patient requests 90 days supply**      OXYGEN Inhale 3 L into the lungs nightly Indications: Difficulty Breathing, Oxygen Therapy And prn through cpap       !! - Potential duplicate medications found. Please discuss with provider. ALLERGIES     is allergic to latex; macrobid [nitrofurantoin monohyd macro]; levaquin [levofloxacin]; adhesive tape; and alphagan [brimonidine tartrate]. FAMILY HISTORY     He indicated that his mother is . He indicated that his father is . family history includes Other in his mother; Other (age of onset: [de-identified]) in his father. SOCIAL HISTORY      reports that he has never smoked. He has never used smokeless tobacco. He reports current alcohol use of about 1.0 standard drinks of alcohol per week. He reports that he does not use drugs. PHYSICAL EXAM     INITIAL VITALS:  height is 5' 7\" (1.702 m) and weight is 250 lb (113.4 kg). His oral temperature is 98.2 °F (36.8 °C). His blood pressure is 104/47 (abnormal) and his pulse is 79. His respiration is 18 and oxygen saturation is 93%. Physical Exam  Constitutional:       Appearance: Normal appearance. HENT:      Head: Normocephalic. Right Ear: External ear normal.      Left Ear: External ear normal.      Nose: Nose normal.      Mouth/Throat:      Mouth: Mucous membranes are moist.   Eyes:      Conjunctiva/sclera: Conjunctivae normal.   Cardiovascular:      Rate and Rhythm: Normal rate and regular rhythm. Pulses: Normal pulses. Heart sounds: Normal heart sounds. Pulmonary:      Effort: Pulmonary effort is normal.      Breath sounds: Normal breath sounds. Skin:     General: Skin is warm and dry. Neurological:      Mental Status: He is alert. Psychiatric:         Mood and Affect: Mood normal.         Behavior: Behavior normal.         Thought Content:  Thought content normal.         Judgment: Judgment normal.          DIFFERENTIAL DIAGNOSIS:   Traumatic Billy catheter dislodgment with hematuria, urethral injury, UTI    DIAGNOSTIC RESULTS     EKG: All EKG's are interpreted by the Emergency Department Physician who either signs or Co-signs this chart in the absence of a cardiologist.    none    RADIOLOGY: non-plainfilm images(s) such as CT, Ultrasound and MRI are read by the radiologist.    No orders to display       LABS:     Labs Reviewed   CBC WITH AUTO DIFFERENTIAL - Abnormal; Notable for the following components:       Result Value    RBC 4.18 (*)     Hemoglobin 11.0 (*)     Hematocrit 35.8 (*)     MCHC 30.7 (*)     RDW-CV 16.8 (*)     RDW-SD 52.3 (*)     Lymphocytes Absolute 0.4 (*)     Monocytes Absolute 0.1 (*)     Immature Grans (Abs) 0.08 (*)     All other components within normal limits   COMPREHENSIVE METABOLIC PANEL W/ REFLEX TO MG FOR LOW K - Abnormal; Notable for the following components:    Glucose 112 (*)     Calcium 8.1 (*)     Total Protein 6.0 (*)     ALT 6 (*)     All other components within normal limits   URINE WITH REFLEXED MICRO - Abnormal; Notable for the following components:    Blood, Urine LARGE (*)     Protein, UA >= 300 (*)     Leukocyte Esterase, Urine TRACE (*)     Color, UA RED (*)     Character, Urine TURBID (*)     All other components within normal limits   GLOMERULAR FILTRATION RATE, ESTIMATED - Abnormal; Notable for the following components:    Est, Glom Filt Rate 79 (*)     All other components within normal limits   TSH WITHOUT REFLEX   APTT   PROTIME-INR   ANION GAP   OSMOLALITY       EMERGENCY DEPARTMENT COURSE:   Vitals:    Vitals:    07/20/20 1147 07/20/20 1235 07/20/20 1417 07/20/20 1538   BP: (!) 108/55 (!) 108/54 (!) 112/47 (!) 104/47   Pulse:  75  79   Resp:  18  18   Temp:       TempSrc:       SpO2: 94% 94%  93%   Weight:       Height:           9:43 AM EDT: The patient was seen and evaluated. Patient had a Billy catheter replaced at the [facility] care [facility]. This continues to drain bright red blood. Initially this look like it was clearing up and was becoming pink-tinged, however return to dark red in color.     The patient is not on any blood thinners. Platelet count is 293. Hemoglobin 11.0, hematocrit 35.8. White blood cell count 6.5. Patient's urinalysis is turbid with greater than 200 RBCs. I spoke with Nickolas Foss CNP with urology. She came to the department and evaluated the patient. Per their request, a three-way Billy catheter was placed with continuous irrigation. They reevaluated the patient and was having difficulty pulling back any fluids distilled into the catheter. They consulted with their urologist, who advised to discontinue current three-way catheter and replace another. An additional three-way catheter was placed. They were able to flush the catheter without any difficulties. Irrigation was performed until urine appeared clear. Will be discharged back to the extended care facility. Requested that additional days of Omnicef be written for the patient for treatment and resolution of previously known UTI. MDM:  The patient will be discharged back to extended care facility. Billy catheter care per facility. Omnicef as prescribed. May require irrigation of catheter if any blood clots develop or any urinary retention. Advised to return to the emergency department for continued gross bloody urine, temperature 100.5 or greater, urinary retention or large amount of clots in the urine, or any new concerns. CRITICAL CARE:   None    CONSULTS:  RADHA Valentin. BC, urology    PROCEDURES:  None    FINAL IMPRESSION      1. Dislodged Billy catheter, [facility]    2. Hematuria, gross          DISPOSITION/PLAN   Discharge    PATIENT REFERRED TO:  Judy Fernandez MD  [address] [facility] [address] [facility]  [phone]      As needed    [facility]  [address]  [facility]  [facility] [place] [address]    As needed      DISCHARGE MEDICATIONS:  Current Discharge Medication List      START taking these medications    Details   cefUROXime (CEFTIN) 250 MG tablet Take 1 tablet by mouth 2 times daily for 5 days  Qty: 10 tablet, Refills: 0             (Please note that portions of this note were completed with a voice recognition program.  Efforts were made to edit the dictations but occasionally words are mis-transcribed.)    The patient was given an opportunity to see the Emergency Attending. The patient voiced understanding that I was a Mid-LevelProvider and was in agreement with being seen independently by myself.           Carlotta Tejeda, MAEGAN - CNP  07/20/20 1559

## 2020-07-20 NOTE — CONSULTS
Urology Consult Note      Reason for Consult:  Hematuria, urinary catheter problem  Requesting Physician:  Georgia Leon CNP    History Obtained From:  Patient, EMR    Chief Complaint: hematuria, urinary catheter problem    HISTORY OF PRESENT ILLNESS:                The patient is a 80 y.o. male with significant past medical history of see below who presents with hematuria after pulling catheter out overnight and replaced this AM by [facility] staff. Nursing home called office reporting some hematuria. He was brought into ER instead. Patient also c/o some lower abd discomfort on arrival.  ER replaced urinary felipe catheter with 22fr 3 way felipe catheter draining clear yellow urine with approx 300ml dark wine colored urine returned in eflipe catheter bag. Patient denies CP/SOB, fever/chills, abd or flank pain on exam.    Hx of green light laser with Dr. Dudley Guillen in March of 2020. No longer on anticoags. Saw Dr. Dudley Guillen last week, has chronic catheter now due to neurogenic bladder. SPT was discussed at that office visit. Recently treated with Doxy for right epididymitis.     Past Medical History:        Diagnosis Date    Anxiety     CAD (coronary artery disease)     leaking valve    Chronic back pain     COPD (chronic obstructive pulmonary disease) (HCC)     Detached retina     Diabetes mellitus (HCC)     NIDDM    [facility]deep [facility] thrombosis) ([facility])     RLE    DVT (deep venous thrombosis) ([facility] [address] 11/9/15    LLE    Glaucoma     Hyperlipidemia     Hypertension     Mass of chest     benign chest mass, Dr Lisa Sands Movement disorder     spinal stenosis    Obesity     Osteoarthritis     right ankle, right hand    Pneumonia     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    Sleep apnea 1993    on BiPap, Dr Rosy Escudero Thyroid cancer [facility])     s/p thyroid resection    Urinary incontinence      Past Surgical History:        Procedure Laterality Date   [facility] SURGERY  2006,2011, 2014    BRONCHOSCOPY N/A 8/29/2017 BRONCHOSCOPY AIRWAY ONLY performed by Sol Rogers MD at [facility]    EKG 12-LEAD  11/10/2015         FRACTURE SURGERY      right hand    OTHER SURGICAL HISTORY      spinal epidurals    RETINAL DETACHMENT SURGERY      SPINE SURGERY  2004    Lumbar laminectomy    THYROIDECTOMY      TURP N/A 3/17/2020    CYSTOSCOPY WITH GREENLIGHT PHOTOVAPORIZATION OF PROSTATE performed by Saint Satchel, MD at [address] TURP N/A 3/24/2020    PLASMA BUTTON TURP WITH CLOT EVACUATION performed by Saint Satchel, MD at [address]:  Latex; Macrobid [nitrofurantoin monohyd macro]; Levaquin [levofloxacin]; Adhesive tape; and Alphagan [brimonidine tartrate]    Social History     Socioeconomic History    Marital status:      Spouse name: Not on file    Number of children: 2    Years of education: Not on file    Highest education level: Not on file   Occupational History    Not on file   Social Needs    Financial resource strain: Not hard at all   [place] insecurity     Worry: Never true     Inability: Never true   [employer] needs     Medical: No     Non-medical: No   Tobacco Use    Smoking status: Never Smoker    Smokeless tobacco: Never Used   Substance and Sexual Activity    Alcohol use: Yes     Alcohol/week: 1.0 standard drinks     Types: 1 Glasses of wine per week     Comment: Glass of wine with dinner.     Drug use: No    Sexual activity: Never   Lifestyle    Physical activity     Days per week: 0 days     Minutes per session: 0 min    Stress: Not at all   Relationships    Social connections     Talks on phone: Once a week     Gets together: Once a week     Attends [employer] service: Not on file     Active member of club or organization: No     Attends meetings of clubs or organizations: Never     Relationship status:     Intimate partner violence     Fear of current or ex partner: Not on file     Emotionally abused: Not on file     Physically abused: Not on file     Forced sexual hand irrigation by ED staff,  dark wine colored urine in felipe bag  Scrotal contents normal to inspection and palpation   Normal testes palpated bilaterally  Musculoskeletal:    Normal range of motion. He exhibits no edema or tenderness of lower extremities. Extremities:    No cyanosis, clubbing, or edema present. Neurological:    Alert and oriented.      DATA:  CBC:   Lab Results   Component Value Date    WBC 6.5 07/20/2020    RBC 4.18 07/20/2020    HGB 11.0 07/20/2020    HCT 35.8 07/20/2020    MCV 85.6 07/20/2020    MCH 26.3 07/20/2020    MCHC 30.7 07/20/2020    RDW 14.6 05/02/2018     07/20/2020    MPV 9.6 07/20/2020     BMP:    Lab Results   Component Value Date     07/20/2020    K 4.2 07/20/2020     07/20/2020    CO2 29 07/20/2020    BUN 10 07/20/2020    CREATININE 0.9 07/20/2020    CALCIUM 8.1 07/20/2020    LABGLOM 79 07/20/2020    GLUCOSE 112 07/20/2020    GLUCOSE 130 11/30/2011     BUN/Creatinine:    Lab Results   Component Value Date    BUN 10 07/20/2020    CREATININE 0.9 07/20/2020     Magnesium:    Lab Results   Component Value Date    MG 1.9 03/13/2020     Phosphorus:    Lab Results   Component Value Date    PHOS 3.4 03/14/2019     PT/INR:    Lab Results   Component Value Date    INR 0.99 07/20/2020     U/A:    Lab Results   Component Value Date    NITRITE negative 01/31/2020    COLORU RED 07/20/2020    PHUR 6.0 07/20/2020    LABCAST 4-8 HYALINE 03/15/2019    LABCAST NONE SEEN 03/15/2019    WBCUA 0-2 07/20/2020    RBCUA > 200 07/20/2020    YEAST NONE SEEN 07/20/2020    BACTERIA MANY 07/20/2020    CLARITYU clear 01/31/2020    SPECGRAV 1.025 01/31/2020    SPECGRAV 1.016 03/15/2019    LEUKOCYTESUR TRACE 07/20/2020    UROBILINOGEN 0.2 07/20/2020    BILIRUBINUR NEGATIVE 07/20/2020    BILIRUBINUR negative 01/31/2020    BLOODU LARGE 07/20/2020    GLUCOSEU NEGATIVE 07/20/2020    AMORPHOUS DEBRIS 05/05/2020         IMPRESSION:     Traumatic catheter removal  Gross hematuria      Plan:  Insert 3 way felipe and hand irrigate with at least 100ml NS for clot evacuation, Dr Leanne Toro confirmed placement of catheter, balloon deflated and advanced with clear yellow returned. Leave 3 way felipe in on discharge back to ECF, cap irrigation port prior to leaving    Encourage the use of large padded briefs with pants/shorts over to limit unintended pulling of catheter    5 days of ceftin prescribed due to catheter manipulation, recently treated for UTI. Fu as scheduled with dr Rosaline Balderas. Continue every 4 week cath changes.       Thank you for including us in the care of MAEGAN Ansari  07/20/20 5:11 PM  Urology

## 2020-07-21 NOTE — PROGRESS NOTES
Patient has given me verbal consent to perform catheter change YES      Following Dr. Effie Garsia of [facility]. 16 Fr Catheter changed without difficulty. Once balloon was deflated, removed catheter without difficulty. Cleansed head of penis with betadine swab. 16 Fr regular felipe was inserted without difficulty. Catheter was flushed with 70 cc water insuring return. Patient was then flushed with another 35 cc and was starting to have bladder spasams. I slowly inserted 35 cc more and adjusted the catheter to make sure that it was in the correct spot. I then slowly flushed another 20cc and continue to spasm. Steve Magana was notified and then he took over. Steve Magana placed a 18 Fr coude into the bladder without difficulty and patient was discharged from the office.

## 2020-07-24 RX ORDER — DOXAZOSIN 8 MG/1
8 TABLET ORAL NIGHTLY
Qty: 90 TABLET | Refills: 3 | Status: SHIPPED | OUTPATIENT
Start: 2020-07-24 | End: 2021-03-15

## 2020-07-24 RX ORDER — PSEUDOEPHEDRINE HCL 30 MG
100 TABLET ORAL 2 TIMES DAILY
Qty: 180 CAPSULE | Refills: 3 | Status: SHIPPED | OUTPATIENT
Start: 2020-07-24

## 2020-07-24 RX ORDER — LISINOPRIL 2.5 MG/1
2.5 TABLET ORAL DAILY
Qty: 90 TABLET | Refills: 3 | Status: ON HOLD | OUTPATIENT
Start: 2020-07-24 | End: 2022-03-05 | Stop reason: HOSPADM

## 2020-07-24 RX ORDER — POTASSIUM CHLORIDE 750 MG/1
TABLET, EXTENDED RELEASE ORAL
Qty: 180 TABLET | Refills: 3 | Status: ON HOLD | OUTPATIENT
Start: 2020-07-24 | End: 2021-07-14 | Stop reason: SDUPTHER

## 2020-07-24 RX ORDER — FUROSEMIDE 40 MG/1
40 TABLET ORAL 2 TIMES DAILY
Qty: 180 TABLET | Refills: 3 | Status: SHIPPED | OUTPATIENT
Start: 2020-07-24 | End: 2021-02-15

## 2020-07-24 RX ORDER — CEFUROXIME AXETIL 250 MG/1
250 TABLET ORAL 2 TIMES DAILY
Qty: 10 TABLET | Refills: 0 | Status: CANCELLED | OUTPATIENT
Start: 2020-07-24 | End: 2020-07-29

## 2020-07-24 RX ORDER — OMEGA-3S/DHA/EPA/FISH OIL/D3 300MG-1000
400 CAPSULE ORAL DAILY
Qty: 90 TABLET | Refills: 3 | Status: SHIPPED | OUTPATIENT
Start: 2020-07-24

## 2020-07-24 RX ORDER — BUDESONIDE 0.5 MG/2ML
INHALANT ORAL
Qty: 60 AMPULE | Refills: 11 | Status: SHIPPED | OUTPATIENT
Start: 2020-07-24 | End: 2020-08-04

## 2020-07-24 NOTE — TELEPHONE ENCOUNTER
Fax received from [facility] requesting refills on:  Docusate 100mg BID  Doxazosin 8mg QD  Furosemide 40mg BID  lisonopril 2.5mg QD  Vitamin D 400 units QD  Potassium 10meq BID  Budesomide 0.5/2ml    [address]  See attached scan    Orders pending  last seen 7/6/20. Next appt 10/19/20  Please verify vit d order is correct.

## 2020-07-27 ENCOUNTER — TELEPHONE (OUTPATIENT)
Dept: FAMILY MEDICINE CLINIC | Age: 85
End: 2020-07-27

## 2020-07-28 RX ORDER — ARFORMOTEROL TARTRATE 15 UG/2ML
SOLUTION RESPIRATORY (INHALATION)
Qty: 360 ML | Refills: 3 | Status: SHIPPED | OUTPATIENT
Start: 2020-07-28 | End: 2020-08-05 | Stop reason: ALTCHOICE

## 2020-07-28 NOTE — TELEPHONE ENCOUNTER
PA also required for Sherel Amble since it is administered via nebulizer and the pt is in a long-term care facility. PA started on CoverMyMeds today, will await determination. PA denial for Budesonide was received via fax stating that the claim that was submitted to them from the pharmacy does not indicate that the pt is living in a long-term care facility so would be covered under Medicare Part B. The pt does live in a long-term care facility (covered under Medicare Part D) so I contacted [facility] and they changed the code and resubmitted it to the insurance. I will need to call them to see if the medication PA needs anything further to be approved.

## 2020-07-28 NOTE — TELEPHONE ENCOUNTER
PA for Rocco Dong was denied because the pt has not tried and failed Perforomist, the preferred alternative. Will check with CG on 7/31/20 to see if she will prescribe this medication. I called [facility] appeals department at [phone] and spoke to [facility] and she submitted an appeal stating that the pt does reside in a long term care facility and that his medication should be covered under Part D. She states that it can take up to 7 days for a decision. Reference # for the call is [id number]. Pts son notified that the appeal was done and that we are checking with CG on Friday to see if the alternative can be sent to the pharmacy.

## 2020-07-31 RX ORDER — FORMOTEROL FUMARATE 20 UG/2ML
20 SOLUTION RESPIRATORY (INHALATION) EVERY 12 HOURS SCHEDULED
Qty: 360 ML | Refills: 3 | Status: SHIPPED | OUTPATIENT
Start: 2020-07-31 | End: 2020-08-05 | Stop reason: SDUPTHER

## 2020-07-31 NOTE — TELEPHONE ENCOUNTER
Fax received from [facility] appeals department stating that they needed additional information to complete the review. Form completed and faxed back to [facility], ATTN: Chanel Duffy at [phone]. Of note, the form did state that it needed to be returned by 7/30/20 but I was not here to address it so I called Chanel Duffy at [phone] and asked to to notify me if the form is invalid since it was faxed today. Will await determination.

## 2020-08-03 RX ORDER — ANTIOX #8/OM3/DHA/EPA/LUT/ZEAX 250-2.5 MG
CAPSULE ORAL DAILY
COMMUNITY
End: 2020-08-03 | Stop reason: SDUPTHER

## 2020-08-03 RX ORDER — ANTIOX #8/OM3/DHA/EPA/LUT/ZEAX 250-2.5 MG
CAPSULE ORAL
Qty: 90 CAPSULE | Refills: 3 | Status: SHIPPED | OUTPATIENT
Start: 2020-08-03 | Stop reason: SDUPTHER

## 2020-08-03 NOTE — TELEPHONE ENCOUNTER
Fax received from [facility] stating that the Rx for Perforomist requires a PA. PA started on CoverMyMeds today. Will await determination. Called [phone] and left a detailed message to have Lexus Conn at [facility] appeals call me back to let me know the status of the appeal for Budesonide.

## 2020-08-03 NOTE — TELEPHONE ENCOUNTER
This medication refill is regarding a fax request from [facility]:     Requested Prescriptions     Pending Prescriptions Disp Refills    metoprolol tartrate (LOPRESSOR) 25 MG tablet 180 tablet 3     Sig: Take 1 tablet by mouth 2 times daily Hold for SBP <110 or HR <60    Multiple Vitamins-Minerals (PRESERVISION AREDS 2) CAPS 90 capsule 3     Sig: Take 1 capsule by mouth daily       Date of last visit: 7/6/2020  Date of next visit: 10/19/2020  Pharmacy Name: [facility]    Rx verified, ordered and set to EP.

## 2020-08-04 RX ORDER — BUDESONIDE 0.5 MG/2ML
INHALANT ORAL
Qty: 360 ML | Refills: 3 | Status: SHIPPED | OUTPATIENT
Start: 2020-08-04

## 2020-08-04 NOTE — TELEPHONE ENCOUNTER
Budesonide recently sent but pharmacy is now requesting 90 day supply. Order pended and set to escribe.

## 2020-08-04 NOTE — TELEPHONE ENCOUNTER
PA for Perforomist was approved. I called [facility] and spoke to SUMIT and he said that he ran it through and it states that it is unable to be covered under Part D and wants them to send it through Part B. The pt is in a long term care facility so they are unable to bill part B. SUMIT is going to call the insurance to see if there is anything he can do to get the medication approved through Part D. SUMIT also ran the Budesonide through and it was approved through Medicare Part D so he will get it ready for the pt.      Will wait to call the pts son until I hear back from SUMIT regarding the [address] [place] [address] Street.

## 2020-08-05 RX ORDER — FORMOTEROL FUMARATE 20 UG/2ML
20 SOLUTION RESPIRATORY (INHALATION) EVERY 12 HOURS SCHEDULED
Qty: 360 ML | Refills: 3 | Status: SHIPPED | OUTPATIENT
Start: 2020-08-05 | End: 2020-08-10

## 2020-08-05 NOTE — TELEPHONE ENCOUNTER
Spoke to SUMIT and he said that he spoke to the pts insurance and the Perforomist will only be covered if filled by the pharmacy at [facility]. Rx canceled at [facility] and sent to [facility]. Approval of medication was faxed to [facility] at [phone] and they will contact the office if they have any issues filling the medication. Pts son notified that the Budesonide, Metoprolol and Preservision are ready for him to  at [facility] and that the [address] will be filled by [facility]. He verbalized understanding.

## 2020-08-10 ENCOUNTER — OFFICE VISIT (OUTPATIENT)
Dept: CARDIOLOGY CLINIC | Age: 85
End: 2020-08-10
Payer: MEDICARE

## 2020-08-10 VITALS
WEIGHT: 238.6 LBS | HEART RATE: 71 BPM | HEIGHT: 67 IN | BODY MASS INDEX: 37.45 KG/M2 | DIASTOLIC BLOOD PRESSURE: 59 MMHG | SYSTOLIC BLOOD PRESSURE: 101 MMHG

## 2020-08-10 PROCEDURE — G8417 CALC BMI ABV UP PARAM F/U: HCPCS | Performed by: INTERNAL MEDICINE

## 2020-08-10 PROCEDURE — 99214 OFFICE O/P EST MOD 30 MIN: CPT | Performed by: INTERNAL MEDICINE

## 2020-08-10 PROCEDURE — 4040F PNEUMOC VAC/ADMIN/RCVD: CPT | Performed by: INTERNAL MEDICINE

## 2020-08-10 PROCEDURE — 1036F TOBACCO NON-USER: CPT | Performed by: INTERNAL MEDICINE

## 2020-08-10 PROCEDURE — G8427 DOCREV CUR MEDS BY ELIG CLIN: HCPCS | Performed by: INTERNAL MEDICINE

## 2020-08-10 PROCEDURE — 1123F ACP DISCUSS/DSCN MKR DOCD: CPT | Performed by: INTERNAL MEDICINE

## 2020-08-10 NOTE — PROGRESS NOTES
Chief Complaint   Patient presents with    6 Month Follow-Up   New patient referred for cholesterol emboli behind the right eye. From Dr. Jaden Campbell:  Nonsustained ventricular tachycardia rate of  140 to 150 beats per minute composed of 29 beats, happened overnight at  32 minutes after midnight. No PVC, nothing after that, and the patient  admitted for acute COPD exacerbation.       6  Moth F/u      Had been off lipitor 80 and coumadin since last visit    Hxo hematuria may 2020 and coumadin stopped  Denied cp or palpitation     No wt gain    Sob on exertion- chronic    Some leg edema+1 chronic    Walk with walker    No dizziness    nevere smoked    FHX  None for CAD    Patient Active Problem List   Diagnosis    Diabetes mellitus ([facility] [place] [address]    Hyperlipidemia    Acquired hypothyroidism    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    Breast lump    BPH (benign prostatic hyperplasia)    ROBER (obstructive sleep apnea)    Essential hypertension    History of DVT (deep vein thrombosis)    Shortness of breath    Chronic respiratory failure with hypoxia ([facility])    Mixed hyperlipidemia    Chronic deep vein thrombosis (DVT) of right lower extremity ([facility])    Anticoagulated on Coumadin    Type 2 diabetes mellitus with complication, with long-term current use of insulin ([facility])    Muscle weakness    Chronic bronchitis (HCC)    Chronic obstructive pulmonary disease (HCC)    Paroxysmal atrial fibrillation (HCC)    Acute diastolic congestive heart failure (HCC)    Paroxysmal atrial flutter (HCC)    Urinary incontinence    Serous detachment of retinal pigment epithelium    Senile nuclear sclerosis    Puckering of macula, bilateral    Presence of intraocular lens    Primary open-angle glaucoma    Other chorioretinal scars, right eye    Nonexudative senile macular degeneration of retina    Chronic diastolic CHF (congestive heart failure) (HCC)    Morbid obesity (HCC)    Urinary retention    Urine retention    Abdominal pain    Altered mental status    Chronic indwelling Billy catheter    Urinary tract infection associated with indwelling urethral catheter (HCC)    Hypokalemia    Weakness of left lower extremity    Pill dysphagia    History of thyroid cancer    Gross hematuria    Constipation    Acute urinary retention       Past Surgical History:   Procedure Laterality Date   [facility] SURGERY  1532,9040, 2014    BRONCHOSCOPY N/A 8/29/2017    BRONCHOSCOPY AIRWAY ONLY performed by Bartolo Aaron MD at [address] [place] [address] EKG 12-LEAD  11/10/2015         FRACTURE SURGERY      right hand    OTHER SURGICAL HISTORY      spinal epidurals    RETINAL DETACHMENT SURGERY      SPINE SURGERY  2004    Lumbar laminectomy    THYROIDECTOMY      TURP N/A 3/17/2020    CYSTOSCOPY WITH GREENLIGHT PHOTOVAPORIZATION OF PROSTATE performed by Regine Ziegler MD at [address] [place] [address] TURP N/A 3/24/2020    PLASMA BUTTON TURP WITH CLOT EVACUATION performed by Regine Ziegler MD at [facility] [Nitrofurantoin Fito Code      Dr. Navarro Payne prefers pt not use Macrobid due to pulmonary side effects.     Levaquin [Levofloxacin] Other (See Comments)     Redness/flushing    Adhesive Tape Rash    Alphagan [Brimonidine Tartrate] Hives     Eyes red        Family History   Problem Relation Age of Onset    Other Mother         Complications of Childbirth    Other Father [de-identified]        Natural causes        Social History     Socioeconomic History    Marital status:      Spouse name: Not on file    Number of children: 2    Years of education: Not on file    Highest education level: Not on file   Occupational History    Not on file   Social Needs    Financial resource strain: Not hard at all   [place] insecurity     Worry: Never true     Inability: Never true    Transportation needs     Medical: No     Non-medical: No   Tobacco Use    Smoking status: Never Smoker    Smokeless tobacco: Never hematuria  Musculoskeletal ROS: negative  Neurological ROS: no TIA or stroke symptoms  Dermatological ROS: negative      Blood pressure (!) 101/59, pulse 71, height 5' 7\" (1.702 m), weight 238 lb 9.6 oz (108.2 kg). Physical Examination:    General appearance - alert, well appearing, and in no distress  HEENT- Pink conjunctiva  , Non-icteri sclera,PERRLA  Mental status - alert, oriented to person, place, and time  Neck - supple, no significant adenopathy, no JVD, or carotid bruits  Chest - clear to auscultation, no wheezes, rales or rhonchi, symmetric air entry  Heart - normal rate, regular rhythm, normal S1, S2, no murmurs, rubs, clicks or gallops  Abdomen - soft, nontender, nondistended, no masses or organomegaly  EDWARD- no CVA or flank tenderness, no suprapubic tenderness  Neurological - alert, oriented, normal speech, no focal findings or movement disorder noted  Musculoskeletal/limbs - no joint tenderness, deformity or swelling   - peripheral pulses normal, no pedal edema, no clubbing or cyanosis  Skin - normal coloration and turgor, no rashes, no suspicious skin lesions noted  Psych- appropriate mood and affect    Lab  No results for input(s): CKTOTAL, CKMB, CKMBINDEX, TROPONINI in the last 72 hours.   CBC:   Lab Results   Component Value Date    WBC 6.5 07/20/2020    RBC 4.18 07/20/2020    HGB 11.0 07/20/2020    HCT 35.8 07/20/2020    MCV 85.6 07/20/2020    MCH 26.3 07/20/2020    MCHC 30.7 07/20/2020    RDW 14.6 05/02/2018     07/20/2020    MPV 9.6 07/20/2020     BMP:    Lab Results   Component Value Date     07/20/2020    K 4.2 07/20/2020     07/20/2020    CO2 29 07/20/2020    BUN 10 07/20/2020    LABALBU 3.6 07/20/2020    LABALBU 4.3 11/30/2011    CREATININE 0.9 07/20/2020    CALCIUM 8.1 07/20/2020    LABGLOM 79 07/20/2020    GLUCOSE 112 07/20/2020    GLUCOSE 130 11/30/2011     Hepatic Function Panel:    Lab Results   Component Value Date    ALKPHOS 52 07/20/2020    ALT 6 07/20/2020 AST 10 07/20/2020    PROT 6.0 07/20/2020    PROT 6.9 02/05/2011    BILITOT 0.4 07/20/2020    BILIDIR <0.2 03/11/2020    LABALBU 3.6 07/20/2020    LABALBU 4.3 11/30/2011     Magnesium:    Lab Results   Component Value Date    MG 1.9 03/13/2020     Warfarin PT/INR:  No components found for: Monica Regan  HgBA1c:    Lab Results   Component Value Date    LABA1C 6.1 07/09/2020     FLP:    Lab Results   Component Value Date    TRIG 241 07/09/2020    HDL 34 07/09/2020    HDL 33 11/30/2011    LDLCALC 103 07/09/2020    LDLDIRECT 69.29 10/10/2018     TSH:    Lab Results   Component Value Date    TSH 2.210 07/20/2020     Conclusions      Summary   Technically difficult examination.   This is a suboptimal study due to poor echocardiographic window.   Doppler parameters were consistent with abnormal left ventricular   relaxation (grade 1 diastolic dysfunction).   Left ventricle size is normal.   Normal left ventricular wall thickness.   Ejection fraction is visually estimated at 55%.   Unable to determine wall motion abnormalities due to poor image quality.   Doppler parameters were consistent with abnormal left ventricular   relaxation (grade 1 diastolic dysfunction).      Signature      ----------------------------------------------------------------   Electronically signed by Rock Misa MORRIS (Interpreting   physician) on 03/19/2019 at 05:35 AM      Assessment   Diagnosis Orders   1. Paroxysmal atrial fibrillation (HCC)     2. Mixed hyperlipidemia     3. Essential hypertension     4. Chronic diastolic CHF (congestive heart failure) (HCC)           ASSESSMENT:  1. Basically acute hypercapnic respiratory failure. 2.  Acute COPD exacerbation. 3.  Acute diastolic congestive heart failure exacerbation. 4.  Nonsustained ventricular tachycardia composed of 29 beats at a rate  of 140 to 150 beats per minute range, happened at 32 minutes after  midnight. 5.  Hypertension. 6.  Hyperlipidemia. 7.  Obesity.   8.  COPD, on home oxygen. 9.  History of DVT for which he is on Coumadin. 10.  Hyperlipidemia. 11.  Hypertension. Plan   The current  meds and labs reviewed    Continue the current treatment and with constant vigilance to changes in symptoms and also any potential side effects. Return for care or seek medical attention immediately if symptoms got worse and/or develop new symptoms. Hyperlipidemia:  off statins,   No sure why pat off statin  Marilee Nina does not remember      Congestive heart failure: no evidence of fluid overload today, no recent hospitalization for CHF    Hypertension, on medical treatment. Seems to be under good control. Patient is compliant with medical treatment.      Hx of cholesterol emboli   Cont asa,    PAF   Had been off lipitor 80 and coumadin since last visit  Consider to resume coumadin when urology gave the okay and if family agree      D/w the pat the plan of care        RTC in 6 months        [facility]

## 2020-08-18 ENCOUNTER — HOSPITAL ENCOUNTER (EMERGENCY)
Age: 85
Discharge: HOME | End: 2020-08-19
Attending: EMERGENCY MEDICINE
Payer: MEDICARE

## 2020-08-18 PROCEDURE — 87086 URINE CULTURE/COLONY COUNT: CPT

## 2020-08-18 PROCEDURE — 87077 CULTURE AEROBIC IDENTIFY: CPT

## 2020-08-18 PROCEDURE — 99284 EMERGENCY DEPT VISIT MOD MDM: CPT

## 2020-08-18 PROCEDURE — 81001 URINALYSIS AUTO W/SCOPE: CPT

## 2020-08-18 PROCEDURE — 87186 SC STD MICRODIL/AGAR DIL: CPT

## 2020-08-18 PROCEDURE — 51702 INSERT TEMP BLADDER CATH: CPT

## 2020-08-18 PROCEDURE — 99283 EMERGENCY DEPT VISIT LOW MDM: CPT

## 2020-08-18 RX ORDER — GRANULES FOR ORAL 3 G/1
3 POWDER ORAL ONCE
Status: COMPLETED | OUTPATIENT
Start: 2020-08-19 | End: 2020-08-19

## 2020-08-18 RX ORDER — SULFAMETHOXAZOLE AND TRIMETHOPRIM 800; 160 MG/1; MG/1
1 TABLET ORAL 2 TIMES DAILY
Qty: 20 TABLET | Refills: 0 | Status: SHIPPED | OUTPATIENT
Start: 2020-08-18 | End: 2020-08-28

## 2020-08-18 RX ORDER — DOXYCYCLINE HYCLATE 50 MG/1
324 CAPSULE, GELATIN COATED ORAL 2 TIMES DAILY
Qty: 180 TABLET | Refills: 3 | Status: SHIPPED | OUTPATIENT
Start: 2020-08-18

## 2020-08-18 ASSESSMENT — ENCOUNTER SYMPTOMS
BACK PAIN: 0
SORE THROAT: 0
WHEEZING: 0
CHEST TIGHTNESS: 0
VOICE CHANGE: 0
BLOOD IN STOOL: 0
EYE ITCHING: 0
TROUBLE SWALLOWING: 0
VOMITING: 0
CHOKING: 0
SINUS PRESSURE: 0
EYE REDNESS: 0
EYE DISCHARGE: 0
ABDOMINAL PAIN: 1
CONSTIPATION: 0
DIARRHEA: 0
SHORTNESS OF BREATH: 0
PHOTOPHOBIA: 0
NAUSEA: 0
EYE PAIN: 0
COUGH: 0
RHINORRHEA: 0
ABDOMINAL DISTENTION: 0

## 2020-08-18 NOTE — TELEPHONE ENCOUNTER
Fax received from [facility] requesting refills be sent to [address]. Metoprolol, ferrous gluconate, and glucophage  Orders pending  Last seen 7/6/20.  Next appt 10/19/20

## 2020-08-19 VITALS
RESPIRATION RATE: 18 BRPM | SYSTOLIC BLOOD PRESSURE: 124 MMHG | DIASTOLIC BLOOD PRESSURE: 67 MMHG | WEIGHT: 250 LBS | BODY MASS INDEX: 39.24 KG/M2 | HEIGHT: 67 IN | HEART RATE: 60 BPM | TEMPERATURE: 98.5 F | OXYGEN SATURATION: 96 %

## 2020-08-19 LAB
BACTERIA: ABNORMAL /HPF
BILIRUBIN URINE: NEGATIVE
BLOOD, URINE: ABNORMAL
CASTS 2: ABNORMAL /LPF
CASTS UA: ABNORMAL /LPF
CHARACTER, URINE: ABNORMAL
COLOR: YELLOW
CRYSTALS, UA: ABNORMAL
EPITHELIAL CELLS, UA: ABNORMAL /HPF
GLUCOSE URINE: NEGATIVE MG/DL
KETONES, URINE: ABNORMAL
LEUKOCYTE ESTERASE, URINE: ABNORMAL
MISCELLANEOUS 2: ABNORMAL
NITRITE, URINE: POSITIVE
PH UA: 6.5 (ref 5–9)
PROTEIN UA: 100
RBC URINE: > 100 /HPF
RENAL EPITHELIAL, UA: ABNORMAL
SPECIFIC GRAVITY, URINE: 1.02 (ref 1–1.03)
UROBILINOGEN, URINE: 1 EU/DL (ref 0–1)
WBC UA: > 200 /HPF
YEAST: ABNORMAL

## 2020-08-19 PROCEDURE — 6370000000 HC RX 637 (ALT 250 FOR IP): Performed by: EMERGENCY MEDICINE

## 2020-08-19 RX ADMIN — FOSFOMYCIN TROMETHAMINE 1 PACKET: 3 POWDER ORAL at 00:10

## 2020-08-19 NOTE — ED TRIAGE NOTES
Pt presents to the ED via [facility] after accidentally pulling out his felipe catheter. Pt states he has \"been in and out of here since February for the same problem\". Pt does has some red drainage from his penis, but appears to be no injury to the site. Pt states he sees the urologist at [facility]. Pt states it was pulled out around 2045. Pt alert and oriented. Pt breathing easy and unlabored. Pt alert and oriented. Pt denies further needs. VS obtained. Pt made comfortable in cot.

## 2020-08-19 NOTE — ED NOTES
DR. Bing Browning at bedside with pt. Verbal order from Dr. Bing Browning to replace felipe at this time.       Bushra Kingsley RN  08/18/20 0529

## 2020-08-19 NOTE — ED NOTES
Pt repositioned in cot at this time. Pt medicated and educated. Will continue to monitor until transport. Pt took medication without complications. VS updated.       Mora Partida RN  08/19/20 3118

## 2020-08-19 NOTE — ED PROVIDER NOTES
[facility]  eMERGENCY dEPARTMENT eNCOUnter          CHIEF COMPLAINT       Chief Complaint   Patient presents with    Urinary Catheter Problem       Nurses Notes reviewed and I agree except as noted in the HPI. HISTORY OF PRESENT ILLNESS    Tyson Lara is a 80 y.o. male who presents because he pulled out his Billy. Apparently the car was going on the bathroom toilet, he hooked it over the railing, and when he twisted it pulled his Billy out. He has some blood at the meatus, he is not having any pain except in his abdomen. He has not had a good urination. Patient does have prostatic hypertrophy. He has had slight dribbling right now. Patient is resting comfortably on the cot no apparent distress. No other physical complaints at this time. REVIEW OF SYSTEMS     Review of Systems   Constitutional: Negative for activity change, appetite change, diaphoresis, fatigue and unexpected weight change. HENT: Negative for congestion, ear discharge, ear pain, hearing loss, rhinorrhea, sinus pressure, sore throat, trouble swallowing and voice change. Eyes: Negative for photophobia, pain, discharge, redness and itching. Respiratory: Negative for cough, choking, chest tightness, shortness of breath and wheezing. Cardiovascular: Negative for chest pain, palpitations and leg swelling. Gastrointestinal: Positive for abdominal pain. Negative for abdominal distention, blood in stool, constipation, diarrhea, nausea and vomiting. Endocrine: Negative for polydipsia, polyphagia and polyuria. Genitourinary: Positive for difficulty urinating, dysuria and hematuria. Negative for decreased urine volume, enuresis, frequency and urgency. Musculoskeletal: Negative for arthralgias, back pain, gait problem, myalgias, neck pain and neck stiffness. Skin: Negative for pallor and rash. Allergic/Immunologic: Negative for immunocompromised state.    Neurological: Negative for dizziness, tremors, seizures, syncope, facial asymmetry, weakness, light-headedness, numbness and headaches. Hematological: Negative for adenopathy. Does not bruise/bleed easily. Psychiatric/Behavioral: Negative for agitation, hallucinations and suicidal ideas. The patient is not nervous/anxious. PAST MEDICAL HISTORY    has a past medical history of Anxiety, CAD (coronary artery disease), Chronic back pain, COPD (chronic obstructive pulmonary disease) ([address] Detached retina, Diabetes mellitus ([address] DVT (deep venous thrombosis) ([address] DVT (deep venous thrombosis) ([address] Glaucoma, Hyperlipidemia, Hypertension, Mass of chest, Movement disorder, Obesity, Osteoarthritis, Pneumonia, Primary thyroid follicular carcinoma, Sleep apnea, Thyroid cancer ([address] and Urinary incontinence. SURGICAL HISTORY      has a past surgical history that includes Retinal detachment surgery; Thyroidectomy; Spine surgery (2004); other surgical history; fracture surgery; EKG 12 Lead (11/10/2015); back surgery (4227,4100, 2014); bronchoscopy (N/A, 8/29/2017); TURP (N/A, 3/17/2020); and TURP (N/A, 3/24/2020). CURRENT MEDICATIONS       Discharge Medication List as of 8/18/2020 11:49 PM      CONTINUE these medications which have NOT CHANGED    Details   metoprolol tartrate (LOPRESSOR) 25 MG tablet Take 1 tablet by mouth 2 times daily Hold for SBP <110 or HR <60, Disp-180 tablet,R-3Normal      ferrous gluconate (FERGON) 324 (38 Fe) MG tablet Take 1 tablet by mouth 2 times daily, Disp-180 tablet,R-3Normal      metFORMIN (GLUCOPHAGE) 1000 MG tablet Take 1 tablet by mouth 2 times daily (with meals), Disp-180 tablet,R-3Normal      budesonide (PULMICORT) 0.5 MG/2ML nebulizer suspension USE 1 VIAL VIA NEBULIZER TWICE DAILY.  RINSE MOUTH AFTER TREATMENT, Disp-360 mL,R-3**Patient requests 90 days supply**Normal      Multiple Vitamins-Minerals (PRESERVISION AREDS 2) CAPS Take 1 capsule by mouth daily, Disp-90 capsule,R-3Normal      lisinopril (PRINIVIL;ZESTRIL) 2.5 MG tablet Take 1 tablet by mouth daily, Disp-90 tablet,R-3Normal      potassium chloride (KLOR-CON M) 10 MEQ extended release tablet TAKE 1 TABLETS BY MOUTH TWICE DAILY, Disp-180 tablet,R-3**Patient requests 90 days supply**Normal      docusate (COLACE, DULCOLAX) 100 MG CAPS Take 100 mg by mouth 2 times daily, Disp-180 capsule,R-3Normal      doxazosin (CARDURA) 8 MG tablet Take 1 tablet by mouth nightly, Disp-90 tablet,R-3Normal      furosemide (LASIX) 40 MG tablet Take 1 tablet by mouth 2 times daily, Disp-180 tablet,R-3Normal      vitamin D3 (CHOLECALCIFEROL) 10 MCG (400 UNIT) TABS tablet Take 1 tablet by mouth daily 400units, Disp-90 tablet,R-3Normal      HYDROcodone-acetaminophen (NORCO) 5-325 MG per tablet Take 1 tablet by mouth every 6 hours as needed for Pain. Historical Med      ibuprofen (ADVIL;MOTRIN) 800 MG tablet Take 1 tablet by mouth 3 times daily (with meals), Disp-270 tablet, R-1Normal      polyethylene glycol (MIRALAX) 17 g packet Take 17 g by mouth daily as needed for ConstipationHistorical Med      Arformoterol Tartrate (BROVANA IN) Inhale into the lungs 1 dose inhale orally two times per dayHistorical Med      tiotropium (SPIRIVA) 18 MCG inhalation capsule Inhale 18 mcg into the lungs dailyHistorical Med      acetaminophen (TYLENOL) 325 MG tablet Take 650 mg by mouth every 4 hours as needed for PainHistorical Med      lidocaine (LMX) 4 % cream Apply topically as needed for Pain Apply topically as needed., Topical, PRN, Historical Med      oxybutynin (DITROPAN) 5 MG tablet Take 1 tablet by mouth every 8 hours as needed (bladder spasms), Disp-90 tablet, R-0DC to SNF      albuterol sulfate  (90 Base) MCG/ACT inhaler Inhale 2 puffs into the lungs every 6 hours as needed for Wheezing, Disp-1 Inhaler, R-3Normal      LANTUS SOLOSTAR 100 UNIT/ML injection pen INJECT SUBCUTANEOUSLY 18  UNITS NIGHTLY, Disp-30 mL, R-3Normal      levothyroxine (SYNTHROID) 175 MCG tablet TAKE 1 TABLET BY MOUTH  DAILY, Disp-90 tablet, R-3Normal      SOFT TOUCH LANCETS MISC Disp-100 each, R-3, NormalUse to check blood sugars 2 times daily      blood glucose test strips (ACCU-CHEK CHE PLUS) strip Disp-150 strip, R-5, NormalTEST TWICE DAILY      OXYGEN Inhale 3 L into the lungs nightly Indications: Difficulty Breathing, Oxygen Therapy And prn through cpapHistorical Med             ALLERGIES     is allergic to latex; macrobid [nitrofurantoin monohyd macro]; levaquin [levofloxacin]; adhesive tape; and alphagan [brimonidine tartrate]. FAMILY HISTORY     He indicated that his mother is . He indicated that his father is . family history includes Other in his mother; Other (age of onset: [de-identified]) in his father. SOCIAL HISTORY      reports that he has never smoked. He has never used smokeless tobacco. He reports current alcohol use of about 1.0 standard drinks of alcohol per week. He reports that he does not use drugs. PHYSICAL EXAM     INITIAL VITALS:  height is 5' 7\" (1.702 m) and weight is 250 lb (113.4 kg). His oral temperature is 98.5 °F (36.9 °C). His blood pressure is 124/67 and his pulse is 60. His respiration is 18 and oxygen saturation is 96%. Physical Exam  Vitals signs and nursing note reviewed. Constitutional:       General: He is not in acute distress. Appearance: He is well-developed. He is not diaphoretic. HENT:      Head: Normocephalic and atraumatic. Right Ear: External ear normal.      Left Ear: External ear normal.      Nose: Nose normal.   Eyes:      General: Lids are normal. No scleral icterus. Right eye: No discharge. Left eye: No discharge. Conjunctiva/sclera: Conjunctivae normal.      Right eye: No exudate. Left eye: No exudate. Pupils: Pupils are equal, round, and reactive to light. Neck:      Musculoskeletal: Normal range of motion and neck supple. Normal range of motion. Thyroid: No thyromegaly.       Vascular: No JVD.      Trachea: No tracheal deviation. Cardiovascular:      Rate and Rhythm: Normal rate and regular rhythm. Pulses: Normal pulses. Heart sounds: Normal heart sounds, S1 normal and S2 normal. No murmur. No friction rub. No gallop. Pulmonary:      Effort: Pulmonary effort is normal. No respiratory distress. Breath sounds: Normal breath sounds. No stridor. No wheezing or rales. Chest:      Chest wall: No tenderness. Abdominal:      General: Bowel sounds are normal. There is no distension. Palpations: Abdomen is soft. There is no mass. Tenderness: There is no abdominal tenderness. There is no guarding or rebound. Genitourinary:     Pubic Area: No rash. Penis: Uncircumcised. Hypospadias present. Scrotum/Testes: Normal.         Right: Tenderness or swelling not present. Left: Tenderness or swelling not present. Comments: Dark blood at the urethral meatus, slight dribbling of urine, no active extravasation. Musculoskeletal: Normal range of motion. General: No tenderness. Right shoulder: He exhibits no tenderness, no bony tenderness, no crepitus and normal strength. Lymphadenopathy:      Cervical: No cervical adenopathy. Skin:     General: Skin is warm and dry. Findings: No bruising, ecchymosis, lesion or rash. Neurological:      Mental Status: He is alert and oriented to person, place, and time. Cranial Nerves: No cranial nerve deficit. Sensory: No sensory deficit. Coordination: Coordination normal.      Deep Tendon Reflexes: Reflexes are normal and symmetric. Psychiatric:         Speech: Speech normal.         Behavior: Behavior normal.         Thought Content:  Thought content normal.         Judgment: Judgment normal.           DIFFERENTIAL DIAGNOSIS:   Differential diagnosis discussed extensively at bedside with the patient including but not limited to traumatic Billy removal, acute urinary retention, acute urethral disruption    DIAGNOSTIC RESULTS     EKG: All EKG's are interpreted by the Emergency Department Physician who either signs or Co-signs this chart in the absence of a cardiologist.  None    RADIOLOGY: non-plain film images(s) such as CT, Ultrasound and MRI are read by the radiologist.  No orders to display         LABS:   Labs Reviewed   URINE WITH REFLEXED MICRO - Abnormal; Notable for the following components:       Result Value    Ketones, Urine TRACE (*)     Blood, Urine LARGE (*)     Protein,  (*)     Nitrite, Urine POSITIVE (*)     Leukocyte Esterase, Urine LARGE (*)     Character, Urine CLOUDY (*)     All other components within normal limits   CULTURE, REFLEXED, URINE       EMERGENCY DEPARTMENT COURSE:   Vitals:    Vitals:    08/18/20 2108 08/18/20 2217 08/19/20 0014   BP: (!) 157/69 128/67 124/67   Pulse: 65 62 60   Resp: 21 17 18   Temp: 98.5 °F (36.9 °C)     TempSrc: Oral     SpO2: 94% 95% 96%   Weight: 250 lb (113.4 kg)     Height: 5' 7\" (1.702 m)       Patient was assessed at bedside appropriate labs and imaging were ordered. Patient has done this in the past.  He has some mild blood at his urethra but he had no pain. Here his urine did reveal nitrite positive leukocyte Estrace positive urinary tract infection appears he has had these in the past.  He was given 1 dose of fosfomycin here. He will be sent home on Bactrim. He is instructed to follow-up with the urologist and call for an appointment within the next 1 to 2 days. Patient understood and agreed with the plan. Patient is subsequently discharged home in good condition. Patient has what appear to be a traumatic removal of his Billy catheter. Patient is instructed to take the antibiotics as prescribed. He is instructed to follow-up with his urologist and caregivers are instructed to call for an appointment in the next 1 to 2 days.   Caregivers are instructed to return the patient to the nearest emergency room immediately for any new or worsening complaints. CRITICAL CARE:   None    CONSULTS:  None    PROCEDURES:  None    FINAL IMPRESSION      1. Billy catheter problem, initial encounter ([facility] [address]    2.  Urinary tract infection with hematuria, site unspecified          DISPOSITION/PLAN   Discharge    PATIENT REFERRED TO:  Sanjeev Canseco MD  [address]  [phone]    Call in 1 day      Brenda Farley [address] [place] [id number]  [phone]    Call in 2 days        DISCHARGE MEDICATIONS:  Discharge Medication List as of 8/18/2020 11:49 PM          (Please note that portions of this note were completed with a voice recognition program.  Efforts were made to edit the dictations but occasionally words are mis-transcribed.)    Asaf Mar, [address] [place] [address],   08/19/20 9724

## 2020-08-19 NOTE — ED NOTES
Called [facility] at this time to set up transportation for pt back to [facility]. Approx time for  is 0100. Pt informed.       Radha Grady RN  08/18/20 0820

## 2020-08-19 NOTE — ED NOTES
Billy catheter inserted for pt. Pt aware of POC. Cloudy, dark yellow urine obtained with red tinted sediment. Pt denies further needs. Urine sample obtained and sent to lab.       Candis Deng RN  08/18/20 7645

## 2020-08-20 RX ORDER — BLOOD SUGAR DIAGNOSTIC
STRIP MISCELLANEOUS
Qty: 200 STRIP | Refills: 3 | Status: SHIPPED | OUTPATIENT
Start: 2020-08-20 | End: 2021-06-11

## 2020-08-20 RX ORDER — BLOOD-GLUCOSE METER
EACH MISCELLANEOUS
Qty: 1 KIT | Refills: 0 | Status: SHIPPED | OUTPATIENT
Start: 2020-08-20 | End: 2021-06-11

## 2020-08-20 RX ORDER — UBIQUINOL 100 MG
CAPSULE ORAL
Qty: 200 EACH | Refills: 3 | Status: SHIPPED | OUTPATIENT
Start: 2020-08-20 | End: 2021-06-11

## 2020-08-20 NOTE — TELEPHONE ENCOUNTER
Fax received from [facility] (see attached scan) requesting rx for test strips, meter, alcohol and lancets be sent to [address]  Last seen 7/6/20.  Next appt 10/19/20    Orders pending

## 2020-08-22 LAB
ORGANISM: ABNORMAL
URINE CULTURE REFLEX: ABNORMAL

## 2020-08-24 ENCOUNTER — TELEPHONE (OUTPATIENT)
Dept: UROLOGY | Age: 85
End: 2020-08-24

## 2020-08-24 NOTE — TELEPHONE ENCOUNTER
MARLYN CALLED FROM [facility] TO SCHEDULE AN ER FU ON PATIENT. I CALLED THEY SAID SON DARIO ANDERSON WOULD TRANSPORT HIM. THEY CAN ONLY BRING ON Monday. I CALLED DR ANDERSON AND HE SAID HE CAN NOT BRING TOMORROW AND TO KEEP HIS ORIGINAL APPT IN OCT. HE IS NOT HAVING ANY PROBLEMS AND IF HE DOES THEY WILL CALL BACK. JUST A FYI.  THANKS

## 2020-08-24 NOTE — TELEPHONE ENCOUNTER
Urine is resistant to bactrim he was put on by ER doctor. Is he having any symptoms of infection? Hematuria, pain, fever, chills, dysuria. ? Has allergy to levaquin and macrobid as listed in epic. Ok to keep October appt.

## 2020-09-08 ENCOUNTER — TELEPHONE (OUTPATIENT)
Dept: FAMILY MEDICINE CLINIC | Age: 85
End: 2020-09-08

## 2020-09-08 NOTE — TELEPHONE ENCOUNTER
Simon Varela at [facility] notified of results and ES response to cipro issue  CBC order faxed to [facility]

## 2020-09-08 NOTE — TELEPHONE ENCOUNTER
I did not put on Cipro- my suspicion is this was started per Urology. They will need to check with the prescribing office.   ES

## 2020-09-08 NOTE — TELEPHONE ENCOUNTER
She at [facility] left message at 134pm stating pt was placed on Cipro but per pharmacy this is in the levaquin family that he cannot take. Pt had dose last night and today. He states he would know right away if he was unable to tolerate it. Pt feels fine. Opal Ralph needs an ok to continue so she can let the pharmacy know. Chart reviewed, I see that ES ordered urine and labs but do not see when Cipro was ordered. Ok to continue?        ----- Message from Peter Thurman MD sent at 9/8/2020 12:13 PM EDT -----  Notify pt-   Results reviewed. Urine Culture +- please forward to Urology as they manage as pt has indwelling Billy Catheter. CMP ok, except TP slightly low- nothing needed at this time. CBC shows stable anemia. FREE T4 and TSH ok. Recheck CBC in 2-3 months.  ES

## 2020-09-09 ENCOUNTER — TELEPHONE (OUTPATIENT)
Dept: UROLOGY | Age: 85
End: 2020-09-09

## 2020-09-09 RX ORDER — GRANULES FOR ORAL 3 G/1
3 POWDER ORAL
Qty: 3 EACH | Refills: 0 | Status: SHIPPED | OUTPATIENT
Start: 2020-09-09 | End: 2020-10-19

## 2020-09-09 NOTE — TELEPHONE ENCOUNTER
Chadd Smith from [place] advised to stop the Cipro due to possible allergy. She voiced understanding to start fosomycin 3 gm 1 packet every 3 days dispensing 3 doses. She voiced understanding. Copy of medication that was ordered faxed to [phone] to [facility].

## 2020-09-09 NOTE — TELEPHONE ENCOUNTER
We have levaquin listed as allergy, with redness/flushing. Cancel cipro due to possible allergy  And can start fosfomycin if hes having symptoms of UTI.

## 2020-09-21 ENCOUNTER — TELEPHONE (OUTPATIENT)
Dept: FAMILY MEDICINE CLINIC | Age: 85
End: 2020-09-21

## 2020-09-21 NOTE — TELEPHONE ENCOUNTER
Fax received from [facility] asking for order to go to [address] or privacy bag for felipe catheter.   See attached scan  Printed misc nursing order to fax  Placed on ES desk

## 2020-09-22 ENCOUNTER — TELEPHONE (OUTPATIENT)
Dept: FAMILY MEDICINE CLINIC | Age: 85
End: 2020-09-22

## 2020-09-22 NOTE — TELEPHONE ENCOUNTER
Spoke with Ayesha Haque for [facility] who states the patient started home health today for physical therapy.  Please advise with any questions or [id number]

## 2020-09-25 RX ORDER — LEVOTHYROXINE SODIUM 175 UG/1
TABLET ORAL
Qty: 90 TABLET | Refills: 3 | Status: SHIPPED | OUTPATIENT
Start: 2020-09-25 | End: 2020-10-19

## 2020-09-25 RX ORDER — OXYBUTYNIN CHLORIDE 5 MG/1
5 TABLET ORAL EVERY 8 HOURS PRN
Qty: 90 TABLET | Refills: 0 | Status: SHIPPED | OUTPATIENT
Start: 2020-09-25 | End: 2020-09-28

## 2020-09-25 RX ORDER — IBUPROFEN 800 MG/1
800 TABLET ORAL
Qty: 270 TABLET | Refills: 1 | Status: SHIPPED | OUTPATIENT
Start: 2020-09-25 | End: 2021-03-23

## 2020-09-25 NOTE — PROGRESS NOTES
[facility]    : Koki Espino, 80 y.o.   : 1931    Pt of Dr. Chong Roach   Patient presents with    Follow-up     COPD 6 month pulmonary follow up         HPI  Darline Harris is here for follow up for ROBER and COPD. He is using nebulizer with good benefit- Brovana and Pulmicort. He has not been wearing PAP for the past few months. He does not know how to assemble and has not asked for assistance at the assisted living. He moved in to assisted living a few weeks ago. He is using O2 at 3 liters at night. He feels less SOB than in the past.     Progress History:   Since last visit any new medical issues? Yes urinary retention  New ER or hospitlal visits? Yes   Any new or changes in medicines? No  Using inhalers? Neb treatments  Are they helpful?  Yes   Past Medical hx   PMH:  Past Medical History:   Diagnosis Date    Anxiety     CAD (coronary artery disease)     leaking valve    Chronic back pain     COPD [facility] obstructive pulmonary disease) ([facility])     Detached retina     Diabetes mellitus (HCC)     NIDDM    DVT (deep venous thrombosis) ([facility])     RLE    DVT (deep venous thrombosis) ([facility] [address] 11/9/15    LLE    Glaucoma     Hyperlipidemia     Hypertension     Mass of chest     benign chest mass, Dr Precious Mejia Movement disorder     spinal stenosis    Obesity     Osteoarthritis     right ankle, right hand    Pneumonia     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    Sleep apnea     on BiPap, Dr Chen Ace Thyroid cancer [facility])     s/p thyroid resection    Urinary incontinence      SURGICAL HISTORY:  Past Surgical History:   Procedure Laterality Date   [facility] SURGERY  ,,     BRONCHOSCOPY N/A 2017    BRONCHOSCOPY AIRWAY ONLY performed by Quinn Silva MD at [address] [place] [address] EKG 12-LEAD  11/10/2015         FRACTURE SURGERY      right hand    OTHER SURGICAL HISTORY      spinal epidurals    RETINAL DETACHMENT SURGERY      SPINE SURGERY  2004    Lumbar laminectomy    THYROIDECTOMY      TURP N/A 3/17/2020    CYSTOSCOPY WITH GREENLIGHT PHOTOVAPORIZATION OF PROSTATE performed by Saint Satchel, MD at [facility] TURP N/A 3/24/2020    PLASMA BUTTON TURP WITH CLOT EVACUATION performed by Saint Satchel, MD at [address] [place] [address] Road:  Social History     Tobacco Use    Smoking status: Never Smoker    Smokeless tobacco: Never Used   Substance Use Topics    Alcohol use: Yes     Alcohol/week: 1.0 standard drinks     Types: 1 Glasses of wine per week     Comment: Glass of wine with dinner.  Drug use: No     ALLERGIES:  Allergies   Allergen Reactions    Latex Rash    Macrobid [Nitrofurantoin Derek Greenwood prefers pt not use Macrobid due to pulmonary side effects.  Levaquin [Levofloxacin] Other (See Comments)     Redness/flushing    Adhesive Tape Rash    Alphagan [Brimonidine Tartrate] Hives     Eyes red     FAMILY HISTORY:  Family History   Problem Relation Age of Onset    Other Mother         Complications of Childbirth    Other Father [de-identified]        Natural causes     CURRENT MEDICATIONS:  Current Outpatient Medications   Medication Sig Dispense Refill    ibuprofen (ADVIL;MOTRIN) 800 MG tablet Take 1 tablet by mouth 3 times daily (with meals) 270 tablet 1    oxybutynin (DITROPAN) 5 MG tablet Take 1 tablet by mouth every 8 hours as needed (bladder spasms) Indications: Bladder Spasm 90 tablet 0    levothyroxine (SYNTHROID) 175 MCG tablet TAKE 1 TABLET BY MOUTH  DAILY 90 tablet 3    fosfomycin tromethamine (MONUROL) 3 g PACK Take 1 packet by mouth every 3 days 3 each 0    blood glucose test strips (ACCU-CHEK MERYL PLUS) strip Accu-check Meryl Plus Test Strips (or brand per pt preference). Sig: test sugar BID. Dx: E11.8 200 strip 3    SOFT TOUCH LANCETS [facility] Lancet (or brand per pt preference) Use to check blood sugars 2 times daily.  Dx: E11.8 200 each 3    Alcohol Swabs (ALCOHOL PREP) 70 % PADS Alcohol pads (brand per preference). Sig: use to test sugar twice per day. Dx: E11.8 200 each 3    Blood Glucose Monitoring Suppl (ACCU-CHEK CHE PLUS) w/Device KIT Or brand per pt preference. Sig: test sugar BID. Dx: E11.8 1 kit 0    metoprolol tartrate (LOPRESSOR) 25 MG tablet Take 1 tablet by mouth 2 times daily Hold for SBP <110 or HR <60 180 tablet 3    ferrous gluconate (FERGON) 324 (38 Fe) MG tablet Take 1 tablet by mouth 2 times daily 180 tablet 3    metFORMIN (GLUCOPHAGE) 1000 MG tablet Take 1 tablet by mouth 2 times daily (with meals) 180 tablet 3    budesonide (PULMICORT) 0.5 MG/2ML nebulizer suspension USE 1 VIAL VIA NEBULIZER TWICE DAILY. RINSE MOUTH AFTER TREATMENT 360 mL 3    Multiple Vitamins-Minerals (PRESERVISION AREDS 2) CAPS Take 1 capsule by mouth daily 90 capsule 3    lisinopril (PRINIVIL;ZESTRIL) 2.5 MG tablet Take 1 tablet by mouth daily 90 tablet 3    potassium chloride (KLOR-CON M) 10 MEQ extended release tablet TAKE 1 TABLETS BY MOUTH TWICE DAILY 180 tablet 3    docusate (COLACE, DULCOLAX) 100 MG CAPS Take 100 mg by mouth 2 times daily 180 capsule 3    doxazosin (CARDURA) 8 MG tablet Take 1 tablet by mouth nightly 90 tablet 3    furosemide (LASIX) 40 MG tablet Take 1 tablet by mouth 2 times daily 180 tablet 3    vitamin D3 (CHOLECALCIFEROL) 10 MCG (400 UNIT) TABS tablet Take 1 tablet by mouth daily 400units 90 tablet 3    HYDROcodone-acetaminophen (NORCO) 5-325 MG per tablet Take 1 tablet by mouth every 6 hours as needed for Pain.       polyethylene glycol (MIRALAX) 17 g packet Take 17 g by mouth daily as needed for Constipation      Arformoterol Tartrate (BROVANA IN) Inhale into the lungs 1 dose inhale orally two times per day      tiotropium (SPIRIVA) 18 MCG inhalation capsule Inhale 18 mcg into the lungs daily      acetaminophen (TYLENOL) 325 MG tablet Take 650 mg by mouth every 4 hours as needed for Pain      lidocaine (LMX) 4 % cream Apply topically as needed for Pain Apply topically as needed.  albuterol sulfate  (90 Base) MCG/ACT inhaler Inhale 2 puffs into the lungs every 6 hours as needed for Wheezing 1 Inhaler 3    LANTUS SOLOSTAR 100 UNIT/ML injection pen INJECT SUBCUTANEOUSLY 18  UNITS NIGHTLY 30 mL 3    OXYGEN Inhale 3 L into the lungs nightly Indications: Difficulty Breathing, Oxygen Therapy And prn through cpap       No current facility-administered medications for this visit. [place] ROS   Review of Systems   Constitutional: Negative for activity change, appetite change, chills and fever. HENT: Negative for congestion and postnasal drip. Eyes: Negative. Respiratory: Negative for cough, chest tightness, shortness of breath, wheezing and stridor. Cardiovascular: Negative for chest pain and leg swelling. Gastrointestinal: Negative for diarrhea and nausea. Endocrine: Negative. Genitourinary: Positive for difficulty urinating. Musculoskeletal: Negative. Negative for arthralgias and back pain. Skin: Negative. Allergic/Immunologic: Negative. Neurological: Negative. Negative for dizziness and light-headedness. Psychiatric/Behavioral: Negative. All other systems reviewed and are negative. Physical exam   /64 (Site: Right Upper Arm, Position: Sitting)   Pulse 67   Temp 97.5 °F (36.4 °C)   Ht 5' 7\" (1.702 m)   Wt 250 lb 3.2 oz (113.5 kg)   SpO2 95% Comment: on RA  BMI 39.19 kg/m²    Wt Readings from Last 3 Encounters:   09/28/20 250 lb 3.2 oz (113.5 kg)   08/18/20 250 lb (113.4 kg)   08/10/20 238 lb 9.6 oz (108.2 kg)       Physical Exam  Constitutional:       Appearance: He is well-developed. HENT:      Head: Normocephalic and atraumatic. Right Ear: External ear normal.      Left Ear: External ear normal.   Eyes:      Conjunctiva/sclera: Conjunctivae normal.      Pupils: Pupils are equal, round, and reactive to light. Neck:      Musculoskeletal: Normal range of motion and neck supple. Cardiovascular:      Rate and Rhythm: Normal rate and regular rhythm. Heart sounds: Normal heart sounds. Pulmonary:      Effort: Pulmonary effort is normal.      Breath sounds: Normal breath sounds. Abdominal:      General: There is distension. Musculoskeletal: Normal range of motion. Skin:     General: Skin is warm and dry. Neurological:      Mental Status: He is alert and oriented to person, place, and time. Psychiatric:         Behavior: Behavior normal.         Thought Content: Thought content normal.         Judgment: Judgment normal.          Test results     Results for orders placed or performed during the hospital encounter of 08/18/20   Culture, Reflexed, Urine    Specimen: Urine, clean catch   Result Value Ref Range    Organism Escherichia coli (A)     Urine Culture Reflex       [place] count: >100,000 CFU/mL This isolate is a probable ESBL . ID consultation maybe helpful in some clinical circumstances. CONTACT isolationrequired.        Susceptibility    Escherichia coli - BACTERIAL SUSCEPTIBILITY PANEL BY AKHIL     amoxicillin-clavulanate 16 Resistant mcg/mL     piperacillin-tazobactam <=4 Resistant mcg/mL     ceFAZolin >=64 Resistant mcg/mL     cefOXitin 16 Resistant mcg/mL     cefTAZidime 16 Resistant mcg/mL     ampicillin >=32 Resistant mcg/mL     cefTRIAXone >=64 Resistant mcg/mL     trimethoprim-sulfamethoxazole >=320 Resistant mcg/mL     cefepime 2 Resistant mcg/mL     gentamicin <=1 Sensitive mcg/mL     ciprofloxacin <=0.25 Sensitive mcg/mL     tetracycline >=16 Resistant mcg/mL     nitrofurantoin <=16 Sensitive mcg/mL   Urine with Reflexed Micro   Result Value Ref Range    Glucose, Ur NEGATIVE NEGATIVE mg/dl    Bilirubin Urine NEGATIVE NEGATIVE    Ketones, Urine TRACE (A) NEGATIVE    Specific Gravity, Urine 1.023 1.002 - 1.03    Blood, Urine LARGE (A) NEGATIVE    pH, UA 6.5 5.0 - 9.0    Protein,  (A) NEGATIVE    Urobilinogen, Urine 1.0 0.0 - 1.0 eu/dl    Nitrite, Urine POSITIVE (A) NEGATIVE    Leukocyte Esterase, Urine LARGE (A) NEGATIVE    Color, UA YELLOW STRAW-YELL    Character, Urine CLOUDY (A) CLEAR-SL C    RBC, UA > 100 0-2/hpf /hpf    WBC, UA > 200 0-4/hpf /hpf    Epithelial Cells, UA NONE SEEN 3-5/hpf /hpf    Bacteria, UA MANY FEW/NONE S /hpf    Casts UA 8-15 HYALINE NONE SEEN /lpf    Crystals, UA NONE SEEN NONE SEEN    Renal Epithelial, UA NONE SEEN NONE SEEN    Yeast, UA NONE SEEN NONE SEEN    CASTS 2 NONE SEEN NONE SEEN /lpf    MISCELLANEOUS 2 NONE SEEN           Assessment      Diagnosis Orders   1. Stage 3 severe COPD by GOLD classification ([facility] [address]     2. Obesity (BMI 30-39.9)     3. ROBER on CPAP     4. Obesity hypoventilation syndrome ([facility] [address]           Plan   Will need to get back on PAP- his ROBER is uncontrolled.   His SHAH is improved with increased activity and weight loss  Continue neb treatments  O2 at 3 liters bled into PAP    Will see Amor Lamar in: 4 months    Nenita Lee PA-C, [place]  9/28/2020

## 2020-09-25 NOTE — TELEPHONE ENCOUNTER
Fax received from [facility] requesting refill on levothyroxine, oxybutynin, ibuprofen. Needs sent to [facility]  Last seen 7/6/20.  Next appt 10/19/20  Orders pending

## 2020-09-28 ENCOUNTER — OFFICE VISIT (OUTPATIENT)
Dept: PULMONOLOGY | Age: 85
End: 2020-09-28
Payer: MEDICARE

## 2020-09-28 VITALS
SYSTOLIC BLOOD PRESSURE: 130 MMHG | WEIGHT: 250.2 LBS | BODY MASS INDEX: 39.27 KG/M2 | DIASTOLIC BLOOD PRESSURE: 64 MMHG | OXYGEN SATURATION: 95 % | TEMPERATURE: 97.5 F | HEART RATE: 67 BPM | HEIGHT: 67 IN

## 2020-09-28 PROCEDURE — G8417 CALC BMI ABV UP PARAM F/U: HCPCS | Performed by: PHYSICIAN ASSISTANT

## 2020-09-28 PROCEDURE — 99214 OFFICE O/P EST MOD 30 MIN: CPT | Performed by: PHYSICIAN ASSISTANT

## 2020-09-28 PROCEDURE — G8427 DOCREV CUR MEDS BY ELIG CLIN: HCPCS | Performed by: PHYSICIAN ASSISTANT

## 2020-09-28 PROCEDURE — 4040F PNEUMOC VAC/ADMIN/RCVD: CPT | Performed by: PHYSICIAN ASSISTANT

## 2020-09-28 PROCEDURE — 1123F ACP DISCUSS/DSCN MKR DOCD: CPT | Performed by: PHYSICIAN ASSISTANT

## 2020-09-28 PROCEDURE — G8926 SPIRO NO PERF OR DOC: HCPCS | Performed by: PHYSICIAN ASSISTANT

## 2020-09-28 PROCEDURE — 3023F SPIROM DOC REV: CPT | Performed by: PHYSICIAN ASSISTANT

## 2020-09-28 PROCEDURE — 1036F TOBACCO NON-USER: CPT | Performed by: PHYSICIAN ASSISTANT

## 2020-09-28 RX ORDER — OXYBUTYNIN CHLORIDE 5 MG/1
TABLET ORAL
Qty: 270 TABLET | Refills: 0 | Status: SHIPPED | OUTPATIENT
Start: 2020-09-28 | End: 2021-01-26 | Stop reason: SDUPTHER

## 2020-09-28 ASSESSMENT — ENCOUNTER SYMPTOMS
DIARRHEA: 0
STRIDOR: 0
WHEEZING: 0
SHORTNESS OF BREATH: 0
CHEST TIGHTNESS: 0
NAUSEA: 0
ALLERGIC/IMMUNOLOGIC NEGATIVE: 1
EYES NEGATIVE: 1
COUGH: 0
BACK PAIN: 0

## 2020-09-28 NOTE — TELEPHONE ENCOUNTER
[email or web address] 5 mg q8 hours prn. Last seen 7/6/20, next appt 10/19/20. Rx was just sent on 9/25/20 for #90. Pharmacy is requesting 90 day supply. (#270)  Ok to update? Order pended and set to escribe.

## 2020-09-28 NOTE — PROGRESS NOTES
[facility] Facility-Based Therapy for COPD   INDIVIDUAL TREATMENT PLAN (ITP)     Name: Cole Diana   :  1931  Acct Number: [[id number]]   AGE: 80 y.o. Diagnosis:  Severe COPD, which is GOLD Stage 3                                               PFT:  Post Bronchodilator FEV1/FVC: 48  FEV1: 41    Patient Goals:  (x) Breathe better  (x) Increase my endurance (x) Have more energy  (x) Rely less on others  (x) Ease ADLs  (x) Understand and use meds correctly  (x) Control cough  (x) Make fewer visits to hospital  () Quit smoking  (x) Feel less anxious / have more confidence    Glossary:  SC=Pulmonary Rehab  PF=Physical Fitness TM=Treadmill  AD=Schwinn Airdyne  UBE=UpperBody Ergometer  RT=Resistance Training  PLB=Pursed Lip Breathing    NOTE 3/19/2020: Per Administration, Pulmonary Rehab is postponed until further notice due to COVID-19. We will contact patient when we resume rehab sessions. Spoke with patient and he understood. INDIVIDUAL TREATMENT PLAN    INITIAL ASSESSMENT    Day #1 INDIVIDUAL TREATMENT PLAN    PLAN      Day #2-30 INDIVIDUAL TREATMENT PLAN    RE ASSESSMENT    Day #31-60 INDIVIDUAL TREATMENT PLAN    RE ASSESSMENT    Day #61-90 INDIVIDUAL TREATMENT PLAN    RE ASSESSMENT    Day # INDIVIDUAL TREATMENT PLAN    DISCHARGE      Last Day        Date: 19     Date: 20   Date: 20  Pt did not attend this session   Date: 3/10/2020  Patient not in attendance for reassessment.    Date:    Date: 2020   EXERCISE   INITIAL ASSESSMENT      Prescribed Oxygen Use  None      Oxygen Titration  (x) Assess for desaturation            Current Home Exercise:  None   EXERCISE  PLAN        Current Oxygen Use  NONE      Oxygen Titration  (x) Maintaining >87% Spo2  () Not maintaining -  L/min needed on       Current Home Exercise:  None   EXERCISE  RE ASSESSMENT      Current Oxygen Use  RA      Oxygen Titration  (x) Maintaining >87% Spo2  () Not maintaining -  L/min needed on       Current Home Exercise:  unkn EXERCISE  RE ASSESSMENT      Current Oxygen Use  None, room air      Oxygen Titration  (x) Maintaining >87% Spo2  () Not maintaining -  L/min needed on       Current Home Exercise:  Unknown, patient not in attendance. EXERCISE  RE ASSESSMENT      Current Oxygen Use  Activity:  L/min  () Continuous  () Breath Actuated      Oxygen Titration  () Maintaining >87% Spo2  () Not maintaining -  L/min needed on       Current Home Exercise:  Frequency:  x/wk  Intensity:  /10  Duration:  min  Mode:   *Patient being discharged from program due to ongoing health issues. Patient was admitted to hospital prior to [facility] program stoppage and currently in assisted living. 6 Minute Walk Test (6MWT):  O2 used: none   410 ft walked = 1.5 METs  SpO2: 90-96%  HR: 72-83   RPD: 3  RPE: 5  Number of Breaks: 1  Avg time for break:  1min and 41 secs  Assist device used: none                  Exercise Prescription    THR: 66 - 105 bpm    Frequency:  2-3x/wk in [facility], 1-3x/wk home activity    Intensity:  METs (from 6MWT)      Time:  15-30 total minutes up to 55 minutes max    Type:  Aerobic (TM, AD, UBE, NuStep), 6 ST, RT 1-10# 8-15 reps    Progression:  Increase 30s - 2 min/mode for Aerobic activity, and increase Intensity 2-5% each week if RPE <5, RPD <5, SpO2 >87% and pt is within [facility] above. Exercise Prescription    (x) Pt exercising within THR    Frequency:  2-3x/wk in [facility], 0x/wk home activity    Intensity:  3-5 on Fady Dyspnea/ Fatigue scale    Time:  15-30 total minutes up to 55 minutes max    Type:  Aerobic (TM, AD, UBE, NuStep), 6 ST, RT 1-10# 8-15 reps    Progression:  Increase 30s - 2 min/mode for Aerobic activity, and increase Intensity 2-5% each week if RPE <5, RPD <5, SpO2 >87% and pt is within [facility] above.    Exercise Prescription    (x) Pt exercising within THR    Frequency:  2-3x/wk in [facility], 2-5x/wk home activity    Intensity:  3-5 on Fady Dyspnea/ Fatigue scale    Time: 30-45 total minutes up to 55 minutes max    Type:  Aerobic (TM, AD, UBE, NuStep), 6 ST, RT 1-10# 8-15 reps    Progression:  Increase 30s - 2 min/mode for Aerobic activity, and increase Intensity 2-5% each week if RPE <5, RPD <5, SpO2 >87% and pt is within [facility] above. Exercise Prescription    (x) Pt exercising within THR    Frequency:  2-3x/wk in [place], 2-5x/wk home activity    Intensity:  3-5 on Fady Dyspnea/ Fatigue scale    Time:  30-45 total minutes up to 55 minutes max    Type:  Aerobic (TM, AD, UBE, NuStep), 6 ST, RT 1-10# 8-15 reps    Progression:  Increase 30s - 2 min/mode for Aerobic activity up to 35 minutes, and increase Intensity 2-5% each week if RPE <5, RPD <5, SpO2 >87% and pt is within [facility] above. Exercise Prescription    () Pt exercising within THR    Frequency:  2-3x/wk in [place], 2-5x/wk home activity    Intensity:  3-5 on Fady Dyspnea/ Fatigue scale    Time:  30-45 total minutes up to 55 minutes max    Type:  Aerobic (TM, AD, UBE, NuStep), 6 ST, RT 1-10# 8-15 reps    Progression:  Increase 30s - 2 min/mode for Aerobic activity up to 35 minutes, and increase Intensity 2-5% each week if RPE <5, RPD <5, SpO2 >87% and pt is within [facility] above.     Initial Levels:  TM:0.6mph,5min  AD:0.4level,5min  NuStep: 6 W, 5 min  UBE:0.2level, 5 min  RT 2#, 8-15 reps   Current Levels:  NuStep: 36 W, 10 min x2  UBE: 20 becerra level, 5 min  RT  2#, 8-15 reps   Current Levels:    NuStep:38  W, 10 min x 2   UBE: 30 Becerra,  5 min  RT 3 #, 8-15 reps Current Levels:  NuStep:38  W, 10 min x 2   UBE: 26 Becerra,  5 min  RT 3 #, 8-15 reps Current Levels:  TM:  mph,  min  AD:  level,  min  NuStep:  W,  min  UBE:  level, 5 min  RT  #, 8-15 reps      Physical Limitations  Initial Assessment    (x) Weakness  () Inflexible  () Poor posture  () Gait abnormality  (x) Balance  () Orthopedic Issues   Physical Limitation  Plan      (x) Strengthen through squats and RT  (x) Appropriate stretches shown  (x)Verbal cues and reminders for posture and gait given  (x) Proper modalities chosen for ortho issues  (x) Give Home activity / stretches/ Warmup/ [place] info   Physical Limitations Reassessment      (x) Pt progressing  () Pt not progressing Physical Limitations Reassessment      (x) Pt progressing  () Pt not progressing     Physical Limitations Reassessment      () Pt progressing  () Pt not progressing        Exercise Goals   Initial Assessment:    (x) Start to, and commit to exercising regularly     (x) Learn about home activity recommendations        (x) Increase PF shown by increasing 6MWD   Exercise Goals   Plan      -Initiate IA 2x/week  -Encourage home exercise    -Attend Home Activity EDUCATION class      -Slowly, safely, progress in IA    Exercise Goals   Reassessment      (x) Pt is coming regularly  () Pt is sporadic    (x) Pt has had class  () Pt has not had class        (x) Pt is progressing  () Pt is not progressing Exercise Goals   Reassessment      (x) Pt is coming regularly  () Pt is sporadic    () Pt has had class  () Pt has not had class        (x) Pt is progressing  () Pt is not progressing Exercise Goals   Reassessment      () Pt is coming regularly  () Pt is sporadic    () Pt has had class  () Pt has not had class        () Pt is progressing  () Pt is not progressing      Exercise Intervention Initial Assessment      () Transportation needed        (x) EDUCATION needed          (x) Motivation needed   Exercise Intervention/ Education   Plan      () [facility] set up        (x) EDUCATION:  Home activity class to be given        (x) Positive encouragement, support and motivation to be given   Exercise Intervention/ Education Reassessment      (x) Pt is attending  () Pt is not attending      () Pt has had class  () Pt has not had class      (x) Pt is progressing  () Pt not progressing Exercise Intervention/ Education Reassessment      (x) Pt is attending  () Pt is not attending      (x) Pt has had class-2/4/20  () Pt has not had class       (x) Pt has had class  () Pt has not had class       () Pt progressing  () Pt not progressing     Nutrition Goals  Reassessment      () DM: Pt has had class  () DM: Pt has not had class           () Pt has had class  () Pt has not had class       () Pt has had class  () Pt has not had class       () Pt progressing  () Pt not progressing        Nutrition Intervention/ Education   Initial Assessment    (x) Pt needs Nutrition education class        () Pt states does not need class       Nutrition Intervention/ Education  Plan      (x) Pt will have Nutrition class          (x) Encourage pt to continue to learn and incorporate self knowledge in to diet choices   Nutrition Intervention/ Education  Reassessment      (x) Pt has had class  () Pt has not had class      () Pt had questions  (x) Pt denies having questions Nutrition Intervention/ Education  Reassessment      (x) Pt has had class  () Pt has not had class      () Pt had questions  (x) Pt denies having questions Nutrition Intervention/ Education  Reassessment      () Pt has had class  () Pt has not had class      () Pt had questions  () Pt denies having questions            PSYCHO SOCIAL INITIAL ASSESSMENT      Depression:  () Self Reported  () In History  () S/Sx noted  (x) Denies  () On Medication  () Follows physician      (x) Give PHQ-9 Depression screening tool                  Dyspnea:  (x) Give pt [employer] SOB survey      (x) Pt gets SOB during activity and ADLs.           (x) Pt has support from home for the program        () Pt does not have support for the program   PSYCHO SOCIAL  PLAN      (x) Attend Stress/ Depression/ Anxiety Class                Pre Rehab PHQ-9   Score: 4  1-4 Normal  5-9 Mild  10-14 Mod  15-19 Mod-Sev  >19 Severe        Pre Rehab UC[employer] SOB Score: 26      (x) Attend classes and attend rehab to increase strength and endurance      -Attend Psychosocial class          () Speak with the patient/ family about ability to commit to the program with undue stress/ anxiety   PSYCHO SOCIAL  RE ASSESSMENT    () Pt scored >10 on PHQ-9.   Spoke with pt privately about issues and *             () Pt recommended to contact physician for assistance                See below for ADLs        (x) Pt has had class  () Pt has not had class        Re assessment of support:  Good, pt has been attending PSYCHO SOCIAL  RE ASSESSMENT    (x) Pt is coping well  () Pt needs more assistance-              () Pt recommended to contact physician for assistance                See below          () Pt has had class  (x) Pt has not had           Re assessment of support:  Good, pt has been attending PSYCHO SOCIAL  RE ASSESSMENT    () Pt is coping well  () Pt needs more assistance-              () Pt recommended to contact physician for assistance                See below          () Pt has had class  () Pt has not had class        Re assessment of support:  Good, pt has been attending      Psychosocial Goals   Initial Assessment    (x) Maintain/ Decrease Depression Levels      (x) Maintain/ Decrease Anxiety Levels        (x) Learn about Emotional Health          (x) Increase QoL   (CAT tool)          (x) Give the CAT tool for HR QoL      Pre Rehab  CAT score:  20/ 40       Psychosocial Goals  Plan      (x) Attend Stress/ Anxiety/ Dyspnea - Panic control class      (x) Attend Stress/ Anxiety/ Dyspnea - Panic control class      (x) Attend Stress/ Anxiety/ Dyspnea - Panic control class      (x) Initiate WI, get stronger, more endurance and more confidence              30 day CAT score:  14/ 40     Psychosocial Goals  Reassessment      (x) Pt has had class  () Pt has not had class      (x) Pt has had class  () Pt has not had class      (x) Pt has had class  () Pt has not had class      (x) Pt is progressing  () Pt is not progressing              60 day CAT score:  ?/ 40 Psychosocial Goals  Reassessment      () Pt has had class  (x) Pt has not had class      () Pt has had class  (x) Pt has not had class      () Pt has had class  (x) Pt has not had class      (x) Pt is progressing  () Pt is not progressing              90 day CAT score:  Unknown, patient not in attendance Psychosocial Goals  Reassessment      () Pt has had class  () Pt has not had class      () Pt has had class  () Pt has not had class      () Pt has had class  () Pt has not had class      () Pt is progressing  () Pt is not progressing              120 day CAT score:  / 40              Psychosocial Intervention/ Education   Initial Assessment    () Pt is being treated for depression/ anxiety        (x) Pt would benefit from [facility]s Psychosocial Issues Class (Stress, Depression, Anxiety, and Intimacy   Psychosocial  Intervention/ Education  Plan      (x) Pt to contact physician if any severe changes occur in mood        (x) Pt will attend [facility]s class   Psychosocial Intervention/ Education Reassessment      (x) Pt is coping well  () Pt is not coping well         (x) Pt has had class  () Pt has not had class Psychosocial Intervention/ Education Reassessment      (x) Pt is coping well  () Pt is not coping well         () Pt has had class  (x) Pt has not had class Psychosocial Intervention/ Education Reassessment      () Pt is coping well  () Pt is not coping well         () Pt has had class  () Pt has not had class      Pain   Initial Assessment    () N/A  Location: both legs  Duration:all the time   Intensity: 5 /10  Type: constant ache   Pain   Plan      () N/A    (x) Pts pain is under control  () Pt encouraged to contact physician for pain control   Pain   Reassessment      () N/A    (x) Pts pain is under control  () Pt encouraged to contact physician for pain control Pain   Reassessment      () N/A    (x) Pts pain is under control  () Pt encouraged to contact physician for pain control Pain   Reassessment      () N/A    () Pts pain is under control  () Pt encouraged to contact physician for pain control            OXYGEN INITIAL ASSESSMENT    RESTING:  (x) RA  () O2:  L/min  () Continuous  () Breath Actuated  93% SpO2 / 20 bpm    Prescribed Oxygen Use:  No home O2 at rest or with exertion,  Does use 3LPM at night bled into home CPAP/BIPAP.      DME Company for O2:  [facility]     OXYGEN   PLAN      RESTING:  (x) Monitor needs, administer supplemental oxygen if SpO2 <88% OXYGEN   RE ASSESSMENT    RESTING:  (x) Pt SpO2 >87%  () Pt needs higher flow  () Pt needs less flow OXYGEN   RE ASSESSMENT    RESTING:  (x) Pt SpO2 >87%  () Pt needs higher flow  () Pt needs less flow OXYGEN   RE ASSESSMENT    RESTING:  (x) Pt SpO2 >87%  () Pt needs higher flow  () Pt needs less flow    Oxygen Goals   Initial Assessment    (x) Wean, Titrate down, or get off O2 if possible   Oxygen Goals   Plan      (x) Closely monitor SpO2 and HR using pulse oximetry for saturation and HR response, and titrate if appropriate   Oxygen Goals  Reassessment    (x) RA    On exertion:  () Pt maintaining FiO2  () Pt tolerating lower O2 needs  () Pt needing more O2   Oxygen Goals  Reassessment    (x) RA    On exertion:  () Pt maintaining FiO2  () Pt tolerating lower O2 needs  () Pt needing more O2 Oxygen Goals  Reassessment    () RA    On exertion:  () Pt maintaining FiO2  () Pt tolerating lower O2 needs  () Pt needing more O2        Oxygen Intervention/ Education  Initial Assessment    (x) Learn / Review about O2 use, safety and travel      () Pt does not wear or have home oxygen    Oxygen Intervention/ Education  Plan      () Attend O2 Use, safety and travel class        () Assess for SpO2 with each exercise   Oxygen Intervention/ Education  Reassessment      (x) Pt has had class  () Pt has not had class      (x) Pt is >87%  () Pt has been found to desaturate - physician notified Oxygen Intervention/ Education  Reassessment      (x) Pt has had class-1/28/20  () Pt has not had class      (x) Pt is >87%  () Pt has been found to desaturate - physician notified Oxygen Intervention/ Education  Reassessment      () Pt has had class  () Pt has not had class      () Pt is >87%  () Pt has been found to desaturate - physician notified              TOBACCO USE  INITIAL ASSESSMENT    (x) Pt currently not smoking    () Pt currently smoking        () Pt needs Tobacco Cessation counseling          Pt never smoked.    TOBACCO USE  PLAN      (x N/A      Does pt want to quit:   Does pt have a quit date:    () Tobacco Cessation counseling Education if applicable        () Encouraged to contact physician for tools/ nicotine replacement etc.   TOBACCO USE  RE ASSESSMENT    (x) N/A      Any change in Tobacco use status (x) N  ()Y      () Pt attended counseling education session            () Pt has contacted physician TOBACCO USE  RE ASSESSMENT    (x) N/A      Any change in Tobacco use status () N  ()Y      () Pt attended counseling education session            () Pt has contacted physician TOBACCO USE  RE ASSESSMENT    () N/A      Any change in Tobacco use status () N  ()Y      () Pt attended counseling education session            () Pt has contacted physician    Tobacco Use Goal  Initial Assessment      (x) Complete Cessation or Maintain Cessation of tobacco products   Tobacco Use Goal Intervention and Education Plan    (x Encourage pt to fight the urge, call quit line, use techniques learned from friends/ class    () Attend Tobacco Cessation class if needed   Tobacco Use  Reassessment          (x) Pt remains tobacco free            () Pt has had TC counseling session        () Pt still smoking Tobacco Use  Reassessment          (x) Pt remains tobacco free            () Pt has had TC counseling session        () Pt still smoking Tobacco Use  Reassessment          () Pt remains tobacco free            () Pt has had TC counseling session        () Pt still smoking            MEDICATIONS  (Oral & Inhaled)  INITIAL ASSESSMENT    Pt reports taking his meds properly 100% of the time      (x) Pt is on inhaled medications   MEDICATIONS  PLAN        -Review Rxs purpose, schedule, side effects and importance of compliance during Medication class. Also address Cpap, Vents.        MEDICATIONS  RE ASSESSMENT      Pt reports taking their meds properly  % of the time        (x) Pt has had class  () Pt has not had class MEDICATIONS  RE ASSESSMENT      Pt reports taking their meds properly  % of the time        (x) Pt has had class-1/14/20  () Pt has not had class MEDICATIONS  RE ASSESSMENT      Pt reports taking their meds properly  % of the time        () Pt has had class  () Pt has not had class    Pt has:  (x) Metered Dose Inhaler  () Dry Powder Inhaler  (x) Nebulizer   -Review correct use, timing, technique and cleaning of equipment during Medication class (x) Pt has had class  () Pt has not had class (x) Pt has had class  () Pt has not had class () Pt has had class  () Pt has not had class    Medication Goals  Initial Assessment        (x) Take meds properly 100% of the time    (x) Learn about / Review Rxs, and devices Medication Goals  Intervention & Education  Plan      -Follow Rx instructions and ask if questions    -Attend Medication Education class   Medication Goals  Re assessment          (x) Readdressed questions on any medications    (x) Pt has had class  () Pt has not had class Medication Goals  Re assessment          (x) Readdressed questions on any medications    (x) Pt has had class  () Pt has not had class Medication Goals  Re assessment          () Readdressed questions on any medications    () Pt has had class  () Pt has not had class            ADL  INITIAL ASSESSMENT      (x) Impaired ADL ability  () Need for Assist Devices  (x) Generalized weakness, low functional capacity  (x) Stairs in house               ADL  PLAN        -Initiate IL, RT, and chair squats  -Breathing retraining, PLB, and pacing      -Encourage home activity               ADL  RE ASSESSMENT      (x) Pt is progressing  () Pt is class  () Pt has not had class        (x) Improved hydration    (x) Correct hand washing techs and alcohol gel usage    (x) Sputum assessment    (x) Ability to recognize exacerbation and when to call MD        (x) Cleaning of respiratory equipment EXACERBAT'N PREVENTION & MANAGEMENT  RE ASSESSMENT      (x) Pt has had class-1/7/20  () Pt has not had class        (x) Improved hydration    (x) Correct hand washing techs and alcohol gel usage    (x) Sputum assessment    (x) Ability to recognize exacerbation and when to call MD        () Cleaning of respiratory equipment EXACERBAT'N PREVENTION & MANAGEMENT  RE ASSESSMENT      () Pt has had class  () Pt has not had class        () Improved hydration    () Correct hand washing techs and alcohol gel usage    () Sputum assessment    () Ability to recognize exacerbation and when to call MD        () Cleaning of respiratory equipment    Exacerbation Prevention & Management Goals  Initial Assessment      (x) Learn ways to stay healthy and not get admitted          (x) Increase knowledge of Lungs, Breathing, Exercise, Nutrition, Mood and controlling anxiety, and stopping the Dyspnea Cycle by attending classes   Exacerbation Prevention & Management Goals Intervention/ Education  Plan    -Attend class and demonstrate disease self management strategies      -Attend all appropriate classes                   Exacerbation Prevention & Management Goals  Reassessment        (x) Pt has had class  () Pt has not had class        (x) Pt has had all classes  () Pt has had some classes  () Pt has had little/ not staying for education Exacerbation Prevention & Management Goals  Reassessment        (x) Pt has had class  () Pt has not had class        (x) Pt has had all classes  () Pt has had some classes  () Pt has had little/ not staying for education Exacerbation Prevention & Management Goals  Reassessment        () Pt has had class  () Pt has not had class        () Pt has had all

## 2020-09-29 RX ORDER — INSULIN GLARGINE 100 [IU]/ML
INJECTION, SOLUTION SUBCUTANEOUS
Qty: 30 ML | Refills: 3 | Status: SHIPPED | OUTPATIENT
Start: 2020-09-29

## 2020-09-29 NOTE — TELEPHONE ENCOUNTER
Fax received from [facility] for refill on:  Spiriva 18mcg daily  Oxybutynin 5mg 1 Q8hrs prn spasms  lantus flex pen 18units QHS    [address]  Oxybutynin was done yesterday.  Order not pended

## 2020-10-02 ENCOUNTER — OFFICE VISIT (OUTPATIENT)
Dept: UROLOGY | Age: 85
End: 2020-10-02
Payer: MEDICARE

## 2020-10-02 VITALS — WEIGHT: 253 LBS | HEIGHT: 67 IN | BODY MASS INDEX: 39.71 KG/M2 | TEMPERATURE: 97 F

## 2020-10-02 PROCEDURE — 1036F TOBACCO NON-USER: CPT | Performed by: UROLOGY

## 2020-10-02 PROCEDURE — G8417 CALC BMI ABV UP PARAM F/U: HCPCS | Performed by: UROLOGY

## 2020-10-02 PROCEDURE — 4040F PNEUMOC VAC/ADMIN/RCVD: CPT | Performed by: UROLOGY

## 2020-10-02 PROCEDURE — 99214 OFFICE O/P EST MOD 30 MIN: CPT | Performed by: UROLOGY

## 2020-10-02 PROCEDURE — G8484 FLU IMMUNIZE NO ADMIN: HCPCS | Performed by: UROLOGY

## 2020-10-02 PROCEDURE — 51702 INSERT TEMP BLADDER CATH: CPT | Performed by: UROLOGY

## 2020-10-02 PROCEDURE — 1123F ACP DISCUSS/DSCN MKR DOCD: CPT | Performed by: UROLOGY

## 2020-10-02 PROCEDURE — G8427 DOCREV CUR MEDS BY ELIG CLIN: HCPCS | Performed by: UROLOGY

## 2020-10-02 RX ORDER — POTASSIUM CHLORIDE 750 MG/1
TABLET, FILM COATED, EXTENDED RELEASE ORAL
COMMUNITY
Start: 2020-07-11 | End: 2020-10-19

## 2020-10-02 NOTE — PROGRESS NOTES
Patient has given me verbal consent to perform catheter change YES    Does patient have latex allergy? YES  Does patient have shellfish or betadine allergy? NO      Following Dr. Gato Stevens plan of care. 18 Fr Catheter changed without difficulty. Once balloon was deflated, removed catheter without difficulty. Cleansed head of penis with betadine swab. 18 Fr silicone felipe was inserted without difficulty. Catheter was flushed with 100cc water insuring return and inflated balloon with 10 ml of water. Felipe Catheter was hooked up to leg bag with straps. Patient instructed on catheter care including draining catheter bag and keeping catheter bag above the knee to prevent pulling on catheter causing blood.

## 2020-10-02 NOTE — PROGRESS NOTES
Cindy Lora MD        [address] [place] [id number]  Dept: [phone]  Dept Fax: [phone]: [address] [address] Urology Office Note -     Patient:  Torey Lee  YOB: 1931    The patient is a 80 y.o. male who presents today for evaluation of the following problems: urinary retention  Chief Complaint   Patient presents with    Urinary Retention    Benign Prostatic Hypertrophy    3 Month Follow-Up        HISTORY OF PRESENT ILLNESS:     Urinary retention   Onset was  Months ago  Overall, the problem(s) are unchanged  Severity is described as moderate. Associated Symptoms: No dysuria, no gross hematuria. Current Pain Severity: 3    Has chronic felipe catheter  BPH/neurogenic bladder  Here with cath that has not been changed for > 3 months        Secondary Diagnosis:    Right epididymitis- resolved    Recurrent uti- has been in ED several times. Records reviewed. Requested/reviewed records from Jasbir Moser MD office and/or outside physician/EMR    (Patient's old records have been requested, reviewed and pertinent findings summarized in today's note.)    Procedures Today: N/A    Last several PSA's:  Lab Results   Component Value Date    PSA 3.76 10/27/2010       Last total testosterone:  No results found for: TESTOSTERONE    Urinalysis today:  No results found for this visit on 10/02/20. Last BUN and creatinine:  Lab Results   Component Value Date    BUN 10 07/20/2020     Lab Results   Component Value Date    CREATININE 0.9 07/20/2020       Imaging Reviewed during this Office Visit:   Cindy Lora MD independently reviewed the images and verified the radiology reports from:    Us Scrotum And Testicles    Result Date: 7/7/2020  PROCEDURE: US SCROTUM AND TESTICLES CLINICAL INFORMATION: scrotal pain . COMPARISON: No prior study.  TECHNIQUE: Grayscale and color Doppler sonographic imaging of the scrotum was performed in the longitudinal and transverse planes. FINDINGS: Findings On the right, the testicle has normal echogenicity. There is no testicular mass. There is normal color flow and Doppler waveforms. There is a small hydrocele. The epididymis is normal.Right Testicle- 4.3 x 3.0 x 2.4 cm Right Epididymis- 0.7 x 0.7 x 0.9 cm there is a suspected small appendix testes measuring 1.8 x 1.8 x 2.7 mm along the margin of the right testicle. On the left, the testicle has normal echogenicity. There is no testicular mass. There is normal color flow and Doppler waveforms. There is small hydrocele present. The epididymis is normal.Left Testicle - 5.2 x 3.2 x 2.5 cm Left Epididymis - 0.6 x 0.7 x 0.8 cm. There is suspected appendix testes visualized along the margin of the left testicle the detail of which is limited. 1. No evidence of testicular torsion or epididymal pathology. 2. Small hydroceles left greater than right. 3. Suspected visualization of appendix testes structures. **This report has been created using voice recognition software. It may contain minor errors which are inherent in voice recognition technology. ** Final report electronically signed by Dr. Serena Addison on 7/7/2020 7:51 PM      PAST MEDICAL, FAMILY AND SOCIAL HISTORY:  Past Medical History:   Diagnosis Date    Anxiety     CAD (coronary artery disease)     leaking valve    Chronic back pain     COPD (chronic obstructive pulmonary disease) ([address]     Detached retina     Diabetes mellitus (HCC)     NIDDM    DVT [facility]deep venous thrombosis) ([facility])     RLE    DVT (deep venous thrombosis) ([address] 11/9/15    LLE    Glaucoma     Hyperlipidemia     Hypertension     Mass of chest     benign chest mass, Dr Carl Marker    Movement disorder     spinal stenosis    Obesity     Osteoarthritis     right ankle, right hand    Pneumonia     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)     s/p thyroid resection    Urinary incontinence      Past Surgical History:   Procedure Laterality Date   [facility] SURGERY  4085,2425, 2014    BRONCHOSCOPY N/A 8/29/2017    BRONCHOSCOPY AIRWAY ONLY performed by Cole Ng MD at [address] [place] [address] EKG 12-LEAD  11/10/2015         FRACTURE SURGERY      right hand    OTHER SURGICAL HISTORY      spinal epidurals    RETINAL DETACHMENT SURGERY      SPINE SURGERY  2004    Lumbar laminectomy    THYROIDECTOMY      TURP N/A 3/17/2020    CYSTOSCOPY WITH GREENLIGHT PHOTOVAPORIZATION OF PROSTATE performed by Shaw Davis MD at [facility] TURP N/A 3/24/2020    PLASMA BUTTON TURP WITH CLOT EVACUATION performed by Shaw Davis MD at [facility] History   Problem Relation Age of Onset    Other Mother         Complications of Childbirth    Other Father [de-identified]        Natural causes     Outpatient Medications Marked as Taking for the 10/2/20 encounter (Office Visit) with Shaw Davis MD   Medication Sig Dispense Refill    insulin glargine (LANTUS SOLOSTAR) 100 UNIT/ML injection pen INJECT SUBCUTANEOUSLY 18  UNITS NIGHTLY 30 mL 3    tiotropium (SPIRIVA) 18 MCG inhalation capsule Inhale 1 capsule into the lungs daily 90 capsule 3    oxybutynin (DITROPAN) 5 MG tablet TAKE 1 TABLET BY MOUTH EVERY 8 HOURS AS NEEDED FOR BLADDER SPASMS OR BLADDER SPASM 270 tablet 0    ibuprofen (ADVIL;MOTRIN) 800 MG tablet Take 1 tablet by mouth 3 times daily (with meals) 270 tablet 1    levothyroxine (SYNTHROID) 175 MCG tablet TAKE 1 TABLET BY MOUTH  DAILY 90 tablet 3    fosfomycin tromethamine (MONUROL) 3 g PACK Take 1 packet by mouth every 3 days 3 each 0    blood glucose test strips (ACCU-CHEK CHE PLUS) strip Accu-check Che Plus Test Strips (or brand per pt preference). Sig: test sugar BID. Dx: E11.8 200 strip 3    SOFT TOUCH LANCETS [facility] [place] [facility] Lancet (or brand per pt preference) Use to check blood sugars 2 times daily.  Dx: E11.8 200 each 3    Alcohol Swabs (ALCOHOL PREP) 70 % PADS Alcohol pads (brand per preference). Sig: use to test sugar twice per day. Dx: E11.8 200 each 3    Blood Glucose Monitoring Suppl (ACCU-CHEK CHE PLUS) w/Device KIT Or brand per pt preference. Sig: test sugar BID. Dx: E11.8 1 kit 0    metoprolol tartrate (LOPRESSOR) 25 MG tablet Take 1 tablet by mouth 2 times daily Hold for SBP <110 or HR <60 180 tablet 3    ferrous gluconate (FERGON) 324 (38 Fe) MG tablet Take 1 tablet by mouth 2 times daily 180 tablet 3    metFORMIN (GLUCOPHAGE) 1000 MG tablet Take 1 tablet by mouth 2 times daily (with meals) 180 tablet 3    budesonide (PULMICORT) 0.5 MG/2ML nebulizer suspension USE 1 VIAL VIA NEBULIZER TWICE DAILY. RINSE MOUTH AFTER TREATMENT 360 mL 3    Multiple Vitamins-Minerals (PRESERVISION AREDS 2) CAPS Take 1 capsule by mouth daily 90 capsule 3    lisinopril (PRINIVIL;ZESTRIL) 2.5 MG tablet Take 1 tablet by mouth daily 90 tablet 3    potassium chloride (KLOR-CON M) 10 MEQ extended release tablet TAKE 1 TABLETS BY MOUTH TWICE DAILY 180 tablet 3    docusate (COLACE, DULCOLAX) 100 MG CAPS Take 100 mg by mouth 2 times daily 180 capsule 3    doxazosin (CARDURA) 8 MG tablet Take 1 tablet by mouth nightly 90 tablet 3    furosemide (LASIX) 40 MG tablet Take 1 tablet by mouth 2 times daily 180 tablet 3    vitamin D3 (CHOLECALCIFEROL) 10 MCG (400 UNIT) TABS tablet Take 1 tablet by mouth daily 400units 90 tablet 3    HYDROcodone-acetaminophen (NORCO) 5-325 MG per tablet Take 1 tablet by mouth every 6 hours as needed for Pain.  polyethylene glycol (MIRALAX) 17 g packet Take 17 g by mouth daily as needed for Constipation      Arformoterol Tartrate (BROVANA IN) Inhale into the lungs 1 dose inhale orally two times per day      acetaminophen (TYLENOL) 325 MG tablet Take 650 mg by mouth every 4 hours as needed for Pain      lidocaine (LMX) 4 % cream Apply topically as needed for Pain Apply topically as needed.       albuterol sulfate  (90 Base) MCG/ACT inhaler Inhale 2 puffs into the lungs every 6 hours as needed for Wheezing 1 Inhaler 3    OXYGEN Inhale 3 L into the lungs nightly Indications: Difficulty Breathing, Oxygen Therapy And prn through cpap         Latex; Macrobid [nitrofurantoin monohyd macro]; Levaquin [levofloxacin]; Adhesive tape; and Alphagan [brimonidine tartrate]  Social History     Tobacco Use   Smoking Status Never Smoker   Smokeless Tobacco Never Used      (If patient a smoker, smoking cessation counseling offered)   Social History     Substance and Sexual Activity   Alcohol Use Yes    Alcohol/week: 1.0 standard drinks    Types: 1 Glasses of wine per week    Comment: Glass of wine with dinner. REVIEW OF SYSTEMS:  Constitutional: negative  Eyes: negative  Respiratory: negative  Cardiovascular: negative  Gastrointestinal: negative  Genitourinary: see HPI  Musculoskeletal: negative  Skin: negative   Neurological: negative  Hematological/Lymphatic: negative  Psychological: negative      Physical Exam:    This a 80 y.o. male  Vitals:    10/02/20 1044   Temp: 97 °F (36.1 °C)     Body mass index is 39.63 kg/m². Constitutional: Patient in no acute distress;       Assessment and Plan        1. Urine retention    2. Neurogenic bladder    3. Urinary retention               Plan:       Recurrent uti- secondary to inconsistent catheter changes. Will treat symptomatic infections  Catheter needs changed monthly in facility (18 french) or with visiting nurse  No epididymitis  Follow up in six months. Did discuss SPT. Will hold off on this for now. Prescriptions Ordered:  No orders of the defined types were placed in this encounter. Orders Placed:  No orders of the defined types were placed in this encounter.            Vipin Benedict MD

## 2020-10-03 ENCOUNTER — HOSPITAL ENCOUNTER (EMERGENCY)
Age: 85
Discharge: NURSING, REHAB OR LONG-TERM CARE | End: 2020-10-04
Attending: EMERGENCY MEDICINE
Payer: MEDICARE

## 2020-10-03 ENCOUNTER — APPOINTMENT (OUTPATIENT)
Dept: GENERAL RADIOLOGY | Age: 85
End: 2020-10-03
Payer: MEDICARE

## 2020-10-03 LAB
ALBUMIN SERPL-MCNC: 3.9 G/DL (ref 3.5–5.1)
ALP BLD-CCNC: 44 U/L (ref 38–126)
ALT SERPL-CCNC: 16 U/L (ref 11–66)
ANION GAP SERPL CALCULATED.3IONS-SCNC: 12 MEQ/L (ref 8–16)
APTT: 29.1 SECONDS (ref 22–38)
AST SERPL-CCNC: 12 U/L (ref 5–40)
BACTERIA: ABNORMAL /HPF
BASE EXCESS MIXED: 6.6 MMOL/L (ref -2–3)
BASOPHILS # BLD: 0.3 %
BASOPHILS ABSOLUTE: 0 THOU/MM3 (ref 0–0.1)
BILIRUB SERPL-MCNC: 0.2 MG/DL (ref 0.3–1.2)
BILIRUBIN DIRECT: < 0.2 MG/DL (ref 0–0.3)
BILIRUBIN URINE: NEGATIVE
BLOOD, URINE: ABNORMAL
BUN BLDV-MCNC: 12 MG/DL (ref 7–22)
CALCIUM SERPL-MCNC: 8.6 MG/DL (ref 8.5–10.5)
CASTS 2: ABNORMAL /LPF
CASTS UA: ABNORMAL /LPF
CHARACTER, URINE: CLEAR
CHLORIDE BLD-SCNC: 101 MEQ/L (ref 98–111)
CO2: 27 MEQ/L (ref 23–33)
COLLECTED BY:: ABNORMAL
COLOR: YELLOW
CREAT SERPL-MCNC: 0.9 MG/DL (ref 0.4–1.2)
CRYSTALS, UA: ABNORMAL
DEVICE: ABNORMAL
EKG ATRIAL RATE: 67 BPM
EKG Q-T INTERVAL: 402 MS
EKG QRS DURATION: 92 MS
EKG QTC CALCULATION (BAZETT): 424 MS
EKG R AXIS: 76 DEGREES
EKG T AXIS: 48 DEGREES
EKG VENTRICULAR RATE: 67 BPM
EOSINOPHIL # BLD: 1.3 %
EOSINOPHILS ABSOLUTE: 0.2 THOU/MM3 (ref 0–0.4)
EPITHELIAL CELLS, UA: ABNORMAL /HPF
ERYTHROCYTE [DISTWIDTH] IN BLOOD BY AUTOMATED COUNT: 16 % (ref 11.5–14.5)
ERYTHROCYTE [DISTWIDTH] IN BLOOD BY AUTOMATED COUNT: 49.7 FL (ref 35–45)
FIO2, MIXED VENOUS: 2
GFR SERPL CREATININE-BSD FRML MDRD: 79 ML/MIN/1.73M2
GLUCOSE BLD-MCNC: 134 MG/DL (ref 70–108)
GLUCOSE URINE: NEGATIVE MG/DL
HCO3, MIXED: 33 MMOL/L (ref 23–28)
HCT VFR BLD CALC: 33.4 % (ref 42–52)
HEMOGLOBIN: 10.7 GM/DL (ref 14–18)
IMMATURE GRANS (ABS): 0.06 THOU/MM3 (ref 0–0.07)
IMMATURE GRANULOCYTES: 0.4 %
INR BLD: 0.98 (ref 0.85–1.13)
KETONES, URINE: NEGATIVE
LEUKOCYTE ESTERASE, URINE: ABNORMAL
LYMPHOCYTES # BLD: 11.7 %
LYMPHOCYTES ABSOLUTE: 1.8 THOU/MM3 (ref 1–4.8)
MAGNESIUM: 1.8 MG/DL (ref 1.6–2.4)
MCH RBC QN AUTO: 27.2 PG (ref 26–33)
MCHC RBC AUTO-ENTMCNC: 32 GM/DL (ref 32.2–35.5)
MCV RBC AUTO: 84.8 FL (ref 80–94)
MISCELLANEOUS 2: ABNORMAL
MONOCYTES # BLD: 6.5 %
MONOCYTES ABSOLUTE: 1 THOU/MM3 (ref 0.4–1.3)
NITRITE, URINE: POSITIVE
NUCLEATED RED BLOOD CELLS: 0 /100 WBC
O2 SAT, MIXED: 61 %
OSMOLALITY CALCULATION: 281.1 MOSMOL/KG (ref 275–300)
PCO2, MIXED VENOUS: 56 MMHG (ref 41–51)
PH UA: 7 (ref 5–9)
PH, MIXED: 7.38 (ref 7.31–7.41)
PLATELET # BLD: 229 THOU/MM3 (ref 130–400)
PMV BLD AUTO: 10 FL (ref 9.4–12.4)
PO2 MIXED: 33 MMHG (ref 25–40)
POTASSIUM REFLEX MAGNESIUM: 4 MEQ/L (ref 3.5–5.2)
PRO-BNP: 205.6 PG/ML (ref 0–1800)
PROTEIN UA: NEGATIVE
RBC # BLD: 3.94 MILL/MM3 (ref 4.7–6.1)
RBC URINE: ABNORMAL /HPF
RENAL EPITHELIAL, UA: ABNORMAL
SARS-COV-2, NAAT: NOT DETECTED
SEG NEUTROPHILS: 79.8 %
SEGMENTED NEUTROPHILS ABSOLUTE COUNT: 12.3 THOU/MM3 (ref 1.8–7.7)
SODIUM BLD-SCNC: 140 MEQ/L (ref 135–145)
SPECIFIC GRAVITY, URINE: 1.01 (ref 1–1.03)
T4 FREE: 0.85 NG/DL (ref 0.93–1.76)
TOTAL PROTEIN: 6.5 G/DL (ref 6.1–8)
TROPONIN T: 0.03 NG/ML
TROPONIN T: 0.03 NG/ML
TSH SERPL DL<=0.05 MIU/L-ACNC: 7.16 UIU/ML (ref 0.4–4.2)
UROBILINOGEN, URINE: 0.2 EU/DL (ref 0–1)
WBC # BLD: 15.4 THOU/MM3 (ref 4.8–10.8)
WBC UA: ABNORMAL /HPF
YEAST: ABNORMAL

## 2020-10-03 PROCEDURE — 36415 COLL VENOUS BLD VENIPUNCTURE: CPT

## 2020-10-03 PROCEDURE — 83880 ASSAY OF NATRIURETIC PEPTIDE: CPT

## 2020-10-03 PROCEDURE — 94760 N-INVAS EAR/PLS OXIMETRY 1: CPT

## 2020-10-03 PROCEDURE — 84439 ASSAY OF FREE THYROXINE: CPT

## 2020-10-03 PROCEDURE — 87186 SC STD MICRODIL/AGAR DIL: CPT

## 2020-10-03 PROCEDURE — 6360000002 HC RX W HCPCS: Performed by: EMERGENCY MEDICINE

## 2020-10-03 PROCEDURE — 85025 COMPLETE CBC W/AUTO DIFF WBC: CPT

## 2020-10-03 PROCEDURE — 85610 PROTHROMBIN TIME: CPT

## 2020-10-03 PROCEDURE — 84443 ASSAY THYROID STIM HORMONE: CPT

## 2020-10-03 PROCEDURE — 87086 URINE CULTURE/COLONY COUNT: CPT

## 2020-10-03 PROCEDURE — 85730 THROMBOPLASTIN TIME PARTIAL: CPT

## 2020-10-03 PROCEDURE — U0002 COVID-19 LAB TEST NON-CDC: HCPCS

## 2020-10-03 PROCEDURE — 83735 ASSAY OF MAGNESIUM: CPT

## 2020-10-03 PROCEDURE — 81001 URINALYSIS AUTO W/SCOPE: CPT

## 2020-10-03 PROCEDURE — 80048 BASIC METABOLIC PNL TOTAL CA: CPT

## 2020-10-03 PROCEDURE — 93005 ELECTROCARDIOGRAM TRACING: CPT | Performed by: EMERGENCY MEDICINE

## 2020-10-03 PROCEDURE — 80076 HEPATIC FUNCTION PANEL: CPT

## 2020-10-03 PROCEDURE — 84484 ASSAY OF TROPONIN QUANT: CPT

## 2020-10-03 PROCEDURE — 2580000003 HC RX 258: Performed by: EMERGENCY MEDICINE

## 2020-10-03 PROCEDURE — 2700000000 HC OXYGEN THERAPY PER DAY

## 2020-10-03 PROCEDURE — 87077 CULTURE AEROBIC IDENTIFY: CPT

## 2020-10-03 PROCEDURE — 96365 THER/PROPH/DIAG IV INF INIT: CPT

## 2020-10-03 PROCEDURE — 99285 EMERGENCY DEPT VISIT HI MDM: CPT

## 2020-10-03 PROCEDURE — 71045 X-RAY EXAM CHEST 1 VIEW: CPT

## 2020-10-03 PROCEDURE — 82803 BLOOD GASES ANY COMBINATION: CPT

## 2020-10-03 RX ADMIN — CEFTRIAXONE SODIUM 1 G: 1 INJECTION, POWDER, FOR SOLUTION INTRAMUSCULAR; INTRAVENOUS at 23:26

## 2020-10-03 ASSESSMENT — ENCOUNTER SYMPTOMS
NAUSEA: 0
EYE PAIN: 0
SORE THROAT: 0
EYE ITCHING: 0
ABDOMINAL PAIN: 0
PHOTOPHOBIA: 0
DIARRHEA: 0
CONSTIPATION: 0
ABDOMINAL DISTENTION: 0
STRIDOR: 0
CHEST TIGHTNESS: 0
COUGH: 0
RHINORRHEA: 0
EYE REDNESS: 0
VOMITING: 0
BACK PAIN: 0
EYE DISCHARGE: 0
SHORTNESS OF BREATH: 1
WHEEZING: 1

## 2020-10-04 VITALS
SYSTOLIC BLOOD PRESSURE: 123 MMHG | OXYGEN SATURATION: 96 % | HEIGHT: 67 IN | TEMPERATURE: 98.7 F | BODY MASS INDEX: 39.24 KG/M2 | WEIGHT: 250 LBS | HEART RATE: 71 BPM | RESPIRATION RATE: 20 BRPM | DIASTOLIC BLOOD PRESSURE: 97 MMHG

## 2020-10-04 PROCEDURE — 93010 ELECTROCARDIOGRAM REPORT: CPT | Performed by: INTERNAL MEDICINE

## 2020-10-04 PROCEDURE — 6370000000 HC RX 637 (ALT 250 FOR IP): Performed by: EMERGENCY MEDICINE

## 2020-10-04 RX ORDER — AMOXICILLIN AND CLAVULANATE POTASSIUM 875; 125 MG/1; MG/1
1 TABLET, FILM COATED ORAL 2 TIMES DAILY
Qty: 20 TABLET | Refills: 0 | Status: SHIPPED | OUTPATIENT
Start: 2020-10-04 | End: 2020-10-14

## 2020-10-04 RX ORDER — PREDNISONE 20 MG/1
TABLET ORAL
Qty: 11 TABLET | Refills: 0 | Status: SHIPPED | OUTPATIENT
Start: 2020-10-04 | End: 2020-10-19 | Stop reason: ALTCHOICE

## 2020-10-04 RX ORDER — PREDNISONE 20 MG/1
40 TABLET ORAL ONCE
Status: COMPLETED | OUTPATIENT
Start: 2020-10-04 | End: 2020-10-04

## 2020-10-04 RX ADMIN — PREDNISONE 40 MG: 20 TABLET ORAL at 00:41

## 2020-10-04 NOTE — ED NOTES
Bed: 008A  Expected date:   Expected time:   Means of arrival:   Comments:  Angela Kunz RN  10/03/20 2034
Discharge instructions and new medications discussed and explained with Pt. Pt. Verbalized understanding and has no further questions or needs at this time.  Pt departing facility in stable condition with LACP to nursing home          [facility] APOPKA, RN  10/04/20 1847
Pt arrives to the ED vis [facility] from the nursing home. Pt states he has been SOB since this morning and has become worse as the day progressed. pt states he has a hx of COPD and CHF. Pt on arrival has unlabored respirations and is 92% on room air. Pt placed on 2 L NC is now 96%. Pt received 1 mg of bumex enroute. Pt has 3+ pitting edema in bilateral legs. Pt denies any fever, cough, chest pain, or abdominal pain. Pt is alert and oriented.  Pt vitals are stable     Georgiana Segundo RN  10/03/20 1262
Pt. Resting in bed with even and unlabored respirations. Pt. Denies any pain at this time. Pt. Updated about plan of care and treatment. Pt watching TV at this time. Pt. Has no further concerns, questions or needs at this time. Call light within reach.         Lexy Cox RN  10/03/20 1913
RT at bedside for VBG and assessment of patient.          Aneta Zavala RN  10/03/20 0209
all other ROS negative except as per HPI

## 2020-10-04 NOTE — ED PROVIDER NOTES
for arthralgias, back pain, gait problem, myalgias, neck pain and neck stiffness. Skin: Negative for pallor, rash and wound. Allergic/Immunologic: Negative for environmental allergies and food allergies. Neurological: Positive for dizziness and weakness. Negative for tremors, syncope and headaches. Psychiatric/Behavioral: Negative for agitation, behavioral problems, confusion, self-injury, sleep disturbance and suicidal ideas.           PAST MEDICAL HISTORY     Past Medical History:   Diagnosis Date    Anxiety     CAD (coronary artery disease)     leaking valve    Chronic back pain     COPD (chronic obstructive pulmonary disease) (HCC)     Detached retina     Diabetes mellitus (HCC)     NIDDM    DVT (deep venous thrombosis) (HCC)     RLE    DVT (deep venous thrombosis) (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)     s/p thyroid resection    Urinary incontinence        SURGICAL HISTORY       Past Surgical History:   Procedure Laterality Date   [facility] SURGERY  2006,2011, 2014    BRONCHOSCOPY N/A 8/29/2017    BRONCHOSCOPY AIRWAY ONLY performed by Vika Evangelista MD at [address] [place] [address] EKG 12-LEAD  11/10/2015         FRACTURE SURGERY      right hand    OTHER SURGICAL HISTORY      spinal epidurals    RETINAL DETACHMENT SURGERY      SPINE SURGERY  2004    Lumbar laminectomy    THYROIDECTOMY      TURP N/A 3/17/2020    CYSTOSCOPY WITH GREENLIGHT PHOTOVAPORIZATION OF PROSTATE performed by Raymon Colón MD at [address] [place] [address] TURP N/A 3/24/2020    PLASMA BUTTON TURP WITH CLOT EVACUATION performed by Raymon Colón MD at [facility]       Discharge Medication List as of 10/4/2020 12:20 AM      CONTINUE these medications which have NOT CHANGED    Details   potassium chloride (KLOR-CON) 10 MEQ extended release tablet Historical Med      insulin glargine (LANTUS SOLOSTAR) 100 UNIT/ML injection pen INJECT SUBCUTANEOUSLY 18  UNITS NIGHTLY, Disp-30 mL,R-3Normal      tiotropium (SPIRIVA) 18 MCG inhalation capsule Inhale 1 capsule into the lungs daily, Disp-90 capsule,R-3Normal      oxybutynin (DITROPAN) 5 MG tablet TAKE 1 TABLET BY MOUTH EVERY 8 HOURS AS NEEDED FOR BLADDER SPASMS OR BLADDER SPASM, Disp-270 tablet,R-0**Patient requests 90 days supply**Normal      ibuprofen (ADVIL;MOTRIN) 800 MG tablet Take 1 tablet by mouth 3 times daily (with meals), Disp-270 tablet,R-1Normal      levothyroxine (SYNTHROID) 175 MCG tablet TAKE 1 TABLET BY MOUTH  DAILY, Disp-90 tablet,R-3Normal      fosfomycin tromethamine (MONUROL) 3 g PACK Take 1 packet by mouth every 3 days, Disp-3 each,R-0Stop ciproNormal      blood glucose test strips (ACCU-CHEK CHE PLUS) strip Accu-check Che Plus Test Strips (or brand per pt preference). Sig: test sugar BID. Dx: E11.8, Disp-200 strip,R-3**Patient requests 90 days supply**Normal      SOFT TOUCH LANCETS MISC Disp-200 each,R-3, NormalLancet (or brand per pt preference) Use to check blood sugars 2 times daily. Dx: E11.8      Alcohol Swabs (ALCOHOL PREP) 70 % PADS Disp-200 each,R-3, NormalAlcohol pads (brand per preference). Sig: use to test sugar twice per day. Dx: E11.8      Blood Glucose Monitoring Suppl (ACCU-CHEK CHE PLUS) w/Device KIT Disp-1 kit,R-0, NormalOr brand per pt preference. Sig: test sugar BID.  Dx: E11.8      metoprolol tartrate (LOPRESSOR) 25 MG tablet Take 1 tablet by mouth 2 times daily Hold for SBP <110 or HR <60, Disp-180 tablet,R-3Normal      ferrous gluconate (FERGON) 324 (38 Fe) MG tablet Take 1 tablet by mouth 2 times daily, Disp-180 tablet,R-3Normal      metFORMIN (GLUCOPHAGE) 1000 MG tablet Take 1 tablet by mouth 2 times daily (with meals), Disp-180 tablet,R-3Normal      budesonide (PULMICORT) 0.5 MG/2ML nebulizer suspension USE 1 VIAL VIA NEBULIZER TWICE DAILY. RINSE MOUTH AFTER TREATMENT, Disp-360 mL,R-3**Patient requests 90 days supply**Normal      Multiple Vitamins-Minerals (PRESERVISION AREDS 2) CAPS Take 1 capsule by mouth daily, Disp-90 capsule,R-3Normal      lisinopril (PRINIVIL;ZESTRIL) 2.5 MG tablet Take 1 tablet by mouth daily, Disp-90 tablet,R-3Normal      potassium chloride (KLOR-CON M) 10 MEQ extended release tablet TAKE 1 TABLETS BY MOUTH TWICE DAILY, Disp-180 tablet,R-3**Patient requests 90 days supply**Normal      docusate (COLACE, DULCOLAX) 100 MG CAPS Take 100 mg by mouth 2 times daily, Disp-180 capsule,R-3Normal      doxazosin (CARDURA) 8 MG tablet Take 1 tablet by mouth nightly, Disp-90 tablet,R-3Normal      furosemide (LASIX) 40 MG tablet Take 1 tablet by mouth 2 times daily, Disp-180 tablet,R-3Normal      vitamin D3 (CHOLECALCIFEROL) 10 MCG (400 UNIT) TABS tablet Take 1 tablet by mouth daily 400units, Disp-90 tablet,R-3Normal      HYDROcodone-acetaminophen (NORCO) 5-325 MG per tablet Take 1 tablet by mouth every 6 hours as needed for Pain. Historical Med      polyethylene glycol (MIRALAX) 17 g packet Take 17 g by mouth daily as needed for ConstipationHistorical Med      Arformoterol Tartrate (BROVANA IN) Inhale into the lungs 1 dose inhale orally two times per dayHistorical Med      acetaminophen (TYLENOL) 325 MG tablet Take 650 mg by mouth every 4 hours as needed for PainHistorical Med      lidocaine (LMX) 4 % cream Apply topically as needed for Pain Apply topically as needed., Topical, PRN, Historical Med      albuterol sulfate  (90 Base) MCG/ACT inhaler Inhale 2 puffs into the lungs every 6 hours as needed for Wheezing, Disp-1 Inhaler, R-3Normal      OXYGEN Inhale 3 L into the lungs nightly Indications: Difficulty Breathing, Oxygen Therapy And prn through cpapHistorical Med             ALLERGIES     Latex;  [place] reviewed. Constitutional:       Appearance: He is well-developed. He is not diaphoretic. HENT:      Head: Normocephalic and atraumatic. Nose: Nose normal.   Eyes:      General: No scleral icterus. Right eye: No discharge. Left eye: No discharge. Conjunctiva/sclera: Conjunctivae normal.      Pupils: Pupils are equal, round, and reactive to light. Neck:      Musculoskeletal: Normal range of motion and neck supple. Vascular: No JVD. Trachea: No tracheal deviation. Cardiovascular:      Rate and Rhythm: Normal rate and regular rhythm. Heart sounds: Normal heart sounds. No murmur. No friction rub. No gallop. Pulmonary:      Effort: Pulmonary effort is normal. No respiratory distress. Breath sounds: No stridor. Examination of the right-upper field reveals wheezing, rhonchi and rales. Examination of the left-upper field reveals wheezing, rhonchi and rales. Wheezing, rhonchi and rales present. Chest:      Chest wall: No tenderness. Abdominal:      General: Bowel sounds are normal. There is no distension. Palpations: Abdomen is soft. There is no mass. Tenderness: There is no abdominal tenderness. There is no guarding or rebound. Hernia: No hernia is present. Musculoskeletal:         General: No tenderness or deformity. Right lower leg: Edema present. Left lower leg: Edema present. Lymphadenopathy:      Cervical: No cervical adenopathy. Skin:     General: Skin is warm and dry. Capillary Refill: Capillary refill takes less than 2 seconds. Coloration: Skin is not pale. Findings: No erythema or rash. Neurological:      Mental Status: He is alert and oriented to person, place, and time. Cranial Nerves: No cranial nerve deficit. Sensory: No sensory deficit. Motor: No abnormal muscle tone.       Coordination: Coordination normal.      Deep Tendon Reflexes: Reflexes normal.   Psychiatric:         Behavior: Behavior normal.         Thought Content: Thought content normal.         Judgment: Judgment normal.           DIFFERENTIAL DIAGNOSIS:   Includes but not limited to: Pneumonia, bronchitis, CHF, PE, COPD, asthma, pulmonary edema, pleural effusion, AMI, URI, influenza, sinusitis, allergies, anxiety      DIAGNOSTIC RESULTS     EKG: All EKG's are interpreted by the Emergency Department Physician who either signs or Co-signsthis chart in the absence of a cardiologist.  Interpreted by me  Accelerated junctional rhythm  Ventricular rate of 67 bpm  QRS duration 92 ms   ms  Low voltage QRS  No ST elevation or QT wave  RADIOLOGY: non-plain film images(s) such as CT, Ultrasound and MRI are read by the radiologist.    XR CHEST PORTABLE   Final Result   Pulmonary hyperinflation.       This document has been electronically signed by: Kal Cano MD on    10/03/2020 09:36 PM             []Visualized and interpreted by me   [] Radiologist's Wet Read Report Reviewed   [] Discussed with Radiologist.    Dirk Cloud:   Results for orders placed or performed during the hospital encounter of 31/94/68   Basic Metabolic Panel w/ Reflex to MG   Result Value Ref Range    Sodium 140 135 - 145 meq/L    Potassium reflex Magnesium 4.0 3.5 - 5.2 meq/L    Chloride 101 98 - 111 meq/L    CO2 27 23 - 33 meq/L    Glucose 134 (H) 70 - 108 mg/dL    BUN 12 7 - 22 mg/dL    CREATININE 0.9 0.4 - 1.2 mg/dL    Calcium 8.6 8.5 - 10.5 mg/dL   CBC Auto Differential   Result Value Ref Range    WBC 15.4 (H) 4.8 - 10.8 thou/mm3    RBC 3.94 (L) 4.70 - 6.10 mill/mm3    Hemoglobin 10.7 (L) 14.0 - 18.0 gm/dl    Hematocrit 33.4 (L) 42.0 - 52.0 %    MCV 84.8 80.0 - 94.0 fL    MCH 27.2 26.0 - 33.0 pg    MCHC 32.0 (L) 32.2 - 35.5 gm/dl    RDW-CV 16.0 (H) 11.5 - 14.5 %    RDW-SD 49.7 (H) 35.0 - 45.0 fL    Platelets 926 104 - 628 thou/mm3    MPV 10.0 9.4 - 12.4 fL    Seg Neutrophils 79.8 %    Lymphocytes 11.7 %    Monocytes 6.5 %    Eosinophils 1.3 %    Basophils 0.3 % NEGATIVE    pH, UA 7.0 5.0 - 9.0    Protein, UA NEGATIVE NEGATIVE    Urobilinogen, Urine 0.2 0.0 - 1.0 eu/dl    Nitrite, Urine POSITIVE (A) NEGATIVE    Leukocyte Esterase, Urine SMALL (A) NEGATIVE    Color, UA YELLOW STRAW-YELLOW    Character, Urine CLEAR CLEAR-SL CLOUD    RBC, UA 5-10 0-2/hpf /hpf    WBC, UA 10-15 0-4/hpf /hpf    Epithelial Cells, UA 0-2 3-5/hpf /hpf    Bacteria, UA NONE SEEN FEW/NONE SEEN /hpf    Casts UA NONE SEEN NONE SEEN /lpf    Crystals, UA NONE SEEN NONE SEEN    Renal Epithelial, UA NONE SEEN NONE SEEN    Yeast, UA NONE SEEN NONE SEEN    CASTS 2 NONE SEEN NONE SEEN /lpf    MISCELLANEOUS 2 NONE SEEN    T4, Free   Result Value Ref Range    T4 Free 0.85 (L) 0.93 - 1.76 ng/dL   Troponin   Result Value Ref Range    Troponin T 0.032 (A) ng/ml       EMERGENCY DEPARTMENT COURSE:   Vitals:    Vitals:    10/03/20 2153 10/03/20 2300 10/04/20 0000 10/04/20 0102   BP: (!) 147/82 (!) 145/78 (!) 123/97    Pulse: 63 59 64 71   Resp: 18 19 22 20   Temp:       TempSrc:       SpO2: 97% 96% 95% 96%   Weight:       Height:           9:31 PM: Patient is seen and evaluated in a timely fashion.      ACTIONS:    Large bore IV  Tele monitor  EKG 12-LEAD  XR CHEST PORTABLE  Labs Reviewed   CBC WITH AUTO DIFFERENTIAL - Abnormal; Notable for the following components:       Result Value    WBC 15.4 (*)     RBC 3.94 (*)     Hemoglobin 10.7 (*)     Hematocrit 33.4 (*)     MCHC 32.0 (*)     RDW-CV 16.0 (*)     RDW-SD 49.7 (*)     Segs Absolute 12.3 (*)     All other components within normal limits   BASIC METABOLIC PANEL W/ REFLEX TO MG FOR LOW K   BRAIN NATRIURETIC PEPTIDE   TROPONIN   HEPATIC FUNCTION PANEL   APTT   PROTIME-INR   URINE RT REFLEX TO CULTURE   MAGNESIUM   TSH WITH REFLEX   COVID-19   BLOOD GAS, VENOUS     Medications   cefTRIAXone (ROCEPHIN) 1 g IVPB in 50 mL D5W minibag (0 g Intravenous Stopped 10/4/20 0016)   predniSONE (DELTASONE) tablet 40 mg (40 mg Oral Given 10/4/20 0041)       MEDICAL DECISION MAKINGS:    Patient had stable vital signs, afebrile, a chest x-ray was done which was read as pulmonary hyperinflation by radiologist, I reviewed the chest x-ray, I felt the patient had developing right lower lobe infiltrates. WBC 15.4, otherwise labs were reassuring including slightly elevated troponin which was stable when repeated. COVID-19 negative. Patient's ABG showed mild chronic type II respiratory failure which was compensated. Patient is on Lasix 40 mg already. After COVID-19 result was negative, patient received prednisone 40 mg orally. Rocephin 1 g IV was given. I felt he can be discharged with close PCP follow-up. He was given prescription of Augmentin and tapering down oral prednisone. PCP follow-up in 3 days. CRITICAL CARE:   none    CONSULTS:  None    PROCEDURES:  None    FINAL IMPRESSION      1. Dyspnea and respiratory abnormalities    2. Leukocytosis, unspecified type    3. Elevated troponin    4. Peripheral edema    5. Chronic respiratory failure with hypercapnia (HCC)    6.  Chronic bronchitis, unspecified chronic bronchitis type [facility])          DISPOSITION/PLAN   Home    PATIENT REFERRED TO:  Bryant Cardozo MD  [address] [facility] [address] [facility]  [phone]    In 3 days  ED discharge follow up      [address]:  Discharge Medication List as of 10/4/2020 12:20 AM      START taking these medications    Details   amoxicillin-clavulanate (AUGMENTIN) 875-125 MG per tablet Take 1 tablet by mouth 2 times daily for 10 days, Disp-20 tablet,R-0Print      predniSONE (DELTASONE) 20 MG tablet 40 mg daily for 3 days, 20 mg a day for 3 days, then 10 mg daily for 3 days, Disp-11 tablet,R-0Print             (Please note that portions of this note were completed with a voice recognition program.  Efforts were made to edit the dictations but occasionally words aremis-transcribed.)    MD Marbella Zurita MD  10/04/20 2030

## 2020-10-05 LAB
ORGANISM: ABNORMAL
URINE CULTURE REFLEX: ABNORMAL

## 2020-10-06 ENCOUNTER — TELEPHONE (OUTPATIENT)
Dept: FAMILY MEDICINE CLINIC | Age: 85
End: 2020-10-06

## 2020-10-06 NOTE — TELEPHONE ENCOUNTER
Chasidy WATSON called. Patient went to ED 10/03/2020 for SOB, +3 pitting edema BILAT LE. Returned to assisted living 10/04/2020 on Augmentin and prednisone for treatment of UTI, Bronchitis. His son is requesting a change to his diuretics. Dr. Jhonathan Cates reviewed he would like his cardiologist to manage these changes.

## 2020-10-06 NOTE — TELEPHONE ENCOUNTER
It appears pt presented to the ER on 10/3/2020 for shortness of breath and respiratory symptoms. ER physician performed urine culture and I do not see any documentation of urinary symptoms. Pt has chronic catheter last exchanged 10/2/2020. If not having any symptoms of UTI do not recommend antibiotic treatment of the bacteriuria at this time. Please advise [facility] staff to notify our office if he should develop urinary symptoms and we will consider treatment at that time.

## 2020-10-06 NOTE — TELEPHONE ENCOUNTER
----- Message from Cynthia Vazquez MD sent at 10/5/2020  7:57 AM EDT -----  Please make sure Urology is following as Urine Culture resistant to Augmentin and he sees them chronically.   ES

## 2020-10-06 NOTE — TELEPHONE ENCOUNTER
Spoke to [facility] [place]. He is on Augmentin 875 mg BID times 10 days for URI. He is not having any UTI symptoms. The felipe is patent. He denies urinary pain at this time. Please advise. Thank you.

## 2020-10-06 NOTE — TELEPHONE ENCOUNTER
[facility] left message at 134pm returning call  Call back [phone] option 4 or 5    Called back - left detailed message to review results and treat accordingly

## 2020-10-07 ENCOUNTER — TELEPHONE (OUTPATIENT)
Dept: CARDIOLOGY CLINIC | Age: 85
End: 2020-10-07

## 2020-10-07 NOTE — TELEPHONE ENCOUNTER
SEEN IN ED - NO ACTION  HIS PCP SAW STATED NEED TO SEE DR. GRUBBS  NOW -REQUEST COMES FOR ME TO MANAGE OVER PHONE WHILE TWO DOCTORS WHO SAW THE PAT IN PERSON DID NOT ACT ON DIURETICS.    nEED TO BE SEEN IN MY OFFICE TO EVALUATE AND SEE WHAT I CAN DO

## 2020-10-07 NOTE — TELEPHONE ENCOUNTER
Donte Cabrera at [facility] voiced understanding since the patient was not having any UTI symptoms antibiotics would not be ordered at this time. She will call the office if the patient develops any UTI symptoms.

## 2020-10-08 LAB
AVERAGE GLUCOSE: 143
HBA1C MFR BLD: 6.6 %

## 2020-10-14 RX ORDER — ARFORMOTEROL TARTRATE 15 UG/2ML
15 SOLUTION RESPIRATORY (INHALATION) 2 TIMES DAILY
Qty: 120 ML | Refills: 11 | Status: SHIPPED | OUTPATIENT
Start: 2020-10-14 | End: 2021-05-03 | Stop reason: SDUPTHER

## 2020-10-14 NOTE — TELEPHONE ENCOUNTER
Fax received from [facility] requesting refills on [facility] There was fax sent on 10/9/20 requesting Spiriva be changed to something cheaper or just stopping, this fax was sent back to change to Atrovent, reprinted this fax and sent back to [facility] again, order is pending for rx for Atrovent. Order also pending for Trav Guillory  Last seen 7/6/20.  Next appt 10/19/20

## 2020-10-17 NOTE — PROGRESS NOTES
 Urinary incontinence    Serous detachment of retinal pigment epithelium    Senile nuclear sclerosis    Puckering of macula, bilateral    Presence of intraocular lens    Primary open-angle glaucoma    Other chorioretinal scars, right eye    Nonexudative senile macular degeneration of retina    Chronic diastolic CHF (congestive heart failure) (HCC)    Morbid obesity (HCC)    Urinary retention    Urine retention    Abdominal pain    Altered mental status    Chronic indwelling Billy catheter    Urinary tract infection associated with indwelling urethral catheter (HCC)    Hypokalemia    Weakness of left lower extremity    Pill dysphagia    History of thyroid cancer    Gross hematuria    Constipation    Acute urinary retention       Current Outpatient Medications   Medication Sig Dispense Refill    levothyroxine (SYNTHROID) 200 MCG tablet TAKE 1 TABLET BY MOUTH  DAILY 90 tablet 3    Arformoterol Tartrate (BROVANA) 15 MCG/2ML NEBU Take 2 mLs by nebulization 2 times daily 1 dose inhale orally two times per day 120 mL 11    ipratropium (ATROVENT HFA) 17 MCG/ACT inhaler Inhale 2 puffs into the lungs 4 times daily 3 Inhaler 3    insulin glargine (LANTUS SOLOSTAR) 100 UNIT/ML injection pen INJECT SUBCUTANEOUSLY 18  UNITS NIGHTLY 30 mL 3    oxybutynin (DITROPAN) 5 MG tablet TAKE 1 TABLET BY MOUTH EVERY 8 HOURS AS NEEDED FOR BLADDER SPASMS OR BLADDER SPASM 270 tablet 0    ibuprofen (ADVIL;MOTRIN) 800 MG tablet Take 1 tablet by mouth 3 times daily (with meals) 270 tablet 1    blood glucose test strips (ACCU-CHEK CHE PLUS) strip Accu-check Che Plus Test Strips (or brand per pt preference). Sig: test sugar BID. Dx: E11.8 200 strip 3    SOFT TOUCH LANCETS [facility] Lancet (or brand per pt preference) Use to check blood sugars 2 times daily. Dx: E11.8 200 each 3    Alcohol Swabs (ALCOHOL PREP) 70 % PADS Alcohol pads (brand per preference). Sig: use to test sugar twice per day.  Dx: E11.8 200 each 3    Blood Glucose Monitoring Suppl (ACCU-CHEK CHE PLUS) w/Device KIT Or brand per pt preference. Sig: test sugar BID. Dx: E11.8 1 kit 0    metoprolol tartrate (LOPRESSOR) 25 MG tablet Take 1 tablet by mouth 2 times daily Hold for SBP <110 or HR <60 180 tablet 3    ferrous gluconate (FERGON) 324 (38 Fe) MG tablet Take 1 tablet by mouth 2 times daily 180 tablet 3    metFORMIN (GLUCOPHAGE) 1000 MG tablet Take 1 tablet by mouth 2 times daily (with meals) 180 tablet 3    budesonide (PULMICORT) 0.5 MG/2ML nebulizer suspension USE 1 VIAL VIA NEBULIZER TWICE DAILY. RINSE MOUTH AFTER TREATMENT 360 mL 3    Multiple Vitamins-Minerals (PRESERVISION AREDS 2) CAPS Take 1 capsule by mouth daily 90 capsule 3    lisinopril (PRINIVIL;ZESTRIL) 2.5 MG tablet Take 1 tablet by mouth daily 90 tablet 3    potassium chloride (KLOR-CON M) 10 MEQ extended release tablet TAKE 1 TABLETS BY MOUTH TWICE DAILY 180 tablet 3    docusate (COLACE, DULCOLAX) 100 MG CAPS Take 100 mg by mouth 2 times daily 180 capsule 3    doxazosin (CARDURA) 8 MG tablet Take 1 tablet by mouth nightly 90 tablet 3    furosemide (LASIX) 40 MG tablet Take 1 tablet by mouth 2 times daily 180 tablet 3    vitamin D3 (CHOLECALCIFEROL) 10 MCG (400 UNIT) TABS tablet Take 1 tablet by mouth daily 400units 90 tablet 3    polyethylene glycol (MIRALAX) 17 g packet Take 17 g by mouth daily as needed for Constipation      acetaminophen (TYLENOL) 325 MG tablet Take 650 mg by mouth every 4 hours as needed for Pain      lidocaine (LMX) 4 % cream Apply topically as needed for Pain Apply topically as needed.  albuterol sulfate  (90 Base) MCG/ACT inhaler Inhale 2 puffs into the lungs every 6 hours as needed for Wheezing 1 Inhaler 3    OXYGEN Inhale 3 L into the lungs nightly Indications: Difficulty Breathing, Oxygen Therapy And prn through cpap       No current facility-administered medications for this visit.       Review of Systems   Constitutional: Negative for chills, diaphoresis, fatigue, fever and unexpected weight change. Eyes: Negative for visual disturbance. Respiratory: Positive for shortness of breath (due to known COPD). Negative for chest tightness. Cardiovascular: Positive for leg swelling (chronic- stable). Negative for chest pain and palpitations. Gastrointestinal: Negative for abdominal pain, anal bleeding, blood in stool, constipation, diarrhea, nausea and vomiting. Genitourinary: Negative for dysuria and hematuria. Musculoskeletal: Negative for neck pain. Neurological: Negative for dizziness, light-headedness and headaches. OBJECTIVE     BP (!) 90/58 (Site: Right Upper Arm)   Pulse 68   Temp 96.3 °F (35.7 °C) (Temporal)   Resp 16   Wt 250 lb 12.8 oz (113.8 kg)   BMI 39.28 kg/m²   Body mass index is 39.28 kg/m². BP Readings from Last 3 Encounters:   10/19/20 (!) 90/58   10/04/20 (!) 123/97   09/28/20 130/64       Physical Exam  Vitals signs and nursing note reviewed. Exam conducted with a chaperone present. Constitutional:       General: He is not in acute distress. Appearance: Normal appearance. He is not ill-appearing, toxic-appearing or diaphoretic. HENT:      Head: Normocephalic and atraumatic. Right Ear: External ear normal.      Left Ear: External ear normal.      Nose: Nose normal. No rhinorrhea. Mouth/Throat:      Mouth: Mucous membranes are moist.      Pharynx: Oropharynx is clear. Eyes:      General: No scleral icterus. Right eye: No discharge. Left eye: No discharge. Extraocular Movements: Extraocular movements intact. Conjunctiva/sclera: Conjunctivae normal.      Pupils: Pupils are equal, round, and reactive to light. Neck:      Musculoskeletal: Normal range of motion. Cardiovascular:      Rate and Rhythm: Normal rate and regular rhythm. Heart sounds: Normal heart sounds. No murmur. No friction rub. No gallop.     Pulmonary:      Effort: Pulmonary effort is normal. No respiratory distress. Breath sounds: Normal breath sounds. No stridor. No wheezing, rhonchi or rales. Chest:      Chest wall: No tenderness. Abdominal:      General: Abdomen is flat. Bowel sounds are normal. There is no distension. Palpations: Abdomen is soft. There is no mass. Tenderness: There is no abdominal tenderness. There is no guarding or rebound. Hernia: No hernia is present. Musculoskeletal: Normal range of motion. General: No swelling, deformity or signs of injury. Skin:     General: Skin is warm and dry. Coloration: Skin is not jaundiced or pale. Findings: No bruising, erythema, lesion or rash. Neurological:      General: No focal deficit present. Mental Status: He is alert and oriented to person, place, and time. Mental status is at baseline. Motor: No weakness. Coordination: Coordination normal.      Gait: Gait normal.   Psychiatric:         Mood and Affect: Mood normal.         Behavior: Behavior normal.         Thought Content: Thought content normal.         Judgment: Judgment normal.         Lab Results   Component Value Date    LABA1C 6.6 10/08/2020       Lab Results   Component Value Date    CHOL 185 07/09/2020    TRIG 241 07/09/2020    HDL 34 (A) 07/09/2020    LDLCALC 103 07/09/2020    LDLDIRECT 69.29 10/10/2018       The ASCVD Risk score (Oddonovan Loser., et al., 2013) failed to calculate for the following reasons:     The 2013 ASCVD risk score is only valid for ages 36 to 78    Lab Results   Component Value Date     10/03/2020    K 4.0 10/03/2020     10/03/2020    CO2 27 10/03/2020    BUN 12 10/03/2020    CREATININE 0.9 10/03/2020    GLUCOSE 134 (H) 10/03/2020    CALCIUM 8.6 10/03/2020    PROT 6.5 10/03/2020    LABALBU 3.9 10/03/2020    BILITOT 0.2 (L) 10/03/2020    ALKPHOS 44 10/03/2020    AST 12 10/03/2020    ALT 16 10/03/2020    LABGLOM 79 (A) 10/03/2020     Component      Latest Ref Rng & Units 7/9/2020          12:00 AM Microalbumin Creatinine Ratio       516.7   Microalb, Ur       12.4   Creatinine, Urine       24.0       Lab Results   Component Value Date    TSH 7.160 (H) 10/03/2020    T4FREE 0.85 (L) 10/03/2020       Lab Results   Component Value Date    WBC 15.4 (H) 10/03/2020    HGB 10.7 (L) 10/03/2020    HCT 33.4 (L) 10/03/2020    MCV 84.8 10/03/2020     10/03/2020           Immunization History   Administered Date(s) Administered    Influenza 10/31/2013    Influenza Virus Vaccine 11/01/2011, 11/05/2012, 11/11/2014, 09/29/2015    Influenza Whole 10/01/2010    Influenza, Quadv, 6 mo and older, IM, PF (Flulaval, Fluarix) 09/27/2018    Influenza, Quadv, IM, (6 mo and older Fluzone, Flulaval, Fluarix and 3 yrs and older Afluria) 11/17/2017    Influenza, Quadv, IM, PF (6 mo and older Fluzone, Flulaval, Fluarix, and 3 yrs and older Afluria) 10/01/2019    Influenza, Quadv, adjuvanted, 65 yrs +, IM, PF (Fluad) 10/19/2020    Influenza, Triv, 3 Years and older, IM (Afluria (5 yrs and older) 09/26/2016    Pneumococcal Conjugate 13-valent ([id number]) 12/19/2014    Pneumococcal Polysaccharide ([id number]) 01/01/2003    Td, unspecified formulation 06/08/2010    Zoster Live (Zostavax) 08/05/2014       Health Maintenance   Topic Date Due    DTaP/Tdap/Td vaccine (1 - Tdap) 07/17/1950    Shingles Vaccine (2 of 3) 09/30/2014    Annual Wellness Visit (AWV)  05/29/2019    TSH testing  10/03/2021    Potassium monitoring  10/03/2021    Creatinine monitoring  10/03/2021    Flu vaccine  Completed    Pneumococcal 65+ years Vaccine  Completed    Hepatitis A vaccine  Aged Out    Hib vaccine  Aged Out    Meningococcal (ACWY) vaccine  Aged Out       AAA ultrasound (Male, 65-75, smoked ever) indicated at this time? NO, age > 71  CT Lung Screen (55-80, 30 pk-yrs, smoking or quit <15 years) indicated at this [address], age [de-identified]    Future Appointments   Date Time Provider Gretchen Jorgensen   1/25/2021 10:45 AM Christopher DUNLAP Alana Graham, [address]   2/2/2021 12:15 PM Vick Gee MD [address]   2/15/2021  1:00 PM [facility] [address] [facility]   4/2/2021 10:30 AM Gurjit Garland MD [facility]       ASSESSMENT       Diagnosis Orders   1. Type 2 diabetes mellitus with complication, with long-term current use of insulin (HCC)  Hemoglobin A1C   2. Essential hypertension     3. Mixed hyperlipidemia     4. Postoperative hypothyroidism  T4, Free    TSH without Reflex    levothyroxine (SYNTHROID) 200 MCG tablet   5. Stage 3 severe COPD by GOLD classification ([facility] [address]     6. Mucopurulent chronic bronchitis ([address]     7. ROBER (obstructive sleep apnea)     8. Paroxysmal atrial fibrillation (HCC)     9. Anemia, unspecified type  CBC Auto Differential   10. Benign prostatic hyperplasia with lower urinary tract symptoms, symptom details unspecified     11. Morbid obesity with BMI of 40.0-44.9, adult     12. Chronic diastolic CHF (congestive heart failure) ([address]     13. Chronic deep vein thrombosis (DVT) of other vein of right lower extremity (HCC)     14. Influenza vaccine needed  INFLUENZA, QUADV, ADJUVANTED, 72 YRS =, IM, PF, PREFILL SYR, 0.5ML (FLUAD)       PLAN          Follow up with Dr. Gabino Chen), Jerman De Paz (COPD and ROBER), Urology (Urinary symptoms) as scheduled. (updated 10/19/2020)  Will continue to hold on Statin use due to risk of Myalgias and pt's age. Therefore, will no  longer check FLP any further also. (updated 10/19/2020)  After discussion with pt, will increase Levothyroxine to 200 mcg-1 pill daily. #90/3 refills. Check TSH/ FREE T4, and CBC in 6 weeks. Check HGA1C in 3 months   Continue current medicines   No refills needed today  Follow up in 3 months        Preventive Health Topics:  FLU SHOT today. (updated 10/19/2020)   Encouraged TETANUS SHOT (TdaP/ ADACEL/ BOOSTRIX) per personal pharmacy. (updated 10/19/2020)  Pt declines SHINGLES SHOT (Shingrix) at this time. (updated 10/19/2020)  Pt had COLONOSCOPY with Dr. Brijesh Rivera on 3/22/05- ok- 10 year follow up overdue- pt declines at this time- \"I'm too old for that\". (updated 10/19/2020)  Pt declines SFOBT/ FIT testing at this time (updated 10/19/2020)  CHETNA/ PSA management per [facility]- to follow up 4/2/2021. DILATED EYE EXAM- done with Dr. Nayeli Leung- to follow up every 6 months- please check on date of next appt.  ([phone])         Electronically signed by Milena Oseguera MD on 10/19/2020 at 5:17 PM

## 2020-10-19 ENCOUNTER — OFFICE VISIT (OUTPATIENT)
Dept: FAMILY MEDICINE CLINIC | Age: 85
End: 2020-10-19
Payer: MEDICARE

## 2020-10-19 VITALS
BODY MASS INDEX: 39.28 KG/M2 | RESPIRATION RATE: 16 BRPM | WEIGHT: 250.8 LBS | TEMPERATURE: 96.3 F | HEART RATE: 68 BPM | DIASTOLIC BLOOD PRESSURE: 58 MMHG | SYSTOLIC BLOOD PRESSURE: 90 MMHG

## 2020-10-19 PROCEDURE — G8484 FLU IMMUNIZE NO ADMIN: HCPCS | Performed by: FAMILY MEDICINE

## 2020-10-19 PROCEDURE — 90694 VACC AIIV4 NO PRSRV 0.5ML IM: CPT | Performed by: FAMILY MEDICINE

## 2020-10-19 PROCEDURE — 1123F ACP DISCUSS/DSCN MKR DOCD: CPT | Performed by: FAMILY MEDICINE

## 2020-10-19 PROCEDURE — 99214 OFFICE O/P EST MOD 30 MIN: CPT | Performed by: FAMILY MEDICINE

## 2020-10-19 PROCEDURE — G8417 CALC BMI ABV UP PARAM F/U: HCPCS | Performed by: FAMILY MEDICINE

## 2020-10-19 PROCEDURE — 1036F TOBACCO NON-USER: CPT | Performed by: FAMILY MEDICINE

## 2020-10-19 PROCEDURE — 4040F PNEUMOC VAC/ADMIN/RCVD: CPT | Performed by: FAMILY MEDICINE

## 2020-10-19 PROCEDURE — 3023F SPIROM DOC REV: CPT | Performed by: FAMILY MEDICINE

## 2020-10-19 PROCEDURE — G8427 DOCREV CUR MEDS BY ELIG CLIN: HCPCS | Performed by: FAMILY MEDICINE

## 2020-10-19 PROCEDURE — G8926 SPIRO NO PERF OR DOC: HCPCS | Performed by: FAMILY MEDICINE

## 2020-10-19 PROCEDURE — G0008 ADMIN INFLUENZA VIRUS VAC: HCPCS | Performed by: FAMILY MEDICINE

## 2020-10-19 RX ORDER — LEVOTHYROXINE SODIUM 0.2 MG/1
TABLET ORAL
Qty: 90 TABLET | Refills: 3 | Status: ON HOLD | OUTPATIENT
Start: 2020-10-19 | End: 2021-07-14 | Stop reason: SDUPTHER

## 2020-10-19 ASSESSMENT — ENCOUNTER SYMPTOMS
ABDOMINAL PAIN: 0
NAUSEA: 0
ANAL BLEEDING: 0
VOMITING: 0
BLOOD IN STOOL: 0
DIARRHEA: 0
CHEST TIGHTNESS: 0
CONSTIPATION: 0
SHORTNESS OF BREATH: 1

## 2020-10-19 NOTE — PATIENT INSTRUCTIONS
Follow up with Dr. Pooja Rojas, Cheryl De Paz (COPD and ROBER), Urology (Urinary symptoms) as scheduled. (updated 10/19/2020)  Will continue to hold on Statin use due to risk of Myalgias and pt's age. Therefore, will no  longer check FLP any further also. (updated 10/19/2020)  After discussion with pt, will increase Levothyroxine to 200 mcg-1 pill daily. #90/3 refills. Check TSH/ FREE T4, and CBC in 6 weeks. Check HGA1C in 3 months   Continue current medicines   No refills needed today  Follow up in 3 months        Preventive Health Topics:  FLU SHOT today. (updated 10/19/2020)   Encouraged TETANUS SHOT (TdaP/ ADACEL/ BOOSTRIX) per personal pharmacy. (updated 10/19/2020)  Pt declines SHINGLES SHOT (Shingrix) at this time. (updated 10/19/2020)  Pt had COLONOSCOPY with Dr. Josie Vasquez on 3/22/05- ok- 10 year follow up overdue- pt declines at this time- \"I'm too old for that\". (updated 10/19/2020)  Pt declines SFOBT/ FIT testing at this time (updated 10/19/2020)  CHETNA/ PSA management per [facility]- to follow up 4/2/2021. DILATED EYE EXAM- done with Dr. Antonio Sen- to follow up every 6 months- please check on date of next appt.  ([phone])

## 2020-10-22 ENCOUNTER — TELEPHONE (OUTPATIENT)
Dept: FAMILY MEDICINE CLINIC | Age: 85
End: 2020-10-22

## 2020-11-05 LAB
AVERAGE GLUCOSE: 126
HBA1C MFR BLD: 6 %

## 2020-12-01 ENCOUNTER — TELEPHONE (OUTPATIENT)
Dept: FAMILY MEDICINE CLINIC | Age: 85
End: 2020-12-01

## 2020-12-01 NOTE — TELEPHONE ENCOUNTER
----- Message from Tera Ho MD sent at 11/30/2020  5:12 PM EST -----  Notify pt-   Results reviewed.    CBC shows slightly worsening anemia with elevated WBC- how is pt feeling at this time  TFT's ok  ES

## 2020-12-05 ENCOUNTER — HOSPITAL ENCOUNTER (INPATIENT)
Age: 85
LOS: 2 days | Discharge: NURSING, REHAB OR LONG-TERM CARE | DRG: 189 | End: 2020-12-07
Attending: INTERNAL MEDICINE | Admitting: INTERNAL MEDICINE
Payer: MEDICARE

## 2020-12-05 ENCOUNTER — APPOINTMENT (OUTPATIENT)
Dept: GENERAL RADIOLOGY | Age: 85
DRG: 189 | End: 2020-12-05
Payer: MEDICARE

## 2020-12-05 PROBLEM — J44.1 COPD EXACERBATION (HCC): Status: ACTIVE | Noted: 2020-12-05

## 2020-12-05 LAB
ALBUMIN SERPL-MCNC: 3.9 G/DL (ref 3.5–5.1)
ALLEN TEST: POSITIVE
ALLEN TEST: POSITIVE
ALP BLD-CCNC: 52 U/L (ref 38–126)
ALT SERPL-CCNC: 6 U/L (ref 11–66)
ANION GAP SERPL CALCULATED.3IONS-SCNC: 10 MEQ/L (ref 8–16)
AST SERPL-CCNC: 10 U/L (ref 5–40)
BACTERIA: ABNORMAL
BASE EXCESS (CALCULATED): 12.3 MMOL/L (ref -2.5–2.5)
BASE EXCESS (CALCULATED): 7.9 MMOL/L (ref -2.5–2.5)
BASOPHILS # BLD: 0.4 %
BASOPHILS ABSOLUTE: 0 THOU/MM3 (ref 0–0.1)
BILIRUB SERPL-MCNC: 0.2 MG/DL (ref 0.3–1.2)
BILIRUBIN URINE: NEGATIVE
BLOOD, URINE: ABNORMAL
BUN BLDV-MCNC: 12 MG/DL (ref 7–22)
CALCIUM SERPL-MCNC: 9 MG/DL (ref 8.5–10.5)
CASTS: ABNORMAL /LPF
CASTS: ABNORMAL /LPF
CHARACTER, URINE: ABNORMAL
CHLORIDE BLD-SCNC: 98 MEQ/L (ref 98–111)
CO2: 35 MEQ/L (ref 23–33)
COLLECTED BY:: ABNORMAL
COLLECTED BY:: ABNORMAL
COLOR: YELLOW
CREAT SERPL-MCNC: 0.7 MG/DL (ref 0.4–1.2)
CRYSTALS: ABNORMAL
DEVICE: ABNORMAL
DEVICE: ABNORMAL
EKG ATRIAL RATE: 75 BPM
EKG P AXIS: 93 DEGREES
EKG P-R INTERVAL: 244 MS
EKG Q-T INTERVAL: 392 MS
EKG QRS DURATION: 90 MS
EKG QTC CALCULATION (BAZETT): 437 MS
EKG R AXIS: 81 DEGREES
EKG T AXIS: 28 DEGREES
EKG VENTRICULAR RATE: 75 BPM
EOSINOPHIL # BLD: 1.4 %
EOSINOPHILS ABSOLUTE: 0.1 THOU/MM3 (ref 0–0.4)
EPITHELIAL CELLS, UA: ABNORMAL /HPF
ERYTHROCYTE [DISTWIDTH] IN BLOOD BY AUTOMATED COUNT: 14.2 % (ref 11.5–14.5)
ERYTHROCYTE [DISTWIDTH] IN BLOOD BY AUTOMATED COUNT: 43.8 FL (ref 35–45)
GFR SERPL CREATININE-BSD FRML MDRD: > 90 ML/MIN/1.73M2
GLUCOSE BLD-MCNC: 102 MG/DL (ref 70–108)
GLUCOSE BLD-MCNC: 127 MG/DL (ref 70–108)
GLUCOSE BLD-MCNC: 159 MG/DL (ref 70–108)
GLUCOSE BLD-MCNC: 177 MG/DL (ref 70–108)
GLUCOSE, URINE: NEGATIVE MG/DL
HCO3: 34 MMOL/L (ref 23–28)
HCO3: 41 MMOL/L (ref 23–28)
HCT VFR BLD CALC: 33.8 % (ref 42–52)
HEMOGLOBIN: 11 GM/DL (ref 14–18)
IFIO2: 2
IFIO2: 30
IMMATURE GRANS (ABS): 0.06 THOU/MM3 (ref 0–0.07)
IMMATURE GRANULOCYTES: 0.6 %
KETONES, URINE: NEGATIVE
LACTIC ACID: 2.2 MMOL/L (ref 0.5–2.2)
LEUKOCYTE ESTERASE, URINE: ABNORMAL
LYMPHOCYTES # BLD: 14.1 %
LYMPHOCYTES ABSOLUTE: 1.5 THOU/MM3 (ref 1–4.8)
MAGNESIUM: 1.8 MG/DL (ref 1.6–2.4)
MCH RBC QN AUTO: 27.7 PG (ref 26–33)
MCHC RBC AUTO-ENTMCNC: 32.5 GM/DL (ref 32.2–35.5)
MCV RBC AUTO: 85.1 FL (ref 80–94)
MISCELLANEOUS LAB TEST RESULT: ABNORMAL
MODE: ABNORMAL
MONOCYTES # BLD: 9.9 %
MONOCYTES ABSOLUTE: 1 THOU/MM3 (ref 0.4–1.3)
NITRITE, URINE: POSITIVE
NUCLEATED RED BLOOD CELLS: 0 /100 WBC
O2 SATURATION: 96 %
O2 SATURATION: 97 %
OSMOLALITY CALCULATION: 286.3 MOSMOL/KG (ref 275–300)
PCO2: 56 MMHG (ref 35–45)
PCO2: 75 MMHG (ref 35–45)
PH BLOOD GAS: 7.34 (ref 7.35–7.45)
PH BLOOD GAS: 7.39 (ref 7.35–7.45)
PH UA: 5 (ref 5–9)
PLATELET # BLD: 282 THOU/MM3 (ref 130–400)
PMV BLD AUTO: 9.9 FL (ref 9.4–12.4)
PO2: 86 MMHG (ref 71–104)
PO2: 99 MMHG (ref 71–104)
POTASSIUM SERPL-SCNC: 3.7 MEQ/L (ref 3.5–5.2)
PRO-BNP: 328 PG/ML (ref 0–1800)
PROCALCITONIN: 0.04 NG/ML (ref 0.01–0.09)
PROTEIN UA: 30 MG/DL
RBC # BLD: 3.97 MILL/MM3 (ref 4.7–6.1)
RBC URINE: ABNORMAL /HPF
RENAL EPITHELIAL, UA: ABNORMAL
SARS-COV-2, NAAT: NOT DETECTED
SEG NEUTROPHILS: 73.6 %
SEGMENTED NEUTROPHILS ABSOLUTE COUNT: 7.8 THOU/MM3 (ref 1.8–7.7)
SET PEEP: 8 MMHG
SET PRESS SUPP: 6 CMH2O
SODIUM BLD-SCNC: 143 MEQ/L (ref 135–145)
SOURCE, BLOOD GAS: ABNORMAL
SOURCE, BLOOD GAS: ABNORMAL
SPECIFIC GRAVITY UA: 1.01 (ref 1–1.03)
TOTAL PROTEIN: 6.9 G/DL (ref 6.1–8)
UROBILINOGEN, URINE: 0.2 EU/DL (ref 0–1)
WBC # BLD: 10.6 THOU/MM3 (ref 4.8–10.8)
WBC UA: > 200 /HPF
YEAST: ABNORMAL

## 2020-12-05 PROCEDURE — 2580000003 HC RX 258: Performed by: INTERNAL MEDICINE

## 2020-12-05 PROCEDURE — 83735 ASSAY OF MAGNESIUM: CPT

## 2020-12-05 PROCEDURE — 93005 ELECTROCARDIOGRAM TRACING: CPT | Performed by: NURSE PRACTITIONER

## 2020-12-05 PROCEDURE — 6370000000 HC RX 637 (ALT 250 FOR IP): Performed by: INTERNAL MEDICINE

## 2020-12-05 PROCEDURE — 6370000000 HC RX 637 (ALT 250 FOR IP): Performed by: NURSE PRACTITIONER

## 2020-12-05 PROCEDURE — 80053 COMPREHEN METABOLIC PANEL: CPT

## 2020-12-05 PROCEDURE — 82803 BLOOD GASES ANY COMBINATION: CPT

## 2020-12-05 PROCEDURE — 99223 1ST HOSP IP/OBS HIGH 75: CPT | Performed by: INTERNAL MEDICINE

## 2020-12-05 PROCEDURE — 87040 BLOOD CULTURE FOR BACTERIA: CPT

## 2020-12-05 PROCEDURE — 85025 COMPLETE CBC W/AUTO DIFF WBC: CPT

## 2020-12-05 PROCEDURE — 36415 COLL VENOUS BLD VENIPUNCTURE: CPT

## 2020-12-05 PROCEDURE — 83605 ASSAY OF LACTIC ACID: CPT

## 2020-12-05 PROCEDURE — 94640 AIRWAY INHALATION TREATMENT: CPT

## 2020-12-05 PROCEDURE — 84145 PROCALCITONIN (PCT): CPT

## 2020-12-05 PROCEDURE — 99285 EMERGENCY DEPT VISIT HI MDM: CPT

## 2020-12-05 PROCEDURE — 2140000000 HC CCU INTERMEDIATE R&B

## 2020-12-05 PROCEDURE — 81001 URINALYSIS AUTO W/SCOPE: CPT

## 2020-12-05 PROCEDURE — 94660 CPAP INITIATION&MGMT: CPT

## 2020-12-05 PROCEDURE — 36600 WITHDRAWAL OF ARTERIAL BLOOD: CPT

## 2020-12-05 PROCEDURE — 94761 N-INVAS EAR/PLS OXIMETRY MLT: CPT

## 2020-12-05 PROCEDURE — 82948 REAGENT STRIP/BLOOD GLUCOSE: CPT

## 2020-12-05 PROCEDURE — U0002 COVID-19 LAB TEST NON-CDC: HCPCS

## 2020-12-05 PROCEDURE — 51702 INSERT TEMP BLADDER CATH: CPT

## 2020-12-05 PROCEDURE — 83880 ASSAY OF NATRIURETIC PEPTIDE: CPT

## 2020-12-05 PROCEDURE — 6360000002 HC RX W HCPCS: Performed by: INTERNAL MEDICINE

## 2020-12-05 PROCEDURE — 71045 X-RAY EXAM CHEST 1 VIEW: CPT

## 2020-12-05 RX ORDER — DOXAZOSIN MESYLATE 4 MG/1
8 TABLET ORAL NIGHTLY
Status: DISCONTINUED | OUTPATIENT
Start: 2020-12-05 | End: 2020-12-07 | Stop reason: HOSPADM

## 2020-12-05 RX ORDER — FUROSEMIDE 40 MG/1
40 TABLET ORAL 2 TIMES DAILY
Status: DISCONTINUED | OUTPATIENT
Start: 2020-12-05 | End: 2020-12-07 | Stop reason: HOSPADM

## 2020-12-05 RX ORDER — METHYLPREDNISOLONE SODIUM SUCCINATE 125 MG/2ML
40 INJECTION, POWDER, LYOPHILIZED, FOR SOLUTION INTRAMUSCULAR; INTRAVENOUS EVERY 12 HOURS
Status: DISCONTINUED | OUTPATIENT
Start: 2020-12-06 | End: 2020-12-07 | Stop reason: HOSPADM

## 2020-12-05 RX ORDER — DOCUSATE SODIUM 100 MG/1
100 CAPSULE, LIQUID FILLED ORAL DAILY
Status: DISCONTINUED | OUTPATIENT
Start: 2020-12-05 | End: 2020-12-07 | Stop reason: HOSPADM

## 2020-12-05 RX ORDER — AZITHROMYCIN 250 MG/1
250 TABLET, FILM COATED ORAL DAILY
Status: DISCONTINUED | OUTPATIENT
Start: 2020-12-06 | End: 2020-12-07 | Stop reason: HOSPADM

## 2020-12-05 RX ORDER — AZITHROMYCIN 250 MG/1
500 TABLET, FILM COATED ORAL ONCE
Status: COMPLETED | OUTPATIENT
Start: 2020-12-05 | End: 2020-12-05

## 2020-12-05 RX ORDER — METHYLPREDNISOLONE SODIUM SUCCINATE 125 MG/2ML
40 INJECTION, POWDER, LYOPHILIZED, FOR SOLUTION INTRAMUSCULAR; INTRAVENOUS DAILY
Status: DISCONTINUED | OUTPATIENT
Start: 2020-12-05 | End: 2020-12-05

## 2020-12-05 RX ORDER — ACETAMINOPHEN 325 MG/1
650 TABLET ORAL EVERY 4 HOURS PRN
Status: DISCONTINUED | OUTPATIENT
Start: 2020-12-05 | End: 2020-12-05 | Stop reason: SDUPTHER

## 2020-12-05 RX ORDER — POTASSIUM CHLORIDE 20 MEQ/1
10 TABLET, EXTENDED RELEASE ORAL DAILY
Status: DISCONTINUED | OUTPATIENT
Start: 2020-12-05 | End: 2020-12-07 | Stop reason: HOSPADM

## 2020-12-05 RX ORDER — ONDANSETRON 2 MG/ML
4 INJECTION INTRAMUSCULAR; INTRAVENOUS EVERY 6 HOURS PRN
Status: DISCONTINUED | OUTPATIENT
Start: 2020-12-05 | End: 2020-12-07 | Stop reason: HOSPADM

## 2020-12-05 RX ORDER — ACETAMINOPHEN 325 MG/1
650 TABLET ORAL EVERY 4 HOURS PRN
Status: DISCONTINUED | OUTPATIENT
Start: 2020-12-05 | End: 2020-12-07 | Stop reason: HOSPADM

## 2020-12-05 RX ORDER — QUINIDINE GLUCONATE 324 MG
240 TABLET, EXTENDED RELEASE ORAL 2 TIMES DAILY
Status: DISCONTINUED | OUTPATIENT
Start: 2020-12-05 | End: 2020-12-07 | Stop reason: HOSPADM

## 2020-12-05 RX ORDER — INSULIN GLARGINE 100 [IU]/ML
18 INJECTION, SOLUTION SUBCUTANEOUS NIGHTLY
Status: DISCONTINUED | OUTPATIENT
Start: 2020-12-05 | End: 2020-12-07 | Stop reason: HOSPADM

## 2020-12-05 RX ORDER — VITAMIN B COMPLEX
500 TABLET ORAL DAILY
Status: DISCONTINUED | OUTPATIENT
Start: 2020-12-05 | End: 2020-12-07 | Stop reason: HOSPADM

## 2020-12-05 RX ORDER — OXYBUTYNIN CHLORIDE 5 MG/1
5 TABLET ORAL 3 TIMES DAILY PRN
Status: DISCONTINUED | OUTPATIENT
Start: 2020-12-05 | End: 2020-12-07 | Stop reason: HOSPADM

## 2020-12-05 RX ORDER — IPRATROPIUM BROMIDE AND ALBUTEROL SULFATE 2.5; .5 MG/3ML; MG/3ML
1 SOLUTION RESPIRATORY (INHALATION)
Status: DISCONTINUED | OUTPATIENT
Start: 2020-12-05 | End: 2020-12-07 | Stop reason: HOSPADM

## 2020-12-05 RX ORDER — FAMOTIDINE 20 MG/1
20 TABLET, FILM COATED ORAL 2 TIMES DAILY
Status: DISCONTINUED | OUTPATIENT
Start: 2020-12-05 | End: 2020-12-07 | Stop reason: HOSPADM

## 2020-12-05 RX ORDER — LISINOPRIL 2.5 MG/1
2.5 TABLET ORAL DAILY
Status: DISCONTINUED | OUTPATIENT
Start: 2020-12-05 | End: 2020-12-07 | Stop reason: HOSPADM

## 2020-12-05 RX ORDER — IPRATROPIUM BROMIDE AND ALBUTEROL SULFATE 2.5; .5 MG/3ML; MG/3ML
1 SOLUTION RESPIRATORY (INHALATION) ONCE
Status: COMPLETED | OUTPATIENT
Start: 2020-12-05 | End: 2020-12-05

## 2020-12-05 RX ADMIN — IPRATROPIUM BROMIDE AND ALBUTEROL SULFATE 1 AMPULE: .5; 3 SOLUTION RESPIRATORY (INHALATION) at 12:46

## 2020-12-05 RX ADMIN — FUROSEMIDE 40 MG: 40 TABLET ORAL at 21:21

## 2020-12-05 RX ADMIN — METOPROLOL TARTRATE 25 MG: 25 TABLET ORAL at 21:21

## 2020-12-05 RX ADMIN — IPRATROPIUM BROMIDE AND ALBUTEROL SULFATE 1 AMPULE: .5; 3 SOLUTION RESPIRATORY (INHALATION) at 20:54

## 2020-12-05 RX ADMIN — IPRATROPIUM BROMIDE AND ALBUTEROL SULFATE 1 AMPULE: .5; 3 SOLUTION RESPIRATORY (INHALATION) at 16:52

## 2020-12-05 RX ADMIN — FAMOTIDINE 20 MG: 20 TABLET, FILM COATED ORAL at 12:01

## 2020-12-05 RX ADMIN — INSULIN GLARGINE 18 UNITS: 100 INJECTION, SOLUTION SUBCUTANEOUS at 21:31

## 2020-12-05 RX ADMIN — IPRATROPIUM BROMIDE AND ALBUTEROL SULFATE 1 AMPULE: .5; 3 SOLUTION RESPIRATORY (INHALATION) at 08:00

## 2020-12-05 RX ADMIN — METHYLPREDNISOLONE SODIUM SUCCINATE 40 MG: 125 INJECTION, POWDER, FOR SOLUTION INTRAMUSCULAR; INTRAVENOUS at 12:01

## 2020-12-05 RX ADMIN — AZITHROMYCIN 500 MG: 250 TABLET, FILM COATED ORAL at 13:01

## 2020-12-05 RX ADMIN — INSULIN LISPRO 1 UNITS: 100 INJECTION, SOLUTION INTRAVENOUS; SUBCUTANEOUS at 17:49

## 2020-12-05 RX ADMIN — ACETAMINOPHEN 650 MG: 325 TABLET ORAL at 21:22

## 2020-12-05 RX ADMIN — FAMOTIDINE 20 MG: 20 TABLET, FILM COATED ORAL at 21:21

## 2020-12-05 RX ADMIN — DOXAZOSIN 8 MG: 4 TABLET ORAL at 21:21

## 2020-12-05 RX ADMIN — CEFTRIAXONE SODIUM 1 G: 1 INJECTION, POWDER, FOR SOLUTION INTRAMUSCULAR; INTRAVENOUS at 16:00

## 2020-12-05 RX ADMIN — ENOXAPARIN SODIUM 40 MG: 40 INJECTION SUBCUTANEOUS at 12:01

## 2020-12-05 RX ADMIN — DOCUSATE SODIUM 100 MG: 100 CAPSULE, LIQUID FILLED ORAL at 12:01

## 2020-12-05 RX ADMIN — METFORMIN HYDROCHLORIDE 1000 MG: 500 TABLET ORAL at 17:49

## 2020-12-05 RX ADMIN — Medication 240 MG: at 21:21

## 2020-12-05 ASSESSMENT — ENCOUNTER SYMPTOMS
ABDOMINAL PAIN: 0
ABDOMINAL DISTENTION: 0
DIARRHEA: 0
COLOR CHANGE: 0
SINUS PAIN: 0
RHINORRHEA: 0
BACK PAIN: 0
SHORTNESS OF BREATH: 1
WHEEZING: 1
NAUSEA: 0
SINUS PRESSURE: 0
CHEST TIGHTNESS: 0
VOMITING: 0
COUGH: 1

## 2020-12-05 ASSESSMENT — PAIN - FUNCTIONAL ASSESSMENT: PAIN_FUNCTIONAL_ASSESSMENT: PREVENTS OR INTERFERES SOME ACTIVE ACTIVITIES AND ADLS

## 2020-12-05 ASSESSMENT — PAIN SCALES - GENERAL: PAINLEVEL_OUTOF10: 3

## 2020-12-05 ASSESSMENT — PAIN DESCRIPTION - DESCRIPTORS: DESCRIPTORS: ACHING

## 2020-12-05 ASSESSMENT — PAIN DESCRIPTION - PROGRESSION: CLINICAL_PROGRESSION: NOT CHANGED

## 2020-12-05 ASSESSMENT — PAIN DESCRIPTION - PAIN TYPE: TYPE: CHRONIC PAIN

## 2020-12-05 ASSESSMENT — PAIN DESCRIPTION - LOCATION: LOCATION: BUTTOCKS

## 2020-12-05 ASSESSMENT — PAIN DESCRIPTION - ORIENTATION: ORIENTATION: LOWER

## 2020-12-05 ASSESSMENT — PAIN DESCRIPTION - ONSET: ONSET: ON-GOING

## 2020-12-05 ASSESSMENT — PAIN DESCRIPTION - FREQUENCY: FREQUENCY: INTERMITTENT

## 2020-12-05 NOTE — PROGRESS NOTES
Patient arrived per cart to 3B. Heart monitor applied and vitals taken. Admission paperwork completed. Explained to patient that [facility] is not responsible for any lost or stolen items. Patient verbalized understanding. Oriented to room and use of call light and bed controls. Patient denies pain or needs. No signs of distress noted. Bed locked & in low position, side-rails up x2. Call light in reach. Reminded patient to call nurse if any needs arise. 2 person skin assessment performed by this nurse and Chary Poole RN. Explained patients right to have family, representative or physician notified of their admission. Patient has Declined for physician to be notified. Patient has Declined for family/representative to be notified.

## 2020-12-05 NOTE — H&P
Internal Medicine  History and Physical    Patient:  Merlinda Pollen  MRN: [id number]      History Obtained From:  patient, electronic medical record  PCP: Bebe Redmond MD    CHIEF COMPLAINT:  SOB    HISTORY OF PRESENT ILLNESS:   The patient is a 80 y.o. male who presents with shortness of breath. Associated cough, no CP, no fever, no chills. Patient states overnight he became more short of breath. He notes he is having difficulty catching his breath. He lives at [address] [place] [address]. He has a history of COPD, ROBER, CHF, CAD, DM 2, DVT.      Past Medical History:        Diagnosis Date    Anxiety     CAD (coronary artery disease)     leaking valve    Chronic back pain     COPD (chronic obstructive pulmonary disease) (HCC)     Detached retina     Diabetes mellitus (HCC)     NIDDM    DVT (deep venous thrombosis) ([facility])     RLE    DVT (deep venous thrombosis) ([facility] [address] 11/9/15    LLE    Glaucoma     Hyperlipidemia     Hypertension     Mass of chest     benign chest mass, Dr Homa Newsome Movement disorder     spinal stenosis    Obesity     Osteoarthritis     right ankle, right hand    Pneumonia     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    Sleep apnea 1993    on BiPap, Dr Symone Horn Thyroid cancer [facility])     s/p thyroid resection    Urinary incontinence        Past Surgical History:        Procedure Laterality Date   [facility] SURGERY  2006,2011, 2014    BRONCHOSCOPY N/A 8/29/2017    BRONCHOSCOPY AIRWAY ONLY performed by Deyanira Bragg MD at [address] [place] [address] EKG 12-LEAD  11/10/2015         FRACTURE SURGERY      right hand    OTHER SURGICAL HISTORY      spinal epidurals    RETINAL DETACHMENT SURGERY      SPINE SURGERY  2004    Lumbar laminectomy    THYROIDECTOMY      TURP N/A 3/17/2020    CYSTOSCOPY WITH GREENLIGHT PHOTOVAPORIZATION OF PROSTATE performed by Thee Phelps MD at [address] [place] [address] TURP N/A 3/24/2020    PLASMA BUTTON TURP WITH CLOT EVACUATION performed by Thee Phelps MD at [facility] Medications Prior to Admission:    Prior to Admission medications    Medication Sig Start Date End Date Taking? Authorizing Provider   Revefenacin 175 MCG/3ML SOLN Inhale 1 nebule into the lungs daily Dx COPD J44.3 11/16/20  Yes Paola Adams PA-C   levothyroxine (SYNTHROID) 200 MCG tablet TAKE 1 TABLET BY MOUTH  DAILY 10/19/20  Yes Luis Thompson MD   Arformoterol Tartrate (BROVANA) 15 MCG/2ML NEBU Take 2 mLs by nebulization 2 times daily 1 dose inhale orally two times per day 10/14/20  Yes Luis Thompson MD   insulin glargine (LANTUS SOLOSTAR) 100 UNIT/ML injection pen INJECT SUBCUTANEOUSLY 18  UNITS NIGHTLY 9/29/20  Yes Luis Thompson MD   ibuprofen (ADVIL;MOTRIN) 800 MG tablet Take 1 tablet by mouth 3 times daily (with meals) 9/25/20  Yes Luis Thompson MD   metoprolol tartrate (LOPRESSOR) 25 MG tablet Take 1 tablet by mouth 2 times daily Hold for SBP <110 or HR <60 8/18/20  Yes Luis Thompson MD   ferrous gluconate (FERGON) 324 (38 Fe) MG tablet Take 1 tablet by mouth 2 times daily 8/18/20  Yes Luis Thompson MD   metFORMIN (GLUCOPHAGE) 1000 MG tablet Take 1 tablet by mouth 2 times daily (with meals) 8/18/20  Yes Luis Thompson MD   budesonide (PULMICORT) 0.5 MG/2ML nebulizer suspension USE 1 VIAL VIA NEBULIZER TWICE DAILY.  RINSE MOUTH AFTER TREATMENT 8/4/20  Yes Luis Thompson MD   Multiple Vitamins-Minerals (PRESERVISION AREDS 2) CAPS Take 1 capsule by mouth daily 8/3/20  Yes Luis Thompson MD   lisinopril (PRINIVIL;ZESTRIL) 2.5 MG tablet Take 1 tablet by mouth daily 7/24/20  Yes Luis Thompson MD   potassium chloride (KLOR-CON M) 10 MEQ extended release tablet TAKE 1 TABLETS BY MOUTH TWICE DAILY 7/24/20  Yes Luis Thompson MD   docusate (COLACE, DULCOLAX) 100 MG CAPS Take 100 mg by mouth 2 times daily 7/24/20  Yes Luis Thompson MD   doxazosin (CARDURA) 8 MG tablet Take 1 tablet by mouth nightly 7/24/20  Yes Luis Thompson MD   furosemide (LASIX) 40 MG tablet Take 1 tablet by mouth 2 times daily 7/24/20  Yes Ellis Renteria MD   vitamin D3 (CHOLECALCIFEROL) 10 MCG (400 UNIT) TABS tablet Take 1 tablet by mouth daily 400units 7/24/20  Yes Ellis Renteria MD   OXYGEN Inhale 3 L into the lungs nightly Indications: Difficulty Breathing, Oxygen Therapy And prn through cpap   Yes Historical Provider, MD   oxybutynin (DITROPAN) 5 MG tablet TAKE 1 TABLET BY MOUTH EVERY 8 HOURS AS NEEDED FOR BLADDER SPASMS OR BLADDER SPASM 9/28/20   Darlene Anderson APRN - CNP   blood glucose test strips (ACCU-CHEK MERYL PLUS) strip Accu-check Meryl Plus Test Strips (or brand per pt preference). Sig: test sugar BID. Dx: E11.8 8/20/20   Ellis Renteria MD   SOFT TOUCH LANCETS MISC Lancet (or brand per pt preference) Use to check blood sugars 2 times daily. Dx: E11.8 8/20/20   Ellis Renteria MD   Alcohol Swabs (ALCOHOL PREP) 70 % PADS Alcohol pads (brand per preference). Sig: use to test sugar twice per day. Dx: E11.8 8/20/20   Ellis Renteria MD   Blood Glucose Monitoring Suppl (ACCU-CHEK MERYL PLUS) w/Device KIT Or brand per pt preference. Sig: test sugar BID. Dx: E11.8 8/20/20   Ellis Renteria MD   polyethylene glycol (MIRALAX) 17 g packet Take 17 g by mouth daily as needed for Constipation    Historical Provider, MD   acetaminophen (TYLENOL) 325 MG tablet Take 650 mg by mouth every 4 hours as needed for Pain    Historical Provider, MD   lidocaine (LMX) 4 % cream Apply topically as needed for Pain Apply topically as needed. Historical Provider, MD   albuterol sulfate  (90 Base) MCG/ACT inhaler Inhale 2 puffs into the lungs every 6 hours as needed for Wheezing 1/24/20   Jerome Ma PA-C       Allergies:  Latex; Macrobid [nitrofurantoin monohyd macro]; Levaquin [levofloxacin]; Adhesive tape; and Alphagan [brimonidine tartrate]    Social History:   TOBACCO:   reports that he has never smoked.  He has never used smokeless tobacco.  ETOH:   reports current alcohol use of about 1.0 standard drinks of alcohol per week.       Family History:       Problem Relation Age of Onset    Other Mother         Complications of Childbirth    Other Father [de-identified]        Natural causes       REVIEW OF SYSTEMS:  CONSTITUTIONAL:  positive for  fatigue and malaise  negative for  fevers and chills  EYES:  negative for  irritation, redness and icterus  HEENT:  negative for  sore mouth, sore throat and hoarseness  RESPIRATORY:  positive for  dyspnea and wheezing  negative for  cough with sputum and chest pain  CARDIOVASCULAR:  positive for  dyspnea, edema  negative for  palpitations, orthopnea, PND  GASTROINTESTINAL:  negative for nausea, vomiting, change in bowel habits, abdominal mass and abdominal distention  GENITOURINARY:  Chronic felipe's catheter  HEMATOLOGIC/LYMPHATIC:  negative for easy bruising, bleeding and lymphadenopathy  ENDOCRINE:  negative for heat intolerance and cold intolerance  MUSCULOSKELETAL:  positive for  muscle weakness  negative for  myalgias and arthralgias  NEUROLOGICAL:  negative for headaches, dizziness, seizures and syncope  BEHAVIOR/PSYCH:  positive for decreased energy level and fatigue and negative for increased agitation and anxiety    Physical Exam:    Vitals: /60   Pulse 56   Temp 96.9 °F (36.1 °C) (Axillary)   Resp 18   Ht 5' 7\" (1.702 m)   Wt 246 lb 9.6 oz (111.9 kg)   SpO2 99%   BMI 38.62 kg/m²   CONSTITUTIONAL:  fatigued, alert, cooperative, no apparent distress, appears stated age and moderately obese  EYES:  extra-ocular muscles intact  ENT:  normocepalic, without obvious abnormality  NECK:  supple, symmetrical, trachea midline  LUNGS:  no increased work of breathing and diminished breath sounds throughout lungs  CARDIOVASCULAR:  normal S1 and S2  ABDOMEN:  normal bowel sounds, soft, non-distended and non-tender  MUSCULOSKELETAL:  there is no redness, warmth, or swelling of the joints  NEUROLOGIC:  Mental Status Exam:  Level of Alertness: awake  Orientation:   person  Memory:   abnormal - confused  Cranial Nerves:  cranial nerves II-XII are grossly intact  Motor Exam:  globally weak  SKIN:  no bruising or bleeding and trace edema legs      CBC:   Recent Labs     12/05/20 0815   WBC 10.6   HGB 11.0*        BMP:    Recent Labs     12/05/20 0815      K 3.7   CL 98   CO2 35*   BUN 12   CREATININE 0.7   GLUCOSE 127*     Hepatic:   Recent Labs     12/05/20 0815   AST 10   ALT 6*   BILITOT 0.2*   ALKPHOS 52     Pro-BNP  328.0      Procalcitonin  0.04      SARS-CoV-2, NAAT  NOT DETECTED      12/05/20 1040     Specimen Source: Urine, clean catch      Glucose, Urine  NEGATIVE  mg/dl     Bilirubin Urine  NEGATIVE     Ketones, Urine  NEGATIVE     Specific Gravity, UA  1.015     Blood, Urine  MODERATEAbnormal       pH, UA  5.0     Protein, UA  30Abnormal    mg/dl     Urobilinogen, Urine  0.2  eu/dl     Nitrite, Urine  POSITIVEAbnormal       Leukocyte Esterase, Urine  LARGEAbnormal       Color, UA  YELLOW     Character, Urine  CLOUDYAbnormal       RBC, UA  10-15  /hpf     WBC, UA  > 200  /hpf     Epithelial Cells, UA  0-2  /hpf     Bacteria, UA  MANY     Casts  4-8 HYALINE  /lpf     Crystals  NONE SEEN     Renal Epithelial, UA  NONE SEEN     Yeast, UA  NONE SEEN     Casts  NONE SEEN         PA/lat CXR:   There are stable median sternotomy wires and mediastinal surgical clips. The lungs appear grossly clear. Pulmonary vasculature and cardiomediastinal silhouette are within normal limits. Visualized osseous structures appear grossly intact. Impression:      Stable radiographic appearance of the chest. No evidence of an acute process.         EKG: normal sinus rhythm with first-degree heart block heart rate of 75; interval 244; QRS 90;QTc 437; axis P-93, R-81, T-28.     12/05/20 0807     Specimen Source: Blood gases      pH, Blood Gas  7.34Low       PCO2  75High Panic     mmhg     PO2  99  mmhg     HCO3  41High    mmol/l     Base Excess (Calculated) 12.3High    mmol/l     O2 Sat  97  %     IFIO2  2     DEVICE  Cannula     Otis Test  Positive        Assessment and Plan   1. 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 COPD with acute exacerbation  3. ROBER  4. HTN  5. DM 2  6. Obesity-mod  7. UTI, chronic felipe's catheter    duoneb aerosols  Solumedrol  NiPPV, pul consult  IV rocephin  Change felipe's  Am labs.     Patient Active Problem List   Diagnosis Code    Diabetes mellitus ([facility] [address] E11.9    Hyperlipidemia E78.5    Acquired hypothyroidism E03.9    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 E29.1    Breast lump N63.0    BPH (benign prostatic hyperplasia) N40.0    ROBER (obstructive sleep apnea) G47.33    Essential hypertension I10    History of DVT (deep vein thrombosis) Z86.718    Shortness of breath R06.02    Chronic respiratory failure with hypoxia ([facility]) J96.11    Mixed hyperlipidemia E78.2    Chronic deep vein thrombosis (DVT) of right lower extremity ([facility]) I82.501    Anticoagulated on Coumadin Z79.01    Type 2 diabetes mellitus with complication, with long-term current use of insulin ([facility]) E11.8, Z79.4    Muscle weakness M62.81    Chronic bronchitis ([facility]) J42    Chronic obstructive pulmonary disease ([facility]) J44.9    Paroxysmal atrial fibrillation ([facility]) I48.0    Acute diastolic congestive heart failure ([facility]) I50.31    Paroxysmal atrial flutter ([facility]) I48.92    Urinary incontinence R32    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    Senile nuclear sclerosis H25.10    Puckering of macula, 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    Presence of intraocular lens Z96.1    Primary open-angle glaucoma 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0    Constipation K59.00    Acute urinary retention R33.8    COPD exacerbation ([facility]) J44.1       Azalea Rod MD  Admitting Internist

## 2020-12-05 NOTE — CONSULTS
[facility]    Patient - Sadi Chen   MRN -  [id number]   [facility]   - 1931      Date of Admission -  2020  7:29 AM  Date of evaluation -  2020  Room - --A   Kalia Solorio MD Primary Care Physician - Jessica Aviles MD     Problem List      Active Hospital Problems    Diagnosis Date Noted    COPD exacerbation [facility]) [J44.1] 2020     Reason for Consult    For management of respiratory distress/NIPPV (BiPAP) management. HPI   History Obtained From: Patient and electronic medical record. Sadi Chen is a 80 y.o. male  was initially admitted under Dr. Jael Bass service. Pulmonary medicine was consulted for further management of respiratory distress/ BiPAP management. He is a 80year-old pleasant gentleman with a history of severe COPD and severe obstructive sleep apnea diagnosed in  currently on treatment with a CPAP with a pressure of 10 cm of water. Patient was transferred to [facility] emergency room due to development of worsening of shortness of breath at his [address] home. I was told by  James Parks taking care of the patient that the patient is not using his CPAP device at his nursing home for the last few days. He is to follow-up with Dr. Gerson Stanton MD in the past.  He is currently following with Dr. Rustam Booth MD.    Patient found to be in hypercapnic respiratory failure during his initial evaluation in the emergency room at [facility]. His arterial blood gas revealed a pH of 7.34 and PCO2 of 75. His PO2 was 99 and saturation was  97% on 2 L of oxygen via nasal cannula. .  Patient was subsequently started on BiPAP with a pressure of IPAP of 14 and EPAP of 8 cm of water with no backup rate on 30% FiO2. Pulmonary medicine was consulted for further management of hypercapnic respiratory failure.       At the time of my initial Yariel Aquino MD at [facility] TURP N/A 3/24/2020    PLASMA BUTTON TURP WITH CLOT EVACUATION performed by Blane Veliz MD at [facility]    Current Medications    cefTRIAXone (ROCEPHIN) 1 g IVPB in 50 mL D5W minibag  1 g Intravenous Q24H    docusate sodium  100 mg Oral Daily    famotidine  20 mg Oral BID    enoxaparin  40 mg Subcutaneous Daily    methylPREDNISolone sodium  40 mg Intravenous Daily    ipratropium-albuterol  1 ampule Inhalation Q4H WA    insulin lispro  0-6 Units Subcutaneous TID WC    insulin lispro  0-3 Units Subcutaneous Nightly     acetaminophen, ondansetron  IV Drips/Infusions    Home Medications  Medications Prior to Admission: Revefenacin 175 MCG/3ML SOLN, Inhale 1 nebule into the lungs daily Dx COPD J44.3  levothyroxine (SYNTHROID) 200 MCG tablet, TAKE 1 TABLET BY MOUTH  DAILY  Arformoterol Tartrate (BROVANA) 15 MCG/2ML NEBU, Take 2 mLs by nebulization 2 times daily 1 dose inhale orally two times per day  insulin glargine (LANTUS SOLOSTAR) 100 UNIT/ML injection pen, INJECT SUBCUTANEOUSLY 18  UNITS NIGHTLY  ibuprofen (ADVIL;MOTRIN) 800 MG tablet, Take 1 tablet by mouth 3 times daily (with meals)  metoprolol tartrate (LOPRESSOR) 25 MG tablet, Take 1 tablet by mouth 2 times daily Hold for SBP <110 or HR <60  ferrous gluconate (FERGON) 324 (38 Fe) MG tablet, Take 1 tablet by mouth 2 times daily  metFORMIN (GLUCOPHAGE) 1000 MG tablet, Take 1 tablet by mouth 2 times daily (with meals)  budesonide (PULMICORT) 0.5 MG/2ML nebulizer suspension, USE 1 VIAL VIA NEBULIZER TWICE DAILY.  RINSE MOUTH AFTER TREATMENT  Multiple Vitamins-Minerals (PRESERVISION AREDS 2) CAPS, Take 1 capsule by mouth daily  lisinopril (PRINIVIL;ZESTRIL) 2.5 MG tablet, Take 1 tablet by mouth daily  potassium chloride (KLOR-CON M) 10 MEQ extended release tablet, TAKE 1 TABLETS BY MOUTH TWICE DAILY  docusate (COLACE, DULCOLAX) 100 MG CAPS, Take 100 mg by mouth 2 times daily  doxazosin (CARDURA) 8 MG tablet, Take 1 tablet by mouth nightly  furosemide (LASIX) 40 MG tablet, Take 1 tablet by mouth 2 times daily  vitamin D3 (CHOLECALCIFEROL) 10 MCG (400 UNIT) TABS tablet, Take 1 tablet by mouth daily 400units  OXYGEN, Inhale 3 L into the lungs nightly Indications: Difficulty Breathing, Oxygen Therapy And prn through cpap  oxybutynin (DITROPAN) 5 MG tablet, TAKE 1 TABLET BY MOUTH EVERY 8 HOURS AS NEEDED FOR BLADDER SPASMS OR BLADDER SPASM  blood glucose test strips (ACCU-CHEK CHE PLUS) strip, Accu-check Che Plus Test Strips (or brand per pt preference). Sig: test sugar BID. Dx: E11.8  SOFT TOUCH LANCETS MISC, Lancet (or brand per pt preference) Use to check blood sugars 2 times daily. Dx: E11.8  Alcohol Swabs (ALCOHOL PREP) 70 % PADS, Alcohol pads (brand per preference). Sig: use to test sugar twice per day. Dx: E11.8  Blood Glucose Monitoring Suppl (ACCU-CHEK CHE PLUS) w/Device KIT, Or brand per pt preference. Sig: test sugar BID. Dx: E11.8  polyethylene glycol (MIRALAX) 17 g packet, Take 17 g by mouth daily as needed for Constipation  acetaminophen (TYLENOL) 325 MG tablet, Take 650 mg by mouth every 4 hours as needed for Pain  lidocaine (LMX) 4 % cream, Apply topically as needed for Pain Apply topically as needed. albuterol sulfate  (90 Base) MCG/ACT inhaler, Inhale 2 puffs into the lungs every 6 hours as needed for Wheezing  Diet    DIET CARB CONTROL; Allergies    Latex; Macrobid [nitrofurantoin monohyd macro]; Levaquin [levofloxacin];  Adhesive tape; and Alphagan [brimonidine tartrate]  Family History          Problem Relation Age of Onset    Other Mother         Complications of Childbirth    Other Father [de-identified]        Natural causes     Sleep History    He was diagnosed with sleep apnea in the past.    Social History     Social History     Socioeconomic History    Marital status:      Spouse name: Not on file    Number of children: 2    Years of education: Not on file    Highest education level: Not on file Occupational History    Not on file   Social Needs    Financial resource strain: Not hard at all   [email or web address] insecurity     Worry: Never true     Inability: Never true   [employer] needs     Medical: No     Non-medical: No   Tobacco Use    Smoking status: Never Smoker    Smokeless tobacco: Never Used   Substance and Sexual Activity    Alcohol use: Yes     Alcohol/week: 1.0 standard drinks     Types: 1 Glasses of wine per week     Comment: Glass of wine with dinner.  Drug use: No    Sexual activity: Never   Lifestyle    Physical activity     Days per week: 0 days     Minutes per session: 0 min    Stress: Not at all   Relationships    Social connections     Talks on phone: Once a week     Gets together: Once a week     Attends Bahai service: Not on file     Active member of club or organization: No     Attends meetings of clubs or organizations: Never     Relationship status:     Intimate partner violence     Fear of current or ex partner: Not on file     Emotionally abused: Not on file     Physically abused: Not on file     Forced sexual activity: Not on file   Other Topics Concern    Not on file   Social History Narrative    Not on file       [place] of systems   General/Constitutional: No recent loss of weight or appetite changes. No fever or chills. HENT: Negative. Eyes: Negative. Lower respiratory tract/ lungs: Worsening of shortness of breath. He remain on BiPAP with full face mask. No hemoptysis. Cardiovascular: No chest pain. Gastrointestinal: No nausea or vomiting. Neurological: No focal neurologiacal weakness. Extremities: No edema. Musculoskeletal: No complaints. Genitourinary: No complaints. Hematological: Negative. Psychiatric/Behavioral: Negative. Skin: No itching. Vitals     height is 5' 7\" (1.702 m) and weight is 246 lb 9.6 oz (111.9 kg). His axillary temperature is 96.9 °F (36.1 °C). His blood pressure is 135/60 and his pulse is 56.  His respiration is 18 and oxygen saturation is 99%. Body mass index is 38.62 kg/m². SUPPLEMENTAL O2: O2 Flow Rate (L/min): 2 L/min       I/O    No intake or output data in the 24 hours ending 12/05/20 1133  No intake/output data recorded. Patient Vitals for the past 96 hrs (Last 3 readings):   Weight   12/05/20 0930 246 lb 9.6 oz (111.9 kg)       Exam   General Appearance: well built, well nourished in no acute distress on BiPAP. HEENT: Normal, Head is normocephalic, atraumatic. Rest of the exam is limited by full face mask. PERRL  Neck - Supple, No JVD present. No tracheal deviation. Lungs - Bilateral air entry present. Bilateral breath sounds heard. Diminished breath sounds at bases. Occasional expiratory wheezes. No rales. Cardiovascular - Heart sounds are normal.  Regular rhythm normal rate without murmur, gallop or rub. Abdomen - Soft,Obese, nontender, nondistended, no masses or organomegaly  Neurologic - Awake, alert, oriented. There are no focal motor or sensory deficits  Extremities - No cyanosis, clubbing or edema. Musculoskeletal: Normal range of motion. Patient exhibits no tenderness. Lymphadenopathy:  No cervical adenopathy. Psychiatric: Patient  has a normal mood. Skin - No bruising or bleeding.     Labs  - Old records and notes have been reviewed in [facility]   ABG  Lab Results   Component Value Date    PH 7.34 12/05/2020    PO2 99 12/05/2020    PCO2 75 12/05/2020    HCO3 41 12/05/2020    O2SAT 97 12/05/2020     Lab Results   Component Value Date    IFIO2 2 12/05/2020    SETPEEP 6.0 03/15/2019     CBC  Recent Labs     12/05/20  0815   WBC 10.6   RBC 3.97*   HGB 11.0*   HCT 33.8*   MCV 85.1   MCH 27.7   MCHC 32.5      MPV 9.9      BMP  Recent Labs     12/05/20  0815      K 3.7   CL 98   CO2 35*   BUN 12   CREATININE 0.7   GLUCOSE 127*   MG 1.8   CALCIUM 9.0     LFT  Recent Labs     12/05/20  0815   AST 10   ALT 6*   BILITOT 0.2*   ALKPHOS 52     TROP  Lab Results   Component Value Date TROPONINT 0.032 10/03/2020    TROPONINT 0.027 10/03/2020    TROPONINT 0.067 03/11/2020     BNP  No results for input(s): BNP in the last 72 hours. Lactic Acid  Recent Labs     12/05/20  0808   LACTA 2.2     INR  No results for input(s): INR, PROTIME in the last 72 hours. PTT  No results for input(s): APTT in the last 72 hours. Glucose  No results for input(s): POCGLU in the last 72 hours. UA   Recent Labs     12/05/20  0953   SPECGRAV 1.015   PHUR 5.0   COLORU YELLOW   PROTEINU 30*   BLOODU MODERATE*   RBCUA 10-15   WBCUA > 200   BACTERIA MANY   NITRU POSITIVE*   BILIRUBINUR NEGATIVE   UROBILINOGEN 0.2   KETUA NEGATIVE   LABCAST 4-8 HYALINE  NONE SEEN   . PFTs 26 November 2019               Sleep studies 08/05/2008               As per MsCarson Harden PA-C note dated 3 July 2019 patient currently using a CPAP with a pressure of 10 cm of water. Cultures    None in Epic. Blood cultures 2 sets-Pending    Echocardiogram   3/18/2019   Narrative & Impression      Transthoracic Echocardiography Report (TTE)     Conclusions      Summary   Technically difficult examination. This is a suboptimal study due to poor echocardiographic window. Doppler parameters were consistent with abnormal left ventricular   relaxation (grade 1 diastolic dysfunction). Left ventricle size is normal.   Normal left ventricular wall thickness. Ejection fraction is visually estimated at 55%. Unable to determine wall motion abnormalities due to poor image quality. Doppler parameters were consistent with abnormal left ventricular   relaxation (grade 1 diastolic dysfunction).       Signature      ----------------------------------------------------------------   Electronically signed by Kerry Otto MD (Interpreting   physician) on 03/19/2019 at 05:35 AM   ----------------------------------------------------------------    Radiology    CXR  Dec 5, 2020   PROCEDURE: XR CHEST PORTABLE   Stable radiographic appearance of the

## 2020-12-05 NOTE — ED PROVIDER NOTES
[facility] Emergency Department    CHIEF COMPLAINT       Chief Complaint   Patient presents with    Shortness of Breath       Nurses Notes reviewed and I agree except as noted in the HPI. HISTORY OF PRESENT ILLNESS    Fili Hand te 80 y.o. male who presents to the ED for evaluation of shortness of breath. Patient states overnight he became more short of breath. He notes he is having difficulty catching his breath, worsening shortness of breath with any activity. He notes he has been tested for COVID-19 as of Thursday and was negative. He notes he lives in a at [facility]. He has a history of COPD, congestive heart failure, coronary artery disease, diabetes, DVT sleep apnea. EMS noted that the facility was having trouble getting the patient to wear his mask last night with sleep apnea. Patient notes a moist cough. He denies any significant decreased swelling. He denies any chest pain. He denies nausea vomiting or diarrhea. HPI was provided by the patient. REVIEW OF SYSTEMS     Review of Systems   Constitutional: Positive for fatigue. Negative for activity change, chills and fever. HENT: Negative for congestion, rhinorrhea, sinus pressure and sinus pain. Respiratory: Positive for cough, shortness of breath and wheezing. Negative for chest tightness. Cardiovascular: Positive for leg swelling. Negative for chest pain and palpitations. Gastrointestinal: Negative for abdominal distention, abdominal pain, diarrhea, nausea and vomiting. Genitourinary: Negative for decreased urine volume, difficulty urinating and frequency. Musculoskeletal: Negative for arthralgias, back pain and gait problem. Skin: Negative for color change and rash. Allergic/Immunologic: Negative for immunocompromised state. Neurological: Negative for dizziness, weakness, light-headedness, numbness and headaches. Hematological: Does not bruise/bleed easily.    Psychiatric/Behavioral: Negative for agitation, behavioral problems and confusion. PAST MEDICAL HISTORY     Past Medical History:   Diagnosis Date    Anxiety     CAD (coronary artery disease)     leaking valve    Chronic back pain     COPD (chronic obstructive pulmonary disease) (formerly [facility])     Detached retina     Diabetes mellitus (formerly [facility])     NIDDM    DVT (deep venous thrombosis) (formerly [facility])     RLE    DVT (deep venous thrombosis) ([facility] [address] 11/9/15    LLE    Glaucoma     Hyperlipidemia     Hypertension     Mass of chest     benign chest mass, Dr Sharyle Rainwater    Movement disorder     spinal stenosis    Obesity     Osteoarthritis     right ankle, right hand    Pneumonia     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     CURRENT MEDICATIONS       Current Discharge Medication List      CONTINUE these medications which have NOT CHANGED    Details   Revefenacin 175 MCG/3ML SOLN Inhale 1 nebule into the lungs daily Dx COPD J44.3  Qty: 30 vial, Refills: 5      levothyroxine (SYNTHROID) 200 MCG tablet TAKE 1 TABLET BY MOUTH  DAILY  Qty: 90 tablet, Refills: 3    Associated Diagnoses: Postoperative hypothyroidism      Arformoterol Tartrate (BROVANA) 15 MCG/2ML NEBU Take 2 mLs by nebulization 2 times daily 1 dose inhale orally two times per day  Qty: 120 mL, Refills: 11      insulin glargine (LANTUS SOLOSTAR) 100 UNIT/ML injection pen INJECT SUBCUTANEOUSLY 18  UNITS NIGHTLY  Qty: 30 mL, Refills: 3    Associated Diagnoses: Type 2 diabetes mellitus with complication, with long-term current use of insulin (formerly [facility]) ibuprofen (ADVIL;MOTRIN) 800 MG tablet Take 1 tablet by mouth 3 times daily (with meals)  Qty: 270 tablet, Refills: 1      metoprolol tartrate (LOPRESSOR) 25 MG tablet Take 1 tablet by mouth 2 times daily Hold for SBP <110 or HR <60  Qty: 180 tablet, Refills: 3      ferrous gluconate (FERGON) 324 (38 Fe) MG tablet Take 1 tablet by mouth 2 times daily  Qty: 180 tablet, Refills: 3      metFORMIN (GLUCOPHAGE) 1000 MG tablet Take 1 tablet by mouth 2 times daily (with meals)  Qty: 180 tablet, Refills: 3      budesonide (PULMICORT) 0.5 MG/2ML nebulizer suspension USE 1 VIAL VIA NEBULIZER TWICE DAILY.  RINSE MOUTH AFTER TREATMENT  Qty: 360 mL, Refills: 3    Comments: **Patient requests 90 days supply**      Multiple Vitamins-Minerals (PRESERVISION AREDS 2) CAPS Take 1 capsule by mouth daily  Qty: 90 capsule, Refills: 3      lisinopril (PRINIVIL;ZESTRIL) 2.5 MG tablet Take 1 tablet by mouth daily  Qty: 90 tablet, Refills: 3    Associated Diagnoses: Type 2 diabetes mellitus with complication, with long-term current use of insulin ([facility])      potassium chloride (KLOR-CON M) 10 MEQ extended release tablet TAKE 1 TABLETS BY MOUTH TWICE DAILY  Qty: 180 tablet, Refills: 3    Comments: **Patient requests 90 days supply**      docusate (COLACE, DULCOLAX) 100 MG CAPS Take 100 mg by mouth 2 times daily  Qty: 180 capsule, Refills: 3      doxazosin (CARDURA) 8 MG tablet Take 1 tablet by mouth nightly  Qty: 90 tablet, Refills: 3      furosemide (LASIX) 40 MG tablet Take 1 tablet by mouth 2 times daily  Qty: 180 tablet, Refills: 3      vitamin D3 (CHOLECALCIFEROL) 10 MCG (400 UNIT) TABS tablet Take 1 tablet by mouth daily 400units  Qty: 90 tablet, Refills: 3      OXYGEN Inhale 3 L into the lungs nightly Indications: Difficulty Breathing, Oxygen Therapy And prn through cpap      oxybutynin (DITROPAN) 5 MG tablet TAKE 1 TABLET BY MOUTH EVERY 8 HOURS AS NEEDED FOR BLADDER SPASMS OR BLADDER SPASM  Qty: 270 tablet, Refills: 0    Comments: **Patient requests 90 days supply**      blood glucose test strips (ACCU-CHEK CHE PLUS) strip Accu-check Che Plus Test Strips (or brand per pt preference). Sig: test sugar BID. Dx: E11.8  Qty: 200 strip, Refills: 3    Comments: **Patient requests 90 days supply**  Associated Diagnoses: Type 2 diabetes mellitus with complication, with long-term current use of insulin ([facility])      SOFT TOUCH LANCETS MISC Lancet (or brand per pt preference) Use to check blood sugars 2 times daily. Dx: E11.8  Qty: 200 each, Refills: 3    Associated Diagnoses: Type 2 diabetes mellitus with complication, with long-term current use of insulin ([facility])      Alcohol Swabs (ALCOHOL PREP) 70 % PADS Alcohol pads (brand per preference). Sig: use to test sugar twice per day. Dx: E11.8  Qty: 200 each, Refills: 3    Associated Diagnoses: Type 2 diabetes mellitus with complication, with long-term current use of insulin ([facility])      Blood Glucose Monitoring Suppl (ACCU-CHEK CHE PLUS) w/Device KIT Or brand per pt preference. Sig: test sugar BID. Dx: E11.8  Qty: 1 kit, Refills: 0      polyethylene glycol (MIRALAX) 17 g packet Take 17 g by mouth daily as needed for Constipation      acetaminophen (TYLENOL) 325 MG tablet Take 650 mg by mouth every 4 hours as needed for Pain      lidocaine (LMX) 4 % cream Apply topically as needed for Pain Apply topically as needed. albuterol sulfate  (90 Base) MCG/ACT inhaler Inhale 2 puffs into the lungs every 6 hours as needed for Wheezing  Qty: 1 Inhaler, Refills: 3             ALLERGIES     is allergic to latex; macrobid [nitrofurantoin monohyd macro]; levaquin [levofloxacin]; adhesive tape; and alphagan [brimonidine tartrate]. FAMILY HISTORY     He indicated that his mother is . He indicated that his father is . family history includes Other in his mother; Other (age of onset: [de-identified]) in his father.     SOCIAL HISTORY       Social History     Socioeconomic History    Marital status:      Spouse name: Not on file    Number of children: 2    Years of education: Not on file    Highest education level: Not on file   Occupational History    Not on file   Social Needs    Financial resource strain: Not hard at all   [place] insecurity     Worry: Never true     Inability: Never true   [employer] needs     Medical: No     Non-medical: No   Tobacco Use    Smoking status: Never Smoker    Smokeless tobacco: Never Used   Substance and Sexual Activity    Alcohol use: Yes     Alcohol/week: 1.0 standard drinks     Types: 1 Glasses of wine per week     Comment: Glass of wine with dinner.  Drug use: No    Sexual activity: Never   Lifestyle    Physical activity     Days per week: 0 days     Minutes per session: 0 min    Stress: Not at all   Relationships    Social connections     Talks on phone: Once a week     Gets together: Once a week     Attends Anabaptism service: Not on file     Active member of club or organization: No     Attends meetings of clubs or organizations: Never     Relationship status:     Intimate partner violence     Fear of current or ex partner: Not on file     Emotionally abused: Not on file     Physically abused: Not on file     Forced sexual activity: Not on file   Other Topics Concern    Not on file   Social History Narrative    Not on file       PHYSICAL EXAM     INITIAL VITALS:  height is 5' 7\" (1.702 m) and weight is 246 lb 9.6 oz (111.9 kg). His axillary temperature is 96.9 °F (36.1 °C). His blood pressure is 135/60 and his pulse is 56. His respiration is 18 and oxygen saturation is 99%. Physical Exam  Vitals signs reviewed. Constitutional:       General: He is not in acute distress. Appearance: He is well-developed and normal weight. HENT:      Head: Normocephalic and atraumatic. Mouth/Throat:      Mouth: Mucous membranes are moist.      Pharynx: No pharyngeal swelling.    Eyes:      Pupils: Pupils are equal, round, and reactive to light. Neck:      Musculoskeletal: Normal range of motion and neck supple. Cardiovascular:      Rate and Rhythm: Regular rhythm. Tachycardia present. Pulmonary:      Effort: Tachypnea and accessory muscle usage present. Breath sounds: Examination of the right-upper field reveals wheezing. Examination of the left-upper field reveals wheezing. Examination of the right-lower field reveals decreased breath sounds. Examination of the left-lower field reveals decreased breath sounds. Decreased breath sounds, wheezing and rhonchi present. Abdominal:      General: Bowel sounds are normal.      Palpations: Abdomen is soft. Musculoskeletal: Normal range of motion. Skin:     General: Skin is warm and dry. Capillary Refill: Capillary refill takes less than 2 seconds. Coloration: Skin is not cyanotic. Neurological:      General: No focal deficit present. Mental Status: He is alert and oriented to person, place, and time. Psychiatric:         Mood and Affect: Mood normal. Mood is not anxious. Behavior: Behavior normal.         DIFFERENTIAL DIAGNOSIS:   COPD exacerbation, CHF exacerbation, pneumonia, COVID-19  DIAGNOSTIC RESULTS     EKG: All EKG's are interpreted by the Emergency Department Physician who eithersigns or Co-signs this chart in the absence of a cardiologist.    EKG read and interpreted by myself with comparison to 10/3/2020 gives impression of normal sinus rhythm with first-degree heart block heart rate of 75; interval 244; QRS 90;QTc 437; axis P-93, R-81, T-28. RADIOLOGY: non-plainfilm images(s) such as CT, Ultrasound and MRI are read by the radiologist.  Plain radiographic images are visualized and preliminarily interpreted by the emergency physician unless otherwise stated below. XR CHEST PORTABLE   Final Result   Stable radiographic appearance of the chest. No evidence of an acute process.                **This report has been created using voice recognition software. It may contain minor errors which are inherent in voice recognition technology. **      Final report electronically signed by Dr. Tawana Dalal MD on 12/5/2020 8:11 AM            LABS:   Labs Reviewed   CBC WITH AUTO DIFFERENTIAL - Abnormal; Notable for the following components:       Result Value    RBC 3.97 (*)     Hemoglobin 11.0 (*)     Hematocrit 33.8 (*)     Segs Absolute 7.8 (*)     All other components within normal limits   COMPREHENSIVE METABOLIC PANEL - Abnormal; Notable for the following components:    Glucose 127 (*)     CO2 35 (*)     Total Bilirubin 0.2 (*)     ALT 6 (*)     All other components within normal limits   URINALYSIS WITH MICROSCOPIC - Abnormal; Notable for the following components:    Blood, Urine MODERATE (*)     Protein, UA 30 (*)     Nitrite, Urine POSITIVE (*)     Leukocyte Esterase, Urine LARGE (*)     Character, Urine CLOUDY (*)     All other components within normal limits   BLOOD GAS, ARTERIAL - Abnormal; Notable for the following components:    pH, Blood Gas 7.34 (*)     PCO2 75 (*)     HCO3 41 (*)     Base Excess (Calculated) 12.3 (*)     All other components within normal limits   CULTURE, BLOOD 1   CULTURE, BLOOD 2   BRAIN NATRIURETIC PEPTIDE   LACTIC ACID, PLASMA   PROCALCITONIN   MAGNESIUM   COVID-19   ANION GAP   OSMOLALITY   GLOMERULAR FILTRATION RATE, ESTIMATED   POCT GLUCOSE       EMERGENCY DEPARTMENT COURSE:   Vitals:    Vitals:    12/05/20 0826 12/05/20 0924 12/05/20 0930 12/05/20 1128   BP:  (!) 104/49 (!) 153/68 135/60   Pulse:  63 65 56   Resp:  20 20 18   Temp:   97.9 °F (36.6 °C) 96.9 °F (36.1 °C)   TempSrc:   Oral Axillary   SpO2: 97% 95% 99% 99%   Weight:   246 lb 9.6 oz (111.9 kg)    Height:   5' 7\" (1.702 m)        MDM    Patient was seen and evaluated in the emergency department, patient appeared to be in no acute distress, vital signs were reviewed, no significant findings were noted.   Physical exam was completed, he was in no respiratory distress but he was having some labored breathing, he was using some accessory muscles. His breath sounds were diminished in the bases, he had some rales and some wheezes. He had some minimal lower extremity edema. Respiratory was called and obtain an ABG, give the patient a DuoNeb and placed on BiPAP. Chest x-ray and lab work were obtained. ABG concerning for COPD exacerbation, rest of the lab work is reassuring. Chest x-ray reveals no significant findings. Patient seemed to tolerate BiPAP well was doing better. Discussed the case with Dr. Michele Monzon, graciously except the patient for admission. Discussed plan of care with the patient's son he is agreeable verbalized understanding of plan of care. While here in the emergency department patient maintained stable course appropriate for admission.     Medications   acetaminophen (TYLENOL) tablet 650 mg (has no administration in time range)   docusate sodium (COLACE) capsule 100 mg (100 mg Oral Given 12/5/20 1201)   famotidine (PEPCID) tablet 20 mg (20 mg Oral Given 12/5/20 1201)   enoxaparin (LOVENOX) injection 40 mg (40 mg Subcutaneous Given 12/5/20 1201)   ondansetron (ZOFRAN) injection 4 mg (has no administration in time range)   ipratropium-albuterol (DUONEB) nebulizer solution 1 ampule (has no administration in time range)   albuterol (PROVENTIL) nebulizer solution 2.5 mg (has no administration in time range)   insulin lispro (HUMALOG) injection vial 0-6 Units (has no administration in time range)   insulin lispro (HUMALOG) injection vial 0-3 Units (has no administration in time range)   azithromycin (ZITHROMAX) tablet 500 mg (has no administration in time range)     Followed by   azithromycin (ZITHROMAX) tablet 250 mg (has no administration in time range)   methylPREDNISolone sodium (SOLU-MEDROL) injection 40 mg (has no administration in time range)   ipratropium-albuterol (DUONEB) nebulizer solution 1 ampule (1 ampule Inhalation Given 12/5/20 0800) Patient was seenindependently by myself. The patient's final impression and disposition and plan was determined by myself. CRITICAL CARE:   There was a high probability of clinically significant/life threatening deterioration in this patient's condition which required my urgent intervention. Total critical care time was 35 minutes. This excludes any time for separately reportable procedures. CONSULTS:  Dr. Angela Franco:  None    FINAL IMPRESSION     1. COPD exacerbation ([facility] [address]    2. Acute on chronic respiratory failure with hypoxia and hypercapnia ([facility] [address]          DISPOSITION/PLAN   Patient admitted in stable condition    PATIENT REFERREDTO:  No follow-up provider specified. DISCHARGE MEDICATIONS:  Current Discharge Medication List          (Please note that portions of this note were completed with a voice recognition program.  Efforts were made to edit the dictations but occasionally words are mis-transcribed.)      Provider:  I personally performed the services described in the documentation,reviewed and edited the documentation which was dictated to the scribe in my presence, and it accurately records my words and actions.     Brennan Saravia CNP 12/05/20 12:05 PM    Leonard Saravia, APRN - CNP        [employer], MAEGAN - CNP  12/05/20 0644

## 2020-12-05 NOTE — ED NOTES
Pt transported to 3B30 on cart in stable condition. Floor contacted before transport. Spoke with Buck.      Roc Oar  12/05/20 5872

## 2020-12-05 NOTE — PLAN OF CARE
Problem: Respiratory Function - Compromised  Goal: Patient will achieve/maintain normal respiratory rate/effort  Outcome: Ongoing  Note: Pt set up on bipap this AM to help decrease WOB and due to elevated CO2. Problem: HH PREVENTION OF SKIN BREAKDOWN-PRESSURE ULCER  Goal: Absence of new pressure ulcer  Outcome: Ongoing  Note: Respiratory will rotate between full and total face mask to prevent skin breakdown or soreness. I changed from full mask to total face mask at this time and no noted skin breakdown or soreness.

## 2020-12-05 NOTE — ACP (ADVANCE CARE PLANNING)
Advance Care Planning     Advance Care Planning Activator (Inpatient)  Conversation Note      Date of ACP Conversation: 12/5/2020    Conversation Conducted with: Patient with Decision Making Capacity    ACP Activator: [address]:   \"Who would you like to name as your primary health care decision-maker? \"               Name: Alan Pool        Relationship: son          Phone number: [phone]  \"Can this person be reached easily? \" Yes  \"Who would you like to name as your back-up decision maker? \"   Name: Ron Dominguez        Relationship: child          Phone number: [phone]  \"Can this person be reached easily? \" Yes    Care Preferences    Ventilation: \"If you were in your present state of health and suddenly became very ill and were unable to breathe on your own, what would your preference be about the use of a ventilator (breathing machine) if it were available to you? \"      Would the patient desire the use of ventilator (breathing machine)?: yes    \"If your health worsens and it becomes clear that your chance of recovery is unlikely, what would your preference be about the use of a ventilator (breathing machine) if it were available to you? \"     Would the patient desire the use of ventilator (breathing machine)?: No    Resuscitation  \"CPR works best to restart the heart when there is a sudden event, like a heart attack, in someone who is otherwise healthy. Unfortunately, CPR does not typically restart the heart for people who have serious health conditions or who are very sick. \"    \"In the event your heart stopped as a result of an underlying serious health condition, would you want attempts to be made to restart your heart (answer \"yes\" for attempt to resuscitate) or would you prefer a natural death (answer \"no\" for do not attempt to resuscitate)? \" no     [] Yes   [x] No   Educated Patient / Consuelo Stark regarding differences between Advance Directives and portable DNR orders. Length of ACP Conversation in minutes:  10  Conversation Outcomes:  [x] ACP discussion completed  [] Existing advance directive reviewed with patient; no changes to patient's previously recorded wishes  [] New Advance Directive completed  [] Portable Do Not Rescitate prepared for Provider review and signature  [] POLST/POST/MOLST/MOST prepared for Provider review and signature      Follow-up plan:    [] Schedule follow-up conversation to continue planning  [x] Referred individual to Provider for additional questions/concerns   [] Advised patient/agent/surrogate to review completed ACP document and update if needed with changes in condition, patient preferences or care setting    [] This note routed to one or more involved healthcare providers     Pt came into ED from [facility]. Met with pt in ED room 1 to discuss ACP. Pt does not want chest compressions. He would be ok with vent - is currently on a bi-pap. Son is Dr. Azalea Alvares who is his poa. Lexy Shannon Counts notified about limited DNR requested - asked me to write the order - written.   He will call Dr. Azalea Alvares to let him know that his father is in the hospital.

## 2020-12-05 NOTE — ED NOTES
ED to inpatient nurses report    Chief Complaint   Patient presents with    Shortness of Breath      Present to ED from nursing home  LOC: alert and orientated to name, place, date  Vital signs   Vitals:    12/05/20 0736 12/05/20 0819 12/05/20 0826 12/05/20 0924   BP: (!) 156/63   (!) 104/49   Pulse: 76   63   Resp: 20 20  20   Temp: 98.3 °F (36.8 °C)      TempSrc: Oral      SpO2: 97%  97% 95%      Oxygen Baseline RA     Bipap/Cpap Yes  LDAs:   Peripheral IV 12/05/20 Left Forearm (Active)   Site Assessment Clean;Dry; Intact 12/05/20 0811     Mobility: Requires assistance * 2  Pending ED orders: URINE  Present condition: STABLE    Electronically signed by Salvatore Quevedo RN on 12/5/2020 at 9:24 AM       Salvatore Quevedo RN  12/05/20 9014

## 2020-12-06 LAB
ANION GAP SERPL CALCULATED.3IONS-SCNC: 13 MEQ/L (ref 8–16)
BUN BLDV-MCNC: 14 MG/DL (ref 7–22)
CALCIUM SERPL-MCNC: 8.7 MG/DL (ref 8.5–10.5)
CHLORIDE BLD-SCNC: 99 MEQ/L (ref 98–111)
CO2: 33 MEQ/L (ref 23–33)
CREAT SERPL-MCNC: 0.8 MG/DL (ref 0.4–1.2)
ERYTHROCYTE [DISTWIDTH] IN BLOOD BY AUTOMATED COUNT: 14.2 % (ref 11.5–14.5)
ERYTHROCYTE [DISTWIDTH] IN BLOOD BY AUTOMATED COUNT: 43.7 FL (ref 35–45)
GFR SERPL CREATININE-BSD FRML MDRD: > 90 ML/MIN/1.73M2
GLUCOSE BLD-MCNC: 130 MG/DL (ref 70–108)
GLUCOSE BLD-MCNC: 169 MG/DL (ref 70–108)
GLUCOSE BLD-MCNC: 169 MG/DL (ref 70–108)
GLUCOSE BLD-MCNC: 174 MG/DL (ref 70–108)
GLUCOSE BLD-MCNC: 178 MG/DL (ref 70–108)
HCT VFR BLD CALC: 32.9 % (ref 42–52)
HEMOGLOBIN: 10.5 GM/DL (ref 14–18)
MCH RBC QN AUTO: 27.1 PG (ref 26–33)
MCHC RBC AUTO-ENTMCNC: 31.9 GM/DL (ref 32.2–35.5)
MCV RBC AUTO: 84.8 FL (ref 80–94)
PLATELET # BLD: 279 THOU/MM3 (ref 130–400)
PMV BLD AUTO: 9.8 FL (ref 9.4–12.4)
POTASSIUM SERPL-SCNC: 3.8 MEQ/L (ref 3.5–5.2)
RBC # BLD: 3.88 MILL/MM3 (ref 4.7–6.1)
SODIUM BLD-SCNC: 145 MEQ/L (ref 135–145)
WBC # BLD: 11.7 THOU/MM3 (ref 4.8–10.8)

## 2020-12-06 PROCEDURE — 94640 AIRWAY INHALATION TREATMENT: CPT

## 2020-12-06 PROCEDURE — 85027 COMPLETE CBC AUTOMATED: CPT

## 2020-12-06 PROCEDURE — 94761 N-INVAS EAR/PLS OXIMETRY MLT: CPT

## 2020-12-06 PROCEDURE — 94660 CPAP INITIATION&MGMT: CPT

## 2020-12-06 PROCEDURE — 6370000000 HC RX 637 (ALT 250 FOR IP): Performed by: INTERNAL MEDICINE

## 2020-12-06 PROCEDURE — 2700000000 HC OXYGEN THERAPY PER DAY

## 2020-12-06 PROCEDURE — 82948 REAGENT STRIP/BLOOD GLUCOSE: CPT

## 2020-12-06 PROCEDURE — 2580000003 HC RX 258: Performed by: INTERNAL MEDICINE

## 2020-12-06 PROCEDURE — 6360000002 HC RX W HCPCS: Performed by: INTERNAL MEDICINE

## 2020-12-06 PROCEDURE — 99233 SBSQ HOSP IP/OBS HIGH 50: CPT | Performed by: INTERNAL MEDICINE

## 2020-12-06 PROCEDURE — 93010 ELECTROCARDIOGRAM REPORT: CPT | Performed by: INTERNAL MEDICINE

## 2020-12-06 PROCEDURE — 36415 COLL VENOUS BLD VENIPUNCTURE: CPT

## 2020-12-06 PROCEDURE — 80048 BASIC METABOLIC PNL TOTAL CA: CPT

## 2020-12-06 PROCEDURE — 2140000000 HC CCU INTERMEDIATE R&B

## 2020-12-06 RX ORDER — FUROSEMIDE 10 MG/ML
40 INJECTION INTRAMUSCULAR; INTRAVENOUS 2 TIMES DAILY
Status: DISCONTINUED | OUTPATIENT
Start: 2020-12-06 | End: 2020-12-07 | Stop reason: HOSPADM

## 2020-12-06 RX ADMIN — AZITHROMYCIN 250 MG: 250 TABLET, FILM COATED ORAL at 08:33

## 2020-12-06 RX ADMIN — CEFTRIAXONE SODIUM 1 G: 1 INJECTION, POWDER, FOR SOLUTION INTRAMUSCULAR; INTRAVENOUS at 16:18

## 2020-12-06 RX ADMIN — FAMOTIDINE 20 MG: 20 TABLET, FILM COATED ORAL at 08:34

## 2020-12-06 RX ADMIN — INSULIN LISPRO 1 UNITS: 100 INJECTION, SOLUTION INTRAVENOUS; SUBCUTANEOUS at 08:33

## 2020-12-06 RX ADMIN — ACETAMINOPHEN 650 MG: 325 TABLET ORAL at 21:24

## 2020-12-06 RX ADMIN — METHYLPREDNISOLONE SODIUM SUCCINATE 40 MG: 125 INJECTION, POWDER, FOR SOLUTION INTRAMUSCULAR; INTRAVENOUS at 13:12

## 2020-12-06 RX ADMIN — POTASSIUM CHLORIDE 10 MEQ: 1500 TABLET, EXTENDED RELEASE ORAL at 08:34

## 2020-12-06 RX ADMIN — METOPROLOL TARTRATE 25 MG: 25 TABLET ORAL at 21:23

## 2020-12-06 RX ADMIN — FUROSEMIDE 40 MG: 40 TABLET ORAL at 08:33

## 2020-12-06 RX ADMIN — DOXAZOSIN 8 MG: 4 TABLET ORAL at 21:24

## 2020-12-06 RX ADMIN — ACETAMINOPHEN 650 MG: 325 TABLET ORAL at 08:34

## 2020-12-06 RX ADMIN — DOCUSATE SODIUM 100 MG: 100 CAPSULE, LIQUID FILLED ORAL at 08:34

## 2020-12-06 RX ADMIN — FUROSEMIDE 40 MG: 10 INJECTION, SOLUTION INTRAMUSCULAR; INTRAVENOUS at 16:17

## 2020-12-06 RX ADMIN — FAMOTIDINE 20 MG: 20 TABLET, FILM COATED ORAL at 21:23

## 2020-12-06 RX ADMIN — METOPROLOL TARTRATE 25 MG: 25 TABLET ORAL at 08:33

## 2020-12-06 RX ADMIN — IPRATROPIUM BROMIDE AND ALBUTEROL SULFATE 1 AMPULE: .5; 3 SOLUTION RESPIRATORY (INHALATION) at 12:39

## 2020-12-06 RX ADMIN — Medication 240 MG: at 08:33

## 2020-12-06 RX ADMIN — METHYLPREDNISOLONE SODIUM SUCCINATE 40 MG: 125 INJECTION, POWDER, FOR SOLUTION INTRAMUSCULAR; INTRAVENOUS at 00:37

## 2020-12-06 RX ADMIN — ENOXAPARIN SODIUM 40 MG: 40 INJECTION SUBCUTANEOUS at 08:34

## 2020-12-06 RX ADMIN — IPRATROPIUM BROMIDE AND ALBUTEROL SULFATE 1 AMPULE: .5; 3 SOLUTION RESPIRATORY (INHALATION) at 20:39

## 2020-12-06 RX ADMIN — Medication 500 UNITS: at 08:33

## 2020-12-06 RX ADMIN — IPRATROPIUM BROMIDE AND ALBUTEROL SULFATE 1 AMPULE: .5; 3 SOLUTION RESPIRATORY (INHALATION) at 08:56

## 2020-12-06 RX ADMIN — INSULIN GLARGINE 18 UNITS: 100 INJECTION, SOLUTION SUBCUTANEOUS at 21:16

## 2020-12-06 RX ADMIN — METFORMIN HYDROCHLORIDE 1000 MG: 500 TABLET ORAL at 08:33

## 2020-12-06 RX ADMIN — METFORMIN HYDROCHLORIDE 1000 MG: 500 TABLET ORAL at 16:17

## 2020-12-06 RX ADMIN — Medication 240 MG: at 21:23

## 2020-12-06 RX ADMIN — LISINOPRIL 2.5 MG: 2.5 TABLET ORAL at 10:00

## 2020-12-06 RX ADMIN — INSULIN LISPRO 1 UNITS: 100 INJECTION, SOLUTION INTRAVENOUS; SUBCUTANEOUS at 17:17

## 2020-12-06 RX ADMIN — IPRATROPIUM BROMIDE AND ALBUTEROL SULFATE 1 AMPULE: .5; 3 SOLUTION RESPIRATORY (INHALATION) at 16:03

## 2020-12-06 ASSESSMENT — PAIN SCALES - GENERAL
PAINLEVEL_OUTOF10: 1
PAINLEVEL_OUTOF10: 5

## 2020-12-06 ASSESSMENT — PAIN DESCRIPTION - ORIENTATION: ORIENTATION: LOWER

## 2020-12-06 ASSESSMENT — PAIN DESCRIPTION - PAIN TYPE: TYPE: CHRONIC PAIN

## 2020-12-06 ASSESSMENT — PAIN - FUNCTIONAL ASSESSMENT: PAIN_FUNCTIONAL_ASSESSMENT: PREVENTS OR INTERFERES SOME ACTIVE ACTIVITIES AND ADLS

## 2020-12-06 ASSESSMENT — PAIN DESCRIPTION - FREQUENCY: FREQUENCY: INTERMITTENT

## 2020-12-06 ASSESSMENT — PAIN DESCRIPTION - ONSET: ONSET: ON-GOING

## 2020-12-06 ASSESSMENT — PAIN DESCRIPTION - LOCATION: LOCATION: BUTTOCKS

## 2020-12-06 ASSESSMENT — PAIN DESCRIPTION - PROGRESSION: CLINICAL_PROGRESSION: NOT CHANGED

## 2020-12-06 ASSESSMENT — PAIN DESCRIPTION - DESCRIPTORS: DESCRIPTORS: ACHING

## 2020-12-06 NOTE — PROGRESS NOTES
[facility]    Patient - Susan Larkin   MRN -  [id number]   [facility] # - [de-identified]   - 1931      Date of Admission -  2020  7:29 AM  Date of evaluation -  2020  Room - 3B--[facility] [address], MD Primary Care Physician - Chaya Florentino MD     Problem List      Active Hospital Problems    Diagnosis Date Noted    COPD exacerbation [facility]) [J44.1] 2020     Reason for Consult    For management of hypercapnic respiratory failure and COPD. HPI   History Obtained From: Patient and electronic medical record. Susan Larkin is a 80 y.o. male  was initially admitted under Dr. Unique Chaparro service. Pulmonary medicine was consulted for further management of respiratory distress/ BiPAP management. He is a 80year-old pleasant gentleman with a history of severe COPD and severe obstructive sleep apnea diagnosed in  currently on treatment with a CPAP with a pressure of 10 cm of water. Patient was transferred to [facility] emergency room due to development of worsening of shortness of breath at his [address] home. I was told by  Opal Mae taking care of the patient that the patient is not using his CPAP device at his nursing home for the last few days. He is to follow-up with Dr. Nereyda Seo MD in the past.  He is currently following with Dr. Ludin Villegas MD.    Patient found to be in hypercapnic respiratory failure during his initial evaluation in the emergency room at [facility]. His arterial blood gas revealed a pH of 7.34 and PCO2 of 75. His PO2 was 99 and saturation was  97% on 2 L of oxygen via nasal cannula. .  Patient was subsequently started on BiPAP with a pressure of IPAP of 14 and EPAP of 8 cm of water with no backup rate on 30% FiO2. Pulmonary medicine was consulted for further management of hypercapnic respiratory failure.       At the time of my initial evaluation, he remain on BiPAP. Limited history obtained from the patient. He is currently on treatment with a BiPAP with following settings:  IPAP: 14 cm H20. EPAP: 8 cm H20. Fio2: 30  Back up rate: 0/minute  Mask type: Full face mask. Subjective/Events Past 24 hours/ROS   He is currently sitting in chair with 1 L of oxygen on nasal cannula.   He used BiPAP with a 14 x 8 cm of water with 30% FiO2 last night  He is awake alert  Denies any chest pain  Improving shortness of breath.  -Rest of the review of systems reviewed    PMHx   Past Medical History      Diagnosis Date    Anxiety     CAD (coronary artery disease)     leaking valve    Chronic back pain     COPD (chronic obstructive pulmonary disease) (HCC)     Detached retina     Diabetes mellitus (HCC)     NIDDM    DVT [facility]deep venous thrombosis) ([facility])     RLE    DVT (deep venous thrombosis) ([facility] [address] 11/9/15    LLE    Glaucoma     Hyperlipidemia     Hypertension     Mass of chest     benign chest mass, Dr Raffy Candelaria    Movement disorder     spinal stenosis    Obesity     Osteoarthritis     right ankle, right hand    Pneumonia     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    Sleep apnea 1993    on BiPap, Dr Ashley Diaz Thyroid cancer [facility])     s/p thyroid resection    Urinary incontinence       Past Surgical History        Procedure Laterality Date   [facility] SURGERY  2006,2011, 2014    BRONCHOSCOPY N/A 8/29/2017    BRONCHOSCOPY AIRWAY ONLY performed by Roney Hector MD at [address] [place] [address] EKG 12-LEAD  11/10/2015         FRACTURE SURGERY      right hand    OTHER SURGICAL HISTORY      spinal epidurals    RETINAL DETACHMENT SURGERY      SPINE SURGERY  2004    Lumbar laminectomy    THYROIDECTOMY      TURP N/A 3/17/2020    CYSTOSCOPY WITH GREENLIGHT PHOTOVAPORIZATION OF PROSTATE performed by Buffy Beck MD at [address] [place] [address] TURP N/A 3/24/2020    PLASMA BUTTON TURP WITH CLOT EVACUATION performed by Buffy Beck MD at [facility]    docusate sodium  100 mg Oral Daily    famotidine  20 mg Oral BID    enoxaparin  40 mg Subcutaneous Daily    ipratropium-albuterol  1 ampule Inhalation Q4H WA    insulin lispro  0-6 Units Subcutaneous TID     insulin lispro  0-3 Units Subcutaneous Nightly    azithromycin  250 mg Oral Daily    methylPREDNISolone sodium  40 mg Intravenous Q12H    doxazosin  8 mg Oral Nightly    ferrous gluconate  240 mg Oral BID    furosemide  40 mg Oral BID    insulin glargine  18 Units Subcutaneous Nightly    lisinopril  2.5 mg Oral Daily    metFORMIN  1,000 mg Oral BID     metoprolol tartrate  25 mg Oral BID    potassium chloride  10 mEq Oral Daily    Vitamin D  500 Units Oral Daily    cefTRIAXone (ROCEPHIN) IV  1 g Intravenous Q24H     acetaminophen, ondansetron, oxybutynin  IV Drips/Infusions    Home Medications  Medications Prior to Admission: Revefenacin 175 MCG/3ML SOLN, Inhale 1 nebule into the lungs daily Dx COPD J44.3  levothyroxine (SYNTHROID) 200 MCG tablet, TAKE 1 TABLET BY MOUTH  DAILY  Arformoterol Tartrate (BROVANA) 15 MCG/2ML NEBU, Take 2 mLs by nebulization 2 times daily 1 dose inhale orally two times per day  insulin glargine (LANTUS SOLOSTAR) 100 UNIT/ML injection pen, INJECT SUBCUTANEOUSLY 18  UNITS NIGHTLY  ibuprofen (ADVIL;MOTRIN) 800 MG tablet, Take 1 tablet by mouth 3 times daily (with meals)  metoprolol tartrate (LOPRESSOR) 25 MG tablet, Take 1 tablet by mouth 2 times daily Hold for SBP <110 or HR <60  ferrous gluconate (FERGON) 324 (38 Fe) MG tablet, Take 1 tablet by mouth 2 times daily  metFORMIN (GLUCOPHAGE) 1000 MG tablet, Take 1 tablet by mouth 2 times daily (with meals)  budesonide (PULMICORT) 0.5 MG/2ML nebulizer suspension, USE 1 VIAL VIA NEBULIZER TWICE DAILY.  RINSE MOUTH AFTER TREATMENT  Multiple Vitamins-Minerals (PRESERVISION AREDS 2) CAPS, Take 1 capsule by mouth daily  lisinopril (PRINIVIL;ZESTRIL) 2.5 MG tablet, Take 1 tablet by mouth daily  potassium chloride (KLOR-CON M) 10 MEQ extended release tablet, TAKE 1 TABLETS BY MOUTH TWICE DAILY  docusate (COLACE, DULCOLAX) 100 MG CAPS, Take 100 mg by mouth 2 times daily  doxazosin (CARDURA) 8 MG tablet, Take 1 tablet by mouth nightly  furosemide (LASIX) 40 MG tablet, Take 1 tablet by mouth 2 times daily  vitamin D3 (CHOLECALCIFEROL) 10 MCG (400 UNIT) TABS tablet, Take 1 tablet by mouth daily 400units  OXYGEN, Inhale 3 L into the lungs nightly Indications: Difficulty Breathing, Oxygen Therapy And prn through cpap  oxybutynin (DITROPAN) 5 MG tablet, TAKE 1 TABLET BY MOUTH EVERY 8 HOURS AS NEEDED FOR BLADDER SPASMS OR BLADDER SPASM  blood glucose test strips (ACCU-CHEK CHE PLUS) strip, Accu-check Che Plus Test Strips (or brand per pt preference). Sig: test sugar BID. Dx: E11.8  SOFT TOUCH LANCETS MISC, Lancet (or brand per pt preference) Use to check blood sugars 2 times daily. Dx: E11.8  Alcohol Swabs (ALCOHOL PREP) 70 % PADS, Alcohol pads (brand per preference). Sig: use to test sugar twice per day. Dx: E11.8  Blood Glucose Monitoring Suppl (ACCU-CHEK CHE PLUS) w/Device KIT, Or brand per pt preference. Sig: test sugar BID. Dx: E11.8  polyethylene glycol (MIRALAX) 17 g packet, Take 17 g by mouth daily as needed for Constipation  acetaminophen (TYLENOL) 325 MG tablet, Take 650 mg by mouth every 4 hours as needed for Pain  lidocaine (LMX) 4 % cream, Apply topically as needed for Pain Apply topically as needed. albuterol sulfate  (90 Base) MCG/ACT inhaler, Inhale 2 puffs into the lungs every 6 hours as needed for Wheezing  Diet    DIET CARB CONTROL; Carb Control: 4 carb choices (60 gms)/meal  Allergies    Latex; Macrobid [nitrofurantoin monohyd macro]; Levaquin [levofloxacin];  Adhesive tape; and Alphagan [brimonidine tartrate]  Family History          Problem Relation Age of Onset    Other Mother         Complications of Childbirth    Other Father [de-identified]        Natural causes     Sleep History    He was diagnosed with sleep apnea in CL 98 99   CO2 35* 33   BUN 12 14   CREATININE 0.7 0.8   GLUCOSE 127* 174*   MG 1.8  --    CALCIUM 9.0 8.7     LFT  Recent Labs     12/05/20  0815   AST 10   ALT 6*   BILITOT 0.2*   ALKPHOS 52     TROP  Lab Results   Component Value Date    TROPONINT 0.032 10/03/2020    TROPONINT 0.027 10/03/2020    TROPONINT 0.067 03/11/2020     BNP  No results for input(s): BNP in the last 72 hours. Lactic Acid  Recent Labs     12/05/20  0808   LACTA 2.2     INR  No results for input(s): INR, PROTIME in the last 72 hours. PTT  No results for input(s): APTT in the last 72 hours. Glucose  Recent Labs     12/05/20  2111 12/06/20  0759 12/06/20  1156   POCGLU 177* 169* 130*     UA   Recent Labs     12/05/20  0953   SPECGRAV 1.015   PHUR 5.0   COLORU YELLOW   PROTEINU 30*   BLOODU MODERATE*   RBCUA 10-15   WBCUA > 200   BACTERIA MANY   NITRU POSITIVE*   BILIRUBINUR NEGATIVE   UROBILINOGEN 0.2   KETUA NEGATIVE   LABCAST 4-8 HYALINE  NONE SEEN   . PFTs 26 November 2019               Sleep studies 08/05/2008               As per Ms. Finn Swift PA-C note dated 3 July 2019 patient currently using a CPAP with a pressure of 10 cm of water. Cultures    None in Epic. Blood cultures 2 sets-Pending    Echocardiogram   3/18/2019   Narrative & Impression      Transthoracic Echocardiography Report (TTE)     Conclusions      Summary   Technically difficult examination. This is a suboptimal study due to poor echocardiographic window. Doppler parameters were consistent with abnormal left ventricular   relaxation (grade 1 diastolic dysfunction). Left ventricle size is normal.   Normal left ventricular wall thickness. Ejection fraction is visually estimated at 55%. Unable to determine wall motion abnormalities due to poor image quality. Doppler parameters were consistent with abnormal left ventricular   relaxation (grade 1 diastolic dysfunction).       Signature Johnna Gasca MD on 12/6/2020 at 1:45 PM

## 2020-12-06 NOTE — PROGRESS NOTES
INTERNAL MEDICINE Progress Note  12/6/2020 2:43 PM  Subjective:   Admit Date: 12/5/2020  PCP: Ellis Renteria MD  Interval History:   Breathing better, no CP, no cough    Objective:   Vitals: /60   Pulse 58   Temp 97.8 °F (36.6 °C) (Oral)   Resp 20   Ht 5' 7\" (1.702 m)   Wt 246 lb 9.6 oz (111.9 kg)   SpO2 96%   BMI 38.62 kg/m²   General appearance: alert and cooperative with exam  HEENT: Head: atraumatic  Neck: no adenopathy, no carotid bruit and no JVD  Lungs: diminished breath sounds bibasilar  Heart: S1, S2 normal  Abdomen: soft, non-tender; bowel sounds normal; no masses,  no organomegaly  Extremities: no edema, redness or tenderness in the calves or thighs  Neurologic: Mental status: Alert, oriented, thought content appropriate      Medications:   Scheduled Meds:   docusate sodium  100 mg Oral Daily    famotidine  20 mg Oral BID    enoxaparin  40 mg Subcutaneous Daily    ipratropium-albuterol  1 ampule Inhalation Q4H WA    insulin lispro  0-6 Units Subcutaneous TID WC    insulin lispro  0-3 Units Subcutaneous Nightly    azithromycin  250 mg Oral Daily    methylPREDNISolone sodium  40 mg Intravenous Q12H    doxazosin  8 mg Oral Nightly    ferrous gluconate  240 mg Oral BID    furosemide  40 mg Oral BID    insulin glargine  18 Units Subcutaneous Nightly    lisinopril  2.5 mg Oral Daily    metFORMIN  1,000 mg Oral BID WC    metoprolol tartrate  25 mg Oral BID    potassium chloride  10 mEq Oral Daily    Vitamin D  500 Units Oral Daily    cefTRIAXone (ROCEPHIN) IV  1 g Intravenous Q24H     Continuous Infusions:    Lab Results:   CBC:   Recent Labs     12/05/20  0815 12/06/20  0613   WBC 10.6 11.7*   HGB 11.0* 10.5*    279     BMP:    Recent Labs     12/05/20  0815 12/06/20  0613    145   K 3.7 3.8   CL 98 99   CO2 35* 33   BUN 12 14   CREATININE 0.7 0.8   GLUCOSE 127* 174*     Hepatic:   Recent Labs     12/05/20  0815   AST 10   ALT 6*   BILITOT 0.2*   ALKPHOS 52 Magnesium:    Lab Results   Component Value Date    MG 1.8 12/05/2020     HgBA1c:    Lab Results   Component Value Date    LABA1C 6.0 11/05/2020     TSH:    Lab Results   Component Value Date    TSH 7.160 10/03/2020       Assessment and Plan:   1. 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 COPD with acute exacerbation  3. ROBER  4. HTN  5. DM 2  6. Obesity-mod  7. UTI, chronic felipe's catheter     Cont duoneb aerosols  Cont Solumedrol  NiPPV,   IV rocephin  Am labs.   PT/OT jf Braxton MD

## 2020-12-07 VITALS
WEIGHT: 246.6 LBS | SYSTOLIC BLOOD PRESSURE: 144 MMHG | HEIGHT: 67 IN | BODY MASS INDEX: 38.71 KG/M2 | OXYGEN SATURATION: 95 % | DIASTOLIC BLOOD PRESSURE: 63 MMHG | HEART RATE: 59 BPM | RESPIRATION RATE: 20 BRPM | TEMPERATURE: 98 F

## 2020-12-07 PROBLEM — J96.21 ACUTE ON CHRONIC RESPIRATORY FAILURE WITH HYPOXIA AND HYPERCAPNIA (HCC): Status: ACTIVE | Noted: 2020-12-07

## 2020-12-07 PROBLEM — J96.22 ACUTE ON CHRONIC RESPIRATORY FAILURE WITH HYPOXIA AND HYPERCAPNIA (HCC): Status: ACTIVE | Noted: 2020-12-07

## 2020-12-07 LAB
ANION GAP SERPL CALCULATED.3IONS-SCNC: 14 MEQ/L (ref 8–16)
BUN BLDV-MCNC: 18 MG/DL (ref 7–22)
CALCIUM SERPL-MCNC: 9.3 MG/DL (ref 8.5–10.5)
CHLORIDE BLD-SCNC: 96 MEQ/L (ref 98–111)
CO2: 32 MEQ/L (ref 23–33)
CREAT SERPL-MCNC: 0.8 MG/DL (ref 0.4–1.2)
GFR SERPL CREATININE-BSD FRML MDRD: > 90 ML/MIN/1.73M2
GLUCOSE BLD-MCNC: 118 MG/DL (ref 70–108)
GLUCOSE BLD-MCNC: 176 MG/DL (ref 70–108)
GLUCOSE BLD-MCNC: 178 MG/DL (ref 70–108)
POTASSIUM SERPL-SCNC: 3.8 MEQ/L (ref 3.5–5.2)
SODIUM BLD-SCNC: 142 MEQ/L (ref 135–145)

## 2020-12-07 PROCEDURE — 94660 CPAP INITIATION&MGMT: CPT

## 2020-12-07 PROCEDURE — 82948 REAGENT STRIP/BLOOD GLUCOSE: CPT

## 2020-12-07 PROCEDURE — 6370000000 HC RX 637 (ALT 250 FOR IP): Performed by: INTERNAL MEDICINE

## 2020-12-07 PROCEDURE — 2700000000 HC OXYGEN THERAPY PER DAY

## 2020-12-07 PROCEDURE — 94640 AIRWAY INHALATION TREATMENT: CPT

## 2020-12-07 PROCEDURE — 6360000002 HC RX W HCPCS: Performed by: INTERNAL MEDICINE

## 2020-12-07 PROCEDURE — 94761 N-INVAS EAR/PLS OXIMETRY MLT: CPT

## 2020-12-07 PROCEDURE — 36415 COLL VENOUS BLD VENIPUNCTURE: CPT

## 2020-12-07 PROCEDURE — 80048 BASIC METABOLIC PNL TOTAL CA: CPT

## 2020-12-07 RX ORDER — CEFDINIR 300 MG/1
300 CAPSULE ORAL 2 TIMES DAILY
Qty: 14 CAPSULE | Refills: 0 | DISCHARGE
Start: 2020-12-07 | End: 2020-12-14

## 2020-12-07 RX ORDER — PREDNISONE 20 MG/1
TABLET ORAL
Qty: 9 TABLET | Refills: 0 | DISCHARGE
Start: 2020-12-07 | End: 2020-12-16

## 2020-12-07 RX ADMIN — IPRATROPIUM BROMIDE AND ALBUTEROL SULFATE 1 AMPULE: .5; 3 SOLUTION RESPIRATORY (INHALATION) at 04:13

## 2020-12-07 RX ADMIN — METOPROLOL TARTRATE 25 MG: 25 TABLET ORAL at 09:31

## 2020-12-07 RX ADMIN — FUROSEMIDE 40 MG: 10 INJECTION, SOLUTION INTRAMUSCULAR; INTRAVENOUS at 09:31

## 2020-12-07 RX ADMIN — POTASSIUM CHLORIDE 10 MEQ: 1500 TABLET, EXTENDED RELEASE ORAL at 09:30

## 2020-12-07 RX ADMIN — LISINOPRIL 2.5 MG: 2.5 TABLET ORAL at 09:31

## 2020-12-07 RX ADMIN — Medication 500 UNITS: at 09:31

## 2020-12-07 RX ADMIN — IPRATROPIUM BROMIDE AND ALBUTEROL SULFATE 1 AMPULE: .5; 3 SOLUTION RESPIRATORY (INHALATION) at 12:29

## 2020-12-07 RX ADMIN — FAMOTIDINE 20 MG: 20 TABLET, FILM COATED ORAL at 09:31

## 2020-12-07 RX ADMIN — DOCUSATE SODIUM 100 MG: 100 CAPSULE, LIQUID FILLED ORAL at 09:30

## 2020-12-07 RX ADMIN — METFORMIN HYDROCHLORIDE 1000 MG: 500 TABLET ORAL at 09:31

## 2020-12-07 RX ADMIN — ENOXAPARIN SODIUM 40 MG: 40 INJECTION SUBCUTANEOUS at 09:31

## 2020-12-07 RX ADMIN — AZITHROMYCIN 250 MG: 250 TABLET, FILM COATED ORAL at 09:31

## 2020-12-07 RX ADMIN — METHYLPREDNISOLONE SODIUM SUCCINATE 40 MG: 125 INJECTION, POWDER, FOR SOLUTION INTRAMUSCULAR; INTRAVENOUS at 01:17

## 2020-12-07 RX ADMIN — Medication 240 MG: at 09:30

## 2020-12-07 RX ADMIN — INSULIN LISPRO 1 UNITS: 100 INJECTION, SOLUTION INTRAVENOUS; SUBCUTANEOUS at 09:32

## 2020-12-07 RX ADMIN — IPRATROPIUM BROMIDE AND ALBUTEROL SULFATE 1 AMPULE: .5; 3 SOLUTION RESPIRATORY (INHALATION) at 08:07

## 2020-12-07 NOTE — PROGRESS NOTES
Pharmacy Medication History Note      List of current medications patient is taking is complete. Source of information: ECF MAR    Changes made to medication list:  Medications removed (include reason, ex. therapy complete or physician discontinued): N/A    Medications added/doses adjusted:  APAP 325mg, changed to 2 tabs Q4H PRN pain/fever  Lidocaine 4% cream changed to apply to groin Q6H PRN pain    Other notes (ex. Recent course of antibiotics, Coumadin dosing):  ECF MAR included in packet from [facility]  Denies use of other OTC or herbal medications.       Allergies reviewed      Electronically signed by Juliano Linares on 12/7/2020 at 12:42 PM

## 2020-12-07 NOTE — CARE COORDINATION
12/7/20, 2:31 PM EST    Patient goals/plan/ treatment preferences discussed by  and . Patient goals/plan/ treatment preferences reviewed with patient/ family. Patient/ family verbalize understanding of discharge plan and are in agreement with goal/plan/treatment preferences. Understanding was demonstrated using the teach back method. AVS provided by RN at time of discharge, which includes all necessary medical information pertaining to the patients current course of illness, treatment, post-discharge goals of care, and treatment preferences. IMM Letter  IMM Letter given to Patient/Family/Significant other/Guardian/POA/by[de-identified] Pt Access  IMM Letter date given[de-identified] 12/05/20  IMM Letter time given[de-identified] 4150  Yung Wills will be discharged back to [facility] today. Spoke with Bushra Candelario at the facility and they will transport him back to the facility at 3:15pm.  Spoke with his son Dr. Tenzin Hill to make him aware. He is agreeable.   Juliana Angela RN will have him down in the lobby ready for pickup at 3:15pm.

## 2020-12-07 NOTE — DISCHARGE SUMMARY
Discharge Summary    Saintclair Amos  :  1931  MRN:  [id number]    Admit date:  2020  Discharge date:      Admitting Physician:  Liseth Wiley MD    Discharge Diagnoses:    1. 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 COPD with acute exacerbation, improved  3. ROBER  4. HTN  5. DM 2  6. Obesity-mod  7. UTI, chronic felipe's catheter      Admission Condition:  serious  Discharged Condition:  good    Hospital Course:   Patient was diuresed with IV lasix, hypoxemia improved, cont nocturnal NIPPV. He remained stable and was discharged in stable condition. Discharge Medications:       Andreas Gonzalez   Home Medication Instructions BQK:[id number]    Printed on:20 5254   Medication Information                      acetaminophen (TYLENOL) 325 MG tablet  Take 650 mg by mouth every 4 hours as needed for Pain or Fever              albuterol sulfate  (90 Base) MCG/ACT inhaler  Inhale 2 puffs into the lungs every 6 hours as needed for Wheezing             Alcohol Swabs (ALCOHOL PREP) 70 % PADS  Alcohol pads (brand per preference). Sig: use to test sugar twice per day. Dx: E11.8             Arformoterol Tartrate (BROVANA) 15 MCG/2ML NEBU  Take 2 mLs by nebulization 2 times daily 1 dose inhale orally two times per day             Blood Glucose Monitoring Suppl (ACCU-CHEK MERYL PLUS) w/Device KIT  Or brand per pt preference. Sig: test sugar BID. Dx: E11.8             blood glucose test strips (ACCU-CHEK MERYL PLUS) strip  Accu-check Meryl Plus Test Strips (or brand per pt preference). Sig: test sugar BID. Dx: E11.8             budesonide (PULMICORT) 0.5 MG/2ML nebulizer suspension  USE 1 VIAL VIA NEBULIZER TWICE DAILY.  RINSE MOUTH AFTER TREATMENT             cefdinir (OMNICEF) 300 MG capsule  Take 1 capsule by mouth 2 times daily for 7 days             docusate (COLACE, DULCOLAX) 100 MG CAPS  Take 100 mg by mouth 2 times daily             doxazosin (CARDURA) 8 MG tablet  Take 1 tablet by mouth nightly             ferrous gluconate (FERGON) 324 (38 Fe) MG tablet  Take 1 tablet by mouth 2 times daily             furosemide (LASIX) 40 MG tablet  Take 1 tablet by mouth 2 times daily             ibuprofen (ADVIL;MOTRIN) 800 MG tablet  Take 1 tablet by mouth 3 times daily (with meals)             insulin glargine (LANTUS SOLOSTAR) 100 UNIT/ML injection pen  INJECT SUBCUTANEOUSLY 18  UNITS NIGHTLY             levothyroxine (SYNTHROID) 200 MCG tablet  TAKE 1 TABLET BY MOUTH  DAILY             lidocaine (LMX) 4 % cream  Apply topically every 6 hours as needed for Pain To groin             lisinopril (PRINIVIL;ZESTRIL) 2.5 MG tablet  Take 1 tablet by mouth daily             metFORMIN (GLUCOPHAGE) 1000 MG tablet  Take 1 tablet by mouth 2 times daily (with meals)             metoprolol tartrate (LOPRESSOR) 25 MG tablet  Take 1 tablet by mouth 2 times daily Hold for SBP <110 or HR <60             Multiple Vitamins-Minerals (PRESERVISION AREDS 2) CAPS  Take 1 capsule by mouth daily             oxybutynin (DITROPAN) 5 MG tablet  TAKE 1 TABLET BY MOUTH EVERY 8 HOURS AS NEEDED FOR BLADDER SPASMS OR BLADDER SPASM             OXYGEN  Inhale 3 L into the lungs nightly Indications: Difficulty Breathing, Oxygen Therapy And prn through cpap             polyethylene glycol (MIRALAX) 17 g packet  Take 17 g by mouth daily as needed for Constipation             potassium chloride (KLOR-CON M) 10 MEQ extended release tablet  TAKE 1 TABLETS BY MOUTH TWICE DAILY             Revefenacin 175 MCG/3ML SOLN  Inhale 1 nebule into the lungs daily Dx COPD J44.3             SOFT TOUCH LANCETS [facility]  Lancet (or brand per pt preference) Use to check blood sugars 2 times daily.  Dx: E11.8             vitamin D3 (CHOLECALCIFEROL) 10 MCG (400 UNIT) TABS tablet  Take 1 tablet by mouth daily 400units                 Consults:  pulmonary/intensive care    Significant Diagnostic Studies: labs: CBC:        Recent Labs     12/05/20  0815 12/06/20  0202 WBC 10.6 11.7*   HGB 11.0* 10.5*    279      BMP:          Recent Labs     12/05/20  0815 12/06/20  0613 12/07/20  0650    145 142   K 3.7 3.8 3.8   CL 98 99 96*   CO2 35* 33 32   BUN 12 14 18   CREATININE 0.7 0.8 0.8   GLUCOSE 127* 174* 178*      Hepatic:       Recent Labs     12/05/20  0815   AST 10   ALT 6*   BILITOT 0.2*   ALKPHOS 52      Magnesium:          Lab Results   Component Value Date     MG 1.8 12/05/2020      HgBA1c:          Lab Results   Component Value Date     LABA1C 6.0 11/05/2020      TSH:          Lab Results   Component Value Date     TSH 7.160 10/03/2020         Assessment and Plan:   8. Acute on chronic hypercapnic resp failure, improved  9. COPD with acute exacerbation, improved  10. ROBER  11. HTN  12. DM 2  13. Obesity-mod  14. UTI, chronic felipe's catheter     Cont duoneb aerosols  Cont po prednisone and taper  [place] Cpap  Po antibiotic    Treatments:   Diuresis, bronchodilators. Disposition:   assisted living facility.     Signed:  Carlos Pina  12/7/2020, 12:54 PM

## 2020-12-07 NOTE — FLOWSHEET NOTE
9667  Patient refuses to wear bipap any longer. Readied for am bath, patient dyspneic with turning, audible wheezed heard and then coughing started. O2 sats now 88-90%. RT called for aerosol. Patient up to recliner where he felt better and aerosol given. Wheezing and subsequently coughing now gone. Patient wants to watch TV and with gentle direction patient able to turn on tv and find the channel he wants to watch. Since admission, patient continues occasionally to voice he's not liking inability to care for self. He repetitively states he's the one that has always taken care of things. Voices dismay over what he's not able to do anymore. This RN allowed patient to continue to vent.

## 2020-12-07 NOTE — CARE COORDINATION
DISCHARGE/PLANNING EVALUATION  12/7/20, 10:00 AM EST    Reason for Referral: Discharge planning. Mental Status: Was made aware by Theronaldo Rodriguez that the patient has significant short term memory loss. Decision Making:Son Markie Garcia is helping with decision making. Family/Social/Home Environment: Lives at [facility]. Family is supportive. Current Services including food security, transportation and housekeeping: Lives at [facility]. Spoke with [facility] at the facility and they are able to accept back at discharge. Current Equipment:did not discuss  Payment Source:Medicare  Concerns or Barriers to Discharge: None  Post acute provider list with quality measures, geographic area and applicable managed care information provided. Questions regarding selection process answered:Current at [facility]. Teach Back Method used with son Dr. Juliocesar Munguia regarding care plan and discharge planning. Patient's son verbalized understanding of the plan of care and contribute to goal setting. Patient goals, treatment preferences and discharge plan: Darius Maldonado will return to [facility] at discharge. Electronically signed by ZEKE Guzman on 12/7/2020 at 10:00 AM

## 2020-12-07 NOTE — FLOWSHEET NOTE
6402  Patient adamant about a new room and since another room is available, patient ambulated in the both hallways of 3B and then into new room. Patient happy with new accommodations, sitting in recliner watching his favorite TV show. Has already enjoyed requested snack of ice cream. Denies further need at this time. Remains confused but pleasant.

## 2020-12-07 NOTE — PROGRESS NOTES
Patient discharged to assisted living in stable condition via [facility] transport bus. Belongings and AVS sent with patient. Attempted to call report to facility x3 with no answer. Unit phone number provided on AVS for any questions.

## 2020-12-08 ENCOUNTER — CARE COORDINATION (OUTPATIENT)
Dept: CASE MANAGEMENT | Age: 85
End: 2020-12-08

## 2020-12-08 PROCEDURE — 1111F DSCHRG MED/CURRENT MED MERGE: CPT | Performed by: FAMILY MEDICINE

## 2020-12-08 NOTE — CARE COORDINATION
[facility] Transitions Initial Follow Up Call    Call within 2 business days of discharge: Yes    Patient: Robert Mendez Patient : 1931   MRN: [id number]  Reason for Admission: COPD  Discharge Date: 20 RARS: Readmission Risk Score: 24      Last Discharge [facility]       Complaint Diagnosis Description Type Department Provider    20 Shortness of Breath COPD exacerbation ([address] . .. ED to Hosp-Admission (Discharged) (ADMITTED) Mariana Curran MD             Challenges to be reviewed by the provider   Additional needs identified to be addressed with provider No  none    Discussed COVID-19 related testing which was available at this time. Test results were negative. Patient informed of results, if available? No         Method of communication with provider : none    Advance Care Planning:   Does patient have an Advance Directive:  reviewed and current. Was this a readmission? No    Spoke with Nurse Dante Hernandez at [facility]. They help with some ADL's but patient does most. They do all medications for patient. Reviewed medications with nurse. No questions regarding discharge paperwork. Patient refused to wear Bipap last night. F/u appt with Dr Joel Cabezas on 12/15/20. Family usually transports patient to appts. Nurse has no questions or concerns at this time. States patient is doing well has is not having issues. CTN provided contact information for future needs.           Non-face-to-face services provided:  Obtained and reviewed discharge summary and/or continuity of care documents    Care Transitions 24 Hour Call    Do you have any ongoing symptoms?:  No  Do you have a copy of your discharge instructions?:  Yes  Do you have all of your prescriptions and are they filled?:  No  Have you been contacted by a [employer] [address]?:  No  Have you scheduled your follow up appointment?:  Yes  How are you going to get to your appointment?:  Car - family or friend to transport  Were you discharged with any Home Care or Post Acute Services:  No  Patient DME:  Straight cane, Walker  Patient Home Equipment:  Oxygen, CPAP, Nebulizer  Do you have support at home?:  Alone  Do you feel like you have everything you need to keep you well at home?:  Yes  Are you an active caregiver in your home?:  No  Care Transitions Interventions  No Identified Needs    Social Work:  Completed           Follow Up  Future Appointments   Date Time Provider Gretchen Joslyn   12/15/2020  9:00 AM Domingo Snellen, APRN - [address]   1/25/2021 10:45 AM Asmita Eid, [address]   2/2/2021 12:15 PM Brenda Crisostomo MD [address]   2/15/2021  1:00 PM Concepcion Loredo MD [address] [facility] - [address]   4/2/2021 10:30 AM MD [address] Urology [facility] - Tessie Holliday RN

## 2020-12-10 LAB
BLOOD CULTURE, ROUTINE: NORMAL
BLOOD CULTURE, ROUTINE: NORMAL

## 2020-12-18 ENCOUNTER — TELEPHONE (OUTPATIENT)
Dept: FAMILY MEDICINE CLINIC | Age: 85
End: 2020-12-18

## 2020-12-23 ENCOUNTER — TELEPHONE (OUTPATIENT)
Dept: FAMILY MEDICINE CLINIC | Age: 85
End: 2020-12-23

## 2020-12-23 NOTE — TELEPHONE ENCOUNTER
Received a PA request from [employer] for Perforomist 20 mcg/2 ml. Reviewed the pts med list and it is not listed. I called [address] and spoke to a nurse and she stated the pt hasn't been receiving that medication, has only been using Albuterol nebulizer solution and Budesonide nebulizer; no Brovana or Revefenacin on hand at the facility either. Called pts son to see if the pt is supposed to be taking this medication. Will perform PA if pt is to take. Will await call back from pts son.      CM  Key: [id number]

## 2020-12-24 LAB
AVERAGE GLUCOSE: 126
HBA1C MFR BLD: 6 %

## 2020-12-31 ENCOUNTER — TELEPHONE (OUTPATIENT)
Dept: FAMILY MEDICINE CLINIC | Age: 85
End: 2020-12-31

## 2020-12-31 NOTE — TELEPHONE ENCOUNTER
Fax/lab order sent to [facility]      ----- Message from Bryan Anne MD sent at 12/30/2020 12:47 PM EST -----  Call pt-   CBC shows improving anemia, otherwise normal    Other labs addressed previously as they are older. Recheck CBC in 2-3 months.   ES

## 2021-01-19 ENCOUNTER — TELEPHONE (OUTPATIENT)
Dept: FAMILY MEDICINE CLINIC | Age: 86
End: 2021-01-19

## 2021-01-19 NOTE — TELEPHONE ENCOUNTER
Bailey from [address] calls asking if you will follow  ept for Maicol Horn. They will be helping with Catheter changes and such. Okay to follow?     [phone]

## 2021-01-26 RX ORDER — OXYBUTYNIN CHLORIDE 5 MG/1
TABLET ORAL
Qty: 270 TABLET | Refills: 0 | Status: SHIPPED | OUTPATIENT
Start: 2021-01-26 | End: 2021-04-30 | Stop reason: SDUPTHER

## 2021-01-26 NOTE — TELEPHONE ENCOUNTER
Incoming fax received from [facility] for refill of oxybutynin 5 mg q8 hours prn. Last seen 10/19/20, no future appt scheduled. Medication verified. Order pended and set to escribe.

## 2021-02-15 ENCOUNTER — OFFICE VISIT (OUTPATIENT)
Dept: CARDIOLOGY CLINIC | Age: 86
End: 2021-02-15
Payer: MEDICARE

## 2021-02-15 VITALS
HEART RATE: 73 BPM | BODY MASS INDEX: 40.97 KG/M2 | HEIGHT: 67 IN | DIASTOLIC BLOOD PRESSURE: 60 MMHG | SYSTOLIC BLOOD PRESSURE: 136 MMHG | WEIGHT: 261 LBS

## 2021-02-15 DIAGNOSIS — I10 ESSENTIAL HYPERTENSION: ICD-10-CM

## 2021-02-15 DIAGNOSIS — E78.2 MIXED HYPERLIPIDEMIA: ICD-10-CM

## 2021-02-15 DIAGNOSIS — I48.0 PAROXYSMAL ATRIAL FIBRILLATION (HCC): ICD-10-CM

## 2021-02-15 DIAGNOSIS — I50.33 ACUTE ON CHRONIC DIASTOLIC CONGESTIVE HEART FAILURE (HCC): Primary | ICD-10-CM

## 2021-02-15 DIAGNOSIS — R60.0 BILATERAL LEG EDEMA: ICD-10-CM

## 2021-02-15 PROCEDURE — 1123F ACP DISCUSS/DSCN MKR DOCD: CPT | Performed by: INTERNAL MEDICINE

## 2021-02-15 PROCEDURE — 1036F TOBACCO NON-USER: CPT | Performed by: INTERNAL MEDICINE

## 2021-02-15 PROCEDURE — 4040F PNEUMOC VAC/ADMIN/RCVD: CPT | Performed by: INTERNAL MEDICINE

## 2021-02-15 PROCEDURE — G8417 CALC BMI ABV UP PARAM F/U: HCPCS | Performed by: INTERNAL MEDICINE

## 2021-02-15 PROCEDURE — G8484 FLU IMMUNIZE NO ADMIN: HCPCS | Performed by: INTERNAL MEDICINE

## 2021-02-15 PROCEDURE — 99214 OFFICE O/P EST MOD 30 MIN: CPT | Performed by: INTERNAL MEDICINE

## 2021-02-15 PROCEDURE — G8427 DOCREV CUR MEDS BY ELIG CLIN: HCPCS | Performed by: INTERNAL MEDICINE

## 2021-02-15 RX ORDER — FUROSEMIDE 40 MG/1
60 TABLET ORAL 2 TIMES DAILY
Qty: 90 TABLET | Refills: 1 | Status: SHIPPED | OUTPATIENT
Start: 2021-02-15 | End: 2021-02-17

## 2021-02-15 RX ORDER — SPIRONOLACTONE 25 MG/1
25 TABLET ORAL DAILY
Qty: 90 TABLET | Refills: 3 | Status: ON HOLD | OUTPATIENT
Start: 2021-02-15 | End: 2022-03-05 | Stop reason: HOSPADM

## 2021-02-15 RX ORDER — SPIRONOLACTONE 25 MG/1
25 TABLET ORAL DAILY
Qty: 30 TABLET | Refills: 3 | Status: SHIPPED | OUTPATIENT
Start: 2021-02-15 | End: 2021-02-15

## 2021-02-15 NOTE — PROGRESS NOTES
Chief Complaint   Patient presents with    6 Month Follow-Up   originally  patient referred for cholesterol emboli behind the right eye. From Dr. Reji Mills:  Nonsustained ventricular tachycardia rate of  140 to 150 beats per minute composed of 29 beats, happened overnight at  32 minutes after midnight. No PVC, nothing after that, and the patient  admitted for acute COPD exacerbation.       Had been off lipitor 80 and coumadin since last visit    Hxo hematuria may 2020 and coumadin stopped    6 month F/u    Had been in hospital in Dec 2020 for copd ex    Denied cp , dizziness or palpitation     Gained wt a lot over 30 lb in 6 month    Walk with walker    Sob on exertion- chronic but worse    Some leg edema+2 chronic but worse from 1+ to 2+ now    Walk with walker    No dizziness    nevere smoked    FHX  None for CAD    Patient Active Problem List   Diagnosis    [facility]    Hyperlipidemia    Acquired hypothyroidism    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    Breast lump    BPH (benign prostatic hyperplasia)    ROBER (obstructive sleep apnea)    Essential hypertension    History of DVT (deep vein thrombosis)    Shortness of breath    Chronic respiratory failure with hypoxia (HCC)    Mixed hyperlipidemia    Chronic deep vein thrombosis (DVT) of right lower extremity (HCC)    Anticoagulated on Coumadin    Type 2 diabetes mellitus with complication, with long-term current use of insulin (HCC)    Muscle weakness    Chronic bronchitis (HCC)    Chronic obstructive pulmonary disease (HCC)    Paroxysmal atrial fibrillation (HCC)    Acute diastolic congestive heart failure (HCC)    Paroxysmal atrial flutter (HCC)    Urinary incontinence    Serous detachment of retinal pigment epithelium    Senile nuclear sclerosis    Puckering of macula, bilateral    Presence of intraocular lens    Primary open-angle glaucoma    Other chorioretinal scars, right eye    Nonexudative senile macular (TYLENOL) 325 MG tablet Take 650 mg by mouth every 4 hours as needed for Pain or Fever       lidocaine (LMX) 4 % cream Apply topically every 6 hours as needed for Pain To groin      albuterol sulfate  (90 Base) MCG/ACT inhaler Inhale 2 puffs into the lungs every 6 hours as needed for Wheezing 1 Inhaler 3    OXYGEN Inhale 3 L into the lungs nightly Indications: Difficulty Breathing, Oxygen Therapy And prn through cpap       No current facility-administered medications for this visit. Review of Systems -     General ROS: negative  Psychological ROS: negative  Hematological and Lymphatic ROS: No history of blood clots or bleeding disorder. Respiratory ROS: no cough,  or wheezing, the rest see HPI  Cardiovascular ROS: See HPI  Gastrointestinal ROS: negative  Genito-Urinary ROS: no dysuria, trouble voiding, or hematuria  Musculoskeletal ROS: negative  Neurological ROS: no TIA or stroke symptoms  Dermatological ROS: negative      Blood pressure 136/60, pulse 73, height 5' 7\" (1.702 m), weight 261 lb (118.4 kg).         Physical Examination:    General appearance - alert, well appearing, and in no distress  HEENT- Pink conjunctiva  , Non-icteri sclera,PERRLA  Mental status - alert, oriented to person, place, and time  Neck - supple, no significant adenopathy, no JVD, or carotid bruits  Chest - clear to auscultation, no wheezes, rales or rhonchi, symmetric air entry  Heart - normal rate, regular rhythm, normal S1, S2, no murmurs, rubs, clicks or gallops  Abdomen - soft, nontender, nondistended, no masses or organomegaly  EDWARD- no CVA or flank tenderness, no suprapubic tenderness  Neurological - alert, oriented, normal speech, no focal findings or movement disorder noted  Musculoskeletal/limbs - no joint tenderness, deformity or swelling   - peripheral pulses normal, no pedal edema, no clubbing or cyanosis  Skin - normal coloration and turgor, no rashes, no suspicious skin lesions noted  Psych- appropriate consistent with abnormal left ventricular   relaxation (grade 1 diastolic dysfunction).      Signature      ----------------------------------------------------------------   Electronically signed by Jennifer Mcneill MD (Interpreting   [id number]) on 03/19/2019 at 05:35 AM      Assessment   Diagnosis Orders   1. Acute on chronic diastolic congestive heart failure ([address]     2. Bilateral leg edema +2     3. Paroxysmal atrial fibrillation (HCC)     4. Essential hypertension     5. Mixed hyperlipidemia           ASSESSMENT:  1. Basically acute hypercapnic respiratory failure. 2.  Acute COPD exacerbation. 3.  Acute diastolic congestive heart failure exacerbation. 4.  Nonsustained ventricular tachycardia composed of 29 beats at a rate  of 140 to 150 beats per minute range, happened at 32 minutes after  midnight. 5.  Hypertension. 6.  Hyperlipidemia. 7.  Obesity. 8.  COPD, on home oxygen. 9.  History of DVT for which he is on Coumadin. 10.  Hyperlipidemia. 11.  Hypertension. Plan   The current  meds and labs reviewed    Continue the current treatment and with constant vigilance to changes in symptoms and also any potential side effects. Return for care or seek medical attention immediately if symptoms got worse and/or develop new symptoms. Hyperlipidemia:  off statins,   No sure why pat off statin  Alanna Sal does not remember      Congestive heart failure: +ve evidence of fluid overload today, no recent hospitalization for CHF  Wt gain  Sob and leg edema worse  Increase lasix to 60 po bid from 40 po bid  Cont kcl 10 po qd  Start aldsactone 25 po qd  BMP and MG in 4 weeks      Hypertension, on medical treatment. Seems to be under good control. Patient is compliant with medical treatment.      Hx of cholesterol emboli   Cont asa,    PAF   Had been off lipitor 80 and coumadin since  Early 2020  Consider to resume coumadin when urology gave the okay and if family agree      D/w the pat the plan of care    Recent hospital record reviewed         I spent 32 minutes involved in face-to-face discussion of medical issues, prognosis, record review  and plan with the patient today and more than 50% of the time was spent on counseling and coordination of care      RTC in4 weeks with alma delia      [facility]

## 2021-02-17 RX ORDER — FUROSEMIDE 40 MG/1
TABLET ORAL
Qty: 270 TABLET | Refills: 0 | Status: ON HOLD | OUTPATIENT
Start: 2021-02-17 | End: 2021-05-19

## 2021-02-22 ENCOUNTER — OFFICE VISIT (OUTPATIENT)
Dept: PULMONOLOGY | Age: 86
End: 2021-02-22
Payer: MEDICARE

## 2021-02-22 VITALS
SYSTOLIC BLOOD PRESSURE: 132 MMHG | TEMPERATURE: 97.5 F | DIASTOLIC BLOOD PRESSURE: 64 MMHG | OXYGEN SATURATION: 97 % | HEART RATE: 81 BPM | WEIGHT: 259.6 LBS | BODY MASS INDEX: 40.74 KG/M2 | HEIGHT: 67 IN

## 2021-02-22 DIAGNOSIS — E66.01 MORBID OBESITY WITH BMI OF 40.0-44.9, ADULT (HCC): ICD-10-CM

## 2021-02-22 DIAGNOSIS — G47.33 OSA ON CPAP: ICD-10-CM

## 2021-02-22 DIAGNOSIS — E66.2 OBESITY HYPOVENTILATION SYNDROME (HCC): ICD-10-CM

## 2021-02-22 DIAGNOSIS — J44.9 STAGE 3 SEVERE COPD BY GOLD CLASSIFICATION (HCC): Primary | ICD-10-CM

## 2021-02-22 DIAGNOSIS — Z99.89 OSA ON CPAP: ICD-10-CM

## 2021-02-22 PROCEDURE — 99214 OFFICE O/P EST MOD 30 MIN: CPT | Performed by: PHYSICIAN ASSISTANT

## 2021-02-22 PROCEDURE — 3023F SPIROM DOC REV: CPT | Performed by: PHYSICIAN ASSISTANT

## 2021-02-22 PROCEDURE — 4040F PNEUMOC VAC/ADMIN/RCVD: CPT | Performed by: PHYSICIAN ASSISTANT

## 2021-02-22 PROCEDURE — 1036F TOBACCO NON-USER: CPT | Performed by: PHYSICIAN ASSISTANT

## 2021-02-22 PROCEDURE — 1123F ACP DISCUSS/DSCN MKR DOCD: CPT | Performed by: PHYSICIAN ASSISTANT

## 2021-02-22 PROCEDURE — G8417 CALC BMI ABV UP PARAM F/U: HCPCS | Performed by: PHYSICIAN ASSISTANT

## 2021-02-22 PROCEDURE — G8484 FLU IMMUNIZE NO ADMIN: HCPCS | Performed by: PHYSICIAN ASSISTANT

## 2021-02-22 PROCEDURE — G8926 SPIRO NO PERF OR DOC: HCPCS | Performed by: PHYSICIAN ASSISTANT

## 2021-02-22 PROCEDURE — G8427 DOCREV CUR MEDS BY ELIG CLIN: HCPCS | Performed by: PHYSICIAN ASSISTANT

## 2021-02-22 ASSESSMENT — ENCOUNTER SYMPTOMS
EYES NEGATIVE: 1
CHEST TIGHTNESS: 0
DIARRHEA: 0
COUGH: 0
STRIDOR: 0
WHEEZING: 0
NAUSEA: 0
ALLERGIC/IMMUNOLOGIC NEGATIVE: 1
BACK PAIN: 0
SHORTNESS OF BREATH: 1

## 2021-02-22 NOTE — PROGRESS NOTES
[facility]    : Angela Alarcon, 80 y.o.   : 1931    Pt of Dr. Freddie Johnson   Patient presents with    Follow-up     COPD 5 month pulmonary follow up         HPI  Bart Lindsay is here for follow up for COPD and ROBER. He is currently residing in assisted living. He is on Brovana and Pulmicort nebs he thinks. He feels they are helpful. He is SOB at times but not worse. He is wearing his PAP at night. He has O2 bled into PAP at 3 liters at night. No download available currently. Progress History:   Since last visit any new medical issues? No  New ER or hospitlal visits? No  Any new or changes in medicines? No  Using inhalers? Yes Brovana and Pulmicort  Are they helpful?  Yes   Past Medical hx   PMH:  Past Medical History:   Diagnosis Date    Anxiety     CAD (coronary artery disease)     leaking valve    Chronic back pain     COPD (chronic obstructive pulmonary disease) (HCC)     Detached retina     Diabetes mellitus (HCC)     NIDDM    DVT (deep venous thrombosis) ([facility])     RLE    DVT (deep venous thrombosis) ([facility] [address] 11/9/15    LLE    Glaucoma     Hyperlipidemia     Hypertension     Mass of chest     benign chest mass, Dr Eva Norris Movement disorder     spinal stenosis    Obesity     Osteoarthritis     right ankle, right hand    Pneumonia     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    Sleep apnea     on BiPap, Dr Tawana Mclean Thyroid cancer [facility])     s/p thyroid resection    Urinary incontinence      SURGICAL HISTORY:  Past Surgical History:   Procedure Laterality Date   [facility] SURGERY  ,,     BRONCHOSCOPY N/A 2017    BRONCHOSCOPY AIRWAY ONLY performed by Bianca Vallecillo MD at [address] [place] [address] EKG 12-LEAD  11/10/2015         FRACTURE SURGERY      right hand    OTHER SURGICAL HISTORY      spinal epidurals    RETINAL DETACHMENT SURGERY     [facility] SURGERY  2004    Lumbar laminectomy  THYROIDECTOMY      TURP N/A 3/17/2020    CYSTOSCOPY WITH GREENLIGHT PHOTOVAPORIZATION OF PROSTATE performed by Earnestine Posey MD at [address] [facility] TURP N/A 3/24/2020    PLASMA BUTTON TURP WITH CLOT EVACUATION performed by Earnestine Posey MD at [address]:  Social History     Tobacco Use    Smoking status: Never Smoker    Smokeless tobacco: Never Used   Substance Use Topics    Alcohol use: Yes     Alcohol/week: 1.0 standard drinks     Types: 1 Glasses of wine per week     Comment: Glass of wine with dinner.  Drug use: No     ALLERGIES:  Allergies   Allergen Reactions    Latex Rash    Macrobid [Nitrofurantoin Samara Morrison      Dr. Leonor Horn prefers pt not use Macrobid due to pulmonary side effects.     Levaquin [Levofloxacin] Other (See Comments)     Redness/flushing    Adhesive Tape Rash    Alphagan [Brimonidine Tartrate] Hives     Eyes red     FAMILY HISTORY:  Family History   Problem Relation Age of Onset    Other Mother         Complications of Childbirth    Other Father [de-identified]        Natural causes     CURRENT MEDICATIONS:  Current Outpatient Medications   Medication Sig Dispense Refill    furosemide (LASIX) 40 MG tablet TAKE 1 AND 1/2 TABLETS BY MOUTH TWICE DAILY 270 tablet 0    spironolactone (ALDACTONE) 25 MG tablet TAKE 1 TABLET BY MOUTH DAILY 90 tablet 3    oxybutynin (DITROPAN) 5 MG tablet TAKE 1 TABLET BY MOUTH EVERY 8 HOURS AS NEEDED FOR BLADDER SPASMS OR BLADDER SPASM 270 tablet 0    Revefenacin 175 MCG/3ML SOLN Inhale 1 nebule into the lungs daily Dx COPD J44.3 30 vial 5    levothyroxine (SYNTHROID) 200 MCG tablet TAKE 1 TABLET BY MOUTH  DAILY 90 tablet 3    Arformoterol Tartrate (BROVANA) 15 MCG/2ML NEBU Take 2 mLs by nebulization 2 times daily 1 dose inhale orally two times per day 120 mL 11    insulin glargine (LANTUS SOLOSTAR) 100 UNIT/ML injection pen INJECT SUBCUTANEOUSLY 18  UNITS NIGHTLY 30 mL 3  ibuprofen (ADVIL;MOTRIN) 800 MG tablet Take 1 tablet by mouth 3 times daily (with meals) 270 tablet 1    blood glucose test strips (ACCU-CHEK CHE PLUS) strip Accu-check Che Plus Test Strips (or brand per pt preference). Sig: test sugar BID. Dx: E11.8 200 strip 3    SOFT TOUCH LANCETS MISC Lancet (or brand per pt preference) Use to check blood sugars 2 times daily. Dx: E11.8 200 each 3    Alcohol Swabs (ALCOHOL PREP) 70 % PADS Alcohol pads (brand per preference). Sig: use to test sugar twice per day. Dx: E11.8 200 each 3    Blood Glucose Monitoring Suppl (ACCU-CHEK CHE PLUS) w/Device KIT Or brand per pt preference. Sig: test sugar BID. Dx: E11.8 1 kit 0    metoprolol tartrate (LOPRESSOR) 25 MG tablet Take 1 tablet by mouth 2 times daily Hold for SBP <110 or HR <60 180 tablet 3    ferrous gluconate (FERGON) 324 (38 Fe) MG tablet Take 1 tablet by mouth 2 times daily 180 tablet 3    metFORMIN (GLUCOPHAGE) 1000 MG tablet Take 1 tablet by mouth 2 times daily (with meals) 180 tablet 3    budesonide (PULMICORT) 0.5 MG/2ML nebulizer suspension USE 1 VIAL VIA NEBULIZER TWICE DAILY.  RINSE MOUTH AFTER TREATMENT 360 mL 3    Multiple Vitamins-Minerals (PRESERVISION AREDS 2) CAPS Take 1 capsule by mouth daily 90 capsule 3    lisinopril (PRINIVIL;ZESTRIL) 2.5 MG tablet Take 1 tablet by mouth daily 90 tablet 3    potassium chloride (KLOR-CON M) 10 MEQ extended release tablet TAKE 1 TABLETS BY MOUTH TWICE DAILY 180 tablet 3    docusate (COLACE, DULCOLAX) 100 MG CAPS Take 100 mg by mouth 2 times daily 180 capsule 3    doxazosin (CARDURA) 8 MG tablet Take 1 tablet by mouth nightly 90 tablet 3    vitamin D3 (CHOLECALCIFEROL) 10 MCG (400 UNIT) TABS tablet Take 1 tablet by mouth daily 400units 90 tablet 3    polyethylene glycol (MIRALAX) 17 g packet Take 17 g by mouth daily as needed for Constipation      acetaminophen (TYLENOL) 325 MG tablet Take 650 mg by mouth every 4 hours as needed for Pain or Fever Effort: Pulmonary effort is normal.      Breath sounds: Normal breath sounds. Musculoskeletal: Normal range of motion. Skin:     General: Skin is warm and dry. Neurological:      Mental Status: He is alert and oriented to person, place, and time. Psychiatric:         Behavior: Behavior normal.         Thought Content: Thought content normal.         Judgment: Judgment normal.          Test results   None     Assessment      Diagnosis Orders   1. Stage 3 severe COPD by GOLD classification ([address]     2. Morbid obesity with BMI of 40.0-44.9, adult ([address]     3. ROBER on CPAP     4. Obesity hypoventilation syndrome (HCC)           Plan   - Continue Neb treatments  - Continue PAP and O2 bleed in   - Download reviewed and discussed with patient  - He  was advised to continue current positive airway pressure therapy with above described pressure. - He  advised to keep good compliance with current recommended pressure to get optimal results and clinical improvement  - Recommend 7-9 hours of sleep with PAP  - He was advised to call [employer] regarding supplies if needed.   -He call my office for earlier appointment if needed for worsening of sleep symptoms.   - He was instructed on weight loss  - Enoch Bee was educated about my impression and plan. Patient verbalizesunderstanding.   We will see Garfield Villegas back in: 6 months with download      Katarina Gonzalez PA-C, JARETH  2/22/2021     Time spent for visit- 30min

## 2021-03-15 ENCOUNTER — OFFICE VISIT (OUTPATIENT)
Dept: CARDIOLOGY CLINIC | Age: 86
End: 2021-03-15
Payer: MEDICARE

## 2021-03-15 VITALS
SYSTOLIC BLOOD PRESSURE: 108 MMHG | HEART RATE: 75 BPM | BODY MASS INDEX: 40.81 KG/M2 | DIASTOLIC BLOOD PRESSURE: 58 MMHG | HEIGHT: 67 IN | WEIGHT: 260 LBS

## 2021-03-15 DIAGNOSIS — I48.0 PAROXYSMAL ATRIAL FIBRILLATION (HCC): ICD-10-CM

## 2021-03-15 DIAGNOSIS — R60.0 BILATERAL LEG EDEMA: ICD-10-CM

## 2021-03-15 DIAGNOSIS — I10 ESSENTIAL HYPERTENSION: ICD-10-CM

## 2021-03-15 DIAGNOSIS — I50.32 CHRONIC DIASTOLIC CHF (CONGESTIVE HEART FAILURE) (HCC): Primary | ICD-10-CM

## 2021-03-15 DIAGNOSIS — E78.2 MIXED HYPERLIPIDEMIA: ICD-10-CM

## 2021-03-15 PROCEDURE — 1123F ACP DISCUSS/DSCN MKR DOCD: CPT | Performed by: INTERNAL MEDICINE

## 2021-03-15 PROCEDURE — 99214 OFFICE O/P EST MOD 30 MIN: CPT | Performed by: INTERNAL MEDICINE

## 2021-03-15 PROCEDURE — 1036F TOBACCO NON-USER: CPT | Performed by: INTERNAL MEDICINE

## 2021-03-15 PROCEDURE — G8484 FLU IMMUNIZE NO ADMIN: HCPCS | Performed by: INTERNAL MEDICINE

## 2021-03-15 PROCEDURE — G8417 CALC BMI ABV UP PARAM F/U: HCPCS | Performed by: INTERNAL MEDICINE

## 2021-03-15 PROCEDURE — 4040F PNEUMOC VAC/ADMIN/RCVD: CPT | Performed by: INTERNAL MEDICINE

## 2021-03-15 PROCEDURE — G8427 DOCREV CUR MEDS BY ELIG CLIN: HCPCS | Performed by: INTERNAL MEDICINE

## 2021-03-15 RX ORDER — DOXAZOSIN MESYLATE 4 MG/1
6 TABLET ORAL NIGHTLY
Qty: 30 TABLET | Refills: 3 | Status: ON HOLD
Start: 2021-03-15 | End: 2021-05-24 | Stop reason: SDUPTHER

## 2021-03-15 NOTE — PROGRESS NOTES
Chief Complaint   Patient presents with    1 Month Follow-Up    Congestive Heart Failure   originally  patient referred for cholesterol emboli behind the right eye. From Dr. Elba Hughes:  Nonsustained ventricular tachycardia rate of  140 to 150 beats per minute composed of 29 beats, happened overnight at  32 minutes after midnight. No PVC, nothing after that, and the patient  admitted for acute COPD exacerbation. Had been off lipitor 80 and coumadin since last visit    Hxo hematuria may 2020 and coumadin stopped    Had been in hospital in Dec 2020 for copd ex        Patient presents in office today for a 4 wk fu.     Denied cp , dizziness or palpitation     Lost 1 to 2 lb in 1 month    Walk with walker    Sob on exertion- chronic better  The  leg edema chronic  now +1   After increased lasix From  +2     Walk with walker    No dizziness    nevere smoked    FHX  None for CAD    Patient Active Problem List   Diagnosis    Diabetes mellitus ([facility] [address]    Hyperlipidemia    Acquired hypothyroidism    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    Breast lump    BPH (benign prostatic hyperplasia)    ROBER (obstructive sleep apnea)    Essential hypertension    History of DVT (deep vein thrombosis)    Shortness of breath    Chronic respiratory failure with hypoxia (HCC)    Mixed hyperlipidemia    Chronic deep vein thrombosis (DVT) of right lower extremity (HCC)    Anticoagulated on Coumadin    Type 2 diabetes mellitus with complication, with long-term current use of insulin (HCC)    Muscle weakness    Chronic bronchitis (HCC)    Chronic obstructive pulmonary disease (HCC)    Paroxysmal atrial fibrillation (HCC)    Acute diastolic congestive heart failure (HCC)    Paroxysmal atrial flutter (HCC)    Urinary incontinence    Serous detachment of retinal pigment epithelium    Senile nuclear sclerosis    Puckering of macula, bilateral    Presence of intraocular lens    Primary open-angle glaucoma    Other on file    Number of children: 2    Years of education: Not on file    Highest education level: Not on file   Occupational History    Not on file   Social Needs    Financial resource strain: Not hard at all    Food insecurity     Worry: Never true     Inability: Never true   [employer] needs     Medical: No     Non-medical: No   Tobacco Use    Smoking status: Never Smoker    Smokeless tobacco: Never Used   Substance and Sexual Activity    Alcohol use: Yes     Alcohol/week: 1.0 standard drinks     Types: 1 Glasses of wine per week     Comment: Glass of wine with dinner.     Drug use: No    Sexual activity: Never   Lifestyle    Physical activity     Days per week: 0 days     Minutes per session: 0 min    Stress: Not at all   Relationships    Social connections     Talks on phone: Once a week     Gets together: Once a week     Attends Sabianist service: Not on file     Active member of club or organization: No     Attends meetings of clubs or organizations: Never     Relationship status:     Intimate partner violence     Fear of current or ex partner: Not on file     Emotionally abused: Not on file     Physically abused: Not on file     Forced sexual activity: Not on file   Other Topics Concern    Not on file   Social History Narrative    Not on file       Current Outpatient Medications   Medication Sig Dispense Refill    furosemide (LASIX) 40 MG tablet TAKE 1 AND 1/2 TABLETS BY MOUTH TWICE DAILY 270 tablet 0    spironolactone (ALDACTONE) 25 MG tablet TAKE 1 TABLET BY MOUTH DAILY 90 tablet 3    oxybutynin (DITROPAN) 5 MG tablet TAKE 1 TABLET BY MOUTH EVERY 8 HOURS AS NEEDED FOR BLADDER SPASMS OR BLADDER SPASM 270 tablet 0    Revefenacin 175 MCG/3ML SOLN Inhale 1 nebule into the lungs daily Dx COPD J44.3 30 vial 5    levothyroxine (SYNTHROID) 200 MCG tablet TAKE 1 TABLET BY MOUTH  DAILY 90 tablet 3    Arformoterol Tartrate (BROVANA) 15 MCG/2ML NEBU Take 2 mLs by nebulization 2 times daily 1 dose inhale orally two times per day 120 mL 11    insulin glargine (LANTUS SOLOSTAR) 100 UNIT/ML injection pen INJECT SUBCUTANEOUSLY 18  UNITS NIGHTLY 30 mL 3    ibuprofen (ADVIL;MOTRIN) 800 MG tablet Take 1 tablet by mouth 3 times daily (with meals) 270 tablet 1    blood glucose test strips (ACCU-CHEK CHE PLUS) strip Accu-check Che Plus Test Strips (or brand per pt preference). Sig: test sugar BID. Dx: E11.8 200 strip 3    SOFT TOUCH LANCETS [facility] Lancet (or brand per pt preference) Use to check blood sugars 2 times daily. Dx: E11.8 200 each 3    Alcohol Swabs (ALCOHOL PREP) 70 % PADS Alcohol pads (brand per preference). Sig: use to test sugar twice per day. Dx: E11.8 200 each 3    Blood Glucose Monitoring Suppl (ACCU-CHEK CHE PLUS) w/Device KIT Or brand per pt preference. Sig: test sugar BID. Dx: E11.8 1 kit 0    metoprolol tartrate (LOPRESSOR) 25 MG tablet Take 1 tablet by mouth 2 times daily Hold for SBP <110 or HR <60 180 tablet 3    ferrous gluconate (FERGON) 324 (38 Fe) MG tablet Take 1 tablet by mouth 2 times daily 180 tablet 3    metFORMIN (GLUCOPHAGE) 1000 MG tablet Take 1 tablet by mouth 2 times daily (with meals) 180 tablet 3    budesonide (PULMICORT) 0.5 MG/2ML nebulizer suspension USE 1 VIAL VIA NEBULIZER TWICE DAILY.  RINSE MOUTH AFTER TREATMENT 360 mL 3    Multiple Vitamins-Minerals (PRESERVISION AREDS 2) CAPS Take 1 capsule by mouth daily 90 capsule 3    lisinopril (PRINIVIL;ZESTRIL) 2.5 MG tablet Take 1 tablet by mouth daily 90 tablet 3    potassium chloride (KLOR-CON M) 10 MEQ extended release tablet TAKE 1 TABLETS BY MOUTH TWICE DAILY 180 tablet 3    docusate (COLACE, DULCOLAX) 100 MG CAPS Take 100 mg by mouth 2 times daily 180 capsule 3    doxazosin (CARDURA) 8 MG tablet Take 1 tablet by mouth nightly 90 tablet 3    vitamin D3 (CHOLECALCIFEROL) 10 MCG (400 UNIT) TABS tablet Take 1 tablet by mouth daily 400units 90 tablet 3    polyethylene glycol (MIRALAX) 17 g packet Take 17 g by mouth daily as needed for Constipation      acetaminophen (TYLENOL) 325 MG tablet Take 650 mg by mouth every 4 hours as needed for Pain or Fever       lidocaine (LMX) 4 % cream Apply topically every 6 hours as needed for Pain To groin      albuterol sulfate  (90 Base) MCG/ACT inhaler Inhale 2 puffs into the lungs every 6 hours as needed for Wheezing 1 Inhaler 3    OXYGEN Inhale 3 L into the lungs nightly Indications: Difficulty Breathing, Oxygen Therapy And prn through cpap       No current facility-administered medications for this visit. Review of Systems -     General ROS: negative  Psychological ROS: negative  Hematological and Lymphatic ROS: No history of blood clots or bleeding disorder. Respiratory ROS: no cough,  or wheezing, the rest see HPI  Cardiovascular ROS: See HPI  Gastrointestinal ROS: negative  Genito-Urinary ROS: no dysuria, trouble voiding, or hematuria  Musculoskeletal ROS: negative  Neurological ROS: no TIA or stroke symptoms  Dermatological ROS: negative      Blood pressure (!) 108/58, pulse 75, height 5' 7\" (1.702 m), weight 260 lb (117.9 kg).         Physical Examination:    General appearance - alert, well appearing, and in no distress  HEENT- Pink conjunctiva  , Non-icteri sclera,PERRLA  Mental status - alert, oriented to person, place, and time  Neck - supple, no significant adenopathy, no JVD, or carotid bruits  Chest - clear to auscultation, no wheezes, rales or rhonchi, symmetric air entry  Heart - normal rate, regular rhythm, normal S1, S2, no murmurs, rubs, clicks or gallops  Abdomen - soft, nontender, nondistended, no masses or organomegaly  EDWARD- no CVA or flank tenderness, no suprapubic tenderness  Neurological - alert, oriented, normal speech, no focal findings or movement disorder noted  Musculoskeletal/limbs - no joint tenderness, deformity or swelling   - peripheral pulses normal, no pedal edema, no clubbing or cyanosis  Skin - normal coloration and turgor, no rashes, no suspicious skin lesions noted  Psych- appropriate mood and affect    Lab  No results for input(s): CKTOTAL, CKMB, CKMBINDEX, TROPONINI in the last 72 hours.   CBC:   Lab Results   Component Value Date    WBC 11.7 12/06/2020    RBC 3.88 12/06/2020    HGB 10.5 12/06/2020    HCT 32.9 12/06/2020    MCV 84.8 12/06/2020    MCH 27.1 12/06/2020    MCHC 31.9 12/06/2020    RDW 14.6 05/02/2018     12/06/2020    MPV 9.8 12/06/2020     BMP:    Lab Results   Component Value Date     12/07/2020    K 3.8 12/07/2020    K 4.0 10/03/2020    CL 96 12/07/2020    CO2 32 12/07/2020    BUN 18 12/07/2020    LABALBU 3.9 12/05/2020    LABALBU 4.3 11/30/2011    CREATININE 0.8 12/07/2020    CALCIUM 9.3 12/07/2020    LABGLOM >90 12/07/2020    GLUCOSE 178 12/07/2020    GLUCOSE 130 11/30/2011     Hepatic Function Panel:    Lab Results   Component Value Date    ALKPHOS 52 12/05/2020    ALT 6 12/05/2020    AST 10 12/05/2020    PROT 6.9 12/05/2020    PROT 6.9 02/05/2011    BILITOT 0.2 12/05/2020    BILIDIR <0.2 10/03/2020    LABALBU 3.9 12/05/2020    LABALBU 4.3 11/30/2011     Magnesium:    Lab Results   Component Value Date    MG 1.8 12/05/2020     Warfarin PT/INR:  No components found for: PTPATWAR, PTINRWAR  HgBA1c:    Lab Results   Component Value Date    LABA1C 6.0 12/24/2020     FLP:    Lab Results   Component Value Date    TRIG 241 07/09/2020    HDL 34 07/09/2020    HDL 33 11/30/2011    LDLCALC 103 07/09/2020    LDLDIRECT 69.29 10/10/2018     TSH:    Lab Results   Component Value Date    TSH 7.160 10/03/2020     Conclusions      Summary   Technically difficult examination.   This is a suboptimal study due to poor echocardiographic window.   Doppler parameters were consistent with abnormal left ventricular   relaxation (grade 1 diastolic dysfunction).   Left ventricle size is normal.   Normal left ventricular wall thickness.   Ejection fraction is visually estimated at 55%.   Unable to determine wall motion abnormalities due to poor image quality.   Doppler parameters were consistent with abnormal left ventricular   relaxation (grade 1 diastolic dysfunction).      Signature      ----------------------------------------------------------------   Electronically signed by Alejandro Edwards MD (Interpreting   physician) on 03/19/2019 at 05:35 AM      Assessment   Diagnosis Orders   1. Chronic diastolic CHF (congestive heart failure) ([address]     2. Essential hypertension     3. Mixed hyperlipidemia     4. Paroxysmal atrial fibrillation (HCC)     5. Bilateral leg edema +2 now +1           ASSESSMENT:  1. Basically acute hypercapnic respiratory failure. 2.  Acute COPD exacerbation. 3.  Acute diastolic congestive heart failure exacerbation. 4.  Nonsustained ventricular tachycardia composed of 29 beats at a rate  of 140 to 150 beats per minute range, happened at 32 minutes after  midnight. 5.  Hypertension. 6.  Hyperlipidemia. 7.  Obesity. 8.  COPD, on home oxygen. 9.  History of DVT for which he is on Coumadin. 10.  Hyperlipidemia. 11.  Hypertension. Plan   The current  meds and labs reviewed    Continue the current treatment and with constant vigilance to changes in symptoms and also any potential side effects. Return for care or seek medical attention immediately if symptoms got worse and/or develop new symptoms. Hyperlipidemia:  off statins,   No sure why pat off statin  Edwin Mehta does not remember      Congestive heart failure: _ve evidence of fluid overload today, no recent hospitalization for CHF  Wt gain  Sob and leg edema  Better  Cont  lasix to 60 po bid   Cont kcl 10 po qd  Cont  aldsactone 25 po qd  BMP and MG in 3 months      Hypertension, on medical treatment. Seems to be under good control. Patient is compliant with medical treatment.    Low normal   Decrease cardua to 6 mg from 8 mg    Hx of cholesterol emboli   Cont asa,    PAF   Had been off lipitor 80 and coumadin since  Early 2020  Consider to resume coumadin when urology gave the okay and if family agree      D/w the pat the plan of care    Recent hospital record reviewed      I spent 32 minutes involved in face-to-face discussion of medical issues, prognosis, record review  and plan with the patient today and more than 50% of the time was spent on counseling and coordination of care      RTC in 3 months with alma delia      [facility]

## 2021-03-22 ENCOUNTER — HOSPITAL ENCOUNTER (EMERGENCY)
Age: 86
Discharge: HOME | End: 2021-03-22
Payer: MEDICARE

## 2021-03-22 ENCOUNTER — APPOINTMENT (OUTPATIENT)
Dept: CT IMAGING | Age: 86
End: 2021-03-22
Payer: MEDICARE

## 2021-03-22 VITALS
HEIGHT: 67 IN | HEART RATE: 65 BPM | RESPIRATION RATE: 17 BRPM | SYSTOLIC BLOOD PRESSURE: 115 MMHG | WEIGHT: 250 LBS | OXYGEN SATURATION: 97 % | BODY MASS INDEX: 39.24 KG/M2 | TEMPERATURE: 97.9 F | DIASTOLIC BLOOD PRESSURE: 72 MMHG

## 2021-03-22 DIAGNOSIS — N39.0 URINARY TRACT INFECTION ASSOCIATED WITH INDWELLING URETHRAL CATHETER, INITIAL ENCOUNTER (HCC): Primary | ICD-10-CM

## 2021-03-22 DIAGNOSIS — R53.1 GENERAL WEAKNESS: ICD-10-CM

## 2021-03-22 DIAGNOSIS — T83.511A URINARY TRACT INFECTION ASSOCIATED WITH INDWELLING URETHRAL CATHETER, INITIAL ENCOUNTER (HCC): Primary | ICD-10-CM

## 2021-03-22 LAB
ALBUMIN SERPL-MCNC: 4 G/DL (ref 3.5–5.1)
ALP BLD-CCNC: 47 U/L (ref 38–126)
ALT SERPL-CCNC: 10 U/L (ref 11–66)
AMORPHOUS: ABNORMAL
ANION GAP SERPL CALCULATED.3IONS-SCNC: 11 MEQ/L (ref 8–16)
AST SERPL-CCNC: 16 U/L (ref 5–40)
BACTERIA: ABNORMAL /HPF
BASOPHILS # BLD: 0.9 %
BASOPHILS ABSOLUTE: 0.1 THOU/MM3 (ref 0–0.1)
BILIRUB SERPL-MCNC: 0.3 MG/DL (ref 0.3–1.2)
BILIRUBIN URINE: NEGATIVE
BLOOD, URINE: ABNORMAL
BUN BLDV-MCNC: 16 MG/DL (ref 7–22)
CALCIUM SERPL-MCNC: 9.4 MG/DL (ref 8.5–10.5)
CASTS UA: ABNORMAL /LPF
CHARACTER, URINE: CLEAR
CHLORIDE BLD-SCNC: 95 MEQ/L (ref 98–111)
CO2: 34 MEQ/L (ref 23–33)
COLOR: YELLOW
CREAT SERPL-MCNC: 1 MG/DL (ref 0.4–1.2)
CRYSTALS, UA: ABNORMAL
EKG ATRIAL RATE: 63 BPM
EKG P AXIS: 69 DEGREES
EKG P-R INTERVAL: 296 MS
EKG Q-T INTERVAL: 402 MS
EKG QRS DURATION: 94 MS
EKG QTC CALCULATION (BAZETT): 411 MS
EKG R AXIS: 73 DEGREES
EKG T AXIS: 54 DEGREES
EKG VENTRICULAR RATE: 63 BPM
EOSINOPHIL # BLD: 2.5 %
EOSINOPHILS ABSOLUTE: 0.2 THOU/MM3 (ref 0–0.4)
EPITHELIAL CELLS, UA: ABNORMAL /HPF
ERYTHROCYTE [DISTWIDTH] IN BLOOD BY AUTOMATED COUNT: 14.4 % (ref 11.5–14.5)
ERYTHROCYTE [DISTWIDTH] IN BLOOD BY AUTOMATED COUNT: 43.8 FL (ref 35–45)
GFR SERPL CREATININE-BSD FRML MDRD: 70 ML/MIN/1.73M2
GLUCOSE BLD-MCNC: 143 MG/DL (ref 70–108)
GLUCOSE URINE: NEGATIVE MG/DL
HCT VFR BLD CALC: 37.3 % (ref 42–52)
HEMOGLOBIN: 12 GM/DL (ref 14–18)
IMMATURE GRANS (ABS): 0.07 THOU/MM3 (ref 0–0.07)
IMMATURE GRANULOCYTES: 0.9 %
INR BLD: 0.97 (ref 0.85–1.13)
KETONES, URINE: NEGATIVE
LEUKOCYTE ESTERASE, URINE: ABNORMAL
LIPASE: 30.4 U/L (ref 5.6–51.3)
LYMPHOCYTES # BLD: 23.7 %
LYMPHOCYTES ABSOLUTE: 1.9 THOU/MM3 (ref 1–4.8)
MCH RBC QN AUTO: 27 PG (ref 26–33)
MCHC RBC AUTO-ENTMCNC: 32.2 GM/DL (ref 32.2–35.5)
MCV RBC AUTO: 83.8 FL (ref 80–94)
MONOCYTES # BLD: 10.9 %
MONOCYTES ABSOLUTE: 0.9 THOU/MM3 (ref 0.4–1.3)
MUCUS: ABNORMAL
NITRITE, URINE: NEGATIVE
NUCLEATED RED BLOOD CELLS: 0 /100 WBC
OSMOLALITY CALCULATION: 283.1 MOSMOL/KG (ref 275–300)
PH UA: 6.5 (ref 5–9)
PLATELET # BLD: 245 THOU/MM3 (ref 130–400)
PMV BLD AUTO: 10.2 FL (ref 9.4–12.4)
POTASSIUM REFLEX MAGNESIUM: 4 MEQ/L (ref 3.5–5.2)
PROTEIN UA: NEGATIVE
RBC # BLD: 4.45 MILL/MM3 (ref 4.7–6.1)
RBC URINE: ABNORMAL /HPF
SEG NEUTROPHILS: 61.1 %
SEGMENTED NEUTROPHILS ABSOLUTE COUNT: 5 THOU/MM3 (ref 1.8–7.7)
SODIUM BLD-SCNC: 140 MEQ/L (ref 135–145)
SPECIFIC GRAVITY, URINE: 1.01 (ref 1–1.03)
TOTAL PROTEIN: 7 G/DL (ref 6.1–8)
TROPONIN T: 0.07 NG/ML
TROPONIN T: 0.07 NG/ML
UROBILINOGEN, URINE: 0.2 EU/DL (ref 0–1)
WBC # BLD: 8.2 THOU/MM3 (ref 4.8–10.8)
WBC UA: ABNORMAL /HPF

## 2021-03-22 PROCEDURE — 81001 URINALYSIS AUTO W/SCOPE: CPT

## 2021-03-22 PROCEDURE — 6360000002 HC RX W HCPCS: Performed by: NURSE PRACTITIONER

## 2021-03-22 PROCEDURE — 2580000003 HC RX 258: Performed by: NURSE PRACTITIONER

## 2021-03-22 PROCEDURE — 36415 COLL VENOUS BLD VENIPUNCTURE: CPT

## 2021-03-22 PROCEDURE — 96365 THER/PROPH/DIAG IV INF INIT: CPT

## 2021-03-22 PROCEDURE — 85025 COMPLETE CBC W/AUTO DIFF WBC: CPT

## 2021-03-22 PROCEDURE — 76376 3D RENDER W/INTRP POSTPROCES: CPT

## 2021-03-22 PROCEDURE — 85610 PROTHROMBIN TIME: CPT

## 2021-03-22 PROCEDURE — 87186 SC STD MICRODIL/AGAR DIL: CPT

## 2021-03-22 PROCEDURE — 93005 ELECTROCARDIOGRAM TRACING: CPT | Performed by: NURSE PRACTITIONER

## 2021-03-22 PROCEDURE — 87086 URINE CULTURE/COLONY COUNT: CPT

## 2021-03-22 PROCEDURE — 84484 ASSAY OF TROPONIN QUANT: CPT

## 2021-03-22 PROCEDURE — 87077 CULTURE AEROBIC IDENTIFY: CPT

## 2021-03-22 PROCEDURE — 83690 ASSAY OF LIPASE: CPT

## 2021-03-22 PROCEDURE — 74177 CT ABD & PELVIS W/CONTRAST: CPT

## 2021-03-22 PROCEDURE — 80053 COMPREHEN METABOLIC PANEL: CPT

## 2021-03-22 PROCEDURE — 6360000004 HC RX CONTRAST MEDICATION: Performed by: NURSE PRACTITIONER

## 2021-03-22 PROCEDURE — 99285 EMERGENCY DEPT VISIT HI MDM: CPT

## 2021-03-22 RX ORDER — 0.9 % SODIUM CHLORIDE 0.9 %
1000 INTRAVENOUS SOLUTION INTRAVENOUS ONCE
Status: COMPLETED | OUTPATIENT
Start: 2021-03-22 | End: 2021-03-22

## 2021-03-22 RX ADMIN — IOPAMIDOL 80 ML: 755 INJECTION, SOLUTION INTRAVENOUS at 10:18

## 2021-03-22 RX ADMIN — CEFTRIAXONE SODIUM 1000 MG: 1 INJECTION, POWDER, FOR SOLUTION INTRAMUSCULAR; INTRAVENOUS at 11:44

## 2021-03-22 RX ADMIN — SODIUM CHLORIDE 1000 ML: 9 INJECTION, SOLUTION INTRAVENOUS at 11:44

## 2021-03-22 ASSESSMENT — ENCOUNTER SYMPTOMS
SORE THROAT: 0
FACIAL SWELLING: 0
CHEST TIGHTNESS: 0
WHEEZING: 0
SINUS PAIN: 0
RHINORRHEA: 0
SHORTNESS OF BREATH: 1
NAUSEA: 0
PHOTOPHOBIA: 0
COUGH: 0
APNEA: 0
DIARRHEA: 0
ABDOMINAL DISTENTION: 1
COLOR CHANGE: 0
ABDOMINAL PAIN: 0
VOMITING: 0
TROUBLE SWALLOWING: 0
SINUS PRESSURE: 0
CONSTIPATION: 0
BACK PAIN: 1

## 2021-03-22 NOTE — ED NOTES
Felipe removed at this time and new felipe put in place. Small blood clots noted initially with insertion, urine now noted to be clear. Pt denies any needs or concerns. Call light within reach.      Faraz Sawyer RN  03/22/21 2700

## 2021-03-22 NOTE — ED TRIAGE NOTES
Pt presents to the ED via EMS from [facility] for weakness. Pt states that the weakness is in his lower back. Pt states he has had multiple back surgeries. Pt states for a couple of weeks he has had dizziness when standing. Pt denies pain.

## 2021-03-22 NOTE — ED PROVIDER NOTES
Kandi Pope 13 COMPLAINT       Chief Complaint   Patient presents with    Fatigue       Nurses Notes reviewed and I agree except as noted in the HPI. HISTORY OF PRESENT ILLNESS    Osiris Weber is a 80 y.o. male who presents to the Emergency Department for the evaluation of creased fatigue, weakness, states that his back is been giving out on him and he is having increased back pain. States that he has been very bloated with abdominal distention for over 1 year. States that he has the feeling that his legs will give out on him. Patient comes from [facility], states that they make him walk with a walker even no he insists that he has a steady gait, he denies recent falls, denies recent injuries. The HPI was provided by the patient. REVIEW OF SYSTEMS     Review of Systems   Constitutional: Positive for fatigue. Negative for chills, diaphoresis and fever. HENT: Negative for congestion, ear pain, facial swelling, rhinorrhea, sinus pressure, sinus pain, sneezing, sore throat and trouble swallowing. Eyes: Negative for photophobia. Respiratory: Positive for shortness of breath. Negative for apnea, cough, chest tightness and wheezing. Cardiovascular: Positive for chest pain. Negative for palpitations. Gastrointestinal: Positive for abdominal distention. Negative for abdominal pain, constipation, diarrhea, nausea and vomiting. Endocrine: Negative for polydipsia, polyphagia and polyuria. Genitourinary: Negative for decreased urine volume, dysuria, flank pain, frequency and urgency. Musculoskeletal: Positive for back pain and gait problem. Negative for arthralgias, joint swelling, myalgias, neck pain and neck stiffness. Skin: Negative for color change and wound. Neurological: Positive for weakness. Negative for dizziness, tremors, light-headedness, numbness and headaches.        PAST MEDICAL HISTORY    has a past medical history of mL,R-3Normal      ibuprofen (ADVIL;MOTRIN) 800 MG tablet Take 1 tablet by mouth 3 times daily (with meals), Disp-270 tablet,R-1Normal      blood glucose test strips (ACCU-CHEK CHE PLUS) strip Accu-check Che Plus Test Strips (or brand per pt preference). Sig: test sugar BID. Dx: E11.8, Disp-200 strip,R-3**Patient requests 90 days supply**Normal      SOFT TOUCH LANCETS MISC Disp-200 each,R-3, NormalLancet (or brand per pt preference) Use to check blood sugars 2 times daily. Dx: E11.8      Alcohol Swabs (ALCOHOL PREP) 70 % PADS Disp-200 each,R-3, NormalAlcohol pads (brand per preference). Sig: use to test sugar twice per day. Dx: E11.8      Blood Glucose Monitoring Suppl (ACCU-CHEK CHE PLUS) w/Device KIT Disp-1 kit,R-0, NormalOr brand per pt preference. Sig: test sugar BID. Dx: E11.8      metoprolol tartrate (LOPRESSOR) 25 MG tablet Take 1 tablet by mouth 2 times daily Hold for SBP <110 or HR <60, Disp-180 tablet,R-3Normal      ferrous gluconate (FERGON) 324 (38 Fe) MG tablet Take 1 tablet by mouth 2 times daily, Disp-180 tablet,R-3Normal      metFORMIN (GLUCOPHAGE) 1000 MG tablet Take 1 tablet by mouth 2 times daily (with meals), Disp-180 tablet,R-3Normal      budesonide (PULMICORT) 0.5 MG/2ML nebulizer suspension USE 1 VIAL VIA NEBULIZER TWICE DAILY.  RINSE MOUTH AFTER TREATMENT, Disp-360 mL,R-3**Patient requests 90 days supply**Normal      Multiple Vitamins-Minerals (PRESERVISION AREDS 2) CAPS Take 1 capsule by mouth daily, Disp-90 capsule,R-3Normal      lisinopril (PRINIVIL;ZESTRIL) 2.5 MG tablet Take 1 tablet by mouth daily, Disp-90 tablet,R-3Normal      potassium chloride (KLOR-CON M) 10 MEQ extended release tablet TAKE 1 TABLETS BY MOUTH TWICE DAILY, Disp-180 tablet,R-3**Patient requests 90 days supply**Normal      docusate (COLACE, DULCOLAX) 100 MG CAPS Take 100 mg by mouth 2 times daily, Disp-180 capsule,R-3Normal      vitamin D3 (CHOLECALCIFEROL) 10 MCG (400 UNIT) TABS tablet Take 1 tablet by mouth daily 400units, Disp-90 tablet,R-3Normal      polyethylene glycol (MIRALAX) 17 g packet Take 17 g by mouth daily as needed for ConstipationHistorical Med      acetaminophen (TYLENOL) 325 MG tablet Take 650 mg by mouth every 4 hours as needed for Pain or Fever Historical Med      lidocaine (LMX) 4 % cream Apply topically every 6 hours as needed for Pain To groin, Topical, EVERY 6 HOURS PRN Starting 2020, Historical Med      albuterol sulfate  (90 Base) MCG/ACT inhaler Inhale 2 puffs into the lungs every 6 hours as needed for Wheezing, Disp-1 Inhaler, R-3Normal      OXYGEN Inhale 3 L into the lungs nightly Indications: Difficulty Breathing, Oxygen Therapy And prn through cpapHistorical Med             ALLERGIES     is allergic to latex; macrobid [nitrofurantoin monohyd macro]; levaquin [levofloxacin]; adhesive tape; and alphagan [brimonidine tartrate]. FAMILY HISTORY     He indicated that his mother is . He indicated that his father is . family history includes Other in his mother; Other (age of onset: [de-identified]) in his father. SOCIAL HISTORY      reports that he has never smoked. He has never used smokeless tobacco. He reports current alcohol use of about 1.0 standard drinks of alcohol per week. He reports that he does not use drugs. PHYSICAL EXAM     INITIAL VITALS:  height is 5' 7\" (1.702 m) and weight is 250 lb (113.4 kg). His oral temperature is 97.9 °F (36.6 °C). His blood pressure is 115/72 and his pulse is 65. His respiration is 17 and oxygen saturation is 97%. Physical Exam  Vitals signs and nursing note reviewed. Constitutional:       General: He is awake. He is not in acute distress. Appearance: Normal appearance. He is well-developed. He is obese. He is not ill-appearing, toxic-appearing or diaphoretic. HENT:      Head: Normocephalic and atraumatic. Nose: Nose normal.      Mouth/Throat:      Mouth: Mucous membranes are moist.      Pharynx: Oropharynx is clear. Eyes:      Extraocular Movements: Extraocular movements intact. Pupils: Pupils are equal, round, and reactive to light. Neck:      Musculoskeletal: Normal range of motion and neck supple. No neck rigidity or muscular tenderness. Cardiovascular:      Rate and Rhythm: Normal rate and regular rhythm. No extrasystoles are present. Pulses: Normal pulses. Heart sounds: Normal heart sounds, S1 normal and S2 normal. Heart sounds not distant. No murmur. No friction rub. No gallop. Pulmonary:      Effort: Pulmonary effort is normal. No tachypnea, bradypnea, accessory muscle usage, prolonged expiration, respiratory distress or retractions. Breath sounds: Normal breath sounds. No stridor. No wheezing, rhonchi or rales. Chest:      Chest wall: No tenderness. Abdominal:      General: Abdomen is protuberant. Bowel sounds are normal. There is distension. Palpations: Abdomen is soft. There is no shifting dullness, hepatomegaly, splenomegaly or mass. Tenderness: There is generalized abdominal tenderness and tenderness in the epigastric area. There is no right CVA tenderness or left CVA tenderness. Hernia: No hernia is present. Musculoskeletal: Normal range of motion. General: No swelling, tenderness, deformity or signs of injury. Right lower leg: No edema. Left lower leg: No edema. Skin:     General: Skin is warm and dry. Capillary Refill: Capillary refill takes less than 2 seconds. Coloration: Skin is not jaundiced or pale. Neurological:      General: No focal deficit present. Mental Status: He is alert and oriented to person, place, and time. Mental status is at baseline. GCS: GCS eye subscore is 4. GCS verbal subscore is 5. GCS motor subscore is 6. Cranial Nerves: Cranial nerves are intact. Sensory: Sensation is intact. Psychiatric:         Mood and Affect: Mood normal.         Behavior: Behavior normal. Behavior is cooperative. DIFFERENTIAL DIAGNOSIS:   Degenerative disc disease, UTI, constipation, bowel obstruction, bloating application of Billy catheter    DIAGNOSTIC RESULTS     EKG: All EKG's are interpreted by the Emergency Department Physician who either signs or Co-signs this chart in the absence of a cardiologist.    Sinus rhythm with sinus arrhythmia with first degree AV  Block-ventricular rate of 63. LA of 296, QRS of 94, QTc of 411. Compared EKG from December 5, 2020 no significant changes        RADIOLOGY: non-plainfilm images(s) such as CT, Ultrasound and MRI are read by the radiologist.    CT ABDOMEN PELVIS W IV CONTRAST Additional Contrast? None   Final Result   The urinary bladder wall appears mildly thickened and irregular which may be related to acute or chronic cystitis. Patient's Billy balloon is dilated in the prostatic urethra within the prostate gland. Air is present in the bladder which is presumably from placement of the Billy catheter. No other acute abdominal or pelvic abnormalities            **This report has been created using voice recognition software. It may contain minor errors which are inherent in voice recognition technology. **      Final report electronically signed by Dr. Fredi Locke on 3/22/2021 10:56 AM      CT LUMBAR RECONSTRUCTION WO POST PROCESS   Final Result   No acute fracture or acute malalignment. Multilevel neural foraminal narrowing and central canal stenosis as detailed above. **This report has been created using voice recognition software. It may contain minor errors which are inherent in voice recognition technology. **      Final report electronically signed by Dr. Fredi Locke on 3/22/2021 11:10 AM          LABS:     Labs Reviewed   CULTURE, REFLEXED, URINE - Abnormal; Notable for the following components:       Result Value    Organism gram negative bacilli (*)     Organism gram negative bacilli (*)     All other components within normal limits    Narrative: Source: urine, clean catch       Site: clean void          Current Antibiotics: not stated   CBC WITH AUTO DIFFERENTIAL - Abnormal; Notable for the following components:    RBC 4.45 (*)     Hemoglobin 12.0 (*)     Hematocrit 37.3 (*)     All other components within normal limits   COMPREHENSIVE METABOLIC PANEL W/ REFLEX TO MG FOR LOW K - Abnormal; Notable for the following components:    Glucose 143 (*)     Chloride 95 (*)     CO2 34 (*)     ALT 10 (*)     All other components within normal limits   TROPONIN - Abnormal; Notable for the following components:    Troponin T 0.070 (*)     All other components within normal limits   GLOMERULAR FILTRATION RATE, ESTIMATED - Abnormal; Notable for the following components:    Est, Glom Filt Rate 70 (*)     All other components within normal limits   URINE WITH REFLEXED MICRO - Abnormal; Notable for the following components:    Blood, Urine MODERATE (*)     Leukocyte Esterase, Urine LARGE (*)     All other components within normal limits   TROPONIN - Abnormal; Notable for the following components:    Troponin T 0.071 (*)     All other components within normal limits   LIPASE   PROTIME-INR   ANION GAP   OSMOLALITY       EMERGENCY DEPARTMENT COURSE:   Vitals:    Vitals:    03/22/21 0955 03/22/21 1120 03/22/21 1149 03/22/21 1247   BP: 114/62 (!) 117/53 115/72    Pulse: 63 65 67 65   Resp: 18 17 18 17   Temp:       TempSrc:       SpO2: 97% 98% 97% 97%   Weight:       Height:           8:08 AM EDT: The patient was seen and evaluated. MDM:  Patient seen and evaluated today for low back pain, fatigue and weakness. He reports that symptoms have been intermittently progressing for some time, abdominal bloating has been going on for 1 year. He seems to have decent sense of humor, is awake and alert and interacting well. I have taken care of him before and he seems at baseline mentition. EKG completed, appropriate labs and imaging were ordered.   CT of the abdomen shows Billy catheter balloon inflated the patient's prostate. Catheter was removed and replaced by ED nursing staff who tell me that flow greatly improved. Urine sent for UA and culture. Suggestive of possible UTI however also possible contaminant due to Billy catheter. He was treated with 1 g Rocephin while in the emergency department. [facility] staff notified of result and informed to look for signs and symptoms and to potentially have urine rechecked in several days. Patient's condition noted to improve with catheter exchange, he was discharged back to [facility] in stable condition. CRITICAL CARE:   None    CONSULTS:  None    PROCEDURES:  None    FINAL IMPRESSION      1. Urinary tract infection associated with indwelling urethral catheter, initial encounter ([facility] [address]    2. General weakness          DISPOSITION/PLAN   Discharge    PATIENT REFERRED TO:  Daria Giordano MD  [address] [facility] [address]  Harsha Arnold [facility]  [phone]    Call   As needed      DISCHARGE MEDICATIONS:  Discharge Medication List as of 3/22/2021 12:57 PM          (Please note that portions of this note were completed with a voice recognition program.  Efforts were made to edit the dictations but occasionally words are mis-transcribed.)    The patient was given an opportunity to see the Emergency Attending. The patient voiced understanding that I was a Mid-LevelProvider and was in agreement with being seen independently by myself. Provider:  I personally performed the services described in the documentation, reviewed and edited the documentation which was dictated to the scribe in my presence, and it accurately records my words and actions.     MAEGAN Kidd CNP, 3/22/21, 9:51 PM       MAEGAN Kidd CNP  03/23/21 8706

## 2021-03-22 NOTE — ED NOTES
Pt resting in bed watching television, pt updated on plan of care. Denies any needs or concerns at this time. Call light remains within reach.      Faraz Sawyer RN  03/22/21 8472

## 2021-03-22 NOTE — ED NOTES
Bed: 004A  Expected date:   Expected time:   Means of arrival: [facility]  Comments:     Christine Sena RN  03/22/21 0456

## 2021-03-22 NOTE — ED NOTES
Transferring Facility: [employer] back #:  Per sending provider request:    Code Status:      DNR-CC                        Allergies:  Reason for transfer: generalized weakness and dizziness    Vital Signs:  BP:  P:  R:  T:  O2 SATS:    Other Details:      Tanvi Keita RN  03/22/21 8399

## 2021-03-23 ENCOUNTER — CARE COORDINATION (OUTPATIENT)
Dept: CARE COORDINATION | Age: 86
End: 2021-03-23

## 2021-03-23 RX ORDER — IBUPROFEN 800 MG/1
TABLET ORAL
Qty: 270 TABLET | Refills: 1 | Status: ON HOLD | OUTPATIENT
Start: 2021-03-23 | End: 2021-07-14 | Stop reason: HOSPADM

## 2021-03-23 NOTE — CARE COORDINATION
Eligible for Care Coordination per [facility] report. Was sent to [place] from ED visit where he resides. I spoke with nurse at [facility] who states that Jan Napier receives nurse aide and nurse support from facility. Medications administered per staff and adl support given. Will not enroll at this time due to residing in [address] [place] [address] with nursing support.

## 2021-03-23 NOTE — TELEPHONE ENCOUNTER
Gm Domínguez needs refill of   Requested Prescriptions     Pending Prescriptions Disp Refills    ibuprofen (ADVIL;MOTRIN) 800 MG tablet [Pharmacy Med Name: IBUPROFEN 800MG TABLETS] 270 tablet 1     Sig: TAKE 1 TABLET BY MOUTH THREE TIMES DAILY WITH MEALS       Last Filled on:  9/25/20 270*1     *Last Visit Date:  10/19/2020-OV      Next Visit Date:    Visit date not found

## 2021-03-23 NOTE — TELEPHONE ENCOUNTER
This patient actually need refill of this medication at this time? Would like to avoid NSAIDs as much as possible given history of hypertension. Please let me know.   ES

## 2021-03-23 NOTE — TELEPHONE ENCOUNTER
Called and spoke with Vladislav Nguyen LPN, [facility]. She stated patient takes this medication daily and has for a period of time and does not function well without it.

## 2021-03-24 ENCOUNTER — TELEPHONE (OUTPATIENT)
Dept: FAMILY MEDICINE CLINIC | Age: 86
End: 2021-03-24

## 2021-03-24 LAB
ORGANISM: ABNORMAL
ORGANISM: ABNORMAL
URINE CULTURE REFLEX: ABNORMAL
URINE CULTURE REFLEX: ABNORMAL

## 2021-03-24 NOTE — TELEPHONE ENCOUNTER
----- Message from Corrie Heath MD sent at 3/24/2021  7:59 AM EDT -----  Urine culture shows possible infection with 2 causes. Is patient being treated per urology? Is patient currently symptomatic?   ES

## 2021-03-24 NOTE — TELEPHONE ENCOUNTER
[address] and left a message with Mckayla Montemayor to have the pts nurse return my call.

## 2021-03-24 NOTE — TELEPHONE ENCOUNTER
Spoke to AMANDA BAKER at [facility] and she said that the pt is feeling better. He was given IV antibiotics while in the ED. Pt will f/up with Dr. Coco Holden as scheduled on 4/2/21.

## 2021-03-25 NOTE — PROGRESS NOTES
Pharmacy Note  ED Culture Follow-up    Lavern Haines is a 80 y.o. male. Allergies: Latex, Macrobid [nitrofurantoin monohyd macro], Levaquin [levofloxacin], Adhesive tape, and Alphagan [brimonidine tartrate]     Labs:  Lab Results   Component Value Date    BUN 16 03/22/2021    CREATININE 1.0 03/22/2021    WBC 8.2 03/22/2021     Estimated Creatinine Clearance: 60 mL/min (based on SCr of 1 mg/dL). Current antimicrobials:   none    ASSESSMENT:  Micro results:   Urine culture: positive for Pseudomonas aeruginosa, Enterobacter cloacae     PLAN:  Need for intervention: Yes, but will defer to provider at nursing home  Discussed with: MAEGAN Narayanan  Chosen treatment:    Faxed results to [address] [place] [address] ECF  May be colonization of Billy    Patient response:   No need to contact patient    Called/sent in prescription to: Not applicable    Please call with any questions.  Emeka Khalil PharmD 7:02 PM 3/25/2021

## 2021-04-09 RX ORDER — FUROSEMIDE 40 MG/1
40 TABLET ORAL 2 TIMES DAILY
Qty: 180 TABLET | Refills: 3 | Status: ON HOLD | OUTPATIENT
Start: 2021-04-09 | End: 2021-05-24 | Stop reason: HOSPADM

## 2021-04-09 RX ORDER — FUROSEMIDE 20 MG/1
20 TABLET ORAL 2 TIMES DAILY
Qty: 180 TABLET | Refills: 3 | Status: ON HOLD | OUTPATIENT
Start: 2021-04-09 | End: 2021-05-24 | Stop reason: HOSPADM

## 2021-04-09 RX ORDER — ANTIOX #8/OM3/DHA/EPA/LUT/ZEAX 250-2.5 MG
CAPSULE ORAL
Qty: 90 CAPSULE | Refills: 3 | Status: SHIPPED | OUTPATIENT
Start: 2021-04-09

## 2021-04-09 NOTE — TELEPHONE ENCOUNTER
This medication refill is regarding a electronic request by [employer]. Requested Prescriptions     Pending Prescriptions Disp Refills    Multiple Vitamins-Minerals (PRESERVISION AREDS 2) CAPS 90 capsule 3     Sig: Take 1 capsule by mouth daily       Date of last visit: 10/19/2020  Date of next visit: None  Date of last refill: 8/3/2020  90/3      Rx verified, ordered and set to EP.

## 2021-04-09 NOTE — TELEPHONE ENCOUNTER
Fax received from [facility] requesting refills of Lasix 40 mg BID and Lasix 20 mg BID to be sent separately to [facility]. Please see request scanned into chart under media tab. Date of last visit: 10/19/20  Date of next visit: none    Last CMP:   Lab Results   Component Value Date     03/22/2021    K 4.0 03/22/2021    CL 95 (L) 03/22/2021    CO2 34 (H) 03/22/2021    BUN 16 03/22/2021    CREATININE 1.0 03/22/2021    GLUCOSE 143 (H) 03/22/2021    CALCIUM 9.4 03/22/2021    PROT 7.0 03/22/2021    LABALBU 4.0 03/22/2021    BILITOT 0.3 03/22/2021    ALKPHOS 47 03/22/2021    AST 16 03/22/2021    ALT 10 (L) 03/22/2021    LABGLOM 70 (A) 03/22/2021       Rx's verified, ordered and set to EP. Call pts son after Rx's sent to the pharmacy.  ([phone])

## 2021-04-13 ENCOUNTER — TELEPHONE (OUTPATIENT)
Dept: FAMILY MEDICINE CLINIC | Age: 86
End: 2021-04-13

## 2021-04-13 NOTE — TELEPHONE ENCOUNTER
Discussion noted. Okay for Maalox-15 mL orally 4 times a day as needed for upset stomach. Please do not administer in conjunction with levothyroxine or iron supplement, as this may affect absorption of both of those medicines. Keep us updated.   ES

## 2021-04-13 NOTE — TELEPHONE ENCOUNTER
Fax received from [facility] stating that the pt is c/o stomach pain and they want to get an order for upset stomach, heartburn, indigestion. I called there and spoke to Caity Martinez to get more information. Pt has had epigastric pain (points to the area right above navel) x 2 weeks. Has it daily at random times. Had some bloating last week but that has gone away. Pts catheter/urine looks good and bowels are moving fine. No fever. Pt will sometimes drink ginger ale for the heartburn and it helps some. Requesting an order for an Rx or OTC medication that he can take PRN for the symptoms. Please advise. OK to fax new orders to [facility] at [phone].

## 2021-04-16 ENCOUNTER — TELEPHONE (OUTPATIENT)
Dept: PULMONOLOGY | Age: 86
End: 2021-04-16

## 2021-04-16 DIAGNOSIS — G47.33 OBSTRUCTIVE SLEEP APNEA: Primary | ICD-10-CM

## 2021-04-16 NOTE — TELEPHONE ENCOUNTER
[facility] faxed stating the patient is needing new head gear. See media. Please advise.

## 2021-04-19 NOTE — PROGRESS NOTES
History and Physical    Patient:  Arya Chávez  MRN: [id number]    CHIEF COMPLAINT:  Right flank pain    HISTORY OF PRESENT ILLNESS:   The patient is a 72 y.o. female who presents with right flank pain. History  10/2013 Left HLL      11/2013 Right ESWL      4/2015 Left ESWL      1/2018 Left ESWL      2/2021 - Left HLL     Today:  Patient is here today for stone follow-up. She is approximately 6 weeks status post left HL L. She has not passed any stone spontaneously since her last visit. Patient did have a KUB prior to visit today which is independently reviewed. This did show multiple renal calculi bilaterally. On the left there is at least 2 stones measuring 4 mm or less. On the right side there is a 8 mm left lower pole stone as well as a 3 mm stone adjacent. Her last visit she denies any spontaneous stone passage. She denies any fever, chills, gross hematuria, flank pain, dysuria.     Past Medical History:    Past Medical History:   Diagnosis Date    Fracture of wrist, unspecified laterality, closed, initial encounter     right    History of sepsis 2009    following lithotripsy    Hyperlipidemia     Hypertension     Hypothyroidism 2007    secondary to radioactive iodine 2007    Kidney stone     Osteoporosis     Primary localized osteoarthritis of right hip 3/18/2019       Past Surgical History:    Past Surgical History:   Procedure Laterality Date    CHOLECYSTECTOMY, LAPAROSCOPIC  2009    COLONOSCOPY  07/17/2014    Dr. Tiana Hill - tubular adenomatous polyp removed    COLONOSCOPY N/A 8/1/2018    Dr. Marilyn Avila architectural distortion, suggestive of mucosal prolapse    CYSTOSCOPY  2015    stent removal and reinsert     CYSTOSCOPY Left     HLL with stent    CYSTOSCOPY Left 2/5/2021    CYSTOSCOPY URETEROSCOPY LASER-HLL performed by Marleen Clancy MD at [address] [place] [address] / Marky Patel / STONE Left 2/5/2021    CYSTOSCOPY RETROGRADE PYELOGRAM STENT INSERTION Patient being discharged via wheelchair in stable condition. Written and verbal instructions given to patient and family, they voice no questions are concerns. performed by Nilsa Soto MD at [facility] LITHOTRIPSY  4/28/2015    left    LITHOTRIPSY Left 01/30/2018    cystoscopy- Dr. Martita Nj  2007    radioactive iodine procedure    NE CYSTOURETHROSCOPY Left 1/30/2018    CYSTOSCOPY performed by Nilsa Soto MD at [address] ESWL Left 1/30/2018    ESWL [address] LITHOTRIPSY performed by Nilsa Soto MD at [address] [facility] Right 3/18/2019    Dr. Marcy Murdock       Medications Prior to Admission:    Prior to Admission medications    Medication Sig Start Date End Date Taking?  Authorizing Provider   Zinc Sulfate (ZINC 15 PO) Take by mouth   Yes Historical Provider, MD   ketorolac (TORADOL) 10 MG tablet Take 10 mg by mouth every 6 hours as needed for Pain   Yes Historical Provider, MD   B Complex-Biotin-FA (VITAMIN B50 COMPLEX PO) Take 50 mg by mouth daily    Historical Provider, MD   clindamycin (CLEOCIN) 150 MG capsule Take 150 mg by mouth as needed for Other For dental work 1/25/21   Historical Provider, MD   Red Yeast Rice Extract (RED YEAST RICE PO) Take by mouth    Historical Provider, MD   CINNAMON PO Take by mouth    Historical Provider, MD   Ascorbic Acid (VITAMIN C) 1000 MG tablet Take 1,000 mg by mouth daily    Historical Provider, MD   ondansetron (ZOFRAN) 4 MG tablet Take 1 tablet by mouth every 6 hours as needed for Nausea 2/3/21   [facility], APRN - CNP   atorvastatin (LIPITOR) 20 MG tablet Take 1 tablet by mouth daily 9/22/20   Abena Haddad MD   levothyroxine (SYNTHROID) 75 MCG tablet Take 1 tablet by mouth Daily 9/22/20   Abena Haddad MD   alendronate (FOSAMAX) 70 MG tablet Take 1 tablet by mouth every 7 days 9/22/20   Abena Haddad MD   losartan (COZAAR) 50 MG tablet Take 1 tablet by mouth daily 9/22/20   Abena Haddad MD   acetaminophen (TYLENOL) 500 MG tablet Take 500 mg by mouth every 6 hours as needed for Pain    Historical Provider, MD   diphenhydrAMINE (BENADRYL) 25 MG capsule Take 25 mg by mouth nightly as needed for Itching    Historical Provider, MD   Polyethylene Glycol 3350 (MIRALAX PO) Take by mouth every other day Indications: PRN     Historical Provider, MD   Glucosamine-Chondroitin (GLUCOSAMINE CHONDR COMPLEX PO) Take by mouth    Historical Provider, MD   vitamin D (ERGOCALCIFEROL) 400 UNITS CAPS Take 2,000 Units by mouth daily     Historical Provider, MD   ibuprofen (ADVIL;MOTRIN) 400 MG tablet Take 400 mg by mouth every 6 hours as needed for Pain. Historical Provider, MD       Allergies:  Patient has no known allergies. Social History:    Social History     Socioeconomic History    Marital status:      Spouse name: Not on file    Number of children: Not on file    Years of education: Not on file    Highest education level: Not on file   Occupational History    Not on file   Social Needs    Financial resource strain: Not on file    Food insecurity     Worry: Not on file     Inability: Not on file   [employer] needs     Medical: Not on file     Non-medical: Not on file   Tobacco Use    Smoking status: Current Some Day Smoker     Packs/day: 0.25     Years: 30.00     Pack years: 7.50     Types: Cigarettes    Smokeless tobacco: Never Used   Substance and Sexual Activity    Alcohol use:  Yes     Alcohol/week: 0.0 standard drinks     Comment: socially    Drug use: No    Sexual activity: Yes     Partners: Male   Lifestyle    Physical activity     Days per week: Not on file     Minutes per session: Not on file    Stress: Not on file   Relationships    Social connections     Talks on phone: Not on file     Gets together: Not on file     Attends Orthodoxy service: Not on file     Active member of club or organization: Not on file     Attends meetings of clubs or organizations: Not on file     Relationship status: Not on file  Intimate partner violence     Fear of current or ex partner: Not on file     Emotionally abused: Not on file     Physically abused: Not on file     Forced sexual activity: Not on file   Other Topics Concern    Not on file   Social History Narrative    Not on file       Family History:    Family History   Problem Relation Age of Onset    Stroke Father     Hypertension Father     Breast Cancer Maternal Grandmother     Alzheimer's Disease Mother        REVIEW OF SYSTEMS:  All systems reviewed and negative except for that already noted in the HPI. Physical Exam:      This a 72 y.o. female   No data found. Constitutional: Patient in no acute distress. Neuro: Alert and oriented to person, place and time. Psych: mood and affect normal  HEENT negative  Lungs: Respiratory effort is normal  Cardiovascular: Normal peripheral pulses  Abdomen: Soft, non-tender, non-distended with no CVA, flank pain or hepatosplenomegaly. No hernias. Kidneys normal.  Lymphatics: No palpable lymphadenopathy. Bladder non-tender and not distended. Pelvic exam:  External genitalia normal  Urethral and urethral meatus normal  Vagina normal with no evidence of pelvic prolapse  Uterus normal  Adnexa normal  Anus and perineum normal  Rectal exam not indicated    LABS:   No results for input(s): WBC, HGB, HCT, MCV, PLT in the last 72 hours. No results for input(s): NA, K, CL, CO2, PHOS, BUN, CREATININE in the last 72 hours. Invalid input(s): CA    Additional Lab/culture results:    Urinalysis: No results for input(s): COLORU, PHUR, LABCAST, WBCUA, RBCUA, MUCUS, TRICHOMONAS, YEAST, BACTERIA, CLARITYU, SPECGRAV, LEUKOCYTESUR, UROBILINOGEN, Elfreda Ishmale in the last 72 hours.     Invalid input(s): NITRATE, GLUCOSEUKETONESUAMORPHOUS     -----------------------------------------------------------------  Imaging Results:      Assessment and Plan   Impression:    Patient Active Problem List   Diagnosis    Left ureteral calculus   

## 2021-04-30 RX ORDER — OXYBUTYNIN CHLORIDE 5 MG/1
TABLET ORAL
Qty: 270 TABLET | Refills: 0 | Status: SHIPPED | OUTPATIENT
Start: 2021-04-30 | End: 2021-08-23 | Stop reason: DRUGHIGH

## 2021-05-03 ENCOUNTER — HOSPITAL ENCOUNTER (EMERGENCY)
Age: 86
Discharge: HOME | End: 2021-05-04
Payer: MEDICARE

## 2021-05-03 ENCOUNTER — TELEPHONE (OUTPATIENT)
Dept: FAMILY MEDICINE CLINIC | Age: 86
End: 2021-05-03

## 2021-05-03 DIAGNOSIS — R10.9 ABDOMINAL CRAMPING: ICD-10-CM

## 2021-05-03 DIAGNOSIS — R14.0 BLOATING: ICD-10-CM

## 2021-05-03 DIAGNOSIS — R10.84 GENERALIZED ABDOMINAL PAIN: Primary | ICD-10-CM

## 2021-05-03 PROCEDURE — 99285 EMERGENCY DEPT VISIT HI MDM: CPT

## 2021-05-03 RX ORDER — ARFORMOTEROL TARTRATE 15 UG/2ML
15 SOLUTION RESPIRATORY (INHALATION) 2 TIMES DAILY
Qty: 120 ML | Refills: 11 | Status: ON HOLD | OUTPATIENT
Start: 2021-05-03 | End: 2022-02-23

## 2021-05-03 ASSESSMENT — PAIN SCALES - GENERAL: PAINLEVEL_OUTOF10: 7

## 2021-05-03 NOTE — TELEPHONE ENCOUNTER
This medication refill is regarding a fax request by [facility]. Requested Prescriptions     Pending Prescriptions Disp Refills    Arformoterol Tartrate (BROVANA) 15 MCG/2ML NEBU 120 mL 11     Sig: Take 2 mLs by nebulization 2 times daily 1 dose inhale orally two times per day       Date of last visit: 10/19/2020  Date of next visit: None  Date of last refill: 10/14/2020  120/11      Rx verified, ordered and set to EP.

## 2021-05-03 NOTE — TELEPHONE ENCOUNTER
Blood sugar log reviewed. Accu-Cheks often elevated and above goal.  Med list states patient is taking Lantus 18 units nightly-please confirm. If taking Lantus as above, would recommend increasing Lantus to 20 units nightly. Call update on sugars in 1 to 2 weeks or sooner if problems with hypoglycemia.   ES

## 2021-05-03 NOTE — TELEPHONE ENCOUNTER
Fax received from [facility] dated 4/30/21 showing an updated log of the pts blood sugars. Please advise. Copy of fax scanned and attached to this message for review.

## 2021-05-03 NOTE — TELEPHONE ENCOUNTER
Rx refilled. Please check with Michael's [facility]lever regarding his sugars and let me know regarding previous message about increasing Lantus if needed.   ES

## 2021-05-04 ENCOUNTER — APPOINTMENT (OUTPATIENT)
Dept: CT IMAGING | Age: 86
End: 2021-05-04
Payer: MEDICARE

## 2021-05-04 ENCOUNTER — TELEPHONE (OUTPATIENT)
Dept: FAMILY MEDICINE CLINIC | Age: 86
End: 2021-05-04

## 2021-05-04 VITALS
BODY MASS INDEX: 39.24 KG/M2 | HEART RATE: 63 BPM | WEIGHT: 250 LBS | HEIGHT: 67 IN | SYSTOLIC BLOOD PRESSURE: 113 MMHG | OXYGEN SATURATION: 95 % | DIASTOLIC BLOOD PRESSURE: 60 MMHG | TEMPERATURE: 98.7 F | RESPIRATION RATE: 16 BRPM

## 2021-05-04 LAB
ALBUMIN SERPL-MCNC: 3.7 G/DL (ref 3.5–5.1)
ALP BLD-CCNC: 45 U/L (ref 38–126)
ALT SERPL-CCNC: 9 U/L (ref 11–66)
ANION GAP SERPL CALCULATED.3IONS-SCNC: 11 MEQ/L (ref 8–16)
AST SERPL-CCNC: 13 U/L (ref 5–40)
BACTERIA: ABNORMAL /HPF
BASOPHILS # BLD: 0.6 %
BASOPHILS ABSOLUTE: 0.1 THOU/MM3 (ref 0–0.1)
BILIRUB SERPL-MCNC: 0.2 MG/DL (ref 0.3–1.2)
BILIRUBIN DIRECT: < 0.2 MG/DL (ref 0–0.3)
BILIRUBIN URINE: NEGATIVE
BLOOD, URINE: NEGATIVE
BUN BLDV-MCNC: 13 MG/DL (ref 7–22)
CALCIUM SERPL-MCNC: 8.8 MG/DL (ref 8.5–10.5)
CASTS 2: ABNORMAL /LPF
CASTS UA: ABNORMAL /LPF
CHARACTER, URINE: CLEAR
CHLORIDE BLD-SCNC: 93 MEQ/L (ref 98–111)
CO2: 31 MEQ/L (ref 23–33)
COLOR: YELLOW
CREAT SERPL-MCNC: 0.9 MG/DL (ref 0.4–1.2)
CRYSTALS, UA: ABNORMAL
EOSINOPHIL # BLD: 2 %
EOSINOPHILS ABSOLUTE: 0.2 THOU/MM3 (ref 0–0.4)
EPITHELIAL CELLS, UA: ABNORMAL /HPF
ERYTHROCYTE [DISTWIDTH] IN BLOOD BY AUTOMATED COUNT: 14 % (ref 11.5–14.5)
ERYTHROCYTE [DISTWIDTH] IN BLOOD BY AUTOMATED COUNT: 41.8 FL (ref 35–45)
GFR SERPL CREATININE-BSD FRML MDRD: 79 ML/MIN/1.73M2
GLUCOSE BLD-MCNC: 132 MG/DL (ref 70–108)
GLUCOSE URINE: NEGATIVE MG/DL
HCT VFR BLD CALC: 33.3 % (ref 42–52)
HEMOGLOBIN: 10.9 GM/DL (ref 14–18)
IMMATURE GRANS (ABS): 0.09 THOU/MM3 (ref 0–0.07)
IMMATURE GRANULOCYTES: 0.8 %
KETONES, URINE: NEGATIVE
LEUKOCYTE ESTERASE, URINE: ABNORMAL
LIPASE: 35.9 U/L (ref 5.6–51.3)
LYMPHOCYTES # BLD: 23.2 %
LYMPHOCYTES ABSOLUTE: 2.5 THOU/MM3 (ref 1–4.8)
MCH RBC QN AUTO: 27.2 PG (ref 26–33)
MCHC RBC AUTO-ENTMCNC: 32.7 GM/DL (ref 32.2–35.5)
MCV RBC AUTO: 83 FL (ref 80–94)
MISCELLANEOUS 2: ABNORMAL
MONOCYTES # BLD: 9 %
MONOCYTES ABSOLUTE: 1 THOU/MM3 (ref 0.4–1.3)
NITRITE, URINE: NEGATIVE
NUCLEATED RED BLOOD CELLS: 0 /100 WBC
OSMOLALITY CALCULATION: 272.1 MOSMOL/KG (ref 275–300)
PH UA: 8.5 (ref 5–9)
PLATELET # BLD: 284 THOU/MM3 (ref 130–400)
PMV BLD AUTO: 9.4 FL (ref 9.4–12.4)
POTASSIUM SERPL-SCNC: 4 MEQ/L (ref 3.5–5.2)
PROTEIN UA: NEGATIVE
RBC # BLD: 4.01 MILL/MM3 (ref 4.7–6.1)
RBC URINE: ABNORMAL /HPF
RENAL EPITHELIAL, UA: ABNORMAL
SEG NEUTROPHILS: 64.4 %
SEGMENTED NEUTROPHILS ABSOLUTE COUNT: 7 THOU/MM3 (ref 1.8–7.7)
SODIUM BLD-SCNC: 135 MEQ/L (ref 135–145)
SPECIFIC GRAVITY, URINE: 1 (ref 1–1.03)
TOTAL PROTEIN: 6.7 G/DL (ref 6.1–8)
UROBILINOGEN, URINE: 0.2 EU/DL (ref 0–1)
WBC # BLD: 10.8 THOU/MM3 (ref 4.8–10.8)
WBC UA: ABNORMAL /HPF
YEAST: ABNORMAL

## 2021-05-04 PROCEDURE — 83690 ASSAY OF LIPASE: CPT

## 2021-05-04 PROCEDURE — 85025 COMPLETE CBC W/AUTO DIFF WBC: CPT

## 2021-05-04 PROCEDURE — 80053 COMPREHEN METABOLIC PANEL: CPT

## 2021-05-04 PROCEDURE — 82248 BILIRUBIN DIRECT: CPT

## 2021-05-04 PROCEDURE — 87186 SC STD MICRODIL/AGAR DIL: CPT

## 2021-05-04 PROCEDURE — 74176 CT ABD & PELVIS W/O CONTRAST: CPT

## 2021-05-04 PROCEDURE — 36415 COLL VENOUS BLD VENIPUNCTURE: CPT

## 2021-05-04 PROCEDURE — 6370000000 HC RX 637 (ALT 250 FOR IP): Performed by: NURSE PRACTITIONER

## 2021-05-04 PROCEDURE — 87086 URINE CULTURE/COLONY COUNT: CPT

## 2021-05-04 PROCEDURE — 81001 URINALYSIS AUTO W/SCOPE: CPT

## 2021-05-04 RX ORDER — SUCRALFATE 1 G/1
1 TABLET ORAL ONCE
Status: COMPLETED | OUTPATIENT
Start: 2021-05-04 | End: 2021-05-04

## 2021-05-04 RX ADMIN — SUCRALFATE 1 G: 1 TABLET ORAL at 05:23

## 2021-05-04 RX ADMIN — LIDOCAINE HYDROCHLORIDE: 20 SOLUTION ORAL; TOPICAL at 03:25

## 2021-05-04 NOTE — ED NOTES
Bed: 001A  Expected date: 5/3/21  Expected time: 11:25 PM  Means of arrival: [facility]  Comments:     Shruthi Vega RN  05/03/21 [id number]

## 2021-05-04 NOTE — ED TRIAGE NOTES
Arrives to ER with concern of abd distension and abd pain. abd pain 3-4 days ago per EMS. Pt reports last BM today. Pt reports pain feel like \"churring\" in abdomen. Pt reports reports nausea \"all the time\". VSS. Will monitor.

## 2021-05-04 NOTE — ED NOTES
Pt resting in bed. Voiced no concerns. Awaiting results. Will monitor.       Wilder Briggs RN  05/04/21 [id number]

## 2021-05-04 NOTE — ED NOTES
Resting in bed. Watching tv at this time. Voiced no concerns. Awaits results. Will monitor.       Jojo Monterroso RN  05/04/21 [id number]

## 2021-05-04 NOTE — TELEPHONE ENCOUNTER
1) okay for Gas-X (simethicone) 125 mg - 1 pill as needed after meals and at bedtime. Maximum: 4 tablets per 24 hours. 2) ok for Pepcid 20 mg- 1 pill BID prn GERD symptoms. These let me know if he needs it frequently and we can simply schedule it at that time. 3) will sign order when in office on Thursday.   ES

## 2021-05-04 NOTE — TELEPHONE ENCOUNTER
ES response regarding the pts blood sugars and insulin dosage faxed to [facility] for review and response back. Will await response.

## 2021-05-05 ASSESSMENT — ENCOUNTER SYMPTOMS
CONSTIPATION: 1
CHEST TIGHTNESS: 0
ABDOMINAL PAIN: 1
COUGH: 0
NAUSEA: 0
SHORTNESS OF BREATH: 0
RHINORRHEA: 0
VOMITING: 0
ABDOMINAL DISTENTION: 1
BACK PAIN: 0

## 2021-05-05 NOTE — ED PROVIDER NOTES
[facility] Emergency Department    CHIEF COMPLAINT       Chief Complaint   Patient presents with    Abdominal Pain       Nurses Notes reviewed and I agree except as noted in the HPI. HISTORY OF PRESENT ILLNESS    Flores Ledesma te 80 y.o. male who presents to the ED for evaluation of abdominal pain and bloating x 3 days. Patient states he has been taking laxatives. He states he feels like his abdomen is distended and he is full. No nausea or vomiting. No fever. No chest pain. HPI was provided by the patient    REVIEW OF SYSTEMS     Review of Systems   Constitutional: Negative for chills, fatigue and fever. HENT: Negative for congestion, ear discharge, ear pain, postnasal drip and rhinorrhea. Respiratory: Negative for cough, chest tightness and shortness of breath. Cardiovascular: Negative for chest pain, palpitations and leg swelling. Gastrointestinal: Positive for abdominal distention, abdominal pain and constipation. Negative for nausea and vomiting. Genitourinary: Negative for difficulty urinating, dysuria, enuresis, flank pain and hematuria. Musculoskeletal: Negative for back pain and joint swelling. Skin: Negative for rash. Neurological: Negative for dizziness, light-headedness, numbness and headaches. Psychiatric/Behavioral: Negative for agitation, behavioral problems and confusion. All other systems negative except as noted.       PAST MEDICAL HISTORY     Past Medical History:   Diagnosis Date    Anxiety     CAD (coronary artery disease)     leaking valve    Chronic back pain     COPD [facility]chronic obstructive pulmonary disease) [facility]     Detached retina     Diabetes mellitus ([facility])     NIDDM    DVT (deep venous thrombosis) ([facility])     RLE    DVT (deep venous thrombosis) ([facility] [address] 11/9/15    LLE    Glaucoma     Hyperlipidemia     Hypertension     Mass of chest     benign chest mass, Dr Mary Pizarro    Movement disorder     spinal stenosis    Obesity     Osteoarthritis right ankle, right hand    Pneumonia     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     CURRENT MEDICATIONS       Discharge Medication List as of 5/4/2021  4:08 AM      CONTINUE these medications which have NOT CHANGED    Details   Arformoterol Tartrate (BROVANA) 15 MCG/2ML NEBU Take 2 mLs by nebulization 2 times daily 1 dose inhale orally two times per day, Disp-120 mL, R-11Normal      oxybutynin (DITROPAN) 5 MG tablet TAKE 1 TABLET BY MOUTH EVERY 8 HOURS AS NEEDED FOR BLADDER SPASMS OR BLADDER SPASM, Disp-270 tablet, R-0**Patient requests 90 days supply**Normal      Multiple Vitamins-Minerals (PRESERVISION AREDS 2) CAPS Take 1 capsule by mouth daily, Disp-90 capsule, R-3Normal      !! furosemide (LASIX) 40 MG tablet Take 1 tablet by mouth 2 times daily, Disp-180 tablet, R-3Normal      !! furosemide (LASIX) 20 MG tablet Take 1 tablet by mouth 2 times daily, Disp-180 tablet, R-3Normal      ibuprofen (ADVIL;MOTRIN) 800 MG tablet TAKE 1 TABLET BY MOUTH THREE TIMES DAILY WITH MEALS, Disp-270 tablet, R-1Normal      doxazosin (CARDURA) 4 MG tablet Take 1.5 tablets by mouth nightly, Disp-30 tablet, R-3NO PRINT      !! furosemide (LASIX) 40 MG tablet TAKE 1 AND 1/2 TABLETS BY MOUTH TWICE DAILY, Disp-270 tablet, R-0**Patient requests 90 days supply**Normal      spironolactone (ALDACTONE) 25 MG tablet TAKE 1 TABLET BY MOUTH DAILY, Disp-90 tablet, R-3**Patient requests 90 days supply**Normal      Revefenacin 175 MCG/3ML SOLN Inhale 1 nebule into the lungs daily Dx COPD J44.3, Cdae-30 vial,R-5Normal      levothyroxine (SYNTHROID) 200 MCG tablet TAKE 1 TABLET BY MOUTH  DAILY, Disp-90 tablet,R-3Normal      insulin glargine (LANTUS SOLOSTAR) 100 UNIT/ML injection pen INJECT SUBCUTANEOUSLY 18  UNITS NIGHTLY, Disp-30 mL,R-3Normal      blood glucose test strips (ACCU-CHEK CHE PLUS) strip Accu-check Che Plus Test Strips (or brand per pt preference). Sig: test sugar BID. Dx: E11.8, Disp-200 strip,R-3**Patient requests 90 days supply**Normal      SOFT TOUCH LANCETS MISC Disp-200 each,R-3, NormalLancet (or brand per pt preference) Use to check blood sugars 2 times daily. Dx: E11.8      Alcohol Swabs (ALCOHOL PREP) 70 % PADS Disp-200 each,R-3, NormalAlcohol pads (brand per preference). Sig: use to test sugar twice per day. Dx: E11.8      Blood Glucose Monitoring Suppl (ACCU-CHEK CHE PLUS) w/Device KIT Disp-1 kit,R-0, NormalOr brand per pt preference. Sig: test sugar BID. Dx: E11.8      metoprolol tartrate (LOPRESSOR) 25 MG tablet Take 1 tablet by mouth 2 times daily Hold for SBP <110 or HR <60, Disp-180 tablet,R-3Normal      ferrous gluconate (FERGON) 324 (38 Fe) MG tablet Take 1 tablet by mouth 2 times daily, Disp-180 tablet,R-3Normal      metFORMIN (GLUCOPHAGE) 1000 MG tablet Take 1 tablet by mouth 2 times daily (with meals), Disp-180 tablet,R-3Normal      budesonide (PULMICORT) 0.5 MG/2ML nebulizer suspension USE 1 VIAL VIA NEBULIZER TWICE DAILY.  RINSE MOUTH AFTER TREATMENT, Disp-360 mL,R-3**Patient requests 90 days supply**Normal      lisinopril (PRINIVIL;ZESTRIL) 2.5 MG tablet Take 1 tablet by mouth daily, Disp-90 tablet,R-3Normal      potassium chloride (KLOR-CON M) 10 MEQ extended release tablet TAKE 1 TABLETS BY MOUTH TWICE DAILY, Disp-180 tablet,R-3**Patient requests 90 days supply**Normal      docusate (COLACE, DULCOLAX) 100 MG CAPS Take 100 mg by mouth 2 times daily, Disp-180 capsule,R-3Normal      vitamin D3 (CHOLECALCIFEROL) 10 MCG (400 UNIT) TABS tablet Take 1 tablet by mouth daily 400units, Disp-90 tablet,R-3Normal      polyethylene glycol (MIRALAX) 17 g packet Take 17 g by mouth daily as needed for ConstipationHistorical Med      acetaminophen (TYLENOL) 325 MG tablet Take 650 mg by mouth every 4 hours as needed for Pain or Fever Historical Med      lidocaine (LMX) 4 % cream Apply topically every 6 hours as needed for Pain To groin, Topical, EVERY 6 HOURS PRN Starting 2020, Historical Med      albuterol sulfate  (90 Base) MCG/ACT inhaler Inhale 2 puffs into the lungs every 6 hours as needed for Wheezing, Disp-1 Inhaler, R-3Normal      OXYGEN Inhale 3 L into the lungs nightly Indications: Difficulty Breathing, Oxygen Therapy And prn through cpapHistorical Med       !! - Potential duplicate medications found. Please discuss with provider. ALLERGIES     is allergic to latex; macrobid [nitrofurantoin monohyd macro]; levaquin [levofloxacin]; adhesive tape; and alphagan [brimonidine tartrate]. FAMILY HISTORY     He indicated that his mother is . He indicated that his father is . family history includes Other in his mother; Other (age of onset: [de-identified]) in his father. SOCIAL HISTORY       Social History     Socioeconomic History    Marital status:      Spouse name: Not on file    Number of children: 2    Years of education: Not on file    Highest education level: Not on file   Occupational History    Not on file   Social Needs    Financial resource strain: Not hard at all   [address] insecurity     Worry: Never true     Inability: Never true   [employer] needs     Medical: No     Non-medical: No   Tobacco Use    Smoking status: Never Smoker    Smokeless tobacco: Never Used   Substance and Sexual Activity    Alcohol use: Yes     Alcohol/week: 1.0 standard drinks     Types: 1 Glasses of wine per week     Comment: Glass of wine with dinner.     Drug use: No    Sexual activity: Never   Lifestyle    Physical activity     Days per week: 0 days     Minutes per session: 0 min    Stress: Not at all   Relationships    Social connections     Talks on phone: Once a week     Gets together: Once a week     Attends Worship service: Not on file     Active member of club or organization: No     Attends meetings of clubs or organizations: Never     Relationship status:     Intimate partner violence     Fear of current or ex partner: Not on file     Emotionally abused: Not on file     Physically abused: Not on file     Forced sexual activity: Not on file   Other Topics Concern    Not on file   Social History Narrative    Not on file       PHYSICAL EXAM     INITIAL VITALS:  height is 5' 7\" (1.702 m) and weight is 250 lb (113.4 kg). His temperature is 98.7 °F (37.1 °C). His blood pressure is 113/60 and his pulse is 63. His respiration is 16 and oxygen saturation is 95%. Physical Exam  Vitals signs and nursing note reviewed. Constitutional:       General: He is not in acute distress. Appearance: He is well-developed. He is not diaphoretic. HENT:      Head: Normocephalic and atraumatic. Mouth/Throat:      Mouth: Mucous membranes are moist.      Pharynx: Oropharynx is clear. Eyes:      General:         Right eye: No discharge. Left eye: No discharge. Conjunctiva/sclera: Conjunctivae normal.   Neck:      Musculoskeletal: Normal range of motion. Trachea: No tracheal deviation. Cardiovascular:      Rate and Rhythm: Normal rate and regular rhythm. Heart sounds: Normal heart sounds. No murmur. No gallop. Comments: Normal capillary refill  Pulmonary:      Effort: Pulmonary effort is normal. No respiratory distress. Breath sounds: Normal breath sounds. No stridor. Abdominal:      General: Bowel sounds are normal. There is distension. Palpations: Abdomen is soft. Tenderness: There is generalized abdominal tenderness. Musculoskeletal: Normal range of motion.          General: No tenderness or deformity. Skin:     General: Skin is warm and dry. Capillary Refill: Capillary refill takes less than 2 seconds. Coloration: Skin is not pale. Findings: No erythema or rash. Neurological:      General: No focal deficit present. Mental Status: He is alert and oriented to person, place, and time. Cranial Nerves: No cranial nerve deficit. Psychiatric:         Behavior: Behavior normal.         DIFFERENTIAL DIAGNOSIS:   Gastroenteritis, obstruction, ascites    DIAGNOSTIC RESULTS     EKG: All EKG's are interpreted by the Emergency Department Physician who eithersigns or Co-signs this chart in the absence of a cardiologist.        RADIOLOGY: non-plainfilm images(s) such as CT, Ultrasound and MRI are read by the radiologist.  Plain radiographic images are visualized and preliminarily interpreted by the emergency physician unless otherwise stated below. CT ABDOMEN PELVIS WO CONTRAST Additional Contrast? None   Final Result   1. Billy catheter balloon in the region of the bulbous urethra and can be    repositioned. 2.  Enlarged prostate. 3.  Nonobstructing left renal stone. 4.  Colonic diverticula. This document has been electronically signed by: Britt Cabrera MD on    05/04/2021 01:20 AM      All CTs at this facility use dose modulation techniques and iterative    reconstructions, and/or weight-based dosing   when appropriate to reduce radiation to a low as reasonably achievable.             LABS:   Labs Reviewed   CBC WITH AUTO DIFFERENTIAL - Abnormal; Notable for the following components:       Result Value    RBC 4.01 (*)     Hemoglobin 10.9 (*)     Hematocrit 33.3 (*)     Immature Grans (Abs) 0.09 (*)     All other components within normal limits   BASIC METABOLIC PANEL - Abnormal; Notable for the following components:    Chloride 93 (*)     Glucose 132 (*)     All other components within normal limits   HEPATIC FUNCTION PANEL - Abnormal; Notable for the following components: Total Bilirubin 0.2 (*)     ALT 9 (*)     All other components within normal limits   OSMOLALITY - Abnormal; Notable for the following components:    Osmolality Calc 272.1 (*)     All other components within normal limits   GLOMERULAR FILTRATION RATE, ESTIMATED - Abnormal; Notable for the following components:    Est, Glom Filt Rate 79 (*)     All other components within normal limits   URINE WITH REFLEXED MICRO - Abnormal; Notable for the following components:    Leukocyte Esterase, Urine LARGE (*)     All other components within normal limits   CULTURE, REFLEXED, URINE    Narrative:     Source: urine, clean catch       Site:           Current Antibiotics: not stated   LIPASE   ANION GAP       EMERGENCY DEPARTMENT COURSE:   Vitals:    Vitals:    05/04/21 0108 05/04/21 0258 05/04/21 0323 05/04/21 0520   BP: 126/68 121/69 133/63 113/60   Pulse:  64 62 63   Resp:  16 18 16   Temp:       SpO2:  95% 96% 95%   Weight:       Height:              MDM                         MDM    Patient was seen in the ER for abdominal distension and discomfort. Appropriate labs and imaging are ordered and reviewed. Labs are reassuring. CT scan advises us to advance the catheter balloon on the felipe which was done. The patient appears to have a lot of gaseous distension. He is treated with GI cocktail and carafate with improvement. I reviewed findings with the patient. He is comfortable with discharge home. Case was staffed with DR. Garcia No    Medications   aluminum & magnesium hydroxide-simethicone (MAALOX) 30 mL, lidocaine viscous hcl (XYLOCAINE) 5 mL (GI COCKTAIL) ( Oral Given 5/4/21 7486)   sucralfate (CARAFATE) tablet 1 g (1 g Oral Given 5/4/21 6402)         Patient was seen independently by myself. The patient's final impression and disposition and plan was determined by myself.      Strict return precautions and follow up instructions were discussed with the patient prior to discharge, with which the patient agrees. Physical assessment findings, diagnostic testing(s) if applicable, and vital signs reviewed with patient/patient representative. Questions answered. Medications asdirected, including OTC medications for supportive care. Education provided on medications. Differential diagnosis(s) discussed with patient/patient representative. Home care/self care instructions reviewed withpatient/patient representative. Patient is to follow-up with family care provider in 2-3 days if no improvement. Patient is to go to the emergency department if symptoms worsen. Patient/patient representative isaware of care plan, questions answered, verbalizes understanding and is in agreement. CRITICAL CARE:   None    CONSULTS:  None    PROCEDURES:  None    FINAL IMPRESSION     1. Generalized abdominal pain    2. Bloating    3.  Abdominal cramping          DISPOSITION/PLAN   DISPOSITION Decision To Discharge 05/04/2021 04:07:03 AM      PATIENT REFERREDTO:  Pedro Bower MD  [address] [facility] [address]  [phone]    Schedule an appointment as soon as possible for a visit in 2 days  For follow up      DISCHARGE MEDICATIONS:  Discharge Medication List as of 5/4/2021  4:08 AM          (Please note that portions of this note were completed with a voice recognition program.  Efforts were made to edit the dictations but occasionally words are mis-transcribed.)         MAEGAN Landaverde CNP, APRN - CNP  05/05/21 6147

## 2021-05-06 ENCOUNTER — TELEPHONE (OUTPATIENT)
Dept: FAMILY MEDICINE CLINIC | Age: 86
End: 2021-05-06

## 2021-05-06 LAB
ORGANISM: ABNORMAL
URINE CULTURE REFLEX: ABNORMAL
URINE CULTURE REFLEX: ABNORMAL

## 2021-05-10 NOTE — TELEPHONE ENCOUNTER
LMTCB with [address] regarding separate message. Will need to address both issues when they call back.

## 2021-05-11 ENCOUNTER — TELEPHONE (OUTPATIENT)
Dept: FAMILY MEDICINE CLINIC | Age: 86
End: 2021-05-11

## 2021-05-11 NOTE — TELEPHONE ENCOUNTER
Spoke to Kristina at [facility] regarding the urine culture result. She states that the pt is currently taking Cipro 500 mg BID x 9 days but wasn't started until yesterday morning; they received a call from [facility] ED on 5/9/21 telling them to start the pt on Cipro due the final culture result dated 5/4/21. She states that they also obtained a UA with reflex C&S there on 5/6/21 and the culture came back positive on 5/9/21; the result has been faxed to this office and Carlos Thomson has a copy of it. Pt still has an indwelling catheter. Anisha is in the process of contacting Dr. Diallo Thompson office for further recommendations and management of the UTI.

## 2021-05-11 NOTE — TELEPHONE ENCOUNTER
Called and spoke with nurse at [facility]. Nurse verbalized understanding and is waiting for Claudeen Liter from office to return her call. No additional questions at this time.

## 2021-05-11 NOTE — TELEPHONE ENCOUNTER
Dr. Wen Bower made aware of the sensitivity result from the culture dated 5/6/21 and that Cipro is resistant. Per ES, discontinue Cipro at this time. (nursing order faxed to [facility] was also notified) Dr. Wen Bower is requesting that urology manage. Michelle WATSON from Dr. Yassine Jacobs office notified on this case. She is sending a note to the provider to see if treatment needed at this time. Result of recent culture and sensitivity scanned in. Copy of ED note also scanned in. (order for Cipro from original urine completed in ED)  Await response from Dr. Yassine Jacobs office.

## 2021-05-11 NOTE — TELEPHONE ENCOUNTER
----- Message from Gema Lara MD sent at 5/11/2021 12:37 PM EDT -----  Notify pt-   Results reviewed. Given urine culture resistant to Ciprostop Cipro at this time. Patient/[place] need to follow-up with urology for recommendations as patient uses urinary catheter and they have been managing previously. Current UTI is resistant to multiple antibiotics at this time.  ES

## 2021-05-11 NOTE — TELEPHONE ENCOUNTER
Discussion noted and sugars reviewed. Given most recent sugars improved and generally <150 on a regular basis, continue Lantus 20 units daily at this time. Fax update on sugars in 1 month or sooner if routinely > 150 on a regular basis.  ES

## 2021-05-11 NOTE — TELEPHONE ENCOUNTER
Spoke to Kristina at [facility] and he is currently taking Lantus 20 units Q HS since 5/6/21. She states that the order was given by  for the change but I do not see it documented anywhere. Most recent blood sugar readings were faxed to the office today, scanned into the chart and attached to this message for review. Please advise. Fax response to [facility] as well as call Kristina.

## 2021-05-12 NOTE — TELEPHONE ENCOUNTER
I called [address] [place] [address] to see if they still need to talk to me about something and the nurse stated she would need to call me back as she was busy with a patient. I reviewed all of the notes from yesterday and it doesn't look like anything further is needed from this office. Encounter closed as Dr. Rodrigo Oconnor is managing.

## 2021-05-18 ENCOUNTER — TELEPHONE (OUTPATIENT)
Dept: UROLOGY | Age: 86
End: 2021-05-18

## 2021-05-18 ENCOUNTER — TELEPHONE (OUTPATIENT)
Dept: FAMILY MEDICINE CLINIC | Age: 86
End: 2021-05-18

## 2021-05-18 DIAGNOSIS — T83.511S URINARY TRACT INFECTION ASSOCIATED WITH INDWELLING URETHRAL CATHETER, SEQUELA: Primary | ICD-10-CM

## 2021-05-18 DIAGNOSIS — Z16.30 DRUG RESISTANCE: ICD-10-CM

## 2021-05-18 DIAGNOSIS — N39.0 URINARY TRACT INFECTION ASSOCIATED WITH INDWELLING URETHRAL CATHETER, SEQUELA: Primary | ICD-10-CM

## 2021-05-18 NOTE — TELEPHONE ENCOUNTER
Spoke to Gin Pickens from [facility] to let her know ES will follow and she stated that she had some concerns regarding her visit with the pt today. She stated that she saw the pt today to change his indwelling catheter and when she pulled out the catheter there were strings of blood and chunks of blood and sediment in the bag. The urine was dark, cloudy and foul smelling. The pt was afebrile. She also noted that the pt seemed confused and told her that he felt \"stupid\" because he couldn't understand what she was saying. She also stated that he c/o a ST and cough and just didn't feel well. Lungs did sound diminished but is unsure if this is his normal. She is going to come back in 1 week to reevaluate the pt. Spoke to the director of Joel Meade at [email or web address] just 10-15 minutes prior to Gin Pickens calling the office and she stated that the pt had no urinary symptoms and was doing well. (I called there to speak to her regarding an unsolved issue). I called Dr. Yassine Jacobs office and spoke to Jesus and she notified Dr. Dat Price of the pts symptoms and he would like to the pt to have the catheter exchanged and a referral to ID made since the current infection is resistant to multiple drugs. Referral to Shaina Harmon CNP at [facility] ID was placed in [facility] and I will call there tomorrow to schedule. Spoke to the pts son Dr. Radha Bonilla regarding the above and he is agreeable to the ID referral but wants to know if it can be a virtual visit between the office and the patient and the nurse at [email or web address] (can't get away from his office to be at the [place] [address]). He is also agreeable to the addition of a daily probiotic. This information still needs faxed/called to [facility] [email or web address]. Will work on ID referral tomorrow.

## 2021-05-18 NOTE — TELEPHONE ENCOUNTER
Sterling Márquez from Dr Von Taylor office stated the home health nurse stated patient is more confused and has symptoms of UTI. The urine is foul smelling and has sediment. Last urine was 05/06/2021 resistant to oral antibiotics. Spoke to Dr Rocco Horne who stated to refer to ID and have the catheter exchanged.     Sterling Márquez stated the catheter was exchanged and will make the referral.

## 2021-05-18 NOTE — TELEPHONE ENCOUNTER
Kirti Maki nurse from [facility] asking if ES will continue to follow for [facility] as he needs re-certified also asking for an order for pt to start taking a daily probiotic.

## 2021-05-19 ENCOUNTER — TELEPHONE (OUTPATIENT)
Dept: WOUND CARE | Age: 86
End: 2021-05-19

## 2021-05-19 ENCOUNTER — APPOINTMENT (OUTPATIENT)
Dept: GENERAL RADIOLOGY | Age: 86
DRG: 699 | End: 2021-05-19
Payer: MEDICARE

## 2021-05-19 ENCOUNTER — HOSPITAL ENCOUNTER (INPATIENT)
Age: 86
LOS: 5 days | Discharge: HOME | DRG: 699 | End: 2021-05-24
Attending: EMERGENCY MEDICINE | Admitting: FAMILY MEDICINE
Payer: MEDICARE

## 2021-05-19 ENCOUNTER — APPOINTMENT (OUTPATIENT)
Dept: CT IMAGING | Age: 86
DRG: 699 | End: 2021-05-19
Payer: MEDICARE

## 2021-05-19 DIAGNOSIS — N39.0 URINARY TRACT INFECTION WITHOUT HEMATURIA, SITE UNSPECIFIED: Primary | ICD-10-CM

## 2021-05-19 DIAGNOSIS — E87.6 HYPOKALEMIA: ICD-10-CM

## 2021-05-19 DIAGNOSIS — R33.9 URINARY RETENTION: ICD-10-CM

## 2021-05-19 DIAGNOSIS — I50.33 ACUTE ON CHRONIC DIASTOLIC CONGESTIVE HEART FAILURE (HCC): ICD-10-CM

## 2021-05-19 DIAGNOSIS — J18.9 PNEUMONIA DUE TO ORGANISM: ICD-10-CM

## 2021-05-19 DIAGNOSIS — R77.8 ELEVATED TROPONIN: ICD-10-CM

## 2021-05-19 PROBLEM — J15.9 COMMUNITY ACQUIRED BACTERIAL PNEUMONIA: Status: ACTIVE | Noted: 2021-05-19

## 2021-05-19 LAB
ANION GAP SERPL CALCULATED.3IONS-SCNC: 14 MEQ/L (ref 8–16)
BACTERIA: ABNORMAL /HPF
BASOPHILS # BLD: 0.5 %
BASOPHILS ABSOLUTE: 0.1 THOU/MM3 (ref 0–0.1)
BILIRUBIN URINE: NEGATIVE
BLOOD, URINE: NEGATIVE
BUN BLDV-MCNC: 19 MG/DL (ref 7–22)
CALCIUM SERPL-MCNC: 8.6 MG/DL (ref 8.5–10.5)
CASTS 2: ABNORMAL /LPF
CASTS UA: ABNORMAL /LPF
CHARACTER, URINE: ABNORMAL
CHLORIDE BLD-SCNC: 96 MEQ/L (ref 98–111)
CO2: 31 MEQ/L (ref 23–33)
COLOR: YELLOW
CREAT SERPL-MCNC: 1 MG/DL (ref 0.4–1.2)
CRYSTALS, UA: ABNORMAL
EKG ATRIAL RATE: 63 BPM
EKG P AXIS: -2 DEGREES
EKG P-R INTERVAL: 268 MS
EKG Q-T INTERVAL: 402 MS
EKG QRS DURATION: 96 MS
EKG QTC CALCULATION (BAZETT): 411 MS
EKG R AXIS: 70 DEGREES
EKG T AXIS: 38 DEGREES
EKG VENTRICULAR RATE: 63 BPM
EOSINOPHIL # BLD: 2.2 %
EOSINOPHILS ABSOLUTE: 0.2 THOU/MM3 (ref 0–0.4)
EPITHELIAL CELLS, UA: ABNORMAL /HPF
ERYTHROCYTE [DISTWIDTH] IN BLOOD BY AUTOMATED COUNT: 14 % (ref 11.5–14.5)
ERYTHROCYTE [DISTWIDTH] IN BLOOD BY AUTOMATED COUNT: 43.6 FL (ref 35–45)
GFR SERPL CREATININE-BSD FRML MDRD: 70 ML/MIN/1.73M2
GLUCOSE BLD-MCNC: 133 MG/DL (ref 70–108)
GLUCOSE BLD-MCNC: 153 MG/DL (ref 70–108)
GLUCOSE URINE: NEGATIVE MG/DL
HCT VFR BLD CALC: 35.4 % (ref 42–52)
HEMOGLOBIN: 11.1 GM/DL (ref 14–18)
IMMATURE GRANS (ABS): 0.05 THOU/MM3 (ref 0–0.07)
IMMATURE GRANULOCYTES: 0.5 %
KETONES, URINE: NEGATIVE
LACTIC ACID, SEPSIS: 2.5 MMOL/L (ref 0.5–1.9)
LACTIC ACID, SEPSIS: 2.8 MMOL/L (ref 0.5–1.9)
LEUKOCYTE ESTERASE, URINE: ABNORMAL
LYMPHOCYTES # BLD: 19 %
LYMPHOCYTES ABSOLUTE: 2 THOU/MM3 (ref 1–4.8)
MAGNESIUM: 2.1 MG/DL (ref 1.6–2.4)
MCH RBC QN AUTO: 26.9 PG (ref 26–33)
MCHC RBC AUTO-ENTMCNC: 31.4 GM/DL (ref 32.2–35.5)
MCV RBC AUTO: 85.9 FL (ref 80–94)
MISCELLANEOUS 2: ABNORMAL
MONOCYTES # BLD: 8.6 %
MONOCYTES ABSOLUTE: 0.9 THOU/MM3 (ref 0.4–1.3)
NITRITE, URINE: NEGATIVE
NUCLEATED RED BLOOD CELLS: 0 /100 WBC
OSMOLALITY CALCULATION: 285.4 MOSMOL/KG (ref 275–300)
PH UA: 6.5 (ref 5–9)
PLATELET # BLD: 247 THOU/MM3 (ref 130–400)
PMV BLD AUTO: 9.6 FL (ref 9.4–12.4)
POTASSIUM SERPL-SCNC: 4.2 MEQ/L (ref 3.5–5.2)
PROTEIN UA: NEGATIVE
RBC # BLD: 4.12 MILL/MM3 (ref 4.7–6.1)
RBC URINE: ABNORMAL /HPF
RENAL EPITHELIAL, UA: ABNORMAL
SARS-COV-2, NAAT: NOT DETECTED
SEG NEUTROPHILS: 69.2 %
SEGMENTED NEUTROPHILS ABSOLUTE COUNT: 7.2 THOU/MM3 (ref 1.8–7.7)
SODIUM BLD-SCNC: 141 MEQ/L (ref 135–145)
SPECIFIC GRAVITY, URINE: 1.01 (ref 1–1.03)
TROPONIN T: 0.06 NG/ML
UROBILINOGEN, URINE: 1 EU/DL (ref 0–1)
WBC # BLD: 10.4 THOU/MM3 (ref 4.8–10.8)
WBC UA: ABNORMAL /HPF
YEAST: ABNORMAL

## 2021-05-19 PROCEDURE — 80048 BASIC METABOLIC PNL TOTAL CA: CPT

## 2021-05-19 PROCEDURE — 70450 CT HEAD/BRAIN W/O DYE: CPT

## 2021-05-19 PROCEDURE — 87077 CULTURE AEROBIC IDENTIFY: CPT

## 2021-05-19 PROCEDURE — 81001 URINALYSIS AUTO W/SCOPE: CPT

## 2021-05-19 PROCEDURE — 2580000003 HC RX 258: Performed by: STUDENT IN AN ORGANIZED HEALTH CARE EDUCATION/TRAINING PROGRAM

## 2021-05-19 PROCEDURE — 85025 COMPLETE CBC W/AUTO DIFF WBC: CPT

## 2021-05-19 PROCEDURE — 71045 X-RAY EXAM CHEST 1 VIEW: CPT

## 2021-05-19 PROCEDURE — 83735 ASSAY OF MAGNESIUM: CPT

## 2021-05-19 PROCEDURE — 36415 COLL VENOUS BLD VENIPUNCTURE: CPT

## 2021-05-19 PROCEDURE — 93005 ELECTROCARDIOGRAM TRACING: CPT | Performed by: PHYSICIAN ASSISTANT

## 2021-05-19 PROCEDURE — 1200000003 HC TELEMETRY R&B

## 2021-05-19 PROCEDURE — 93010 ELECTROCARDIOGRAM REPORT: CPT | Performed by: INTERNAL MEDICINE

## 2021-05-19 PROCEDURE — 87186 SC STD MICRODIL/AGAR DIL: CPT

## 2021-05-19 PROCEDURE — 99283 EMERGENCY DEPT VISIT LOW MDM: CPT

## 2021-05-19 PROCEDURE — 83605 ASSAY OF LACTIC ACID: CPT

## 2021-05-19 PROCEDURE — 82948 REAGENT STRIP/BLOOD GLUCOSE: CPT

## 2021-05-19 PROCEDURE — 84484 ASSAY OF TROPONIN QUANT: CPT

## 2021-05-19 PROCEDURE — 87040 BLOOD CULTURE FOR BACTERIA: CPT

## 2021-05-19 PROCEDURE — 99222 1ST HOSP IP/OBS MODERATE 55: CPT | Performed by: FAMILY MEDICINE

## 2021-05-19 PROCEDURE — 6360000002 HC RX W HCPCS: Performed by: STUDENT IN AN ORGANIZED HEALTH CARE EDUCATION/TRAINING PROGRAM

## 2021-05-19 PROCEDURE — 87086 URINE CULTURE/COLONY COUNT: CPT

## 2021-05-19 PROCEDURE — 6370000000 HC RX 637 (ALT 250 FOR IP): Performed by: STUDENT IN AN ORGANIZED HEALTH CARE EDUCATION/TRAINING PROGRAM

## 2021-05-19 PROCEDURE — 87635 SARS-COV-2 COVID-19 AMP PRB: CPT

## 2021-05-19 PROCEDURE — 94640 AIRWAY INHALATION TREATMENT: CPT

## 2021-05-19 PROCEDURE — 6360000002 HC RX W HCPCS: Performed by: PHYSICIAN ASSISTANT

## 2021-05-19 PROCEDURE — 2580000003 HC RX 258: Performed by: PHYSICIAN ASSISTANT

## 2021-05-19 RX ORDER — LISINOPRIL 2.5 MG/1
2.5 TABLET ORAL DAILY
Status: DISCONTINUED | OUTPATIENT
Start: 2021-05-19 | End: 2021-05-24 | Stop reason: HOSPADM

## 2021-05-19 RX ORDER — ARFORMOTEROL TARTRATE 15 UG/2ML
15 SOLUTION RESPIRATORY (INHALATION) 2 TIMES DAILY
Status: DISCONTINUED | OUTPATIENT
Start: 2021-05-19 | End: 2021-05-24 | Stop reason: HOSPADM

## 2021-05-19 RX ORDER — ALUMINA, MAGNESIA, AND SIMETHICONE 2400; 2400; 240 MG/30ML; MG/30ML; MG/30ML
15 SUSPENSION ORAL EVERY 6 HOURS PRN
COMMUNITY

## 2021-05-19 RX ORDER — SODIUM CHLORIDE 0.9 % (FLUSH) 0.9 %
5-40 SYRINGE (ML) INJECTION PRN
Status: DISCONTINUED | OUTPATIENT
Start: 2021-05-19 | End: 2021-05-24 | Stop reason: HOSPADM

## 2021-05-19 RX ORDER — ACETAMINOPHEN 325 MG/1
650 TABLET ORAL EVERY 6 HOURS PRN
Status: DISCONTINUED | OUTPATIENT
Start: 2021-05-19 | End: 2021-05-24 | Stop reason: HOSPADM

## 2021-05-19 RX ORDER — SODIUM CHLORIDE 9 MG/ML
25 INJECTION, SOLUTION INTRAVENOUS PRN
Status: DISCONTINUED | OUTPATIENT
Start: 2021-05-19 | End: 2021-05-24 | Stop reason: HOSPADM

## 2021-05-19 RX ORDER — POTASSIUM CHLORIDE 750 MG/1
10 TABLET, FILM COATED, EXTENDED RELEASE ORAL 2 TIMES DAILY
Status: DISCONTINUED | OUTPATIENT
Start: 2021-05-19 | End: 2021-05-24 | Stop reason: HOSPADM

## 2021-05-19 RX ORDER — FAMOTIDINE 20 MG/1
20 TABLET, FILM COATED ORAL 2 TIMES DAILY PRN
COMMUNITY

## 2021-05-19 RX ORDER — 0.9 % SODIUM CHLORIDE 0.9 %
1000 INTRAVENOUS SOLUTION INTRAVENOUS ONCE
Status: COMPLETED | OUTPATIENT
Start: 2021-05-19 | End: 2021-05-19

## 2021-05-19 RX ORDER — INSULIN GLARGINE 100 [IU]/ML
18 INJECTION, SOLUTION SUBCUTANEOUS NIGHTLY
Status: DISCONTINUED | OUTPATIENT
Start: 2021-05-19 | End: 2021-05-24 | Stop reason: HOSPADM

## 2021-05-19 RX ORDER — DOCUSATE SODIUM 100 MG/1
100 CAPSULE, LIQUID FILLED ORAL 2 TIMES DAILY
Status: DISCONTINUED | OUTPATIENT
Start: 2021-05-19 | End: 2021-05-24 | Stop reason: HOSPADM

## 2021-05-19 RX ORDER — OXYBUTYNIN CHLORIDE 5 MG/1
5 TABLET ORAL EVERY 8 HOURS PRN
Status: DISCONTINUED | OUTPATIENT
Start: 2021-05-19 | End: 2021-05-24 | Stop reason: HOSPADM

## 2021-05-19 RX ORDER — FUROSEMIDE 20 MG/1
20 TABLET ORAL 2 TIMES DAILY
Status: DISCONTINUED | OUTPATIENT
Start: 2021-05-19 | End: 2021-05-21

## 2021-05-19 RX ORDER — ACETAMINOPHEN 650 MG/1
650 SUPPOSITORY RECTAL EVERY 6 HOURS PRN
Status: DISCONTINUED | OUTPATIENT
Start: 2021-05-19 | End: 2021-05-24 | Stop reason: HOSPADM

## 2021-05-19 RX ORDER — DOXAZOSIN MESYLATE 4 MG/1
6 TABLET ORAL NIGHTLY
Status: DISCONTINUED | OUTPATIENT
Start: 2021-05-19 | End: 2021-05-24 | Stop reason: HOSPADM

## 2021-05-19 RX ORDER — QUINIDINE GLUCONATE 324 MG
240 TABLET, EXTENDED RELEASE ORAL 2 TIMES DAILY
Status: DISCONTINUED | OUTPATIENT
Start: 2021-05-19 | End: 2021-05-24 | Stop reason: HOSPADM

## 2021-05-19 RX ORDER — BUDESONIDE 0.5 MG/2ML
0.5 INHALANT ORAL 2 TIMES DAILY
Status: DISCONTINUED | OUTPATIENT
Start: 2021-05-19 | End: 2021-05-24 | Stop reason: HOSPADM

## 2021-05-19 RX ORDER — SODIUM CHLORIDE 0.9 % (FLUSH) 0.9 %
5-40 SYRINGE (ML) INJECTION EVERY 12 HOURS SCHEDULED
Status: DISCONTINUED | OUTPATIENT
Start: 2021-05-19 | End: 2021-05-24 | Stop reason: HOSPADM

## 2021-05-19 RX ORDER — SPIRONOLACTONE 25 MG/1
25 TABLET ORAL DAILY
Status: DISCONTINUED | OUTPATIENT
Start: 2021-05-19 | End: 2021-05-24 | Stop reason: HOSPADM

## 2021-05-19 RX ORDER — LEVOTHYROXINE SODIUM 0.1 MG/1
200 TABLET ORAL
Status: DISCONTINUED | OUTPATIENT
Start: 2021-05-20 | End: 2021-05-24 | Stop reason: HOSPADM

## 2021-05-19 RX ORDER — ONDANSETRON 2 MG/ML
4 INJECTION INTRAMUSCULAR; INTRAVENOUS EVERY 6 HOURS PRN
Status: DISCONTINUED | OUTPATIENT
Start: 2021-05-19 | End: 2021-05-24 | Stop reason: HOSPADM

## 2021-05-19 RX ORDER — POLYETHYLENE GLYCOL 3350 17 G/17G
17 POWDER, FOR SOLUTION ORAL DAILY PRN
Status: DISCONTINUED | OUTPATIENT
Start: 2021-05-19 | End: 2021-05-24 | Stop reason: HOSPADM

## 2021-05-19 RX ORDER — PROMETHAZINE HYDROCHLORIDE 25 MG/1
12.5 TABLET ORAL EVERY 6 HOURS PRN
Status: DISCONTINUED | OUTPATIENT
Start: 2021-05-19 | End: 2021-05-24 | Stop reason: HOSPADM

## 2021-05-19 RX ADMIN — CEFEPIME HYDROCHLORIDE 2000 MG: 2 INJECTION, POWDER, FOR SOLUTION INTRAVENOUS at 20:45

## 2021-05-19 RX ADMIN — METFORMIN HYDROCHLORIDE 1000 MG: 500 TABLET ORAL at 18:15

## 2021-05-19 RX ADMIN — ARFORMOTEROL TARTRATE 15 MCG: 15 SOLUTION RESPIRATORY (INHALATION) at 20:13

## 2021-05-19 RX ADMIN — SODIUM CHLORIDE, PRESERVATIVE FREE 10 ML: 5 INJECTION INTRAVENOUS at 20:54

## 2021-05-19 RX ADMIN — SPIRONOLACTONE 25 MG: 25 TABLET ORAL at 18:15

## 2021-05-19 RX ADMIN — OXYBUTYNIN CHLORIDE 5 MG: 5 TABLET ORAL at 20:52

## 2021-05-19 RX ADMIN — BUDESONIDE 500 MCG: 0.5 INHALANT RESPIRATORY (INHALATION) at 20:13

## 2021-05-19 RX ADMIN — POTASSIUM CHLORIDE 10 MEQ: 750 TABLET, EXTENDED RELEASE ORAL at 20:52

## 2021-05-19 RX ADMIN — LISINOPRIL 2.5 MG: 2.5 TABLET ORAL at 18:15

## 2021-05-19 RX ADMIN — OXYBUTYNIN CHLORIDE 5 MG: 5 TABLET ORAL at 18:15

## 2021-05-19 RX ADMIN — SODIUM CHLORIDE 1000 ML: 9 INJECTION, SOLUTION INTRAVENOUS at 13:29

## 2021-05-19 RX ADMIN — METOPROLOL TARTRATE 25 MG: 25 TABLET, FILM COATED ORAL at 20:53

## 2021-05-19 RX ADMIN — DOCUSATE SODIUM 100 MG: 100 CAPSULE ORAL at 20:54

## 2021-05-19 RX ADMIN — DOXAZOSIN 6 MG: 4 TABLET ORAL at 20:52

## 2021-05-19 RX ADMIN — INSULIN GLARGINE 18 UNITS: 100 INJECTION, SOLUTION SUBCUTANEOUS at 20:55

## 2021-05-19 RX ADMIN — FUROSEMIDE 20 MG: 20 TABLET ORAL at 20:53

## 2021-05-19 RX ADMIN — ENOXAPARIN SODIUM 40 MG: 40 INJECTION SUBCUTANEOUS at 18:18

## 2021-05-19 RX ADMIN — CEFTRIAXONE SODIUM 1000 MG: 1 INJECTION, POWDER, FOR SOLUTION INTRAMUSCULAR; INTRAVENOUS at 13:30

## 2021-05-19 RX ADMIN — Medication 240 MG: at 20:52

## 2021-05-19 ASSESSMENT — ENCOUNTER SYMPTOMS
DIARRHEA: 0
BACK PAIN: 0
WHEEZING: 0
NAUSEA: 0
EYE DISCHARGE: 0
EYE PAIN: 0
CHEST TIGHTNESS: 0
RHINORRHEA: 0
COUGH: 0
VOMITING: 0

## 2021-05-19 ASSESSMENT — PAIN SCALES - GENERAL: PAINLEVEL_OUTOF10: 0

## 2021-05-19 NOTE — ED PROVIDER NOTES
Kandi Pope 13 COMPLAINT       Chief Complaint   Patient presents with    Altered Mental Status       Nurses Notes reviewed and I agree except as notedin the HPI. HISTORY OF PRESENT ILLNESS    Mellissa Ly is a 80 y.o. male with a hx of COPD, Type 2 DM,CHF, and  who was brought to the ED by EMS personnel from a nursing home for evaluation of AMS. Nursing home staff reported that the patient appeared to be confused today which prompted them to call EMS. In the ED, pt reports that he does not feel confused. He denies any complaints of pain. He also denies fevers, chills, cough SOB, dysuria, urinary frequency, and any other complaints. There is no family or nursing home staff present to provide additional history at this time. REVIEW OF SYSTEMS     Review of Systems   Constitutional: Negative for chills, fatigue and fever. HENT: Negative for congestion, ear pain and rhinorrhea. Eyes: Negative for pain and discharge. Respiratory: Negative for cough, chest tightness and wheezing. Cardiovascular: Negative for chest pain, palpitations and leg swelling. Gastrointestinal: Negative for diarrhea, nausea and vomiting. Genitourinary: Negative for dysuria and frequency. Musculoskeletal: Negative for arthralgias, back pain, myalgias and neck pain. Skin: Negative for rash. Neurological: Negative for dizziness, weakness and headaches. All other systems reviewed and are negative.        PAST MEDICAL HISTORY    has a past medical history of Anxiety, CAD (coronary artery disease), Chronic back pain, COPD (chronic obstructive pulmonary disease) ([address] Detached retina, Diabetes mellitus ([address] DVT (deep venous thrombosis) ([address] DVT (deep venous thrombosis) ([address] Dysphagia, Glaucoma, Hyperlipidemia, Hypertension, Mass of chest, Movement disorder, Obesity, Osteoarthritis, Pneumonia, Primary thyroid follicular carcinoma, Sleep apnea, Thyroid cancer [facility]), and Urinary incontinence. SURGICAL HISTORY      has a past surgical history that includes Retinal detachment surgery; Thyroidectomy; Spine surgery (2004); other surgical history; fracture surgery; EKG 12 Lead (11/10/2015); back surgery ([id number], 2014); bronchoscopy (N/A, 8/29/2017); TURP (N/A, 3/17/2020); and TURP (N/A, 3/24/2020).     CURRENT MEDICATIONS       Current Discharge Medication List      CONTINUE these medications which have NOT CHANGED    Details   aluminum & magnesium hydroxide-simethicone (MYLANTA) 400-400-40 MG/5ML SUSP Take 15 mLs by mouth every 6 hours as needed      famotidine (PEPCID) 20 MG tablet Take 20 mg by mouth 2 times daily as needed      Arformoterol Tartrate (BROVANA) 15 MCG/2ML NEBU Take 2 mLs by nebulization 2 times daily 1 dose inhale orally two times per day  Qty: 120 mL, Refills: 11      oxybutynin (DITROPAN) 5 MG tablet TAKE 1 TABLET BY MOUTH EVERY 8 HOURS AS NEEDED FOR BLADDER SPASMS OR BLADDER SPASM  Qty: 270 tablet, Refills: 0    Comments: **Patient requests 90 days supply**      Multiple Vitamins-Minerals (PRESERVISION AREDS 2) CAPS Take 1 capsule by mouth daily  Qty: 90 capsule, Refills: 3      !! furosemide (LASIX) 40 MG tablet Take 1 tablet by mouth 2 times daily  Qty: 180 tablet, Refills: 3      !! furosemide (LASIX) 20 MG tablet Take 1 tablet by mouth 2 times daily  Qty: 180 tablet, Refills: 3      ibuprofen (ADVIL;MOTRIN) 800 MG tablet TAKE 1 TABLET BY MOUTH THREE TIMES DAILY WITH MEALS  Qty: 270 tablet, Refills: 1      doxazosin (CARDURA) 4 MG tablet Take 1.5 tablets by mouth nightly  Qty: 30 tablet, Refills: 3      spironolactone (ALDACTONE) 25 MG tablet TAKE 1 TABLET BY MOUTH DAILY  Qty: 90 tablet, Refills: 3    Comments: **Patient requests 90 days supply**      Revefenacin 175 MCG/3ML SOLN Inhale 1 nebule into the lungs daily Dx COPD J44.3  Qty: 30 vial, Refills: 5      levothyroxine (SYNTHROID) 200 MCG tablet TAKE 1 TABLET BY MOUTH  DAILY  Qty: 90 tablet, Wheezing  Qty: 1 Inhaler, Refills: 3      OXYGEN Inhale 3 L into the lungs nightly Indications: Difficulty Breathing, Oxygen Therapy And prn through cpap      blood glucose test strips (ACCU-CHEK CHE PLUS) strip Accu-check Che Plus Test Strips (or brand per pt preference). Sig: test sugar BID. Dx: E11.8  Qty: 200 strip, Refills: 3    Comments: **Patient requests 90 days supply**  Associated Diagnoses: Type 2 diabetes mellitus with complication, with long-term current use of insulin (HCC)      SOFT TOUCH LANCETS MISC Lancet (or brand per pt preference) Use to check blood sugars 2 times daily. Dx: E11.8  Qty: 200 each, Refills: 3    Associated Diagnoses: Type 2 diabetes mellitus with complication, with long-term current use of insulin (HCC)      Alcohol Swabs (ALCOHOL PREP) 70 % PADS Alcohol pads (brand per preference). Sig: use to test sugar twice per day. Dx: E11.8  Qty: 200 each, Refills: 3    Associated Diagnoses: Type 2 diabetes mellitus with complication, with long-term current use of insulin ([facility])      Blood Glucose Monitoring Suppl (ACCU-CHEK CHE PLUS) w/Device KIT Or brand per pt preference. Sig: test sugar BID. Dx: E11.8  Qty: 1 kit, Refills: 0       !! - Potential duplicate medications found. Please discuss with provider. ALLERGIES     is allergic to latex, macrobid [nitrofurantoin monohyd macro], levaquin [levofloxacin], brimonidine tartrate, adhesive tape, and alphagan [brimonidine tartrate]. HISTORY     He indicated that his mother is . He indicated that his father is . family history includes Other in his mother; Other (age of onset: [de-identified]) in his father. SOCIALHISTORY      reports that he has never smoked. He has never used smokeless tobacco. He reports current alcohol use of about 1.0 standard drinks of alcohol per week. He reports that he does not use drugs. PHYSICAL EXAM     INITIAL VITALS:  height is 5' 7\" (1.702 m) and weight is 260 lb 12.8 oz (118.3 kg).  His oral temperature is 97.7 °F (36.5 °C). His blood pressure is 141/56 (abnormal) and his pulse is 65. His respiration is 16 and oxygen saturation is 96%. Physical Exam  Vitals and nursing note reviewed. Constitutional:       Comments: Well Developed Well Nourished Appearing     HENT:      Head: Normocephalic and atraumatic. Eyes:      Pupils: Pupils are equal, round, and reactive to light. Cardiovascular:      Rate and Rhythm: Normal rate and regular rhythm. Heart sounds: Normal heart sounds. Pulmonary:      Effort: Pulmonary effort is normal. No respiratory distress. Breath sounds: Rhonchi (bilaterally) present. No wheezing. Abdominal:      General: Bowel sounds are normal. There is no distension. Palpations: Abdomen is soft. Musculoskeletal:      Cervical back: Normal range of motion and neck supple. DIFFERENTIAL DIAGNOSIS:   UTI possible pneumonia.     DIAGNOSTIC RESULTS     EKG: All EKG's are interpreted by the Emergency Department Physician who either signs or Co-signs this chart in the absence of a cardiologist.     EKG Emergency (Preliminary result)   Component (Lab Inquiry)  Collection Time Result Time Ventricular Rate Atrial Rate P-R Interval QRS Duration Q-T Interval   05/19/21 11:26:45 05/19/21 14:23:36 63 63 268 96 402       Collection Time Result Time QTc Calculation (Bazett) P Axis R Axis T Axis   05/19/21 11:26:45 05/19/21 14:23:36 411 -2 70 38         Preliminary result                Narrative:     Poor data quality, interpretation may be adversely affected   Sinus rhythm with 1st degree A-V block   Low voltage QRS, consider pulmonary disease, pericardial effusion, or normal variant   Septal infarct (cited on or before 25-MAR-2016)   Abnormal ECG   When compared with ECG of 22-MAR-2021 08:08,   No significant change was found                Preliminary result                Narrative:     Poor data quality, interpretation may be adversely affected   Sinus rhythm with 1st degree A-V block   Low voltage QRS, consider pulmonary disease, pericardial effusion, or normal variant   Septal infarct (cited on or before 25-MAR-2016)   Abnormal ECG   When compared with ECG of 22-MAR-2021 08:08,   No significant change was found                      RADIOLOGY: non-plain film images(s) such as CT, Ultrasound and MRI are read by the radiologist.      CT HEAD WO CONTRAST (Final result)  Result time 05/19/21 13:05:25  Final result by Nick Simmons MD (05/19/21 13:05:25)                Impression:    No acute intracranial hemorrhage, mass effect or midline shift. **This report has been created using voice recognition software. It may contain minor errors which are inherent in voice recognition technology. **     Final report electronically signed by Dr Calvin Grewal on 5/19/2021 1:05 PM            Narrative:    PROCEDURE: CT HEAD WO CONTRAST     CLINICAL INFORMATION: 80-year-old male with altered mental status. COMPARISON: CT scan 3/19/2020. TECHNIQUE: Noncontrast 5 mm axial images were obtained through the brain. All CT scans at this facility use dose modulation, iterative reconstruction, and/or weight-based dosing when appropriate to reduce radiation dose to as low as reasonably achievable. FINDINGS:     There is generalized prominence of the sulci and gyri consistent with age-related volume loss. There is no hemorrhage. There are no intra-or extra-axial collections.  There is no hydrocephalus, midline shift or mass effect.  The gray-white matter differentiation is preserved. There are bilateral basal ganglia calcifications. There is some hypoattenuation in the periventricular white matter which is consistent with chronic microvascular ischemic disease. Catracho Motto is a mucus retention cyst in the left maxillary sinus.  The   paranasal sinuses and mastoid air cells are otherwise normally aerated.  There is no suspicious calvarial abnormality.                     XR CHEST PORTABLE (Final result)  Result time 05/19/21 11:23:16  Final result by Yenny Winston MD (05/19/21 11:23:16)                Impression:    There are bibasilar opacities which may represent infiltrates or atelectasis. **This report has been created using voice recognition software. It may contain minor errors which are inherent in voice recognition technology. **     Final report electronically signed by Dr Alvarenga on 5/19/2021 11:23 AM            Narrative:    PROCEDURE: XR CHEST PORTABLE     CLINICAL INFORMATION: 68-year-old male with cough and congestion. COMPARISON: Chest x-ray 12/5/2020. TECHNIQUE: AP upright view of the chest was obtained. FINDINGS:   Metallic wire sutures are in the sternum. There is cardiomegaly. There are bibasilar opacities which may represent infiltrates or atelectasis. There is no significant pleural effusion or pneumothorax. Visualized portions of the upper abdomen are within normal limits. The osseous structures are intact. No acute fractures or suspicious osseous lesions.                    LABS:   Labs Reviewed   CBC WITH AUTO DIFFERENTIAL - Abnormal; Notable for the following components:       Result Value    RBC 4.12 (*)     Hemoglobin 11.1 (*)     Hematocrit 35.4 (*)     MCHC 31.4 (*)     All other components within normal limits   BASIC METABOLIC PANEL - Abnormal; Notable for the following components:    Chloride 96 (*)     Glucose 133 (*)     All other components within normal limits   URINE WITH REFLEXED MICRO - Abnormal; Notable for the following components:    Leukocyte Esterase, Urine MODERATE (*)     All other components within normal limits   TROPONIN - Abnormal; Notable for the following components:    Troponin T 0.057 (*)     All other components within normal limits   LACTATE, SEPSIS - Abnormal; Notable for the following components:    Lactic Acid, Sepsis 2.8 (*)     All other components within normal limits   LACTATE, SEPSIS - Abnormal; Notable for the following components:    Lactic Acid, Sepsis 2.5 (*)     All other components within normal limits   GLOMERULAR FILTRATION RATE, ESTIMATED - Abnormal; Notable for the following components:    Est, Glom Filt Rate 70 (*)     All other components within normal limits   POCT GLUCOSE - Abnormal; Notable for the following components:    POC Glucose 153 (*)     All other components within normal limits   COVID-19, RAPID   CULTURE, REFLEXED, URINE    Narrative:     Source: urine, clean catch       Site:           Current Antibiotics: not stated   CULTURE, BLOOD 1   CULTURE, BLOOD 2   MAGNESIUM   ANION GAP   OSMOLALITY   BASIC METABOLIC PANEL W/ REFLEX TO MG FOR LOW K   CBC WITH AUTO DIFFERENTIAL       EMERGENCY DEPARTMENT COURSE:   :    Vitals:    05/19/21 1519 05/19/21 1615 05/19/21 1845 05/19/21 2030   BP: (!) 121/54 135/60  (!) 141/56   Pulse: 63 63  65   Resp: 16 16  16   Temp:  97.7 °F (36.5 °C)  97.7 °F (36.5 °C)   TempSrc:  Oral  Oral   SpO2: 96% 95%  96%   Weight:   260 lb 12.8 oz (118.3 kg)    Height:   5' 7\" (1.702 m)      Patient was seen history physical exam was performed. The patient was given Rocephin IV here. Patient can be admitted for further care management. I did discuss the case with the patient's son Dr. QUINTANILLA [facility] who recommended consults to infectious disease and urology. I did pass on the hospitalist service. See disposition below    CRITICAL CARE:  None    CONSULTS:  Hospitialist    PROCEDURES:  None    FINAL IMPRESSION      1. Urinary tract infection without hematuria, site unspecified    2. Elevated troponin    3. Pneumonia due to organism          DISPOSITION/PLAN   Admit      PATIENT REFERRED TO:  No follow-up provider specified.     DISCHARGE MEDICATIONS:  Current Discharge Medication List          (Please note that portions of this note were completed with a voice recognitionprogram.  Efforts were made to edit the dictations but

## 2021-05-19 NOTE — H&P
HISTORY & PHYSICAL       Patient:  Charles Melton  YOB: 1931    MRN: [id number]     Acct: [de-identified]    PCP: Christopher Quiroga MD    Date of Admission: 5/19/2021    Date of Service: Pt seen/examined on 5/19/2021 and Admitted to Inpatient with expected LOS greater than two midnights due to medical therapy. ASSESSMENT and PLAN:    Active Hospital Problems    Diagnosis Date Noted    Community acquired bacterial pneumonia [J15.9] 05/19/2021     UTI, recurrent, secondary to chronic Felipe  -With elevated lactic, likely due to dehydration as he does not have any other indications of sepsis  -Noted urine culture from 5/4/2021 with growth of Pseudomonas sensitive to cefepime. Started cefepime while admitted; ID consulted per family request  -Urology consulted for consideration of suprapubic catheter placement    Chronic bladder spasms, chronic Felipe  -Continue home meds; as above    Elevated trops  -Chronic, no active CP; no indications for any further workup at this time    AG, lactic acidosis  -Likely secondary to dehydration; push oral fluids, trend lactic level, improving with hydration    HFpEF  -Noted previous echo with normal LVEF and grade 1 diastolic dysfunction  -Continue home meds, cardiac diet, monitor I/Os    Chronic normocytic anemia  COPD  ROBER  CAD  -Noted per history, continue home meds    Code status: Limited     Discharge planning: pending clinical improvement. Will return to [facility] after discharge. Chief Complaint:  Suspected UTI    History Of Present Illness:      80 y.o. male with a history of CAD, COPD, NIDDM, postthyroidectomy hypothyroidism, HTN, HLD, presents to [facility] on 5/19/2021 for evaluation of suspected UTI. Pt resides at [facility] and has a chronic felipe with a history of multiple UTIs in the past. Per ED chart review, patient was reported to be not himself at the nursing home which led to suspicion of UTI.  Patient reports that he didn't realize he wasn't himself and on tablet Take 20 mg by mouth 2 times daily as needed   Yes Historical Minerva, MD   Arformoterol Tartrate (BROVANA) 15 MCG/2ML NEBU Take 2 mLs by nebulization 2 times daily 1 dose inhale orally two times per day 5/3/21  Yes Dominic Siemens, MD   oxybutynin (DITROPAN) 5 MG tablet TAKE 1 TABLET BY MOUTH EVERY 8 HOURS AS NEEDED FOR BLADDER SPASMS OR BLADDER SPASM 4/30/21  Yes Dominic Siemens, MD   Multiple Vitamins-Minerals (PRESERVISION AREDS 2) CAPS Take 1 capsule by mouth daily 4/9/21  Yes Dominic Siemens, MD   furosemide (LASIX) 40 MG tablet Take 1 tablet by mouth 2 times daily 4/9/21  Yes Dominic Siemens, MD   furosemide (LASIX) 20 MG tablet Take 1 tablet by mouth 2 times daily 4/9/21  Yes Dominic Siemens, MD   ibuprofen (ADVIL;MOTRIN) 800 MG tablet TAKE 1 TABLET BY MOUTH THREE TIMES DAILY WITH MEALS 3/23/21  Yes Dominic Siemens, MD   doxazosin (CARDURA) 4 MG tablet Take 1.5 tablets by mouth nightly  Patient taking differently: Take 6 mg by mouth daily  3/15/21  Yes Arcenio Austin MD   spironolactone (ALDACTONE) 25 MG tablet TAKE 1 TABLET BY MOUTH DAILY 2/15/21  Yes Arcenio Austin MD   Revefenacin 175 MCG/3ML SOLN Inhale 1 nebule into the lungs daily Dx COPD J44.3 11/16/20  Yes Irina Lin PA-C   levothyroxine (SYNTHROID) 200 MCG tablet TAKE 1 TABLET BY MOUTH  DAILY 10/19/20  Yes Dominic Siemens, MD   insulin glargine (LANTUS SOLOSTAR) 100 UNIT/ML injection pen INJECT SUBCUTANEOUSLY 18  UNITS NIGHTLY  Patient taking differently: INJECT SUBCUTANEOUSLY 20  UNITS NIGHTLY 9/29/20  Yes Dominic Siemens, MD   metoprolol tartrate (LOPRESSOR) 25 MG tablet Take 1 tablet by mouth 2 times daily Hold for SBP <110 or HR <60 8/18/20  Yes Dominic Siemens, MD   ferrous gluconate (FERGON) 324 (38 Fe) MG tablet Take 1 tablet by mouth 2 times daily 8/18/20  Yes Dominic Siemens, MD   metFORMIN (GLUCOPHAGE) 1000 MG tablet Take 1 tablet by mouth 2 times daily (with meals) 8/18/20  Yes Dominic Siemens, MD   budesonide (PULMICORT) 0.5 MG/2ML nebulizer suspension USE 1 VIAL VIA NEBULIZER TWICE DAILY. RINSE MOUTH AFTER TREATMENT 8/4/20  Yes Elijah Jones MD   lisinopril (PRINIVIL;ZESTRIL) 2.5 MG tablet Take 1 tablet by mouth daily 7/24/20  Yes Elijah Jones MD   potassium chloride (KLOR-CON M) 10 MEQ extended release tablet TAKE 1 TABLETS BY MOUTH TWICE DAILY 7/24/20  Yes Elijah Jones MD   docusate (COLACE, DULCOLAX) 100 MG CAPS Take 100 mg by mouth 2 times daily 7/24/20  Yes Elijah Jones MD   vitamin D3 (CHOLECALCIFEROL) 10 MCG (400 UNIT) TABS tablet Take 1 tablet by mouth daily 400units 7/24/20  Yes Elijah Jones MD   polyethylene glycol (MIRALAX) 17 g packet Take 17 g by mouth daily as needed for Constipation   Yes Historical Provider, MD   acetaminophen (TYLENOL) 325 MG tablet Take 650 mg by mouth every 4 hours as needed for Pain or Fever  6/26/20  Yes Historical Provider, MD   lidocaine (LMX) 4 % cream Apply topically every 6 hours as needed for Pain To groin 6/26/20  Yes Historical Provider, MD   albuterol sulfate  (90 Base) MCG/ACT inhaler Inhale 2 puffs into the lungs every 6 hours as needed for Wheezing 1/24/20  Yes Sahra Sawant PA-C   OXYGEN Inhale 3 L into the lungs nightly Indications: Difficulty Breathing, Oxygen Therapy And prn through cpap   Yes Historical Provider, MD   blood glucose test strips (ACCU-CHEK MERYL PLUS) strip Accu-check Meryl Plus Test Strips (or brand per pt preference). Sig: test sugar BID. Dx: E11.8 8/20/20   Elijah Jones MD   SOFT TOUCH LANCETS MISC Lancet (or brand per pt preference) Use to check blood sugars 2 times daily. Dx: E11.8 8/20/20   Elijah Jones MD   Alcohol Swabs (ALCOHOL PREP) 70 % PADS Alcohol pads (brand per preference). Sig: use to test sugar twice per day. Dx: E11.8 8/20/20   Elijah Jones MD   Blood Glucose Monitoring Suppl (ACCU-CHEK MERYL PLUS) w/Device KIT Or brand per pt preference. Sig: test sugar BID.  Dx: E11.8 8/20/20   Davi Harrington María Elena Oleary MD   Multiple Vitamins-Minerals (PRESERVISION AREDS 2) CAPS Take 1 capsule by mouth daily 8/3/20   Pedro Bower MD       Allergies:  Latex, Macrobid [nitrofurantoin monohyd macro], Levaquin [levofloxacin], Brimonidine tartrate, Adhesive tape, and Alphagan [brimonidine tartrate]    Social History:      The patient currently lives at [facility]. TOBACCO:   reports that he has never smoked. He has never used smokeless tobacco.  ETOH:   reports current alcohol use of about 1.0 standard drinks of alcohol per week. Family History:      Positive as follows:        Problem Relation Age of Onset    Other Mother         Complications of Childbirth    Other Father [de-identified]        Natural causes       Diet:  DIET CARDIAC; Carb Control: 4 carb choices (60 gms)/meal; Low Sodium (2 GM); Daily Fluid Restriction: 2000 ml    REVIEW OF SYSTEMS:   Pertinent positives as noted in the HPI. All other systems reviewed and negative. Review of Systems - Negative except as noted in HPI        PHYSICAL EXAM:    /60   Pulse 63   Temp 97.7 °F (36.5 °C) (Oral)   Resp 16   Ht 5' 7\" (1.702 m)   Wt 260 lb 12.8 oz (118.3 kg)   SpO2 95%   BMI 40.85 kg/m²     General appearance:  No apparent distress, appears stated age and cooperative. Obese. HEENT:  Normal cephalic, atraumatic without obvious deformity. Pupils equal, round, and reactive to light. Extra ocular muscles intact. Conjunctivae/corneas clear. Neck: Supple, with full range of motion. No jugular venous distention. Trachea midline. Respiratory:  Normal respiratory effort. Clear to auscultation, bilaterally without Rales/Wheezes/Rhonchi. Cardiovascular:  Regular rate and rhythm with normal S1/S2 without murmurs, rubs or gallops. Abdomen: Soft, non-tender, non-distended with normal bowel sounds. Musculoskeletal:  No clubbing, cyanosis or edema bilaterally. Full range of motion without deformity.   Skin: Skin color, texture, turgor normal.  No rashes or lesions. Neurologic:  Neurovascularly intact without any focal sensory/motor deficits. Cranial nerves: II-XII intact, grossly non-focal.  Psychiatric:  Alert and oriented, thought content appropriate, normal insight  Capillary Refill: Brisk,< 3 seconds   Peripheral Pulses: +2 palpable, equal bilaterally       Labs:     Recent Labs     05/19/21  1047   WBC 10.4   HGB 11.1*   HCT 35.4*        Recent Labs     05/19/21  1047      K 4.2   CL 96*   CO2 31   BUN 19   CREATININE 1.0   CALCIUM 8.6     No results for input(s): AST, ALT, BILIDIR, BILITOT, ALKPHOS in the last 72 hours. No results for input(s): INR in the last 72 hours. No results for input(s): Javier Journey in the last 72 hours. Urinalysis:      Lab Results   Component Value Date    NITRU NEGATIVE 05/19/2021    WBCUA 15-25 05/19/2021    BACTERIA MANY 05/19/2021    RBCUA 0-2 05/19/2021    BLOODU NEGATIVE 05/19/2021    SPECGRAV 1.015 12/05/2020    GLUCOSEU NEGATIVE 05/19/2021       Intake & Output:  No intake/output data recorded. No intake/output data recorded. Radiology:       CT HEAD WO CONTRAST   Final Result   No acute intracranial hemorrhage, mass effect or midline shift. **This report has been created using voice recognition software. It may contain minor errors which are inherent in voice recognition technology. **      Final report electronically signed by Dr Mariano Davalos on 5/19/2021 1:05 PM      XR CHEST PORTABLE   Final Result   There are bibasilar opacities which may represent infiltrates or atelectasis. **This report has been created using voice recognition software. It may contain minor errors which are inherent in voice recognition technology. **      Final report electronically signed by Dr Mariano Davalos on 5/19/2021 11:23 AM             DVT prophylaxis: [x] Lovenox                                 [] SCDs                                 [] SQ Heparin [] Encourage ambulation           [] Already on Anticoagulation    Code Status: Limited      PT/OT Eval Status: n/a    Disposition:    [] Home       [] TCU       [] Rehab       [] Psych       [x] SNF       [] [place]       [] Other-    Case discussed with Dr. Joelene Collet. Thank you Pedro Bower MD for the opportunity to be involved in this patient's care.     Electronically signed by Beto Arriaza MD on 5/19/2021 at 7:31 PM

## 2021-05-19 NOTE — ED NOTES
Pt sent in from [facility]. It is reported that pt had some confusion today and has a history of UTI. He has a catheter in place that according to him he has had for a long time. Pt currently alert and oriented. He states he has no idea why they even sent him to the hospital. Upon assessment, pt does have a cough which he states is new. He reports that this cough comes and goes every so often. Pt denies any fever or feeling any pain. Vitals assessed. Urine specimen collected.       Rafia Galo RN  05/19/21 2402

## 2021-05-19 NOTE — TELEPHONE ENCOUNTER
Spoke to Julio Palomo at Dr. Vaibhav Maki office (EXT 6529) and she spoke to Dr. Natalia Alonso and he doesn't think that a VV is appropriate for the patient due to his confusion. He recommends the pt be seen at ED for probable admission and then they can consult him after he is admitted. I spoke to the pts son Dr. Dolores Silva and he is agreeable to the above, would like pt transported to the [facility] ED by ambulance. I called [facility] and spoke to [address] [facility] and she will arrange the transportation through [facility] or [place]. Called [facility] ED and gave report to Lorren Schilder.

## 2021-05-20 ENCOUNTER — APPOINTMENT (OUTPATIENT)
Dept: ULTRASOUND IMAGING | Age: 86
DRG: 699 | End: 2021-05-20
Payer: MEDICARE

## 2021-05-20 LAB
ALBUMIN SERPL-MCNC: 3.4 G/DL (ref 3.5–5.1)
ALP BLD-CCNC: 42 U/L (ref 38–126)
ALT SERPL-CCNC: 8 U/L (ref 11–66)
ANION GAP SERPL CALCULATED.3IONS-SCNC: 9 MEQ/L (ref 8–16)
AST SERPL-CCNC: 13 U/L (ref 5–40)
BASOPHILS # BLD: 0.5 %
BASOPHILS ABSOLUTE: 0.1 THOU/MM3 (ref 0–0.1)
BILIRUB SERPL-MCNC: 0.2 MG/DL (ref 0.3–1.2)
BILIRUBIN DIRECT: < 0.2 MG/DL (ref 0–0.3)
BUN BLDV-MCNC: 15 MG/DL (ref 7–22)
CALCIUM SERPL-MCNC: 8.3 MG/DL (ref 8.5–10.5)
CHLORIDE BLD-SCNC: 99 MEQ/L (ref 98–111)
CO2: 31 MEQ/L (ref 23–33)
CREAT SERPL-MCNC: 0.9 MG/DL (ref 0.4–1.2)
EOSINOPHIL # BLD: 1.8 %
EOSINOPHILS ABSOLUTE: 0.2 THOU/MM3 (ref 0–0.4)
ERYTHROCYTE [DISTWIDTH] IN BLOOD BY AUTOMATED COUNT: 14.1 % (ref 11.5–14.5)
ERYTHROCYTE [DISTWIDTH] IN BLOOD BY AUTOMATED COUNT: 43.5 FL (ref 35–45)
GFR SERPL CREATININE-BSD FRML MDRD: 79 ML/MIN/1.73M2
GLUCOSE BLD-MCNC: 111 MG/DL (ref 70–108)
GLUCOSE BLD-MCNC: 115 MG/DL (ref 70–108)
GLUCOSE BLD-MCNC: 119 MG/DL (ref 70–108)
GLUCOSE BLD-MCNC: 122 MG/DL (ref 70–108)
GLUCOSE BLD-MCNC: 143 MG/DL (ref 70–108)
HCT VFR BLD CALC: 31.5 % (ref 42–52)
HEMOGLOBIN: 10.1 GM/DL (ref 14–18)
IMMATURE GRANS (ABS): 0.06 THOU/MM3 (ref 0–0.07)
IMMATURE GRANULOCYTES: 0.5 %
LYMPHOCYTES # BLD: 15.7 %
LYMPHOCYTES ABSOLUTE: 1.8 THOU/MM3 (ref 1–4.8)
MCH RBC QN AUTO: 27.6 PG (ref 26–33)
MCHC RBC AUTO-ENTMCNC: 32.1 GM/DL (ref 32.2–35.5)
MCV RBC AUTO: 86.1 FL (ref 80–94)
MONOCYTES # BLD: 8.5 %
MONOCYTES ABSOLUTE: 1 THOU/MM3 (ref 0.4–1.3)
NUCLEATED RED BLOOD CELLS: 0 /100 WBC
PLATELET # BLD: 225 THOU/MM3 (ref 130–400)
PMV BLD AUTO: 9.8 FL (ref 9.4–12.4)
POTASSIUM REFLEX MAGNESIUM: 3.8 MEQ/L (ref 3.5–5.2)
RBC # BLD: 3.66 MILL/MM3 (ref 4.7–6.1)
SEG NEUTROPHILS: 73 %
SEGMENTED NEUTROPHILS ABSOLUTE COUNT: 8.2 THOU/MM3 (ref 1.8–7.7)
SODIUM BLD-SCNC: 139 MEQ/L (ref 135–145)
TOTAL PROTEIN: 6.1 G/DL (ref 6.1–8)
WBC # BLD: 11.2 THOU/MM3 (ref 4.8–10.8)

## 2021-05-20 PROCEDURE — 99232 SBSQ HOSP IP/OBS MODERATE 35: CPT | Performed by: FAMILY MEDICINE

## 2021-05-20 PROCEDURE — 2580000003 HC RX 258: Performed by: STUDENT IN AN ORGANIZED HEALTH CARE EDUCATION/TRAINING PROGRAM

## 2021-05-20 PROCEDURE — 6370000000 HC RX 637 (ALT 250 FOR IP): Performed by: STUDENT IN AN ORGANIZED HEALTH CARE EDUCATION/TRAINING PROGRAM

## 2021-05-20 PROCEDURE — 80076 HEPATIC FUNCTION PANEL: CPT

## 2021-05-20 PROCEDURE — 94640 AIRWAY INHALATION TREATMENT: CPT

## 2021-05-20 PROCEDURE — 94760 N-INVAS EAR/PLS OXIMETRY 1: CPT

## 2021-05-20 PROCEDURE — 6360000002 HC RX W HCPCS: Performed by: STUDENT IN AN ORGANIZED HEALTH CARE EDUCATION/TRAINING PROGRAM

## 2021-05-20 PROCEDURE — 99221 1ST HOSP IP/OBS SF/LOW 40: CPT | Performed by: UROLOGY

## 2021-05-20 PROCEDURE — 80048 BASIC METABOLIC PNL TOTAL CA: CPT

## 2021-05-20 PROCEDURE — 85025 COMPLETE CBC W/AUTO DIFF WBC: CPT

## 2021-05-20 PROCEDURE — 1200000003 HC TELEMETRY R&B

## 2021-05-20 PROCEDURE — 36415 COLL VENOUS BLD VENIPUNCTURE: CPT

## 2021-05-20 PROCEDURE — 82948 REAGENT STRIP/BLOOD GLUCOSE: CPT

## 2021-05-20 PROCEDURE — 76870 US EXAM SCROTUM: CPT

## 2021-05-20 RX ORDER — FUROSEMIDE 10 MG/ML
40 INJECTION INTRAMUSCULAR; INTRAVENOUS ONCE
Status: COMPLETED | OUTPATIENT
Start: 2021-05-20 | End: 2021-05-20

## 2021-05-20 RX ADMIN — DOXAZOSIN 6 MG: 4 TABLET ORAL at 08:55

## 2021-05-20 RX ADMIN — Medication 240 MG: at 20:54

## 2021-05-20 RX ADMIN — FUROSEMIDE 40 MG: 10 INJECTION, SOLUTION INTRAVENOUS at 11:06

## 2021-05-20 RX ADMIN — DOCUSATE SODIUM 100 MG: 100 CAPSULE ORAL at 20:54

## 2021-05-20 RX ADMIN — SODIUM CHLORIDE, PRESERVATIVE FREE 10 ML: 5 INJECTION INTRAVENOUS at 20:53

## 2021-05-20 RX ADMIN — FUROSEMIDE 20 MG: 20 TABLET ORAL at 08:55

## 2021-05-20 RX ADMIN — METFORMIN HYDROCHLORIDE 1000 MG: 500 TABLET ORAL at 08:54

## 2021-05-20 RX ADMIN — METFORMIN HYDROCHLORIDE 1000 MG: 500 TABLET ORAL at 16:57

## 2021-05-20 RX ADMIN — CEFEPIME HYDROCHLORIDE 2000 MG: 2 INJECTION, POWDER, FOR SOLUTION INTRAVENOUS at 16:56

## 2021-05-20 RX ADMIN — ARFORMOTEROL TARTRATE 15 MCG: 15 SOLUTION RESPIRATORY (INHALATION) at 20:14

## 2021-05-20 RX ADMIN — DOXAZOSIN 6 MG: 4 TABLET ORAL at 22:38

## 2021-05-20 RX ADMIN — ENOXAPARIN SODIUM 40 MG: 40 INJECTION SUBCUTANEOUS at 16:58

## 2021-05-20 RX ADMIN — TIOTROPIUM BROMIDE INHALATION SPRAY 2 PUFF: 3.12 SPRAY, METERED RESPIRATORY (INHALATION) at 08:06

## 2021-05-20 RX ADMIN — BUDESONIDE 500 MCG: 0.5 INHALANT RESPIRATORY (INHALATION) at 20:15

## 2021-05-20 RX ADMIN — LEVOTHYROXINE SODIUM 200 MCG: 0.1 TABLET ORAL at 06:20

## 2021-05-20 RX ADMIN — CEFEPIME HYDROCHLORIDE 2000 MG: 2 INJECTION, POWDER, FOR SOLUTION INTRAVENOUS at 06:18

## 2021-05-20 RX ADMIN — POTASSIUM CHLORIDE 10 MEQ: 750 TABLET, EXTENDED RELEASE ORAL at 08:58

## 2021-05-20 RX ADMIN — DOCUSATE SODIUM 100 MG: 100 CAPSULE ORAL at 08:58

## 2021-05-20 RX ADMIN — SPIRONOLACTONE 25 MG: 25 TABLET ORAL at 08:54

## 2021-05-20 RX ADMIN — METOPROLOL TARTRATE 25 MG: 25 TABLET, FILM COATED ORAL at 08:55

## 2021-05-20 RX ADMIN — OXYBUTYNIN CHLORIDE 5 MG: 5 TABLET ORAL at 08:54

## 2021-05-20 RX ADMIN — MAGNESIUM HYDROXIDE 30 ML: 2400 SUSPENSION ORAL at 11:06

## 2021-05-20 RX ADMIN — INSULIN GLARGINE 18 UNITS: 100 INJECTION, SOLUTION SUBCUTANEOUS at 20:52

## 2021-05-20 RX ADMIN — LISINOPRIL 2.5 MG: 2.5 TABLET ORAL at 11:06

## 2021-05-20 RX ADMIN — POTASSIUM CHLORIDE 10 MEQ: 750 TABLET, EXTENDED RELEASE ORAL at 20:54

## 2021-05-20 RX ADMIN — BUDESONIDE 500 MCG: 0.5 INHALANT RESPIRATORY (INHALATION) at 08:05

## 2021-05-20 RX ADMIN — ARFORMOTEROL TARTRATE 15 MCG: 15 SOLUTION RESPIRATORY (INHALATION) at 08:05

## 2021-05-20 RX ADMIN — Medication 240 MG: at 08:54

## 2021-05-20 RX ADMIN — FUROSEMIDE 20 MG: 20 TABLET ORAL at 20:53

## 2021-05-20 RX ADMIN — METOPROLOL TARTRATE 25 MG: 25 TABLET, FILM COATED ORAL at 20:54

## 2021-05-20 RX ADMIN — SODIUM CHLORIDE, PRESERVATIVE FREE 10 ML: 5 INJECTION INTRAVENOUS at 08:56

## 2021-05-20 NOTE — CONSULTS
CONSULTATION NOTE :ID       Patient - Romina David,  Age - 80 y.o.    - 1931      Room Number - 6K-06/006-A   N -  [id number]   Acct # - [de-identified]  Date of Admission -  2021 10:19 AM  Patient's PCP: Aletha Schirmer, MD     Requesting Physician: Dickson Escalona MD    REASON FOR CONSULTATION   UTI with confusion  CHIEF COMPLAINT   Confusion with recurrent hx of UTI    HISTORY OF PRESENT ILLNESS       This is a very pleasant 80 y.o. male who was admitted to the hospital with a chief complaints of confusion and hx of recurrent UTI. He has hx of neurogenic bladder ,has chronic felipe. I was notifed from the nursing home to see patient via [address] [place] [address] [address] I was informed the patient was confused and there was concern for UTI. I advised to bring patient for iv antibiotic and further investigation. He has been at the nursing home since the beginning of the year. He has CAD COPD and diabetes. Treated in the past for UTI. He follows with urology. He had  A recent UTI due to pseudomonas. He reports of more cough and congestion.      PAST MEDICAL  HISTORY       Past Medical History:   Diagnosis Date    Anxiety     CAD (coronary artery disease)     leaking valve    Chronic back pain     [facility]) [facility]     Detached retina     Diabetes mellitus (HCC)     NIDDM    DVT (deep venous thrombosis) ([facility])     RLE    DVT (deep venous thrombosis) ([facility] [address] 11/9/15    LLE    Dysphagia     Glaucoma     Hyperlipidemia     Hypertension     Mass of chest     benign chest mass, Dr Sarah Monroe    Movement disorder     spinal stenosis    Obesity     Osteoarthritis     right ankle, right hand    Pneumonia     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    Sleep apnea     on BiPap, Dr Trevino Generous Thyroid cancer [facility])     s/p thyroid resection    Urinary incontinence        PAST SURGICAL HISTORY     Past Surgical History:   Procedure Laterality Date    BACK SURGERY  0942,[id number], 2014    BRONCHOSCOPY N/A 8/29/2017    BRONCHOSCOPY AIRWAY ONLY performed by Debby Soto MD at [address] [place] [address] EKG 12-LEAD  11/10/2015         FRACTURE SURGERY      right hand    OTHER SURGICAL HISTORY      spinal epidurals    RETINAL DETACHMENT SURGERY      SPINE SURGERY  2004    Lumbar laminectomy    THYROIDECTOMY      TURP N/A 3/17/2020    CYSTOSCOPY WITH GREENLIGHT PHOTOVAPORIZATION OF PROSTATE performed by Alieda Gibbons MD at [facility] TURP N/A 3/24/2020    PLASMA BUTTON TURP WITH CLOT EVACUATION performed by Aleida Gibbons MD at [address]:       Scheduled Meds:   Arformoterol Tartrate  15 mcg Nebulization BID    budesonide  0.5 mg Nebulization BID    docusate sodium  100 mg Oral BID    doxazosin  6 mg Oral Nightly    ferrous gluconate  240 mg Oral BID    furosemide  20 mg Oral BID    insulin glargine  18 Units Subcutaneous Nightly    levothyroxine  200 mcg Oral QAM AC    lisinopril  2.5 mg Oral Daily    metFORMIN  1,000 mg Oral BID WC    metoprolol tartrate  25 mg Oral BID    potassium chloride  10 mEq Oral BID    tiotropium  2 puff Inhalation Daily    spironolactone  25 mg Oral Daily    sodium chloride flush  5-40 mL Intravenous 2 times per day    enoxaparin  40 mg Subcutaneous Q24H    cefepime  2,000 mg Intravenous Q12H     Continuous Infusions:   sodium chloride       PRN Meds:oxybutynin, polyethylene glycol, sodium chloride flush, sodium chloride, promethazine **OR** ondansetron, acetaminophen **OR** acetaminophen, magnesium hydroxide  Allergies:   ALLERGIES:    Latex, Macrobid [nitrofurantoin monohyd macro], Levaquin [levofloxacin], Brimonidine tartrate, Adhesive tape, and Alphagan [brimonidine tartrate]        SOCIAL HISTORY:     TOBACCO:   reports that he has never smoked. He has never used smokeless tobacco.     ETOH:   reports current alcohol use of about 1.0 standard drinks of alcohol per week.   Patient currently lives in a nursing home      FAMILY HISTORY:         Problem Relation Age of Onset    Other Mother         Complications of Childbirth    Other Father [de-identified]        Natural causes       REVIEW OF SYSTEMS:     Constitutional: no fever, no night sweats, no fatigue, no weight loss. Head: no head ache , no head injury, no migranes. Eye: no eye discharge, blurring of vision, no double vision,no eye pain. Ears: no hearing difficulty, no tinnitus  Mouth/throat: no ulceration, dental caries , dysphagia, no hoarseness and voice change  Respiratory: no cough no chest pain,no shortness of breath,no wheezing  CVS: no palpitation, no chest pain,   GI: no abdominal pain, no nausea , no vomiting, no constipation,no diarrhea. EDWARD: no dysuria, frequency and urgency, no hematuria, no kidney stones  Musculoskeletal: no joint pain, swelling , stiffness,  Endocrine: no polyuria, polydipsia, no cold or heat intolerance  Hematology: no anemia, no easy brusing or bleeding, no hx of clotting disorder  Dermatology: no skin rash, no skin lesions, no pruritis,  Neurological:no headaches,no dizziness, no seizure, no numbness. Psychiatry: no depression, no anxiety,no panic attacks, no suicide ideation    PHYSICAL EXAM:     BP (!) 137/51   Pulse 65   Temp 98.3 °F (36.8 °C) (Oral)   Resp 18   Ht 5' 7\" (1.702 m)   Wt 260 lb 12.8 oz (118.3 kg)   SpO2 94%   BMI 40.85 kg/m²   General apperance:  Awake, alert,obese  HEENT: pink conjunctiva, unicteric sclera, moist oral mucosa. Chest: bilateral air entry, diminished breath sound, few rhonchi  Cardiovascular:  RRR ,S1S2, no murmur or gallop. Abdomen:  Soft, non tender to palpation. Extremities: +edema  EDWARD; he has hydrocele, buried penis, felipe catheter in place  Skin:  Warm and dry.   CNS:awake and orietned        LABS:     CBC:   Recent Labs     05/19/21  1047 05/20/21  0439   WBC 10.4 11.2*   HGB 11.1* 10.1*    225     BMP:    Recent Labs     05/19/21  1047 05/20/21  0439    139   K 4.2 3.8   CL 96* 99   CO2 31 31   BUN 19 15   CREATININE 1.0 0.9   GLUCOSE 133* 115*     Calcium:  Recent Labs     05/20/21  0439   CALCIUM 8.3*     Ionized Calcium:No results for input(s): IONCA in the last 72 hours. Magnesium:  Recent Labs     05/19/21  1122   MG 2.1     Phosphorus:No results for input(s): PHOS in the last 72 hours. BNP:No results for input(s): BNP in the last 72 hours.   Glucose:  Recent Labs     05/19/21 2015 05/20/21  0626   POCGLU 153* 111*      UA:   Recent Labs     05/19/21  1020   PHUR 6.5   COLORU YELLOW   PROTEINU NEGATIVE   BLOODU NEGATIVE   RBCUA 0-2   WBCUA 15-25   BACTERIA MANY   NITRU NEGATIVE   GLUCOSEU NEGATIVE   BILIRUBINUR NEGATIVE   UROBILINOGEN 1.0   KETUA NEGATIVE         IMAGING:    Micro:   Lab Results   Component Value Date    BC No growth-preliminary  05/19/2021       Problem list of patient      Patient Active Problem List   Diagnosis Code    Diabetes mellitus ([facility] [address] E11.9    Hyperlipidemia E78.5    Acquired hypothyroidism E03.9    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 E29.1    Breast lump N63.0    BPH (benign prostatic hyperplasia) N40.0    ROBER (obstructive sleep apnea) G47.33    Essential hypertension I10    History of DVT (deep vein thrombosis) Z86.718    Shortness of breath R06.02    Chronic respiratory failure with hypoxia ([facility]) J96.11    Mixed hyperlipidemia E78.2    Chronic deep vein thrombosis (DVT) of right lower extremity ([facility]) I82.501    Anticoagulated on Coumadin Z79.01    Type 2 diabetes mellitus with complication, with long-term current use of insulin ([facility]) E11.8, Z79.4    Muscle weakness M62.81    Chronic bronchitis ([facility]) J42    Chronic obstructive pulmonary disease ([facility]) J44.9    Paroxysmal atrial fibrillation ([facility]) I48.0    Acute diastolic congestive heart failure ([facility]) I50.31    Paroxysmal atrial flutter ([facility]) I48.92    Urinary incontinence R32    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    Senile nuclear sclerosis H25.10    Puckering of macula, 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    Presence of intraocular lens Z96.1    Primary open-angle glaucoma 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    Chronic diastolic congestive heart failure ([facility]) I50.32    Morbid obesity ([facility]) E66.01    Urinary retention R33.9    Urine retention R33.9    Abdominal pain R10.9    Altered mental status R41.82    Chronic indwelling Felipe catheter I03.0    Complicated UTI (urinary tract infection) N39.0    Hypokalemia E87.6    Weakness of left lower extremity R29.898    Pill dysphagia R13.10    History of thyroid cancer Z85.850    Gross hematuria R31.0    Constipation K59.00    Acute urinary retention R33.8    COPD exacerbation ([facility]) J44.1    Acute on chronic respiratory failure with hypoxia and hypercapnia ([facility]) J96.21, J96.22    Acute on chronic diastolic congestive heart failure ([facility]) I50.33    Bilateral leg edema +2 now +1 R60.0    Community acquired bacterial pneumonia J15.9    Elevated troponin R77.8           Impression and Recommendation:   Recurrent UTI related to chronic felipe catheter. Will continue iv cefepime. Follow urine cx report  COPD with exacerbation. His CXR shows basal infiltrates/atlelectasis  BPH  Non obstructing kidney stone  CAD   Thank you Judith Clark MD for allowing me to participate in this patient's care.     Fidelina Ceballos MD, MD,FACP 5/20/2021 8:48 AM

## 2021-05-20 NOTE — CARE COORDINATION
DISCHARGE/PLANNING EVALUATION  5/20/21, 12:38 PM EDT    Reason for Referral: pt if from [address] Status: Patient is alert and oriented. Decision Making: Patient is making own decisions, son is POA Dr. Misa Valentine. Family/Social/Home Environment: Assessment completed with Patient. Patient has been at [facility] for the past 2 weeks and did state that he really doesn't feel as if he fits there but has no choice but to return. Patient stated that his son does assist him with decisions. Patient stated that he is using a walker at this time more for safety then needing to use it. Patient is provided meals at AL and medication assistance. Current Services including food security, transportation and housekeeping: assisted by family and AL  Current Equipment: walker  Payment Source: Medicare, [employer]  Concerns or Barriers to Discharge: not at this time  Post acute provider list with quality measures, geographic area and applicable managed care information provided. Questions regarding selection process answered: from AL at this time and planning to return. Teach Back Method used with Patient regarding care plan and discharge plan. Patient and son verbalize understanding of the plan of care and contribute to goal setting. Patient goals, treatment preferences and discharge plan: Patient return to [address] [place] [address] at discharge.      Electronically signed by EROS Payne, ZEKE on 5/20/2021 at 12:38 PM

## 2021-05-20 NOTE — CONSULTS
[address] [facility] [address]  [address] [facility] [address]  Dept: [phone]  Loc: [phone]  Visit Date: 5/19/2021    Urology Consult Note    Reason for Consult:  Consideration of insertion of suprapubic catheter  Requesting Physician:  Dr Fer Higgins    History Obtained From:  Patient, EMR    Chief Complaint: AMS    HISTORY OF PRESENT ILLNESS:                The patient is a 80 y.o. male with significant past medical history of see below who presented to ER for evaluation of AMS, reported confusion at [facility]. At ER he states he does not feel confused. He denied fevers, cough, SOB, urinary frequency, dysuria or any other   He is known patient with our practice, he follows with Dr Kelle Mejia. We were contacted on 5/18 regarding his increased confusion and urine is foul smelling with sediment. Instructions were given to exchange felipe catheter and consult ID for infection management. Ua moderate leuk this admission, awaiting Ux  Previous Ux   Organism Abnormal  05/04/2021  3:22 AM  - [facility]   Pseudomonas aeruginosa      Susceptibility    Pseudomonas aeruginosa (1)    Antibiotic Interpretation AKHIL Status   cefepime Sensitive 2 mcg/mL Final   piperacillin-tazobactam Sensitive 8 mcg/mL Final   gentamicin Sensitive <=1 mcg/mL Final   ciprofloxacin Sensitive <=0.25 mcg/mL Final   tobramycin Sensitive <=1 mcg/mL Final     Ux on 5/11 + for enterobacter and entercoccus   On Cefepime while inpatient, also sensitive to Cefepime    From Dr Kin Watters note on 10/2/21  Recurrent uti- secondary to inconsistent catheter changes. Will treat symptomatic infections  Catheter needs changed monthly in facility ([address]) or with visiting nurse  No epididymitis  Follow up in six months. Did discuss SPT.  Will hold off on this for now.     Past Medical History:        Diagnosis Date    Anxiety     CAD (coronary artery disease)     leaking valve    Chronic back pain     COPD (chronic obstructive [facility] disease) [facility] [address]     Detached retina     Diabetes mellitus (HCC)     NIDDM    [facility] thrombosis) ([facility])     RLE    DVT (deep venous thrombosis) ([facility] [address] 11/9/15    LLE    Dysphagia     Glaucoma     Hyperlipidemia     Hypertension     Mass of chest     benign chest mass, Dr Tomi Damian disorder     spinal stenosis    Obesity     Osteoarthritis     right ankle, right hand    Pneumonia     Primary thyroid follicular carcinoma 9/0/2551    Sleep apnea 1993    on BiPap, Dr Thelma Samayoa Thyroid cancer [facility])     s/p thyroid resection    Urinary incontinence      Past Surgical History:        Procedure Laterality Date   [facility] SURGERY  2006,2011, 2014    BRONCHOSCOPY N/A 8/29/2017    BRONCHOSCOPY AIRWAY ONLY performed by Octavio Corrales MD at [address] [place] [address] EKG 12-LEAD  11/10/2015         FRACTURE SURGERY      right hand    OTHER SURGICAL HISTORY      spinal epidurals    RETINAL DETACHMENT SURGERY      SPINE SURGERY  2004    Lumbar laminectomy    THYROIDECTOMY      TURP N/A 3/17/2020    CYSTOSCOPY WITH GREENLIGHT PHOTOVAPORIZATION OF PROSTATE performed by Tommy Sol MD at [facility] [address] TURP N/A 3/24/2020    PLASMA BUTTON TURP WITH CLOT EVACUATION performed by Tommy Sol MD at [place] [facility]     Allergies:  Latex, Macrobid [nitrofurantoin monohyd macro], Levaquin [levofloxacin], Brimonidine tartrate, Adhesive tape, and Alphagan [brimonidine tartrate]  Social History:  Social History     Socioeconomic History    Marital status:      Spouse name: Not on file    Number of children: 2    Years of education: Not on file    Highest education level: Not on file   Occupational History    Not on file   Tobacco Use    Smoking status: Never Smoker    Smokeless tobacco: Never Used   Vaping Use    Vaping Use: Never used   Substance and Sexual Activity    Alcohol use:  Yes     Alcohol/week: 1.0 standard drinks     Types: 1 Glasses of wine per week     Comment: Glass of wine with dinner.  Drug use: No    Sexual activity: Never   Other Topics Concern    Not on file   Social History Narrative    Not on file     Social Determinants of Health     Financial Resource Strain:     Difficulty of Paying Living Expenses:    Food Insecurity:     Worried About Running Out of Food in the Last Year:     [address] in the Last Year:    Transportation Needs:     Lack of Transportation (Medical):  Lack of Transportation (Non-Medical):    Physical Activity:     Days of Exercise per Week:     Minutes of Exercise per Session:    Stress:     Feeling of Stress :    Social Connections:     Frequency of Communication with Friends and Family:     Frequency of Social Gatherings with Friends and Family:     Attends [employer] Services:     Active Member of Clubs or Organizations:     Attends Club or Organization Meetings:     Marital Status:    Intimate Partner Violence:     Fear of Current or Ex-Partner:     Emotionally Abused:     Physically Abused:     Sexually Abused:      Family History:       Problem Relation Age of Onset    Other Mother         Complications of Childbirth    Other Father [de-identified]        Natural causes       ROS:  Constitutional: Negative for chills, fatigue, fever, or weight loss. Eyes: Denies reported visual changes. ENT: Denies headache, difficulty swallowing, earache, and nosebleeds. Cardiovascular: Negative for chest pain, palpitations, tachycardia or edema. Respiratory: Denies cough or SOB. GI:The patient denies abdominal or flank pain, anorexia, nausea or vomiting. : see HPI. Musculoskeletal: Patient denies low back pain or painful or reduced range of ROM. Neurological: The patient denies any symptoms of neurological impairment or TIA. Psychiatric: Denies anxiety or depression. Skin: Denies rash or lesions. All remaining ROS negative.     PHYSICAL EXAM:  VITALS:  BP (!) 126/59   Pulse 68   Temp 98.2 °F (36.8 °C) (Oral)   Resp 17 Ht 5' 7\" (1.702 m)   Wt 260 lb 12.8 oz (118.3 kg)   SpO2 93%   BMI 40.85 kg/m² . Nursing note and vitals reviewed. Constitutional:, no acute distress, and cooperative to examination with appropriate mood and affect. HEENT:   Head:         Normocephalic and atraumatic. Mouth/Throat:          Mucous membranes are normal.   Eyes:         EOM are normal. No scleral icterus. Nose:    The external appearance of the nose is normal  Ears: The ears appear normal to external inspection. Cardiovascular:       Normal rate, regular rhythm. Pulmonary/Chest:  Normal respiratory rate and rhthym. No use of accessory muscles. Lungs clear bilaterally. Abdominal:          distended No tenderness. hypoactive bowel sounds             Genitalia:   circumcised penis  Urethral meatus is normal in size and location, some thick puss like drainage around meatus  Slightly swollen scrotum, some tenderness to palpation  Normal testes palpated bilaterally  Musculoskeletal:    Normal range of motion. He exhibits no edema or tenderness of lower extremities. Extremities:    No cyanosis, clubbing, or edema present. Neurological:    Alert and oriented.      DATA:  CBC:   Lab Results   Component Value Date    WBC 11.2 05/20/2021    RBC 3.66 05/20/2021    HGB 10.1 05/20/2021    HCT 31.5 05/20/2021    MCV 86.1 05/20/2021    MCH 27.6 05/20/2021    MCHC 32.1 05/20/2021    RDW 14.6 05/02/2018     05/20/2021    MPV 9.8 05/20/2021     BMP:    Lab Results   Component Value Date     05/20/2021    K 3.8 05/20/2021    CL 99 05/20/2021    CO2 31 05/20/2021    BUN 15 05/20/2021    CREATININE 0.9 05/20/2021    CALCIUM 8.3 05/20/2021    LABGLOM 79 05/20/2021    GLUCOSE 115 05/20/2021    GLUCOSE 130 11/30/2011     BUN/Creatinine:    Lab Results   Component Value Date    BUN 15 05/20/2021    CREATININE 0.9 05/20/2021     Magnesium:    Lab Results   Component Value Date    MG 2.1 05/19/2021     Phosphorus:    Lab Results   Component Value Date    PHOS 3.4 03/14/2019     PT/INR:    Lab Results   Component Value Date    INR 0.97 03/22/2021     U/A:    Lab Results   Component Value Date    NITRITE negative 01/31/2020    COLORU YELLOW 05/19/2021    PHUR 6.5 05/19/2021    LABCAST 4-8 HYALINE 12/05/2020    LABCAST NONE SEEN 12/05/2020    WBCUA 15-25 05/19/2021    RBCUA 0-2 05/19/2021    MUCUS NONE SEEN 03/22/2021    YEAST NONE SEEN 05/19/2021    BACTERIA MANY 05/19/2021    CLARITYU clear 01/31/2020    SPECGRAV 1.015 12/05/2020    LEUKOCYTESUR MODERATE 05/19/2021    UROBILINOGEN 1.0 05/19/2021    BILIRUBINUR NEGATIVE 05/19/2021    BILIRUBINUR negative 01/31/2020    BLOODU NEGATIVE 05/19/2021    GLUCOSEU NEGATIVE 05/19/2021    AMORPHOUS NONE SEEN 03/22/2021     CT abd/pelvis from 5/4/21 shows   Impression   1.  Felipe catheter balloon in the region of the bulbous urethra and can be    repositioned. 2.  Enlarged prostate. 3.  Nonobstructing left renal stone. 4.  Colonic diverticula. IMPRESSION:  UTI - ESBL infection  Enlarged prostate  nonobstructing left renal stone  Neurogenic bladder - chronic felipe  AMS - CT head unremarkable    Plan:    Ordered scrotal US  Elevate scrotum - can sling or use rolled up towel   Continue Cefepime  Felipe catheter was exchanged on 5/18 per Dr Olga Burkitt note  We have follow up scheduled 6/4/21 with Dr Tammy Rodriguez - will discuss SPT options  Urology not planning any surgical interventions at this time, ID is managing infection.   Infection will need treated prior to surgery    Thank you for including us in the care of MAEGAN Coreas - CNP, MAEGAN  05/20/21 7:37 AM  Urology

## 2021-05-20 NOTE — PROGRESS NOTES
PROGRESS NOTE      Patient:  Magali Cho      Unit/Bed:6K-06/006-A    YOB: 1931    MRN: [id number]       Acct: [de-identified]     PCP: Jones Reed MD    Date of Admission: 5/19/2021      Assessment/Plan:    Active Hospital Problems    Diagnosis Date Noted    Community acquired bacterial pneumonia [J15.9] 05/19/2021    Elevated troponin [G14.8]     Complicated UTI (urinary tract infection) [N39.0]     Chronic diastolic congestive heart failure ([address] [I50.32] 10/03/2019       UTI, recurrent, secondary to chronic Billy  -With elevated lactic, likely due to dehydration as he does not have any other indications of sepsis  -Noted urine culture from 5/4/2021 with growth of Pseudomonas sensitive to cefepime. Started cefepime while admitted; ID consulted per family request  -Urology consulted for consideration of suprapubic catheter placement  -5/20: Mild leukocytosis. CCM. ID following. Scrotal edema  -Likely dependent edema. Lasix ordered as he has excellent kidney function  -Billy in place  -Elevate scrotum  -Urology to see     Chronic bladder spasms, chronic Billy  -Continue home meds; as above     Elevated trops  -Chronic, no active CP; no indications for any further workup at this time     AG, lactic acidosis  -Likely secondary to dehydration; push oral fluids, trend lactic level, improving with hydration  -5/20: gap has closed. Lactic trending down.      HFpEF  -Noted previous echo with normal LVEF and grade 1 diastolic dysfunction  -Continue home meds, cardiac diet, monitor I/Os  -5/20: noted LE edema, will give one dose of IV lasix and start compression stockings    COPD without acute exacerbation  -On room air, this is his baseline  -Noted CXR on admission with bibasilar opacities; suspect this is d/t third spacing of fluid as patient is intravascularly depleted but is retaining fluid in scrotum and LEs.   -IS and will add on IV diuresis as above and monitor improvement. Hypocalcemia  -Will check albumin level to assess if true hypocalcemia. If normal albumin level, will check Ical and replete as indicated. Constipation  -PRNs ordered, asked nurse to give     Chronic normocytic anemia  ROBER  CAD  -Noted per history, continue home meds     Code status: Limited      Discharge planning: pending clinical improvement. Will return to [facility] after discharge.     Chief Complaint:  Suspected UTI     History Of Present Illness:       80 y.o. male with a history of CAD, COPD, NIDDM, postthyroidectomy hypothyroidism, HTN, HLD, presents to [facility] on 5/19/2021 for evaluation of suspected UTI. Pt resides at [facility] and has a chronic felipe with a history of multiple UTIs in the past. Per ED chart review, patient was reported to be not himself at the nursing home which led to suspicion of UTI. Patient reports that he didn't realize he wasn't himself and on admission denies any SOB, abdominal pain, nausea, vomiting, bladder pain, hematuria, flank pain, CP, fever, or chills.      In the ED, pt had an unremarkable CT head. UA positive for LEs. Was given a dose of rocephin. Subjective: Seen during morning rounds. Appears comfortable. Reports discomfort in testicles and some pain in ankles bilaterally. States he is tolerating diet well. Reports constipation. Denies any other complaints, no fevers, chills, nausea, vomiting, abdominal pain.        Medications:  Reviewed    Infusion Medications    sodium chloride       Scheduled Medications    furosemide  40 mg Intravenous Once    Arformoterol Tartrate  15 mcg Nebulization BID    budesonide  0.5 mg Nebulization BID    docusate sodium  100 mg Oral BID    doxazosin  6 mg Oral Nightly    ferrous gluconate  240 mg Oral BID    furosemide  20 mg Oral BID    insulin glargine  18 Units Subcutaneous Nightly    levothyroxine  200 mcg Oral QAM AC    lisinopril  2.5 mg Oral Daily    metFORMIN  1,000 mg Oral BID WC    metoprolol tartrate  25 mg Oral BID    potassium chloride  10 mEq Oral BID    tiotropium  2 puff Inhalation Daily    spironolactone  25 mg Oral Daily    sodium chloride flush  5-40 mL Intravenous 2 times per day    enoxaparin  40 mg Subcutaneous Q24H    cefepime  2,000 mg Intravenous Q12H     PRN Meds: oxybutynin, polyethylene glycol, sodium chloride flush, sodium chloride, promethazine **OR** ondansetron, acetaminophen **OR** acetaminophen, magnesium hydroxide      Intake/Output Summary (Last 24 hours) at 5/20/2021 4815  Last data filed at 5/20/2021 0358  Gross per 24 hour   Intake 0 ml   Output 925 ml   Net -925 ml       Diet:  DIET CARDIAC; Carb Control: 4 carb choices (60 gms)/meal; Low Sodium (2 GM); Daily Fluid Restriction: 2000 ml    Exam:  BP (!) 137/51   Pulse 65   Temp 98.3 °F (36.8 °C) (Oral)   Resp 18   Ht 5' 7\" (1.702 m)   Wt 260 lb 12.8 oz (118.3 kg)   SpO2 94%   BMI 40.85 kg/m²     General appearance:  No apparent distress, appears stated age and cooperative. Obese. HEENT:  Normal cephalic, atraumatic without obvious deformity. Pupils equal, round, and reactive to light. Extra ocular muscles intact. Conjunctivae/corneas clear. Neck: Supple, with full range of motion. No jugular venous distention. Trachea midline. Respiratory:  Normal respiratory effort. Clear to auscultation, bilaterally without Rales/Wheezes/Rhonchi. Cardiovascular:  Regular rate and rhythm with normal S1/S2 without murmurs, rubs or gallops. Abdomen: Soft, non-tender, non-distended with normal bowel sounds. Musculoskeletal:  No clubbing or cyanosis bilaterally. Full range of motion without deformity. Extremities: 1+ bilateral LE edema extending to level of mid-calf bilaterally. : Scrotal edema is present. Billy in place. Skin: Skin color, texture, turgor normal.  No rashes or lesions. Neurologic:  Neurovascularly intact without any focal sensory/motor deficits.  Cranial nerves: II-XII intact, grossly non-focal.  Psychiatric:  Alert and oriented, thought content appropriate, normal insight. Memory appears poor. Capillary Refill: Brisk,< 3 seconds   Peripheral Pulses: +2 palpable, equal bilaterally       Labs:   Recent Labs     05/19/21  1047 05/20/21  0439   WBC 10.4 11.2*   HGB 11.1* 10.1*   HCT 35.4* 31.5*    225     Recent Labs     05/19/21  1047 05/20/21  0439    139   K 4.2 3.8   CL 96* 99   CO2 31 31   BUN 19 15   CREATININE 1.0 0.9   CALCIUM 8.6 8.3*     No results for input(s): AST, ALT, BILIDIR, BILITOT, ALKPHOS in the last 72 hours. No results for input(s): INR in the last 72 hours. No results for input(s): Rejeana Kemps in the last 72 hours. Urinalysis:      Lab Results   Component Value Date    NITRU NEGATIVE 05/19/2021    WBCUA 15-25 05/19/2021    BACTERIA MANY 05/19/2021    RBCUA 0-2 05/19/2021    BLOODU NEGATIVE 05/19/2021    SPECGRAV 1.015 12/05/2020    GLUCOSEU NEGATIVE 05/19/2021       Radiology:  CT HEAD WO CONTRAST   Final Result   No acute intracranial hemorrhage, mass effect or midline shift. **This report has been created using voice recognition software. It may contain minor errors which are inherent in voice recognition technology. **      Final report electronically signed by Dr Alirio Sanchez on 5/19/2021 1:05 PM      XR CHEST PORTABLE   Final Result   There are bibasilar opacities which may represent infiltrates or atelectasis. **This report has been created using voice recognition software. It may contain minor errors which are inherent in voice recognition technology. **      Final report electronically signed by Dr Alirio Sanchez on 5/19/2021 11:23 AM          Diet: DIET CARDIAC; Carb Control: 4 carb choices (60 gms)/meal; Low Sodium (2 GM);  Daily Fluid Restriction: 2000 ml    PT/OT Eval Status: consulted    DVT prophylaxis: [x] Lovenox                                 [] SCDs                                 [] SQ Heparin                                 [] Encourage ambulation           [] Already on Anticoagulation     Disposition:    [] Home       [] TCU       [] Rehab       [] Psych       [x] SNF       [] [place]       [] Other-    Case discussed with Dr. Tristian Escamilla    Electronically signed by Sally Valenzuela MD on 5/20/2021 at 9:27 AM

## 2021-05-21 LAB
ANION GAP SERPL CALCULATED.3IONS-SCNC: 10 MEQ/L (ref 8–16)
BASOPHILS # BLD: 0.7 %
BASOPHILS ABSOLUTE: 0.1 THOU/MM3 (ref 0–0.1)
BUN BLDV-MCNC: 12 MG/DL (ref 7–22)
CALCIUM SERPL-MCNC: 8.4 MG/DL (ref 8.5–10.5)
CHLORIDE BLD-SCNC: 98 MEQ/L (ref 98–111)
CO2: 25 MEQ/L (ref 23–33)
CREAT SERPL-MCNC: 0.7 MG/DL (ref 0.4–1.2)
EOSINOPHIL # BLD: 2.4 %
EOSINOPHILS ABSOLUTE: 0.2 THOU/MM3 (ref 0–0.4)
ERYTHROCYTE [DISTWIDTH] IN BLOOD BY AUTOMATED COUNT: 14 % (ref 11.5–14.5)
ERYTHROCYTE [DISTWIDTH] IN BLOOD BY AUTOMATED COUNT: 45 FL (ref 35–45)
GFR SERPL CREATININE-BSD FRML MDRD: > 90 ML/MIN/1.73M2
GLUCOSE BLD-MCNC: 107 MG/DL (ref 70–108)
GLUCOSE BLD-MCNC: 119 MG/DL (ref 70–108)
GLUCOSE BLD-MCNC: 130 MG/DL (ref 70–108)
GLUCOSE BLD-MCNC: 134 MG/DL (ref 70–108)
GLUCOSE BLD-MCNC: 141 MG/DL (ref 70–108)
HCT VFR BLD CALC: 35.7 % (ref 42–52)
HEMOGLOBIN: 10.9 GM/DL (ref 14–18)
IMMATURE GRANS (ABS): 0.05 THOU/MM3 (ref 0–0.07)
IMMATURE GRANULOCYTES: 0.6 %
LYMPHOCYTES # BLD: 24.6 %
LYMPHOCYTES ABSOLUTE: 2.2 THOU/MM3 (ref 1–4.8)
MCH RBC QN AUTO: 27.2 PG (ref 26–33)
MCHC RBC AUTO-ENTMCNC: 30.5 GM/DL (ref 32.2–35.5)
MCV RBC AUTO: 89 FL (ref 80–94)
MONOCYTES # BLD: 9.5 %
MONOCYTES ABSOLUTE: 0.8 THOU/MM3 (ref 0.4–1.3)
NUCLEATED RED BLOOD CELLS: 0 /100 WBC
PLATELET # BLD: 208 THOU/MM3 (ref 130–400)
PMV BLD AUTO: 9.8 FL (ref 9.4–12.4)
POTASSIUM SERPL-SCNC: 3.7 MEQ/L (ref 3.5–5.2)
RBC # BLD: 4.01 MILL/MM3 (ref 4.7–6.1)
SEG NEUTROPHILS: 62.2 %
SEGMENTED NEUTROPHILS ABSOLUTE COUNT: 5.5 THOU/MM3 (ref 1.8–7.7)
SODIUM BLD-SCNC: 133 MEQ/L (ref 135–145)
WBC # BLD: 8.8 THOU/MM3 (ref 4.8–10.8)

## 2021-05-21 PROCEDURE — 6370000000 HC RX 637 (ALT 250 FOR IP): Performed by: STUDENT IN AN ORGANIZED HEALTH CARE EDUCATION/TRAINING PROGRAM

## 2021-05-21 PROCEDURE — 1200000003 HC TELEMETRY R&B

## 2021-05-21 PROCEDURE — 85025 COMPLETE CBC W/AUTO DIFF WBC: CPT

## 2021-05-21 PROCEDURE — 97116 GAIT TRAINING THERAPY: CPT

## 2021-05-21 PROCEDURE — 97166 OT EVAL MOD COMPLEX 45 MIN: CPT

## 2021-05-21 PROCEDURE — 94640 AIRWAY INHALATION TREATMENT: CPT

## 2021-05-21 PROCEDURE — 99233 SBSQ HOSP IP/OBS HIGH 50: CPT | Performed by: HOSPITALIST

## 2021-05-21 PROCEDURE — 99232 SBSQ HOSP IP/OBS MODERATE 35: CPT | Performed by: UROLOGY

## 2021-05-21 PROCEDURE — 6360000002 HC RX W HCPCS: Performed by: STUDENT IN AN ORGANIZED HEALTH CARE EDUCATION/TRAINING PROGRAM

## 2021-05-21 PROCEDURE — 80048 BASIC METABOLIC PNL TOTAL CA: CPT

## 2021-05-21 PROCEDURE — 97535 SELF CARE MNGMENT TRAINING: CPT

## 2021-05-21 PROCEDURE — 94760 N-INVAS EAR/PLS OXIMETRY 1: CPT

## 2021-05-21 PROCEDURE — 2580000003 HC RX 258: Performed by: STUDENT IN AN ORGANIZED HEALTH CARE EDUCATION/TRAINING PROGRAM

## 2021-05-21 PROCEDURE — 36415 COLL VENOUS BLD VENIPUNCTURE: CPT

## 2021-05-21 PROCEDURE — 97162 PT EVAL MOD COMPLEX 30 MIN: CPT

## 2021-05-21 PROCEDURE — 82948 REAGENT STRIP/BLOOD GLUCOSE: CPT

## 2021-05-21 RX ADMIN — DOXAZOSIN 6 MG: 4 TABLET ORAL at 20:47

## 2021-05-21 RX ADMIN — INSULIN GLARGINE 18 UNITS: 100 INJECTION, SOLUTION SUBCUTANEOUS at 20:47

## 2021-05-21 RX ADMIN — SPIRONOLACTONE 25 MG: 25 TABLET ORAL at 09:19

## 2021-05-21 RX ADMIN — BUDESONIDE 500 MCG: 0.5 INHALANT RESPIRATORY (INHALATION) at 07:41

## 2021-05-21 RX ADMIN — LISINOPRIL 2.5 MG: 2.5 TABLET ORAL at 09:21

## 2021-05-21 RX ADMIN — FUROSEMIDE 60 MG: 40 TABLET ORAL at 20:47

## 2021-05-21 RX ADMIN — BUDESONIDE 500 MCG: 0.5 INHALANT RESPIRATORY (INHALATION) at 17:41

## 2021-05-21 RX ADMIN — METFORMIN HYDROCHLORIDE 1000 MG: 500 TABLET ORAL at 09:21

## 2021-05-21 RX ADMIN — SODIUM CHLORIDE, PRESERVATIVE FREE 10 ML: 5 INJECTION INTRAVENOUS at 22:30

## 2021-05-21 RX ADMIN — ARFORMOTEROL TARTRATE 15 MCG: 15 SOLUTION RESPIRATORY (INHALATION) at 07:41

## 2021-05-21 RX ADMIN — Medication 240 MG: at 09:21

## 2021-05-21 RX ADMIN — POTASSIUM CHLORIDE 10 MEQ: 750 TABLET, EXTENDED RELEASE ORAL at 09:19

## 2021-05-21 RX ADMIN — METFORMIN HYDROCHLORIDE 1000 MG: 500 TABLET ORAL at 17:09

## 2021-05-21 RX ADMIN — ARFORMOTEROL TARTRATE 15 MCG: 15 SOLUTION RESPIRATORY (INHALATION) at 17:36

## 2021-05-21 RX ADMIN — LEVOTHYROXINE SODIUM 200 MCG: 0.1 TABLET ORAL at 06:31

## 2021-05-21 RX ADMIN — FUROSEMIDE 20 MG: 20 TABLET ORAL at 09:21

## 2021-05-21 RX ADMIN — DOCUSATE SODIUM 100 MG: 100 CAPSULE ORAL at 20:47

## 2021-05-21 RX ADMIN — ENOXAPARIN SODIUM 40 MG: 40 INJECTION SUBCUTANEOUS at 17:13

## 2021-05-21 RX ADMIN — SODIUM CHLORIDE, PRESERVATIVE FREE 10 ML: 5 INJECTION INTRAVENOUS at 09:22

## 2021-05-21 RX ADMIN — POTASSIUM CHLORIDE 10 MEQ: 750 TABLET, EXTENDED RELEASE ORAL at 20:47

## 2021-05-21 RX ADMIN — CEFEPIME HYDROCHLORIDE 2000 MG: 2 INJECTION, POWDER, FOR SOLUTION INTRAVENOUS at 17:09

## 2021-05-21 RX ADMIN — DOCUSATE SODIUM 100 MG: 100 CAPSULE ORAL at 09:19

## 2021-05-21 RX ADMIN — OXYBUTYNIN CHLORIDE 5 MG: 5 TABLET ORAL at 09:19

## 2021-05-21 RX ADMIN — TIOTROPIUM BROMIDE INHALATION SPRAY 2 PUFF: 3.12 SPRAY, METERED RESPIRATORY (INHALATION) at 07:40

## 2021-05-21 RX ADMIN — CEFEPIME HYDROCHLORIDE 2000 MG: 2 INJECTION, POWDER, FOR SOLUTION INTRAVENOUS at 06:31

## 2021-05-21 RX ADMIN — Medication 240 MG: at 20:47

## 2021-05-21 RX ADMIN — METOPROLOL TARTRATE 25 MG: 25 TABLET, FILM COATED ORAL at 20:47

## 2021-05-21 NOTE — PROGRESS NOTES
(Non-Medical):    Physical Activity:     Days of Exercise per Week:     Minutes of Exercise per Session:    Stress:     Feeling of Stress :    Social Connections:     Frequency of Communication with Friends and Family:     Frequency of Social Gatherings with Friends and Family:     Attends Restorationist Services:     Active Member of Clubs or Organizations:     Attends Club or Organization Meetings:     Marital Status:    Intimate Partner Violence:     Fear of Current or Ex-Partner:     Emotionally Abused:     Physically Abused:     Sexually Abused:      Family History   Problem Relation Age of Onset    Other Mother         Complications of Childbirth    Other Father [de-identified]        Natural causes     Allergies   Allergen Reactions    Latex Rash   Roosevelt Nicho [Nitrofurantoin Jennifer April      Dr. Mayank Simms prefers pt not use Macrobid due to pulmonary side effects.  Levaquin [Levofloxacin] Other (See Comments)     Redness/flushing    Brimonidine Tartrate     Adhesive Tape Rash    Alphagan [Brimonidine Tartrate] Hives     Eyes red         Constitutional:, no acute distress, and cooperative to examination with appropriate mood and affect. HEENT:   Head:               Normocephalic and atraumatic. Mouth/Throat:                Mucous membranes are normal.   Eyes:               EOM are normal. No scleral icterus. Nose:               The external appearance of the nose is normal  Ears: The ears appear normal to external inspection. Cardiovascular:       Normal rate, regular rhythm. Pulmonary/Chest:  Normal respiratory rate and rhthym. No use of accessory muscles. Lungs clear bilaterally. Abdominal:               distended No tenderness.  hypoactive bowel sounds             Genitalia:   circumcised penis  Urethral meatus is normal in size and location, some thick puss like drainage around meatus  Slightly swollen scrotum, some tenderness to palpation  Normal testes palpated bilaterally  Musculoskeletal:               Normal range of motion. He exhibits no edema or tenderness of lower extremities. Extremities:               No cyanosis, clubbing, or edema present. Neurological:               Alert and oriented.        Labs:  WBC:    Lab Results   Component Value Date    WBC 8.8 05/21/2021     Hemoglobin/Hematocrit:    Lab Results   Component Value Date    HGB 10.9 05/21/2021    HCT 35.7 05/21/2021     BMP:    Lab Results   Component Value Date     05/21/2021    K 3.7 05/21/2021    K 3.8 05/20/2021    CL 98 05/21/2021    CO2 25 05/21/2021    BUN 12 05/21/2021    LABALBU 3.4 05/20/2021    LABALBU 4.3 11/30/2011    CREATININE 0.7 05/21/2021    CALCIUM 8.4 05/21/2021    LABGLOM >90 05/21/2021     Scrotal US:   Narrative   PROCEDURE: US SCROTUM AND TESTICLES       CLINICAL INFORMATION: testicle pain       COMPARISON: 7/7/2020       TECHNIQUE: Multiple grayscale and color flow sonographic images of both testicles and other scrotal contents were obtained. Spectral Doppler waveforms were also generated.       FINDINGS:        Right Testicle - 4.1 x 2.7 x 2.9 cm    Right Epididymis - 0.8 x 0.9 x 0.8 cm    Left Testicle - 4.4 x 3.3 x 2.9 cm    Left Epididymis - Non visualized               Testicles: Both testicles are normal in size, contour, and echogenicity. There is a small 2 mm hyperechoic focus in the supraclavicular testicle which, in retrospect, believe was present previously and is unchanged, possibly a small calcification. Normal    vascular color flow demonstrated to both testicles. Spectral Doppler waveforms of both testicles are normal.       Epididymi:  Right epididymis unremarkable. Left epididymis is not clearly seen.       Hydrocele: Moderate-sized bilateral hydroceles.       Varicocele: Moderate-sized varicocele right side.                 Impression   1. Moderate-sized bilateral hydroceles. 2. Moderate size varicocele right side. 3. No acute findings.  No appreciable change from prior study. Impression:  UTI - ESBL infection  Enlarged prostate  nonobstructing left renal stone  Neurogenic bladder - chronic felipe  AMS - CT head unremarkable        Plan:    Scrotal US shows no appreciable changes from prior study  Elevate scrotum - can sling or use rolled up towel   Felipe draining well, 1300UOP overnight  Continue Cefepime - managed per ID  Felipe catheter was exchanged on 5/18 per Dr Domi Dorado note  We have follow up scheduled 6/4/21 with Dr Garcia [facility] - will discuss SPT options  Urology not planning any surgical interventions at this time, ID is managing infection.   Infection will need treated prior to surgery     Discussed with Dr Garcia Ahr    Urology signing off, please contact us with any questions or concerns    Thank you for including us in the care of MAEGAN Menchaca - CNP, MAEGAN  05/21/21 9:59 AM  Urology

## 2021-05-21 NOTE — FLOWSHEET NOTE
SPIRITUAL CARE PROGRESS NOTE    Spiritual Assessment:  encountered patient as part of spiritual care rounds. Patient was approachable, receptive of 's presence. Patient shared brief narrative behind health issues and hospitalization. Patient shared feelings of frustration about health conditions but shared he was coping, trying to make the best of the situation. Patient engaged  in conversation about life review, sharing with  that \"he's lived a good life\" and expressed being at peace. Patient shared with  he's living in a care facility and that he has children including two sons who are aware of his hospitalization. Intervention:  engaged patient in conversation about his feelings through active listening.  was a spiritual presence to patient and nurtured hope. Patient informed additional spiritual and emotional support are available upon patient's request.     Outcome: Chaplains are available for specific request visits at any time and may be paged via [facility].        05/21/21 1929   Encounter Summary   Services provided to: Patient   Referral/Consult From: [facility] Children;Family members   Continue Visiting   (5/21/2021)   Complexity of Encounter Low   Length of Encounter 30 minutes   Routine   Type Initial   Assessment Approachable;Calm;Coping;Grieving   Intervention Active listening;Explored coping resources; Discussed illness/injury and it's impact; Discussed meaning/purpose;Nurtured hope;Sustaining presence/ Ministry of presence   Outcome Expressed gratitude   Spiritual/Mosque   Type Spiritual support     Electronically signed by Skyler Edouard on 5/21/2021 at 7:34 PM.  [address] [place] [address]

## 2021-05-21 NOTE — PROGRESS NOTES
[facility]  INPATIENT PHYSICAL THERAPY  EVALUATION  STRZ RENAL TELEMETRY 6K - 6K-06/006-A    Time In: 5201  Time Out: 1357  Timed Code Treatment Minutes: 8 Minutes  Minutes: 18          Date: 2021  Patient Name: Magali Cho,  Gender:  male        MRN: [id number]  : 1931  (80 y.o.)      Referring Practitioner: Lilian Rowley MD  Diagnosis: Community acquired bacterial pneumonia  Additional Pertinent Hx: Per H&P \"89 y.o. male with a history of CAD, COPD, NIDDM, postthyroidectomy hypothyroidism, HTN, HLD, presents to [facility] on 2021 for evaluation of suspected UTI. Pt resides at [facility] and has a chronic felipe with a history of multiple UTIs in the past. Per ED chart review, patient was reported to be not himself at the nursing home which led to suspicion of UTI. Patient reports that he didn't realize he wasn't himself and on admission denies any SOB, abdominal pain, nausea, vomiting, bladder pain, hematuria, flank pain, CP, fever, or chills. In the ED, pt had an unremarkable CT head. UA positive for LEs. Was given a dose of rocephin. \"     Restrictions/Precautions:  Restrictions/Precautions: General Precautions, Fall Risk    Subjective:  Chart Reviewed: Yes  Patient assessed for rehabilitation services?: Yes  Family / Caregiver Present: No  Subjective: RN approved session. Pt pleasant and agreeable to therapy.  denies pain    General:  Overall Orientation Status: Within Functional Limits  Follows Commands: Within Functional Limits    Vision: Impaired  Vision Exceptions: Wears glasses at all times    Hearing: Exceptions to [facility]  Hearing Exceptions: Hard of hearing/hearing concerns         Pain: 0/10    Vitals: Vitals not assessed per clinical judgement, see nursing flowsheet    Social/Functional History:    Type of Home: Assisted living  Home Layout: One level     Bathroom Shower/Tub: Walk-in shower  Bathroom Toilet: Handicap height       ADL Assistance: Independent     Homemaking Responsibilities: No  Ambulation Assistance: Independent  Transfer Assistance: Independent          Additional Comments: Pt reports being indep with mobility within apartment with no AD, and within ERICK with RW.    OBJECTIVE:  Range of Motion:  Bilateral Lower Extremity: WFL    Strength:  Bilateral Lower Extremity: Impaired - grossly deconditioned     Balance:  Static Sitting Balance:  Contact Guard Assistance  Static Standing Balance: Contact Guard Assistance    Bed Mobility:  Not Tested    Transfers:  Sit to Stand: Contact Guard Assistance, X 1, with increased time for completion, RW  Stand to [facility] 68, X 1, with increased time for completion, RW    Ambulation:  Contact Guard Assistance, X 1, with increased time for completion  Distance: 150'  Surface: Level Tile  Device:No Device  Gait Deviations:  Decreased Step Length Bilaterally, Decreased Gait Speed, Wide Base of Support and 1 Standing rest break; SOB noted        Exercise:  Patient was guided in 1 set(s) 10 reps of exercise to both lower extremities. Seated marches, Seated hamstring curls, Seated heel/toe raises and Long arc quads. Exercises were completed for increased independence with functional mobility. Functional Outcome Measures: Completed  AM-PAC Inpatient Mobility Raw Score : 17  AM-PAC Inpatient T-Scale Score : 42.13    ASSESSMENT:  Activity Tolerance:  Patient tolerance of  treatment: good. SOB during ambulation although PO2 remained >90%. Treatment Initiated: Treatment and education initiated within context of evaluation. Evaluation time included review of current medical information, gathering information related to past medical, social and functional history, completion of standardized testing, formal and informal observation of tasks, assessment of data and development of plan of care and goals.   Treatment time included skilled education and facilitation of tasks to increase safety and independence with functional mobility for improved independence and quality of life. Assessment: Body structures, Functions, Activity limitations: Decreased functional mobility , Decreased posture, Decreased endurance, Decreased strength, Decreased balance  Assessment: Pt demonstrates a decrease in baseline by way of bed mobility, transfers and ambulation secondary to decreased activity tolerance, strength, fatigue, and balance deficits. Pt will benefit from skilled PT services throughout admission and beyond hospital discharge for improvements in functional mobility and in order to decrease fall risk and return pt to OF. Prognosis: Good    REQUIRES PT FOLLOW UP: Yes    Discharge Recommendations:  Discharge Recommendations: Continue to assess pending progress, Home with Home health PT    Patient Education:  PT Education: Goals, PT Role, Plan of Care, Functional Mobility Training    Equipment Recommendations:  Equipment Needed: No    Plan:  Times per week: 3-5x GM  Times per day: Daily  Current Treatment Recommendations: Strengthening, Gait Training, Patient/Caregiver Education & Training, Stair training, Equipment Evaluation, Education, & procurement, Balance Training, Functional Mobility Training, Endurance Training, Home Exercise Program, Transfer Training, Safety Education & Training    Goals:  Patient goals : none stated  Short term goals  Time Frame for Short term goals: by discharge  Short term goal 1: bed mobility with HOB flat, no rails mod I for increased functional ind  Short term goal 2: sit <>stand from various surfaces with LRD mod I for safe transfers  Short term goal 3: ambulate 100' with LRD mod I for safe household ambulation  Long term goals  Time Frame for Long term goals : NA due to short ELOS    Following session, patient left in safe position with all fall risk precautions in place.     Nancy Brewer PT, DPT [id number]

## 2021-05-21 NOTE — PROGRESS NOTES
Progress note: Infectious diseases    Patient - Kuldeep Moser,  Age - 80 y.o.    - 1931      Room Number - 6K-06/006-A   MRN -  [id number]   Acct # - [de-identified]  Date of Admission -  2021 10:19 AM    SUBJECTIVE:   He has no new complaints  Urine report noted,growing enterobacter. Had recent hx of pseudomonas   OBJECTIVE   VITALS    height is 5' 7\" (1.702 m) and weight is 259 lb 12.8 oz (117.8 kg). His oral temperature is 97.8 °F (36.6 °C). His blood pressure is 133/63 and his pulse is 57. His respiration is 18 and oxygen saturation is 95%. Wt Readings from Last 3 Encounters:   21 259 lb 12.8 oz (117.8 kg)   21 250 lb (113.4 kg)   21 250 lb (113.4 kg)       I/O (24 Hours)    Intake/Output Summary (Last 24 hours) at 2021 1013  Last data filed at 2021 0516  Gross per 24 hour   Intake 780 ml   Output 1300 ml   Net -520 ml       General Appearance  Awake, alert, oriented,  not  In acute distress  HEENT - normocephalic, atraumatic, pink conjunctiva,  anicteric sclera  Neck - Supple, no mass  Lungs -  Bilateral good air entry, no rhonchi, no wheeze  Cardiovascular - Heart sounds are normal.     Abdomen - soft, not distended, nontender,   Neurologic -oriented  Skin - No bruising or bleeding  Extremities - + edema, no cyanosis, clubbing   Billy catheter in place.   MEDICATIONS:      Arformoterol Tartrate  15 mcg Nebulization BID    budesonide  0.5 mg Nebulization BID    docusate sodium  100 mg Oral BID    doxazosin  6 mg Oral Nightly    ferrous gluconate  240 mg Oral BID    furosemide  20 mg Oral BID    insulin glargine  18 Units Subcutaneous Nightly    levothyroxine  200 mcg Oral QAM AC    lisinopril  2.5 mg Oral Daily    metFORMIN  1,000 mg Oral BID WC    metoprolol tartrate  25 mg Oral BID    potassium chloride  10 mEq Oral BID    tiotropium  2 puff Inhalation Daily    spironolactone  25 mg Oral Daily    sodium chloride flush  5-40 mL Intravenous 2 times per day    enoxaparin  40 mg Subcutaneous Q24H    cefepime  2,000 mg Intravenous Q12H      sodium chloride       oxybutynin, polyethylene glycol, sodium chloride flush, sodium chloride, promethazine **OR** ondansetron, acetaminophen **OR** acetaminophen, magnesium hydroxide      LABS:     CBC:   Recent Labs     05/19/21  1047 05/20/21  0439 05/21/21  0501   WBC 10.4 11.2* 8.8   HGB 11.1* 10.1* 10.9*    225 208     BMP:    Recent Labs     05/19/21  1047 05/20/21  0439 05/21/21  0501    139 133*   K 4.2 3.8 3.7   CL 96* 99 98   CO2 31 31 25   BUN 19 15 12   CREATININE 1.0 0.9 0.7   GLUCOSE 133* 115* 107     Calcium:  Recent Labs     05/21/21  0501   CALCIUM 8.4*     Ionized Calcium:No results for input(s): IONCA in the last 72 hours.   Magnesium:  Recent Labs     05/19/21  1122   MG 2.1      Recent Labs     05/20/21  1514 05/20/21  2006 05/21/21  0624   POCGLU 119* 122* 119*      Recent Labs     05/20/21  0439   ALKPHOS 42   ALT 8*   AST 13   PROT 6.1   BILITOT 0.2*   BILIDIR <0.2   LABALBU 3.4*        CULTURES:   UA:   Recent Labs     05/19/21  1020   PHUR 6.5   COLORU YELLOW   PROTEINU NEGATIVE   BLOODU NEGATIVE   RBCUA 0-2   WBCUA 15-25   BACTERIA MANY   NITRU NEGATIVE   GLUCOSEU NEGATIVE   BILIRUBINUR NEGATIVE   UROBILINOGEN 1.0   KETUA NEGATIVE     Micro:   Lab Results   Component Value Date    BC No growth-preliminary  05/19/2021            Problem list of patient:     Patient Active Problem List   Diagnosis Code    Diabetes mellitus ([facility] [address] E11.9    Hyperlipidemia E78.5    Coronary artery disease involving native coronary artery of native heart without angina pectoris I25.10    Acquired hypothyroidism E03.9    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 E29.1    Breast lump N63.0    BPH (benign prostatic hyperplasia) N40.0    ROBER (obstructive sleep apnea) G47.33    Essential hypertension I10    History of DVT (deep vein thrombosis) Z86.718    Shortness of breath R06.02    Chronic respiratory failure with hypoxia ([facility]) J96.11    Mixed hyperlipidemia E78.2    Chronic deep vein thrombosis (DVT) of right lower extremity ([facility]) I82.501    Anticoagulated on Coumadin Z79.01    Type 2 diabetes mellitus with complication, with long-term current use of insulin ([facility]) E11.8, Z79.4    Muscle weakness M62.81    Chronic bronchitis ([facility]) J42    Chronic obstructive pulmonary disease ([facility]) J44.9    Paroxysmal atrial fibrillation ([facility]) I48.0    Acute diastolic congestive heart failure ([facility]) I50.31    Paroxysmal atrial flutter ([facility]) I48.92    Urinary incontinence R32    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    Senile nuclear sclerosis H25.10    Puckering of macula, 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    Presence of intraocular lens Z96.1    Primary open-angle glaucoma 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    Chronic diastolic congestive heart failure ([facility]) I50.32    Morbid obesity ([facility]) E66.01    Urinary retention R33.9    Urine retention R33.9    Abdominal pain R10.9    Altered mental status R41.82    Chronic indwelling Billy catheter X35.9    Complicated UTI (urinary tract infection) N39.0    Hypokalemia E87.6    Weakness of left lower extremity R29.898    Pill dysphagia R13.10    History of thyroid cancer Z85.850    Gross hematuria R31.0    Constipation K59.00    Acute urinary retention R33.8    COPD exacerbation ([facility]) J44.1    Acute on chronic respiratory failure with hypoxia and hypercapnia ([facility]) J96.21, J96.22    Acute on chronic diastolic congestive heart failure ([facility]) I50.33    Bilateral leg edema +2 now +1 R60.0    Community acquired bacterial pneumonia J15.9    Elevated troponin R77.8    Scrotal pain N50.82         ASSESSMENT/PLAN   UTI: recurrent. will discharge with iv cefepime for one wk.  Will try to keep a peripheral

## 2021-05-21 NOTE — CARE COORDINATION
5/21/21, 1:24 PM EDT    DISCHARGE ON GOING [address] [facility] [place] day: 2  Location: -06/006-A Reason for admit: Community acquired bacterial pneumonia [J15.9]   Procedure:   CXR: There are bibasilar opacities which may represent infiltrates or atelectasis.    Scrotal US:  1. Moderate-sized bilateral hydroceles. 2. Moderate size varicocele right side  Barriers to Discharge: Enterobacter cloacae complex on urine culture, IV Cefepime 2x/day continues, Lasix, neuro checks, I/O, PT/OT, chronic felipe, Tmax 98.4, urology follows. PCP: Blanca Dunham MD  Readmission Risk Score: 22%  Patient Goals/Plan/Treatment Preferences: Anatoly Morel is from [facility]; patient needs IV Cefepime for atleast 5-6 more days for UTI. Attending spoke with patient son today.

## 2021-05-21 NOTE — PROGRESS NOTES
PROGRESS NOTE      Patient:  Antoni Rubio      Unit/Bed:6K-06/006-A    YOB: 1931    MRN: [id number]       Acct: [de-identified]     PCP: Abram Keyes MD    Date of Admission: 5/19/2021      Assessment/Plan:    Active Hospital Problems    Diagnosis Date Noted    Scrotal pain [N50.82]     Community acquired bacterial pneumonia [J15.9] 05/19/2021    Elevated troponin [R77.8]     Urinary tract infection without hematuria [N39.0]     Chronic diastolic congestive heart failure ([facility] [address] [I50.32] 10/03/2019    Coronary artery disease involving native coronary artery of native heart without angina pectoris [I25.10]        UTI, recurrent, secondary to chronic Billy  -With elevated lactic, likely due to dehydration as he does not have any other indications of sepsis  -Noted urine culture from 5/4/2021 with growth of Pseudomonas sensitive to cefepime. Started cefepime while admitted; ID consulted per family request  -Urology consulted for consideration of suprapubic catheter placement  -5/20: Mild leukocytosis. CCM. ID following.  -5/21: Overall improving. Will need cefepime for a total 7 day course. Plan as below for patient to stay admitted in the hospital for this. Discussed with ID team.     Scrotal edema  -Likely dependent edema. Lasix ordered as he has excellent kidney function  -Billy in place  -Elevate scrotum  -Urology to see  -5/21: US reviewed, moderate-sized bilateral hydroceles, moderate size varicocele right side. Supportive care at this time. Plan for urology follow up as OP.      Chronic bladder spasms, chronic Billy  -Continue home meds; as above     Elevated trops  -Chronic, no active CP; no indications for any further workup at this time     AG, lactic acidosis  -Likely secondary to dehydration; push oral fluids, trend lactic level, improving with hydration  -5/20: gap has closed. Lactic trending down.    -5/21: resolved.      HFpEF  -Noted previous echo with normal LVEF and grade 1 diastolic dysfunction  -Continue home meds, cardiac diet, monitor I/Os  -5/20: noted LE edema, will give one dose of IV lasix and start compression stockings  -5/21: Minus ~1.5L since admission. Home lasix dosing confirmed by pharmacy and ordered accordingly. COPD without acute exacerbation  -On room air, this is his baseline  -Noted CXR on admission with bibasilar opacities; suspect this is d/t third spacing of fluid as patient is intravascularly depleted but is retaining fluid in scrotum and LEs.   -IS and will add on IV diuresis as above and monitor improvement. -5/21: breathing is comfortable. No changes to lung exam.     Pseudohypocalcemia, hypoalbuminemia  -Will check albumin level to assess if true hypocalcemia. If normal albumin level, will check Ical and replete as indicated. -5/21: normal Ca when corrected for hypoalbuminemia    Constipation  -PRNs ordered, asked nurse to give     Chronic normocytic anemia  ROBER  CAD  -Noted per history, continue home meds     Code status: Limited      Discharge planning: He is stable for discharge however will need continued course of IV cefepime on discharge. He lives at assisted living and is not able to manage any lines on his own, concern for patient forgetting what the line is there for and removing. He is also not able to go to outpatient infusion twice a day for cefepime. Transportation issues, insurance will not cover, per SW. Discussed all of the above including possible option to have patient go to SNF while he needs IV with pt's son. Son does not want pt to lose his spot at assisted living, declines SNF.  Due to family preference and insurance/financial/transportation restrictions, patient will likely need to stay admitted in the hospital until course of abx is complete.      Chief Complaint:  Suspected UTI     History Of Present Illness:       80 y.o. male with a history of CAD, COPD, NIDDM, postthyroidectomy hypothyroidism, HTN, HLD, presents to [facility] on 5/19/2021 for evaluation of suspected UTI. Pt resides at [facility] and has a chronic felipe with a history of multiple UTIs in the past. Per ED chart review, patient was reported to be not himself at the nursing home which led to suspicion of UTI. Patient reports that he didn't realize he wasn't himself and on admission denies any SOB, abdominal pain, nausea, vomiting, bladder pain, hematuria, flank pain, CP, fever, or chills.      In the ED, pt had an unremarkable CT head. UA positive for LEs. Was given a dose of rocephin. Subjective: Seen during morning rounds. Appears comfortable. States he is tolerating diet well. Reports constipation. Denies any other complaints, no fevers, chills, nausea, vomiting, abdominal pain.        Medications:  Reviewed    Infusion Medications    sodium chloride       Scheduled Medications    furosemide  60 mg Oral BID    Arformoterol Tartrate  15 mcg Nebulization BID    budesonide  0.5 mg Nebulization BID    docusate sodium  100 mg Oral BID    doxazosin  6 mg Oral Nightly    ferrous gluconate  240 mg Oral BID    insulin glargine  18 Units Subcutaneous Nightly    levothyroxine  200 mcg Oral QAM AC    lisinopril  2.5 mg Oral Daily    metFORMIN  1,000 mg Oral BID WC    metoprolol tartrate  25 mg Oral BID    potassium chloride  10 mEq Oral BID    tiotropium  2 puff Inhalation Daily    spironolactone  25 mg Oral Daily    sodium chloride flush  5-40 mL Intravenous 2 times per day    enoxaparin  40 mg Subcutaneous Q24H    cefepime  2,000 mg Intravenous Q12H     PRN Meds: oxybutynin, polyethylene glycol, sodium chloride flush, sodium chloride, promethazine **OR** ondansetron, acetaminophen **OR** acetaminophen, magnesium hydroxide      Intake/Output Summary (Last 24 hours) at 5/21/2021 1514  Last data filed at 5/21/2021 1131  Gross per 24 hour   Intake 600 ml   Output 1300 ml   Net -700 ml       Diet:  DIET CARDIAC; Carb Control: 4 carb choices (60 gms)/meal; Low Sodium (2 GM); Daily Fluid Restriction: 2000 ml    Exam:  /63   Pulse 62   Temp 98.3 °F (36.8 °C) (Oral)   Resp 18   Ht 5' 7\" (1.702 m)   Wt 259 lb 12.8 oz (117.8 kg)   SpO2 93%   BMI 40.69 kg/m²     General appearance:  No apparent distress, appears stated age and cooperative. Obese. HEENT:  Normal cephalic, atraumatic without obvious deformity. Pupils equal, round, and reactive to light. Extra ocular muscles intact. Conjunctivae/corneas clear. Neck: Supple, with full range of motion. No jugular venous distention. Trachea midline. Respiratory:  Normal respiratory effort. Clear to auscultation, bilaterally without Rales/Wheezes/Rhonchi. Cardiovascular:  Regular rate and rhythm with normal S1/S2 without murmurs, rubs or gallops. Abdomen: Soft, non-tender, non-distended with normal bowel sounds. Musculoskeletal:  No clubbing or cyanosis bilaterally. Full range of motion without deformity. Extremities: 1+ bilateral LE edema extending to level of mid-calf bilaterally. : Scrotal edema is present. Billy in place. Skin: Skin color, texture, turgor normal.  No rashes or lesions. Neurologic:  Neurovascularly intact without any focal sensory/motor deficits. Cranial nerves: II-XII intact, grossly non-focal.  Psychiatric:  Alert and oriented, thought content appropriate, normal insight. Memory appears poor. Capillary Refill: Brisk,< 3 seconds   Peripheral Pulses: +2 palpable, equal bilaterally       Labs:   Recent Labs     05/19/21  1047 05/20/21  0439 05/21/21  0501   WBC 10.4 11.2* 8.8   HGB 11.1* 10.1* 10.9*   HCT 35.4* 31.5* 35.7*    225 208     Recent Labs     05/19/21  1047 05/20/21  0439 05/21/21  0501    139 133*   K 4.2 3.8 3.7   CL 96* 99 98   CO2 31 31 25   BUN 19 15 12   CREATININE 1.0 0.9 0.7   CALCIUM 8.6 8.3* 8.4*     Recent Labs     05/20/21  0439   AST 13   ALT 8*   BILIDIR <0.2   BILITOT 0.2*   ALKPHOS 42     No results for input(s): INR in the last 72 hours.   No results for input(s): Tigist [place] in the last 72 hours. Urinalysis:      Lab Results   Component Value Date    NITRU NEGATIVE 05/19/2021    WBCUA 15-25 05/19/2021    BACTERIA MANY 05/19/2021    RBCUA 0-2 05/19/2021    BLOODU NEGATIVE 05/19/2021    SPECGRAV 1.015 12/05/2020    GLUCOSEU NEGATIVE 05/19/2021       Radiology:  US SCROTUM AND TESTICLES   Final Result   1. Moderate-sized bilateral hydroceles. 2. Moderate size varicocele right side. 3. No acute findings. No appreciable change from prior study. **This report has been created using voice recognition software. It may contain minor errors which are inherent in voice recognition technology. **      Final report electronically signed by Dr. Parra Done on 5/20/2021 2:19 PM      CT HEAD WO CONTRAST   Final Result   No acute intracranial hemorrhage, mass effect or midline shift. **This report has been created using voice recognition software. It may contain minor errors which are inherent in voice recognition technology. **      Final report electronically signed by Dr Charlotte Amos on 5/19/2021 1:05 PM      XR CHEST PORTABLE   Final Result   There are bibasilar opacities which may represent infiltrates or atelectasis. **This report has been created using voice recognition software. It may contain minor errors which are inherent in voice recognition technology. **      Final report electronically signed by Dr Charlotte Amos on 5/19/2021 11:23 AM          Diet: DIET CARDIAC; Carb Control: 4 carb choices (60 gms)/meal; Low Sodium (2 GM);  Daily Fluid Restriction: 2000 ml    PT/OT Eval Status: consulted    DVT prophylaxis: [x] Lovenox                                 [] SCDs                                 [] SQ Heparin                                 [] Encourage ambulation           [] Already on Anticoagulation     Disposition:    [] Home       [] TCU       [] Rehab       [] Psych       [] SNF       [] [facility]       [x] Other-assisted living    Case discussed with Dr. Judd Parish    Electronically signed by Marilyn Carr MD on 5/21/2021 at 3:14 PM

## 2021-05-22 LAB
ABSOLUTE RETIC #: 65 THOU/MM3 (ref 20–115)
ANION GAP SERPL CALCULATED.3IONS-SCNC: 11 MEQ/L (ref 8–16)
BASOPHILS # BLD: 0.8 %
BASOPHILS ABSOLUTE: 0.1 THOU/MM3 (ref 0–0.1)
BUN BLDV-MCNC: 11 MG/DL (ref 7–22)
CALCIUM SERPL-MCNC: 8.7 MG/DL (ref 8.5–10.5)
CHLORIDE BLD-SCNC: 100 MEQ/L (ref 98–111)
CO2: 28 MEQ/L (ref 23–33)
CREAT SERPL-MCNC: 0.8 MG/DL (ref 0.4–1.2)
EOSINOPHIL # BLD: 2.3 %
EOSINOPHILS ABSOLUTE: 0.2 THOU/MM3 (ref 0–0.4)
ERYTHROCYTE [DISTWIDTH] IN BLOOD BY AUTOMATED COUNT: 14 % (ref 11.5–14.5)
ERYTHROCYTE [DISTWIDTH] IN BLOOD BY AUTOMATED COUNT: 43.2 FL (ref 35–45)
FOLATE: 17.3 NG/ML (ref 4.8–24.2)
GFR SERPL CREATININE-BSD FRML MDRD: > 90 ML/MIN/1.73M2
GLUCOSE BLD-MCNC: 110 MG/DL (ref 70–108)
GLUCOSE BLD-MCNC: 115 MG/DL (ref 70–108)
GLUCOSE BLD-MCNC: 123 MG/DL (ref 70–108)
GLUCOSE BLD-MCNC: 125 MG/DL (ref 70–108)
GLUCOSE BLD-MCNC: 128 MG/DL (ref 70–108)
HCT VFR BLD CALC: 34.2 % (ref 42–52)
HEMOGLOBIN: 11.2 GM/DL (ref 14–18)
IMMATURE GRANS (ABS): 0.06 THOU/MM3 (ref 0–0.07)
IMMATURE GRANULOCYTES: 0.7 %
IMMATURE RETIC FRACT: 13.5 % (ref 2.3–13.4)
IRON SATURATION: 19 % (ref 20–50)
IRON: 47 UG/DL (ref 65–195)
LYMPHOCYTES # BLD: 21.2 %
LYMPHOCYTES ABSOLUTE: 2 THOU/MM3 (ref 1–4.8)
MCH RBC QN AUTO: 27.7 PG (ref 26–33)
MCHC RBC AUTO-ENTMCNC: 32.7 GM/DL (ref 32.2–35.5)
MCV RBC AUTO: 84.4 FL (ref 80–94)
MONOCYTES # BLD: 10 %
MONOCYTES ABSOLUTE: 0.9 THOU/MM3 (ref 0.4–1.3)
NUCLEATED RED BLOOD CELLS: 0 /100 WBC
ORGANISM: ABNORMAL
PLATELET # BLD: 237 THOU/MM3 (ref 130–400)
PMV BLD AUTO: 9.9 FL (ref 9.4–12.4)
POTASSIUM SERPL-SCNC: 3.9 MEQ/L (ref 3.5–5.2)
RBC # BLD: 4.05 MILL/MM3 (ref 4.7–6.1)
RETIC HEMOGLOBIN: 31.4 PG (ref 28.2–35.7)
RETICULOCYTE ABSOLUTE COUNT: 1.5 % (ref 0.5–2)
SEG NEUTROPHILS: 65 %
SEGMENTED NEUTROPHILS ABSOLUTE COUNT: 6 THOU/MM3 (ref 1.8–7.7)
SODIUM BLD-SCNC: 139 MEQ/L (ref 135–145)
TOTAL IRON BINDING CAPACITY: 245 UG/DL (ref 171–450)
URINE CULTURE REFLEX: ABNORMAL
VITAMIN B-12: 168 PG/ML (ref 211–911)
WBC # BLD: 9.2 THOU/MM3 (ref 4.8–10.8)

## 2021-05-22 PROCEDURE — 6360000002 HC RX W HCPCS: Performed by: STUDENT IN AN ORGANIZED HEALTH CARE EDUCATION/TRAINING PROGRAM

## 2021-05-22 PROCEDURE — 36415 COLL VENOUS BLD VENIPUNCTURE: CPT

## 2021-05-22 PROCEDURE — 83550 IRON BINDING TEST: CPT

## 2021-05-22 PROCEDURE — 1200000003 HC TELEMETRY R&B

## 2021-05-22 PROCEDURE — 82746 ASSAY OF FOLIC ACID SERUM: CPT

## 2021-05-22 PROCEDURE — 99233 SBSQ HOSP IP/OBS HIGH 50: CPT | Performed by: HOSPITALIST

## 2021-05-22 PROCEDURE — 85046 RETICYTE/HGB CONCENTRATE: CPT

## 2021-05-22 PROCEDURE — 82607 VITAMIN B-12: CPT

## 2021-05-22 PROCEDURE — 80048 BASIC METABOLIC PNL TOTAL CA: CPT

## 2021-05-22 PROCEDURE — 82948 REAGENT STRIP/BLOOD GLUCOSE: CPT

## 2021-05-22 PROCEDURE — 2580000003 HC RX 258: Performed by: STUDENT IN AN ORGANIZED HEALTH CARE EDUCATION/TRAINING PROGRAM

## 2021-05-22 PROCEDURE — 6370000000 HC RX 637 (ALT 250 FOR IP): Performed by: STUDENT IN AN ORGANIZED HEALTH CARE EDUCATION/TRAINING PROGRAM

## 2021-05-22 PROCEDURE — 83540 ASSAY OF IRON: CPT

## 2021-05-22 PROCEDURE — 94640 AIRWAY INHALATION TREATMENT: CPT

## 2021-05-22 PROCEDURE — 85025 COMPLETE CBC W/AUTO DIFF WBC: CPT

## 2021-05-22 RX ADMIN — CEFEPIME HYDROCHLORIDE 2000 MG: 2 INJECTION, POWDER, FOR SOLUTION INTRAVENOUS at 06:10

## 2021-05-22 RX ADMIN — METFORMIN HYDROCHLORIDE 1000 MG: 500 TABLET ORAL at 17:20

## 2021-05-22 RX ADMIN — OXYBUTYNIN CHLORIDE 5 MG: 5 TABLET ORAL at 08:43

## 2021-05-22 RX ADMIN — FUROSEMIDE 60 MG: 40 TABLET ORAL at 08:42

## 2021-05-22 RX ADMIN — Medication 240 MG: at 08:41

## 2021-05-22 RX ADMIN — ENOXAPARIN SODIUM 40 MG: 40 INJECTION SUBCUTANEOUS at 17:21

## 2021-05-22 RX ADMIN — LEVOTHYROXINE SODIUM 200 MCG: 0.1 TABLET ORAL at 06:10

## 2021-05-22 RX ADMIN — METFORMIN HYDROCHLORIDE 1000 MG: 500 TABLET ORAL at 08:41

## 2021-05-22 RX ADMIN — CEFEPIME HYDROCHLORIDE 2000 MG: 2 INJECTION, POWDER, FOR SOLUTION INTRAVENOUS at 17:20

## 2021-05-22 RX ADMIN — SODIUM CHLORIDE, PRESERVATIVE FREE 10 ML: 5 INJECTION INTRAVENOUS at 21:11

## 2021-05-22 RX ADMIN — INSULIN GLARGINE 18 UNITS: 100 INJECTION, SOLUTION SUBCUTANEOUS at 21:11

## 2021-05-22 RX ADMIN — BUDESONIDE 500 MCG: 0.5 INHALANT RESPIRATORY (INHALATION) at 17:24

## 2021-05-22 RX ADMIN — Medication 240 MG: at 21:11

## 2021-05-22 RX ADMIN — METOPROLOL TARTRATE 25 MG: 25 TABLET, FILM COATED ORAL at 08:45

## 2021-05-22 RX ADMIN — SPIRONOLACTONE 25 MG: 25 TABLET ORAL at 08:42

## 2021-05-22 RX ADMIN — POTASSIUM CHLORIDE 10 MEQ: 750 TABLET, EXTENDED RELEASE ORAL at 08:46

## 2021-05-22 RX ADMIN — DOCUSATE SODIUM 100 MG: 100 CAPSULE ORAL at 21:11

## 2021-05-22 RX ADMIN — LISINOPRIL 2.5 MG: 2.5 TABLET ORAL at 08:42

## 2021-05-22 RX ADMIN — ARFORMOTEROL TARTRATE 15 MCG: 15 SOLUTION RESPIRATORY (INHALATION) at 17:24

## 2021-05-22 RX ADMIN — DOCUSATE SODIUM 100 MG: 100 CAPSULE ORAL at 08:44

## 2021-05-22 RX ADMIN — POTASSIUM CHLORIDE 10 MEQ: 750 TABLET, EXTENDED RELEASE ORAL at 21:11

## 2021-05-22 RX ADMIN — FUROSEMIDE 60 MG: 40 TABLET ORAL at 21:11

## 2021-05-22 RX ADMIN — SODIUM CHLORIDE, PRESERVATIVE FREE 10 ML: 5 INJECTION INTRAVENOUS at 08:46

## 2021-05-22 ASSESSMENT — PAIN SCALES - GENERAL
PAINLEVEL_OUTOF10: 0
PAINLEVEL_OUTOF10: 0

## 2021-05-22 NOTE — PROGRESS NOTES
PROGRESS NOTE      Patient:  Naga Cardenas      Unit/Bed:6K-06/006-A    YOB: 1931    MRN: [id number]       Acct: [de-identified]     PCP: Gema Lara MD    Date of Admission: 5/19/2021      Assessment/Plan:    Active Hospital Problems    Diagnosis Date Noted    Scrotal pain [N50.82]     Community acquired bacterial pneumonia [J15.9] 05/19/2021    Elevated troponin [R77.8]     Urinary tract infection without hematuria [N39.0]     Chronic diastolic congestive heart failure ([facility] [address] [I50.32] 10/03/2019    Coronary artery disease involving native coronary artery of native heart without angina pectoris [I25.10]        UTI, recurrent, secondary to chronic Billy  On cefepime per ID. [facility]    Scrotal edema  -Likely dependent edema. Lasix ordered as he has excellent kidney function  -Billy in place  -Elevate scrotum  -Urology to see  -5/21: US reviewed, moderate-sized bilateral hydroceles, moderate size varicocele right side. Supportive care at this time. Plan for urology follow up as OP.      Chronic bladder spasms, chronic Billy  -Continue home meds; as above     Elevated trops  -Chronic, no active CP; no indications for any further workup at this time     AG, lactic acidosis (resolved)     HFpEF  Clinically euvloemic. [facility]    COPD without acute exacerbation  Continue home standing/prn inhaler regimen    Pseudohypocalcemia, hypoalbuminemia    Constipation  -PRNs ordered, asked nurse to give     Chronic normocytic anemia: Hb stable. On iron. W/U sent  ROBER  CAD  -Noted per history, continue home meds    Obesity w/ BMI 40.69       Code status: Limited      Discharge planning: stable medically for discharge; delayed discharge d/t need for IV abx     Chief Complaint:  Suspected UTI     History Of Present Illness:       80 y.o. male with a history of CAD, COPD, NIDDM, postthyroidectomy hypothyroidism, HTN, HLD, presents to [facility] on 5/19/2021 for evaluation of suspected UTI. Subjective: Denies any complaints. without murmurs, rubs or gallops. Abdomen: Soft, non-tender, non-distended with normal bowel sounds. Musculoskeletal:  No clubbing or cyanosis bilaterally. Full range of motion without deformity. Extremities: 1+ bilateral LE edema extending to level of mid-calf bilaterally. : Scrotal edema is present. Billy in place. Skin: Skin color, texture, turgor normal.  No rashes or lesions. Neurologic:  Neurovascularly intact without any focal sensory/motor deficits. Cranial nerves: II-XII intact, grossly non-focal.  Psychiatric:  Alert and oriented, thought content appropriate, normal insight. Memory appears poor. Capillary Refill: Brisk,< 3 seconds   Peripheral Pulses: +2 palpable, equal bilaterally       Labs:   Recent Labs     05/20/21  0439 05/21/21  0501 05/22/21  0557   WBC 11.2* 8.8 9.2   HGB 10.1* 10.9* 11.2*   HCT 31.5* 35.7* 34.2*    208 237     Recent Labs     05/20/21  0439 05/21/21  0501 05/22/21  0557    133* 139   K 3.8 3.7 3.9   CL 99 98 100   CO2 31 25 28   BUN 15 12 11   CREATININE 0.9 0.7 0.8   CALCIUM 8.3* 8.4* 8.7     Recent Labs     05/20/21 0439   AST 13   ALT 8*   BILIDIR <0.2   BILITOT 0.2*   ALKPHOS 42     No results for input(s): INR in the last 72 hours. No results for input(s): Marletta [place] in the last 72 hours. Urinalysis:      Lab Results   Component Value Date    NITRU NEGATIVE 05/19/2021    WBCUA 15-25 05/19/2021    BACTERIA MANY 05/19/2021    RBCUA 0-2 05/19/2021    BLOODU NEGATIVE 05/19/2021    SPECGRAV 1.015 12/05/2020    GLUCOSEU NEGATIVE 05/19/2021       Radiology:  US SCROTUM AND TESTICLES   Final Result   1. Moderate-sized bilateral hydroceles. 2. Moderate size varicocele right side. 3. No acute findings. No appreciable change from prior study. **This report has been created using voice recognition software. It may contain minor errors which are inherent in voice recognition technology. **      Final report electronically signed by Dr. Leo Schwab on 5/20/2021 2:19 PM      CT HEAD WO CONTRAST   Final Result   No acute intracranial hemorrhage, mass effect or midline shift. **This report has been created using voice recognition software. It may contain minor errors which are inherent in voice recognition technology. **      Final report electronically signed by Dr Lynda Sparks on 5/19/2021 1:05 PM      XR CHEST PORTABLE   Final Result   There are bibasilar opacities which may represent infiltrates or atelectasis. **This report has been created using voice recognition software. It may contain minor errors which are inherent in voice recognition technology. **      Final report electronically signed by Dr Lynda Sparks on 5/19/2021 11:23 AM          Diet: DIET CARDIAC; Carb Control: 4 carb choices (60 gms)/meal; Low Sodium (2 GM); Daily Fluid Restriction: 2000 ml    PT/OT Eval Status: consulted    DVT prophylaxis: [x] Lovenox                                 [] SCDs                                 [] SQ Heparin                                 [] Encourage ambulation           [] Already on Anticoagulation     Disposition:    [] Home       [] TCU       [] Rehab       [] Psych       [] SNF       [] [place]       [x] Other-assisted living    Case discussed with Dr. Nenita Higgins  With RN in room, patient was updated about and agreed upon the treatment plan, all the questions and concerns were addressed.     Electronically signed by Fantasma Brandt MD on 5/22/2021 at 7:33 AM

## 2021-05-23 LAB
GLUCOSE BLD-MCNC: 105 MG/DL (ref 70–108)
GLUCOSE BLD-MCNC: 120 MG/DL (ref 70–108)
GLUCOSE BLD-MCNC: 131 MG/DL (ref 70–108)
GLUCOSE BLD-MCNC: 169 MG/DL (ref 70–108)

## 2021-05-23 PROCEDURE — 6370000000 HC RX 637 (ALT 250 FOR IP): Performed by: STUDENT IN AN ORGANIZED HEALTH CARE EDUCATION/TRAINING PROGRAM

## 2021-05-23 PROCEDURE — 2580000003 HC RX 258: Performed by: STUDENT IN AN ORGANIZED HEALTH CARE EDUCATION/TRAINING PROGRAM

## 2021-05-23 PROCEDURE — 1200000003 HC TELEMETRY R&B

## 2021-05-23 PROCEDURE — 99232 SBSQ HOSP IP/OBS MODERATE 35: CPT | Performed by: HOSPITALIST

## 2021-05-23 PROCEDURE — 6360000002 HC RX W HCPCS: Performed by: STUDENT IN AN ORGANIZED HEALTH CARE EDUCATION/TRAINING PROGRAM

## 2021-05-23 PROCEDURE — 94760 N-INVAS EAR/PLS OXIMETRY 1: CPT

## 2021-05-23 PROCEDURE — 82948 REAGENT STRIP/BLOOD GLUCOSE: CPT

## 2021-05-23 PROCEDURE — 94640 AIRWAY INHALATION TREATMENT: CPT

## 2021-05-23 RX ADMIN — DOCUSATE SODIUM 100 MG: 100 CAPSULE ORAL at 22:12

## 2021-05-23 RX ADMIN — OXYBUTYNIN CHLORIDE 5 MG: 5 TABLET ORAL at 09:31

## 2021-05-23 RX ADMIN — ENOXAPARIN SODIUM 40 MG: 40 INJECTION SUBCUTANEOUS at 17:19

## 2021-05-23 RX ADMIN — ARFORMOTEROL TARTRATE 15 MCG: 15 SOLUTION RESPIRATORY (INHALATION) at 08:01

## 2021-05-23 RX ADMIN — METFORMIN HYDROCHLORIDE 1000 MG: 500 TABLET ORAL at 17:19

## 2021-05-23 RX ADMIN — CEFEPIME HYDROCHLORIDE 2000 MG: 2 INJECTION, POWDER, FOR SOLUTION INTRAVENOUS at 17:18

## 2021-05-23 RX ADMIN — Medication 240 MG: at 22:12

## 2021-05-23 RX ADMIN — LISINOPRIL 2.5 MG: 2.5 TABLET ORAL at 09:31

## 2021-05-23 RX ADMIN — CEFEPIME HYDROCHLORIDE 2000 MG: 2 INJECTION, POWDER, FOR SOLUTION INTRAVENOUS at 05:43

## 2021-05-23 RX ADMIN — FUROSEMIDE 60 MG: 40 TABLET ORAL at 22:12

## 2021-05-23 RX ADMIN — SPIRONOLACTONE 25 MG: 25 TABLET ORAL at 09:31

## 2021-05-23 RX ADMIN — BUDESONIDE 500 MCG: 0.5 INHALANT RESPIRATORY (INHALATION) at 17:48

## 2021-05-23 RX ADMIN — INSULIN GLARGINE 18 UNITS: 100 INJECTION, SOLUTION SUBCUTANEOUS at 22:10

## 2021-05-23 RX ADMIN — TIOTROPIUM BROMIDE INHALATION SPRAY 2 PUFF: 3.12 SPRAY, METERED RESPIRATORY (INHALATION) at 08:02

## 2021-05-23 RX ADMIN — BUDESONIDE 500 MCG: 0.5 INHALANT RESPIRATORY (INHALATION) at 08:01

## 2021-05-23 RX ADMIN — ARFORMOTEROL TARTRATE 15 MCG: 15 SOLUTION RESPIRATORY (INHALATION) at 17:47

## 2021-05-23 RX ADMIN — SODIUM CHLORIDE, PRESERVATIVE FREE 10 ML: 5 INJECTION INTRAVENOUS at 09:32

## 2021-05-23 RX ADMIN — POTASSIUM CHLORIDE 10 MEQ: 750 TABLET, EXTENDED RELEASE ORAL at 22:12

## 2021-05-23 RX ADMIN — Medication 240 MG: at 09:32

## 2021-05-23 RX ADMIN — POTASSIUM CHLORIDE 10 MEQ: 750 TABLET, EXTENDED RELEASE ORAL at 09:31

## 2021-05-23 RX ADMIN — METFORMIN HYDROCHLORIDE 1000 MG: 500 TABLET ORAL at 09:31

## 2021-05-23 RX ADMIN — SODIUM CHLORIDE, PRESERVATIVE FREE 10 ML: 5 INJECTION INTRAVENOUS at 22:13

## 2021-05-23 RX ADMIN — FUROSEMIDE 60 MG: 40 TABLET ORAL at 09:31

## 2021-05-23 RX ADMIN — DOXAZOSIN 6 MG: 4 TABLET ORAL at 09:31

## 2021-05-23 RX ADMIN — METOPROLOL TARTRATE 25 MG: 25 TABLET, FILM COATED ORAL at 09:32

## 2021-05-23 RX ADMIN — DOCUSATE SODIUM 100 MG: 100 CAPSULE ORAL at 09:31

## 2021-05-23 RX ADMIN — LEVOTHYROXINE SODIUM 200 MCG: 0.1 TABLET ORAL at 05:42

## 2021-05-23 ASSESSMENT — PAIN DESCRIPTION - FREQUENCY: FREQUENCY: CONTINUOUS

## 2021-05-23 ASSESSMENT — PAIN SCALES - GENERAL
PAINLEVEL_OUTOF10: 3
PAINLEVEL_OUTOF10: 0
PAINLEVEL_OUTOF10: 0

## 2021-05-23 ASSESSMENT — PAIN DESCRIPTION - ORIENTATION: ORIENTATION: LEFT;RIGHT

## 2021-05-23 ASSESSMENT — PAIN DESCRIPTION - LOCATION: LOCATION: SCROTUM

## 2021-05-23 NOTE — PROGRESS NOTES
normal S1/S2 without murmurs, rubs or gallops. Abdomen: Soft, non-tender, non-distended with normal bowel sounds. Musculoskeletal:  No clubbing or cyanosis bilaterally. Full range of motion without deformity. Extremities: 1+ bilateral LE edema extending to level of mid-calf bilaterally. : Scrotal edema is present. Billy in place. Skin: Skin color, texture, turgor normal.  No rashes or lesions. Neurologic:  Neurovascularly intact without any focal sensory/motor deficits. Cranial nerves: II-XII intact, grossly non-focal.  Psychiatric:  Alert and oriented, thought content appropriate, normal insight. Memory appears poor. Capillary Refill: Brisk,< 3 seconds   Peripheral Pulses: +2 palpable, equal bilaterally       Labs:   Recent Labs     05/21/21  0501 05/22/21  0557   WBC 8.8 9.2   HGB 10.9* 11.2*   HCT 35.7* 34.2*    237     Recent Labs     05/21/21  0501 05/22/21  0557   * 139   K 3.7 3.9   CL 98 100   CO2 25 28   BUN 12 11   CREATININE 0.7 0.8   CALCIUM 8.4* 8.7     No results for input(s): AST, ALT, BILIDIR, BILITOT, ALKPHOS in the last 72 hours. No results for input(s): INR in the last 72 hours. No results for input(s): Jo Castor in the last 72 hours. Urinalysis:      Lab Results   Component Value Date    NITRU NEGATIVE 05/19/2021    WBCUA 15-25 05/19/2021    BACTERIA MANY 05/19/2021    RBCUA 0-2 05/19/2021    BLOODU NEGATIVE 05/19/2021    SPECGRAV 1.015 12/05/2020    GLUCOSEU NEGATIVE 05/19/2021       Radiology:  US SCROTUM AND TESTICLES   Final Result   1. Moderate-sized bilateral hydroceles. 2. Moderate size varicocele right side. 3. No acute findings. No appreciable change from prior study. **This report has been created using voice recognition software. It may contain minor errors which are inherent in voice recognition technology. **      Final report electronically signed by Dr. George Dang on 5/20/2021 2:19 PM      CT HEAD WO CONTRAST   Final Result

## 2021-05-24 VITALS
HEIGHT: 67 IN | OXYGEN SATURATION: 95 % | DIASTOLIC BLOOD PRESSURE: 59 MMHG | SYSTOLIC BLOOD PRESSURE: 119 MMHG | HEART RATE: 79 BPM | BODY MASS INDEX: 40.48 KG/M2 | RESPIRATION RATE: 18 BRPM | WEIGHT: 257.9 LBS | TEMPERATURE: 97.9 F

## 2021-05-24 LAB
ANION GAP SERPL CALCULATED.3IONS-SCNC: 10 MEQ/L (ref 8–16)
BUN BLDV-MCNC: 13 MG/DL (ref 7–22)
CALCIUM SERPL-MCNC: 8.9 MG/DL (ref 8.5–10.5)
CHLORIDE BLD-SCNC: 97 MEQ/L (ref 98–111)
CO2: 30 MEQ/L (ref 23–33)
CREAT SERPL-MCNC: 0.9 MG/DL (ref 0.4–1.2)
GFR SERPL CREATININE-BSD FRML MDRD: 79 ML/MIN/1.73M2
GLUCOSE BLD-MCNC: 112 MG/DL (ref 70–108)
GLUCOSE BLD-MCNC: 123 MG/DL (ref 70–108)
GLUCOSE BLD-MCNC: 139 MG/DL (ref 70–108)
POTASSIUM SERPL-SCNC: 3.9 MEQ/L (ref 3.5–5.2)
SODIUM BLD-SCNC: 137 MEQ/L (ref 135–145)

## 2021-05-24 PROCEDURE — 6360000002 HC RX W HCPCS: Performed by: STUDENT IN AN ORGANIZED HEALTH CARE EDUCATION/TRAINING PROGRAM

## 2021-05-24 PROCEDURE — 2580000003 HC RX 258: Performed by: STUDENT IN AN ORGANIZED HEALTH CARE EDUCATION/TRAINING PROGRAM

## 2021-05-24 PROCEDURE — 80048 BASIC METABOLIC PNL TOTAL CA: CPT

## 2021-05-24 PROCEDURE — 97110 THERAPEUTIC EXERCISES: CPT

## 2021-05-24 PROCEDURE — 94760 N-INVAS EAR/PLS OXIMETRY 1: CPT

## 2021-05-24 PROCEDURE — 97530 THERAPEUTIC ACTIVITIES: CPT

## 2021-05-24 PROCEDURE — 36415 COLL VENOUS BLD VENIPUNCTURE: CPT

## 2021-05-24 PROCEDURE — 82948 REAGENT STRIP/BLOOD GLUCOSE: CPT

## 2021-05-24 PROCEDURE — 94640 AIRWAY INHALATION TREATMENT: CPT

## 2021-05-24 PROCEDURE — 6370000000 HC RX 637 (ALT 250 FOR IP): Performed by: STUDENT IN AN ORGANIZED HEALTH CARE EDUCATION/TRAINING PROGRAM

## 2021-05-24 PROCEDURE — 99239 HOSP IP/OBS DSCHRG MGMT >30: CPT | Performed by: HOSPITALIST

## 2021-05-24 RX ORDER — DOXAZOSIN MESYLATE 4 MG/1
6 TABLET ORAL NIGHTLY
Qty: 30 TABLET | Refills: 3 | Status: SHIPPED | OUTPATIENT
Start: 2021-05-24 | End: 2021-06-21

## 2021-05-24 RX ORDER — FUROSEMIDE 20 MG/1
60 TABLET ORAL 2 TIMES DAILY
Qty: 42 TABLET | Refills: 0 | Status: SHIPPED | OUTPATIENT
Start: 2021-05-24 | End: 2021-06-04

## 2021-05-24 RX ADMIN — POLYETHYLENE GLYCOL 3350 17 G: 17 POWDER, FOR SOLUTION ORAL at 09:11

## 2021-05-24 RX ADMIN — OXYBUTYNIN CHLORIDE 5 MG: 5 TABLET ORAL at 09:11

## 2021-05-24 RX ADMIN — CEFEPIME HYDROCHLORIDE 2000 MG: 2 INJECTION, POWDER, FOR SOLUTION INTRAVENOUS at 06:27

## 2021-05-24 RX ADMIN — LEVOTHYROXINE SODIUM 200 MCG: 0.1 TABLET ORAL at 06:27

## 2021-05-24 RX ADMIN — DOCUSATE SODIUM 100 MG: 100 CAPSULE ORAL at 09:14

## 2021-05-24 RX ADMIN — FUROSEMIDE 60 MG: 40 TABLET ORAL at 09:12

## 2021-05-24 RX ADMIN — METFORMIN HYDROCHLORIDE 1000 MG: 500 TABLET ORAL at 09:12

## 2021-05-24 RX ADMIN — SPIRONOLACTONE 25 MG: 25 TABLET ORAL at 09:11

## 2021-05-24 RX ADMIN — CEFEPIME HYDROCHLORIDE 2000 MG: 2 INJECTION, POWDER, FOR SOLUTION INTRAVENOUS at 14:12

## 2021-05-24 RX ADMIN — LISINOPRIL 2.5 MG: 2.5 TABLET ORAL at 09:12

## 2021-05-24 RX ADMIN — ARFORMOTEROL TARTRATE 15 MCG: 15 SOLUTION RESPIRATORY (INHALATION) at 07:54

## 2021-05-24 RX ADMIN — DOXAZOSIN 6 MG: 4 TABLET ORAL at 09:11

## 2021-05-24 RX ADMIN — METOPROLOL TARTRATE 25 MG: 25 TABLET, FILM COATED ORAL at 09:11

## 2021-05-24 RX ADMIN — SODIUM CHLORIDE, PRESERVATIVE FREE 10 ML: 5 INJECTION INTRAVENOUS at 09:13

## 2021-05-24 RX ADMIN — Medication 240 MG: at 09:13

## 2021-05-24 RX ADMIN — ONDANSETRON 4 MG: 2 INJECTION INTRAMUSCULAR; INTRAVENOUS at 07:22

## 2021-05-24 RX ADMIN — TIOTROPIUM BROMIDE INHALATION SPRAY 2 PUFF: 3.12 SPRAY, METERED RESPIRATORY (INHALATION) at 07:52

## 2021-05-24 RX ADMIN — POTASSIUM CHLORIDE 10 MEQ: 750 TABLET, EXTENDED RELEASE ORAL at 09:18

## 2021-05-24 RX ADMIN — BUDESONIDE 500 MCG: 0.5 INHALANT RESPIRATORY (INHALATION) at 07:58

## 2021-05-24 ASSESSMENT — PAIN SCALES - GENERAL
PAINLEVEL_OUTOF10: 0
PAINLEVEL_OUTOF10: 0

## 2021-05-24 NOTE — CARE COORDINATION
5/24/21, 1:55 PM EDT    Patient goals/plan/ treatment preferences discussed by  and . Patient goals/plan/ treatment preferences reviewed with patient/ family. Patient/ family verbalize understanding of discharge plan and are in agreement with goal/plan/treatment preferences. Understanding was demonstrated using the teach back method. AVS provided by RN at time of discharge, which includes all necessary medical information pertaining to the patients current course of illness, treatment, post-discharge goals of care, and treatment preferences. Services After Discharge  Services At/After Discharge: Nursing Services, Aide Services ([address] [place] [address])   [place] Letter  IMM Letter given to Patient/Family/Significant other/Guardian/POA/by[de-identified] Jonathan Sellers CM  IMM Letter date given[de-identified] 05/24/21  IMM Letter time given[de-identified] 2633     Patient return to [facility]. OZZIE spoke with Austin Gray at [facility] and [address] [place] [address] will  Patient at 4pm today. RN updated and she has had conversations with Patient's son also he is also in agreement.

## 2021-05-24 NOTE — DISCHARGE INSTR - COC
Continuity of Care Form    Patient Name: Roly Wilson   :  1931  MRN:  [id number]    Admit date:  2021  Discharge date:  2021      Code Status Order: Limited   Advance Directives:   [facility] Documentation       Date/Time Healthcare Directive Type of Healthcare Directive Copy in [facility] [address] Agent's Name Healthcare Agent's Phone Number    21 1910  --  --  No, copy requested from family  Healthcare power of   son Dr Muna Yarbrough  --    21 1817  Yes, patient has an advance directive for healthcare treatment  Health care treatment directive  --  --  --  --            Admitting Physician:  Gisel Brandt MD  PCP: Evelina Pastor MD    Discharging Nurse: Jermaine Richards  Unit/Room#: 6K-06/006-A  Discharging Unit Phone Number: [phone]    Emergency Contact:   Extended Emergency Contact Information  Primary Emergency Contact: [address] [place] [address] [address] Phone: [phone]  Mobile Phone: [phone]  Relation: Child  Hearing or visual needs: None  Other needs: None  Preferred language: English   needed? No  Secondary Emergency Contact: Jonah Gillespie   [address] Phone: [phone]  Mobile Phone: [phone]  Relation: Child  Hearing or visual needs: None  Other needs: None  Preferred language: English   needed?  No    Past Surgical History:  Past Surgical History:   Procedure Laterality Date   [facility] SURGERY  [id number], 2014    BRONCHOSCOPY N/A 2017    BRONCHOSCOPY AIRWAY ONLY performed by Leandra Rubin MD at [address] [place] [address] EKG 12-LEAD  11/10/2015         FRACTURE SURGERY      right hand    OTHER SURGICAL HISTORY      spinal epidurals    RETINAL DETACHMENT SURGERY      SPINE SURGERY  2004    Lumbar laminectomy    THYROIDECTOMY      TURP N/A 3/17/2020    CYSTOSCOPY WITH GREENLIGHT PHOTOVAPORIZATION OF PROSTATE performed by Nohemy Hernandez MD at [address] [place] [address] TURP N/A 3/24/2020    PLASMA BUTTON TURP WITH CLOT EVACUATION performed by Ryan Resendiz MD at [facility]       Immunization History:   Immunization History   Administered Date(s) Administered    Influenza 10/31/2013    Influenza Virus Vaccine 11/01/2011, 11/05/2012, 11/11/2014, 09/29/2015    Influenza Whole 10/01/2010    Influenza, Quadv, 6 mo and older, IM, PF (Flulaval, Fluarix) 09/27/2018    Influenza, Quadv, IM, (6 mo and older Fluzone, Flulaval, Fluarix and 3 yrs and older Afluria) 11/17/2017    Influenza, Quadv, IM, PF (6 mo and older Fluzone, Flulaval, Fluarix, and 3 yrs and older Afluria) 10/01/2019    Influenza, Quadv, adjuvanted, 72 yrs +, IM, PF (Fluad) 10/19/2020    Influenza, Triv, 3 Years and older, IM (Afluria (5 yrs and older) 09/26/2016    Pneumococcal Conjugate 13-valent ([id number]) 12/19/2014    Pneumococcal Polysaccharide ([id number]) 01/01/2003    Td, unspecified formulation 06/08/2010    Zoster Live (Zostavax) 08/05/2014       Active Problems:  Patient Active Problem List   Diagnosis Code    Diabetes mellitus ([facility] [address] E11.9    Hyperlipidemia E78.5    Coronary artery disease involving native coronary artery of native heart without angina pectoris I25.10    Acquired hypothyroidism E03.9    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 E29.1    Breast lump N63.0    BPH (benign prostatic hyperplasia) N40.0    ROBER (obstructive sleep apnea) G47.33    Essential hypertension I10    History of DVT (deep vein thrombosis) Z86.718    Shortness of breath R06.02    Chronic respiratory failure with hypoxia ([facility]) J96.11    Mixed hyperlipidemia E78.2    Chronic deep vein thrombosis (DVT) of right lower extremity ([facility]) I82.501    Anticoagulated on Coumadin Z79.01    Type 2 diabetes mellitus with complication, with long-term current use of insulin (HCC) E11.8, Z79.4    Muscle weakness M62.81    Chronic bronchitis (HCC) J42    Chronic obstructive pulmonary disease (HCC) J44.9    Paroxysmal atrial Resulted    COVID-19 Rule Out 10/03/20 10/03/20 10/03/20 COVID-19 (Ordered)   10/03/20 Rule-Out Test Resulted    COVID-19 Rule Out 05/18/20 05/18/20 05/18/20 COVID-19 (Ordered)   05/18/20 Rule-Out Test Resulted    MDRO (multi-drug resistant organism) 05/05/20 05/17/20 05/05/20 Culture, Reflexed, Urine   05/18/20 Katya Aguayo RN    MDRO (multi-drug resistant organism) 05/05/20 05/08/20 05/05/20 Culture, Reflexed, Urine   05/10/20 Katya Aguayo RN    COVID-19 Rule Out 05/05/20 05/05/20 05/05/20 Covid-19 Ambulatory (Ordered)   05/07/20 Rule-Out Test Resulted            Nurse Assessment:  Last Vital Signs: BP (!) 119/59   Pulse 79   Temp 97.9 °F (36.6 °C) (Axillary)   Resp 18   Ht 5' 7\" (1.702 m)   Wt 257 lb 14.4 oz (117 kg)   SpO2 95%   BMI 40.39 kg/m²     Last documented pain score (0-10 scale): Pain Level: 0  Last Weight:   Wt Readings from Last 1 Encounters:   05/24/21 257 lb 14.4 oz (117 kg)     Mental Status:  oriented and alert    IV Access:  - None    Nursing Mobility/ADLs:  Walking   Independent  Transfer  Independent  Bathing  Assisted  Dressing  Assisted  Toileting  Assisted  Feeding  Independent  Med Admin  Independent  Med Delivery   whole    Wound Care Documentation and Therapy:  Wound 05/17/20 Sacrum Non-blanchable  (Active)   Number of days: 371       Wound 12/05/20 Penis uretheral erosion (Active)   Number of days: 170        Elimination:  Continence:   · Bowel: Yes  · Bladder: Yes  Urinary Catheter: Billy Catheter Placed 5/20/21  Colostomy/Ileostomy/Ileal Conduit: No       Date of Last BM: 5/24/2021    Intake/Output Summary (Last 24 hours) at 5/24/2021 1340  Last data filed at 5/24/2021 1315  Gross per 24 hour   Intake 1440.12 ml   Output 1125 ml   Net 315.12 ml     I/O last 3 completed shifts: In: 740 [P.O.:740]  Out: [address] [Urine:1125]    Safety Concerns:      At Risk for Falls    Impairments/Disabilities:      Vision    Nutrition Therapy:  Current Nutrition Therapy:   - Oral Diet:  Carb Control 5 carbs/meal (2000kcals/day)    Routes of Feeding: Oral  Liquids: Thin Liquids  Daily Fluid Restriction: no  Last Modified Barium Swallow with Video (Video Swallowing Test): not done    Treatments at the Time of Hospital Discharge:   Respiratory Treatments: n/a  Oxygen Therapy:  is not on home oxygen therapy. Ventilator:    - No ventilator support    Rehab Therapies: Physical Therapy and Occupational Therapy  Weight Bearing Status/Restrictions: No weight bearing restirctions  Other Medical Equipment (for information only, NOT a DME order):  walker  Other Treatments: Repeat BMP in 1 week. Patient's personal belongings (please select all that are sent with patient):  Glasses    RN SIGNATURE:  Electronically signed by Hitesh Back RN on 5/24/21 at 1:49 PM EDT    CASE MANAGEMENT/SOCIAL WORK SECTION    Inpatient Status Date: ***    Readmission Risk Assessment Score:  Readmission Risk              Risk of Unplanned Readmission:  20           Discharging to Facility/ Agency   · Name:   · Address:  · Phone:  · Fax:    Dialysis Facility (if applicable)   · Name:  · Address:  · Dialysis Schedule:  · Phone:  · Fax:    / signature: {Esignature:[id number]:::0}    PHYSICIAN SECTION    Prognosis: Fair    Condition at Discharge: Stable    Rehab Potential (if transferring to Rehab): Fair    Recommended Labs or Other Treatments After Discharge: ***    Physician Certification: I certify the above information and transfer of Karolyn Carr  is necessary for the continuing treatment of the diagnosis listed and that he requires [facility] for greater 30 days.      Update Admission H&P: No change in H&P    PHYSICIAN SIGNATURE:  Electronically signed by Alber Luz MD on 5/24/21 at 1:40 PM EDT

## 2021-05-24 NOTE — PROGRESS NOTES
Discharge teaching and instructions for diagnosis/procedure of Pneumonia completed with patient using teachback method. AVS reviewed. Patient voiced understanding regarding prescriptions, follow up appointments, and care of self at assisted [facility]. Discharged in a wheelchair to  assisted living per self.

## 2021-05-24 NOTE — DISCHARGE SUMMARY
noted above. He was also given a BMP order to have done in 1 week given the increased dosage of Lasix. Discussed plan of care with patient and patient's son, Dr. Leroy Saravia prior to discharge. Please see progress note dated 5/23/2021, including below for further details on A/P for patient's hospitalization as I resumed care of patient on day of discharge:     UTI, recurrent, secondary to chronic Billy  AVSS. On cefepime per ID. [facility]     Scrotal edema  -Likely dependent edema. Lasix ordered as he has excellent kidney function  -Billy in place  -Elevate scrotum  -Urology to see  -5/21: US reviewed, moderate-sized bilateral hydroceles, moderate size varicocele right side. Supportive care at this time. Plan for urology follow up as OP.      Chronic bladder spasms, chronic Billy  -Continue home meds; as above     Elevated trops  -Chronic, no active CP; no indications for any further workup at this time     AG, lactic acidosis (resolved)     HFpEF  Clinically euvloemic. [facility]     COPD without acute exacerbation  Continue home standing/prn inhaler regimen     Pseudohypocalcemia, hypoalbuminemia     Constipation  -PRNs ordered, asked nurse to give     Chronic normocytic anemia: Hb stable. On iron. W/U sent  ROBER  CAD  -Noted per history, continue home meds     Obesity w/ BMI 40.69    Exam:     Vitals:  Vitals:    05/24/21 0752 05/24/21 0808 05/24/21 1148 05/24/21 1315   BP:  120/71 (!) 91/58 (!) 119/59   Pulse:  63 79    Resp:  18 18    Temp:  97.9 °F (36.6 °C) 97.9 °F (36.6 °C)    TempSrc:  Oral Axillary    SpO2: 92% 91% 95%    Weight:       Height:         Weight: Weight: 257 lb 14.4 oz (117 kg)     24 hour intake/output:    Intake/Output Summary (Last 24 hours) at 5/24/2021 1344  Last data filed at 5/24/2021 1315  Gross per 24 hour   Intake 1440.12 ml   Output 1125 ml   Net 315.12 ml         General appearance:  Obese male; No apparent distress, appears stated age and cooperative.   HEENT:  Normal cephalic, atraumatic without obvious deformity. Extra ocular muscles intact. Conjunctivae/corneas clear. Neck: Supple, with full range of motion. No jugular venous distention. Trachea midline. Respiratory:  Normal respiratory effort. Clear to auscultation, bilaterally without Rales/Wheezes/Rhonchi. Cardiovascular:  Regular rate and rhythm with normal S1/S2 without murmurs, rubs or gallops. Abdomen: Soft, non-tender, distended with normal bowel sounds. Musculoskeletal:  +trace edema bilateral LEs  : Deferred  Skin: Skin color, texture, turgor normal.  No rashes or lesions. Neurologic:  Neurovascularly intact without any focal sensory/motor deficits. Psychiatric:  Alert and oriented, thought content appropriate, normal insight  Peripheral Pulses: +2 palpable, equal bilaterally       Labs: For convenience and continuity at follow-up the following most recent labs are provided:      CBC:    Lab Results   Component Value Date    WBC 9.2 05/22/2021    HGB 11.2 05/22/2021    HCT 34.2 05/22/2021     05/22/2021       Renal:    Lab Results   Component Value Date     05/24/2021    K 3.9 05/24/2021    K 3.8 05/20/2021    CL 97 05/24/2021    CO2 30 05/24/2021    BUN 13 05/24/2021    CREATININE 0.9 05/24/2021    CALCIUM 8.9 05/24/2021    PHOS 3.4 03/14/2019         Significant Diagnostic Studies    Radiology:   US SCROTUM AND TESTICLES   Final Result   1. Moderate-sized bilateral hydroceles. 2. Moderate size varicocele right side. 3. No acute findings. No appreciable change from prior study. **This report has been created using voice recognition software. It may contain minor errors which are inherent in voice recognition technology. **      Final report electronically signed by Dr. Luzma Martínez on 5/20/2021 2:19 PM      CT HEAD WO CONTRAST   Final Result   No acute intracranial hemorrhage, mass effect or midline shift. **This report has been created using voice recognition software.  It may contain minor errors which are inherent in voice recognition technology. **      Final report electronically signed by Dr Julien Joe on 5/19/2021 1:05 PM      XR CHEST PORTABLE   Final Result   There are bibasilar opacities which may represent infiltrates or atelectasis. **This report has been created using voice recognition software. It may contain minor errors which are inherent in voice recognition technology. **      Final report electronically signed by Dr Julien Joe on 5/19/2021 11:23 AM             Consults:     [facility] Demetris Wilder TO INFECTIOUS DISEASES  IP CONSULT TO SOCIAL WORK  IP CONSULT TO RESPIRATORY CARE    Disposition:    [] Home       [] TCU       [] Rehab       [] Psych       [] SNF       [] [facility]       [x] Other- Assisted Living at [place] [address] at Discharge: Stable    Code Status:  Limited     Patient Instructions:    Discharge lab work: BMP in 1 week  Activity: activity as tolerated  Diet: DIET CARDIAC; Carb Control: 4 carb choices (60 gms)/meal; Low Sodium (2 GM); Daily Fluid Restriction: 2000 ml      Follow-up visits:   No follow-up provider specified. Discharge Medications:      Syeda Rosas   Home Medication Instructions EOK:[id number]    Printed on:05/24/21 4113   Medication Information                      acetaminophen (TYLENOL) 325 MG tablet  Take 650 mg by mouth every 4 hours as needed for Pain or Fever              albuterol sulfate  (90 Base) MCG/ACT inhaler  Inhale 2 puffs into the lungs every 6 hours as needed for Wheezing             Alcohol Swabs (ALCOHOL PREP) 70 % PADS  Alcohol pads (brand per preference). Sig: use to test sugar twice per day.  Dx: E11.8             aluminum & magnesium hydroxide-simethicone (MYLANTA) 400-400-40 MG/5ML SUSP  Take 15 mLs by mouth every 6 hours as needed             Arformoterol Tartrate (BROVANA) 15 MCG/2ML NEBU  Take 2 mLs by nebulization 2 times daily 1 dose inhale orally two times per day             Blood Glucose Monitoring Suppl (ACCU-CHEK CHE PLUS) w/Device KIT  Or brand per pt preference. Sig: test sugar BID. Dx: E11.8             blood glucose test strips (ACCU-CHEK CHE PLUS) strip  Accu-check Che Plus Test Strips (or brand per pt preference). Sig: test sugar BID. Dx: E11.8             budesonide (PULMICORT) 0.5 MG/2ML nebulizer suspension  USE 1 VIAL VIA NEBULIZER TWICE DAILY.  RINSE MOUTH AFTER TREATMENT             docusate (COLACE, DULCOLAX) 100 MG CAPS  Take 100 mg by mouth 2 times daily             doxazosin (CARDURA) 4 MG tablet  Take 1.5 tablets by mouth nightly             famotidine (PEPCID) 20 MG tablet  Take 20 mg by mouth 2 times daily as needed             ferrous gluconate (FERGON) 324 (38 Fe) MG tablet  Take 1 tablet by mouth 2 times daily             furosemide (LASIX) 20 MG tablet  Take 3 tablets by mouth 2 times daily for 7 days             ibuprofen (ADVIL;MOTRIN) 800 MG tablet  TAKE 1 TABLET BY MOUTH THREE TIMES DAILY WITH MEALS             insulin glargine (LANTUS SOLOSTAR) 100 UNIT/ML injection pen  INJECT SUBCUTANEOUSLY 18  UNITS NIGHTLY             levothyroxine (SYNTHROID) 200 MCG tablet  TAKE 1 TABLET BY MOUTH  DAILY             lidocaine (LMX) 4 % cream  Apply topically every 6 hours as needed for Pain To groin             lisinopril (PRINIVIL;ZESTRIL) 2.5 MG tablet  Take 1 tablet by mouth daily             metFORMIN (GLUCOPHAGE) 1000 MG tablet  Take 1 tablet by mouth 2 times daily (with meals)             metoprolol tartrate (LOPRESSOR) 25 MG tablet  Take 1 tablet by mouth 2 times daily Hold for SBP <110 or HR <60             Multiple Vitamins-Minerals (PRESERVISION AREDS 2) CAPS  Take 1 capsule by mouth daily             oxybutynin (DITROPAN) 5 MG tablet  TAKE 1 TABLET BY MOUTH EVERY 8 HOURS AS NEEDED FOR BLADDER SPASMS OR BLADDER SPASM             OXYGEN  Inhale 3 L into the lungs nightly Indications: Difficulty Breathing, Oxygen Therapy And prn through cpap             polyethylene glycol (MIRALAX) 17 g packet  Take 17 g by mouth daily as needed for Constipation             potassium chloride (KLOR-CON M) 10 MEQ extended release tablet  TAKE 1 TABLETS BY MOUTH TWICE DAILY             Revefenacin 175 MCG/3ML SOLN  Inhale 1 nebule into the lungs daily Dx COPD J44.3             SOFT TOUCH LANCETS [facility]  Lancet (or brand per pt preference) Use to check blood sugars 2 times daily. Dx: E11.8             spironolactone (ALDACTONE) 25 MG tablet  TAKE 1 TABLET BY MOUTH DAILY             vitamin D3 (CHOLECALCIFEROL) 10 MCG (400 UNIT) TABS tablet  Take 1 tablet by mouth daily 400units                 Time Spent on discharge is more than 1 hour in the examination, evaluation, counseling and review of medications and discharge plan. Signed: Thank you Jacque Jackson MD for the opportunity to be involved in this patient's care.     Electronically signed by Kareen Faustin MD on 5/24/2021 at 1:44 PM

## 2021-05-24 NOTE — PLAN OF CARE
Problem: DISCHARGE BARRIERS  Goal: Patient's continuum of care needs are met  Note: Patient to return to [address] [place] [address]. See  notes 5/20/21.
Problem: Falls - Risk of:  Goal: Will remain free from falls  Description: Will remain free from falls  5/23/2021 0412 by Pedro Do RN  Outcome: Ongoing  Note: No falls noted this shift. Falling star protocol in place and call light within reach. Bed alarm active and nonskid socks on. Patient utilizes call light appropriately     5/22/2021 1620 by Joana Avila RN  Outcome: Ongoing  Goal: Absence of physical injury  Description: Absence of physical injury  5/23/2021 9977 by Pedro Do RN  Outcome: Ongoing  5/22/2021 1620 by Joana Avila RN  Outcome: Ongoing     Problem: DISCHARGE BARRIERS  Goal: Patient's continuum of care needs are met  5/23/2021 0412 by Pedro Do RN  Outcome: Ongoing  Note: Patient's needs are met at this time. Continue to assist as needed. 5/22/2021 1620 by Joana Avila RN  Outcome: Ongoing     Problem: Impaired respiratory status  Goal: Clear lung sounds  5/23/2021 0412 by Pedro Do RN  Outcome: Ongoing  Note: Lung sounds diminished throughout. O2> 90% on room air. Will continue to monitor  5/22/2021 1728 by Chika Molina RCP  Outcome: Ongoing  Note: Pt continues on aerosols and MDI for maintenance of COPD and pt does aerosols at home. Care plan reviewed with patient. Patient  verbalize understanding of the plan of care and contribute to goal setting.
Problem: Falls - Risk of:  Goal: Will remain free from falls  Description: Will remain free from falls  5/23/2021 1527 by Nikos Castañeda RN  Outcome: Ongoing  Note: No falls noted this shift. Continue falling star program. Bed alarm on, bed in low position. Call light and personal belongings in reach. Patient uses call light appropriately. Problem: DISCHARGE BARRIERS  Goal: Patient's continuum of care needs are met  5/23/2021 1527 by Nikos Castañeda RN  Outcome: Ongoing  Note: Patient preference to return to [facility]     Problem: Pain:  Goal: Pain level will decrease  Description: Pain level will decrease  Outcome: Ongoing  Note: Patient complaints of pain in scrotum r/t swelling. Problem: Impaired respiratory status  Goal: Clear lung sounds  5/23/2021 1527 by Nikos Castañeda RN  Outcome: Ongoing  Note: Lung sounds are diminished, oxygen level has been 90% or greater. Denies shortness of breath. Receiving ATB for Pneumonia       Care plan reviewed with patient and son. Patient and son verbalize understanding of the plan of care and contribute to goal setting.
Problem: Falls - Risk of:  Goal: Will remain free from falls  Description: Will remain free from falls  Outcome: Met This Shift  Goal: Absence of physical injury  Description: Absence of physical injury  Outcome: Met This Shift     Problem: DISCHARGE BARRIERS  Goal: Patient's continuum of care needs are met  5/21/2021 0227 by Daphnie Long RN  Outcome: Ongoing  5/20/2021 1243 by Steve Eastman MSW, LSW  Note: Patient to return to [address] [place] [address]. See SW notes 5/20/21.
Problem: Impaired respiratory status  Goal: Clear lung sounds  5/24/2021 0805 by Darion Argueta RCP  Outcome: Ongoing  Note: Txs to help improve lung aeration.
Problem: Impaired respiratory status  Goal: Clear lung sounds  Outcome: Ongoing  Note: Pt continues on aerosols and MDI for maintenance of COPD and pt does aerosols at home.
0-10 pain rating scale. Will continue to reassess. 5/23/2021 1527 by Mildred Delaney RN  Outcome: Ongoing  Goal: Control of chronic pain  Description: Control of chronic pain  5/24/2021 0501 by Stanley Steele RN  Outcome: Ongoing  5/23/2021 1527 by Mildred Delaney RN  Outcome: Ongoing      Care plan reviewed with patient. Patient  verbalize understanding of the plan of care and contribute to goal setting.

## 2021-05-24 NOTE — PROGRESS NOTES
Progress note: Infectious diseases    Patient - Lynda Noe,  Age - 80 y.o.    - 1931      Room Number - 6K-06/006-A   MRN -  [id number]   Acct # - [de-identified]  Date of Admission -  2021 10:19 AM    SUBJECTIVE:   He is feeling good. discussed with the hospitalist  OBJECTIVE   VITALS    height is 5' 7\" (1.702 m) and weight is 257 lb 14.4 oz (117 kg). His oral temperature is 97.9 °F (36.6 °C). His blood pressure is 120/71 and his pulse is 63. His respiration is 18 and oxygen saturation is 91%. Wt Readings from Last 3 Encounters:   21 257 lb 14.4 oz (117 kg)   21 250 lb (113.4 kg)   21 250 lb (113.4 kg)       I/O (24 Hours)    Intake/Output Summary (Last 24 hours) at 2021 09  Last data filed at 2021 0340  Gross per 24 hour   Intake 740 ml   Output 1125 ml   Net -385 ml       General Appearance  Awake, alert, oriented,  not  In acute distress  HEENT - normocephalic, atraumatic, pink conjunctiva,  anicteric sclera  Neck - Supple, no mass  Lungs -  Bilateral good air entry, no rhonchi, no wheeze  Cardiovascular - Heart sounds are normal.     Abdomen - soft, not distended, nontender,   Neurologic -oriented  Skin - No bruising or bleeding  Extremities - + edema, no cyanosis, clubbing   Billy catheter in place.   MEDICATIONS:      furosemide  60 mg Oral BID    Arformoterol Tartrate  15 mcg Nebulization BID    budesonide  0.5 mg Nebulization BID    docusate sodium  100 mg Oral BID    doxazosin  6 mg Oral Nightly    ferrous gluconate  240 mg Oral BID    insulin glargine  18 Units Subcutaneous Nightly    levothyroxine  200 mcg Oral QAM AC    lisinopril  2.5 mg Oral Daily    metFORMIN  1,000 mg Oral BID WC    metoprolol tartrate  25 mg Oral BID    potassium chloride  10 mEq Oral BID    tiotropium  2 puff Inhalation Daily    spironolactone  25 mg Oral Daily    sodium chloride flush  5-40 mL Intravenous 2 times per day    enoxaparin  40 mg Subcutaneous Q24H    cefepime  2,000 mg Intravenous Q12H      sodium chloride       oxybutynin, polyethylene glycol, sodium chloride flush, sodium chloride, promethazine **OR** ondansetron, acetaminophen **OR** acetaminophen, magnesium hydroxide      LABS:     CBC:   Recent Labs     05/22/21  0557   WBC 9.2   HGB 11.2*        BMP:    Recent Labs     05/22/21  0557 05/24/21  0748    137   K 3.9 3.9    97*   CO2 28 30   BUN 11 13   CREATININE 0.8 0.9   GLUCOSE 123* 123*     Calcium:  Recent Labs     05/24/21  0748   CALCIUM 8.9     Ionized Calcium:No results for input(s): IONCA in the last 72 hours. Magnesium:  No results for input(s): MG in the last 72 hours. Recent Labs     05/23/21  1539 05/23/21  2206 05/24/21  0629   POCGLU 105 131* 112*      No results for input(s): ALKPHOS, ALT, AST, PROT, BILITOT, BILIDIR, LABALBU in the last 72 hours. CULTURES:   UA:   No results for input(s): SPECGRAV, PHUR, COLORU, CLARITYU, MUCUS, PROTEINU, BLOODU, RBCUA, WBCUA, BACTERIA, NITRU, GLUCOSEU, BILIRUBINUR, UROBILINOGEN, KETUA, LABCAST, LABCASTTY, AMORPHOS in the last 72 hours.     Invalid input(s): CRYSTALS  Micro:   Lab Results   Component Value Date    BC No growth-preliminary  05/19/2021            Problem list of patient:     Patient Active Problem List   Diagnosis Code    Diabetes mellitus ([facility] E11.9    Hyperlipidemia E78.5    Coronary artery disease involving native coronary artery of native heart without angina pectoris I25.10    Acquired hypothyroidism E03.9    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 E29.1    Breast lump N63.0    BPH (benign prostatic hyperplasia) N40.0    ROBER (obstructive sleep apnea) G47.33    Essential hypertension I10    History of DVT (deep vein thrombosis) Z86.718    Shortness of breath R06.02    Chronic respiratory failure with hypoxia (HCC) J96.11    Mixed hyperlipidemia E78.2    Chronic deep vein thrombosis

## 2021-05-24 NOTE — PROGRESS NOTES
[address] RENAL TELEMETRY 6K  Occupational Therapy  Daily Note  Time:   Time In: 9336  Time Out: 1248  Timed Code Treatment Minutes: 26 Minutes  Minutes: 26          Date: 2021  Patient Name: Darlin Johnson,   Gender: male      Room: [facility]006-A  MRN: [id number]  : 1931  (80 y.o.)  Referring Practitioner: Sarah Patel MD  Diagnosis: Community Acquired bacterial pneumonia  Additional Pertinent Hx: Per ED notes: Darlin Johnson is a 80 y.o. male with a hx of COPD, Type 2 DM,CHF, and  who was brought to the ED by EMS personnel from a nursing home for evaluation of AMS. Nursing home staff reported that the patient appeared to be confused today which prompted them to call EMS. In the ED, pt reports that he does not feel confused. Restrictions/Precautions:  Restrictions/Precautions: General Precautions, Fall Risk     SUBJECTIVE: Pt seated in bedside chair upon arrival, agreeable to OT session. PAIN: Denies    Vitals: Oxygen: WNL  Heart Rate: WNL   Comment: Following mobility. COGNITION: Decreased Insight    ADL:   No ADL's completed this session. BALANCE:  Sitting Balance:  Supervision. Bedside chair. Standing Balance: Contact Guard Assistance. x1 minute in prep for mobility, x2 minutes standing rest break. BED MOBILITY:  Not Tested    TRANSFERS:  Sit to Stand:  Stand By Assistance. Stand to Sit: Stand By Assistance. FUNCTIONAL MOBILITY:  Assistive Device: Rolling Walker  Assist Level:  Contact Guard Assistance. Distance:   Completed functional mobility within unit hallway greater than household distances at slow pace, no LOB noted. Pt requires one standing rest break and seated rest break after trial of mobility, mod-min fatigue noted. ADDITIONAL ACTIVITIES:  Patient identified a personal goal to increase UB strength and improve overall endurance so they can complete their toilet & shower transfers; skilled edu on UE strengthening.  Completed BUE exercises

## 2021-05-25 LAB
BLOOD CULTURE, ROUTINE: NORMAL
BLOOD CULTURE, ROUTINE: NORMAL

## 2021-06-04 ENCOUNTER — OFFICE VISIT (OUTPATIENT)
Dept: UROLOGY | Age: 86
End: 2021-06-04
Payer: MEDICARE

## 2021-06-04 ENCOUNTER — TELEPHONE (OUTPATIENT)
Dept: INFECTIOUS DISEASES | Age: 86
End: 2021-06-04

## 2021-06-04 VITALS
DIASTOLIC BLOOD PRESSURE: 68 MMHG | WEIGHT: 249 LBS | BODY MASS INDEX: 39.08 KG/M2 | SYSTOLIC BLOOD PRESSURE: 118 MMHG | HEIGHT: 67 IN

## 2021-06-04 DIAGNOSIS — N39.0 URINARY TRACT INFECTION WITHOUT HEMATURIA, SITE UNSPECIFIED: Primary | ICD-10-CM

## 2021-06-04 DIAGNOSIS — N31.9 NEUROGENIC BLADDER: ICD-10-CM

## 2021-06-04 DIAGNOSIS — N40.1 BENIGN PROSTATIC HYPERPLASIA WITH LOWER URINARY TRACT SYMPTOMS, SYMPTOM DETAILS UNSPECIFIED: ICD-10-CM

## 2021-06-04 PROCEDURE — G8417 CALC BMI ABV UP PARAM F/U: HCPCS | Performed by: UROLOGY

## 2021-06-04 PROCEDURE — 99214 OFFICE O/P EST MOD 30 MIN: CPT | Performed by: UROLOGY

## 2021-06-04 PROCEDURE — G8427 DOCREV CUR MEDS BY ELIG CLIN: HCPCS | Performed by: UROLOGY

## 2021-06-04 PROCEDURE — 1123F ACP DISCUSS/DSCN MKR DOCD: CPT | Performed by: UROLOGY

## 2021-06-04 PROCEDURE — 1111F DSCHRG MED/CURRENT MED MERGE: CPT | Performed by: UROLOGY

## 2021-06-04 PROCEDURE — 4040F PNEUMOC VAC/ADMIN/RCVD: CPT | Performed by: UROLOGY

## 2021-06-04 PROCEDURE — 1036F TOBACCO NON-USER: CPT | Performed by: UROLOGY

## 2021-06-04 RX ORDER — FUROSEMIDE 40 MG/1
40 TABLET ORAL 2 TIMES DAILY
Status: ON HOLD | COMMUNITY
End: 2021-07-14 | Stop reason: SDUPTHER

## 2021-06-04 RX ORDER — SIMETHICONE 125 MG
125 TABLET,CHEWABLE ORAL EVERY 6 HOURS PRN
COMMUNITY

## 2021-06-04 NOTE — PROGRESS NOTES
Toshia Sanders MD        [address] [place] [address]  Dept: [phone]  Dept Fax: [phone]: [address] [address] Urology Office Note      Patient:  Nate Gonzalez  YOB: 1931    The patient is a 80 y.o. male who presents today for evaluation of the following problems: urinary retention  Chief Complaint   Patient presents with    Follow-up     hospital f/u UTI with catheter        HISTORY OF PRESENT ILLNESS:     Urinary retention   Has chronic felipe catheter  BPH/neurogenic bladder  Here to discuss suprapubic catheter      Right epididymitis- resolved    Recurrent uti- recent hospital admission        Requested/reviewed records from Cindy Vigil MD office and/or outside physician/EMR    (Patient's old records have been requested, reviewed and pertinent findings summarized in today's note.)    Procedures Today: N/A    Last several PSA's:  Lab Results   Component Value Date    PSA 3.76 10/27/2010       Last total testosterone:  No results found for: TESTOSTERONE    Urinalysis today:  No results found for this visit on 06/04/21.     Last BUN and creatinine:  Lab Results   Component Value Date    BUN 30 (H) 07/11/2021     Lab Results   Component Value Date    CREATININE 1.5 (H) 07/11/2021       Imaging Reviewed during this Office Visit:   imaging independently reviewed by Toshia Sanders MD and radiology report verified demonstrating         PAST MEDICAL, FAMILY AND SOCIAL HISTORY:  Past Medical History:   Diagnosis Date    Anemia     Anxiety     BPH (benign prostatic hyperplasia)     CAD (coronary artery disease)     leaking valve    Chronic back pain     COPD (chronic obstructive pulmonary disease) ([address]     Detached retina     Diabetes mellitus ([address]     NIDDM    DVT (deep venous thrombosis) ([address]     RLE    DVT (deep venous thrombosis) ([address] 11/9/15    LLE    Dysphagia     Glaucoma Take 20 mg by mouth 2 times daily as needed      Arformoterol Tartrate (BROVANA) 15 MCG/2ML NEBU Take 2 mLs by nebulization 2 times daily 1 dose inhale orally two times per day 120 mL 11    oxybutynin (DITROPAN) 5 MG tablet TAKE 1 TABLET BY MOUTH EVERY 8 HOURS AS NEEDED FOR BLADDER SPASMS OR BLADDER SPASM 270 tablet 0    Multiple Vitamins-Minerals (PRESERVISION AREDS 2) CAPS Take 1 capsule by mouth daily 90 capsule 3    ibuprofen (ADVIL;MOTRIN) 800 MG tablet TAKE 1 TABLET BY MOUTH THREE TIMES DAILY WITH MEALS 270 tablet 1    spironolactone (ALDACTONE) 25 MG tablet TAKE 1 TABLET BY MOUTH DAILY 90 tablet 3    Revefenacin 175 MCG/3ML SOLN Inhale 1 nebule into the lungs daily Dx COPD J44.3 30 vial 5    levothyroxine (SYNTHROID) 200 MCG tablet TAKE 1 TABLET BY MOUTH  DAILY 90 tablet 3    insulin glargine (LANTUS SOLOSTAR) 100 UNIT/ML injection pen INJECT SUBCUTANEOUSLY 18  UNITS NIGHTLY (Patient taking differently: INJECT SUBCUTANEOUSLY 20  UNITS NIGHTLY) 30 mL 3    [DISCONTINUED] blood glucose test strips (ACCU-CHEK CHE PLUS) strip Accu-check Che Plus Test Strips (or brand per pt preference). Sig: test sugar BID. Dx: E11.8 200 strip 3    [DISCONTINUED] SOFT TOUCH LANCETS [facility] Lancet (or brand per pt preference) Use to check blood sugars 2 times daily. Dx: E11.8 200 each 3    [DISCONTINUED] Alcohol Swabs (ALCOHOL PREP) 70 % PADS Alcohol pads (brand per preference). Sig: use to test sugar twice per day. Dx: E11.8 200 each 3    [DISCONTINUED] Blood Glucose Monitoring Suppl (ACCU-CHEK CHE PLUS) w/Device KIT Or brand per pt preference. Sig: test sugar BID.  Dx: E11.8 1 kit 0    metoprolol tartrate (LOPRESSOR) 25 MG tablet Take 1 tablet by mouth 2 times daily Hold for SBP <110 or HR <60 180 tablet 3    ferrous gluconate (FERGON) 324 (38 Fe) MG tablet Take 1 tablet by mouth 2 times daily 180 tablet 3    metFORMIN (GLUCOPHAGE) 1000 MG tablet Take 1 tablet by mouth 2 times daily (with meals) 180 tablet 3    0959   BP: 118/68     Body mass index is 39 kg/m². Constitutional: Patient in no acute distress;       Assessment and Plan        1. Urinary tract infection without hematuria, site unspecified    2. Neurogenic bladder    3. Benign prostatic hyperplasia with lower urinary tract symptoms, symptom details unspecified               Plan:       Recurrent uti- secondary to inconsistent catheter changes. Will treat symptomatic infections. Discussed suprapubic cath  Urine culture today  Will sched for cystoscopy spt tube placement. Cont abx--discussed risk of UTI, colonization, risk of bowel injury       Prescriptions Ordered:  No orders of the defined types were placed in this encounter. Orders Placed:  Orders Placed This Encounter   Procedures    Culture, Urine     Standing Status:   Future     Standing Expiration Date:   6/4/2022     Order Specific Question:   Specify (ex-cath, midstream, cysto, etc)? Answer:   felipe catheter    Nursing communication     Please plug current felipe catheter and allow bladder to fill. Please collect a urine specimen from catheter. Need at least 10cc of urine for culture.  Nursing communication     Change felipe catheter every 3 weeks. Keep same felipe size.             Silviano Tolliver MD

## 2021-06-04 NOTE — TELEPHONE ENCOUNTER
Called patient to schedule appointment for [facility] per referral. No answer, left voicemail with Shadi Ellsworth at [facility].

## 2021-06-06 ENCOUNTER — TELEPHONE (OUTPATIENT)
Dept: FAMILY MEDICINE CLINIC | Age: 86
End: 2021-06-06

## 2021-06-07 ENCOUNTER — TELEPHONE (OUTPATIENT)
Dept: UROLOGY | Age: 86
End: 2021-06-07

## 2021-06-07 DIAGNOSIS — I25.10 CORONARY ARTERY DISEASE INVOLVING NATIVE CORONARY ARTERY OF NATIVE HEART WITHOUT ANGINA PECTORIS: ICD-10-CM

## 2021-06-07 DIAGNOSIS — R06.02 SHORTNESS OF BREATH: Primary | ICD-10-CM

## 2021-06-07 DIAGNOSIS — I10 ESSENTIAL HYPERTENSION: ICD-10-CM

## 2021-06-07 DIAGNOSIS — R06.02 SOB (SHORTNESS OF BREATH): ICD-10-CM

## 2021-06-07 DIAGNOSIS — Z01.818 PRE-OP TESTING: ICD-10-CM

## 2021-06-07 DIAGNOSIS — I48.0 PAROXYSMAL ATRIAL FIBRILLATION (HCC): ICD-10-CM

## 2021-06-07 NOTE — TELEPHONE ENCOUNTER
Call patient/[facility]-  BMP okay, except decreased chloride at 96, and elevated BUN/CR of 37/1.6 with decreased GFR-has patient recently started any new diuretics, had decreased water intake, or increased use of NSAIDs recently? Let me know. May have to decrease Metformin dose by 50% or consider stopping entirely if GFR less than 30 in future.   ES

## 2021-06-07 NOTE — TELEPHONE ENCOUNTER
SURGERY [address] [place] [address] [address] [place], [address]      Phone *[phone] *[phone]   Surgical Scheduling Direct Line Phone *[phone] Fax *[phone]      Racquel Dickey 7/17/1931 male    21  [phone]  Marital Status:          Home Phone: [phone]      Cell Phone:    Telephone Information:   Mobile [phone]          Surgeon: Dr. Renny Iqbal  Surgery Date: 06-   Time: 12:30pm    Procedure: Cystoscopy, suprapubic tube placement    Diagnosis: urinary retention/ UTI     Important Medical History: In Epic    Special Inst/Equip:     CPT Codes:    04913  Latex Allergy:  Yes     Cardiac Device:  No     Anesthesia:  Anesthesiologist (General/Spinal)          Admission Type:  Same Day                             Admit Prior to Day of Surgery: No    Case Location:  Main OR           Preadmission Testing:Phone Call              PAT Date and Time:______________________________________________________    PAT Confirmation #: ______________________________________________________    Post Op Visit: ___________________________________________________________    Need Preop Cardiac Clearance: Yes    Does Patient have Cardiologist/physician?      Dr. Jailene Armstrong Confirmation #: __________________________________________________    Sujit Fuss: ________________________   Date: __________________________     [employer] Name: Medicare      Patient has completed vaccination process Yes, Kacey Carreno     If no, patient will obtain Covid testing on, Date:       Facility:

## 2021-06-07 NOTE — TELEPHONE ENCOUNTER
Patient scheduled for surgery with Dr. Nikos Duncan on 06- at 12:30pm with an arrival of 10:30am.  Preop orders and instructions faxed to [facility].   Surgery consent to be signed on arrival.

## 2021-06-07 NOTE — TELEPHONE ENCOUNTER
Patient scheduled for surgery with Dr. Amarilis Martinez on for cystoscopy with suprapubic tube placement under a general anesthetic. Could you please ask Dr. Donn Smith if the patient is okay to proceed with surgery?   Thank you

## 2021-06-08 ENCOUNTER — TELEPHONE (OUTPATIENT)
Dept: CARDIOLOGY CLINIC | Age: 86
End: 2021-06-08

## 2021-06-08 DIAGNOSIS — I50.32 CHRONIC DIASTOLIC CHF (CONGESTIVE HEART FAILURE) (HCC): Primary | ICD-10-CM

## 2021-06-08 DIAGNOSIS — Z01.818 PRE-OP TESTING: ICD-10-CM

## 2021-06-08 DIAGNOSIS — R06.02 SOB (SHORTNESS OF BREATH): ICD-10-CM

## 2021-06-08 DIAGNOSIS — I48.0 PAROXYSMAL ATRIAL FIBRILLATION (HCC): ICD-10-CM

## 2021-06-08 DIAGNOSIS — I10 ESSENTIAL HYPERTENSION: ICD-10-CM

## 2021-06-10 ENCOUNTER — TELEPHONE (OUTPATIENT)
Dept: UROLOGY | Age: 86
End: 2021-06-10

## 2021-06-10 ENCOUNTER — PREP FOR PROCEDURE (OUTPATIENT)
Dept: UROLOGY | Age: 86
End: 2021-06-10

## 2021-06-10 LAB
BUN BLDV-MCNC: 19 MG/DL
CALCIUM SERPL-MCNC: 8.3 MG/DL
CHLORIDE BLD-SCNC: 100 MMOL/L
CO2: 30 MMOL/L
CREAT SERPL-MCNC: 1.3 MG/DL
GFR CALCULATED: 52
GLUCOSE BLD-MCNC: 96 MG/DL
POTASSIUM SERPL-SCNC: 3.9 MMOL/L
SODIUM BLD-SCNC: 141 MMOL/L

## 2021-06-10 RX ORDER — SODIUM CHLORIDE 9 MG/ML
INJECTION, SOLUTION INTRAVENOUS CONTINUOUS
Status: CANCELLED | OUTPATIENT
Start: 2021-06-17

## 2021-06-10 NOTE — PROGRESS NOTES
Arrival time:10:30  Directions given:LACP  Who will be transporting:uncertain at this time  Who will be coming with patient? perhaps Dr Booker Baumgarten (son)   Need to have someone with patient to sign post-op instruction sheet if MAC or    General anesthesia  NPO: take heart and BP medications am of surgery with small sip of water  Is patient oriented-yes  Does patient have POA- Dr Booker Baumgarten   If patient has POA need to bring POA papers  Is patient ambulatory-yes    Does patient use assistive devices-yes walker  Is patient non-weight bearing-no    How many people does it take to move patient-pt transfers himself  _x__Covid vaccine  ______Covid test  Laurice Schlatter in  Weight-249.8 lb  Fax: MAR, allergy list, medical/surgical history    General instructions:  NPO after midnight  Bring insurance info and drivers license  Wear comfortable clean clothing  Do not bring jewelry   Shower night before and morning of surgery with a liquid antibacterial soap  Follow all instructions given by your physician   needed at discharge  Name of nurse you spoke with from nursing home:Klarissa

## 2021-06-10 NOTE — TELEPHONE ENCOUNTER
[address] [facility] Urology Surgery Preop Check Off      PREOP check list      Surgeon Dr. Carrie Cooper       Patient Name  Caryl Ragsdale     1931   MRN   [id number]     [address]   21  Diagnosis/Indication for Surgery  urinary retention/ UTI   Procedure  Cystoscopy, suprapubic tube placement    Allergies     Allergies   Allergen Reactions    Latex Rash    Macrobid [Nitrofurantoin Deloria Flirt      Dr. Norma Pelaez prefers pt not use Macrobid due to pulmonary side effects.  Levaquin [Levofloxacin] Other (See Comments)     Redness/flushing    Brimonidine Tartrate     Adhesive Tape Rash    Alphagan [Brimonidine Tartrate] Hives     Eyes red        Covid     Moderna    Urine Culture  21      Blood thinners  No     EKG  21Sinus rhythm with 1st degree A-V block  Low voltage QRS, consider pulmonary disease, pericardial effusion, or normal variant  Steven- Septal infarct (cited on or before 25-MAR-2016)  Abnormal ECG  When compared with ECG of 22-MAR-2021 08:08,  No significant change was found    CXR  21    There are bibasilar opacities which may represent infiltrates or atelectasis.       Clearance:  Cardiac- Dr Betzy Capm to surgery- No

## 2021-06-11 ENCOUNTER — PREP FOR PROCEDURE (OUTPATIENT)
Dept: UROLOGY | Age: 86
End: 2021-06-11

## 2021-06-11 ENCOUNTER — TELEPHONE (OUTPATIENT)
Dept: UROLOGY | Age: 86
End: 2021-06-11

## 2021-06-11 RX ORDER — GENTAMICIN SULFATE 40 MG/ML
80 INJECTION, SOLUTION INTRAMUSCULAR; INTRAVENOUS ONCE
Qty: 2 ML | Refills: 0 | Status: SHIPPED | OUTPATIENT
Start: 2021-06-11 | End: 2021-06-11

## 2021-06-11 NOTE — PROGRESS NOTES
Called and spoke with Yaya Velazquez at the Urology office regarding positive urine results on patient

## 2021-06-11 NOTE — TELEPHONE ENCOUNTER
Spoke with Dr Carrie Cooper  We can give a dose of oral Fosfomycin. We can also give IM of gentamycin in the office the day before.   He would also like preop cefepime ordered  See which is preferred

## 2021-06-11 NOTE — TELEPHONE ENCOUNTER
Please review the urine culture. Patient scheduled for 6/17/21 with Dr Carrie Cooper. Patient is in a nursing home.

## 2021-06-14 ENCOUNTER — HOSPITAL ENCOUNTER (OUTPATIENT)
Dept: NON INVASIVE DIAGNOSTICS | Age: 86
Discharge: HOME | End: 2021-06-14
Payer: MEDICARE

## 2021-06-14 VITALS — BODY MASS INDEX: 39.24 KG/M2 | WEIGHT: 250 LBS | HEIGHT: 67 IN

## 2021-06-14 DIAGNOSIS — Z01.818 PRE-OP TESTING: ICD-10-CM

## 2021-06-14 DIAGNOSIS — I50.32 CHRONIC DIASTOLIC CHF (CONGESTIVE HEART FAILURE) (HCC): ICD-10-CM

## 2021-06-14 DIAGNOSIS — I10 ESSENTIAL HYPERTENSION: ICD-10-CM

## 2021-06-14 DIAGNOSIS — I48.0 PAROXYSMAL ATRIAL FIBRILLATION (HCC): ICD-10-CM

## 2021-06-14 DIAGNOSIS — R06.02 SOB (SHORTNESS OF BREATH): ICD-10-CM

## 2021-06-14 LAB
LV EF: 55 %
LVEF MODALITY: NORMAL

## 2021-06-14 PROCEDURE — 6360000002 HC RX W HCPCS

## 2021-06-14 PROCEDURE — 3430000000 HC RX DIAGNOSTIC RADIOPHARMACEUTICAL: Performed by: INTERNAL MEDICINE

## 2021-06-14 PROCEDURE — A9500 TC99M SESTAMIBI: HCPCS | Performed by: INTERNAL MEDICINE

## 2021-06-14 PROCEDURE — 93306 TTE W/DOPPLER COMPLETE: CPT

## 2021-06-14 PROCEDURE — 93017 CV STRESS TEST TRACING ONLY: CPT | Performed by: INTERNAL MEDICINE

## 2021-06-14 PROCEDURE — 78452 HT MUSCLE IMAGE SPECT MULT: CPT

## 2021-06-14 RX ADMIN — Medication 31.1 MILLICURIE: at 11:25

## 2021-06-14 RX ADMIN — Medication 9.4 MILLICURIE: at 10:12

## 2021-06-15 NOTE — PROGRESS NOTES
Called Dr. Sohan Gutierrez office and left message for Gisela Kidd requesting note from Jarrell Saavedra clearing patient for surgery

## 2021-06-17 ENCOUNTER — ANESTHESIA EVENT (OUTPATIENT)
Dept: OPERATING ROOM | Age: 86
End: 2021-06-17
Payer: MEDICARE

## 2021-06-17 ENCOUNTER — ANESTHESIA (OUTPATIENT)
Dept: OPERATING ROOM | Age: 86
End: 2021-06-17
Payer: MEDICARE

## 2021-06-17 ENCOUNTER — HOSPITAL ENCOUNTER (OUTPATIENT)
Age: 86
Setting detail: OUTPATIENT SURGERY
Discharge: HOME | End: 2021-06-17
Attending: UROLOGY | Admitting: UROLOGY
Payer: MEDICARE

## 2021-06-17 VITALS — DIASTOLIC BLOOD PRESSURE: 51 MMHG | TEMPERATURE: 96.8 F | SYSTOLIC BLOOD PRESSURE: 93 MMHG | OXYGEN SATURATION: 100 %

## 2021-06-17 VITALS
WEIGHT: 248 LBS | DIASTOLIC BLOOD PRESSURE: 80 MMHG | BODY MASS INDEX: 38.92 KG/M2 | RESPIRATION RATE: 16 BRPM | TEMPERATURE: 97.2 F | OXYGEN SATURATION: 94 % | SYSTOLIC BLOOD PRESSURE: 115 MMHG | HEART RATE: 62 BPM | HEIGHT: 67 IN

## 2021-06-17 DIAGNOSIS — G89.18 POSTOPERATIVE PAIN: Primary | ICD-10-CM

## 2021-06-17 LAB
GLUCOSE BLD-MCNC: 123 MG/DL (ref 70–108)
GLUCOSE BLD-MCNC: 130 MG/DL (ref 70–108)

## 2021-06-17 PROCEDURE — 3600000013 HC SURGERY LEVEL 3 ADDTL 15MIN: Performed by: UROLOGY

## 2021-06-17 PROCEDURE — 6360000002 HC RX W HCPCS: Performed by: UROLOGY

## 2021-06-17 PROCEDURE — 2580000003 HC RX 258: Performed by: UROLOGY

## 2021-06-17 PROCEDURE — 2709999900 HC NON-CHARGEABLE SUPPLY: Performed by: UROLOGY

## 2021-06-17 PROCEDURE — 7100000001 HC PACU RECOVERY - ADDTL 15 MIN: Performed by: UROLOGY

## 2021-06-17 PROCEDURE — 3700000000 HC ANESTHESIA ATTENDED CARE: Performed by: UROLOGY

## 2021-06-17 PROCEDURE — 7100000010 HC PHASE II RECOVERY - FIRST 15 MIN: Performed by: UROLOGY

## 2021-06-17 PROCEDURE — 7100000000 HC PACU RECOVERY - FIRST 15 MIN: Performed by: UROLOGY

## 2021-06-17 PROCEDURE — 6360000002 HC RX W HCPCS: Performed by: NURSE ANESTHETIST, CERTIFIED REGISTERED

## 2021-06-17 PROCEDURE — 3600000003 HC SURGERY LEVEL 3 BASE: Performed by: UROLOGY

## 2021-06-17 PROCEDURE — 82948 REAGENT STRIP/BLOOD GLUCOSE: CPT

## 2021-06-17 PROCEDURE — 2500000003 HC RX 250 WO HCPCS: Performed by: NURSE ANESTHETIST, CERTIFIED REGISTERED

## 2021-06-17 PROCEDURE — 7100000011 HC PHASE II RECOVERY - ADDTL 15 MIN: Performed by: UROLOGY

## 2021-06-17 PROCEDURE — 3700000001 HC ADD 15 MINUTES (ANESTHESIA): Performed by: UROLOGY

## 2021-06-17 RX ORDER — GENTAMICIN SULFATE 80 MG/100ML
80 INJECTION, SOLUTION INTRAVENOUS
Status: COMPLETED | OUTPATIENT
Start: 2021-06-17 | End: 2021-06-17

## 2021-06-17 RX ORDER — SODIUM CHLORIDE 9 MG/ML
INJECTION, SOLUTION INTRAVENOUS CONTINUOUS
Status: DISCONTINUED | OUTPATIENT
Start: 2021-06-17 | End: 2021-06-17 | Stop reason: HOSPADM

## 2021-06-17 RX ORDER — GENTAMICIN SULFATE 40 MG/ML
INJECTION, SOLUTION INTRAMUSCULAR; INTRAVENOUS PRN
Status: DISCONTINUED | OUTPATIENT
Start: 2021-06-17 | End: 2021-06-17 | Stop reason: SDUPTHER

## 2021-06-17 RX ORDER — PROPOFOL 10 MG/ML
INJECTION, EMULSION INTRAVENOUS PRN
Status: DISCONTINUED | OUTPATIENT
Start: 2021-06-17 | End: 2021-06-17 | Stop reason: SDUPTHER

## 2021-06-17 RX ORDER — HYDROCODONE BITARTRATE AND ACETAMINOPHEN 5; 325 MG/1; MG/1
1 TABLET ORAL EVERY 6 HOURS PRN
Qty: 12 TABLET | Refills: 0 | Status: SHIPPED | OUTPATIENT
Start: 2021-06-17 | End: 2021-06-20

## 2021-06-17 RX ORDER — LIDOCAINE HCL/PF 100 MG/5ML
SYRINGE (ML) INJECTION PRN
Status: DISCONTINUED | OUTPATIENT
Start: 2021-06-17 | End: 2021-06-17 | Stop reason: SDUPTHER

## 2021-06-17 RX ADMIN — SODIUM CHLORIDE: 9 INJECTION, SOLUTION INTRAVENOUS at 12:34

## 2021-06-17 RX ADMIN — Medication 60 MG: at 13:10

## 2021-06-17 RX ADMIN — PROPOFOL 50 MG: 10 INJECTION, EMULSION INTRAVENOUS at 13:10

## 2021-06-17 RX ADMIN — GENTAMICIN SULFATE 80 MG: 40 INJECTION, SOLUTION INTRAMUSCULAR; INTRAVENOUS at 13:20

## 2021-06-17 RX ADMIN — GENTAMICIN SULFATE 80 MG: 80 INJECTION, SOLUTION INTRAVENOUS at 12:49

## 2021-06-17 ASSESSMENT — PULMONARY FUNCTION TESTS
PIF_VALUE: 3
PIF_VALUE: 9
PIF_VALUE: 3
PIF_VALUE: 3
PIF_VALUE: 1
PIF_VALUE: 10
PIF_VALUE: 4
PIF_VALUE: 3
PIF_VALUE: 9
PIF_VALUE: 4
PIF_VALUE: 5
PIF_VALUE: 5
PIF_VALUE: 10
PIF_VALUE: 1
PIF_VALUE: 9
PIF_VALUE: 9
PIF_VALUE: 5
PIF_VALUE: 3
PIF_VALUE: 2
PIF_VALUE: 4
PIF_VALUE: 9
PIF_VALUE: 3
PIF_VALUE: 10
PIF_VALUE: 10
PIF_VALUE: 9

## 2021-06-17 ASSESSMENT — ENCOUNTER SYMPTOMS: SHORTNESS OF BREATH: 1

## 2021-06-17 ASSESSMENT — PAIN SCALES - GENERAL
PAINLEVEL_OUTOF10: 0

## 2021-06-17 ASSESSMENT — PAIN - FUNCTIONAL ASSESSMENT: PAIN_FUNCTIONAL_ASSESSMENT: 0-10

## 2021-06-17 NOTE — PROGRESS NOTES
Alert. Family at bedside. Cranberry juice given. Side rails up bed in low position.  Call light in reach

## 2021-06-17 NOTE — H&P
Dane Kessler MD  History and Physical    Patient:  María Aguila  MRN: [id number]  YOB: 1931    HISTORY OF PRESENT ILLNESS:     The patient is a 80 y.o. male who presents with neurogenic bladder. Here for procedure. Patient's old records, notes and chart reviewed and summarized above. Dane Kessler MD independently reviewed the images and verified the radiology reports from:    Echo 2D w doppler w color complete    Result Date: 6/14/2021  Transthoracic Echocardiography Report (TTE)  Demographics   Patient Name    Niko Breaux Gender                Male   MR #            [id number]    Race                                                  Ethnicity   Account #       [de-identified]    Room Number   Accession       [id number]   Date of Study         06/14/2021  Number   Date of Birth   07/17/1931   Referring Physician   Felecia Livingston MD   Age             80 year(s)   Sonographer           Yair Simth RDCS, RVT                                Interpreting          Felecia Ba MD                               Physician  Procedure Type of Study   TTE procedure:ECHOCARDIOGRAM COMPLETE 2D W DOPPLER W COLOR. Procedure Date Date: 06/14/2021 Start: 08:59 AM Study Location: Bedside Technical Quality: Limited visualization due to body habitus. Indications:Congestive heart failure. Additional Medical History:Chronic respiratory failure, Thyroid cancer, Sleep apnea, Coronary artery disease, Osteoarthritis, Diabetes, COPD, Hypertension, Hyperlipidemia, DVT, Congestive heart failure, NSVT, Obesity. Patient Status: Routine Height: 67 inches Weight: 249.01 pounds BSA: 2.22 m^2 BMI: 39 kg/m^2 BP: 118/68 mmHg  Conclusions   Summary  Normal left ventricle size and systolic function. Ejection fraction was  estimated at 55 %. There were no regional left ventricular wall motion  abnormalities and wall thickness was within normal limits. Doppler parameters were consistent with abnormal left ventricular  relaxation (grade 1 diastolic dysfunction). Signature   ----------------------------------------------------------------  Electronically signed by Jorge Jansen MD (Interpreting  physician) on 06/14/2021 at 06:10 PM  ----------------------------------------------------------------   Findings   Mitral Valve  The mitral valve structure was normal with normal leaflet separation. DOPPLER: The transmitral velocity was within the normal range with no  evidence for mitral stenosis. There was no evidence of mitral  regurgitation. Aortic Valve  The aortic valve was trileaflet with normal thickness and cuspal  separation. DOPPLER: Transaortic velocity was within the normal range with  no evidence of aortic stenosis. There was no evidence of aortic  regurgitation. Tricuspid Valve  The tricuspid valve structure was normal with normal leaflet separation. DOPPLER: There was no evidence of tricuspid stenosis. Trivial tricuspid regurgitation visualized. Pulmonic Valve  The pulmonic valve leaflets exhibited normal thickness, no calcification,  and normal cuspal separation. DOPPLER: The transpulmonic velocity was  within the normal range with no evidence for regurgitation. Left Atrium  Left atrial size was normal.   Left Ventricle  Normal left ventricle size and systolic function. Ejection fraction was  estimated at 55 %. There were no regional left ventricular wall motion  abnormalities and wall thickness was within normal limits. Doppler parameters were consistent with abnormal left ventricular  relaxation (grade 1 diastolic dysfunction). Right Atrium  Right atrial size was normal.   Right Ventricle  The right ventricular size was normal with normal systolic function and  wall thickness. Pericardial Effusion  The pericardium was normal in appearance with no evidence of a pericardial  effusion. Pleural Effusion  No evidence of pleural effusion. [id number]        Past Medical History:    Past Medical History:   Diagnosis Date    Anemia     Anxiety     BPH (benign prostatic hyperplasia)     CAD (coronary artery disease)     leaking valve    Chronic back pain     COPD (chronic obstructive pulmonary disease) (HCC)     Detached retina     Diabetes mellitus (HCC)     NIDDM    DVT (deep venous thrombosis) (HCC)     RLE    DVT (deep venous thrombosis) ([facility] [address] 11/9/15    LLE    Dysphagia     Glaucoma     Hyperlipidemia     Hypertension     Macular degeneration     Mass of chest     benign chest mass, Dr Sarah Monroe    Movement disorder     spinal stenosis    Obesity     Osteoarthritis     right ankle, right hand    Paroxysmal atrial fibrillation ([facility] [address]     Pneumonia     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)     s/p thyroid resection    Urinary incontinence        Past Surgical History:    Past Surgical History:   Procedure Laterality Date   [facility] SURGERY  2006,2011, 2014    BRONCHOSCOPY N/A 8/29/2017    BRONCHOSCOPY AIRWAY ONLY performed by Isabella Melton MD at  [address] EKG 12-LEAD  11/10/2015         FRACTURE SURGERY      right hand    OTHER SURGICAL HISTORY      spinal epidurals    RETINAL DETACHMENT SURGERY      SPINE SURGERY  2004    Lumbar laminectomy    THYROIDECTOMY      TURP N/A 3/17/2020    CYSTOSCOPY WITH GREENLIGHT PHOTOVAPORIZATION OF PROSTATE performed by Eduardo Harrison MD at [facility] [address] TURP N/A 3/24/2020    PLASMA BUTTON TURP WITH CLOT EVACUATION performed by Eduardo Harrison MD at [facility]       Medications Prior to Admission:    Prior to Admission medications    Medication Sig Start Date End Date Taking?  Authorizing Provider   simethicone (MYLICON) 885 MG chewable tablet Take 125 mg by mouth every 6 hours as needed for Flatulence   Yes Historical Provider, MD   doxazosin (CARDURA) 4 MG tablet Take 1.5 tablets by mouth nightly 5/24/21  Yes Dewayne Redmond MD   aluminum & magnesium hydroxide-simethicone (MYLANTA) 400-400-40 MG/5ML SUSP Take 15 mLs by mouth every 6 hours as needed   Yes Historical Provider, MD   famotidine (PEPCID) 20 MG tablet Take 20 mg by mouth 2 times daily as needed   Yes Historical Provider, MD   Arformoterol Tartrate (BROVANA) 15 MCG/2ML NEBU Take 2 mLs by nebulization 2 times daily 1 dose inhale orally two times per day 5/3/21  Yes Mary Jo Bee MD   oxybutynin (DITROPAN) 5 MG tablet TAKE 1 TABLET BY MOUTH EVERY 8 HOURS AS NEEDED FOR BLADDER SPASMS OR BLADDER SPASM 4/30/21  Yes Mary Jo Bee MD   Multiple Vitamins-Minerals (PRESERVISION AREDS 2) CAPS Take 1 capsule by mouth daily 4/9/21  Yes Mary Jo Bee MD   ibuprofen (ADVIL;MOTRIN) 800 MG tablet TAKE 1 TABLET BY MOUTH THREE TIMES DAILY WITH MEALS 3/23/21  Yes Mary Jo Bee MD   spironolactone (ALDACTONE) 25 MG tablet TAKE 1 TABLET BY MOUTH DAILY 2/15/21  Yes Edmundo Garza MD   Revefenacin 175 MCG/3ML SOLN Inhale 1 nebule into the lungs daily Dx COPD J44.3 11/16/20  Yes Richard De Jesus PA-C   levothyroxine (SYNTHROID) 200 MCG tablet TAKE 1 TABLET BY MOUTH  DAILY 10/19/20  Yes Mary Jo Bee MD   insulin glargine (LANTUS SOLOSTAR) 100 UNIT/ML injection pen INJECT SUBCUTANEOUSLY 18  UNITS NIGHTLY  Patient taking differently: INJECT SUBCUTANEOUSLY 20  UNITS NIGHTLY 9/29/20  Yes Mary Jo Bee MD   metoprolol tartrate (LOPRESSOR) 25 MG tablet Take 1 tablet by mouth 2 times daily Hold for SBP <110 or HR <60 8/18/20  Yes Mary Jo Bee MD   ferrous gluconate (FERGON) 324 (38 Fe) MG tablet Take 1 tablet by mouth 2 times daily 8/18/20  Yes Mary Jo Bee MD   metFORMIN (GLUCOPHAGE) 1000 MG tablet Take 1 tablet by mouth 2 times daily (with meals) 8/18/20  Yes Mary Jo Bee MD   budesonide (PULMICORT) 0.5 MG/2ML nebulizer suspension USE 1 VIAL VIA NEBULIZER TWICE DAILY.  RINSE MOUTH AFTER TREATMENT 8/4/20  Yes Aletha Schirmer, MD   lisinopril (PRINIVIL;ZESTRIL) 2.5 MG tablet Take 1 tablet by mouth daily 7/24/20  Yes Aletha Schirmer, MD   potassium chloride (KLOR-CON M) 10 MEQ extended release tablet TAKE 1 TABLETS BY MOUTH TWICE DAILY 7/24/20  Yes Aletha Schirmer, MD   docusate (COLACE, DULCOLAX) 100 MG CAPS Take 100 mg by mouth 2 times daily 7/24/20  Yes Aletha Schirmer, MD   vitamin D3 (CHOLECALCIFEROL) 10 MCG (400 UNIT) TABS tablet Take 1 tablet by mouth daily 400units 7/24/20  Yes Aletha Schirmer, MD   polyethylene glycol (MIRALAX) 17 g packet Take 17 g by mouth daily as needed for Constipation   Yes Historical Provider, MD   acetaminophen (TYLENOL) 325 MG tablet Take 650 mg by mouth every 4 hours as needed for Pain or Fever  6/26/20  Yes Historical Provider, MD   lidocaine (LMX) 4 % cream Apply topically every 6 hours as needed for Pain To groin 6/26/20  Yes Historical Provider, MD   albuterol sulfate  (90 Base) MCG/ACT inhaler Inhale 2 puffs into the lungs every 6 hours as needed for Wheezing 1/24/20  Yes Charles Gillis PA-C   OXYGEN Inhale 3 L into the lungs nightly Indications: Difficulty Breathing, Oxygen Therapy And prn through cpap   Yes Historical Provider, MD   furosemide (LASIX) 40 MG tablet Take 40 mg by mouth 2 times daily     Historical Provider, MD   Multiple Vitamins-Minerals (PRESERVISION AREDS 2) CAPS Take 1 capsule by mouth daily 8/3/20   Aletha Schirmer, MD       Allergies:  Latex, Macrobid [nitrofurantoin monohyd macro], Levaquin [levofloxacin], Brimonidine tartrate, Adhesive tape, and Alphagan [brimonidine tartrate]    Social History:    Social History     Socioeconomic History    Marital status:      Spouse name: Not on file    Number of children: 2    Years of education: Not on file    Highest education level: Not on file   Occupational History    Not on file Tobacco Use    Smoking status: Never Smoker    Smokeless tobacco: Never Used   Vaping Use    Vaping Use: Never used   Substance and Sexual Activity    Alcohol use: Yes     Alcohol/week: 1.0 standard drinks     Types: 1 Glasses of wine per week     Comment: Glass of wine with dinner.  Drug use: No    Sexual activity: Never   Other Topics Concern    Not on file   Social History Narrative    Not on file     Social Determinants of Health     Financial Resource Strain:     Difficulty of Paying Living Expenses:    Food Insecurity:     Worried About Running Out of Food in the Last Year:     [address] in the Last Year:    Transportation Needs:     Lack of Transportation (Medical):  Lack of Transportation (Non-Medical):    Physical Activity:     Days of Exercise per Week:     Minutes of Exercise per Session:    Stress:     Feeling of Stress :    Social Connections:     Frequency of Communication with Friends and Family:     Frequency of Social Gatherings with Friends and Family:     Attends [employer] Services:     Active Member of Clubs or Organizations:     Attends Club or Organization Meetings:     Marital Status:    Intimate Partner Violence:     Fear of Current or Ex-Partner:     Emotionally Abused:     Physically Abused:     Sexually Abused:        Family History:    Family History   Problem Relation Age of Onset    Other Mother         Complications of Childbirth    Other Father [de-identified]        Natural causes       REVIEW OF SYSTEMS:  Constitutional: negative  Eyes: negative  Respiratory: negative  Cardiovascular: negative  Gastrointestinal: negative  Genitourinary: no acute issues  Musculoskeletal: negative  Skin: negative   Neurological: negative  Hematological/Lymphatic: negative  Psychological: negative    Physical Exam:      No data found. Constitutional: Patient in no acute distress; Neuro: alert and oriented to person place and time.     Psych: Mood and affect normal.  Skin: Normal  Lungs: Respiratory effort normal, CTA  Cardiovascular:  Normal peripheral pulses; no murmur. Normal rhythm  Abdomen: Soft, non-tender, non-distended with no CVA, flank pain, hepatosplenomegaly or hernia. Kidneys normal.  Bladder non-tender and not distended. LABS:   No results for input(s): WBC, HGB, HCT, MCV, PLT in the last 72 hours. No results for input(s): NA, K, CL, CO2, PHOS, BUN, CREATININE in the last 72 hours. Invalid input(s): CA  Lab Results   Component Value Date    PSA 3.76 10/27/2010         Urinalysis: No results for input(s): COLORU, PHUR, LABCAST, WBCUA, RBCUA, MUCUS, TRICHOMONAS, YEAST, BACTERIA, CLARITYU, SPECGRAV, LEUKOCYTESUR, UROBILINOGEN, Satish Spell in the last 72 hours.     Invalid input(s): NITRATE, GLUCOSEUKETONESUAMORPHOUS     -----------------------------------------------------------------      Assessment and Plan     Impression:    Patient Active Problem List   Diagnosis    Diabetes mellitus (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    Breast lump    BPH (benign prostatic hyperplasia)    ROBER (obstructive sleep apnea)    Essential hypertension    History of DVT (deep vein thrombosis)    Shortness of breath    Chronic respiratory failure with hypoxia (HCC)    Mixed hyperlipidemia    Chronic deep vein thrombosis (DVT) of right lower extremity (HCC)    Anticoagulated on Coumadin    Type 2 diabetes mellitus with complication, with long-term current use of insulin (HCC)    Muscle weakness    Chronic bronchitis (HCC)    Chronic obstructive pulmonary disease (HCC)    Paroxysmal atrial fibrillation (HCC)    Acute diastolic congestive heart failure (HCC)    Paroxysmal atrial flutter (HCC)    Urinary incontinence    Serous detachment of retinal pigment epithelium    Senile nuclear sclerosis    Puckering of macula, bilateral    Presence of intraocular lens    Primary open-angle glaucoma    Other chorioretinal scars, right eye    Nonexudative senile macular degeneration of retina    Chronic diastolic congestive heart failure (HCC)    Morbid obesity (HCC)    Urinary retention    Urine retention    Abdominal pain    Altered mental status    Chronic indwelling Billy catheter    Urinary tract infection without hematuria    Hypokalemia    Weakness of left lower extremity    Pill dysphagia    History of thyroid cancer    Gross hematuria    Constipation    Acute urinary retention    COPD exacerbation (HCC)    Acute on chronic respiratory failure with hypoxia and hypercapnia (HCC)    Acute on chronic diastolic congestive heart failure (HCC)    Bilateral leg edema +2 now +1    Community acquired bacterial pneumonia    Elevated troponin    Scrotal pain       Plan:     Consent obtained; cysto spt in OR today    Cesar Mcelroy MD  11:50 AM 6/17/2021

## 2021-06-17 NOTE — PROGRESS NOTES
ADMITTED TO SDS AND ORIENTED TO UNIT. SCDS ON. FALL AND ALLERGY BANDS ON. PT VERBALIZED APPROVAL FOR FIRST NAME, LAST INITIAL AND PHYSICIAN NAME ON UNIT WHITEBOARD. No one is with the patient.

## 2021-06-17 NOTE — ANESTHESIA POSTPROCEDURE EVALUATION
Department of Anesthesiology  Postprocedure Note    Patient: Naga Cardenas  MRN: [id number]  YOB: 1931  Date of evaluation: 6/17/2021  Time:  4:17 PM     Procedure Summary     Date: 06/17/21 Room / Location: formerly [facility]    Anesthesia Start: 1252 Anesthesia Stop: 1347    Procedure: CYSTOSCOPY SUPRAPUBIC TUBE PLACEMENT (N/A ) Diagnosis: (URINARY RETENTION/UTI)    Surgeons: Cayetano Moran MD Responsible Provider:     Anesthesia Type: general ASA Status: 3          Anesthesia Type: general    Alejandra Phase I: Alejandra Score: 8    Alejandra Phase II: Alejandra Score: 10    Last vitals: Reviewed and per EMR flowsheets. Anesthesia Post Evaluation    Patient location during evaluation: PACU  Patient participation: complete - patient participated  Level of consciousness: awake and alert  Airway patency: patent  Nausea & Vomiting: no nausea and no vomiting  Complications: no  Cardiovascular status: hemodynamically stable  Respiratory status: acceptable  Hydration status: euvolemic      [facility]  POST-ANESTHESIA NOTE       Name:  Naga Cardenas                                         Age:  80 y.o.   MRN:  [id number]      Last Vitals:  /84   Pulse 65   Temp 97.2 °F (36.2 °C) (Temporal)   Resp 16   Ht 5' 7\" (1.702 m)   Wt 248 lb (112.5 kg)   SpO2 96%   BMI 38.84 kg/m²   Patient Vitals for the past 4 hrs:   BP Temp Temp src Pulse Resp SpO2   06/17/21 1540 131/84   65 16 96 %   06/17/21 1453 (!) 142/60 97.2 °F (36.2 °C) Temporal 61 16 95 %   06/17/21 1440 (!) 130/58   56 16 98 %   06/17/21 1435 (!) 142/65   56 16 99 %   06/17/21 1430 (!) 143/63   56 16 100 %   06/17/21 1425 (!) 129/55   59 16 100 %   06/17/21 1420 (!) 128/59   53  100 %   06/17/21 1415 (!) 128/56   55  100 %   06/17/21 1410 138/63   61  100 %   06/17/21 1405 137/63   56  100 %   06/17/21 1400 (!) 135/56   60 16 99 %   06/17/21 1355 (!) 143/63   69 18 99 %   06/17/21 1350 118/64   66 17 94 % 06/17/21 1345 (!) 81/46 98.4 °F (36.9 °C) Temporal 65 19 98 %       Level of Consciousness:  Awake    Respiratory:  Stable    Oxygen Saturation:  Stable    Cardiovascular:  Stable    Hydration:  Adequate    PONV:  Stable    Post-op Pain:  Adequate analgesia    Post-op Assessment:  No apparent anesthetic complications    Additional Follow-Up / Treatment / Comment:  None    Crow Castro MD  June 17, 2021   4:18 PM

## 2021-06-17 NOTE — PROGRESS NOTES
The patient has been taken to surgery. The current medication sheets from [facility] have not been received yet.

## 2021-06-17 NOTE — ANESTHESIA PRE PROCEDURE
Department of Anesthesiology  Preprocedure Note       Name:  Kait Lambert   Age:  80 y.o.  :  1931                                          MRN:  [id number]         Date:  2021      Surgeon: Jone Herndon):  Estuardo Mason MD    Procedure: CYSTOSCOPY SUPRAPUBIC TUBE PLACEMENT (N/A )    Medications prior to admission:   Prior to Admission medications    Medication Sig Start Date End Date Taking?  Authorizing Provider   furosemide (LASIX) 40 MG tablet Take 40 mg by mouth 2 times daily     Historical Provider, MD   simethicone (MYLICON) 037 MG chewable tablet Take 125 mg by mouth every 6 hours as needed for Flatulence    Historical Provider, MD   doxazosin (CARDURA) 4 MG tablet Take 1.5 tablets by mouth nightly 21   Saul Hernandez MD   aluminum & magnesium hydroxide-simethicone (MYLANTA) 400-400-40 MG/5ML SUSP Take 15 mLs by mouth every 6 hours as needed    Historical Provider, MD   famotidine (PEPCID) 20 MG tablet Take 20 mg by mouth 2 times daily as needed    Historical Provider, MD   Arformoterol Tartrate (BROVANA) 15 MCG/2ML NEBU Take 2 mLs by nebulization 2 times daily 1 dose inhale orally two times per day 5/3/21   Rai Willett MD   oxybutynin (DITROPAN) 5 MG tablet TAKE 1 TABLET BY MOUTH EVERY 8 HOURS AS NEEDED FOR BLADDER SPASMS OR BLADDER SPASM 21   Rai Willett MD   Multiple Vitamins-Minerals (PRESERVISION AREDS 2) CAPS Take 1 capsule by mouth daily 21   Rai Willett MD   ibuprofen (ADVIL;MOTRIN) 800 MG tablet TAKE 1 TABLET BY MOUTH THREE TIMES DAILY WITH MEALS 3/23/21   Rai Willett MD   spironolactone (ALDACTONE) 25 MG tablet TAKE 1 TABLET BY MOUTH DAILY 2/15/21   Cookie Chicas MD   Revefenacin 175 MCG/3ML SOLN Inhale 1 nebule into the lungs daily Dx COPD J44.3 20   Dale Gil PA-C   levothyroxine (SYNTHROID) 200 MCG tablet TAKE 1 TABLET BY MOUTH  DAILY 10/19/20   Rai Willett MD   insulin glargine (LANTUS SOLOSTAR) 100 UNIT/ML injection pen INJECT SUBCUTANEOUSLY 18  UNITS NIGHTLY  Patient taking differently: INJECT SUBCUTANEOUSLY 20  UNITS NIGHTLY 9/29/20   Alix Ocampo MD   metoprolol tartrate (LOPRESSOR) 25 MG tablet Take 1 tablet by mouth 2 times daily Hold for SBP <110 or HR <60 8/18/20   Alix Ocampo MD   ferrous gluconate (FERGON) 324 (38 Fe) MG tablet Take 1 tablet by mouth 2 times daily 8/18/20   Alix Ocampo MD   metFORMIN (GLUCOPHAGE) 1000 MG tablet Take 1 tablet by mouth 2 times daily (with meals) 8/18/20   Alix Ocampo MD   budesonide (PULMICORT) 0.5 MG/2ML nebulizer suspension USE 1 VIAL VIA NEBULIZER TWICE DAILY.  RINSE MOUTH AFTER TREATMENT 8/4/20   Alix Ocampo MD   Multiple Vitamins-Minerals (PRESERVISION AREDS 2) CAPS Take 1 capsule by mouth daily 8/3/20   Alix Ocampo MD   lisinopril (PRINIVIL;ZESTRIL) 2.5 MG tablet Take 1 tablet by mouth daily 7/24/20   Alix Ocampo MD   potassium chloride (KLOR-CON M) 10 MEQ extended release tablet TAKE 1 TABLETS BY MOUTH TWICE DAILY 7/24/20   Alix Ocampo MD   docusate (COLACE, DULCOLAX) 100 MG CAPS Take 100 mg by mouth 2 times daily 7/24/20   Alix Ocampo MD   vitamin D3 (CHOLECALCIFEROL) 10 MCG (400 UNIT) TABS tablet Take 1 tablet by mouth daily 400units 7/24/20   Alix Ocampo MD   polyethylene glycol (MIRALAX) 17 g packet Take 17 g by mouth daily as needed for Constipation    Historical Provider, MD   acetaminophen (TYLENOL) 325 MG tablet Take 650 mg by mouth every 4 hours as needed for Pain or Fever  6/26/20   Historical Provider, MD   lidocaine (LMX) 4 % cream Apply topically every 6 hours as needed for Pain To groin 6/26/20   Historical Provider, MD   albuterol sulfate  (90 Base) MCG/ACT inhaler Inhale 2 puffs into the lungs every 6 hours as needed for Wheezing 1/24/20   Yasmeen Valera PA-C   OXYGEN Inhale 3 L into the lungs nightly Indications: Difficulty Breathing, Oxygen Therapy And prn through cpap    Historical Provider, MD Current medications:    No current facility-administered medications for this visit. No current outpatient medications on file. Facility-Administered Medications Ordered in Other Visits   Medication Dose Route Frequency Provider Last Rate Last Admin    gentamicin (GARAMYCIN) IVPB 80 mg  80 mg Intravenous On Call to [id number] Munson, MD           Allergies: Allergies   Allergen Reactions    Latex Rash    Macrobid [Nitrofurantoin Rollen Irene Silva prefers pt not use Macrobid due to pulmonary side effects.     Levaquin [Levofloxacin] Other (See Comments)     Redness/flushing    Brimonidine Tartrate     Adhesive Tape Rash    Alphagan [Brimonidine Tartrate] Hives     Eyes red       Problem List:    Patient Active Problem List   Diagnosis Code    Diabetes mellitus ([facility] [address] E11.9    Hyperlipidemia E78.5    Coronary artery disease involving native coronary artery of native heart without angina pectoris I25.10    Acquired hypothyroidism E03.9    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 E29.1    Breast lump N63.0    BPH (benign prostatic hyperplasia) N40.0    ROBER (obstructive sleep apnea) G47.33    Essential hypertension I10    History of DVT (deep vein thrombosis) Z86.718    Shortness of breath R06.02    Chronic respiratory failure with hypoxia ([facility]) J96.11    Mixed hyperlipidemia E78.2    Chronic deep vein thrombosis (DVT) of right lower extremity ([facility]) I82.501    Anticoagulated on Coumadin Z79.01    Type 2 diabetes mellitus with complication, with long-term current use of insulin (HCC) E11.8, Z79.4    Muscle weakness M62.81    Chronic bronchitis (HCC) J42    Chronic obstructive pulmonary disease (HCC) J44.9    Paroxysmal atrial fibrillation (HCC) I48.0    Acute diastolic congestive heart failure (HCC) I50.31    Paroxysmal atrial flutter (HCC) I48.92    Urinary incontinence R32    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    Senile nuclear sclerosis H25.10    Puckering of resection    Urinary incontinence        Past Surgical History:        Procedure Laterality Date   Idalia Alar SURGERY  [id number], 2014    BRONCHOSCOPY N/A 8/29/2017    BRONCHOSCOPY AIRWAY ONLY performed by Rain Nina MD at [address] [place] [address] EKG 12-LEAD  11/10/2015         FRACTURE SURGERY      right hand    OTHER SURGICAL HISTORY      spinal epidurals    RETINAL DETACHMENT SURGERY      SPINE SURGERY  2004    Lumbar laminectomy    THYROIDECTOMY      TURP N/A 3/17/2020    CYSTOSCOPY WITH GREENLIGHT PHOTOVAPORIZATION OF PROSTATE performed by Naila Daniels MD at [address] TURP N/A 3/24/2020    PLASMA BUTTON TURP WITH CLOT EVACUATION performed by Naila Daniels MD at [facility] History:    Social History     Tobacco Use    Smoking status: Never Smoker    Smokeless tobacco: Never Used   Substance Use Topics    Alcohol use: Yes     Alcohol/week: 1.0 standard drinks     Types: 1 Glasses of wine per week     Comment: Glass of wine with dinner. Counseling given: Not Answered      Vital Signs (Current): There were no vitals filed for this visit.                                            BP Readings from Last 3 Encounters:   06/17/21 (!) 117/57   06/04/21 118/68   05/24/21 (!) 119/59       NPO Status:                                                                                 BMI:   Wt Readings from Last 3 Encounters:   06/17/21 248 lb (112.5 kg)   06/14/21 250 lb (113.4 kg)   06/04/21 249 lb (112.9 kg)     There is no height or weight on file to calculate BMI.    CBC:   Lab Results   Component Value Date    WBC 9.2 05/22/2021    RBC 4.05 05/22/2021    HGB 11.2 05/22/2021    HCT 34.2 05/22/2021    MCV 84.4 05/22/2021    RDW 14.6 05/02/2018     05/22/2021       CMP:   Lab Results   Component Value Date     06/10/2021    K 3.9 06/10/2021    K 3.8 05/20/2021     06/10/2021    CO2 30 06/10/2021    BUN 19 06/10/2021    CREATININE 1.3 06/10/2021    LABGLOM 52 alternatives, and personnel involved with patient, who agrees to proceed.       Nicholas Edwards MD  Staff Anesthesiologist  12:20 PM

## 2021-06-17 NOTE — PROGRESS NOTES
The medication sheets were received. The medications are listed but the current administrations dates and times are not listed. The mar was marked unknown. The list was requested three times from the [place] in which the patient lives.

## 2021-06-18 ENCOUNTER — TELEPHONE (OUTPATIENT)
Dept: UROLOGY | Age: 86
End: 2021-06-18

## 2021-06-18 NOTE — TELEPHONE ENCOUNTER
Patient scheduled for Friday, 07- at 10:15am with Dr. Francis Muñiz. Detailed message left for [phone] advising her of his appointment and asking her to contact me if I need to notify patient's son.

## 2021-06-18 NOTE — OP NOTE
Patient:  María Aguila  MRN: [id number]  YOB: 1931    FACILITY: [place], [place]    DATE:6/17/2021    SURGEON: Dane Kessler MD     ASSISTANT:none    PREOPERATIVE DIAGNOSIS: URINARY RETENTION/UTI    POSTOPERATIVE DIAGNOSIS: urinary retention/neurogenic bladder    PROCEDURE PERFORMED:     1. Cystourethroscopy  2. suprapubic catheter placement    ANESTHESIA: General    ESTIMATED BLOOD LOSS: 0 (units unknown)     COMPLICATIONS: None immediate    DRAINS: 16 Iraqi suprapubic catheter    SPECIMENS: * No specimens in log *    INDICATIONS FOR PROCEDURE:  The patient is a 80 y.o. male who presents today with URINARY RETENTION[facility] After risks, benefits and alternatives of the procedure were discussed with the patient, the patient elected to proceed. NARRATIVE SUMMARY OF THE PROCEDURE:  After informed consent was reviewed in the preoperative area the patient was taken back to the operating room and transferred to the operating table in the supine position. EPC cuffs were placed and the machine was turned on. Preoperative antibiotics were administered. Anesthesia was induced and the antibiotics were started. Patient was placed in dorsal lithotomy position, sterilely prepped and draped in a standard fashion. We entered the urethra with the cystoscope and advanced into the bladder easily. We then used a spinal needle to project our trajectory of the trochar advancing it through the abdomen two fingerbreadths above the pubic symphysis. We could see the needle going into the dome of the bladder. We then made a small skin incision using a 15 blade scalpel. Using a Rouch trochar we then entered the bladder under direct visualization. The stylet was removed and the sheath was left in place and a 16 [place] Billy catheter was inserted through the sheath and the balloon was instilled with 10 mL of sterile water. The sheath was then broken down.  The catheter was hooked up to gravity drainage. It was stitched in place using a 2-0 Nylon suture. The patient was then cleaned off, awakened, extubated, and discharged back to the PACU in good and stable condition. FOLLOW UP:  The patient will need to follow up in 6 weeks for suprapubic catheter exchange with dr Nicholas Camp in office.

## 2021-06-18 NOTE — TELEPHONE ENCOUNTER
FOLLOW UP:  The patient will need to follow up in 6 weeks for suprapubic catheter exchange with dr Carley Faye in office.

## 2021-06-21 ENCOUNTER — OFFICE VISIT (OUTPATIENT)
Dept: CARDIOLOGY CLINIC | Age: 86
End: 2021-06-21
Payer: MEDICARE

## 2021-06-21 ENCOUNTER — TELEPHONE (OUTPATIENT)
Dept: INFECTIOUS DISEASES | Age: 86
End: 2021-06-21

## 2021-06-21 ENCOUNTER — OFFICE VISIT (OUTPATIENT)
Dept: INFECTIOUS DISEASES | Age: 86
End: 2021-06-21
Payer: MEDICARE

## 2021-06-21 VITALS
RESPIRATION RATE: 16 BRPM | HEART RATE: 80 BPM | TEMPERATURE: 97.7 F | DIASTOLIC BLOOD PRESSURE: 39 MMHG | BODY MASS INDEX: 36.81 KG/M2 | WEIGHT: 235 LBS | SYSTOLIC BLOOD PRESSURE: 98 MMHG

## 2021-06-21 VITALS
DIASTOLIC BLOOD PRESSURE: 55 MMHG | HEART RATE: 71 BPM | WEIGHT: 236 LBS | SYSTOLIC BLOOD PRESSURE: 114 MMHG | BODY MASS INDEX: 37.04 KG/M2 | HEIGHT: 67 IN

## 2021-06-21 DIAGNOSIS — R50.9 FEVER, UNSPECIFIED FEVER CAUSE: ICD-10-CM

## 2021-06-21 DIAGNOSIS — Z87.440 HISTORY OF RECURRENT UTI (URINARY TRACT INFECTION): Primary | ICD-10-CM

## 2021-06-21 DIAGNOSIS — R53.81 MALAISE: ICD-10-CM

## 2021-06-21 DIAGNOSIS — I10 ESSENTIAL HYPERTENSION: ICD-10-CM

## 2021-06-21 DIAGNOSIS — I82.591 CHRONIC DEEP VEIN THROMBOSIS (DVT) OF OTHER VEIN OF RIGHT LOWER EXTREMITY (HCC): ICD-10-CM

## 2021-06-21 DIAGNOSIS — I25.10 CORONARY ARTERY DISEASE INVOLVING NATIVE CORONARY ARTERY OF NATIVE HEART WITHOUT ANGINA PECTORIS: Primary | ICD-10-CM

## 2021-06-21 DIAGNOSIS — R60.0 BILATERAL LEG EDEMA: ICD-10-CM

## 2021-06-21 DIAGNOSIS — E78.2 MIXED HYPERLIPIDEMIA: ICD-10-CM

## 2021-06-21 DIAGNOSIS — I50.32 CHRONIC DIASTOLIC CONGESTIVE HEART FAILURE (HCC): ICD-10-CM

## 2021-06-21 DIAGNOSIS — I48.0 PAROXYSMAL ATRIAL FIBRILLATION (HCC): ICD-10-CM

## 2021-06-21 PROCEDURE — 1123F ACP DISCUSS/DSCN MKR DOCD: CPT | Performed by: INTERNAL MEDICINE

## 2021-06-21 PROCEDURE — 1036F TOBACCO NON-USER: CPT | Performed by: INTERNAL MEDICINE

## 2021-06-21 PROCEDURE — G8417 CALC BMI ABV UP PARAM F/U: HCPCS | Performed by: NURSE PRACTITIONER

## 2021-06-21 PROCEDURE — G8427 DOCREV CUR MEDS BY ELIG CLIN: HCPCS | Performed by: INTERNAL MEDICINE

## 2021-06-21 PROCEDURE — 1036F TOBACCO NON-USER: CPT | Performed by: NURSE PRACTITIONER

## 2021-06-21 PROCEDURE — G8417 CALC BMI ABV UP PARAM F/U: HCPCS | Performed by: INTERNAL MEDICINE

## 2021-06-21 PROCEDURE — 99212 OFFICE O/P EST SF 10 MIN: CPT | Performed by: NURSE PRACTITIONER

## 2021-06-21 PROCEDURE — G8427 DOCREV CUR MEDS BY ELIG CLIN: HCPCS | Performed by: NURSE PRACTITIONER

## 2021-06-21 PROCEDURE — 1111F DSCHRG MED/CURRENT MED MERGE: CPT | Performed by: NURSE PRACTITIONER

## 2021-06-21 PROCEDURE — 4040F PNEUMOC VAC/ADMIN/RCVD: CPT | Performed by: NURSE PRACTITIONER

## 2021-06-21 PROCEDURE — 99214 OFFICE O/P EST MOD 30 MIN: CPT | Performed by: INTERNAL MEDICINE

## 2021-06-21 PROCEDURE — 4040F PNEUMOC VAC/ADMIN/RCVD: CPT | Performed by: INTERNAL MEDICINE

## 2021-06-21 PROCEDURE — 1123F ACP DISCUSS/DSCN MKR DOCD: CPT | Performed by: NURSE PRACTITIONER

## 2021-06-21 PROCEDURE — 1111F DSCHRG MED/CURRENT MED MERGE: CPT | Performed by: INTERNAL MEDICINE

## 2021-06-21 RX ORDER — DOXAZOSIN MESYLATE 4 MG/1
4 TABLET ORAL NIGHTLY
Qty: 30 TABLET | Refills: 3 | Status: SHIPPED | OUTPATIENT
Start: 2021-06-21 | End: 2021-06-21

## 2021-06-21 NOTE — PROGRESS NOTES
[facility] Infectious Disease         Consult Note      [address] RECORD NUMBER:  [id number]  AGE: 80 y.o. GENDER: male  : 1931  EPISODE DATE:  2021    Subjective:     Chief Complaint   Patient presents with    Other     New patient         HISTORY of PRESENT ILLNESS HPI     Kaley Weiss is a 80 y.o. male New patient referred by Dr. Symone Henley, who presents today for infectious disease evaluation. History of Infectious Disease Context:   Patient presents today for evaluation of recurrent UTI. Patient upset at start of visit as nursing facility reportedly left him outside of vacant suite, down the [place]wards from clinic, where he remained for some time prior to another staff member noticing him and assisting him find clinic. Patient presented to appointment today approximately 1 hour past appointment time. Reassurance provided to patient that being late was out of his control and that we are happy to help him today. Patient has history of urinary retention/neurogenic bladder, has had history of recurrent UTI. He underwent cystourethroscopy and suprapubic catheter placement on 2021 per Dr. Ilsa Severance. Patient reports that he has been in the hospital every day for the past week for urinary symptoms. Chart reviewed, no encounters found aside from procedure with Dr. Ilsa Severance. Will reach out to [facility] for update on recent emergency visits. Patient reports fever, chills, nausea, decreased appetite and malaise for the last week or so. He reports that he has been drinking less than one glass of water per day. He denies any abdominal pain, vomiting, diarrhea, constipation. Reports regular bowel movements. Symptoms are described as stable, neither improving or worsening. He denies desire or need to go to emergency department for evaluation.       PAST MEDICAL HISTORY        Diagnosis Date    Anemia     Anxiety     BPH (benign prostatic hyperplasia)     CAD (coronary artery disease)     leaking valve    Chronic back pain     COPD (chronic obstructive pulmonary disease) (HCC)     Detached retina     Diabetes mellitus (HCC)     NIDDM    DVT (deep venous thrombosis) (HCC)     RLE    DVT (deep venous thrombosis) ([facility] [address] 11/9/15    LLE    Dysphagia     Glaucoma     Hyperlipidemia     Hypertension     Macular degeneration     Mass of chest     benign chest mass, Dr Josias Zuñiga    Movement disorder     spinal stenosis    Obesity     Osteoarthritis     right ankle, right hand    Paroxysmal atrial fibrillation ([facility] [address]     Pneumonia     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)     s/p thyroid resection    Urinary incontinence        PAST SURGICAL HISTORY    Past Surgical History:   Procedure Laterality Date   [facility] SURGERY  2006,2011, 2014    BRONCHOSCOPY N/A 8/29/2017    BRONCHOSCOPY AIRWAY ONLY performed by Guille Stoner MD at [address] N/A 6/17/2021    CYSTOSCOPY SUPRAPUBIC TUBE PLACEMENT performed by Shyanne Torrez MD at [address] [place] [address] EKG 12-LEAD  11/10/2015         FRACTURE SURGERY      right hand    OTHER SURGICAL HISTORY      spinal epidurals    RETINAL DETACHMENT SURGERY      SPINE SURGERY  2004    Lumbar laminectomy    THYROIDECTOMY      TURP N/A 3/17/2020    CYSTOSCOPY WITH GREENLIGHT PHOTOVAPORIZATION OF PROSTATE performed by Shyanne Torrez MD at [facility] TURP N/A 3/24/2020    PLASMA BUTTON TURP WITH CLOT EVACUATION performed by Shyanne Torrez MD at [facility]    Family History   Problem Relation Age of Onset    Other Mother         Complications of Childbirth    Other Father [de-identified]        Natural causes       SOCIAL HISTORY    Social History     Tobacco Use    Smoking status: Never Smoker    Smokeless tobacco: Never Used   Vaping Use    Vaping Use: Never used   Substance Use Topics    Alcohol use:  Yes     Alcohol/week: 1.0 standard drinks     Types: 1 Glasses of wine per week     Comment: Glass of wine with dinner.  Drug use: No       ALLERGIES    Allergies   Allergen Reactions    Latex Rash    Macrobid [Nitrofurantoin Diamond Santos prefers pt not use Macrobid due to pulmonary side effects.     Levaquin [Levofloxacin] Other (See Comments)     Redness/flushing    Brimonidine Tartrate     Adhesive Tape Rash    Alphagan [Brimonidine Tartrate] Hives     Eyes red       MEDICATIONS    Current Outpatient Medications on File Prior to Visit   Medication Sig Dispense Refill    furosemide (LASIX) 40 MG tablet Take 40 mg by mouth 2 times daily       simethicone (MYLICON) 685 MG chewable tablet Take 125 mg by mouth every 6 hours as needed for Flatulence      aluminum & magnesium hydroxide-simethicone (MYLANTA) 400-400-40 MG/5ML SUSP Take 15 mLs by mouth every 6 hours as needed      famotidine (PEPCID) 20 MG tablet Take 20 mg by mouth 2 times daily as needed      Arformoterol Tartrate (BROVANA) 15 MCG/2ML NEBU Take 2 mLs by nebulization 2 times daily 1 dose inhale orally two times per day 120 mL 11    oxybutynin (DITROPAN) 5 MG tablet TAKE 1 TABLET BY MOUTH EVERY 8 HOURS AS NEEDED FOR BLADDER SPASMS OR BLADDER SPASM 270 tablet 0    Multiple Vitamins-Minerals (PRESERVISION AREDS 2) CAPS Take 1 capsule by mouth daily 90 capsule 3    ibuprofen (ADVIL;MOTRIN) 800 MG tablet TAKE 1 TABLET BY MOUTH THREE TIMES DAILY WITH MEALS 270 tablet 1    spironolactone (ALDACTONE) 25 MG tablet TAKE 1 TABLET BY MOUTH DAILY 90 tablet 3    Revefenacin 175 MCG/3ML SOLN Inhale 1 nebule into the lungs daily Dx COPD J44.3 30 vial 5    levothyroxine (SYNTHROID) 200 MCG tablet TAKE 1 TABLET BY MOUTH  DAILY 90 tablet 3    insulin glargine (LANTUS SOLOSTAR) 100 UNIT/ML injection pen INJECT SUBCUTANEOUSLY 18  UNITS NIGHTLY (Patient taking differently: INJECT SUBCUTANEOUSLY 20  UNITS NIGHTLY) 30 mL 3    metoprolol tartrate (LOPRESSOR) 25 MG tablet Take 1 tablet by mouth 2 times daily Hold for SBP <110 or HR <60 180 tablet 3    ferrous gluconate (FERGON) 324 (38 Fe) MG tablet Take 1 tablet by mouth 2 times daily 180 tablet 3    metFORMIN (GLUCOPHAGE) 1000 MG tablet Take 1 tablet by mouth 2 times daily (with meals) 180 tablet 3    budesonide (PULMICORT) 0.5 MG/2ML nebulizer suspension USE 1 VIAL VIA NEBULIZER TWICE DAILY.  RINSE MOUTH AFTER TREATMENT 360 mL 3    lisinopril (PRINIVIL;ZESTRIL) 2.5 MG tablet Take 1 tablet by mouth daily 90 tablet 3    potassium chloride (KLOR-CON M) 10 MEQ extended release tablet TAKE 1 TABLETS BY MOUTH TWICE DAILY 180 tablet 3    docusate (COLACE, DULCOLAX) 100 MG CAPS Take 100 mg by mouth 2 times daily 180 capsule 3    vitamin D3 (CHOLECALCIFEROL) 10 MCG (400 UNIT) TABS tablet Take 1 tablet by mouth daily 400units 90 tablet 3    polyethylene glycol (MIRALAX) 17 g packet Take 17 g by mouth daily as needed for Constipation      acetaminophen (TYLENOL) 325 MG tablet Take 650 mg by mouth every 4 hours as needed for Pain or Fever       lidocaine (LMX) 4 % cream Apply topically every 6 hours as needed for Pain To groin      albuterol sulfate  (90 Base) MCG/ACT inhaler Inhale 2 puffs into the lungs every 6 hours as needed for Wheezing 1 Inhaler 3    doxazosin (CARDURA) 4 MG tablet Take 1 tablet by mouth nightly 30 tablet 3    [DISCONTINUED] Multiple Vitamins-Minerals (PRESERVISION AREDS 2) CAPS Take 1 capsule by mouth daily 90 capsule 3    OXYGEN Inhale 3 L into the lungs nightly Indications: Difficulty Breathing, Oxygen Therapy And prn through cpap       Current Facility-Administered Medications on File Prior to Visit   Medication Dose Route Frequency Provider Last Rate Last Admin    ceFAZolin (ANCEF) 2,000 mg in dextrose 5 % 100 mL IVPB  2,000 mg Intravenous [address] Mer[address] MD           REVIEW OF SYSTEMS    Constitutional: +fever, chills, malaise, loss of appetite Denies night sweats, fatigue, weight loss/gain  Head: Denies headache,  dizziness, loss of consciousness  Eye: Denies visual changes  Ears: Denies hearing loss, tinnitus  Mouth/throat: Denies ulceration, dental caries , dysphagia  Respiratory: Denies shortness of breath, cough, wheezing, dyspnea with exertion  Cardiovascular:+bilateral lower extremity edema Denies chest pain, palpitations  Gastrointestinal: +nausea Denies vomiting, constipation, diarrhea, abdominal pain   EDWARD: +suprapubic catheter Denies  hematuria  Musculoskeletal: Denies joint pain, stiffness,  Endocrine: Denies polyuria, polydipsia, cold or heat intolerance  Hematology: Denies easy brusing or bleeding, hx of clotting disorder  Dermatology: Denies skin rash, eczema, pruritis    Objective:      BP (!) 98/39 (Site: Left Upper Arm, Position: Sitting, Cuff Size: Medium Adult)   Pulse 80   Temp 97.7 °F (36.5 °C) (Temporal)   Resp 16   Wt 235 lb (106.6 kg)   BMI 36.81 kg/m²     Wt Readings from Last 3 Encounters:   06/21/21 236 lb (107 kg)   06/21/21 235 lb (106.6 kg)   06/17/21 248 lb (112.5 kg)       Immunization History   Administered Date(s) Administered    COVID-19, Pfizer, PF, 30mcg/0.3mL 01/22/2021, 02/12/2021    Influenza 10/31/2013    Influenza Virus Vaccine 11/01/2011, 11/05/2012, 11/11/2014, 09/29/2015    Influenza Whole 10/01/2010    Influenza, Quadv, 6 mo and older, IM, PF (Flulaval, Fluarix) 09/27/2018    Influenza, Quadv, IM, (6 mo and older Fluzone, Flulaval, Fluarix and 3 yrs and older Afluria) 11/17/2017    Influenza, Quadv, IM, PF (6 mo and older Fluzone, Flulaval, Fluarix, and 3 yrs and older Afluria) 10/01/2019    Influenza, Quadv, adjuvanted, 65 yrs +, IM, PF (Fluad) 10/19/2020    Influenza, Triv, 3 Years and older, IM (Afluria (5 yrs and older) 09/26/2016    Pneumococcal Conjugate 13-valent ([id number]) 12/19/2014    Pneumococcal Polysaccharide ([id number]) 01/01/2003    Td, unspecified formulation 06/08/2010    Zoster Live (Zostavax) 08/05/2014 PHYSICAL EXAM    Wt Readings from Last 3 Encounters:   06/21/21 236 lb (107 kg)   06/21/21 235 lb (106.6 kg)   06/17/21 248 lb (112.5 kg)       General:  Awake, alert, no apparent distress. Appears stated age  [de-identified]: conjuctivae are clear without exudate or hemorrhage, anicteric sclera, moist oral mucosa. Chest:  Respirations regular, non-labored. Chest rise and fall equal bilaterally. Lungs clear to auscultation throughout all fields  Cardiovascular:  RRR,S1S2, no murmur or gallop. Abdomen:  Soft, non tender to palpation. No CVA tenderness. Suprapubic catheter draining clear, yellow urine. Insertion site clean, no erythema or drainage  Neurological: Awake, alert  Psychiatric:  Appropriate mood and affect  Extremities: non-traumatic in appearance.    Skin:  Warm and dry     LABS       CBC:   Lab Results   Component Value Date    WBC 9.2 05/22/2021    HGB 11.2 05/22/2021    HCT 34.2 05/22/2021    MCV 84.4 05/22/2021     05/22/2021     BMP:   Lab Results   Component Value Date     06/10/2021    K 3.9 06/10/2021    K 3.8 05/20/2021     06/10/2021    CO2 30 06/10/2021    PHOS 3.4 03/14/2019    BUN 19 06/10/2021    CREATININE 1.3 06/10/2021     PT/INR:   Lab Results   Component Value Date    INR 0.97 03/22/2021     Prealbumin: No results found for: PREALBUMIN  Albumin:  Lab Results   Component Value Date    LABALBU 3.4 05/20/2021    LABALBU 4.3 11/30/2011     Sed Rate:  Lab Results   Component Value Date    SEDRATE 8 10/28/2012     CRP:   Lab Results   Component Value Date    CRP 0.31 12/15/2015     Micro:   Lab Results   Component Value Date    BC No growth-preliminary No growth  05/19/2021      Hemoglobin A1C:   Lab Results   Component Value Date    LABA1C 6.0 12/24/2020       Assessment:     -Recurrent UTI  -Fever, chills  -Nausea  -Loss of appetite    Patient Active Problem List   Diagnosis Code    Diabetes mellitus ([facility] [address] E11.9    Hyperlipidemia E78.5    Coronary artery disease involving native coronary artery of native heart without angina pectoris I25.10    Acquired hypothyroidism E03.9    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 E29.1    Breast lump N63.0    BPH (benign prostatic hyperplasia) N40.0    ROBER (obstructive sleep apnea) G47.33    Essential hypertension I10    History of DVT (deep vein thrombosis) Z86.718    Shortness of breath R06.02    Chronic respiratory failure with hypoxia ([facility]) J96.11    Mixed hyperlipidemia E78.2    Chronic deep vein thrombosis (DVT) of right lower extremity ([facility]) I82.501    Anticoagulated on Coumadin Z79.01    Type 2 diabetes mellitus with complication, with long-term current use of insulin ([facility]) E11.8, Z79.4    Muscle weakness M62.81    Chronic bronchitis ([facility]) J42    Chronic obstructive pulmonary disease ([facility]) J44.9    Paroxysmal atrial fibrillation ([facility]) I48.0    Acute diastolic congestive heart failure ([facility]) I50.31    Paroxysmal atrial flutter ([facility]) I48.92    Urinary incontinence R32    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    Senile nuclear sclerosis H25.10    Puckering of macula, 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    Presence of intraocular lens Z96.1    Primary open-angle glaucoma 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    Chronic diastolic congestive heart failure ([facility]) I50.32    Morbid obesity ([facility]) E66.01    Urinary retention R33.9    Urine retention R33.9    Abdominal pain R10.9    Altered mental status R41.82    Chronic indwelling Billy catheter Z97.8    Urinary tract infection without hematuria N39.0    Hypokalemia E87.6    Weakness of left lower extremity R29.898    Pill dysphagia R13.10    History of thyroid cancer Z85.850    Gross hematuria R31.0    Constipation K59.00    Acute urinary retention R33.8    COPD exacerbation ([facility]) J44.1    Acute on chronic respiratory failure with hypoxia and hypercapnia ([facility]) J96.21, J96.22    Acute on

## 2021-06-21 NOTE — PROGRESS NOTES
retinal pigment epithelium    Senile nuclear sclerosis    Puckering of macula, bilateral    Presence of intraocular lens    Primary open-angle glaucoma    Other chorioretinal scars, right eye    Nonexudative senile macular degeneration of retina    Chronic diastolic congestive heart failure (HCC)    Morbid obesity (HCC)    Urinary retention    Urine retention    Abdominal pain    Altered mental status    Chronic indwelling Billy catheter    Urinary tract infection without hematuria    Hypokalemia    Weakness of left lower extremity    Pill dysphagia    History of thyroid cancer    Gross hematuria    Constipation    Acute urinary retention    COPD exacerbation (HCC)    Acute on chronic respiratory failure with hypoxia and hypercapnia (HCC)    Acute on chronic diastolic congestive heart failure (HCC)    Bilateral leg edema +2 now +1    Community acquired bacterial pneumonia    Scrotal pain       Past Surgical History:   Procedure Laterality Date   [facility] SURGERY  2006,2011, 2014    BRONCHOSCOPY N/A 8/29/2017    [facility]    CYSTOSCOPY N/A 6/17/2021    CYSTOSCOPY SUPRAPUBIC TUBE PLACEMENT performed by Aleida Gibbons MD at [facility] EKG 12-LEAD  11/10/2015         FRACTURE SURGERY      right hand    OTHER SURGICAL HISTORY      spinal epidurals    RETINAL DETACHMENT SURGERY      SPINE SURGERY  2004    Lumbar laminectomy    THYROIDECTOMY      TURP N/A 3/17/2020    CYSTOSCOPY WITH GREENLIGHT PHOTOVAPORIZATION OF PROSTATE performed by Aleida Gibbons MD at [address] [place] [address] TURP N/A 3/24/2020    PLASMA BUTTON TURP WITH CLOT EVACUATION performed by Aleida Gibbons MD at [facility]      Dr. Armand Davis prefers pt not use Macrobid due to pulmonary side effects.     Levaquin [Levofloxacin] Other (See Comments)     Redness/flushing    Brimonidine Tartrate     Adhesive Tape Rash    Alphagan [Brimonidine Tartrate] Hives     Eyes red        Family History   Problem Relation Age of Onset    Other Mother         Complications of Childbirth    Other Father [de-identified]        Natural causes        Social History     Socioeconomic History    Marital status:      Spouse name: Not on file    Number of children: 2    Years of education: Not on file    Highest education level: Not on file   Occupational History    Not on file   Tobacco Use    Smoking status: Never Smoker    Smokeless tobacco: Never Used   Vaping Use    Vaping Use: Never used   Substance and Sexual Activity    Alcohol use: Yes     Alcohol/week: 1.0 standard drinks     Types: 1 Glasses of wine per week     Comment: Glass of wine with dinner.  Drug use: No    Sexual activity: Never   Other Topics Concern    Not on file   Social History Narrative    Not on file     Social Determinants of Health     Financial Resource Strain:     Difficulty of Paying Living Expenses:    Food Insecurity:     Worried About Running Out of Food in the Last Year:     [address] in the Last Year:    Transportation Needs:     Lack of Transportation (Medical):      Lack of Transportation (Non-Medical):    Physical Activity:     Days of Exercise per Week:     Minutes of Exercise per Session:    Stress:     Feeling of Stress :    Social Connections:     Frequency of Communication with Friends and Family:     Frequency of Social Gatherings with Friends and Family:     Attends Mu-ism Services:     Active Member of Clubs or Organizations:     Attends Club or Organization Meetings:     Marital Status:    Intimate Partner Violence:     Fear of Current or Ex-Partner:     Emotionally Abused:     Physically Abused:     Sexually Abused:        Current Outpatient Medications   Medication Sig Dispense Refill    doxazosin (CARDURA) 4 MG tablet Take 1 tablet by mouth nightly 30 tablet 3    furosemide (LASIX) 40 MG tablet Take 40 mg by mouth 2 times daily       simethicone (MYLICON) 833 MG chewable tablet Take 125 mg by mouth every 6 hours as needed for Flatulence      aluminum & magnesium hydroxide-simethicone (MYLANTA) 400-400-40 MG/5ML SUSP Take 15 mLs by mouth every 6 hours as needed      famotidine (PEPCID) 20 MG tablet Take 20 mg by mouth 2 times daily as needed      Arformoterol Tartrate (BROVANA) 15 MCG/2ML NEBU Take 2 mLs by nebulization 2 times daily 1 dose inhale orally two times per day 120 mL 11    oxybutynin (DITROPAN) 5 MG tablet TAKE 1 TABLET BY MOUTH EVERY 8 HOURS AS NEEDED FOR BLADDER SPASMS OR BLADDER SPASM 270 tablet 0    Multiple Vitamins-Minerals (PRESERVISION AREDS 2) CAPS Take 1 capsule by mouth daily 90 capsule 3    ibuprofen (ADVIL;MOTRIN) 800 MG tablet TAKE 1 TABLET BY MOUTH THREE TIMES DAILY WITH MEALS 270 tablet 1    spironolactone (ALDACTONE) 25 MG tablet TAKE 1 TABLET BY MOUTH DAILY 90 tablet 3    Revefenacin 175 MCG/3ML SOLN Inhale 1 nebule into the lungs daily Dx COPD J44.3 30 vial 5    levothyroxine (SYNTHROID) 200 MCG tablet TAKE 1 TABLET BY MOUTH  DAILY 90 tablet 3    insulin glargine (LANTUS SOLOSTAR) 100 UNIT/ML injection pen INJECT SUBCUTANEOUSLY 18  UNITS NIGHTLY (Patient taking differently: INJECT SUBCUTANEOUSLY 20  UNITS NIGHTLY) 30 mL 3    metoprolol tartrate (LOPRESSOR) 25 MG tablet Take 1 tablet by mouth 2 times daily Hold for SBP <110 or HR <60 180 tablet 3    ferrous gluconate (FERGON) 324 (38 Fe) MG tablet Take 1 tablet by mouth 2 times daily 180 tablet 3    metFORMIN (GLUCOPHAGE) 1000 MG tablet Take 1 tablet by mouth 2 times daily (with meals) 180 tablet 3    budesonide (PULMICORT) 0.5 MG/2ML nebulizer suspension USE 1 VIAL VIA NEBULIZER TWICE DAILY.  RINSE MOUTH AFTER TREATMENT 360 mL 3    lisinopril (PRINIVIL;ZESTRIL) 2.5 MG tablet Take 1 tablet by mouth daily 90 tablet 3    potassium chloride (KLOR-CON M) 10 MEQ extended release tablet TAKE 1 TABLETS BY MOUTH TWICE DAILY 180 tablet 3    docusate (COLACE, DULCOLAX) 100 MG CAPS Take 100 mg by mouth 2 times daily 180 capsule 3    vitamin D3 (CHOLECALCIFEROL) 10 MCG (400 UNIT) TABS tablet Take 1 tablet by mouth daily 400units 90 tablet 3    polyethylene glycol (MIRALAX) 17 g packet Take 17 g by mouth daily as needed for Constipation      acetaminophen (TYLENOL) 325 MG tablet Take 650 mg by mouth every 4 hours as needed for Pain or Fever       lidocaine (LMX) 4 % cream Apply topically every 6 hours as needed for Pain To groin      albuterol sulfate  (90 Base) MCG/ACT inhaler Inhale 2 puffs into the lungs every 6 hours as needed for Wheezing 1 Inhaler 3    OXYGEN Inhale 3 L into the lungs nightly Indications: Difficulty Breathing, Oxygen Therapy And prn through cpap       Current Facility-Administered Medications   Medication Dose Route Frequency Provider Last Rate Last Admin    ceFAZolin (ANCEF) 2,000 mg in dextrose 5 % 100 mL IVPB  2,000 mg Intravenous 30 Min Pre-Op Marni Servin MD           Review of Systems -     General ROS: negative  Psychological ROS: negative  Hematological and Lymphatic ROS: No history of blood clots or bleeding disorder. Respiratory ROS: no cough,  or wheezing, the rest see HPI  Cardiovascular ROS: See HPI  Gastrointestinal ROS: negative  Genito-Urinary ROS: no dysuria, trouble voiding, or hematuria  Musculoskeletal ROS: negative  Neurological ROS: no TIA or stroke symptoms  Dermatological ROS: negative      Blood pressure (!) 114/55, pulse 71, height 5' 7\" (1.702 m), weight 236 lb (107 kg).         Physical Examination:    General appearance - alert, well appearing, and in no distress  HEENT- Pink conjunctiva  , Non-icteri sclera,PERRLA  Mental status - alert, oriented to person, place, and time  Neck - supple, no significant adenopathy, no JVD, or carotid bruits  Chest - clear to auscultation, no wheezes, rales or rhonchi, symmetric air entry  Heart - normal rate, regular rhythm, normal S1, S2, no murmurs, rubs, clicks or gallops  Abdomen - soft, nontender, nondistended, no masses or organomegaly  EDWARD- no CVA or flank tenderness, no suprapubic tenderness  Neurological - alert, oriented, normal speech, no focal findings or movement disorder noted  Musculoskeletal/limbs - no joint tenderness, deformity or swelling   - peripheral pulses normal, no pedal edema, no clubbing or cyanosis  Skin - normal coloration and turgor, no rashes, no suspicious skin lesions noted  Psych- appropriate mood and affect    Lab  No results for input(s): CKTOTAL, CKMB, CKMBINDEX, TROPONINI in the last 72 hours.   CBC:   Lab Results   Component Value Date    WBC 9.2 05/22/2021    RBC 4.05 05/22/2021    HGB 11.2 05/22/2021    HCT 34.2 05/22/2021    MCV 84.4 05/22/2021    MCH 27.7 05/22/2021    MCHC 32.7 05/22/2021    RDW 14.6 05/02/2018     05/22/2021    MPV 9.9 05/22/2021     BMP:    Lab Results   Component Value Date     06/10/2021    K 3.9 06/10/2021    K 3.8 05/20/2021     06/10/2021    CO2 30 06/10/2021    BUN 19 06/10/2021    LABALBU 3.4 05/20/2021    LABALBU 4.3 11/30/2011    CREATININE 1.3 06/10/2021    CALCIUM 8.3 06/10/2021    LABGLOM 52 06/10/2021    LABGLOM 79 05/24/2021    GLUCOSE 96 06/10/2021    GLUCOSE 130 11/30/2011     Hepatic Function Panel:    Lab Results   Component Value Date    ALKPHOS 42 05/20/2021    ALT 8 05/20/2021    AST 13 05/20/2021    PROT 6.1 05/20/2021    PROT 6.9 02/05/2011    BILITOT 0.2 05/20/2021    BILIDIR <0.2 05/20/2021    LABALBU 3.4 05/20/2021    LABALBU 4.3 11/30/2011     Magnesium:    Lab Results   Component Value Date    MG 2.1 05/19/2021     Warfarin PT/INR:  No components found for: PTPATWAR, PTINRWAR  HgBA1c:    Lab Results   Component Value Date    LABA1C 6.0 12/24/2020     FLP:    Lab Results   Component Value Date    TRIG 241 07/09/2020    HDL 34 07/09/2020    HDL 33 11/30/2011    LDLCALC 103 07/09/2020    LDLDIRECT 69.29 10/10/2018     TSH:    Lab Results   Component Value Date    TSH 7.160 10/03/2020     Conclusions      Summary   Technically difficult examination.   This is a suboptimal study due to poor echocardiographic window.   Doppler parameters were consistent with abnormal left ventricular   relaxation (grade 1 diastolic dysfunction).   Left ventricle size is normal.   Normal left ventricular wall thickness.   Ejection fraction is visually estimated at 55%.   Unable to determine wall motion abnormalities due to poor image quality.   Doppler parameters were consistent with abnormal left ventricular   relaxation (grade 1 diastolic dysfunction).      Signature      ----------------------------------------------------------------   Electronically signed by Enoc Parry MD (Interpreting   physician) on 03/19/2019 at 05:35 AM    Conclusions      Summary   Normal left ventricle size and systolic function. Ejection fraction was   estimated at 55 %. There were no regional left ventricular wall motion   abnormalities and wall thickness was within normal limits. Doppler parameters were consistent with abnormal left ventricular   relaxation (grade 1 diastolic dysfunction). Signature      ----------------------------------------------------------------   Electronically signed by Dave Malin MD (Interpreting   physician) on 06/14/2021 at 06:10 PM     Conclusions      Summary   Lexiscan EKG stress test is not suggestive for ischemia. The nuclear images is not suggestive for myocardial ischemia. Signatures      ----------------------------------------------------------------   Electronically signed by Dave Malin MD (Interpreting   Cardiologist) on 06/14/2021 at 19:16    Assessment   Diagnosis Orders   1. Coronary artery disease involving native coronary artery of native heart without angina pectoris     2. Chronic deep vein thrombosis (DVT) of other vein of right lower extremity (HCC)     3. Chronic diastolic congestive heart failure ([address]     4. Essential hypertension     5. Mixed hyperlipidemia     6. Paroxysmal atrial fibrillation (HCC)     7. Bilateral leg edema +2 now +1           ASSESSMENT:  1. Basically acute hypercapnic respiratory failure. 2.  Acute COPD exacerbation. 3.  Acute diastolic congestive heart failure exacerbation. 4.  Nonsustained ventricular tachycardia composed of 29 beats at a rate  of 140 to 150 beats per minute range, happened at 32 minutes after  midnight. 5.  Hypertension. 6.  Hyperlipidemia. 7.  Obesity. 8.  COPD, on home oxygen. 9.  History of DVT for which he is on Coumadin. 10.  Hyperlipidemia. 11.  Hypertension. Plan   The current  meds and labs reviewed    Continue the current treatment and with constant vigilance to changes in symptoms and also any potential side effects. Return for care or seek medical attention immediately if symptoms got worse and/or develop new symptoms. Hyperlipidemia:  off statins,   No sure why pat off statin  Fuentes Fairchild does not remember      Congestive heart failure: -ve evidence of fluid overload today, no recent hospitalization for CHF  Wt gain  Sob and leg edema  Trace to +1- Better  Cont  lasix to 60 po bid   Cont kcl 10 po qd  Cont  aldsactone 25 po qd  BMP and MG in 3 months      Hypertension, on medical treatment. Seems to be under good control. Patient is compliant with medical treatment. Low normal   Home sbp 98 to 110  Some dizziness on standing inituiall  Decrease cardua to 4  mg from 6 mg was from 8    Pt had suprapubic catheter    Hx of cholesterol emboli   Cont asa,    PAF   Had been off lipitor 80 and coumadin since  Early 2020  Consider to resume coumadin when urology gave the okay and if family agree    D/w the pat the plan of care    Recent hospital record reviewed    Stable  And doing well    Discussed use, benefit, and side effects of prescribed medications. All patient questions answered. Pt voiced understanding.  Instructed to continue current medications, diet and exercise. Continue risk factor modification and medical management. Patient agreed with treatment plan. Follow up as directed.       RTC in 4 months     India Sanchez [facility]

## 2021-06-21 NOTE — TELEPHONE ENCOUNTER
[facility] called to verify if patient was coming today for appointment. Nurse stated the transportation picked up patient 40mins ago and should have been at his appointment. Nurse said she would call  and call Infec. Dis.,  Back.

## 2021-06-22 ENCOUNTER — TELEPHONE (OUTPATIENT)
Dept: FAMILY MEDICINE CLINIC | Age: 86
End: 2021-06-22

## 2021-06-22 DIAGNOSIS — Z79.4 TYPE 2 DIABETES MELLITUS WITH COMPLICATION, WITH LONG-TERM CURRENT USE OF INSULIN (HCC): Primary | ICD-10-CM

## 2021-06-22 DIAGNOSIS — R79.89 ELEVATED SERUM CREATININE: ICD-10-CM

## 2021-06-22 DIAGNOSIS — E11.8 TYPE 2 DIABETES MELLITUS WITH COMPLICATION, WITH LONG-TERM CURRENT USE OF INSULIN (HCC): Primary | ICD-10-CM

## 2021-06-22 RX ORDER — DOXAZOSIN MESYLATE 4 MG/1
TABLET ORAL
Qty: 90 TABLET | Refills: 3 | Status: SHIPPED | OUTPATIENT
Start: 2021-06-22

## 2021-06-22 NOTE — TELEPHONE ENCOUNTER
----- Message from Sudha Du MD sent at 6/21/2021  5:26 PM EDT -----  Notify pt-   Results reviewed. BMP okay, except elevated glucose (known DM) and elevated CR (most likely due to continued diuretic usage) with GFR 44Try to increase hydration status gentlyRecheck BMP in 2 to 3 weeks to assure improvement. If GFR less than 45 at that time, we will have to consider stopping/decreasing metformin.   ES

## 2021-06-23 ENCOUNTER — TELEPHONE (OUTPATIENT)
Dept: INFECTIOUS DISEASES | Age: 86
End: 2021-06-23

## 2021-06-23 NOTE — TELEPHONE ENCOUNTER
----- Message from [facility], APRN - CNP sent at 6/23/2021  9:41 AM EDT -----  Regarding: urine culture  Have we received urine culture results back yet from [facility]? If not please reach out to them to obtain these results. Thank you!     Burt Oleary

## 2021-06-25 ENCOUNTER — TELEPHONE (OUTPATIENT)
Dept: INFECTIOUS DISEASES | Age: 86
End: 2021-06-25

## 2021-06-25 NOTE — TELEPHONE ENCOUNTER
[facility] called for second time to fax results of patients urine culture. Talked to Kim who states she will fax to clinic.

## 2021-06-28 ENCOUNTER — TELEPHONE (OUTPATIENT)
Dept: INFECTIOUS DISEASES | Age: 86
End: 2021-06-28

## 2021-07-06 DIAGNOSIS — E11.8 TYPE 2 DIABETES MELLITUS WITH COMPLICATION, WITH LONG-TERM CURRENT USE OF INSULIN (HCC): ICD-10-CM

## 2021-07-06 DIAGNOSIS — Z79.4 TYPE 2 DIABETES MELLITUS WITH COMPLICATION, WITH LONG-TERM CURRENT USE OF INSULIN (HCC): ICD-10-CM

## 2021-07-06 RX ORDER — BLOOD SUGAR DIAGNOSTIC
STRIP MISCELLANEOUS
Qty: 200 STRIP | Refills: 3 | Status: SHIPPED | OUTPATIENT
Start: 2021-07-06

## 2021-07-06 RX ORDER — UBIQUINOL 100 MG
CAPSULE ORAL
Qty: 200 EACH | Refills: 3 | Status: SHIPPED | OUTPATIENT
Start: 2021-07-06

## 2021-07-06 NOTE — TELEPHONE ENCOUNTER
[facility] requesting that our office send rx in for patient's testing supplies. Will also need the Medicare form completed for the pharmacy. (completed and on your desk awaiting signature)  Orders pended and set to escribe.

## 2021-07-09 ENCOUNTER — HOSPITAL ENCOUNTER (INPATIENT)
Age: 86
LOS: 4 days | Discharge: NURSING, REHAB OR LONG-TERM CARE | DRG: 699 | End: 2021-07-14
Attending: INTERNAL MEDICINE | Admitting: FAMILY MEDICINE
Payer: MEDICARE

## 2021-07-09 ENCOUNTER — APPOINTMENT (OUTPATIENT)
Dept: GENERAL RADIOLOGY | Age: 86
DRG: 699 | End: 2021-07-09
Payer: MEDICARE

## 2021-07-09 ENCOUNTER — TELEPHONE (OUTPATIENT)
Dept: INFECTIOUS DISEASES | Age: 86
End: 2021-07-09

## 2021-07-09 DIAGNOSIS — E89.0 POSTOPERATIVE HYPOTHYROIDISM: ICD-10-CM

## 2021-07-09 DIAGNOSIS — I48.0 PAROXYSMAL ATRIAL FIBRILLATION (HCC): ICD-10-CM

## 2021-07-09 DIAGNOSIS — J18.9 PNEUMONIA DUE TO INFECTIOUS ORGANISM, UNSPECIFIED LATERALITY, UNSPECIFIED PART OF LUNG: Primary | ICD-10-CM

## 2021-07-09 DIAGNOSIS — N18.9 ACUTE KIDNEY INJURY SUPERIMPOSED ON CKD (HCC): ICD-10-CM

## 2021-07-09 DIAGNOSIS — N17.9 ACUTE RENAL FAILURE, UNSPECIFIED ACUTE RENAL FAILURE TYPE (HCC): ICD-10-CM

## 2021-07-09 DIAGNOSIS — N17.9 ACUTE KIDNEY INJURY SUPERIMPOSED ON CKD (HCC): ICD-10-CM

## 2021-07-09 LAB
ALBUMIN SERPL-MCNC: 3.5 G/DL (ref 3.5–5.1)
ALP BLD-CCNC: 57 U/L (ref 38–126)
ALT SERPL-CCNC: < 5 U/L (ref 11–66)
ANION GAP SERPL CALCULATED.3IONS-SCNC: 14 MEQ/L (ref 8–16)
AST SERPL-CCNC: 7 U/L (ref 5–40)
BACTERIA: ABNORMAL /HPF
BASOPHILS # BLD: 0.4 %
BASOPHILS ABSOLUTE: 0 THOU/MM3 (ref 0–0.1)
BILIRUB SERPL-MCNC: 0.2 MG/DL (ref 0.3–1.2)
BILIRUBIN URINE: NEGATIVE
BLOOD, URINE: ABNORMAL
BUN BLDV-MCNC: 38 MG/DL (ref 7–22)
CALCIUM SERPL-MCNC: 8.8 MG/DL (ref 8.5–10.5)
CASTS 2: ABNORMAL /LPF
CASTS UA: ABNORMAL /LPF
CHARACTER, URINE: ABNORMAL
CHLORIDE BLD-SCNC: 96 MEQ/L (ref 98–111)
CO2: 23 MEQ/L (ref 23–33)
COLOR: YELLOW
CREAT SERPL-MCNC: 2.7 MG/DL (ref 0.4–1.2)
CRYSTALS, UA: ABNORMAL
EOSINOPHIL # BLD: 2.2 %
EOSINOPHILS ABSOLUTE: 0.3 THOU/MM3 (ref 0–0.4)
EPITHELIAL CELLS, UA: ABNORMAL /HPF
ERYTHROCYTE [DISTWIDTH] IN BLOOD BY AUTOMATED COUNT: 13.7 % (ref 11.5–14.5)
ERYTHROCYTE [DISTWIDTH] IN BLOOD BY AUTOMATED COUNT: 41.6 FL (ref 35–45)
GFR SERPL CREATININE-BSD FRML MDRD: 22 ML/MIN/1.73M2
GLUCOSE BLD-MCNC: 135 MG/DL (ref 70–108)
GLUCOSE URINE: NEGATIVE MG/DL
HCT VFR BLD CALC: 29.1 % (ref 42–52)
HEMOGLOBIN: 9.3 GM/DL (ref 14–18)
IMMATURE GRANS (ABS): 0.06 THOU/MM3 (ref 0–0.07)
IMMATURE GRANULOCYTES: 0.5 %
KETONES, URINE: NEGATIVE
LEUKOCYTE ESTERASE, URINE: ABNORMAL
LIPASE: 34.7 U/L (ref 5.6–51.3)
LYMPHOCYTES # BLD: 18.6 %
LYMPHOCYTES ABSOLUTE: 2.2 THOU/MM3 (ref 1–4.8)
MCH RBC QN AUTO: 26.6 PG (ref 26–33)
MCHC RBC AUTO-ENTMCNC: 32 GM/DL (ref 32.2–35.5)
MCV RBC AUTO: 83.1 FL (ref 80–94)
MISCELLANEOUS 2: ABNORMAL
MONOCYTES # BLD: 8.5 %
MONOCYTES ABSOLUTE: 1 THOU/MM3 (ref 0.4–1.3)
NITRITE, URINE: NEGATIVE
NUCLEATED RED BLOOD CELLS: 0 /100 WBC
OSMOLALITY CALCULATION: 277.5 MOSMOL/KG (ref 275–300)
PH UA: 5.5 (ref 5–9)
PLATELET # BLD: 320 THOU/MM3 (ref 130–400)
PMV BLD AUTO: 9.1 FL (ref 9.4–12.4)
POTASSIUM REFLEX MAGNESIUM: 4.3 MEQ/L (ref 3.5–5.2)
PROTEIN UA: 100
RBC # BLD: 3.5 MILL/MM3 (ref 4.7–6.1)
RBC URINE: ABNORMAL /HPF
RENAL EPITHELIAL, UA: ABNORMAL
SEG NEUTROPHILS: 69.8 %
SEGMENTED NEUTROPHILS ABSOLUTE COUNT: 8.4 THOU/MM3 (ref 1.8–7.7)
SODIUM BLD-SCNC: 133 MEQ/L (ref 135–145)
SPECIFIC GRAVITY, URINE: 1.01 (ref 1–1.03)
TOTAL PROTEIN: 6.8 G/DL (ref 6.1–8)
TROPONIN T: 0.09 NG/ML
UROBILINOGEN, URINE: 0.2 EU/DL (ref 0–1)
WBC # BLD: 12 THOU/MM3 (ref 4.8–10.8)
WBC UA: > 200 /HPF
YEAST: ABNORMAL

## 2021-07-09 PROCEDURE — 80053 COMPREHEN METABOLIC PANEL: CPT

## 2021-07-09 PROCEDURE — 84484 ASSAY OF TROPONIN QUANT: CPT

## 2021-07-09 PROCEDURE — 93005 ELECTROCARDIOGRAM TRACING: CPT | Performed by: INTERNAL MEDICINE

## 2021-07-09 PROCEDURE — 83690 ASSAY OF LIPASE: CPT

## 2021-07-09 PROCEDURE — 87086 URINE CULTURE/COLONY COUNT: CPT

## 2021-07-09 PROCEDURE — 99285 EMERGENCY DEPT VISIT HI MDM: CPT

## 2021-07-09 PROCEDURE — 36415 COLL VENOUS BLD VENIPUNCTURE: CPT

## 2021-07-09 PROCEDURE — 87077 CULTURE AEROBIC IDENTIFY: CPT

## 2021-07-09 PROCEDURE — 85025 COMPLETE CBC W/AUTO DIFF WBC: CPT

## 2021-07-09 PROCEDURE — 87186 SC STD MICRODIL/AGAR DIL: CPT

## 2021-07-09 PROCEDURE — 81001 URINALYSIS AUTO W/SCOPE: CPT

## 2021-07-09 PROCEDURE — 71045 X-RAY EXAM CHEST 1 VIEW: CPT

## 2021-07-09 NOTE — TELEPHONE ENCOUNTER
[address] [facility] regarding patients UA culture results. Verbal Order given by Mela MARTIN CNP to start Cipro 500mg, bid for 5days. Dominic repeated back the order and stated it would ordered through [address] [place] [address].

## 2021-07-10 ENCOUNTER — APPOINTMENT (OUTPATIENT)
Dept: CT IMAGING | Age: 86
DRG: 699 | End: 2021-07-10
Payer: MEDICARE

## 2021-07-10 ENCOUNTER — APPOINTMENT (OUTPATIENT)
Dept: ULTRASOUND IMAGING | Age: 86
DRG: 699 | End: 2021-07-10
Payer: MEDICARE

## 2021-07-10 PROBLEM — N17.9 ACUTE KIDNEY INJURY SUPERIMPOSED ON CKD (HCC): Status: ACTIVE | Noted: 2021-07-10

## 2021-07-10 PROBLEM — N18.9 ACUTE KIDNEY INJURY SUPERIMPOSED ON CKD (HCC): Status: ACTIVE | Noted: 2021-07-10

## 2021-07-10 LAB
ALLEN TEST: POSITIVE
ANION GAP SERPL CALCULATED.3IONS-SCNC: 14 MEQ/L (ref 8–16)
BASE EXCESS (CALCULATED): -2.4 MMOL/L (ref -2.5–2.5)
BUN BLDV-MCNC: 38 MG/DL (ref 7–22)
CALCIUM SERPL-MCNC: 8.6 MG/DL (ref 8.5–10.5)
CHLORIDE BLD-SCNC: 99 MEQ/L (ref 98–111)
CO2: 22 MEQ/L (ref 23–33)
COLLECTED BY:: ABNORMAL
CREAT SERPL-MCNC: 2.4 MG/DL (ref 0.4–1.2)
DEVICE: ABNORMAL
GFR SERPL CREATININE-BSD FRML MDRD: 26 ML/MIN/1.73M2
GLUCOSE BLD-MCNC: 107 MG/DL (ref 70–108)
GLUCOSE BLD-MCNC: 111 MG/DL (ref 70–108)
GLUCOSE BLD-MCNC: 114 MG/DL (ref 70–108)
GLUCOSE BLD-MCNC: 116 MG/DL (ref 70–108)
GLUCOSE BLD-MCNC: 163 MG/DL (ref 70–108)
HCO3: 22 MMOL/L (ref 23–28)
IFIO2: 21
MRSA SCREEN RT-PCR: NEGATIVE
O2 SATURATION: 93 %
PCO2: 37 MMHG (ref 35–45)
PH BLOOD GAS: 7.39 (ref 7.35–7.45)
PO2: 66 MMHG (ref 71–104)
POTASSIUM REFLEX MAGNESIUM: 4.2 MEQ/L (ref 3.5–5.2)
PROCALCITONIN: 0.12 NG/ML (ref 0.01–0.09)
REASON FOR REJECTION: NORMAL
REJECTED TEST: NORMAL
SODIUM BLD-SCNC: 135 MEQ/L (ref 135–145)
SOURCE, BLOOD GAS: ABNORMAL
TROPONIN T: 0.09 NG/ML
TROPONIN T: 0.09 NG/ML
VANCOMYCIN RESISTANT ENTEROCOCCUS: NEGATIVE

## 2021-07-10 PROCEDURE — 2580000003 HC RX 258: Performed by: FAMILY MEDICINE

## 2021-07-10 PROCEDURE — 87147 CULTURE TYPE IMMUNOLOGIC: CPT

## 2021-07-10 PROCEDURE — 87500 VANOMYCIN DNA AMP PROBE: CPT

## 2021-07-10 PROCEDURE — 6360000002 HC RX W HCPCS: Performed by: INTERNAL MEDICINE

## 2021-07-10 PROCEDURE — 74176 CT ABD & PELVIS W/O CONTRAST: CPT

## 2021-07-10 PROCEDURE — 82803 BLOOD GASES ANY COMBINATION: CPT

## 2021-07-10 PROCEDURE — 94660 CPAP INITIATION&MGMT: CPT

## 2021-07-10 PROCEDURE — 84484 ASSAY OF TROPONIN QUANT: CPT

## 2021-07-10 PROCEDURE — 94640 AIRWAY INHALATION TREATMENT: CPT

## 2021-07-10 PROCEDURE — 36600 WITHDRAWAL OF ARTERIAL BLOOD: CPT

## 2021-07-10 PROCEDURE — 84145 PROCALCITONIN (PCT): CPT

## 2021-07-10 PROCEDURE — 2580000003 HC RX 258: Performed by: INTERNAL MEDICINE

## 2021-07-10 PROCEDURE — 6360000002 HC RX W HCPCS: Performed by: STUDENT IN AN ORGANIZED HEALTH CARE EDUCATION/TRAINING PROGRAM

## 2021-07-10 PROCEDURE — 94761 N-INVAS EAR/PLS OXIMETRY MLT: CPT

## 2021-07-10 PROCEDURE — 6370000000 HC RX 637 (ALT 250 FOR IP): Performed by: STUDENT IN AN ORGANIZED HEALTH CARE EDUCATION/TRAINING PROGRAM

## 2021-07-10 PROCEDURE — 87081 CULTURE SCREEN ONLY: CPT

## 2021-07-10 PROCEDURE — 87040 BLOOD CULTURE FOR BACTERIA: CPT

## 2021-07-10 PROCEDURE — 2700000000 HC OXYGEN THERAPY PER DAY

## 2021-07-10 PROCEDURE — 87641 MR-STAPH DNA AMP PROBE: CPT

## 2021-07-10 PROCEDURE — 76770 US EXAM ABDO BACK WALL COMP: CPT

## 2021-07-10 PROCEDURE — 82948 REAGENT STRIP/BLOOD GLUCOSE: CPT

## 2021-07-10 PROCEDURE — 2580000003 HC RX 258: Performed by: STUDENT IN AN ORGANIZED HEALTH CARE EDUCATION/TRAINING PROGRAM

## 2021-07-10 PROCEDURE — 36415 COLL VENOUS BLD VENIPUNCTURE: CPT

## 2021-07-10 PROCEDURE — 1200000003 HC TELEMETRY R&B

## 2021-07-10 PROCEDURE — 99223 1ST HOSP IP/OBS HIGH 75: CPT | Performed by: FAMILY MEDICINE

## 2021-07-10 PROCEDURE — 80048 BASIC METABOLIC PNL TOTAL CA: CPT

## 2021-07-10 RX ORDER — MAGNESIUM HYDROXIDE/ALUMINUM HYDROXICE/SIMETHICONE 120; 1200; 1200 MG/30ML; MG/30ML; MG/30ML
30 SUSPENSION ORAL EVERY 6 HOURS PRN
Status: DISCONTINUED | OUTPATIENT
Start: 2021-07-10 | End: 2021-07-14 | Stop reason: HOSPADM

## 2021-07-10 RX ORDER — DEXTROSE MONOHYDRATE 50 MG/ML
100 INJECTION, SOLUTION INTRAVENOUS PRN
Status: DISCONTINUED | OUTPATIENT
Start: 2021-07-10 | End: 2021-07-14 | Stop reason: HOSPADM

## 2021-07-10 RX ORDER — DEXTROSE MONOHYDRATE 25 G/50ML
12.5 INJECTION, SOLUTION INTRAVENOUS PRN
Status: DISCONTINUED | OUTPATIENT
Start: 2021-07-10 | End: 2021-07-14 | Stop reason: HOSPADM

## 2021-07-10 RX ORDER — FUROSEMIDE 40 MG/1
40 TABLET ORAL 2 TIMES DAILY
Status: DISCONTINUED | OUTPATIENT
Start: 2021-07-10 | End: 2021-07-14 | Stop reason: HOSPADM

## 2021-07-10 RX ORDER — ACETAMINOPHEN 650 MG/1
650 SUPPOSITORY RECTAL EVERY 6 HOURS PRN
Status: DISCONTINUED | OUTPATIENT
Start: 2021-07-10 | End: 2021-07-14 | Stop reason: HOSPADM

## 2021-07-10 RX ORDER — ONDANSETRON 2 MG/ML
4 INJECTION INTRAMUSCULAR; INTRAVENOUS EVERY 6 HOURS PRN
Status: DISCONTINUED | OUTPATIENT
Start: 2021-07-10 | End: 2021-07-14 | Stop reason: HOSPADM

## 2021-07-10 RX ORDER — ALBUTEROL SULFATE 90 UG/1
2 AEROSOL, METERED RESPIRATORY (INHALATION) EVERY 6 HOURS PRN
Status: DISCONTINUED | OUTPATIENT
Start: 2021-07-10 | End: 2021-07-14 | Stop reason: HOSPADM

## 2021-07-10 RX ORDER — DOXAZOSIN MESYLATE 4 MG/1
4 TABLET ORAL NIGHTLY
Status: DISCONTINUED | OUTPATIENT
Start: 2021-07-10 | End: 2021-07-14 | Stop reason: HOSPADM

## 2021-07-10 RX ORDER — ARFORMOTEROL TARTRATE 15 UG/2ML
15 SOLUTION RESPIRATORY (INHALATION) 2 TIMES DAILY
Status: DISCONTINUED | OUTPATIENT
Start: 2021-07-10 | End: 2021-07-14 | Stop reason: HOSPADM

## 2021-07-10 RX ORDER — SODIUM CHLORIDE 0.9 % (FLUSH) 0.9 %
5-40 SYRINGE (ML) INJECTION PRN
Status: DISCONTINUED | OUTPATIENT
Start: 2021-07-10 | End: 2021-07-14 | Stop reason: HOSPADM

## 2021-07-10 RX ORDER — INSULIN GLARGINE 100 [IU]/ML
20 INJECTION, SOLUTION SUBCUTANEOUS NIGHTLY
Status: DISCONTINUED | OUTPATIENT
Start: 2021-07-10 | End: 2021-07-14 | Stop reason: HOSPADM

## 2021-07-10 RX ORDER — LEVOTHYROXINE SODIUM 0.1 MG/1
200 TABLET ORAL DAILY
Status: DISCONTINUED | OUTPATIENT
Start: 2021-07-10 | End: 2021-07-14 | Stop reason: HOSPADM

## 2021-07-10 RX ORDER — SODIUM CHLORIDE 0.9 % (FLUSH) 0.9 %
5-40 SYRINGE (ML) INJECTION EVERY 12 HOURS SCHEDULED
Status: DISCONTINUED | OUTPATIENT
Start: 2021-07-10 | End: 2021-07-14 | Stop reason: HOSPADM

## 2021-07-10 RX ORDER — POLYETHYLENE GLYCOL 3350 17 G/17G
17 POWDER, FOR SOLUTION ORAL DAILY PRN
Status: DISCONTINUED | OUTPATIENT
Start: 2021-07-10 | End: 2021-07-14 | Stop reason: HOSPADM

## 2021-07-10 RX ORDER — SPIRONOLACTONE 25 MG/1
25 TABLET ORAL DAILY
Status: DISCONTINUED | OUTPATIENT
Start: 2021-07-10 | End: 2021-07-13

## 2021-07-10 RX ORDER — HEPARIN SODIUM 5000 [USP'U]/ML
5000 INJECTION, SOLUTION INTRAVENOUS; SUBCUTANEOUS EVERY 8 HOURS SCHEDULED
Status: DISCONTINUED | OUTPATIENT
Start: 2021-07-10 | End: 2021-07-14 | Stop reason: HOSPADM

## 2021-07-10 RX ORDER — BUDESONIDE 0.5 MG/2ML
0.5 INHALANT ORAL 2 TIMES DAILY
Status: DISCONTINUED | OUTPATIENT
Start: 2021-07-10 | End: 2021-07-14 | Stop reason: HOSPADM

## 2021-07-10 RX ORDER — FAMOTIDINE 20 MG/1
20 TABLET, FILM COATED ORAL DAILY PRN
Status: DISCONTINUED | OUTPATIENT
Start: 2021-07-10 | End: 2021-07-14 | Stop reason: HOSPADM

## 2021-07-10 RX ORDER — OXYBUTYNIN CHLORIDE 5 MG/1
5 TABLET ORAL EVERY 8 HOURS PRN
Status: DISCONTINUED | OUTPATIENT
Start: 2021-07-10 | End: 2021-07-14 | Stop reason: HOSPADM

## 2021-07-10 RX ORDER — NICOTINE POLACRILEX 4 MG
15 LOZENGE BUCCAL PRN
Status: DISCONTINUED | OUTPATIENT
Start: 2021-07-10 | End: 2021-07-14 | Stop reason: HOSPADM

## 2021-07-10 RX ORDER — SIMETHICONE 80 MG
125 TABLET,CHEWABLE ORAL EVERY 6 HOURS PRN
Status: DISCONTINUED | OUTPATIENT
Start: 2021-07-10 | End: 2021-07-14 | Stop reason: HOSPADM

## 2021-07-10 RX ORDER — ACETAMINOPHEN 325 MG/1
650 TABLET ORAL EVERY 6 HOURS PRN
Status: DISCONTINUED | OUTPATIENT
Start: 2021-07-10 | End: 2021-07-14 | Stop reason: HOSPADM

## 2021-07-10 RX ORDER — SODIUM CHLORIDE 9 MG/ML
25 INJECTION, SOLUTION INTRAVENOUS PRN
Status: DISCONTINUED | OUTPATIENT
Start: 2021-07-10 | End: 2021-07-14 | Stop reason: HOSPADM

## 2021-07-10 RX ORDER — LISINOPRIL 2.5 MG/1
2.5 TABLET ORAL DAILY
Status: DISCONTINUED | OUTPATIENT
Start: 2021-07-10 | End: 2021-07-14 | Stop reason: HOSPADM

## 2021-07-10 RX ORDER — SODIUM CHLORIDE 9 MG/ML
INJECTION, SOLUTION INTRAVENOUS CONTINUOUS
Status: DISCONTINUED | OUTPATIENT
Start: 2021-07-10 | End: 2021-07-13

## 2021-07-10 RX ORDER — DOCUSATE SODIUM 100 MG/1
100 CAPSULE, LIQUID FILLED ORAL 2 TIMES DAILY
Status: DISCONTINUED | OUTPATIENT
Start: 2021-07-10 | End: 2021-07-14 | Stop reason: HOSPADM

## 2021-07-10 RX ORDER — LIDOCAINE 40 MG/G
CREAM TOPICAL EVERY 6 HOURS PRN
Status: DISCONTINUED | OUTPATIENT
Start: 2021-07-10 | End: 2021-07-14 | Stop reason: HOSPADM

## 2021-07-10 RX ORDER — QUINIDINE GLUCONATE 324 MG
240 TABLET, EXTENDED RELEASE ORAL 2 TIMES DAILY
Status: DISCONTINUED | OUTPATIENT
Start: 2021-07-10 | End: 2021-07-14 | Stop reason: HOSPADM

## 2021-07-10 RX ORDER — FAMOTIDINE 20 MG/1
20 TABLET, FILM COATED ORAL 2 TIMES DAILY PRN
Status: DISCONTINUED | OUTPATIENT
Start: 2021-07-10 | End: 2021-07-10 | Stop reason: DRUGHIGH

## 2021-07-10 RX ADMIN — AZITHROMYCIN DIHYDRATE 500 MG: 500 INJECTION, POWDER, LYOPHILIZED, FOR SOLUTION INTRAVENOUS at 11:33

## 2021-07-10 RX ADMIN — BUDESONIDE 500 MCG: 0.5 INHALANT RESPIRATORY (INHALATION) at 18:27

## 2021-07-10 RX ADMIN — LEVOTHYROXINE SODIUM 200 MCG: 0.1 TABLET ORAL at 11:32

## 2021-07-10 RX ADMIN — SODIUM CHLORIDE: 9 INJECTION, SOLUTION INTRAVENOUS at 17:10

## 2021-07-10 RX ADMIN — HEPARIN SODIUM 5000 UNITS: 5000 INJECTION INTRAVENOUS; SUBCUTANEOUS at 14:40

## 2021-07-10 RX ADMIN — SODIUM CHLORIDE, PRESERVATIVE FREE 10 ML: 5 INJECTION INTRAVENOUS at 21:51

## 2021-07-10 RX ADMIN — HEPARIN SODIUM 5000 UNITS: 5000 INJECTION INTRAVENOUS; SUBCUTANEOUS at 06:47

## 2021-07-10 RX ADMIN — HEPARIN SODIUM 5000 UNITS: 5000 INJECTION INTRAVENOUS; SUBCUTANEOUS at 21:51

## 2021-07-10 RX ADMIN — SODIUM CHLORIDE, PRESERVATIVE FREE 10 ML: 5 INJECTION INTRAVENOUS at 11:33

## 2021-07-10 RX ADMIN — Medication 240 MG: at 11:32

## 2021-07-10 RX ADMIN — DOCUSATE SODIUM 100 MG: 100 CAPSULE, LIQUID FILLED ORAL at 11:36

## 2021-07-10 RX ADMIN — LIDOCAINE 4%: 4 CREAM TOPICAL at 21:51

## 2021-07-10 RX ADMIN — INSULIN GLARGINE 20 UNITS: 100 INJECTION, SOLUTION SUBCUTANEOUS at 21:54

## 2021-07-10 RX ADMIN — ONDANSETRON 4 MG: 2 INJECTION INTRAMUSCULAR; INTRAVENOUS at 08:45

## 2021-07-10 RX ADMIN — ARFORMOTEROL TARTRATE 15 MCG: 15 SOLUTION RESPIRATORY (INHALATION) at 18:25

## 2021-07-10 RX ADMIN — METOPROLOL TARTRATE 25 MG: 25 TABLET, FILM COATED ORAL at 11:33

## 2021-07-10 RX ADMIN — Medication 240 MG: at 21:51

## 2021-07-10 RX ADMIN — DOCUSATE SODIUM 100 MG: 100 CAPSULE, LIQUID FILLED ORAL at 21:51

## 2021-07-10 RX ADMIN — SODIUM CHLORIDE: 9 INJECTION, SOLUTION INTRAVENOUS at 03:49

## 2021-07-10 RX ADMIN — CEFEPIME HYDROCHLORIDE 2000 MG: 2 INJECTION, POWDER, FOR SOLUTION INTRAVENOUS at 01:55

## 2021-07-10 ASSESSMENT — PAIN SCALES - GENERAL
PAINLEVEL_OUTOF10: 0

## 2021-07-10 NOTE — ED PROVIDER NOTES
eMERGENCY dEPARTMENT eNCOUnter      [address] [facility]    Chief Complaint   Patient presents with    Fatigue    Shortness of Breath       HPI    Susan Larkin is a 80 y.o. male who presents emergency department when he was sent here from the nursing home because of shortness of breath. Patient also was noted to have altered mental status at nursing home. Patient is not complaining of any fever or chills is not complaining of shortness of breath anymore. According to nursing staff patient also has altered mental status but patient is alert and oriented x3 in the emergency department. Patient is on oxygen and nursing home on as-needed basis but takes CPAP at night.     PAST MEDICAL HISTORY    Past Medical History:   Diagnosis Date    Anemia     Anxiety     BPH (benign prostatic hyperplasia)     CAD (coronary artery disease)     leaking valve    Chronic back pain     COPD (chronic obstructive pulmonary disease) (HCC)     Detached retina     Diabetes mellitus (HCC)     NIDDM    DVT (deep venous thrombosis) (HCC)     RLE    DVT (deep venous thrombosis) ([address] 11/9/15    LLE    Dysphagia     Glaucoma     Hyperlipidemia     Hypertension     Macular degeneration     Mass of chest     benign chest mass, Dr Phillips Fuse    Movement disorder     spinal stenosis    Obesity     Osteoarthritis     right ankle, right hand    Paroxysmal atrial fibrillation ([address]     Pneumonia     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    Sleep apnea 1993    on BiPap, Dr Lesvia Gresham Testicular hypofunction     Thyroid cancer [facility])     s/p thyroid resection    Urinary incontinence        SURGICAL HISTORY    Past Surgical History:   Procedure Laterality Date   [facility] SURGERY  2006,2011, 2014    BRONCHOSCOPY N/A 8/29/2017    BRONCHOSCOPY AIRWAY ONLY performed by Chance Segovia MD at [address] N/A 6/17/2021    CYSTOSCOPY SUPRAPUBIC TUBE PLACEMENT performed by Janae Warren MD at [address] [place] [address] EKG 12-LEAD 11/10/2015         FRACTURE SURGERY      right hand    OTHER SURGICAL HISTORY      spinal epidurals    RETINAL DETACHMENT SURGERY      SPINE SURGERY  2004    Lumbar laminectomy    THYROIDECTOMY      TURP N/A 3/17/2020    CYSTOSCOPY WITH GREENLIGHT PHOTOVAPORIZATION OF PROSTATE performed by Na Quintero MD at [address] [facility] TURP N/A 3/24/2020    PLASMA BUTTON TURP WITH CLOT EVACUATION performed by Na Quintero MD at [address] [facility]    Current Outpatient Rx   Medication Sig Dispense Refill    blood glucose test strips (ACCU-CHEK CHE PLUS) strip Accu-check Che Plus Test Strips (or brand per pt preference). Sig: test sugar BID. Dx: E11.8 200 strip 3    SOFT TOUCH LANCETS MISC Lancet (or brand per pt preference) Use to check blood sugars 2 times daily. Dx: E11.8 200 each 3    Alcohol Swabs (ALCOHOL PREP) 70 % PADS Alcohol pads (brand per preference). Sig: use to test sugar twice per day.  Dx: E11.8 200 each 3    doxazosin (CARDURA) 4 MG tablet TAKE 1 TABLET BY MOUTH EVERY NIGHT 90 tablet 3    furosemide (LASIX) 40 MG tablet Take 40 mg by mouth 2 times daily       simethicone (MYLICON) 817 MG chewable tablet Take 125 mg by mouth every 6 hours as needed for Flatulence      aluminum & magnesium hydroxide-simethicone (MYLANTA) 400-400-40 MG/5ML SUSP Take 15 mLs by mouth every 6 hours as needed      famotidine (PEPCID) 20 MG tablet Take 20 mg by mouth 2 times daily as needed      Arformoterol Tartrate (BROVANA) 15 MCG/2ML NEBU Take 2 mLs by nebulization 2 times daily 1 dose inhale orally two times per day 120 mL 11    oxybutynin (DITROPAN) 5 MG tablet TAKE 1 TABLET BY MOUTH EVERY 8 HOURS AS NEEDED FOR BLADDER SPASMS OR BLADDER SPASM 270 tablet 0    Multiple Vitamins-Minerals (PRESERVISION AREDS 2) CAPS Take 1 capsule by mouth daily 90 capsule 3    ibuprofen (ADVIL;MOTRIN) 800 MG tablet TAKE 1 TABLET BY MOUTH THREE TIMES DAILY WITH MEALS 270 tablet 1    spironolactone (ALDACTONE) 25 MG tablet TAKE 1 TABLET BY MOUTH DAILY 90 tablet 3    Revefenacin 175 MCG/3ML SOLN Inhale 1 nebule into the lungs daily Dx COPD J44.3 30 vial 5    levothyroxine (SYNTHROID) 200 MCG tablet TAKE 1 TABLET BY MOUTH  DAILY 90 tablet 3    insulin glargine (LANTUS SOLOSTAR) 100 UNIT/ML injection pen INJECT SUBCUTANEOUSLY 18  UNITS NIGHTLY (Patient taking differently: INJECT SUBCUTANEOUSLY 20  UNITS NIGHTLY) 30 mL 3    metoprolol tartrate (LOPRESSOR) 25 MG tablet Take 1 tablet by mouth 2 times daily Hold for SBP <110 or HR <60 180 tablet 3    ferrous gluconate (FERGON) 324 (38 Fe) MG tablet Take 1 tablet by mouth 2 times daily 180 tablet 3    metFORMIN (GLUCOPHAGE) 1000 MG tablet Take 1 tablet by mouth 2 times daily (with meals) 180 tablet 3    budesonide (PULMICORT) 0.5 MG/2ML nebulizer suspension USE 1 VIAL VIA NEBULIZER TWICE DAILY.  RINSE MOUTH AFTER TREATMENT 360 mL 3    lisinopril (PRINIVIL;ZESTRIL) 2.5 MG tablet Take 1 tablet by mouth daily 90 tablet 3    potassium chloride (KLOR-CON M) 10 MEQ extended release tablet TAKE 1 TABLETS BY MOUTH TWICE DAILY 180 tablet 3    docusate (COLACE, DULCOLAX) 100 MG CAPS Take 100 mg by mouth 2 times daily 180 capsule 3    vitamin D3 (CHOLECALCIFEROL) 10 MCG (400 UNIT) TABS tablet Take 1 tablet by mouth daily 400units 90 tablet 3    polyethylene glycol (MIRALAX) 17 g packet Take 17 g by mouth daily as needed for Constipation      acetaminophen (TYLENOL) 325 MG tablet Take 650 mg by mouth every 4 hours as needed for Pain or Fever       lidocaine (LMX) 4 % cream Apply topically every 6 hours as needed for Pain To groin      albuterol sulfate  (90 Base) MCG/ACT inhaler Inhale 2 puffs into the lungs every 6 hours as needed for Wheezing 1 Inhaler 3    OXYGEN Inhale 3 L into the lungs nightly Indications: Difficulty Breathing, Oxygen Therapy And prn through cpap         ALLERGIES    Allergies   Allergen Reactions    Latex Rash    Macrobid [Nitrofurantoin Monohyd Macro] Dr. Chemo Blake prefers pt not use Macrobid due to pulmonary side effects.  Levaquin [Levofloxacin] Other (See Comments)     Redness/flushing    Brimonidine Tartrate     Adhesive Tape Rash    Alphagan [Brimonidine Tartrate] Hives     Eyes red       FAMILY HISTORY    Family History   Problem Relation Age of Onset    Other Mother         Complications of Childbirth    Other Father [de-identified]        Natural causes       SOCIAL HISTORY    Social History     Socioeconomic History    Marital status:      Spouse name: None    Number of children: 2    Years of education: None    Highest education level: None   Occupational History    None   Tobacco Use    Smoking status: Never Smoker    Smokeless tobacco: Never Used   Vaping Use    Vaping Use: Never used   Substance and Sexual Activity    Alcohol use: Yes     Alcohol/week: 1.0 standard drinks     Types: 1 Glasses of wine per week     Comment: Glass of wine with dinner.  Drug use: No    Sexual activity: Never   Other Topics Concern    None   Social History Narrative    None     Social Determinants of Health     Financial Resource Strain:     Difficulty of Paying Living Expenses:    Food Insecurity:     Worried About Running Out of Food in the Last Year:     [address] in the Last Year:    Transportation Needs:     Lack of Transportation (Medical):      Lack of Transportation (Non-Medical):    Physical Activity:     Days of Exercise per Week:     Minutes of Exercise per Session:    Stress:     Feeling of Stress :    Social Connections:     Frequency of Communication with Friends and Family:     Frequency of Social Gatherings with Friends and Family:     Attends [employer] Services:     Active Member of Clubs or Organizations:     Attends Club or Organization Meetings:     Marital Status:    Intimate Partner Violence:     Fear of Current or Ex-Partner:     Emotionally Abused:     Physically Abused:     Sexually Abused:        REVIEW OF SYSTEMS    Constitutional:  Denies fever, chills, weight loss or weakness   Eyes:  Denies photophobia or discharge   HENT:  Denies sore throat or ear pain   Respiratory:  Denies cough, complaining of shortness of breath   Cardiovascular:  Denies chest pain, palpitations or swelling   GI:  Denies abdominal pain, nausea, vomiting, or diarrhea   Musculoskeletal:  Denies back pain   Skin:  Denies rash   Neurologic:  Denies headache, focal weakness or sensory changes   Endocrine:  Denies polyuria or polydypsia   Lymphatic:  Denies swollen glands   Psychiatric:  Denies depression, suicidal ideation or homicidal ideation   All systems negative except as marked. PHYSICAL EXAM    VITAL SIGNS: /62   Pulse 64   Temp 98 °F (36.7 °C) (Oral)   Resp 16   Ht 5' 7\" (1.702 m)   Wt 250 lb (113.4 kg)   SpO2 95%   BMI 39.16 kg/m²    Constitutional:  Well developed, Well nourished, No acute distress, Non-toxic appearance. HENT:  Normocephalic, Atraumatic, Bilateral external ears normal, Oropharynx moist, No oral exudates, Nose normal. Neck- Normal range of motion, No tenderness, Supple, No stridor. Eyes:  PERRL, EOMI, Conjunctiva normal, No discharge. Respiratory:  Normal breath sounds, No respiratory distress, No wheezing, No chest tenderness. Cardiovascular:  Normal heart rate, Normal rhythm, No murmurs, No rubs, No gallops. GI:  Bowel sounds normal, Soft, No tenderness, No masses, No pulsatile masses. : External genitalia appear normal, No masses or lesions. No discharge. No CVA tenderness. Musculoskeletal:  Intact distal pulses, No edema, No tenderness, No cyanosis, No clubbing. Good range of motion in all major joints. No tenderness to palpation or major deformities noted. Back- No tenderness. Integument:  Warm, Dry, No erythema, No rash. Lymphatic:  No lymphadenopathy noted. Neurologic:  Alert & oriented x 3, Normal motor function, Normal sensory function, No focal deficits noted. Psychiatric:  Affect normal, Judgment normal, Mood normal.     EKG    Normal sinus rhythm. Ventricular rate 66 bpm.    RADIOLOGY    XR CHEST PORTABLE   Final Result   Impression:   1. Bibasilar atelectasis and/or infiltrates, left greater than right. This document has been electronically signed by: Osman Moe. DO [place] on    07/10/2021 12:04 AM        LABS  Labs Reviewed   CBC WITH AUTO DIFFERENTIAL - Abnormal; Notable for the following components:       Result Value    WBC 12.0 (*)     RBC 3.50 (*)     Hemoglobin 9.3 (*)     Hematocrit 29.1 (*)     MCHC 32.0 (*)     MPV 9.1 (*)     Segs Absolute 8.4 (*)     All other components within normal limits   COMPREHENSIVE METABOLIC PANEL W/ REFLEX TO MG FOR LOW K - Abnormal; Notable for the following components:    Glucose 135 (*)     CREATININE 2.7 (*)     BUN 38 (*)     Sodium 133 (*)     Chloride 96 (*)     Total Bilirubin 0.2 (*)     ALT <5 (*)     All other components within normal limits   TROPONIN - Abnormal; Notable for the following components:    Troponin T 0.090 (*)     All other components within normal limits   BLOOD GAS, ARTERIAL - Abnormal; Notable for the following components:    PO2 66 (*)     HCO3 22 (*)     All other components within normal limits   URINE WITH REFLEXED MICRO - Abnormal; Notable for the following components:    Blood, Urine MODERATE (*)     Protein,  (*)     Leukocyte Esterase, Urine LARGE (*)     Character, Urine CLOUDY (*)     All other components within normal limits   GLOMERULAR FILTRATION RATE, ESTIMATED - Abnormal; Notable for the following components:    Est, Glom Filt Rate 22 (*)     All other components within normal limits   CULTURE, REFLEXED, URINE    Narrative:     Source: urine, clean catch       Site: clean void          Current Antibiotics: not stated   LIPASE   ANION GAP   OSMOLALITY       ED COURSE & MEDICAL DECISION MAKING    Pertinent Labs & Imaging studies reviewed.  (See chart for details)  Patient was not as short of breath when patient came to the emergency department. ABG showed pH to be 7.39 PCO2 was 37 PO2 was 66. White count is 12,000 hemoglobin is 9.3. Patient creatinine has jumped to 2.7. BUN was 38. Sodium was 133. Troponin was slightly elevated at 0.090, patient has chronic elevation of troponin. This is slightly more elevated because of his renal function. Patient's urine shows moderate blood and large leukocyte esterase. Greater than 200 WBCs in the urine. Chest x-ray shows bibasilar atelectasis and/or infiltrates left greater than right. Patient will be admitted by hospitalist for further work-up and management. FINAL IMPRESSION    1. Pneumonia due to infectious organism, unspecified laterality, unspecified part of lung    2.  Acute renal failure, unspecified acute renal failure type ([facility]             Rachel Guevara MD  07/10/21 [id number]

## 2021-07-10 NOTE — H&P
History & Physical        Patient:  Fili Hand  YOB: 1931    MRN: [id number]     Acct: [de-identified]    PCP: Nyasia Samuel MD    Date of Admission: 7/9/2021    Date of Service: Pt seen/examined on 07/10/21  and Admitted to Inpatient with expected LOS greater than two midnights due to medical therapy. Chief Complaint: Shortness of breath and altered mental status    Assessment and Plan:    1.) Acute UTI 2/2 chronic suprapubic catheter: UA noted to have bacteria, RBC, and WBC without significant epithelial cells. Patient states he vaguely ill feeling but is unable to describe specific symptoms. UTI most likely cause of ill feeling versus pneumonia. CXR performed does show what appears to be bilateral infiltrates, however prior studies redemonstrate said infiltrates. -Continue treatment with cefepime for easier discharge to nursing home   -Obtaining kidney ultrasound due to STEPHY to check for pyelonephritis   -Continue IVF rehydration   -Acetaminophen as needed for fevers    2.) ? Acute? On chronic hypoxic respiratory failure with Hx of COPD and ROBER: Patient was brought to ER for shortness of breath, however patient is saturating well on room air. He does use oxygen as needed outpatient. Patient also wears CPAP at night for ROBER. No listed history of hypercapnia. -Continue oxygen 3L as needed during day   -Continue CPAP therapy at night with pressure of 10 mmHg   -Continue patient's albuterol, Brovana, Revefenacin, and Pulmicort, no need for steroids    3.) Mild hyponatremia: Sodium 133 on arrival, likely secondary to iatrogenic causes. Continue with light IVF replacement. Currently holding Lasix and lisinopril due to STEPHY. 4.) STEPHY on CKD III: Creatinine on arrival 2.7, baseline creatinine around 1.3. STEPHY likely secondary to UTI as well as recent treatment of UTI.  Patient does take several nephrotoxic medications at home.   -Hold patient's lisinopril, Lasix, and spironolactone   -Recheck BMP daily   -Continue light IVF rehydration   -Continue with treatment of UTI    5.) Elevated troponin: Troponin elevated at 0.060 on admission. Patient is chronically elevated to around 0.050. No acute EKG changes noted, no chest pain. Shortness of breath may be a sign of ischemic heart disease, however this has resolved on its own without nitro. Low suspicion for ACS. Likely secondary to demand ischemia from STEPHY. -Trend troponin until resolution    6.) T2DM: Under good control, continue patient's Lantus 20 units nightly. Holding patient's Metformin in setting of STEPHY. Hypoglycemia orders in place. Low-dose sliding scale ordered. 7.) Paroxysmal A. fib: Regular rate and rhythm on presentation. Continue patient on metoprolol. Keep on telemetry for at least 24 hours. Patient not noted to be on an anticoagulant outpatient, reasoning unknown potentially related to decompensation risk of falls. 8.) HTN: BP stable on admission, continue patient on Cardura and metoprolol as recorded home dose. Continue to monitor BP and potassium with daily BMP.   -Holding Lasix, lisinopril, and spironolactone in setting of STEPHY. 9.) Acute on chronic iron deficiency anemia: Hgb on arrival 9.3, previous Hgb 11.22 months ago. Iron studies performed 2 months ago show low iron levels as well as mildly low B12. Continue iron replacement tablets and patient. Monitor with daily CBC, may transfuse if Hgb <7.    10.) Acquired hypothyroidism: Continue patient's levothyroxine 200 mcg tablet daily. 11.) Obesity: Patient has a BMI of 39.16, weight loss recommended. 12.) GERD/GI upset: Continue patient on Pepcid, Colace, and simethicone as needed. History Of Present Illness:       Mr. America Sandoval is a 59-year-old male with a past medical history of COPD (uses CPAP at night with oxygen as needed), T2DM, HTN, HLD, A. fib, thyroid resection, ROBER, and anemia.  He was sent to the ER from the nursing home for reports of the nursing home staff that the patient was short of breath and was altered. The patient himself was alert and oriented x3 in the emergency department and was not complaining of any fevers chills or shortness of breath. The patient did state that he \"did not feel right\" ever since his recent treatment for UTI. The patient reiterates that he is not feeling more short of breath than on a normal day. But does feel rather not like himself but cannot exactly explain the feeling. He denies any fever, chills (though he does state that he does feel cold), nausea, vomiting, diarrhea, constipation, issues with urination, or numbness and tingling in his hands and feet. He is unaware of why the nursing home decided he would have to come to the hospital to be evaluated. In the ED temperature 98.0 °F, /88, HR 70, RR 14, SPO2 97% on room air. Sodium 133, chloride 96, potassium 4.3, creatinine of 2.7, troponin 0.090, LFTs within normal limits, WBC 12.0, Hgb 9.3, platelet 425. UA shows leukocyte esterase, blood, WBCs, and bacteria. Blood gas shows pH of 7.39, PO2 of 66, and PCO2 of 37. CXR shows bibasilar atelectasis left greater than right. EKG shows potential A. fib due to lack of P waves.     Past Medical History:          Diagnosis Date    Anemia     Anxiety     BPH (benign prostatic hyperplasia)     CAD (coronary artery disease)     leaking valve    Chronic back pain     [facility]ch[facility] obstructive [facility] disease[facility]     Detached retina     Diabetes mellitus (HCC)     NIDDM    DVT (deep venous thrombosis) ([facility])     RLE    DVT (deep venous thrombosis) ([address] 11/9/15    LLE    Dysphagia     Glaucoma     Hyperlipidemia     Hypertension     Macular degeneration     Mass of chest     benign chest mass, Dr Luis Mccullough    Movement disorder     spinal stenosis    Obesity     Osteoarthritis     right ankle, right hand    Paroxysmal atrial fibrillation ([address]     Pneumonia     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)     s/p thyroid resection    Urinary incontinence        Past Surgical History:          Procedure Laterality Date   [facility] SURGERY  [id number], 2014    BRONCHOSCOPY N/A 8/29/2017    BRONCHOSCOPY AIRWAY ONLY performed by John Bird MD at [address] [facility] [address] N/A 6/17/2021    CYSTOSCOPY SUPRAPUBIC TUBE PLACEMENT performed by Starla Cutler MD at [facility] EKG 12-LEAD  11/10/2015         FRACTURE SURGERY      right hand    OTHER SURGICAL HISTORY      spinal epidurals    RETINAL DETACHMENT SURGERY      SPINE SURGERY  2004    Lumbar laminectomy    THYROIDECTOMY      TURP N/A 3/17/2020    CYSTOSCOPY WITH GREENLIGHT PHOTOVAPORIZATION OF PROSTATE performed by Starla Cutler MD at [facility] TURP N/A 3/24/2020    PLASMA BUTTON T[facility] CL[facility]       Medications Prior to Admission:      Prior to Admission medications    Medication Sig Start Date End Date Taking? Authorizing Provider   blood glucose test strips (ACCU-CHEK MERYL PLUS) strip Accu-check Meryl Plus Test Strips (or brand per pt preference). Sig: test sugar BID. Dx: E11.8 7/6/21   MAEGAN Colón CNP   SOFT TOUCH LANCETS MISC Lancet (or brand per pt preference) Use to check blood sugars 2 times daily. Dx: E11.8 7/6/21   MAEGAN Colón CNP   Alcohol Swabs (ALCOHOL PREP) 70 % PADS Alcohol pads (brand per preference). Sig: use to test sugar twice per day.  Dx: E11.8 7/6/21   MAEGAN Colón CNP   doxazosin (CARDURA) 4 MG tablet TAKE 1 TABLET BY MOUTH EVERY NIGHT 6/22/21   Isa Larry MD   furosemide (LASIX) 40 MG tablet Take 40 mg by mouth 2 times daily     Historical Provider, MD   simethicone (MYLICON) 677 MG chewable tablet Take 125 mg by mouth every 6 hours as needed for Flatulence    Historical Provider, MD   aluminum & magnesium hydroxide-simethicone (MYLANTA) 546-624-06 MG/5ML SUSP Take 15 mLs by mouth every 6 hours as needed    Historical Provider, MD   famotidine (PEPCID) 20 MG tablet Take 20 mg by mouth 2 times daily as needed    Historical Provider, MD   Arformoterol Tartrate (BROVANA) 15 MCG/2ML NEBU Take 2 mLs by nebulization 2 times daily 1 dose inhale orally two times per day 5/3/21   Heather Ahn MD   oxybutynin (DITROPAN) 5 MG tablet TAKE 1 TABLET BY MOUTH EVERY 8 HOURS AS NEEDED FOR BLADDER SPASMS OR BLADDER SPASM 4/30/21   Heather Ahn MD   Multiple Vitamins-Minerals (PRESERVISION AREDS 2) CAPS Take 1 capsule by mouth daily 4/9/21   Heather Ahn MD   ibuprofen (ADVIL;MOTRIN) 800 MG tablet TAKE 1 TABLET BY MOUTH THREE TIMES DAILY WITH MEALS 3/23/21   Heather Ahn MD   spironolactone (ALDACTONE) 25 MG tablet TAKE 1 TABLET BY MOUTH DAILY 2/15/21   Jammie Sr MD   Revefenacin 175 MCG/3ML SOLN Inhale 1 nebule into the lungs daily Dx COPD J44.3 11/16/20   Nori Greene PA-C   levothyroxine (SYNTHROID) 200 MCG tablet TAKE 1 TABLET BY MOUTH  DAILY 10/19/20   Heather Ahn MD   insulin glargine (LANTUS SOLOSTAR) 100 UNIT/ML injection pen INJECT SUBCUTANEOUSLY 18  UNITS NIGHTLY  Patient taking differently: INJECT SUBCUTANEOUSLY 20  UNITS NIGHTLY 9/29/20   Heather Ahn MD   metoprolol tartrate (LOPRESSOR) 25 MG tablet Take 1 tablet by mouth 2 times daily Hold for SBP <110 or HR <60 8/18/20   Heather Ahn MD   ferrous gluconate (FERGON) 324 (38 Fe) MG tablet Take 1 tablet by mouth 2 times daily 8/18/20   Heather Ahn MD   metFORMIN (GLUCOPHAGE) 1000 MG tablet Take 1 tablet by mouth 2 times daily (with meals) 8/18/20   Heather Ahn MD   budesonide (PULMICORT) 0.5 MG/2ML nebulizer suspension USE 1 VIAL VIA NEBULIZER TWICE DAILY.  RINSE MOUTH AFTER TREATMENT 8/4/20   Heather Ahn MD   Multiple Vitamins-Minerals (PRESERVISION AREDS 2) CAPS Take 1 capsule by mouth daily 8/3/20   Heather Ahn MD   lisinopril (PRINIVIL;ZESTRIL) 2.5 MG tablet Take 1 tablet by mouth daily 7/24/20   Jessica Aviles MD   potassium chloride (KLOR-CON M) 10 MEQ extended release tablet TAKE 1 TABLETS BY MOUTH TWICE DAILY 7/24/20   Jessica Aviles MD   docusate (COLACE, DULCOLAX) 100 MG CAPS Take 100 mg by mouth 2 times daily 7/24/20   Jessica Aviles MD   vitamin D3 (CHOLECALCIFEROL) 10 MCG (400 UNIT) TABS tablet Take 1 tablet by mouth daily 400units 7/24/20   Jessica Aviles MD   polyethylene glycol (MIRALAX) 17 g packet Take 17 g by mouth daily as needed for Constipation    Historical Provider, MD   acetaminophen (TYLENOL) 325 MG tablet Take 650 mg by mouth every 4 hours as needed for Pain or Fever  6/26/20   Historical Provider, MD   lidocaine (LMX) 4 % cream Apply topically every 6 hours as needed for Pain To groin 6/26/20   Historical Provider, MD   albuterol sulfate  (90 Base) MCG/ACT inhaler Inhale 2 puffs into the lungs every 6 hours as needed for Wheezing 1/24/20   [address], [employer]   OXYGEN Inhale 3 L into the lungs nightly Indications: Difficulty Breathing, Oxygen Therapy And prn through cpap    Historical Provider, MD       Allergies:  Latex, Macrobid [nitrofurantoin monohyd macro], Levaquin [levofloxacin], Brimonidine tartrate, Adhesive tape, and Alphagan [brimonidine tartrate]    Social History:      The patient currently lives in a nursing home. TOBACCO:   reports that he has never smoked. He has never used smokeless tobacco.  ETOH:   reports current alcohol use of about 1.0 standard drinks of alcohol per week. Family History:      Reviewed in detail and negative for DM, CAD, Cancer, CVA. Positive as follows:        Problem Relation Age of Onset    Other Mother         Complications of Childbirth    Other Father [de-identified]        Natural causes       Diet:  No diet orders on file    REVIEW OF SYSTEMS:   Pertinent positives as noted in the HPI. All other systems reviewed and negative.     PHYSICAL EXAM:    /62   Pulse 64 Temp 98 °F (36.7 °C) (Oral)   Resp 16   Ht 5' 7\" (1.702 m)   Wt 250 lb (113.4 kg)   SpO2 95%   BMI 39.16 kg/m²     General appearance:  No apparent distress, appears stated age and cooperative. HEENT:  Normal cephalic, atraumatic without obvious deformity. Pupils equal, round, and reactive to light. Extra ocular muscles intact. Conjunctivae/corneas clear. Neck: Supple, with full range of motion. No jugular venous distention. Trachea midline. Respiratory: Currently on 3 L nasal cannula oxygen (baseline). Clear to auscultation on right side, appreciable rhonchi noted on left. No wheezing heard. Cardiovascular:  Regular rate and rhythm with normal S1/S2 without murmurs, rubs or gallops. Abdomen: Soft, non-tender, non-distended with normal bowel sounds. Musculoskeletal:  No clubbing or cyanosis, +1 pitting edema in bilateral lower limbs. Full range of motion without deformity. Skin: Skin color, texture, turgor normal.  No rashes or lesions. Neurologic:  Neurovascularly intact without any focal sensory/motor deficits. Cranial nerves: II-XII intact, grossly non-focal.  Psychiatric:  Alert and oriented, thought content appropriate, normal insight  Capillary Refill: Brisk,< 3 seconds   Peripheral Pulses: +2 palpable, equal bilaterally       Labs:     Recent Labs     07/09/21  2300   WBC 12.0*   HGB 9.3*   HCT 29.1*        Recent Labs     07/09/21  2300   *   K 4.3   CL 96*   CO2 23   BUN 38*   CREATININE 2.7*   CALCIUM 8.8     Recent Labs     07/09/21  2300   AST 7   ALT <5*   BILITOT 0.2*   ALKPHOS 57     No results for input(s): INR in the last 72 hours. No results for input(s): Shellia Bugler in the last 72 hours.     Urinalysis:      Lab Results   Component Value Date    NITRU NEGATIVE 07/09/2021    WBCUA > 200 07/09/2021    BACTERIA MANY 07/09/2021    RBCUA 5-10 07/09/2021    BLOODU MODERATE 07/09/2021    SPECGRAV 1.015 12/05/2020    GLUCOSEU NEGATIVE 07/09/2021       Intake & Output:  No intake/output data recorded. No intake/output data recorded. Radiology:     CXR: I have reviewed the CXR with the following interpretation: Bibasilar atelectasis with the left greater than the right. EKG:  I have reviewed the EKG with the following interpretation: Possible A. fib, no P waves identified. XR CHEST PORTABLE   Final Result   Impression:   1. Bibasilar atelectasis and/or infiltrates, left greater than right. This document has been electronically signed by: Rashaun Starkey DO on    07/10/2021 12:04 AM           DVT prophylaxis: SQ heparin    Code Status: Prior      PT/OT Eval Status: Not consulted    Disposition:SNF    There are no active hospital problems to display for this patient. Thank you Luis Garza MD for the opportunity to be involved in this patient's care.     Electronically signed by Genia Klinefelter, DO on 7/10/2021 at 12:35 AM

## 2021-07-10 NOTE — ED NOTES
Daphne WATSON updated on pt admission.  All questions answered at this time     Ofilia SHIRLEY Infante  07/10/21 9581

## 2021-07-10 NOTE — ED NOTES
ED to inpatient nurses report    Chief Complaint   Patient presents with    Fatigue    Shortness of Breath      Present to ED from [facility]  LOC: alert and orientated to name, place, date  Vital signs   Vitals:    07/09/21 2236 07/09/21 2351 07/10/21 0004   BP: 133/88 123/62    Pulse: 70 64    Resp: 14 16    Temp: 98 °F (36.7 °C)     TempSrc: Oral     SpO2: 97% 96% 95%   Weight: 250 lb (113.4 kg)     Height: 5' 7\" (1.702 m)        Oxygen Baseline 96%    Current needs required room air Bipap/Cpap Yes at night  LDAs:   Peripheral IV 07/09/21 Left Antecubital (Active)   Site Assessment Clean;Dry; Intact 07/09/21 2352   Line Status Normal saline locked 07/09/21 2352   Dressing Status Clean;Dry; Intact 07/09/21 2352   Dressing Intervention New 07/09/21 2255     Mobility: Independent  Pending ED orders: none  Present condition: stable    Electronically signed by Isaac Freitas RN on 7/10/2021 at 1:23 AM       Isaac Freitas RN  07/10/21 0124

## 2021-07-10 NOTE — PLAN OF CARE
Problem: Skin Integrity:  Goal: Will show no infection signs and symptoms  Description: Will show no infection signs and symptoms  Outcome: Ongoing     Problem: Skin Integrity:  Goal: Absence of new skin breakdown  Description: Absence of new skin breakdown  Outcome: Ongoing  Note: No new skin breakdown noted this shift. Patient turned and repositioned every 2 hours and PRN. Heels elevated off of bed. Will continue to monitor. Problem: Falls - Risk of:  Goal: Will remain free from falls  Description: Will remain free from falls  Outcome: Ongoing  Note: Patient remains on fall precautions. Falling star in place. Fall band on. Non-skid slippers on when ambulating. Side rails up x 2. Bed and chair alarm on. Call light within reach. Patient uses call light appropriately. Hourly rounding in place. Problem: Falls - Risk of:  Goal: Absence of physical injury  Description: Absence of physical injury  Outcome: Ongoing     Problem: Pain:  Goal: Pain level will decrease  Description: Pain level will decrease  Outcome: Ongoing  Note: Pain Assessment: 0-10  Pain Level: 0   Patient's Stated Pain Goal: No pain   Is pain goal met at this time? Yes   Patient denies pain at this time. Problem: Pain:  Goal: Control of acute pain  Description: Control of acute pain  Outcome: Ongoing     Problem: Pain:  Goal: Control of chronic pain  Description: Control of chronic pain  Outcome: Ongoing     Problem: Discharge Planning:  Goal: Discharged to appropriate level of care  Description: Discharged to appropriate level of care  Outcome: Ongoing  Note: Discharge planning in process.  aware of any additional discharge needs. Plan is to return to assisted living facility at this time. Problem: Urinary Elimination:  Goal: Signs and symptoms of infection will decrease  Description: Signs and symptoms of infection will decrease  Outcome: Ongoing  Note: Suprapubic catheter in place. Site red and swollen at this time. Plan is to replace catheter today. Will continue to monitor site. Problem: Urinary Elimination:  Goal: Complications related to the disease process, condition or treatment will be avoided or minimized  Description: Complications related to the disease process, condition or treatment will be avoided or minimized  Outcome: Ongoing     Problem: Metabolic:  Goal: Ability to maintain appropriate glucose levels will improve  Description: Ability to maintain appropriate glucose levels will improve  Outcome: Ongoing  Note: Glucose level remains within normal limits this shift. Will continue to monitor as appropriate. Care plan reviewed with patient. Patient verbalizes understanding of the plan of care and contribute to goal setting.

## 2021-07-10 NOTE — PLAN OF CARE
Hospitalist Update Note      Patient admitted this morning from SNF with AMS, SOB. Please see full admission note from this morning for full details. Patient has been started on cefepime. Of note, patient recently had suprapubic catheter placed on 6/17/2021 for BPH/urinary retention. He has a history of previous UTIs as well. Initially concern for pneumonia on top of this based on chest x-ray, however per further review and CT imaging of the abdomen which obtained bilateral lung bases, this appears more consistent with scarring. Will discontinue azithromycin and continue on with cefepime monotherapy. Also, there is concern surrounding the area of the suprapubic catheter, as there is some erythema which may be reactive however at this point should be healing, consideration for cellulitis -there is also mention of air along the track of the suprapubic catheter internally. If purulent drainage will culture swab. Urology consulted for further evaluation and management of catheter site. CT abdomen pelvis otherwise shows no gross intra-abdominal pathology otherwise. No evidence of pyelonephritis, does have bilateral nephrolithiasis. STEPHY: Suspect prerenal, continue fluids but gently given history of diastolic dysfunction  CAUTI: Cefepime, consult to urology to assess catheter site as well as for exchange as it appears irritated surrounding the site. Recent outpatient rx for CAUTI (failed - cipro 6/2-9/74)  Acute metabolic encephalopathy: Patient is alert and oriented x3, appropriate. Seems to have resolved. Chronic Troponin elevation: With slight increase from baseline, however recent stress test 6/14/2021 is reassuring. Patient denies chest pain. Suspect related to STEPHY, infection with supply demand mismatch. COPD: No exacerbation, continue home regimen  ROBER on CPAP  Chronic Diastolic Dysfunction: Y1SY per echo 6/14/21  CAD, recent stress 6/14/21 wnl.    Hx of DVT: Status post IVC filter, off of anticoagulation at this time  DM2: Continue basal regimen, will adjust as needed. Holding Metformin with STEPHY. PAF: previously on coumadin, off 2/2 hematuria  BPH/s/p TURP/SP catheter  Chronic Anemia   Dysphagia  Chronic Back Pain: hold sedating meds due to concern for ams, will reassess ongoing need for pain control.    Hx of thyroid cancer s/p resection

## 2021-07-10 NOTE — ED NOTES
Pt moved to room 23 in ED. Pt resting on cot, RR even and unlabored. Pt watching TV. Pt denies needs at this time.      Tobin Shea RN  07/09/21 0313

## 2021-07-10 NOTE — FLOWSHEET NOTE
07/10/21 1415   Provider Notification   Reason for Communication Evaluate   Provider Name [facility]   Provider Notification Advance Practice Clinician (CNS/NP/CNM/CRNA/PA)   Method of Communication Secure Message   Response Other (Comment)   Notification Time [id number] Consulted urology per Dr. Annie Cai for suprapubic catheter site management and catheter replacement. Response from [facility], CNP to add to Dr. Caroline womack. Will see patient tomorrow.

## 2021-07-10 NOTE — ED TRIAGE NOTES
Pt brought in by EMS for weakness and SOB. Pt alert and oriented x 4. Pt 98% on room air. Pt wears CPAP at night. Pt states he doesn't \"feel with it\" Pt recently treated for UTI, suprapubic catheter in place. No fever on arrival, pt having chills.

## 2021-07-11 LAB
ANION GAP SERPL CALCULATED.3IONS-SCNC: 12 MEQ/L (ref 8–16)
BUN BLDV-MCNC: 30 MG/DL (ref 7–22)
CALCIUM SERPL-MCNC: 8.8 MG/DL (ref 8.5–10.5)
CHLORIDE BLD-SCNC: 106 MEQ/L (ref 98–111)
CO2: 23 MEQ/L (ref 23–33)
CREAT SERPL-MCNC: 1.5 MG/DL (ref 0.4–1.2)
EKG ATRIAL RATE: 312 BPM
EKG Q-T INTERVAL: 376 MS
EKG QRS DURATION: 92 MS
EKG QTC CALCULATION (BAZETT): 394 MS
EKG R AXIS: 43 DEGREES
EKG T AXIS: 29 DEGREES
EKG VENTRICULAR RATE: 66 BPM
ERYTHROCYTE [DISTWIDTH] IN BLOOD BY AUTOMATED COUNT: 13.5 % (ref 11.5–14.5)
ERYTHROCYTE [DISTWIDTH] IN BLOOD BY AUTOMATED COUNT: 41.4 FL (ref 35–45)
GFR SERPL CREATININE-BSD FRML MDRD: 44 ML/MIN/1.73M2
GLUCOSE BLD-MCNC: 105 MG/DL (ref 70–108)
GLUCOSE BLD-MCNC: 141 MG/DL (ref 70–108)
GLUCOSE BLD-MCNC: 142 MG/DL (ref 70–108)
GLUCOSE BLD-MCNC: 161 MG/DL (ref 70–108)
GLUCOSE BLD-MCNC: 89 MG/DL (ref 70–108)
GLUCOSE BLD-MCNC: 94 MG/DL (ref 70–108)
HCT VFR BLD CALC: 29.3 % (ref 42–52)
HEMOGLOBIN: 9.5 GM/DL (ref 14–18)
MAGNESIUM: 1.9 MG/DL (ref 1.6–2.4)
MCH RBC QN AUTO: 27.2 PG (ref 26–33)
MCHC RBC AUTO-ENTMCNC: 32.4 GM/DL (ref 32.2–35.5)
MCV RBC AUTO: 84 FL (ref 80–94)
ORGANISM: ABNORMAL
PLATELET # BLD: 296 THOU/MM3 (ref 130–400)
PMV BLD AUTO: 9.6 FL (ref 9.4–12.4)
POTASSIUM SERPL-SCNC: 3.9 MEQ/L (ref 3.5–5.2)
RBC # BLD: 3.49 MILL/MM3 (ref 4.7–6.1)
SODIUM BLD-SCNC: 141 MEQ/L (ref 135–145)
URINE CULTURE REFLEX: ABNORMAL
WBC # BLD: 9.1 THOU/MM3 (ref 4.8–10.8)

## 2021-07-11 PROCEDURE — 99233 SBSQ HOSP IP/OBS HIGH 50: CPT | Performed by: INTERNAL MEDICINE

## 2021-07-11 PROCEDURE — 2580000003 HC RX 258: Performed by: FAMILY MEDICINE

## 2021-07-11 PROCEDURE — 93010 ELECTROCARDIOGRAM REPORT: CPT | Performed by: INTERNAL MEDICINE

## 2021-07-11 PROCEDURE — 87077 CULTURE AEROBIC IDENTIFY: CPT

## 2021-07-11 PROCEDURE — 85027 COMPLETE CBC AUTOMATED: CPT

## 2021-07-11 PROCEDURE — 87205 SMEAR GRAM STAIN: CPT

## 2021-07-11 PROCEDURE — 99221 1ST HOSP IP/OBS SF/LOW 40: CPT | Performed by: NURSE PRACTITIONER

## 2021-07-11 PROCEDURE — 87186 SC STD MICRODIL/AGAR DIL: CPT

## 2021-07-11 PROCEDURE — 6360000002 HC RX W HCPCS: Performed by: STUDENT IN AN ORGANIZED HEALTH CARE EDUCATION/TRAINING PROGRAM

## 2021-07-11 PROCEDURE — 94760 N-INVAS EAR/PLS OXIMETRY 1: CPT

## 2021-07-11 PROCEDURE — 2580000003 HC RX 258: Performed by: STUDENT IN AN ORGANIZED HEALTH CARE EDUCATION/TRAINING PROGRAM

## 2021-07-11 PROCEDURE — 6370000000 HC RX 637 (ALT 250 FOR IP): Performed by: STUDENT IN AN ORGANIZED HEALTH CARE EDUCATION/TRAINING PROGRAM

## 2021-07-11 PROCEDURE — 36415 COLL VENOUS BLD VENIPUNCTURE: CPT

## 2021-07-11 PROCEDURE — 94640 AIRWAY INHALATION TREATMENT: CPT

## 2021-07-11 PROCEDURE — 80048 BASIC METABOLIC PNL TOTAL CA: CPT

## 2021-07-11 PROCEDURE — 83735 ASSAY OF MAGNESIUM: CPT

## 2021-07-11 PROCEDURE — 1200000003 HC TELEMETRY R&B

## 2021-07-11 PROCEDURE — 87147 CULTURE TYPE IMMUNOLOGIC: CPT

## 2021-07-11 PROCEDURE — 6360000002 HC RX W HCPCS: Performed by: INTERNAL MEDICINE

## 2021-07-11 PROCEDURE — 87075 CULTR BACTERIA EXCEPT BLOOD: CPT

## 2021-07-11 PROCEDURE — 82948 REAGENT STRIP/BLOOD GLUCOSE: CPT

## 2021-07-11 PROCEDURE — 87070 CULTURE OTHR SPECIMN AEROBIC: CPT

## 2021-07-11 PROCEDURE — 2580000003 HC RX 258: Performed by: INTERNAL MEDICINE

## 2021-07-11 RX ADMIN — CEFEPIME HYDROCHLORIDE 500 MG: 1 INJECTION, POWDER, FOR SOLUTION INTRAMUSCULAR; INTRAVENOUS at 03:09

## 2021-07-11 RX ADMIN — INSULIN LISPRO 1 UNITS: 100 INJECTION, SOLUTION INTRAVENOUS; SUBCUTANEOUS at 17:18

## 2021-07-11 RX ADMIN — DOCUSATE SODIUM 100 MG: 100 CAPSULE, LIQUID FILLED ORAL at 08:02

## 2021-07-11 RX ADMIN — METOPROLOL TARTRATE 25 MG: 25 TABLET, FILM COATED ORAL at 08:02

## 2021-07-11 RX ADMIN — HEPARIN SODIUM 5000 UNITS: 5000 INJECTION INTRAVENOUS; SUBCUTANEOUS at 20:52

## 2021-07-11 RX ADMIN — HEPARIN SODIUM 5000 UNITS: 5000 INJECTION INTRAVENOUS; SUBCUTANEOUS at 13:31

## 2021-07-11 RX ADMIN — LEVOTHYROXINE SODIUM 200 MCG: 0.1 TABLET ORAL at 08:03

## 2021-07-11 RX ADMIN — SODIUM CHLORIDE: 9 INJECTION, SOLUTION INTRAVENOUS at 08:16

## 2021-07-11 RX ADMIN — INSULIN GLARGINE 20 UNITS: 100 INJECTION, SOLUTION SUBCUTANEOUS at 20:52

## 2021-07-11 RX ADMIN — BUDESONIDE 500 MCG: 0.5 INHALANT RESPIRATORY (INHALATION) at 18:27

## 2021-07-11 RX ADMIN — SODIUM CHLORIDE, PRESERVATIVE FREE 10 ML: 5 INJECTION INTRAVENOUS at 08:02

## 2021-07-11 RX ADMIN — ARFORMOTEROL TARTRATE 15 MCG: 15 SOLUTION RESPIRATORY (INHALATION) at 08:56

## 2021-07-11 RX ADMIN — DOCUSATE SODIUM 100 MG: 100 CAPSULE, LIQUID FILLED ORAL at 20:49

## 2021-07-11 RX ADMIN — OXYBUTYNIN CHLORIDE 5 MG: 5 TABLET ORAL at 20:49

## 2021-07-11 RX ADMIN — TIOTROPIUM BROMIDE INHALATION SPRAY 2 PUFF: 3.12 SPRAY, METERED RESPIRATORY (INHALATION) at 08:56

## 2021-07-11 RX ADMIN — HEPARIN SODIUM 5000 UNITS: 5000 INJECTION INTRAVENOUS; SUBCUTANEOUS at 06:49

## 2021-07-11 RX ADMIN — BUDESONIDE 500 MCG: 0.5 INHALANT RESPIRATORY (INHALATION) at 08:56

## 2021-07-11 RX ADMIN — Medication 240 MG: at 08:03

## 2021-07-11 RX ADMIN — Medication 240 MG: at 20:49

## 2021-07-11 RX ADMIN — ARFORMOTEROL TARTRATE 15 MCG: 15 SOLUTION RESPIRATORY (INHALATION) at 18:27

## 2021-07-11 RX ADMIN — CEFEPIME HYDROCHLORIDE 1000 MG: 1 INJECTION, POWDER, FOR SOLUTION INTRAMUSCULAR; INTRAVENOUS at 14:37

## 2021-07-11 RX ADMIN — VANCOMYCIN HYDROCHLORIDE 1500 MG: 5 INJECTION, POWDER, LYOPHILIZED, FOR SOLUTION INTRAVENOUS at 15:10

## 2021-07-11 RX ADMIN — ACETAMINOPHEN 650 MG: 325 TABLET ORAL at 20:49

## 2021-07-11 ASSESSMENT — PAIN SCALES - GENERAL
PAINLEVEL_OUTOF10: 0
PAINLEVEL_OUTOF10: 2
PAINLEVEL_OUTOF10: 0
PAINLEVEL_OUTOF10: 0

## 2021-07-11 ASSESSMENT — PAIN DESCRIPTION - PROGRESSION: CLINICAL_PROGRESSION: GRADUALLY WORSENING

## 2021-07-11 ASSESSMENT — PAIN - FUNCTIONAL ASSESSMENT: PAIN_FUNCTIONAL_ASSESSMENT: PREVENTS OR INTERFERES SOME ACTIVE ACTIVITIES AND ADLS

## 2021-07-11 ASSESSMENT — PAIN DESCRIPTION - DESCRIPTORS: DESCRIPTORS: DISCOMFORT;CRAMPING

## 2021-07-11 ASSESSMENT — PAIN DESCRIPTION - PAIN TYPE: TYPE: CHRONIC PAIN

## 2021-07-11 ASSESSMENT — PAIN DESCRIPTION - FREQUENCY: FREQUENCY: INTERMITTENT

## 2021-07-11 ASSESSMENT — PAIN DESCRIPTION - LOCATION: LOCATION: ABDOMEN

## 2021-07-11 ASSESSMENT — PAIN DESCRIPTION - ORIENTATION: ORIENTATION: MID;LOWER;LEFT

## 2021-07-11 ASSESSMENT — PAIN DESCRIPTION - ONSET: ONSET: ON-GOING

## 2021-07-11 NOTE — CONSULTS
Urology Consult Note      Reason for Consult:  Patient with CAUTI, recently placed SP cath 6/17/21 - ensure proper healing and no issues with site. Requesting Physician:  Dr Luis Felipe Barney    History Obtained From:  patient, electronic medical record    Chief Complaint: SOB and altered mental status    HISTORY OF PRESENT ILLNESS:                The patient is a 80 y.o. male with significant past medical history of as listed below who presents with shortness of breath, change in mental status. He was sent to the ER from the nursing home for reports of the nursing home staff that the patient was short of breath and was altered. The patient did state that he \"did not feel right\" ever since his recent treatment for UTI. The patient reiterates that he is not feeling more short of breath than on a normal day. He does note suprapubic pain and notes that he has had discomfort from catheter ever since SP catheter was placed on 6/17 by Dr Lucille Huynh. In the ED, creatinine of 2.7,  WBC 12.0,  UA shows leukocyte esterase, blood, WBCs, and bacteria. CT showed SP Catheter in place with air along the tract of catheter and non obstructive nephrolithiasis. Pt follows with Dr Lucille Huynh. Pt underwent cysto with Greenlight PVP on 3/17/2020 by Dr Lucille Huynh. He required chronic catheter after procedure, and has been dealing with recurrent UTI. It was elected to undergo SP catheter placement which was done 6/17. Recently seen by ID as well for UTI. Started on Cipro 7/9/2021.      Past Medical History:        Diagnosis Date    Anemia     Anxiety     BPH (benign prostatic hyperplasia)     CAD (coronary artery disease)     leaking valve    Chronic back pain     COPD (chronic obstructive pulmonary disease) ([address]     Detached retina     Diabetes mellitus ([address]     NIDDM    [facility] thrombosis[facility])     RLE    DVT (deep venous thrombosis) ([address] 11/9/15    LLE    Dysphagia     Glaucoma     Hyperlipidemia     Hypertension     Macular degeneration     Mass of chest     benign chest mass, Dr Qureshi Later    Movement disorder     spinal stenosis    Obesity     Osteoarthritis     right ankle, right hand    Paroxysmal atrial fibrillation ([address]     Pneumonia     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    Sleep apnea 1993    on BiPap, Dr Marbella Joseph Testicular hypofunction     Thyroid cancer [facility])     s/p thyroid resection    Urinary incontinence      Past Surgical History:        Procedure Laterality Date   [facility] SURGERY  2006,2011, 2014    BRONCHOSCOPY N/A 8/29/2017    BRONCHOSCOPY AIRWAY ONLY performed by Darshan Maciel MD at [address] N/A 6/17/2021    [address] performed by Dez Randolph MD at [email or web address] EKG 12-LEAD  11/10/2015         FRACTURE SURGERY      right hand    OTHER SURGICAL HISTORY      spinal epidurals    RETINAL DETACHMENT SURGERY      SPINE SURGERY  2004    Lumbar laminectomy    THYROIDECTOMY      TURP N/A 3/17/2020    CYSTOSCOPY WITH GREENLIGHT PHOTOVAPORIZATION OF PROSTATE performed by Dez Randolph MD at [address] [email or web address] TURP N/A 3/24/2020    PLASMA BUTTON TURP WITH CLOT EVACUATION performed by Dez Randolph MD at [facility]     Allergies:  Latex, Macrobid [nitrofurantoin monohyd macro], Levaquin [levofloxacin], Brimonidine tartrate, Adhesive tape, and Alphagan [brimonidine tartrate]    Social History     Socioeconomic History    Marital status:      Spouse name: Not on file    Number of children: 2    Years of education: Not on file    Highest education level: Not on file   Occupational History    Not on file   Tobacco Use    Smoking status: Never Smoker    Smokeless tobacco: Never Used   Vaping Use    Vaping Use: Never used   Substance and Sexual Activity    Alcohol use: Yes     Alcohol/week: 1.0 standard drinks     Types: 1 Glasses of wine per week     Comment: Glass of wine with dinner.     Drug use: No    Sexual activity: Never   Other Topics Concern    Not on file   Social History Narrative    Not on file     Social Determinants of Health     Financial Resource Strain:     Difficulty of Paying Living Expenses:    Food Insecurity:     Worried About Running Out of Food in the Last Year:     [address] in the Last Year:    Transportation Needs:     Lack of Transportation (Medical):  Lack of Transportation (Non-Medical):    Physical Activity:     Days of Exercise per Week:     Minutes of Exercise per Session:    Stress:     Feeling of Stress :    Social Connections:     Frequency of Communication with Friends and Family:     Frequency of Social Gatherings with Friends and Family:     Attends [employer] Services:     Active Member of Clubs or Organizations:     Attends Club or Organization Meetings:     Marital Status:    Intimate Partner Violence:     Fear of Current or Ex-Partner:     Emotionally Abused:     Physically Abused:     Sexually Abused:        Family History:       Problem Relation Age of Onset    Other Mother         Complications of Childbirth    Other Father [de-identified]        Natural causes       ROS:  Constitutional: Negative for chills, fatigue, fever, or weight loss. Eyes: Denies reported visual changes. ENT: Denies headache, difficulty swallowing, earache, and nosebleeds. Cardiovascular: Negative for chest pain, palpitations, tachycardia or edema. Respiratory: Denies cough or SOB. GI:The patient denies abdominal or flank pain, anorexia, nausea or vomiting. : see HPI. Musculoskeletal: Patient denies low back pain or painful or reduced range of ROM. Neurological: The patient denies any symptoms of neurological impairment or TIA. Psychiatric: Denies anxiety or depression. Skin: Denies rash or lesions. All remaining ROS negative.     PHYSICAL EXAM:  VITALS:  /66   Pulse 65   Temp 98.3 °F (36.8 °C) (Oral)   Resp 18   Ht 5' 7\" (1.702 m)   Wt 232 lb 6.4 oz (105.4 kg)   SpO2 97%   BMI 36.40 kg/m² . Nursing note and vitals reviewed. Constitutional: Alert and oriented times x3, no acute distress, and cooperative to examination with appropriate mood and affect. HEENT:   Head:         Normocephalic and atraumatic. Mouth/Throat:          Mucous membranes are normal.   Eyes:         EOM are normal. No scleral icterus. Nose:    The external appearance of the nose is normal  Ears: The ears appear normal to external inspection. Cardiovascular:       Normal rate, regular rhythm. Pulmonary/Chest:  Normal respiratory rate and rhthym. No use of accessory muscles. Lungs clear bilaterally. Abdominal:          Soft. No tenderness. Active bowel sounds           Suprapubic tenderness, SP catheter in place. Suture is no longer intact. Incision was cleansed with sterile water and site was painted with betadine, small amt of slough noted around edges of insertion site. SP catheter draining yellow urine. Genitalia:    Normal uncircumcised penis  Urethral meatus is normal in size and location  Scrotal contents normal to inspection and palpation   Normal testes palpated bilaterally  Musculoskeletal:    Normal range of motion. He exhibits no edema or tenderness of lower extremities. Extremities:    No cyanosis, clubbing, or edema present. Neurological:    Alert and oriented.      DATA:  CBC:   Lab Results   Component Value Date    WBC 9.1 07/11/2021    RBC 3.49 07/11/2021    HGB 9.5 07/11/2021    HCT 29.3 07/11/2021    MCV 84.0 07/11/2021    MCH 27.2 07/11/2021    MCHC 32.4 07/11/2021    RDW 14.6 05/02/2018     07/11/2021    MPV 9.6 07/11/2021     BMP:    Lab Results   Component Value Date     07/11/2021    K 3.9 07/11/2021    K 4.2 07/10/2021     07/11/2021    CO2 23 07/11/2021    BUN 30 07/11/2021    CREATININE 1.5 07/11/2021    CALCIUM 8.8 07/11/2021    LABGLOM 44 07/11/2021    GLUCOSE 89 07/11/2021    GLUCOSE 130 11/30/2011     BUN/Creatinine:    Lab Results   Component Value Date BUN 30 07/11/2021    CREATININE 1.5 07/11/2021     Magnesium:    Lab Results   Component Value Date    MG 1.9 07/11/2021     Phosphorus:    Lab Results   Component Value Date    PHOS 3.4 03/14/2019     PT/INR:    Lab Results   Component Value Date    INR 0.97 03/22/2021     U/A:    Lab Results   Component Value Date    NITRITE negative 01/31/2020    COLORU YELLOW 07/09/2021    PHUR 5.5 07/09/2021    LABCAST 4-8 HYALINE 12/05/2020    LABCAST NONE SEEN 12/05/2020    WBCUA > 200 07/09/2021    RBCUA 5-10 07/09/2021    MUCUS NONE SEEN 03/22/2021    YEAST NONE SEEN 07/09/2021    BACTERIA MANY 07/09/2021    CLARITYU clear 01/31/2020    SPECGRAV 1.015 12/05/2020    LEUKOCYTESUR LARGE 07/09/2021    UROBILINOGEN 0.2 07/09/2021    BILIRUBINUR NEGATIVE 07/09/2021    BILIRUBINUR negative 01/31/2020    BLOODU MODERATE 07/09/2021    GLUCOSEU NEGATIVE 07/09/2021    AMORPHOUS NONE SEEN 03/22/2021       Imaging: The patient has had a CT Stone Protocol which I have reviewed along with its accompanying report. The study demonstrates :      There are stable linear opacities of the bilateral lung bases as evidence for recurrent atelectasis or scar. Visualized portions of the lungs are otherwise clear. The visualized portion of the unopacified heart is unremarkable.       The unopacified liver is unremarkable. The gallbladder is normal in appearance. Adrenal glands are unremarkable. There is a 3 mm calculus at the interpolar region of the right kidney. There is also a 3 mm calculus at the interpolar region of the left    kidney. There is mild nonspecific perinephric fat stranding bilaterally, unchanged compared to prior exam. Unopacified kidneys are otherwise unremarkable. There is a calcified granuloma in the spleen which is otherwise unremarkable. Pancreas is    unremarkable. No retroperitoneal or mesenteric lymphadenopathy is identified.  There is a IVC filter in place, stable compared to prior exam.       The unopacified bowel

## 2021-07-11 NOTE — PROGRESS NOTES
beta-blocker for now. COPD: No exacerbation, continue home regimen. ROBER on CPAP, continue hospital unit if patient can tolerate. Chronic Diastolic Dysfunction: I4QS per echo 6/14/21, since on fluids monitoring status closely. Euvolemic with trace LE edema bilaterally. No rales. Holding diuretics, resume after fluids stopped. CAD, recent stress 6/14/21 wnl.   does not appear to be on statin or antiplatelet PTA. Hx of DVT: Status post IVC filter, off of anticoagulation at this time. DM2: Continue basal regimen, will adjust as needed. Holding Metformin with STEPHY. Sliding scale and hypoglycemia protocol in place. PAF: previously on coumadin, off 2/2 hematuria. Continue Lopressor. Hypertension: On low-dose lisinopril, will hold given STEPHY. Chronic Anemia: Stable, will monitor. Slightly below baseline of 10-11, normocytic. No evidence of gross bleeding. Chronic Back Pain: hold sedating meds due to concern for ams, will reassess ongoing need for pain control. Hx of thyroid cancer s/p resection, continue Synthroid. Expected discharge date:  tbd    Disposition: Return to skilled facility         [] Home       [] TCU       [] Rehab       [] Psych       [] SNF       [] [facility]       [] Other-    --------------------------------------------    Chief Complaint: [address] Course:  Per HPI, \"Mr. Sayra Sullivan is a 70-year-old male with a past medical history of COPD (uses CPAP at night with oxygen as needed), T2DM, HTN, HLD, A. fib, thyroid resection, ROBER, and anemia. He was sent to the ER from the nursing home for reports of the nursing home staff that the patient was short of breath and was altered. The patient himself was alert and oriented x3 in the emergency department and was not complaining of any fevers chills or shortness of breath. The patient did state that he \"did not feel right\" ever since his recent treatment for UTI.  The patient reiterates that he is not feeling more short of breath than on a normal day. But does feel rather not like himself but cannot exactly explain the feeling. He denies any fever, chills (though he does state that he does feel cold), nausea, vomiting, diarrhea, constipation, issues with urination, or numbness and tingling in his hands and feet. He is unaware of why the nursing home decided he would have to come to the hospital to be evaluated. In the ED temperature 98.0 °F, /88, HR 70, RR 14, SPO2 97% on room air. Sodium 133, chloride 96, potassium 4.3, creatinine of 2.7, troponin 0.090, LFTs within normal limits, WBC 12.0, Hgb 9.3, platelet 774. UA shows leukocyte esterase, blood, WBCs, and bacteria. Blood gas shows pH of 7.39, PO2 of 66, and PCO2 of 37. CXR shows bibasilar atelectasis left greater than right. EKG shows potential A. fib due to lack of P waves. \"    Subjective (past 24 hours): Patient seen at bedside, still has some discomfort in the area near the suprapubic catheter, does report some drainage this morning. No fevers or chills reported, otherwise he is feeling well and denies any shortness of breath, nausea or vomiting, palpitations, flank pain or tenderness, diarrhea or constipation.       Medications:  Reviewed    Infusion Medications    dextrose      sodium chloride 75 mL/hr at 07/10/21 1710    sodium chloride       Scheduled Medications    Arformoterol Tartrate  15 mcg Nebulization BID    budesonide  0.5 mg Nebulization BID    docusate sodium  100 mg Oral BID    [Held by provider] doxazosin  4 mg Oral Nightly    ferrous gluconate  240 mg Oral BID    [Held by provider] furosemide  40 mg Oral BID    insulin glargine  20 Units Subcutaneous Nightly    levothyroxine  200 mcg Oral Daily    [Held by provider] lisinopril  2.5 mg Oral Daily    [Held by provider] metFORMIN  1,000 mg Oral BID WC    metoprolol tartrate  25 mg Oral BID    tiotropium  2 puff Inhalation Daily    [Held by provider] spironolactone  25 mg Oral Daily    insulin lispro  0-6 Units Subcutaneous TID WC    insulin lispro  0-3 Units Subcutaneous Nightly    sodium chloride flush  5-40 mL Intravenous 2 times per day    heparin (porcine)  5,000 Units Subcutaneous 3 times per day    cefepime  500 mg Intravenous Q24H     PRN Meds: albuterol sulfate HFA, aluminum & magnesium hydroxide-simethicone, lidocaine, oxybutynin, polyethylene glycol, simethicone, glucose, dextrose, glucagon (rDNA), dextrose, sodium chloride flush, sodium chloride, acetaminophen **OR** acetaminophen, ondansetron, famotidine      Intake/Output Summary (Last 24 hours) at 7/11/2021 0812  Last data filed at 7/10/2021 1932  Gross per 24 hour   Intake 1724.8 ml   Output 2100 ml   Net -375.2 ml     Weight change:       Exam:  BP (!) 141/62   Pulse 64   Temp 98.3 °F (36.8 °C) (Oral)   Resp 16   Ht 5' 7\" (1.702 m)   Wt 232 lb 6.4 oz (105.4 kg)   SpO2 96%   BMI 36.40 kg/m²     General appearance: No apparent distress, well developed, appears stated age. Eyes:  Pupils equal, round, and reactive to light. Conjunctivae/corneas clear. HENT: Head normal in appearance. External nares normal.  Oral mucosa moist without lesions. Hearing grossly intact. Neck: Supple, with full range of motion. Trachea midline. No gross JVD appreciated. Respiratory:  Normal respiratory effort. Clear to auscultation, bilaterally without rales or wheezes or rhonchi. Diminished sounds overall  Cardiovascular: Normal rate, regular rhythm with normal S1/S2 without murmurs. No lower extremity edema. Abdomen: Soft, non-tender, non-distended with normal bowel sounds. Suprapubic catheter in place with mild surrounding erythema and trace amount of purulent drainage. Musculoskeletal: There is no joint swelling or tenderness. Normal tone. No abnormal movements. Skin: Warm and dry. No rashes or lesions. Neurologic:  No focal sensory/motor deficits in the upper and lower extremities. Cranial nerves:  grossly non-focal 2-12. Psychiatric: Alert and oriented, normal insight and though content. Capillary Refill: Brisk,< 3 seconds. Peripheral Pulses: +2 palpable, equal bilaterally. Labs:   Recent Labs     07/09/21 2300 07/11/21  0505   WBC 12.0* 9.1   HGB 9.3* 9.5*   HCT 29.1* 29.3*    296     Recent Labs     07/09/21  2300 07/10/21  0337 07/11/21  0505   * 135 141   K 4.3 4.2 3.9   CL 96* 99 106   CO2 23 22* 23   BUN 38* 38* 30*   CREATININE 2.7* 2.4* 1.5*   CALCIUM 8.8 8.6 8.8     Recent Labs     07/09/21 2300   AST 7   ALT <5*   BILITOT 0.2*   ALKPHOS 57     No results for input(s): INR in the last 72 hours. No results for input(s): Kd Saucedo in the last 72 hours. Microbiology:      Urinalysis:      Lab Results   Component Value Date    NITRU NEGATIVE 07/09/2021    WBCUA > 200 07/09/2021    BACTERIA MANY 07/09/2021    RBCUA 5-10 07/09/2021    BLOODU MODERATE 07/09/2021    SPECGRAV 1.015 12/05/2020    GLUCOSEU NEGATIVE 07/09/2021       Radiology:  CT ABDOMEN PELVIS WO CONTRAST Additional Contrast? None   Final Result       1. Nonobstructive nephrolithiasis. 2. Air along the track of the suprapubic catheter. **This report has been created using voice recognition software. It may contain minor errors which are inherent in voice recognition technology. **      Final report electronically signed by Dr. Melanie Membreno MD on 7/10/2021 10:16 AM      US RENAL COMPLETE   Final Result   1. Normal ultrasound appearance of the kidneys. This document has been electronically signed by: Rafal Burden MD on    07/10/2021 06:28 AM      XR CHEST PORTABLE   Final Result   Impression:   1. Bibasilar atelectasis and/or infiltrates, left greater than right. This document has been electronically signed by: Lore Molina.  DO Lalito on    07/10/2021 12:04 AM          DVT prophylaxis: [] Lovenox                                  [] SCDs [x] SQ Heparin                                 [] Encourage ambulation           [] Already on Anticoagulation     Code Status: Limited    PT/OT Eval Status: Yes    Diet:  ADULT DIET; Regular; 3 carb choices (45 gm/meal);  Low Sodium (2 gm)    Fluids: gently    Tele:   [x] yes             [] no      Electronically signed by Elvira Peña DO on 7/11/2021 at 8:12 AM

## 2021-07-11 NOTE — PLAN OF CARE
Problem: Skin Integrity:  Goal: Will show no infection signs and symptoms  Description: Will show no infection signs and symptoms  Outcome: Ongoing     Problem: Skin Integrity:  Goal: Absence of new skin breakdown  Description: Absence of new skin breakdown  Outcome: Ongoing  Note: No signs of new skin breakdown noted this shift. Patient turned and repositioned every 2 hours while in bed. Ambulated to chair- chair cushion in place. Problem: Falls - Risk of:  Goal: Will remain free from falls  Description: Will remain free from falls  Outcome: Ongoing  Note: Patient remains on fall precautions. Falling star in place. Fall band on. Non-skid slippers on when ambulating. Bed and chair alarm on. Call light within reach. Patient uses call light appropriately. Hourly rounding in place. Problem: Falls - Risk of:  Goal: Absence of physical injury  Description: Absence of physical injury  Outcome: Ongoing     Problem: Pain:  Goal: Pain level will decrease  Description: Pain level will decrease  Outcome: Ongoing  Note: Pain Assessment: 0-10  Pain Level: 0   Patient's Stated Pain Goal: No pain   Is pain goal met at this time? Yes             Problem: Pain:  Goal: Control of acute pain  Description: Control of acute pain  Outcome: Ongoing     Problem: Pain:  Goal: Control of chronic pain  Description: Control of chronic pain  Outcome: Ongoing     Problem: Discharge Planning:  Goal: Discharged to appropriate level of care  Description: Discharged to appropriate level of care  Outcome: Ongoing  Note: Discharge planning in process.  made aware of any additional discharge needs should they arise. From skilled nursing facility- plan is to return back once medically stable. Problem: Urinary Elimination:  Goal: Signs and symptoms of infection will decrease  Description: Signs and symptoms of infection will decrease  Outcome: Ongoing  Note: Suprapubic catheter site is red, swollen, and painful.  Culture sent this AM. Results showing infection. Antibiotics ordered. Will continue to monitor for adequate urinary output and clearing of the infection. Problem: Urinary Elimination:  Goal: Signs and symptoms of infection will decrease  Description: Signs and symptoms of infection will decrease  Outcome: Ongoing  Note: Suprapubic catheter site is red, swollen, and painful. Culture sent this AM. Results showing infection. Antibiotics ordered. Will continue to monitor for adequate urinary output and clearing of the infection. Problem: Metabolic:  Goal: Ability to maintain appropriate glucose levels will improve  Description: Ability to maintain appropriate glucose levels will improve  Outcome: Ongoing  Note: Glucose levels remain within normal limits this shift. Will continue to monitor. Care plan reviewed with patient. Patient verbalizes understanding of the plan of care and contribute to goal setting.

## 2021-07-12 LAB
ANION GAP SERPL CALCULATED.3IONS-SCNC: 9 MEQ/L (ref 8–16)
BUN BLDV-MCNC: 20 MG/DL (ref 7–22)
CALCIUM SERPL-MCNC: 8.9 MG/DL (ref 8.5–10.5)
CHLORIDE BLD-SCNC: 105 MEQ/L (ref 98–111)
CO2: 28 MEQ/L (ref 23–33)
CREAT SERPL-MCNC: 1.2 MG/DL (ref 0.4–1.2)
ERYTHROCYTE [DISTWIDTH] IN BLOOD BY AUTOMATED COUNT: 13.3 % (ref 11.5–14.5)
ERYTHROCYTE [DISTWIDTH] IN BLOOD BY AUTOMATED COUNT: 40.6 FL (ref 35–45)
GFR SERPL CREATININE-BSD FRML MDRD: 57 ML/MIN/1.73M2
GLUCOSE BLD-MCNC: 109 MG/DL (ref 70–108)
GLUCOSE BLD-MCNC: 112 MG/DL (ref 70–108)
GLUCOSE BLD-MCNC: 114 MG/DL (ref 70–108)
GLUCOSE BLD-MCNC: 128 MG/DL (ref 70–108)
GLUCOSE BLD-MCNC: 162 MG/DL (ref 70–108)
HCT VFR BLD CALC: 29.8 % (ref 42–52)
HEMOGLOBIN: 9.5 GM/DL (ref 14–18)
MCH RBC QN AUTO: 26.5 PG (ref 26–33)
MCHC RBC AUTO-ENTMCNC: 31.9 GM/DL (ref 32.2–35.5)
MCV RBC AUTO: 83 FL (ref 80–94)
PLATELET # BLD: 314 THOU/MM3 (ref 130–400)
PMV BLD AUTO: 9.4 FL (ref 9.4–12.4)
POTASSIUM SERPL-SCNC: 4 MEQ/L (ref 3.5–5.2)
RBC # BLD: 3.59 MILL/MM3 (ref 4.7–6.1)
SODIUM BLD-SCNC: 142 MEQ/L (ref 135–145)
WBC # BLD: 9.3 THOU/MM3 (ref 4.8–10.8)

## 2021-07-12 PROCEDURE — 36415 COLL VENOUS BLD VENIPUNCTURE: CPT

## 2021-07-12 PROCEDURE — 6360000002 HC RX W HCPCS: Performed by: STUDENT IN AN ORGANIZED HEALTH CARE EDUCATION/TRAINING PROGRAM

## 2021-07-12 PROCEDURE — 82948 REAGENT STRIP/BLOOD GLUCOSE: CPT

## 2021-07-12 PROCEDURE — 97162 PT EVAL MOD COMPLEX 30 MIN: CPT

## 2021-07-12 PROCEDURE — 1200000000 HC SEMI PRIVATE

## 2021-07-12 PROCEDURE — 2580000003 HC RX 258: Performed by: INTERNAL MEDICINE

## 2021-07-12 PROCEDURE — 80048 BASIC METABOLIC PNL TOTAL CA: CPT

## 2021-07-12 PROCEDURE — 6370000000 HC RX 637 (ALT 250 FOR IP): Performed by: NURSE PRACTITIONER

## 2021-07-12 PROCEDURE — 85027 COMPLETE CBC AUTOMATED: CPT

## 2021-07-12 PROCEDURE — 6370000000 HC RX 637 (ALT 250 FOR IP): Performed by: STUDENT IN AN ORGANIZED HEALTH CARE EDUCATION/TRAINING PROGRAM

## 2021-07-12 PROCEDURE — 97110 THERAPEUTIC EXERCISES: CPT

## 2021-07-12 PROCEDURE — 6360000002 HC RX W HCPCS: Performed by: INTERNAL MEDICINE

## 2021-07-12 PROCEDURE — 99232 SBSQ HOSP IP/OBS MODERATE 35: CPT | Performed by: NURSE PRACTITIONER

## 2021-07-12 PROCEDURE — 94760 N-INVAS EAR/PLS OXIMETRY 1: CPT

## 2021-07-12 PROCEDURE — 2580000003 HC RX 258: Performed by: STUDENT IN AN ORGANIZED HEALTH CARE EDUCATION/TRAINING PROGRAM

## 2021-07-12 PROCEDURE — 87086 URINE CULTURE/COLONY COUNT: CPT

## 2021-07-12 PROCEDURE — 99233 SBSQ HOSP IP/OBS HIGH 50: CPT | Performed by: INTERNAL MEDICINE

## 2021-07-12 PROCEDURE — 94640 AIRWAY INHALATION TREATMENT: CPT

## 2021-07-12 RX ORDER — GINSENG 100 MG
CAPSULE ORAL 2 TIMES DAILY
Status: DISCONTINUED | OUTPATIENT
Start: 2021-07-12 | End: 2021-07-14 | Stop reason: HOSPADM

## 2021-07-12 RX ORDER — ACETAMINOPHEN 650 MG
TABLET, EXTENDED RELEASE ORAL 2 TIMES DAILY
Status: DISCONTINUED | OUTPATIENT
Start: 2021-07-12 | End: 2021-07-12

## 2021-07-12 RX ADMIN — POLYETHYLENE GLYCOL 3350 17 G: 17 POWDER, FOR SOLUTION ORAL at 09:01

## 2021-07-12 RX ADMIN — INSULIN GLARGINE 20 UNITS: 100 INJECTION, SOLUTION SUBCUTANEOUS at 20:35

## 2021-07-12 RX ADMIN — CEFEPIME HYDROCHLORIDE 1000 MG: 1 INJECTION, POWDER, FOR SOLUTION INTRAMUSCULAR; INTRAVENOUS at 02:24

## 2021-07-12 RX ADMIN — SODIUM CHLORIDE, PRESERVATIVE FREE 10 ML: 5 INJECTION INTRAVENOUS at 20:35

## 2021-07-12 RX ADMIN — CEFEPIME HYDROCHLORIDE 1000 MG: 1 INJECTION, POWDER, FOR SOLUTION INTRAMUSCULAR; INTRAVENOUS at 14:08

## 2021-07-12 RX ADMIN — SODIUM CHLORIDE: 9 INJECTION, SOLUTION INTRAVENOUS at 12:32

## 2021-07-12 RX ADMIN — TIOTROPIUM BROMIDE INHALATION SPRAY 2 PUFF: 3.12 SPRAY, METERED RESPIRATORY (INHALATION) at 08:30

## 2021-07-12 RX ADMIN — BACITRACIN: 500 OINTMENT TOPICAL at 22:45

## 2021-07-12 RX ADMIN — BUDESONIDE 500 MCG: 0.5 INHALANT RESPIRATORY (INHALATION) at 08:20

## 2021-07-12 RX ADMIN — ARFORMOTEROL TARTRATE 15 MCG: 15 SOLUTION RESPIRATORY (INHALATION) at 08:20

## 2021-07-12 RX ADMIN — ARFORMOTEROL TARTRATE 15 MCG: 15 SOLUTION RESPIRATORY (INHALATION) at 17:52

## 2021-07-12 RX ADMIN — Medication 240 MG: at 20:34

## 2021-07-12 RX ADMIN — DOCUSATE SODIUM 100 MG: 100 CAPSULE, LIQUID FILLED ORAL at 20:35

## 2021-07-12 RX ADMIN — HEPARIN SODIUM 5000 UNITS: 5000 INJECTION INTRAVENOUS; SUBCUTANEOUS at 20:36

## 2021-07-12 RX ADMIN — OXYBUTYNIN CHLORIDE 5 MG: 5 TABLET ORAL at 06:12

## 2021-07-12 RX ADMIN — HEPARIN SODIUM 5000 UNITS: 5000 INJECTION INTRAVENOUS; SUBCUTANEOUS at 14:09

## 2021-07-12 RX ADMIN — HEPARIN SODIUM 5000 UNITS: 5000 INJECTION INTRAVENOUS; SUBCUTANEOUS at 06:12

## 2021-07-12 RX ADMIN — Medication 240 MG: at 08:02

## 2021-07-12 RX ADMIN — DOCUSATE SODIUM 100 MG: 100 CAPSULE, LIQUID FILLED ORAL at 08:02

## 2021-07-12 RX ADMIN — LEVOTHYROXINE SODIUM 200 MCG: 0.1 TABLET ORAL at 06:12

## 2021-07-12 RX ADMIN — BUDESONIDE 500 MCG: 0.5 INHALANT RESPIRATORY (INHALATION) at 17:53

## 2021-07-12 RX ADMIN — LIDOCAINE 4%: 4 CREAM TOPICAL at 20:34

## 2021-07-12 ASSESSMENT — PAIN SCALES - GENERAL
PAINLEVEL_OUTOF10: 0

## 2021-07-12 NOTE — PROGRESS NOTES
[address] ICU STEPDOWN TELEMETRY 4K  [address] [facility] [address]  Dept: [phone]  Loc: [phone]  Visit Date: 2021  Urology Progress Note    Chief Complaint: altered mental status, suprapubic tenderness    Subjective:   Patient is resting in bed, voiding via SP catheter, yellow urine, +flatus,ambulating with assistance, tolerating regular diet, denies any nausea or vomiting. There are complaints of mild pain at this time. Vitals:  /66   Pulse 65   Temp 97.7 °F (36.5 °C) (Oral)   Resp 18   Ht 5' 7\" (1.702 m)   Wt 225 lb 14.4 oz (102.5 kg)   SpO2 97%   BMI 35.38 kg/m²   Temp  Av.9 °F (36.6 °C)  Min: 97.7 °F (36.5 °C)  Max: 98.7 °F (37.1 °C)    Intake/Output Summary (Last 24 hours) at 2021 1549  Last data filed at 2021 1416  Gross per 24 hour   Intake 2255.34 ml   Output 3300 ml   Net -1044.66 ml       Social History     Socioeconomic History    Marital status:      Spouse name: Not on file    Number of children: 2    Years of education: Not on file    Highest education level: Not on file   Occupational History    Not on file   Tobacco Use    Smoking status: Never Smoker    Smokeless tobacco: Never Used   Vaping Use    Vaping Use: Never used   Substance and Sexual Activity    Alcohol use: Yes     Alcohol/week: 1.0 standard drinks     Types: 1 Glasses of wine per week     Comment: Glass of wine with dinner.  Drug use: No    Sexual activity: Never   Other Topics Concern    Not on file   Social History Narrative    Not on file     Social Determinants of Health     Financial Resource Strain:     Difficulty of Paying Living Expenses:    Food Insecurity:     Worried About Running Out of Food in the Last Year:     [address] in the Last Year:    Transportation Needs:     Lack of Transportation (Medical):      Lack of Transportation (Non-Medical):    Physical Activity:     Days of Exercise per Week:  Minutes of Exercise per Session:    Stress:     Feeling of Stress :    Social Connections:     Frequency of Communication with Friends and Family:     Frequency of Social Gatherings with Friends and Family:     Attends [employer] Services:     Active Member of Clubs or Organizations:     Attends Club or Organization Meetings:     Marital Status:    Intimate Partner Violence:     Fear of Current or Ex-Partner:     Emotionally Abused:     Physically Abused:     Sexually Abused:      Family History   Problem Relation Age of Onset    Other Mother         Complications of Childbirth    Other Father [de-identified]        Natural causes     Allergies   Allergen Reactions    Latex Rash   Chan Granda [Nitrofurantoin Racquel Ax      Dr. Leo Zamorano prefers pt not use Macrobid due to pulmonary side effects.  Levaquin [Levofloxacin] Other (See Comments)     Redness/flushing    Brimonidine Tartrate     Adhesive Tape Rash    Alphagan [Brimonidine Tartrate] Hives     Eyes red       Objective:    Constitutional: Alert and oriented times x3, no acute distress, and cooperative to examination with appropriate mood and affect. HEENT:   Head:         Normocephalic and atraumatic. Mucous membranes are normal.   Eyes:         EOM are normal. No scleral icterus. Nose:    The external appearance of the nose is normal  Ears: The ears appear normal to external inspection. Cardiovascular:       Normal rate, regular rhythm. Pulmonary/Chest:  Normal respiratory rate and rhthym. No use of accessory muscles. Lungs clear bilaterally. Abdominal:          Soft. No tenderness. Active bowel sounds. SP catheter site with some erythema, and minimal yellow slough around site. Pt tender to touch to lower abdomen. Musculoskeletal:    Normal range of motion. He exhibits no edema or tenderness of lower extremities. Extremities:    No cyanosis, clubbing, or edema present. Neurological:    Alert and oriented.      Labs:  WBC: Lab Results   Component Value Date    WBC 9.3 07/12/2021     Hemoglobin/Hematocrit:    Lab Results   Component Value Date    HGB 9.5 07/12/2021    HCT 29.8 07/12/2021     BMP:    Lab Results   Component Value Date     07/12/2021    K 4.0 07/12/2021    K 4.2 07/10/2021     07/12/2021    CO2 28 07/12/2021    BUN 20 07/12/2021    LABALBU 3.5 07/09/2021    LABALBU 4.3 11/30/2011    CREATININE 1.2 07/12/2021    CALCIUM 8.9 07/12/2021    LABGLOM 57 07/12/2021       Impression/Plan:    Neurogenic bladder- SP catheter placed 6/17 by Dr Shoaib Hutton. Pt presented to ER with UTI. SP catheter exchanged today, using 16 fr catheter. 10 ml inserted into balloon. Pt tolerated fair. New urine culture sent off of new catheter. SP catheter insertion site, can use bacitracin ointment as needed around site. UTI-  On cefepime. Culture with enterobacter cloacae complex. Keep FU appt 7/30 with Dr Shoaib Hutton.      Vitor Ortega, APRN - CNP, APRN  07/12/21 3:49 PM  Urology

## 2021-07-12 NOTE — PLAN OF CARE
Problem: Skin Integrity:  Goal: Absence of new skin breakdown  Description: Absence of new skin breakdown  7/12/2021 0153 by Kim Vigil RN  Outcome: Ongoing  Note: Patient shows no signs or symptoms of skin breakdown this shift. Problem: Falls - Risk of:  Goal: Will remain free from falls  Description: Will remain free from falls  7/12/2021 0153 by Kim Vigil RN  Outcome: Ongoing  Note: Patient free from falls this shift with call light within reach, slipper socks in place, and bed alarm on. Problem: Falls - Risk of:  Goal: Absence of physical injury  Description: Absence of physical injury  7/12/2021 0153 by Kim Vigil RN  Outcome: Ongoing  Note: Patient free from physical injury this shift with call light within reach, slipper socks in place, and bed alarm on. Problem: Discharge Planning:  Goal: Discharged to appropriate level of care  Description: Discharged to appropriate level of care  7/12/2021 0153 by Kim Vigil RN  Outcome: Ongoing  Note: Patient from assisted living and will address level of care prior to discharge. Care plan reviewed with patient. Patient verbalizes understanding of the plan of care and contribute to goal setting.

## 2021-07-12 NOTE — PLAN OF CARE
Problem: Discharge Planning:  Goal: Discharged to appropriate level of care  Description: Discharged to appropriate level of care  7/12/2021 1402 by ZEKE Rocha  Outcome: Completed       SW consult received and completed. See SW note.

## 2021-07-12 NOTE — PROGRESS NOTES
[facility]  INPATIENT PHYSICAL THERAPY  EVALUATION  STR ICU STEPDOWN TELEMETRY 4K - 4K-11/011-A    Time In: 8729  Time Out: 1504  Timed Code Treatment Minutes: 10 Minutes  Minutes: 27          Date: 2021  Patient Name: Cary Mack,  Gender:  male        MRN: [id number]  : 1931  (80 y.o.)      Referring Practitioner: Tom Son DO  Diagnosis: Acute kidney injury superimposed on CKD  Additional Pertinent Hx: Mr. Cary Mack is a 45-year-old male with a past medical history of COPD (uses CPAP at night with oxygen as needed), T2DM, HTN, HLD, A. fib, thyroid resection, ROBER, and anemia. He was sent to the ER from the nursing home for reports of the nursing home staff that the patient was short of breath and was altered. The patient himself was alert and oriented x3 in the emergency department and was not complaining of any fevers chills or shortness of breath. The patient did state that he \"did not feel right\" ever since his recent treatment for UTI. The patient reiterates that he is not feeling more short of breath than on a normal day. But does feel rather not like himself but cannot exactly explain the feeling. He denies any fever, chills (though he does state that he does feel cold), nausea, vomiting, diarrhea, constipation, issues with urination, or numbness and tingling in his hands and feet. He is unaware of why the nursing home decided he would have to come to the hospital to be evaluated. Pt follows with Dr Elia Dewey. Pt underwent cysto with Green[facility] PVP on 3/17/2020 by Dr Elia Dewey. He required chronic catheter after procedure, and has been dealing with recurrent UTI. It was elected to undergo SP catheter placement which was done . Recently seen by ID as well for UTI. Started on Cipro 2021.      Restrictions/Precautions:  Restrictions/Precautions: Fall Risk    Subjective:  Chart Reviewed: Yes  Patient assessed for rehabilitation services?: Yes  Family / Caregiver Present: No  Subjective: RN approved session, pt is seated in recliner, agreeable to PT. Pt oriented during session, however, per RN pt has been intermittently confused. General:  Overall Orientation Status: Within Functional Limits  Follows Commands: Within Functional Limits    Vision: Within Functional Limits    Hearing: Within functional limits         Pain: \"a little\" lower abdomen    Vitals: Vitals not assessed per clinical judgement, see nursing flowsheet    Social/Functional History:    Type of Home: Assisted living [facility] [place])  Home Layout: One level  Home Access: Level entry  Home Equipment: Rolling walker      Ambulation Assistance: Independent  Transfer Assistance: Independent       Occupation: Retired  Type of occupation:   Additional Comments: Pt amb without AD    OBJECTIVE:  Range of Motion:  Bilateral Lower Extremity: WFL    Strength:  Bilateral Lower Extremity: WFL    Balance:  Static Standing Balance: Stand By Assistance  Dynamic Standing Balance: Contact Guard Assistance    Bed Mobility:  Not Tested    Transfers:  Sit to Stand: Stand By Assistance  Stand to Sit:Stand By Assistance    Ambulation:  Contact Guard Assistance  Distance: ~150 feet around the room (pt declines hallway ambulation)  Surface: Level Tile  Device:Rolling Walker  Gait Deviations: Forward Flexed Posture, Slow Giovanna, Decreased Step Length Bilaterally and Decreased Gait Speed  **Slow, steady pace    Exercise:  Patient was guided in 1 set(s) 10 reps of exercise to both lower extremities. Ankle pumps, Heelslides and Hip abduction/adduction. Exercises were completed for increased independence with functional mobility. Functional Outcome Measures: Not completed     ASSESSMENT:  Activity Tolerance:  Patient tolerance of  treatment: good. Treatment Initiated: Treatment and education initiated within context of evaluation.   Evaluation time included review of current medical information, gathering information related to past medical, social and functional history, completion of standardized testing, formal and informal observation of tasks, assessment of data and development of plan of care and goals. Treatment time included skilled education and facilitation of tasks to increase safety and independence with functional mobility for improved independence and quality of life. See above exercises. Assessment: Body structures, Functions, Activity limitations: Decreased functional mobility , Decreased strength, Decreased endurance  Assessment: Pt tolerates session well, limited by generalized weakness, requires use of RW at this time. PT to continue to progress strength and functional mobility. Prognosis: Good    REQUIRES PT FOLLOW UP: Yes    Discharge Recommendations:  Discharge Recommendations: Home with assist PRN (Return to [facility])    Patient Education:  PT Education: Plan of Care, PT Role    Equipment Recommendations:  Equipment Needed: No    Plan:  Times per week: 3-5x GM  Current Treatment Recommendations: Strengthening, Neuromuscular Re-education, Home Exercise Program, Balance Training, Endurance Training, Patient/Caregiver Education & Training, Functional Mobility Training, Transfer Training, Gait Training    Goals:  Patient goals : to go home  Short term goals  Time Frame for Short term goals: by discharge  Short term goal 1: Pt to transfer supine <--> sit S to enable pt to get in/out of bed. Short term goal 2: Pt to transfer sit <--> stand S for increased functional mobility. Short term goal 3: Pt to ambulate >200 feet without AD SBA for household ambulation. Long term goals  Time Frame for Long term goals : NA due to short length of stay. Following session, patient left in safe position with all fall risk precautions in place.

## 2021-07-12 NOTE — PROGRESS NOTES
[facility] Palliative [facility]           Progress Note    Patient Name:  Michelle Castrejon  Medical Record Number:  [id number]  YOB: 1931    Date:  7/12/2021  Time:  10:41 AM  Completed By:  Nettie Davis RN, RN    Reason for Palliative Care Evaluation:  Code status    Current Issues:  []  Pain  []  Fatigue  []  Nausea  []  Anxiety  []  Depression  []  Shortness of Breath  []  Constipation  []  Appetite  []  Other:    Advance Directives  [x] [place] DNR Form  [] Living Will  [] Medical POA    Current Code Status  [] Full Resuscitation  [] DNR-Comfort Care-Arrest  [] DNR-Comfort Care  [x] Limited   [x] No CPR   [] No shock   [] No ET intubation/reintubation   [] No resuscitative medications   [] Other limitation: yes to intubation, mediications, and intubation    Performance Status:  Palliative Performance Scale:  ___70%  Ambulation reduced; Some disease; Can't do normal job or work; intake normal or reduced; can do full self care; LOC full  _X_60%  Ambulation reduced; Significant disease; Can't do hobbies/housework; intake normal or reduced; occasional assist; LOC full/confusion  ___50%  Mainly sit/lie; Extensive disease; Can't do any work; Considerable assist; intake normal or reduced; LOC full/confusion  ___40%  Mainly in bed; Extensive disease; Mainly assist; intake normal or reduced; LOC full/confusion   ___30%  Bed Bound; Extensive disease; Total care; intake reduced; LOC full/confusion  ___20%  Bed Bound; Extensive disease; Total care; intake minimal; Drowsy/coma  ___10%  Bed Bound; Extensive disease; Total care; Mouth care only; Drowsy/coma  ___0       Death      Family/Patient Discussion: Consulted for code status discussion. Chart review in Epic patient has [facility] form. Per primary RN, Luis Carlos Buchanan, patient had stated that he would want intubation, defibrillation, and medications. In to speak with patient, patient up in chair watching TV.  Introduced as palliative care, patient agreed to speak with this RN. This RN educated patient of Full code. Informed patient that he has [facility] in [facility]. Patient acknowledged [facility], stated that is what he would still want. Educated patient that with [facility] patient would not want chest compressions, defibrillation, medications, or intubation. Patient agreed. Patient denied further questions. This RN called patient's son/POA, Dr Janae Rodriguez. Informed Dr Newell of conversation with patient. Dr Newell confirmed that patient is confused at times and is unable to make his own decisions. Updated Dr Newell of current code status of Limited yes to meds, intubation, and shock, no to compressions. Dr Newell confirmed that patient is to be a [facility].  Denied further questions    Plan/Follow-Up:  Change code status to LIMITED NO X4    Maty Overton, RN, RN  7/12/2021,  10:41 AM

## 2021-07-12 NOTE — CARE COORDINATION
7/12/21, 11:20 AM EDT  DISCHARGE PLANNING EVALUATION:    Sophie Santiago       Admitted: 7/9/2021/ [place] day: 2   Location: -11/011-A Reason for admit: Acute kidney injury superimposed on CKD ([address] [N17.9, N18.9]   PMH:  has a past medical history of Anemia, Anxiety, BPH (benign prostatic hyperplasia), CAD (coronary artery disease), Chronic back pain, COPD (chronic obstructive pulmonary disease) ([address] Detached retina, Diabetes mellitus ([address] DVT (deep venous thrombosis) ([address] DVT (deep venous thrombosis) ([address] Dysphagia, Glaucoma, Hyperlipidemia, Hypertension, Macular degeneration, Mass of chest, Movement disorder, Obesity, Osteoarthritis, Paroxysmal atrial fibrillation ([address] Pneumonia, Primary thyroid follicular carcinoma, Sleep apnea, Testicular hypofunction, Thyroid cancer ([address] and Urinary incontinence. Procedure:   7/10 CXR  Bibasilar atelectasis and/or infiltrates, left greater than right. 7/10 CT Abdomen  1. Nonobstructive nephrolithiasis. 2. Air along the track of the suprapubic catheter. 7/12 SPC Exchange  Barriers to Discharge: STEPHY w history COPD, DM, A-fib, Thyroid Carcinoma, recent UTI. Urology plans procedure above today. Await final cultures. Palliative Care following. (+) SPC site culture: Corynebacterium species, Staph; (+) Urine Culture: Enterobacter Cloacae; Creatinine 1.5, elevated troponin; monitor. PCP: Anjum Li MD  Readmission Risk Score: 22%    Patient Goals/Plan/Treatment Preferences: plans return [address] (this is Dr. Estee Jones [facility]) father) versus possible new [facility]; has CPAP, home oxygen 3L at night and as needed; await therapy recommendations; confused at times; collaborated w OZZIE Abbott  Transportation/Food Security/Housekeeping Addressed:  No issues identified.

## 2021-07-12 NOTE — DISCHARGE INSTR - COC
Continuity of Care Form    Patient Name: Fili Hand   :  1931  MRN:  [id number]    Admit date:  2021  Discharge date:  21    Code Status Order: Limited   Advance Directives:     Admitting Physician:  Noralee Merlin, MD  PCP: Nyasia Samuel MD    Discharging Nurse: [facility]/Room#: 4K-11/011-A  Discharging Unit Phone Number: [phone]    Emergency Contact:   Extended Emergency Contact Information  Primary Emergency Contact: Cristo Shrestha of [address] [facility] Phone: [phone]  Mobile Phone: [phone]  Relation: Child  Hearing or visual needs: None  Other needs: None  Preferred language: English   needed? No  Secondary Emergency Contact: Jonah Gillespie of [address]  Phone: [phone]  Mobile Phone: [phone]  Relation: Child  Hearing or visual needs: None  Other needs: None  Preferred language: English   needed?  No    Past Surgical History:  Past Surgical History:   Procedure Laterality Date   [facility] SURGERY  [id number], 2014    BRONCHOSCOPY N/A 2017    BRONCHOSCOPY AIRWAY ONLY performed by Wes Suarez MD at [address] N/A 2021    CYSTOSCOPY SUPRAPUBIC TUBE PLACEMENT performed by Mat Poe MD at [address] [place] [address] EKG 12-LEAD  11/10/2015         FRACTURE SURGERY      right hand    OTHER SURGICAL HISTORY      spinal epidurals    RETINAL DETACHMENT SURGERY      SPINE SURGERY  2004    Lumbar laminectomy    THYROIDECTOMY      TURP N/A 3/17/2020    CYSTOSCOPY WITH GREENLIGHT PHOTOVAPORIZATION OF PROSTATE performed by Mat Poe MD at [address] [place] [address] TURP N/A 3/24/2020    PLASMA BUTTON TURP WITH CLOT EVACUATION performed by Mat Poe MD at [facility]       Immunization History:   Immunization History   Administered Date(s) Administered    COVID-19, Anderson Peter, PF, 30mcg/0.3mL 2021, 2021    Influenza 10/31/2013    Influenza Virus Vaccine 2011, 2012, 2014, 09/29/2015    Influenza Whole 10/01/2010    Influenza, Quadv, 6 mo and older, IM, PF (Flulaval, Fluarix) 09/27/2018    Influenza, Quadv, IM, (6 mo and older Fluzone, Flulaval, Fluarix and 3 yrs and older Afluria) 11/17/2017    Influenza, Quadv, IM, PF (6 mo and older Fluzone, Flulaval, Fluarix, and 3 yrs and older Afluria) 10/01/2019    Influenza, Quadv, adjuvanted, 65 yrs +, IM, PF (Fluad) 10/19/2020    Influenza, Triv, 3 Years and older, IM (Afluria (5 yrs and older) 09/26/2016    Pneumococcal Conjugate 13-valent ([id number]) 12/19/2014    Pneumococcal Polysaccharide ([id number]) 01/01/2003    Td, unspecified formulation 06/08/2010    Zoster Live (Zostavax) 08/05/2014       Active Problems:  Patient Active Problem List   Diagnosis Code    [facility] mellitus [facility] E11.9    Hyperlipidemia E78.5    Coronary artery disease involving native coronary artery of native heart without angina pectoris I25.10    Acquired hypothyroidism E03.9    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 E29.1    Breast lump N63.0    BPH (benign prostatic hyperplasia) N40.0    ROBER (obstructive sleep apnea) G47.33    Essential hypertension I10    History of DVT (deep vein thrombosis) Z86.718    Pneumonia due to infectious organism J18.9    Shortness of breath R06.02    Chronic respiratory failure with hypoxia ([facility]) J96.11    Mixed hyperlipidemia E78.2    Chronic deep vein thrombosis (DVT) of right lower extremity ([facility]) I82.501    Anticoagulated on Coumadin Z79.01    Type 2 diabetes mellitus with complication, with long-term current use of insulin ([facility]) E11.8, Z79.4    Muscle weakness M62.81    Chronic bronchitis ([facility]) J42    Chronic obstructive pulmonary disease ([facility]) J44.9    Paroxysmal atrial fibrillation ([facility]) I48.0    Acute diastolic congestive heart failure ([facility]) I50.31    Paroxysmal atrial flutter ([facility]) I48.92    Urinary incontinence R32    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    Senile nuclear sclerosis H25.10    Puckering of macula, 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    Presence of intraocular lens Z96.1    Primary open-angle glaucoma 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    Chronic diastolic congestive heart failure ([facility]) I50.32    Morbid obesity ([facility]) E66.01    Urinary retention R33.9    Urine retention R33.9    Abdominal pain R10.9    Altered mental status R41.82    Chronic indwelling Billy catheter Z97.8    Urinary tract infection without hematuria N39.0    Hypokalemia E87.6    Weakness of left lower extremity R29.898    Pill dysphagia R13.10    History of thyroid cancer Z85.850    Gross hematuria R31.0    Constipation K59.00    Acute urinary retention R33.8    COPD exacerbation ([facility]) J44.1    Acute on chronic respiratory failure with hypoxia and hypercapnia ([facility]) J96.21, J96.22    Acute on chronic diastolic congestive heart failure ([facility]) I50.33    Bilateral leg edema +2 now +1 R60.0    Community acquired bacterial pneumonia J15.9    Scrotal pain N50.82    Acute kidney injury superimposed on CKD ([facility]) N17.9, N18.9       Isolation/Infection:   Isolation          No Isolation        Patient Infection Status     Infection Onset Added Last Indicated Last Indicated By Review Planned Expiration Resolved Resolved By    None active    Resolved    COVID-19 Rule Out 05/19/21 05/19/21 05/19/21 COVID-19, Rapid (Ordered)   05/19/21 Rule-Out Test Resulted    COVID-19 Rule Out 12/05/20 12/05/20 12/05/20 COVID-19 (Ordered)   12/05/20 Rule-Out Test Resulted    COVID-19 Rule Out 10/03/20 10/03/20 10/03/20 COVID-19 (Ordered)   10/03/20 Rule-Out Test Resulted    COVID-19 Rule Out 05/18/20 05/18/20 05/18/20 COVID-19 (Ordered)   05/18/20 Rule-Out Test Resulted    MDRO (multi-drug resistant organism) 05/05/20 05/17/20 05/05/20 Culture, Reflexed, Urine   05/18/20 Mingo Gallagher RN    ESBL (Extended Spectrum Beta Lactamase) 05/05/20 05/08/20 08/18/20 Culture, Reflexed, Urine   06/17/21 Ammy Weinstein RN    Klebsiella Pneumonia in urine 5/2020-Amp C confirmed by [address]  E coli in urine 7/2020, 8/2020    MDRO (multi-drug resistant organism) 05/05/20 05/08/20 05/05/20 Culture, Reflexed, Urine   05/10/20 [address] Varela RN    COVID-19 Rule Out 05/05/20 05/05/20 05/05/20 Covid-19 Ambulatory (Ordered)   05/07/20 Rule-Out Test Resulted          Nurse Assessment:  Last Vital Signs: BP (!) 151/67   Pulse 56   Temp 97.8 °F (36.6 °C) (Oral)   Resp 18   Ht 5' 7\" (1.702 m)   Wt 225 lb 14.4 oz (102.5 kg)   SpO2 93%   BMI 35.38 kg/m²     Last documented pain score (0-10 scale): Pain Level: 0  Last Weight:   Wt Readings from Last 1 Encounters:   07/12/21 225 lb 14.4 oz (102.5 kg)     Mental Status:  oriented and alert    IV Access:  - None    Nursing Mobility/ADLs:  Walking   Assisted  Transfer  Assisted  Bathing  Assisted  Dressing  Assisted  Toileting  Assisted  Feeding  Assisted  Med Admin  Assisted  Med Delivery   none    Wound Care Documentation and Therapy:  Wound 05/17/20 Sacrum Non-blanchable  (Active)   Number of days: 420       Wound 12/05/20 Penis uretheral erosion (Active)   Number of days: 219        Elimination:  Continence:   · Bowel: Yes  · Bladder: Yes  Urinary Catheter: Suprapubic catheter, originally inserted 6/17, exchanged 7/12   Colostomy/Ileostomy/Ileal Conduit: No       Date of Last BM: 7/12/21    Intake/Output Summary (Last 24 hours) at 7/12/2021 1359  Last data filed at 7/12/2021 1100  Gross per 24 hour   Intake 1351.18 ml   Output 3000 ml   Net -1648.82 ml     I/O last 3 completed shifts: In: 3161.2 [P.O.:1110; I.V.:2051.2]  Out: 4325 [Urine:4325]    Safety Concerns: At Risk for Falls    Impairments/Disabilities:      Vision    Nutrition Therapy:  Current Nutrition Therapy:   - Oral Diet:  Carb Control 3 carbs/meal (1500kcals/day) and Low Sodium (2gm)    Routes of Feeding: Oral  Liquids:  Thin Liquids  Daily Fluid Restriction: no  Last Modified Barium Swallow with Video (Video Swallowing Test): not done    Treatments at the Time of Hospital Discharge:   Respiratory Treatments: 2L NC O2 PRN: SOB or SpO2<90%  Oxygen Therapy:  is not on home oxygen therapy. Ventilator:    - No ventilator support    Rehab Therapies: none  Weight Bearing Status/Restrictions: No weight bearing restirctions  Other Medical Equipment (for information only, NOT a DME order):  wheelchair and walker  Other Treatments: Swab catheter site with betadyne then apply antibiotic ointment BID and PRN    Patient's personal belongings (please select all that are sent with patient):  Glasses    RN SIGNATURE:  Electronically signed by Gertrude Pak RN on 7/14/21 at 12:38 PM EDT    CASE MANAGEMENT/SOCIAL WORK SECTION    Inpatient Status Date: 7/10/2021    Readmission Risk Assessment Score:  Readmission Risk              Risk of Unplanned Readmission:  22           Discharging to Facility/ Agency   · Name: [address]  · Address: [address] [place] [address]  · Phone: [phone]  · Fax: [phone]    Dialysis Facility (if applicable)   · Name:  · Address:  · Dialysis Schedule:  · Phone:  · Fax:    / signature: Electronically signed by ZEKE Elaine on 7/12/21 at 2:01 PM EDT    PHYSICIAN SECTION    Prognosis: Good    Condition at Discharge: Stable    Rehab Potential (if transferring to Rehab): Good    Recommended Labs or Other Treatments After Discharge: 2 weeks of event monitor    Physician Certification: I certify the above information and transfer of Samuel Giang  is necessary for the continuing treatment of the diagnosis listed and that he requires Assisted Living for greater 30 days.      Update Admission H&P: No change in H&P     Dr. Freddie Dodson:  {Esignature:[id number]}

## 2021-07-12 NOTE — PROGRESS NOTES
time.    First-degree AV block/2nd Degree AVB Mobitz Type I: Noted progressive prolongation of DC interval with dropped beat, native rhythm has prolonged DC interval as well. No evidence of higher grade block, will monitor and consider event monitor. Asymptomatic. Monitor telemetry. Hold beta-blocker for now. Stable/improved. COPD: No exacerbation, continue home regimen. ROBER on CPAP, continue hospital unit if patient can tolerate. Chronic Diastolic Dysfunction: Y9UU per echo 6/14/21, since on fluids monitoring status closely. Euvolemic with trace LE edema bilaterally. No rales. Holding diuretics, resume after fluids stopped. CAD: recent stress 6/14/21 wnl.   does not appear to be on statin or antiplatelet PTA. Hx of DVT: Status post IVC filter, off of anticoagulation at this time. DM2: Continue basal regimen, will adjust as needed. Holding Metformin with STEPHY. Sliding scale and hypoglycemia protocol in place. PAF: previously on coumadin, off 2/2 hematuria. Continue Lopressor. Hypertension: On low-dose lisinopril, will hold given STEPHY. Chronic Anemia: Stable, will monitor. Slightly below baseline of 10-11, normocytic. No evidence of gross bleeding. Chronic Back Pain: hold sedating meds due to concern for ams, will reassess ongoing need for pain control. Hx of thyroid cancer s/p resection, continue Synthroid. Expected discharge date:  1-2 days    Disposition: Return to skilled facility - okay to transfer to med surg floor. [] Home       [] TCU       [] Rehab       [] Psych       [] SNF       [] [facility]       [] Other-    --------------------------------------------    Chief Complaint: AMS    Hospital Course:  Per HPI, \"Mr. Elisa Coker is a 28-year-old male with a past medical history of COPD (uses CPAP at night with oxygen as needed), T2DM, HTN, HLD, A. fib, thyroid resection, ROBER, and anemia.  He was sent to the ER from the nursing home for reports of the nursing home staff that the patient was short of breath and was altered. The patient himself was alert and oriented x3 in the emergency department and was not complaining of any fevers chills or shortness of breath. The patient did state that he \"did not feel right\" ever since his recent treatment for UTI. The patient reiterates that he is not feeling more short of breath than on a normal day. But does feel rather not like himself but cannot exactly explain the feeling. He denies any fever, chills (though he does state that he does feel cold), nausea, vomiting, diarrhea, constipation, issues with urination, or numbness and tingling in his hands and feet. He is unaware of why the nursing home decided he would have to come to the hospital to be evaluated. In the ED temperature 98.0 °F, /88, HR 70, RR 14, SPO2 97% on room air. Sodium 133, chloride 96, potassium 4.3, creatinine of 2.7, troponin 0.090, LFTs within normal limits, WBC 12.0, Hgb 9.3, platelet 951. UA shows leukocyte esterase, blood, WBCs, and bacteria. Blood gas shows pH of 7.39, PO2 of 66, and PCO2 of 37. CXR shows bibasilar atelectasis left greater than right. EKG shows potential A. fib due to lack of P waves. \"    Subjective (past 24 hours): Patient seen at bedside, pain is less notable today, he denies any new complaints of fevers, chills, nausea or vomiting, diarrhea or constipation. Urology to exchange his suprapubic catheter today. Continues on IV antibiotics.       Medications:  Reviewed    Infusion Medications    dextrose      sodium chloride 50 mL/hr at 07/12/21 1232    sodium chloride       Scheduled Medications    bacitracin   Topical BID    cefepime  1,000 mg Intravenous Q12H    Arformoterol Tartrate  15 mcg Nebulization BID    budesonide  0.5 mg Nebulization BID    docusate sodium  100 mg Oral BID    [Held by provider] doxazosin  4 mg Oral Nightly    ferrous gluconate  240 mg Oral BID    [Held by provider] furosemide  40 mg Oral BID    insulin glargine  20 Units Subcutaneous Nightly    levothyroxine  200 mcg Oral Daily    [Held by provider] lisinopril  2.5 mg Oral Daily    [Held by provider] metFORMIN  1,000 mg Oral BID     [Held by provider] metoprolol tartrate  25 mg Oral BID    tiotropium  2 puff Inhalation Daily    [Held by provider] spironolactone  25 mg Oral Daily    insulin lispro  0-6 Units Subcutaneous TID     insulin lispro  0-3 Units Subcutaneous Nightly    sodium chloride flush  5-40 mL Intravenous 2 times per day    heparin (porcine)  5,000 Units Subcutaneous 3 times per day     PRN Meds: albuterol sulfate HFA, aluminum & magnesium hydroxide-simethicone, lidocaine, oxybutynin, polyethylene glycol, simethicone, glucose, dextrose, glucagon (rDNA), dextrose, sodium chloride flush, sodium chloride, acetaminophen **OR** acetaminophen, ondansetron, famotidine      Intake/Output Summary (Last 24 hours) at 7/12/2021 1624  Last data filed at 7/12/2021 1416  Gross per 24 hour   Intake 2255.34 ml   Output 3300 ml   Net -1044.66 ml     Weight change:       Exam:  /66   Pulse 65   Temp 97.7 °F (36.5 °C) (Oral)   Resp 18   Ht 5' 7\" (1.702 m)   Wt 225 lb 14.4 oz (102.5 kg)   SpO2 97%   BMI 35.38 kg/m²     General appearance: No apparent distress, well developed, appears stated age. Elderly, pleasant. Eyes:  Pupils equal, round, and reactive to light. Conjunctivae/corneas clear. HENT: Head normal in appearance. External nares normal.  Oral mucosa moist without lesions. Hearing grossly intact. Neck: Supple, with full range of motion. Trachea midline. No gross JVD appreciated. Respiratory:  Normal respiratory effort. Clear to auscultation, bilaterally without rales or wheezes or rhonchi. Cardiovascular: Normal rate, regular rhythm with normal S1/S2 without murmurs. No lower extremity edema. Abdomen: Soft, non-tender, non-distended with normal bowel sounds.   Suprapubic catheter in place with mild surrounding erythema and trace amount of purulent drainage - relatively unchanged. Musculoskeletal: There is no joint swelling or tenderness. Normal tone. No abnormal movements. Skin: Warm and dry. No rashes or lesions. Neurologic:  No focal sensory/motor deficits in the upper and lower extremities. Cranial nerves:  grossly non-focal 2-12. Psychiatric: Alert and oriented, normal insight and though content. Capillary Refill: Brisk,< 3 seconds. Peripheral Pulses: +2 palpable, equal bilaterally. Labs:   Recent Labs     07/09/21  2300 07/11/21  0505 07/12/21  0457   WBC 12.0* 9.1 9.3   HGB 9.3* 9.5* 9.5*   HCT 29.1* 29.3* 29.8*    296 314     Recent Labs     07/10/21  0337 07/11/21  0505 07/12/21  0457    141 142   K 4.2 3.9 4.0   CL 99 106 105   CO2 22* 23 28   BUN 38* 30* 20   CREATININE 2.4* 1.5* 1.2   CALCIUM 8.6 8.8 8.9     Recent Labs     07/09/21  2300   AST 7   ALT <5*   BILITOT 0.2*   ALKPHOS 57     No results for input(s): INR in the last 72 hours. No results for input(s): Neftali Suzanna in the last 72 hours. Microbiology:      Urinalysis:      Lab Results   Component Value Date    NITRU NEGATIVE 07/09/2021    WBCUA > 200 07/09/2021    BACTERIA MANY 07/09/2021    RBCUA 5-10 07/09/2021    BLOODU MODERATE 07/09/2021    SPECGRAV 1.015 12/05/2020    GLUCOSEU NEGATIVE 07/09/2021       Radiology:  CT ABDOMEN PELVIS WO CONTRAST Additional Contrast? None   Final Result       1. Nonobstructive nephrolithiasis. 2. Air along the track of the suprapubic catheter. **This report has been created using voice recognition software. It may contain minor errors which are inherent in voice recognition technology. **      Final report electronically signed by Dr. Cristina Marroquin MD on 7/10/2021 10:16 AM      US RENAL COMPLETE   Final Result   1. Normal ultrasound appearance of the kidneys.       This document has been electronically signed by: Slick Yousif Alla Awan MD on    07/10/2021 06:28 AM      XR CHEST PORTABLE   Final Result   Impression:   1. Bibasilar atelectasis and/or infiltrates, left greater than right. This document has been electronically signed by: Venkat Amin. DO Lalito on    07/10/2021 12:04 AM          DVT prophylaxis: [] Lovenox                                  [] SCDs                                 [x] SQ Heparin                                 [] Encourage ambulation           [] Already on Anticoagulation     Code Status: Limited    PT/OT Eval Status: Yes    Diet:  ADULT DIET; Regular; 3 carb choices (45 gm/meal);  Low Sodium (2 gm)    Fluids: gently, likely stop in AM.     Tele:   [x] yes             [] no      Electronically signed by Dora Flores DO on 7/12/2021 at 4:24 PM

## 2021-07-12 NOTE — CARE COORDINATION
7/12/21, 1:56 PM EDT  Discharge Planning Evaluation  Social work consult received, patient from [place] [address]. Patient/Family preference is to return to [facility]. The patient's current payor source at the facility is private pay. Medicare skilled days available: Yes  Insurance precert:  No  Spoke with Pat at the facility. Patient bed hold: No  Anticipated transport plan: ambulance  Do they require COVID 19 test to return to F: Yes  Is there a required time frame which which COVID test needs done: 24 hours    OZZIE spoke with Dr. Destiney Devries, patient's son and he confirmed that patient will return to [facility] when discharged. Mateo Brewer is open to have patient go to [facility] for rehab if necessary. OZZIE spoke with Azra Childress at [facility] and she confirmed that patient is from [facility] and is able to return when discharged. Equal and normal pulses (carotid, femoral, dorsalis pedis)

## 2021-07-13 LAB
ANION GAP SERPL CALCULATED.3IONS-SCNC: 10 MEQ/L (ref 8–16)
BUN BLDV-MCNC: 14 MG/DL (ref 7–22)
CALCIUM SERPL-MCNC: 8.9 MG/DL (ref 8.5–10.5)
CHLORIDE BLD-SCNC: 108 MEQ/L (ref 98–111)
CO2: 25 MEQ/L (ref 23–33)
CREAT SERPL-MCNC: 0.9 MG/DL (ref 0.4–1.2)
GFR SERPL CREATININE-BSD FRML MDRD: 79 ML/MIN/1.73M2
GLUCOSE BLD-MCNC: 100 MG/DL (ref 70–108)
GLUCOSE BLD-MCNC: 103 MG/DL (ref 70–108)
GLUCOSE BLD-MCNC: 105 MG/DL (ref 70–108)
GLUCOSE BLD-MCNC: 110 MG/DL (ref 70–108)
GLUCOSE BLD-MCNC: 129 MG/DL (ref 70–108)
MRSA SCREEN: NORMAL
POTASSIUM REFLEX MAGNESIUM: 3.7 MEQ/L (ref 3.5–5.2)
SODIUM BLD-SCNC: 143 MEQ/L (ref 135–145)

## 2021-07-13 PROCEDURE — 99233 SBSQ HOSP IP/OBS HIGH 50: CPT | Performed by: INTERNAL MEDICINE

## 2021-07-13 PROCEDURE — 6370000000 HC RX 637 (ALT 250 FOR IP): Performed by: INTERNAL MEDICINE

## 2021-07-13 PROCEDURE — 6360000002 HC RX W HCPCS: Performed by: STUDENT IN AN ORGANIZED HEALTH CARE EDUCATION/TRAINING PROGRAM

## 2021-07-13 PROCEDURE — 97535 SELF CARE MNGMENT TRAINING: CPT

## 2021-07-13 PROCEDURE — 6370000000 HC RX 637 (ALT 250 FOR IP): Performed by: STUDENT IN AN ORGANIZED HEALTH CARE EDUCATION/TRAINING PROGRAM

## 2021-07-13 PROCEDURE — 2580000003 HC RX 258: Performed by: STUDENT IN AN ORGANIZED HEALTH CARE EDUCATION/TRAINING PROGRAM

## 2021-07-13 PROCEDURE — 97166 OT EVAL MOD COMPLEX 45 MIN: CPT

## 2021-07-13 PROCEDURE — 1200000000 HC SEMI PRIVATE

## 2021-07-13 PROCEDURE — 94640 AIRWAY INHALATION TREATMENT: CPT

## 2021-07-13 PROCEDURE — 99232 SBSQ HOSP IP/OBS MODERATE 35: CPT | Performed by: UROLOGY

## 2021-07-13 PROCEDURE — 80048 BASIC METABOLIC PNL TOTAL CA: CPT

## 2021-07-13 PROCEDURE — 82948 REAGENT STRIP/BLOOD GLUCOSE: CPT

## 2021-07-13 PROCEDURE — 6360000002 HC RX W HCPCS: Performed by: INTERNAL MEDICINE

## 2021-07-13 PROCEDURE — 36415 COLL VENOUS BLD VENIPUNCTURE: CPT

## 2021-07-13 PROCEDURE — 2580000003 HC RX 258: Performed by: INTERNAL MEDICINE

## 2021-07-13 RX ORDER — SPIRONOLACTONE 25 MG/1
25 TABLET ORAL DAILY
Status: DISCONTINUED | OUTPATIENT
Start: 2021-07-13 | End: 2021-07-14 | Stop reason: HOSPADM

## 2021-07-13 RX ORDER — LACTOBACILLUS RHAMNOSUS GG 10B CELL
1 CAPSULE ORAL 2 TIMES DAILY WITH MEALS
Status: DISCONTINUED | OUTPATIENT
Start: 2021-07-13 | End: 2021-07-14 | Stop reason: HOSPADM

## 2021-07-13 RX ORDER — ACETAMINOPHEN 650 MG
TABLET, EXTENDED RELEASE ORAL DAILY
Status: DISCONTINUED | OUTPATIENT
Start: 2021-07-13 | End: 2021-07-14 | Stop reason: HOSPADM

## 2021-07-13 RX ADMIN — HEPARIN SODIUM 5000 UNITS: 5000 INJECTION INTRAVENOUS; SUBCUTANEOUS at 20:53

## 2021-07-13 RX ADMIN — DOCUSATE SODIUM 100 MG: 100 CAPSULE, LIQUID FILLED ORAL at 09:37

## 2021-07-13 RX ADMIN — CEFEPIME HYDROCHLORIDE 1000 MG: 1 INJECTION, POWDER, FOR SOLUTION INTRAMUSCULAR; INTRAVENOUS at 15:04

## 2021-07-13 RX ADMIN — BUDESONIDE 500 MCG: 0.5 INHALANT RESPIRATORY (INHALATION) at 08:59

## 2021-07-13 RX ADMIN — BACITRACIN: 500 OINTMENT TOPICAL at 09:37

## 2021-07-13 RX ADMIN — Medication 240 MG: at 20:53

## 2021-07-13 RX ADMIN — Medication 1 CAPSULE: at 17:20

## 2021-07-13 RX ADMIN — LEVOTHYROXINE SODIUM 200 MCG: 0.1 TABLET ORAL at 06:03

## 2021-07-13 RX ADMIN — INSULIN GLARGINE 20 UNITS: 100 INJECTION, SOLUTION SUBCUTANEOUS at 21:00

## 2021-07-13 RX ADMIN — SODIUM CHLORIDE, PRESERVATIVE FREE 10 ML: 5 INJECTION INTRAVENOUS at 09:37

## 2021-07-13 RX ADMIN — TIOTROPIUM BROMIDE INHALATION SPRAY 2 PUFF: 3.12 SPRAY, METERED RESPIRATORY (INHALATION) at 09:03

## 2021-07-13 RX ADMIN — HEPARIN SODIUM 5000 UNITS: 5000 INJECTION INTRAVENOUS; SUBCUTANEOUS at 06:03

## 2021-07-13 RX ADMIN — ARFORMOTEROL TARTRATE 15 MCG: 15 SOLUTION RESPIRATORY (INHALATION) at 08:59

## 2021-07-13 RX ADMIN — SPIRONOLACTONE 25 MG: 25 TABLET ORAL at 15:04

## 2021-07-13 RX ADMIN — SODIUM CHLORIDE, PRESERVATIVE FREE 10 ML: 5 INJECTION INTRAVENOUS at 20:54

## 2021-07-13 RX ADMIN — CEFEPIME HYDROCHLORIDE 1000 MG: 1 INJECTION, POWDER, FOR SOLUTION INTRAMUSCULAR; INTRAVENOUS at 03:15

## 2021-07-13 RX ADMIN — HEPARIN SODIUM 5000 UNITS: 5000 INJECTION INTRAVENOUS; SUBCUTANEOUS at 15:05

## 2021-07-13 RX ADMIN — Medication: at 15:20

## 2021-07-13 RX ADMIN — Medication 240 MG: at 09:39

## 2021-07-13 RX ADMIN — BUDESONIDE 500 MCG: 0.5 INHALANT RESPIRATORY (INHALATION) at 17:25

## 2021-07-13 RX ADMIN — ARFORMOTEROL TARTRATE 15 MCG: 15 SOLUTION RESPIRATORY (INHALATION) at 17:24

## 2021-07-13 RX ADMIN — DOCUSATE SODIUM 100 MG: 100 CAPSULE, LIQUID FILLED ORAL at 20:53

## 2021-07-13 ASSESSMENT — PAIN SCALES - GENERAL
PAINLEVEL_OUTOF10: 0

## 2021-07-13 NOTE — PROGRESS NOTES
Hospitalist Progress Note    Patient:  Jorge Giordano      Unit/Bed:4K-11/011-A    YOB: 1931    MRN: [id number]       Acct: [de-identified]     PCP: Jeanette Keyes MD    Date of Admission: 7/9/2021      Assessment/Plan:  Active Hospital Problems    Diagnosis Date Noted    Acute kidney injury superimposed on CKD ([facility] [address] [N17.9, N18.9] 07/10/2021    Pneumonia due to infectious organism [J18.9]        STEPHY: Suspect prerenal, continue fluids but gently given history of diastolic dysfunction. Improving steadily, baseline appears ~0.9. Avoid nephrotoxins, monitor UOP. Holding metformin, Aldactone, furosemide and lisinopril.  -Also patient may have been taking ibuprofen per nursing home documentation? Avoid any NSAIDs.  -Improving (2.7 now to 0.9), fluids stopped  -resume lasix in am if renal function stable. CAUTI 2/2 E [place]: Recent outpatient rx for CAUTI (failed - cipro 7/4-7/10), consult to urology to assess catheter site as well as for exchange as it appears irritated surrounding the site. Swab taken and sent for gram stain/culture (prelim GPC, GNB). Given 1x dose vancomycin 7/11, continue on cefepime for UTI. -Discussed with pharmacy, and given improving renal function, will increased dose from 500 every 24 up to 1 g every 12 hours of cefepime on 7/11/21.    -Likely change to cipro upon discharge. Complete 7-10 days is plan  -lactobacillus   -bacitracin ointment and betadine around site of catheter   -SP cath exchanged on 7/12/21, repeat urine cultures sent - no growth to date. Hx of BPH s/p recent suprapubic catheter placement on 6/17/2021. Consult urology for further management. -replaced on 8/40/87    Acute metabolic encephalopathy: Patient is alert and oriented x3, appropriate. resolved. Chronic Troponin elevation: With slight increase from baseline, however recent stress test 6/14/2021 is reassuring. Patient denies chest pain.   Suspect related to STEPHY, infection with history of COPD (uses CPAP at night with oxygen as needed), T2DM, HTN, HLD, A. fib, thyroid resection, ROBER, and anemia. He was sent to the ER from the nursing home for reports of the nursing home staff that the patient was short of breath and was altered. The patient himself was alert and oriented x3 in the emergency department and was not complaining of any fevers chills or shortness of breath. The patient did state that he \"did not feel right\" ever since his recent treatment for UTI. The patient reiterates that he is not feeling more short of breath than on a normal day. But does feel rather not like himself but cannot exactly explain the feeling. He denies any fever, chills (though he does state that he does feel cold), nausea, vomiting, diarrhea, constipation, issues with urination, or numbness and tingling in his hands and feet. He is unaware of why the nursing home decided he would have to come to the hospital to be evaluated. In the ED temperature 98.0 °F, /88, HR 70, RR 14, SPO2 97% on room air. Sodium 133, chloride 96, potassium 4.3, creatinine of 2.7, troponin 0.090, LFTs within normal limits, WBC 12.0, Hgb 9.3, platelet 008. UA shows leukocyte esterase, blood, WBCs, and bacteria. Blood gas shows pH of 7.39, PO2 of 66, and PCO2 of 37. CXR shows bibasilar atelectasis left greater than right. EKG shows potential A. fib due to lack of P waves. \"    Subjective (past 24 hours): Patient seen at bedside, doing well but does complain of a bit of irritation surrounding his catheter site. Does have some clear drainage, however purulent drainage is unchanged. No fevers or chills, no abdominal discomfort, shortness of breath, chest pain or palpitations. Overall feeling well.       Medications:  Reviewed    Infusion Medications    dextrose      sodium chloride       Scheduled Medications    spironolactone  25 mg Oral Daily    povidone-iodine   Topical Daily    bacitracin   Topical BID    cefepime  1,000 mg Intravenous Q12H    Arformoterol Tartrate  15 mcg Nebulization BID    budesonide  0.5 mg Nebulization BID    docusate sodium  100 mg Oral BID    [Held by provider] doxazosin  4 mg Oral Nightly    ferrous gluconate  240 mg Oral BID    [Held by provider] furosemide  40 mg Oral BID    insulin glargine  20 Units Subcutaneous Nightly    levothyroxine  200 mcg Oral Daily    [Held by provider] lisinopril  2.5 mg Oral Daily    [Held by provider] metFORMIN  1,000 mg Oral BID     [Held by provider] metoprolol tartrate  25 mg Oral BID    tiotropium  2 puff Inhalation Daily    insulin lispro  0-6 Units Subcutaneous TID     insulin lispro  0-3 Units Subcutaneous Nightly    sodium chloride flush  5-40 mL Intravenous 2 times per day    heparin (porcine)  5,000 Units Subcutaneous 3 times per day     PRN Meds: albuterol sulfate HFA, aluminum & magnesium hydroxide-simethicone, lidocaine, oxybutynin, polyethylene glycol, simethicone, glucose, dextrose, glucagon (rDNA), dextrose, sodium chloride flush, sodium chloride, acetaminophen **OR** acetaminophen, ondansetron, famotidine      Intake/Output Summary (Last 24 hours) at 7/13/2021 1258  Last data filed at 7/13/2021 3810  Gross per 24 hour   Intake 1601.16 ml   Output 2800 ml   Net -1198.84 ml     Weight change: 1.6 oz (0.045 kg)      Exam:  BP (!) 151/65   Pulse 61   Temp 98.5 °F (36.9 °C) (Oral)   Resp 17   Ht 5' 7\" (1.702 m)   Wt 226 lb (102.5 kg)   SpO2 97%   BMI 35.40 kg/m²     General appearance: No apparent distress, well developed, appears stated age. Elderly, pleasant. Eyes:  Pupils equal, round, and reactive to light. Conjunctivae/corneas clear. HENT: Head normal in appearance. External nares normal.  Oral mucosa moist without lesions. Hearing grossly intact. Neck: Supple, with full range of motion. Trachea midline. No gross JVD appreciated. Respiratory:  Normal respiratory effort.  Clear to auscultation, bilaterally without wheezes or rhonchi. Trace rales in bases. Cardiovascular: Normal rate, regular rhythm with normal S1/S2 without murmurs. No lower extremity edema. Abdomen: Soft, non-tender, non-distended with normal bowel sounds. Suprapubic catheter in place with trace amount of purulent drainage - relatively unchanged. Mild reactive erythema and increased clear drainage today. Musculoskeletal: There is no joint swelling or tenderness. Normal tone. No abnormal movements. Skin: Warm and dry. No rashes or lesions. Neurologic:  No focal sensory/motor deficits in the upper and lower extremities. Cranial nerves:  grossly non-focal 2-12. Psychiatric: Alert and oriented, normal insight and though content. Capillary Refill: Brisk,< 3 seconds. Peripheral Pulses: +2 palpable, equal bilaterally. Labs:   Recent Labs     07/11/21 0505 07/12/21 0457   WBC 9.1 9.3   HGB 9.5* 9.5*   HCT 29.3* 29.8*    314     Recent Labs     07/11/21  0505 07/12/21 0457 07/13/21  0512    142 143   K 3.9 4.0 3.7    105 108   CO2 23 28 25   BUN 30* 20 14   CREATININE 1.5* 1.2 0.9   CALCIUM 8.8 8.9 8.9     No results for input(s): AST, ALT, BILIDIR, BILITOT, ALKPHOS in the last 72 hours. No results for input(s): INR in the last 72 hours. No results for input(s): Roger Seed in the last 72 hours. Microbiology:      Urinalysis:      Lab Results   Component Value Date    NITRU NEGATIVE 07/09/2021    WBCUA > 200 07/09/2021    BACTERIA MANY 07/09/2021    RBCUA 5-10 07/09/2021    BLOODU MODERATE 07/09/2021    SPECGRAV 1.015 12/05/2020    GLUCOSEU NEGATIVE 07/09/2021       Radiology:  CT ABDOMEN PELVIS WO CONTRAST Additional Contrast? None   Final Result       1. Nonobstructive nephrolithiasis. 2. Air along the track of the suprapubic catheter. **This report has been created using voice recognition software. It may contain minor errors which are inherent in voice recognition technology. ** Final report electronically signed by Dr. Catrina Ribeiro MD on 7/10/2021 10:16 AM      US RENAL COMPLETE   Final Result   1. Normal ultrasound appearance of the kidneys. This document has been electronically signed by: Edith Loomis MD on    07/10/2021 06:28 AM      XR CHEST PORTABLE   Final Result   Impression:   1. Bibasilar atelectasis and/or infiltrates, left greater than right. This document has been electronically signed by: Rachid Starkey DO on    07/10/2021 12:04 AM          DVT prophylaxis: [] Lovenox                                  [] SCDs                                 [x] SQ Heparin                                 [] Encourage ambulation           [] Already on Anticoagulation     Code Status: Limited    PT/OT Eval Status: Yes    Diet:  ADULT DIET; Regular; 3 carb choices (45 gm/meal);  Low Sodium (2 gm)    Fluids: completed    Tele:   [x] yes             [] no      Electronically signed by Daniel Mcguire DO on 7/13/2021 at 12:58 PM

## 2021-07-13 NOTE — PROGRESS NOTES
[address] ICU STEPDOWN TELEMETRY 4K  EVALUATION    Time:   Time In: 806  Time Out: 6652  Timed Code Treatment Minutes: 25 Minutes  Minutes: 33          Date: 2021  Patient Name: Paola Melvin,   Gender: male      MRN: [id number]  : 1931  (80 y.o.)  Referring Practitioner: Laverna Lesch, DO  Diagnosis: Acute kidney injury, superimposed of CKD  Additional Pertinent Hx: Mr. Paola Melvin is a 77-year-old male with a past medical history of COPD (uses CPAP at night with oxygen as needed), T2DM, HTN, HLD, A. fib, thyroid resection, ROBER, and anemia. He was sent to the ER from the nursing home for reports of the nursing home staff that the patient was short of breath and was altered. The patient himself was alert and oriented x3 in the emergency department and was not complaining of any fevers chills or shortness of breath. The patient did state that he \"did not feel right\" ever since his recent treatment for UTI. The patient reiterates that he is not feeling more short of breath than on a normal day. But does feel rather not like himself but cannot exactly explain the feeling. He denies any fever, chills (though he does state that he does feel cold), nausea, vomiting, diarrhea, constipation, issues with urination, or numbness and tingling in his hands and feet. He is unaware of why the nursing home decided he would have to come to the hospital to be evaluated. Pt follows with Dr Bernhard Oppenheim. Pt underwent cysto with Greenlight PVP on 3/17/2020 by Dr Bernhard Oppenheim. He required chronic catheter after procedure, and has been dealing with recurrent UTI. It was elected to undergo SP catheter placement which was done . Recently seen by ID as well for UTI. Started on Cipro 2021.     Restrictions/Precautions:  Restrictions/Precautions: Fall Risk, General Precautions    Subjective  Chart Reviewed: Yes, Orders, Progress Notes, Labs, Imaging  Patient assessed for rehabilitation services?: Yes    Subjective: RN approved OT session. Upon entering room pt supine in bed. Pt agreeable to OT session, pt pleasant and cooperative. Pain:  Pain Assessment  Patient Currently in Pain: Denies    Vitals: Oxygen: O2 seated EOB on room air, 95%. Pt educated regarding pursed lip breathing. Pt demonstrates understanding. Social/Functional History:  Lives With: Alone  Type of Home:  ([facility])  Home Layout: One level  Home Access: Level entry  Home Equipment: Rolling walker   Bathroom Shower/Tub: Walk-in shower, Shower chair with back  Bathroom Toilet: Handicap height  Bathroom Equipment: 3-in-1 commode       ADL Assistance: Independent  Homemaking Assistance: Needs assistance  Homemaking Responsibilities: No  Ambulation Assistance: Independent  Transfer Assistance: Independent    Active : No  Occupation: Retired  Type of occupation:   Additional Comments: Pt amb without AD    VISION:Corrected    HEARING:  WFL    COGNITION: Decreased Problem Solving and Decreased Safety Awareness    RANGE OF MOTION:  Bilateral Upper Extremity:  WFL    STRENGTH:  Bilateral Upper Extremity:  Impaired - Grossly deconditioned    ADL:   Feeding: Modified Independent. .  Grooming: Contact Guard Assistance. Standing sink side, washing face. Lower Extremity Dressing: Maximum Assistance. Pt requires increased assistance due to decreased safe functional reach to ground to doff and paula socks. Pt attempts to utilize adaptive technique, crossing legs at this time, however, unable to complete. Pt may benefit from sock aide and or long handle adaptive equipment. Linda Oconnor BALANCE:  Sitting Balance:  Contact Guard Assistance. Seated unsupoorted at edge of recliner chair. Standing Balance: Contact Guard Assistance. Pt dmeonstrates decreased endurance and at times decreased safety awareness with RW (as he utilized no AD prior).      BED MOBILITY:  Supine to Sit: Contact Guard Assistance Pt requires increased assistance as he reports feeling light headed upon sitting up. TRANSFERS:  Sit to Stand:  Contact Guard Assistance. Pt stood unexpectedly from EOB. Stand to Sit: Contact Guard Assistance. Pt requires verbal cuing to bring walker throughout transfer. FUNCTIONAL MOBILITY:  Assistive Device: Rolling Walker  Assist Level:  Contact Guard Assistance. Distance: To and from bathroom  Pt requires increased assistance as he requires verbal cues for RW ( he did not use AD prior to admission to hospital). Activity Tolerance:  Patient tolerance of  treatment: good. Pt tolerated session well, requiring verbal cuing for use of RW at times. Assessment:  Assessment: Pt admitted to hospital for Acute kidney injury, superimposed of CKD, demonstrating decreased endurance and strength. Pt will benefit from skilled OT services to improve functional mobility and indep in self care ADL routine to return to [facility]. Performance deficits / Impairments: Decreased functional mobility , Decreased ADL status, Decreased strength, Decreased safe awareness, Decreased endurance, Decreased balance  Prognosis: Good  REQUIRES OT FOLLOW UP: Yes    Treatment Initiated: Treatment and education initiated within context of evaluation. Evaluation time included review of current medical information, gathering information related to past medical, social and functional history, completion of standardized testing, formal and informal observation of tasks, assessment of data and development of plan of care and goals. Treatment time included skilled education and facilitation of tasks to increase safety and independence with ADL's for improved functional independence and quality of life.     Discharge Recommendations:   (May require increased assistance upon discharge home as he demonstrates difficulty with LB dressing.)    Patient Education:  OT Education: Plan of Care, OT Role, ADL Adaptive Strategies, Transfer Training, Energy Conservation  Patient Education: Pt educated regarding role of OT and plan of care. Pt educated regarding pursed lip breathing technique, pt demonstrates understanding at this time. Barriers to Learning: none noted    Equipment Recommendations:  Equipment Needed: No (did not use AD prior, currently using RW. Continue to assess)    Plan:  Times per week: 3-5x  Times per day: Daily  Current Treatment Recommendations: Strengthening, Balance Training, Functional Mobility Training, Endurance Training, Safety Education & Training, Patient/Caregiver Education & Training, Equipment Evaluation, Education, & procurement, Self-Care / ADL, Home Management Training  Plan Comment: Pt demonstrates decline from baseline, upon admission to hospital. Pt will benefit from skilled OT services to improve functional mobility and indep in self care ADL routine to return to [facility]. .  See long-term goal time frame for expected duration of plan of care. If no long-term goals established, a short length of stay is anticipated. Goals:     Short term goals  Time Frame for Short term goals: by discharge  Short term goal 1: Pt will complete functional mobility to and from restroom and household distances utilizing RW MOD I to improve indep in self care enviornment. Short term goal 2: Pt will complete standing tolerance x 10 minutes with 2 UE release MOD I to improve indep in toileting routine. Short term goal 3: Pt will complete toileting routine, including transfer MOD I to improve indep in self care ADL routine. Short term goal 4: Pt will complete LB dressing with MIN A utilizing adaptive equipment/ devices as needed to improve indep. Following session, patient left in safe position with all fall risk precautions in place.

## 2021-07-13 NOTE — PLAN OF CARE
Problem: Impaired respiratory status  Goal: Clear lung sounds  Description: Clear lung sounds  Outcome: Ongoing  Note: Pt had no questions on purpose or side effects of medication   Will continue with treatments to help improve aeration t/o lungs

## 2021-07-13 NOTE — PROGRESS NOTES
[address] ICU STEPDOWN TELEMETRY 4K  [address] [facility]   Dept: [phone]  Loc: [phone]  Visit Date: 2021  Urology Progress Note    Chief Complaint: altered mental status, suprapubic tenderness    Subjective:   Patient is resting in bed, voiding via SP catheter, yellow urine, +flatus,ambulating with assistance, tolerating regular diet, denies any nausea or vomiting. There are complaints of mild pain at this time. Vitals:  /60   Pulse 64   Temp 98.9 °F (37.2 °C) (Oral)   Resp 16   Ht 5' 7\" (1.702 m)   Wt 226 lb (102.5 kg)   SpO2 95%   BMI 35.40 kg/m²   Temp  Av.3 °F (36.8 °C)  Min: 97.7 °F (36.5 °C)  Max: 99.1 °F (37.3 °C)    Intake/Output Summary (Last 24 hours) at 2021 1127  Last data filed at 2021 0989  Gross per 24 hour   Intake 1961.16 ml   Output 2800 ml   Net -838.84 ml       Social History     Socioeconomic History    Marital status:      Spouse name: Not on file    Number of children: 2    Years of education: Not on file    Highest education level: Not on file   Occupational History    Not on file   Tobacco Use    Smoking status: Never Smoker    Smokeless tobacco: Never Used   Vaping Use    Vaping Use: Never used   Substance and Sexual Activity    Alcohol use: Yes     Alcohol/week: 1.0 standard drinks     Types: 1 Glasses of wine per week     Comment: Glass of wine with dinner.  Drug use: No    Sexual activity: Never   Other Topics Concern    Not on file   Social History Narrative    Not on file     Social Determinants of Health     Financial Resource Strain:     Difficulty of Paying Living Expenses:    Food Insecurity:     Worried About Running Out of Food in the Last Year:     [address] in the Last Year:    Transportation Needs:     Lack of Transportation (Medical):      Lack of Transportation (Non-Medical):    Physical Activity:     Days of Exercise per Week:     Minutes of Exercise per Session:    Stress:     Feeling of Stress :    Social Connections:     Frequency of Communication with Friends and Family:     Frequency of Social Gatherings with Friends and Family:     Attends Protestant Services:     Active Member of Clubs or Organizations:     Attends Club or Organization Meetings:     Marital Status:    Intimate Partner Violence:     Fear of Current or Ex-Partner:     Emotionally Abused:     Physically Abused:     Sexually Abused:      Family History   Problem Relation Age of Onset    Other Mother         Complications of Childbirth    Other Father [de-identified]        Natural causes     Allergies   Allergen Reactions    Latex Rash   Arvin Vanesa [Nitrofurantoin Irineo Ro      Dr. Chemo Blake prefers pt not use Macrobid due to pulmonary side effects.  Levaquin [Levofloxacin] Other (See Comments)     Redness/flushing    Brimonidine Tartrate     Adhesive Tape Rash    Alphagan [Brimonidine Tartrate] Hives     Eyes red       Objective:    Constitutional: Alert and oriented times x3, no acute distress, and cooperative to examination with appropriate mood and affect. HEENT:   Head:         Normocephalic and atraumatic. Mucous membranes are normal.   Eyes:         EOM are normal. No scleral icterus. Nose:    The external appearance of the nose is normal  Ears: The ears appear normal to external inspection. Cardiovascular:       Normal rate, regular rhythm. Pulmonary/Chest:  Normal respiratory rate and rhthym. No use of accessory muscles. Lungs clear bilaterally. Abdominal:          Soft. No tenderness. Active bowel sounds. SP catheter site with some erythema, and minimal yellow slough around site. Mild tender to touch to lower abdomen. Musculoskeletal:    Normal range of motion. He exhibits no edema or tenderness of lower extremities. Extremities:    No cyanosis, clubbing, or edema present. Neurological:    Alert and oriented.      Labs:  WBC: Lab Results   Component Value Date    WBC 9.3 07/12/2021     Hemoglobin/Hematocrit:    Lab Results   Component Value Date    HGB 9.5 07/12/2021    HCT 29.8 07/12/2021     BMP:    Lab Results   Component Value Date     07/13/2021    K 3.7 07/13/2021     07/13/2021    CO2 25 07/13/2021    BUN 14 07/13/2021    LABALBU 3.5 07/09/2021    LABALBU 4.3 11/30/2011    CREATININE 0.9 07/13/2021    CALCIUM 8.9 07/13/2021    LABGLOM 79 07/13/2021       Impression/Plan:    Neurogenic bladder- SP catheter placed 6/17 by Dr Ciera Woods. Exchanged yesterday, new Ux sent - no growth prelim    SP catheter insertion site, can use bacitracin ointment as needed around site. UTI-  On cefepime. Culture with enterobacter cloacae complex. Keep FU appt 7/30 with Dr Ciera Woods.      Urology will continue to follow, ok for discharge from Urology standpoint    MAEGAN Dudley - CNP, MAEGAN  07/13/21 11:27 AM  Urology

## 2021-07-14 ENCOUNTER — TELEPHONE (OUTPATIENT)
Dept: UROLOGY | Age: 86
End: 2021-07-14

## 2021-07-14 VITALS
SYSTOLIC BLOOD PRESSURE: 139 MMHG | HEART RATE: 60 BPM | TEMPERATURE: 98.7 F | WEIGHT: 225.4 LBS | BODY MASS INDEX: 35.38 KG/M2 | HEIGHT: 67 IN | DIASTOLIC BLOOD PRESSURE: 64 MMHG | OXYGEN SATURATION: 92 % | RESPIRATION RATE: 18 BRPM

## 2021-07-14 LAB
AEROBIC CULTURE: ABNORMAL
AEROBIC CULTURE: ABNORMAL
ANAEROBIC CULTURE: ABNORMAL
ANION GAP SERPL CALCULATED.3IONS-SCNC: 9 MEQ/L (ref 8–16)
BUN BLDV-MCNC: 14 MG/DL (ref 7–22)
CALCIUM SERPL-MCNC: 8.9 MG/DL (ref 8.5–10.5)
CHLORIDE BLD-SCNC: 105 MEQ/L (ref 98–111)
CO2: 29 MEQ/L (ref 23–33)
CREAT SERPL-MCNC: 1 MG/DL (ref 0.4–1.2)
GFR SERPL CREATININE-BSD FRML MDRD: 70 ML/MIN/1.73M2
GLUCOSE BLD-MCNC: 107 MG/DL (ref 70–108)
GLUCOSE BLD-MCNC: 111 MG/DL (ref 70–108)
GLUCOSE BLD-MCNC: 122 MG/DL (ref 70–108)
GLUCOSE BLD-MCNC: 147 MG/DL (ref 70–108)
GRAM STAIN RESULT: ABNORMAL
ORGANISM: ABNORMAL
POTASSIUM SERPL-SCNC: 3.5 MEQ/L (ref 3.5–5.2)
SODIUM BLD-SCNC: 143 MEQ/L (ref 135–145)
T4 FREE: 1.17 NG/DL (ref 0.93–1.76)
TSH SERPL DL<=0.05 MIU/L-ACNC: 0.25 UIU/ML (ref 0.4–4.2)
URINE CULTURE, ROUTINE: NORMAL

## 2021-07-14 PROCEDURE — 99239 HOSP IP/OBS DSCHRG MGMT >30: CPT | Performed by: FAMILY MEDICINE

## 2021-07-14 PROCEDURE — 99232 SBSQ HOSP IP/OBS MODERATE 35: CPT | Performed by: UROLOGY

## 2021-07-14 PROCEDURE — 6370000000 HC RX 637 (ALT 250 FOR IP)

## 2021-07-14 PROCEDURE — 84439 ASSAY OF FREE THYROXINE: CPT

## 2021-07-14 PROCEDURE — 6360000002 HC RX W HCPCS: Performed by: STUDENT IN AN ORGANIZED HEALTH CARE EDUCATION/TRAINING PROGRAM

## 2021-07-14 PROCEDURE — 82948 REAGENT STRIP/BLOOD GLUCOSE: CPT

## 2021-07-14 PROCEDURE — 6370000000 HC RX 637 (ALT 250 FOR IP): Performed by: STUDENT IN AN ORGANIZED HEALTH CARE EDUCATION/TRAINING PROGRAM

## 2021-07-14 PROCEDURE — 93270 REMOTE 30 DAY ECG REV/REPORT: CPT

## 2021-07-14 PROCEDURE — 2580000003 HC RX 258: Performed by: INTERNAL MEDICINE

## 2021-07-14 PROCEDURE — 80048 BASIC METABOLIC PNL TOTAL CA: CPT

## 2021-07-14 PROCEDURE — 2580000003 HC RX 258: Performed by: STUDENT IN AN ORGANIZED HEALTH CARE EDUCATION/TRAINING PROGRAM

## 2021-07-14 PROCEDURE — 36415 COLL VENOUS BLD VENIPUNCTURE: CPT

## 2021-07-14 PROCEDURE — 6360000002 HC RX W HCPCS: Performed by: INTERNAL MEDICINE

## 2021-07-14 PROCEDURE — 6370000000 HC RX 637 (ALT 250 FOR IP): Performed by: INTERNAL MEDICINE

## 2021-07-14 PROCEDURE — 84443 ASSAY THYROID STIM HORMONE: CPT

## 2021-07-14 PROCEDURE — 94640 AIRWAY INHALATION TREATMENT: CPT

## 2021-07-14 RX ORDER — POTASSIUM CHLORIDE 750 MG/1
10 TABLET, EXTENDED RELEASE ORAL DAILY
Qty: 180 TABLET | Refills: 0 | Status: SHIPPED | OUTPATIENT
Start: 2021-07-14

## 2021-07-14 RX ORDER — POTASSIUM CHLORIDE 20 MEQ/1
40 TABLET, EXTENDED RELEASE ORAL PRN
Status: DISCONTINUED | OUTPATIENT
Start: 2021-07-14 | End: 2021-07-14 | Stop reason: HOSPADM

## 2021-07-14 RX ORDER — GINSENG 100 MG
CAPSULE ORAL
Qty: 1 TUBE | Refills: 0 | Status: SHIPPED | OUTPATIENT
Start: 2021-07-14 | End: 2021-07-24

## 2021-07-14 RX ORDER — CIPROFLOXACIN 500 MG/1
500 TABLET, FILM COATED ORAL 2 TIMES DAILY
Qty: 14 TABLET | Refills: 0 | Status: SHIPPED | OUTPATIENT
Start: 2021-07-14 | End: 2021-07-21

## 2021-07-14 RX ORDER — POTASSIUM CHLORIDE 7.45 MG/ML
10 INJECTION INTRAVENOUS PRN
Status: DISCONTINUED | OUTPATIENT
Start: 2021-07-14 | End: 2021-07-14 | Stop reason: HOSPADM

## 2021-07-14 RX ORDER — LEVOTHYROXINE SODIUM 175 UG/1
TABLET ORAL
Qty: 30 TABLET | Refills: 0 | Status: SHIPPED | OUTPATIENT
Start: 2021-07-14

## 2021-07-14 RX ORDER — LACTOBACILLUS RHAMNOSUS GG 10B CELL
1 CAPSULE ORAL 2 TIMES DAILY WITH MEALS
Qty: 30 CAPSULE | Refills: 0 | Status: SHIPPED | OUTPATIENT
Start: 2021-07-14

## 2021-07-14 RX ORDER — FUROSEMIDE 40 MG/1
40 TABLET ORAL DAILY
Qty: 60 TABLET | Refills: 0 | Status: ON HOLD | OUTPATIENT
Start: 2021-07-14 | End: 2022-03-05 | Stop reason: HOSPADM

## 2021-07-14 RX ADMIN — Medication 1 CAPSULE: at 17:04

## 2021-07-14 RX ADMIN — ARFORMOTEROL TARTRATE 15 MCG: 15 SOLUTION RESPIRATORY (INHALATION) at 07:47

## 2021-07-14 RX ADMIN — HEPARIN SODIUM 5000 UNITS: 5000 INJECTION INTRAVENOUS; SUBCUTANEOUS at 13:22

## 2021-07-14 RX ADMIN — Medication: at 13:49

## 2021-07-14 RX ADMIN — BACITRACIN: 500 OINTMENT TOPICAL at 08:19

## 2021-07-14 RX ADMIN — Medication 240 MG: at 08:18

## 2021-07-14 RX ADMIN — BACITRACIN: 500 OINTMENT TOPICAL at 03:19

## 2021-07-14 RX ADMIN — POTASSIUM CHLORIDE 40 MEQ: 1500 TABLET, EXTENDED RELEASE ORAL at 09:04

## 2021-07-14 RX ADMIN — TIOTROPIUM BROMIDE INHALATION SPRAY 2 PUFF: 3.12 SPRAY, METERED RESPIRATORY (INHALATION) at 07:46

## 2021-07-14 RX ADMIN — CEFEPIME HYDROCHLORIDE 1000 MG: 1 INJECTION, POWDER, FOR SOLUTION INTRAMUSCULAR; INTRAVENOUS at 13:22

## 2021-07-14 RX ADMIN — DOCUSATE SODIUM 100 MG: 100 CAPSULE, LIQUID FILLED ORAL at 08:18

## 2021-07-14 RX ADMIN — INSULIN LISPRO 1 UNITS: 100 INJECTION, SOLUTION INTRAVENOUS; SUBCUTANEOUS at 08:19

## 2021-07-14 RX ADMIN — LEVOTHYROXINE SODIUM 200 MCG: 0.1 TABLET ORAL at 06:09

## 2021-07-14 RX ADMIN — HEPARIN SODIUM 5000 UNITS: 5000 INJECTION INTRAVENOUS; SUBCUTANEOUS at 06:06

## 2021-07-14 RX ADMIN — BUDESONIDE 500 MCG: 0.5 INHALANT RESPIRATORY (INHALATION) at 07:46

## 2021-07-14 RX ADMIN — SPIRONOLACTONE 25 MG: 25 TABLET ORAL at 08:18

## 2021-07-14 RX ADMIN — CEFEPIME HYDROCHLORIDE 1000 MG: 1 INJECTION, POWDER, FOR SOLUTION INTRAMUSCULAR; INTRAVENOUS at 03:15

## 2021-07-14 RX ADMIN — SODIUM CHLORIDE, PRESERVATIVE FREE 10 ML: 5 INJECTION INTRAVENOUS at 08:18

## 2021-07-14 RX ADMIN — Medication 1 CAPSULE: at 08:18

## 2021-07-14 ASSESSMENT — PAIN SCALES - GENERAL
PAINLEVEL_OUTOF10: 0
PAINLEVEL_OUTOF10: 0

## 2021-07-14 NOTE — PLAN OF CARE
Problem: Skin Integrity:  Goal: Will show no infection signs and symptoms  Description: Will show no infection signs and symptoms  Outcome: Completed  Goal: Absence of new skin breakdown  Description: Absence of new skin breakdown  Outcome: Completed     Problem: Falls - Risk of:  Goal: Will remain free from falls  Description: Will remain free from falls  Outcome: Completed  Goal: Absence of physical injury  Description: Absence of physical injury  Outcome: Completed     Problem: Urinary Elimination:  Goal: Signs and symptoms of infection will decrease  Description: Signs and symptoms of infection will decrease  Outcome: Completed  Goal: Complications related to the disease process, condition or treatment will be avoided or minimized  Description: Complications related to the disease process, condition or treatment will be avoided or minimized  Outcome: Completed     Problem: Metabolic:  Goal: Ability to maintain appropriate glucose levels will improve  Description: Ability to maintain appropriate glucose levels will improve  Outcome: Completed         Patient discharged

## 2021-07-14 NOTE — PLAN OF CARE
Problem: Impaired respiratory status  Goal: Clear lung sounds  Description: Clear lung sounds  Outcome: Ongoing   Continue therapy to help improve breath sounds.

## 2021-07-14 NOTE — TELEPHONE ENCOUNTER
He is currently scheduled with Dr Sadia Schreiber on 7/30/21  Abby exchanged his SPT while inpatient, please change his appointment to 4 weeks for SPT exchange and visit with Dr Giuliana Lindsey for discharge today

## 2021-07-14 NOTE — FLOWSHEET NOTE
07/14/21 1425   Handoff   Communication Given Transfer Handoff   Oncoming Nurse/Offgoing Nurse Chris nurse from [facility] Communication Telephone   Time Handoff Given [phone]       AVS was faxed to [phone] per Mabel [facility] facility's DON. Call back number was given to staff at facility for call back to this nurse for report on patient. Report was given and this RN will call facility back when ETA of transport is given by [facility].

## 2021-07-14 NOTE — CARE COORDINATION
7/14/21, 10:19 AM EDT    DISCHARGE PLANNING EVALUATION      OZZIE received a voicemail from Sergo from the [employer] [facility] about patient being on the [address]. She requested a call back on date of admission and diagnosis. OZZIE called and left message for Sergo reporting date of admission and diagnosis. Notified her of potential discharge today. OZZIE called [facility] and spoke with [facility] and notified her of potential discharge today.

## 2021-07-14 NOTE — PROGRESS NOTES
Patient discharged to home, patient's son to bring patient to [employer] [facility] via private vehicle. Floor staff takes patient down to discharge lobby via wheelchair. Patient left with belongings and discharge packet including medications fills from meds to beds. Patient's questions and concerns were addressed, patient leaves stable and ambulatory. LACP was called and notified regarding no more need for tranport. Facility was called and notified patient was leaving at this time.

## 2021-07-14 NOTE — PROGRESS NOTES
[address] ICU STEPDOWN TELEMETRY 4K  [address] [facility] [address]  Dept: [phone]  Loc: [phone]  Visit Date: 2021  Urology Progress Note    Chief Complaint: altered mental status, suprapubic tenderness    Subjective:   Patient is resting in bed, voiding via SP catheter, yellow urine, +flatus,ambulating with assistance, tolerating regular diet, denies any nausea or vomiting. There are complaints of mild pain at this time. Vitals:  BP (!) 159/72   Pulse 56   Temp 98.9 °F (37.2 °C) (Oral)   Resp 16   Ht 5' 7\" (1.702 m)   Wt 225 lb 6.4 oz (102.2 kg)   SpO2 96%   BMI 35.30 kg/m²   Temp  Av.5 °F (36.9 °C)  Min: 97.6 °F (36.4 °C)  Max: 98.9 °F (37.2 °C)    Intake/Output Summary (Last 24 hours) at 2021 8039  Last data filed at 2021 0818  Gross per 24 hour   Intake 899.65 ml   Output 2500 ml   Net -1600.35 ml       Social History     Socioeconomic History    Marital status:      Spouse name: Not on file    Number of children: 2    Years of education: Not on file    Highest education level: Not on file   Occupational History    Not on file   Tobacco Use    Smoking status: Never Smoker    Smokeless tobacco: Never Used   Vaping Use    Vaping Use: Never used   Substance and Sexual Activity    Alcohol use: Yes     Alcohol/week: 1.0 standard drinks     Types: 1 Glasses of wine per week     Comment: Glass of wine with dinner.  Drug use: No    Sexual activity: Never   Other Topics Concern    Not on file   Social History Narrative    Not on file     Social Determinants of Health     Financial Resource Strain:     Difficulty of Paying Living Expenses:    Food Insecurity:     Worried About Running Out of Food in the Last Year:     [address] in the Last Year:    Transportation Needs:     Lack of Transportation (Medical):      Lack of Transportation (Non-Medical):    Physical Activity:     Days of Exercise per Week:     Minutes of Exercise per Session:    Stress:     Feeling of Stress :    Social Connections:     Frequency of Communication with Friends and Family:     Frequency of Social Gatherings with Friends and Family:     Attends Sabianism Services:     Active Member of Clubs or Organizations:     Attends Club or Organization Meetings:     Marital Status:    Intimate Partner Violence:     Fear of Current or Ex-Partner:     Emotionally Abused:     Physically Abused:     Sexually Abused:      Family History   Problem Relation Age of Onset    Other Mother         Complications of Childbirth    Other Father [de-identified]        Natural causes     Allergies   Allergen Reactions    Latex Rash   Geanie Deis [Nitrofurantoin Jeancarlos Berlin Ibarra prefers pt not use Macrobid due to pulmonary side effects.  Levaquin [Levofloxacin] Other (See Comments)     Redness/flushing    Brimonidine Tartrate     Adhesive Tape Rash    Alphagan [Brimonidine Tartrate] Hives     Eyes red       Objective:    Constitutional: Alert and oriented times x3, no acute distress, and cooperative to examination with appropriate mood and affect. HEENT:   Head:         Normocephalic and atraumatic. Mucous membranes are normal.   Eyes:         EOM are normal. No scleral icterus. Nose:    The external appearance of the nose is normal  Ears: The ears appear normal to external inspection. Cardiovascular:       Normal rate, regular rhythm. Pulmonary/Chest:  Normal respiratory rate and rhthym. No use of accessory muscles. Lungs clear bilaterally. Abdominal:          Soft. No tenderness. Active bowel sounds. SP catheter site with some erythema, and minimal yellow slough around site. Mild tender to touch to lower abdomen. Musculoskeletal:    Normal range of motion. He exhibits no edema or tenderness of lower extremities. Extremities:    No cyanosis, clubbing, or edema present. Neurological:    Alert and oriented. Labs:  WBC:    Lab Results   Component Value Date    WBC 9.3 07/12/2021     Hemoglobin/Hematocrit:    Lab Results   Component Value Date    HGB 9.5 07/12/2021    HCT 29.8 07/12/2021     BMP:    Lab Results   Component Value Date     07/14/2021    K 3.5 07/14/2021    K 3.7 07/13/2021     07/14/2021    CO2 29 07/14/2021    BUN 14 07/14/2021    LABALBU 3.5 07/09/2021    LABALBU 4.3 11/30/2011    CREATININE 1.0 07/14/2021    CALCIUM 8.9 07/14/2021    LABGLOM 70 07/14/2021       Impression/Plan:    Neurogenic bladder- SP catheter placed 6/17 by Dr Deepika Mancia. Exchanged 7/12/21, new Ux sent - no growth     SP catheter insertion site, can use bacitracin ointment as needed around site. UTI-  On cefepime. Previous culture with enterobacter cloacae complex.  Cipro at discharge for 7 days  Medicine discussed with Dr Herrera Zendejas ok for Cipro at discharge    [address] office will schedule in 4 weeks for SPT exchange and office visit    Leander Hoang for discharge from Urology standpoint    MAEGAN Montes De Oca - CNP, MAEGAN  07/14/21 9:37 AM  Urology

## 2021-07-14 NOTE — CARE COORDINATION
7/14/21, 10:13 AM EDT  Plans return AL when medically cleared, Urology okay for discharge yesterday  Patient goals/plan/ treatment preferences discussed by  and . Patient goals/plan/ treatment preferences reviewed with patient/ family. Patient/ family verbalize understanding of discharge plan and are in agreement with goal/plan/treatment preferences. Understanding was demonstrated using the teach back method. AVS provided by RN at time of discharge, which includes all necessary medical information pertaining to the patients current course of illness, treatment, post-discharge goals of care, and treatment preferences.         IMM Letter  IMM Letter given to Patient/Family/Significant other/Guardian/POA/by[de-identified]   IMM Letter date given[de-identified] 07/13/21  IMM Letter time given[de-identified] 8464

## 2021-07-14 NOTE — FLOWSHEET NOTE
07/14/21 0242   Provider Notification   Reason for Communication Evaluate   Provider Name [facility]   Provider Notification Advance Practice Clinician (CNS/NP/CNM/CRNA/PA)   Method of Communication Secure Message   Response No new orders   Notification Time [phone]   Provider notified of patient's high BP. She said to re-check another BP-this RN will do with 0300 vitals and update [facility].

## 2021-07-14 NOTE — DISCHARGE SUMMARY
Hospital Medicine Discharge Summary      Patient Identification:  Name: Michelle Castrejon  : 1931  MRN: [id number]  Account: [de-identified]    Patient's PCP: Vick Gee MD    Admit Date: 2021    Discharge Date:    21    Admitting Physician: Linn Bravo MD    Discharge Physician: Sirisha Duran MD ( Resident)/Yoseph Stark MD (Attending)       HPI:  Michelle Castrejon is a 80 y.o. male with PMHx of COPD, type 2 diabetes mellitus, hypertension, hyperlipidemia, atrial fibrillation, thyroid resection, ROBER, and anemia who was admitted to [address] [place] [address] on 2021 for shortness of breath and altered mental status. Please see chart for more details regarding HPI. Hospital Course:   Michelle Castrejon is a 80 y.o. male who presented to [address] [place] [address] for shortness of breath and altered mental status. Patient UA positive for UTI, treated for urinary tract infection. UA and suprapubic cath site cultures grew Enterobacter cloacae. Patient received one-time dose of vancomycin, and transitioned to 5 days of IV cefepime. Patient also has history of paroxysmal atrial fibrillation, had signs of first-degree AV block on EKG. For discharge, will order an event monitor for 14 days, and follow-up with cardiology after. For antibiotic therapy, Urology recommend Ciprofloxacin 500 mg PO BID x 7 days on discharge. Suprapubic catheter placed for neurogenic bladder on 2021 was exchanged on 2021, repeat urine culture sent with no growth to date. Patient mental status improved upon treatment, and patient was discharged to assisted living facility. Patient to get a metabolic panel and follow-up outpatient within 1 week with PCP. Follow-up with cardiologist following event monitor for the sinus bradycardia. The patient was stable for discharge - all consultants were contacted and in agreement with plan for discharge.   Appropriate follow up appointment was arranged prior to discharge. Please see below or view chart for more details from hospital course. Discharge Diagnoses:    CAUTI/complicated UTI secondary to E Cloacae: UA and suprapubic catheter site cultures positive for Enterobacter cloacae. Given one-time dose of vancomycin 7/11. Switched to cefepime on 7/10/21, where he received 8 doses. Repeat UA negative.  -Switch over to oral ciprofloxacin 500 mg daily for 7 days for 15 total days of treatment  -Bacitracin ointment prescribed for cath area  -Catheter exchange on 0/06/7977    Acute metabolic encephalopathy: Resolved with treatment of urinary tract infection. STEPHY on CKD stage II, resolved. Suspect prerenal,  Baseline Cr appears ~0.9. Cr 1.0 on discharge. Avoid nephrotoxins. Holding metformin, Aldactone, furosemide and lisinopril.  -Avoid any NSAIDs.  -Reduce Lasix to 40 mg daily from BID. Follow-up with PCP for dosage adjustments as needed. Patient also takes aldactone 25 mg PO daily, continue on discharge. Follow-up with PCP and consider repeat BMP in OP. Pt takes ibuprofen prior admission, ibuprofen was stopped on discharge. PAF: Lopressor and Coumadin held secondary to hematuria. -Patient not currently on anti-coagulation. Patient last took home warfarin in May of 2020. Has had recurrent hematuria over the last year, including during this admission. High-risk for anticoagulation, has verbalized understanding and willing to take risk of not continuing anti-coagulation. The patient may be an excellent candidate for watchmen procedure. -HASBLED: 3 not on anti-coagulation  -Can follow-up with PCP and discuss anti-coagulation therapy and the risk/benefit of anti-coagulation  Lopressor held due to First-degree AV block/2nd Degree AVB Mobitz Type I. Event monitor ordered on discharge and patient was advised to follow-up with Cardiologist     Chronic Diastolic Dysfunction, compensated: Echo 0/66/5015: Grade 1 diastolic dysfunction.  Euvolemic with trace LE edema bilaterally.   -Resume Lasix 40 mg 1 time per day on discharge. Follow-up with PCP for dosage adjustment as necessary. Hx of BPH Suprapubic catheter placement on 6/17/2021. Replaced on 7/12/21.  -Catheter continued, bacitracin ointment prescribed. Chronic Troponin elevation: Slight increase from baseline on presentation. Since stress test 6/14/2021 is reassuring. Likely related to STEPHY, infection. No further work-up required at this time. First-degree AV block/2nd Degree AVB Mobitz Type I: EKG on arrival noted progressive prolongation of NV interval with dropped beat, native rhythm has prolonged NV interval as well. No evidence of higher grade block.  -Home metoprolol held at discharge due to low heart rate.  -Cardiac event monitor ordered for 2 weeks. Follow-up with cardiology in 2 weeks. -Rate controlled currently    COPD: Continue home regimen     ROBER on CPAP, continue. CAD:  Recent stress 6/14/2021 normal.     Hx of DVT: IVC filter in place. DM2: Lantus 18 units nightly, Metformin 1000 mg twice daily with meals. Follow-up with PCP for close diabetes management. Hypertension: Continue home lisinopril 2.5 mg daily. Chronic Anemia, stable: Patient hemoglobin stable upon discharge around 9-10. Follow-up with PCP for work-up and management of chronic anemia. Chronic Back Pain: Avoid sedating meds     Hx of thyroid cancer, with acquired hypothyroidism: history of thyroid resection. Pt takes synthroid 200 mcg PO daily prior admission. TSH low with normal Ft4. Levothyroxine dose reduced from 200 mc to 175 mcg PO daily. Patient was advised to follow-up with his PCP and consider repeat TFT in OP      The patient was seen and examined on day of discharge and this discharge summary is in conjunction with any daily progress note from day of discharge. The patient understood, was in agreement, and verbalized the plan of care at time of discharge.         Exam:    Vitals: Vitals:    07/13/21 2310 07/14/21 0308 07/14/21 0747 07/14/21 1125   BP: (!) 185/93 (!) 159/72 (!) 168/74 139/64   Pulse: 54 56 75 60   Resp: 16 16 18    Temp: 98.9 °F (37.2 °C) 98.9 °F (37.2 °C) 98.3 °F (36.8 °C) 98.7 °F (37.1 °C)   TempSrc: Oral Oral Oral Oral   SpO2: 98% 96% 93% 92%   Weight:  225 lb 6.4 oz (102.2 kg)     Height:         Weight: Weight: 225 lb 6.4 oz (102.2 kg)    24 hour intake/output:    Intake/Output Summary (Last 24 hours) at 7/14/2021 1346  Last data filed at 7/14/2021 0818  Gross per 24 hour   Intake 699.65 ml   Output 2500 ml   Net -1800.35 ml         Physical Exam:    Gen: No acute distress, patient resting comfortably in bed  Heart: Regular rate and rhythm, No murmurs, rubs, gallops  Lung: Clear to ascultation bilaterally. No wheezes, rhonchi, rales. Abdomen: Soft, non-tender, non-distended, BS+, Suprapubic catheter present with no areas or erythema or excessive warmth surrounding  Musculoskeletal: Normal tone, no abnormal movements. Strength mildly decreased  Skin: Warm, Dry. Neurologic: No focal sensory or motor deficits in the upper or lower extremities. Psychiatric: Alert, orientedx3. Extremities: Trace lower extremity edema. 2+ bilateral radial pulses    Labs: For convenience and continuity at follow-up the following most recent labs are provided:      CBC:    Lab Results   Component Value Date    WBC 9.3 07/12/2021    HGB 9.5 07/12/2021    HCT 29.8 07/12/2021     07/12/2021       Renal:    Lab Results   Component Value Date     07/14/2021    K 3.5 07/14/2021    K 3.7 07/13/2021     07/14/2021    CO2 29 07/14/2021    BUN 14 07/14/2021    CREATININE 1.0 07/14/2021    CALCIUM 8.9 07/14/2021    PHOS 3.4 03/14/2019         Significant Diagnostic Studies    Radiology:   CT ABDOMEN PELVIS WO CONTRAST Additional Contrast? None   Final Result       1. Nonobstructive nephrolithiasis. 2. Air along the track of the suprapubic catheter.                **This report has been created using voice recognition software. It may contain minor errors which are inherent in voice recognition technology. **      Final report electronically signed by Dr. Zaira Mayfield MD on 7/10/2021 10:16 AM      US RENAL COMPLETE   Final Result   1. Normal ultrasound appearance of the kidneys. This document has been electronically signed by: Elena Alegre MD on    07/10/2021 06:28 AM      XR CHEST PORTABLE   Final Result   Impression:   1. Bibasilar atelectasis and/or infiltrates, left greater than right. This document has been electronically signed by: Pascual Starkey DO on    07/10/2021 12:04 AM            Consults:     IP CONSULT TO UROLOGY  PALLIATIVE CARE EVAL  IP CONSULT TO SOCIAL WORK    Disposition:    [] Home        [] TCU       [] Rehab       [] Psych       [] SNF       [] [place]       [x] Other-  [address]     Condition at Discharge: stable    Code Status:  Limited   DVT Prophylaxis: None  Admitted for: Shortness of Breath, Altered Mental Status    Patient Instructions:  Discharge lab work: BMP prior to PCP follow-up, Event monitor for 2 weeks  Activity: activity as tolerated  Diet: ADULT DIET; Regular; 3 carb choices (45 gm/meal);  Low Sodium (2 gm)    Follow-up visits:   Jessica Aviles MD  [address] [facility] [address] [phone]    Go on 7/21/2021  With Michelle Tim CNP, For post hospital follow-up, Time: 2:30 pm - Please bring picture ID, insurance card, and meds    Jesenia Li MD  [facility], [address] [place] [id number] [phone]    Go on 7/27/2021  For post hospital follow-up, Time: 0900 - Please bring picture ID, insurance card, and meds, Metoprolol needs addressed        Discharge Medications:        Medication List        START taking these medications      bacitracin 500 UNIT/GM ointment  Apply topically 2 times daily on SP cath area     ciprofloxacin 500 MG tablet  Commonly known as: CIPRO  Take 1 tablet by mouth 2 times daily for 7 days     lactobacillus capsule  Take 1 capsule by mouth 2 times daily (with meals)            CHANGE how you take these medications      furosemide 40 MG tablet  Commonly known as: LASIX  Take 1 tablet by mouth daily  What changed: when to take this     Lantus SoloStar 100 UNIT/ML injection pen  Generic drug: insulin glargine  INJECT SUBCUTANEOUSLY 18  UNITS NIGHTLY  What changed: additional instructions     levothyroxine 175 MCG tablet  Commonly known as: SYNTHROID  TAKE 1 TABLET BY MOUTH  DAILY  What changed: medication strength     potassium chloride 10 MEQ extended release tablet  Commonly known as: KLOR-CON M  Take 1 tablet by mouth daily TAKE 1 TABLETS BY MOUTH TWICE DAILY  What changed:   how much to take  how to take this  when to take this            CONTINUE taking these medications      Accu-Chek Meryl Plus strip  Generic drug: blood glucose test strips  Accu-check Meryl Plus Test Strips (or brand per pt preference). Sig: test sugar BID. Dx: E11.8     acetaminophen 325 MG tablet  Commonly known as: TYLENOL     albuterol sulfate  (90 Base) MCG/ACT inhaler  Inhale 2 puffs into the lungs every 6 hours as needed for Wheezing     Alcohol Prep 70 % Pads  Alcohol pads (brand per preference). Sig: use to test sugar twice per day. Dx: E11.8     aluminum & magnesium hydroxide-simethicone 400-400-40 MG/5ML Susp  Commonly known as: MYLANTA     Brovana 15 MCG/2ML Nebu  Generic drug: Arformoterol Tartrate  Take 2 mLs by nebulization 2 times daily 1 dose inhale orally two times per day     budesonide 0.5 MG/2ML nebulizer suspension  Commonly known as: PULMICORT  USE 1 VIAL VIA NEBULIZER TWICE DAILY.  RINSE MOUTH AFTER TREATMENT     docusate 100 MG Caps  Commonly known as: COLACE, DULCOLAX  Take 100 mg by mouth 2 times daily     doxazosin 4 MG tablet  Commonly known as: CARDURA  TAKE 1 TABLET BY MOUTH EVERY NIGHT     famotidine 20 MG tablet  Commonly known as: PEPCID     ferrous gluconate 324 (38 Fe) MG tablet  Commonly known as: FERGON  Take 1 tablet by mouth 2 times daily     lidocaine 4 % cream  Commonly known as: LMX     lisinopril 2.5 MG tablet  Commonly known as: PRINIVIL;ZESTRIL  Take 1 tablet by mouth daily     metFORMIN 1000 MG tablet  Commonly known as: GLUCOPHAGE  Take 1 tablet by mouth 2 times daily (with meals)     MiraLax 17 g packet  Generic drug: polyethylene glycol     oxybutynin 5 MG tablet  Commonly known as: DITROPAN  TAKE 1 TABLET BY MOUTH EVERY 8 HOURS AS NEEDED FOR BLADDER SPASMS OR BLADDER SPASM     OXYGEN     PreserVision AREDS 2 Caps  Take 1 capsule by mouth daily     Revefenacin 175 MCG/3ML Soln  Inhale 1 nebule into the lungs daily Dx COPD J44.3     simethicone 125 MG chewable tablet  Commonly known as: MYLICON     SOFT TOUCH LANCETS Misc  Lancet (or brand per pt preference) Use to check blood sugars 2 times daily. Dx: E11.8     spironolactone 25 MG tablet  Commonly known as: ALDACTONE  TAKE 1 TABLET BY MOUTH DAILY     vitamin D3 10 MCG (400 UNIT) Tabs tablet  Commonly known as: CHOLECALCIFEROL  Take 1 tablet by mouth daily 400units            STOP taking these medications      ibuprofen 800 MG tablet  Commonly known as: ADVIL;MOTRIN     metoprolol tartrate 25 MG tablet  Commonly known as: LOPRESSOR               Where to Get Your Medications        These medications were sent to [facility] [address], [address]  [address] [place] [address]  1st Floor, [address] [facility] [address] [address]      Phone: [phone]   bacitracin 500 UNIT/GM ointment  ciprofloxacin 500 MG tablet  furosemide 40 MG tablet  lactobacillus capsule  levothyroxine 175 MCG tablet  potassium chloride 10 MEQ extended release tablet           Discharge Time:  Time spent on discharge is >30 minutes in the examination, evaluation, counseling, and review of medications and discharge plan.  The hospital course was discussed with the patient and all questions

## 2021-07-14 NOTE — FLOWSHEET NOTE
07/14/21 1250   Encounter Summary   Services provided to: Patient   Referral/Consult From: Rounding   Continue Visiting Yes  (7/14)   Complexity of Encounter Moderate   Length of Encounter 15 minutes   Routine   Type Initial   Assessment Calm; Approachable   Intervention Nurtured hope   Outcome Comfort;Expressed gratitude   Assessment: In my encounter with the 80 yr old Palliative Care patient, while rounding  the unit 4K. I provided spiritual care to patient through conversation, I also came to assess the patients spiritual needs present. The pt was admitted due to acute kidney injury. Interventions:  I provided prayer, emotional support and words of comfort.  provided a listening presence and encouraged pt to share their beliefs and how they support him during their hospitalization. Outcomes: The patient was encouraged and didnt share any further spiritual needs at this time. The pt remains optimistic and hopeful. The pt shared that they were appreciative for the support. Plan:  Chaplains will follow-up at a later time for assessment of any spiritual care needs present.

## 2021-07-14 NOTE — PROCEDURES
The skin was prepped and a 30 day cardiac event monitor was applied. The patient was instructed on the documentation of symptoms and the purpose of the monitor as well as the things to avoid while wearing the monitor. The patient was instructed to remove and return the monitor on 08/13/2021.   The serial number of the monitor that was applied is [id number]

## 2021-07-14 NOTE — CARE COORDINATION
7/14/21, 12:34 PM EDT    Patient goals/plan/ treatment preferences discussed by  and . Patient goals/plan/ treatment preferences reviewed with patient/ family. Patient/ family verbalize understanding of discharge plan and are in agreement with goal/plan/treatment preferences. Understanding was demonstrated using the teach back method. AVS provided by RN at time of discharge, which includes all necessary medical information pertaining to the patients current course of illness, treatment, post-discharge goals of care, and treatment preferences. IMM Letter  IMM Letter given to Patient/Family/Significant other/Guardian/POA/by[de-identified]   IMM Letter date given[de-identified] 07/13/21  IMM Letter time given[de-identified] 5255       Patient to discharge back to [facility]. Patient and son, Larayne Meckel aware and agreeable to discharge plan. OZZIE called Pat at [facility] and notified her of discharge today. OZZIE faxed transportation forms. RN made aware and discharge instructions placed on chart.

## 2021-07-15 ENCOUNTER — APPOINTMENT (OUTPATIENT)
Dept: GENERAL RADIOLOGY | Age: 86
End: 2021-07-15
Payer: MEDICARE

## 2021-07-15 ENCOUNTER — HOSPITAL ENCOUNTER (EMERGENCY)
Age: 86
Discharge: HOME | End: 2021-07-15
Attending: EMERGENCY MEDICINE
Payer: MEDICARE

## 2021-07-15 ENCOUNTER — CARE COORDINATION (OUTPATIENT)
Dept: CASE MANAGEMENT | Age: 86
End: 2021-07-15

## 2021-07-15 ENCOUNTER — APPOINTMENT (OUTPATIENT)
Dept: ULTRASOUND IMAGING | Age: 86
End: 2021-07-15
Payer: MEDICARE

## 2021-07-15 VITALS
BODY MASS INDEX: 39.24 KG/M2 | OXYGEN SATURATION: 98 % | TEMPERATURE: 97.8 F | DIASTOLIC BLOOD PRESSURE: 57 MMHG | HEART RATE: 68 BPM | SYSTOLIC BLOOD PRESSURE: 143 MMHG | HEIGHT: 67 IN | WEIGHT: 250 LBS | RESPIRATION RATE: 17 BRPM

## 2021-07-15 DIAGNOSIS — R53.1 GENERALIZED WEAKNESS: Primary | ICD-10-CM

## 2021-07-15 LAB
ALBUMIN SERPL-MCNC: 3.3 G/DL (ref 3.5–5.1)
ALP BLD-CCNC: 52 U/L (ref 38–126)
ALT SERPL-CCNC: 8 U/L (ref 11–66)
ANION GAP SERPL CALCULATED.3IONS-SCNC: 12 MEQ/L (ref 8–16)
AST SERPL-CCNC: 13 U/L (ref 5–40)
BACTERIA: ABNORMAL /HPF
BASOPHILS # BLD: 0.7 %
BASOPHILS ABSOLUTE: 0.1 THOU/MM3 (ref 0–0.1)
BILIRUB SERPL-MCNC: 0.2 MG/DL (ref 0.3–1.2)
BILIRUBIN URINE: NEGATIVE
BLOOD, URINE: ABNORMAL
BUN BLDV-MCNC: 14 MG/DL (ref 7–22)
CALCIUM SERPL-MCNC: 9.3 MG/DL (ref 8.5–10.5)
CASTS 2: ABNORMAL /LPF
CASTS UA: ABNORMAL /LPF
CHARACTER, URINE: CLEAR
CHLORIDE BLD-SCNC: 104 MEQ/L (ref 98–111)
CO2: 26 MEQ/L (ref 23–33)
COLOR: YELLOW
CREAT SERPL-MCNC: 1.1 MG/DL (ref 0.4–1.2)
CRYSTALS, UA: ABNORMAL
EKG Q-T INTERVAL: 436 MS
EKG QRS DURATION: 98 MS
EKG QTC CALCULATION (BAZETT): 413 MS
EKG R AXIS: 67 DEGREES
EKG T AXIS: 31 DEGREES
EKG VENTRICULAR RATE: 54 BPM
EOSINOPHIL # BLD: 2.7 %
EOSINOPHILS ABSOLUTE: 0.3 THOU/MM3 (ref 0–0.4)
EPITHELIAL CELLS, UA: ABNORMAL /HPF
ERYTHROCYTE [DISTWIDTH] IN BLOOD BY AUTOMATED COUNT: 13.6 % (ref 11.5–14.5)
ERYTHROCYTE [DISTWIDTH] IN BLOOD BY AUTOMATED COUNT: 42.5 FL (ref 35–45)
GFR SERPL CREATININE-BSD FRML MDRD: 63 ML/MIN/1.73M2
GLUCOSE BLD-MCNC: 99 MG/DL (ref 70–108)
GLUCOSE URINE: NEGATIVE MG/DL
HCT VFR BLD CALC: 30.5 % (ref 42–52)
HEMOGLOBIN: 9.7 GM/DL (ref 14–18)
IMMATURE GRANS (ABS): 0.04 THOU/MM3 (ref 0–0.07)
IMMATURE GRANULOCYTES: 0.4 %
KETONES, URINE: NEGATIVE
LEUKOCYTE ESTERASE, URINE: ABNORMAL
LYMPHOCYTES # BLD: 17.1 %
LYMPHOCYTES ABSOLUTE: 1.8 THOU/MM3 (ref 1–4.8)
MCH RBC QN AUTO: 27.3 PG (ref 26–33)
MCHC RBC AUTO-ENTMCNC: 31.8 GM/DL (ref 32.2–35.5)
MCV RBC AUTO: 85.9 FL (ref 80–94)
MISCELLANEOUS 2: ABNORMAL
MONOCYTES # BLD: 9 %
MONOCYTES ABSOLUTE: 1 THOU/MM3 (ref 0.4–1.3)
NITRITE, URINE: NEGATIVE
NUCLEATED RED BLOOD CELLS: 0 /100 WBC
OSMOLALITY CALCULATION: 283.6 MOSMOL/KG (ref 275–300)
PH UA: 5.5 (ref 5–9)
PLATELET # BLD: 274 THOU/MM3 (ref 130–400)
PMV BLD AUTO: 9.6 FL (ref 9.4–12.4)
POTASSIUM REFLEX MAGNESIUM: 3.7 MEQ/L (ref 3.5–5.2)
PROTEIN UA: NEGATIVE
RBC # BLD: 3.55 MILL/MM3 (ref 4.7–6.1)
RBC URINE: ABNORMAL /HPF
RENAL EPITHELIAL, UA: ABNORMAL
SEG NEUTROPHILS: 70.1 %
SEGMENTED NEUTROPHILS ABSOLUTE COUNT: 7.5 THOU/MM3 (ref 1.8–7.7)
SODIUM BLD-SCNC: 142 MEQ/L (ref 135–145)
SPECIFIC GRAVITY, URINE: 1.01 (ref 1–1.03)
TOTAL PROTEIN: 6.7 G/DL (ref 6.1–8)
TROPONIN T: 0.05 NG/ML
UROBILINOGEN, URINE: 0.2 EU/DL (ref 0–1)
WBC # BLD: 10.7 THOU/MM3 (ref 4.8–10.8)
WBC UA: ABNORMAL /HPF
YEAST: ABNORMAL

## 2021-07-15 PROCEDURE — 99284 EMERGENCY DEPT VISIT MOD MDM: CPT

## 2021-07-15 PROCEDURE — 84484 ASSAY OF TROPONIN QUANT: CPT

## 2021-07-15 PROCEDURE — 36415 COLL VENOUS BLD VENIPUNCTURE: CPT

## 2021-07-15 PROCEDURE — 76775 US EXAM ABDO BACK WALL LIM: CPT

## 2021-07-15 PROCEDURE — 85025 COMPLETE CBC W/AUTO DIFF WBC: CPT

## 2021-07-15 PROCEDURE — 80053 COMPREHEN METABOLIC PANEL: CPT

## 2021-07-15 PROCEDURE — 81001 URINALYSIS AUTO W/SCOPE: CPT

## 2021-07-15 PROCEDURE — 71045 X-RAY EXAM CHEST 1 VIEW: CPT

## 2021-07-15 PROCEDURE — 93010 ELECTROCARDIOGRAM REPORT: CPT | Performed by: NUCLEAR MEDICINE

## 2021-07-15 PROCEDURE — 93005 ELECTROCARDIOGRAM TRACING: CPT | Performed by: STUDENT IN AN ORGANIZED HEALTH CARE EDUCATION/TRAINING PROGRAM

## 2021-07-15 ASSESSMENT — ENCOUNTER SYMPTOMS
CONSTIPATION: 0
BLOOD IN STOOL: 0
DIARRHEA: 0
VOMITING: 0
ABDOMINAL PAIN: 1
COUGH: 0
TROUBLE SWALLOWING: 0
SHORTNESS OF BREATH: 0
EYE PAIN: 0
NAUSEA: 0

## 2021-07-15 NOTE — ED NOTES
Presents to ER via [facility] via [address] with complaints of worsening tremors. EMS states pt was recently discharged from [address] [place] [address] for a UTI. Pt has a history of frequent UTIs. Upon arrival pt noted to be shaking, stating \"I don't know why I can't stop. \" Pt alert to person and place, denies any pain. DNR-CCA.      Guera Hernandez RN  07/15/21 0581

## 2021-07-15 NOTE — ED NOTES
EKG completed. Pt resting in bed, no tremors noted at this time. Respirations unlabored. Call light in reach.      Guera Hernandez RN  07/15/21 7467

## 2021-07-15 NOTE — CARE COORDINATION
Care Transitions Outreach Attempt    Call within 2 business days of discharge: Yes   Attempted to reach patient for transitions of care follow up with ERICK. No answer this morning. Attempted to call now and see that he is in the ED. Will check tomorrow, if not admitted.     Patient: Ludwig Hatchet Patient : 1931 MRN: [id number]    Last Discharge [facility]       Complaint Diagnosis Description Type Department Provider    7/15/21 Tremors  ED [facility] ED Lorrie Abad MD        Noted following upcoming appointments from discharge chart review:   [facility] follow up appointment(s):   Future Appointments   Date Time Provider Gretchen Jorgensen   2021  2:30 PM MAEGAN Sanchez - [facility] Medicine [facility] [place]   2021  9:00 AM Jabier Flores MD N [facility] [place]   8/10/2021 11:45 AM Saint Bran, MD [address]   2021 11:00 AM Terri Orlando   2021  1:00 PM Jabier Flores MD [facility] [place]     [facility] follow up appointment(s): matrín

## 2021-07-15 NOTE — ED PROVIDER NOTES
[address] [facility] [address]          Pt Name: Fili Hand  MRN: [id number]  [place] 7/17/1931  Date of evaluation: 7/15/2021  Treating Resident Physician: Jeremy Dakins, MD  Supervising Physician: Dr. Maryan Ramirez       Chief Complaint   Patient presents with    Tremors     History obtained from son over the phone, chart review and the patient. HISTORY OF PRESENT ILLNESS    HPI  Fili Hand is a 80 y.o. male who was discharged from [facility] yesterday after admission for UTI and STEPHY, has a suprapubic cath, has dementia, live [place] [address] at [facility], who is brought to the ED for evaluation of tremors. Patient is a poor historian. He states he went home from the hospital yesterday, went to sleep for a long time, woke up today feeling bad. Per chart review, patient was discharged with oral cipro for 7 days, topical bacitracin for SP cath site, and a probiotic. He reports shaking all over his body, worse in his arms, generalized weakness. He states he has RLQ abdominal pain that started yesterday and states \"it feels like they're probing me. \" He denies any fevers, sick contacts. Reports SOB and CP but states this is his usual. Denies any n/v, diarrhea, last bowel movement was yesterday. States he has been compliant with his medications as far as he know. Did eat this morning. The patient has no other acute complaints at this time. REVIEW OF SYSTEMS   Review of Systems   Constitutional: Negative for chills, fatigue and fever. HENT: Negative for ear pain, postnasal drip and trouble swallowing. Eyes: Negative for pain and visual disturbance. Respiratory: Negative for cough and shortness of breath. Cardiovascular: Negative for chest pain and palpitations. Gastrointestinal: Positive for abdominal pain. Negative for blood in stool, constipation, diarrhea, nausea and vomiting. Genitourinary: Negative for dysuria.    Skin: Negative for rash. Neurological: Positive for tremors and weakness. Negative for headaches. PAST MEDICAL AND SURGICAL HISTORY     Past Medical History:   Diagnosis Date    Anemia     Anxiety     BPH (benign prostatic hyperplasia)     CAD (coronary artery disease)     leaking valve    Chronic back pain     COPD (chronic obstructive pulmonary disease) (HCC)     Detached retina     Diabetes mellitus (HCC)     NIDDM    DVT (deep venous thrombosis) (HCC)     RLE    DVT (deep venous thrombosis) ([facility] [address] 11/9/15    LLE    Dysphagia     Glaucoma     Hyperlipidemia     Hypertension     Macular degeneration     Mass of chest     benign chest mass, Dr Beata Cooper    Movement disorder     spinal stenosis    Obesity     Osteoarthritis     right ankle, right hand    Paroxysmal atrial fibrillation ([facility] [address]     Pneumonia     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    Sleep apnea 1993    on BiPap, Dr Marysol Dacosta Testicular hypofunction     Thyroid cancer [facility])     s/p thyroid resection    Urinary incontinence      Past Surgical History:   Procedure Laterality Date   [facility] SURGERY  2006,2011, 2014    BRONCHOSCOPY N/A 8/29/2017    BRONCHOSCOPY AIRWAY ONLY performed by Cole Ng MD at [address] [address] [address] N/A 6/17/2021    CYSTOSCOPY SUPRAPUBIC TUBE PLACEMENT performed by Shaw Davis MD at [facility] EKG 12-LEAD  11/10/2015         FRACTURE SURGERY      right hand    OTHER SURGICAL HISTORY      spinal epidurals    RETINAL DETACHMENT SURGERY      SPINE SURGERY  2004    Lumbar laminectomy    THYROIDECTOMY      TURP N/A 3/17/2020    CYSTOSCOPY WITH GREENLIGHT PHOTOVAPORIZATION OF PROSTATE performed by Shaw Davis MD at [facility] TURP N/A 3/24/2020    PLASMA BUTTON TURP WITH CLOT EVACUATION performed by Shaw Davis MD at [address]   No current facility-administered medications for this encounter.     Current Outpatient Medications:     ciprofloxacin (CIPRO) 500 MG tablet, Take 1 tablet by mouth 2 times daily for 7 days, Disp: 14 tablet, Rfl: 0    bacitracin 500 UNIT/GM ointment, Apply topically 2 times daily on SP cath area, Disp: 1 Tube, Rfl: 0    furosemide (LASIX) 40 MG tablet, Take 1 tablet by mouth daily, Disp: 60 tablet, Rfl: 0    potassium chloride (KLOR-CON M) 10 MEQ extended release tablet, Take 1 tablet by mouth daily TAKE 1 TABLETS BY MOUTH TWICE DAILY, Disp: 180 tablet, Rfl: 0    levothyroxine (SYNTHROID) 175 MCG tablet, TAKE 1 TABLET BY MOUTH  DAILY, Disp: 30 tablet, Rfl: 0    lactobacillus (CULTURELLE) capsule, Take 1 capsule by mouth 2 times daily (with meals), Disp: 30 capsule, Rfl: 0    blood glucose test strips (ACCU-CHEK CHE PLUS) strip, Accu-check Ceh Plus Test Strips (or brand per pt preference). Sig: test sugar BID. Dx: E11.8, Disp: 200 strip, Rfl: 3    SOFT TOUCH LANCETS MISC, Lancet (or brand per pt preference) Use to check blood sugars 2 times daily. Dx: E11.8, Disp: 200 each, Rfl: 3    Alcohol Swabs (ALCOHOL PREP) 70 % PADS, Alcohol pads (brand per preference). Sig: use to test sugar twice per day.  Dx: E11.8, Disp: 200 each, Rfl: 3    doxazosin (CARDURA) 4 MG tablet, TAKE 1 TABLET BY MOUTH EVERY NIGHT, Disp: 90 tablet, Rfl: 3    simethicone (MYLICON) 189 MG chewable tablet, Take 125 mg by mouth every 6 hours as needed for Flatulence, Disp: , Rfl:     aluminum & magnesium hydroxide-simethicone (MYLANTA) 400-400-40 MG/5ML SUSP, Take 15 mLs by mouth every 6 hours as needed, Disp: , Rfl:     famotidine (PEPCID) 20 MG tablet, Take 20 mg by mouth 2 times daily as needed, Disp: , Rfl:     Arformoterol Tartrate (BROVANA) 15 MCG/2ML NEBU, Take 2 mLs by nebulization 2 times daily 1 dose inhale orally two times per day, Disp: 120 mL, Rfl: 11    oxybutynin (DITROPAN) 5 MG tablet, TAKE 1 TABLET BY MOUTH EVERY 8 HOURS AS NEEDED FOR BLADDER SPASMS OR BLADDER SPASM, Disp: 270 tablet, Rfl: 0    Multiple Vitamins-Minerals (PRESERVISION AREDS 2) CAPS, Take 1 capsule by mouth daily, Disp: 90 capsule, Rfl: 3    spironolactone (ALDACTONE) 25 MG tablet, TAKE 1 TABLET BY MOUTH DAILY, Disp: 90 tablet, Rfl: 3    Revefenacin 175 MCG/3ML SOLN, Inhale 1 nebule into the lungs daily Dx COPD J44.3, Disp: 30 vial, Rfl: 5    insulin glargine (LANTUS SOLOSTAR) 100 UNIT/ML injection pen, INJECT SUBCUTANEOUSLY 18  UNITS NIGHTLY (Patient taking differently: INJECT SUBCUTANEOUSLY 20  UNITS NIGHTLY), Disp: 30 mL, Rfl: 3    ferrous gluconate (FERGON) 324 (38 Fe) MG tablet, Take 1 tablet by mouth 2 times daily, Disp: 180 tablet, Rfl: 3    metFORMIN (GLUCOPHAGE) 1000 MG tablet, Take 1 tablet by mouth 2 times daily (with meals), Disp: 180 tablet, Rfl: 3    budesonide (PULMICORT) 0.5 MG/2ML nebulizer suspension, USE 1 VIAL VIA NEBULIZER TWICE DAILY.  RINSE MOUTH AFTER TREATMENT, Disp: 360 mL, Rfl: 3    lisinopril (PRINIVIL;ZESTRIL) 2.5 MG tablet, Take 1 tablet by mouth daily, Disp: 90 tablet, Rfl: 3    docusate (COLACE, DULCOLAX) 100 MG CAPS, Take 100 mg by mouth 2 times daily, Disp: 180 capsule, Rfl: 3    vitamin D3 (CHOLECALCIFEROL) 10 MCG (400 UNIT) TABS tablet, Take 1 tablet by mouth daily 400units, Disp: 90 tablet, Rfl: 3    polyethylene glycol (MIRALAX) 17 g packet, Take 17 g by mouth daily as needed for Constipation, Disp: , Rfl:     acetaminophen (TYLENOL) 325 MG tablet, Take 650 mg by mouth every 4 hours as needed for Pain or Fever , Disp: , Rfl:     lidocaine (LMX) 4 % cream, Apply topically every 6 hours as needed for Pain To groin, Disp: , Rfl:     albuterol sulfate  (90 Base) MCG/ACT inhaler, Inhale 2 puffs into the lungs every 6 hours as needed for Wheezing, Disp: 1 Inhaler, Rfl: 3    OXYGEN, Inhale 3 L into the lungs nightly Indications: Difficulty Breathing, Oxygen Therapy And prn through cpap, Disp: , Rfl:       SOCIAL HISTORY     Social History     Social History Narrative    Not on file     Social History     Tobacco Use    Smoking status: Never Smoker  Smokeless tobacco: Never Used   Vaping Use    Vaping Use: Never used   Substance Use Topics    Alcohol use: Yes     Alcohol/week: 1.0 standard drinks     Types: 1 Glasses of wine per week     Comment: Glass of wine with dinner.  Drug use: No         ALLERGIES     Allergies   Allergen Reactions    Latex Rash    Macrobid [Nitrofurantoin Fernanda Petit      Dr. Roberto Ferguson prefers pt not use Macrobid due to pulmonary side effects.  Levaquin [Levofloxacin] Other (See Comments)     Redness/flushing    Brimonidine Tartrate     Adhesive Tape Rash    Alphagan [Brimonidine Tartrate] Hives     Eyes red         FAMILY HISTORY     Family History   Problem Relation Age of Onset    Other Mother         Complications of Childbirth    Other Father [de-identified]        Natural causes         PREVIOUS RECORDS   Previous records reviewed: noted documentation from recent admission. PHYSICAL EXAM     ED Triage Vitals [07/15/21 1223]   BP Temp Temp Source Pulse Resp SpO2 Height Weight   (!) 132/95 97.8 °F (36.6 °C) Oral 65 20 97 % 5' 7\" (1.702 m) 250 lb (113.4 kg)     Initial vital signs and nursing assessment reviewed and normal. Pulsoximetry is normal per my interpretation. Additional Vital Signs:  Vitals:    07/15/21 1715   BP:    Pulse: 68   Resp: 17   Temp:    SpO2: 98%       Physical Exam  Vitals and nursing note reviewed. Constitutional:       General: He is not in acute distress. Appearance: He is well-developed. He is not diaphoretic. HENT:      Head: Normocephalic and atraumatic. Right Ear: External ear normal.      Left Ear: External ear normal.      Nose: Nose normal.   Eyes:      General: No scleral icterus. Right eye: No discharge. Left eye: No discharge. Conjunctiva/sclera: Conjunctivae normal.   Cardiovascular:      Rate and Rhythm: Normal rate and regular rhythm. Heart sounds: Normal heart sounds. No murmur heard.      Pulmonary:      Effort: Pulmonary effort is normal. Breath sounds: Normal breath sounds. Abdominal:      General: Bowel sounds are normal.      Comments: Tenderness to palpation of RLQ of abdomen and around area of SP catheter   Musculoskeletal:      Cervical back: Normal range of motion. Skin:     General: Skin is warm and dry. Findings: No erythema or rash. Neurological:      Mental Status: He is alert. Cranial Nerves: No cranial nerve deficit. Psychiatric:      Comments: Memory appears poor, patient has known dementia             MEDICAL DECISION MAKING   Initial Assessment: UTI, STEPHY, metabolic encephalopathy, hypo or hyperglycemia. Plan: labs as below. Results for Myah Martin (MRN [id number]) as of 7/15/2021 12:54   Ref. Range 7/14/2021 03:43   TSH Latest Ref Range: 0.400 - 4.200 uIU/mL 0.250 (L)   T4 Free Latest Ref Range: 0.93 - 1.76 ng/dL 1.17         ED RESULTS   Laboratory results:  Labs Reviewed   COMPREHENSIVE METABOLIC PANEL W/ REFLEX TO MG FOR LOW K - Abnormal; Notable for the following components:       Result Value    Albumin 3.3 (*)     Total Bilirubin 0.2 (*)     ALT 8 (*)     All other components within normal limits   CBC WITH AUTO DIFFERENTIAL - Abnormal; Notable for the following components:    RBC 3.55 (*)     Hemoglobin 9.7 (*)     Hematocrit 30.5 (*)     MCHC 31.8 (*)     All other components within normal limits   TROPONIN - Abnormal; Notable for the following components:    Troponin T 0.048 (*)     All other components within normal limits   URINE WITH REFLEXED MICRO - Abnormal; Notable for the following components:    Blood, Urine TRACE (*)     Leukocyte Esterase, Urine TRACE (*)     All other components within normal limits   GLOMERULAR FILTRATION RATE, ESTIMATED - Abnormal; Notable for the following components:    Est, Glom Filt Rate 63 (*)     All other components within normal limits   ANION GAP   OSMOLALITY       Radiologic studies results:  US RENAL LIMITED   Final Result       1.  No evidence of hydronephrosis on either side. 2. The punctate stone seen on CT scan dated 10 July 2021 are not clearly seen on ultrasound. 3. Suprapubic catheter in place. .               **This report has been created using voice recognition software. It may contain minor errors which are inherent in voice recognition technology. **      Final report electronically signed by DR Elma Maria on 7/15/2021 3:54 PM      XR CHEST PORTABLE   Final Result   1. Mild cardiac megaly. Metallic sternotomy sutures from prior surgery. 2. Mild bibasilar atelectasis/pneumonia. No effusion. Overall appearance improved from prior study. **This report has been created using voice recognition software. It may contain minor errors which are inherent in voice recognition technology. **      Final report electronically signed by Dr. Marla Bundy on 7/15/2021 1:51 PM          ED Medications administered this visit: Medications - No data to display      ED COURSE        Clinical picture at this time is most consistent with acute on chronic generalized weakness and deconditioning, likely secondary to recent infection and inpatient admission. Case discussed with patient's son over the phone - discussed that pt is likely not a good candidate for assisted living any longer and needs a higher level of care considering multiple inpatient admissions, ED visits, and chronic medical conditions that require close supervision that the patient does not seem to be able to manage. Pt's son is planning to work with pt's PCP and NH staff to get patient moved to NH possibly. Pt is advised to continue his medications including abx that were given to him on discharge from [facility] yesterday; continue to monitor his symptoms and to notify AL staff if any changes or concerns. He is advised to follow up with his PCP in the next 3-7 days; return to the ED if any new or worsened symptoms.  Strict return precautions and follow up instructions were discussed with the patient prior to discharge, with which the patient agrees. Patient is being discharged back to assisted living in stable condition at this time. MEDICATION CHANGES     Discharge Medication List as of 7/15/2021  4:10 PM            FINAL DISPOSITION     Final diagnoses:   Generalized weakness     Condition: condition: stable  Dispo: Discharge to home [facility], assisted living)      This transcription was electronically signed. Parts of this transcriptions may have been dictated by use of voice recognition software and electronically transcribed, and parts may have been transcribed with the assistance of an ED scribe. The transcription may contain errors not detected in proofreading. Please refer to my supervising physician's documentation if my documentation differs.     Electronically Signed: Halina Hidalgo MD, 07/15/21, 8:30 PM       Halina Hidalgo MD  Resident  07/15/21 2030

## 2021-07-15 NOTE — ED NOTES
Pt resting in bed, respirations unlabored. Call light within reach.      Guera Hernandez RN  07/15/21 1960

## 2021-07-15 NOTE — ED NOTES
Bed: 008A  Expected date:   Expected time:   Means of arrival: [facility]  Comments:     Eliz Garza RN  07/15/21 7627

## 2021-07-16 ENCOUNTER — CARE COORDINATION (OUTPATIENT)
Dept: CASE MANAGEMENT | Age: 86
End: 2021-07-16

## 2021-07-16 DIAGNOSIS — R06.02 SHORTNESS OF BREATH: Primary | ICD-10-CM

## 2021-07-16 LAB
BLOOD CULTURE, ROUTINE: NORMAL
BLOOD CULTURE, ROUTINE: NORMAL

## 2021-07-16 PROCEDURE — 1111F DSCHRG MED/CURRENT MED MERGE: CPT | Performed by: FAMILY MEDICINE

## 2021-07-16 NOTE — CARE COORDINATION
[facility] Transitions Initial Follow Up Call    Call within 2 business days of discharge: Yes    Patient: Antonio Lino Patient : 1931   MRN: [id number]  Reason for Admission: SOB, fatigue, tremors  Discharge Date: 7/15/21 RARS: Readmission Risk Score: 32      Last Discharge [facility]       Complaint Diagnosis Description Type Department Provider    7/15/21 Tremors Generalized weakness ED (DISCHARGE) [facility] ED Jameson Albarado MD           Spoke with Kimberly Lakhani at [address] [place] [address]. Staff provide all care for Jackelin Simon. Said he came down for lunch and is feeling better, just tired. Denies fever, tremors, cough, has some SHAH. Uses O2 at night with C-pap. Appetite and fluid intake adequate. Wt and BS are stable. Has a supra-pubic catheter draining clear yellow urine, no odor. Reviewed medications, no issues. Has f/u next week. Facility transports if on a Monday, otherwise family transports. No other concerns at this time. Non-face-to-face services provided:  Scheduled appointment with PCP-  Scheduled appointment with Specialist-7/27 8/10 8/23 11/22  Obtained and reviewed discharge summary and/or continuity of care documents    Care Transitions 24 Hour Call    Schedule Follow Up Appointment with PCP: Completed  Do you have any ongoing symptoms?: No  Do you have a copy of your discharge instructions?: Yes  Do you have all of your prescriptions and are they filled?: Yes  Have you been contacted by a [facility] Pharmacist?: No  Have you scheduled your follow up appointment?: Yes  How are you going to get to your appointment?: Car - family or friend to transport  Were you discharged with any Home Care or Post Acute Services: Yes  Post Acute Services:  Other (Comment: Sadia VIVAS)  Patient DME: Straight cane, Walker  Patient Home Equipment: Oxygen, CPAP, Nebulizer  Do you have support at home?: Alone  Do you feel like you have everything you need to keep you well at home?: Yes  Are you an active quarantine with CDC Guidelines. Caregiver was given an opportunity to verbalize any questions and concerns and agrees to contact CTN or health care provider for questions related to their healthcare. Reviewed and educated caregiver on any new and changed medications related to discharge diagnosis. Was patient discharged with a pulse oximeter? No     CTN provided contact information. No further follow-up call indicated based on severity of symptoms and risk factors. AL provides all care for pt.         Follow Up  Future Appointments   Date Time Provider Gretchen Watsoni   7/21/2021  2:30 PM MAEGAN Collins - [facility]   7/27/2021  9:00 AM Jammie Sr MD [email or web address] - [address] [facility]   8/10/2021 11:45 AM MD Maria Esther Barney [facility]   8/23/2021 11:00 AM Terri Bañuelos   11/22/2021  1:00 PM Jammie Sr MD [email or web address] - Rodríguez Massey RN

## 2021-07-22 NOTE — PROGRESS NOTES
Physician Progress Note      PATIENT:               Myah Martin  CSN #:                  [id number]  :                       1931  ADMIT DATE:       2021 10:28 PM  Irvin Melton DATE:        2021 5:45 PM  RESPONDING  PROVIDER #:        SET [id number]          QUERY TEXT:    Patient admitted with UTI. Noted documentation of Metabolic Encephalopathy. In   order to support the diagnosis of Metabolic Encephalopathy, please include   additional clinical indicators in your documentation. Or please document if   the diagnosis of Metabolic Encephalopathy has been ruled out after further   study. The medical record reflects the following:  Risk Factors: Elderly  Clinical Indicators: Assisted living said patient had altered mental status,   ED documenting patient was A & O X 3  Treatment: Lab monitoring, IVF, IV ATB's    Thank You! Yue Jones RN  RN Clinical   (E) [phone] (M) [phone]  Options provided:  -- Metabolic Encephalopathy present as evidenced by, Please document evidence. -- Metabolic Encephalopathy was ruled out  -- Other - I will add my own diagnosis  -- Disagree - Not applicable / Not valid  -- Disagree - Clinically unable to determine / Unknown  -- Refer to Clinical Documentation Reviewer    PROVIDER RESPONSE TEXT:    Metabolic Encephalopathy was ruled out after study.     Query created by: Carlos scott 2021 8:38 AM      Electronically signed by:  Marti Jang 2021 8:09 AM

## 2021-07-26 NOTE — PROGRESS NOTES
CLINICAL PHARMACY NOTE: MEDS TO BEDS    Total # of Prescriptions Filled: 6   The following medications were delivered to the patient:  Bacitracin 500u/gm  Ciprofloxacin 500mg  Levothyroxine 175mcg  Furosemide 40mg  Potassium Chloride Love ER 10meq  Culturelle    Additional Documentation:

## 2021-08-03 ENCOUNTER — APPOINTMENT (OUTPATIENT)
Dept: CT IMAGING | Age: 86
End: 2021-08-03
Payer: MEDICARE

## 2021-08-03 ENCOUNTER — HOSPITAL ENCOUNTER (EMERGENCY)
Age: 86
Discharge: HOME | End: 2021-08-03
Attending: EMERGENCY MEDICINE
Payer: MEDICARE

## 2021-08-03 VITALS
BODY MASS INDEX: 39.24 KG/M2 | TEMPERATURE: 98.4 F | HEART RATE: 64 BPM | HEIGHT: 67 IN | DIASTOLIC BLOOD PRESSURE: 58 MMHG | WEIGHT: 250 LBS | RESPIRATION RATE: 17 BRPM | OXYGEN SATURATION: 99 % | SYSTOLIC BLOOD PRESSURE: 102 MMHG

## 2021-08-03 DIAGNOSIS — R10.2 SUPRAPUBIC PAIN: Primary | ICD-10-CM

## 2021-08-03 DIAGNOSIS — N17.9 ACUTE KIDNEY INJURY (HCC): ICD-10-CM

## 2021-08-03 DIAGNOSIS — R77.8 ELEVATED TROPONIN LEVEL: ICD-10-CM

## 2021-08-03 LAB
ALBUMIN SERPL-MCNC: 3.7 G/DL (ref 3.5–5.1)
ALP BLD-CCNC: 68 U/L (ref 38–126)
ALT SERPL-CCNC: 6 U/L (ref 11–66)
ANION GAP SERPL CALCULATED.3IONS-SCNC: 12 MEQ/L (ref 8–16)
AST SERPL-CCNC: 9 U/L (ref 5–40)
BASOPHILS # BLD: 0.4 %
BASOPHILS ABSOLUTE: 0 THOU/MM3 (ref 0–0.1)
BILIRUB SERPL-MCNC: 0.2 MG/DL (ref 0.3–1.2)
BILIRUBIN URINE: NEGATIVE
BLOOD, URINE: NEGATIVE
BUN BLDV-MCNC: 14 MG/DL (ref 7–22)
CALCIUM SERPL-MCNC: 8.7 MG/DL (ref 8.5–10.5)
CHARACTER, URINE: CLEAR
CHLORIDE BLD-SCNC: 95 MEQ/L (ref 98–111)
CO2: 30 MEQ/L (ref 23–33)
COLOR: YELLOW
CREAT SERPL-MCNC: 1.6 MG/DL (ref 0.4–1.2)
EKG ATRIAL RATE: 71 BPM
EKG P AXIS: 65 DEGREES
EKG P-R INTERVAL: 304 MS
EKG Q-T INTERVAL: 374 MS
EKG QRS DURATION: 86 MS
EKG QTC CALCULATION (BAZETT): 406 MS
EKG R AXIS: 74 DEGREES
EKG T AXIS: 52 DEGREES
EKG VENTRICULAR RATE: 71 BPM
EOSINOPHIL # BLD: 1.8 %
EOSINOPHILS ABSOLUTE: 0.2 THOU/MM3 (ref 0–0.4)
ERYTHROCYTE [DISTWIDTH] IN BLOOD BY AUTOMATED COUNT: 14.1 % (ref 11.5–14.5)
ERYTHROCYTE [DISTWIDTH] IN BLOOD BY AUTOMATED COUNT: 44.5 FL (ref 35–45)
GFR SERPL CREATININE-BSD FRML MDRD: 41 ML/MIN/1.73M2
GLUCOSE BLD-MCNC: 99 MG/DL (ref 70–108)
GLUCOSE URINE: NEGATIVE MG/DL
HCT VFR BLD CALC: 32.6 % (ref 42–52)
HEMOGLOBIN: 10.3 GM/DL (ref 14–18)
IMMATURE GRANS (ABS): 0.08 THOU/MM3 (ref 0–0.07)
IMMATURE GRANULOCYTES: 0.7 %
KETONES, URINE: NEGATIVE
LEUKOCYTE ESTERASE, URINE: NEGATIVE
LIPASE: 32.6 U/L (ref 5.6–51.3)
LYMPHOCYTES # BLD: 19.1 %
LYMPHOCYTES ABSOLUTE: 2.3 THOU/MM3 (ref 1–4.8)
MCH RBC QN AUTO: 27.2 PG (ref 26–33)
MCHC RBC AUTO-ENTMCNC: 31.6 GM/DL (ref 32.2–35.5)
MCV RBC AUTO: 86 FL (ref 80–94)
MONOCYTES # BLD: 7.3 %
MONOCYTES ABSOLUTE: 0.9 THOU/MM3 (ref 0.4–1.3)
NITRITE, URINE: NEGATIVE
NUCLEATED RED BLOOD CELLS: 0 /100 WBC
OSMOLALITY CALCULATION: 274.3 MOSMOL/KG (ref 275–300)
PH UA: 6.5 (ref 5–9)
PLATELET # BLD: 300 THOU/MM3 (ref 130–400)
PMV BLD AUTO: 9.7 FL (ref 9.4–12.4)
POTASSIUM REFLEX MAGNESIUM: 5.2 MEQ/L (ref 3.5–5.2)
PROTEIN UA: NEGATIVE
RBC # BLD: 3.79 MILL/MM3 (ref 4.7–6.1)
REASON FOR REJECTION: NORMAL
REJECTED TEST: NORMAL
SEG NEUTROPHILS: 70.7 %
SEGMENTED NEUTROPHILS ABSOLUTE COUNT: 8.5 THOU/MM3 (ref 1.8–7.7)
SODIUM BLD-SCNC: 137 MEQ/L (ref 135–145)
SPECIFIC GRAVITY, URINE: 1.01 (ref 1–1.03)
TOTAL PROTEIN: 6 G/DL (ref 6.1–8)
TROPONIN T: 0.1 NG/ML
UROBILINOGEN, URINE: 0.2 EU/DL (ref 0–1)
WBC # BLD: 12 THOU/MM3 (ref 4.8–10.8)

## 2021-08-03 PROCEDURE — 93005 ELECTROCARDIOGRAM TRACING: CPT | Performed by: EMERGENCY MEDICINE

## 2021-08-03 PROCEDURE — 81003 URINALYSIS AUTO W/O SCOPE: CPT

## 2021-08-03 PROCEDURE — 99285 EMERGENCY DEPT VISIT HI MDM: CPT

## 2021-08-03 PROCEDURE — 6360000002 HC RX W HCPCS: Performed by: EMERGENCY MEDICINE

## 2021-08-03 PROCEDURE — 83690 ASSAY OF LIPASE: CPT

## 2021-08-03 PROCEDURE — 80053 COMPREHEN METABOLIC PANEL: CPT

## 2021-08-03 PROCEDURE — 74176 CT ABD & PELVIS W/O CONTRAST: CPT

## 2021-08-03 PROCEDURE — 84484 ASSAY OF TROPONIN QUANT: CPT

## 2021-08-03 PROCEDURE — 85025 COMPLETE CBC W/AUTO DIFF WBC: CPT

## 2021-08-03 PROCEDURE — 96374 THER/PROPH/DIAG INJ IV PUSH: CPT

## 2021-08-03 PROCEDURE — 2580000003 HC RX 258: Performed by: EMERGENCY MEDICINE

## 2021-08-03 PROCEDURE — 36415 COLL VENOUS BLD VENIPUNCTURE: CPT

## 2021-08-03 RX ORDER — 0.9 % SODIUM CHLORIDE 0.9 %
1000 INTRAVENOUS SOLUTION INTRAVENOUS ONCE
Status: COMPLETED | OUTPATIENT
Start: 2021-08-03 | End: 2021-08-03

## 2021-08-03 RX ORDER — ONDANSETRON 2 MG/ML
4 INJECTION INTRAMUSCULAR; INTRAVENOUS EVERY 30 MIN PRN
Status: DISCONTINUED | OUTPATIENT
Start: 2021-08-03 | End: 2021-08-03 | Stop reason: HOSPADM

## 2021-08-03 RX ADMIN — SODIUM CHLORIDE 1000 ML: 9 INJECTION, SOLUTION INTRAVENOUS at 13:25

## 2021-08-03 RX ADMIN — ONDANSETRON 4 MG: 2 INJECTION INTRAMUSCULAR; INTRAVENOUS at 11:03

## 2021-08-03 ASSESSMENT — ENCOUNTER SYMPTOMS
VOMITING: 0
COUGH: 0
BACK PAIN: 0
NAUSEA: 1
ABDOMINAL PAIN: 1
SHORTNESS OF BREATH: 0

## 2021-08-03 NOTE — ED NOTES
Pt requesting to sit in chair. Chair moved and pt moved to chair, provided turkey sandwich box at this time. Updated on transport for 2030.      Guera Hernandez RN  08/03/21 8762

## 2021-08-03 NOTE — ED PROVIDER NOTES
[address] [facility] [address] [address] EMERGENCY DEPT    EMERGENCY MEDICINE     Pt Name: Paul Card  MRN: [id number]  [place] 7/17/1931  Date of evaluation: 8/3/2021  Provider: Nubia Odom MD    CHIEF COMPLAINT       Chief Complaint   Patient presents with    Urinary Catheter Problem       HISTORY OF PRESENT ILLNESS    Paul Card is a pleasant 80 y.o. male who presents to the emergency department from a nursing home with complaints of 1+ weeks of nausea without vomiting, sensation of suprapubic urinary catheter drainage leaking around to tube insertion site, lower abd pain, most localized at suprapubic catheter site, as well as abdominal distension throughout the entire lower abdomen. He denies any fever, denies any constipation/diarrhea. He denies any other complaints or known exacerbating/relieving factors. He describes the pain as an ache. He reports he told the people at the nursing facility but they didn't seem concerned. Triage notes and Nursing notes were reviewed by myself. Any discrepancies are addressed above.     PAST MEDICAL HISTORY     Past Medical History:   Diagnosis Date    Anemia     Anxiety     BPH (benign prostatic hyperplasia)     CAD (coronary artery disease)     leaking valve    Chronic back pain     COPD (chronic obstructive pulmonary disease) (HCC)     Detached retina     Diabetes mellitus (HCC)     NIDDM    [facility] venous thrombosis) ([facility])     RLE    DVT (deep venous thrombosis) ([address] 11/9/15    LLE    Dysphagia     Glaucoma     Hyperlipidemia     Hypertension     Macular degeneration     Mass of chest     benign chest mass, Dr Tere Maria    Movement disorder     spinal stenosis    Obesity     Osteoarthritis     right ankle, right hand    Paroxysmal atrial fibrillation ([address]     Pneumonia     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    Sleep apnea 1993    on BiPap, Dr Tran Six Testicular hypofunction     Thyroid cancer [facility])     s/p thyroid resection    Urinary incontinence        SURGICAL HISTORY       Past Surgical History:   Procedure Laterality Date   [facility] SURGERY  8810,8969, 2014    BRONCHOSCOPY N/A 8/29/2017    BRONCHOSCOPY AIRWAY ONLY performed by Lauren Ly MD at [address] [address] [address] N/A 6/17/2021    CYSTOSCOPY SUPRAPUBIC TUBE PLACEMENT performed by Kierra Choudhary MD at [facility] EKG 12-LEAD  11/10/2015         FRACTURE SURGERY      right hand    OTHER SURGICAL HISTORY      spinal epidurals    RETINAL DETACHMENT SURGERY      SPINE SURGERY  2004    Lumbar laminectomy    THYROIDECTOMY      TURP N/A 3/17/2020    CYSTOSCOPY WITH GREENLIGHT PHOTOVAPORIZATION OF PROSTATE performed by Kierra Choudhary MD at [facility] TURP N/A 3/24/2020    PLASMA BUTTON TURP WITH CLOT EVACUATION performed by Kierra Choudhary MD at [address] [place] [address]       Current Discharge Medication List      CONTINUE these medications which have NOT CHANGED    Details   furosemide (LASIX) 40 MG tablet Take 1 tablet by mouth daily  Qty: 60 tablet, Refills: 0      potassium chloride (KLOR-CON M) 10 MEQ extended release tablet Take 1 tablet by mouth daily TAKE 1 TABLETS BY MOUTH TWICE DAILY  Qty: 180 tablet, Refills: 0    Comments: **Patient requests 90 days supply**      levothyroxine (SYNTHROID) 175 MCG tablet TAKE 1 TABLET BY MOUTH  DAILY  Qty: 30 tablet, Refills: 0    Associated Diagnoses: Postoperative hypothyroidism      lactobacillus (CULTURELLE) capsule Take 1 capsule by mouth 2 times daily (with meals)  Qty: 30 capsule, Refills: 0      blood glucose test strips (ACCU-CHEK CHE PLUS) strip Accu-check Che Plus Test Strips (or brand per pt preference). Sig: test sugar BID. Dx: E11.8  Qty: 200 strip, Refills: 3    Comments: **Patient requests 90 days supply**  Associated Diagnoses: Type 2 diabetes mellitus with complication, with long-term current use of insulin (HCC)      SOFT TOUCH LANCETS MISC Lancet (or brand per pt preference) Use to check blood sugars 2 times daily. Dx: E11.8  Qty: 200 each, Refills: 3    Associated Diagnoses: Type 2 diabetes mellitus with complication, with long-term current use of insulin ([facility])      Alcohol Swabs (ALCOHOL PREP) 70 % PADS Alcohol pads (brand per preference). Sig: use to test sugar twice per day.  Dx: E11.8  Qty: 200 each, Refills: 3    Associated Diagnoses: Type 2 diabetes mellitus with complication, with long-term current use of insulin ([facility])      doxazosin (CARDURA) 4 MG tablet TAKE 1 TABLET BY MOUTH EVERY NIGHT  Qty: 90 tablet, Refills: 3    Comments: **Patient requests 90 days supply**      simethicone (MYLICON) 374 MG chewable tablet Take 125 mg by mouth every 6 hours as needed for Flatulence      aluminum & magnesium hydroxide-simethicone (MYLANTA) 400-400-40 MG/5ML SUSP Take 15 mLs by mouth every 6 hours as needed      famotidine (PEPCID) 20 MG tablet Take 20 mg by mouth 2 times daily as needed      Arformoterol Tartrate (BROVANA) 15 MCG/2ML NEBU Take 2 mLs by nebulization 2 times daily 1 dose inhale orally two times per day  Qty: 120 mL, Refills: 11      oxybutynin (DITROPAN) 5 MG tablet TAKE 1 TABLET BY MOUTH EVERY 8 HOURS AS NEEDED FOR BLADDER SPASMS OR BLADDER SPASM  Qty: 270 tablet, Refills: 0    Comments: **Patient requests 90 days supply**      Multiple Vitamins-Minerals (PRESERVISION AREDS 2) CAPS Take 1 capsule by mouth daily  Qty: 90 capsule, Refills: 3      spironolactone (ALDACTONE) 25 MG tablet TAKE 1 TABLET BY MOUTH DAILY  Qty: 90 tablet, Refills: 3    Comments: **Patient requests 90 days supply**      Revefenacin 175 MCG/3ML SOLN Inhale 1 nebule into the lungs daily Dx COPD J44.3  Qty: 30 vial, Refills: 5      insulin glargine (LANTUS SOLOSTAR) 100 UNIT/ML injection pen INJECT SUBCUTANEOUSLY 18  UNITS NIGHTLY  Qty: 30 mL, Refills: 3    Associated Diagnoses: Type 2 diabetes mellitus with complication, with long-term current use of insulin ([facility])      ferrous gluconate (FERGON) 324 (38 Fe) MG tablet Take 1 tablet by mouth 2 times daily  Qty: 180 tablet, Refills: 3      metFORMIN (GLUCOPHAGE) 1000 MG tablet Take 1 tablet by mouth 2 times daily (with meals)  Qty: 180 tablet, Refills: 3      budesonide (PULMICORT) 0.5 MG/2ML nebulizer suspension USE 1 VIAL VIA NEBULIZER TWICE DAILY.  RINSE MOUTH AFTER TREATMENT  Qty: 360 mL, Refills: 3    Comments: **Patient requests 90 days supply**      lisinopril (PRINIVIL;ZESTRIL) 2.5 MG tablet Take 1 tablet by mouth daily  Qty: 90 tablet, Refills: 3    Associated Diagnoses: Type 2 diabetes mellitus with complication, with long-term current use of insulin (HCC)      docusate (COLACE, DULCOLAX) 100 MG CAPS Take 100 mg by mouth 2 times daily  Qty: 180 capsule, Refills: 3      vitamin D3 (CHOLECALCIFEROL) 10 MCG (400 UNIT) TABS tablet Take 1 tablet by mouth daily 400units  Qty: 90 tablet, Refills: 3      polyethylene glycol (MIRALAX) 17 g packet Take 17 g by mouth daily as needed for Constipation      acetaminophen (TYLENOL) 325 MG tablet Take 650 mg by mouth every 4 hours as needed for Pain or Fever       lidocaine (LMX) 4 % cream Apply topically every 6 hours as needed for Pain To groin      albuterol sulfate  (90 Base) MCG/ACT inhaler Inhale 2 puffs into the lungs every 6 hours as needed for Wheezing  Qty: 1 Inhaler, Refills: 3      OXYGEN Inhale 3 L into the lungs nightly Indications: Difficulty Breathing, Oxygen Therapy And prn through cpap             ALLERGIES     Latex, Macrobid [nitrofurantoin monohyd macro], Levaquin [levofloxacin], Brimonidine tartrate, Adhesive tape, and Alphagan [brimonidine tartrate]    FAMILY HISTORY       Family History   Problem Relation Age of Onset    Other Mother         Complications of Childbirth    Other Father [de-identified]        Natural causes        SOCIAL HISTORY       Social History     Socioeconomic History    Marital status:      Spouse name: None    Number of children: 2    Years of education: None    Highest education level: None   Occupational History    None   Tobacco Use    Smoking status: Never Smoker    Smokeless tobacco: Never Used   Vaping Use    Vaping Use: Never used   Substance and Sexual Activity    Alcohol use: Yes     Alcohol/week: 1.0 standard drinks     Types: 1 Glasses of wine per week     Comment: Glass of wine with dinner.  Drug use: No    Sexual activity: Never   Other Topics Concern    None   Social History Narrative    None     Social Determinants of Health     Financial Resource Strain:     Difficulty of Paying Living Expenses:    Food Insecurity:     Worried About Running Out of Food in the Last Year:     [address] in the Last Year:    Transportation Needs:     Lack of Transportation (Medical):  Lack of Transportation (Non-Medical):    Physical Activity:     Days of Exercise per Week:     Minutes of Exercise per Session:    Stress:     Feeling of Stress :    Social Connections:     Frequency of Communication with Friends and Family:     Frequency of Social Gatherings with Friends and Family:     Attends Muslim Services:     Active Member of Clubs or Organizations:     Attends Club or Organization Meetings:     Marital Status:    Intimate Partner Violence:     Fear of Current or Ex-Partner:     Emotionally Abused:     Physically Abused:     Sexually Abused:        REVIEW OF SYSTEMS     Review of Systems   Constitutional: Negative for chills, fatigue and fever. Respiratory: Negative for cough and shortness of breath. Cardiovascular: Negative for chest pain and leg swelling. Gastrointestinal: Positive for abdominal pain and nausea. Negative for vomiting. Musculoskeletal: Negative for back pain and neck pain. Skin: Negative for rash and wound. Neurological: Negative for weakness and numbness. All other systems reviewed and are negative. Except as noted above the remainder of the review of systems was reviewed and is.    PHYSICAL EXAM    (up to 7 for level 4, 8 or more for level WO CONTRAST Additional Contrast? None   Final Result      1. Colonic diverticulosis without evidence of acute diverticulitis. 2. Left-sided nonobstructive nephrolithiasis. 3. Enlargement of the prostate gland causing mass effect on the bladder. **This report has been created using voice recognition software. It may contain minor errors which are inherent in voice recognition technology. **      Final report electronically signed by Dr Abram Selby on 8/3/2021 1:31 PM          LABS:  Labs Reviewed   CBC WITH AUTO DIFFERENTIAL - Abnormal; Notable for the following components:       Result Value    WBC 12.0 (*)     RBC 3.79 (*)     Hemoglobin 10.3 (*)     Hematocrit 32.6 (*)     MCHC 31.6 (*)     Segs Absolute 8.5 (*)     Immature Grans (Abs) 0.08 (*)     All other components within normal limits   TROPONIN - Abnormal; Notable for the following components:    Troponin T 0.099 (*)     All other components within normal limits   COMPREHENSIVE METABOLIC PANEL W/ REFLEX TO MG FOR LOW K - Abnormal; Notable for the following components:    CREATININE 1.6 (*)     Chloride 95 (*)     Total Protein 6.0 (*)     Total Bilirubin 0.2 (*)     ALT 6 (*)     All other components within normal limits   TROPONIN - Abnormal; Notable for the following components:    Troponin T 0.099 (*)     All other components within normal limits   GLOMERULAR FILTRATION RATE, ESTIMATED - Abnormal; Notable for the following components:    Est, Glom Filt Rate 41 (*)     All other components within normal limits   OSMOLALITY - Abnormal; Notable for the following components:    Osmolality Calc 274.3 (*)     All other components within normal limits   TROPONIN - Abnormal; Notable for the following components:    Troponin T 0.103 (*)     All other components within normal limits   URINE RT REFLEX TO CULTURE   SPECIMEN REJECTION   LIPASE   ANION GAP       All other labs were within normal range or not returned as of this dictation.   Please note, any cultures that may have been sent were not resulted at the time of this patient visit. EMERGENCY DEPARTMENT COURSE andMedical Decision Making:     MDM/  ED Course as of Aug 03 1722   Tue Aug 03, 2021   1204 Troponin T(!): 0.099 [JS]   1449 Troponin T(!): 0.103 [JS]      ED Course User Index  [JS] Vee Worthington MD     D/w Dr Ayanna Gil, cardiology, reviewed case, reports troponin unlikely related to ACS, agrees with hydration, no need for further cardiac eval    Pt presents to the ER c/o concern for suprapubic catheteter leaking, however no leak here, subjective lower abd distension. Mild STEPHY, given IVF here, tolerating PO, patient hemodynamically stable, patient counseled regarding need for close outpatient f/u and ER return precautions. Strict returnprecautions and follow up instructions were discussed with the patient with which the patient agrees        ED Medications administered this visit:    Medications   ondansetron (ZOFRAN) injection 4 mg (4 mg Intravenous Given 8/3/21 1103)   0.9 % sodium chloride bolus (0 mLs Intravenous Stopped 8/3/21 1516)         Procedures: (None if blank)       CLINICAL       1. Suprapubic pain    2. Elevated troponin level    3.  Acute kidney injury [facility])          DISPOSITION/PLAN   DISPOSITION    Discharge to home    PATIENT REFERRED TO:  Yo Chacon MD  [address] [facility] [address] [phone]    Schedule an appointment as soon as possible for a visit in 2 days        DISCHARGE MEDICATIONS:  Current Discharge Medication List                 (Please note that portions of this note were completed with a voice recognition program.  Efforts were made to edit the dictations but occasionallywords are mis-transcribed.)      Vanessa Abbasi MD,FACEP (electronically signed)  Attending Physician, Emergency Department        Vee Worthington MD  08/03/21 8338

## 2021-08-03 NOTE — ED NOTES
Pt resting in bed, respirations easy and unlabored. Pt denies needs or concerns at this time.      Guera Hernandez RN  08/03/21 7411

## 2021-08-03 NOTE — ED NOTES
Bed: 012A  Expected date:   Expected time:   Means of arrival: [facility]  Comments:     Guera Hernandez RN  08/03/21 1033

## 2021-08-03 NOTE — ED NOTES
Pt resting in bed, respirations unlabored. Call light in reach.      Guera Hernandez RN  08/03/21 1848

## 2021-08-03 NOTE — ED NOTES
Pt resting in bed with eyes closed, respirations unlabored. Call light within reach.      Guera Hernandez RN  08/03/21 2220

## 2021-08-03 NOTE — ED NOTES
Presents to ER via [address] from [address] [place] [address] with complaints of leakage around suprapubic site. Upon arrival area noted to be dry, catheter noted to have yellow/clear urine. Pt also complains of nausea that has been ongoing for one week. States he feels his abdomen has become more distended over the last week.       Guera Hernandez, SHIRLEY  08/03/21 5248

## 2021-08-05 ENCOUNTER — TELEPHONE (OUTPATIENT)
Dept: FAMILY MEDICINE CLINIC | Age: 86
End: 2021-08-05

## 2021-08-05 NOTE — LETTER
[address] [facility]  [address] [facility], [address]  Phone: [phone]  Fax: [phone]    August 5, 2021    Elisa Coker  [address] [phone]    Dear Carmel Radford,    Thank you for choosing our [facility] on 8/3/21. Dr. Pedro Walls wanted to make sure that you understand your discharge instructions and that you were able to fill any prescriptions that may have been ordered for you. Please contact the office at the above phone number if you were advised to follow up with Dr. Pedro Walls, or if you have any further questions or needs. Also, did you know -                             [facility]) practices can often offer you an appointment on the same day that you call for acute issues. *We have some [facility] offices that offer Walk-in appointments; check our website for availability in your community, [email or web address].      *Evisits are now available for patients through [address] [facility]) also offers video visits through [address] If you do not have MyChart and are interested, please contact the office and a staff member may assist you or go to [email or web address].     Sincerely,   Pedro Walls MD and your [facility]

## 2021-08-12 ENCOUNTER — HOSPITAL ENCOUNTER (INPATIENT)
Age: 86
LOS: 1 days | Discharge: HOME | DRG: 699 | End: 2021-08-15
Attending: INTERNAL MEDICINE | Admitting: RADIOLOGY
Payer: MEDICARE

## 2021-08-12 ENCOUNTER — TELEPHONE (OUTPATIENT)
Dept: FAMILY MEDICINE CLINIC | Age: 86
End: 2021-08-12

## 2021-08-12 ENCOUNTER — ANESTHESIA (OUTPATIENT)
Dept: OPERATING ROOM | Age: 86
DRG: 699 | End: 2021-08-12
Payer: MEDICARE

## 2021-08-12 ENCOUNTER — APPOINTMENT (OUTPATIENT)
Dept: INTERVENTIONAL RADIOLOGY/VASCULAR | Age: 86
DRG: 699 | End: 2021-08-12
Payer: MEDICARE

## 2021-08-12 ENCOUNTER — ANESTHESIA EVENT (OUTPATIENT)
Dept: OPERATING ROOM | Age: 86
DRG: 699 | End: 2021-08-12
Payer: MEDICARE

## 2021-08-12 VITALS — DIASTOLIC BLOOD PRESSURE: 49 MMHG | OXYGEN SATURATION: 99 % | SYSTOLIC BLOOD PRESSURE: 89 MMHG

## 2021-08-12 DIAGNOSIS — T83.010A SUPRAPUBIC CATHETER DYSFUNCTION, INITIAL ENCOUNTER (HCC): Primary | ICD-10-CM

## 2021-08-12 LAB
ANION GAP SERPL CALCULATED.3IONS-SCNC: 17 MEQ/L (ref 8–16)
BASOPHILS # BLD: 0.5 %
BASOPHILS ABSOLUTE: 0.1 THOU/MM3 (ref 0–0.1)
BUN BLDV-MCNC: 10 MG/DL (ref 7–22)
CALCIUM SERPL-MCNC: 8.8 MG/DL (ref 8.5–10.5)
CHLORIDE BLD-SCNC: 95 MEQ/L (ref 98–111)
CO2: 23 MEQ/L (ref 23–33)
CREAT SERPL-MCNC: 1.1 MG/DL (ref 0.4–1.2)
EOSINOPHIL # BLD: 1.6 %
EOSINOPHILS ABSOLUTE: 0.2 THOU/MM3 (ref 0–0.4)
ERYTHROCYTE [DISTWIDTH] IN BLOOD BY AUTOMATED COUNT: 14.3 % (ref 11.5–14.5)
ERYTHROCYTE [DISTWIDTH] IN BLOOD BY AUTOMATED COUNT: 44.4 FL (ref 35–45)
GFR SERPL CREATININE-BSD FRML MDRD: 63 ML/MIN/1.73M2
GLUCOSE BLD-MCNC: 128 MG/DL (ref 70–108)
GLUCOSE BLD-MCNC: 145 MG/DL (ref 70–108)
HCT VFR BLD CALC: 35.1 % (ref 42–52)
HEMOGLOBIN: 11.1 GM/DL (ref 14–18)
IMMATURE GRANS (ABS): 0.09 THOU/MM3 (ref 0–0.07)
IMMATURE GRANULOCYTES: 0.6 %
LYMPHOCYTES # BLD: 15.9 %
LYMPHOCYTES ABSOLUTE: 2.4 THOU/MM3 (ref 1–4.8)
MCH RBC QN AUTO: 27.3 PG (ref 26–33)
MCHC RBC AUTO-ENTMCNC: 31.6 GM/DL (ref 32.2–35.5)
MCV RBC AUTO: 86.2 FL (ref 80–94)
MONOCYTES # BLD: 7.9 %
MONOCYTES ABSOLUTE: 1.2 THOU/MM3 (ref 0.4–1.3)
NUCLEATED RED BLOOD CELLS: 0 /100 WBC
OSMOLALITY CALCULATION: 270.8 MOSMOL/KG (ref 275–300)
PLATELET # BLD: 285 THOU/MM3 (ref 130–400)
PMV BLD AUTO: 9.4 FL (ref 9.4–12.4)
POTASSIUM REFLEX MAGNESIUM: 4.1 MEQ/L (ref 3.5–5.2)
RBC # BLD: 4.07 MILL/MM3 (ref 4.7–6.1)
SEG NEUTROPHILS: 73.5 %
SEGMENTED NEUTROPHILS ABSOLUTE COUNT: 10.9 THOU/MM3 (ref 1.8–7.7)
SODIUM BLD-SCNC: 135 MEQ/L (ref 135–145)
WBC # BLD: 14.8 THOU/MM3 (ref 4.8–10.8)

## 2021-08-12 PROCEDURE — BW111ZZ FLUOROSCOPY OF ABDOMEN AND PELVIS USING LOW OSMOLAR CONTRAST: ICD-10-PCS | Performed by: UROLOGY

## 2021-08-12 PROCEDURE — 0TJB8ZZ INSPECTION OF BLADDER, VIA NATURAL OR ARTIFICIAL OPENING ENDOSCOPIC: ICD-10-PCS | Performed by: UROLOGY

## 2021-08-12 PROCEDURE — 51798 US URINE CAPACITY MEASURE: CPT

## 2021-08-12 PROCEDURE — 85025 COMPLETE CBC W/AUTO DIFF WBC: CPT

## 2021-08-12 PROCEDURE — 77002 NEEDLE LOCALIZATION BY XRAY: CPT

## 2021-08-12 PROCEDURE — 51102 DRAIN BL W/CATH INSERTION: CPT

## 2021-08-12 PROCEDURE — 6360000002 HC RX W HCPCS: Performed by: NURSE ANESTHETIST, CERTIFIED REGISTERED

## 2021-08-12 PROCEDURE — 80048 BASIC METABOLIC PNL TOTAL CA: CPT

## 2021-08-12 PROCEDURE — 2500000003 HC RX 250 WO HCPCS: Performed by: NURSE ANESTHETIST, CERTIFIED REGISTERED

## 2021-08-12 PROCEDURE — 36415 COLL VENOUS BLD VENIPUNCTURE: CPT

## 2021-08-12 PROCEDURE — 2500000003 HC RX 250 WO HCPCS: Performed by: UROLOGY

## 2021-08-12 PROCEDURE — 99213 OFFICE O/P EST LOW 20 MIN: CPT | Performed by: PHYSICIAN ASSISTANT

## 2021-08-12 PROCEDURE — 2709999900 IR ABSCESS DRAINAGE PERC

## 2021-08-12 PROCEDURE — 3600000013 HC SURGERY LEVEL 3 ADDTL 15MIN: Performed by: UROLOGY

## 2021-08-12 PROCEDURE — 6370000000 HC RX 637 (ALT 250 FOR IP): Performed by: NURSE PRACTITIONER

## 2021-08-12 PROCEDURE — 6370000000 HC RX 637 (ALT 250 FOR IP): Performed by: PHYSICIAN ASSISTANT

## 2021-08-12 PROCEDURE — 82948 REAGENT STRIP/BLOOD GLUCOSE: CPT

## 2021-08-12 PROCEDURE — 3700000000 HC ANESTHESIA ATTENDED CARE: Performed by: UROLOGY

## 2021-08-12 PROCEDURE — 2580000003 HC RX 258: Performed by: NURSE PRACTITIONER

## 2021-08-12 PROCEDURE — 99283 EMERGENCY DEPT VISIT LOW MDM: CPT

## 2021-08-12 PROCEDURE — 2580000003 HC RX 258: Performed by: NURSE ANESTHETIST, CERTIFIED REGISTERED

## 2021-08-12 PROCEDURE — APPNB30 APP NON BILLABLE TIME 0-30 MINS: Performed by: NURSE PRACTITIONER

## 2021-08-12 PROCEDURE — 6360000004 HC RX CONTRAST MEDICATION: Performed by: RADIOLOGY

## 2021-08-12 PROCEDURE — G0378 HOSPITAL OBSERVATION PER HR: HCPCS

## 2021-08-12 PROCEDURE — 2709999900 HC NON-CHARGEABLE SUPPLY: Performed by: UROLOGY

## 2021-08-12 PROCEDURE — 3600000003 HC SURGERY LEVEL 3 BASE: Performed by: UROLOGY

## 2021-08-12 PROCEDURE — 3700000001 HC ADD 15 MINUTES (ANESTHESIA): Performed by: UROLOGY

## 2021-08-12 PROCEDURE — 2720000010 HC SURG SUPPLY STERILE: Performed by: UROLOGY

## 2021-08-12 PROCEDURE — 6360000002 HC RX W HCPCS: Performed by: RADIOLOGY

## 2021-08-12 PROCEDURE — 6360000004 HC RX CONTRAST MEDICATION: Performed by: UROLOGY

## 2021-08-12 PROCEDURE — 7100000000 HC PACU RECOVERY - FIRST 15 MIN: Performed by: UROLOGY

## 2021-08-12 PROCEDURE — 7100000001 HC PACU RECOVERY - ADDTL 15 MIN: Performed by: UROLOGY

## 2021-08-12 PROCEDURE — 99214 OFFICE O/P EST MOD 30 MIN: CPT | Performed by: NURSE PRACTITIONER

## 2021-08-12 PROCEDURE — C1769 GUIDE WIRE: HCPCS | Performed by: UROLOGY

## 2021-08-12 PROCEDURE — 0T9B30Z DRAINAGE OF BLADDER WITH DRAINAGE DEVICE, PERCUTANEOUS APPROACH: ICD-10-PCS | Performed by: UROLOGY

## 2021-08-12 RX ORDER — ONDANSETRON 4 MG/1
4 TABLET, ORALLY DISINTEGRATING ORAL EVERY 8 HOURS PRN
Status: DISCONTINUED | OUTPATIENT
Start: 2021-08-12 | End: 2021-08-15 | Stop reason: HOSPADM

## 2021-08-12 RX ORDER — BUDESONIDE 0.5 MG/2ML
0.5 INHALANT ORAL 2 TIMES DAILY
Status: DISCONTINUED | OUTPATIENT
Start: 2021-08-12 | End: 2021-08-15 | Stop reason: HOSPADM

## 2021-08-12 RX ORDER — PROPOFOL 10 MG/ML
INJECTION, EMULSION INTRAVENOUS PRN
Status: DISCONTINUED | OUTPATIENT
Start: 2021-08-12 | End: 2021-08-12 | Stop reason: SDUPTHER

## 2021-08-12 RX ORDER — ACETAMINOPHEN 325 MG/1
650 TABLET ORAL EVERY 4 HOURS PRN
Status: DISCONTINUED | OUTPATIENT
Start: 2021-08-12 | End: 2021-08-12 | Stop reason: SDUPTHER

## 2021-08-12 RX ORDER — INSULIN GLARGINE 100 [IU]/ML
18 INJECTION, SOLUTION SUBCUTANEOUS NIGHTLY
Status: DISCONTINUED | OUTPATIENT
Start: 2021-08-12 | End: 2021-08-15 | Stop reason: HOSPADM

## 2021-08-12 RX ORDER — FUROSEMIDE 40 MG/1
40 TABLET ORAL DAILY
Status: DISCONTINUED | OUTPATIENT
Start: 2021-08-13 | End: 2021-08-15 | Stop reason: HOSPADM

## 2021-08-12 RX ORDER — FENTANYL CITRATE 50 UG/ML
INJECTION, SOLUTION INTRAMUSCULAR; INTRAVENOUS PRN
Status: DISCONTINUED | OUTPATIENT
Start: 2021-08-12 | End: 2021-08-12 | Stop reason: SDUPTHER

## 2021-08-12 RX ORDER — M-VIT,TX,IRON,MINS/CALC/FOLIC 27MG-0.4MG
1 TABLET ORAL DAILY
Status: DISCONTINUED | OUTPATIENT
Start: 2021-08-12 | End: 2021-08-15 | Stop reason: HOSPADM

## 2021-08-12 RX ORDER — SODIUM CHLORIDE 9 MG/ML
25 INJECTION, SOLUTION INTRAVENOUS PRN
Status: DISCONTINUED | OUTPATIENT
Start: 2021-08-12 | End: 2021-08-15 | Stop reason: HOSPADM

## 2021-08-12 RX ORDER — POLYETHYLENE GLYCOL 3350 17 G/17G
17 POWDER, FOR SOLUTION ORAL DAILY PRN
Status: DISCONTINUED | OUTPATIENT
Start: 2021-08-12 | End: 2021-08-12 | Stop reason: SDUPTHER

## 2021-08-12 RX ORDER — NICOTINE POLACRILEX 4 MG
15 LOZENGE BUCCAL PRN
Status: DISCONTINUED | OUTPATIENT
Start: 2021-08-12 | End: 2021-08-15 | Stop reason: HOSPADM

## 2021-08-12 RX ORDER — ALBUTEROL SULFATE 90 UG/1
2 AEROSOL, METERED RESPIRATORY (INHALATION) EVERY 6 HOURS PRN
Status: DISCONTINUED | OUTPATIENT
Start: 2021-08-12 | End: 2021-08-15 | Stop reason: HOSPADM

## 2021-08-12 RX ORDER — LISINOPRIL 2.5 MG/1
2.5 TABLET ORAL DAILY
Status: DISCONTINUED | OUTPATIENT
Start: 2021-08-13 | End: 2021-08-15 | Stop reason: HOSPADM

## 2021-08-12 RX ORDER — SODIUM CHLORIDE 0.9 % (FLUSH) 0.9 %
5-40 SYRINGE (ML) INJECTION EVERY 12 HOURS SCHEDULED
Status: DISCONTINUED | OUTPATIENT
Start: 2021-08-12 | End: 2021-08-15 | Stop reason: HOSPADM

## 2021-08-12 RX ORDER — SPIRONOLACTONE 25 MG/1
25 TABLET ORAL DAILY
Status: DISCONTINUED | OUTPATIENT
Start: 2021-08-13 | End: 2021-08-15 | Stop reason: HOSPADM

## 2021-08-12 RX ORDER — LACTOBACILLUS RHAMNOSUS GG 10B CELL
1 CAPSULE ORAL 2 TIMES DAILY WITH MEALS
Status: DISCONTINUED | OUTPATIENT
Start: 2021-08-12 | End: 2021-08-15 | Stop reason: HOSPADM

## 2021-08-12 RX ORDER — DOCUSATE SODIUM 100 MG/1
100 CAPSULE, LIQUID FILLED ORAL 2 TIMES DAILY
Status: DISCONTINUED | OUTPATIENT
Start: 2021-08-12 | End: 2021-08-15 | Stop reason: HOSPADM

## 2021-08-12 RX ORDER — POLYETHYLENE GLYCOL 3350 17 G/17G
17 POWDER, FOR SOLUTION ORAL DAILY PRN
Status: DISCONTINUED | OUTPATIENT
Start: 2021-08-12 | End: 2021-08-15 | Stop reason: HOSPADM

## 2021-08-12 RX ORDER — ACETAMINOPHEN 325 MG/1
650 TABLET ORAL EVERY 4 HOURS PRN
Status: DISCONTINUED | OUTPATIENT
Start: 2021-08-12 | End: 2021-08-15 | Stop reason: HOSPADM

## 2021-08-12 RX ORDER — SODIUM CHLORIDE 0.9 % (FLUSH) 0.9 %
5-40 SYRINGE (ML) INJECTION PRN
Status: DISCONTINUED | OUTPATIENT
Start: 2021-08-12 | End: 2021-08-15 | Stop reason: HOSPADM

## 2021-08-12 RX ORDER — QUINIDINE GLUCONATE 324 MG
240 TABLET, EXTENDED RELEASE ORAL 2 TIMES DAILY
Status: DISCONTINUED | OUTPATIENT
Start: 2021-08-12 | End: 2021-08-15 | Stop reason: HOSPADM

## 2021-08-12 RX ORDER — POTASSIUM CHLORIDE 20 MEQ/1
10 TABLET, EXTENDED RELEASE ORAL 2 TIMES DAILY
Status: DISCONTINUED | OUTPATIENT
Start: 2021-08-12 | End: 2021-08-15 | Stop reason: HOSPADM

## 2021-08-12 RX ORDER — FAMOTIDINE 20 MG/1
20 TABLET, FILM COATED ORAL 2 TIMES DAILY PRN
Status: DISCONTINUED | OUTPATIENT
Start: 2021-08-12 | End: 2021-08-15 | Stop reason: HOSPADM

## 2021-08-12 RX ORDER — LIDOCAINE HCL/PF 100 MG/5ML
SYRINGE (ML) INJECTION PRN
Status: DISCONTINUED | OUTPATIENT
Start: 2021-08-12 | End: 2021-08-12 | Stop reason: SDUPTHER

## 2021-08-12 RX ORDER — MORPHINE SULFATE 4 MG/ML
4 INJECTION, SOLUTION INTRAMUSCULAR; INTRAVENOUS
Status: DISCONTINUED | OUTPATIENT
Start: 2021-08-12 | End: 2021-08-15 | Stop reason: HOSPADM

## 2021-08-12 RX ORDER — LIDOCAINE HYDROCHLORIDE 10 MG/ML
INJECTION, SOLUTION EPIDURAL; INFILTRATION; INTRACAUDAL; PERINEURAL PRN
Status: DISCONTINUED | OUTPATIENT
Start: 2021-08-12 | End: 2021-08-12 | Stop reason: ALTCHOICE

## 2021-08-12 RX ORDER — ONDANSETRON 2 MG/ML
4 INJECTION INTRAMUSCULAR; INTRAVENOUS EVERY 6 HOURS PRN
Status: DISCONTINUED | OUTPATIENT
Start: 2021-08-12 | End: 2021-08-15 | Stop reason: HOSPADM

## 2021-08-12 RX ORDER — MORPHINE SULFATE 2 MG/ML
2 INJECTION, SOLUTION INTRAMUSCULAR; INTRAVENOUS
Status: DISCONTINUED | OUTPATIENT
Start: 2021-08-12 | End: 2021-08-15 | Stop reason: HOSPADM

## 2021-08-12 RX ORDER — DOXAZOSIN MESYLATE 4 MG/1
4 TABLET ORAL NIGHTLY
Status: DISCONTINUED | OUTPATIENT
Start: 2021-08-12 | End: 2021-08-15 | Stop reason: HOSPADM

## 2021-08-12 RX ORDER — SODIUM CHLORIDE 9 MG/ML
INJECTION, SOLUTION INTRAVENOUS CONTINUOUS PRN
Status: DISCONTINUED | OUTPATIENT
Start: 2021-08-12 | End: 2021-08-12 | Stop reason: SDUPTHER

## 2021-08-12 RX ORDER — FENTANYL CITRATE 50 UG/ML
50 INJECTION, SOLUTION INTRAMUSCULAR; INTRAVENOUS ONCE
Status: COMPLETED | OUTPATIENT
Start: 2021-08-12 | End: 2021-08-12

## 2021-08-12 RX ORDER — OXYBUTYNIN CHLORIDE 5 MG/1
5 TABLET ORAL 3 TIMES DAILY PRN
Status: DISCONTINUED | OUTPATIENT
Start: 2021-08-12 | End: 2021-08-15 | Stop reason: HOSPADM

## 2021-08-12 RX ORDER — DEXTROSE MONOHYDRATE 50 MG/ML
100 INJECTION, SOLUTION INTRAVENOUS PRN
Status: DISCONTINUED | OUTPATIENT
Start: 2021-08-12 | End: 2021-08-15 | Stop reason: HOSPADM

## 2021-08-12 RX ORDER — CEFAZOLIN SODIUM 1 G/3ML
INJECTION, POWDER, FOR SOLUTION INTRAMUSCULAR; INTRAVENOUS PRN
Status: DISCONTINUED | OUTPATIENT
Start: 2021-08-12 | End: 2021-08-12 | Stop reason: SDUPTHER

## 2021-08-12 RX ORDER — SODIUM CHLORIDE 9 MG/ML
INJECTION, SOLUTION INTRAVENOUS CONTINUOUS
Status: DISCONTINUED | OUTPATIENT
Start: 2021-08-12 | End: 2021-08-15 | Stop reason: HOSPADM

## 2021-08-12 RX ORDER — MIDAZOLAM HYDROCHLORIDE 1 MG/ML
1 INJECTION INTRAMUSCULAR; INTRAVENOUS ONCE
Status: COMPLETED | OUTPATIENT
Start: 2021-08-12 | End: 2021-08-12

## 2021-08-12 RX ORDER — DEXTROSE MONOHYDRATE 25 G/50ML
12.5 INJECTION, SOLUTION INTRAVENOUS PRN
Status: DISCONTINUED | OUTPATIENT
Start: 2021-08-12 | End: 2021-08-15 | Stop reason: HOSPADM

## 2021-08-12 RX ADMIN — ACETAMINOPHEN 650 MG: 325 TABLET ORAL at 21:44

## 2021-08-12 RX ADMIN — Medication 240 MG: at 21:44

## 2021-08-12 RX ADMIN — DOXAZOSIN 4 MG: 4 TABLET ORAL at 21:44

## 2021-08-12 RX ADMIN — Medication 80 MG: at 15:49

## 2021-08-12 RX ADMIN — FENTANYL CITRATE 50 MCG: 50 INJECTION, SOLUTION INTRAMUSCULAR; INTRAVENOUS at 17:10

## 2021-08-12 RX ADMIN — Medication 1 CAPSULE: at 21:44

## 2021-08-12 RX ADMIN — SODIUM CHLORIDE: 9 INJECTION, SOLUTION INTRAVENOUS at 15:36

## 2021-08-12 RX ADMIN — FENTANYL CITRATE 25 MCG: 50 INJECTION, SOLUTION INTRAMUSCULAR; INTRAVENOUS at 15:44

## 2021-08-12 RX ADMIN — PROPOFOL 50 MG: 10 INJECTION, EMULSION INTRAVENOUS at 15:49

## 2021-08-12 RX ADMIN — INSULIN GLARGINE 18 UNITS: 100 INJECTION, SOLUTION SUBCUTANEOUS at 21:56

## 2021-08-12 RX ADMIN — Medication 1 TABLET: at 21:44

## 2021-08-12 RX ADMIN — PROPOFOL 50 MG: 10 INJECTION, EMULSION INTRAVENOUS at 16:10

## 2021-08-12 RX ADMIN — INSULIN LISPRO 1 UNITS: 100 INJECTION, SOLUTION INTRAVENOUS; SUBCUTANEOUS at 21:55

## 2021-08-12 RX ADMIN — FENTANYL CITRATE 50 MCG: 50 INJECTION, SOLUTION INTRAMUSCULAR; INTRAVENOUS at 16:13

## 2021-08-12 RX ADMIN — SODIUM CHLORIDE: 9 INJECTION, SOLUTION INTRAVENOUS at 18:52

## 2021-08-12 RX ADMIN — MIDAZOLAM 1 MG: 1 INJECTION INTRAMUSCULAR; INTRAVENOUS at 17:10

## 2021-08-12 RX ADMIN — IOTHALAMATE MEGLUMINE 15 ML: 600 INJECTION INTRAVASCULAR at 17:22

## 2021-08-12 RX ADMIN — CEFAZOLIN 2000 MG: 1 INJECTION, POWDER, FOR SOLUTION INTRAMUSCULAR; INTRAVENOUS at 16:03

## 2021-08-12 RX ADMIN — PROPOFOL 150 MG: 10 INJECTION, EMULSION INTRAVENOUS at 15:55

## 2021-08-12 RX ADMIN — FENTANYL CITRATE 25 MCG: 50 INJECTION, SOLUTION INTRAMUSCULAR; INTRAVENOUS at 15:49

## 2021-08-12 RX ADMIN — POTASSIUM CHLORIDE 10 MEQ: 1500 TABLET, EXTENDED RELEASE ORAL at 21:44

## 2021-08-12 RX ADMIN — DOCUSATE SODIUM 100 MG: 100 CAPSULE ORAL at 21:45

## 2021-08-12 ASSESSMENT — PULMONARY FUNCTION TESTS
PIF_VALUE: 0

## 2021-08-12 ASSESSMENT — PAIN DESCRIPTION - ORIENTATION: ORIENTATION: MID;LOWER;LEFT

## 2021-08-12 ASSESSMENT — PAIN SCALES - GENERAL
PAINLEVEL_OUTOF10: 0
PAINLEVEL_OUTOF10: 3
PAINLEVEL_OUTOF10: 8
PAINLEVEL_OUTOF10: 4
PAINLEVEL_OUTOF10: 8

## 2021-08-12 ASSESSMENT — PAIN DESCRIPTION - PROGRESSION
CLINICAL_PROGRESSION: GRADUALLY WORSENING

## 2021-08-12 ASSESSMENT — PAIN DESCRIPTION - FREQUENCY: FREQUENCY: INTERMITTENT

## 2021-08-12 ASSESSMENT — PAIN DESCRIPTION - DESCRIPTORS
DESCRIPTORS: PRESSURE
DESCRIPTORS: PRESSURE

## 2021-08-12 ASSESSMENT — PAIN DESCRIPTION - ONSET: ONSET: ON-GOING

## 2021-08-12 ASSESSMENT — LIFESTYLE VARIABLES: SMOKING_STATUS: 0

## 2021-08-12 ASSESSMENT — PAIN DESCRIPTION - LOCATION
LOCATION: ABDOMEN
LOCATION: GROIN

## 2021-08-12 ASSESSMENT — PAIN DESCRIPTION - PAIN TYPE
TYPE: ACUTE PAIN
TYPE: ACUTE PAIN

## 2021-08-12 ASSESSMENT — PAIN - FUNCTIONAL ASSESSMENT: PAIN_FUNCTIONAL_ASSESSMENT: PREVENTS OR INTERFERES SOME ACTIVE ACTIVITIES AND ADLS

## 2021-08-12 ASSESSMENT — COPD QUESTIONNAIRES: CAT_SEVERITY: MILD

## 2021-08-12 ASSESSMENT — ENCOUNTER SYMPTOMS: SHORTNESS OF BREATH: 0

## 2021-08-12 NOTE — TELEPHONE ENCOUNTER
Ирина Albarran from [facility] called to let you know that patient is being transported to [facility] ED as he has pulled out his suprapubic catheter.

## 2021-08-12 NOTE — ANESTHESIA POSTPROCEDURE EVALUATION
Department of Anesthesiology  Postprocedure Note    Patient: Antonio Lino  MRN: [id number]  YOB: 1931  Date of evaluation: 8/12/2021  Time:  4:47 PM     Procedure Summary     Date: 08/12/21 [facility]    Anesthesia Start: 8739 Anesthesia Stop: 1631    Procedure: CYSTOSCOPY, ATTEMPTED SUPRAPUBIC TUBE PLACEMENT, ATTEMPTED PAINTER CATH (N/A Pelvis) Diagnosis: (URINARY RETENTION, OBSTRUCTION)    Surgeons: Benji Silva MD Responsible Provider: Josefina Fung DO    Anesthesia Type: MAC ASA Status: 3          Anesthesia Type: MAC    Alejandra Phase I: Alejandra Score: 10    Alejandra Phase II:      Last vitals: Reviewed and per EMR flowsheets.        Anesthesia Post Evaluation    Patient location during evaluation: bedside  Patient participation: complete - patient participated  Level of consciousness: awake  Airway patency: patent  Nausea & Vomiting: no nausea  Complications: no  Cardiovascular status: hemodynamically stable  Respiratory status: acceptable  Hydration status: stable

## 2021-08-12 NOTE — ED NOTES
Bed: 042A  Expected date:   Expected time:   Means of arrival: [facility]  Comments:     Brant Ma RN  08/12/21 2082

## 2021-08-12 NOTE — PROGRESS NOTES
1628- Pt received to pacu, alert, oriented, denies pain. Plans to transfer to IR to complete procedure. 1635- pt reports burring in nose and end of penis. 1645- transport here to take patient to IR, anesthesia ok with pt discharge, IR aware of previous sedation.

## 2021-08-12 NOTE — ANESTHESIA PRE PROCEDURE
Department of Anesthesiology  Preprocedure Note       Name:  Tonia Lea   Age:  80 y.o.  :  1931                                          MRN:  [id number]         Date:  2021      Surgeon: Mary Bowden):  Evy Conroy MD    Procedure: CYSTOSCOPY, POSS SUPRAPUBIC TUBE PLACEMENT, POSS PAINTER CATH (N/A )    Medications prior to admission:   Prior to Admission medications    Medication Sig Start Date End Date Taking? Authorizing Provider   furosemide (LASIX) 40 MG tablet Take 1 tablet by mouth daily 21   Yoseph Baez MD   potassium chloride (KLOR-CON M) 10 MEQ extended release tablet Take 1 tablet by mouth daily TAKE 1 TABLETS BY MOUTH TWICE DAILY 21   Yoseph Baez MD   levothyroxine (SYNTHROID) 175 MCG tablet TAKE 1 TABLET BY MOUTH  DAILY 21   Ruddy Jaramillo MD   lactobacillus (CULTURELLE) capsule Take 1 capsule by mouth 2 times daily (with meals) 21   Ruddy Jaramillo MD   blood glucose test strips (ACCU-CHEK CHE PLUS) strip Accu-check Che Plus Test Strips (or brand per pt preference). Sig: test sugar BID. Dx: E11.8 21   MAEGAN Aguayo CNP   SOFT TOUCH LANCETS MISC Lancet (or brand per pt preference) Use to check blood sugars 2 times daily. Dx: E11.8 21   MAEGAN Aguayo CNP   Alcohol Swabs (ALCOHOL PREP) 70 % PADS Alcohol pads (brand per preference). Sig: use to test sugar twice per day.  Dx: E11.8 21   MAEGAN Aguayo CNP   doxazosin (CARDURA) 4 MG tablet TAKE 1 TABLET BY MOUTH EVERY NIGHT 21   Noelle Booth MD   simethicone (MYLICON) 387 MG chewable tablet Take 125 mg by mouth every 6 hours as needed for Flatulence    Historical Provider, MD   aluminum & magnesium hydroxide-simethicone (MYLANTA) 400-400-40 MG/5ML SUSP Take 15 mLs by mouth every 6 hours as needed    Historical Provider, MD   famotidine (PEPCID) 20 MG tablet Take 20 mg by mouth 2 times daily as needed    Historical Provider, MD   Arformoterol Tartrate (BROVANA) 15 MCG/2ML NEBU Take 2 mLs by nebulization 2 times daily 1 dose inhale orally two times per day 5/3/21   Eduardo Beltrán MD   oxybutynin (DITROPAN) 5 MG tablet TAKE 1 TABLET BY MOUTH EVERY 8 HOURS AS NEEDED FOR BLADDER SPASMS OR BLADDER SPASM 4/30/21   Eduardo Betlrán MD   Multiple Vitamins-Minerals (PRESERVISION AREDS 2) CAPS Take 1 capsule by mouth daily 4/9/21   Eduardo Beltrán MD   spironolactone (ALDACTONE) 25 MG tablet TAKE 1 TABLET BY MOUTH DAILY 2/15/21   Lupe Aguilar MD   Revefenacin 175 MCG/3ML SOLN Inhale 1 nebule into the lungs daily Dx COPD J44.3 11/16/20   Julio Cesar Sharp PA-C   insulin glargine (LANTUS SOLOSTAR) 100 UNIT/ML injection pen INJECT SUBCUTANEOUSLY 18  UNITS NIGHTLY  Patient taking differently: No sig reported 9/29/20   Eduardo Beltrán MD   ferrous gluconate (FERGON) 324 (38 Fe) MG tablet Take 1 tablet by mouth 2 times daily 8/18/20   Eduardo Beltrán MD   metFORMIN (GLUCOPHAGE) 1000 MG tablet Take 1 tablet by mouth 2 times daily (with meals) 8/18/20   Eduardo Beltrán MD   budesonide (PULMICORT) 0.5 MG/2ML nebulizer suspension USE 1 VIAL VIA NEBULIZER TWICE DAILY.  RINSE MOUTH AFTER TREATMENT 8/4/20   Eduardo Beltrán MD   Multiple Vitamins-Minerals (PRESERVISION AREDS 2) CAPS Take 1 capsule by mouth daily 8/3/20   Eduardo Beltrán MD   lisinopril (PRINIVIL;ZESTRIL) 2.5 MG tablet Take 1 tablet by mouth daily 7/24/20   Eduardo Beltrán MD   docusate (COLACE, DULCOLAX) 100 MG CAPS Take 100 mg by mouth 2 times daily 7/24/20   Eduardo Beltrán MD   vitamin D3 (CHOLECALCIFEROL) 10 MCG (400 UNIT) TABS tablet Take 1 tablet by mouth daily 400units 7/24/20   Eduardo Beltrán MD   polyethylene glycol (MIRALAX) 17 g packet Take 17 g by mouth daily as needed for Constipation    Historical Provider, MD   acetaminophen (TYLENOL) 325 MG tablet Take 650 mg by mouth every 4 hours as needed for Pain or Fever  6/26/20   Historical Provider, MD   lidocaine (LMX) 4 % cream Apply topically every 6 hours as needed for Pain To groin 6/26/20   Historical Provider, MD   albuterol sulfate  (90 Base) MCG/ACT inhaler Inhale 2 puffs into the lungs every 6 hours as needed for Wheezing 1/24/20   Santiago Escudero PA-C   OXYGEN Inhale 3 L into the lungs nightly Indications: Difficulty Breathing, Oxygen Therapy And prn through cpap    Historical Provider, MD       Current medications:    Current Outpatient Medications   Medication Sig Dispense Refill    furosemide (LASIX) 40 MG tablet Take 1 tablet by mouth daily 60 tablet 0    potassium chloride (KLOR-CON M) 10 MEQ extended release tablet Take 1 tablet by mouth daily TAKE 1 TABLETS BY MOUTH TWICE DAILY 180 tablet 0    levothyroxine (SYNTHROID) 175 MCG tablet TAKE 1 TABLET BY MOUTH  DAILY 30 tablet 0    lactobacillus (CULTURELLE) capsule Take 1 capsule by mouth 2 times daily (with meals) 30 capsule 0    blood glucose test strips (ACCU-CHEK CHE PLUS) strip Accu-check Che Plus Test Strips (or brand per pt preference). Sig: test sugar BID. Dx: E11.8 200 strip 3    SOFT TOUCH LANCETS MISC Lancet (or brand per pt preference) Use to check blood sugars 2 times daily. Dx: E11.8 200 each 3    Alcohol Swabs (ALCOHOL PREP) 70 % PADS Alcohol pads (brand per preference). Sig: use to test sugar twice per day.  Dx: E11.8 200 each 3    doxazosin (CARDURA) 4 MG tablet TAKE 1 TABLET BY MOUTH EVERY NIGHT 90 tablet 3    simethicone (MYLICON) 926 MG chewable tablet Take 125 mg by mouth every 6 hours as needed for Flatulence      aluminum & magnesium hydroxide-simethicone (MYLANTA) 400-400-40 MG/5ML SUSP Take 15 mLs by mouth every 6 hours as needed      famotidine (PEPCID) 20 MG tablet Take 20 mg by mouth 2 times daily as needed      Arformoterol Tartrate (BROVANA) 15 MCG/2ML NEBU Take 2 mLs by nebulization 2 times daily 1 dose inhale orally two times per day 120 mL 11    oxybutynin (DITROPAN) 5 MG tablet TAKE 1 TABLET BY MOUTH EVERY 8 HOURS AS NEEDED FOR BLADDER SPASMS OR BLADDER SPASM 270 tablet 0    Multiple Vitamins-Minerals (PRESERVISION AREDS 2) CAPS Take 1 capsule by mouth daily 90 capsule 3    spironolactone (ALDACTONE) 25 MG tablet TAKE 1 TABLET BY MOUTH DAILY 90 tablet 3    Revefenacin 175 MCG/3ML SOLN Inhale 1 nebule into the lungs daily Dx COPD J44.3 30 vial 5    insulin glargine (LANTUS SOLOSTAR) 100 UNIT/ML injection pen INJECT SUBCUTANEOUSLY 18  UNITS NIGHTLY (Patient taking differently: No sig reported) 30 mL 3    ferrous gluconate (FERGON) 324 (38 Fe) MG tablet Take 1 tablet by mouth 2 times daily 180 tablet 3    metFORMIN (GLUCOPHAGE) 1000 MG tablet Take 1 tablet by mouth 2 times daily (with meals) 180 tablet 3    budesonide (PULMICORT) 0.5 MG/2ML nebulizer suspension USE 1 VIAL VIA NEBULIZER TWICE DAILY. RINSE MOUTH AFTER TREATMENT 360 mL 3    lisinopril (PRINIVIL;ZESTRIL) 2.5 MG tablet Take 1 tablet by mouth daily 90 tablet 3    docusate (COLACE, DULCOLAX) 100 MG CAPS Take 100 mg by mouth 2 times daily 180 capsule 3    vitamin D3 (CHOLECALCIFEROL) 10 MCG (400 UNIT) TABS tablet Take 1 tablet by mouth daily 400units 90 tablet 3    polyethylene glycol (MIRALAX) 17 g packet Take 17 g by mouth daily as needed for Constipation      acetaminophen (TYLENOL) 325 MG tablet Take 650 mg by mouth every 4 hours as needed for Pain or Fever       lidocaine (LMX) 4 % cream Apply topically every 6 hours as needed for Pain To groin      albuterol sulfate  (90 Base) MCG/ACT inhaler Inhale 2 puffs into the lungs every 6 hours as needed for Wheezing 1 Inhaler 3    OXYGEN Inhale 3 L into the lungs nightly Indications: Difficulty Breathing, Oxygen Therapy And prn through cpap       No current facility-administered medications for this visit. Allergies: Allergies   Allergen Reactions    Latex Rash    Macrobid [Nitrofurantoin Amador Dawson prefers pt not use Macrobid due to pulmonary side effects.     Levaquin tract infection without hematuria N39.0    Hypokalemia E87.6    Weakness of left lower extremity R29.898    Pill dysphagia R13.10    Hx of thyroid cancer Z85.850    Gross hematuria R31.0    Constipation K59.00    Acute urinary retention R33.8    COPD exacerbation (HCC) J44.1    Acute on chronic respiratory failure with hypoxia and hypercapnia (HCC) J96.21, J96.22    Acute on chronic diastolic congestive heart failure ([facility]) I50.33    Bilateral leg edema +2 now +1 R60.0    Community acquired bacterial pneumonia J15.9    Elevated troponin R77.8    Scrotal pain N50.82    Acute kidney injury superimposed on CKD ([facility]) N17.9, N18.9    Catheter-associated urinary tract infection ([facility]) T83.511A, N39.0    Acute metabolic encephalopathy R47.74    History of BPH Z87.438    First degree AV block I44.0    Hx of coronary artery disease Z86.79    Chronic back pain M54.9, G89.29    Suprapubic catheter dysfunction ([facility]) T83.010A       Past Medical History:        Diagnosis Date    Anemia     Anxiety     BPH (benign prostatic hyperplasia)     CAD (coronary artery disease)     leaking valve    Chronic back pain     COPD (chronic obstructive pulmonary disease) ([facility])     Detached retina     Diabetes mellitus ([facility])     NIDDM    DVT (deep venous thrombosis) ([address]     RLE    DVT (deep venous thrombosis) ([address] 11/9/15    LLE    Dysphagia     Glaucoma     Hyperlipidemia     Hypertension     Macular degeneration     Mass of chest     benign chest mass, Dr Lupe Erwin    Movement disorder     spinal stenosis    Obesity     Osteoarthritis     right ankle, right hand    Paroxysmal atrial fibrillation ([address]     Pneumonia     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)     s/p thyroid resection    Urinary incontinence        Past Surgical History:        Procedure Laterality Date   [facility] SURGERY  2006,2011, 2014    BRONCHOSCOPY N/A 8/29/2017    BRONCHOSCOPY AIRWAY ONLY performed by Puma Turk MD at [facility] [facility] [address] N/A 6/17/2021    CYSTOSCOPY SUPRAPUBIC TUBE PLACEMENT performed by Steph Ambrose MD at [address] EKG 12-LEAD  11/10/2015         FRACTURE SURGERY      right hand    OTHER SURGICAL HISTORY      spinal epidurals    RETINAL DETACHMENT SURGERY      SPINE SURGERY  2004    Lumbar laminectomy    THYROIDECTOMY      TURP N/A 3/17/2020    CYSTOSCOPY WITH GREENLIGHT PHOTOVAPORIZATION OF PROSTATE performed by Steph Ambrose MD at [address] TURP N/A 3/24/2020    PLASMA BUTTON TURP WITH CLOT EVACUATION performed [facility] Steph [facility]rose MD at [facility] History:    Social History     Tobacco Use    Smoking status: Never Smoker    Smokeless tobacco: Never Used   Substance Use Topics    Alcohol use: Yes     Alcohol/week: 1.0 standard drinks     Types: 1 Glasses of wine per week     Comment: Glass of wine with dinner. Counseling given: Not Answered      Vital Signs (Current): There were no vitals filed for this visit.                                            BP Readings from Last 3 Encounters:   08/12/21 (!) 142/80   08/03/21 (!) 102/58   07/15/21 (!) 143/57       NPO Status:                                                                                 BMI:   Wt Readings from Last 3 Encounters:   08/12/21 250 lb (113.4 kg)   08/03/21 250 lb (113.4 kg)   07/15/21 250 lb (113.4 kg)     There is no height or weight on file to calculate BMI.    CBC:   Lab Results   Component Value Date    WBC 14.8 08/12/2021    RBC 4.07 08/12/2021    HGB 11.1 08/12/2021    HCT 35.1 08/12/2021    MCV 86.2 08/12/2021    RDW 14.6 05/02/2018     08/12/2021       CMP:   Lab Results   Component Value Date     08/12/2021    K 4.1 08/12/2021    CL 95 08/12/2021    CO2 23 08/12/2021    BUN 10 08/12/2021    CREATININE 1.1 08/12/2021    LABGLOM 63 08/12/2021    GLUCOSE 128 08/12/2021 GLUCOSE 130 11/30/2011    PROT 6.0 08/03/2021    PROT 6.9 02/05/2011    CALCIUM 8.8 08/12/2021    BILITOT 0.2 08/03/2021    ALKPHOS 68 08/03/2021    AST 9 08/03/2021    ALT 6 08/03/2021       POC Tests:   No results for input(s): POCGLU, POCNA, POCK, POCCL, POCBUN, POCHEMO, POCHCT in the last 72 hours. Coags:   Lab Results   Component Value Date    INR 0.97 03/22/2021    APTT 29.1 10/03/2020       HCG (If Applicable): No results found for: PREGTESTUR, PREGSERUM, HCG, HCGQUANT     ABGs: No results found for: PHART, PO2ART, NNM4MDU, MSF8MJX, BEART, V4DOAMJN     Type & Screen (If Applicable):  No results found for: LABABO, [address]    Anesthesia Evaluation  Patient summary reviewed and Nursing notes reviewed no history of anesthetic complications:   Airway: Mallampati: III  TM distance: <3 FB   Neck ROM: limited  Mouth opening: > = 3 FB Dental:          Pulmonary:   (+) COPD: mild,  sleep apnea: on CPAP,  decreased breath sounds,      (-) shortness of breath and not a current smoker                           Cardiovascular:  Exercise tolerance: poor (<4 METS),   (+) hypertension: mild, CAD:, CHF: diastolic, hyperlipidemia      ECG reviewed  Rhythm: regular  Rate: normal  Echocardiogram reviewed               ROS comment: Echo 6/14/21  Left Ventricle   Normal left ventricle size and systolic function. Ejection fraction was   estimated at 55 %. There were no regional left ventricular wall motion   abnormalities and wall thickness was within normal limits. Doppler parameters were consistent with abnormal left ventricular   relaxation (grade 1 diastolic dysfunction). Neuro/Psych:      (-) seizures and CVA           GI/Hepatic/Renal:   (+) morbid obesity     (-) GERD, liver disease and no renal disease       Endo/Other:    (+) DiabetesType II DM, using insulin, hypothyroidism::., .                 Abdominal:   (+) obese,           Vascular:   + DVT (many years ago.  Still takes warfarin, held for a 'couple of weeks\"), . Other Findings:               Anesthesia Plan      MAC     ASA 3     (PIV. Additional access can be obtained after induction if needed. Standard ASA monitors. IV/PO opioids and other adjuncts as needed for pain control. PACU post op for recovery. Possible anesthetics complications were discussed with the patient, including but not limited to: PONV, damage to the airway and surrounding structures (teeth, lips, gums, tongue, etc.), adverse reactions to medicine, cardiac complications (MI, CHF, arrhythmias, etc.), respiratory complications (post-op ventilation, respiratory failure, etc.), neurologic complications (nerve damage, stroke, seizure), and death. The patient was given the opportunity to ask questions and all questions were answered to the patient's satisfaction. The patient is in agreement with the anesthetic plan. Had 2 small crackers around 10AM  Pt holding DNR and will be full code cameron-op, consent signed)  Induction: intravenous. MIPS: Postoperative opioids intended and Prophylactic antiemetics administered. Anesthetic plan and risks discussed with patient. Plan discussed with CRNA and surgical team.                Faustino Guan DO   8/12/2021        DOS STAFF ADDENDUM:    Chart reviewed. Pt seen and examined. Plan as above, discussed with CRNA. Discussed risks, benefits, alternatives, and personnel involved with patient, who agrees to proceed.       Faustino Guan DO  Staff Anesthesiologist  3:25 PM

## 2021-08-12 NOTE — H&P
Formulation and discussion of sedation / procedure plans, risks, benefits, side effects and alternatives with patient and/or responsible adult completed.     Electronically signed by Tam Aleman MD on 8/12/2021 at 5:23 PM

## 2021-08-12 NOTE — PROGRESS NOTES
Pt admitted to  [facility] via via cart/stretcher and  From specials. Complaints: None. IV normal saline infusing into the hand right, condition patent and no redness at a rate of 75 mls/ hour with about 600 mls in the bag still. IV site free of s/s of infection or infiltration. Vital signs obtained. Assessment and data collection initiated. Two nurse skin assessment performed by Jinny Mahan RN RN and Radha Mei. Oriented to room. Policies and procedures for  explained. All questions answered with no further questions at this time. Fall prevention and safety brochure discussed with patient. Bed alarm on. Call light in reach. Oriented to room. Bonny Garcia RN, RN 8/12/2021 7:40 PM     Explained patients right to have family, representative or physician notified of their admission. Patient has Declined for physician to be notified. Patient has Declined for family/representative to be notified.

## 2021-08-12 NOTE — CONSULTS
Hospitalist Consult Note        Patient:  Lydia Hernández  YOB: 1931  Date of Service: 8/12/2021  MRN: [id number]   Acct:  [de-identified]   Primary Care Physician: El Zimmerman MD    Chief Complaint:  SP catheter malfunction  Reason for consult  DM management    Date of Service: Pt seen/examined in consultation on 8/12/2021     History Of Present Illness:      Cand[age] y.o. male who we are asked to see/evaluate by Jaymie Hui MD for medical management of DM II. Patient admitted following replacement of his SP catheter. Assessment and Plan:-  1. SP catheter malfunction: primary to manage  2.  DM II: continue Lantus, hold metformin, low dose sliding scale insulin, hypoglycemic treatment orders        Past Medical History:        Diagnosis Date    Anemia     Anxiety     BPH (benign prostatic hyperplasia)     CAD (coronary artery disease)     leaking valve    Chronic back pain     COPD (chronic obstructive pulmonary disease) ([address]     Detached retina     Diabetes mellitus ([address]     NIDDM    DVT (deep venous thrombosis) ([address]     RLE    DVT (deep venous thrombosis) ([address] 11/9/15    LLE    Dysphagia     Glaucoma     Hyperlipidemia     Hypertension     Macular degeneration     Mass of chest     benign chest mass, Dr Raffy Candelaria    Movement disorder     spinal stenosis    Obesity     Osteoarthritis     right ankle, right hand    Paroxysmal atrial fibrillation ([address]     Pneumonia     Primary thyroid follicular carcinoma 9/4/9043    Sleep apnea 1993    on BiPap, Dr Ashley Diaz Testicular hypofunction     Thyroid cancer [facility])     s/p thyroid resection    Urinary incontinence        Past Surgical History:        Procedure Laterality Date   [facility] SURGERY  2006,2011, 2014    BRONCHOSCOPY N/A 8/29/2017    BRONCHOSCOPY AIRWAY ONLY performed by Roney Hector MD at [address] N/A 6/17/2021    CYSTOSCOPY SUPRAPUBIC TUBE PLACEMENT performed by Buffy Beck MD at [facility] OR    EKG 12-LEAD  11/10/2015         FRACTURE SURGERY      right hand    OTHER SURGICAL HISTORY      spinal epidurals    RETINAL DETACHMENT SURGERY      SPINE SURGERY  2004    Lumbar laminectomy    THYROIDECTOMY      TURP N/A 3/17/2020    CYSTOSCOPY WITH GREENLIGHT PHOTOVAPORIZATION OF PROSTATE performed by Buffy Beck MD at [facility] TURP N/A 3/24/2020    PLASMA BUTTON TURP WITH CLOT EVACUATION performed by Buffy Beck MD at [address] [facility] [place] Medications:   No current facility-administered medications on file prior to encounter. Current Outpatient Medications on File Prior to Encounter   Medication Sig Dispense Refill    furosemide (LASIX) 40 MG tablet Take 1 tablet by mouth daily 60 tablet 0    potassium chloride (KLOR-CON M) 10 MEQ extended release tablet Take 1 tablet by mouth daily TAKE 1 TABLETS BY MOUTH TWICE DAILY 180 tablet 0    levothyroxine (SYNTHROID) 175 MCG tablet TAKE 1 TABLET BY MOUTH  DAILY 30 tablet 0    lactobacillus (CULTURELLE) capsule Take 1 capsule by mouth 2 times daily (with meals) 30 capsule 0    blood glucose test strips (ACCU-CHEK MERYL PLUS) strip Accu-check Meryl Plus Test Strips (or brand per pt preference). Sig: test sugar BID. Dx: E11.8 200 strip 3    SOFT TOUCH LANCETS MISC Lancet (or brand per pt preference) Use to check blood sugars 2 times daily. Dx: E11.8 200 each 3    Alcohol Swabs (ALCOHOL PREP) 70 % PADS Alcohol pads (brand per preference). Sig: use to test sugar twice per day.  Dx: E11.8 200 each 3    doxazosin (CARDURA) 4 MG tablet TAKE 1 TABLET BY MOUTH EVERY NIGHT 90 tablet 3    simethicone (MYLICON) 951 MG chewable tablet Take 125 mg by mouth every 6 hours as needed for Flatulence      aluminum & magnesium hydroxide-simethicone (MYLANTA) 400-400-40 MG/5ML SUSP Take 15 mLs by mouth every 6 hours as needed      famotidine (PEPCID) 20 MG tablet Take 20 mg by mouth 2 times daily as needed      Arformoterol Tartrate (BROVANA) 15 MCG/2ML NEBU Take 2 mLs by nebulization 2 times daily 1 dose inhale orally two times per day 120 mL 11    oxybutynin (DITROPAN) 5 MG tablet TAKE 1 TABLET BY MOUTH EVERY 8 HOURS AS NEEDED FOR BLADDER SPASMS OR BLADDER SPASM 270 tablet 0    Multiple Vitamins-Minerals (PRESERVISION AREDS 2) CAPS Take 1 capsule by mouth daily 90 capsule 3    spironolactone (ALDACTONE) 25 MG tablet TAKE 1 TABLET BY MOUTH DAILY 90 tablet 3    Revefenacin 175 MCG/3ML SOLN Inhale 1 nebule into the lungs daily Dx COPD J44.3 30 vial 5    insulin glargine (LANTUS SOLOSTAR) 100 UNIT/ML injection pen INJECT SUBCUTANEOUSLY 18  UNITS NIGHTLY (Patient taking differently: No sig reported) 30 mL 3    ferrous gluconate (FERGON) 324 (38 Fe) MG tablet Take 1 tablet by mouth 2 times daily 180 tablet 3    metFORMIN (GLUCOPHAGE) 1000 MG tablet Take 1 tablet by mouth 2 times daily (with meals) 180 tablet 3    budesonide (PULMICORT) 0.5 MG/2ML nebulizer suspension USE 1 VIAL VIA NEBULIZER TWICE DAILY.  RINSE MOUTH AFTER TREATMENT 360 mL 3    [DISCONTINUED] Multiple Vitamins-Minerals (PRESERVISION AREDS 2) CAPS Take 1 capsule by mouth daily 90 capsule 3    lisinopril (PRINIVIL;ZESTRIL) 2.5 MG tablet Take 1 tablet by mouth daily 90 tablet 3    docusate (COLACE, DULCOLAX) 100 MG CAPS Take 100 mg by mouth 2 times daily 180 capsule 3    vitamin D3 (CHOLECALCIFEROL) 10 MCG (400 UNIT) TABS tablet Take 1 tablet by mouth daily 400units 90 tablet 3    polyethylene glycol (MIRALAX) 17 g packet Take 17 g by mouth daily as needed for Constipation      acetaminophen (TYLENOL) 325 MG tablet Take 650 mg by mouth every 4 hours as needed for Pain or Fever       lidocaine (LMX) 4 % cream Apply topically every 6 hours as needed for Pain To groin      albuterol sulfate  (90 Base) MCG/ACT inhaler Inhale 2 puffs into the lungs every 6 hours as needed for Wheezing 1 Inhaler 3    OXYGEN Inhale 3 L into the lungs nightly Indications: Difficulty Breathing, Oxygen Therapy And prn through cpap         Allergies:    Latex, Macrobid [nitrofurantoin monohyd macro], Levaquin [levofloxacin], Brimonidine tartrate, Adhesive tape, and Alphagan [brimonidine tartrate]    Social History:    reports that he has never smoked. He has never used smokeless tobacco. He reports current alcohol use of about 1.0 standard drinks of alcohol per week. He reports that he does not use drugs. Family History:       Problem Relation Age of Onset    Other Mother         Complications of Childbirth    Other Father [de-identified]        Natural causes       Diet:  ADULT DIET; Regular; 4 carb choices (60 gm/meal)    Review of systems:   Pertinent positives as noted in the HPI. All other systems reviewed and negative. PHYSICAL EXAM:  BP (!) 103/54   Pulse 60   Temp 97.9 °F (36.6 °C) (Oral)   Resp 16   Ht 5' 7\" (1.702 m)   Wt 250 lb (113.4 kg)   SpO2 98%   BMI 39.16 kg/m²   General appearance: No apparent distress, appears stated age and cooperative. HEENT: Normal cephalic, atraumatic without obvious deformity. Pupils equal, round, and reactive to light. Extra ocular muscles intact. Conjunctivae/corneas clear. Neck: Supple, with full range of motion. No jugular venous distention. Trachea midline. Respiratory:  Normal respiratory effort. Clear to auscultation, bilaterally without Rales/Wheezes/Rhonchi. Cardiovascular: Regular rate and rhythm with normal S1/S2 without murmurs, rubs or gallops. Abdomen: Soft, non-tender, non-distended with normal bowel sounds. Musculoskeletal:  No clubbing, cyanosis or edema bilaterally. Skin: Skin color, texture, turgor normal.  No rashes or lesions. Neurologic:  Neurovascularly intact without any focal sensory/motor deficits.  Cranial nerves: II-XII intact, grossly non-focal.  Psychiatric: Alert and oriented, thought content appropriate, normal insight  Capillary Refill: Brisk,< 3 seconds   Peripheral Pulses: +2 palpable, equal bilaterally     Labs:   Recent Labs     08/12/21  1440

## 2021-08-12 NOTE — CONSULTS
Urology Consult Note      Reason for Consult:  SP catheter problem  Requesting Physician:  Dr Joseph Means    History Obtained From:  patient, electronic medical record    Chief Complaint: dislodged SP catheter, no longer draining    HISTORY OF PRESENT ILLNESS:                The patient is a 80 y.o. male with significant past medical history of as listed below who presents with SP catheter problem. SP catheter no longer draining. ECF personnel changed SP catheter this morning per pt report but has not drained since that time. Pt reports bladder pressure, abdominal pain. Known to our practice. Follows with Dr Cassie Resendiz. Pt underwent cysto with Greenlight PVP on 3/17/2020 by Dr Cassie Resendiz. He required chronic catheter after procedure, and has been dealing with recurrent UTI. It was elected to undergo SP catheter placement which was done 6/17. Pt presented to ER with UTI in July. SP catheter exchanged 7- while in the hospital for UTI. He was to follow up 8- with Dr Cassie Resendiz in the office.  Catheter was draining well until this morning.       Past Medical History:        Diagnosis Date    Anemia     Anxiety     BPH (benign prostatic hyperplasia)     CAD (coronary artery disease)     leaking valve    Chronic back pain     COPD (chronic obstructive pulmonary disease) ([address]     Detached retina     Diabetes mellitus (HCC)     NIDDM    [facility]     RLE    DVT (deep venous thrombosis) ([address] 11/9/15    LLE    Dysphagia     Glaucoma     Hyperlipidemia     Hypertension     Macular degeneration     Mass of chest     benign chest mass, Dr Lupe Erwin    Movement disorder     spinal stenosis    Obesity     Osteoarthritis     right ankle, right hand    Paroxysmal atrial fibrillation ([address]     Pneumonia     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    Sleep apnea 1993    on BiPap, Dr Edward Charlton Testicular hypofunction     Thyroid cancer [facility])     s/p thyroid resection    Urinary incontinence      Past Surgical History:        Procedure Laterality Date   [facility] SURGERY  [id number], 2014    BRONCHOSCOPY N/A 8/29/2017    [facility]WAY [facility]20[facility]    CYSTOSCOPY SUPRAPUBIC TUBE PLACEMENT performed by Thee Phelps MD at [address] [facility] EKG 12-LEAD  11/10/2015         FRACTURE SURGERY      right hand    OTHER SURGICAL HISTORY      spinal epidurals    RETINAL DETACHMENT SURGERY      SPINE SURGERY  2004    Lumbar laminectomy    THYROIDECTOMY      TURP N/A 3/17/2020    CYSTOSCOPY WITH GREENLIGHT PHOTOVAPORIZATION OF PROSTATE performed by Thee Phelps MD at [address] [facility] TURP N/A 3/24/2020    PLASMA BUTTON TURP WITH CLOT EVACUATION performed by Thee Phelps MD at [place] [facility]     Allergies:  Latex, Macrobid [nitrofurantoin monohyd macro], Levaquin [levofloxacin], Brimonidine tartrate, Adhesive tape, and Alphagan [brimonidine tartrate]    Social History     Socioeconomic History    Marital status:      Spouse name: Not on file    Number of children: 2    Years of education: Not on file    Highest education level: Not on file   Occupational History    Not on file   Tobacco Use    Smoking status: Never Smoker    Smokeless tobacco: Never Used   Vaping Use    Vaping Use: Never used   Substance and Sexual Activity    Alcohol use: Yes     Alcohol/week: 1.0 standard drinks     Types: 1 Glasses of wine per week     Comment: Glass of wine with dinner.  Drug use: No    Sexual activity: Never   Other Topics Concern    Not on file   Social History Narrative    Not on file     Social Determinants of Health     Financial Resource Strain:     Difficulty of Paying Living Expenses:    Food Insecurity:     Worried About Running Out of Food in the Last Year:     [address] in the Last Year:    Transportation Needs:     Lack of Transportation (Medical):      Lack of Transportation (Non-Medical):    Physical Activity:     Days of Exercise per Week:     Minutes of Exercise per Session:    Stress:     Feeling of Stress :    Social Connections:     Frequency of Communication with Friends and Family:     Frequency of Social Gatherings with Friends and Family:     Attends Protestant Services:     Active Member of Clubs or Organizations:     Attends Club or Organization Meetings:     Marital Status:    Intimate Partner Violence:     Fear of Current or Ex-Partner:     Emotionally Abused:     Physically Abused:     Sexually Abused:        Family History:       Problem Relation Age of Onset    Other Mother         Complications of Childbirth    Other Father [de-identified]        Natural causes       ROS:  Constitutional: Negative for chills, fatigue, fever, or weight loss. Eyes: Denies reported visual changes. ENT: Denies headache, difficulty swallowing, earache, and nosebleeds. Cardiovascular: Negative for chest pain, palpitations, tachycardia or edema. Respiratory: Denies cough or SOB. GI: + suprapubic pain  : see HPI. Musculoskeletal: Patient denies low back pain or painful or reduced range of ROM. Neurological: The patient denies any symptoms of neurological impairment or TIA. Psychiatric: Denies anxiety or depression. Skin: Denies rash or lesions. All remaining ROS negative. PHYSICAL EXAM:  VITALS:  BP (!) 142/80   Pulse 72   Temp 98.4 °F (36.9 °C) (Oral)   Resp 18   Ht 5' 7\" (1.702 m)   Wt 250 lb (113.4 kg)   SpO2 97%   BMI 39.16 kg/m² . Nursing note and vitals reviewed. Constitutional: Alert and oriented times x3, no acute distress, and cooperative to examination with appropriate mood and affect. HEENT:   Head:         Normocephalic and atraumatic. Mouth/Throat:          Mucous membranes are normal.   Eyes:         EOM are normal. No scleral icterus. Nose:    The external appearance of the nose is normal  Ears: The ears appear normal to external inspection.    Cardiovascular:       Normal rate, regular rhythm. Pulmonary/Chest:  Normal respiratory rate and rhthym. No use of accessory muscles. Lungs clear bilaterally. Abdominal:          Soft. SP catheter in place with minimal bloody output in tubing. Genitalia:     Urethral meatus is normal in size and location  Scrotal contents normal to inspection and palpation   Normal testes palpated bilaterally  Musculoskeletal:    Normal range of motion. Extremities:    No cyanosis, clubbing, or edema present. Neurological:    Alert and oriented.      DATA:  CBC:   Lab Results   Component Value Date    WBC 14.8 08/12/2021    RBC 4.07 08/12/2021    HGB 11.1 08/12/2021    HCT 35.1 08/12/2021    MCV 86.2 08/12/2021    MCH 27.3 08/12/2021    MCHC 31.6 08/12/2021    RDW 14.6 05/02/2018     08/12/2021    MPV 9.4 08/12/2021     BMP:    Lab Results   Component Value Date     08/03/2021    K 5.2 08/03/2021    CL 95 08/03/2021    CO2 30 08/03/2021    BUN 14 08/03/2021    CREATININE 1.6 08/03/2021    CALCIUM 8.7 08/03/2021    LABGLOM 41 08/03/2021    GLUCOSE 99 08/03/2021    GLUCOSE 130 11/30/2011     BUN/Creatinine:    Lab Results   Component Value Date    BUN 14 08/03/2021    CREATININE 1.6 08/03/2021     Magnesium:    Lab Results   Component Value Date    MG 1.9 07/11/2021     Phosphorus:    Lab Results   Component Value Date    PHOS 3.4 03/14/2019     PT/INR:    Lab Results   Component Value Date    INR 0.97 03/22/2021     U/A:    Lab Results   Component Value Date    NITRITE negative 01/31/2020    COLORU YELLOW 08/03/2021    PHUR 6.5 08/03/2021    LABCAST 4-8 HYALINE 12/05/2020    LABCAST NONE SEEN 12/05/2020    WBCUA 5-9 07/15/2021    RBCUA 3-5 07/15/2021    MUCUS NONE SEEN 03/22/2021    YEAST NONE SEEN 07/15/2021    BACTERIA NONE SEEN 07/15/2021    CLARITYU clear 01/31/2020    SPECGRAV 1.015 12/05/2020    LEUKOCYTESUR NEGATIVE 08/03/2021    UROBILINOGEN 0.2 08/03/2021    BILIRUBINUR NEGATIVE 08/03/2021    BILIRUBINUR negative 01/31/2020    BLOODU NEGATIVE 08/03/2021    GLUCOSEU NEGATIVE 08/03/2021    AMORPHOUS NONE SEEN 03/22/2021       IMPRESSION:     SP catheter problem    Plan:      SP catheter no longer draining. ER resident performed US at bedside which showed catheter tip outside of bladder. 16 fr urethral catheter was attempted to be placed and unsuccessful. Dr. Roberto montero at bedside. Unable to advance urethral catheter or SP catheter. 12 fr SP catheter was attempted as well with no success. Will need to go to OR for cystoscopy, possible SP catheter placement vs urethral catheter placement by Dr Roberto montero. NPO since last night. I described the procedure in detail and also described the associated risks and benefits at length. We discussed possible alternative therapies. We discussed the risks and benefits of not undergoing therapy. Patient understands these risks and benefits and desires to proceed.          Thank you for including us in the care of MAEGAN Elizondo CNP, MAEGAN  08/12/21 3:10 PM  Urology

## 2021-08-12 NOTE — OP NOTE
Department of Radiology  Post Procedure Progress Note      Pre-Procedure Diagnosis:  Urinary obstruction    Procedure Performed:  Image guided SP catheter placement    Anesthesia: local / versed and fentanyl    Findings: successful    Immediate Complications:  None    Estimated Blood Loss: minimal    SEE DICTATED PROCEDURE NOTE FOR COMPLETE DETAILS.     Electronically signed by Shaw Ceron MD on 8/12/2021 at 5:23 PM

## 2021-08-12 NOTE — ED NOTES
Pt presents to the ed with c/o urinary catheter being pulled out x2. The nursing home replaced it and it is still casuing him issues. PT states he is having a lot of groin pain at this time. Pt has suprapubic catheter in place at this time.       [place], [place]  40/01/71 [id number]

## 2021-08-12 NOTE — ED PROVIDER NOTES
eMERGENCY dEPARTMENT eNCOUnter      [address] [facility]    Chief Complaint   Patient presents with    Urinary Catheter Problem       HPI    Saintclair Amos is a 80 y.o. male who presents to the emergency department because of malfunction of suprapubic catheter. According the patient his suprapubic catheter was paced few months ago and to start working this morning. Patient also has been complaining of some suprapubic pain. There is no fever or chills. There is no drainage of urine around the catheter. Patient is describing his pain to be 3-4 out of 10 no radiation and no elevating or relieving factors for this pain.     PAST MEDICAL HISTORY    Past Medical History:   Diagnosis Date    Anemia     Anxiety     BPH (benign prostatic hyperplasia)     CAD (coronary artery disease)     leaking valve    Chronic back pain     COPD (chronic obstructive pulmonary disease) (HCC)     Detached retina     Diabetes mellitus (HCC)     NIDDM    DVT (deep venous thrombosis) (HCC)     RLE    DVT (deep venous thrombosis) ([address] 11/9/15    LLE    Dysphagia     Glaucoma     Hyperlipidemia     Hypertension     Macular degeneration     Mass of chest     benign chest mass, Dr Villareal Corpus    Movement disorder     spinal stenosis    Obesity     Osteoarthritis     right ankle, right hand    Paroxysmal atrial fibrillation ([address]     Pneumonia     Primary thyroid follicular carcinoma 9/3/3066    Sleep apnea 1993    on BiPap, Dr Leo Ortiz Testicular hypofunction     Thyroid cancer [facility])     s/p thyroid resection    Urinary incontinence        SURGICAL HISTORY    Past Surgical History:   Procedure Laterality Date   [facility] SURGERY  2006,2011, 2014    BRONCHOSCOPY N/A 8/29/2017    BRONCHOSCOPY AIRWAY ONLY performed by Jair Lewis MD at [address] [facility] [address] N/A 6/17/2021    CYSTOSCOPY SUPRAPUBIC TUBE PLACEMENT performed by Nancy Ferrell MD at [address] [facility] EKG 12-LEAD  11/10/2015         FRACTURE SURGERY      right hand    OTHER SURGICAL HISTORY      spinal epidurals    RETINAL DETACHMENT SURGERY      SPINE SURGERY  2004    Lumbar laminectomy    THYROIDECTOMY      TURP N/A 3/17/2020    CYSTOSCOPY WITH GREENLIGHT PHOTOVAPORIZATION OF PROSTATE performed by Vena Duane, MD at [address] TURP N/A 3/24/2020    PLASMA BUTTON TURP WITH CLOT EVACUATION performed by Vena Duane, MD at [address]    Current Outpatient Rx   Medication Sig Dispense Refill    furosemide (LASIX) 40 MG tablet Take 1 tablet by mouth daily 60 tablet 0    potassium chloride (KLOR-CON M) 10 MEQ extended release tablet Take 1 tablet by mouth daily TAKE 1 TABLETS BY MOUTH TWICE DAILY 180 tablet 0    levothyroxine (SYNTHROID) 175 MCG tablet TAKE 1 TABLET BY MOUTH  DAILY 30 tablet 0    lactobacillus (CULTURELLE) capsule Take 1 capsule by mouth 2 times daily (with meals) 30 capsule 0    blood glucose test strips (ACCU-CHEK MERYL PLUS) strip Accu-check Meryl Plus Test Strips (or brand per pt preference). Sig: test sugar BID. Dx: E11.8 200 strip 3    SOFT TOUCH LANCETS MISC Lancet (or brand per pt preference) Use to check blood sugars 2 times daily. Dx: E11.8 200 each 3    Alcohol Swabs (ALCOHOL PREP) 70 % PADS Alcohol pads (brand per preference). Sig: use to test sugar twice per day.  Dx: E11.8 200 each 3    doxazosin (CARDURA) 4 MG tablet TAKE 1 TABLET BY MOUTH EVERY NIGHT 90 tablet 3    simethicone (MYLICON) 644 MG chewable tablet Take 125 mg by mouth every 6 hours as needed for Flatulence      aluminum & magnesium hydroxide-simethicone (MYLANTA) 400-400-40 MG/5ML SUSP Take 15 mLs by mouth every 6 hours as needed      famotidine (PEPCID) 20 MG tablet Take 20 mg by mouth 2 times daily as needed      Arformoterol Tartrate (BROVANA) 15 MCG/2ML NEBU Take 2 mLs by nebulization 2 times daily 1 dose inhale orally two times per day 120 mL 11    oxybutynin (DITROPAN) 5 MG tablet TAKE 1 TABLET BY MOUTH EVERY 8 HOURS AS NEEDED FOR BLADDER SPASMS OR BLADDER SPASM 270 tablet 0    Multiple Vitamins-Minerals (PRESERVISION AREDS 2) CAPS Take 1 capsule by mouth daily 90 capsule 3    spironolactone (ALDACTONE) 25 MG tablet TAKE 1 TABLET BY MOUTH DAILY 90 tablet 3    Revefenacin 175 MCG/3ML SOLN Inhale 1 nebule into the lungs daily Dx COPD J44.3 30 vial 5    insulin glargine (LANTUS SOLOSTAR) 100 UNIT/ML injection pen INJECT SUBCUTANEOUSLY 18  UNITS NIGHTLY (Patient taking differently: No sig reported) 30 mL 3    ferrous gluconate (FERGON) 324 (38 Fe) MG tablet Take 1 tablet by mouth 2 times daily 180 tablet 3    metFORMIN (GLUCOPHAGE) 1000 MG tablet Take 1 tablet by mouth 2 times daily (with meals) 180 tablet 3    budesonide (PULMICORT) 0.5 MG/2ML nebulizer suspension USE 1 VIAL VIA NEBULIZER TWICE DAILY. RINSE MOUTH AFTER TREATMENT 360 mL 3    lisinopril (PRINIVIL;ZESTRIL) 2.5 MG tablet Take 1 tablet by mouth daily 90 tablet 3    docusate (COLACE, DULCOLAX) 100 MG CAPS Take 100 mg by mouth 2 times daily 180 capsule 3    vitamin D3 (CHOLECALCIFEROL) 10 MCG (400 UNIT) TABS tablet Take 1 tablet by mouth daily 400units 90 tablet 3    polyethylene glycol (MIRALAX) 17 g packet Take 17 g by mouth daily as needed for Constipation      acetaminophen (TYLENOL) 325 MG tablet Take 650 mg by mouth every 4 hours as needed for Pain or Fever       lidocaine (LMX) 4 % cream Apply topically every 6 hours as needed for Pain To groin      albuterol sulfate  (90 Base) MCG/ACT inhaler Inhale 2 puffs into the lungs every 6 hours as needed for Wheezing 1 Inhaler 3    OXYGEN Inhale 3 L into the lungs nightly Indications: Difficulty Breathing, Oxygen Therapy And prn through cpap         ALLERGIES    Allergies   Allergen Reactions    Latex Rash    Macrobid [Nitrofurantoin Merlinda Bream Dr. Jaquelyn Mort prefers pt not use Macrobid due to pulmonary side effects.     Levaquin [Levofloxacin] Other (See Comments)     Redness/flushing    Brimonidine Tartrate     Adhesive Tape Rash    Alphagan [Brimonidine Tartrate] Hives     Eyes red       FAMILY HISTORY    Family History   Problem Relation Age of Onset    Other Mother         Complications of Childbirth    Other Father [de-identified]        Natural causes       SOCIAL HISTORY    Social History     Socioeconomic History    Marital status:      Spouse name: Not on file    Number of children: 2    Years of education: Not on file    Highest education level: Not on file   Occupational History    Not on file   Tobacco Use    Smoking status: Never Smoker    Smokeless tobacco: Never Used   Vaping Use    Vaping Use: Never used   Substance and Sexual Activity    Alcohol use: Yes     Alcohol/week: 1.0 standard drinks     Types: 1 Glasses of wine per week     Comment: Glass of wine with dinner.  Drug use: No    Sexual activity: Never   Other Topics Concern    Not on file   Social History Narrative    Not on file     Social Determinants of Health     Financial Resource Strain:     Difficulty of Paying Living Expenses:    Food Insecurity:     Worried About Running Out of Food in the Last Year:     [address] in the Last Year:    Transportation Needs:     Lack of Transportation (Medical):      Lack of Transportation (Non-Medical):    Physical Activity:     Days of Exercise per Week:     Minutes of Exercise per Session:    Stress:     Feeling of Stress :    Social Connections:     Frequency of Communication with Friends and Family:     Frequency of Social Gatherings with Friends and Family:     Attends [employer] Services:     Active Member of Clubs or Organizations:     Attends Club or Organization Meetings:     Marital Status:    Intimate Partner Violence:     Fear of Current or Ex-Partner:     Emotionally Abused:     Physically Abused:     Sexually Abused:        REVIEW OF SYSTEMS    Constitutional:  Denies fever, chills, weight loss or weakness   Eyes:  Denies photophobia or discharge   HENT:  Denies sore throat or ear pain   Respiratory:  Denies cough or shortness of breath   Cardiovascular:  Denies chest pain, palpitations or swelling   GI:  Denies abdominal pain, nausea, vomiting, or diarrhea   Musculoskeletal:  Denies back pain   Skin:  Denies rash   Neurologic:  Denies headache, focal weakness or sensory changes   Endocrine:  Denies polyuria or polydypsia   Lymphatic:  Denies swollen glands   Psychiatric:  Denies depression, suicidal ideation or homicidal ideation   All systems negative except as marked. PHYSICAL EXAM    VITAL SIGNS: BP (!) 142/80   Pulse 72   Temp 98.4 °F (36.9 °C) (Oral)   Resp 18   Ht 5' 7\" (1.702 m)   Wt 250 lb (113.4 kg)   SpO2 97%   BMI 39.16 kg/m²    Constitutional:  Well developed, Well nourished, No acute distress, Non-toxic appearance. HENT:  Normocephalic, Atraumatic, Bilateral external ears normal, Oropharynx moist, No oral exudates, Nose normal. Neck- Normal range of motion, No tenderness, Supple, No stridor. Eyes:  PERRL, EOMI, Conjunctiva normal, No discharge. Respiratory:  Normal breath sounds, No respiratory distress, No wheezing, No chest tenderness. Cardiovascular:  Normal heart rate, Normal rhythm, No murmurs, No rubs, No gallops. GI:  Bowel sounds normal, Soft, No tenderness, No masses, No pulsatile masses. Suprapubic catheter in place. There is no redness or discharge around the catheter site. : External genitalia appear normal, No masses or lesions. No discharge. No CVA tenderness. Musculoskeletal:  Intact distal pulses, No edema, No tenderness, No cyanosis, No clubbing. Good range of motion in all major joints. No tenderness to palpation or major deformities noted. Back- No tenderness. Integument:  Warm, Dry, No erythema, No rash. Lymphatic:  No lymphadenopathy noted. Neurologic:  Alert & oriented x 3, Normal motor function, Normal sensory function, No focal deficits noted.    Psychiatric:  Affect normal, Judgment normal, Mood normal. LABS  Labs Reviewed   BASIC METABOLIC PANEL W/ REFLEX TO MG FOR LOW K - Abnormal; Notable for the following components:       Result Value    Chloride 95 (*)     Glucose 128 (*)     All other components within normal limits   CBC WITH AUTO DIFFERENTIAL - Abnormal; Notable for the following components:    WBC 14.8 (*)     RBC 4.07 (*)     Hemoglobin 11.1 (*)     Hematocrit 35.1 (*)     MCHC 31.6 (*)     Segs Absolute 10.9 (*)     Immature Grans (Abs) 0.09 (*)     All other components within normal limits   ANION GAP - Abnormal; Notable for the following components:    Anion Gap 17.0 (*)     All other components within normal limits   OSMOLALITY - Abnormal; Notable for the following components:    Osmolality Calc 270.8 (*)     All other components within normal limits   GLOMERULAR FILTRATION RATE, ESTIMATED - Abnormal; Notable for the following components:    Est, Glom Filt Rate 63 (*)     All other components within normal limits         CONSULTS  [facility] studies reviewed. (See chart for details)  Urology try to report a Billy catheter in the patient without any success. Patient will be taken to the OR for placement of Billy's catheter or suprapubic catheter. Patient will be discharged from the OR by urology. FINAL IMPRESSION    1.  Suprapubic catheter dysfunction, initial encounter ([facility]             Faraz Abreu MD  08/12/21 9930

## 2021-08-12 NOTE — ACP (ADVANCE CARE PLANNING)
Advance Care Planning     Advance Care Planning Activator (Inpatient)  Conversation Note      Date of ACP Conversation: 8/12/2021     Conversation Conducted with: Patient with Decision Making Capacity    ACP Activator: Ofelia Carbajal:     Current Designated Health Care Decision Maker: Today we documented Decision Maker(s) consistent with ACP documents on file. Care Preferences    Ventilation: \"If you were in your present state of health and suddenly became very ill and were unable to breathe on your own, what would your preference be about the use of a ventilator (breathing machine) if it were available to you? \"      Would the patient desire the use of ventilator (breathing machine)?: no    \"If your health worsens and it becomes clear that your chance of recovery is unlikely, what would your preference be about the use of a ventilator (breathing machine) if it were available to you? \"     Would the patient desire the use of ventilator (breathing machine)?: No      Resuscitation  \"CPR works best to restart the heart when there is a sudden event, like a heart attack, in someone who is otherwise healthy. Unfortunately, CPR does not typically restart the heart for people who have serious health conditions or who are very sick. \"    \"In the event your heart stopped as a result of an underlying serious health condition, would you want attempts to be made to restart your heart (answer \"yes\" for attempt to resuscitate) or would you prefer a natural death (answer \"no\" for do not attempt to resuscitate)? \" no       [x] Yes   [] No   Educated Patient / Nichole Torres regarding differences between Advance Directives and portable DNR orders.     Length of ACP Conversation in minutes:  14  Conversation Outcomes:  [x] ACP discussion completed  [x] Existing advance directive reviewed with patient; no changes to patient's previously recorded wishes  [] New Advance Directive completed  [] Portable Do Not Rescitate prepared for Provider review and signature  [] POLST/POST/MOLST/MOST prepared for Provider review and signature      Follow-up plan:    [] Schedule follow-up conversation to continue planning  [x] Referred individual to Provider for additional questions/concerns   [] Advised patient/agent/surrogate to review completed ACP document and update if needed with changes in condition, patient preferences or care setting    [x] This note routed to one or more involved healthcare providers      - Halina Iraj was in process of being moved for testing. He did confirm his son Velma Givens as his POA however no Advance Directive is on file. He also confirmed he is a DNR-CCA and that document is located in Medical Records. - Spiritual [facility] asked to follow-up in obtaining the AD if possible. No call was placed to the son because the patient shared he is working.

## 2021-08-12 NOTE — PROGRESS NOTES
1655 Patient received in IR for suprapubic catheter insertion. [id number] Procedure started with Dr. Joe Vaughan. 1720 Procedure completed; patient tolerated well. Dressing to SP catheter insertion site; no bleeding noted. 1730 Patient on bed; comfort ensured. [address] [facility] [address] called to [facility]. 1740 Patient taken to 5K via bed.

## 2021-08-13 ENCOUNTER — APPOINTMENT (OUTPATIENT)
Dept: CT IMAGING | Age: 86
DRG: 699 | End: 2021-08-13
Payer: MEDICARE

## 2021-08-13 ENCOUNTER — APPOINTMENT (OUTPATIENT)
Dept: GENERAL RADIOLOGY | Age: 86
DRG: 699 | End: 2021-08-13
Payer: MEDICARE

## 2021-08-13 LAB
ERYTHROCYTE [DISTWIDTH] IN BLOOD BY AUTOMATED COUNT: 14.4 % (ref 11.5–14.5)
ERYTHROCYTE [DISTWIDTH] IN BLOOD BY AUTOMATED COUNT: 44.7 FL (ref 35–45)
GLUCOSE BLD-MCNC: 105 MG/DL (ref 70–108)
GLUCOSE BLD-MCNC: 106 MG/DL (ref 70–108)
GLUCOSE BLD-MCNC: 110 MG/DL (ref 70–108)
GLUCOSE BLD-MCNC: 122 MG/DL (ref 70–108)
HCT VFR BLD CALC: 28.6 % (ref 42–52)
HEMOGLOBIN: 9 GM/DL (ref 14–18)
MCH RBC QN AUTO: 27.1 PG (ref 26–33)
MCHC RBC AUTO-ENTMCNC: 31.5 GM/DL (ref 32.2–35.5)
MCV RBC AUTO: 86.1 FL (ref 80–94)
PLATELET # BLD: 236 THOU/MM3 (ref 130–400)
PMV BLD AUTO: 9.9 FL (ref 9.4–12.4)
RBC # BLD: 3.32 MILL/MM3 (ref 4.7–6.1)
WBC # BLD: 10.9 THOU/MM3 (ref 4.8–10.8)

## 2021-08-13 PROCEDURE — 94760 N-INVAS EAR/PLS OXIMETRY 1: CPT

## 2021-08-13 PROCEDURE — 6370000000 HC RX 637 (ALT 250 FOR IP): Performed by: NURSE PRACTITIONER

## 2021-08-13 PROCEDURE — 71046 X-RAY EXAM CHEST 2 VIEWS: CPT

## 2021-08-13 PROCEDURE — G0378 HOSPITAL OBSERVATION PER HR: HCPCS

## 2021-08-13 PROCEDURE — 96372 THER/PROPH/DIAG INJ SC/IM: CPT

## 2021-08-13 PROCEDURE — 6370000000 HC RX 637 (ALT 250 FOR IP): Performed by: PHYSICIAN ASSISTANT

## 2021-08-13 PROCEDURE — 99232 SBSQ HOSP IP/OBS MODERATE 35: CPT | Performed by: PHYSICIAN ASSISTANT

## 2021-08-13 PROCEDURE — 99231 SBSQ HOSP IP/OBS SF/LOW 25: CPT | Performed by: STUDENT IN AN ORGANIZED HEALTH CARE EDUCATION/TRAINING PROGRAM

## 2021-08-13 PROCEDURE — 2580000003 HC RX 258: Performed by: NURSE PRACTITIONER

## 2021-08-13 PROCEDURE — 94640 AIRWAY INHALATION TREATMENT: CPT

## 2021-08-13 PROCEDURE — 36415 COLL VENOUS BLD VENIPUNCTURE: CPT

## 2021-08-13 PROCEDURE — 6360000002 HC RX W HCPCS: Performed by: PHYSICIAN ASSISTANT

## 2021-08-13 PROCEDURE — 74177 CT ABD & PELVIS W/CONTRAST: CPT

## 2021-08-13 PROCEDURE — 2580000003 HC RX 258: Performed by: PHYSICIAN ASSISTANT

## 2021-08-13 PROCEDURE — 6360000002 HC RX W HCPCS: Performed by: NURSE PRACTITIONER

## 2021-08-13 PROCEDURE — 96374 THER/PROPH/DIAG INJ IV PUSH: CPT

## 2021-08-13 PROCEDURE — 6360000004 HC RX CONTRAST MEDICATION: Performed by: PHYSICIAN ASSISTANT

## 2021-08-13 PROCEDURE — 85027 COMPLETE CBC AUTOMATED: CPT

## 2021-08-13 PROCEDURE — 82948 REAGENT STRIP/BLOOD GLUCOSE: CPT

## 2021-08-13 RX ORDER — ATROPA BELLADONNA AND OPIUM 16.2; 3 MG/1; MG/1
30 SUPPOSITORY RECTAL EVERY 8 HOURS PRN
Status: DISCONTINUED | OUTPATIENT
Start: 2021-08-13 | End: 2021-08-15 | Stop reason: HOSPADM

## 2021-08-13 RX ORDER — SENNA AND DOCUSATE SODIUM 50; 8.6 MG/1; MG/1
2 TABLET, FILM COATED ORAL DAILY
Status: DISCONTINUED | OUTPATIENT
Start: 2021-08-14 | End: 2021-08-14

## 2021-08-13 RX ORDER — SENNA PLUS 8.6 MG/1
2 TABLET ORAL NIGHTLY
Status: DISCONTINUED | OUTPATIENT
Start: 2021-08-13 | End: 2021-08-15 | Stop reason: HOSPADM

## 2021-08-13 RX ORDER — BISACODYL 10 MG
10 SUPPOSITORY, RECTAL RECTAL ONCE
Status: COMPLETED | OUTPATIENT
Start: 2021-08-13 | End: 2021-08-13

## 2021-08-13 RX ORDER — HYDROCODONE BITARTRATE AND ACETAMINOPHEN 5; 325 MG/1; MG/1
1 TABLET ORAL EVERY 4 HOURS PRN
Status: DISCONTINUED | OUTPATIENT
Start: 2021-08-13 | End: 2021-08-15 | Stop reason: HOSPADM

## 2021-08-13 RX ADMIN — Medication 1 CAPSULE: at 09:24

## 2021-08-13 RX ADMIN — CEFTRIAXONE SODIUM 1000 MG: 1 INJECTION, POWDER, FOR SOLUTION INTRAMUSCULAR; INTRAVENOUS at 17:08

## 2021-08-13 RX ADMIN — HYDROCODONE BITARTRATE AND ACETAMINOPHEN 1 TABLET: 5; 325 TABLET ORAL at 15:43

## 2021-08-13 RX ADMIN — SENNOSIDES 17.2 MG: 8.6 TABLET, FILM COATED ORAL at 22:17

## 2021-08-13 RX ADMIN — BUDESONIDE 500 MCG: 0.5 INHALANT RESPIRATORY (INHALATION) at 18:30

## 2021-08-13 RX ADMIN — POTASSIUM CHLORIDE 10 MEQ: 1500 TABLET, EXTENDED RELEASE ORAL at 20:09

## 2021-08-13 RX ADMIN — Medication 1 TABLET: at 09:22

## 2021-08-13 RX ADMIN — LISINOPRIL 2.5 MG: 2.5 TABLET ORAL at 09:22

## 2021-08-13 RX ADMIN — TIOTROPIUM BROMIDE INHALATION SPRAY 2 PUFF: 3.12 SPRAY, METERED RESPIRATORY (INHALATION) at 08:25

## 2021-08-13 RX ADMIN — SPIRONOLACTONE 25 MG: 25 TABLET ORAL at 09:22

## 2021-08-13 RX ADMIN — HYDROCODONE BITARTRATE AND ACETAMINOPHEN 1 TABLET: 5; 325 TABLET ORAL at 11:05

## 2021-08-13 RX ADMIN — INSULIN GLARGINE 18 UNITS: 100 INJECTION, SOLUTION SUBCUTANEOUS at 20:10

## 2021-08-13 RX ADMIN — ENOXAPARIN SODIUM 40 MG: 40 INJECTION SUBCUTANEOUS at 09:22

## 2021-08-13 RX ADMIN — POTASSIUM CHLORIDE 10 MEQ: 1500 TABLET, EXTENDED RELEASE ORAL at 09:23

## 2021-08-13 RX ADMIN — MORPHINE SULFATE 4 MG: 4 INJECTION, SOLUTION INTRAMUSCULAR; INTRAVENOUS at 09:22

## 2021-08-13 RX ADMIN — BISACODYL 10 MG: 10 SUPPOSITORY RECTAL at 21:33

## 2021-08-13 RX ADMIN — OXYBUTYNIN CHLORIDE 5 MG: 5 TABLET ORAL at 09:22

## 2021-08-13 RX ADMIN — FUROSEMIDE 40 MG: 40 TABLET ORAL at 09:22

## 2021-08-13 RX ADMIN — Medication 240 MG: at 20:08

## 2021-08-13 RX ADMIN — Medication 240 MG: at 09:22

## 2021-08-13 RX ADMIN — BUDESONIDE 500 MCG: 0.5 INHALANT RESPIRATORY (INHALATION) at 08:25

## 2021-08-13 RX ADMIN — Medication 1 CAPSULE: at 17:08

## 2021-08-13 RX ADMIN — ATROPA BELLADONNA AND OPIUM 30 MG: 16.2; 3 SUPPOSITORY RECTAL at 11:05

## 2021-08-13 RX ADMIN — LEVOTHYROXINE SODIUM 175 MCG: 0.15 TABLET ORAL at 09:24

## 2021-08-13 RX ADMIN — DOCUSATE SODIUM 100 MG: 100 CAPSULE ORAL at 20:08

## 2021-08-13 RX ADMIN — IOPAMIDOL 80 ML: 755 INJECTION, SOLUTION INTRAVENOUS at 17:58

## 2021-08-13 RX ADMIN — MAGNESIUM HYDROXIDE 30 ML: 400 SUSPENSION ORAL at 21:33

## 2021-08-13 RX ADMIN — DOXAZOSIN 4 MG: 4 TABLET ORAL at 20:08

## 2021-08-13 RX ADMIN — SODIUM CHLORIDE: 9 INJECTION, SOLUTION INTRAVENOUS at 04:25

## 2021-08-13 RX ADMIN — DOCUSATE SODIUM 100 MG: 100 CAPSULE ORAL at 09:22

## 2021-08-13 ASSESSMENT — PAIN DESCRIPTION - PROGRESSION
CLINICAL_PROGRESSION: GRADUALLY WORSENING

## 2021-08-13 ASSESSMENT — PAIN SCALES - GENERAL
PAINLEVEL_OUTOF10: 10
PAINLEVEL_OUTOF10: 7
PAINLEVEL_OUTOF10: 8
PAINLEVEL_OUTOF10: 3
PAINLEVEL_OUTOF10: 10

## 2021-08-13 ASSESSMENT — PAIN DESCRIPTION - PAIN TYPE: TYPE: SURGICAL PAIN

## 2021-08-13 ASSESSMENT — PAIN DESCRIPTION - LOCATION: LOCATION: ABDOMEN

## 2021-08-13 ASSESSMENT — PAIN DESCRIPTION - DESCRIPTORS: DESCRIPTORS: PRESSURE

## 2021-08-13 NOTE — CARE COORDINATION
8/13/21, 4:03 PM EDT    DISCHARGE PLANNING EVALUATION      Spoke with [address] admissions. Anticipate return there at discharge.

## 2021-08-13 NOTE — PROGRESS NOTES
1600- Oral contrast started. 1620- Another of oral contrast dose given per orders. 1640- Another of oral contrast dose given per orders.   1700- Oral contrast completed at this time

## 2021-08-13 NOTE — PLAN OF CARE
Problem: Pain:  Goal: Pain level will decrease  Description: Pain level will decrease  Outcome: Ongoing  Note: Pt states pain is 4/10. Pt pain goal 0/10. Rest and reposition provided to decrease pain. PRN tylenol administered as ordered per patient request to relieve pain. Will continue ongoing assessment of pain. Goal: Control of acute pain  Description: Control of acute pain  Outcome: Ongoing  Goal: Control of chronic pain  Description: Control of chronic pain  Outcome: Ongoing     Problem: Falls - Risk of:  Goal: Will remain free from falls  Description: Will remain free from falls  Outcome: Ongoing  Note: No falls this shift. Bed alarm, nonskid socks, and call light utilized. At risk due to generalized weakness, IV therapy, and suprapubic catheter. Problem: Skin Integrity:  Goal: Absence of new skin breakdown  Description: Absence of new skin breakdown  Outcome: Ongoing  Note: No new skin breakdown. Pt turned and repositioned. Will continue on going skin assessment. Goal: Will show no infection signs and symptoms  Description: Will show no infection signs and symptoms  Outcome: Ongoing  Note: Pt afebrile this shift with no s/sx of infection noted at this time. Suprapubic site dressing, clean dry and intact. Pt educated on incentive spirometer to prevent pneumonia. Pt used incentive spirometer with RN present in the room. Problem: Urinary Elimination:  Goal: Ability to achieve a balanced intake and output will improve  Description: Ability to achieve a balanced intake and output will improve  Outcome: Ongoing  Note: Pt suprapubic catheter intact and draining well. Goal: Will remain free from infection  Description: Will remain free from infection  Outcome: Ongoing     Problem: Discharge Planning:  Goal: Discharged to appropriate level of care  Description: Discharged to appropriate level of care  Outcome: Ongoing  Note: Discharge plans to return to [facility] today. Discussed with pt. Patient's son updated on plan of care. Care plan reviewed with patient. Patient verbalize understanding of the plan of care and contribute to goal setting.

## 2021-08-13 NOTE — CARE COORDINATION
08/13/21 1325   Readmission Assessment   Number of Days since last admission? 8-30 days   Previous Disposition SNF   Who is being Interviewed Patient   What was the patient's/caregiver's perception as to why they think they needed to return back to the hospital? Other (Comment)  (malfunction of suprapubic catheter)   Did you visit your Primary Care Physician after you left the hospital, before you returned this time? Yes   Did you see a specialist, such as Cardiac, Pulmonary, Orthopedic Physician, etc. after you left the hospital? No   Who advised the patient to return to the hospital? Skilled Unit   Does the patient report anything that got in the way of taking their medications? No   In our efforts to provide the best possible care to you and others like you, can you think of anything that we could have done to help you after you left the hospital the first time, so that you might not have needed to return so soon?    Osman Lefort will return to [employer].)

## 2021-08-13 NOTE — PLAN OF CARE
Problem: Pain:  Goal: Pain level will decrease  Description: Pain level will decrease  8/13/2021 1815 by Faheem Khanna RN  Outcome: Ongoing  Note: Patient reporting pain 8/10 with a goal of 3/10. Patient satisfied with current interventions including rest and repositioning. Pain assessment ongoing. Problem: Falls - Risk of:  Goal: Will remain free from falls  Description: Will remain free from falls  8/13/2021 1815 by Faheem Khanna RN  Outcome: Ongoing  Note: Patient free from falls this shift. Patient uses call light appropriately, bed in lowest position, nonskid socks on, bed rails up x2, fall sign posted and call light in reach. Patient at risk due to generalized weakness. Problem: Skin Integrity:  Goal: Absence of new skin breakdown  Description: Absence of new skin breakdown  8/13/2021 1815 by Faheem Khanna RN  Outcome: Ongoing  Note: No new evidence of skin breakdown this shift. Up to chair this shift. Repositioning every 2 hours. CHG bath given this shift. Problem: Urinary Elimination:  Goal: Ability to achieve a balanced intake and output will improve  Description: Ability to achieve a balanced intake and output will improve  8/13/2021 1815 by Faheem Khanna RN  Outcome: Ongoing  Note: Adequate urine output this shift. Will continue to monitor. Problem: Discharge Planning:  Goal: Discharged to appropriate level of care  Description: Discharged to appropriate level of care  8/13/2021 1815 by Faheem Khanna RN  Outcome: Ongoing  Note: Discharge plans to return to [facility] discussed with patient. Care plan reviewed with patient and family. Patient and family verbalize understanding of the plan of care and contribute to goal setting.

## 2021-08-13 NOTE — PROGRESS NOTES
Patient seen earlier today. Reports severe abdominal pain since suprapubic catheter placement yesterday. He states that the pain is not similar to his intermittent bladder spasms. This pain seems constant in nature. Patient reports decreased appetite because of the pain. He reports that he has had no bowel movement in two days and is not passing flatus. He does not feel well enough to get out of bed due to the pain. He is not sure if his [facility] is draining well. He is receiving Morphine for his pain. Reports increasing cough over last two weeks. Non-productive. Vitals:    08/12/21 1809 08/12/21 2130 08/13/21 0031 08/13/21 0330   BP: (!) 103/54 (!) 112/53 (!) 111/57 (!) 103/56   Pulse: 60 68 60 59   Resp: 16 16 18 18   Temp: 97.9 °F (36.6 °C) 97.8 °F (36.6 °C) 97.4 °F (36.3 °C) 97.5 °F (36.4 °C)   TempSrc: Oral Oral Oral Oral   SpO2: 98% 95% 96% 96%   Weight:       Height:             Intake/Output Summary (Last 24 hours) at 8/13/2021 0813  Last data filed at 8/13/2021 0330  Gross per 24 hour   Intake 1801.42 ml   Output 1251 ml   Net 550.42 ml       Labs  Recent Labs     08/12/21  1440 08/13/21  0615   WBC 14.8* 10.9*   HGB 11.1* 9.0*   HCT 35.1* 28.6*    236     --    K 4.1  --    CL 95*  --    CO2 23  --    BUN 10  --    CREATININE 1.1  --    CALCIUM 8.8  --        Exam  General: Alert and oriented x 3. Cooperative with examination  Heart: RRR. S1/S2 without murmurs. Lungs: Course breath sounds bilaterally with occasional rhonchi that clears with cough. Abdomen: Soft. Mildly tender in the upper quadrants and umbilical area. Supraumbilical area firmer to palpation. Hypoactive bowel sounds. No CVA. No rigidity, guarding, or rebound. Ext: No pitting edema or calf tenderness bilaterally    Assessment and Plan  1. BPH with obstruction- Status post Greenlight on 3/17/20 by Dr. Zack Bates. Status post [facility] placement due to the need for chronic catheterization.  Status post image guided SP catheter placement in IR on 8/12/21. On Ditropan as needed for bladder spasms. On Cardura for prostate. Will obtain CT abdomen and pelvis with oral and IV contrast to rule out bowel injury with trauma from [facility] dislodging and manipulation trying to reposition the catheter. Will start Rocephin 1 gram every 24 hours for procedural coverage. Will add Norco and B&O suppositories to his regimen. Informed and will continue to inform Dr. Newell of his father's daily status. 2. History of Anemia- On Iron supplementation    3. DM- On Lantus. 4. History of DVT- On Lovenox. 5. COPD- On Brovana, Pulmicort, Albuterol, Home O2. Will obtain CXR today due to increasing cough. Rule out pneumonia. 6. HTN- On Lasix, Lisinopril.     Maryan Manzano PA-C  8/13/21

## 2021-08-13 NOTE — CARE COORDINATION
[address] and [facility] paperwork is not in the blue envelope. I asked [employer] to fax this paperwork to [facility]. I gave them my cell number if they have problems.

## 2021-08-13 NOTE — OP NOTE
Operative Note      Patient: Sayra Sullivan  YOB: 1931  MRN: [id number]    Date of Procedure: 8/12/2021    Pre-Op Diagnosis: URINARY RETENTION, OBSTRUCTION    Post-Op Diagnosis: Same       Procedure(s):  CYSTOSCOPY, ATTEMPTED SUPRAPUBIC TUBE PLACEMENT, ATTEMPTED PAINTER CATH retrograde urethrogram.  Subcutaneous fistulogram of suprapubic tract    Surgeon(s):  Carole Cast MD    Assistant:   * No surgical staff found *    Anesthesia: General    Estimated Blood Loss (mL): Minimal    Complications: None    Specimens:   * No specimens in log *    Implants:  * No implants in log *      Drains:   Suprapubic Catheter Non-latex 14 fr (Active)   Dressing Status Clean;Dry; Intact 08/12/21 1830   Dressing Type Split gauze 08/12/21 1830   Urine Color Yellow 08/12/21 1830   Urine Appearance Clear 08/12/21 1830   Output (mL) 1000 mL 08/13/21 1043       [REMOVED] Urethral Catheter 16 fr (Removed)       [REMOVED] Urethral Catheter (Removed)       [REMOVED] Urethral Catheter (Removed)   Catheter Indications Perioperative use for selected surgical procedures 07/10/21 0358   Site Assessment Other (Comment) 07/10/21 0358   Urine Color Yellow 07/10/21 0358   Urine Appearance Clear 07/10/21 0358   Output (mL) 800 mL 07/10/21 0614       [REMOVED] Suprapubic Catheter (Removed)   Site Assessment Moist;Swelling;Urethral drainage 07/14/21 1645   Dressing Type Open to air 07/14/21 1645   Urine Color Yellow 07/14/21 1645   Urine Appearance Cloudy 07/14/21 1645   Output (mL) 1100 mL 07/14/21 1051       Findings: Blind ending urethral stricture. Noncommunicating subcutaneous tract. Blind ending subcutaneous tract    Detailed Description of Procedure:   Patient was brought back to the operating room. He was laid in supine position. MAC anesthesia was inducted. He was positioned lithotomy prepped and draped in sterile fashion. A timeout was performed.   We first started by placing a cystoscope in the patient's urethra we visualize a blind ending bulbar urethral stricture. We attempted to place a wire through this and into the bladder but this was unsuccessful it was blind ending. We then attempted to use the flexible cystoscope and passed it through the subcutaneous tract but we were unable to get into the bladder. I attempted to place a wire through and into the bladder under fluoroscopy with this was unsuccessful and there is no passage to the bladder. I then used the catheter and placed this within the subcutaneous tract and injected contrast and this did not delineate a passageway to the bladder there is only subcutaneous fluid collection. 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 At this point the procedure was completed and he was taken to recovery. [facility] radiology consult was placed to place a suprapubic tube under image guidance.     Electronically signed by Elton Garcia M.D, MD on 8/13/2021 at 1:56 PM

## 2021-08-13 NOTE — PROGRESS NOTES
Hospitalist Progress Note      Patient:  Lydia Sanchez    Unit/Bed:5K-24/024-A  YOB: 1931  MRN: [id number]   Acct: [de-identified]   PCP: Tera Ho MD  Date of Admission: 8/12/2021    Assessment/Plan:    1. Diabetes mellitus type 2:  a. Diabetes is well controlled. Fasting blood glucose this morning of 128.  b. Continue Lantus and sliding scale insulin Accu-Cheks AC at bedtime. c. Previous hemoglobin A1c 12/2020: 6.0.  d. Holding home metformin.    [place] to resume regular diabetic medicines including Lantus and Metformin at discharge. 2. Dislodged suprapubic catheter:   a. Patient underwent cystoscopy with inability to place either suprapubic or indwelling Billy catheter. Underwent IR placement of suprapubic catheter. b. Pain control per urology. c. Will defer management to urology. Chief Complaint: Abdominal pain with malfunctioning suprapubic catheter    Hospital Course:    Patient presented to the hospital from nursing facility with complaints of malfunctioning suprapubic catheter. Upon arrival, the catheter was noted to be displaced in the bladder. Patient underwent cystoscopy on 8/12 with attempts to place either suprapubic or indwelling Billy catheter. These attempts were not successful. Eventually the patient underwent IR placement of suprapubic catheter. Subjective (past 24 hours):   Patient states he has lower abdominal pain at the site of his suprapubic catheter insertion. He is concerned that there is something wrong with the catheter although it appears to be functioning appropriately with yellow urine output. Patient denies any fevers or chills. Past medical history, family history, social history and allergies reviewed again and is unchanged since admission. ROS (12 point review of systems completed. Pertinent positives noted.  Otherwise ROS is negative)     Medications:  Reviewed    Infusion Medications    sodium chloride      sodium chloride 75 mL/hr at 08/13/21 0425    dextrose       Scheduled Medications    budesonide  0.5 mg Nebulization BID    docusate sodium  100 mg Oral BID    doxazosin  4 mg Oral Nightly    furosemide  40 mg Oral Daily    ferrous gluconate  240 mg Oral BID    lactobacillus  1 capsule Oral BID WC    levothyroxine  175 mcg Oral Daily    lisinopril  2.5 mg Oral Daily    therapeutic multivitamin-minerals  1 tablet Oral Daily    potassium chloride  10 mEq Oral BID    tiotropium  2 puff Inhalation Daily    spironolactone  25 mg Oral Daily    sodium chloride flush  5-40 mL Intravenous 2 times per day    enoxaparin  40 mg Subcutaneous Q24H    insulin glargine  18 Units Subcutaneous Nightly    insulin lispro  0-6 Units Subcutaneous TID WC    insulin lispro  0-3 Units Subcutaneous Nightly     PRN Meds: opium-belladonna, HYDROcodone-acetaminophen, albuterol sulfate HFA, oxybutynin, sodium chloride flush, sodium chloride, acetaminophen, morphine **OR** morphine, ondansetron **OR** ondansetron, polyethylene glycol, famotidine, glucose, dextrose, glucagon (rDNA), dextrose      Intake/Output Summary (Last 24 hours) at 8/13/2021 1140  Last data filed at 8/13/2021 1043  Gross per 24 hour   Intake 1901.42 ml   Output 2251 ml   Net -349.58 ml       Diet:  ADULT DIET; Regular; 4 carb choices (60 gm/meal)    Exam:  /62   Pulse 64   Temp 98 °F (36.7 °C) (Oral)   Resp 16   Ht 5' 7\" (1.702 m)   Wt 250 lb (113.4 kg)   SpO2 94%   BMI 39.16 kg/m²   General appearance: No apparent distress, appears stated age and cooperative. HEENT: Pupils equal, round, and reactive to light. Conjunctivae/corneas clear. Neck: Supple, with full range of motion. No jugular venous distention. Trachea midline. Respiratory:  Normal respiratory effort. Clear to auscultation, bilaterally without Rales/Wheezes/Rhonchi.   Cardiovascular: Regular rate and rhythm with normal S1/S2 without murmurs, rubs or gallops. Abdomen: Suprapubic catheter in place with surrounding gauze dressing. No erythema or drainage. There is tenderness to palpation in the lower abdomen. No rebound or peritoneal signs. : Suprapubic catheter in place with yellow urine output. Musculoskeletal: passive and active ROM x 4 extremities. Skin: Skin color, texture, turgor normal.  No rashes or lesions. Neurologic:  Neurovascularly intact without any focal sensory/motor deficits. Cranial nerves: II-XII intact, grossly non-focal.  Psychiatric: Alert and oriented, thought content appropriate, normal insight  Capillary Refill: Brisk,< 3 seconds   Peripheral Pulses: +2 palpable, equal bilaterally     Labs:   Recent Labs     08/12/21  1440 08/13/21  0615   WBC 14.8* 10.9*   HGB 11.1* 9.0*   HCT 35.1* 28.6*    236     Recent Labs     08/12/21  1440      K 4.1   CL 95*   CO2 23   BUN 10   CREATININE 1.1   CALCIUM 8.8     No results for input(s): AST, ALT, BILIDIR, BILITOT, ALKPHOS in the last 72 hours. No results for input(s): INR in the last 72 hours. No results for input(s): [facility] in the last 72 hours. Microbiology:    Blood culture #1:   Lab Results   Component Value Date    BC No growth-preliminary No growth  07/10/2021       Blood culture #2:No results found for: Víctor Fernandez    Organism:  Lab Results   Component Value Date    ORG Enterobacter cloacae complex 07/11/2021         Lab Results   Component Value Date    LABGRAM  07/11/2021     Few segmented neutrophils observed. No epithelial cells observed. Rare gram positive cocci occurring singly and in pairs. Rare small gram positive bacilli.         MRSA culture only:No results found for: [facility]    Urine culture:   Lab Results   Component Value Date    LABURIN No growth-preliminary No growth  07/12/2021       Respiratory culture: No results found for: CULTRESP    Aerobic and Anaerobic :  Lab Results   Component Value Date    LABAERO  07/11/2021     Culture also yielded moderate growth of gram positive bacilli most consistent with Corynebacterium species and Staphylococcus (coagulase negative). At least one of drugs in current antibiotic therapy is ineffective in vitro for isolate. LABAERO  07/11/2021     light growth Enterobacter species which may be initially susceptible to third generation cephalosporins may develop resistance within three to four days of initiation of this antimicrobial therapy. Lab Results   Component Value Date    LABANAE  07/11/2021     Culture yielded moderate growth of anaerobic gram positive cocci. If a true mixed aerobic and anaerobic infection is suspected, then broad spectrum empiric antibiotic therapy is indicated and should include coverage for anaerobic organisms. Urinalysis:      Lab Results   Component Value Date    NITRU NEGATIVE 08/03/2021    WBCUA 5-9 07/15/2021    BACTERIA NONE SEEN 07/15/2021    RBCUA 3-5 07/15/2021    BLOODU NEGATIVE 08/03/2021    SPECGRAV 1.015 12/05/2020    GLUCOSEU NEGATIVE 08/03/2021       Radiology:  IR ABSCESS DRAINAGE PERC   Final Result   1. Status post successful suprapubic catheter insertion. 2. Patient will be scheduled for exchange of indwelling pigtail catheter for soft rubber Billy catheter sometime in the next 4-6 weeks            **This report has been created using voice recognition software. It may contain minor errors which are inherent in voice recognition technology. **      Final report electronically signed by Dr Quinten Arias on 8/13/2021 11:05 AM      XR CHEST (2 VW)    (Results Pending)     IR ABSCESS DRAINAGE PERC    Result Date: 8/13/2021  SUPRAPUBIC DRAINAGE CATHETER INSERTION: CLINICAL INFORMATION: 80-year-old male with urinary obstruction PERFORMED BY: Vilma Angela. ADRIANNA Barrett [place]: Urinary bladder, suprapubic approach.  CATHETER: 14 Monegasque pigtail CONTRAST: Conray 60, 15 mL URINE DRAINED: 1100 mL capone urine FLUOROSCOPY TIME: 2 minutes 42 seconds minor errors which are inherent in voice recognition technology. ** Final report electronically signed by Dr Faustino Meade on 8/13/2021 11:05 AM      Electronically signed by Marii Nelson DO on 8/13/2021 at 11:40 AM

## 2021-08-13 NOTE — PROGRESS NOTES
Patient's son, Nubia Lanza called for an update on patient. Patient verified it was okay to update. Updated on current plan of care. Denies further questions at this time.

## 2021-08-14 LAB
ANION GAP SERPL CALCULATED.3IONS-SCNC: 9 MEQ/L (ref 8–16)
BASOPHILS # BLD: 0.5 %
BASOPHILS ABSOLUTE: 0 THOU/MM3 (ref 0–0.1)
BUN BLDV-MCNC: 8 MG/DL (ref 7–22)
CALCIUM SERPL-MCNC: 8.1 MG/DL (ref 8.5–10.5)
CHLORIDE BLD-SCNC: 102 MEQ/L (ref 98–111)
CO2: 29 MEQ/L (ref 23–33)
CREAT SERPL-MCNC: 0.9 MG/DL (ref 0.4–1.2)
EOSINOPHIL # BLD: 3.1 %
EOSINOPHILS ABSOLUTE: 0.3 THOU/MM3 (ref 0–0.4)
ERYTHROCYTE [DISTWIDTH] IN BLOOD BY AUTOMATED COUNT: 14.4 % (ref 11.5–14.5)
ERYTHROCYTE [DISTWIDTH] IN BLOOD BY AUTOMATED COUNT: 44.9 FL (ref 35–45)
GFR SERPL CREATININE-BSD FRML MDRD: 79 ML/MIN/1.73M2
GLUCOSE BLD-MCNC: 110 MG/DL (ref 70–108)
GLUCOSE BLD-MCNC: 110 MG/DL (ref 70–108)
GLUCOSE BLD-MCNC: 137 MG/DL (ref 70–108)
GLUCOSE BLD-MCNC: 99 MG/DL (ref 70–108)
GLUCOSE BLD-MCNC: 99 MG/DL (ref 70–108)
HCT VFR BLD CALC: 28.1 % (ref 42–52)
HEMOGLOBIN: 8.9 GM/DL (ref 14–18)
IMMATURE GRANS (ABS): 0.05 THOU/MM3 (ref 0–0.07)
IMMATURE GRANULOCYTES: 0.5 %
LYMPHOCYTES # BLD: 20.2 %
LYMPHOCYTES ABSOLUTE: 1.9 THOU/MM3 (ref 1–4.8)
MCH RBC QN AUTO: 27.2 PG (ref 26–33)
MCHC RBC AUTO-ENTMCNC: 31.7 GM/DL (ref 32.2–35.5)
MCV RBC AUTO: 85.9 FL (ref 80–94)
MONOCYTES # BLD: 9.3 %
MONOCYTES ABSOLUTE: 0.9 THOU/MM3 (ref 0.4–1.3)
NUCLEATED RED BLOOD CELLS: 0 /100 WBC
PLATELET # BLD: 214 THOU/MM3 (ref 130–400)
PMV BLD AUTO: 9.7 FL (ref 9.4–12.4)
POTASSIUM SERPL-SCNC: 3.6 MEQ/L (ref 3.5–5.2)
RBC # BLD: 3.27 MILL/MM3 (ref 4.7–6.1)
SEG NEUTROPHILS: 66.4 %
SEGMENTED NEUTROPHILS ABSOLUTE COUNT: 6.3 THOU/MM3 (ref 1.8–7.7)
SODIUM BLD-SCNC: 140 MEQ/L (ref 135–145)
WBC # BLD: 9.5 THOU/MM3 (ref 4.8–10.8)

## 2021-08-14 PROCEDURE — 6370000000 HC RX 637 (ALT 250 FOR IP): Performed by: NURSE PRACTITIONER

## 2021-08-14 PROCEDURE — 36415 COLL VENOUS BLD VENIPUNCTURE: CPT

## 2021-08-14 PROCEDURE — 94640 AIRWAY INHALATION TREATMENT: CPT

## 2021-08-14 PROCEDURE — 6370000000 HC RX 637 (ALT 250 FOR IP): Performed by: PHYSICIAN ASSISTANT

## 2021-08-14 PROCEDURE — 94761 N-INVAS EAR/PLS OXIMETRY MLT: CPT

## 2021-08-14 PROCEDURE — 6360000002 HC RX W HCPCS: Performed by: NURSE PRACTITIONER

## 2021-08-14 PROCEDURE — 99232 SBSQ HOSP IP/OBS MODERATE 35: CPT | Performed by: NURSE PRACTITIONER

## 2021-08-14 PROCEDURE — 6360000002 HC RX W HCPCS: Performed by: PHYSICIAN ASSISTANT

## 2021-08-14 PROCEDURE — 80048 BASIC METABOLIC PNL TOTAL CA: CPT

## 2021-08-14 PROCEDURE — 1200000000 HC SEMI PRIVATE

## 2021-08-14 PROCEDURE — 82948 REAGENT STRIP/BLOOD GLUCOSE: CPT

## 2021-08-14 PROCEDURE — 2580000003 HC RX 258: Performed by: PHYSICIAN ASSISTANT

## 2021-08-14 PROCEDURE — 2580000003 HC RX 258: Performed by: NURSE PRACTITIONER

## 2021-08-14 PROCEDURE — 99231 SBSQ HOSP IP/OBS SF/LOW 25: CPT | Performed by: STUDENT IN AN ORGANIZED HEALTH CARE EDUCATION/TRAINING PROGRAM

## 2021-08-14 PROCEDURE — 85025 COMPLETE CBC W/AUTO DIFF WBC: CPT

## 2021-08-14 PROCEDURE — 96372 THER/PROPH/DIAG INJ SC/IM: CPT

## 2021-08-14 RX ORDER — POLYETHYLENE GLYCOL 3350 17 G/17G
17 POWDER, FOR SOLUTION ORAL DAILY
Status: DISCONTINUED | OUTPATIENT
Start: 2021-08-15 | End: 2021-08-15

## 2021-08-14 RX ORDER — BISACODYL 10 MG
10 SUPPOSITORY, RECTAL RECTAL ONCE
Status: COMPLETED | OUTPATIENT
Start: 2021-08-14 | End: 2021-08-14

## 2021-08-14 RX ADMIN — SPIRONOLACTONE 25 MG: 25 TABLET ORAL at 09:52

## 2021-08-14 RX ADMIN — TIOTROPIUM BROMIDE INHALATION SPRAY 2 PUFF: 3.12 SPRAY, METERED RESPIRATORY (INHALATION) at 08:04

## 2021-08-14 RX ADMIN — POTASSIUM CHLORIDE 10 MEQ: 1500 TABLET, EXTENDED RELEASE ORAL at 21:50

## 2021-08-14 RX ADMIN — Medication 1 CAPSULE: at 18:13

## 2021-08-14 RX ADMIN — BUDESONIDE 500 MCG: 0.5 INHALANT RESPIRATORY (INHALATION) at 08:03

## 2021-08-14 RX ADMIN — SENNOSIDES 17.2 MG: 8.6 TABLET, FILM COATED ORAL at 21:48

## 2021-08-14 RX ADMIN — POTASSIUM CHLORIDE 10 MEQ: 1500 TABLET, EXTENDED RELEASE ORAL at 09:51

## 2021-08-14 RX ADMIN — LEVOTHYROXINE SODIUM 175 MCG: 0.15 TABLET ORAL at 05:55

## 2021-08-14 RX ADMIN — SODIUM CHLORIDE, PRESERVATIVE FREE 10 ML: 5 INJECTION INTRAVENOUS at 09:51

## 2021-08-14 RX ADMIN — MAGNESIUM CITRATE 150 ML: 1.75 LIQUID ORAL at 12:31

## 2021-08-14 RX ADMIN — Medication 1 CAPSULE: at 09:51

## 2021-08-14 RX ADMIN — BUDESONIDE 500 MCG: 0.5 INHALANT RESPIRATORY (INHALATION) at 18:03

## 2021-08-14 RX ADMIN — FUROSEMIDE 40 MG: 40 TABLET ORAL at 09:52

## 2021-08-14 RX ADMIN — INSULIN GLARGINE 18 UNITS: 100 INJECTION, SOLUTION SUBCUTANEOUS at 21:53

## 2021-08-14 RX ADMIN — Medication 1 TABLET: at 09:52

## 2021-08-14 RX ADMIN — BISACODYL 10 MG: 10 SUPPOSITORY RECTAL at 18:13

## 2021-08-14 RX ADMIN — ENOXAPARIN SODIUM 40 MG: 40 INJECTION SUBCUTANEOUS at 09:54

## 2021-08-14 RX ADMIN — DOCUSATE SODIUM 100 MG: 100 CAPSULE ORAL at 21:48

## 2021-08-14 RX ADMIN — Medication 240 MG: at 21:48

## 2021-08-14 RX ADMIN — MAGNESIUM CITRATE 150 ML: 1.75 LIQUID ORAL at 09:51

## 2021-08-14 RX ADMIN — Medication 240 MG: at 09:52

## 2021-08-14 RX ADMIN — CEFTRIAXONE SODIUM 1000 MG: 1 INJECTION, POWDER, FOR SOLUTION INTRAMUSCULAR; INTRAVENOUS at 15:15

## 2021-08-14 RX ADMIN — SODIUM CHLORIDE: 9 INJECTION, SOLUTION INTRAVENOUS at 09:54

## 2021-08-14 RX ADMIN — LISINOPRIL 2.5 MG: 2.5 TABLET ORAL at 09:52

## 2021-08-14 ASSESSMENT — PAIN DESCRIPTION - LOCATION: LOCATION: ABDOMEN

## 2021-08-14 ASSESSMENT — PAIN DESCRIPTION - ORIENTATION: ORIENTATION: RIGHT;LEFT;MID

## 2021-08-14 ASSESSMENT — PAIN DESCRIPTION - PROGRESSION
CLINICAL_PROGRESSION: GRADUALLY WORSENING

## 2021-08-14 ASSESSMENT — PAIN - FUNCTIONAL ASSESSMENT: PAIN_FUNCTIONAL_ASSESSMENT: ACTIVITIES ARE NOT PREVENTED

## 2021-08-14 ASSESSMENT — PAIN DESCRIPTION - DESCRIPTORS: DESCRIPTORS: STABBING

## 2021-08-14 ASSESSMENT — PAIN DESCRIPTION - ONSET: ONSET: ON-GOING

## 2021-08-14 ASSESSMENT — PAIN SCALES - GENERAL
PAINLEVEL_OUTOF10: 8
PAINLEVEL_OUTOF10: 8

## 2021-08-14 ASSESSMENT — PAIN DESCRIPTION - PAIN TYPE: TYPE: ACUTE PAIN

## 2021-08-14 ASSESSMENT — PAIN DESCRIPTION - FREQUENCY: FREQUENCY: CONTINUOUS

## 2021-08-14 NOTE — PROGRESS NOTES
Fantasma Everett, patient's son, called asking for an update. Update given. Fax number given to Roland Burkitt for POA paperwork to be sent to [facility] and be put on file. POA paperwork received and placed in patients paper chart. Will notify medical records to scan into electronic chart.

## 2021-08-14 NOTE — PLAN OF CARE
Problem: Pain:  Goal: Pain level will decrease  Description: Pain level will decrease  Outcome: Ongoing  Note: Pt states pain is 4/10. Pt pain goal 0/10. Rest and reposition provided to decrease pain. PRN tylenol administered as ordered per patient request to relieve pain. Will continue ongoing assessment of pain. Goal: Control of acute pain  Description: Control of acute pain  Outcome: Ongoing  Goal: Control of chronic pain  Description: Control of chronic pain  Outcome: Ongoing     Problem: Falls - Risk of:  Goal: Will remain free from falls  Description: Will remain free from falls  Outcome: Ongoing  Note: No falls this shift. Bed alarm, nonskid socks, and call light utilized. At risk due to generalized weakness, IV therapy, and suprapubic catheter. Problem: Skin Integrity:  Goal: Absence of new skin breakdown  Description: Absence of new skin breakdown  Outcome: Ongoing  Note: No new skin breakdown. Pt turned and repositioned. Will continue on going skin assessment. Goal: Will show no infection signs and symptoms  Description: Will show no infection signs and symptoms  Outcome: Ongoing  Note: Pt afebrile this shift with no s/sx of infection noted at this time. Suprapubic site dressing, clean dry and intact. Pt educated on incentive spirometer to prevent pneumonia. Pt used incentive spirometer with RN present in the room. Problem: Urinary Elimination:  Goal: Ability to achieve a balanced intake and output will improve  Description: Ability to achieve a balanced intake and output will improve  Outcome: Ongoing  Note: Pt suprapubic catheter intact and draining well. Goal: Will remain free from infection  Description: Will remain free from infection  Outcome: Ongoing     Problem: Discharge Planning:  Goal: Discharged to appropriate level of care  Description: Discharged to appropriate level of care  Outcome: Ongoing  Note: Discharge plans to return to [facility]. Discussed with pt.  Patient's son updated on plan of care. Care plan reviewed with patient. Patient verbalize understanding of the plan of care and contribute to goal setting.

## 2021-08-14 NOTE — PLAN OF CARE
Problem: Impaired respiratory status  Goal: Clear lung sounds  Description: Clear lung sounds  Note: Treatment will be continued as ordered. Patient on home regimen.

## 2021-08-14 NOTE — ACP (ADVANCE CARE PLANNING)
Advance Care Planning     General Advance Care Planning (ACP) Conversation    Date of Conversation: 8/12/2021  Conducted with:  Healthcare Decision Maker: Named in Advance Directive or Healthcare Power of [address] [place] [address] (name) [facility] Decision Maker:      Note:  Chaplain holguin[winnie to follow-up on hand off note fromother .  inquired about POA doc being received from patient nurse, and this was confirmed.  could not find this in media record however. Please confirm with patient staff as seems appropriate. Click here to complete [address] including selection of the Healthcare Decision Maker Relationship (ie \"Primary\")      Content/Action Overview:  Attempted to confirm receipt of POA doc as reported by designated POA and by unit nurse, but could not confirm in record.    Reviewed DNR/DNI and patient elects Full Code (Attempt Resuscitation)        Length of Voluntary ACP Conversation in minutes:  <16 minutes (Non-Billable)    Elly Mcconnell

## 2021-08-14 NOTE — FLOWSHEET NOTE
Advance Care Planning     General Advance Care Planning (ACP) Conversation    Date of Conversation: 8/12/2021  Conducted with: Patient with Decision Making Capacity    Healthcare Decision Maker:        Click here to complete [employer] including selection of the [employer] Relationship (ie \"Primary\")  Today we documented Decision Maker(s) consistent with Legal Next of Kin hierarchy. Content/Action Overview: Has ACP document(s) NOT on file - requested patient to provide      Angelina Lara is a delightful 79 yo  father of two. He lives at [facility] currently. 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 He confirmed he wants son Moose Morrow as [place] and gave sumi permission to contact Moose Morrow to try to get a copy of the document.  placed the call and left message on Dr. Vick Hastings voice mail.      Length of Voluntary ACP Conversation in minutes:  45 minutes    Kalina Corrales

## 2021-08-14 NOTE — PROGRESS NOTES
reviewed again and is unchanged since admission. ROS (12 point review of systems completed. Pertinent positives noted. Otherwise ROS is negative)     Medications:  Reviewed    Infusion Medications    sodium chloride      sodium chloride 75 mL/hr at 08/14/21 0954    dextrose       Scheduled Medications    magnesium citrate  150 mL Oral Q2H    cefTRIAXone (ROCEPHIN) IV  1,000 mg Intravenous Q24H    senna  2 tablet Oral Nightly    magnesium hydroxide  30 mL Oral Daily    budesonide  0.5 mg Nebulization BID    docusate sodium  100 mg Oral BID    doxazosin  4 mg Oral Nightly    furosemide  40 mg Oral Daily    ferrous gluconate  240 mg Oral BID    lactobacillus  1 capsule Oral BID WC    levothyroxine  175 mcg Oral Daily    lisinopril  2.5 mg Oral Daily    therapeutic multivitamin-minerals  1 tablet Oral Daily    potassium chloride  10 mEq Oral BID    tiotropium  2 puff Inhalation Daily    spironolactone  25 mg Oral Daily    sodium chloride flush  5-40 mL Intravenous 2 times per day    enoxaparin  40 mg Subcutaneous Q24H    insulin glargine  18 Units Subcutaneous Nightly    insulin lispro  0-6 Units Subcutaneous TID WC    insulin lispro  0-3 Units Subcutaneous Nightly     PRN Meds: opium-belladonna, HYDROcodone-acetaminophen, albuterol sulfate HFA, oxybutynin, sodium chloride flush, sodium chloride, acetaminophen, morphine **OR** morphine, ondansetron **OR** ondansetron, polyethylene glycol, famotidine, glucose, dextrose, glucagon (rDNA), dextrose      Intake/Output Summary (Last 24 hours) at 8/14/2021 1055  Last data filed at 8/14/2021 0951  Gross per 24 hour   Intake 2290.76 ml   Output 2950 ml   Net -659.24 ml       Diet:  ADULT DIET;  Regular; 4 carb choices (60 gm/meal)    Exam:  BP (!) 123/59   Pulse 67   Temp 97.5 °F (36.4 °C) (Oral)   Resp 16   Ht 5' 7\" (1.702 m)   Wt 250 lb (113.4 kg)   SpO2 94%   BMI 39.16 kg/m²   General appearance: No apparent distress, appears stated age and cooperative. HEENT: Pupils equal, round, and reactive to light. Conjunctivae/corneas clear. Neck: Supple, with full range of motion. No jugular venous distention. Trachea midline. Respiratory:  Normal respiratory effort. Clear to auscultation, bilaterally without Rales/Wheezes/Rhonchi. Cardiovascular: Regular rate and rhythm with normal S1/S2 without murmurs, rubs or gallops. Abdomen: Suprapubic catheter in place with surrounding gauze dressing. No erythema or drainage. There is tenderness to palpation in the lower abdomen. No rebound or peritoneal signs. : Suprapubic catheter in place with yellow urine output. Musculoskeletal: passive and active ROM x 4 extremities. Skin: Skin color, texture, turgor normal.  No rashes or lesions. Neurologic:  Neurovascularly intact without any focal sensory/motor deficits. Cranial nerves: II-XII intact, grossly non-focal.  Psychiatric: Alert and oriented, thought content appropriate, normal insight  Capillary Refill: Brisk,< 3 seconds   Peripheral Pulses: +2 palpable, equal bilaterally     Labs:   Recent Labs     08/12/21  1440 08/13/21  0615 08/14/21  0553   WBC 14.8* 10.9* 9.5   HGB 11.1* 9.0* 8.9*   HCT 35.1* 28.6* 28.1*    236 214     Recent Labs     08/12/21  1440 08/14/21  0553    140   K 4.1 3.6   CL 95* 102   CO2 23 29   BUN 10 8   CREATININE 1.1 0.9   CALCIUM 8.8 8.1*     No results for input(s): AST, ALT, BILIDIR, BILITOT, ALKPHOS in the last 72 hours. No results for input(s): INR in the last 72 hours. No results for input(s): Neftali Suzanna in the last 72 hours. Microbiology:    Blood culture #1:   Lab Results   Component Value Date    BC No growth-preliminary No growth  07/10/2021       Blood culture #2:No results found for: Josephine Nino    Organism:  Lab Results   Component Value Date    ORG Enterobacter cloacae complex 07/11/2021         Lab Results   Component Value Date    LABGRAM  07/11/2021     Few segmented neutrophils observed. No epithelial cells observed. Rare gram positive cocci occurring singly and in pairs. Rare small gram positive bacilli. MRSA culture only:No results found for: [facility]    Urine culture:   Lab Results   Component Value Date    LABURIN No growth-preliminary No growth  07/12/2021       Respiratory culture: No results found for: CULTRESP    Aerobic and Anaerobic :  Lab Results   Component Value Date    LABAERO  07/11/2021     Culture also yielded moderate growth of gram positive bacilli most consistent with Corynebacterium species and Staphylococcus (coagulase negative). At least one of drugs in current antibiotic therapy is ineffective in vitro for isolate. LABAERO  07/11/2021     light growth Enterobacter species which may be initially susceptible to third generation cephalosporins may develop resistance within three to four days of initiation of this antimicrobial therapy. Lab Results   Component Value Date    LABANAE  07/11/2021     Culture yielded moderate growth of anaerobic gram positive cocci. If a true mixed aerobic and anaerobic infection is suspected, then broad spectrum empiric antibiotic therapy is indicated and should include coverage for anaerobic organisms. Urinalysis:      Lab Results   Component Value Date    NITRU NEGATIVE 08/03/2021    WBCUA 5-9 07/15/2021    BACTERIA NONE SEEN 07/15/2021    RBCUA 3-5 07/15/2021    BLOODU NEGATIVE 08/03/2021    SPECGRAV 1.015 12/05/2020    GLUCOSEU NEGATIVE 08/03/2021       Radiology:  CT ABDOMEN PELVIS W IV CONTRAST Additional Contrast? Oral   Final Result   1. Suprapubic pigtail catheter in the bladder, in good position. There are a few small air bubbles, as discussed above. 2. Constipation. **This report has been created using voice recognition software. It may contain minor errors which are inherent in voice recognition technology. **      Final report electronically signed by Dr. Rosa Oliver on 8/13/2021 6:59 PM      XR CHEST listed above. A small amount of urine was aspirated to confirm appropriate needle position. A small amount of iodinated contrast material was then injected for confirmation and a fluoroscopic image was obtained for documentation. An 018 wire was then passed through the needle, followed by insertion of a 6 [place] AccuStick system. . The 018 wire was upsized to an [facility]. Progressively larger dilators were then passed over the wire up to 14 Fijian size. A 14 Amharic multipurpose drainage catheter was inserted over the wire with fluoroscopic guidance with the tip coiled in the urinary bladder. . 1100 mL urine was drained through the catheter. Contrast material was injected, confirming appropriate catheter position. A fluoroscopic spot film was obtained for documentation. . The catheter was stabilized to the skin and sterile dressings. A small amount of antibiotic quadrant was applied to the wound site along with a split gauze dressing. 1. Status post successful suprapubic catheter insertion. 2. Patient will be scheduled for exchange of indwelling pigtail catheter for soft rubber Billy catheter sometime in the next 4-6 weeks **This report has been created using voice recognition software. It may contain minor errors which are inherent in voice recognition technology. ** Final report electronically signed by Dr Dae Nuñez on 8/13/2021 11:05 AM      Electronically signed by Lexis Reed DO on 8/14/2021 at 10:55 AM

## 2021-08-14 NOTE — PROGRESS NOTES
Urology Progress Note    Chief Complaint: displaced SP catheter    Subjective:     Up in chair. Notes some discomfort in bilateral lower abdomen. Denies n/v.  SP catheter with clear light yellow urine. No BM or flatus yet at this time. Vitals:  BP (!) 108/55   Pulse 63   Temp 98.2 °F (36.8 °C) (Oral)   Resp 16   Ht 5' 7\" (1.702 m)   Wt 250 lb (113.4 kg)   SpO2 93%   BMI 39.16 kg/m²   Temp  Av.2 °F (36.8 °C)  Min: 98 °F (36.7 °C)  Max: 98.7 °F (37.1 °C)    Intake/Output Summary (Last 24 hours) at 2021 0905  Last data filed at 2021 0439  Gross per 24 hour   Intake 2380.76 ml   Output 3950 ml   Net -1569.24 ml       Social History     Socioeconomic History    Marital status:      Spouse name: Not on file    Number of children: 2    Years of education: Not on file    Highest education level: Not on file   Occupational History    Not on file   Tobacco Use    Smoking status: Never Smoker    Smokeless tobacco: Never Used   Vaping Use    Vaping Use: Never used   Substance and Sexual Activity    Alcohol use: Yes     Alcohol/week: 1.0 standard drinks     Types: 1 Glasses of wine per week     Comment: Glass of wine with dinner.  Drug use: No    Sexual activity: Never   Other Topics Concern    Not on file   Social History Narrative    Not on file     Social Determinants of Health     Financial Resource Strain:     Difficulty of Paying Living Expenses:    Food Insecurity:     Worried About Running Out of Food in the Last Year:     [address] in the Last Year:    Transportation Needs:     Lack of Transportation (Medical):      Lack of Transportation (Non-Medical):    Physical Activity:     Days of Exercise per Week:     Minutes of Exercise per Session:    Stress:     Feeling of Stress :    Social Connections:     Frequency of Communication with Friends and Family:     Frequency of Social Gatherings with Friends and Family:     Attends Yarsani Services:     Active Member of Clubs or Organizations:     Attends Club or Organization Meetings:     Marital Status:    Intimate Partner Violence:     Fear of Current or Ex-Partner:     Emotionally Abused:     Physically Abused:     Sexually Abused:      Family History   Problem Relation Age of Onset    Other Mother         Complications of Childbirth    Other Father [de-identified]        Natural causes     Allergies   Allergen Reactions    Latex Rash    Macrobid [Nitrofurantoin Tonia Fair      Dr. Albert Flores prefers pt not use Macrobid due to pulmonary side effects.  Levaquin [Levofloxacin] Other (See Comments)     Redness/flushing    Brimonidine Tartrate     Adhesive Tape Rash    Alphagan [Brimonidine Tartrate] Hives     Eyes red         Constitutional: Alert and oriented times x3, no acute distress, and cooperative to examination with appropriate mood and affect. HEENT:   Head:         Normocephalic and atraumatic. Mucous membranes are normal.   Eyes:         EOM are normal. No scleral icterus. Nose:    The external appearance of the nose is normal  Ears: The ears appear normal to external inspection. Cardiovascular:       Normal rate, regular rhythm. Pulmonary/Chest:  Normal respiratory rate and rhthym. No use of accessory muscles. Exp wheeze posterior lung fields. Abdominal:          Soft. No tenderness. Active bowel sounds. Genitalia:    SP catheter draining clear yellow urine. Musculoskeletal:    Normal range of motion. Exhibits no edema or tenderness of lower extremities. Extremities:    No cyanosis, clubbing, or edema present. Neurological:    Alert and oriented.      Labs:  WBC:    Lab Results   Component Value Date    WBC 9.5 08/14/2021     Hemoglobin/Hematocrit:    Lab Results   Component Value Date    HGB 8.9 08/14/2021    HCT 28.1 08/14/2021     BMP:    Lab Results   Component Value Date     08/14/2021    K 3.6 08/14/2021    K 4.1 08/12/2021     08/14/2021    CO2 29 08/14/2021    BUN 8 08/14/2021    LABALBU 3.7 08/03/2021    LABALBU 4.3 11/30/2011    CREATININE 0.9 08/14/2021    CALCIUM 8.1 08/14/2021    LABGLOM 79 08/14/2021     Assessment and Plan  1. BPH with obstruction- Status post [facility] on 3/17/20 by Dr. Ce King. Status post [facility] placement due to the need for chronic catheterization. Catheter dislodged 8/12 and replaced by IR      2. Dislodged SP catheter-s/p cystoscopy, attempted sp tube placement, attempted felipe cath, retrograde urethrogram, SQ fistulogram of SP tract 8/12 by Dr. Brianna Huynh. Ultimately underwent IR placement of 14 Surinamese multipurpose drainage catheter 8/12/21 under fluoro by Dr. Ayanna Topete 8/12/21. CT abd/pelvis 8/13/21 with SP catheter in proper position. Will need exchange and upsize in 4-6 weeks by IR. 3. Constipation-No response with colace, senna, mom and dulcolax. No evidence of obstruction on CT abd/pelvis. Give magnesium citrate this am.  Increase activity. 4. History of Anemia- On Iron supplementation     5. DM- On Lantus. Per IM     6. History of DVT- On Lovenox. 7. COPD- On Brovana, Pulmicort, Albuterol, Home O2. CXR 8/13/21 stable without acute findings. Per IM    8. HTN- On Lasix, Lisinopril. Per IM    Awaiting bowel movement prior to d/c. Left message for pt's son Dr. Trudy Inman updating on [address] and left number for call back if any questions.       MAEGAN Denis - [place]  08/14/21 9:05 AM  Urology

## 2021-08-15 ENCOUNTER — TELEPHONE (OUTPATIENT)
Dept: UROLOGY | Age: 86
End: 2021-08-15

## 2021-08-15 VITALS
DIASTOLIC BLOOD PRESSURE: 61 MMHG | SYSTOLIC BLOOD PRESSURE: 142 MMHG | HEART RATE: 57 BPM | OXYGEN SATURATION: 94 % | HEIGHT: 67 IN | WEIGHT: 250 LBS | RESPIRATION RATE: 18 BRPM | TEMPERATURE: 98 F | BODY MASS INDEX: 39.24 KG/M2

## 2021-08-15 LAB
GLUCOSE BLD-MCNC: 105 MG/DL (ref 70–108)
SARS-COV-2, NAAT: NOT DETECTED

## 2021-08-15 PROCEDURE — 87635 SARS-COV-2 COVID-19 AMP PRB: CPT

## 2021-08-15 PROCEDURE — 6360000002 HC RX W HCPCS: Performed by: NURSE PRACTITIONER

## 2021-08-15 PROCEDURE — 6370000000 HC RX 637 (ALT 250 FOR IP): Performed by: NURSE PRACTITIONER

## 2021-08-15 PROCEDURE — 94760 N-INVAS EAR/PLS OXIMETRY 1: CPT

## 2021-08-15 PROCEDURE — 82948 REAGENT STRIP/BLOOD GLUCOSE: CPT

## 2021-08-15 PROCEDURE — 94640 AIRWAY INHALATION TREATMENT: CPT

## 2021-08-15 PROCEDURE — 99239 HOSP IP/OBS DSCHRG MGMT >30: CPT | Performed by: NURSE PRACTITIONER

## 2021-08-15 PROCEDURE — 96372 THER/PROPH/DIAG INJ SC/IM: CPT

## 2021-08-15 PROCEDURE — 99231 SBSQ HOSP IP/OBS SF/LOW 25: CPT | Performed by: STUDENT IN AN ORGANIZED HEALTH CARE EDUCATION/TRAINING PROGRAM

## 2021-08-15 PROCEDURE — 6370000000 HC RX 637 (ALT 250 FOR IP): Performed by: PHYSICIAN ASSISTANT

## 2021-08-15 RX ADMIN — DOCUSATE SODIUM 100 MG: 100 CAPSULE ORAL at 09:10

## 2021-08-15 RX ADMIN — Medication 1 TABLET: at 09:10

## 2021-08-15 RX ADMIN — FUROSEMIDE 40 MG: 40 TABLET ORAL at 09:10

## 2021-08-15 RX ADMIN — ENOXAPARIN SODIUM 40 MG: 40 INJECTION SUBCUTANEOUS at 09:10

## 2021-08-15 RX ADMIN — SPIRONOLACTONE 25 MG: 25 TABLET ORAL at 09:10

## 2021-08-15 RX ADMIN — LEVOTHYROXINE SODIUM 175 MCG: 0.15 TABLET ORAL at 05:27

## 2021-08-15 RX ADMIN — TIOTROPIUM BROMIDE INHALATION SPRAY 2 PUFF: 3.12 SPRAY, METERED RESPIRATORY (INHALATION) at 09:00

## 2021-08-15 RX ADMIN — POTASSIUM CHLORIDE 10 MEQ: 1500 TABLET, EXTENDED RELEASE ORAL at 09:10

## 2021-08-15 RX ADMIN — HYDROCODONE BITARTRATE AND ACETAMINOPHEN 1 TABLET: 5; 325 TABLET ORAL at 12:15

## 2021-08-15 RX ADMIN — Medication 1 CAPSULE: at 09:10

## 2021-08-15 RX ADMIN — MAGNESIUM HYDROXIDE 30 ML: 400 SUSPENSION ORAL at 09:10

## 2021-08-15 RX ADMIN — LISINOPRIL 2.5 MG: 2.5 TABLET ORAL at 09:10

## 2021-08-15 RX ADMIN — BUDESONIDE 500 MCG: 0.5 INHALANT RESPIRATORY (INHALATION) at 09:00

## 2021-08-15 RX ADMIN — Medication 240 MG: at 09:10

## 2021-08-15 ASSESSMENT — PAIN SCALES - GENERAL
PAINLEVEL_OUTOF10: 0
PAINLEVEL_OUTOF10: 10

## 2021-08-15 ASSESSMENT — PAIN DESCRIPTION - LOCATION: LOCATION: GROIN

## 2021-08-15 ASSESSMENT — PAIN DESCRIPTION - PAIN TYPE: TYPE: ACUTE PAIN

## 2021-08-15 NOTE — PROGRESS NOTES
Call made to [facility] assisted living regarding patients discharge. Facility okay to take patient back today if discharged.

## 2021-08-15 NOTE — DISCHARGE INSTR - COC
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    Chronic congestive heart failure with left ventricular diastolic dysfunction ([facility]) I50.32    Morbid obesity ([facility]) E66.01    Urinary retention R33.9    Urine retention R33.9    Abdominal pain R10.9    Altered mental status R41.82    Chronic indwelling Billy catheter Z97.8    Urinary tract infection without hematuria N39.0    Hypokalemia E87.6    Weakness of left lower extremity R29.898    Pill dysphagia R13.10    Hx of thyroid cancer Z85.850    Gross hematuria R31.0    Constipation K59.00    Acute urinary retention R33.8    COPD exacerbation ([facility]) J44.1    Acute on chronic respiratory failure with hypoxia and hypercapnia ([facility]) J96.21, J96.22    Acute on chronic diastolic congestive heart failure ([facility]) I50.33    Bilateral leg edema +2 now +1 R60.0    Community acquired bacterial pneumonia J15.9    Elevated troponin R77.8    Scrotal pain N50.82    Acute kidney injury superimposed on CKD ([facility]) N17.9, N18.9    Catheter-associated urinary tract infection ([facility]) T83.511A, Z60.9    Acute metabolic encephalopathy S11.70    History of BPH Z87.438    First degree AV block I44.0    Hx of coronary artery disease Z86.79    Chronic back pain M54.9, G89.29    Suprapubic catheter dysfunction ([facility]) T83.010A       Isolation/Infection:   Isolation            No Isolation          Patient Infection Status       Infection Onset Added Last Indicated Last Indicated By Review Planned Expiration Resolved Resolved By    C-diff Rule Out 08/14/21 08/14/21 08/14/21 Gastrointestinal Panel, Molecular (Ordered)        Resolved    COVID-19 Rule Out 05/19/21 05/19/21 05/19/21 COVID-19, Rapid (Ordered)   05/19/21 Rule-Out Test Resulted    COVID-19 Rule Out 12/05/20 12/05/20 12/05/20 COVID-19 (Ordered)   12/05/20 Rule-Out Test Resulted    COVID-19 Rule Out 10/03/20 10/03/20 10/03/20 COVID-19 (Ordered)   10/03/20 Rule-Out Test Resulted    COVID-19 Rule Out 05/18/20 05/18/20 05/18/20 COVID-19 (Ordered)   05/18/20 Rule-Out Test Resulted    MDRO (multi-drug resistant organism) 05/05/20 05/17/20 05/05/20 Culture, Reflexed, Urine   05/18/20 Hardeep Segal RN    ESBL (Extended Spectrum Beta Lactamase) 05/05/20 05/08/20 08/18/20 Culture, Reflexed, Urine   06/17/21 Mady Mcdermott RN    Klebsiella Pneumonia in urine 5/2020-Amp C confirmed by [address]  E coli in urine 7/2020, 8/2020    MDRO (multi-drug resistant organism) 05/05/20 05/08/20 05/05/20 Culture, Reflexed, Urine   05/10/20 Hardeep Segal RN    COVID-19 Rule Out 05/05/20 05/05/20 05/05/20 Covid-19 Ambulatory (Ordered)   05/07/20 Rule-Out Test Resulted            Nurse Assessment:  Last Vital Signs: BP (!) 142/61   Pulse 57   Temp 98 °F (36.7 °C) (Oral)   Resp 18   Ht 5' 7\" (1.702 m)   Wt 250 lb (113.4 kg)   SpO2 94% Comment: evaluated o2, no o2 needed at this time  BMI 39.16 kg/m²     Last documented pain score (0-10 scale): Pain Level: 0  Last Weight:   Wt Readings from Last 1 Encounters:   08/12/21 250 lb (113.4 kg)     Mental Status:  oriented, alert, coherent, logical, thought processes intact, and able to concentrate and follow conversation    IV Access:  - None    Nursing Mobility/ADLs:  Walking   Independent  Transfer  Independent  Bathing  Assisted  Dressing  Assisted  [address]  Assisted  Med Delivery   whole    Wound Care Documentation and Therapy:        Elimination:  Continence: Bowel: Yes  Bladder: Yes  Urinary Catheter:  Insertion Date: 8/12/21 and Indication for Use of Catheter: neurogenic bladder and chronic urinary retention    Colostomy/Ileostomy/Ileal Conduit: No       Date of Last BM: 8/14/21    Intake/Output Summary (Last 24 hours) at 8/15/2021 1121  Last data filed at 8/15/2021 1049  Gross per 24 hour   Intake 2487 ml   Output 2970 ml   Net -483 ml     I/O last 3 completed shifts: In: 7832 [P.O.:480; I.V.:1187]  Out: 2420 [Urine:2420]    Safety Concerns:     Sundowners Sundrome and At Risk for Falls    Impairments/Disabilities:      None    Nutrition Therapy:  Current Nutrition Therapy:   - Oral Diet:  Carb Control 4 carbs/meal (1800kcals/day)    Routes of Feeding: Oral  Liquids: Thin Liquids  Daily Fluid Restriction: no  Last Modified Barium Swallow with Video (Video Swallowing Test): not done    Treatments at the Time of Hospital Discharge:   Respiratory Treatments: scheduled HHN treatments  Oxygen Therapy:  is not on home oxygen therapy. Ventilator:    - No ventilator support    Rehab Therapies: none  Weight Bearing Status/Restrictions: No weight bearing restirctions  Other Medical Equipment (for information only, NOT a DME order):  bath bench and bedside commode  Other Treatments: none    Patient's personal belongings (please select all that are sent with patient):  Necklace, street clothes    RN SIGNATURE:  Electronically signed by Lay Pacheco RN on 8/15/21 at 11:54 AM EDT    CASE MANAGEMENT/SOCIAL WORK SECTION    Inpatient Status Date: ***    Readmission Risk Assessment Score:  Readmission Risk              Risk of Unplanned Readmission:  44           Discharging to Facility/ Agency   Name:   Address:  Phone:  Fax:    Dialysis Facility (if applicable)   Name:  Address:  Dialysis Schedule:  Phone:  Fax:    / signature:     PHYSICIAN SECTION    Prognosis: Good    Condition at Discharge: Stable    Rehab Potential (if transferring to Rehab): Good    Recommended Labs or Other Treatments After Discharge: Pt will need scheduled for IR exchange and upsizing of suprapubic catheter in 4-6 weeks. [facility] urology office will assist with scheduling.     Physician Certification: I certify the above information and transfer of Paola Melvin  is necessary for the continuing treatment of the diagnosis listed and that he requires Assisted Living for greater 30 days. Update Admission H&P: s/p cystoscopy, attempted sp tube placement, attempted felipe cath, retrograde urethrogram, SQ fistulogram of SP tract 8/12 by Dr. Samantha Pulliam. Ultimately underwent IR placement of 14 Faroese multipurpose drainage catheter under fluoro by Dr. Sylvia Billy 8/12/21. CT abd/pelvis 8/13/21 with SP catheter in proper position.   Will need exchange and upsize in 4-6 weeks by IR.    PHYSICIAN SIGNATURE:  Electronically signed by MAEGAN Martin CNP on 8/15/21 at 11:27 AM EDT

## 2021-08-15 NOTE — PROGRESS NOTES
Hospitalist Progress Note      Patient:  Wendy Shell    Unit/Bed:5K-24/024-A  YOB: 1931  MRN: [id number]   Acct: [de-identified]   PCP: Brenda Crisostomo MD  Date of Admission: 8/12/2021    Assessment/Plan:    1. Diabetes mellitus type 2:  a. Diabetes is well controlled. Fasting blood glucose this morning of 99.  b. Continue Lantus and sliding scale insulin Accu-Cheks AC at bedtime. c. Previous hemoglobin A1c 12/2020: 6.0.  d. Holding home metformin.    [facility] to resume regular diabetic medicines including Lantus and Metformin at discharge. 2. Dislodged suprapubic catheter:   a. Patient underwent cystoscopy with inability to place either suprapubic or indwelling Billy catheter. Underwent IR placement of suprapubic catheter. b. Pain control per urology. c. Will defer management to urology. 3. Abdominal pain with constipation  a. Patient had moderate-sized bowel movement yesterday with resolution of abdominal pain. Disposition: Patient is medically stable for discharge from the hospital pending clearance from urology. Chief Complaint: Abdominal pain with malfunctioning suprapubic catheter    Hospital Course:    Patient presented to the hospital from nursing facility with complaints of malfunctioning suprapubic catheter. Upon arrival, the catheter was noted to be displaced in the bladder. Patient underwent cystoscopy on 8/12 with attempts to place either suprapubic or indwelling Billy catheter. These attempts were not successful. Eventually the patient underwent IR placement of suprapubic catheter. Subjective (past 24 hours):   Patient had moderate sized bowel movement yesterday following magnesium citrate. He denies any abdominal pain today, and states that he feels better.   He states he is ready to leave the hospital.      Past medical history, family history, social history and allergies reviewed again and is unchanged since admission. ROS (12 point review of systems completed. Pertinent positives noted. Otherwise ROS is negative)     Medications:  Reviewed    Infusion Medications    sodium chloride      sodium chloride 75 mL/hr at 08/14/21 0954    dextrose       Scheduled Medications    cefTRIAXone (ROCEPHIN) IV  1,000 mg Intravenous Q24H    senna  2 tablet Oral Nightly    magnesium hydroxide  30 mL Oral Daily    budesonide  0.5 mg Nebulization BID    docusate sodium  100 mg Oral BID    doxazosin  4 mg Oral Nightly    furosemide  40 mg Oral Daily    ferrous gluconate  240 mg Oral BID    lactobacillus  1 capsule Oral BID WC    levothyroxine  175 mcg Oral Daily    lisinopril  2.5 mg Oral Daily    therapeutic multivitamin-minerals  1 tablet Oral Daily    potassium chloride  10 mEq Oral BID    tiotropium  2 puff Inhalation Daily    spironolactone  25 mg Oral Daily    sodium chloride flush  5-40 mL Intravenous 2 times per day    enoxaparin  40 mg Subcutaneous Q24H    insulin glargine  18 Units Subcutaneous Nightly    insulin lispro  0-6 Units Subcutaneous TID WC    insulin lispro  0-3 Units Subcutaneous Nightly     PRN Meds: opium-belladonna, HYDROcodone-acetaminophen, albuterol sulfate HFA, oxybutynin, sodium chloride flush, sodium chloride, acetaminophen, morphine **OR** morphine, ondansetron **OR** ondansetron, polyethylene glycol, famotidine, glucose, dextrose, glucagon (rDNA), dextrose      Intake/Output Summary (Last 24 hours) at 8/15/2021 0956  Last data filed at 8/15/2021 0917  Gross per 24 hour   Intake 1657 ml   Output 2420 ml   Net -763 ml       Diet:  ADULT DIET; Regular; 4 carb choices (60 gm/meal)    Exam:  BP (!) 142/61   Pulse 57   Temp 98 °F (36.7 °C) (Oral)   Resp 18   Ht 5' 7\" (1.702 m)   Wt 250 lb (113.4 kg)   SpO2 94% Comment: evaluated o2, no o2 needed at this time  BMI 39.16 kg/m²   General appearance: No apparent distress, appears stated age and cooperative.   HEENT: Pupils equal, round, and reactive to light. Conjunctivae/corneas clear. Neck: Supple, with full range of motion. No jugular venous distention. Trachea midline. Respiratory:  Normal respiratory effort. Clear to auscultation, bilaterally without Rales/Wheezes/Rhonchi. Cardiovascular: Regular rate and rhythm with normal S1/S2 without murmurs, rubs or gallops. Abdomen: Suprapubic catheter in place with surrounding gauze dressing. No erythema or drainage. There is tenderness to palpation in the lower abdomen. No rebound or peritoneal signs. : Suprapubic catheter in place with yellow urine output. Musculoskeletal: passive and active ROM x 4 extremities. Skin: Skin color, texture, turgor normal.  No rashes or lesions. Neurologic:  Neurovascularly intact without any focal sensory/motor deficits. Cranial nerves: II-XII intact, grossly non-focal.  Psychiatric: Alert and oriented, thought content appropriate, normal insight  Capillary Refill: Brisk,< 3 seconds   Peripheral Pulses: +2 palpable, equal bilaterally     Labs:   Recent Labs     08/12/21  1440 08/13/21  0615 08/14/21  0553   WBC 14.8* 10.9* 9.5   HGB 11.1* 9.0* 8.9*   HCT 35.1* 28.6* 28.1*    236 214     Recent Labs     08/12/21  1440 08/14/21  0553    140   K 4.1 3.6   CL 95* 102   CO2 23 29   BUN 10 8   CREATININE 1.1 0.9   CALCIUM 8.8 8.1*     No results for input(s): AST, ALT, BILIDIR, BILITOT, ALKPHOS in the last 72 hours. No results for input(s): INR in the last 72 hours. No results for input(s): Cale Favre in the last 72 hours. Microbiology:    Blood culture #1:   Lab Results   Component Value Date    BC No growth-preliminary No growth  07/10/2021       Blood culture #2:No results found for: [place] Jo    Organism:  Lab Results   Component Value Date    ORG Enterobacter cloacae complex 07/11/2021         Lab Results   Component Value Date    LABGRAM  07/11/2021     Few segmented neutrophils observed. No epithelial cells observed.  Rare gram positive cocci occurring singly and in pairs. Rare small gram positive bacilli. MRSA culture only:No results found for: [facility]    Urine culture:   Lab Results   Component Value Date    LABURIN No growth-preliminary No growth  07/12/2021       Respiratory culture: No results found for: CULTRESP    Aerobic and Anaerobic :  Lab Results   Component Value Date    LABAERO  07/11/2021     Culture also yielded moderate growth of gram positive bacilli most consistent with Corynebacterium species and Staphylococcus (coagulase negative). At least one of drugs in current antibiotic therapy is ineffective in vitro for isolate. LABAERO  07/11/2021     light growth Enterobacter species which may be initially susceptible to third generation cephalosporins may develop resistance within three to four days of initiation of this antimicrobial therapy. Lab Results   Component Value Date    LABANAE  07/11/2021     Culture yielded moderate growth of anaerobic gram positive cocci. If a true mixed aerobic and anaerobic infection is suspected, then broad spectrum empiric antibiotic therapy is indicated and should include coverage for anaerobic organisms. Urinalysis:      Lab Results   Component Value Date    NITRU NEGATIVE 08/03/2021    WBCUA 5-9 07/15/2021    BACTERIA NONE SEEN 07/15/2021    RBCUA 3-5 07/15/2021    BLOODU NEGATIVE 08/03/2021    SPECGRAV 1.015 12/05/2020    GLUCOSEU NEGATIVE 08/03/2021       Radiology:  CT ABDOMEN PELVIS W IV CONTRAST Additional Contrast? Oral   Final Result   1. Suprapubic pigtail catheter in the bladder, in good position. There are a few small air bubbles, as discussed above. 2. Constipation. **This report has been created using voice recognition software. It may contain minor errors which are inherent in voice recognition technology. **      Final report electronically signed by Dr. Lucita Canavan on 8/13/2021 6:59 PM      XR CHEST (2 VW)   Final Result   Stable radiographic appearance of the chest. No evidence of an acute process. **This report has been created using voice recognition software. It may contain minor errors which are inherent in voice recognition technology. **      Final report electronically signed by Dr. Denise Jane MD on 8/13/2021 1:31 PM      IR ABSCESS DRAINAGE PERC   Final Result   1. Status post successful suprapubic catheter insertion. 2. Patient will be scheduled for exchange of indwelling pigtail catheter for soft rubber Billy catheter sometime in the next 4-6 weeks            **This report has been created using voice recognition software. It may contain minor errors which are inherent in voice recognition technology. **      Final report electronically signed by Dr Krishna Suarez on 8/13/2021 11:05 AM        IR ABSCESS DRAINAGE PERC    Result Date: 8/13/2021  SUPRAPUBIC DRAINAGE CATHETER INSERTION: CLINICAL INFORMATION: 49-year-old male with urinary obstruction PERFORMED BY: Darrell Arriaza. Artem Nix M.D. Lawence Nab: Urinary bladder, suprapubic approach. CATHETER: 14 Liberian pigtail CONTRAST: Conray 60, 15 mL URINE DRAINED: 1100 mL capone urine FLUOROSCOPY TIME: 2 minutes 42 seconds FLUOROSCOPIC IMAGES: 1 ESTIMATED BLOOD LOSS: Minimal SEDATION: Versed 1 mg, fentanyl 50mcg, IV. Patient was sedated for 15 minutesduring this procedure and monitored with EKG and pulse ox monitoring devices by registered nurse. Face-to-face time with the patient was 15 minutes. PROCEDURE: Signed informed consent was obtained prior to performing this procedure. The patient was placed on the fluoroscopic table and the pelvis was evaluated With ultrasound guidance. An appropriate puncture site was determined with Ultrasound guidance, was marked, prepped, and draped in a sterile fashion. Following local anesthesia and utilizing aseptic technique, a Chiba needle was passed into the urinary bladder at the location listed above.  A small amount of urine was aspirated to confirm appropriate needle position. A small amount of iodinated contrast material was then injected for confirmation and a fluoroscopic image was obtained for documentation. An 018 wire was then passed through the needle, followed by insertion of a 6 [place] AccuStick system. . The 018 wire was upsized to an [facility]. Progressively larger dilators were then passed over the wire up to 14 Georgian size. A 14 Sinhala multipurpose drainage catheter was inserted over the wire with fluoroscopic guidance with the tip coiled in the urinary bladder. . 1100 mL urine was drained through the catheter. Contrast material was injected, confirming appropriate catheter position. A fluoroscopic spot film was obtained for documentation. . The catheter was stabilized to the skin and sterile dressings. A small amount of antibiotic quadrant was applied to the wound site along with a split gauze dressing. 1. Status post successful suprapubic catheter insertion. 2. Patient will be scheduled for exchange of indwelling pigtail catheter for soft rubber Billy catheter sometime in the next 4-6 weeks **This report has been created using voice recognition software. It may contain minor errors which are inherent in voice recognition technology. ** Final report electronically signed by Dr María Kerr on 8/13/2021 11:05 AM      Electronically signed by Sudha Baptiste DO on 8/15/2021 at 9:56 AM

## 2021-08-15 NOTE — PROGRESS NOTES
All personal belongings and chart with patient at discharge. Daniel Pool transported to [facility].

## 2021-08-15 NOTE — DISCHARGE SUMMARY
DISCHARGE SUMMARY NOTE:      Patient Identification  Antonio Lino is a 80 y.o. male. :  1931  Admit Date:  2021    Discharge date:   8/15/2021                                   Disposition: assisted living facility [facility].     Discharge Diagnoses:   Patient Active Problem List   Diagnosis    Diabetes mellitus ([facility] [address]    Hyperlipidemia    Coronary artery disease involving native coronary artery of native heart without angina pectoris    Chronic anemia    Acquired hypothyroidism    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    Breast lump    BPH (benign prostatic hyperplasia)    ROBER on CPAP    Essential hypertension    Hx of deep venous thrombosis    Pneumonia due to infectious organism    Shortness of breath    Chronic respiratory failure with hypoxia (HCC)    Mixed hyperlipidemia    Chronic deep vein thrombosis (DVT) of right lower extremity (HCC)    Anticoagulated on Coumadin    Type 2 diabetes mellitus with complication, with long-term current use of insulin (HCC)    Muscle weakness    Chronic bronchitis (HCC)    COPD without exacerbation (HCC)    Paroxysmal atrial fibrillation (HCC)    Acute diastolic congestive heart failure (HCC)    Paroxysmal atrial flutter (HCC)    Urinary incontinence    Serous detachment of retinal pigment epithelium    Senile nuclear sclerosis    Puckering of macula, bilateral    Presence of intraocular lens    Primary open-angle glaucoma    Other chorioretinal scars, right eye    Nonexudative senile macular degeneration of retina    Chronic congestive heart failure with left ventricular diastolic dysfunction (HCC)    Morbid obesity (HCC)    Urinary retention    Urine retention    Abdominal pain    Altered mental status    Chronic indwelling Billy catheter    Urinary tract infection without hematuria    Hypokalemia    Weakness of left lower extremity    Pill dysphagia    Hx of thyroid cancer    Gross hematuria    Constipation    Acute urinary retention    COPD exacerbation ([facility] [address]    Acute on chronic respiratory failure with hypoxia and hypercapnia (HCC)    Acute on chronic diastolic congestive heart failure (HCC)    Bilateral leg edema +2 now +1    Community acquired bacterial pneumonia    Elevated troponin    Scrotal pain    Acute kidney injury superimposed on CKD ([address]    Catheter-associated urinary tract infection (HCC)    Acute metabolic encephalopathy    History of BPH    First degree AV block    Hx of coronary artery disease    Chronic back pain    Suprapubic catheter dysfunction ([facility] [address]         Consults: Hospitalist    Surgery: s/p cystoscopy, attempted sp tube placement, attempted felipe cath, retrograde urethrogram, SQ fistulogram of SP tract 8/12 by Dr. Samantha Pulliam. Ultimately underwent IR placement of 14 [place] multipurpose drainage catheter under fluoro by Dr. Sylvia Billy 8/12/21    Patient Instructions: Activity: as tolerated. Diet: As tolerated  Follow-up:  Office to facilitate scheduling of IR exchange and upsizing of SP catheter in 4-6 weeks. Hospital course: Patient presented to [facility] secondary to displaced SP catheter. Catheter was unable to be replaced at the bedside and pt underwent cystoscopy, attempted sp tube placement, attempted felipe cath, retrograde urethrogram, SQ fistulogram of SP tract 8/12 by Dr. Samantha Pulliam. Urology was unable to gain access and pt ultimately underwent IR placement of 14 [place] multipurpose drainage catheter under fluoro by Dr. Sylvia Billy 5/46/62 with no complications. Post-operatively pt had abdominal pain. CT imaging 8/13 noted moderate constipation and good placement of SP catheter. Pt had BM on 8/14 and reports his pain is improved today. SP catheter draining clear yellow urine. Plan d/c to assisted living today. Son will transport. Time spent for discharge 32 minutes.      Chayo Franco, APRN - [address]   8/15/2021 11:28 AM

## 2021-08-15 NOTE — TELEPHONE ENCOUNTER
Pt being discharged today. Will need scheduled for exchange of indwelling 14 Welsh pigtail SP catheter for soft rubber felipe catheter in 4-6 weeks in IR.

## 2021-08-15 NOTE — PROGRESS NOTES
Urology Progress Note    Chief Complaint: displaced SP catheter    Subjective:     Resting in bed. Had small BM last evening. Reports abdominal pain much improved. Passing some flatus. Denies new complaints. SP catheter draining light yellow clear urine. Vitals:  BP (!) 142/61   Pulse 57   Temp 98 °F (36.7 °C) (Oral)   Resp 18   Ht 5' 7\" (1.702 m)   Wt 250 lb (113.4 kg)   SpO2 95%   BMI 39.16 kg/m²   Temp  Av.7 °F (36.5 °C)  Min: 97.5 °F (36.4 °C)  Max: 98.1 °F (36.7 °C)    Intake/Output Summary (Last 24 hours) at 8/15/2021 0820  Last data filed at 8/15/2021 5825  Gross per 24 hour   Intake 1667 ml   Output 2420 ml   Net -753 ml       Social History     Socioeconomic History    Marital status:      Spouse name: Not on file    Number of children: 2    Years of education: Not on file    Highest education level: Not on file   Occupational History    Not on file   Tobacco Use    Smoking status: Never Smoker    Smokeless tobacco: Never Used   Vaping Use    Vaping Use: Never used   Substance and Sexual Activity    Alcohol use: Yes     Alcohol/week: 1.0 standard drinks     Types: 1 Glasses of wine per week     Comment: Glass of wine with dinner.  Drug use: No    Sexual activity: Never   Other Topics Concern    Not on file   Social History Narrative    Not on file     Social Determinants of Health     Financial Resource Strain:     Difficulty of Paying Living Expenses:    Food Insecurity:     Worried About Running Out of Food in the Last Year:     [address] in the Last Year:    Transportation Needs:     Lack of Transportation (Medical):      Lack of Transportation (Non-Medical):    Physical Activity:     Days of Exercise per Week:     Minutes of Exercise per Session:    Stress:     Feeling of Stress :    Social Connections:     Frequency of Communication with Friends and Family:     Frequency of Social Gatherings with Friends and Family:     Attends [employer] Services:     Active Member of Clubs or Organizations:     Attends Club or Organization Meetings:     Marital Status:    Intimate Partner Violence:     Fear of Current or Ex-Partner:     Emotionally Abused:     Physically Abused:     Sexually Abused:      Family History   Problem Relation Age of Onset    Other Mother         Complications of Childbirth    Other Father [de-identified]        Natural causes     Allergies   Allergen Reactions    Latex Rash    Macrobid [Nitrofurantoin Stanley Ralston      Dr. Tamir Zhang prefers pt not use Macrobid due to pulmonary side effects.  Levaquin [Levofloxacin] Other (See Comments)     Redness/flushing    Brimonidine Tartrate     Adhesive Tape Rash    Alphagan [Brimonidine Tartrate] Hives     Eyes red         Constitutional: Alert and oriented times x3, no acute distress, and cooperative to examination with appropriate mood and affect. HEENT:   Head:         Normocephalic and atraumatic. Mucous membranes are normal.   Eyes:         EOM are normal. No scleral icterus. Nose:    The external appearance of the nose is normal  Ears: The ears appear normal to external inspection. Cardiovascular:       Normal rate, regular rhythm. Pulmonary/Chest:  Normal respiratory rate and rhthym. No use of accessory muscles. Exp wheeze posterior lung fields. Abdominal:          Soft. No tenderness. Active bowel sounds. Genitalia:    SP catheter draining clear yellow urine. Musculoskeletal:    Normal range of motion. Exhibits no edema or tenderness of lower extremities. Extremities:    No cyanosis, clubbing, or edema present. Neurological:    Alert and oriented.      Labs:  WBC:    Lab Results   Component Value Date    WBC 9.5 08/14/2021     Hemoglobin/Hematocrit:    Lab Results   Component Value Date    HGB 8.9 08/14/2021    HCT 28.1 08/14/2021     BMP:    Lab Results   Component Value Date     08/14/2021    K 3.6 08/14/2021    K 4.1 08/12/2021     08/14/2021 CO2 29 08/14/2021    BUN 8 08/14/2021    LABALBU 3.7 08/03/2021    LABALBU 4.3 11/30/2011    CREATININE 0.9 08/14/2021    CALCIUM 8.1 08/14/2021    LABGLOM 79 08/14/2021     Assessment and Plan  1. BPH with obstruction- Status post [facility] on 3/17/20 by Dr. Helen Horn. Status post [facility] placement due to the need for chronic catheterization. Catheter dislodged 8/12 and replaced by IR      2. Dislodged SP catheter-s/p cystoscopy, attempted sp tube placement, attempted felipe cath, retrograde urethrogram, SQ fistulogram of SP tract 8/12 by Dr. Shabnam Francisco. Ultimately underwent IR placement of 14 Maldivian multipurpose drainage catheter under fluoro by Dr. Khoa Melendez 8/12/21. CT abd/pelvis 8/13/21 with SP catheter in proper position. Will need exchange and upsize in 4-6 weeks by IR. 3. Constipation- Improving. On colace, senna, MOM. Received mag citrate and Dulcolax PRN. Increase activity. 4. History of Anemia- On Iron supplementation     5. DM- On Lantus and sliding scale. Per IM     6. History of DVT- On Lovenox. 7. COPD- On Brovana, Pulmicort, Albuterol, Home O2. CXR 8/13/21 stable without acute findings. Per IM    8. HTN- On Lasix, Lisinopril. Per IM      Had small BM. Pain improved. SP catheter draining well. Plan d/c today if ok with Hospitalist and pt's facility able to accept. Nursing to update me later this morning.      Era Carson, APRN - [address]   08/15/21 8:20 AM  Urology

## 2021-08-16 ENCOUNTER — CARE COORDINATION (OUTPATIENT)
Dept: CASE MANAGEMENT | Age: 86
End: 2021-08-16

## 2021-08-16 DIAGNOSIS — N31.9 NEUROGENIC BLADDER: ICD-10-CM

## 2021-08-16 DIAGNOSIS — N39.0 URINARY TRACT INFECTION WITHOUT HEMATURIA, SITE UNSPECIFIED: Primary | ICD-10-CM

## 2021-08-16 NOTE — CARE COORDINATION
8/16/21, 7:41 AM EDT    Patient goals/plan/ treatment preferences discussed by  and . Patient goals/plan/ treatment preferences reviewed with patient/ family. Patient/ family verbalize understanding of discharge plan and are in agreement with goal/plan/treatment preferences. Understanding was demonstrated using the teach back method. AVS provided by RN at time of discharge, which includes all necessary medical information pertaining to the patients current course of illness, treatment, post-discharge goals of care, and treatment preferences. IMM Letter  Observation Status Letter date given[de-identified] 08/12/21  Observation Status Letter time given[de-identified] 2053  Observation Status Letter given to Patient/Family/Significant other/Guardian/POA/by[de-identified] staff     Patient discharged over the weekend. RN confirmed that [facility] accepted patient.

## 2021-08-16 NOTE — CARE COORDINATION
Care Transitions Outreach Attempt    Call to [address], spoke with , only number I have for patient is their number. She transferred me to nurse, no answer LVM. Call within 2 business days of discharge: Yes   Attempted to reach patient for transitions of care follow up. Unable to reach patient. Patient: Samuel Giang Patient : 1931 MRN: [id number]    Last Discharge [address] [address]       Complaint Diagnosis Description Type Department Provider    21 Urinary Catheter Problem Suprapubic catheter dysfunction, initial encounter [facility]) ED to Hosp-Admission (Discharged) (ED OBS) Brian Nuñez MD; Mei Ceballos MD            Was this an external facility discharge?  No Discharge Facility:  [facility]    Noted following upcoming appointments from discharge chart review:   [facility] follow up appointment(s):   Future Appointments   Date Time Provider Gretchen Jorgensen   2021 11:00 AM Terri Nolasco   2021  7:30 AM STR SPECIAL PROCEDURE ROOM 1 STRZ SPEC STR Radiolog   2021  1:00 PM Tammy Briones MD N [facility] [place] [email or web address]     Non-[facility] follow up appointment(s):

## 2021-08-23 ENCOUNTER — HOSPITAL ENCOUNTER (EMERGENCY)
Age: 86
Discharge: HOME | End: 2021-08-23
Attending: EMERGENCY MEDICINE
Payer: MEDICARE

## 2021-08-23 VITALS
WEIGHT: 250 LBS | DIASTOLIC BLOOD PRESSURE: 48 MMHG | BODY MASS INDEX: 39.24 KG/M2 | HEIGHT: 67 IN | HEART RATE: 78 BPM | OXYGEN SATURATION: 94 % | SYSTOLIC BLOOD PRESSURE: 117 MMHG | RESPIRATION RATE: 18 BRPM | TEMPERATURE: 98.2 F

## 2021-08-23 DIAGNOSIS — T83.010A SUPRAPUBIC CATHETER DYSFUNCTION, INITIAL ENCOUNTER (HCC): ICD-10-CM

## 2021-08-23 DIAGNOSIS — N13.9 OBSTRUCTIVE UROPATHY: Primary | ICD-10-CM

## 2021-08-23 LAB
ANION GAP SERPL CALCULATED.3IONS-SCNC: 12 MEQ/L (ref 8–16)
BASOPHILS # BLD: 0.6 %
BASOPHILS ABSOLUTE: 0.1 THOU/MM3 (ref 0–0.1)
BUN BLDV-MCNC: 7 MG/DL (ref 7–22)
CALCIUM SERPL-MCNC: 8.9 MG/DL (ref 8.5–10.5)
CHLORIDE BLD-SCNC: 98 MEQ/L (ref 98–111)
CO2: 26 MEQ/L (ref 23–33)
CREAT SERPL-MCNC: 1 MG/DL (ref 0.4–1.2)
EOSINOPHIL # BLD: 2.7 %
EOSINOPHILS ABSOLUTE: 0.3 THOU/MM3 (ref 0–0.4)
ERYTHROCYTE [DISTWIDTH] IN BLOOD BY AUTOMATED COUNT: 14.1 % (ref 11.5–14.5)
ERYTHROCYTE [DISTWIDTH] IN BLOOD BY AUTOMATED COUNT: 44.5 FL (ref 35–45)
GFR SERPL CREATININE-BSD FRML MDRD: 70 ML/MIN/1.73M2
GLUCOSE BLD-MCNC: 107 MG/DL (ref 70–108)
HCT VFR BLD CALC: 32.3 % (ref 42–52)
HEMOGLOBIN: 10 GM/DL (ref 14–18)
IMMATURE GRANS (ABS): 0.07 THOU/MM3 (ref 0–0.07)
IMMATURE GRANULOCYTES: 0.6 %
LYMPHOCYTES # BLD: 14.4 %
LYMPHOCYTES ABSOLUTE: 1.8 THOU/MM3 (ref 1–4.8)
MCH RBC QN AUTO: 26.7 PG (ref 26–33)
MCHC RBC AUTO-ENTMCNC: 31 GM/DL (ref 32.2–35.5)
MCV RBC AUTO: 86.4 FL (ref 80–94)
MONOCYTES # BLD: 7.6 %
MONOCYTES ABSOLUTE: 0.9 THOU/MM3 (ref 0.4–1.3)
NUCLEATED RED BLOOD CELLS: 0 /100 WBC
OSMOLALITY CALCULATION: 270.4 MOSMOL/KG (ref 275–300)
PLATELET # BLD: 317 THOU/MM3 (ref 130–400)
PMV BLD AUTO: 9.6 FL (ref 9.4–12.4)
POTASSIUM REFLEX MAGNESIUM: 4 MEQ/L (ref 3.5–5.2)
RBC # BLD: 3.74 MILL/MM3 (ref 4.7–6.1)
SEG NEUTROPHILS: 74.1 %
SEGMENTED NEUTROPHILS ABSOLUTE COUNT: 9.2 THOU/MM3 (ref 1.8–7.7)
SODIUM BLD-SCNC: 136 MEQ/L (ref 135–145)
WBC # BLD: 12.4 THOU/MM3 (ref 4.8–10.8)

## 2021-08-23 PROCEDURE — 51798 US URINE CAPACITY MEASURE: CPT

## 2021-08-23 PROCEDURE — 85025 COMPLETE CBC W/AUTO DIFF WBC: CPT

## 2021-08-23 PROCEDURE — 80048 BASIC METABOLIC PNL TOTAL CA: CPT

## 2021-08-23 PROCEDURE — 36415 COLL VENOUS BLD VENIPUNCTURE: CPT

## 2021-08-23 PROCEDURE — 99282 EMERGENCY DEPT VISIT SF MDM: CPT

## 2021-08-23 RX ORDER — OXYBUTYNIN CHLORIDE 10 MG/1
10 TABLET, EXTENDED RELEASE ORAL EVERY EVENING
Qty: 30 TABLET | Refills: 2 | Status: ON HOLD | OUTPATIENT
Start: 2021-08-23 | End: 2022-03-05 | Stop reason: HOSPADM

## 2021-08-23 RX ORDER — OXYBUTYNIN CHLORIDE 10 MG/1
10 TABLET, EXTENDED RELEASE ORAL EVERY EVENING
Qty: 30 TABLET | Refills: 2 | Status: SHIPPED | OUTPATIENT
Start: 2021-08-23 | End: 2021-08-23 | Stop reason: SDUPTHER

## 2021-08-23 ASSESSMENT — ENCOUNTER SYMPTOMS
NAUSEA: 0
SHORTNESS OF BREATH: 0
COUGH: 0
DIARRHEA: 0
VOMITING: 0
ABDOMINAL PAIN: 1

## 2021-08-23 NOTE — ED NOTES
Spoke with [address] transport [address] [facility]. States she will be here in \"just a few\" to pick patient up for transport back to assisted living facility.      Margarito Galeano RN  08/23/21 [id number]

## 2021-08-23 NOTE — ED NOTES
Spoke with Patient son  Geno Mosquera. He informed that patient has been having issues lately with his superpubic catheter. Pt is at assisted living and had attempted to cut his catheter with scissors, unscrew it and other things to remove catheter. Pt was recently admitted and had it placed. Family is considering all options including ecf placement.       Enriqueta Livingston RN  08/23/21 [id number]

## 2021-08-23 NOTE — ED PROVIDER NOTES
[place] ENCOUNTER          Pt Name: Nate Gonzalez  MRN: [id number]  [place] 7/17/1931  Date of evaluation: 8/23/2021  Treating Resident Physician: Siena Leblanc MD  Supervising Physician: Prashant Willett DO    CHIEF COMPLAINT       Chief Complaint   Patient presents with    Other     Suprapubic catheter complications     History obtained from chart review and the patient. HISTORY OF PRESENT ILLNESS    Nate Gonzalez is a 80 y.o. male who presents to the emergency department for evaluation of suprapubic catheter problems. Patient has a history of indwelling suprapubic catheter, followed by Urology. Patient presents from [facility] as patient's supposedly cut his catheter end. Patient reported to nursing staff he was unable to urinate as a result. Patient reports the end of his catheter was caught on clothes and he accidentally pulled on it, which is why the bag is no longer present. Patient was recently discharged from [facility] on 8/15/2021 after IR placed his suprapubic catheter. Patient is well-known to the Urology service. Patient currently reports bladder spasms and swelling in his scrotum. He denies any chest pain, SOB, abdominal pain, N/V/D otherwise. The patient has no other acute complaints at this time. REVIEW OF SYSTEMS   Review of Systems   Constitutional: Negative for chills and fever. Respiratory: Negative for cough and shortness of breath. Cardiovascular: Negative for chest pain. Gastrointestinal: Positive for abdominal pain. Negative for diarrhea, nausea and vomiting. Genitourinary: Positive for difficulty urinating and scrotal swelling. Negative for flank pain and frequency.          PAST MEDICAL AND SURGICAL HISTORY     Past Medical History:   Diagnosis Date    Anemia     Anxiety     BPH (benign prostatic hyperplasia)     CAD (coronary artery disease)     leaking valve    Chronic back pain     [facility]ch[facility]     Detached retina     Diabetes mellitus ([address]     NIDDM    D[facility]deep venous thrombosis) ([facility])     RLE    DVT (deep venous thrombosis) ([address] 11/9/15    LLE    Dysphagia     Glaucoma     Hyperlipidemia     Hypertension     Macular degeneration     Mass of chest     benign chest mass, Dr Rivera Cramp disorder     spinal stenosis    Obesity     Osteoarthritis     right ankle, right hand    Paroxysmal atrial fibrillation ([facility] [address] [facility]     Pneumonia     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    Sleep apnea 1993    on BiPap, Dr Arlette Lennon Testicular hypofunction     Thyroid cancer [facility])     s/p thyroid resection    Urinary incontinence      Past Surgical History:   Procedure Laterality Date   [facility] SURGERY  2006,2011, 2014    BRONCHOSCOPY N/A 8/29/2017    BRONCHOSCOPY AIRWAY ONLY performed by Vishal Nayak MD at [address] [address] [address] N/A 6/17/2021    CYSTOSCOPY SUPRAPUBIC TUBE PLACEMENT performed by Berlin Roe MD at [address] [place] [address] 8/12/2021    CYSTOSCOPY, ATTEMPTED SUPRAPUBIC TUBE PLACEMENT, ATTEMPTED PAINTER CATH performed by Ana Albarado MD at [address] EKG 12-LEAD  11/10/2015         FRACTURE SURGERY      right hand    OTHER SURGICAL HISTORY      spinal epidurals    RETINAL DETACHMENT SURGERY      SPINE SURGERY  2004    Lumbar laminectomy    THYROIDECTOMY      TURP N/A 3/17/2020    CYSTOSCOPY WITH GREENLIGHT PHOTOVAPORIZATION OF PROSTATE performed by Berlin Roe MD at [address] TURP N/A 3/24/2020    PLASMA BUTTON TURP WITH CLOT EVACUATION [facility]   No current facility-administered medications for this encounter.     Current Outpatient Medications:     oxybutynin (DITROPAN XL) 10 MG extended release tablet, Take 1 tablet by mouth every evening, Disp: 30 tablet, Rfl: 2    furosemide (LASIX) 40 MG tablet, Take 1 tablet by mouth daily, Disp: 60 tablet, Rfl: 0   potassium chloride (KLOR-CON M) 10 MEQ extended release tablet, Take 1 tablet by mouth daily TAKE 1 TABLETS BY MOUTH TWICE DAILY, Disp: 180 tablet, Rfl: 0    levothyroxine (SYNTHROID) 175 MCG tablet, TAKE 1 TABLET BY MOUTH  DAILY, Disp: 30 tablet, Rfl: 0    lactobacillus (CULTURELLE) capsule, Take 1 capsule by mouth 2 times daily (with meals), Disp: 30 capsule, Rfl: 0    blood glucose test strips (ACCU-CHEK CHE PLUS) strip, Accu-check Che Plus Test Strips (or brand per pt preference). Sig: test sugar BID. Dx: E11.8, Disp: 200 strip, Rfl: 3    SOFT TOUCH LANCETS MISC, Lancet (or brand per pt preference) Use to check blood sugars 2 times daily. Dx: E11.8, Disp: 200 each, Rfl: 3    Alcohol Swabs (ALCOHOL PREP) 70 % PADS, Alcohol pads (brand per preference). Sig: use to test sugar twice per day.  Dx: E11.8, Disp: 200 each, Rfl: 3    doxazosin (CARDURA) 4 MG tablet, TAKE 1 TABLET BY MOUTH EVERY NIGHT, Disp: 90 tablet, Rfl: 3    simethicone (MYLICON) 945 MG chewable tablet, Take 125 mg by mouth every 6 hours as needed for Flatulence, Disp: , Rfl:     aluminum & magnesium hydroxide-simethicone (MYLANTA) 400-400-40 MG/5ML SUSP, Take 15 mLs by mouth every 6 hours as needed, Disp: , Rfl:     famotidine (PEPCID) 20 MG tablet, Take 20 mg by mouth 2 times daily as needed, Disp: , Rfl:     Arformoterol Tartrate (BROVANA) 15 MCG/2ML NEBU, Take 2 mLs by nebulization 2 times daily 1 dose inhale orally two times per day, Disp: 120 mL, Rfl: 11    Multiple Vitamins-Minerals (PRESERVISION AREDS 2) CAPS, Take 1 capsule by mouth daily, Disp: 90 capsule, Rfl: 3    spironolactone (ALDACTONE) 25 MG tablet, TAKE 1 TABLET BY MOUTH DAILY, Disp: 90 tablet, Rfl: 3    Revefenacin 175 MCG/3ML SOLN, Inhale 1 nebule into the lungs daily Dx COPD J44.3, Disp: 30 vial, Rfl: 5    insulin glargine (LANTUS SOLOSTAR) 100 UNIT/ML injection pen, INJECT SUBCUTANEOUSLY 18  UNITS NIGHTLY (Patient taking differently: No sig reported), Disp: 30 mL, Rfl: 3    ferrous gluconate (FERGON) 324 (38 Fe) MG tablet, Take 1 tablet by mouth 2 times daily, Disp: 180 tablet, Rfl: 3    metFORMIN (GLUCOPHAGE) 1000 MG tablet, Take 1 tablet by mouth 2 times daily (with meals), Disp: 180 tablet, Rfl: 3    budesonide (PULMICORT) 0.5 MG/2ML nebulizer suspension, USE 1 VIAL VIA NEBULIZER TWICE DAILY. RINSE MOUTH AFTER TREATMENT, Disp: 360 mL, Rfl: 3    lisinopril (PRINIVIL;ZESTRIL) 2.5 MG tablet, Take 1 tablet by mouth daily, Disp: 90 tablet, Rfl: 3    docusate (COLACE, DULCOLAX) 100 MG CAPS, Take 100 mg by mouth 2 times daily, Disp: 180 capsule, Rfl: 3    vitamin D3 (CHOLECALCIFEROL) 10 MCG (400 UNIT) TABS tablet, Take 1 tablet by mouth daily 400units, Disp: 90 tablet, Rfl: 3    polyethylene glycol (MIRALAX) 17 g packet, Take 17 g by mouth daily as needed for Constipation, Disp: , Rfl:     acetaminophen (TYLENOL) 325 MG tablet, Take 650 mg by mouth every 4 hours as needed for Pain or Fever , Disp: , Rfl:     lidocaine (LMX) 4 % cream, Apply topically every 6 hours as needed for Pain To groin, Disp: , Rfl:     albuterol sulfate  (90 Base) MCG/ACT inhaler, Inhale 2 puffs into the lungs every 6 hours as needed for Wheezing, Disp: 1 Inhaler, Rfl: 3    OXYGEN, Inhale 3 L into the lungs nightly Indications: Difficulty Breathing, Oxygen Therapy And prn through cpap, Disp: , Rfl:       SOCIAL HISTORY     Social History     Social History Narrative    Not on file     Social History     Tobacco Use    Smoking status: Never Smoker    Smokeless tobacco: Never Used   Vaping Use    Vaping Use: Never used   Substance Use Topics    Alcohol use: Yes     Alcohol/week: 1.0 standard drinks     Types: 1 Glasses of wine per week     Comment: Glass of wine with dinner.  Drug use: No         ALLERGIES     Allergies   Allergen Reactions    Latex Rash    Macrobid [Nitrofurantoin Jeancarlos Berlin Ibarra prefers pt not use Macrobid due to pulmonary side effects.     Levaquin [Levofloxacin] Other (See Comments)     Redness/flushing    Brimonidine Tartrate     Adhesive Tape Rash    Alphagan [Brimonidine Tartrate] Hives     Eyes red         FAMILY HISTORY     Family History   Problem Relation Age of Onset    Other Mother         Complications of Childbirth    Other Father [de-identified]        Natural causes         PREVIOUS RECORDS   Previous records reviewed: Reviewed. PHYSICAL EXAM     ED Triage Vitals   BP Temp Temp Source Pulse Resp SpO2 Height Weight   08/23/21 1123 08/23/21 1122 08/23/21 1122 08/23/21 1123 08/23/21 1122 08/23/21 1123 -- --   (!) 104/56 98.2 °F (36.8 °C) Oral 76 17 96 %       Initial vital signs and nursing assessment reviewed and normal. Body mass index is 39.16 kg/m². Pulsoximetry is normal per my interpretation. Additional Vital Signs:  Vitals:    08/23/21 1530   BP: (!) 117/48   Pulse: 78   Resp: 18   Temp:    SpO2: 94%       Physical Exam  Vitals reviewed. Exam conducted with a chaperone present. Constitutional:       Appearance: Normal appearance. He is obese. HENT:      Head: Normocephalic and atraumatic. Cardiovascular:      Rate and Rhythm: Normal rate and regular rhythm. Pulses: Normal pulses. Heart sounds: Normal heart sounds. Pulmonary:      Effort: Pulmonary effort is normal.      Breath sounds: Normal breath sounds. Abdominal:      Palpations: Abdomen is soft. Tenderness: There is no abdominal tenderness. Comments: Suprapubic catheter in place (end piece cut off below 3-way cork); No erythema noted surrounding inlet, no drainage or tenderness/warmth noted. Musculoskeletal:      Right lower leg: No edema. Left lower leg: No edema. Skin:     General: Skin is warm and dry. Neurological:      Mental Status: He is oriented to person, place, and time.    Psychiatric:         Mood and Affect: Mood normal.         Behavior: Behavior normal.         MEDICAL DECISION MAKING   Initial Assessment:   80year old male presents to the ED with complications with his suprapubic catheter. Patient noted to \"cut\" off end of catheter with the drainage bag. Patient reports bladder spasms. Plan:    BMP, CBC   Bladder scan   Consult Urology        ED RESULTS   Laboratory results:  Labs Reviewed   CBC WITH AUTO DIFFERENTIAL - Abnormal; Notable for the following components:       Result Value    WBC 12.4 (*)     RBC 3.74 (*)     Hemoglobin 10.0 (*)     Hematocrit 32.3 (*)     MCHC 31.0 (*)     Segs Absolute 9.2 (*)     All other components within normal limits   GLOMERULAR FILTRATION RATE, ESTIMATED - Abnormal; Notable for the following components:    Est, Glom Filt Rate 70 (*)     All other components within normal limits   OSMOLALITY - Abnormal; Notable for the following components:    Osmolality Calc 270.4 (*)     All other components within normal limits   BASIC METABOLIC PANEL W/ REFLEX TO MG FOR LOW K   ANION GAP       Radiologic studies results:  No orders to display       ED Medications administered this visit: Medications - No data to display      ED COURSE     ED Course as of Aug 23 1752   Mon Aug 23, 2021   1423 Spoke to Urology NP - advised to call IR for supplies given that the suprapubic catheter was placed by IR    [SS]   1423 IR supply tech to send over supplies for replacement    [SS]   6057 Able to replace catheter bag, draining well. Discussed with Urology on-call - okay to switch to Ditropan XL 10mg nightly.     [SS]      ED Course User Index  [SS] Demarcus Membreno MD       Bladder scan showed 367 ml but able to be emptied via suprapubic catheter. Patient's catheter connection was cut off. Discussed with NP on-call for Urology, Harjit Polk - catheter was placed by IR. Called IR and catheter connection was able to be replaced by RN. Catheter draining urine fine. Patient's pain likely secondary to bladder spasms.  Discussed with Urology - okay to stop PRN Ditropan and start Ditropan XL 10mg nightly to help with spasms. Discussed this with patient prior to discharged. Also updated patient's son regarding patient's care in ED today. Strict return precautions and follow up instructions were discussed with the patient prior to discharge, with which the patient agrees. MEDICATION CHANGES     Discharge Medication List as of 8/23/2021  3:58 PM      START taking these medications    Details   oxybutynin (DITROPAN XL) 10 MG extended release tablet Take 1 tablet by mouth every evening, Disp-30 tablet, R-2Normal               FINAL DISPOSITION     Final diagnoses:   Obstructive uropathy   Suprapubic catheter dysfunction, initial encounter [facility])     Condition: condition: good  Dispo: Discharge to [facility]      This transcription was electronically signed. Parts of this transcriptions may have been dictated by use of voice recognition software and electronically transcribed, and parts may have been transcribed with the assistance of an ED scribe. The transcription may contain errors not detected in proofreading. Please refer to my supervising physician's documentation if my documentation differs.     Electronically Signed: Tal North MD, 08/23/21, 5:52 Dionicio Mcclellan MD  Resident  08/23/21 3485

## 2021-08-23 NOTE — ED TRIAGE NOTES
Patient presents with catheter removal. States it got pulled out yesterday.  States he has been able to urinate since then

## 2021-08-23 NOTE — ED NOTES
Bladder scanner reads 367 ml. Pt bladder emptied via suprapubic catheter. Approx. 364 ml out.      Stephany Enciso RN  08/23/21 1257

## 2021-08-23 NOTE — ACP (ADVANCE CARE PLANNING)
Existing advance directive reviewed with patient; no changes to patient's previously recorded wishes  [] New Advance Directive completed  [] Portable Do Not Rescitate prepared for Provider review and signature  [] POLST/POST/MOLST/MOST prepared for Provider review and signature      Follow-up plan:    [] Schedule follow-up conversation to continue planning  [] Referred individual to Provider for additional questions/concerns   [] Advised patient/agent/surrogate to review completed ACP document and update if needed with changes in condition, patient preferences or care setting    [x] This note routed to one or more involved healthcare providers     Patient from [facility]. He has DNR-CCA on file in [address] [place] [address] Rd. I confirmed with patient. HCPOA on file. I entered Joe's information as per document. No consult placed. Order placed for [facility] after discussion with Dr. Meghan Dyer.

## 2021-08-24 ENCOUNTER — CARE COORDINATION (OUTPATIENT)
Dept: CASE MANAGEMENT | Age: 86
End: 2021-08-24

## 2021-08-24 NOTE — CARE COORDINATION
Attempt to contact patient today regarding care coordination, unable to reach. Recent encounters and notes reviewed.     Future Appointments   Date Time Provider Gretchen Jorgensen   9/13/2021  7:30 AM STR SPECIAL PROCEDURE ROOM 1 STRZ SPEC STR Radiolog   10/18/2021 11:00 AM YASHIRA Luna [facility]   11/22/2021  1:00 PM Giuliana Vasquez MD N [facility] 1943 RN Care Manager  [phone]

## 2021-08-26 ENCOUNTER — APPOINTMENT (OUTPATIENT)
Dept: INTERVENTIONAL RADIOLOGY/VASCULAR | Age: 86
End: 2021-08-26
Payer: MEDICARE

## 2021-08-26 ENCOUNTER — HOSPITAL ENCOUNTER (EMERGENCY)
Age: 86
Discharge: HOME | End: 2021-08-26
Attending: INTERNAL MEDICINE
Payer: MEDICARE

## 2021-08-26 VITALS
WEIGHT: 250 LBS | DIASTOLIC BLOOD PRESSURE: 55 MMHG | TEMPERATURE: 98.2 F | SYSTOLIC BLOOD PRESSURE: 111 MMHG | HEIGHT: 67 IN | OXYGEN SATURATION: 96 % | BODY MASS INDEX: 39.24 KG/M2 | HEART RATE: 61 BPM | RESPIRATION RATE: 16 BRPM

## 2021-08-26 DIAGNOSIS — T83.010A SUPRAPUBIC CATHETER DYSFUNCTION, INITIAL ENCOUNTER (HCC): Primary | ICD-10-CM

## 2021-08-26 DIAGNOSIS — R10.30 LOWER ABDOMINAL PAIN: ICD-10-CM

## 2021-08-26 LAB
AMORPHOUS: ABNORMAL
ANION GAP SERPL CALCULATED.3IONS-SCNC: 12 MEQ/L (ref 8–16)
BACTERIA: ABNORMAL /HPF
BASOPHILS # BLD: 0.7 %
BASOPHILS ABSOLUTE: 0.1 THOU/MM3 (ref 0–0.1)
BILIRUBIN URINE: NEGATIVE
BLOOD, URINE: ABNORMAL
BUN BLDV-MCNC: 9 MG/DL (ref 7–22)
CALCIUM SERPL-MCNC: 9.1 MG/DL (ref 8.5–10.5)
CASTS UA: ABNORMAL /LPF
CHARACTER, URINE: CLEAR
CHLORIDE BLD-SCNC: 96 MEQ/L (ref 98–111)
CO2: 28 MEQ/L (ref 23–33)
COLOR: ABNORMAL
CREAT SERPL-MCNC: 1 MG/DL (ref 0.4–1.2)
CRYSTALS, UA: ABNORMAL
EOSINOPHIL # BLD: 3.4 %
EOSINOPHILS ABSOLUTE: 0.4 THOU/MM3 (ref 0–0.4)
EPITHELIAL CELLS, UA: ABNORMAL /HPF
ERYTHROCYTE [DISTWIDTH] IN BLOOD BY AUTOMATED COUNT: 14.1 % (ref 11.5–14.5)
ERYTHROCYTE [DISTWIDTH] IN BLOOD BY AUTOMATED COUNT: 43.6 FL (ref 35–45)
GFR SERPL CREATININE-BSD FRML MDRD: 70 ML/MIN/1.73M2
GLUCOSE BLD-MCNC: 121 MG/DL (ref 70–108)
GLUCOSE URINE: NEGATIVE MG/DL
HCT VFR BLD CALC: 32.1 % (ref 42–52)
HEMOGLOBIN: 10.1 GM/DL (ref 14–18)
IMMATURE GRANS (ABS): 0.05 THOU/MM3 (ref 0–0.07)
IMMATURE GRANULOCYTES: 0.4 %
KETONES, URINE: NEGATIVE
LEUKOCYTE ESTERASE, URINE: ABNORMAL
LYMPHOCYTES # BLD: 15 %
LYMPHOCYTES ABSOLUTE: 1.8 THOU/MM3 (ref 1–4.8)
MCH RBC QN AUTO: 27.2 PG (ref 26–33)
MCHC RBC AUTO-ENTMCNC: 31.5 GM/DL (ref 32.2–35.5)
MCV RBC AUTO: 86.3 FL (ref 80–94)
MONOCYTES # BLD: 8.3 %
MONOCYTES ABSOLUTE: 1 THOU/MM3 (ref 0.4–1.3)
NITRITE, URINE: NEGATIVE
NUCLEATED RED BLOOD CELLS: 0 /100 WBC
PH UA: 7 (ref 5–9)
PLATELET # BLD: 329 THOU/MM3 (ref 130–400)
PMV BLD AUTO: 9.5 FL (ref 9.4–12.4)
POTASSIUM SERPL-SCNC: 4.2 MEQ/L (ref 3.5–5.2)
PROTEIN UA: NEGATIVE
RBC # BLD: 3.72 MILL/MM3 (ref 4.7–6.1)
RBC URINE: ABNORMAL /HPF
RENAL EPITHELIAL, UA: ABNORMAL
SEG NEUTROPHILS: 72.2 %
SEGMENTED NEUTROPHILS ABSOLUTE COUNT: 8.7 THOU/MM3 (ref 1.8–7.7)
SODIUM BLD-SCNC: 136 MEQ/L (ref 135–145)
SPECIFIC GRAVITY, URINE: <= 1.005 (ref 1–1.03)
UROBILINOGEN, URINE: 0.2 EU/DL (ref 0–1)
WBC # BLD: 12.1 THOU/MM3 (ref 4.8–10.8)
WBC UA: ABNORMAL /HPF
YEAST: ABNORMAL

## 2021-08-26 PROCEDURE — 99284 EMERGENCY DEPT VISIT MOD MDM: CPT

## 2021-08-26 PROCEDURE — 36415 COLL VENOUS BLD VENIPUNCTURE: CPT

## 2021-08-26 PROCEDURE — 51705 CHANGE OF BLADDER TUBE: CPT | Performed by: RADIOLOGY

## 2021-08-26 PROCEDURE — C1725 CATH, TRANSLUMIN NON-LASER: HCPCS

## 2021-08-26 PROCEDURE — 81001 URINALYSIS AUTO W/SCOPE: CPT

## 2021-08-26 PROCEDURE — 75984 XRAY CONTROL CATHETER CHANGE: CPT | Performed by: RADIOLOGY

## 2021-08-26 PROCEDURE — 80048 BASIC METABOLIC PNL TOTAL CA: CPT

## 2021-08-26 PROCEDURE — 85025 COMPLETE CBC W/AUTO DIFF WBC: CPT

## 2021-08-26 PROCEDURE — 6360000004 HC RX CONTRAST MEDICATION: Performed by: RADIOLOGY

## 2021-08-26 RX ADMIN — IOTHALAMATE MEGLUMINE 25 ML: 600 INJECTION INTRAVASCULAR at 14:40

## 2021-08-26 ASSESSMENT — PAIN SCALES - WONG BAKER: WONGBAKER_NUMERICALRESPONSE: 2

## 2021-08-26 ASSESSMENT — ENCOUNTER SYMPTOMS
EYES NEGATIVE: 1
ABDOMINAL DISTENTION: 1
CONSTIPATION: 0
DIARRHEA: 0
ABDOMINAL PAIN: 1
ALLERGIC/IMMUNOLOGIC NEGATIVE: 1
SHORTNESS OF BREATH: 1
NAUSEA: 0
VOMITING: 0

## 2021-08-26 NOTE — ED TRIAGE NOTES
Pt presents to ED by [facility] from [facility]. Pt has suprapubic catheter in place. Pt has cuts in the tubing of the catheter. Michaela stated that pt cut it with nail clippers and scissors on Monday. Pt states that cut in the catheter have been there since they put it in. Pt states that catheter was put in in February. Provider in at bed side at this time  resp regular. AxOx4 Call light within reach.

## 2021-08-26 NOTE — H&P
Formulation and discussion of sedation / procedure plans, risks, benefits, side effects and alternatives with patient and/or responsible adult completed.     Electronically signed by Ольга Gallardo MD on 8/26/2021 at 2:35 PM

## 2021-08-26 NOTE — ED NOTES
Pt back from procedure at this time. Pt urine in catheter bag has pink tinge. Pt resp regular. Pt denies any other needs. Call light within reach.        Marina Mccoy RN  08/26/21 1964

## 2021-08-26 NOTE — ED NOTES
Bed: 021A  Expected date: 8/26/21  Expected time:   Means of arrival: [facility]  Comments:      Lenore Vang RN  08/26/21 104

## 2021-08-26 NOTE — PROGRESS NOTES
1352 Patient received in IR for suprapubic catheter exchange  1356 This procedure has been fully reviewed with the patient and written informed consent has been obtained. 1405 Pt place on table in supine position. Pt prepped and draped. 1423 Procedure started with   4112 Procedure completed; patient tolerated well. Split guaze applied around tube exit site; no bleeding noted. 1442 Patient on cart; comfort ensured. 1443 Patient taken to ED via cart.

## 2021-08-26 NOTE — PROGRESS NOTES
Department of Radiology  Post Procedure Progress Note      Pre-Procedure Diagnosis:  urinary retention    Procedure Performed:  suprapubic cath exchange    Anesthesia: local    Findings: successful    Immediate Complications:  None    Estimated Blood Loss: minimal    SEE DICTATED PROCEDURE NOTE FOR COMPLETE DETAILS.     Say Maurice MD   8/26/2021 2:35 PM

## 2021-08-26 NOTE — ED PROVIDER NOTES
[address] [address]          Pt Name: Pavan Cameron  MRN: [id number]  [place] 7/17/1931  Date of evaluation: 8/26/2021  Treating Resident Physician: Emilie Randall DO  Supervising Physician: Dr. Klarissa Persaud       Chief Complaint   Patient presents with    Other     catheter complications     History obtained from chart review and the patient. HISTORY OF PRESENT ILLNESS    HPI  Pavan Cameron is a 80 y.o. male who presents to the emergency department for evaluation of suprapubic catheter dysfunction. Patient was recently seen for similar complaints on 8/23/2021. Per report patient on Monday cut slits in his suprapubic catheter hosing with nail clippers and scissors. Patient denies this. States he feels like his lower abdomen where the catheter is has fluid around it. She has trouble getting fluid out of the catheter since it has been cut. Denies any pain just says is uncomfortable. Patient's son, Dr. Tanvi Navarrete, is POA. He was contacted and informed about patient's status and likely need replacement of suprapubic catheter via IR. The patient has no other acute complaints at this time. REVIEW OF SYSTEMS   Review of Systems   Constitutional: Negative for chills and fever. HENT: Negative. Eyes: Negative. Respiratory: Positive for shortness of breath (Chronic). Cardiovascular: Negative for chest pain. Gastrointestinal: Positive for abdominal distention (Lower Abd) and abdominal pain (Lower Abd). Negative for constipation, diarrhea, nausea and vomiting. Endocrine: Negative. Genitourinary: Positive for difficulty urinating (Without catheter). Negative for dysuria, flank pain, frequency, hematuria and scrotal swelling. Musculoskeletal: Negative. Allergic/Immunologic: Negative. Neurological: Negative for weakness, numbness and headaches. Hematological: Negative.           PAST MEDICAL AND SURGICAL HISTORY     Past Medical History:   Diagnosis Date    Anemia     Anxiety     BPH (benign prostatic hyperplasia)     CAD (coronary artery disease)     leaking valve    Chronic back pain     [facility]ch[facility] obstructive [facility] disease[facility]     Detached retina     Diabetes mellitus (HCC)     NIDDM    DVT (deep venous thrombosis) (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    Sleep apnea 1993    on BiPap, Dr Tran Six Testicular hypofunction     Thyroid cancer [facility])     s/p thyroid resection    Urinary incontinence      Past Surgical History:   Procedure Laterality Date   [facility] SURGERY  2006,2011, 2014    BRONCHOSCOPY N/A 8/29/2017    BRONCHOSCOPY AIRWAY ONLY performed by Lauren Ly MD at [address] N/A 6/17/2021    CYSTOSCOPY SUPRAPUBIC TUBE PLACEMENT performed by Kierra Choudhary MD at [address] [place] [address] 8/12/2021    CYSTOSCOPY, ATTEMPTED SUPRAPUBIC TUBE PLACEMENT, ATTEMPTED PAINTER CATH performed by Dulce Maria Lan MD at [address] EKG 12-LEAD  11/10/2015         FRACTURE SURGERY      right hand    OTHER SURGICAL HISTORY      spinal epidurals    RETINAL DETACHMENT SURGERY      SPINE SURGERY  2004    Lumbar laminectomy    THYROIDECTOMY      TURP N/A 3/17/2020    CYSTOSCOPY WITH GREENLIGHT PHOTOVAPORIZATION OF PROSTATE performed by Kierra Choudhary MD at [address] TURP N/A 3/24/2020    PLASMA BUTTON TURP WITH CLOT EVACUATION performed by Kierra Choudhary MD at [facility]   No current facility-administered medications for this encounter.     Current Outpatient Medications:     oxybutynin (DITROPAN J44.3, Disp: 30 vial, Rfl: 5    insulin glargine (LANTUS SOLOSTAR) 100 UNIT/ML injection pen, INJECT SUBCUTANEOUSLY 18  UNITS NIGHTLY (Patient taking differently: No sig reported), Disp: 30 mL, Rfl: 3    ferrous gluconate (FERGON) 324 (38 Fe) MG tablet, Take 1 tablet by mouth 2 times daily, Disp: 180 tablet, Rfl: 3    metFORMIN (GLUCOPHAGE) 1000 MG tablet, Take 1 tablet by mouth 2 times daily (with meals), Disp: 180 tablet, Rfl: 3    budesonide (PULMICORT) 0.5 MG/2ML nebulizer suspension, USE 1 VIAL VIA NEBULIZER TWICE DAILY. RINSE MOUTH AFTER TREATMENT, Disp: 360 mL, Rfl: 3    lisinopril (PRINIVIL;ZESTRIL) 2.5 MG tablet, Take 1 tablet by mouth daily, Disp: 90 tablet, Rfl: 3    docusate (COLACE, DULCOLAX) 100 MG CAPS, Take 100 mg by mouth 2 times daily, Disp: 180 capsule, Rfl: 3    vitamin D3 (CHOLECALCIFEROL) 10 MCG (400 UNIT) TABS tablet, Take 1 tablet by mouth daily 400units, Disp: 90 tablet, Rfl: 3    polyethylene glycol (MIRALAX) 17 g packet, Take 17 g by mouth daily as needed for Constipation, Disp: , Rfl:     acetaminophen (TYLENOL) 325 MG tablet, Take 650 mg by mouth every 4 hours as needed for Pain or Fever , Disp: , Rfl:     lidocaine (LMX) 4 % cream, Apply topically every 6 hours as needed for Pain To groin, Disp: , Rfl:     albuterol sulfate  (90 Base) MCG/ACT inhaler, Inhale 2 puffs into the lungs every 6 hours as needed for Wheezing, Disp: 1 Inhaler, Rfl: 3    OXYGEN, Inhale 3 L into the lungs nightly Indications: Difficulty Breathing, Oxygen Therapy And prn through cpap, Disp: , Rfl:       SOCIAL HISTORY     Social History     Social History Narrative    Not on file     Social History     Tobacco Use    Smoking status: Never Smoker    Smokeless tobacco: Never Used   Vaping Use    Vaping Use: Never used   Substance Use Topics    Alcohol use: Yes     Alcohol/week: 1.0 standard drinks     Types: 1 Glasses of wine per week     Comment: Glass of wine with dinner.  Drug use:  No ALLERGIES     Allergies   Allergen Reactions    Latex Rash    Macrobid [Nitrofurantoin Chary Garciaunk      Dr. Finn Sanchez prefers pt not use Macrobid due to pulmonary side effects.  Levaquin [Levofloxacin] Other (See Comments)     Redness/flushing    Brimonidine Tartrate     Adhesive Tape Rash    Alphagan [Brimonidine Tartrate] Hives     Eyes red         FAMILY HISTORY     Family History   Problem Relation Age of Onset    Other Mother         Complications of Childbirth    Other Father [de-identified]        Natural causes         PREVIOUS RECORDS   Previous records reviewed. PHYSICAL EXAM     ED Triage Vitals [08/26/21 1045]   BP Temp Temp src Pulse Resp SpO2 Height Weight   124/61 -- -- 73 22 100 % 5' 7\" (1.702 m) 250 lb (113.4 kg)     Initial vital signs and nursing assessment reviewed and normal. Body mass index is 39.16 kg/m². Pulsoximetry is normal per my interpretation. Additional Vital Signs:  Vitals:    08/26/21 1514   BP: (!) 111/55   Pulse: 61   Resp: 16   Temp:    SpO2: 96%       Physical Exam  Constitutional:       General: He is not in acute distress. Appearance: Normal appearance. He is obese. He is not ill-appearing. HENT:      Head: Normocephalic and atraumatic. Right Ear: External ear normal.      Left Ear: External ear normal.      Nose: Nose normal.      Mouth/Throat:      Mouth: Mucous membranes are moist.      Pharynx: Oropharynx is clear. Eyes:      Extraocular Movements: Extraocular movements intact. Conjunctiva/sclera: Conjunctivae normal.      Pupils: Pupils are equal, round, and reactive to light. Cardiovascular:      Rate and Rhythm: Normal rate and regular rhythm. Heart sounds: No murmur heard. No friction rub. No gallop. Pulmonary:      Effort: Pulmonary effort is normal.      Breath sounds: Normal breath sounds. Abdominal:      General: Bowel sounds are normal. There is no distension. Palpations: Abdomen is soft. Tenderness:  There is abdominal tenderness (around catheter site). There is no guarding or rebound. Musculoskeletal:         General: Normal range of motion. Cervical back: Normal range of motion. Skin:     General: Skin is warm and dry. Capillary Refill: Capillary refill takes less than 2 seconds. Neurological:      General: No focal deficit present. Mental Status: He is alert and oriented to person, place, and time. Psychiatric:         Attention and Perception: Attention and perception normal.         Mood and Affect: Mood normal.         Behavior: Behavior is cooperative. MEDICAL DECISION MAKING   Initial Assessment:   3 54-year-old  male presents to the ED with suprapubic catheter dysfunction problem. Per EMS report, on Monday patient cut his superapubic catheter with nail clippers and/or scissors. Multiple slits were noted in the urinary catheter. Patient denies cutting the catheter at all. States he feels like he has some lower abdominal swelling. Denies any pain or itching during urination. Denies any scrotal swelling. Nurses able to get urine out of catheter if valve is closed. If valve is open urine leaks out. 2. Conversation with patient's son, Dr. Jesus Mathews, who is the patient's POA revealed that patient does have some elements of dementia. Plan:    CBC, BMP, UA reflex to micro. UA showed moderate blood with small LE. Urine is not cloudy and 0-2 WBC seen. Mild leukocytosis noted with a value of 12.1. This is down from 12.4 on 8/23/2021 during patient's last visit.  Point-of-care ultrasound was completed at bedside it was reported that no fluid was seen in the lower abdomen. [address] urology. Patient is well-known to urology. States the original placement of the suprapubic catheter was done via interventional radiology. Patient will need to go back to IR for replacement of the catheter.  Consulted interventional radiology.   Patient had successful change of suprapubic catheter. Tolerated the procedure well. Will need to come back in 3 months for routine change of catheter.  Discharge instructions stated to remove sharp object from patient's room to mitigate any further damage to suprapubic catheter tubing.  Patient to be discharged. ED RESULTS   Laboratory results:  Labs Reviewed   URINE WITH REFLEXED MICRO - Abnormal; Notable for the following components:       Result Value    Blood, Urine MODERATE (*)     Leukocyte Esterase, Urine SMALL (*)     All other components within normal limits   CBC WITH AUTO DIFFERENTIAL - Abnormal; Notable for the following components:    WBC 12.1 (*)     RBC 3.72 (*)     Hemoglobin 10.1 (*)     Hematocrit 32.1 (*)     MCHC 31.5 (*)     Segs Absolute 8.7 (*)     All other components within normal limits   BASIC METABOLIC PANEL - Abnormal; Notable for the following components:    Chloride 96 (*)     Glucose 121 (*)     All other components within normal limits   GLOMERULAR FILTRATION RATE, ESTIMATED - Abnormal; Notable for the following components:    Est, Glom Filt Rate 70 (*)     All other components within normal limits   ANION GAP       Radiologic studies results:  IR FLUOROSCOPY LESS THAN 1 HOUR   Final Result   1. Status post successful suprapubic catheter exchange. 2. Patient will be tentatively scheduled for a repeat routine suprapubic catheter exchange in the next 3-4 months. **This report has been created using voice recognition software. It may contain minor errors which are inherent in voice recognition technology. **      Final report electronically signed by Dr. Kadi Porras on 8/26/2021 3:04 PM       Debora Andria    (Results Pending)       ED Medications administered this visit:   Medications   iothalamate (CONRAY) 60 % injection 50 mL (25 mLs IntraVENous Given by Other 8/26/21 1440)         ED COURSE     ED Course as of Aug 26 1651   Thu Aug 26, 2021   1133 WBC(!): 12.1 [SM]   1133 Hemoglobin Quant(!): 10.1 [SM]   1133 Blood, Urine(!): MODERATE [SM]   1133 Leukocyte Esterase, Urine(!): SMALL [SM]   1133 Urology Contacted and reccomends exchange of catheter. Consulted IR and placed order for exchange.     [SM]   4335 Patient in IR for suprapubic catheter replacement. [SM]   5480 Patient returned from IR after successful suprapubic catheter replacement. Patient tolerated procedure well. We will plan for routine catheter replacement in 3 to 4 months. [SM]      ED Course User Index  [] Sam Faith DO       Strict return precautions and follow up instructions were discussed with the patient prior to discharge, with which the patient agrees. MEDICATION CHANGES     Discharge Medication List as of 8/26/2021  3:10 PM            FINAL DISPOSITION     Final diagnoses:   Lower abdominal pain   Suprapubic catheter dysfunction, initial encounter ([facility]     Condition: condition: stable  Dispo: Discharge to home      This transcription was electronically signed. Parts of this transcriptions may have been dictated by use of voice recognition software and electronically transcribed, and parts may have been transcribed with the assistance of an ED scribe. The transcription may contain errors not detected in proofreading. Please refer to my supervising physician's documentation if my documentation differs.     Electronically Signed: Adela Frankel DO, 08/26/21, 4:51 PM         Sam Faith DO  Resident  08/26/21 5919

## 2021-08-31 ENCOUNTER — TELEPHONE (OUTPATIENT)
Dept: UROLOGY | Age: 86
End: 2021-08-31

## 2021-08-31 NOTE — TELEPHONE ENCOUNTER
On 8/15/21 you wanted the patient scheduled for the SP cath to be replaced with a felipe cath (telephone encounter of 8/15/21) the patient went to ER on 8/26/21 due to holes in his SP Cath and Dr Sharon Gomez removed that one and put in a new SP Cath. Patient is still scheduled for the change to a felipe cath on 9/13 in IR. Do you still want that done?

## 2021-08-31 NOTE — TELEPHONE ENCOUNTER
Pt's next exchange can occur in the office by Dr. Beverly Muniz in 4-6 weeks from date of most recent exchange (8/26/21). (pt now has 16 [place] tip felipe catheter).

## 2021-09-01 NOTE — ED NOTES
Problem: Wound:  Goal: Will show signs of wound healing; wound closure and no evidence of infection  Description: Will show signs of wound healing; wound closure and no evidence of infection  Outcome: Ongoing   Patient seen for abdominal wound. Wound shows signs of proper closure and healing. No s/s of infection noted. Follow up in 2 weeks. See AVS for order changes. Care plan reviewed with patient and mother. Patient and mother verbalize understanding of the plan of care and contribute to goal setting. Pt resting in bed. Clothing removed from scanning. Denies any needs. Will monitor.      Roxie Enriquez RN  05/04/21 [id number]

## 2021-09-13 ENCOUNTER — OFFICE VISIT (OUTPATIENT)
Dept: FAMILY MEDICINE CLINIC | Age: 86
End: 2021-09-13
Payer: MEDICARE

## 2021-09-13 VITALS — DIASTOLIC BLOOD PRESSURE: 58 MMHG | HEART RATE: 64 BPM | RESPIRATION RATE: 14 BRPM | SYSTOLIC BLOOD PRESSURE: 112 MMHG

## 2021-09-13 DIAGNOSIS — J44.9 COPD WITHOUT EXACERBATION (HCC): ICD-10-CM

## 2021-09-13 DIAGNOSIS — Z00.00 ROUTINE GENERAL MEDICAL EXAMINATION AT A HEALTH CARE FACILITY: Primary | ICD-10-CM

## 2021-09-13 DIAGNOSIS — E03.9 ACQUIRED HYPOTHYROIDISM: ICD-10-CM

## 2021-09-13 DIAGNOSIS — I10 ESSENTIAL HYPERTENSION: ICD-10-CM

## 2021-09-13 DIAGNOSIS — E11.8 TYPE 2 DIABETES MELLITUS WITH COMPLICATION, WITH LONG-TERM CURRENT USE OF INSULIN (HCC): ICD-10-CM

## 2021-09-13 DIAGNOSIS — E78.5 HYPERLIPIDEMIA, UNSPECIFIED HYPERLIPIDEMIA TYPE: ICD-10-CM

## 2021-09-13 DIAGNOSIS — Z79.4 TYPE 2 DIABETES MELLITUS WITH COMPLICATION, WITH LONG-TERM CURRENT USE OF INSULIN (HCC): ICD-10-CM

## 2021-09-13 DIAGNOSIS — I48.0 PAROXYSMAL ATRIAL FIBRILLATION (HCC): ICD-10-CM

## 2021-09-13 PROCEDURE — 1123F ACP DISCUSS/DSCN MKR DOCD: CPT | Performed by: NURSE PRACTITIONER

## 2021-09-13 PROCEDURE — 4040F PNEUMOC VAC/ADMIN/RCVD: CPT | Performed by: NURSE PRACTITIONER

## 2021-09-13 PROCEDURE — G0438 PPPS, INITIAL VISIT: HCPCS | Performed by: NURSE PRACTITIONER

## 2021-09-13 ASSESSMENT — LIFESTYLE VARIABLES
HOW MANY STANDARD DRINKS CONTAINING ALCOHOL DO YOU HAVE ON A TYPICAL DAY: 0
HAVE YOU OR SOMEONE ELSE BEEN INJURED AS A RESULT OF YOUR DRINKING: 0
HOW OFTEN DURING THE LAST YEAR HAVE YOU FAILED TO DO WHAT WAS NORMALLY EXPECTED FROM YOU BECAUSE OF DRINKING: 0
HOW OFTEN DURING THE LAST YEAR HAVE YOU HAD A FEELING OF GUILT OR REMORSE AFTER DRINKING: 0
AUDIT TOTAL SCORE: 1
HOW OFTEN DO YOU HAVE SIX OR MORE DRINKS ON ONE OCCASION: 0
HOW OFTEN DURING THE LAST YEAR HAVE YOU FOUND THAT YOU WERE NOT ABLE TO STOP DRINKING ONCE YOU HAD STARTED: 0
HOW OFTEN DURING THE LAST YEAR HAVE YOU BEEN UNABLE TO REMEMBER WHAT HAPPENED THE NIGHT BEFORE BECAUSE YOU HAD BEEN DRINKING: 0
HOW OFTEN DURING THE LAST YEAR HAVE YOU NEEDED AN ALCOHOLIC DRINK FIRST THING IN THE MORNING TO GET YOURSELF GOING AFTER A NIGHT OF HEAVY DRINKING: 0
HOW OFTEN DO YOU HAVE A DRINK CONTAINING ALCOHOL: 1
HAS A RELATIVE, FRIEND, DOCTOR, OR ANOTHER HEALTH PROFESSIONAL EXPRESSED CONCERN ABOUT YOUR DRINKING OR SUGGESTED YOU CUT DOWN: 0
AUDIT-C TOTAL SCORE: 1

## 2021-09-13 ASSESSMENT — PATIENT HEALTH QUESTIONNAIRE - PHQ9
1. LITTLE INTEREST OR PLEASURE IN DOING THINGS: 0
SUM OF ALL RESPONSES TO PHQ QUESTIONS 1-9: 0
2. FEELING DOWN, DEPRESSED OR HOPELESS: 0
SUM OF ALL RESPONSES TO PHQ9 QUESTIONS 1 & 2: 0

## 2021-09-13 ASSESSMENT — ENCOUNTER SYMPTOMS
BLOOD IN STOOL: 0
ABDOMINAL PAIN: 0
CHEST TIGHTNESS: 0
SHORTNESS OF BREATH: 0
EYES NEGATIVE: 1

## 2021-09-13 NOTE — PROGRESS NOTES
Chief Complaint   Patient presents with    Medicare AWV     No concerns        SUBJECTIVE     Ludwig Hatchet is a 80 y.o.male      Here for Ana.     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    Breast lump    BPH (benign prostatic hyperplasia)    ROBER on CPAP    Essential hypertension    Hx of deep venous thrombosis    Pneumonia due to infectious organism    Shortness of breath    Chronic respiratory failure with hypoxia (HCC)    Mixed hyperlipidemia    Chronic deep vein thrombosis (DVT) of right lower extremity (HCC)    Anticoagulated on Coumadin    Type 2 diabetes mellitus with complication, with long-term current use of insulin (HCC)    Muscle weakness    Chronic bronchitis (HCC)    COPD without exacerbation (HCC)    Paroxysmal atrial fibrillation (HCC)    Acute diastolic congestive heart failure (HCC)    Paroxysmal atrial flutter (HCC)    Urinary incontinence    Serous detachment of retinal pigment epithelium    Senile nuclear sclerosis    Puckering of macula, bilateral    Presence of intraocular lens    Primary open-angle glaucoma    Other chorioretinal scars, right eye    Nonexudative senile macular degeneration of retina    Chronic congestive heart failure with left ventricular diastolic dysfunction (HCC)    Morbid obesity (HCC)    Urinary retention    Urine retention    Abdominal pain    Altered mental status    Chronic indwelling Billy catheter    Urinary tract infection without hematuria    Hypokalemia    Weakness of left lower extremity    Pill dysphagia    Hx of thyroid cancer    Gross hematuria    Constipation    Acute urinary retention    COPD exacerbation (HCC)    Acute on chronic respiratory failure with [facility] and [facility]capnia [facility]    Acute on chronic diastolic congestive heart failure (HCC)    Bilateral leg edema +2 now +1    Community acquired bacterial pneumonia    Scrotal pain    Acute kidney injury superimposed on CKD ([facility]    Catheter-associated urinary tract infection ([facility]    Acute metabolic encephalopathy    History of BPH    First degree AV block    Hx of coronary artery disease    Chronic back pain    Suprapubic catheter dysfunction ([facility]     Patient is currently a resident at [address] [place] [address]. There is some confusion on what the patient manages versus what the facility manages. It does sound like the facility manages his medications for him. Patient is following with urology and has a suprapubic catheter in place. Has issues with leaking. Blood sugars are monitored \"regularly\", but patient unable to provide any numbers. Review of Systems   Constitutional: Positive for activity change and fatigue. Negative for chills, fever and unexpected weight change. HENT: Negative. Eyes: Negative. Respiratory: Negative for chest tightness and shortness of breath. Cardiovascular: Negative for chest pain, palpitations and leg swelling. Gastrointestinal: Negative for abdominal pain and blood in stool. Genitourinary: Negative for dysuria. Musculoskeletal: Negative for joint swelling. Skin: Negative for rash. Neurological: Positive for weakness (generalized). Negative for dizziness. Psychiatric/Behavioral: Negative. All other systems reviewed and are negative. OBJECTIVE     BP (!) 112/58   Pulse 64   Resp 14     Wt Readings from Last 3 Encounters:   08/26/21 250 lb (113.4 kg)   08/23/21 250 lb (113.4 kg)   08/12/21 250 lb (113.4 kg)   Appetite has decreased, but pt admits to trying to lose. Physical Exam  Vitals and nursing note reviewed. Constitutional:       Appearance: He is well-developed. HENT:      Head: Normocephalic and atraumatic.       Right Ear: External ear normal. There is impacted cerumen. Left Ear: External ear normal. There is impacted cerumen. Nose: Nose normal.   Eyes:      Conjunctiva/sclera: Conjunctivae normal.      Pupils: Pupils are equal, round, and reactive to light. Cardiovascular:      Rate and Rhythm: Normal rate and regular rhythm. Pulmonary:      Effort: Pulmonary effort is normal.      Breath sounds: Normal breath sounds. Abdominal:      General: Bowel sounds are normal.      Palpations: Abdomen is soft. Musculoskeletal:         General: Normal range of motion. Cervical back: Normal range of motion and neck supple. Skin:     General: Skin is warm and dry. Neurological:      Mental Status: He is alert and oriented to person, place, and time. Motor: Weakness present. Gait: Gait abnormal.      Deep Tendon Reflexes: Reflexes are normal and symmetric. Psychiatric:         Behavior: Behavior normal.         Thought Content:  Thought content normal.         Judgment: Judgment normal.           Component      Latest Ref Rng & Units 8/26/2021 8/23/2021 8/14/2021 8/13/2021          11:18 AM 12:37 PM  5:53 AM  6:15 AM   WBC      4.8 - 10.8 thou/mm3 12.1 (H) 12.4 (H) 9.5 10.9 (H)   RBC      4.70 - 6.10 mill/mm3 3.72 (L) 3.74 (L) 3.27 (L) 3.32 (L)   Hemoglobin Quant      14.0 - 18.0 gm/dl 10.1 (L) 10.0 (L) 8.9 (L) 9.0 (L)   Hematocrit      42.0 - 52.0 % 32.1 (L) 32.3 (L) 28.1 (L) 28.6 (L)   MCV      80.0 - 94.0 fL 86.3 86.4 85.9 86.1   MCH      26.0 - 33.0 pg 27.2 26.7 27.2 27.1   MCHC      32.2 - 35.5 gm/dl 31.5 (L) 31.0 (L) 31.7 (L) 31.5 (L)   RDW-CV      11.5 - 14.5 % 14.1 14.1 14.4 14.4   RDW-SD      35.0 - 45.0 fL 43.6 44.5 44.9 44.7   Platelet Count      321 - 400 thou/mm3 329 317 214 236   MPV      9.4 - 12.4 fL 9.5 9.6 9.7 9.9   Seg Neutrophils      % 72.2 74.1 66.4    Lymphocytes      % 15.0 14.4 20.2    Monocytes      % 8.3 7.6 9.3    Eosinophils      % 3.4 2.7 3.1    Basophils      % 0.7 0.6 0.5    Immature Granulocytes % 0.4 0.6 0.5    Segs Absolute      1 - 7 thou/mm3 8.7 (H) 9.2 (H) 6.3    Lymphocytes Absolute      1.0 - 4.8 thou/mm3 1.8 1.8 1.9    Monocytes Absolute      0.4 - 1.3 thou/mm3 1.0 0.9 0.9    Eosinophils Absolute      0.0 - 0.4 thou/mm3 0.4 0.3 0.3    Basophils Absolute      0.0 - 0.1 thou/mm3 0.1 0.1 0.0    Immature Grans (Abs)      0.00 - 0.07 thou/mm3 0.05 0.07 0.05    Nucleated Red Blood Cells      /100 wbc 0 0 0      Component      Latest Ref Rng & Units 8/26/2021   Sodium      135 - 145 meq/L 136   Potassium      3.5 - 5.2 meq/L 4.2   Chloride      98 - 111 meq/L 96 (L)   CO2      23 - 33 meq/L 28   Glucose      70 - 108 mg/dL 121 (H)   BUN      7 - 22 mg/dL 9   Creatinine      0.4 - 1.2 mg/dL 1.0   Calcium      8.5 - 10.5 mg/dL 9.1   Anion Gap      8.0 - 16.0 meq/L 12.0   Est, Glom Filt Rate      ml/min/1.73m2 70 (A)       Immunization History   Administered Date(s) Administered    COVID-19, Pfizer, PF, 30mcg/0.3mL 01/22/2021, 02/12/2021    Influenza 10/31/2013    Influenza Virus Vaccine 11/01/2011, 11/05/2012, 11/11/2014, 09/29/2015    Influenza Whole 10/01/2010    Influenza, Quadv, 6 mo and older, IM, PF (Flulaval, Fluarix) 09/27/2018    Influenza, Quadv, IM, (6 mo and older Fluzone, Flulaval, Fluarix and 3 yrs and older Afluria) 11/17/2017    Influenza, Quadv, IM, PF (6 mo and older Fluzone, Flulaval, Fluarix, and 3 yrs and older Afluria) 10/01/2019    Influenza, Quadv, adjuvanted, 65 yrs +, IM, PF (Fluad) 10/19/2020    Influenza, Triv, 3 Years and older, IM (Afluria (5 yrs and older) 09/26/2016    Pneumococcal Conjugate 13-valent ([id number]) 12/19/2014    Pneumococcal Polysaccharide ([id number]) 01/01/2003    Td, unspecified formulation 06/08/2010    Zoster Live (Zostavax) 08/05/2014         Health Maintenance   Topic Date Due    DTaP/Tdap/Td vaccine (1 - Tdap) 06/09/2010    Shingles Vaccine (2 of 3) 09/30/2014    Annual Wellness Visit (AWV)  Never done    Flu vaccine (1) 09/01/2021  TSH testing  07/14/2022    Potassium monitoring  08/26/2022    Creatinine monitoring  08/26/2022    Pneumococcal 65+ years Vaccine  Completed    COVID-19 Vaccine  Completed    Hepatitis A vaccine  Aged Out    Hib vaccine  Aged Out    Meningococcal (ACWY) vaccine  Aged Out       Future Appointments   Date Time Provider Gretchen Joslyn   9/27/2021  9:15 AM Sandie Whitehead MD [address] [facility]   10/18/2021 11:00 AM Vicky Robertson PA-C N Pulm Med [facility]   11/22/2021  1:00 PM Akhil Jones MD N [facility] Heart [facility] [address] [place] [address]   3/14/2022  1:00 PM MAEGAN Kahn - CNP [facility] Medicine [facility] [address] [place] [address]         ASSESSMENT       Diagnosis Orders   1. Routine general medical examination at a health care facility     2. Acquired hypothyroidism  T4, Free    TSH without Reflex   3. Hyperlipidemia, unspecified hyperlipidemia type     4. Essential hypertension  CBC Auto Differential    Comprehensive Metabolic Panel   5. Type 2 diabetes mellitus with complication, with long-term current use of insulin (HCC)  Hemoglobin A1C    CBC Auto Differential    Hemoglobin A1C    Comprehensive Metabolic Panel   6. Paroxysmal atrial fibrillation (HCC)  CBC Auto Differential    Comprehensive Metabolic Panel   7.  COPD without exacerbation ([facility]         PLAN          Orders Placed This Encounter   Procedures    Hemoglobin A1C     Standing Status:   Future     Standing Expiration Date:   9/13/2022    T4, Free     Standing Status:   Future     Standing Expiration Date:   9/13/2022    TSH without Reflex     Standing Status:   Future     Standing Expiration Date:   9/13/2022    CBC Auto Differential     Standing Status:   Future     Standing Expiration Date:   9/13/2022    Hemoglobin A1C     Standing Status:   Future     Standing Expiration Date:   9/13/2022    Comprehensive Metabolic Panel     Standing Status:   Future     Standing Expiration Date:   9/13/2022       Labs - A1C, TSH, Free T4, due now  6 months - CBC, CMP, A1C,   Follow up with specialists as scheduled  Follow up in 6 months     Return in 6 months (on 3/13/2022) for Medicare Annual Wellness Visit in 1 year. Electronically signed by MAEGAN Perry CNP on 2021 at 4:49 PM       Medicare Annual Wellness Visit  Name: Jose Barroso Date: 2021   MRN: [id number] Sex: Male   Age: 80 y.o. Ethnicity: Non- / Non    : 1931 Race: White (non-)      Pavan Cameron is here for Medicare AWV (No concerns )    Screenings for behavioral, psychosocial and functional/safety risks, and cognitive dysfunction are all negative except as indicated below. These results, as well as other patient data from the [address] [facility] [address] form, are documented in Flowsheets linked to this Encounter. Allergies   Allergen Reactions    Latex Rash    Macrobid [Nitrofurantoin Chary Plunk      Dr. Finn Sanchez prefers pt not use Macrobid due to pulmonary side effects.  Levaquin [Levofloxacin] Other (See Comments)     Redness/flushing    Brimonidine Tartrate     Adhesive Tape Rash    Alphagan [Brimonidine Tartrate] Hives     Eyes red         Prior to Visit Medications    Medication Sig Taking? Authorizing Provider   oxybutynin (DITROPAN XL) 10 MG extended release tablet Take 1 tablet by mouth every evening Yes Maegan Mcintosh MD   furosemide (LASIX) 40 MG tablet Take 1 tablet by mouth daily Yes Sunny Balbuena MD   potassium chloride (KLOR-CON M) 10 MEQ extended release tablet Take 1 tablet by mouth daily TAKE 1 TABLETS BY MOUTH TWICE DAILY Yes Sunny Balbuena MD   levothyroxine (SYNTHROID) 175 MCG tablet TAKE 1 TABLET BY MOUTH  DAILY Yes Sunny Balbuena MD   lactobacillus (CULTURELLE) capsule Take 1 capsule by mouth 2 times daily (with meals) Yes Sunny Balbuena MD   blood glucose test strips (ACCU-CHEK CHE PLUS) strip Accu-check Che Plus Test Strips (or brand per pt preference). Sig: test sugar BID.  Dx: E11.8 Yes MAEGAN Fine - CNP   SOFT TOUCH LANCETS [facility] Lancet (or brand per pt preference) Use to check blood sugars 2 times daily. Dx: E11.8 Yes Johnny Hahn APRN - CNP   Alcohol Swabs (ALCOHOL PREP) 70 % PADS Alcohol pads (brand per preference). Sig: use to test sugar twice per day. Dx: E11.8 Yes Johnny Hahn APRN - CNP   doxazosin (CARDURA) 4 MG tablet TAKE 1 TABLET BY MOUTH EVERY NIGHT Yes Lupe Aguilar MD   simethicone (MYLICON) 805 MG chewable tablet Take 125 mg by mouth every 6 hours as needed for Flatulence Yes Historical Provider, MD   aluminum & magnesium hydroxide-simethicone (MYLANTA) 400-400-40 MG/5ML SUSP Take 15 mLs by mouth every 6 hours as needed Yes Historical Provider, MD   famotidine (PEPCID) 20 MG tablet Take 20 mg by mouth 2 times daily as needed Yes Historical Provider, MD   Arformoterol Tartrate (BROVANA) 15 MCG/2ML NEBU Take 2 mLs by nebulization 2 times daily 1 dose inhale orally two times per day Yes Eduardo Beltrán MD   Multiple Vitamins-Minerals (PRESERVISION AREDS 2) CAPS Take 1 capsule by mouth daily Yes Eduardo Beltrán MD   spironolactone (ALDACTONE) 25 MG tablet TAKE 1 TABLET BY MOUTH DAILY Yes Lupe Aguilar MD   Revefenacin 175 MCG/3ML SOLN Inhale 1 nebule into the lungs daily Dx COPD J44.3 Yes Julio Cesar Sharp PA-C   insulin glargine (LANTUS SOLOSTAR) 100 UNIT/ML injection pen INJECT SUBCUTANEOUSLY 18  UNITS NIGHTLY  Patient taking differently: No sig reported Yes Eduardo Beltrán MD   ferrous gluconate (FERGON) 324 (38 Fe) MG tablet Take 1 tablet by mouth 2 times daily Yes Eduardo Beltrán MD   metFORMIN (GLUCOPHAGE) 1000 MG tablet Take 1 tablet by mouth 2 times daily (with meals) Yes Eduardo Beltrán MD   budesonide (PULMICORT) 0.5 MG/2ML nebulizer suspension USE 1 VIAL VIA NEBULIZER TWICE DAILY.  RINSE MOUTH AFTER TREATMENT Yes Eduardo Beltrán MD   lisinopril (PRINIVIL;ZESTRIL) 2.5 MG tablet Take 1 tablet by mouth daily Yes Eduardo Beltárn MD   docusate ([facility]) 100 MG CAPS Take 100 mg by mouth 2 times daily Yes Avelina Zhao MD   vitamin D3 (CHOLECALCIFEROL) 10 MCG (400 UNIT) TABS tablet Take 1 tablet by mouth daily 400units Yes Avelina Zhao MD   polyethylene glycol (MIRALAX) 17 g packet Take 17 g by mouth daily as needed for Constipation Yes Historical Provider, MD   acetaminophen (TYLENOL) 325 MG tablet Take 650 mg by mouth every 4 hours as needed for Pain or Fever  Yes Historical Provider, MD   lidocaine (LMX) 4 % cream Apply topically every 6 hours as needed for Pain To groin Yes Historical Provider, MD   albuterol sulfate  (90 Base) MCG/ACT inhaler Inhale 2 puffs into the lungs every 6 hours as needed for Wheezing Yes Saul Parry PA-C   OXYGEN Inhale 3 L into the lungs nightly Indications: Difficulty Breathing, Oxygen Therapy And prn through cpap Yes Historical Provider, MD   Multiple Vitamins-Minerals (PRESERVISION AREDS 2) CAPS Take 1 capsule by mouth daily  Avelina Zhao MD         Past Medical History:   Diagnosis Date    Anemia     Anxiety     BPH (benign prostatic hyperplasia)     CAD (coronary artery disease)     leaking valve    Chronic back pain     COPD (chronic obstructive pulmonary disease) ([address]     Detached retina     Diabetes mellitus ([facility]     NIDDM    DVT (deep venous thrombosis) ([address]     RLE    DVT (deep venous thrombosis) ([facility] 11/9/15    LLE    Dysphagia     Glaucoma     Hyperlipidemia     Hypertension     Macular degeneration     Mass of chest     benign chest mass, Dr Amin Sick    Movement disorder     spinal stenosis    Obesity     Osteoarthritis     right ankle, right hand    Paroxysmal atrial fibrillation (HCC)     Pneumonia     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    Sleep apnea 1993    on BiPap, Dr Milena Hirsch    Testicular hypofunction     Thyroid cancer [facility])     s/p thyroid resection    Urinary incontinence        Past Surgical History:   Procedure Laterality Date    BACK SURGERY 2383,4522, 2014    BRONCHOSCOPY N/A 8/29/2017    BRONCHOSCOPY AIRWAY ONLY performed by Vikram Poe MD at [address] [facility] N/A 6/17/2021    CYSTOSCOPY SUPRAPUBIC TUBE PLACEMENT performed by Fili Kerns MD at [facility] N/A 8/12/2021    CYSTOSCOPY, ATTEMPTED SUPRAPUBIC TUBE PLACEMENT, ATTEMPTED PAINTER CATH performed by Adriana Leon MD at [address] EKG 12-LEAD  11/10/2015         FRACTURE SURGERY      right hand    OTHER SURGICAL HISTORY      spinal epidurals    RETINAL DETACHMENT SURGERY      SPINE SURGERY  2004    Lumbar laminectomy    THYROIDECTOMY      TURP N/A 3/17/2020    CYSTOSCOPY WITH GREENLIGHT PHOTOVAPORIZATION OF PROSTATE performed by Fili Kerns MD at [address] TURP N/A 3/24/2020    PLASMA BUTTON TURP WITH CLOT EVACUATION performed by Fili Kerns MD at [facility] History   Problem Relation Age of Onset    Other Mother         Complications of Childbirth    Other Father [de-identified]        Natural causes       CareTeam (Including outside providers/suppliers regularly involved in providing care):   Patient Care Team:  Jasbir Moser MD as PCP - General (Family Medicine)  Jasbir Moser MD as PCP - [facility] Empaneled Provider  Prasanth Tamez MD (Endocrinology)  Vikram Poe MD as Consulting Physician (Pulmonology)  Mark Bliss PA-C as Physician Assistant (Pulmonology)    Wt Readings from Last 3 Encounters:   08/26/21 250 lb (113.4 kg)   08/23/21 250 lb (113.4 kg)   08/12/21 250 lb (113.4 kg)     Vitals:    09/13/21 1422   BP: (!) 112/58   Pulse: 64   Resp: 14     There is no height or weight on file to calculate BMI. Based upon direct observation of the patient, evaluation of cognition reveals global memory impairment noted. Patient's complete Health Risk Assessment and screening values have been reviewed and are found in Flowsheets.  The following problems were reviewed today and where indicated follow up appointments were made and/or referrals ordered. Positive Risk Factor Screenings with Interventions:     Fall Risk:  2 or more falls in past year?: (!) yes  Fall with injury in past year?: no  Fall Risk Interventions:    · Home safety tips provided  · Pt is at Assisted Living    Cognitive:   Words recalled: 0 Words Recalled  Clock Drawing Test (CDT) Score: (!) Abnormal  Total Score Interpretation: Positive Mini-Cog  Did the patient refuse to take the cognition test?: No  Cognitive Impairment Interventions:  · Patient declines any further evaluation/treatment for cognitive impairment        Health Habits/Nutrition:  Health Habits/Nutrition  Do you exercise for at least 20 minutes 2-3 times per week?: Yes  Have you lost any weight without trying in the past 3 months?: (!) Yes  Do you eat only one meal per day?: (!) Yes  Have you seen the dentist within the past year?: (!) No     Health Habits/Nutrition Interventions:  · Inadequate physical activity:  limited due to health  · Nutritional issues:  Nutrition discussed  · Dental exam overdue:  patient encouraged to make appointment with his/her dentist    Hearing/Vision:  No exam data present  Hearing/Vision  Do you or your family notice any trouble with your hearing that hasn't been managed with hearing aids?: (!) Yes  Do you have difficulty driving, watching TV, or doing any of your daily activities because of your eyesight?: No  Have you had an eye exam within the past year?: Yes  Hearing/Vision Interventions:  · Hearing concerns:  patient declines any further evaluation/treatment for hearing issues      Personalized Preventive Plan   Current Health Maintenance Status  Immunization History   Administered Date(s) Administered    COVID-19, Anderson Peter, PF, 30mcg/0.3mL 01/22/2021, 02/12/2021    Influenza 10/31/2013    Influenza Virus Vaccine 11/01/2011, 11/05/2012, 11/11/2014, 09/29/2015    Influenza Whole 10/01/2010    Influenza, Quadv, 6 mo and older, IM, PF (Flulaval, Fluarix) 09/27/2018    Influenza, May Herb, IM, (6 mo and older Fluzone, Flulaval, Fluarix and 3 yrs and older Afluria) 11/17/2017    Influenza, Quadv, IM, PF (6 mo and older Fluzone, Flulaval, Fluarix, and 3 yrs and older Afluria) 10/01/2019    Influenza, Quadv, adjuvanted, 65 yrs +, IM, PF (Fluad) 10/19/2020    Influenza, Triv, 3 Years and older, IM (Afluria (5 yrs and older) 09/26/2016    Pneumococcal Conjugate 13-valent ([id number]) 12/19/2014    Pneumococcal Polysaccharide ([id number]) 01/01/2003    Td, unspecified formulation 06/08/2010    Zoster Live (Zostavax) 08/05/2014        Health Maintenance   Topic Date Due    DTaP/Tdap/Td vaccine (1 - Tdap) 06/09/2010    Shingles Vaccine (2 of 3) 09/30/2014    Annual Wellness Visit (AWV)  Never done    Flu vaccine (1) 09/01/2021    TSH testing  07/14/2022    Potassium monitoring  08/26/2022    Creatinine monitoring  08/26/2022    Pneumococcal 65+ years Vaccine  Completed    COVID-19 Vaccine  Completed    Hepatitis A vaccine  Aged Out    Hib vaccine  Aged Out    Meningococcal (ACWY) vaccine  Aged Out     Recommendations for [employer] Due: see orders and patient instructions/AVS.  . Recommended screening schedule for the next 5-10 years is provided to the patient in written form: see Patient Instructions/AVS.    Delilah Antony was seen today for medicare awv. Diagnoses and all orders for this visit:    Routine general medical examination at a health care facility    Acquired hypothyroidism  -     T4, Free; Future  -     TSH without Reflex; Future    Hyperlipidemia, unspecified hyperlipidemia type    Essential hypertension  -     CBC Auto Differential; Future  -     Comprehensive Metabolic Panel; Future    Type 2 diabetes mellitus with complication, with long-term current use of insulin (HCC)  -     Hemoglobin A1C; Future  -     CBC Auto Differential; Future  -     Hemoglobin A1C; Future  -     Comprehensive Metabolic Panel;  Future    Paroxysmal atrial fibrillation (HCC)  -     CBC Auto Differential; Future  -     Comprehensive Metabolic Panel;  Future    [facility] exacerbation ([facility]

## 2021-09-13 NOTE — PATIENT INSTRUCTIONS
Personalized Preventive Plan for Sayra Sullivan - 9/13/2021  Medicare offers a range of preventive health benefits. Some of the tests and screenings are paid in full while other may be subject to a deductible, co-insurance, and/or copay. Some of these benefits include a comprehensive review of your medical history including lifestyle, illnesses that may run in your family, and various assessments and screenings as appropriate. After reviewing your medical record and screening and assessments performed today your provider may have ordered immunizations, labs, imaging, and/or referrals for you. A list of these orders (if applicable) as well as your Preventive Care list are included within your After Visit Summary for your review. Other Preventive Recommendations:    · A preventive eye exam performed by an eye specialist is recommended every 1-2 years to screen for glaucoma; cataracts, macular degeneration, and other eye disorders. · A preventive dental visit is recommended every 6 months. · Try to get at least 150 minutes of exercise per week or 10,000 steps per day on a pedometer . · Order or download the FREE \"Exercise & Physical Activity: Your Everyday Guide\" from [employer]. Call 1-720.356.2838 or search [employer] online. · You need 4523-6515 mg of calcium and 3652-9846 IU of vitamin D per day. It is possible to meet your calcium requirement with diet alone, but a vitamin D supplement is usually necessary to meet this goal.  · When exposed to the sun, use a sunscreen that protects against both UVA and UVB radiation with an SPF of 30 or greater. Reapply every 2 to 3 hours or after sweating, drying off with a towel, or swimming. · Always wear a seat belt when traveling in a car. Always wear a helmet when riding a bicycle or motorcycle.

## 2021-09-13 NOTE — PROGRESS NOTES
Per order of TS a b/l ear irrigation was performed using warm water. A large amount of cerumen was removed from both ear, cerumen spoon and Alligator forceps used. TM's visualized. Pt tolerated well.

## 2021-09-16 LAB
AVERAGE GLUCOSE: 123
HBA1C MFR BLD: 5.9 %

## 2021-09-20 ENCOUNTER — TELEPHONE (OUTPATIENT)
Dept: FAMILY MEDICINE CLINIC | Age: 86
End: 2021-09-20

## 2021-09-20 DIAGNOSIS — D64.9 CHRONIC ANEMIA: ICD-10-CM

## 2021-09-20 DIAGNOSIS — E11.69 TYPE 2 DIABETES MELLITUS WITH OTHER SPECIFIED COMPLICATION, WITH LONG-TERM CURRENT USE OF INSULIN (HCC): ICD-10-CM

## 2021-09-20 DIAGNOSIS — Z79.4 TYPE 2 DIABETES MELLITUS WITH OTHER SPECIFIED COMPLICATION, WITH LONG-TERM CURRENT USE OF INSULIN (HCC): ICD-10-CM

## 2021-09-20 DIAGNOSIS — E11.9 TYPE 2 DIABETES MELLITUS WITHOUT COMPLICATION, WITHOUT LONG-TERM CURRENT USE OF INSULIN (HCC): Primary | ICD-10-CM

## 2021-09-20 NOTE — TELEPHONE ENCOUNTER
A1C is good at 5.9. Repeat in 6 months prior to next appt. TSH and Free T4 are normal   CMP is stable   CBC shows anemia with hgb at 9.9. Recommend repeat CBC in 6 months. Spot MA in 6 months as well.  Thanks, TS     (copied from  regarding recent results)

## 2021-09-22 LAB
BILIRUBIN URINE: ABNORMAL
BLOOD, URINE: NEGATIVE
CLARITY: ABNORMAL
COLOR: YELLOW
GLUCOSE URINE: NEGATIVE
KETONES, URINE: NEGATIVE
LEUKOCYTE ESTERASE, URINE: ABNORMAL
NITRITE, URINE: NEGATIVE
PH UA: 5 (ref 4.5–8)
PROTEIN UA: ABNORMAL
SPECIFIC GRAVITY UA: 1.02 (ref 1–1.03)
UROBILINOGEN, URINE: NORMAL

## 2021-09-30 ENCOUNTER — HOSPITAL ENCOUNTER (EMERGENCY)
Age: 86
Discharge: HOME | End: 2021-09-30
Attending: EMERGENCY MEDICINE
Payer: MEDICARE

## 2021-09-30 ENCOUNTER — HOSPITAL ENCOUNTER (EMERGENCY)
Age: 86
Discharge: HOME | End: 2021-09-30
Payer: MEDICARE

## 2021-09-30 VITALS
TEMPERATURE: 98.7 F | RESPIRATION RATE: 16 BRPM | OXYGEN SATURATION: 98 % | HEART RATE: 70 BPM | DIASTOLIC BLOOD PRESSURE: 56 MMHG | SYSTOLIC BLOOD PRESSURE: 110 MMHG

## 2021-09-30 VITALS
HEIGHT: 67 IN | WEIGHT: 210 LBS | SYSTOLIC BLOOD PRESSURE: 108 MMHG | OXYGEN SATURATION: 97 % | RESPIRATION RATE: 16 BRPM | TEMPERATURE: 98.4 F | DIASTOLIC BLOOD PRESSURE: 67 MMHG | BODY MASS INDEX: 32.96 KG/M2 | HEART RATE: 85 BPM

## 2021-09-30 DIAGNOSIS — T83.010A SUPRAPUBIC CATHETER DYSFUNCTION, INITIAL ENCOUNTER (HCC): Primary | ICD-10-CM

## 2021-09-30 LAB
AMORPHOUS: ABNORMAL
BACTERIA: ABNORMAL /HPF
BILIRUBIN URINE: NEGATIVE
BLOOD, URINE: ABNORMAL
CASTS 2: ABNORMAL /LPF
CASTS UA: ABNORMAL /LPF
CHARACTER, URINE: ABNORMAL
COLOR: YELLOW
CRYSTALS, UA: ABNORMAL
EPITHELIAL CELLS, UA: ABNORMAL /HPF
GLUCOSE URINE: NEGATIVE MG/DL
KETONES, URINE: NEGATIVE
LEUKOCYTE ESTERASE, URINE: ABNORMAL
MISCELLANEOUS 2: ABNORMAL
NITRITE, URINE: POSITIVE
PH UA: 6 (ref 5–9)
PROTEIN UA: 30
RBC URINE: ABNORMAL /HPF
RENAL EPITHELIAL, UA: ABNORMAL
SPECIFIC GRAVITY, URINE: 1.01 (ref 1–1.03)
UROBILINOGEN, URINE: 0.2 EU/DL (ref 0–1)
WBC UA: ABNORMAL /HPF
YEAST: ABNORMAL

## 2021-09-30 PROCEDURE — 87077 CULTURE AEROBIC IDENTIFY: CPT

## 2021-09-30 PROCEDURE — 51702 INSERT TEMP BLADDER CATH: CPT

## 2021-09-30 PROCEDURE — 99284 EMERGENCY DEPT VISIT MOD MDM: CPT

## 2021-09-30 PROCEDURE — 87086 URINE CULTURE/COLONY COUNT: CPT

## 2021-09-30 PROCEDURE — 87186 SC STD MICRODIL/AGAR DIL: CPT

## 2021-09-30 PROCEDURE — 99283 EMERGENCY DEPT VISIT LOW MDM: CPT

## 2021-09-30 PROCEDURE — 81001 URINALYSIS AUTO W/SCOPE: CPT

## 2021-09-30 ASSESSMENT — ENCOUNTER SYMPTOMS: COLOR CHANGE: 0

## 2021-09-30 NOTE — ED NOTES
Bed: 019A  Expected date: 9/30/21  Expected time: 10:26 AM  Means of arrival: [facility]  Comments:     Ely Bautista RN  09/30/21 1023

## 2021-09-30 NOTE — ED TRIAGE NOTES
Pt to ED due to accidentally pulling out his catheter. Pt states he did not know it got pulled out. Nursing home states they sent him here to make sure that all of the catheter is out and to have another one placed. Pt denies any pain. VSS.

## 2021-09-30 NOTE — ED NOTES
Pt appears to be resting comfortably on cot. Pt denies pain at this time. No distress noted. Respirations even and unlabored.  Pt denies any needs or concerns at this time      Agapito Fonseca RN  09/30/21 2560

## 2021-09-30 NOTE — ED PROVIDER NOTES
[facility] Emergency Department    CHIEF COMPLAINT       Chief Complaint   Patient presents with    Other     pulled out catheter       Nurses Notes reviewed and I agree except as noted in the HPI. HISTORY OF PRESENT ILLNESS    Ag Moe is a 80 y.o. male who presents to the ED for evaluation of catheter problem. Patient notes he was walking around today, noticed that his suprapubic catheter was not in place. He is unsure when it fell out. He denies any significant pain to the site. I spoke with the patient's son, he notes that the patient is currently being treated for UTI, request that we get a urine sample while here. Patient has a past medical history of urinary incontinence, BPH. Also has a past medical history of COPD, diabetes, hyperlipidemia. Patient denies any fevers chills nausea vomiting diarrhea. Denies any pain at this time. HPI was provided by the patient. REVIEW OF SYSTEMS     Review of Systems   Constitutional: Negative for activity change, chills and fever. Genitourinary: Positive for decreased urine volume. Catheter problem   Skin: Negative for color change and rash. Psychiatric/Behavioral: Negative for confusion.         PAST MEDICAL HISTORY     Past Medical History:   Diagnosis Date    Anemia     Anxiety     BPH (benign prostatic hyperplasia)     CAD (coronary artery disease)     leaking valve    Chronic back pain     [facility]     Detached retina     Diabetes mellitus (HCC)     NIDDM    DVT (deep venous thrombosis) (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 CURRENT MEDICATIONS       Previous Medications    ACETAMINOPHEN (TYLENOL) 325 MG TABLET    Take 650 mg by mouth every 4 hours as needed for Pain or Fever     ALBUTEROL SULFATE  (90 BASE) MCG/ACT INHALER    Inhale 2 puffs into the lungs every 6 hours as needed for Wheezing    ALCOHOL SWABS (ALCOHOL PREP) 70 % PADS    Alcohol pads (brand per preference). Sig: use to test sugar twice per day. Dx: E11.8    ALUMINUM & MAGNESIUM HYDROXIDE-SIMETHICONE (MYLANTA) 400-400-40 MG/5ML SUSP    Take 15 mLs by mouth every 6 hours as needed    ARFORMOTEROL TARTRATE (BROVANA) 15 MCG/2ML NEBU    Take 2 mLs by nebulization 2 times daily 1 dose inhale orally two times per day    BLOOD GLUCOSE TEST STRIPS (ACCU-CHEK CHE PLUS) STRIP    Accu-check Che Plus Test Strips (or brand per pt preference). Sig: test sugar BID. Dx: E11.8    BUDESONIDE (PULMICORT) 0.5 MG/2ML NEBULIZER SUSPENSION    USE 1 VIAL VIA NEBULIZER TWICE DAILY.  RINSE MOUTH AFTER TREATMENT    DOCUSATE (COLACE, DULCOLAX) 100 MG CAPS    Take 100 mg by mouth 2 times daily    DOXAZOSIN (CARDURA) 4 MG TABLET    TAKE 1 TABLET BY MOUTH EVERY NIGHT    FAMOTIDINE (PEPCID) 20 MG TABLET    Take 20 mg by mouth 2 times daily as needed    FERROUS GLUCONATE (FERGON) 324 (38 FE) MG TABLET    Take 1 tablet by mouth 2 times daily    FUROSEMIDE (LASIX) 40 MG TABLET    Take 1 tablet by mouth daily    INSULIN GLARGINE (LANTUS SOLOSTAR) 100 UNIT/ML INJECTION PEN    INJECT SUBCUTANEOUSLY 18  UNITS NIGHTLY    LACTOBACILLUS (CULTURELLE) CAPSULE    Take 1 capsule by mouth 2 times daily (with meals)    LEVOTHYROXINE (SYNTHROID) 175 MCG TABLET    TAKE 1 TABLET BY MOUTH  DAILY    LIDOCAINE (LMX) 4 % CREAM    Apply topically every 6 hours as needed for Pain To groin    LISINOPRIL (PRINIVIL;ZESTRIL) 2.5 MG TABLET    Take 1 tablet by mouth daily    METFORMIN (GLUCOPHAGE) 1000 MG TABLET    Take 1 tablet by mouth 2 times daily (with meals)    MULTIPLE VITAMINS-MINERALS (PRESERVISION AREDS 2) CAPS    Take 1 capsule by mouth daily    OXYBUTYNIN (DITROPAN XL) 10 MG EXTENDED RELEASE TABLET    Take 1 tablet by mouth every evening    OXYGEN    Inhale 3 L into the lungs nightly Indications: Difficulty Breathing, Oxygen Therapy And prn through cpap    POLYETHYLENE GLYCOL (MIRALAX) 17 G PACKET    Take 17 g by mouth daily as needed for Constipation    POTASSIUM CHLORIDE (KLOR-CON M) 10 MEQ EXTENDED RELEASE TABLET    Take 1 tablet by mouth daily TAKE 1 TABLETS BY MOUTH TWICE DAILY    REVEFENACIN 175 MCG/3ML SOLN    Inhale 1 nebule into the lungs daily Dx COPD J44.3    SIMETHICONE (MYLICON) 296 MG CHEWABLE TABLET    Take 125 mg by mouth every 6 hours as needed for Flatulence    SOFT TOUCH LANCETS [facility]    Lancet (or brand per pt preference) Use to check blood sugars 2 times daily. Dx: E11.8    SPIRONOLACTONE (ALDACTONE) 25 MG TABLET    TAKE 1 TABLET BY MOUTH DAILY    VITAMIN D3 (CHOLECALCIFEROL) 10 MCG (400 UNIT) TABS TABLET    Take 1 tablet by mouth daily 400units       ALLERGIES     is allergic to latex, macrobid [nitrofurantoin monohyd macro], levaquin [levofloxacin], brimonidine tartrate, adhesive tape, and alphagan [brimonidine tartrate]. FAMILY HISTORY     He indicated that his mother is . He indicated that his father is . family history includes Other in his mother; Other (age of onset: [de-identified]) in his father.     SOCIAL HISTORY       Social History Socioeconomic History    Marital status:      Spouse name: Not on file    Number of children: 2    Years of education: Not on file    Highest education level: Not on file   Occupational History    Not on file   Tobacco Use    Smoking status: Never Smoker    Smokeless tobacco: Never Used   Vaping Use    Vaping Use: Never used   Substance and Sexual Activity    Alcohol use: Yes     Alcohol/week: 1.0 standard drinks     Types: 1 Glasses of wine per week     Comment: Glass of wine with dinner.  Drug use: No    Sexual activity: Never   Other Topics Concern    Not on file   Social History Narrative    Not on file     Social Determinants of Health     Financial Resource Strain:     Difficulty of Paying Living Expenses:    Food Insecurity:     Worried About Running Out of Food in the Last Year:     [address] in the Last Year:    Transportation Needs:     Lack of Transportation (Medical):  Lack of Transportation (Non-Medical):    Physical Activity:     Days of Exercise per Week:     Minutes of Exercise per Session:    Stress:     Feeling of Stress :    Social Connections:     Frequency of Communication with Friends and Family:     Frequency of Social Gatherings with Friends and Family:     Attends Islam Services:     Active Member of Clubs or Organizations:     Attends Club or Organization Meetings:     Marital Status:    Intimate Partner Violence:     Fear of Current or Ex-Partner:     Emotionally Abused:     Physically Abused:     Sexually Abused:        PHYSICAL EXAM     INITIAL VITALS:  height is 5' 7\" (1.702 m) and weight is 210 lb (95.3 kg). His oral temperature is 98.4 °F (36.9 °C). His blood pressure is 108/67 and his pulse is 85. His respiration is 16 and oxygen saturation is 97%. Physical Exam  Constitutional:       General: He is not in acute distress. Appearance: Normal appearance. He is obese. He is not ill-appearing or toxic-appearing.    HENT: Head: Normocephalic. Abdominal:      General: Abdomen is flat. Palpations: Abdomen is soft. Genitourinary:     Comments: Suprapubic catheter incision, no sign of infection at this time. Neurological:      Mental Status: He is alert. DIFFERENTIAL DIAGNOSIS:   Displaced suprapubic catheter    DIAGNOSTIC RESULTS     RADIOLOGY: non-plainfilm images(s) such as CT, Ultrasound and MRI are read by the radiologist.  Plain radiographic images are visualized and preliminarily interpreted by the emergency physician unless otherwise stated below. No orders to display         LABS:   Labs Reviewed   URINE WITH REFLEXED MICRO - Abnormal; Notable for the following components:       Result Value    Blood, Urine SMALL (*)     Protein, UA 30 (*)     Nitrite, Urine POSITIVE (*)     Leukocyte Esterase, Urine LARGE (*)     Character, Urine CLOUDY (*)     All other components within normal limits   CULTURE, REFLEXED, URINE    Narrative:     Source: urine, clean catch       Site: clean void          Current Antibiotics: not stated       EMERGENCY DEPARTMENT COURSE:   Vitals:    Vitals:    09/30/21 1037 09/30/21 1130 09/30/21 1227 09/30/21 1245   BP:  (!) 106/53 108/67    Pulse:  82 85    Resp:  16     Temp: 98.4 °F (36.9 °C)      TempSrc: Oral      SpO2:  96% 97%    Weight:    210 lb (95.3 kg)   Height:           MDM    Patient was seen and evaluated in the emergency department, patient appeared to be in no acute distress, vital signs are reviewed, no significant findings noted. Physical exam was completed, no significant abnormality noted, suprapubic catheter not in place. I initially used a 8 Western Frieda pediatric catheter as I was unsure how long the catheter not been in place, this passed easily through the suprapubic site. I then placed a 14 Divehi Billy catheter in the suprapubic catheter site. Noted urine, balloon was inflated, patient tolerated well. Urine sample sent to lab.   Patient will be discharged in stable condition  Medications - No data to display    Patient was seenindependently by myself. The patient's final impression and disposition and plan was determined by myself. CRITICAL CARE:   None    CONSULTS:  None    PROCEDURES:  None    FINAL IMPRESSION     1. Suprapubic catheter dysfunction, initial encounter [facility])          DISPOSITION/PLAN   Patient discharged in stable condition    PATIENT REFERREDTO:  No follow-up provider specified. DISCHARGE MEDICATIONS:  New Prescriptions    No medications on file       (Please note that portions of this note were completed with a voice recognition program.  Efforts were made to edit the dictations but occasionally words are mis-transcribed.)      Provider:  I personally performed the services described in the documentation,reviewed and edited the documentation which was dictated to the scribe in my presence, and it accurately records my words and actions.     Brennan Saravia CNP 09/30/21 12:52 PM    Dale Saravia, APRN - CNP        [employer], MAEGAN - CNP  09/30/21 1300

## 2021-10-01 ENCOUNTER — TELEPHONE (OUTPATIENT)
Dept: FAMILY MEDICINE CLINIC | Age: 86
End: 2021-10-01

## 2021-10-01 ENCOUNTER — TELEPHONE (OUTPATIENT)
Dept: UROLOGY | Age: 86
End: 2021-10-01

## 2021-10-01 NOTE — TELEPHONE ENCOUNTER
Received prelim urine culture results from PCP. Enteric GNB x 2. Pt in ER TWICE yesterday with SP catheter issues. He removed catheter earlier in the day which was replaced by ER and cut catheter later in the day. Called and spoke with RN at [facility]. She reports pt is having increased confusion from baseline and concerned about UTI. Pt's urine culture result she just received significant for Enterobacter and E. faecalis. Enterobacter is MDR. Recommend fosfomycin 3 grams PO every 3 days x 3 doses and referral to infectious disease. Recommend abdominal binder and to remove anything pt may be able to use to cut tubing on his catheter.

## 2021-10-01 NOTE — ED NOTES
Transport set up to take pt back to [facility].       Umberto Silva RN  09/30/21 0917

## 2021-10-01 NOTE — ED PROVIDER NOTES
[address] ENCOUNTER      PATIENT NAME: Kendrick Campbell  MRN: [id number]  : 1931  SHAH: 2021  PROVIDER: Patricia Morrison MD      CHIEF COMPLAINT       Chief Complaint   Patient presents with    Other     pulled out suprapubic cath again       Patient is seen and evaluated in a timely fashion. Nurses Notes are reviewed and I agree except as noted in the HPI. HISTORY OF PRESENT ILLNESS    Kendrick Campbell is a 80 y.o. male who presents to Emergency Department with Other (pulled out suprapubic cath again)     19-year-old male who is a SNF resident was brought in by EMS for [facility] catheter evaluation. Nursing home reports patient cut the SP catheter. He was seen in the morning at our ED for accidentally pulled out SP catheter and a 14 Western Frieda new catheter was replaced. Patient has access to knives and other sharp objects that allow him to cut the SP catheter because of discomfort from the catheter. This HPI was provided by EMS and SNF.      REVIEW OF SYSTEMS   Review of Systems   Unable to perform ROS: Other        PAST MEDICAL HISTORY     Past Medical History:   Diagnosis Date    Anemia     Anxiety     BPH (benign prostatic hyperplasia)     CAD (coronary artery disease)     leaking valve    Chronic back pain     COPD (chronic obstructive pulmonary disease) ([address]     Detached retina     Diabetes mellitus ([address]     NIDDM    [facility] thrombosis) ([facility])     RLE    DVT (deep venous thrombosis) ([address] 11/9/15    LLE    Dysphagia     Glaucoma     Hyperlipidemia     Hypertension     Macular degeneration     Mass of chest     benign chest mass, Dr Lynda Jenkins    Movement disorder     spinal stenosis    Obesity     Osteoarthritis     right ankle, right hand    Paroxysmal atrial fibrillation ([address]     Pneumonia     Primary thyroid follicular carcinoma 8557    Sleep apnea     on BiPap, Dr Samuel Lin Testicular hypofunction     Thyroid cancer ([address] s/p thyroid resection    Urinary incontinence        SURGICAL HISTORY       Past Surgical History:   Procedure Laterality Date   [facility] SURGERY  [id number],[id number], 2014    BRONCHOSCOPY N/A 8/29/2017    BRONCHOSCOPY AIRWAY ONLY performed by Joo Herrera MD at [address] [facility] [address] N/A 6/17/2021    CYSTOSCOPY SUPRAPUBIC TUBE PLACEMENT performed by Stan Martel MD at [address] [facility] N/A 8/12/2021    CYSTOSCOPY, ATTEMPTED SUPRAPUBIC TUBE PLACEMENT, ATTEMPTED PAINTER CATH performed by Melquiades Garrett MD at [address] EKG 12-LEAD  11/10/2015         FRACTURE SURGERY      right hand    OTHER SURGICAL HISTORY      spinal epidurals    RETINAL DETACHMENT SURGERY      SPINE SURGERY  2004    Lumbar laminectomy    THYROIDECTOMY      TURP N/A 3/17/2020    CYSTOSCOPY WITH GREENLIGHT PHOTOVAPORIZATION OF PROSTATE performed by Stan Martel MD at [address] TURP N/A 3/24/2020    PLASMA BUTTON TURP WITH CLOT EVACUATION performed by Stan Martel MD at [address] [facility]       Previous Medications    ACETAMINOPHEN (TYLENOL) 325 MG TABLET    Take 650 mg by mouth every 4 hours as needed for Pain or Fever     ALBUTEROL SULFATE  (90 BASE) MCG/ACT INHALER    Inhale 2 puffs into the lungs every 6 hours as needed for Wheezing    ALCOHOL SWABS (ALCOHOL PREP) 70 % PADS    Alcohol pads (brand per preference). Sig: use to test sugar twice per day. Dx: E11.8    ALUMINUM & MAGNESIUM HYDROXIDE-SIMETHICONE (MYLANTA) 400-400-40 MG/5ML SUSP    Take 15 mLs by mouth every 6 hours as needed    ARFORMOTEROL TARTRATE (BROVANA) 15 MCG/2ML NEBU    Take 2 mLs by nebulization 2 times daily 1 dose inhale orally two times per day    BLOOD GLUCOSE TEST STRIPS (ACCU-CHEK MERYL PLUS) STRIP    Accu-check Meryl Plus Test Strips (or brand per pt preference). Sig: test sugar BID. Dx: E11.8    BUDESONIDE (PULMICORT) 0.5 MG/2ML NEBULIZER SUSPENSION    USE 1 VIAL VIA NEBULIZER TWICE DAILY.  RINSE MOUTH AFTER TREATMENT    DOCUSATE (COLACE, DULCOLAX) 100 MG CAPS    Take 100 mg by mouth 2 times daily    DOXAZOSIN (CARDURA) 4 MG TABLET    TAKE 1 TABLET BY MOUTH EVERY NIGHT    FAMOTIDINE (PEPCID) 20 MG TABLET    Take 20 mg by mouth 2 times daily as needed    FERROUS GLUCONATE (FERGON) 324 (38 FE) MG TABLET    Take 1 tablet by mouth 2 times daily    FUROSEMIDE (LASIX) 40 MG TABLET    Take 1 tablet by mouth daily    INSULIN GLARGINE (LANTUS SOLOSTAR) 100 UNIT/ML INJECTION PEN    INJECT SUBCUTANEOUSLY 18  UNITS NIGHTLY    LACTOBACILLUS (CULTURELLE) CAPSULE    Take 1 capsule by mouth 2 times daily (with meals)    LEVOTHYROXINE (SYNTHROID) 175 MCG TABLET    TAKE 1 TABLET BY MOUTH  DAILY    LIDOCAINE (LMX) 4 % CREAM    Apply topically every 6 hours as needed for Pain To groin    LISINOPRIL (PRINIVIL;ZESTRIL) 2.5 MG TABLET    Take 1 tablet by mouth daily    METFORMIN (GLUCOPHAGE) 1000 MG TABLET    Take 1 tablet by mouth 2 times daily (with meals)    MULTIPLE VITAMINS-MINERALS (PRESERVISION AREDS 2) CAPS    Take 1 capsule by mouth daily    OXYBUTYNIN (DITROPAN XL) 10 MG EXTENDED RELEASE TABLET    Take 1 tablet by mouth every evening    OXYGEN    Inhale 3 L into the lungs nightly Indications: Difficulty Breathing, Oxygen Therapy And prn through cpap    POLYETHYLENE GLYCOL (MIRALAX) 17 G PACKET    Take 17 g by mouth daily as needed for Constipation    POTASSIUM CHLORIDE (KLOR-CON M) 10 MEQ EXTENDED RELEASE TABLET    Take 1 tablet by mouth daily TAKE 1 TABLETS BY MOUTH TWICE DAILY    REVEFENACIN 175 MCG/3ML SOLN    Inhale 1 nebule into the lungs daily Dx COPD J44.3    SIMETHICONE (MYLICON) 592 MG CHEWABLE TABLET    Take 125 mg by mouth every 6 hours as needed for Flatulence    SOFT TOUCH LANCETS [facility]    Lancet (or brand per pt preference) Use to check blood sugars 2 times daily.  Dx: E11.8    SPIRONOLACTONE (ALDACTONE) 25 MG TABLET    TAKE 1 TABLET BY MOUTH DAILY    VITAMIN D3 (CHOLECALCIFEROL) 10 MCG (400 UNIT) TABS TABLET    Take 1 tablet by mouth daily 400units ALLERGIES     Latex, Macrobid [nitrofurantoin monohyd macro], Levaquin [levofloxacin], Brimonidine tartrate, Adhesive tape, and Alphagan [brimonidine tartrate]    FAMILY HISTORY     He indicated that his mother is . He indicated that his father is . family history includes Other in his mother; Other (age of onset: [de-identified]) in his father. SOCIAL HISTORY      reports that he has never smoked. He has never used smokeless tobacco. He reports current alcohol use of about 1.0 standard drinks of alcohol per week. He reports that he does not use drugs. PHYSICAL EXAM      oral temperature is 98.7 °F (37.1 °C). His blood pressure is 119/77 and his pulse is 73. His respiration is 16 and oxygen saturation is 98%. Physical Exam  Vitals and nursing note reviewed. Constitutional:       Appearance: He is well-developed. He is not diaphoretic. HENT:      Head: Normocephalic and atraumatic. Nose: Nose normal.   Eyes:      General: No scleral icterus. Right eye: No discharge. Left eye: No discharge. Conjunctiva/sclera: Conjunctivae normal.      Pupils: Pupils are equal, round, and reactive to light. Neck:      Vascular: No JVD. Trachea: No tracheal deviation. Cardiovascular:      Rate and Rhythm: Normal rate and regular rhythm. Heart sounds: Normal heart sounds. No murmur heard. No friction rub. No gallop. Pulmonary:      Effort: Pulmonary effort is normal. No respiratory distress. Breath sounds: Normal breath sounds. No stridor. No wheezing or rales. Chest:      Chest wall: No tenderness. Abdominal:      General: Bowel sounds are normal. There is no distension. Palpations: Abdomen is soft. There is no mass. Tenderness: There is no abdominal tenderness. There is no guarding or rebound. Hernia: No hernia is present.    Genitourinary:     Comments: SP catheter in place, new tubing and bag are already connected by the nurse already, clear aremis-transcribed.)    MD Kerri Espinal MD  09/30/21 0984

## 2021-10-01 NOTE — ED NOTES
[facility] states they have now removed all knives and other shrap objects from pt room so that he cannot cut cath again. Nurse states pt would not let her look at his catheter, she just saw that the tubing was cut and sent pt in.       Florine Scheuermann, RN  09/30/21 211

## 2021-10-01 NOTE — ED NOTES
Pt continues restful on cart in position of comfort. Provided apple juice per request since I couldn't give him coffee or tea. ETA for transport back to [facility] reported as 'maybe before midnight but it could be after'.       Flor Zhang RN  09/30/21 1405

## 2021-10-02 LAB
ORGANISM: ABNORMAL
URINE CULTURE REFLEX: ABNORMAL

## 2021-10-04 ENCOUNTER — TELEPHONE (OUTPATIENT)
Dept: INFECTIOUS DISEASES | Age: 86
End: 2021-10-04

## 2021-10-04 ENCOUNTER — TELEPHONE (OUTPATIENT)
Dept: UROLOGY | Age: 86
End: 2021-10-04

## 2021-10-04 DIAGNOSIS — A49.8 INFECTION CAUSED BY ENTEROBACTER CLOACAE: Primary | ICD-10-CM

## 2021-10-04 RX ORDER — CIPROFLOXACIN 500 MG/1
500 TABLET, FILM COATED ORAL 2 TIMES DAILY
Qty: 14 TABLET | Refills: 0 | Status: SHIPPED | OUTPATIENT
Start: 2021-10-04 | End: 2021-10-11

## 2021-10-04 NOTE — TELEPHONE ENCOUNTER
----- Message from Ron Sanches, APRN - CNP sent at 10/4/2021  7:37 AM EDT -----  Is he on anything? This is from September.

## 2021-10-04 NOTE — TELEPHONE ENCOUNTER
Pt's appointment changed and Michael's klever notified.  Patient also referred to [facility] they will call for an appointment

## 2021-10-04 NOTE — TELEPHONE ENCOUNTER
Prior auth for Fosfomycin denied per insurance. Abby gave Ciprofloxin 500mg.   Called Cassie Landry at [facility] and gave verbal.

## 2021-10-04 NOTE — TELEPHONE ENCOUNTER
Fosfomycin denied. Last urine culture 9-30 showed enterobacter. Sensitive to cipro, has allergy to levaquin.  Check with nursing to make sure he  Can take cipro

## 2021-10-04 NOTE — TELEPHONE ENCOUNTER
I have personally verified, reviewed, and approved these actions. Make sure he is on fosfomycin that oneida recommended.

## 2021-10-04 NOTE — TELEPHONE ENCOUNTER
[facility] to schedule appointment for [facility] per referral. No answer, left voicemail

## 2021-10-05 NOTE — PROGRESS NOTES
Pharmacy Note  ED Culture Follow-up    Pilar Red is a 80 y.o. male. Allergies: Latex, Macrobid [nitrofurantoin monohyd macro], Levaquin [levofloxacin], Brimonidine tartrate, Adhesive tape, and Alphagan [brimonidine tartrate]     Labs:  Lab Results   Component Value Date    BUN 9 08/26/2021    CREATININE 1.0 08/26/2021    WBC 12.1 (H) 08/26/2021     CrCl cannot be calculated (Patient's most recent lab result is older than the maximum 10 days allowed. ). 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  Tammy Nuñez [facility], PharmD 7:57 PM 10/5/2021

## 2021-10-06 ENCOUNTER — TELEPHONE (OUTPATIENT)
Dept: INFECTIOUS DISEASES | Age: 86
End: 2021-10-06

## 2021-10-08 ENCOUNTER — TELEPHONE (OUTPATIENT)
Dept: FAMILY MEDICINE CLINIC | Age: 86
End: 2021-10-08

## 2021-10-08 NOTE — TELEPHONE ENCOUNTER
COPIED [address] [facility] reviewed.   BMP okay overall with slightly decreased chloride and mildly decreased GFR-nothing further needed  CBC shows essential stable anemia since May 2020-nothing further needed currently.  ES

## 2021-10-18 ENCOUNTER — OFFICE VISIT (OUTPATIENT)
Dept: PULMONOLOGY | Age: 86
End: 2021-10-18
Payer: MEDICARE

## 2021-10-18 VITALS
WEIGHT: 215 LBS | TEMPERATURE: 97.8 F | DIASTOLIC BLOOD PRESSURE: 80 MMHG | SYSTOLIC BLOOD PRESSURE: 132 MMHG | HEIGHT: 67 IN | HEART RATE: 68 BPM | BODY MASS INDEX: 33.74 KG/M2 | OXYGEN SATURATION: 98 %

## 2021-10-18 DIAGNOSIS — E66.9 OBESITY (BMI 30-39.9): ICD-10-CM

## 2021-10-18 DIAGNOSIS — G47.33 OBSTRUCTIVE SLEEP APNEA: Primary | ICD-10-CM

## 2021-10-18 DIAGNOSIS — J44.9 STAGE 3 SEVERE COPD BY GOLD CLASSIFICATION (HCC): ICD-10-CM

## 2021-10-18 PROCEDURE — 99214 OFFICE O/P EST MOD 30 MIN: CPT | Performed by: PHYSICIAN ASSISTANT

## 2021-10-18 PROCEDURE — 4040F PNEUMOC VAC/ADMIN/RCVD: CPT | Performed by: PHYSICIAN ASSISTANT

## 2021-10-18 PROCEDURE — G8427 DOCREV CUR MEDS BY ELIG CLIN: HCPCS | Performed by: PHYSICIAN ASSISTANT

## 2021-10-18 PROCEDURE — 1123F ACP DISCUSS/DSCN MKR DOCD: CPT | Performed by: PHYSICIAN ASSISTANT

## 2021-10-18 PROCEDURE — 3023F SPIROM DOC REV: CPT | Performed by: PHYSICIAN ASSISTANT

## 2021-10-18 PROCEDURE — 1036F TOBACCO NON-USER: CPT | Performed by: PHYSICIAN ASSISTANT

## 2021-10-18 PROCEDURE — G8417 CALC BMI ABV UP PARAM F/U: HCPCS | Performed by: PHYSICIAN ASSISTANT

## 2021-10-18 PROCEDURE — G8484 FLU IMMUNIZE NO ADMIN: HCPCS | Performed by: PHYSICIAN ASSISTANT

## 2021-10-18 PROCEDURE — G8926 SPIRO NO PERF OR DOC: HCPCS | Performed by: PHYSICIAN ASSISTANT

## 2021-10-18 ASSESSMENT — ENCOUNTER SYMPTOMS
COUGH: 0
ALLERGIC/IMMUNOLOGIC NEGATIVE: 1
BACK PAIN: 0
WHEEZING: 0
STRIDOR: 0
NAUSEA: 0
EYES NEGATIVE: 1
SHORTNESS OF BREATH: 0
DIARRHEA: 0
CHEST TIGHTNESS: 0

## 2021-10-18 NOTE — PROGRESS NOTES
[facility]    : Cassi Gamez, 80 y.o.   : 1931  10/18/2021    [facility] [address] [facility]     Chief Complaint   Patient presents with    Follow-up     COPD 8 month follow up with no download, patient not wearing machine         HPI  Nilam Gonzalez is here for follow up for COPD and ROBER. He states that his SHAH has been improving. He has not been wearing his PAP for about 1 year or more. He is suppose to have O2 bled into PAP at 3 liters. He has not been taking neb treatments much. He feels good. Only issue he complains about is catheter. Original or initialAHI: 77.6     Date of initial study: 08    CPAP 10    Progress History:   Since last visit any new medical issues? Yes suprapubic catheter  New ER or hospitlal visits? Yes   Any new or changes in medicines? No  Using inhalers? No  Are they helpful?  No  Past Medical hx   PMH:  Past Medical History:   Diagnosis Date    Anemia     Anxiety     BPH (benign prostatic hyperplasia)     CAD (coronary artery disease)     leaking valve    Chronic back pain     COPD (chronic obstructive pulmonary disease) (HCC)     Detached retina     Diabetes mellitus (HCC)     NIDDM    DVT (deep venous thrombosis) ([facility])     RLE    DVT (deep venous thrombosis) ([address] 11/9/15    LLE    Dysphagia     Glaucoma     Hyperlipidemia     Hypertension     Macular degeneration     Mass of chest     benign chest mass, Dr Frida Holland    Movement disorder     spinal stenosis    Obesity     Osteoarthritis     right ankle, right hand    Paroxysmal atrial fibrillation ([address]     Pneumonia     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. N/A 8/12/2021    CYSTOSCOPY, ATTEMPTED SUPRAPUBIC TUBE PLACEMENT, ATTEMPTED PAINTER CATH performed by Shin Jimenez MD at [address] EKG 12-LEAD  11/10/2015         FRACTURE SURGERY      right hand    OTHER SURGICAL HISTORY      spinal epidurals    RETINAL DETACHMENT SURGERY      SPINE SURGERY  2004    Lumbar laminectomy    THYROIDECTOMY      TURP N/A 3/17/2020    CYSTOSCOPY WITH GREENLIGHT PHOTOVAPORIZATION OF PROSTATE performed by Jyoti Harrison MD at [address] TURP N/A 3/24/2020    PLASMA BUTTON TURP WITH CLOT EVACUATION performed by Jyoti Harrison MD at [address]:  Social History     Tobacco Use    Smoking status: Never Smoker    Smokeless tobacco: Never Used   Vaping Use    Vaping Use: Never used   Substance Use Topics    Alcohol use: Yes     Alcohol/week: 1.0 standard drinks     Types: 1 Glasses of wine per week     Comment: Glass of wine with dinner.  Drug use: No     ALLERGIES:  Allergies   Allergen Reactions    Latex Rash    Macrobid [Nitrofurantoin [place] Rummage      Dr. Shaquille Barney prefers pt not use Macrobid due to pulmonary side effects.     Levaquin [Levofloxacin] Other (See Comments)     Redness/flushing    Brimonidine Tartrate     Adhesive Tape Rash    Alphagan [Brimonidine Tartrate] Hives     Eyes red     FAMILY HISTORY:  Family History   Problem Relation Age of Onset    Other Mother         Complications of Childbirth    Other Father [de-identified]        Natural causes     CURRENT MEDICATIONS:  Current Outpatient Medications   Medication Sig Dispense Refill    oxybutynin (DITROPAN XL) 10 MG extended release tablet Take 1 tablet by mouth every evening 30 tablet 2    furosemide (LASIX) 40 MG tablet Take 1 tablet by mouth daily 60 tablet 0    potassium chloride (KLOR-CON M) 10 MEQ extended release tablet Take 1 tablet by mouth daily TAKE 1 TABLETS BY MOUTH TWICE DAILY 180 tablet 0    levothyroxine (SYNTHROID) 175 MCG tablet TAKE 1 TABLET BY MOUTH  DAILY 30 tablet 0    lactobacillus (CULTURELLE) capsule Take 1 capsule by mouth 2 times daily (with meals) 30 capsule 0    blood glucose test strips (ACCU-CHEK CHE PLUS) strip Accu-check Che Plus Test Strips (or brand per pt preference). Sig: test sugar BID. Dx: E11.8 200 strip 3    SOFT TOUCH LANCETS MISC Lancet (or brand per pt preference) Use to check blood sugars 2 times daily. Dx: E11.8 200 each 3    Alcohol Swabs (ALCOHOL PREP) 70 % PADS Alcohol pads (brand per preference). Sig: use to test sugar twice per day.  Dx: E11.8 200 each 3    doxazosin (CARDURA) 4 MG tablet TAKE 1 TABLET BY MOUTH EVERY NIGHT 90 tablet 3    simethicone (MYLICON) 948 MG chewable tablet Take 125 mg by mouth every 6 hours as needed for Flatulence      aluminum & magnesium hydroxide-simethicone (MYLANTA) 400-400-40 MG/5ML SUSP Take 15 mLs by mouth every 6 hours as needed      famotidine (PEPCID) 20 MG tablet Take 20 mg by mouth 2 times daily as needed      Arformoterol Tartrate (BROVANA) 15 MCG/2ML NEBU Take 2 mLs by nebulization 2 times daily 1 dose inhale orally two times per day 120 mL 11    Multiple Vitamins-Minerals (PRESERVISION AREDS 2) CAPS Take 1 capsule by mouth daily 90 capsule 3    spironolactone (ALDACTONE) 25 MG tablet TAKE 1 TABLET BY MOUTH DAILY 90 tablet 3    Revefenacin 175 MCG/3ML SOLN Inhale 1 nebule into the lungs daily Dx COPD J44.3 30 vial 5    insulin glargine (LANTUS SOLOSTAR) 100 UNIT/ML injection pen INJECT SUBCUTANEOUSLY 18  UNITS NIGHTLY (Patient taking differently: No sig reported) 30 mL 3    ferrous gluconate (FERGON) 324 (38 Fe) MG tablet Take 1 tablet by mouth 2 times daily 180 tablet 3    metFORMIN (GLUCOPHAGE) 1000 MG tablet Take 1 tablet by mouth 2 times daily (with meals) 180 tablet 3    budesonide (PULMICORT) 0.5 MG/2ML nebulizer suspension USE 1 VIAL VIA NEBULIZER TWICE DAILY. RINSE MOUTH AFTER TREATMENT 360 mL 3    lisinopril (PRINIVIL;ZESTRIL) 2.5 MG tablet Take 1 tablet by mouth daily 90 tablet 3    docusate (COLACE, DULCOLAX) 100 MG CAPS Take 100 mg by mouth 2 times daily 180 capsule 3    vitamin D3 (CHOLECALCIFEROL) 10 MCG (400 UNIT) TABS tablet Take 1 tablet by mouth daily 400units 90 tablet 3    polyethylene glycol (MIRALAX) 17 g packet Take 17 g by mouth daily as needed for Constipation      acetaminophen (TYLENOL) 325 MG tablet Take 650 mg by mouth every 4 hours as needed for Pain or Fever       lidocaine (LMX) 4 % cream Apply topically every 6 hours as needed for Pain To groin      albuterol sulfate  (90 Base) MCG/ACT inhaler Inhale 2 puffs into the lungs every 6 hours as needed for Wheezing 1 Inhaler 3    OXYGEN Inhale 3 L into the lungs nightly Indications: Difficulty Breathing, Oxygen Therapy And prn through cpap       No current facility-administered medications for this visit. Vinay LOYOLA   Review of Systems   Constitutional: Negative for activity change, appetite change, chills and fever. HENT: Negative for congestion and postnasal drip. Eyes: Negative. Respiratory: Negative for cough, chest tightness, shortness of breath, wheezing and stridor. Cardiovascular: Negative for chest pain and leg swelling. Gastrointestinal: Negative for diarrhea and nausea. Endocrine: Negative. Genitourinary: Negative. Catheter in place    Musculoskeletal: Negative. Negative for arthralgias and back pain. Skin: Negative. Allergic/Immunologic: Negative. Neurological: Negative. Negative for dizziness and light-headedness. Psychiatric/Behavioral: Negative. All other systems reviewed and are negative.        Physical exam   /80 (Site: Left Upper Arm, Position: Sitting, Cuff Size: Large Adult)   Pulse 68   Temp 97.8 °F (36.6 °C) (Temporal)   Ht 5' 7\" (1.702 m)   Wt 215 lb (97.5 kg)   SpO2 98% BMI 33.67 kg/m²    Neck Circumference -   19  Mallampati - 3  Wt Readings from Last 3 Encounters:   10/18/21 215 lb (97.5 kg)   09/30/21 210 lb (95.3 kg)   08/26/21 250 lb (113.4 kg)   lost 35 lbs 2 months     Physical Exam  Constitutional:       Appearance: He is well-developed. HENT:      Head: Normocephalic and atraumatic. Right Ear: External ear normal.      Left Ear: External ear normal.   Eyes:      Conjunctiva/sclera: Conjunctivae normal.      Pupils: Pupils are equal, round, and reactive to light. Cardiovascular:      Rate and Rhythm: Normal rate and regular rhythm. Heart sounds: Normal heart sounds. Pulmonary:      Effort: Pulmonary effort is normal.      Breath sounds: Normal breath sounds. Musculoskeletal:         General: Normal range of motion. Cervical back: Normal range of motion and neck supple. Skin:     General: Skin is warm and dry. Neurological:      Mental Status: He is alert and oriented to person, place, and time. Psychiatric:         Behavior: Behavior normal.         Thought Content: Thought content normal.         Judgment: Judgment normal.          Test results     Results for orders placed or performed in visit on 10/04/21   Urinalysis Reflex to Culture    Specimen: Urine, clean catch   Result Value Ref Range    Color, UA Yellow     Clarity, UA Turbid (A) Clear    Glucose, Ur Negative     Bilirubin Urine      Ketones, Urine Negative     Specific Gravity, UA 1.017 1.005 - 1.030    Blood, Urine Negative     pH, UA 5.0 4.5 - 8.0    Protein, UA 2+ (A) Negative    Urobilinogen, Urine Normal     Nitrite, Urine Negative     Leukocyte Esterase, Urine 3+      *Note: Due to a large number of results and/or encounters for the requested time period, some results have not been displayed. A complete set of results can be found in Results Review. Assessment      Diagnosis Orders   1. Obstructive sleep apnea     2.  Stage 3 severe COPD by GOLD classification ([address] 3. Obesity (BMI 30-39. 9)           Plan   - Suspect SHAH improving as he looses weight  - Encouraged to wear PAP- ROBER is severe  - Download reviewed and discussed with patient  - He  was advised to continue current positive airway pressure therapy with above described pressure. - He  advised to keep good compliance with current recommended pressure to get optimal results and clinical improvement  - Recommend 7-9 hours of sleep with PAP  - He was advised to call [employer] regarding supplies if needed.   -He call my office for earlier appointment if needed for worsening of sleep symptoms.   - He was instructed on weight loss  - Luisito Nina was educated about my impression and plan. Patient verbalizesunderstanding.   We will see Valentino Willoughby back in: 4 months with download      Marcial Anderson PA-C, MPAS  10/18/2021

## 2021-10-25 ENCOUNTER — OFFICE VISIT (OUTPATIENT)
Dept: INFECTIOUS DISEASES | Age: 86
End: 2021-10-25
Payer: MEDICARE

## 2021-10-25 VITALS
HEART RATE: 84 BPM | SYSTOLIC BLOOD PRESSURE: 118 MMHG | WEIGHT: 250 LBS | BODY MASS INDEX: 39.24 KG/M2 | DIASTOLIC BLOOD PRESSURE: 72 MMHG | TEMPERATURE: 97.1 F | HEIGHT: 67 IN | RESPIRATION RATE: 21 BRPM

## 2021-10-25 DIAGNOSIS — Z87.440 HISTORY OF RECURRENT UTI (URINARY TRACT INFECTION): Primary | ICD-10-CM

## 2021-10-25 PROCEDURE — 99212 OFFICE O/P EST SF 10 MIN: CPT | Performed by: NURSE PRACTITIONER

## 2021-10-25 PROCEDURE — G8427 DOCREV CUR MEDS BY ELIG CLIN: HCPCS | Performed by: NURSE PRACTITIONER

## 2021-10-25 PROCEDURE — G8417 CALC BMI ABV UP PARAM F/U: HCPCS | Performed by: NURSE PRACTITIONER

## 2021-10-25 PROCEDURE — 4040F PNEUMOC VAC/ADMIN/RCVD: CPT | Performed by: NURSE PRACTITIONER

## 2021-10-25 PROCEDURE — 1036F TOBACCO NON-USER: CPT | Performed by: NURSE PRACTITIONER

## 2021-10-25 PROCEDURE — G8484 FLU IMMUNIZE NO ADMIN: HCPCS | Performed by: NURSE PRACTITIONER

## 2021-10-25 PROCEDURE — 1123F ACP DISCUSS/DSCN MKR DOCD: CPT | Performed by: NURSE PRACTITIONER

## 2021-10-25 RX ORDER — GINSENG 100 MG
500 CAPSULE ORAL 2 TIMES DAILY
COMMUNITY

## 2021-10-25 NOTE — PATIENT INSTRUCTIONS
Visit Discharge/Physician Orders:      Keep next scheduled appointment. Please give 24 hour notice if unable to keep appointment. [phone]    If you experience any of the following, please call the [facility] during business hours: [phone]     *Increase in pain   *Temperature over 101   *Increase in drainage from your wound or a foul odor   *Uncontrolled swelling   *Need for compression bandage changes due to slippage, breakthrough drainage    If you need medical attention outside of business hours, please contact your Primary Care Doctor or go to the nearest emergency room.

## 2021-10-25 NOTE — PROGRESS NOTES
[facility] Infectious Disease         Consult Note      [address] RECORD NUMBER:  [id number]  AGE: 80 y.o. GENDER: male  : 1931  EPISODE DATE:  10/25/2021    Subjective:     Chief Complaint   Patient presents with    Other     Recurrent UTI/ Return patient         HISTORY of PRESENT ILLNESS HPI     José Holloway is a 80 y.o. male established patient, who presents today for infectious disease evaluation. History of Infectious Disease Context:   Patient presents today for evaluation of recurrent UTI. Patient has history of urinary retention/neurogenic bladder,  has had history of recurrent UTI. He underwent cystourethroscopy and suprapubic catheter placement on 2021 per Dr. Tera Holter. Patient states that he does not know why he is being seen at clinic today, denies any signs/symptoms of urinary tract infection. He does note pain at the suprapubic catheter insertion site but notes that this has been chronic since placement. Review of MAR from [facility] shows current catheter care includes Bacitracin to site BID. He denies any fevers or chills. Notes good output from catheter. States appetite has been good but water intake is \"so-so\". He denies any further needs or concerns.     PAST MEDICAL HISTORY        Diagnosis Date    Anemia     Anxiety     BPH (benign prostatic hyperplasia)     CAD (coronary artery disease)     leaking valve    Chronic back pain     COPD (chronic obstructive pulmonary disease) ([address]     Detached retina     Diabetes mellitus (HCC)     NIDDM    DVT (deep venous thrombosis) ([facility])     RLE    DVT (deep venous thrombosis) ([address] 11/9/15    LLE    Dysphagia     Glaucoma     Hyperlipidemia     Hypertension     Macular degeneration     Mass of chest     benign chest mass, Dr Александр Borja    Movement disorder     spinal stenosis    Obesity     Osteoarthritis     right ankle, right hand    Paroxysmal atrial fibrillation ([address]     Pneumonia     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)     s/p thyroid resection    Urinary incontinence        PAST SURGICAL HISTORY    Past Surgical History:   Procedure Laterality Date   [facility] SURGERY  1993,5025, 2014    BRONCHOSCOPY N/A 8/29/2017    BRONCHOSCOPY AIRWAY ONLY performed by Ankur Mosquera MD at [address] [facility] [address] N/A 6/17/2021    CYSTOSCOPY SUPRAPUBIC TUBE PLACEMENT [facility] [address] [facility] 8/12[facility]2021    CYSTOSCOPY, ATTEMPTED SUPRAPUBIC TUBE PLACEMENT, ATTEMPTED PAINTER CATH performed by Ede Parry MD at [address] [facility] EKG 12-LEAD  11/10/2015         FRACTURE SURGERY      right hand    OTHER SURGICAL HISTORY      spinal epidurals    RETINAL DETACHMENT SURGERY      SPINE SURGERY  2004    Lumbar laminectomy    THYROIDECTOMY      TURP N/A 3/17/2020    CYSTOSCOPY WITH GREENLIGHT PHOTOVAPORIZATION OF PROSTATE performed by Vika Messina MD at [address] [facility] TURP N/A 3/24/2020    PLASMA BUTTON TURP WITH CLOT EVACUATION performed by Vika Messina MD at [address] [facility]    Family History   Problem Relation Age of Onset    Other Mother         Complications of Childbirth    Other Father [de-identified]        Natural causes       SOCIAL HISTORY    Social History     Tobacco Use    Smoking status: Never Smoker    Smokeless tobacco: Never Used   Vaping Use    Vaping Use: Never used   Substance Use Topics    Alcohol use: Yes     Alcohol/week: 1.0 standard drinks     Types: 1 Glasses of wine per week     Comment: Glass of wine with dinner.  Drug use: No       ALLERGIES    Allergies   Allergen Reactions    Latex Rash    Macrobid [Nitrofurantoin Benjamen Art      Dr. Jo Bolds prefers pt not use Macrobid due to pulmonary side effects.     Levaquin [Levofloxacin] Other (See Comments)     Redness/flushing    Brimonidine Tartrate     Adhesive Tape Rash    Alphagan [Brimonidine Tartrate] Hives     Eyes red       MEDICATIONS    Current Outpatient Medications on File Prior to Visit   Medication Sig Dispense Refill    bacitracin 500 UNIT/GM ointment Apply 500 Units topically 2 times daily Apply topically 2 times daily.  oxybutynin (DITROPAN XL) 10 MG extended release tablet Take 1 tablet by mouth every evening 30 tablet 2    furosemide (LASIX) 40 MG tablet Take 1 tablet by mouth daily 60 tablet 0    potassium chloride (KLOR-CON M) 10 MEQ extended release tablet Take 1 tablet by mouth daily TAKE 1 TABLETS BY MOUTH TWICE DAILY 180 tablet 0    levothyroxine (SYNTHROID) 175 MCG tablet TAKE 1 TABLET BY MOUTH  DAILY 30 tablet 0    lactobacillus (CULTURELLE) capsule Take 1 capsule by mouth 2 times daily (with meals) 30 capsule 0    Alcohol Swabs (ALCOHOL PREP) 70 % PADS Alcohol pads (brand per preference). Sig: use to test sugar twice per day.  Dx: E11.8 200 each 3    doxazosin (CARDURA) 4 MG tablet TAKE 1 TABLET BY MOUTH EVERY NIGHT 90 tablet 3    simethicone (MYLICON) 810 MG chewable tablet Take 125 mg by mouth every 6 hours as needed for Flatulence      aluminum & magnesium hydroxide-simethicone (MYLANTA) 400-400-40 MG/5ML SUSP Take 15 mLs by mouth every 6 hours as needed      famotidine (PEPCID) 20 MG tablet Take 20 mg by mouth 2 times daily as needed      Arformoterol Tartrate (BROVANA) 15 MCG/2ML NEBU Take 2 mLs by nebulization 2 times daily 1 dose inhale orally two times per day 120 mL 11    Multiple Vitamins-Minerals (PRESERVISION AREDS 2) CAPS Take 1 capsule by mouth daily 90 capsule 3    spironolactone (ALDACTONE) 25 MG tablet TAKE 1 TABLET BY MOUTH DAILY 90 tablet 3    Revefenacin 175 MCG/3ML SOLN Inhale 1 nebule into the lungs daily Dx COPD J44.3 30 vial 5    insulin glargine (LANTUS SOLOSTAR) 100 UNIT/ML injection pen INJECT SUBCUTANEOUSLY 18  UNITS NIGHTLY (Patient taking differently: No sig reported) 30 mL 3    ferrous gluconate (FERGON) 324 (38 Fe) MG tablet Take 1 tablet by mouth 2 times daily 180 tablet 3    metFORMIN (GLUCOPHAGE) 1000 MG tablet Take 1 tablet by mouth 2 times daily (with meals) 180 tablet 3    budesonide (PULMICORT) 0.5 MG/2ML nebulizer suspension USE 1 VIAL VIA NEBULIZER TWICE DAILY. RINSE MOUTH AFTER TREATMENT 360 mL 3    lisinopril (PRINIVIL;ZESTRIL) 2.5 MG tablet Take 1 tablet by mouth daily 90 tablet 3    docusate (COLACE, DULCOLAX) 100 MG CAPS Take 100 mg by mouth 2 times daily 180 capsule 3    vitamin D3 (CHOLECALCIFEROL) 10 MCG (400 UNIT) TABS tablet Take 1 tablet by mouth daily 400units 90 tablet 3    polyethylene glycol (MIRALAX) 17 g packet Take 17 g by mouth daily as needed for Constipation      acetaminophen (TYLENOL) 325 MG tablet Take 650 mg by mouth every 4 hours as needed for Pain or Fever       lidocaine (LMX) 4 % cream Apply topically every 6 hours as needed for Pain To groin      albuterol sulfate  (90 Base) MCG/ACT inhaler Inhale 2 puffs into the lungs every 6 hours as needed for Wheezing 1 Inhaler 3    OXYGEN Inhale 3 L into the lungs nightly Indications: Difficulty Breathing, Oxygen Therapy And prn through cpap      blood glucose test strips (ACCU-CHEK CHE PLUS) strip Accu-check Che Plus Test Strips (or brand per pt preference). Sig: test sugar BID. Dx: E11.8 200 strip 3    SOFT TOUCH LANCETS [facility] Lancet (or brand per pt preference) Use to check blood sugars 2 times daily. Dx: E11.8 200 each 3    [DISCONTINUED] Multiple Vitamins-Minerals (PRESERVISION AREDS 2) CAPS Take 1 capsule by mouth daily 90 capsule 3     No current facility-administered medications on file prior to visit.        REVIEW OF SYSTEMS    Constitutional: Denies night sweats, fatigue, weight loss/gain, fever, chills, malaise, loss of appetite   Head: Denies headache,  dizziness, loss of consciousness  Eye: Denies visual changes  Ears: Denies hearing loss, tinnitus  Mouth/throat: Denies ulceration, dental caries , dysphagia  Respiratory: Denies shortness of breath, cough, wheezing, dyspnea with exertion  Cardiovascular:+bilateral lower extremity edema Denies chest pain, palpitations  Gastrointestinal: Denies vomiting, constipation, diarrhea, abdominal pain   EDWARD: +suprapubic catheter Denies  hematuria  Musculoskeletal: Denies joint pain, stiffness,  Endocrine: Denies polyuria, polydipsia, cold or heat intolerance  Hematology: Denies easy brusing or bleeding, hx of clotting disorder  Dermatology: Denies skin rash, eczema, pruritis    Objective:      /72 (Site: Left Upper Arm, Position: Sitting, Cuff Size: Medium Adult)   Pulse 84   Temp 97.1 °F (36.2 °C) (Skin)   Resp 21   Ht 5' 7\" (1.702 m)   Wt 250 lb (113.4 kg)   BMI 39.16 kg/m²     Wt Readings from Last 3 Encounters:   10/25/21 250 lb (113.4 kg)   10/18/21 215 lb (97.5 kg)   09/30/21 210 lb (95.3 kg)       Immunization History   Administered Date(s) Administered    COVID-19, Pfizer, PF, 30mcg/0.3mL 01/22/2021, 02/12/2021, 10/12/2021    Influenza 10/31/2013    Influenza Virus Vaccine 11/01/2011, 11/05/2012, 11/11/2014, 09/29/2015    Influenza Whole 10/01/2010    Influenza, Quadv, 6 mo and older, IM, PF (Flulaval, Fluarix) 09/27/2018    Influenza, Quadv, IM, (6 mo and older Fluzone, Flulaval, Fluarix and 3 yrs and older Afluria) 11/17/2017    Influenza, Quadv, IM, PF (6 mo and older Fluzone, Flulaval, Fluarix, and 3 yrs and older Afluria) 10/01/2019    Influenza, Quadv, adjuvanted, 65 yrs +, IM, PF (Fluad) 10/19/2020    Influenza, Triv, 3 Years and older, IM (Afluria (5 yrs and older) 09/26/2016    Pneumococcal Conjugate 13-valent ([id number]) 12/19/2014    Pneumococcal Polysaccharide ([id number]) 01/01/2003    Td, unspecified formulation 06/08/2010    Zoster Live (Zostavax) 08/05/2014       PHYSICAL EXAM    Wt Readings from Last 3 Encounters:   10/25/21 250 lb (113.4 kg)   10/18/21 215 lb (97.5 kg)   09/30/21 210 lb (95.3 kg)       General:  Awake, alert, no apparent distress. Appears stated age  [de-identified]: conjuctivae are clear without exudate or hemorrhage, anicteric sclera, moist oral mucosa. Chest:  Respirations regular, non-labored. Chest rise and fall equal bilaterally. Lungs clear to auscultation throughout all fields  Cardiovascular:  RRR,S1S2, no murmur or gallop. Abdomen:  Soft, non tender to palpation. No CVA tenderness. Suprapubic catheter draining clear, yellow urine. Insertion site clean, no erythema or drainage  Neurological: Awake, alert  Psychiatric:  Appropriate mood and affect  Extremities: non-traumatic in appearance.    Skin:  Warm and dry    LABS       CBC:   Lab Results   Component Value Date    WBC 12.1 08/26/2021    HGB 10.1 08/26/2021    HCT 32.1 08/26/2021    MCV 86.3 08/26/2021     08/26/2021     BMP:   Lab Results   Component Value Date     08/26/2021    K 4.2 08/26/2021    K 4.0 08/23/2021    CL 96 08/26/2021    CO2 28 08/26/2021    PHOS 3.4 03/14/2019    BUN 9 08/26/2021    CREATININE 1.0 08/26/2021     PT/INR:   Lab Results   Component Value Date    INR 0.97 03/22/2021     Prealbumin: No results found for: PREALBUMIN  Albumin:  Lab Results   Component Value Date    LABALBU 3.7 08/03/2021    LABALBU 4.3 11/30/2011     Sed Rate:  Lab Results   Component Value Date    SEDRATE 8 10/28/2012     CRP:   Lab Results   Component Value Date    CRP 0.31 12/15/2015     Micro:   Lab Results   Component Value Date    BC No growth-preliminary No growth  07/10/2021      Hemoglobin A1C:   Lab Results   Component Value Date    LABA1C 5.9 09/16/2021       Assessment:     -Recurrent UTI    Patient Active Problem List   Diagnosis Code    Diabetes mellitus ([facility] E11.9    Hyperlipidemia E78.5    Coronary artery disease involving native coronary artery of native heart without angina pectoris I25.10    Chronic anemia D64.9    Acquired hypothyroidism E03.9    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 E29.1    Breast lump N63.0    BPH (benign prostatic hyperplasia) N40.0    ROBER on CPAP G47.33, Z99.89    Essential hypertension I10    Hx of deep venous thrombosis Z86.718    Pneumonia due to infectious organism J18.9    Shortness of breath R06.02    Chronic respiratory failure with hypoxia ([facility]) J96.11    Mixed hyperlipidemia E78.2    Chronic deep vein thrombosis (DVT) of right lower extremity ([facility]) I82.501    Anticoagulated on Coumadin Z79.01    Type 2 diabetes mellitus with complication, with long-term current use of insulin ([facility]) E11.8, Z79.4    Muscle weakness M62.81    Chronic bronchitis ([facility]) J42    COPD without exacerbation ([facility]) J44.9    Paroxysmal atrial fibrillation ([facility]) I48.0    Acute diastolic congestive heart failure ([facility]) I50.31    Paroxysmal atrial flutter ([facility]) I48.92    Urinary incontinence R32    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    Senile nuclear sclerosis H25.10    Puckering of macula, 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    Presence of intraocular lens Z96.1    Primary open-angle glaucoma 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    Chronic congestive heart failure with left ventricular diastolic dysfunction ([facility]) I50.32    Morbid obesity ([facility]) E66.01    Urinary retention R33.9    Urine retention R33.9    Abdominal pain R10.9    Altered mental status R41.82    Chronic indwelling Billy catheter Z97.8    Urinary tract infection without hematuria N39.0    Hypokalemia E87.6    Weakness of left lower extremity R29.898    Pill dysphagia R13.10    Hx of thyroid cancer Z85.850    Gross hematuria R31.0    Constipation K59.00    Acute urinary retention R33.8    COPD exacerbation ([facility]) J44.1    Acute on chronic respiratory failure with hypoxia and hypercapnia ([facility]) J96.21, J96.22    Acute on chronic diastolic congestive heart failure ([facility]) I50.33    Bilateral leg edema +2 now +1 R60.0    Community acquired bacterial pneumonia J15.9  Scrotal pain N50.82    Acute kidney injury superimposed on CKD (HCC) N17.9, N18.9    Catheter-associated urinary tract infection ([facility]) T83.511A, N39.0    Acute metabolic encephalopathy Y07.48    History of BPH Z87.438    First degree AV block I44.0    Hx of coronary artery disease Z86.79    Chronic back pain M54.9, G89.29    Suprapubic catheter dysfunction ([facility]) T83.010A         Plan:     Patient examined and evaluated    - history of recurrent UTI- Patient denies any symptoms of UTI at today's visit. He does voice concerns for ongoing pain at catheter insertion site, reportedly unchanged since it was inserted. Recommend discussing this with urology. Insertion site assessed, no signs of infection or skin breakdown noted. Continue site care per Urology orders.    -Signs and symptoms of infection reviewed with patient. Call clinic or seek emergency care should these occur. Follow up: As needed. Advised patient to call with any further needs or concerns. Plan of care reviewed with patient who agrees with plan as above. All questions and concerns addressed prior to completion of visit. Please see attached Discharge Instructions    Written patient dismissal instructions given to patient and signed by patient or POA.              Electronically signed by MAEGAN Rolon CNP on 10/25/2021 at 10:33 AM

## 2021-11-08 ENCOUNTER — NURSE ONLY (OUTPATIENT)
Dept: UROLOGY | Age: 86
End: 2021-11-08
Payer: MEDICARE

## 2021-11-08 DIAGNOSIS — R33.9 URINARY RETENTION: ICD-10-CM

## 2021-11-08 PROCEDURE — 51705 CHANGE OF BLADDER TUBE: CPT | Performed by: UROLOGY

## 2021-11-08 NOTE — PROGRESS NOTES
I have personally verified, reviewed, released, signed, authenticated, authorized, confirmed,finalized, and approved the actions of the [facility]. Patient has given me verbal consent to perform catheter placement  Yes    Does patient have latex allergy? No  Does patient have shellfish or betadine allergy? No      Following Dr. Brianna Webster of care. Changed 14 Fr suprapubic Catheter without difficulty. Once balloon was deflated, removed catheter without difficulty. Cleansed suprapubic opening with betadine swab. 14 Fr regular felipe was inserted using sterile water-soluble lubricant without difficulty. Dr Bryan Mcwilliasm flushed catheter with 75 cc water insuring return and inflated balloon with 10 ml of water. Felipe Catheter was hooked up to overnight bag with straps. Patient advised to make sure they do not pull on catheter. Pulling may cause pain and bleeding from sp site. Supplies were provided by office      Bard order given? No- if pt needs order we can give to him at next visit-seeing Dr Britta Hanley that day. Is pt continuing sp cath? Pt has hx of being difficult cath change so had Dr Bryan Mcwilliams flush just to make sure catheter in place.

## 2021-11-18 LAB
CHOLESTEROL, TOTAL: 186 MG/DL
CHOLESTEROL/HDL RATIO: ABNORMAL
HDLC SERPL-MCNC: 27 MG/DL (ref 35–70)
LDL CHOLESTEROL CALCULATED: 118 MG/DL (ref 0–160)
NONHDLC SERPL-MCNC: ABNORMAL MG/DL
TRIGL SERPL-MCNC: 206 MG/DL
VLDLC SERPL CALC-MCNC: 41 MG/DL

## 2021-11-22 ENCOUNTER — TELEPHONE (OUTPATIENT)
Dept: FAMILY MEDICINE CLINIC | Age: 86
End: 2021-11-22

## 2021-11-22 ENCOUNTER — OFFICE VISIT (OUTPATIENT)
Dept: CARDIOLOGY CLINIC | Age: 86
End: 2021-11-22
Payer: MEDICARE

## 2021-11-22 VITALS
HEART RATE: 77 BPM | DIASTOLIC BLOOD PRESSURE: 65 MMHG | HEIGHT: 67 IN | BODY MASS INDEX: 33.68 KG/M2 | SYSTOLIC BLOOD PRESSURE: 122 MMHG | WEIGHT: 214.6 LBS

## 2021-11-22 DIAGNOSIS — I50.32 CHRONIC CONGESTIVE HEART FAILURE WITH LEFT VENTRICULAR DIASTOLIC DYSFUNCTION (HCC): Primary | ICD-10-CM

## 2021-11-22 DIAGNOSIS — R60.0 BILATERAL LEG EDEMA: ICD-10-CM

## 2021-11-22 DIAGNOSIS — I48.0 PAROXYSMAL ATRIAL FIBRILLATION (HCC): ICD-10-CM

## 2021-11-22 DIAGNOSIS — I10 ESSENTIAL HYPERTENSION: ICD-10-CM

## 2021-11-22 DIAGNOSIS — E78.2 MIXED HYPERLIPIDEMIA: ICD-10-CM

## 2021-11-22 PROCEDURE — 99214 OFFICE O/P EST MOD 30 MIN: CPT | Performed by: INTERNAL MEDICINE

## 2021-11-22 PROCEDURE — G8427 DOCREV CUR MEDS BY ELIG CLIN: HCPCS | Performed by: INTERNAL MEDICINE

## 2021-11-22 PROCEDURE — 1036F TOBACCO NON-USER: CPT | Performed by: INTERNAL MEDICINE

## 2021-11-22 PROCEDURE — G8417 CALC BMI ABV UP PARAM F/U: HCPCS | Performed by: INTERNAL MEDICINE

## 2021-11-22 PROCEDURE — 1123F ACP DISCUSS/DSCN MKR DOCD: CPT | Performed by: INTERNAL MEDICINE

## 2021-11-22 PROCEDURE — 4040F PNEUMOC VAC/ADMIN/RCVD: CPT | Performed by: INTERNAL MEDICINE

## 2021-11-22 PROCEDURE — G8484 FLU IMMUNIZE NO ADMIN: HCPCS | Performed by: INTERNAL MEDICINE

## 2021-11-22 NOTE — PROGRESS NOTES
No chief complaint on file. originally  patient referred for cholesterol emboli behind the right eye. From Dr. Pacheco Mendiola:  Nonsustained ventricular tachycardia rate of  140 to 150 beats per minute composed of 29 beats, happened overnight at  32 minutes after midnight. No PVC, nothing after that, and the patient  admitted for acute COPD exacerbation.       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    Breast lump    BPH (benign prostatic hyperplasia)    ROBER on CPAP    Essential hypertension    Hx of deep venous thrombosis    Pneumonia due to infectious organism    Shortness of breath    Chronic respiratory failure with hypoxia (HCC)    Mixed hyperlipidemia    Chronic deep vein thrombosis (DVT) of right lower extremity (HCC)    Anticoagulated on Coumadin    Type 2 diabetes mellitus with complication, with long-term current use of insulin (HCC)    Muscle weakness    Chronic bronchitis (HCC)    COPD without exacerbation (HCC)    Paroxysmal atrial fibrillation (HCC)    Acute diastolic congestive heart failure (HCC)    Paroxysmal atrial flutter (HCC)    Urinary incontinence    Serous detachment of retinal pigment epithelium    Senile nuclear sclerosis    Puckering of macula, bilateral    Presence of intraocular lens    Primary open-angle glaucoma    Other chorioretinal scars, right eye    Nonexudative senile macular degeneration of retina    Chronic congestive heart failure with left ventricular diastolic dysfunction (HCC)    Morbid obesity ([address]    Urinary retention    Urine retention    Abdominal pain    Altered mental status    Chronic indwelling Painter catheter    Urinary tract infection without hematuria    Hypokalemia    Weakness of left lower extremity    Pill dysphagia    Hx of thyroid cancer    Gross hematuria    Constipation    Acute urinary retention    COPD exacerbation (HCC)    Acute on chronic respiratory failure with hypoxia and hypercapnia (HCC)    Acute on chronic diastolic congestive heart failure (HCC)    Bilateral leg edema +2 now +1    Community acquired bacterial pneumonia    Scrotal pain    Acute kidney injury superimposed on CKD ([address]    Catheter-associated urinary tract infection ([address]    Acute metabolic encephalopathy    History of BPH    First degree AV block    Hx of coronary artery disease    Chronic back pain    Suprapubic catheter dysfunction ([address]    History of recurrent UTI (urinary tract infection)       Past Surgical History:   Procedure Laterality Date   [facility]GERY  2006,2011, 2014    BRONCHOSCOPY N/A 8/29/2017    BRONCHOSCOPY AIRWAY ONLY performed by Cale Newsome MD [facility]    CYSTOSCOPY N/A 6/17/2021    CYSTOSCOPY SUPRAPUBIC TUBE PLACEMENT performed by Vicky Zendejas MD at [address] [facility] 8/12/2021    CYSTOSCOPY, ATTEMPTED SUPRAPUBIC TUBE PLACEMENT, ATTEMPTED PAINTER CATH performed by Brionna Jones MD at [facility] EKG 12-LEAD  11/10/2015         FRACTURE SURGERY      right hand    OTHER SURGICAL HISTORY      spinal epidurals    RETINAL DETACHMENT SURGERY      SPINE SURGERY  2004    Lumbar laminectomy    THYROIDECTOMY      TURP N/A 3/17/2020    CYSTOSCOPY WITH GREENLIGHT PHOTOVAPORIZATION OF PROSTATE performed by Vicky Zendejas MD at [facility] TURP N/A 3/24/2020    PLASMA BUTTON TURP WITH CLOT EVACUATION performed by Vicky Zendejas MD at [address] [facility]   Allergen Reactions    Latex Organization Meetings: Not on file    Marital Status: Not on file   Intimate Partner Violence:     Fear of Current or Ex-Partner: Not on file    Emotionally Abused: Not on file    Physically Abused: Not on file    Sexually Abused: Not on file   Housing Stability:     Unable to Pay for Housing in the Last Year: Not on file    Number of [place] in the Last Year: Not on file    Unstable Housing in the Last Year: Not on file       Current Outpatient Medications   Medication Sig Dispense Refill    bacitracin 500 UNIT/GM ointment Apply 500 Units topically 2 times daily Apply topically 2 times daily.  oxybutynin (DITROPAN XL) 10 MG extended release tablet Take 1 tablet by mouth every evening 30 tablet 2    furosemide (LASIX) 40 MG tablet Take 1 tablet by mouth daily 60 tablet 0    potassium chloride (KLOR-CON M) 10 MEQ extended release tablet Take 1 tablet by mouth daily TAKE 1 TABLETS BY MOUTH TWICE DAILY 180 tablet 0    levothyroxine (SYNTHROID) 175 MCG tablet TAKE 1 TABLET BY MOUTH  DAILY 30 tablet 0    lactobacillus (CULTURELLE) capsule Take 1 capsule by mouth 2 times daily (with meals) 30 capsule 0    SOFT TOUCH LANCETS MISC Lancet (or brand per pt preference) Use to check blood sugars 2 times daily. Dx: E11.8 200 each 3    Alcohol Swabs (ALCOHOL PREP) 70 % PADS Alcohol pads (brand per preference). Sig: use to test sugar twice per day.  Dx: E11.8 200 each 3    doxazosin (CARDURA) 4 MG tablet TAKE 1 TABLET BY MOUTH EVERY NIGHT 90 tablet 3    simethicone (MYLICON) 218 MG chewable tablet Take 125 mg by mouth every 6 hours as needed for Flatulence      famotidine (PEPCID) 20 MG tablet Take 20 mg by mouth 2 times daily as needed      Arformoterol Tartrate (BROVANA) 15 MCG/2ML NEBU Take 2 mLs by nebulization 2 times daily 1 dose inhale orally two times per day 120 mL 11    Multiple Vitamins-Minerals (PRESERVISION AREDS 2) CAPS Take 1 capsule by mouth daily 90 capsule 3    spironolactone (ALDACTONE) 25 MG tablet TAKE 1 TABLET BY MOUTH DAILY 90 tablet 3    Revefenacin 175 MCG/3ML SOLN Inhale 1 nebule into the lungs daily Dx COPD J44.3 30 vial 5    insulin glargine (LANTUS SOLOSTAR) 100 UNIT/ML injection pen INJECT SUBCUTANEOUSLY 18  UNITS NIGHTLY (Patient taking differently: No sig reported) 30 mL 3    ferrous gluconate (FERGON) 324 (38 Fe) MG tablet Take 1 tablet by mouth 2 times daily 180 tablet 3    metFORMIN (GLUCOPHAGE) 1000 MG tablet Take 1 tablet by mouth 2 times daily (with meals) 180 tablet 3    lisinopril (PRINIVIL;ZESTRIL) 2.5 MG tablet Take 1 tablet by mouth daily 90 tablet 3    docusate (COLACE, DULCOLAX) 100 MG CAPS Take 100 mg by mouth 2 times daily 180 capsule 3    polyethylene glycol (MIRALAX) 17 g packet Take 17 g by mouth daily as needed for Constipation      acetaminophen (TYLENOL) 325 MG tablet Take 650 mg by mouth every 4 hours as needed for Pain or Fever       lidocaine (LMX) 4 % cream Apply topically every 6 hours as needed for Pain To groin      albuterol sulfate  (90 Base) MCG/ACT inhaler Inhale 2 puffs into the lungs every 6 hours as needed for Wheezing 1 Inhaler 3    OXYGEN Inhale 3 L into the lungs nightly Indications: Difficulty Breathing, Oxygen Therapy And prn through cpap      blood glucose test strips (ACCU-CHEK CHE PLUS) strip Accu-check Che Plus Test Strips (or brand per pt preference). Sig: test sugar BID. Dx: E11.8 200 strip 3    aluminum & magnesium hydroxide-simethicone (MYLANTA) 400-400-40 MG/5ML SUSP Take 15 mLs by mouth every 6 hours as needed      budesonide (PULMICORT) 0.5 MG/2ML nebulizer suspension USE 1 VIAL VIA NEBULIZER TWICE DAILY. RINSE MOUTH AFTER TREATMENT 360 mL 3    vitamin D3 (CHOLECALCIFEROL) 10 MCG (400 UNIT) TABS tablet Take 1 tablet by mouth daily 400units 90 tablet 3     No current facility-administered medications for this visit.        Review of Systems -     General ROS: negative  Psychological ROS: negative  Hematological and Lymphatic ROS: No history of blood clots or bleeding disorder. Respiratory ROS: no cough,  or wheezing, the rest see HPI  Cardiovascular ROS: See HPI  Gastrointestinal ROS: negative  Genito-Urinary ROS: no dysuria, trouble voiding, or hematuria  Musculoskeletal ROS: negative  Neurological ROS: no TIA or stroke symptoms  Dermatological ROS: negative      Blood pressure 122/65, pulse 77, height 5' 7\" (1.702 m), weight 214 lb 9.6 oz (97.3 kg). Physical Examination:    General appearance - alert, well appearing, and in no distress  HEENT- Pink conjunctiva  , Non-icteri sclera,PERRLA  Mental status - alert, oriented to person, place, and time  Neck - supple, no significant adenopathy, no JVD, or carotid bruits  Chest - clear to auscultation, no wheezes, rales or rhonchi, symmetric air entry  Heart - normal rate, regular rhythm, normal S1, S2, no murmurs, rubs, clicks or gallops  Abdomen - soft, nontender, nondistended, no masses or organomegaly  EDWARD- no CVA or flank tenderness, no suprapubic tenderness  Neurological - alert, oriented, normal speech, no focal findings or movement disorder noted  Musculoskeletal/limbs - no joint tenderness, deformity or swelling   - peripheral pulses normal, no pedal edema, no clubbing or cyanosis  Skin - normal coloration and turgor, no rashes, no suspicious skin lesions noted  Psych- appropriate mood and affect    Lab  No results for input(s): CKTOTAL, CKMB, CKMBINDEX, TROPONINI in the last 72 hours.   CBC:   Lab Results   Component Value Date    WBC 12.1 08/26/2021    RBC 3.72 08/26/2021    HGB 10.1 08/26/2021    HCT 32.1 08/26/2021    MCV 86.3 08/26/2021    MCH 27.2 08/26/2021    MCHC 31.5 08/26/2021    RDW 14.6 05/02/2018     08/26/2021    MPV 9.5 08/26/2021     BMP:    Lab Results   Component Value Date     08/26/2021    K 4.2 08/26/2021    K 4.0 08/23/2021    CL 96 08/26/2021    CO2 28 08/26/2021    BUN 9 08/26/2021    LABALBU 3.7 08/03/2021    LABALBU 4.3 11/30/2011    CREATININE 1.0 08/26/2021    CALCIUM 9.1 08/26/2021    LABGLOM 70 08/26/2021    GLUCOSE 121 08/26/2021    GLUCOSE 130 11/30/2011     Hepatic Function Panel:    Lab Results   Component Value Date    ALKPHOS 68 08/03/2021    ALT 6 08/03/2021    AST 9 08/03/2021    PROT 6.0 08/03/2021    PROT 6.9 02/05/2011    BILITOT 0.2 08/03/2021    BILIDIR <0.2 05/20/2021    LABALBU 3.7 08/03/2021    LABALBU 4.3 11/30/2011     Magnesium:    Lab Results   Component Value Date    MG 1.9 07/11/2021     Warfarin PT/INR:  No components found for: PTPATWAR, PTINRWAR  HgBA1c:    Lab Results   Component Value Date    LABA1C 5.9 09/16/2021     FLP:    Lab Results   Component Value Date    TRIG 241 07/09/2020    HDL 34 07/09/2020    HDL 33 11/30/2011    LDLCALC 103 07/09/2020    LDLDIRECT 69.29 10/10/2018     TSH:    Lab Results   Component Value Date    TSH 0.250 07/14/2021     Conclusions      Summary   Technically difficult examination.   This is a suboptimal study due to poor echocardiographic window.   Doppler parameters were consistent with abnormal left ventricular   relaxation (grade 1 diastolic dysfunction).   Left ventricle size is normal.   Normal left ventricular wall thickness.   Ejection fraction is visually estimated at 55%.   Unable to determine wall motion abnormalities due to poor image quality.   Doppler parameters were consistent with abnormal left ventricular   relaxation (grade 1 diastolic dysfunction).      Signature      ----------------------------------------------------------------   Electronically signed by uSsanne Bowen MD (Interpreting   physician) on 03/19/2019 at 05:35 AM    Conclusions      Summary   Normal left ventricle size and systolic function. Ejection fraction was   estimated at 55 %. There were no regional left ventricular wall motion   abnormalities and wall thickness was within normal limits.    Doppler parameters were consistent with abnormal left ventricular   relaxation (grade 1 diastolic dysfunction). Signature      ----------------------------------------------------------------   Electronically signed by Andrew Gay MD (Interpreting   physician) on 06/14/2021 at 06:10 PM     Conclusions      Summary   Lexiscan EKG stress test is not suggestive for ischemia. The nuclear images is not suggestive for myocardial ischemia. Signatures      ----------------------------------------------------------------   Electronically signed by Andrew Gay MD (Interpreting   Cardiologist) on 06/14/2021 at 19:16    ekg 11/22/21  Sinus rhythm with 1st degree A-V block Septal infarct (cited on or before 25-MAR-2016) Abnormal ECG When compared with ECG of 15-JUL-2021 13:39, Sinus rhythm has replaced Junctional rhythm Nonspecific T wave abnormality no longer evident in Inferior leads Nonspecific T wave abnormality no longer evident in Lateral leads    Assessment  No known CAD     Diagnosis Orders   1. Chronic congestive heart failure with left ventricular diastolic dysfunction (HCC)     2. Paroxysmal atrial fibrillation ([address]     3. Essential hypertension     4. Mixed hyperlipidemia     5. Bilateral leg edema +2 now +1           ASSESSMENT:  1. Basically acute hypercapnic respiratory failure. 2.  Acute COPD exacerbation. 3.  Acute diastolic congestive heart failure exacerbation. 4.  Nonsustained ventricular tachycardia composed of 29 beats at a rate  of 140 to 150 beats per minute range, happened at 32 minutes after  midnight. 5.  Hypertension. 6.  Hyperlipidemia. 7.  Obesity. 8.  COPD, on home oxygen. 9.  History of DVT for which he is on Coumadin. 10.  Hyperlipidemia. 11.  Hypertension. Plan   The current  meds and labs reviewed    Continue the current treatment and with constant vigilance to changes in symptoms and also any potential side effects.   Return for care or seek medical attention immediately if symptoms got worse and/or develop new symptoms. Hyperlipidemia:  off statins,   No sure why pat off statin  Mónica Jacobs does not remember    Congestive heart failure: -ve evidence of fluid overload today, no recent hospitalization for CHF  Wt gain  Sob and leg edema  Trace to +1- Better  Cont  lasix 40 po qd  Cont kcl 10 po qd  Cont  aldsactone 25 po qd      Hypertension, on medical treatment. Seems to be under good control. Patient is compliant with medical treatment. Cont  cardua to 4  mg po qd    Pt had suprapubic catheter    Hx of cholesterol emboli   Cont asa,    PAF   Had been off lipitor 80 and coumadin since  Early 2020  Consider to resume coumadin when urology gave the okay and if family agree  Hx of hematuria intermittent via indewelling cathater    D/w the pat the plan of care    Recent hospital record reviewed      Overall pat is Stable  And doing well    Discussed use, benefit, and side effects of prescribed medications. All patient questions answered. Pt voiced understanding. Instructed to continue current medications, diet and exercise. Continue risk factor modification and medical management. Patient agreed with treatment plan. Follow up as directed.       RTC in 4 months     Pedro Loredo [facility]

## 2021-11-23 NOTE — TELEPHONE ENCOUNTER
Spoke with Michelle at [employer]. She will check with patient regarding statin therapy and let our office know his response either by phone or fax.

## 2021-11-23 NOTE — TELEPHONE ENCOUNTER
BMP ok. LFT's ok, except decreased TP and Albumin- likely related to nutritional status- encourage protein rich diet at this time. FLP ok, except TG elevated at 206, HDL low at 27, and LDL above goal at 118- previous Lipitor now on hold due to increased risk of myalgias- please confirm pt still does not want to try statins at this time. Follow up as scheduled.   ES

## 2021-11-24 ENCOUNTER — TELEPHONE (OUTPATIENT)
Dept: FAMILY MEDICINE CLINIC | Age: 86
End: 2021-11-24

## 2021-11-24 NOTE — TELEPHONE ENCOUNTER
Doc Finnegan from [employer] called in. She said that at this time the pt does not wasn't to start any new medication or treatments.

## 2021-11-24 NOTE — TELEPHONE ENCOUNTER
Discussion noted. This is in response to discussion if pt wants to start statin due to elevated cholesterol values. Will hold statin , based on pt wishes.   ES

## 2021-12-06 ENCOUNTER — OFFICE VISIT (OUTPATIENT)
Dept: UROLOGY | Age: 86
End: 2021-12-06
Payer: MEDICARE

## 2021-12-06 VITALS
WEIGHT: 210 LBS | DIASTOLIC BLOOD PRESSURE: 52 MMHG | SYSTOLIC BLOOD PRESSURE: 122 MMHG | HEIGHT: 67 IN | BODY MASS INDEX: 32.96 KG/M2

## 2021-12-06 DIAGNOSIS — N31.9 NEUROGENIC BLADDER: Primary | ICD-10-CM

## 2021-12-06 DIAGNOSIS — R33.9 URINE RETENTION: ICD-10-CM

## 2021-12-06 PROCEDURE — 1123F ACP DISCUSS/DSCN MKR DOCD: CPT | Performed by: UROLOGY

## 2021-12-06 PROCEDURE — 99213 OFFICE O/P EST LOW 20 MIN: CPT | Performed by: UROLOGY

## 2021-12-06 PROCEDURE — 4040F PNEUMOC VAC/ADMIN/RCVD: CPT | Performed by: UROLOGY

## 2021-12-06 PROCEDURE — G8417 CALC BMI ABV UP PARAM F/U: HCPCS | Performed by: UROLOGY

## 2021-12-06 PROCEDURE — 1036F TOBACCO NON-USER: CPT | Performed by: UROLOGY

## 2021-12-06 PROCEDURE — 51705 CHANGE OF BLADDER TUBE: CPT | Performed by: UROLOGY

## 2021-12-06 PROCEDURE — G8484 FLU IMMUNIZE NO ADMIN: HCPCS | Performed by: UROLOGY

## 2021-12-06 PROCEDURE — G8427 DOCREV CUR MEDS BY ELIG CLIN: HCPCS | Performed by: UROLOGY

## 2021-12-06 NOTE — PROGRESS NOTES
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    Sleep apnea 1993    on BiPap, Dr Iva Boxer Testicular hypofunction     Thyroid cancer [facility])     s/p thyroid resection    Urinary incontinence      Past Surgical History:   Procedure Laterality Date   [facility] SURGERY  2006,2011, 2014    BRONCHOSCOPY N/A 8/29/2017    BRONCHOSCOPY AIRWAY ONLY performed by Ankur Mosquera MD at [address] [facility] [address] N/A 6/17/2021    CYSTOSCOPY SUPRAPUBIC TUBE PLACEMENT performed by Vika Messina MD at [address] [place] [address] 8/12/2021    CYSTOSCOPY, ATTEMPTED SUPRAPUBIC TUBE PLACEMENT, ATTEMPTED PAINTER CATH performed by Ede Parry MD at [address] [place] [address] EKG 12-LEAD  11/10/2015         FRACTURE SURGERY      right hand    OTHER SURGICAL HISTORY      spinal epidurals    RETINAL DETACHMENT SURGERY      SPINE SURGERY  2004    Lumbar laminectomy    THYROIDECTOMY      TURP N/A 3/17/2020    CYSTOSCOPY WITH GREENLIGHT PHOTOVAPORIZATION OF PROSTATE performed by Vika Messina MD at [address] [place] [address] TURP N/A 3/24/2020    PLASMA BUTTON TURP WITH CLOT EVACUATION performed by Vika Messina MD at [address] [place] [address] History   Problem Relation Age of Onset    Other Mother         Complications of Childbirth    Other Father [de-identified]        Natural causes     No outpatient medications have been marked as taking for the 12/6/21 encounter (Office Visit) with Vika Messina MD.       Latex, Macrobid [nitrofurantoin monohyd macro], Levaquin [levofloxacin], Brimonidine tartrate, Adhesive tape, and Alphagan [brimonidine tartrate]  Social History     Tobacco Use   Smoking Status Never Smoker   Smokeless Tobacco Never Used      (If patient a smoker, smoking cessation counseling offered)   Social History     Substance and Sexual Activity Alcohol Use Yes    Alcohol/week: 1.0 standard drink    Types: 1 Glasses of wine per week    Comment: Glass of wine with dinner. REVIEW OF SYSTEMS:  Constitutional: negative  Eyes: negative  Respiratory: negative  Cardiovascular: negative  Gastrointestinal: negative  Genitourinary: see HPI  Musculoskeletal: negative  Skin: negative   Neurological: negative  Hematological/Lymphatic: negative  Psychological: negative      Physical Exam:    This a 80 y.o. male  Vitals:    12/06/21 1303   BP: (!) 122/52     Body mass index is 32.89 kg/m². Constitutional: Patient in no acute distress;       Assessment and Plan        1. Neurogenic bladder    2. Urine retention               Plan:       Recurrent uti- no new infections. 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 Orders Placed:  No orders of the defined types were placed in this encounter.            Ann Lucio MD

## 2021-12-15 ENCOUNTER — NURSE TRIAGE (OUTPATIENT)
Dept: OTHER | Facility: CLINIC | Age: 86
End: 2021-12-15

## 2021-12-15 NOTE — TELEPHONE ENCOUNTER
Received call from Bobo at [facility] with [employer]. Brief description of triage: pt is becoming increasingly confused and agitated over last 2 weeks. Worse in evening but has been constant. Triage indicates for patient to go to PCP office today or tomorrow. Spoke with nurse Tamanna Stewart from assisted living facility where Mr. Natalia Bautista lives. She states that transport is only available on Mondays and needs to get an appointment for that day. Care advice provided, patient verbalizes understanding; denies any other questions or concerns; instructed to call back for any new or worsening symptoms. Writer provided warm transfer to Chelsie Ruelas at [facility] for appointment scheduling. Attention Provider: Thank you for allowing me to participate in the care of your patient. The patient was connected to triage in response to information provided to the ECC/PSC. Please do not respond through this encounter as the response is not directed to a shared pool. Reason for Disposition   Longstanding confusion (e.g., dementia, stroke) and worsening    Answer Assessment - Initial Assessment Questions  1. LEVEL OF CONSCIOUSNESS: \"How is he (she, the patient) acting right now? \" (e.g., alert-oriented, confused, lethargic, stuporous, comatose)     Very confused, delirious, agitated. When watching tv it becomes reality. Thinks he is being held hostage    2. ONSET: \"When did the confusion start? \"  (minutes, hours, days)      Months but has gotten worse in last 2 weeks    3. PATTERN \"Does this come and go, or has it been constant since it started? \"  \"Is it present now? \"      Worse in evening but pretty constant for last 2 weeks    4. ALCOHOL or DRUGS: \"Has he been drinking alcohol or taking any drugs? \"       NA    5. NARCOTIC MEDICATIONS: \"Has he been receiving any narcotic medications? \" (e.g., morphine, Vicodin)      NA    6. CAUSE: \"What do you think is causing the confusion? \"      UTI, possibly undiagnosed dementia? Pt is refusing to eat meals sometimes goes a day without eating    7. OTHER SYMPTOMS: Ethan Cower there any other symptoms? \" (e.g., difficulty breathing, headache, fever, weakness)      Denies    Protocols used: CONFUSION - DELIRIUM-ADULT-OH

## 2021-12-20 ENCOUNTER — OFFICE VISIT (OUTPATIENT)
Dept: FAMILY MEDICINE CLINIC | Age: 86
End: 2021-12-20
Payer: MEDICARE

## 2021-12-20 VITALS
SYSTOLIC BLOOD PRESSURE: 98 MMHG | HEART RATE: 80 BPM | RESPIRATION RATE: 20 BRPM | OXYGEN SATURATION: 97 % | DIASTOLIC BLOOD PRESSURE: 46 MMHG | TEMPERATURE: 97.6 F

## 2021-12-20 DIAGNOSIS — N39.0 URINARY TRACT INFECTION ASSOCIATED WITH INDWELLING URETHRAL CATHETER, SEQUELA: ICD-10-CM

## 2021-12-20 DIAGNOSIS — Z91.81 AT HIGH RISK FOR FALLS: ICD-10-CM

## 2021-12-20 DIAGNOSIS — T83.511S URINARY TRACT INFECTION ASSOCIATED WITH INDWELLING URETHRAL CATHETER, SEQUELA: ICD-10-CM

## 2021-12-20 DIAGNOSIS — Z93.59 PRESENCE OF SUPRAPUBIC CATHETER (HCC): ICD-10-CM

## 2021-12-20 DIAGNOSIS — L03.311 CELLULITIS OF ABDOMINAL WALL: Primary | ICD-10-CM

## 2021-12-20 PROCEDURE — G8484 FLU IMMUNIZE NO ADMIN: HCPCS | Performed by: NURSE PRACTITIONER

## 2021-12-20 PROCEDURE — 1123F ACP DISCUSS/DSCN MKR DOCD: CPT | Performed by: NURSE PRACTITIONER

## 2021-12-20 PROCEDURE — G8427 DOCREV CUR MEDS BY ELIG CLIN: HCPCS | Performed by: NURSE PRACTITIONER

## 2021-12-20 PROCEDURE — G8417 CALC BMI ABV UP PARAM F/U: HCPCS | Performed by: NURSE PRACTITIONER

## 2021-12-20 PROCEDURE — 1036F TOBACCO NON-USER: CPT | Performed by: NURSE PRACTITIONER

## 2021-12-20 PROCEDURE — 99214 OFFICE O/P EST MOD 30 MIN: CPT | Performed by: NURSE PRACTITIONER

## 2021-12-20 PROCEDURE — 4040F PNEUMOC VAC/ADMIN/RCVD: CPT | Performed by: NURSE PRACTITIONER

## 2021-12-20 RX ORDER — OXYBUTYNIN CHLORIDE 5 MG/1
TABLET ORAL
Status: ON HOLD | COMMUNITY
End: 2022-03-05 | Stop reason: HOSPADM

## 2021-12-20 RX ORDER — SULFAMETHOXAZOLE AND TRIMETHOPRIM 800; 160 MG/1; MG/1
1 TABLET ORAL 2 TIMES DAILY
Qty: 20 TABLET | Refills: 0 | Status: SHIPPED | OUTPATIENT
Start: 2021-12-20 | End: 2022-01-03

## 2021-12-20 ASSESSMENT — ENCOUNTER SYMPTOMS
BLOOD IN STOOL: 0
ABDOMINAL PAIN: 1
SINUS PAIN: 0
BELCHING: 0
SORE THROAT: 0
EYE PAIN: 0
TROUBLE SWALLOWING: 0
SHORTNESS OF BREATH: 0
WHEEZING: 0
COLOR CHANGE: 0
BACK PAIN: 0
FACIAL SWELLING: 0
NAUSEA: 0
VOMITING: 0
COUGH: 0
CONSTIPATION: 0
DIARRHEA: 0
FLATUS: 0

## 2021-12-20 NOTE — PROGRESS NOTES
NO  Suicidal/ Homicidal Ideations- NO  Anxiety-OK    Review of Systems   Constitutional: Negative for chills, fatigue and fever. HENT: Negative for congestion, facial swelling, sinus pain, sore throat and trouble swallowing. Eyes: Negative for pain and visual disturbance. Respiratory: Negative for cough, shortness of breath and wheezing. Cardiovascular: Negative for chest pain and palpitations. Gastrointestinal: Positive for abdominal pain. Negative for blood in stool, constipation, diarrhea, flatus, nausea and vomiting. Genitourinary: Negative for difficulty urinating, dysuria, frequency, hematuria and urgency. Musculoskeletal: Negative for arthralgias, back pain, gait problem and neck pain. Skin: Negative for color change and rash. Neurological: Negative for dizziness, weakness and headaches. Psychiatric/Behavioral: Positive for dysphoric mood. Negative for agitation, self-injury, sleep disturbance and suicidal ideas. The patient is not nervous/anxious. Prior to Visit Medications    Medication Sig Taking? Authorizing Provider   sulfamethoxazole-trimethoprim (BACTRIM DS;SEPTRA DS) 800-160 MG per tablet Take 1 tablet by mouth 2 times daily for 10 days Yes Ольга Olmedo APRN - CNP   oxybutynin (DITROPAN XL) 10 MG extended release tablet Take 1 tablet by mouth every evening Yes Ed Patrick MD   furosemide (LASIX) 40 MG tablet Take 1 tablet by mouth daily Yes Ace Ovalle MD   lactobacillus (CULTURELLE) capsule Take 1 capsule by mouth 2 times daily (with meals) Yes Ace Ovalle MD   Alcohol Swabs (ALCOHOL PREP) 70 % PADS Alcohol pads (brand per preference). Sig: use to test sugar twice per day.  Dx: E11.8 Yes Cisco Birmingham APRN - CNP   doxazosin (CARDURA) 4 MG tablet TAKE 1 TABLET BY MOUTH EVERY NIGHT Yes Lisset Schultz MD   Arformoterol Tartrate (BROVANA) 15 MCG/2ML NEBU Take 2 mLs by nebulization 2 times daily 1 dose inhale orally two times per day Yes Bhakti ANDERSON MD Dee   spironolactone (ALDACTONE) 25 MG tablet TAKE 1 TABLET BY MOUTH DAILY Yes Cristo Rodriguez MD   insulin glargine (LANTUS SOLOSTAR) 100 UNIT/ML injection pen INJECT SUBCUTANEOUSLY 18  UNITS NIGHTLY  Patient taking differently: No sig reported Yes Ailyn Gamez MD   ferrous gluconate (FERGON) 324 (38 Fe) MG tablet Take 1 tablet by mouth 2 times daily Yes Ailyn Gamez MD   metFORMIN (GLUCOPHAGE) 1000 MG tablet Take 1 tablet by mouth 2 times daily (with meals) Yes Ailyn Gamez MD   lisinopril (PRINIVIL;ZESTRIL) 2.5 MG tablet Take 1 tablet by mouth daily Yes Ailyn Gamez MD   docusate (COLACE, DULCOLAX) 100 MG CAPS Take 100 mg by mouth 2 times daily Yes Ailny Gamez MD   polyethylene glycol (MIRALAX) 17 g packet Take 17 g by mouth daily as needed for Constipation Yes Historical Provider, MD   acetaminophen (TYLENOL) 325 MG tablet Take 650 mg by mouth every 4 hours as needed for Pain or Fever  Yes Historical Provider, MD   lidocaine (LMX) 4 % cream Apply topically every 6 hours as needed for Pain To groin Yes Historical Provider, MD   albuterol sulfate  (90 Base) MCG/ACT inhaler Inhale 2 puffs into the lungs every 6 hours as needed for Wheezing Yes Dimas Farrar PA-C   bacitracin 500 UNIT/GM ointment Apply 500 Units topically 2 times daily CATHETER CARE  Historical Provider, MD   potassium chloride (KLOR-CON M) 10 MEQ extended release tablet Take 1 tablet by mouth daily TAKE 1 TABLETS BY MOUTH TWICE DAILY  Yoseph Zaragoza MD   levothyroxine (SYNTHROID) 175 MCG tablet TAKE 1 TABLET BY MOUTH  DAILY  Yoseph Zaragoza MD   blood glucose test strips (ACCU-CHEK MERYL PLUS) strip Accu-check Meryl Plus Test Strips (or brand per pt preference). Sig: test sugar BID. Dx: E11.8  MAEGAN Patterson CNP   SOFT TOUCH LANCETS MISC Lancet (or brand per pt preference) Use to check blood sugars 2 times daily.  Dx: E11.8  Audra Favre, APRN - CNP   simethicone (MYLICON) 478 MG chewable tablet Take 125 mg by mouth every 6 hours as needed for Flatulence  Historical Provider, MD   aluminum & magnesium hydroxide-simethicone (MYLANTA) 400-400-40 MG/5ML SUSP Take 15 mLs by mouth every 6 hours as needed  Historical Provider, MD   famotidine (PEPCID) 20 MG tablet Take 20 mg by mouth 2 times daily as needed  Historical Provider, MD   Multiple Vitamins-Minerals (PRESERVISION AREDS 2) CAPS Take 1 capsule by mouth daily  Deo Alcala MD   Revefenacin 175 MCG/3ML SOLN Inhale 1 nebule into the lungs daily Dx COPD J44.3  Savita Pena PA-C   budesonide (PULMICORT) 0.5 MG/2ML nebulizer suspension USE 1 VIAL VIA NEBULIZER TWICE DAILY. RINSE MOUTH AFTER TREATMENT  Deo Alcala MD   Multiple Vitamins-Minerals (PRESERVISION AREDS 2) CAPS Take 1 capsule by mouth daily  Deo Alcala MD   vitamin D3 (CHOLECALCIFEROL) 10 MCG (400 UNIT) TABS tablet Take 1 tablet by mouth daily 400units  Deo Alcala MD   OXYGEN Inhale 3 L into the lungs nightly Indications: Difficulty Breathing, Oxygen Therapy And prn through cpap  Historical Provider, MD        Social History     Tobacco Use    Smoking status: Never Smoker    Smokeless tobacco: Never Used   Substance Use Topics    Alcohol use: Yes     Alcohol/week: 1.0 standard drink     Types: 1 Glasses of wine per week     Comment: Glass of wine with dinner. Vitals:    12/20/21 1305   BP: (!) 98/46   Site: Left Upper Arm   Pulse: 80   Resp: 20   Temp: 97.6 °F (36.4 °C)   TempSrc: Oral   SpO2: 97%     Estimated body mass index is 32.89 kg/m² as calculated from the following:    Height as of 12/6/21: 5' 7\" (1.702 m). Weight as of 12/6/21: 210 lb (95.3 kg). Physical Exam  Vitals reviewed. Constitutional:       General: He is not in acute distress. Appearance: Normal appearance. He is well-developed. HENT:      Head: Normocephalic and atraumatic.       Right Ear: Hearing normal.      Left Ear: Hearing normal.      Nose: Nose normal. No nasal tenderness. Mouth/Throat:      Lips: Pink. Mouth: Mucous membranes are moist. No oral lesions. Pharynx: Oropharynx is clear. Uvula midline. Eyes:      General:         Right eye: No discharge. Left eye: No discharge. Conjunctiva/sclera: Conjunctivae normal.   Neck:      Trachea: No tracheal deviation. Cardiovascular:      Rate and Rhythm: Normal rate and regular rhythm. Heart sounds: Normal heart sounds. No murmur heard. Pulmonary:      Effort: Pulmonary effort is normal. No respiratory distress. Breath sounds: Normal breath sounds. Abdominal:      General: Bowel sounds are normal.      Palpations: Abdomen is soft. Tenderness: There is no abdominal tenderness. Genitourinary:     Penis: Normal.        Musculoskeletal:      Cervical back: Full passive range of motion without pain. Lymphadenopathy:      Head:      Right side of head: No submental, submandibular, tonsillar, preauricular, posterior auricular or occipital adenopathy. Left side of head: No submental, submandibular, tonsillar, preauricular, posterior auricular or occipital adenopathy. Skin:     General: Skin is warm and dry. Findings: No rash. Neurological:      General: No focal deficit present. Mental Status: He is alert and oriented to person, place, and time. Coordination: Coordination normal.   Psychiatric:         Behavior: Behavior normal.         Thought Content: Thought content normal.         Judgment: Judgment normal.         ASSESSMENT/PLAN:  1. At high risk for falls    2. Urinary tract infection associated with indwelling urethral catheter, sequela  - Comprehensive Metabolic Panel; Future  - CBC Auto Differential; Future  - Urine Rt Reflex To Culture; Future    3. Presence of suprapubic catheter (HCC)  - sulfamethoxazole-trimethoprim (BACTRIM DS;SEPTRA DS) 800-160 MG per tablet;  Take 1 tablet by mouth 2 times daily for 10 days  Dispense: 20 tablet; Refill: 0    4. Cellulitis of abdominal wall  - Comprehensive Metabolic Panel; Future  - CBC Auto Differential; Future  - sulfamethoxazole-trimethoprim (BACTRIM DS;SEPTRA DS) 800-160 MG per tablet; Take 1 tablet by mouth 2 times daily for 10 days  Dispense: 20 tablet; Refill: 0  - Culture, Aerobic and Anaerobic    - Discussed depression with pt. He knows that his feelings are from loss of independence and COVID related isolation. He is not interested in medications at this time and wants to work on his feelings on his own. - Will start Bactrim for possible UTI and cellulitis around suprapubic catheter site. Wound culture obtained and send to lab. Office will call and have urology follow up with pt as well. Pt verbalized understanding and will have labs completed ASAP.    - Call office with any questions or concerns, or if symptoms are getting worse or changing  - ER for any acute changes. Return if symptoms worsen or fail to improve. Patient given educational materials - see patient instructions. Discussed use, benefit, and side effects of prescribed medications. All patient questions answered. Pt voiced understanding. Reviewed health maintenance. An electronic signature was used to authenticate this note. --MAEGAN Pretty - CNP on 12/20/2021 at 2:45 PM    On the basis of positive falls risk screening, assessment and plan is as follows: home safety tips provided.

## 2021-12-20 NOTE — PROGRESS NOTES
Spoke to Manuel Perez from Dr. Ron Welsh office and she stated that as long as WS is treating the infection at the suprapubic catheter site and the presumed UTI they do not need to see him in the office until his scheduled appt on 1/10/22. If we need anything from that office after the culture result comes back we can call them for advice.

## 2021-12-20 NOTE — PATIENT INSTRUCTIONS
scrapes, or other injuries to your skin. Cellulitis most often occurs where there is a break in the skin. · If you get a scrape, cut, mild burn, or bite, wash the wound with clean water as soon as you can to help avoid infection. Don't use hydrogen peroxide or alcohol, which can slow healing. · If you have swelling in your legs (edema), support stockings and good skin care may help prevent leg sores and cellulitis. · Take care of your feet, especially if you have diabetes or other conditions that increase the risk of infection. Wear shoes and socks. Do not go barefoot. If you have athlete's foot or other skin problems on your feet, talk to your doctor about how to treat them. When should you call for help? Call your doctor now or seek immediate medical care if:    · You have signs that your infection is getting worse, such as:  ? Increased pain, swelling, warmth, or redness. ? Red streaks leading from the area. ? Pus draining from the area. ? A fever.     · You get a rash. Watch closely for changes in your health, and be sure to contact your doctor if:    · You do not get better as expected. Where can you learn more? Go to https://Echodio.[email or web address] org and sign in to your [employer] account. Enter B503 in the [facility] box to learn more about \"Cellulitis: Care Instructions. \"     If you do not have an account, please click on the \"Sign Up Now\" link. Current as of: March 3, 2021               Content Version: 13.0  © 2006-2021 Healthwise, Incorporated. Care instructions adapted under license by [facility] ([facility]). If you have questions about a medical condition or this instruction, always ask your healthcare professional. [place] [address] any warranty or liability for your use of this information.          Patient Education        Urinary Tract Infections (UTI) in Men: Care Instructions  Overview     A urinary tract infection, or UTI, is a term for an infection anywhere between the kidneys and the urethra. (The urethra is the tube that carries urine from the bladder to outside the body.) Most UTIs are bladder infections. They often cause pain or burning when you urinate. UTIs are caused by bacteria. This means they can be cured with antibiotics. Be sure to complete your treatment so that the infection does not get worse. Follow-up care is a key part of your treatment and safety. Be sure to make and go to all appointments, and call your doctor if you are having problems. It's also a good idea to know your test results and keep a list of the medicines you take. How can you care for yourself at home? · Take your antibiotics as prescribed. Do not stop taking them just because you feel better. You need to take the full course of antibiotics. · Take your medicines exactly as prescribed. Your doctor may have prescribed a medicine, such as phenazopyridine (Pyridium), to help relieve pain when you urinate. This turns your urine orange. You may stop taking it when your symptoms get better. But be sure to take all of your antibiotics, which treat the infection. · Drink extra water for the next day or two. This will help make the urine less concentrated and help wash out the bacteria causing the infection. (If you have kidney, heart, or liver disease and have to limit your fluids, talk with your doctor before you increase your fluid intake.)  · Avoid drinks that are carbonated or have caffeine. They can irritate the bladder. · Urinate often. Try to empty your bladder each time. · To relieve pain, take a hot bath or lay a heating pad (set on low) over your lower belly or genital area. Never go to sleep with a heating pad in place. To help prevent UTIs  · Drink plenty of fluids. If you have kidney, heart, or liver disease and have to limit fluids, talk with your doctor before you increase the amount of fluids you drink. · Urinate when you have the urge.  Do not hold your urine for a long time. Urinate before you go to sleep. · Keep your penis clean. Catheter care  If you have a drainage tube (catheter) in place, the following steps will help you care for it. · Always wash your hands before and after touching your catheter. · Check the area around the urethra for inflammation or signs of infection. Signs of infection include irritated, swollen, red, or tender skin, or pus around the catheter. · Clean the area around the catheter with soap and water two times a day. Dry with a clean towel afterward. · Do not apply powder or lotion to the skin around the catheter. To empty the urine collection bag   · Wash your hands with soap and water. · Without touching the drain spout, remove the spout from its sleeve at the bottom of the collection bag. Open the valve on the spout. · Let the urine flow out of the bag and into the toilet or a container. Do not let the tubing or drain spout touch anything. · After you empty the bag, clean the end of the drain spout with tissue and water. Close the valve and put the drain spout back into its sleeve at the bottom of the collection bag. · Wash your hands with soap and water. When should you call for help? Call your doctor now or seek immediate medical care if:    · Symptoms such as a fever, chills, nausea, or vomiting get worse or happen for the first time.     · You have new pain in your back just below your rib cage. This is called flank pain.     · There is new blood or pus in your urine.     · You are not able to take or keep down your antibiotics. Watch closely for changes in your health, and be sure to contact your doctor if:    · You are not getting better after taking an antibiotic for 2 days.     · Your symptoms go away but then come back. Where can you learn more? Go to [email or web address] [email or web address].Mattersight. org and sign in to your [employer] account.  Enter V158 in the [employer] box to learn more about \"Urinary Tract Infections (UTI) in Men: Care Instructions. \"     If you do not have an account, please click on the \"Sign Up Now\" link. Current as of: February 10, 2021               Content Version: 13.0  © 1185-1708 Healthwise, Incorporated. Care instructions adapted under license by [employer]. If you have questions about a medical condition or this instruction, always ask your healthcare professional. [address] any warranty or liability for your use of this information.

## 2021-12-23 LAB
AEROBIC CULTURE: NORMAL
ANAEROBIC CULTURE: NORMAL
GRAM STAIN RESULT: NORMAL

## 2021-12-27 ENCOUNTER — TELEPHONE (OUTPATIENT)
Dept: FAMILY MEDICINE CLINIC | Age: 86
End: 2021-12-27

## 2021-12-27 RX ORDER — CIPROFLOXACIN 500 MG/1
500 TABLET, FILM COATED ORAL 2 TIMES DAILY
Qty: 10 TABLET | Refills: 0 | Status: SHIPPED | OUTPATIENT
Start: 2021-12-27 | End: 2022-01-01

## 2021-12-27 NOTE — TELEPHONE ENCOUNTER
Nurse notified of Cipro she will place the order through their pharmacy. Nurse states that he has had increased confusion regardless of UTI symptoms. Nurse Ainsley Orona states weight loss from June 9th which was 249 lbs and  Most recent 12-8-21 208 lbs. Pt forgets to eat meals and refuses to eat when reminded, she states that he is hallucinating, trying to leave and currently lives in [place] [address] and they are having to constantly go to his room and regroup him and get him back to \"reality\". Could they trial namenda or aricept? Nurse Ainsley Orona states that son would be agreeable but Leon Callow would be confused regarding this. Nurse Ainsley Orona asking for some direction.

## 2021-12-27 NOTE — TELEPHONE ENCOUNTER
Please call pt and see how he is doing. His UTI is resistant to the bactrim that he is on, the only antibiotic that would work is Macrobid and pt is allergic to [facility]. We can change him to cipro 500 mg BID x 5 days, but this will only treat 1 of the organisms causing the UTI. Pt needs to follow up with Urology ASAP for further follow up.   Thanks -WS    Orders Placed This Encounter   Medications    ciprofloxacin (CIPRO) 500 MG tablet     Sig: Take 1 tablet by mouth 2 times daily for 5 days     Dispense:  10 tablet     Refill:  0

## 2021-12-27 NOTE — TELEPHONE ENCOUNTER
If patient having significant confusion, associated with UTI, he needs evaluated in ED, as he may need IV antibiotics to treat his infection. An infection could certainly cause mental status changes.   ES

## 2021-12-28 NOTE — TELEPHONE ENCOUNTER
Spoke to Kristina at [facility] and notified her of ES response and recommendations. She stated that she doesn't feel that the confusion is from the UTI as the pt has had the confusion x 6 months. She doesn't feel that this is an acute issue at this time but does agree he does need treatment for the current culture proven UTI. She spoke to the pts son and notified him of ES recommendation to be seen at the ED and he doesn't think this is necessary; he would like the pt to just be given a medication for his confusion because he always has a UTI and that is nothing new. Called [facility]/Infectious Disease ([phone]) and left a message on the nurse VM to see if they would see the pt since he is established. Will await call back from ID.

## 2021-12-29 NOTE — TELEPHONE ENCOUNTER
Attempted to call Infectious Disease again. I was able to reach someone, but she said that her line is only for new patients. She then transferred me to the nurse's line and I got voicemail. Since this is an urgent situation, I faxed a note to them. Fax number [phone]. Fax confirmation and note is scanned into pt's chart. I explained to them that the pt has been having an unusual altered mental status recently and we ran a urine an dit came back with a UTI. I explained the medication issues and that he was placed on Cipro. I also added ES recommendation about the ED and how pt's son denied doing that. Hoping to hear from them.

## 2021-12-30 ENCOUNTER — TELEPHONE (OUTPATIENT)
Dept: INFECTIOUS DISEASES | Age: 86
End: 2021-12-30

## 2021-12-30 NOTE — TELEPHONE ENCOUNTER
Spoke to [employer], CNP and she stated that she is willing to try and treat the UTI as outpatient. She will contact [address] [place] [address] and set up a time with them for the pt to be seen on 1/3/2022 for evaluation and treatment.

## 2021-12-30 NOTE — TELEPHONE ENCOUNTER
Called infectious disease again but was unable to speak to anyone. Called [facility] wound clinic at [address] and spoke to Odalys Quintana and notified her of the pts diagnosis and situation. She is going to get a message to [place], [facility] today and call us back with her recommendations. Will await call back.

## 2021-12-30 NOTE — TELEPHONE ENCOUNTER
Given patient has had ongoing issues with UTI and memory, would like to treat UTI first and see if memory/behavior issues improve. If no significant benefit with adequate treatment of UTI, will consider medication at that time.   ES

## 2021-12-30 NOTE — PROCEDURES
14 day continuous monitor wasn't returned to [employer].   Several attempts were made to patient and [address] [place] [address] to return monitor to company

## 2021-12-30 NOTE — TELEPHONE ENCOUNTER
Spoke to The Re at [facility]. Patient scheduled for 8:45 this coming Monday for evaluation of UTI.

## 2021-12-30 NOTE — TELEPHONE ENCOUNTER
----- Message from Veto Medel sent at 12/30/2021  8:45 AM EST -----  [id number] [facility] OFFICE. NEED SOME DIRECTION. ALL INFO IS FOUND UNDER THE MEDIA TAB 12/27/21. SALMA HAS AN INDWELLING CATHETER AND A UTI, CULTURE PROVEN, WITH 2 BACTERIA GROWING, ONE TREATABLE BY ANTIBIOTIC AND THE OTHER WITH IV. HE IS A [place], DEMENTIA, HOWEVER THEY DO NOT BELIEVE HIS MENTAL STATUS IS BEING CAUSED BY THE UTI.

## 2022-01-03 ENCOUNTER — OFFICE VISIT (OUTPATIENT)
Dept: INFECTIOUS DISEASES | Age: 87
End: 2022-01-03
Payer: MEDICARE

## 2022-01-03 VITALS
TEMPERATURE: 97.5 F | WEIGHT: 212 LBS | RESPIRATION RATE: 18 BRPM | SYSTOLIC BLOOD PRESSURE: 146 MMHG | HEIGHT: 67 IN | BODY MASS INDEX: 33.27 KG/M2 | DIASTOLIC BLOOD PRESSURE: 68 MMHG

## 2022-01-03 DIAGNOSIS — N39.0 UTI (URINARY TRACT INFECTION) DUE TO ENTEROCOCCUS: Primary | ICD-10-CM

## 2022-01-03 DIAGNOSIS — B95.2 UTI (URINARY TRACT INFECTION) DUE TO ENTEROCOCCUS: Primary | ICD-10-CM

## 2022-01-03 PROCEDURE — 99214 OFFICE O/P EST MOD 30 MIN: CPT | Performed by: NURSE PRACTITIONER

## 2022-01-03 RX ORDER — GRANULES FOR ORAL 3 G/1
3 POWDER ORAL ONCE
Qty: 1 EACH | Refills: 0 | Status: SHIPPED | OUTPATIENT
Start: 2022-01-03 | End: 2022-01-03

## 2022-01-03 NOTE — PATIENT INSTRUCTIONS
Visit Discharge/Physician Orders:    Fosfomycin ordered today     Keep next scheduled appointment. Please give 24 hour notice if unable to keep appointment. [phone]    If you experience any of the following, please call the Infectious Disease Clinic during business hours: [phone]    -Drink plenty of water, recommend 2-3 liters per day unless otherwise directed by your doctor. This helps to irrigate bladder and prevent urine from becoming stagnant.    -Limit foods and drinks that are known to be irritating to the bladder. In general, you will want to avoid coffee, alcohol, citrus fruits, tomato-based products, artificial sweeteners and spicy foods.   -Constipation can lead to signs and symptoms of urinary tract infection. Ensure adequate water and fiber intake in diet to help regulate bowels. Follow up with primary care physician for any ongoing constipation.  -Call clinic or go to emergency department with any worsening symptoms or new symptoms. If you need medical attention outside of business hours, please contact your Primary Care Doctor or go to the nearest emergency room.

## 2022-01-03 NOTE — PROGRESS NOTES
[facility] Infectious Disease         Consult Note      [address] RECORD NUMBER:  [id number]  AGE: 80 y.o. GENDER: male  : 1931  EPISODE DATE:  1/3/2022    Subjective:     Chief Complaint   Patient presents with    Other     Recurrent UTI/ Return patient       HISTORY of PRESENT ILLNESS HPI     Romina David is a 80 y.o. male established patient, who presents today for infectious disease evaluation. History of Infectious Disease Context:   Patient presents today for evaluation of recurrent UTI. Patient has history of urinary retention/neurogenic bladder with recurrent UTI. He underwent cystourethroscopy and suprapubic catheter placement on 2021 per Dr. Elder Kemp. He reports pain at the suprapubic catheter insertion site but he notes that this has been chronic since placement. Patient seen today due to concerns from PCP regarding increased confusion and positive urine culture. Was placed on Ciprofloxacin and referred here for treatment of enterococcus due to multi-drug resistance. Patient presents independently today, denies any confusion or difficulties with memory. He denies any fevers or chills. Notes good output from catheter. States appetite has been good but water intake is \"so-so\". He denies any further needs or concerns.     PAST MEDICAL HISTORY        Diagnosis Date    Anemia     Anxiety     BPH (benign prostatic hyperplasia)     CAD (coronary artery disease)     leaking valve    Chronic back pain     COPD (chronic obstructive pulmonary disease) ([address]     Detached retina     Diabetes mellitus (HCC)     NIDDM    DVT (deep venous thrombosis) ([facility])     RLE    DVT (deep venous thrombosis) ([facility] [address] 11/9/15    LLE    Dysphagia     Glaucoma     Hyperlipidemia     Hypertension     Macular degeneration     Mass of chest     benign chest mass, Dr Sarah Monroe    Movement disorder     spinal stenosis    Obesity     Osteoarthritis     right ankle, right hand    Paroxysmal atrial fibrillation (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 [address] [address] N/A 6/17/2021    CYSTOSCOPY SUPRAPUBIC TUBE PLACEMENT performed by Eleanor Guan MD at [address] 8/12/2021    CYSTOSCOPY, ATTEMPTED SUPRAPUBIC TUBE PLACEMENT, ATTEMPTED PAINTER CATH performed by Griselda Shadow, MD at [address] [place] [address] EKG 12-LEAD  11/10/2015         FRACTURE SURGERY      right hand    OTHER SURGICAL HISTORY      spinal epidurals    RETINAL DETACHMENT SURGERY      SPINE SURGERY  2004    Lumbar laminectomy    THYROIDECTOMY      TURP N/A 3/17/2020    CYSTOSCOPY WITH GREENLIGHT PHOTOVAPORIZATION OF PROSTATE performed by Eleanor Guan MD at [address] [place] [address] TURP N/A 3/24/2020    PLASMA BUTTON TURP WITH CLOT EVACUATION performed by Eleanor Guan MD at [address] [facility] [address]    Family History   Problem Relation Age of Onset    Other Mother         Complications of Childbirth    Other Father [de-identified]        Natural causes       SOCIAL HISTORY    Social History     Tobacco Use    Smoking status: Never Smoker    Smokeless tobacco: Never Used   Vaping Use    Vaping Use: Never used   Substance Use Topics    Alcohol use: Yes     Alcohol/week: 1.0 standard drink     Types: 1 Glasses of wine per week     Comment: Glass of wine with dinner.  Drug use: No       ALLERGIES    Allergies   Allergen Reactions    Latex Rash    Macrobid [Nitrofurantoin [place] Chroman      Dr. Ajith Zapata prefers pt not use Macrobid due to pulmonary side effects.     Levaquin [Levofloxacin] Other (See Comments)     Redness/flushing    Brimonidine Vitamins-Minerals (PRESERVISION AREDS 2) CAPS Take 1 capsule by mouth daily 90 capsule 3    spironolactone (ALDACTONE) 25 MG tablet TAKE 1 TABLET BY MOUTH DAILY 90 tablet 3    Revefenacin 175 MCG/3ML SOLN Inhale 1 nebule into the lungs daily Dx COPD J44.3 30 vial 5    insulin glargine (LANTUS SOLOSTAR) 100 UNIT/ML injection pen INJECT SUBCUTANEOUSLY 18  UNITS NIGHTLY (Patient taking differently: No sig reported) 30 mL 3    ferrous gluconate (FERGON) 324 (38 Fe) MG tablet Take 1 tablet by mouth 2 times daily 180 tablet 3    metFORMIN (GLUCOPHAGE) 1000 MG tablet Take 1 tablet by mouth 2 times daily (with meals) 180 tablet 3    budesonide (PULMICORT) 0.5 MG/2ML nebulizer suspension USE 1 VIAL VIA NEBULIZER TWICE DAILY. RINSE MOUTH AFTER TREATMENT 360 mL 3    [DISCONTINUED] Multiple Vitamins-Minerals (PRESERVISION AREDS 2) CAPS Take 1 capsule by mouth daily 90 capsule 3    lisinopril (PRINIVIL;ZESTRIL) 2.5 MG tablet Take 1 tablet by mouth daily 90 tablet 3    docusate (COLACE, DULCOLAX) 100 MG CAPS Take 100 mg by mouth 2 times daily 180 capsule 3    vitamin D3 (CHOLECALCIFEROL) 10 MCG (400 UNIT) TABS tablet Take 1 tablet by mouth daily 400units 90 tablet 3    polyethylene glycol (MIRALAX) 17 g packet Take 17 g by mouth daily as needed for Constipation      acetaminophen (TYLENOL) 325 MG tablet Take 650 mg by mouth every 4 hours as needed for Pain or Fever       lidocaine (LMX) 4 % cream Apply topically every 6 hours as needed for Pain To groin      albuterol sulfate  (90 Base) MCG/ACT inhaler Inhale 2 puffs into the lungs every 6 hours as needed for Wheezing 1 Inhaler 3    OXYGEN Inhale 3 L into the lungs nightly Indications: Difficulty Breathing, Oxygen Therapy And prn through cpap       No current facility-administered medications on file prior to visit.        REVIEW OF SYSTEMS    Constitutional: Denies night sweats, fatigue, weight loss/gain, fever, chills, malaise, loss of appetite   Head: Denies headache,  dizziness, loss of consciousness  Eye: Denies visual changes  Ears: Denies hearing loss, tinnitus  Mouth/throat: Denies ulceration, dental caries , dysphagia  Respiratory: Denies shortness of breath, cough, wheezing, dyspnea with exertion  Cardiovascular: Denies chest pain, palpitations  Gastrointestinal: Denies vomiting, constipation, diarrhea, abdominal pain   EDWARD: +suprapubic catheter Denies hematuria  Musculoskeletal: Denies joint pain, stiffness,  Endocrine: Denies polyuria, polydipsia, cold or heat intolerance  Hematology: Denies easy brusing or bleeding, hx of clotting disorder  Dermatology: Denies skin rash, eczema, pruritis    Objective:      Ht 5' 7\" (1.702 m)   Wt 212 lb (96.2 kg)   BMI 33.20 kg/m²     Wt Readings from Last 3 Encounters:   01/03/22 212 lb (96.2 kg)   12/06/21 210 lb (95.3 kg)   11/22/21 214 lb 9.6 oz (97.3 kg)       Immunization History   Administered Date(s) Administered    COVID-19, Pfizer, PF, 30mcg/0.3mL 01/22/2021, 02/12/2021, 10/12/2021    Influenza 10/31/2013    Influenza Virus Vaccine 11/01/2011, 11/05/2012, 11/11/2014, 09/29/2015    Influenza Whole 10/01/2010    Influenza, Quadv, 6 mo and older, IM, PF (Flulaval, Fluarix) 09/27/2018    Influenza, Quadv, IM, (6 mo and older Fluzone, Flulaval, Fluarix and 3 yrs and older Afluria) 11/17/2017    Influenza, Quadv, IM, PF (6 mo and older Fluzone, Flulaval, Fluarix, and 3 yrs and older Afluria) 10/01/2019    Influenza, Quadv, adjuvanted, 65 yrs +, IM, PF (Fluad) 10/19/2020    Influenza, Triv, 3 Years and older, IM (Afluria (5 yrs and older) 09/26/2016    Pneumococcal Conjugate 13-valent ([id number]) 12/19/2014    Pneumococcal Polysaccharide ([id number]) 01/01/2003    Td, unspecified formulation 06/08/2010    Zoster Live (Zostavax) 08/05/2014       PHYSICAL EXAM    Wt Readings from Last 3 Encounters:   01/03/22 212 lb (96.2 kg)   12/06/21 210 lb (95.3 kg)   11/22/21 214 lb 9.6 oz (97.3 kg)       General: Awake, alert, no apparent distress. Appears stated age  [de-identified]: conjuctivae are clear without exudate or hemorrhage, anicteric sclera, moist oral mucosa. Chest:  Respirations regular, non-labored. Chest rise and fall equal bilaterally. Lungs clear to auscultation throughout all fields  Cardiovascular:  RRR,S1S2, no murmur or gallop. Abdomen:  Soft, non tender to palpation, active bowel sounds. No CVA tenderness. Suprapubic catheter draining clear, yellow urine, small amount sediment. Neurological: Awake, alert, repetitive statements  Psychiatric:  Appropriate mood and affect  Extremities: non-traumatic in appearance.    Skin:  Warm and dry    LABS           CBC:   Lab Results   Component Value Date    WBC 12.1 08/26/2021    HGB 10.1 08/26/2021    HCT 32.1 08/26/2021    MCV 86.3 08/26/2021     08/26/2021     BMP:   Lab Results   Component Value Date     08/26/2021    K 4.2 08/26/2021    K 4.0 08/23/2021    CL 96 08/26/2021    CO2 28 08/26/2021    PHOS 3.4 03/14/2019    BUN 9 08/26/2021    CREATININE 1.0 08/26/2021     PT/INR:   Lab Results   Component Value Date    INR 0.97 03/22/2021     Prealbumin: No results found for: PREALBUMIN  Albumin:  Lab Results   Component Value Date    LABALBU 3.7 08/03/2021    LABALBU 4.3 11/30/2011     Sed Rate:  Lab Results   Component Value Date    SEDRATE 8 10/28/2012     CRP:   Lab Results   Component Value Date    CRP 0.31 12/15/2015     Micro:   Lab Results   Component Value Date    BC No growth-preliminary No growth  07/10/2021      Hemoglobin A1C:   Lab Results   Component Value Date    LABA1C 5.9 09/16/2021       Assessment:     -Recurrent UTI  -Enterobacter UTI    Patient Active Problem List   Diagnosis Code    Diabetes mellitus ([facility] [address] E11.9    Chronic anemia D64.9    Acquired hypothyroidism E03.9    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 E29.1    Breast lump N63.0    BPH (benign prostatic hyperplasia) N40.0    ROBER on CPAP G47.33, Z99.89    Essential hypertension I10    Hx of deep venous thrombosis Z86.718    Pneumonia due to infectious organism J18.9    Shortness of breath R06.02    Chronic respiratory failure with hypoxia ([facility]) J96.11    Mixed hyperlipidemia E78.2    Chronic deep vein thrombosis (DVT) of right lower extremity ([facility]) I82.501    Anticoagulated on Coumadin Z79.01    Type 2 diabetes mellitus with complication, with long-term current use of insulin ([facility]) E11.8, Z79.4    Muscle weakness M62.81    Chronic bronchitis ([facility]) J42    COPD without exacerbation ([facility]) J44.9    Paroxysmal atrial fibrillation ([facility]) I48.0    Acute diastolic congestive heart failure ([facility]) I50.31    Paroxysmal atrial flutter (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) I50.32    Morbid obesity ([facility]) E66.01    Urinary retention R33.9    Urine retention R33.9    Abdominal pain R10.9    Altered mental status R41.82    Chronic indwelling Billy catheter Z97.8    Urinary tract infection without hematuria N39.0    Hypokalemia E87.6    Weakness of left lower extremity R29.898    Pill dysphagia R13.10    Hx of thyroid cancer Z85.850    Gross hematuria R31.0    Constipation K59.00    Acute urinary retention R33.8    COPD exacerbation ([facility]) J44.1    Acute on chronic respiratory failure with hypoxia and hypercapnia ([facility]) J96.21, J96.22    Acute on chronic diastolic congestive heart failure ([facility]) I50.33    Bilateral leg edema +2 now +1 R60.0    Community acquired bacterial pneumonia J15.9    Scrotal pain N50.82    Acute kidney injury superimposed on CKD ([facility]) N17.9, N18.9  Catheter-associated urinary tract infection (HCC) T83.511A, N39.0    Acute metabolic encephalopathy F09.60    History of BPH Z87.438    First degree AV block I44.0    Hx of coronary artery disease Z86.79    Chronic back pain M54.9, G89.29    Suprapubic catheter dysfunction (HCC) T83.010A    History of recurrent UTI (urinary tract infection) Z87.440         Plan:     Patient examined and evaluated    - Enterococcus UTI- Information packet from facility, most recent lab results and urine culture reviewed. Patient forgetful, making repetitive statements throughout visit, frequently stating that \"no one cares anymore\" at assisted living facility. Patient had to be reminded on several occasions where he was and reason for visit. Explained to him that there were concerns for urinary tract infection, he states that he \"always has one\" and that this \"is not new. \"   Culture report reviewed, will treat enterococcus with Fosfomycin, 3 grams PO, x1. Patient not a good candidate for IV antibiotics at this time due to confusion and forgetfulness. Do not recommend repeat testing of urine unless patient is symptomatic as he most likely is colonized due to chronic catheter. Encouraged him to go to appointment with urology as scheduled for ongoing concerns regarding pain at catheter insertion site.     -Signs and symptoms of infection reviewed with patient. Call clinic or seek emergency care should these occur. Follow up: As needed. Advised patient to call with any further needs or concerns. Plan of care reviewed with patient who agrees with plan as above. All questions and concerns addressed prior to completion of visit. Please see attached Discharge Instructions    Written patient dismissal instructions given to patient and signed by patient or POA.              Electronically signed by MAEGAN Wills CNP on 1/3/2022 at 9:00 AM

## 2022-01-09 ENCOUNTER — HOSPITAL ENCOUNTER (EMERGENCY)
Age: 87
Discharge: OTHER INSTITUTION | End: 2022-01-09
Payer: MEDICARE

## 2022-01-09 VITALS
RESPIRATION RATE: 16 BRPM | OXYGEN SATURATION: 97 % | TEMPERATURE: 97.7 F | HEART RATE: 64 BPM | SYSTOLIC BLOOD PRESSURE: 117 MMHG | DIASTOLIC BLOOD PRESSURE: 86 MMHG

## 2022-01-09 DIAGNOSIS — Z93.59 CHRONIC SUPRAPUBIC CATHETER (HCC): Primary | ICD-10-CM

## 2022-01-09 LAB
ALBUMIN SERPL-MCNC: 3.7 G/DL (ref 3.5–5.1)
ALP BLD-CCNC: 57 U/L (ref 38–126)
ALT SERPL-CCNC: 6 U/L (ref 11–66)
AMORPHOUS: ABNORMAL
ANION GAP SERPL CALCULATED.3IONS-SCNC: 10 MEQ/L (ref 8–16)
AST SERPL-CCNC: 9 U/L (ref 5–40)
BACTERIA: ABNORMAL /HPF
BASOPHILS # BLD: 0.6 %
BASOPHILS ABSOLUTE: 0.1 THOU/MM3 (ref 0–0.1)
BILIRUB SERPL-MCNC: 0.3 MG/DL (ref 0.3–1.2)
BILIRUBIN URINE: NEGATIVE
BLOOD, URINE: ABNORMAL
BUN BLDV-MCNC: 9 MG/DL (ref 7–22)
CALCIUM SERPL-MCNC: 8.9 MG/DL (ref 8.5–10.5)
CASTS 2: ABNORMAL /LPF
CASTS UA: ABNORMAL /LPF
CHARACTER, URINE: ABNORMAL
CHLORIDE BLD-SCNC: 95 MEQ/L (ref 98–111)
CO2: 29 MEQ/L (ref 23–33)
COLOR: YELLOW
CREAT SERPL-MCNC: 0.9 MG/DL (ref 0.4–1.2)
CRYSTALS, UA: ABNORMAL
EOSINOPHIL # BLD: 2.3 %
EOSINOPHILS ABSOLUTE: 0.2 THOU/MM3 (ref 0–0.4)
EPITHELIAL CELLS, UA: ABNORMAL /HPF
ERYTHROCYTE [DISTWIDTH] IN BLOOD BY AUTOMATED COUNT: 15.1 % (ref 11.5–14.5)
ERYTHROCYTE [DISTWIDTH] IN BLOOD BY AUTOMATED COUNT: 46.2 FL (ref 35–45)
GFR SERPL CREATININE-BSD FRML MDRD: 79 ML/MIN/1.73M2
GLUCOSE BLD-MCNC: 113 MG/DL (ref 70–108)
GLUCOSE URINE: NEGATIVE MG/DL
HCT VFR BLD CALC: 35.3 % (ref 42–52)
HEMOGLOBIN: 11.2 GM/DL (ref 14–18)
IMMATURE GRANS (ABS): 0.03 THOU/MM3 (ref 0–0.07)
IMMATURE GRANULOCYTES: 0.3 %
KETONES, URINE: NEGATIVE
LEUKOCYTE ESTERASE, URINE: ABNORMAL
LYMPHOCYTES # BLD: 18.6 %
LYMPHOCYTES ABSOLUTE: 1.7 THOU/MM3 (ref 1–4.8)
MCH RBC QN AUTO: 26.9 PG (ref 26–33)
MCHC RBC AUTO-ENTMCNC: 31.7 GM/DL (ref 32.2–35.5)
MCV RBC AUTO: 84.7 FL (ref 80–94)
MISCELLANEOUS 2: ABNORMAL
MONOCYTES # BLD: 9.1 %
MONOCYTES ABSOLUTE: 0.8 THOU/MM3 (ref 0.4–1.3)
MUCUS: ABNORMAL
NITRITE, URINE: POSITIVE
NUCLEATED RED BLOOD CELLS: 0 /100 WBC
OSMOLALITY CALCULATION: 267.7 MOSMOL/KG (ref 275–300)
PH UA: 6 (ref 5–9)
PLATELET # BLD: 287 THOU/MM3 (ref 130–400)
PMV BLD AUTO: 8.8 FL (ref 9.4–12.4)
POTASSIUM REFLEX MAGNESIUM: 4.3 MEQ/L (ref 3.5–5.2)
PROTEIN UA: 30
RBC # BLD: 4.17 MILL/MM3 (ref 4.7–6.1)
RBC URINE: ABNORMAL /HPF
RENAL EPITHELIAL, UA: ABNORMAL
SEG NEUTROPHILS: 69.1 %
SEGMENTED NEUTROPHILS ABSOLUTE COUNT: 6.4 THOU/MM3 (ref 1.8–7.7)
SODIUM BLD-SCNC: 134 MEQ/L (ref 135–145)
SPECIFIC GRAVITY, URINE: 1.01 (ref 1–1.03)
TOTAL PROTEIN: 6.3 G/DL (ref 6.1–8)
UROBILINOGEN, URINE: 0.2 EU/DL (ref 0–1)
WBC # BLD: 9.3 THOU/MM3 (ref 4.8–10.8)
WBC UA: > 100 /HPF
YEAST: ABNORMAL

## 2022-01-09 PROCEDURE — 87186 SC STD MICRODIL/AGAR DIL: CPT

## 2022-01-09 PROCEDURE — 81001 URINALYSIS AUTO W/SCOPE: CPT

## 2022-01-09 PROCEDURE — 85025 COMPLETE CBC W/AUTO DIFF WBC: CPT

## 2022-01-09 PROCEDURE — 36415 COLL VENOUS BLD VENIPUNCTURE: CPT

## 2022-01-09 PROCEDURE — 87077 CULTURE AEROBIC IDENTIFY: CPT

## 2022-01-09 PROCEDURE — 87086 URINE CULTURE/COLONY COUNT: CPT

## 2022-01-09 PROCEDURE — 6370000000 HC RX 637 (ALT 250 FOR IP): Performed by: PHYSICIAN ASSISTANT

## 2022-01-09 PROCEDURE — 80053 COMPREHEN METABOLIC PANEL: CPT

## 2022-01-09 PROCEDURE — 99285 EMERGENCY DEPT VISIT HI MDM: CPT

## 2022-01-09 RX ORDER — PHENAZOPYRIDINE HYDROCHLORIDE 100 MG/1
100-200 TABLET, FILM COATED ORAL 3 TIMES DAILY PRN
Qty: 12 TABLET | Refills: 0 | Status: SHIPPED | OUTPATIENT
Start: 2022-01-09

## 2022-01-09 RX ORDER — LIDOCAINE HYDROCHLORIDE 20 MG/ML
JELLY TOPICAL PRN
Status: DISCONTINUED | OUTPATIENT
Start: 2022-01-09 | End: 2022-01-09 | Stop reason: HOSPADM

## 2022-01-09 RX ORDER — PHENAZOPYRIDINE HYDROCHLORIDE 100 MG/1
200 TABLET, FILM COATED ORAL ONCE
Status: COMPLETED | OUTPATIENT
Start: 2022-01-09 | End: 2022-01-09

## 2022-01-09 RX ADMIN — PHENAZOPYRIDINE HYDROCHLORIDE 200 MG: 100 TABLET ORAL at 09:28

## 2022-01-09 RX ADMIN — LIDOCAINE HYDROCHLORIDE: 20 JELLY TOPICAL at 09:28

## 2022-01-09 ASSESSMENT — PAIN DESCRIPTION - ORIENTATION: ORIENTATION: LOWER

## 2022-01-09 ASSESSMENT — PAIN DESCRIPTION - FREQUENCY: FREQUENCY: CONTINUOUS

## 2022-01-09 ASSESSMENT — ENCOUNTER SYMPTOMS: ABDOMINAL PAIN: 1

## 2022-01-09 ASSESSMENT — PAIN DESCRIPTION - LOCATION: LOCATION: ABDOMEN

## 2022-01-09 ASSESSMENT — PAIN SCALES - GENERAL: PAINLEVEL_OUTOF10: 3

## 2022-01-09 ASSESSMENT — PAIN DESCRIPTION - PAIN TYPE: TYPE: ACUTE PAIN

## 2022-01-09 NOTE — ED TRIAGE NOTES
Patient presents via EMS to ER from [facility] with reports of suprapubic catheter pain that increases upon ambulation. Current pain 3 on a scale of 0-10 at rest. Patient reports pain has been ongoing for a month and had suprapubic catheter changed a month ago. Patient reports history of UTI's ever since he had first catheter placed a couple of years ago. Patient A/O x4. Urine specimen obtained and sent.

## 2022-01-09 NOTE — ED PROVIDER NOTES
[facility]  eMERGENCY dEPARTMENT eNCOUnter          CHIEF COMPLAINT       Chief Complaint   Patient presents with    Other     suprapubic catheter pain       Nurses Notes reviewed and I agree except as noted inthe HPI. HISTORY OF PRESENT ILLNESS    Yusuf Elliott is a 80 y.o. male who presents to the Emergency Department for the evaluation of lower abdominal pain. Patient states he has recurrently had pain in his lower abdomen since placement of his suprapubic catheter. States pain has been constant for the past 2 weeks without any changes and states he thinks he is either currently or over his recently on treatment for a UTI. Reports the catheter was last exchanged 1 month ago. Denies any associated fevers, chills, nausea, vomiting, back pain or other urinary symptoms. States the medication he has been getting at his ECF has provided no relief so they sent him in today for evaluation of possible UTI. The HPI was provided by the patient. REVIEW OF SYSTEMS     Review of Systems   Gastrointestinal: Positive for abdominal pain. All other systems reviewed and are negative. PAST MEDICAL HISTORY    has a past medical history of Anemia, Anxiety, BPH (benign prostatic hyperplasia), CAD (coronary artery disease), Chronic back pain, COPD (chronic obstructive pulmonary disease) ([address] Detached retina, Diabetes mellitus ([address] DVT (deep venous thrombosis) ([address] DVT (deep venous thrombosis) ([address] Dysphagia, Glaucoma, Hyperlipidemia, Hypertension, Macular degeneration, Mass of chest, Movement disorder, Obesity, Osteoarthritis, Paroxysmal atrial fibrillation ([address] Pneumonia, Primary thyroid follicular carcinoma, Sleep apnea, Testicular hypofunction, Thyroid cancer ([address] and Urinary incontinence. SURGICAL HISTORY      has a past surgical history that includes Retinal detachment surgery; Thyroidectomy;  Spine surgery (2004); other surgical history; fracture surgery; EKG 12 Lead (11/10/2015); back surgery (8807,[id number], 2014); bronchoscopy (N/A, 8/29/2017); TURP (N/A, 3/17/2020); TURP (N/A, 3/24/2020); Cystoscopy (N/A, 6/17/2021); and Cystoscopy (N/A, 8/12/2021). CURRENT MEDICATIONS       Previous Medications    ACETAMINOPHEN (TYLENOL) 325 MG TABLET    Take 650 mg by mouth every 4 hours as needed for Pain or Fever     ALBUTEROL SULFATE  (90 BASE) MCG/ACT INHALER    Inhale 2 puffs into the lungs every 6 hours as needed for Wheezing    ALCOHOL SWABS (ALCOHOL PREP) 70 % PADS    Alcohol pads (brand per preference). Sig: use to test sugar twice per day. Dx: E11.8    ALUMINUM & MAGNESIUM HYDROXIDE-SIMETHICONE (MYLANTA) 400-400-40 MG/5ML SUSP    Take 15 mLs by mouth every 6 hours as needed    ARFORMOTEROL TARTRATE (BROVANA) 15 MCG/2ML NEBU    Take 2 mLs by nebulization 2 times daily 1 dose inhale orally two times per day    BACITRACIN 500 UNIT/GM OINTMENT    Apply 500 Units topically 2 times daily CATHETER CARE    BLOOD GLUCOSE TEST STRIPS (ACCU-CHEK CHE PLUS) STRIP    Accu-check Che Plus Test Strips (or brand per pt preference). Sig: test sugar BID. Dx: E11.8    BUDESONIDE (PULMICORT) 0.5 MG/2ML NEBULIZER SUSPENSION    USE 1 VIAL VIA NEBULIZER TWICE DAILY.  RINSE MOUTH AFTER TREATMENT    DOCUSATE (COLACE, DULCOLAX) 100 MG CAPS    Take 100 mg by mouth 2 times daily    DOXAZOSIN (CARDURA) 4 MG TABLET    TAKE 1 TABLET BY MOUTH EVERY NIGHT    FAMOTIDINE (PEPCID) 20 MG TABLET    Take 20 mg by mouth 2 times daily as needed    FERROUS GLUCONATE (FERGON) 324 (38 FE) MG TABLET    Take 1 tablet by mouth 2 times daily    FUROSEMIDE (LASIX) 40 MG TABLET    Take 1 tablet by mouth daily    INSULIN GLARGINE (LANTUS SOLOSTAR) 100 UNIT/ML INJECTION PEN    INJECT SUBCUTANEOUSLY 18  UNITS NIGHTLY    LACTOBACILLUS (CULTURELLE) CAPSULE    Take 1 capsule by mouth 2 times daily (with meals)    LEVOTHYROXINE (SYNTHROID) 175 MCG TABLET    TAKE 1 TABLET BY MOUTH  DAILY    LIDOCAINE (LMX) 4 % CREAM    Apply topically every 6 hours as needed for Pain To groin    LISINOPRIL (PRINIVIL;ZESTRIL) 2.5 MG TABLET    Take 1 tablet by mouth daily    METFORMIN (GLUCOPHAGE) 1000 MG TABLET    Take 1 tablet by mouth 2 times daily (with meals)    MULTIPLE VITAMINS-MINERALS (PRESERVISION AREDS 2) CAPS    Take 1 capsule by mouth daily    OXYBUTYNIN (DITROPAN XL) 10 MG EXTENDED RELEASE TABLET    Take 1 tablet by mouth every evening    OXYBUTYNIN (DITROPAN) 5 MG TABLET    Take 1 tablet by mouth every 8 hours PRN pain related to infection/inflammation due to indwelling catheter    OXYGEN    Inhale 3 L into the lungs nightly Indications: Difficulty Breathing, Oxygen Therapy And prn through cpap    POLYETHYLENE GLYCOL (MIRALAX) 17 G PACKET    Take 17 g by mouth daily as needed for Constipation    POTASSIUM CHLORIDE (KLOR-CON M) 10 MEQ EXTENDED RELEASE TABLET    Take 1 tablet by mouth daily TAKE 1 TABLETS BY MOUTH TWICE DAILY    REVEFENACIN 175 MCG/3ML SOLN    Inhale 1 nebule into the lungs daily Dx COPD J44.3    SIMETHICONE (MYLICON) 669 MG CHEWABLE TABLET    Take 125 mg by mouth every 6 hours as needed for Flatulence    SOFT TOUCH LANCETS [facility]    Lancet (or brand per pt preference) Use to check blood sugars 2 times daily. Dx: E11.8    SPIRONOLACTONE (ALDACTONE) 25 MG TABLET    TAKE 1 TABLET BY MOUTH DAILY    VITAMIN D3 (CHOLECALCIFEROL) 10 MCG (400 UNIT) TABS TABLET    Take 1 tablet by mouth daily 400units       ALLERGIES     is allergic to latex, macrobid [nitrofurantoin monohyd macro], levaquin [levofloxacin], brimonidine tartrate, adhesive tape, and alphagan [brimonidine tartrate]. FAMILY HISTORY     He indicated that his mother is . He indicated that his father is . family history includes Other in his mother; Other (age of onset: [de-identified]) in his father. SOCIAL HISTORY      reports that he has never smoked.  He has never used smokeless tobacco. He reports current alcohol use of about 1.0 standard drink of alcohol per week. He reports that he does not use drugs. PHYSICAL EXAM     INITIAL VITALS:  oral temperature is 97.7 °F (36.5 °C). His blood pressure is 117/62 and his pulse is 64. His respiration is 16 and oxygen saturation is 98%. Physical Exam  Vitals and nursing note reviewed. Constitutional:       Appearance: Normal appearance. HENT:      Head: Normocephalic and atraumatic. Eyes:      Conjunctiva/sclera: Conjunctivae normal.   Cardiovascular:      Rate and Rhythm: Normal rate. Pulmonary:      Effort: Pulmonary effort is normal. No respiratory distress. Abdominal:      Palpations: Abdomen is soft. Tenderness: There is abdominal tenderness in the suprapubic area. There is no right CVA tenderness, left CVA tenderness, guarding or rebound. Comments: Very subtle suprapubic tenderness without grimace, wincing, guarding. Suprapubic catheter in place without sign of injury, erythema, swelling or infection. Musculoskeletal:      Cervical back: Normal range of motion. Skin:     General: Skin is warm and dry. Neurological:      General: No focal deficit present. Mental Status: He is alert. Mental status is at baseline.    Psychiatric:         Mood and Affect: Mood normal.         DIFFERENTIAL DIAGNOSIS:   Differential diagnoses are discussed    DIAGNOSTIC RESULTS     EKG: All EKG's are interpreted by the Emergency Department Physician who either signs or Co-signsthis chart in the absence of a cardiologist.        RADIOLOGY: non-plain film images(s) such as CT, Ultrasound and MRI are read by the radiologist.    No orders to display       LABS:      Labs Reviewed   CBC WITH AUTO DIFFERENTIAL - Abnormal; Notable for the following components:       Result Value    RBC 4.17 (*)     Hemoglobin 11.2 (*)     Hematocrit 35.3 (*)     MCHC 31.7 (*)     RDW-CV 15.1 (*)     RDW-SD 46.2 (*)     MPV 8.8 (*)     All other components within normal limits   COMPREHENSIVE METABOLIC PANEL W/ REFLEX TO MG FOR LOW K - Abnormal; Notable for the following components:    Glucose 113 (*)     Sodium 134 (*)     Chloride 95 (*)     ALT 6 (*)     All other components within normal limits   URINE WITH REFLEXED MICRO - Abnormal; Notable for the following components:    Blood, Urine TRACE (*)     Protein, UA 30 (*)     Nitrite, Urine POSITIVE (*)     Leukocyte Esterase, Urine LARGE (*)     Character, Urine CLOUDY (*)     All other components within normal limits   OSMOLALITY - Abnormal; Notable for the following components:    Osmolality Calc 267.7 (*)     All other components within normal limits   GLOMERULAR FILTRATION RATE, ESTIMATED - Abnormal; Notable for the following components:    Est, Glom Filt Rate 79 (*)     All other components within normal limits   CULTURE, REFLEXED, URINE   ANION GAP       EMERGENCY DEPARTMENT COURSE:   Vitals:    Vitals:    01/09/22 0901 01/09/22 1033   BP: (!) 150/76 117/62   Pulse: 62 64   Resp: 16 16   Temp: 97.7 °F (36.5 °C)    TempSrc: Oral    SpO2: 99% 98%      11:16 AM EST: The patient was seen and evaluated. Patient presents for complaints of ongoing pain in the lower abdomen since placement of a suprapubic catheter 6 months ago. Current medication list in the room shows that he was treated with 5 days of 500 mg twice daily ciprofloxacin starting 12/28/2021. He is also on oxybutynin with which he denies relief in his pain. He was treated in the department with Pyridium and Urojet and reports significant improvement in his pain. Laboratory studies revealed white blood cell count 9.3 and renal function within normal limits. Urinalysis revealed trace blood, nitrite positive, large amount of leukocytes with greater than 100 white blood cells, mucus threads and many bacteria with negative yeast.  Review of records shows that on 1/3/2022 he saw the infectious disease service for Enterococcus UTI.   At that time, infectious disease provider was recommending not repeating urinalysis unless the patient is symptomatic as he is likely colonized from his catheter. He was given one-time dose of fosfomycin 3 g after that visit. Discussed results and patient presentation today with Dr. Grayland Runner, infectious disease who is agreeable with deferring antibiotic treatment pending urine culture as the patient is complaining only of the ongoing pain from the catheter placement. He did recommend replacing the catheter and this was done in the emergency department. Will give short course of treatment with Pyridium and patient was agreeable with the above plan. CRITICAL CARE:   None    CONSULTS:  None    PROCEDURES:  None    FINAL IMPRESSION      1.  Chronic suprapubic catheter ([address] - with chronic pain          DISPOSITION/PLAN   Discharge    PATIENT REFERRED TO:  Dudley Weinstein MD  [facility] [address] [id number] [phone]      to review culture results    [address] [facility]\"" [address] EMERGENCY DEPT  [address]    If symptoms worsen      DISCHARGEMEDICATIONS:  New Prescriptions    PHENAZOPYRIDINE (PYRIDIUM) 100 MG TABLET    Take 1-2 tablets by mouth 3 times daily as needed (pain)       (Please note that portions of this note were completedwith a voice recognition program.  Efforts were made to edit the dictations but occasionally words are mis-transcribed.)        Arnold Brewer PA-C  01/09/22 1124

## 2022-01-09 NOTE — ED NOTES
Bed: 022A  Expected date:   Expected time:   Means of arrival:   Comments:  Odalys Fisher, RN  01/09/22 5635

## 2022-01-09 NOTE — ED NOTES
Insert Suprapubic Catheter    Date/Time: 1/9/2022 11:34 AM  Performed by: Mallory Phillips MD  Authorized by: James Brooks DO   Consent: Verbal consent obtained. Consent given by: patient  Patient understanding: patient states understanding of the procedure being performed  Patient consent: the patient's understanding of the procedure matches consent given  Procedure consent: procedure consent matches procedure scheduled  Patient identity confirmed: verbally with patient  Indications: catheter change  Local anesthesia used: yes    Anesthesia:  Local anesthesia used: yes  Local Anesthetic: lidocaine 2% without epinephrine    Sedation:  Patient sedated: no    Suprapubic aspiration by: catheter  Catheter type:  Billy  Catheter size: 16 Fr  Number of attempts: 1  Urine characteristics: clear and yellow  Patient tolerance: patient tolerated the procedure well with no immediate complications             Mallory Phillips MD  Resident  01/09/22 0446

## 2022-01-10 ENCOUNTER — NURSE ONLY (OUTPATIENT)
Dept: UROLOGY | Age: 87
End: 2022-01-10

## 2022-01-10 DIAGNOSIS — N31.9 NEUROGENIC BLADDER: Primary | ICD-10-CM

## 2022-01-10 RX ORDER — SULFAMETHOXAZOLE AND TRIMETHOPRIM 800; 160 MG/1; MG/1
1 TABLET ORAL 2 TIMES DAILY
Qty: 14 TABLET | Refills: 0 | Status: SHIPPED | OUTPATIENT
Start: 2022-01-10 | End: 2022-01-10

## 2022-01-10 RX ORDER — SULFAMETHOXAZOLE AND TRIMETHOPRIM 800; 160 MG/1; MG/1
1 TABLET ORAL 2 TIMES DAILY
Qty: 14 TABLET | Refills: 0 | Status: SHIPPED | OUTPATIENT
Start: 2022-01-10 | End: 2022-01-17

## 2022-01-10 NOTE — PROGRESS NOTES
Pt was in today for a sp cath change. He was in the ER yesterday for recurrent pain in his lower abdomen where his sp site is. Unsure if sp catheter was changed in the ER, patient thought it was changed at [facility] on Saturday. Spoke to the nurse taking care of him at [facility]. She said that it was not changed there. Sent a nursing communication paper with his packet about doing sp cath change at the facility from now on. He finished bactrim about a week ago for a UTI. Gray Ashton CNP sent a prescription over for bactrim.

## 2022-01-12 LAB
ORGANISM: ABNORMAL
URINE CULTURE REFLEX: ABNORMAL

## 2022-01-17 ENCOUNTER — TELEPHONE (OUTPATIENT)
Dept: UROLOGY | Age: 87
End: 2022-01-17

## 2022-01-17 NOTE — TELEPHONE ENCOUNTER
See how pt is doing. His urine from 1/9 showed enterobacter, sensitive to IV medications only. He was started on Bactrim 1/9 which enterobacter is resistant to .

## 2022-01-24 ENCOUNTER — TELEPHONE (OUTPATIENT)
Dept: FAMILY MEDICINE CLINIC | Age: 87
End: 2022-01-24

## 2022-01-26 NOTE — TELEPHONE ENCOUNTER
Spoke with kellee sanchez, they are unable to verify who ordered these, Dr Ruth Orozco did not. Did urology?

## 2022-01-31 NOTE — TELEPHONE ENCOUNTER
Christi from Dr Sheyla Meier office states that the pt was not seen by [facility] in office. Was seen at [facility] in May 2021 while in patient. I then called Gwen Bruno at [facility] and she states that she will look back at their notes on 1-20-22 and see who actually ordered these labs and forward them on to the right provider.  Will keep open and call back on follow-up tomorrow

## 2022-02-02 NOTE — TELEPHONE ENCOUNTER
Discussion noted. CMP okay, except chloride slightly low and GFR slightly low-essentially stable from past.  CBC okay except mild stable (slightly improved) anemia. UA shows 3+ leukocytes, 1+ blood, and 6-20 WBC on microscopy-please make sure patient follows up with urology as planned.  ES

## 2022-02-02 NOTE — TELEPHONE ENCOUNTER
Spoke with [facility]. They have no information regarding who ordered these labs. I believe these were actually ordered on 12-20-21, completed and then in error drawn again on 1-20-22. Does pt need to be treated? I have checked with three different providers and no one has ordered/reviewed. Did speak with Nurse at [facility] and she states that pt is doing well and has no new needs or concerns.

## 2022-02-22 ENCOUNTER — APPOINTMENT (OUTPATIENT)
Dept: GENERAL RADIOLOGY | Age: 87
DRG: 698 | End: 2022-02-22
Payer: MEDICARE

## 2022-02-22 ENCOUNTER — HOSPITAL ENCOUNTER (INPATIENT)
Age: 87
LOS: 11 days | Discharge: NURSING, REHAB OR LONG-TERM CARE | DRG: 698 | End: 2022-03-05
Admitting: INTERNAL MEDICINE
Payer: MEDICARE

## 2022-02-22 ENCOUNTER — APPOINTMENT (OUTPATIENT)
Dept: CT IMAGING | Age: 87
DRG: 698 | End: 2022-02-22
Payer: MEDICARE

## 2022-02-22 DIAGNOSIS — N39.0 URINARY TRACT INFECTION ASSOCIATED WITH CYSTOSTOMY CATHETER, INITIAL ENCOUNTER (HCC): ICD-10-CM

## 2022-02-22 DIAGNOSIS — R10.84 GENERALIZED ABDOMINAL PAIN: Primary | ICD-10-CM

## 2022-02-22 DIAGNOSIS — N17.9 AKI (ACUTE KIDNEY INJURY) (HCC): ICD-10-CM

## 2022-02-22 DIAGNOSIS — E87.5 HYPERKALEMIA: ICD-10-CM

## 2022-02-22 DIAGNOSIS — T83.510A URINARY TRACT INFECTION ASSOCIATED WITH CYSTOSTOMY CATHETER, INITIAL ENCOUNTER (HCC): ICD-10-CM

## 2022-02-22 DIAGNOSIS — Z93.59 SUPRAPUBIC CATHETER (HCC): ICD-10-CM

## 2022-02-22 DIAGNOSIS — D72.829 LEUKOCYTOSIS, UNSPECIFIED TYPE: ICD-10-CM

## 2022-02-22 DIAGNOSIS — A41.9 SEPTICEMIA (HCC): ICD-10-CM

## 2022-02-22 LAB
ALBUMIN SERPL-MCNC: 2.9 G/DL (ref 3.5–5.1)
ALP BLD-CCNC: 85 U/L (ref 38–126)
ALT SERPL-CCNC: 8 U/L (ref 11–66)
ANION GAP SERPL CALCULATED.3IONS-SCNC: 15 MEQ/L (ref 8–16)
ANION GAP SERPL CALCULATED.3IONS-SCNC: 20 MEQ/L (ref 8–16)
ANISOCYTOSIS: PRESENT
AST SERPL-CCNC: 15 U/L (ref 5–40)
BACTERIA: ABNORMAL /HPF
BASOPHILS # BLD: 0.2 %
BASOPHILS ABSOLUTE: 0.1 THOU/MM3 (ref 0–0.1)
BILIRUB SERPL-MCNC: 0.3 MG/DL (ref 0.3–1.2)
BILIRUBIN URINE: NEGATIVE
BLOOD, URINE: ABNORMAL
BUN BLDV-MCNC: 68 MG/DL (ref 7–22)
BUN BLDV-MCNC: 69 MG/DL (ref 7–22)
CALCIUM SERPL-MCNC: 8.2 MG/DL (ref 8.5–10.5)
CALCIUM SERPL-MCNC: 8.6 MG/DL (ref 8.5–10.5)
CASTS 2: ABNORMAL /LPF
CASTS UA: ABNORMAL /LPF
CHARACTER, URINE: ABNORMAL
CHLORIDE BLD-SCNC: 92 MEQ/L (ref 98–111)
CHLORIDE BLD-SCNC: 94 MEQ/L (ref 98–111)
CHLORIDE, URINE: 70 MEQ/L
CO2: 19 MEQ/L (ref 23–33)
CO2: 19 MEQ/L (ref 23–33)
COLOR: YELLOW
CREAT SERPL-MCNC: 5.3 MG/DL (ref 0.4–1.2)
CREAT SERPL-MCNC: 5.5 MG/DL (ref 0.4–1.2)
CREATININE URINE: 24.3 MG/DL
CRENATED RBC'S: ABNORMAL
CRYSTALS, UA: ABNORMAL
DIFFERENTIAL TYPE: ABNORMAL
EOSINOPHIL # BLD: 0 %
EOSINOPHILS ABSOLUTE: 0 THOU/MM3 (ref 0–0.4)
EPITHELIAL CELLS, UA: ABNORMAL /HPF
ERYTHROCYTE [DISTWIDTH] IN BLOOD BY AUTOMATED COUNT: 16.2 % (ref 11.5–14.5)
ERYTHROCYTE [DISTWIDTH] IN BLOOD BY AUTOMATED COUNT: 49.7 FL (ref 35–45)
GFR SERPL CREATININE-BSD FRML MDRD: 10 ML/MIN/1.73M2
GFR SERPL CREATININE-BSD FRML MDRD: 10 ML/MIN/1.73M2
GLUCOSE BLD-MCNC: 109 MG/DL (ref 70–108)
GLUCOSE BLD-MCNC: 113 MG/DL (ref 70–108)
GLUCOSE BLD-MCNC: 117 MG/DL (ref 70–108)
GLUCOSE BLD-MCNC: 159 MG/DL (ref 70–108)
GLUCOSE BLD-MCNC: 171 MG/DL (ref 70–108)
GLUCOSE BLD-MCNC: 175 MG/DL (ref 70–108)
GLUCOSE URINE: NEGATIVE MG/DL
HCT VFR BLD CALC: 35.7 % (ref 42–52)
HEMOGLOBIN: 11.5 GM/DL (ref 14–18)
IMMATURE GRANS (ABS): 0.85 THOU/MM3 (ref 0–0.07)
IMMATURE GRANULOCYTES: 2.7 %
INFLUENZA A: NOT DETECTED
INFLUENZA B: NOT DETECTED
KETONES, URINE: NEGATIVE
LACTIC ACID, SEPSIS: 6.6 MMOL/L (ref 0.5–1.9)
LACTIC ACID, SEPSIS: 7.1 MMOL/L (ref 0.5–1.9)
LACTIC ACID: 4.4 MMOL/L (ref 0.5–2)
LACTIC ACID: 7.1 MMOL/L (ref 0.5–2)
LEUKOCYTE ESTERASE, URINE: ABNORMAL
LYMPHOCYTES # BLD: 1.9 %
LYMPHOCYTES ABSOLUTE: 0.6 THOU/MM3 (ref 1–4.8)
MCH RBC QN AUTO: 27.3 PG (ref 26–33)
MCHC RBC AUTO-ENTMCNC: 32.2 GM/DL (ref 32.2–35.5)
MCV RBC AUTO: 84.8 FL (ref 80–94)
MONOCYTES # BLD: 5.4 %
MONOCYTES ABSOLUTE: 1.7 THOU/MM3 (ref 0.4–1.3)
NITRITE, URINE: NEGATIVE
NUCLEATED RED BLOOD CELLS: 0 /100 WBC
OSMOLALITY CALCULATION: 286.4 MOSMOL/KG (ref 275–300)
PATHOLOGIST REVIEW: ABNORMAL
PH UA: 6 (ref 5–9)
PLATELET # BLD: 288 THOU/MM3 (ref 130–400)
PLATELET ESTIMATE: ADEQUATE
PMV BLD AUTO: 10.3 FL (ref 9.4–12.4)
POIKILOCYTES: ABNORMAL
POTASSIUM SERPL-SCNC: 6.5 MEQ/L (ref 3.5–5.2)
POTASSIUM SERPL-SCNC: 6.9 MEQ/L (ref 3.5–5.2)
POTASSIUM SERPL-SCNC: 7.4 MEQ/L (ref 3.5–5.2)
PROCALCITONIN: 27.54 NG/ML (ref 0.01–0.09)
PROT/CREAT RATIO, UR: 6.1
PROTEIN UA: >= 300
PROTEIN, URINE: 148.3 MG/DL
RBC # BLD: 4.21 MILL/MM3 (ref 4.7–6.1)
RBC URINE: > 100 /HPF
SARS-COV-2 RNA, RT PCR: NOT DETECTED
SCAN OF BLOOD SMEAR: NORMAL
SEG NEUTROPHILS: 89.8 %
SEGMENTED NEUTROPHILS ABSOLUTE COUNT: 27.8 THOU/MM3 (ref 1.8–7.7)
SODIUM BLD-SCNC: 128 MEQ/L (ref 135–145)
SODIUM BLD-SCNC: 131 MEQ/L (ref 135–145)
SODIUM URINE: 75 MEQ/L
SPECIFIC GRAVITY, URINE: 1.02 (ref 1–1.03)
TOTAL PROTEIN: 6.6 G/DL (ref 6.1–8)
TOXIC GRANULATION: PRESENT
TROPONIN T: 0.1 NG/ML
TROPONIN T: 0.14 NG/ML
UROBILINOGEN, URINE: 0.2 EU/DL (ref 0–1)
WBC # BLD: 31 THOU/MM3 (ref 4.8–10.8)
WBC UA: > 200 /HPF

## 2022-02-22 PROCEDURE — 6360000002 HC RX W HCPCS: Performed by: PHYSICIAN ASSISTANT

## 2022-02-22 PROCEDURE — 93005 ELECTROCARDIOGRAM TRACING: CPT | Performed by: NURSE PRACTITIONER

## 2022-02-22 PROCEDURE — 87641 MR-STAPH DNA AMP PROBE: CPT

## 2022-02-22 PROCEDURE — 84145 PROCALCITONIN (PCT): CPT

## 2022-02-22 PROCEDURE — 84132 ASSAY OF SERUM POTASSIUM: CPT

## 2022-02-22 PROCEDURE — 93005 ELECTROCARDIOGRAM TRACING: CPT | Performed by: PHYSICIAN ASSISTANT

## 2022-02-22 PROCEDURE — 2500000003 HC RX 250 WO HCPCS: Performed by: INTERNAL MEDICINE

## 2022-02-22 PROCEDURE — 84300 ASSAY OF URINE SODIUM: CPT

## 2022-02-22 PROCEDURE — 99223 1ST HOSP IP/OBS HIGH 75: CPT | Performed by: INTERNAL MEDICINE

## 2022-02-22 PROCEDURE — 51702 INSERT TEMP BLADDER CATH: CPT

## 2022-02-22 PROCEDURE — 87186 SC STD MICRODIL/AGAR DIL: CPT

## 2022-02-22 PROCEDURE — 6360000002 HC RX W HCPCS

## 2022-02-22 PROCEDURE — 87500 VANOMYCIN DNA AMP PROBE: CPT

## 2022-02-22 PROCEDURE — 93005 ELECTROCARDIOGRAM TRACING: CPT

## 2022-02-22 PROCEDURE — 36415 COLL VENOUS BLD VENIPUNCTURE: CPT

## 2022-02-22 PROCEDURE — 87636 SARSCOV2 & INF A&B AMP PRB: CPT

## 2022-02-22 PROCEDURE — 82570 ASSAY OF URINE CREATININE: CPT

## 2022-02-22 PROCEDURE — 02HV33Z INSERTION OF INFUSION DEVICE INTO SUPERIOR VENA CAVA, PERCUTANEOUS APPROACH: ICD-10-PCS | Performed by: STUDENT IN AN ORGANIZED HEALTH CARE EDUCATION/TRAINING PROGRAM

## 2022-02-22 PROCEDURE — 99223 1ST HOSP IP/OBS HIGH 75: CPT

## 2022-02-22 PROCEDURE — 87040 BLOOD CULTURE FOR BACTERIA: CPT

## 2022-02-22 PROCEDURE — 85025 COMPLETE CBC W/AUTO DIFF WBC: CPT

## 2022-02-22 PROCEDURE — 82436 ASSAY OF URINE CHLORIDE: CPT

## 2022-02-22 PROCEDURE — 6370000000 HC RX 637 (ALT 250 FOR IP): Performed by: INTERNAL MEDICINE

## 2022-02-22 PROCEDURE — 87801 DETECT AGNT MULT DNA AMPLI: CPT

## 2022-02-22 PROCEDURE — 2580000003 HC RX 258: Performed by: PHYSICIAN ASSISTANT

## 2022-02-22 PROCEDURE — 2580000003 HC RX 258: Performed by: INTERNAL MEDICINE

## 2022-02-22 PROCEDURE — 82948 REAGENT STRIP/BLOOD GLUCOSE: CPT

## 2022-02-22 PROCEDURE — 87077 CULTURE AEROBIC IDENTIFY: CPT

## 2022-02-22 PROCEDURE — 2580000003 HC RX 258

## 2022-02-22 PROCEDURE — 84484 ASSAY OF TROPONIN QUANT: CPT

## 2022-02-22 PROCEDURE — 96365 THER/PROPH/DIAG IV INF INIT: CPT

## 2022-02-22 PROCEDURE — 99284 EMERGENCY DEPT VISIT MOD MDM: CPT

## 2022-02-22 PROCEDURE — 83605 ASSAY OF LACTIC ACID: CPT

## 2022-02-22 PROCEDURE — 81001 URINALYSIS AUTO W/SCOPE: CPT

## 2022-02-22 PROCEDURE — 84156 ASSAY OF PROTEIN URINE: CPT

## 2022-02-22 PROCEDURE — 96375 TX/PRO/DX INJ NEW DRUG ADDON: CPT

## 2022-02-22 PROCEDURE — 71045 X-RAY EXAM CHEST 1 VIEW: CPT

## 2022-02-22 PROCEDURE — 80053 COMPREHEN METABOLIC PANEL: CPT

## 2022-02-22 PROCEDURE — 96367 TX/PROPH/DG ADDL SEQ IV INF: CPT

## 2022-02-22 PROCEDURE — 74176 CT ABD & PELVIS W/O CONTRAST: CPT

## 2022-02-22 PROCEDURE — 6370000000 HC RX 637 (ALT 250 FOR IP): Performed by: PHYSICIAN ASSISTANT

## 2022-02-22 PROCEDURE — 87086 URINE CULTURE/COLONY COUNT: CPT

## 2022-02-22 PROCEDURE — 6370000000 HC RX 637 (ALT 250 FOR IP)

## 2022-02-22 PROCEDURE — 2000000000 HC ICU R&B

## 2022-02-22 PROCEDURE — 87081 CULTURE SCREEN ONLY: CPT

## 2022-02-22 PROCEDURE — 96374 THER/PROPH/DIAG INJ IV PUSH: CPT

## 2022-02-22 RX ORDER — VITAMIN B COMPLEX
500 TABLET ORAL DAILY
Status: DISCONTINUED | OUTPATIENT
Start: 2022-02-22 | End: 2022-03-05 | Stop reason: HOSPADM

## 2022-02-22 RX ORDER — DEXTROSE MONOHYDRATE 50 MG/ML
100 INJECTION, SOLUTION INTRAVENOUS PRN
Status: DISCONTINUED | OUTPATIENT
Start: 2022-02-22 | End: 2022-03-05 | Stop reason: HOSPADM

## 2022-02-22 RX ORDER — LISINOPRIL 2.5 MG/1
2.5 TABLET ORAL DAILY
Status: DISCONTINUED | OUTPATIENT
Start: 2022-02-22 | End: 2022-03-05 | Stop reason: HOSPADM

## 2022-02-22 RX ORDER — OXYBUTYNIN CHLORIDE 10 MG/1
10 TABLET, EXTENDED RELEASE ORAL EVERY EVENING
Status: DISCONTINUED | OUTPATIENT
Start: 2022-02-23 | End: 2022-02-25

## 2022-02-22 RX ORDER — DEXTROSE MONOHYDRATE 25 G/50ML
25 INJECTION, SOLUTION INTRAVENOUS ONCE
Status: DISCONTINUED | OUTPATIENT
Start: 2022-02-22 | End: 2022-02-22 | Stop reason: RX

## 2022-02-22 RX ORDER — ONDANSETRON 2 MG/ML
4 INJECTION INTRAMUSCULAR; INTRAVENOUS ONCE
Status: COMPLETED | OUTPATIENT
Start: 2022-02-22 | End: 2022-02-22

## 2022-02-22 RX ORDER — 0.9 % SODIUM CHLORIDE 0.9 %
1000 INTRAVENOUS SOLUTION INTRAVENOUS ONCE
Status: COMPLETED | OUTPATIENT
Start: 2022-02-22 | End: 2022-02-22

## 2022-02-22 RX ORDER — SODIUM CHLORIDE 0.9 % (FLUSH) 0.9 %
10 SYRINGE (ML) INJECTION EVERY 12 HOURS SCHEDULED
Status: DISCONTINUED | OUTPATIENT
Start: 2022-02-22 | End: 2022-03-05 | Stop reason: HOSPADM

## 2022-02-22 RX ORDER — DEXTROSE MONOHYDRATE 100 MG/ML
250 INJECTION, SOLUTION INTRAVENOUS PRN
Status: DISCONTINUED | OUTPATIENT
Start: 2022-02-22 | End: 2022-03-05 | Stop reason: HOSPADM

## 2022-02-22 RX ORDER — NICOTINE POLACRILEX 4 MG
15 LOZENGE BUCCAL PRN
Status: DISCONTINUED | OUTPATIENT
Start: 2022-02-22 | End: 2022-03-05 | Stop reason: HOSPADM

## 2022-02-22 RX ORDER — DEXTROSE MONOHYDRATE 100 MG/ML
250 INJECTION, SOLUTION INTRAVENOUS ONCE
Status: COMPLETED | OUTPATIENT
Start: 2022-02-22 | End: 2022-02-22

## 2022-02-22 RX ORDER — LACTOBACILLUS RHAMNOSUS GG 10B CELL
1 CAPSULE ORAL 2 TIMES DAILY WITH MEALS
Status: DISCONTINUED | OUTPATIENT
Start: 2022-02-22 | End: 2022-03-05 | Stop reason: HOSPADM

## 2022-02-22 RX ORDER — DEXTROSE MONOHYDRATE 25 G/50ML
25 INJECTION, SOLUTION INTRAVENOUS ONCE
Status: DISCONTINUED | OUTPATIENT
Start: 2022-02-22 | End: 2022-02-22

## 2022-02-22 RX ORDER — DEXTROSE MONOHYDRATE 25 G/50ML
12.5 INJECTION, SOLUTION INTRAVENOUS PRN
Status: DISCONTINUED | OUTPATIENT
Start: 2022-02-22 | End: 2022-02-22 | Stop reason: RX

## 2022-02-22 RX ORDER — MAGNESIUM SULFATE IN WATER 40 MG/ML
2000 INJECTION, SOLUTION INTRAVENOUS PRN
Status: DISCONTINUED | OUTPATIENT
Start: 2022-02-22 | End: 2022-02-22

## 2022-02-22 RX ORDER — POLYETHYLENE GLYCOL 3350 17 G/17G
17 POWDER, FOR SOLUTION ORAL DAILY PRN
Status: DISCONTINUED | OUTPATIENT
Start: 2022-02-22 | End: 2022-03-05 | Stop reason: HOSPADM

## 2022-02-22 RX ORDER — QUINIDINE GLUCONATE 324 MG
324 TABLET, EXTENDED RELEASE ORAL 2 TIMES DAILY
Status: DISCONTINUED | OUTPATIENT
Start: 2022-02-22 | End: 2022-03-05 | Stop reason: HOSPADM

## 2022-02-22 RX ORDER — DOCUSATE SODIUM 100 MG/1
100 CAPSULE, LIQUID FILLED ORAL 2 TIMES DAILY
Status: DISCONTINUED | OUTPATIENT
Start: 2022-02-22 | End: 2022-03-05 | Stop reason: HOSPADM

## 2022-02-22 RX ORDER — SODIUM CHLORIDE 0.9 % (FLUSH) 0.9 %
10 SYRINGE (ML) INJECTION PRN
Status: DISCONTINUED | OUTPATIENT
Start: 2022-02-22 | End: 2022-03-05 | Stop reason: HOSPADM

## 2022-02-22 RX ORDER — ONDANSETRON 4 MG/1
4 TABLET, ORALLY DISINTEGRATING ORAL EVERY 8 HOURS PRN
Status: DISCONTINUED | OUTPATIENT
Start: 2022-02-22 | End: 2022-03-05 | Stop reason: HOSPADM

## 2022-02-22 RX ORDER — DEXTROSE MONOHYDRATE 100 MG/ML
125 INJECTION, SOLUTION INTRAVENOUS PRN
Status: DISCONTINUED | OUTPATIENT
Start: 2022-02-22 | End: 2022-03-05 | Stop reason: HOSPADM

## 2022-02-22 RX ORDER — ACETAMINOPHEN 325 MG/1
650 TABLET ORAL EVERY 6 HOURS PRN
Status: DISCONTINUED | OUTPATIENT
Start: 2022-02-22 | End: 2022-03-05 | Stop reason: HOSPADM

## 2022-02-22 RX ORDER — BUDESONIDE 0.5 MG/2ML
0.5 INHALANT ORAL 2 TIMES DAILY
Status: DISCONTINUED | OUTPATIENT
Start: 2022-02-22 | End: 2022-03-05 | Stop reason: HOSPADM

## 2022-02-22 RX ORDER — ACETAMINOPHEN 650 MG/1
650 SUPPOSITORY RECTAL EVERY 6 HOURS PRN
Status: DISCONTINUED | OUTPATIENT
Start: 2022-02-22 | End: 2022-03-05 | Stop reason: HOSPADM

## 2022-02-22 RX ORDER — CALCIUM GLUCONATE 10 MG/ML
1000 INJECTION, SOLUTION INTRAVENOUS ONCE
Status: COMPLETED | OUTPATIENT
Start: 2022-02-22 | End: 2022-02-23

## 2022-02-22 RX ORDER — FAMOTIDINE 20 MG/1
20 TABLET, FILM COATED ORAL DAILY
Status: DISCONTINUED | OUTPATIENT
Start: 2022-02-22 | End: 2022-02-24

## 2022-02-22 RX ORDER — HEPARIN SODIUM 5000 [USP'U]/ML
5000 INJECTION, SOLUTION INTRAVENOUS; SUBCUTANEOUS EVERY 8 HOURS SCHEDULED
Status: DISCONTINUED | OUTPATIENT
Start: 2022-02-22 | End: 2022-03-05 | Stop reason: HOSPADM

## 2022-02-22 RX ORDER — CALCIUM GLUCONATE 94 MG/ML
1000 INJECTION, SOLUTION INTRAVENOUS ONCE
Status: COMPLETED | OUTPATIENT
Start: 2022-02-22 | End: 2022-02-22

## 2022-02-22 RX ORDER — DOXAZOSIN MESYLATE 4 MG/1
4 TABLET ORAL DAILY
Status: DISCONTINUED | OUTPATIENT
Start: 2022-02-22 | End: 2022-02-25

## 2022-02-22 RX ORDER — ALBUTEROL SULFATE 90 UG/1
2 AEROSOL, METERED RESPIRATORY (INHALATION) EVERY 6 HOURS PRN
Status: DISCONTINUED | OUTPATIENT
Start: 2022-02-22 | End: 2022-03-05 | Stop reason: HOSPADM

## 2022-02-22 RX ORDER — ONDANSETRON 2 MG/ML
INJECTION INTRAMUSCULAR; INTRAVENOUS
Status: COMPLETED
Start: 2022-02-22 | End: 2022-02-22

## 2022-02-22 RX ORDER — SODIUM CHLORIDE 9 MG/ML
25 INJECTION, SOLUTION INTRAVENOUS PRN
Status: DISCONTINUED | OUTPATIENT
Start: 2022-02-22 | End: 2022-03-05 | Stop reason: HOSPADM

## 2022-02-22 RX ORDER — ONDANSETRON 2 MG/ML
4 INJECTION INTRAMUSCULAR; INTRAVENOUS EVERY 6 HOURS PRN
Status: DISCONTINUED | OUTPATIENT
Start: 2022-02-22 | End: 2022-03-05 | Stop reason: HOSPADM

## 2022-02-22 RX ORDER — ARFORMOTEROL TARTRATE 15 UG/2ML
15 SOLUTION RESPIRATORY (INHALATION) 2 TIMES DAILY
Status: DISCONTINUED | OUTPATIENT
Start: 2022-02-22 | End: 2022-03-05 | Stop reason: HOSPADM

## 2022-02-22 RX ADMIN — SODIUM CHLORIDE 1000 ML: 9 INJECTION, SOLUTION INTRAVENOUS at 10:08

## 2022-02-22 RX ADMIN — DEXTROSE MONOHYDRATE 250 ML: 100 INJECTION, SOLUTION INTRAVENOUS at 22:48

## 2022-02-22 RX ADMIN — FAMOTIDINE 20 MG: 20 TABLET, FILM COATED ORAL at 23:02

## 2022-02-22 RX ADMIN — SODIUM BICARBONATE: 84 INJECTION, SOLUTION INTRAVENOUS at 13:15

## 2022-02-22 RX ADMIN — Medication 1 CAPSULE: at 23:02

## 2022-02-22 RX ADMIN — CALCIUM GLUCONATE 1000 MG: 10 INJECTION, SOLUTION INTRAVENOUS at 23:44

## 2022-02-22 RX ADMIN — PIPERACILLIN AND TAZOBACTAM 3375 MG: 3; .375 INJECTION, POWDER, LYOPHILIZED, FOR SOLUTION INTRAVENOUS at 13:36

## 2022-02-22 RX ADMIN — Medication 360 MG: at 23:02

## 2022-02-22 RX ADMIN — SODIUM BICARBONATE 50 MEQ: 84 INJECTION, SOLUTION INTRAVENOUS at 13:15

## 2022-02-22 RX ADMIN — DOCUSATE SODIUM 100 MG: 100 CAPSULE, LIQUID FILLED ORAL at 23:02

## 2022-02-22 RX ADMIN — ONDANSETRON 4 MG: 2 INJECTION INTRAMUSCULAR; INTRAVENOUS at 12:36

## 2022-02-22 RX ADMIN — VANCOMYCIN HYDROCHLORIDE 2000 MG: 1 INJECTION, POWDER, LYOPHILIZED, FOR SOLUTION INTRAVENOUS at 22:56

## 2022-02-22 RX ADMIN — CALCIUM GLUCONATE 1000 MG: 98 INJECTION, SOLUTION INTRAVENOUS at 13:37

## 2022-02-22 RX ADMIN — SODIUM CHLORIDE 1000 ML: 9 INJECTION, SOLUTION INTRAVENOUS at 13:19

## 2022-02-22 RX ADMIN — INSULIN HUMAN 5 UNITS: 100 INJECTION, SOLUTION PARENTERAL at 21:21

## 2022-02-22 RX ADMIN — SODIUM CHLORIDE, PRESERVATIVE FREE 10 ML: 5 INJECTION INTRAVENOUS at 22:49

## 2022-02-22 RX ADMIN — Medication 500 UNITS: at 23:03

## 2022-02-22 RX ADMIN — HEPARIN SODIUM 5000 UNITS: 5000 INJECTION INTRAVENOUS; SUBCUTANEOUS at 23:04

## 2022-02-22 RX ADMIN — Medication 10 UNITS: at 13:41

## 2022-02-22 ASSESSMENT — ENCOUNTER SYMPTOMS
NAUSEA: 1
CONSTIPATION: 0
SORE THROAT: 0
DIARRHEA: 0
COUGH: 0
ABDOMINAL PAIN: 1
RHINORRHEA: 0
COUGH: 1
VOMITING: 0
SHORTNESS OF BREATH: 0
BACK PAIN: 0
CONSTIPATION: 1
SHORTNESS OF BREATH: 1
CHEST TIGHTNESS: 0

## 2022-02-22 ASSESSMENT — PAIN SCALES - GENERAL
PAINLEVEL_OUTOF10: 5
PAINLEVEL_OUTOF10: 0

## 2022-02-22 ASSESSMENT — PAIN SCALES - WONG BAKER: WONGBAKER_NUMERICALRESPONSE: 4

## 2022-02-22 ASSESSMENT — PAIN DESCRIPTION - LOCATION
LOCATION: ABDOMEN
LOCATION: ABDOMEN

## 2022-02-22 NOTE — ED NOTES
[facility] contacted. Director of home states yesterday, patient's suprapubic catheter was found wrapped around patient's leg and kinked. States the catheter has not been draining as it usually did before. States today, patient woke up and complained of nausea and did not want to eat because he didn't feel well.      Shahnaz Ceballos RN  02/22/22 9506

## 2022-02-22 NOTE — ED PROVIDER NOTES
[facility] EMERGENCY DEPT      CHIEF COMPLAINT       Chief Complaint   Patient presents with    Leg Swelling    Dementia    Abdominal Pain       Nurses Notes reviewed and I agree except as noted in the HPI. HISTORY OF PRESENT ILLNESS    Toni Lyons is a 80 y.o. male who presents for new abdominal pain. He reports nausea and constipation that is new. EMS reported increased dementia and leg swelling. Patient repeats Sue Wu did they have to change the blood. \"  Per nursing home patient has not felt well since yesterday. He did not want to eat or drink and appeared short of breath. Today patient fell in his room and was found to be hypotensive. Patient has a suprapubic catheter that has not been draining well. Patient is from [facility] and is a DNR CC.    REVIEW OF SYSTEMS     Review of Systems   Constitutional: Positive for appetite change. Negative for fever. HENT: Negative for congestion. Respiratory: Positive for shortness of breath. Negative for cough. Cardiovascular: Negative for chest pain and palpitations. Gastrointestinal: Positive for abdominal pain, constipation and nausea. Negative for diarrhea and vomiting. Genitourinary: Positive for decreased urine volume. Musculoskeletal: Negative for back pain. Skin: Negative for rash. Neurological: Negative for headaches. Psychiatric/Behavioral: Positive for confusion.         PAST MEDICAL HISTORY    has a past medical history of Anemia, Anxiety, BPH (benign prostatic hyperplasia), CAD (coronary artery disease), Chronic back pain, COPD (chronic obstructive pulmonary disease) ([address] Detached retina, Diabetes mellitus ([address] DVT (deep venous thrombosis) ([address] DVT (deep venous thrombosis) ([address] Dysphagia, Glaucoma, Hyperlipidemia, Hypertension, Macular degeneration, Mass of chest, Movement disorder, Obesity, Osteoarthritis, Paroxysmal atrial fibrillation ([address] Pneumonia, Primary thyroid follicular carcinoma, Sleep apnea, Testicular hypofunction, Thyroid cancer ([facility] and Urinary incontinence. SURGICAL HISTORY      has a past surgical history that includes Retinal detachment surgery; Thyroidectomy; Spine surgery (2004); other surgical history; fracture surgery; EKG 12 Lead (11/10/2015); back surgery (0360,0983, 2014); bronchoscopy (N/A, 8/29/2017); TURP (N/A, 3/17/2020); TURP (N/A, 3/24/2020); Cystoscopy (N/A, 6/17/2021); and Cystoscopy (N/A, 8/12/2021).     CURRENT MEDICATIONS       Current Discharge Medication List      CONTINUE these medications which have NOT CHANGED    Details   umeclidinium-vilanterol (ANORO ELLIPTA) 62.5-25 MCG/INH AEPB inhaler Inhale 1 puff into the lungs daily      Aspirin 81 MG CAPS Take 1 capsule by mouth daily      phenazopyridine (PYRIDIUM) 100 MG tablet Take 1-2 tablets by mouth 3 times daily as needed (pain)  Qty: 12 tablet, Refills: 0      oxybutynin (DITROPAN) 5 MG tablet Take 1 tablet by mouth every 8 hours PRN pain related to infection/inflammation due to indwelling catheter      bacitracin 500 UNIT/GM ointment Apply 500 Units topically 2 times daily CATHETER CARE      oxybutynin (DITROPAN XL) 10 MG extended release tablet Take 1 tablet by mouth every evening  Qty: 30 tablet, Refills: 2      furosemide (LASIX) 40 MG tablet Take 1 tablet by mouth daily  Qty: 60 tablet, Refills: 0      potassium chloride (KLOR-CON M) 10 MEQ extended release tablet Take 1 tablet by mouth daily TAKE 1 TABLETS BY MOUTH TWICE DAILY  Qty: 180 tablet, Refills: 0    Comments: **Patient requests 90 days supply**      levothyroxine (SYNTHROID) 175 MCG tablet TAKE 1 TABLET BY MOUTH  DAILY  Qty: 30 tablet, Refills: 0    Associated Diagnoses: Postoperative hypothyroidism      lactobacillus (CULTURELLE) capsule Take 1 capsule by mouth 2 times daily (with meals)  Qty: 30 capsule, Refills: 0      doxazosin (CARDURA) 4 MG tablet TAKE 1 TABLET BY MOUTH EVERY NIGHT  Qty: 90 tablet, Refills: 3    Comments: **Patient requests 90 days supply** simethicone (MYLICON) 390 MG chewable tablet Take 125 mg by mouth every 6 hours as needed for Flatulence      aluminum & magnesium hydroxide-simethicone (MYLANTA) 400-400-40 MG/5ML SUSP Take 15 mLs by mouth every 6 hours as needed      famotidine (PEPCID) 20 MG tablet Take 20 mg by mouth 2 times daily as needed      Multiple Vitamins-Minerals (PRESERVISION AREDS 2) CAPS Take 1 capsule by mouth daily  Qty: 90 capsule, Refills: 3      spironolactone (ALDACTONE) 25 MG tablet TAKE 1 TABLET BY MOUTH DAILY  Qty: 90 tablet, Refills: 3    Comments: **Patient requests 90 days supply**      insulin glargine (LANTUS SOLOSTAR) 100 UNIT/ML injection pen INJECT SUBCUTANEOUSLY 18  UNITS NIGHTLY  Qty: 30 mL, Refills: 3    Associated Diagnoses: Type 2 diabetes mellitus with complication, with long-term current use of insulin ([facility])      ferrous gluconate (FERGON) 324 (38 Fe) MG tablet Take 1 tablet by mouth 2 times daily  Qty: 180 tablet, Refills: 3      metFORMIN (GLUCOPHAGE) 1000 MG tablet Take 1 tablet by mouth 2 times daily (with meals)  Qty: 180 tablet, Refills: 3      budesonide (PULMICORT) 0.5 MG/2ML nebulizer suspension USE 1 VIAL VIA NEBULIZER TWICE DAILY.  RINSE MOUTH AFTER TREATMENT  Qty: 360 mL, Refills: 3    Comments: **Patient requests 90 days supply**      lisinopril (PRINIVIL;ZESTRIL) 2.5 MG tablet Take 1 tablet by mouth daily  Qty: 90 tablet, Refills: 3    Associated Diagnoses: Type 2 diabetes mellitus with complication, with long-term current use of insulin ([facility])      docusate (COLACE, DULCOLAX) 100 MG CAPS Take 100 mg by mouth 2 times daily  Qty: 180 capsule, Refills: 3      vitamin D3 (CHOLECALCIFEROL) 10 MCG (400 UNIT) TABS tablet Take 1 tablet by mouth daily 400units  Qty: 90 tablet, Refills: 3      polyethylene glycol (MIRALAX) 17 g packet Take 17 g by mouth daily as needed for Constipation      acetaminophen (TYLENOL) 325 MG tablet Take 650 mg by mouth every 4 hours as needed for Pain or Fever       lidocaine (LMX) 4 % cream Apply topically every 6 hours as needed for Pain To groin      albuterol sulfate  (90 Base) MCG/ACT inhaler Inhale 2 puffs into the lungs every 6 hours as needed for Wheezing  Qty: 1 Inhaler, Refills: 3      blood glucose test strips (ACCU-CHEK CHE PLUS) strip Accu-check Che Plus Test Strips (or brand per pt preference). Sig: test sugar BID. Dx: E11.8  Qty: 200 strip, Refills: 3    Comments: **Patient requests 90 days supply**  Associated Diagnoses: Type 2 diabetes mellitus with complication, with long-term current use of insulin ([facility])      SOFT TOUCH LANCETS MISC Lancet (or brand per pt preference) Use to check blood sugars 2 times daily. Dx: E11.8  Qty: 200 each, Refills: 3    Associated Diagnoses: Type 2 diabetes mellitus with complication, with long-term current use of insulin (HCC)      Alcohol Swabs (ALCOHOL PREP) 70 % PADS Alcohol pads (brand per preference). Sig: use to test sugar twice per day. Dx: E11.8  Qty: 200 each, Refills: 3    Associated Diagnoses: Type 2 diabetes mellitus with complication, with long-term current use of insulin (HCC)      OXYGEN Inhale 3 L into the lungs nightly Indications: Difficulty Breathing, Oxygen Therapy And prn through cpap             ALLERGIES     is allergic to latex, macrobid [nitrofurantoin monohyd macro], levaquin [levofloxacin], brimonidine tartrate, adhesive tape, and alphagan [brimonidine tartrate]. FAMILY HISTORY     He indicated that his mother is . He indicated that his father is . family history includes Other in his mother; Other (age of onset: [de-identified]) in his father. SOCIAL HISTORY    reports that he has never smoked. He has never used smokeless tobacco. He reports current alcohol use of about 1.0 standard drink of alcohol per week. He reports that he does not use drugs. PHYSICAL EXAM     INITIAL VITALS:  height is 5' 6\" (1.676 m) and weight is 229 lb 8 oz (104.1 kg). His oral temperature is 98.6 °F (37 °C).  His blood pressure is 124/57 (abnormal) and his pulse is 67. His respiration is 18 and oxygen saturation is 93%. Physical Exam  Vitals and nursing note reviewed. Constitutional:       General: He is not in acute distress. Appearance: Normal appearance. He is morbidly obese. He is not toxic-appearing or diaphoretic. HENT:      Right Ear: Hearing normal.      Left Ear: Hearing normal.      Nose: Nose normal. No rhinorrhea. Mouth/Throat:      Lips: Pink. Pharynx: Uvula midline. Eyes:      General: Lids are normal.      Conjunctiva/sclera: Conjunctivae normal.   Neck:      Trachea: Trachea normal. No tracheal deviation. Cardiovascular:      Rate and Rhythm: Normal rate and regular rhythm. Heart sounds: Normal heart sounds. No murmur heard. Pulmonary:      Effort: Pulmonary effort is normal. No respiratory distress. Breath sounds: Normal breath sounds. No stridor. No decreased breath sounds or wheezing. Abdominal:      General: Bowel sounds are increased. There is distension. Palpations: Abdomen is soft. Abdomen is not rigid. There is no shifting dullness or pulsatile mass. Tenderness: There is abdominal tenderness in the periumbilical area. There is no right CVA tenderness, left CVA tenderness, guarding or rebound. Negative signs include Guerrero's sign and McBurney's sign. Hernia: No hernia is present. Comments: Suprapubic catheter noted, small amount of urine with white sediment   Musculoskeletal:         General: Normal range of motion. Cervical back: No rigidity. No muscular tenderness. Comments: Movement normal as observed   Lymphadenopathy:      Cervical: No cervical adenopathy. Skin:     General: Skin is warm and dry. Coloration: Skin is not pale. Findings: No rash. Neurological:      General: No focal deficit present. Mental Status: He is alert. Mental status is at baseline. Cranial Nerves: Cranial nerves are intact. Sensory: Sensation is intact. No sensory deficit. Psychiatric:         Attention and Perception: Attention normal.         Mood and Affect: Mood normal.         Speech: Speech normal.         Behavior: Behavior normal. Behavior is cooperative. Thought Content: Thought content normal.         Cognition and Memory: Cognition normal.         DIFFERENTIAL DIAGNOSIS:   Including but not limited to: abdominal pain, nausea, obstipation, bowel obstruction, ileus, less likely AAA. DIAGNOSTIC RESULTS     EKG: All EKG's are interpreted by [facility] who either signs or Co-signs this chart in the absence of a cardiologist.  Ventricular rate 84 bpm  OH interval 234 ms  QRS duration 92 ms  QTc interval 427 ms  P-RR-T axes 97, 83, 57  Normal sinus rhythm with first-degree AV block. No STEMI    RADIOLOGY: non-plain film images(s) such as CT,Ultrasound and MRI are read by the radiologist.  Plain radiographic images are visualized and preliminarily interpreted by the emergency physician unless otherwise stated below. CT ABDOMEN PELVIS WO CONTRAST Additional Contrast? None   Final Result       1. No evidence of acute intra-abdominal or intrapelvic abnormality. 2. Moderate retained stool. 3. Nonobstructive nephrolithiasis on the left. 4. Prostatomegaly. **This report has been created using voice recognition software. It may contain minor errors which are inherent in voice recognition technology. **      Final report electronically signed by Dr. Trena Foote MD on 2/22/2022 11:01 AM      XR CHEST PORTABLE   Final Result   Stable radiographic appearance of the chest. No evidence of an acute process. **This report has been created using voice recognition software. It may contain minor errors which are inherent in voice recognition technology. **      Final report electronically signed by Dr. Trena Foote MD on 2/22/2022 10:09 AM          LABS:   Labs Reviewed   CBC WITH AUTO DIFFERENTIAL - Abnormal; Notable for the following components:       Result Value    WBC 31.0 (*)     RBC 4.21 (*)     Hemoglobin 11.5 (*)     Hematocrit 35.7 (*)     RDW-CV 16.2 (*)     RDW-SD 49.7 (*)     Segs Absolute 27.8 (*)     Lymphocytes Absolute 0.6 (*)     Monocytes Absolute 1.7 (*)     Immature Grans (Abs) 0.85 (*)     All other components within normal limits   COMPREHENSIVE METABOLIC PANEL - Abnormal; Notable for the following components:    Glucose 171 (*)     CREATININE 5.3 (*)     BUN 68 (*)     Sodium 131 (*)     Potassium 6.9 (*)     Chloride 92 (*)     CO2 19 (*)     Albumin 2.9 (*)     ALT 8 (*)     All other components within normal limits   TROPONIN - Abnormal; Notable for the following components:    Troponin T 0.143 (*)     All other components within normal limits   LACTIC ACID - Abnormal; Notable for the following components:    Lactic Acid 7.1 (*)     All other components within normal limits   ANION GAP - Abnormal; Notable for the following components:    Anion Gap 20.0 (*)     All other components within normal limits   GLOMERULAR FILTRATION RATE, ESTIMATED - Abnormal; Notable for the following components:    Est, Glom Filt Rate 10 (*)     All other components within normal limits   CULTURE, BLOOD 1   CULTURE, BLOOD 2   OSMOLALITY   SCAN OF BLOOD SMEAR   POTASSIUM   PROCALCITONIN   LACTATE, SEPSIS   LACTATE, SEPSIS   URINE WITH REFLEXED MICRO   POCT GLUCOSE   POCT GLUCOSE   POCT GLUCOSE   POCT GLUCOSE   POCT GLUCOSE   POCT GLUCOSE   POCT GLUCOSE       EMERGENCY DEPARTMENT COURSE:   Vitals:    Vitals:    02/26/22 0306 02/26/22 0800 02/26/22 0907 02/26/22 1056   BP: (!) 122/55 (!) 121/57  (!) 124/57   Pulse: 71 76  67   Resp: 18 20 16 18   Temp: 98 °F (36.7 °C) 99.7 °F (37.6 °C)  98.6 °F (37 °C)   TempSrc: Oral Oral  Oral   SpO2: 97%  98% 93%   Weight: 229 lb 8 oz (104.1 kg)      Height:           Patient was seen in the emergency department during the global pandemic, when there was surge capacity and regional healthcare crisis. MDM:  The patient was seen by me in the emergency room for abdominal pain, decreased oral intake, nausea, and increased confusion. Physical exam revealed a confused 25-year-old gentleman who was in no distress. There was mild periumbilical tenderness. Suprapubic catheter had very little urine. Vital signs reviewed and noted stable. Old records were reviewed. Appropriate laboratory and imaging studies were ordered and reviewed upon completion. Pertinent findings: Leukocytosis 31, STEPHY with creatinine of 5.3, BUN 68, hyperkalemia 6.9, troponin 0.143, lactic acid 7.1. Normal EKG    Interventions: Saline, Zosyn, insulin, D10, Kayexalate, calcium gluconate, suprapubic catheter change. Consults: I discussed this patient with my attending physician, Dr. Grady Anderson, who aided in medical decision making. Dr. Mark Pike was consulted in regards to STEPHY and hyperkalemia. He advised in addition to medications monitoring urine output with Billy catheter, give IV fluids, and repeat potassium in 2 hours. Dr. Chadd Phillip was consulted in regards to abdominal pain, benign abdominal exam findings, lactic acid of 7.1 and benign CT scan with high concerns for mesenteric ischemia. Dr. Chadd Phillip did not feel mesenteric ischemia was possible and advised we would see some findings on CT scan. Dr. Chadd Phillip felt mesenteric ischemia was very unlikely and surgical admission was not necessary. Reexamination: Patient remained stable with adequate vital signs. Urine output remained decreased. There was a delay in care as patient would not accept medications for some time. Dr. Grady Anderson was called to the room and spoke with the patient. Patient son was called, talked to the patient, then patient was amenable to receive medications. Results were discussed with the patient and son as well as desire for admission and they were amenable.   The [employer] of our hospitalists' service was consulted and graciously accepted admission. The patient was admitted to the hospital in fair condition. CRITICAL CARE:   During my workup of this patient, it did become clear to me the critical nature of this patient's illness which did require my constant attention, and a critical care time 60 minutes was given    CONSULTS:  1043: Dr. Yovana Brandt (nephrology)   [phone]: Dr. Sb Rahman (surgery)  1245: [facility] PA-C (hospitalist)    PROCEDURES:  None    FINAL IMPRESSION      1. Generalized abdominal pain    2. STEPHY (acute kidney injury) ([address]    3. Hyperkalemia    4. Leukocytosis, unspecified type    5. Suprapubic catheter ([address]    6. Urinary tract infection associated with cystostomy catheter, initial encounter ([address]    7. Septicemia ([address]          DISPOSITION/PLAN     1. Generalized abdominal pain    2. STEPHY (acute kidney injury) ([address]    3. Hyperkalemia    4. Leukocytosis, unspecified type    5. Suprapubic catheter ([address]    6. Urinary tract infection associated with cystostomy catheter, initial encounter ([address]    7.  Septicemia [facility])    Admission      (Please note that portions of this note were completed with a voice recognition program.  Efforts were made to edit the dictations but occasionally words are mis-transcribed.)    Nahomy Stovall PA-C 02/26/22 1:48 PM    YASHIRA Lemons PA-C  02/26/22 [facility],[address], YASHIRA  02/26/22 8310

## 2022-02-22 NOTE — ED PROVIDER NOTES
Attending Supervising Physicians Attestation Statement  I was present with the nurse practitioner during the history and exam. I discussed the findings and plans with the nurse practitioner and agree as documented in her note     Electronically signed by Emelia Stafford MD on 2/22/22 at 1:38 PM TIAGO Molina MD  02/22/22 9414

## 2022-02-22 NOTE — CONSULTS
[facility]    [address], [address]  [facility]  2/22/2022 5:12 PM    Pt Name:    Kari Bailey  MRN:     [id number]   [id number]  YOB: 1931  Admit Date:    2/22/2022  9:12 AM  Primary Care Physician:  Breanna aBll MD    CSN Number:   [id number]    Reason for Consult:  Acute kidney injury, hyperkalemia  Requesting provider:  TEZ Agosto    History:   The patient is a 80 y.o. pleasant elderly white male with history of hypertension, insulin-dependent diabetes mellitus, chronic suprapubic catheter, recent urinary infection, previous cystourethroscopy as well as previous multiple episodes of urinary tract infection. Patient also has history of COPD as well as diastolic CHF. Patient was brought to the emergency room from nursing home for a progressively worsening abdominal pain associated with poor oral intake. Patient is not a very good historian. Apparently patient has not been eating well. Recently patient has had some abdominal pain around his suprapubic catheter site. He has had previous UTIs. Per nursing notes patient's catheter apparently was noted to be skin recently and was not draining as it usually does. Apparently patient also complained of nausea this morning when he woke up. CT abdomen did not show any evidence of acute intra-abdominal or pelvic abnormality however he was noted to have some constipation with retained stool without any hydronephrosis. He was noted to have prostatomegaly. He was noted to have significant leukocytosis as well as elevated procalcitonin level. Nephrology was consulted for management of hyperkalemia and acute kidney injury. Patient was seen and examined in the ER. Medical management has already been initiated for hyperkalemia. He has a suprapubic catheter with some urine in the bag. He is currently on IV fluids. He is on room air.   Denies any shortness of breath.     Past Medical History:  Past Medical History:   Diagnosis Date    Anemia     Anxiety     BPH (benign prostatic hyperplasia)     CAD (coronary artery disease)     leaking valve    Chronic back pain     [facility]ch[facility]     Detached retina     Diabetes mellitus (HCC)     NIDDM    [facility]deep venous thrombosis) ([facility])     RLE    DVT (deep venous thrombosis) ([facility] [facility] 11/9/15    LLE    Dysphagia     Glaucoma     Hyperlipidemia     Hypertension     Macular degeneration     Mass of chest     benign chest mass, Dr Tanvi Weber    Movement disorder     spinal stenosis    Obesity     Osteoarthritis     right ankle, right hand    Paroxysmal atrial fibrillation ([facility]     Pneumonia     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)     s/p thyroid resection    Urinary incontinence        Past Surgical History:  Past Surgical History:   Procedure Laterality Date   [facility] SURGERY  2006,2011, 2014    BRONCHOSCOPY N/A 8/29/2017    BRONCHOSCOPY AIRWAY ONLY performed by Luzma Barrow MD at [address] [facility] [address] N/A 6/17/2021    CYSTOSCOPY SUPRAPUBIC TUBE PLACEMENT performed by Luis Daniel Landin MD at [address] [facility], 8/12/2021    CYSTOSCOPY, ATTEMPTED SUPRAPUBIC TUBE PLACEMENT, ATTEMPTED PAINTER CATH performed by Alison Simmons MD at [address] EKG 12-LEAD  11/10/2015         FRACTURE SURGERY      right hand    OTHER SURGICAL HISTORY      spinal epidurals    RETINAL DETACHMENT SURGERY      SPINE SURGERY  2004    Lumbar laminectomy    THYROIDECTOMY      TURP N/A 3/17/2020    CYSTOSCOPY WITH GREENLIGHT PHOTOVAPORIZATION OF PROSTATE performed by Luis Daniel Landin MD at [address] TURP N/A 3/24/2020    PLASMA BUTTON TURP WITH CLOT EVACUATION performed by Luis Daniel Landin MD at [place] [facility]       Family History:  Family History   Problem Relation Age of Onset    Other Mother Complications of Childbirth    Other Father [de-identified]        Natural causes       Social History:  Social History     Socioeconomic History    Marital status:      Spouse name: Not on file    Number of children: 2    Years of education: Not on file    Highest education level: Not on file   Occupational History    Not on file   Tobacco Use    Smoking status: Never Smoker    Smokeless tobacco: Never Used   Vaping Use    Vaping Use: Never used   Substance and Sexual Activity    Alcohol use: Yes     Alcohol/week: 1.0 standard drink     Types: 1 Glasses of wine per week     Comment: Glass of wine with dinner.  Drug use: No    Sexual activity: Never   Other Topics Concern    Not on file   Social History Narrative    Not on file     Social Determinants of Health     Financial Resource Strain:     Difficulty of Paying Living Expenses: Not on file   Food Insecurity:     Worried About Running Out of Food in the Last Year: Not on file    Edgardo of Food in the Last Year: Not on file   Transportation Needs:     Lack of Transportation (Medical): Not on file    Lack of Transportation (Non-Medical):  Not on file   Physical Activity:     Days of Exercise per Week: Not on file    Minutes of Exercise per Session: Not on file   Stress:     Feeling of Stress : Not on file   Social Connections:     Frequency of Communication with Friends and Family: Not on file    Frequency of Social Gatherings with Friends and Family: Not on file    Attends Christian Services: Not on file    Active Member of Clubs or Organizations: Not on file    Attends Club or Organization Meetings: Not on file    Marital Status: Not on file   Intimate Partner Violence:     Fear of Current or Ex-Partner: Not on file    Emotionally Abused: Not on file    Physically Abused: Not on file    Sexually Abused: Not on file   Housing Stability:     Unable to Pay for Housing in the Last Year: Not on file    Number of [place] in the Last Year: Not on file    Unstable Housing in the Last Year: Not on file       Home Meds:  Prior to Admission medications    Medication Sig Start Date End Date Taking? Authorizing Provider   phenazopyridine (PYRIDIUM) 100 MG tablet Take 1-2 tablets by mouth 3 times daily as needed (pain) 1/9/22   Mary Artis PA-C   oxybutynin (DITROPAN) 5 MG tablet Take 1 tablet by mouth every 8 hours PRN pain related to infection/inflammation due to indwelling catheter    Historical Provider, MD   bacitracin 500 UNIT/GM ointment Apply 500 Units topically 2 times daily [address] MD Minerva   oxybutynin (DITROPAN XL) 10 MG extended release tablet Take 1 tablet by mouth every evening 8/23/21 12/20/21  Ashanti Guerrero MD   furosemide (LASIX) 40 MG tablet Take 1 tablet by mouth daily 7/14/21   Lizette Romeo MD   potassium chloride (KLOR-CON M) 10 MEQ extended release tablet Take 1 tablet by mouth daily TAKE 1 TABLETS BY MOUTH TWICE DAILY 7/14/21   Yoseph Nolasco MD   levothyroxine (SYNTHROID) 175 MCG tablet TAKE 1 TABLET BY MOUTH  DAILY 7/14/21   Lizette Romeo MD   lactobacillus (CULTURELLE) capsule Take 1 capsule by mouth 2 times daily (with meals) 7/14/21   Lizette Romeo MD   blood glucose test strips (ACCU-CHEK MERYL PLUS) strip Accu-check Meryl Plus Test Strips (or brand per pt preference). Sig: test sugar BID. Dx: E11.8 7/6/21   MAEGAN Goodman CNP   SOFT TOUCH LANCETS MISC Lancet (or brand per pt preference) Use to check blood sugars 2 times daily. Dx: E11.8 7/6/21   MAEGAN Goodman CNP   Alcohol Swabs (ALCOHOL PREP) 70 % PADS Alcohol pads (brand per preference). Sig: use to test sugar twice per day.  Dx: E11.8 7/6/21   MAEGAN Goodman CNP   doxazosin (CARDURA) 4 MG tablet TAKE 1 TABLET BY MOUTH EVERY NIGHT 6/22/21   Gladys Linton MD   simethicone (MYLICON) 024 MG chewable tablet Take 125 mg by mouth every 6 hours as needed for Flatulence    Historical Provider, MD aluminum & magnesium hydroxide-simethicone (MYLANTA) 400-400-40 MG/5ML SUSP Take 15 mLs by mouth every 6 hours as needed    Historical Provider, MD   famotidine (PEPCID) 20 MG tablet Take 20 mg by mouth 2 times daily as needed    Historical Provider, MD   Arformoterol Tartrate (BROVANA) 15 MCG/2ML NEBU Take 2 mLs by nebulization 2 times daily 1 dose inhale orally two times per day 5/3/21   Dallas Hill MD   Multiple Vitamins-Minerals (PRESERVISION AREDS 2) CAPS Take 1 capsule by mouth daily 4/9/21   Dallas Hill MD   spironolactone (ALDACTONE) 25 MG tablet TAKE 1 TABLET BY MOUTH DAILY 2/15/21   Neto Aguilar MD   Revefenacin 175 MCG/3ML SOLN Inhale 1 nebule into the lungs daily Dx COPD J44.3 11/16/20   Jennifer Linn PA-C   insulin glargine (LANTUS SOLOSTAR) 100 UNIT/ML injection pen INJECT SUBCUTANEOUSLY 18  UNITS NIGHTLY  Patient taking differently: No sig reported 9/29/20   Dallas Hill MD   ferrous gluconate (FERGON) 324 (38 Fe) MG tablet Take 1 tablet by mouth 2 times daily 8/18/20   Dallas Hill MD   metFORMIN (GLUCOPHAGE) 1000 MG tablet Take 1 tablet by mouth 2 times daily (with meals) 8/18/20   Dallas Hill MD   budesonide (PULMICORT) 0.5 MG/2ML nebulizer suspension USE 1 VIAL VIA NEBULIZER TWICE DAILY.  RINSE MOUTH AFTER TREATMENT 8/4/20   Dallas Hill MD   Multiple Vitamins-Minerals (PRESERVISION AREDS 2) CAPS Take 1 capsule by mouth daily 8/3/20   Dallas Hill MD   lisinopril (PRINIVIL;ZESTRIL) 2.5 MG tablet Take 1 tablet by mouth daily 7/24/20   Dallas Hill MD   docusate (COLACE, DULCOLAX) 100 MG CAPS Take 100 mg by mouth 2 times daily 7/24/20   Dallas Hill MD   vitamin D3 (CHOLECALCIFEROL) 10 MCG (400 UNIT) TABS tablet Take 1 tablet by mouth daily 400units 7/24/20   Dallas Hill MD   polyethylene glycol (MIRALAX) 17 g packet Take 17 g by mouth daily as needed for Constipation    Historical Provider, MD   acetaminophen (TYLENOL) 325 MG tablet Take 650 mg by mouth every 4 hours as needed for Pain or Fever  6/26/20   Historical Provider, MD   lidocaine (LMX) 4 % cream Apply topically every 6 hours as needed for Pain To groin 6/26/20   Historical Provider, MD   albuterol sulfate  (90 Base) MCG/ACT inhaler Inhale 2 puffs into the lungs every 6 hours as needed for Wheezing 1/24/20   Helen Alaniz PA-C   OXYGEN Inhale 3 L into the lungs nightly Indications: Difficulty Breathing, Oxygen Therapy And prn through cpap    Historical Provider, MD       Review of Systems:  Unable to obtain any meaningful information from patient himself. Current Meds:  Infusion:    dextrose      sodium bicarbonate infusion 100 mL/hr at 02/22/22 1315     Meds:   Meds prn: glucose, dextrose, glucagon (rDNA), dextrose     Allergies/Intolerances: ALLERGIES: Latex, Macrobid [nitrofurantoin monohyd macro], Levaquin [levofloxacin], Brimonidine tartrate, Adhesive tape, and Alphagan [brimonidine tartrate]    24HR INTAKE/OUTPUT:  No intake or output data in the 24 hours ending 02/22/22 1712  No intake/output data recorded. No intake/output data recorded. Admission weight: 212 lb (96.2 kg)  Wt Readings from Last 3 Encounters:   02/22/22 212 lb (96.2 kg)   01/03/22 212 lb (96.2 kg)   12/06/21 210 lb (95.3 kg)     Body mass index is 34.22 kg/m².     Physical Examination:  VITALS:   Vitals:    02/22/22 1248 02/22/22 1350 02/22/22 1549 02/22/22 1700   BP: (!) 126/56 (!) 118/54 126/60 (!) 150/80   Pulse: 89 81 77 81   Resp: 21 26 24 22   Temp:    98.1 °F (36.7 °C)   TempSrc:    Oral   SpO2: 93% 99% 100% 98%   Weight:       Height:         Weight:   Wt Readings from Last 3 Encounters:   02/22/22 212 lb (96.2 kg)   01/03/22 212 lb (96.2 kg)   12/06/21 210 lb (95.3 kg)     Constitutional and General Appearance: Awake but poor historian, ill-appearing  Eyes: no icteric sclera in left eye or right eye, B/L pallor conjunctiva  Ears and Nose: normal external appearance of left and right ear  Oral: Dry oral mucous membranes  Neck: No jugular venous distention  Lungs: Air entry diminished, poor effort  Chest: No chest wall tenderness  Heart: regular rate, S1, S2  Extremities: No LE edema, no tenderness  GI: soft, no guarding, nonspecific tenderness on palpation  + SP catheter present  Skin: no rash seen on exposed extremities. Some poor skin turgor  Musculo: moves all extremities. Neuro: no slurred speech    Lab Data  CBC:   Recent Labs     02/22/22  0940   WBC 31.0*   HGB 11.5*   HCT 35.7*        BMP:  Recent Labs     02/22/22  0940 02/22/22  1152   *  --    K 6.9* 6.5*   CL 92*  --    CO2 19*  --    BUN 68*  --    CREATININE 5.3*  --    GLUCOSE 171*  --    CALCIUM 8.6  --      Hepatic:   Recent Labs     02/22/22  0940   LABALBU 2.9*   AST 15   ALT 8*   BILITOT 0.3   ALKPHOS 85       Additional Labs: Procalcitonin level 27  Diagnostics: CT abdomen negative for any acute intra-abdominal pathology, nonobstructive nephrolithiasis, moderate. Retained stool, prostatomegaly    Old tests reviewed. Echo: EF 49%, grade 1 diastolic dysfunction June 2021  Labs: Baseline serum creatinine 0.9-1.0      Impression and Plan:  1. STEPHY appears to be multifactorial but likely ATN in setting of SIRS/sepsis induced organ dysfunction with possible underlying pre and post renal failure. Clinically appearing dehydrated. Patient also possibly had urinary retention due to suprapubic catheter malfunction. Patient was noted to have some discharge around the catheter site on arrival.  Per nursing notes catheter apparently was kinked. Details not clear at this time. Currently has some urine in his catheter bag. CAT scan was negative for hydronephrosis. We will continue with supportive management. Will start patient on IV bicarbonate drip. Serial BMPs. If potassium rises further then may require renal replacement therapy. 2. Hyperkalemia in setting of STEPHY as well as exogenous potassium supplementation. Patient was also on lisinopril and Aldactone. Hold these agents. IV insulin, IV bicarbonate has been given. Start patient on IV bicarbonate drip. Recheck labs. If potassium rises further then will need dialysis treatment. 3. Metabolic acidosis. Start patient on IV bicarbonate drip. 4. Suprapubic catheter infection. Follow cultures. On empiric IV antibiotics per hospitalist services. Patient with recurrent UTIs. May need to see urology  5. Sepsis due to UTI. Significant leukocytosis as well as procalcitonin level. UA shows large pyuria. Follow urine and blood cultures. 6. Chronic diastolic dysfunction. Clinically not in decompensated heart failure. 7. History of COPD  Discussed with the ER  Discussed with hospitalist services  Thank you for the consult. Please feel free to call me if you have any questions.      Keesha Sosa MD  [facility]

## 2022-02-22 NOTE — H&P
Hospitalist - History & Physical      Patient: Donal Figueroa    Unit/Bed:17/017A  YOB: 1931  MRN: [id number]   Acct: [de-identified]   PCP: Ashley Beckham MD    Date of Service: Pt seen/examined on 02/22/22  and Admitted to Inpatient with expected LOS greater than two midnights due to medical therapy. Chief Complaint: Lower abdominal pain     Assessment and Plan:  1. Sepsis due to suspected suprapubic catheter infection:    LA 7.1, WBC 31.0, Procal 27.54. Pt is slightly tachypneic, afebrile, and normotensive. Purulent discharge noted around catheter site with tenderness to palpation in the lower quadrants bilaterally. No signs of peritonitis at this time. CT abdomen today reveals no evidence of acute intra-abdominal or intrapelvic abnormalities. Blood cultures obtained. Given 2L NS in ED with calcium gluconate, insulin, sodium bicarb, and started on Zosyn in ED. Suprapubic catheter was exchanged in ED, per ED provider. Per Nephrology recommendations, continue sodium bicarb at 100cc/hr. Recheck LA Q2H. MRSA ordered. Culture purulent discharge around catheter site. Pharmacy to dose vanc, and continue Zosyn. 2. STEPHY:   Cr 5.3, eGFR 10. Suspecting combination of pre-renal and post renal due to dehydration and possible urinary retention due to #1. Baseline Cr WNL. Dr. Shailesh Andrade consulted from ED. Continue sodium bicarb infusion, per nephrology recommendations. 3. Hyperkalemia:    K 6.9 upon arrival. Nephrology consulted from ED for further recommendations. Kayexalate not given in ED due to suspicion for SBO. Pt given calcium gluconate, insulin, and sodium bicarb infusion. Repeat BMP today. Continue bicarb infusion. Telemetry. 4. HFpEF, with grade 1 diastolic dysfunction:    Last ECHO 6/14/2021 reveals EF 55%. Pt appears to be compensated today. Unclear why patient is not on BB at this time. Consider starting if BP tolerates. Telemetry.      5. Elevated Troponin:    Troponin noted to be 0.143 today. Likely reactive to #1. Repeat troponin x 1 occurrence. 6. Essential HTN:    /54. Hold lisinopril today. Consider resuming tomorrow. 7. COPD:   Stable on RA. Continue home inhalers. 8. Dementia:   Baseline unknown at this time. NPO diet until seen by Speech- concern for aspiration. I discussed case with Dr. Jared Jacobs today. History Of Present Illness:    Rosalva Nunez is a 35-year-old  male with a past medical history of CHF, COPD, dementia who presents to [facility] ED from a nursing home for the evaluation of abdominal pain, and not eating and drinking. Due to patient's underlying dementia, he is a poor historian. ED provider reports poor history from nursing home nurse, and states that he did not eat well yesterday and had a fall earlier today. This patient is the father of Dr. Fatmata Georges with oncology, and ED provider reached out to him for more information. Patient's baseline is unclear. He states he has lower abdominal pain around his catheter insertion site, fever, chills, decreased appetite. Upon questioning, patient was able to state his name and date of birth, but was unsure of where he was at and date. The patient denies chest pain, shortness of breath, lightheadedness, dizziness, weakness at this time.       Past Medical History:        Diagnosis Date    Anemia     Anxiety     BPH (benign prostatic hyperplasia)     CAD (coronary artery disease)     leaking valve    Chronic back pain     COPD (chronic obstructive pulmonary disease) ([address]     Detached retina     Diabetes mellitus ([address]     NIDDM    [facility] thrombosis[facility])     RLE    DVT (deep venous thrombosis) ([address] 11/9/15    LLE    Dysphagia     Glaucoma     Hyperlipidemia     Hypertension     Macular degeneration     Mass of chest     benign chest mass, Dr Jordan Leisure    Movement disorder     spinal stenosis    Obesity     Osteoarthritis     right ankle, right hand    Paroxysmal atrial fibrillation ([address]     Pneumonia     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)     s/p thyroid resection    Urinary incontinence        Past Surgical History:        Procedure Laterality Date   [facility] SURGERY  [id number], 2014    BRONCHOSCOPY N/A 8/29/2017    BRONCHOSCOPY AIRWAY ONLY performed by Yamilet Astudillo MD at [address] [address] [address] N/A 6/17/2021    CYSTOSCOPY SUPRAPUBIC TUBE PLACEMENT performed by Rich Cevallos MD at [place] [address] N/A 8/12/2021    CYSTOSCOPY, ATTEMPTED SUPRAPUBIC TUBE PLACEMENT, ATTEMPTED PAINTER CATH performed by Saul Yanes MD at [address] EKG 12-LEAD  11/10/2015         FRACTURE SURGERY      right hand    OTHER SURGICAL HISTORY      spinal epidurals    RETINAL DETACHMENT SURGERY      SPINE SURGERY  2004    Lumbar laminectomy    THYROIDECTOMY      TURP N/A 3/17/2020    CYSTOSCOPY WITH GREENLIGHT PHOTOVAPORIZATION OF PROSTATE performed by Rich Cevallos MD at [address] TURP N/A 3/24/2020    PLASMA BUTTON TURP WITH CLOT EVACUATION performed by Rich Cevallos MD at [address] [place] [address] Medications:   No current facility-administered medications on file prior to encounter.      Current Outpatient Medications on File Prior to Encounter   Medication Sig Dispense Refill    phenazopyridine (PYRIDIUM) 100 MG tablet Take 1-2 tablets by mouth 3 times daily as needed (pain) 12 tablet 0    oxybutynin (DITROPAN) 5 MG tablet Take 1 tablet by mouth every 8 hours PRN pain related to infection/inflammation due to indwelling catheter      bacitracin 500 UNIT/GM ointment Apply 500 Units topically 2 times daily CATHETER CARE      oxybutynin (DITROPAN XL) 10 MG extended release tablet Take 1 tablet by mouth every evening 30 tablet 2    furosemide (LASIX) 40 MG tablet Take 1 tablet by mouth daily 60 tablet 0    potassium chloride (KLOR-CON M) 10 MEQ extended release tablet Take 1 tablet by mouth daily TAKE 1 TABLETS BY MOUTH TWICE DAILY 180 tablet 0    levothyroxine (SYNTHROID) 175 MCG tablet TAKE 1 TABLET BY MOUTH  DAILY 30 tablet 0    lactobacillus (CULTURELLE) capsule Take 1 capsule by mouth 2 times daily (with meals) 30 capsule 0    blood glucose test strips (ACCU-CHEK CHE PLUS) strip Accu-check Che Plus Test Strips (or brand per pt preference). Sig: test sugar BID. Dx: E11.8 200 strip 3    SOFT TOUCH LANCETS MISC Lancet (or brand per pt preference) Use to check blood sugars 2 times daily. Dx: E11.8 200 each 3    Alcohol Swabs (ALCOHOL PREP) 70 % PADS Alcohol pads (brand per preference). Sig: use to test sugar twice per day.  Dx: E11.8 200 each 3    doxazosin (CARDURA) 4 MG tablet TAKE 1 TABLET BY MOUTH EVERY NIGHT 90 tablet 3    simethicone (MYLICON) 143 MG chewable tablet Take 125 mg by mouth every 6 hours as needed for Flatulence      aluminum & magnesium hydroxide-simethicone (MYLANTA) 400-400-40 MG/5ML SUSP Take 15 mLs by mouth every 6 hours as needed      famotidine (PEPCID) 20 MG tablet Take 20 mg by mouth 2 times daily as needed      Arformoterol Tartrate (BROVANA) 15 MCG/2ML NEBU Take 2 mLs by nebulization 2 times daily 1 dose inhale orally two times per day 120 mL 11    Multiple Vitamins-Minerals (PRESERVISION AREDS 2) CAPS Take 1 capsule by mouth daily 90 capsule 3    spironolactone (ALDACTONE) 25 MG tablet TAKE 1 TABLET BY MOUTH DAILY 90 tablet 3    Revefenacin 175 MCG/3ML SOLN Inhale 1 nebule into the lungs daily Dx COPD J44.3 30 vial 5    insulin glargine (LANTUS SOLOSTAR) 100 UNIT/ML injection pen INJECT SUBCUTANEOUSLY 18  UNITS NIGHTLY (Patient taking differently: No sig reported) 30 mL 3    ferrous gluconate (FERGON) 324 (38 Fe) MG tablet Take 1 tablet by mouth 2 times daily 180 tablet 3    metFORMIN (GLUCOPHAGE) 1000 MG tablet Take 1 tablet by mouth 2 times daily (with meals) 180 tablet 3    budesonide (PULMICORT) 0.5 MG/2ML nebulizer suspension USE 1 VIAL VIA NEBULIZER TWICE DAILY. RINSE MOUTH AFTER TREATMENT 360 mL 3    [DISCONTINUED] Multiple Vitamins-Minerals (PRESERVISION AREDS 2) CAPS Take 1 capsule by mouth daily 90 capsule 3    lisinopril (PRINIVIL;ZESTRIL) 2.5 MG tablet Take 1 tablet by mouth daily 90 tablet 3    docusate (COLACE, DULCOLAX) 100 MG CAPS Take 100 mg by mouth 2 times daily 180 capsule 3    vitamin D3 (CHOLECALCIFEROL) 10 MCG (400 UNIT) TABS tablet Take 1 tablet by mouth daily 400units 90 tablet 3    polyethylene glycol (MIRALAX) 17 g packet Take 17 g by mouth daily as needed for Constipation      acetaminophen (TYLENOL) 325 MG tablet Take 650 mg by mouth every 4 hours as needed for Pain or Fever       lidocaine (LMX) 4 % cream Apply topically every 6 hours as needed for Pain To groin      albuterol sulfate  (90 Base) MCG/ACT inhaler Inhale 2 puffs into the lungs every 6 hours as needed for Wheezing 1 Inhaler 3    OXYGEN Inhale 3 L into the lungs nightly Indications: Difficulty Breathing, Oxygen Therapy And prn through cpap         Allergies:    Latex, Macrobid [nitrofurantoin monohyd macro], Levaquin [levofloxacin], Brimonidine tartrate, Adhesive tape, and Alphagan [brimonidine tartrate]    Social History:    reports that he has never smoked. He has never used smokeless tobacco. He reports current alcohol use of about 1.0 standard drink of alcohol per week. He reports that he does not use drugs. Family History:       Problem Relation Age of Onset    Other Mother         Complications of Childbirth    Other Father [de-identified]        Natural causes       Diet:  No diet orders on file    Review of systems:     Review of Systems   Constitutional: Positive for appetite change, chills and fever. Negative for fatigue. HENT: Negative for congestion, rhinorrhea and sore throat. Respiratory: Positive for cough. Negative for chest tightness and shortness of breath. Cardiovascular: Negative for chest pain, palpitations and leg swelling. Gastrointestinal: Positive for abdominal pain and nausea. Negative for constipation. Musculoskeletal: Negative for back pain. Neurological: Negative for dizziness, weakness and light-headedness. PHYSICAL EXAM:  BP (!) 118/54   Pulse 81   Temp 97.9 °F (36.6 °C) (Oral)   Resp 26   Ht 5' 6\" (1.676 m)   Wt 212 lb (96.2 kg)   SpO2 99%   BMI 34.22 kg/m²   General appearance: Chronically-ill appearing. No apparent distress. Appears stated age and cooperative. Skin: Subtle surrounding erythema around suprapubic catheter. Exposed skin of arms and legs appear dry. Skin color, texture, turgor normal.  No rashes or lesions. HEENT: Oral mucosa is dry. Normal cephalic, atraumatic without obvious deformity. Pupils equal, round, and reactive to light. Extra-ocular muscles intact. Conjunctivae/corneas clear. Neck: Trachea midline. Supple, with full range of motion. No jugular venous distention. Cardiovascular: Regular rate and rhythm with normal S1/S2. No murmurs, rubs or gallops. Respiratory: On RA. Normal respiratory effort. Clear to auscultation, bilaterally without rales, wheezes, or rhonchi. Abdomen: Tender to palpation of lower abdominal quadrants bilaterally. Suprapubic catheter present at umbilicus. Surround erythema and purulent discharge noted around insertion site. Soft, non-distended. Normal bowel sounds. Musculoskeletal: 1-2+ pitting edema of the LE bilaterally. No weakness or instability noted. No edema, erythema, or gross deformity noted. Vascular: Capillary refill brisk,< 3 seconds. Pulses +2 palpable, equal bilaterally. Neurologic:  Neurovascularly intact without any focal sensory/motor deficits. Cranial nerves: II-XII grossly intact. Psychiatric: Alert and oriented x2.       Labs:   Recent Labs     02/22/22  0940   WBC 31.0*   HGB 11.5*   HCT 35.7*        Recent Labs     02/22/22  0940 02/22/22  1152   *  --    K 6.9* 6.5*   CL 92*  --    CO2 19*  --    BUN 68* --    CREATININE 5.3*  --    CALCIUM 8.6  --      Recent Labs     02/22/22  0940   AST 15   ALT 8*   BILITOT 0.3   ALKPHOS 85     No results for input(s): INR in the last 72 hours. No results for input(s): Ethelene Soulier in the last 72 hours. Urinalysis:    Lab Results   Component Value Date    NITRU NEGATIVE 02/22/2022    WBCUA > 200 02/22/2022    BACTERIA MANY 02/22/2022    RBCUA > 100 02/22/2022    BLOODU LARGE 02/22/2022    SPECGRAV 1.017 09/22/2021    GLUCOSEU NEGATIVE 02/22/2022       Radiology:   CT ABDOMEN PELVIS WO CONTRAST Additional Contrast? None   Final Result       1. No evidence of acute intra-abdominal or intrapelvic abnormality. 2. Moderate retained stool. 3. Nonobstructive nephrolithiasis on the left. 4. Prostatomegaly. **This report has been created using voice recognition software. It may contain minor errors which are inherent in voice recognition technology. **      Final report electronically signed by Dr. Deepak Fernandez MD on 2/22/2022 11:01 AM      XR CHEST PORTABLE   Final Result   Stable radiographic appearance of the chest. No evidence of an acute process. **This report has been created using voice recognition software. It may contain minor errors which are inherent in voice recognition technology. **      Final report electronically signed by Dr. Deepak Fernandez MD on 2/22/2022 10:09 AM        CT ABDOMEN PELVIS WO CONTRAST Additional Contrast? None    Result Date: 2/22/2022  PROCEDURE: CT ABDOMEN PELVIS WO CONTRAST CLINICAL INFORMATION: ain, distention . COMPARISON: 8/13/2021. TECHNIQUE: Axial 5 mm CT images were obtained through the abdomen and pelvis. No contrast was given. Coronal and sagittal reconstructions were created. All CT scans at this facility use dose modulation, iterative reconstruction, and/or weight-based dosing when appropriate to reduce radiation dose to as low as reasonably achievable.  FINDINGS:  There is stable linear opacities at the bilateral lung bases, left greater than right as evidence for recurrent atelectasis or scar. Visualized portions of lungs are otherwise clear. The visualized portion of the unopacified heart is unremarkable. The unopacified liver is unremarkable. The gallbladder is normal in appearance. Adrenal glands are unremarkable. There is a 3 mm calculus at the interpolar region of the left kidney. There is stable mild nonspecific perinephric fat stranding bilaterally. No hydronephrosis or ureterolithiasis is identified. There is a stable IVC filter. No retroperitoneal or mesenteric lymphadenopathy is identified. There is prominent stool throughout the large bowel. The appendix is normal in appearance. Small bowel appears within normal limits. There is a suprapubic catheter in place, similar to prior exam. The bladder is decompressed. The prostate is prominently enlarged with central calcifications, similar to prior exam. No free fluid is identified. There are stable degenerative changes of the lumbar spine. There are stable surgical changes from prior laminectomy at L1-2.      1. No evidence of acute intra-abdominal or intrapelvic abnormality. 2. Moderate retained stool. 3. Nonobstructive nephrolithiasis on the left. 4. Prostatomegaly. **This report has been created using voice recognition software. It may contain minor errors which are inherent in voice recognition technology. ** Final report electronically signed by Dr. Jose Angel Sullivan MD on 2/22/2022 11:01 AM    XR CHEST PORTABLE    Result Date: 2/22/2022  PROCEDURE: XR CHEST PORTABLE CLINICAL INFORMATION: sob. Leg swelling. COMPARISON: Chest radiographs dated 8/13/2021. TECHNIQUE: AP upright view of the chest. FINDINGS: There are stable median sternotomy wires and mediastinal surgical clips. The lungs appear grossly clear. The pulmonary vasculature is within normal limits. The cardiac silhouette is normal in size.  The aorta is mildly tortuous, stable compared to prior exam. There are stable degenerative changes of the spine. Stable radiographic appearance of the chest. No evidence of an acute process. **This report has been created using voice recognition software. It may contain minor errors which are inherent in voice recognition technology. ** Final report electronically signed by Dr. Tiffanie Villarreal MD on 2/22/2022 10:09 AM        EKG: SR with 1st degree AV block with Premature atrial complexes.  Low voltage QRS    Electronically signed by Taisha Voss PA-C on 2/22/2022 at 3:06 PM

## 2022-02-22 NOTE — ED NOTES
Patient removed IV by self. Patient pulling off all monitor stickers and clothing. Tele sitter requested to bedside at this time for patient safety.      Mariposa Arroyo RN  02/22/22 5425

## 2022-02-22 NOTE — ED NOTES
ED to inpatient nurses report    Chief Complaint   Patient presents with    Leg Swelling    Dementia    Abdominal Pain      Present to ED from nursing home  LOC: alert to only name  Vital signs   Vitals:    02/22/22 1122 02/22/22 1248 02/22/22 1350 02/22/22 1549   BP: 117/63 (!) 126/56 (!) 118/54 126/60   Pulse: 86 89 81 77   Resp: 19 21 26 24   Temp:       TempSrc:       SpO2: 100% 93% 99% 100%   Weight:       Height:          Oxygen Baseline RA    Current needs required RA Bipap/Cpap No  LDAs:   Peripheral IV 02/22/22 Left Antecubital (Active)   Site Assessment Clean;Dry; Intact 02/22/22 0945   Line Status Brisk blood return;Flushed;Specimen collected;Normal saline locked 02/22/22 0945   Dressing Status Clean;Dry; Intact 02/22/22 0945     Mobility: Requires assistance * 2  Pending ED orders: None  Present condition: STABLE; patient picking at tele monitor, gown, and pointing at things in the room. Tele sitter at bedside for patient safety.   Person of contact Ace Montilla, phone number in patient chart    Electronically signed by Mackenzie Roger RN on 2/22/2022 at 4:22 PM       Mackenzie Roger RN  02/22/22 7845

## 2022-02-22 NOTE — ED NOTES
Patient complains of nausea. Patient asked if he would like medication for the nausea, patient reports, \"yes. \" Order obtained per ED provider.      Nicole Reno RN  02/22/22 4196

## 2022-02-22 NOTE — ED NOTES
Patient refusing all care at this time. Patient states \"I want my doctor to do this. \" TEZ Timmons informed per this nurse.         Allyn Jackson RN  02/22/22 2902

## 2022-02-22 NOTE — Clinical Note
Discharge Plan[de-identified] Extended Care Facility (e.g. Adult Home, Nursing Home, etc.)   Telemetry/Cardiac Monitoring Required?: Yes

## 2022-02-22 NOTE — ED NOTES
Bed: 017A  Expected date: 2/22/22  Expected time: 9:07 AM  Means of arrival:   Comments:  Nuha Herrera RN  02/22/22 7969

## 2022-02-22 NOTE — ED TRIAGE NOTES
[facility] Dosing Services: Antimicrobial Stewardship Progress Note    Consult for antibiotic dosing of vancomycin by Dr. Shanti Larkin  Pharmacist reviewed antibiotic appropriateness for 68year old , female  for indication of septic arthritis of left shoulder (s/p washout on 11/27)   Day of Therapy 1    Plan:  Vancomycin therapy:  Start Vancomycin therapy, with loading dose of 2000 mg. Follow with maintenance dose of 1250 mg every 12 hours. Dose calculated to approximate a therapeutic trough of 15-20 mcg/mL. Last trough level / Plan for level: Trough prior to 4th dose (not ordered). Pharmacy to follow daily and will make changes to dose and/or frequency based on clinical status. Other Antimicrobial  (not dosed by pharmacist)   Levofloxacin 750mg PO every 24 hours   Cultures     11/27 Blood: NGTD X 19 hours (prelim)  11/27 joint fluid: rare WBCs, no organisms (prelim)   Serum Creatinine     Lab Results   Component Value Date/Time    Creatinine 0.74 11/28/2017 04:15 AM       Creatinine Clearance Estimated Creatinine Clearance: 60.4 mL/min (based on Cr of 0.74).      Temp   (P) 98.4 °F (36.9 °C)    WBC   Lab Results   Component Value Date/Time    WBC 19.5 11/28/2017 04:15 AM       H/H   Lab Results   Component Value Date/Time    HGB 8.8 11/28/2017 04:15 AM        Platelets   Lab Results   Component Value Date/Time    PLATELET 978 97/17/5888 04:15 AM          Pharmacist: Signed Aurora Fields Contact information: 1314 Patient to ED from home c/c leg swelling, dementia worsening, and abdominal pain. Patient is calm and cooperative. Patient complains of abdominal pain, but is unable to rate the pain. Distention is noted of patient abdomen with tenderness upon palpitation.

## 2022-02-22 NOTE — ED NOTES
Patient medicated per MAR. Patient semi-fowlers in bed, patient appears to be sleeping with snoring respirations. VSS. Call light in reach. Will continue to monitor.      Ron Mclean RN  02/22/22 6750

## 2022-02-22 NOTE — PROGRESS NOTES
Pt admitted to  6K8 from ED. Complaints: abdominal pain. IV D5 with sodium bicarbonate infusing into the antecubital left, condition patent and no redness at a rate of 100 mls/ hour with about 700 mls in the bag still. IV site free of s/s of infection or infiltration. Vital signs obtained. Assessment and data collection initiated. Two nurse skin assessment performed by Deacon Duran and Ra PATEL. Oriented to room. Policies and procedures for 6 explained. Bushra Downey RN discussed hourly rounding with patient addressing 5 P's. Fall prevention and safety brochure discussed with patient. Bed alarm on. Call light in reach. The best day to schedule a follow up Dr appointment is:  Monday, Tuesday, Wednesday, Thursday and Friday a.m. Explained patients right to have family, representative or physician notified of their admission. Patient has Declined for physician to be notified. Patient has Declined for family/representative to be notified. All questions answered with no further questions at this time.        Which family member is the designated  Luis Carlos Chambers number [phone]  Do you want them contacted on admission and updated every shift no

## 2022-02-23 ENCOUNTER — APPOINTMENT (OUTPATIENT)
Dept: CT IMAGING | Age: 87
DRG: 698 | End: 2022-02-23
Payer: MEDICARE

## 2022-02-23 ENCOUNTER — APPOINTMENT (OUTPATIENT)
Dept: GENERAL RADIOLOGY | Age: 87
DRG: 698 | End: 2022-02-23
Payer: MEDICARE

## 2022-02-23 LAB
ANION GAP SERPL CALCULATED.3IONS-SCNC: 10 MEQ/L (ref 8–16)
ANION GAP SERPL CALCULATED.3IONS-SCNC: 9 MEQ/L (ref 8–16)
ANISOCYTOSIS: PRESENT
BASOPHILS # BLD: 0.2 %
BASOPHILS ABSOLUTE: 0 THOU/MM3 (ref 0–0.1)
BUN BLDV-MCNC: 41 MG/DL (ref 7–22)
BUN BLDV-MCNC: 42 MG/DL (ref 7–22)
CALCIUM SERPL-MCNC: 7.8 MG/DL (ref 8.5–10.5)
CALCIUM SERPL-MCNC: 7.8 MG/DL (ref 8.5–10.5)
CHLORIDE BLD-SCNC: 95 MEQ/L (ref 98–111)
CHLORIDE BLD-SCNC: 98 MEQ/L (ref 98–111)
CO2: 26 MEQ/L (ref 23–33)
CO2: 29 MEQ/L (ref 23–33)
CORTISOL COLLECTION INFO: NORMAL
CORTISOL: 14.95 UG/DL
CREAT SERPL-MCNC: 3.4 MG/DL (ref 0.4–1.2)
CREAT SERPL-MCNC: 3.8 MG/DL (ref 0.4–1.2)
DIFFERENTIAL TYPE: ABNORMAL
EKG ATRIAL RATE: 81 BPM
EKG ATRIAL RATE: 84 BPM
EKG P AXIS: 67 DEGREES
EKG P AXIS: 97 DEGREES
EKG P-R INTERVAL: 234 MS
EKG P-R INTERVAL: 272 MS
EKG Q-T INTERVAL: 338 MS
EKG Q-T INTERVAL: 352 MS
EKG Q-T INTERVAL: 362 MS
EKG QRS DURATION: 100 MS
EKG QRS DURATION: 88 MS
EKG QRS DURATION: 92 MS
EKG QTC CALCULATION (BAZETT): 394 MS
EKG QTC CALCULATION (BAZETT): 408 MS
EKG QTC CALCULATION (BAZETT): 427 MS
EKG R AXIS: -19 DEGREES
EKG R AXIS: 83 DEGREES
EKG R AXIS: 87 DEGREES
EKG T AXIS: -8 DEGREES
EKG T AXIS: 56 DEGREES
EKG T AXIS: 57 DEGREES
EKG VENTRICULAR RATE: 81 BPM
EKG VENTRICULAR RATE: 82 BPM
EKG VENTRICULAR RATE: 84 BPM
EOSINOPHIL # BLD: 0 %
EOSINOPHILS ABSOLUTE: 0 THOU/MM3 (ref 0–0.4)
ERYTHROCYTE [DISTWIDTH] IN BLOOD BY AUTOMATED COUNT: 16 % (ref 11.5–14.5)
ERYTHROCYTE [DISTWIDTH] IN BLOOD BY AUTOMATED COUNT: 48.5 FL (ref 35–45)
GFR SERPL CREATININE-BSD FRML MDRD: 15 ML/MIN/1.73M2
GFR SERPL CREATININE-BSD FRML MDRD: 17 ML/MIN/1.73M2
GLUCOSE BLD-MCNC: 115 MG/DL (ref 70–108)
GLUCOSE BLD-MCNC: 123 MG/DL (ref 70–108)
GLUCOSE BLD-MCNC: 140 MG/DL (ref 70–108)
GLUCOSE BLD-MCNC: 141 MG/DL (ref 70–108)
GLUCOSE BLD-MCNC: 163 MG/DL (ref 70–108)
GLUCOSE BLD-MCNC: 172 MG/DL (ref 70–108)
HBV SURFACE AB TITR SER: NEGATIVE {TITER}
HCT VFR BLD CALC: 30.2 % (ref 42–52)
HEMOGLOBIN: 9.9 GM/DL (ref 14–18)
HEPATITIS B CORE TOTAL ANTIBODY: NONREACTIVE
HEPATITIS B SURFACE ANTIGEN: NEGATIVE
IMMATURE GRANS (ABS): 0.22 THOU/MM3 (ref 0–0.07)
IMMATURE GRANULOCYTES: 0.9 %
LACTIC ACID: 2.4 MMOL/L (ref 0.5–2)
LACTIC ACID: 2.5 MMOL/L (ref 0.5–2)
LACTIC ACID: 2.9 MMOL/L (ref 0.5–2)
LYMPHOCYTES # BLD: 2.2 %
LYMPHOCYTES ABSOLUTE: 0.5 THOU/MM3 (ref 1–4.8)
MCH RBC QN AUTO: 27.3 PG (ref 26–33)
MCHC RBC AUTO-ENTMCNC: 32.8 GM/DL (ref 32.2–35.5)
MCV RBC AUTO: 83.2 FL (ref 80–94)
MONOCYTES # BLD: 10.5 %
MONOCYTES ABSOLUTE: 2.4 THOU/MM3 (ref 0.4–1.3)
MRSA SCREEN RT-PCR: NEGATIVE
NUCLEATED RED BLOOD CELLS: 0 /100 WBC
PATHOLOGIST REVIEW: ABNORMAL
PLATELET # BLD: 243 THOU/MM3 (ref 130–400)
PLATELET ESTIMATE: ADEQUATE
PMV BLD AUTO: 9.9 FL (ref 9.4–12.4)
POIKILOCYTES: ABNORMAL
POTASSIUM REFLEX MAGNESIUM: 5.5 MEQ/L (ref 3.5–5.2)
POTASSIUM SERPL-SCNC: 5.5 MEQ/L (ref 3.5–5.2)
POTASSIUM SERPL-SCNC: 5.7 MEQ/L (ref 3.5–5.2)
RBC # BLD: 3.63 MILL/MM3 (ref 4.7–6.1)
SCAN OF BLOOD SMEAR: NORMAL
SEG NEUTROPHILS: 86.2 %
SEGMENTED NEUTROPHILS ABSOLUTE COUNT: 20 THOU/MM3 (ref 1.8–7.7)
SMUDGE CELLS: PRESENT
SODIUM BLD-SCNC: 133 MEQ/L (ref 135–145)
SODIUM BLD-SCNC: 134 MEQ/L (ref 135–145)
VANCOMYCIN RESISTANT ENTEROCOCCUS: NEGATIVE
WBC # BLD: 23.2 THOU/MM3 (ref 4.8–10.8)

## 2022-02-23 PROCEDURE — 99233 SBSQ HOSP IP/OBS HIGH 50: CPT | Performed by: INTERNAL MEDICINE

## 2022-02-23 PROCEDURE — 92610 EVALUATE SWALLOWING FUNCTION: CPT

## 2022-02-23 PROCEDURE — 90935 HEMODIALYSIS ONE EVALUATION: CPT

## 2022-02-23 PROCEDURE — 6370000000 HC RX 637 (ALT 250 FOR IP)

## 2022-02-23 PROCEDURE — 87340 HEPATITIS B SURFACE AG IA: CPT

## 2022-02-23 PROCEDURE — 93010 ELECTROCARDIOGRAM REPORT: CPT | Performed by: INTERNAL MEDICINE

## 2022-02-23 PROCEDURE — 2580000003 HC RX 258: Performed by: NURSE PRACTITIONER

## 2022-02-23 PROCEDURE — 5A1D70Z PERFORMANCE OF URINARY FILTRATION, INTERMITTENT, LESS THAN 6 HOURS PER DAY: ICD-10-PCS | Performed by: STUDENT IN AN ORGANIZED HEALTH CARE EDUCATION/TRAINING PROGRAM

## 2022-02-23 PROCEDURE — 2500000003 HC RX 250 WO HCPCS

## 2022-02-23 PROCEDURE — 6360000002 HC RX W HCPCS

## 2022-02-23 PROCEDURE — 6360000002 HC RX W HCPCS: Performed by: PHYSICIAN ASSISTANT

## 2022-02-23 PROCEDURE — 2580000003 HC RX 258: Performed by: INTERNAL MEDICINE

## 2022-02-23 PROCEDURE — 71045 X-RAY EXAM CHEST 1 VIEW: CPT

## 2022-02-23 PROCEDURE — 86706 HEP B SURFACE ANTIBODY: CPT

## 2022-02-23 PROCEDURE — 36556 INSERT NON-TUNNEL CV CATH: CPT | Performed by: NURSE PRACTITIONER

## 2022-02-23 PROCEDURE — 2580000003 HC RX 258

## 2022-02-23 PROCEDURE — 83605 ASSAY OF LACTIC ACID: CPT

## 2022-02-23 PROCEDURE — 80048 BASIC METABOLIC PNL TOTAL CA: CPT

## 2022-02-23 PROCEDURE — 36415 COLL VENOUS BLD VENIPUNCTURE: CPT

## 2022-02-23 PROCEDURE — 6360000002 HC RX W HCPCS: Performed by: NURSE PRACTITIONER

## 2022-02-23 PROCEDURE — 70450 CT HEAD/BRAIN W/O DYE: CPT

## 2022-02-23 PROCEDURE — 86704 HEP B CORE ANTIBODY TOTAL: CPT

## 2022-02-23 PROCEDURE — 82533 TOTAL CORTISOL: CPT

## 2022-02-23 PROCEDURE — 85025 COMPLETE CBC W/AUTO DIFF WBC: CPT

## 2022-02-23 PROCEDURE — 94640 AIRWAY INHALATION TREATMENT: CPT

## 2022-02-23 PROCEDURE — 99232 SBSQ HOSP IP/OBS MODERATE 35: CPT | Performed by: INTERNAL MEDICINE

## 2022-02-23 PROCEDURE — 1200000003 HC TELEMETRY R&B

## 2022-02-23 PROCEDURE — 82948 REAGENT STRIP/BLOOD GLUCOSE: CPT

## 2022-02-23 PROCEDURE — 2500000003 HC RX 250 WO HCPCS: Performed by: INTERNAL MEDICINE

## 2022-02-23 PROCEDURE — APPNB60 APP NON BILLABLE TIME 46-60 MINS: Performed by: NURSE PRACTITIONER

## 2022-02-23 RX ORDER — SODIUM CHLORIDE, SODIUM LACTATE, POTASSIUM CHLORIDE, CALCIUM CHLORIDE 600; 310; 30; 20 MG/100ML; MG/100ML; MG/100ML; MG/100ML
INJECTION, SOLUTION INTRAVENOUS CONTINUOUS
Status: DISCONTINUED | OUTPATIENT
Start: 2022-02-23 | End: 2022-02-23

## 2022-02-23 RX ORDER — HALOPERIDOL 5 MG/ML
5 INJECTION INTRAMUSCULAR ONCE
Status: COMPLETED | OUTPATIENT
Start: 2022-02-23 | End: 2022-02-23

## 2022-02-23 RX ORDER — SODIUM CHLORIDE 9 MG/ML
INJECTION, SOLUTION INTRAVENOUS CONTINUOUS
Status: DISCONTINUED | OUTPATIENT
Start: 2022-02-23 | End: 2022-03-02

## 2022-02-23 RX ORDER — UMECLIDINIUM BROMIDE AND VILANTEROL TRIFENATATE 62.5; 25 UG/1; UG/1
1 POWDER RESPIRATORY (INHALATION) DAILY
COMMUNITY

## 2022-02-23 RX ORDER — DEXMEDETOMIDINE HYDROCHLORIDE 4 UG/ML
.1-1.5 INJECTION, SOLUTION INTRAVENOUS CONTINUOUS
Status: DISCONTINUED | OUTPATIENT
Start: 2022-02-23 | End: 2022-02-23

## 2022-02-23 RX ADMIN — SODIUM CHLORIDE: 9 INJECTION, SOLUTION INTRAVENOUS at 13:40

## 2022-02-23 RX ADMIN — PIPERACILLIN AND TAZOBACTAM 3375 MG: 3; .375 INJECTION, POWDER, LYOPHILIZED, FOR SOLUTION INTRAVENOUS at 09:10

## 2022-02-23 RX ADMIN — Medication 500 UNITS: at 08:50

## 2022-02-23 RX ADMIN — SODIUM CHLORIDE, PRESERVATIVE FREE 10 ML: 5 INJECTION INTRAVENOUS at 08:56

## 2022-02-23 RX ADMIN — SODIUM BICARBONATE: 84 INJECTION, SOLUTION INTRAVENOUS at 08:38

## 2022-02-23 RX ADMIN — BUDESONIDE 500 MCG: 0.5 INHALANT RESPIRATORY (INHALATION) at 07:28

## 2022-02-23 RX ADMIN — DOCUSATE SODIUM 100 MG: 100 CAPSULE, LIQUID FILLED ORAL at 08:47

## 2022-02-23 RX ADMIN — Medication 1 CAPSULE: at 08:49

## 2022-02-23 RX ADMIN — HALOPERIDOL LACTATE 5 MG: 5 INJECTION, SOLUTION INTRAMUSCULAR at 20:54

## 2022-02-23 RX ADMIN — LEVOTHYROXINE SODIUM 175 MCG: 0.15 TABLET ORAL at 05:59

## 2022-02-23 RX ADMIN — HEPARIN SODIUM 5000 UNITS: 5000 INJECTION INTRAVENOUS; SUBCUTANEOUS at 05:59

## 2022-02-23 RX ADMIN — Medication 360 MG: at 08:48

## 2022-02-23 RX ADMIN — ARFORMOTEROL TARTRATE 15 MCG: 15 SOLUTION RESPIRATORY (INHALATION) at 07:28

## 2022-02-23 RX ADMIN — SODIUM BICARBONATE: 84 INJECTION, SOLUTION INTRAVENOUS at 01:17

## 2022-02-23 RX ADMIN — SODIUM CHLORIDE: 9 INJECTION, SOLUTION INTRAVENOUS at 22:50

## 2022-02-23 RX ADMIN — FAMOTIDINE 20 MG: 20 TABLET, FILM COATED ORAL at 08:46

## 2022-02-23 RX ADMIN — DOXAZOSIN 4 MG: 4 TABLET ORAL at 08:47

## 2022-02-23 RX ADMIN — PIPERACILLIN AND TAZOBACTAM 3375 MG: 3; .375 INJECTION, POWDER, LYOPHILIZED, FOR SOLUTION INTRAVENOUS at 20:31

## 2022-02-23 ASSESSMENT — PAIN SCALES - GENERAL
PAINLEVEL_OUTOF10: 0
PAINLEVEL_OUTOF10: 0

## 2022-02-23 NOTE — FLOWSHEET NOTE
02/23/22 0415   Vital Signs   BP 99/77   Temp 96.9 °F (36.1 °C)   Pulse 78   Resp 21   SpO2 95 %   Weight 225 lb 1.4 oz (102.1 kg)   Weight Method Bed scale   Percent Weight Change 0   Post-Hemodialysis Assessment   Post-Treatment Procedures Blood returned;Catheter Capped, clamped with Saline x2 ports   Machine Disinfection Process Acid/Vinegar Clean;Heat Disinfect; Exterior Machine Disinfection   Total Liters Processed (l/min) 49.3 l/min   Dialyzer Clearance Heavily streaked   Duration of Treatment (minutes) 180 minutes   Hemodialysis Intake (ml) 600 ml   Hemodialysis Output (ml) 600 ml   NET Removed (ml) 0 ml   Tolerated Treatment Good   3 hour treatment completed. No fluid removed per order. Cath lines flushed with 0.9 NS, clamped and tego secured. Report given to primary nurse. Charting printed and placed in bin to be scanned into EMR.

## 2022-02-23 NOTE — PROGRESS NOTES
Repeat K is 7.4  I've ordered STAT IV insulin and D50 and IV calcium  Potassium is critically high, Will need HD treatment tonight  Case discussed with Dr. Booker Baumgarten (POA). Patient has STEPHY due to hypotension (BP was in 90s at assisted living this morning) and sepsis with elevated procalcitonin and WBC  I feel likely has developed ATN and was also taking aldactone/lisinopril   Should improve overtime with supportive treatment but for now he needs dialysis treatment urgently due to critically high potassium. POA in agreement. Case discussed with ICU CNP. Appreciate ICU assistance and have also requested ICU to place temporary dialysis catheter. Patient will be dialyzed in ICU tonight and if needed will administer pressors on dialysis if BP drops further.      HD orders entered and also spoke with HD RN

## 2022-02-23 NOTE — FLOWSHEET NOTE
02/23/22 1400 02/23/22 1638   Vital Signs   /62 (!) 106/40   Temp 98.4 °F (36.9 °C) 99 °F (37.2 °C)   Pulse 74 79   Resp 18 18   Weight 230 lb 2.6 oz (104.4 kg) 229 lb 4.5 oz (104 kg)   Weight Method Bed scale Bed scale   Percent Weight Change 2.25 -0.38   Post-Hemodialysis Assessment   Post-Treatment Procedures  --  Blood returned;Catheter Capped, clamped with Saline x2 ports   Machine Disinfection Process  --  Acid/Vinegar Clean;Heat Disinfect; Exterior Machine Disinfection   Rinseback Volume (ml)  --  400 ml   Total Liters Processed (l/min)  --  35.6 l/min   Dialyzer Clearance  --  Lightly streaked   Duration of Treatment (minutes)  --  150 minutes   Hemodialysis Intake (ml)  --  500 ml   Hemodialysis Output (ml)  --  1000 ml   NET Removed (ml)  --  500 ml   Tolerated Treatment  --  Good   Stable 2.5hr tx. 500mL  removed during HD; some hypotension noted during tx. Both ports of hemodialysis catheter flushed and clamped per protocol. Treatment record printed for scanning.  Report called to primary RN

## 2022-02-23 NOTE — PROGRESS NOTES
18 - Entered patient's room to complete assessment. Patient's speech appears garbled and slurred. Patient unable to tell this RN his name or birthday, unlike previous assessments. CHEM 141. NIH 7, LKW [address] at bedside. Orders received for CT head. 0500 - Patient transported to CT.

## 2022-02-23 NOTE — PROGRESS NOTES
Pharmacy Renal Adjustment    Jose A Estrada is a 80 y.o. male. Pharmacy renally adjust the following medications per P&T approved policy: Zosyn    Height:   Ht Readings from Last 1 Encounters:   02/22/22 5' 6\" (1.676 m)     Weight:  Wt Readings from Last 1 Encounters:   02/23/22 225 lb 1.4 oz (102.1 kg)     Recent Labs     02/22/22  1853 02/23/22  0324   BUN 69* 41*   CREATININE 5.5* 3.4*     Estimated Creatinine Clearance: 16 mL/min (A) (based on SCr of 3.4 mg/dL [facility] AT [facility])).     Assessment:  STEPHY    Plan:   Decrease Zosyn from 3.375g extended infusion Q8h to 3.375g extended infusion Q12h    Neil Guevara, PharmD  2/23/2022 7:17 AM

## 2022-02-23 NOTE — PROGRESS NOTES
Spoke to Dr. Mark Pike regarding patient critical values of creatinine of 5.5 and a potassium value of 7.4. He ordered 10U iv regular insulin, 1 amp of d50 IV, and 1g calcium gluconate. Dr. Mark Pike also asked for patient to be transferred to ICU. ICU placement found on 4D. Orders placed and transfer initiated.

## 2022-02-23 NOTE — PROCEDURES
[facility]    Patient:  Yelitza Armendariz  YOB: 1931    MRN: [id number]   Acct:  [de-identified]      2/23/22  7470    PERFORMED BY: MAEGAN Westfall CNP    PRE PROCEDURE DIAGNOSES: Abdominal pain, hyperkalemia, acute kidney injury    INDICATIONS:vascular access and hemodialysis. SITE: right  internal jugular vein    CONSENT:  Consent obtained from patient and/or next of kin after explaining indication/risks    TIME OUT: Completed. Hand washing completed prior to procedure. STERILE PREP: Full maximum sterile field/barrier technique was followed (with cap and mask, gloves and sterile gown, as well as broad field sterile drapes). Local disinfection was performed with broad field application of orange dyed chloraprep solution, which was allowed to dry fully prior to the initiation of the procedure. LOCAL ANESTHETIC: Numbed with 1 percent lidocaine, total estimated 5 ml. ESTIMATED BLOOD LOSS:  5 ml. PROCEDURE: Under ultrasound guidance the right internal jugular vein  easily accessed with a 23-gauge needle and with an 18-gauge needle. Guidewire was passed without difficulty or resistance. Both needles removed, dilator over guidewire with skin snip, dilator removed. 2 lumen 20 CVC catheter advanced over the guidewire without difficulty or resistance to full extent. The guidewire was withdrawn and discarded and good return of dark venous blood. Positive blood aspiration from all lumens and flushed easily. Sutured in place. Biopatch applied. Chest x-ray is ordered. Chest x-ray confirms position, line in SVC and ready for use. Pneumothorax No, other complications No.      Procedure well tolerated.           Electronically signed by MAEGAN Westfall CNP on 2/23/2022 at 12:15 AM

## 2022-02-23 NOTE — PROGRESS NOTES
[address] [facility] Pharmacokinetic Monitoring Service - Vancomycin     Oral Harkins is a 80 y.o. male starting on vancomycin therapy for sepsis. Pharmacy consulted by [facility] for monitoring and adjustment. Target Concentration: Dosing based on anticipated concentration <15 mg/L due to renal impairment/insufficiency    Additional Antimicrobials: none    Pertinent Laboratory Values: Wt Readings from Last 1 Encounters:   02/22/22 237 lb 6.4 oz (107.7 kg)     Temp Readings from Last 1 Encounters:   02/22/22 98.1 °F (36.7 °C) (Oral)     Estimated Creatinine Clearance: 11 mL/min (A) (based on SCr of 5.3 mg/dL [facility])). Recent Labs     02/22/22  0940   CREATININE 5.3*   WBC 31.0*     Procalcitonin: 27.54    Pertinent Cultures:  Culture Date Source Results   2/22/22 UC --   2/22/22 BCx2 --   MRSA Nasal Swab: N/A. Non-respiratory infection.     Plan:  Concentration-guided dosing due to renal impairment/insufficiency  Start vancomycin 2000 mg IV once  Renal labs as indicated   Vancomycin concentration ordered for 2/23/22 @ 2000   Pharmacy will continue to monitor patient and adjust therapy as indicated    Thank you for the consult,  Everett Clinton, [facility]  2/22/2022 7:25 PM

## 2022-02-23 NOTE — PROGRESS NOTES
[address] [facility] ICU 4D  Clinical Swallow Evaluation      SLP Individual Minutes  Time In: 2825  Time Out: 7093  Minutes: 15  Timed Code Treatment Minutes: 0 Minutes       Date: 2022  Patient Name: Toni Lyons      CSN: [id number]   : 1931  (80 y.o.)  Gender: male   Referring Physician:  Rip Sanchez PA-C  Diagnosis: Hyperkalemia   Secondary Diagnosis: Dysphagia   History of Present Illness/Injury: Patient admitted to [facility] with above diagnosis. Per H&P, \"Romario is a 79-year-old  male with a past medical history of CHF, COPD, dementia who presents to [facility] ED from a nursing home for the evaluation of abdominal pain, and not eating and drinking. Due to patient's underlying dementia, he is a poor historian. ED provider reports poor history from nursing home nurse, and states that he did not eat well yesterday and had a fall earlier today. This patient is the father of Dr. Warren Kwong with oncology, and ED provider reached out to him for more information. Patient's baseline is unclear. He states he has lower abdominal pain around his catheter insertion site, fever, chills, decreased appetite. Upon questioning, patient was able to state his name and date of birth, but was unsure of where he was at and date. The patient denies chest pain, shortness of breath, lightheadedness, dizziness, weakness at this time. \" ST consulted due to dementia baseline and concerns for aspiration, per chart review, \"Dementia: Baseline unknown at this time. NPO diet until seen by Speech- concern for aspiration. \"   Past Medical History:   Diagnosis Date    Anemia     Anxiety     BPH (benign prostatic hyperplasia)     CAD (coronary artery disease)     leaking valve    Chronic back pain     COPD [facility]chronic obstructive [facility]) [facility] [address]     Detached retina     Diabetes mellitus (HCC)     NIDDM    DVT (deep venous thrombosis) ([facility])     RLE    DVT (deep venous thrombosis) ([facility] [address] 11/9/15    LLE    Dysphagia     Glaucoma     Hyperlipidemia     Hypertension     Macular degeneration     Mass of chest     benign chest mass, Dr Hopper Comment    Movement disorder     spinal stenosis    Obesity     Osteoarthritis     right ankle, right hand    Paroxysmal atrial fibrillation (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 Reports patient difficulty with nursing swallowing screening completed. Confirms appropriate patient tolerance of medications crushed in applesauce (whole if unable to be crushed) without overt difficulty. Patient pleasant upon arrival. No family present. OBJECTIVE:    Pain: Patient reporting pain; however, unable to identify pain level and location. RN present and aware. Current Diet: NPO     Respiratory Status:  Independent    Behavioral Observation:  Confused and Limited Direction Following    Oral Mechanism Evaluation:      Facial / Labial WFL    Lingual Impaired Limited movement   Dentition WFL Missing dentition    Velum WFL    Vocal Quality Impaired Weak vocal quality    Sensation Not Tested    Cough Impaired Weak cough; questionable      Patient Evaluated Using: Thin liquids, puree, hard solids     Oral Phase:  Impaired:  decreased textural breakdown/ slow mastication    Pharyngeal Phase: Impaired:  see s/s of aspiration     Signs and Symptoms of Laryngeal Penetration/Aspiration: Throat Clear, Immediate Cough and Delayed Cough    Impresssions: Patient presents with mild oral dysphagia with inability to fully discern potential presence of pharyngeal phase deficits without formal instrumentation. Patient evaluated using thin liquids via cup and straw, puree, and hard solids.  Patient with decreased textural breakdown and slow mastication during the oral phase. No anterior loss of bolus during any trials. Immediate and delayed cough noted for all consistencies. Patient with consistent hiccupping/belching throughout evaluation, unable to determine relation to swallow at this time. Concerns for possible GI dysfunction. Will continue to monitor. Patient with previous diagnosis of dementia; confusion noted with difficulty following directions and ability to appropriately answer questions given by ST throughout. Recommend NPO with medications in applesauce. ST to complete MBS study 2/24 to further evaluate pharyngeal function of swallow and determine diet/POC as necessary. RN Maximilian Zarate notified. RECOMMENDATIONS/ASSESSMENT:  Instrumental Evaluation: Modified Barium Swallow (MBS)  Diet Recommendations:  NPO + medication in applesauce   Strategies:  Recommend NPO and Medication in Applesauce   Rehabilitation Potential: fair    EDUCATION:  Learner: Patient  Education:  Reviewed results and recommendations of this evaluation and Reviewed ST goals and Plan of Care  Evaluation of Education: Needs further instruction    PLAN:  Recommendations pending MBS    PATIENT GOAL:    Did not state. Will further assess during treatment. SHORT TERM GOALS:  Short-term Goals  Timeframe for Short-term Goals: 2 weeks  Goal 1: ST to complete MBS to evaluate pharyngeal function of swallow and determine potential diet/POC in order to maintain adequate hydration/nutrition  Goal 2: ST/medical staff will complete oral care with 1-3 ice chips to manage appropriate oral hygiene  Goal 3: Monitor cognitive functioning, determine baseline and complete further assessment as needed (h/o dementia, from AL facility).     LONG TERM GOALS:  No LTGs due to short HIGINIOOS    Romario Armendariz, Student Intern  Tracey Morataya [facility] 2/23/2022

## 2022-02-23 NOTE — PLAN OF CARE
Handoff given to Dr. Stefani Stevens MD    Mr Dede Brown is an 80M w/ baseline dementia (AOx2 at baseline - name and place but not time) who p/w hyperkalemia 2/2 acute renal failure and need for emergent dialysis. RIJ HD catheter was placed and he had dialysis session early this morning. His hyperkalemia is resolving and he will be going for a second round of HD today. Dr. Felicia Rogers is the nephrologist. Also septic 2/2 acute on chronic UTI and has a suprapubic catheter. Started on zosyn empirically.      Electronically signed by Mingo Mart MD on 2/23/2022 at 1:43 PM

## 2022-02-23 NOTE — FLOWSHEET NOTE
02/22/22 2124   Treatment Team Notification   Reason for Communication Evaluate   Team Member Name Dr. Boone Hansen Provider   Method of Communication Call   Response See orders     Dr. Estrada Shows called regarding a previous order. Will change sodium bicarb drip to 150 ml/hr and give 5 units regular insulin in place of the 10 units previously ordered and recheck blood sugar in 30 minutes.

## 2022-02-23 NOTE — PROGRESS NOTES
Pharmacy Medication History Note      List of current medications patient is taking is complete. Source of information: Epic fill history, [facility] MAR    Changes made to medication list:  Medications removed (include reason, ex. therapy complete or physician discontinued):  Revefenacin 175mcg/3mL - Alternative therapy  Brovana nebulizer solution 15mcg/3mL - Alternative therapy    Medications added/doses adjusted: Added Anoro Ellipta 62.5-25mcg/inh, 1 inhalation daily  Added aspirin 81mg capsules, 1 capsule daily    Other notes (ex. Recent course of antibiotics, Coumadin dosing):  Denies use of other OTC or herbal medications.       Allergies reviewed      Electronically signed by Selena Hashimoto on 2/23/2022 at 8:35 AM

## 2022-02-23 NOTE — PROGRESS NOTES
1200 Dr. Trae Omer, pts son called in to check on pat updated on labs and condition, talked with code status and Dr Trae Omer wants him to be a DNR-CCA, talked with Leeanne Sullivan CNP about Dr. Jeff Troy wishes and she will take care of it.    1300 pt found pulling at tubes and lines unable to reorient pt called to get a real sitter at the bedside    1315 called Dr Raj Chester with update on labs orders received for dialysis and okay to transfer out of unit    1318 notified Dr Gómez Angry about what Dr Raj Chester said orders received    [phone] pt transferred to dialysis per bed    1400 called and updated Dr Trae Omer the son about transferring out and about dialysis and increase confusion    1420 report called to [facility] RN on 6K

## 2022-02-23 NOTE — FLOWSHEET NOTE
02/23/22 0910   Encounter Summary   Services provided to: Patient   Referral/Consult From: Rounding   Continue Visiting Yes  (2/23/22 NR)   Complexity of Encounter Low   Length of Encounter 15 minutes   Routine   Type Initial   Assessment Unable to respond   Intervention Prayer   In my encounter with the 80  yr old patient, I attempted to see the patient, but they were unresponsive at this time. No family was present in the room. I offered a prayer at the pts side. A  will attempt to see the patient at a later time as a follow up. The pt was admitted due to abdominal pain.

## 2022-02-23 NOTE — PROGRESS NOTES
[facility]   Nephrology progress note  2/23/2022, 8:16 AM      Pt Name:    Maria Esther Walker  MRN:     [id number]     [place]:    7/17/1931  Admit Date:    2/22/2022  9:12 AM  Primary Care Physician:  Nilam Sung MD   Room number  4D-04/004-A    Chief Complaint: Nephrology following for STEPHY/hyperkalemia and metabolic acidosis    Subjective:  Patient seen and examined  Seen and examined earlier today in ICU  Discussed with ICU RN  Had urgent dialysis treatment last night due to critical hyperkalemia  Tolerated dialysis treatment well  Did not require any pressors  Awake but has underlying dementia and does not engage in meaningful conversation      Objective:  24HR INTAKE/OUTPUT:    Intake/Output Summary (Last 24 hours) at 2/23/2022 0816  Last data filed at 2/23/2022 0531  Gross per 24 hour   Intake 3566.25 ml   Output 1200 ml   Net 2366.25 ml     I/O last 3 completed shifts: In: 3566.3 [P.O.:100; I.V.:2173.3; IV Piggyback:693]  Out: 1200 [Urine:600]  No intake/output data recorded. Admission weight: 212 lb (96.2 kg)  Wt Readings from Last 3 Encounters:   02/23/22 225 lb 1.4 oz (102.1 kg)   01/03/22 212 lb (96.2 kg)   12/06/21 210 lb (95.3 kg)     Body mass index is 36.33 kg/m².     Physical examination  VITALS:     Vitals:    02/23/22 0530 02/23/22 0545 02/23/22 0600 02/23/22 0700   BP: 98/61 (!) 120/55 (!) 108/57 (!) 114/47   Pulse: 76 73 75 79   Resp: 21 20 20 19   Temp:       TempSrc:       SpO2: 95% 96% 96% 94%   Weight:       Height:         General Appearance: alert and cooperative with exam, appears comfortable, no distress  Mouth/Throat: Oral mucosa dry  Neck: No JVD  Lungs: Air entry B/L, no rales, no use of accessory muscles  Heart:  S1, S2 heard  GI: soft, non-tender, no guarding  Extremities: No significant LE edema      Lab Data  CBC:   Recent Labs     02/22/22  0940 02/23/22  0324   WBC 31.0* 23.2*   HGB 11.5* 9.9*   HCT 35.7* 30.2*    243     BMP:  Recent Labs 02/22/22  0940 02/22/22  1152 02/22/22  1853 02/23/22  0324   *  --  128* 134*   K 6.9*   < > 7.4* 5.5*  5.5*   CL 92*  --  94* 98   CO2 19*  --  19* 26   BUN 68*  --  69* 41*   CREATININE 5.3*  --  5.5* 3.4*   GLUCOSE 171*  --  113* 140*   CALCIUM 8.6  --  8.2* 7.8*    < > = values in this interval not displayed. Hepatic:   Recent Labs     02/22/22  0940   LABALBU 2.9*   AST 15   ALT 8*   BILITOT 0.3   ALKPHOS 85         Meds:  Infusion:    sodium bicarbonate infusion      dextrose      sodium chloride      dextrose      Or    dextrose       Meds:    piperacillin-tazobactam  3,375 mg IntraVENous Q12H    Arformoterol Tartrate  15 mcg Nebulization BID    budesonide  0.5 mg Nebulization BID    docusate sodium  100 mg Oral BID    doxazosin  4 mg Oral Daily    famotidine  20 mg Oral Daily    ferrous gluconate  360 mg Oral BID    lactobacillus  1 capsule Oral BID WC    levothyroxine  175 mcg Oral Daily    [Held by provider] lisinopril  2.5 mg Oral Daily    oxybutynin  10 mg Oral QPM    tiotropium  2 puff Inhalation Daily    Vitamin D  500 Units Oral Daily    sodium chloride flush  10 mL IntraVENous 2 times per day    heparin (porcine)  5,000 Units SubCUTAneous 3 times per day    vancomycin (VANCOCIN) intermittent dosing (placeholder)   Other RX Placeholder     Meds prn: glucose, glucagon (rDNA), dextrose, albuterol sulfate HFA, sodium chloride flush, sodium chloride, ondansetron **OR** ondansetron, polyethylene glycol, acetaminophen **OR** acetaminophen, dextrose **OR** dextrose       Impression and Plan:  1. STEPHY in setting of hypotension/sepsis in setting of lisinopril and Aldactone. Likely has sustained ischemic ATN. Urine output noted. Required urgent run of dialysis treatment last night. Potassium repeated is 5.5. We will repeat labs this afternoon. May require another round of dialysis treatment depending on repeat potassium level. Okay to transfer out of ICU.   2. Critical hyperkalemia in setting of STEPHY, lisinopril, Aldactone and exogenous potassium supplementation. Had urgent run of dialysis treatment last night. Okay to transfer out of ICU. 3.  Borderline hypotension. Blood pressure was in systolic 45M per my conversation with Dr. Porter Mcgarry at assisted living facility yesterday morning. Blood pressure stable at this time. Did not require any pressors. Continue with IV fluids for now. Repeat labs. 4.  Metabolic acidosis. Improved with IV bicarbonate and dialysis run. Will check labs this afternoon. And adjust fluids  5. Chronic grade 1 diastolic dysfunction. Clinically not in decompensated heart failure  6. Sepsis secondary to UTI. Follow cultures. On empiric IV antibiotics. 7.  Lactic acidosis. Improved  8. Medications reviewed    Okay to transfer out of ICU.     BMP this afternoon        Eliot Almanza MD  [facility]

## 2022-02-23 NOTE — CONSULTS
CRITICAL CARE PROGRESS NOTE      Patient:  Michelle Dyer    Unit/Bed:4D-04/004-A  YOB: 1931  MRN: [id number]   PCP: Precious Persaud MD  Date of Admission: 2/22/2022  Chief Complaint:-Hyperkalemia    Assessment and Plan:    1. Acute kidney injury: This is multifactorial in the setting of hyperkalemia, sepsis. CT abdomen pelvis was negative for hydronephrosis. Bicarb drip was initiated per nephrology. No EKG changes were noted with hyperkalemia. Patient was on lisinopril and Aldactone these have been stopped. Patient was transferred to the ICU on 2/22/2022 for emergent hemodialysis. Dose medications to GFR no nonsteroidals are to be given. Nephrology will follow. 2. Acute hyperkalemia: In the setting of acute kidney injury. No acute EKG changes were noted. Patient was on lisinopril and Aldactone prior to admission. 2/22/2022 patient did receive hemodialysis. Waiting for repeat labs. 3. Sepsis: No shock at time of admission. Likely secondary to UTI. Zosyn was continued. Awaiting for MRSA PCR we will continue vancomycin at this time. Lactic acid 4.4-2.4  4. Hyponatremia: Mild, likely from hypervolemic in nature. Will monitor. 5. Anion gap acidosis: In the setting of sepsis and acute kidney injury. Bicarb drip was initiated by nephrology. This will be continued at this time. 6. HFpEF: History, echo 6/2021 LVEF 55% with grade 1 diastolic dysfunction  7. Diabetes mellitus type 2: Uncontrolled, history Lantus was not restarted at time of admission this will be restarted and monitor with Accu-Cheks sliding scale insulin. INITIAL H AND P AND ICU COURSE:  Michelle Dyer is a 26-year-old  nonagenarian, admitted to [facility] ICU on 2/22/2022 with hyperkalemia and acute kidney injury in need of emergent hemodialysis.     Patient has past medical history significant for lifetime non-smoker, severe COPD, ROBER, PAF, CAD, 6/20/2021 cardiac stress test negative for ischemia, 6/2021 echo LVEF 55% with grade 1 diastolic dysfunction, PAF, thyroid cancer status post thyroid resection, macular degeneration, DVT, diabetes mellitus type 2, detached retina, BPH, anxiety, anemia, dementia. Eric Grossman is admitted on 2022 with chief complaint of bilateral leg swelling, worsening confusion and abdominal pain. Patient is a resident at assisted living. Noted purulent drainage coming from suprapubic catheter site. No signs of peritonitis at this time. CT abdomen pelvis revealed no evidence of acute intra-abdominal or intrapelvic abnormalities. Zosyn was started. Suprapubic catheter was exchanged while in the ER on 2022. Nephrology was consulted for acute hyperkalemia and acute kidney injury. 2022 patient was transferred to the ICU for emergent hemodialysis. Past Medical History: See HPI. Family History: Mother -father . Social History: Lifetime non-smoker, denies any alcohol or illicit drug use. ROS   GENERAL: Positive for fatigue and weakness   SKN: Face is flushed  HEAD: No headaches or recent injury  EYES: No acute changes in vision, no diplopia or blurred vision, no drainage  EARS: No hearing loss, no tinnitus, no drainage  NOSE/THROAT: No rhinorrhea or pharyngitis, no nasal drainage  NECK: No lumps or unusual neck stiffness  PULMONARY: Positive for hypoxia  CARDIAC: Denies any chest pain pressure heaviness or tightness. Positive for bilateral lower leg edema  GI: Positive for suprapubic catheter placement.   No history melena or hematochezia, no diarrhea or constipation  PERIPHERAL VASCULAR: No intermittent claudication or unusual leg cramps  MUSCULOSKELETAL: Occasional arthralgias, myalgias  NEUROLOGICAL: Positive for dementia-history, no Seizures or Syncope  HEMATOLOGIC:  No anemia, unusual bruising or bleeding  PSYCH: No homicidial or suicidal ideations    Scheduled Meds:   Arformoterol Tartrate  15 mcg Nebulization BID    budesonide  0.5 mg B-negative   Covid-19-negative   2/22/2022 urine large amount of blood greater than 300 protein greater than 200 WBCs many bacteria cloudy appearance   2/22/2022 CT abdomen pelvis no evidence of intra-abdominal or intrapelvic abnormality. Nonobstructive nephrolithiasis left.  2/22/2022 2130 NSR 1st degree AV Block    6/2021 ECHO LVEF 99%, grade 1 diastolic dysfunction. Seen with multidisciplinary ICU team yes. Meets Continued ICU Level Care Criteria:    [x] Yes   [] No - Transfer Planned to listed location:  [] HOSPITALIST CONTACTED-      Case and plan discussed with Dr. Yazmin Suárez.         Electronically signed by MAEGAN Danielle - CNP  CRITICAL CARE SPECIALIST

## 2022-02-23 NOTE — CARE COORDINATION
2/23/22, 8:18 AM EST  DISCHARGE PLANNING EVALUATION:    Ja Mallory       Admitted: 2/22/2022/ [facility] day: 1   Location: -04/004-A Reason for admit: Hyperkalemia [E87.5]  Abdominal pain [R10.9]  Septicemia ([address] [A41.9]  Generalized abdominal pain [R10.84]  Suprapubic catheter ([address] [Z93.59]  STEPHY (acute kidney injury) ([address] [N17.9]  Sepsis ([address] [A41.9]  Leukocytosis, unspecified type [D72.829]  Urinary tract infection associated with cystostomy catheter, initial encounter ([address] [T83.510A, N39.0]   PMH:  has a past medical history of Anemia, Anxiety, BPH (benign prostatic hyperplasia), CAD (coronary artery disease), Chronic back pain, COPD (chronic obstructive pulmonary disease) ([address] Detached retina, Diabetes mellitus ([address] DVT (deep venous thrombosis) ([address] DVT (deep venous thrombosis) ([address] Dysphagia, Glaucoma, Hyperlipidemia, Hypertension, Macular degeneration, Mass of chest, Movement disorder, Obesity, Osteoarthritis, Paroxysmal atrial fibrillation ([address] Pneumonia, Primary thyroid follicular carcinoma, Sleep apnea, Testicular hypofunction, Thyroid cancer ([address] and Urinary incontinence. Procedure:   2/22 CXR: No acute process  2/22 CT Abd/pelvis: No evidence of acute intra-abdominal or intrapelvic abnormality; moderate retained stool; Nonobstructive nephrolithiasis on the left; prostatomegaly  2/22 Suprapubic cathter exchanged in ED  2/22 ERIC RANKIN  2/22 Emergent new HD: No fluid removal  2/23 CT Head: No acute findings    Barriers to Discharge: Presented from [place] with c/o abdominal pain and not eating and drinking. AL also reported he had a fall earlier in the day. Patient also reports fever and chills. Purulent drainage noted around [facility] site. SPC exchanged in ED. Admitted to [address] [place] [address]. Nephrology consulted for STEPHY, hyperkalemia. Transferred to ICU last evening for emergent HD. TESSIO placed. HD with no fluid removal. Failed swallow eval today. NPO with crucial meds in applesauce.  Plan for MBS tomorrow. Tmax 99.5. NSR. On room air. Oriented to person only. Follows commands. SLP. Telemetry, TESSIO, SPC. Bicarb drip, nebs, cardura, pepcid, ferrous gluconate, sq heparin, lactobacillus, synthroid, ditropan xl, IV zosyn, inhaler, vit D. Received 2L in fld bolus, 1g 10% Ca+ gluconate x1, 1g Ca+ gluconate, D10 x1, insulin IV x2, 250 ml D10 bolus, and IV vancomycin x1. Na+ 131 - now 134, K+ 6.5 - up to 7.4 - now 5.5, CO2 19 - now 26, BUN 68 - now 41, Creat 5.3 - up to 5.5 - now 3.4, AG 20 - now 10, LA 7.1 -now 2.4, procal 27.54, trop 0.143 - now 0.105, alb 2.9, wbc 31 - now 23.2, hgb 11.5 - now 9.9. Urine w/mod leukocytes - sent for culture. Blood cultures & SPC site cultures sent. COVID 19 was negative. PCP: Frannie Perez MD  Readmission Risk Score: 23 ( )%    Patient Goals/Plan/Treatment Preferences: From [facility]. Uses cpap and 3L O2 at HS & PRN. SW consulted. Transportation/Food Security/Housekeeping Addressed:  No issues identified.

## 2022-02-23 NOTE — PLAN OF CARE
Problem: Pain:  Goal: Pain level will decrease  Description: Pain level will decrease  Outcome: Met This Shift  Note: Pt denies pain     Problem: Pain:  Goal: Control of acute pain  Description: Control of acute pain  Outcome: Met This Shift  Note: Pt denies pain     Problem: Pain:  Goal: Control of chronic pain  Description: Control of chronic pain  Outcome: Met This Shift  Note: Pt denies pain     Problem: Falls - Risk of:  Goal: Will remain free from falls  Description: Will remain free from falls  Outcome: Ongoing  Note: Pt has a tele sitter and bed alarm on      Problem: Falls - Risk of:  Goal: Absence of physical injury  Description: Absence of physical injury  Outcome: Ongoing  Note: Pt is absence of physical injury     Problem: Skin Integrity:  Goal: Will show no infection signs and symptoms  Description: Will show no infection signs and symptoms  Outcome: Ongoing  Note: Pt has no signs of infection     Problem: Skin Integrity:  Goal: Absence of new skin breakdown  Description: Absence of new skin breakdown  Outcome: Ongoing  Note: Pt has no skin breakdown at this time     Problem: Discharge Planning:  Goal: Discharged to appropriate level of care  Description: Discharged to appropriate level of care  Outcome: Ongoing  Note: Pt plans on going back to [facility] at discharge    Care plan reviewed with patient and family. Patient and family verbalize understanding of the plan of care and contribute to goal setting.

## 2022-02-23 NOTE — PROGRESS NOTES
CRITICAL CARE PROGRESS NOTE      Patient:  Ann Jane    Unit/Bed:4D-04/004-A  YOB: 1931  MRN: [id number]   PCP: Carolina Daley MD  Date of Admission: 2/22/2022  Chief Complaint:- Hyperkalemia    Assessment and Plan (All pulmonary edema, renal failure, PE, and respiratory failure diagnoses are acute in nature unless otherwise specified):          Acute kidney injury: Improving. This is multifactorial in the setting of hyperkalemia, sepsis. CT abdomen pelvis was negative for hydronephrosis. Bicarb drip was initiated per nephrology. No EKG changes were noted with hyperkalemia. Patient was on lisinopril and Aldactone these have been stopped. Patient was transferred to the ICU on 2/22/2022 for emergent hemodialysis. Dose medications to GFR no nonsteroidals are to be given. Okay to transfer to floor per nephrology. Acute hyperkalemia: Improving. In the setting of acute kidney injury. No acute EKG changes were noted. Patient was on lisinopril and Aldactone prior to admission. 2/22/2022 patient did receive hemodialysis. Sepsis: Improving. No shock at time of admission. Likely secondary to UTI. Zosyn was continued. Awaiting for MRSA PCR we will continue vancomycin at this time. Lactic acid 4.4-2.4  Hyponatremia: Mild, likely from hypervolemic in nature. Will monitor. Anion gap acidosis: In the setting of sepsis and acute kidney injury. Bicarb drip was initiated by nephrology. This will be continued at this time. HFpEF: History, echo 6/2021 LVEF 55% with grade 1 diastolic dysfunction  Diabetes mellitus type 2: Uncontrolled, history Lantus was not restarted at time of admission this will be restarted and monitor with Accu-Cheks sliding scale insulin. Subjective (events over the past 24 hours):   -No acute events overnight.  -Pt is resting comfortably  -Conversing but unoriented.      HPI:   Ann Jane is a 80-year-old  nonagenarian, admitted to [facility] ICU on 2/22/2022 with hyperkalemia and acute kidney injury in need of emergent hemodialysis. Patient has past medical history significant for lifetime non-smoker, severe COPD, ROBER, PAF, CAD, 6/20/2021 cardiac stress test negative for ischemia, 6/2021 echo LVEF 55% with grade 1 diastolic dysfunction, PAF, thyroid cancer status post thyroid resection, macular degeneration, DVT, diabetes mellitus type 2, detached retina, BPH, anxiety, anemia, dementia. Faiza Tavares is admitted on 2/22/2022 with chief complaint of bilateral leg swelling, worsening confusion and abdominal pain. Patient is a resident at assisted living. Noted purulent drainage coming from suprapubic catheter site. No signs of peritonitis at this time. CT abdomen pelvis revealed no evidence of acute intra-abdominal or intrapelvic abnormalities. Zosyn was started. Suprapubic catheter was exchanged while in the ER on 2/22/2022. Nephrology was consulted for acute hyperkalemia and acute kidney injury. 2/22/2022 patient was transferred to the ICU for emergent hemodialysis. ROS:  GENERAL: Positive for fatigue and weakness   SKN: Face is flushed  HEAD: No headaches or recent injury  EYES: No acute changes in vision, no diplopia or blurred vision, no drainage  EARS: No hearing loss, no tinnitus, no drainage  NOSE/THROAT: No rhinorrhea or pharyngitis, no nasal drainage  NECK: No lumps or unusual neck stiffness  PULMONARY: Positive for hypoxia  CARDIAC: Denies any chest pain pressure heaviness or tightness. Positive for bilateral lower leg edema  GI: Positive for suprapubic catheter placement.   No history melena or hematochezia, no diarrhea or constipation  PERIPHERAL VASCULAR: No intermittent claudication or unusual leg cramps  MUSCULOSKELETAL: Occasional arthralgias, myalgias  NEUROLOGICAL: Positive for dementia-history, no Seizures or Syncope  HEMATOLOGIC:  No anemia, unusual bruising or bleeding  PSYCH: No homicidial or suicidal ideations    PMH: Per HPI    Social History       Tobacco History       Smoking Status  Never Smoker      Smokeless Tobacco Use  Never Used              Alcohol History       Alcohol Use Status  Yes Drinks/Week  1 Glasses of wine, 0 Standard drinks or equivalent per week Amount  1.0 standard drink of alcohol/wk Comment  Glass of wine with dinner. Drug Use       Drug Use Status  No              Sexual Activity       Sexually Active  Never                  Family History   Problem Relation Age of Onset    Other Mother         Complications of Childbirth    Other Father [de-identified]        Natural causes     Allergies   Allergen Reactions    Latex Rash    Macrobid [Nitrofurantoin Magnus Farah prefers pt not use Macrobid due to pulmonary side effects.     Levaquin [Levofloxacin] Other (See Comments)     Redness/flushing    Brimonidine Tartrate     Adhesive Tape Rash    Alphagan [Brimonidine Tartrate] Hives     Eyes red     Objective:  ETT: No  Billy:  suprapubic catheter 16 fr 02/22/22  Drains: No  CVC/Ports:  L antecubital PIV 02/22/22, R forearm PIV 02/22/22, R posterior forearm PIV 02/22/22, RIJ HD Catheter 02/23/22  Arterial Line:No    Other: No    Drips: No      Intake/Output Summary (Last 24 hours) at 2/23/2022 1255  Last data filed at 2/23/2022 0531  Gross per 24 hour   Intake 3566.25 ml   Output 1200 ml   Net 2366.25 ml     Medications:     sodium bicarbonate infusion 150 mL/hr at 02/23/22 0838    dextrose      sodium chloride      dextrose      Or    dextrose        piperacillin-tazobactam  3,375 mg IntraVENous Q12H    Arformoterol Tartrate  15 mcg Nebulization BID    budesonide  0.5 mg Nebulization BID    docusate sodium  100 mg Oral BID    doxazosin  4 mg Oral Daily    famotidine  20 mg Oral Daily    ferrous gluconate  360 mg Oral BID    lactobacillus  1 capsule Oral BID WC    levothyroxine  175 mcg Oral Daily    [Held by provider] lisinopril  2.5 mg Oral Daily    oxybutynin  10 mg Oral QPM    tiotropium  2 puff Inhalation Daily    Vitamin D  500 Units Oral Daily    sodium chloride flush  10 mL IntraVENous 2 times per day    heparin (porcine)  5,000 Units SubCUTAneous 3 times per day     Vital Signs  Temp: 98.4 °F (36.9 °C)  Temp Source: Axillary  Pulse: 77  Heart Rate Source: Monitor  Resp: 22  BP: 108/60  BP Location: Left upper arm  MAP (mmHg): 74  Patient Position: Semi fowlers  Level of Consciousness: Alert (0)  MEWS Score: 2  Patient Currently in Pain: Denies    Body mass index is 36.33 kg/m². Meryle Sandman General:   Pale pleasant  HEENT:  normocephalic and atraumatic. No scleral icterus. PERR  Neck: supple. No Thyromegaly. Lungs: clear to auscultation. No retractions  Cardiac: RRR. No JVD. Abdomen: soft. Nontender, bowel sounds present  Extremities:  No clubbing, cyanosis, or edema x 4. Vasculature: capillary refill < 3 seconds. Palpable dorsalis pedis pulses. Skin:  warm and dry. Psych:  Alert and oriented x2. Affect appropriate  Lymph:  No supraclavicular adenopathy. Neurologic:  No focal deficit. No seizures. LABS/RADIOLOGY:-  Recent Labs     02/22/22  0940 02/23/22  0324   WBC 31.0* 23.2*   HGB 11.5* 9.9*   HCT 35.7* 30.2*    243     Recent Labs     02/22/22  1853 02/23/22  0324 02/23/22  1159   * 134* 133*   K 7.4* 5.5*  5.5* 5.7*   CL 94* 98 95*   CO2 19* 26 29   BUN 69* 41* 42*   CREATININE 5.5* 3.4* 3.8*   CALCIUM 8.2* 7.8* 7.8*     Recent Labs     02/22/22  0940   AST 15   ALT 8*   BILITOT 0.3   ALKPHOS 85     No results for input(s): INR in the last 72 hours. No results for input(s): Evonnie Montane in the last 72 hours.   Recent Labs     02/22/22  1152   PROCAL 27.54*     Recent Labs     02/22/22  1853 02/23/22  0324 02/23/22  1159   LACTA 4.4* 2.4* 2.9*      Microbiology:    Blood culture #1:   Lab Results   Component Value Date    BC No growth-preliminary  02/22/2022    BC No growth-preliminary  02/22/2022     Blood culture #2:No results found for: Yoel Higgins  Organism:  Lab Results   Component Value Date    ORG Enterobacter cloacae complex 01/09/2022         Lab Results   Component Value Date    LABGRAM  12/20/2021     Few segmented neutrophils observed. No epithelial cells observed. Moderate gram positive cocci in pairs and chains. Few gram positive bacilli. MRSA culture only:No results found for: [facility]  Urine culture:   Lab Results   Component Value Date    LABURIN No growth-preliminary No growth  07/12/2021     Respiratory culture: No results found for: CULTRESP  Aerobic and Anaerobic :  Lab Results   Component Value Date    LABAERO  12/20/2021     No growth-preliminary Culture yielded light growth of Staphylococcus species (coagulase negative) and gram positive bacilli consistent with Corynebacterium species. Clinical correlation required. Lab Results   Component Value Date    LABANAE  12/20/2021     Culture yielded light growth of anaerobic gram positive cocci. Clinical correlation required. Urinalysis:      Lab Results   Component Value Date    NITRU NEGATIVE 02/22/2022    WBCUA > 200 02/22/2022    BACTERIA MANY 02/22/2022    RBCUA > 100 02/22/2022    BLOODU LARGE 02/22/2022    SPECGRAV 1.017 09/22/2021    GLUCOSEU NEGATIVE 02/22/2022       Radiology:(All radiographs, tracings, PFTs, and imaging are personally viewed and interpreted unless otherwise noted). CT HEAD WO CONTRAST   Final Result   No acute intracranial findings. Chronic changes as described. This document has been electronically signed by: Richmond Garcia MD on    02/23/2022 05:54 AM      All CTs at this facility use dose modulation techniques and iterative    reconstructions, and/or weight-based dosing   when appropriate to reduce radiation to a low as reasonably achievable. XR CHEST PORTABLE   Final Result   Impression:   Right IJ line tip over the proximal SVC. This document has been electronically signed by:  Richmond Garcia MD on    02/23/2022 12:43 AM      CT ABDOMEN PELVIS WO CONTRAST Additional Contrast? None   Final Result       1. No evidence of acute intra-abdominal or intrapelvic abnormality. 2. Moderate retained stool. 3. Nonobstructive nephrolithiasis on the left. 4. Prostatomegaly. **This report has been created using voice recognition software. It may contain minor errors which are inherent in voice recognition technology. **      Final report electronically signed by Dr. Sarah Chester MD on 2/22/2022 11:01 AM      XR CHEST PORTABLE   Final Result   Stable radiographic appearance of the chest. No evidence of an acute process. **This report has been created using voice recognition software. It may contain minor errors which are inherent in voice recognition technology. **      Final report electronically signed by Dr. Sarah Chester MD on 2/22/2022 10:09 AM      FL MODIFIED BARIUM SWALLOW W VIDEO    (Results Pending)     FEN/GI/DVT  IVF:  D5 in bicarb 150 mEq @ 150 cc/hr  Electrolytes: Monitor and replace per protocols  Nutrition: NPO  GI PPX: Pepcid  DVT Prophylaxis:  heparin    Electronically signed by Swati Ahuja MD on 2/23/2022 at 12:55 PM   Patient seen by me including key components of medical care. Case discussed with resident physician. Okay with me to transition to medical floor. Italicized font, if present,  represents changes to the note made by me. CC time 25 minutes. Time was discontiguous. Time does not include procedure. Time does include my direct assessment of the patient and coordination of care. Time represents more than 50% of the time involved with patient care by the [address] [place] [address] team.  Electronically signed by Tere Oliveira.  Adelina Castillo MD.

## 2022-02-24 ENCOUNTER — APPOINTMENT (OUTPATIENT)
Dept: GENERAL RADIOLOGY | Age: 87
DRG: 698 | End: 2022-02-24
Payer: MEDICARE

## 2022-02-24 LAB
ANION GAP SERPL CALCULATED.3IONS-SCNC: 8 MEQ/L (ref 8–16)
BASOPHILS # BLD: 0.1 %
BASOPHILS ABSOLUTE: 0 THOU/MM3 (ref 0–0.1)
BUN BLDV-MCNC: 33 MG/DL (ref 7–22)
CALCIUM IONIZED: 0.96 MMOL/L (ref 1.12–1.32)
CALCIUM SERPL-MCNC: 7.4 MG/DL (ref 8.5–10.5)
CHLORIDE BLD-SCNC: 102 MEQ/L (ref 98–111)
CO2: 28 MEQ/L (ref 23–33)
CREAT SERPL-MCNC: 3.4 MG/DL (ref 0.4–1.2)
EKG Q-T INTERVAL: 298 MS
EKG QRS DURATION: 78 MS
EKG QTC CALCULATION (BAZETT): 405 MS
EKG R AXIS: 80 DEGREES
EKG T AXIS: -26 DEGREES
EKG VENTRICULAR RATE: 111 BPM
EOSINOPHIL # BLD: 0.5 %
EOSINOPHILS ABSOLUTE: 0.1 THOU/MM3 (ref 0–0.4)
ERYTHROCYTE [DISTWIDTH] IN BLOOD BY AUTOMATED COUNT: 16.1 % (ref 11.5–14.5)
ERYTHROCYTE [DISTWIDTH] IN BLOOD BY AUTOMATED COUNT: 49.2 FL (ref 35–45)
GFR SERPL CREATININE-BSD FRML MDRD: 17 ML/MIN/1.73M2
GLUCOSE BLD-MCNC: 109 MG/DL (ref 70–108)
GLUCOSE BLD-MCNC: 115 MG/DL (ref 70–108)
GLUCOSE BLD-MCNC: 130 MG/DL (ref 70–108)
GLUCOSE BLD-MCNC: 169 MG/DL (ref 70–108)
GLUCOSE BLD-MCNC: 184 MG/DL (ref 70–108)
HCT VFR BLD CALC: 30.6 % (ref 42–52)
HEMOGLOBIN: 10 GM/DL (ref 14–18)
IMMATURE GRANS (ABS): 0.11 THOU/MM3 (ref 0–0.07)
IMMATURE GRANULOCYTES: 0.7 %
LACTIC ACID: 1.4 MMOL/L (ref 0.5–2)
LYMPHOCYTES # BLD: 5.2 %
LYMPHOCYTES ABSOLUTE: 0.8 THOU/MM3 (ref 1–4.8)
MAGNESIUM: 2.1 MG/DL (ref 1.6–2.4)
MCH RBC QN AUTO: 27.3 PG (ref 26–33)
MCHC RBC AUTO-ENTMCNC: 32.7 GM/DL (ref 32.2–35.5)
MCV RBC AUTO: 83.6 FL (ref 80–94)
MONOCYTES # BLD: 13.8 %
MONOCYTES ABSOLUTE: 2.2 THOU/MM3 (ref 0.4–1.3)
MRSA SCREEN: NORMAL
NUCLEATED RED BLOOD CELLS: 0 /100 WBC
PLATELET # BLD: 232 THOU/MM3 (ref 130–400)
PMV BLD AUTO: 9.9 FL (ref 9.4–12.4)
POTASSIUM SERPL-SCNC: 4.7 MEQ/L (ref 3.5–5.2)
RBC # BLD: 3.66 MILL/MM3 (ref 4.7–6.1)
SEG NEUTROPHILS: 79.7 %
SEGMENTED NEUTROPHILS ABSOLUTE COUNT: 12.4 THOU/MM3 (ref 1.8–7.7)
SODIUM BLD-SCNC: 138 MEQ/L (ref 135–145)
WBC # BLD: 15.6 THOU/MM3 (ref 4.8–10.8)

## 2022-02-24 PROCEDURE — 6360000002 HC RX W HCPCS

## 2022-02-24 PROCEDURE — 83605 ASSAY OF LACTIC ACID: CPT

## 2022-02-24 PROCEDURE — 94640 AIRWAY INHALATION TREATMENT: CPT

## 2022-02-24 PROCEDURE — 94760 N-INVAS EAR/PLS OXIMETRY 1: CPT

## 2022-02-24 PROCEDURE — 2580000003 HC RX 258: Performed by: NURSE PRACTITIONER

## 2022-02-24 PROCEDURE — 2500000003 HC RX 250 WO HCPCS: Performed by: INTERNAL MEDICINE

## 2022-02-24 PROCEDURE — 74230 X-RAY XM SWLNG FUNCJ C+: CPT

## 2022-02-24 PROCEDURE — 97166 OT EVAL MOD COMPLEX 45 MIN: CPT

## 2022-02-24 PROCEDURE — 97530 THERAPEUTIC ACTIVITIES: CPT

## 2022-02-24 PROCEDURE — 6370000000 HC RX 637 (ALT 250 FOR IP)

## 2022-02-24 PROCEDURE — 6360000002 HC RX W HCPCS: Performed by: NURSE PRACTITIONER

## 2022-02-24 PROCEDURE — 2580000003 HC RX 258: Performed by: PHYSICIAN ASSISTANT

## 2022-02-24 PROCEDURE — 36415 COLL VENOUS BLD VENIPUNCTURE: CPT

## 2022-02-24 PROCEDURE — 93010 ELECTROCARDIOGRAM REPORT: CPT | Performed by: INTERNAL MEDICINE

## 2022-02-24 PROCEDURE — 83735 ASSAY OF MAGNESIUM: CPT

## 2022-02-24 PROCEDURE — 99233 SBSQ HOSP IP/OBS HIGH 50: CPT | Performed by: INTERNAL MEDICINE

## 2022-02-24 PROCEDURE — 1200000003 HC TELEMETRY R&B

## 2022-02-24 PROCEDURE — 93005 ELECTROCARDIOGRAM TRACING: CPT | Performed by: PHYSICIAN ASSISTANT

## 2022-02-24 PROCEDURE — 82330 ASSAY OF CALCIUM: CPT

## 2022-02-24 PROCEDURE — 85025 COMPLETE CBC W/AUTO DIFF WBC: CPT

## 2022-02-24 PROCEDURE — 80048 BASIC METABOLIC PNL TOTAL CA: CPT

## 2022-02-24 PROCEDURE — 6370000000 HC RX 637 (ALT 250 FOR IP): Performed by: STUDENT IN AN ORGANIZED HEALTH CARE EDUCATION/TRAINING PROGRAM

## 2022-02-24 PROCEDURE — 82948 REAGENT STRIP/BLOOD GLUCOSE: CPT

## 2022-02-24 PROCEDURE — 99232 SBSQ HOSP IP/OBS MODERATE 35: CPT | Performed by: INTERNAL MEDICINE

## 2022-02-24 PROCEDURE — 92611 MOTION FLUOROSCOPY/SWALLOW: CPT

## 2022-02-24 RX ORDER — PANTOPRAZOLE SODIUM 40 MG/10ML
40 INJECTION, POWDER, LYOPHILIZED, FOR SOLUTION INTRAVENOUS
Status: DISCONTINUED | OUTPATIENT
Start: 2022-02-24 | End: 2022-02-25

## 2022-02-24 RX ORDER — CHLORPROMAZINE HYDROCHLORIDE 25 MG/1
25 TABLET, FILM COATED ORAL 2 TIMES DAILY PRN
Status: DISCONTINUED | OUTPATIENT
Start: 2022-02-24 | End: 2022-02-25

## 2022-02-24 RX ORDER — CALCIUM CARBONATE 200(500)MG
1000 TABLET,CHEWABLE ORAL 2 TIMES DAILY
Status: COMPLETED | OUTPATIENT
Start: 2022-02-24 | End: 2022-02-24

## 2022-02-24 RX ORDER — LORAZEPAM 2 MG/ML
2 INJECTION INTRAMUSCULAR ONCE
Status: DISCONTINUED | OUTPATIENT
Start: 2022-02-24 | End: 2022-02-25

## 2022-02-24 RX ADMIN — DOCUSATE SODIUM 100 MG: 100 CAPSULE, LIQUID FILLED ORAL at 20:01

## 2022-02-24 RX ADMIN — BUDESONIDE 500 MCG: 0.5 INHALANT RESPIRATORY (INHALATION) at 17:25

## 2022-02-24 RX ADMIN — BARIUM SULFATE 20 ML: 400 PASTE ORAL at 09:29

## 2022-02-24 RX ADMIN — BARIUM SULFATE 30 ML: 400 SUSPENSION ORAL at 09:29

## 2022-02-24 RX ADMIN — ARFORMOTEROL TARTRATE 15 MCG: 15 SOLUTION RESPIRATORY (INHALATION) at 17:24

## 2022-02-24 RX ADMIN — HEPARIN SODIUM 5000 UNITS: 5000 INJECTION INTRAVENOUS; SUBCUTANEOUS at 22:29

## 2022-02-24 RX ADMIN — HEPARIN SODIUM 5000 UNITS: 5000 INJECTION INTRAVENOUS; SUBCUTANEOUS at 05:58

## 2022-02-24 RX ADMIN — CALCIUM CARBONATE 1000 MG: 500 TABLET, CHEWABLE ORAL at 19:58

## 2022-02-24 RX ADMIN — INSULIN LISPRO 1 UNITS: 100 INJECTION, SOLUTION INTRAVENOUS; SUBCUTANEOUS at 22:29

## 2022-02-24 RX ADMIN — BARIUM SULFATE 100 ML: 0.81 POWDER, FOR SUSPENSION ORAL at 09:29

## 2022-02-24 RX ADMIN — CHLORPROMAZINE HYDROCHLORIDE 25 MG: 25 TABLET, SUGAR COATED ORAL at 19:58

## 2022-02-24 RX ADMIN — PIPERACILLIN AND TAZOBACTAM 3375 MG: 3; .375 INJECTION, POWDER, LYOPHILIZED, FOR SOLUTION INTRAVENOUS at 20:13

## 2022-02-24 RX ADMIN — Medication 1 CAPSULE: at 16:34

## 2022-02-24 RX ADMIN — CALCIUM CARBONATE 1000 MG: 500 TABLET, CHEWABLE ORAL at 14:44

## 2022-02-24 RX ADMIN — Medication 360 MG: at 19:59

## 2022-02-24 RX ADMIN — ARFORMOTEROL TARTRATE 15 MCG: 15 SOLUTION RESPIRATORY (INHALATION) at 08:40

## 2022-02-24 RX ADMIN — PIPERACILLIN AND TAZOBACTAM 3375 MG: 3; .375 INJECTION, POWDER, LYOPHILIZED, FOR SOLUTION INTRAVENOUS at 08:09

## 2022-02-24 RX ADMIN — OXYBUTYNIN CHLORIDE 10 MG: 10 TABLET, EXTENDED RELEASE ORAL at 16:34

## 2022-02-24 RX ADMIN — SODIUM CHLORIDE: 9 INJECTION, SOLUTION INTRAVENOUS at 21:48

## 2022-02-24 RX ADMIN — SODIUM CHLORIDE: 9 INJECTION, SOLUTION INTRAVENOUS at 08:05

## 2022-02-24 ASSESSMENT — PAIN SCALES - WONG BAKER: WONGBAKER_NUMERICALRESPONSE: 2

## 2022-02-24 ASSESSMENT — PAIN DESCRIPTION - LOCATION: LOCATION: CHEST

## 2022-02-24 NOTE — PROGRESS NOTES
[facility]  SPEECH THERAPY  STRZ RENAL TELEMETRY 6K  Modified Barium Swallow    SLP Individual Minutes  Time In: 3162  Time Out:   Minutes: 19  Timed Code Treatment Minutes: 0 Minutes       Date: 2022  Patient Name: Kuldeep Moser      CSN: [id number]   : 1931  (80 y.o.)  Gender: male   Referring Physician:  Dr. MCKNIGHTΑΤAMINTA [place]  Diagnosis: Hyperkalemia  Secondary Diagnosis: Dysphagia   Precautions: aspiration, fall risk  History of Present Illness/Injury: Patient admitted to [facility] with above diagnosis. Per H&P, \"Romario is a 71-year-old  male with a past medical history of CHF, COPD, dementia who presents to [facility] ED from a nursing home for the evaluation of abdominal pain, and not eating and drinking.  Due to patient's underlying dementia, he is a poor historian.  ED provider reports poor history from nursing home nurse, and states that he did not eat well yesterday and had a fall earlier today.  This patient is the father of Dr. Josse Hare with oncology, and ED provider reached out to him for more information.  Patient's baseline is unclear. Jareth Arias states he has lower abdominal pain around his catheter insertion site, fever, chills, decreased appetite.  Upon questioning, patient was able to state his name and date of birth, but was unsure of where he was at and date.  The patient denies chest pain, shortness of breath, lightheadedness, dizziness, weakness at this time. \" ST consulted due to dementia baseline and concerns for aspiration, per chart review, \"Dementia: Baseline unknown at this time. \" ST to complete MBS to evaluate pharyngeal function of swallow and determine potential diet/POC as necessary.      Patient has a past medical history of Anemia, Anxiety, BPH (benign prostatic hyperplasia), CAD (coronary artery disease), Chronic back pain, COPD (chronic obstructive pulmonary disease) ([address] Detached retina, Diabetes mellitus ([address] DVT (deep venous thrombosis) ([address] DVT (deep venous thrombosis) ([address] Dysphagia, Glaucoma, Hyperlipidemia, Hypertension, Macular degeneration, Mass of chest, Movement disorder, Obesity, Osteoarthritis, Paroxysmal atrial fibrillation ([address] Pneumonia, Primary thyroid follicular carcinoma, Sleep apnea, Testicular hypofunction, Thyroid cancer ([address] and Urinary incontinence. \",\"Please see medical history questionnaire for information related to prior medical history, allergies and medications\"  Current Diet: NPO     Pain: No pain reported. SUBJECTIVE:  Patient arrived to fluoroscopy room. Patient alert and pleasant. No family present. OBJECTIVE:    Respiratory Status:  Independent    Behavioral Observation:  Alert    PATIENT WAS EVALUATED USING:  Barium: thin liquids, mildly thick liquids, puree, soft solids, hard solids     ORAL PREPARATION PHASE:  WFL    ORAL PHASE: WFL     ORAL PHASE RUBEN SCORE: (Dysphagia outcome and severity scale)  6 = WFL/Modified Independent - Normal Diet - May have mild oral delay    PHARYNGEAL PHASE:  Impaired: Residue in the Valleculae, Residue in the Pyriform Sinus and Residue Along the Posterior Pharyngeal Wall     PHARYNGEAL PHASE RUBEN SCORE: (Dysphagia outcome and severity scale)  5 = Mild Dysphagia - may need one consistency restricted - May have one or more of the following: Aspiration with thin - cough to clear, Airway penetration midway to the vocal cords with one or more consistency or to the vocal folds with one consistency, but clears spontaneously - Residue in the pharynx clears spontaneously    EVIDENCE FOR LARYNGEAL PENETRATION AND/OR ASPIRATION:  No evidence of laryngeal penetration  No evidence of aspiration    PENETRATION-ASPIRATION SCALE (PAS):   Thin Liquids: 1 = Material does not enter the airway  Mildly Thick Liquids:  1 = Material does not enter the airway  Puree:  1 = Material does not enter the airway  Soft Solid:  1 = Material does not enter the airway  Hard Solid: 1 = Material does not enter the airway    ESOPHAGEAL PHASE:   No significant findings    ATTEMPTED TECHNIQUES:  Small Bolus Size Effective    Straw Effective    Cup Effective    Chin Tuck Not Attempted    Head Turn Not Attempted    Spoon Presentations Not Attempted    Volitional Cough Effective    Spontaneous Cough Not Attempted           DIAGNOSTIC IMPRESSIONS:  Patient presents with oral phase to be WFL/Modified independent and mild pharyngeal dysphagia. Patient seen in most upright position. Patient with initial refusal of attempts with consistencies; however, complied with min encouragement from [address] [place] [address]  Patient presented with thin liquids via cup and straw, mildly thick liquids via cup and straw, puree, soft solids, and hard solids. Appropriate lip closure, AP movement, textural breakdown and mastication with all consistencies. Appropriate bolus control with appropriate epiglottic inversion, hyoid elevation, thyrohyoid approximation, and pharyngeal constriction for all consistencies. Premature spillage to valleculae; trace residue in valleculae/diffusly throughout for soft and hard solids, mild residue along posterior pharyngeal wall and pyriforms as study continued with additional PO trials. No evidence of aspiration or laryngeal penetration for all consistencies. Hiccupping noted throughout entirety of study; however, baseline. Recommendations for regular diet with thin liquids. ST to continue dysphagia treatment to ensure safety with current diet. RN updated.    Diet Recommendations:  Regular diet with thin liquids  Strategies:  Full Upright Position, Small Bite/Sip, Multiple Swallow, Direct 1:1 Supervision and Alternate Solids and Liquids   Rehabilitation Potential: good    EDUCATION:  Learner: Patient  Education:  Reviewed results and recommendations of this evaluation and Reviewed recommendations for follow-up  Evaluation of Education: Needs further instruction    PLAN:  Skilled SLP intervention on acute care 3-5 x per week or until goals met and/or pt plateaus in function. Specific interventions for next session may include: dysphagia tx. PATIENT GOAL:    Did not state. Will further assess during treatment. SHORT TERM GOALS:  Short-term Goals  Timeframe for Short-term Goals: 2 weeks  Goal 1: Patient will consume regular diet with thin liquids without s/s of aspiration to maintain adequate hydration/nutrition  Goal 2: Patient's family and medical staff will demonstrate understanding of compensatory strategies (upright position, oral care) in order to [facility] safety of consumption of PO intake. Goal 3: Monitor cognitive functioning, determine baseline and complete further assessment as needed (h/o dementia, from AL facility).     LONG TERM GOALS:  No LTGs due to short ELOS    Ankit Pimentel, Student Intern

## 2022-02-24 NOTE — PLAN OF CARE
Problem: DISCHARGE BARRIERS  Goal: Patient's continuum of care needs are met  Outcome: Ongoing  Note: Patient return to AL. See  notes 2/24/22.

## 2022-02-24 NOTE — CARE COORDINATION
DISCHARGE/PLANNING EVALUATION  2/24/22, 2:32 PM EST    Reason for Referral: from [address] Status: Patient is alert but not oriented  Decision Making: Patient is assisted by son with decisions. Family/Social/Home Environment: Patient is from [address] [place] [address] with hopes to return. Current Services including food security, transportation and housekeeping:  Assisted by AL  Current Equipment: walker  Payment Source: Medicare, [employer]  Concerns or Barriers to Discharge: not at this time   Post acute provider list with quality measures, geographic area and applicable managed care information provided. Questions regarding selection process answered: will speak with family closer to discharge. Teach Back Method used with Patient regarding care plan and discharge plan. Patient verbalize understanding of the plan of care and contribute to goal setting. Patient goals, treatment preferences and discharge plan: Patient return to AL pending clinical process.      Electronically signed by EROS Ariza, ZEKE on 2/24/2022 at 2:32 PM

## 2022-02-24 NOTE — PROGRESS NOTES
[facility]  INPATIENT OCCUPATIONAL THERAPY  STRZ RENAL TELEMETRY 6K  EVALUATION    Time:    Time In: 5555  Time Out: 1020  Timed Code Treatment Minutes: 15 Minutes  Minutes: 28          Date: 2022  Patient Name: Sander Khan,   Gender: male      MRN: [id number]  : 1931  (80 y.o.)  Referring Practitioner: Tone Navarro MD  Diagnosis: Hyperkalemia  Additional Pertinent Hx: Per H&P: Kathryn De La Torre is a 61-year-old  male with a past medical history of CHF, COPD, dementia who presents to [facility] ED from a nursing home for the evaluation of abdominal pain, and not eating and drinking. Due to patient's underlying dementia, he is a poor historian. ED provider reports poor history from nursing home nurse, and states that he did not eat well yesterday and had a fall earlier today. This patient is the father of Dr. Alecia Brandt with oncology, and ED provider reached out to him for more information. Patient's baseline is unclear. He states he has lower abdominal pain around his catheter insertion site, fever, chills, decreased appetite. Upon questioning, patient was able to state his name and date of birth, but was unsure of where he was at and date. The patient denies chest pain, shortness of breath, lightheadedness, dizziness, weakness at this time. Restrictions/Precautions:  Restrictions/Precautions: General Precautions,Fall Risk    Subjective  Chart Reviewed: Yes,Orders,Progress Notes  Patient assessed for rehabilitation services?: Yes  Family / Caregiver Present: No    Subjective: RN okayed session. Pt was resting in bed upon arrival, pleasant and agreeable to up to chair.     Pain:  Pain Assessment  Patient Currently in Pain: Yes    Vitals: Vitals not assessed per clinical judgement, see nursing flowsheet    Social/Functional History:  Type of Home: Facility           ADL Assistance: Needs assistance  Homemaking Assistance: Needs assistance  Ambulation Assistance: Needs assistance  Transfer Assistance: Needs assistance          Additional Comments: Pt is a questionable historian d/t hx of dementia; however, pt reports he was living at [facility] and received helped with all ADLs. Pt reported walking short distances wtih RW and assist from staff. VISION:Corrected    HEARING:  [place]    COGNITION: Slow Processing, Decreased Recall, Decreased Insight, Impaired Memory, Decreased Problem Solving and Decreased Safety Awareness    RANGE OF MOTION:  Bilateral Upper Extremity:  grossly 150 degrees    STRENGTH:  Bilateral Upper Extremity:  grossly 3+/5    SENSATION:   WFL    ADL:   Lower Extremity Dressing: Dependent. donning slipper socks. BALANCE:  Sitting Balance:  Contact Guard Assistance. on EOB in prep for t/fs and mobility  Standing Balance: Minimal Assistance. with 1-2  UE support on RW    BED MOBILITY:  Supine to Sit: Maximum Assistance, X 2 with increased time and VC  Scooting: Moderate Assistance to scoot towards EOB while seated upright  Not Tested    TRANSFERS:  Sit to Stand: Moderate Assistance. from EOB with increased time and VC for technique and hand placement  Stand to Sit: Moderate Assistance. to guide descent to recliner    FUNCTIONAL MOBILITY:  Assistive Device: Rolling Walker  Assist Level:  Minimal Assistance. Distance: from EOB to recliner  slow pace, VC for step initation, no LOB. Activity Tolerance:  Patient tolerance of  treatment: good. Assessment:  Assessment: Pt presented with the listed deficits s/p admission with hyperkalemia. Pt with decreased strength, endurance, and activity tolerance and currently requires Mod A for t/fs and Min A for mobility. Pt would benefit from continued OT to restore PLOF maximize pt's indep with ADLs and IADLs in prep for a safe return home at max level of indep.     Performance deficits / Impairments: Decreased functional mobility ,Decreased endurance,Decreased posture,Decreased ADL status,Decreased balance,Decreased strength,Decreased high-level IADLs,Decreased safe awareness  Prognosis: Good  REQUIRES OT FOLLOW UP: Yes  Decision Making: Medium Complexity  Safety Devices in place: Yes  Type of devices: All fall risk precautions in place,Left in chair,Call light within reach,Chair alarm in place    Treatment Initiated: Treatment and education initiated within context of evaluation. Evaluation time included review of current medical information, gathering information related to past medical, social and functional history, completion of standardized testing, formal and informal observation of tasks, assessment of data and development of plan of care and goals. Treatment time included skilled education and facilitation of tasks to increase safety and independence with ADL's for improved functional independence and quality of life. Discharge Recommendations:  Subacute/Skilled Nursing Facility    Patient Education:  OT Education: OT Role,Plan of Care,Transfer Training    Equipment Recommendations:  Equipment Needed: No    Plan:  Times per week: 3-5x  Times per day: Daily  Current Treatment Recommendations: Strengthening,Patient/Caregiver Education & Training,Balance Training,Functional Mobility Training,Endurance Training,Safety Education & Training,Self-Care / ADL. See long-term goal time frame for expected duration of plan of care. If no long-term goals established, a short length of stay is anticipated.     Goals:  Patient goals : none stated  Short term goals  Time Frame for Short term goals: by discharge  Short term goal 1: Pt will complete functional mobility short distances with CGA and no LOB or safety cues for ease with ADLs  Short term goal 2: Pt will tolerate dynamic standing x3-4min with CGA and unilateral release to increase indep with grooming  Short term goal 3: Pt will complete UB ADLs with Min A and min VC  Short term goal 4: Pt will complete BUE strengthening HEP with light resistance and min VC for technique to increase indep wtih dressing         Following session, patient left in safe position with all fall risk precautions in place.

## 2022-02-24 NOTE — PROGRESS NOTES
This RN at nurses station, respiratory in room giving patient breathing treatment. Tele reading asystole, upon review patient had 6 2-3 second consecutive pauses, patient sneezing. This RN in patients room, vital signs obtained, patient asymptomatic at this time. Notified hospitalist, orders for cardiology consult. Consult completed to Dr. Александр Gomez, patient normally sees Dr. Brina Phillips.

## 2022-02-24 NOTE — PLAN OF CARE
Problem: Non-Violent Restraints  Goal: Removal from restraints as soon as assessed to be safe  Outcome: Ongoing     Problem: Non-Violent Restraints  Goal: Patient's dignity will be maintained  Outcome: Ongoing     Problem: Non-Violent Restraints  Goal: No harm/injury to patient while restraints in use  Outcome: Ongoing

## 2022-02-24 NOTE — PROGRESS NOTES
[facility]   Nephrology progress note  2/24/2022, 9:11 AM      Pt Name:    Lily Frias  MRN:     [id number]     [place]:    7/17/1931  Admit Date:    2/22/2022  9:12 AM  Primary Care Physician:  Veronica Bianchi MD   Room number  7M-35/576-F    Chief Complaint: Nephrology following for STEPHY/hyperkalemia and metabolic acidosis    Subjective:  Patient seen and examined  Seen earlier today during rounds  No acute distress  Urine output improved  Patient put out 2.2 L of urine output in last 24 h    Objective:  24HR INTAKE/OUTPUT:      Intake/Output Summary (Last 24 hours) at 2/24/2022 0911  Last data filed at 2/24/2022 0407  Gross per 24 hour   Intake 1500 ml   Output 3250 ml   Net -1750 ml     I/O last 3 completed shifts: In: 5066.3 [P.O.:100; I.V.:3173.3; IV Piggyback:693]  Out: 4450 [Urine:2850]  No intake/output data recorded. Admission weight: 212 lb (96.2 kg)  Wt Readings from Last 3 Encounters:   02/24/22 230 lb 2.6 oz (104.4 kg)   01/03/22 212 lb (96.2 kg)   12/06/21 210 lb (95.3 kg)     Body mass index is 37.15 kg/m².     Physical examination  VITALS:     Vitals:    02/24/22 0041 02/24/22 0407 02/24/22 0745 02/24/22 0841   BP: (!) 110/57 (!) 93/58 (!) 106/59    Pulse: 111 107 105    Resp: 17 18  18   Temp: 98 °F (36.7 °C) 98.5 °F (36.9 °C)     TempSrc: Axillary Axillary     SpO2: 93% 93% 91% 92%   Weight:  230 lb 2.6 oz (104.4 kg)     Height:         General Appearance: alert and cooperative with exam, appears comfortable, no distress  Mouth/Throat: Oral mucosa dry  Neck: No JVD  Lungs:  no use of accessory muscles  GI: soft, non-tender, no guarding  Extremities: No significant LE edema      Lab Data  CBC:   Recent Labs     02/22/22  0940 02/23/22  0324 02/24/22  0415   WBC 31.0* 23.2* 15.6*   HGB 11.5* 9.9* 10.0*   HCT 35.7* 30.2* 30.6*    243 232     BMP:  Recent Labs     02/23/22  0324 02/23/22  1159 02/24/22  0415   * 133* 138   K 5.5*  5.5* 5.7* 4.7   CL 98 95* 102   CO2 26 29 28   BUN 41* 42* 33*   CREATININE 3.4* 3.8* 3.4*   GLUCOSE 140* 163* 109*   CALCIUM 7.8* 7.8* 7.4*     Hepatic:   Recent Labs     02/22/22  0940   LABALBU 2.9*   AST 15   ALT 8*   BILITOT 0.3   ALKPHOS 85         Meds:  Infusion:    sodium chloride 75 mL/hr at 02/24/22 0805    dextrose      sodium chloride      dextrose      Or    dextrose       Meds:    LORazepam  2 mg IntraVENous Once    insulin lispro  0-6 Units SubCUTAneous TID WC    insulin lispro  0-3 Units SubCUTAneous Nightly    piperacillin-tazobactam  3,375 mg IntraVENous Q12H    Arformoterol Tartrate  15 mcg Nebulization BID    budesonide  0.5 mg Nebulization BID    docusate sodium  100 mg Oral BID    doxazosin  4 mg Oral Daily    famotidine  20 mg Oral Daily    ferrous gluconate  360 mg Oral BID    lactobacillus  1 capsule Oral BID     levothyroxine  175 mcg Oral Daily    [Held by provider] lisinopril  2.5 mg Oral Daily    oxybutynin  10 mg Oral QPM    tiotropium  2 puff Inhalation Daily    Vitamin D  500 Units Oral Daily    sodium chloride flush  10 mL IntraVENous 2 times per day    heparin (porcine)  5,000 Units SubCUTAneous 3 times per day     Meds prn: glucose, glucagon (rDNA), dextrose, albuterol sulfate HFA, sodium chloride flush, sodium chloride, ondansetron **OR** ondansetron, polyethylene glycol, acetaminophen **OR** acetaminophen, dextrose **OR** dextrose       Impression and Plan:  1. STEPHY in setting of hypotension/sepsis in setting of lisinopril and Aldactone. Likely has ischemic ATN. Improving. Nonoliguric at this time. Urine output 2.2 L documented in last 24 h  No need for renal placement therapy today. Had dialysis treatment yesterday for hyperkalemia. Anticipate improvement in renal function over time  We'll continue to monitor interdialytic creatinine  Check labs in a.m. 2.  Critical hyperkalemia in setting of STEPHY, lisinopril, Aldactone and exogenous potassium supplementation. Improved with dialysis. 3.  Borderline hypotension. Improved. Continue with IV fluids  4. Metabolic acidosis. Improved. 5.  Chronic grade 1 diastolic dysfunction. Clinically not in decompensated heart failure  6. Sepsis secondary to UTI. Follow cultures. On empiric IV antibiotics. 7.  Nutrition. Patient awaiting swallow evaluation. Continue with IV fluids  8.   Medications reviewed    Discussed with SHIRLEY Lara MD  [facility]

## 2022-02-24 NOTE — PROGRESS NOTES
PROGRESS NOTE      Patient:  Hunter Munoz      Unit/Bed:6K-18/018-A    YOB: 1931    MRN: [id number]       Acct: [de-identified]     PCP: Darleen Benson MD    Date of Admission: 2/22/2022    Assessment/Plan:      Active Hospital Problems    Diagnosis Date Noted    Sepsis [facility]) [A41.9] 02/22/2022    Abdominal pain [R10.9]        1. Acute kidney injury: Stable. This is multifactorial in the setting of hyperkalemia, sepsis.  CT abdomen pelvis was negative for hydronephrosis. On IVF w/ NS. No EKG changes were noted with hyperkalemia.  Patient was on lisinopril and Aldactone these have been stopped. Dose medications to GFR no nonsteroidals are to be given. Nephrology on board, appreciate recs. 2. UTI: Purulent drainage noted to be coming from suprapubic catheter site. CT abdomen pelvis w/o signs of intra-abdominal/pelvic abnormality. 3. Acute hyperkalemia: Resolved. In the setting of acute kidney injury.  No acute EKG changes were noted.  Patient was on lisinopril and Aldactone prior to admission.  2/22/2022 patient did receive hemodialysis. 4. Sepsis:Resolved. No shock at time of admission. Graciella P[place] secondary to UTI. Ro Links was continued. MRSA PCR negative, Vanc was discontinued. Lactate now normal. Urine cx shows enterobacter sensitive to zosyn and E Faecalis sensitive to penicillins. Zosyn able to cover both, continue. 5. Hyponatremia: Resolved. likely from hypervolemic in nature.  Will monitor. 6. Anion gap acidosis:Resolved In the setting of sepsis and acute kidney injury.  Bicarb drip was initiated by nephrology and now discontinued. 7. HFpEF: History, echo 6/2021 LVEF 55% with grade 1 diastolic dysfunction  8. Diabetes mellitus type 2: Uncontrolled, Lantus was not restarted at time of admission this will be restarted and monitor with Accu-Cheks sliding scale insulin. Diet: Passed MBS.  Regular diet with thin liquids  DVT prophylaxis: subq heparin    Code Status: DNR-CCA    Disposition:  Back to ECF once medically stable  --------------------------------------------------------------------------------------------------------------    Chief Complaint: Abdominal pain, bilateral LE swelling, and poor PO intake    Hospital Course: Mark Bales is a 25-year-old male admitted on 2/22/2022 with chief complaint of bilateral leg swelling, worsening confusion, and abdominal pain. Patient is a resident at assisted living.  Noted purulent drainage coming from suprapubic catheter site.  No signs of peritonitis at this time.  CT abdomen pelvis revealed no evidence of acute intra-abdominal or intrapelvic abnormalities.  Zosyn was started.  Suprapubic catheter was exchanged while in the ER on 2/22/2022. Nephrology was consulted for acute hyperkalemia and acute kidney injury. 2/22/2022 patient was transferred to the ICU and received dialysis overnight. Patient was transferred to general medicine floor on 2/23. Patient has past medical history significant for lifetime non-smoker, severe COPD, ROBER, PAF, CAD, 6/20/2021 cardiac stress test negative for ischemia, 6/2021 echo LVEF 55% with grade 1 diastolic dysfunction, PAF, thyroid cancer status post thyroid resection, macular degeneration, DVT, diabetes mellitus type 2, detached retina, BPH, anxiety, anemia, dementia. Subjective:  Patient seen this AM. Awake, friendly, and conversant. However, did not know where he was. No complaints at this time. Denies any abdominal or suprapubic discomfort.     Medications:  Reviewed    Infusion Medications    sodium chloride 75 mL/hr at 02/24/22 0805    dextrose      sodium chloride      dextrose      Or    dextrose       Scheduled Medications    LORazepam  2 mg IntraVENous Once    piperacillin-tazobactam  3,375 mg IntraVENous Q12H    Arformoterol Tartrate  15 mcg Nebulization BID    budesonide  0.5 mg Nebulization BID    docusate sodium  100 mg Oral BID    doxazosin  4 mg Oral Daily    famotidine  20 mg Oral Daily    ferrous gluconate  360 mg Oral BID    lactobacillus  1 capsule Oral BID     levothyroxine  175 mcg Oral Daily    [Held by provider] lisinopril  2.5 mg Oral Daily    oxybutynin  10 mg Oral QPM    tiotropium  2 puff Inhalation Daily    Vitamin D  500 Units Oral Daily    sodium chloride flush  10 mL IntraVENous 2 times per day    heparin (porcine)  5,000 Units SubCUTAneous 3 times per day     PRN Meds: glucose, glucagon (rDNA), dextrose, albuterol sulfate HFA, sodium chloride flush, sodium chloride, ondansetron **OR** ondansetron, polyethylene glycol, acetaminophen **OR** acetaminophen, dextrose **OR** dextrose      Intake/Output Summary (Last 24 hours) at 2/24/2022 0826  Last data filed at 2/24/2022 0407  Gross per 24 hour   Intake 1500 ml   Output 3250 ml   Net -1750 ml       Diet:  Diet NPO    Exam:  BP (!) 106/59   Pulse 105   Temp 98.5 °F (36.9 °C) (Axillary)   Resp 18   Ht 5' 6\" (1.676 m)   Wt 230 lb 2.6 oz (104.4 kg)   SpO2 91%   BMI 37.15 kg/m²     Physical Exam:  General appearance: Alert, not in acute distress  HEENT:  Normal cephalic, atraumatic without obvious deformity. Pupils equal, round, and reactive to light. Conjunctivae clear. Clear oral mucosa  Neck: Supple, with full range of motion. No jugular venous distention. Trachea midline. Respiratory:  Normal respiratory effort. Clear to auscultation, bilaterally without Rales/Wheezes/Rhonchi. Cardiovascular: Normal rate, regular rhythm with normal S1/S2 without murmurs, rubs or gallops. Abdomen: Soft, non-tender, non-distended with normal bowel sounds. Musculoskeletal:  No clubbing, cyanosis or edema bilaterally. Full range of motion without deformity. Skin: No rashes or lesions. Neurological exam reveals alert, oriented, normal speech, no focal findings or movement disorder noted.   Exam of extremities: peripheral pulses normal, no pedal or leg edema, no clubbing or cyanosis    Labs:   Recent Labs 02/22/22  0940 02/23/22  0324 02/24/22  0415   WBC 31.0* 23.2* 15.6*   HGB 11.5* 9.9* 10.0*   HCT 35.7* 30.2* 30.6*    243 232     Recent Labs     02/23/22  0324 02/23/22  1159 02/24/22  0415   * 133* 138   K 5.5*  5.5* 5.7* 4.7   CL 98 95* 102   CO2 26 29 28   BUN 41* 42* 33*   CREATININE 3.4* 3.8* 3.4*   CALCIUM 7.8* 7.8* 7.4*     Recent Labs     02/22/22  0940   AST 15   ALT 8*   BILITOT 0.3   ALKPHOS 85     No results for input(s): INR in the last 72 hours. No results for input(s): [place] in the last 72 hours. Urinalysis:      Lab Results   Component Value Date    NITRU NEGATIVE 02/22/2022    WBCUA > 200 02/22/2022    BACTERIA MANY 02/22/2022    RBCUA > 100 02/22/2022    BLOODU LARGE 02/22/2022    SPECGRAV 1.017 09/22/2021    GLUCOSEU NEGATIVE 02/22/2022       Radiology:  CT HEAD WO CONTRAST   Final Result   No acute intracranial findings. Chronic changes as described. This document has been electronically signed by: Jed Ibarra MD on    02/23/2022 05:54 AM      All CTs at this facility use dose modulation techniques and iterative    reconstructions, and/or weight-based dosing   when appropriate to reduce radiation to a low as reasonably achievable. XR CHEST PORTABLE   Final Result   Impression:   Right IJ line tip over the proximal SVC. This document has been electronically signed by: Jed Ibarra MD on    02/23/2022 12:43 AM      CT ABDOMEN PELVIS WO CONTRAST Additional Contrast? None   Final Result       1. No evidence of acute intra-abdominal or intrapelvic abnormality. 2. Moderate retained stool. 3. Nonobstructive nephrolithiasis on the left. 4. Prostatomegaly. **This report has been created using voice recognition software. It may contain minor errors which are inherent in voice recognition technology. **      Final report electronically signed by Dr. Kailey Quan MD on 2/22/2022 11:01 AM      XR CHEST PORTABLE   Final Result   Stable radiographic appearance of the chest. No evidence of an acute process. **This report has been created using voice recognition software. It may contain minor errors which are inherent in voice recognition technology. **      Final report electronically signed by Dr. Stacy Paul MD on 2/22/2022 10:09 AM      FL MODIFIED BARIUM Will Tapia VIDEO    (Results Pending)       Electronically signed by Juliano Perez MD on 2/24/2022 at 8:26 AM

## 2022-02-24 NOTE — PROGRESS NOTES
Updated Dr. Alecia Brandt over the phone of last nights events. Explained use of restraints,  he verbalized understanding . Updated on POC and most recent lab values.   No questions at this time

## 2022-02-25 ENCOUNTER — APPOINTMENT (OUTPATIENT)
Dept: ULTRASOUND IMAGING | Age: 87
DRG: 698 | End: 2022-02-25
Payer: MEDICARE

## 2022-02-25 LAB
ANION GAP SERPL CALCULATED.3IONS-SCNC: 11 MEQ/L (ref 8–16)
BASOPHILS # BLD: 0.1 %
BASOPHILS ABSOLUTE: 0 THOU/MM3 (ref 0–0.1)
BUN BLDV-MCNC: 39 MG/DL (ref 7–22)
CALCIUM SERPL-MCNC: 7.7 MG/DL (ref 8.5–10.5)
CHLORIDE BLD-SCNC: 100 MEQ/L (ref 98–111)
CO2: 27 MEQ/L (ref 23–33)
CREAT SERPL-MCNC: 4 MG/DL (ref 0.4–1.2)
EOSINOPHIL # BLD: 2 %
EOSINOPHILS ABSOLUTE: 0.3 THOU/MM3 (ref 0–0.4)
ERYTHROCYTE [DISTWIDTH] IN BLOOD BY AUTOMATED COUNT: 16.4 % (ref 11.5–14.5)
ERYTHROCYTE [DISTWIDTH] IN BLOOD BY AUTOMATED COUNT: 50.8 FL (ref 35–45)
GFR SERPL CREATININE-BSD FRML MDRD: 14 ML/MIN/1.73M2
GLUCOSE BLD-MCNC: 101 MG/DL (ref 70–108)
GLUCOSE BLD-MCNC: 113 MG/DL (ref 70–108)
GLUCOSE BLD-MCNC: 130 MG/DL (ref 70–108)
GLUCOSE BLD-MCNC: 137 MG/DL (ref 70–108)
HCT VFR BLD CALC: 29.1 % (ref 42–52)
HEMOGLOBIN: 9.3 GM/DL (ref 14–18)
IMMATURE GRANS (ABS): 0.18 THOU/MM3 (ref 0–0.07)
IMMATURE GRANULOCYTES: 1.3 %
LYMPHOCYTES # BLD: 9.2 %
LYMPHOCYTES ABSOLUTE: 1.2 THOU/MM3 (ref 1–4.8)
MCH RBC QN AUTO: 27.2 PG (ref 26–33)
MCHC RBC AUTO-ENTMCNC: 32 GM/DL (ref 32.2–35.5)
MCV RBC AUTO: 85.1 FL (ref 80–94)
MONOCYTES # BLD: 14.1 %
MONOCYTES ABSOLUTE: 1.9 THOU/MM3 (ref 0.4–1.3)
NUCLEATED RED BLOOD CELLS: 0 /100 WBC
PLATELET # BLD: 213 THOU/MM3 (ref 130–400)
PMV BLD AUTO: 9.7 FL (ref 9.4–12.4)
POTASSIUM SERPL-SCNC: 4.2 MEQ/L (ref 3.5–5.2)
RBC # BLD: 3.42 MILL/MM3 (ref 4.7–6.1)
SEG NEUTROPHILS: 73.3 %
SEGMENTED NEUTROPHILS ABSOLUTE COUNT: 9.9 THOU/MM3 (ref 1.8–7.7)
SODIUM BLD-SCNC: 138 MEQ/L (ref 135–145)
WBC # BLD: 13.5 THOU/MM3 (ref 4.8–10.8)

## 2022-02-25 PROCEDURE — 99223 1ST HOSP IP/OBS HIGH 75: CPT | Performed by: NUCLEAR MEDICINE

## 2022-02-25 PROCEDURE — 6360000002 HC RX W HCPCS: Performed by: STUDENT IN AN ORGANIZED HEALTH CARE EDUCATION/TRAINING PROGRAM

## 2022-02-25 PROCEDURE — 99233 SBSQ HOSP IP/OBS HIGH 50: CPT | Performed by: INTERNAL MEDICINE

## 2022-02-25 PROCEDURE — 6360000002 HC RX W HCPCS

## 2022-02-25 PROCEDURE — 6360000002 HC RX W HCPCS: Performed by: NURSE PRACTITIONER

## 2022-02-25 PROCEDURE — 97162 PT EVAL MOD COMPLEX 30 MIN: CPT

## 2022-02-25 PROCEDURE — 76770 US EXAM ABDO BACK WALL COMP: CPT

## 2022-02-25 PROCEDURE — C9113 INJ PANTOPRAZOLE SODIUM, VIA: HCPCS | Performed by: STUDENT IN AN ORGANIZED HEALTH CARE EDUCATION/TRAINING PROGRAM

## 2022-02-25 PROCEDURE — 36415 COLL VENOUS BLD VENIPUNCTURE: CPT

## 2022-02-25 PROCEDURE — 82948 REAGENT STRIP/BLOOD GLUCOSE: CPT

## 2022-02-25 PROCEDURE — 6370000000 HC RX 637 (ALT 250 FOR IP)

## 2022-02-25 PROCEDURE — 97116 GAIT TRAINING THERAPY: CPT

## 2022-02-25 PROCEDURE — 1200000003 HC TELEMETRY R&B

## 2022-02-25 PROCEDURE — 80048 BASIC METABOLIC PNL TOTAL CA: CPT

## 2022-02-25 PROCEDURE — 94760 N-INVAS EAR/PLS OXIMETRY 1: CPT

## 2022-02-25 PROCEDURE — 2580000003 HC RX 258: Performed by: NURSE PRACTITIONER

## 2022-02-25 PROCEDURE — 2580000003 HC RX 258

## 2022-02-25 PROCEDURE — 6370000000 HC RX 637 (ALT 250 FOR IP): Performed by: STUDENT IN AN ORGANIZED HEALTH CARE EDUCATION/TRAINING PROGRAM

## 2022-02-25 PROCEDURE — 85025 COMPLETE CBC W/AUTO DIFF WBC: CPT

## 2022-02-25 PROCEDURE — 94640 AIRWAY INHALATION TREATMENT: CPT

## 2022-02-25 RX ORDER — PANTOPRAZOLE SODIUM 40 MG/1
40 TABLET, DELAYED RELEASE ORAL
Status: DISCONTINUED | OUTPATIENT
Start: 2022-02-25 | End: 2022-03-05 | Stop reason: HOSPADM

## 2022-02-25 RX ADMIN — PIPERACILLIN AND TAZOBACTAM 3375 MG: 3; .375 INJECTION, POWDER, LYOPHILIZED, FOR SOLUTION INTRAVENOUS at 09:29

## 2022-02-25 RX ADMIN — DOXAZOSIN 4 MG: 4 TABLET ORAL at 09:28

## 2022-02-25 RX ADMIN — Medication 1 CAPSULE: at 09:29

## 2022-02-25 RX ADMIN — PANTOPRAZOLE SODIUM 40 MG: 40 INJECTION, POWDER, FOR SOLUTION INTRAVENOUS at 09:28

## 2022-02-25 RX ADMIN — PANTOPRAZOLE SODIUM 40 MG: 40 TABLET, DELAYED RELEASE ORAL at 18:09

## 2022-02-25 RX ADMIN — DOCUSATE SODIUM 100 MG: 100 CAPSULE, LIQUID FILLED ORAL at 09:28

## 2022-02-25 RX ADMIN — Medication 360 MG: at 21:21

## 2022-02-25 RX ADMIN — HEPARIN SODIUM 5000 UNITS: 5000 INJECTION INTRAVENOUS; SUBCUTANEOUS at 05:53

## 2022-02-25 RX ADMIN — BUDESONIDE 500 MCG: 0.5 INHALANT RESPIRATORY (INHALATION) at 17:57

## 2022-02-25 RX ADMIN — Medication 500 UNITS: at 09:28

## 2022-02-25 RX ADMIN — HEPARIN SODIUM 5000 UNITS: 5000 INJECTION INTRAVENOUS; SUBCUTANEOUS at 21:26

## 2022-02-25 RX ADMIN — Medication 1 CAPSULE: at 18:09

## 2022-02-25 RX ADMIN — DOCUSATE SODIUM 100 MG: 100 CAPSULE, LIQUID FILLED ORAL at 21:21

## 2022-02-25 RX ADMIN — BUDESONIDE 500 MCG: 0.5 INHALANT RESPIRATORY (INHALATION) at 09:08

## 2022-02-25 RX ADMIN — Medication 360 MG: at 09:29

## 2022-02-25 RX ADMIN — LEVOTHYROXINE SODIUM 175 MCG: 0.15 TABLET ORAL at 05:53

## 2022-02-25 RX ADMIN — ARFORMOTEROL TARTRATE 15 MCG: 15 SOLUTION RESPIRATORY (INHALATION) at 17:56

## 2022-02-25 RX ADMIN — HEPARIN SODIUM 5000 UNITS: 5000 INJECTION INTRAVENOUS; SUBCUTANEOUS at 14:26

## 2022-02-25 RX ADMIN — SODIUM CHLORIDE, PRESERVATIVE FREE 10 ML: 5 INJECTION INTRAVENOUS at 09:28

## 2022-02-25 RX ADMIN — ARFORMOTEROL TARTRATE 15 MCG: 15 SOLUTION RESPIRATORY (INHALATION) at 09:08

## 2022-02-25 ASSESSMENT — PAIN SCALES - GENERAL: PAINLEVEL_OUTOF10: 0

## 2022-02-25 NOTE — PROGRESS NOTES
[address] [address]  INPATIENT PHYSICAL THERAPY  EVALUATION  [facility] RENAL TELEMETRY 6K - 6K-18/018-A    Time In: 2042/6039  Time Out: 7569/1134  Timed Code Treatment Minutes: 4 Minutes + 10 minutes=14 minutes  Minutes: 17 +10 minutes= 27 minutes        *Split session secondary to tech asking for assistance with transferring pt later in the day. PT in to assist with this. Goals, minutes, and charges updated to reflect this second session. Date: 2022  Patient Name: Severiano Wilkins,  Gender:  male        MRN: [id number]  : 1931  (80 y.o.)      Referring Practitioner: Cheyenne Gomez MD  Diagnosis: Hyperkalemia  Additional Pertinent Hx: Per H&P: Domenico Reyes is a 44-year-old  male with a past medical history of CHF, COPD, dementia who presents to [facility] ED from a nursing home for the evaluation of abdominal pain, and not eating and drinking. Due to patient's underlying dementia, he is a poor historian. ED provider reports poor history from nursing home nurse, and states that he did not eat well yesterday and had a fall earlier today. This patient is the father of Dr. Anahi Frye with oncology, and ED provider reached out to him for more information. Patient's baseline is unclear. He states he has lower abdominal pain around his catheter insertion site, fever, chills, decreased appetite. Upon questioning, patient was able to state his name and date of birth, but was unsure of where he was at and date. Pt presents with UTI, STEPHY, and sepsis. Restrictions/Precautions:  Restrictions/Precautions: General Precautions,Fall Risk  Position Activity Restriction  Other position/activity restrictions: Dementia, suprapubic catheter    Subjective:  Chart Reviewed: Yes  Patient assessed for rehabilitation services?: Yes  Family / Caregiver Present: No  Subjective: RN approved session. Pt initially agreeable to PT with education on the role of PT.  Following subjective questioning and room set up, pt continually stating \"There is no need for mobility, you are distruptive to my function\". Pt continued to decline mobility assessment despite education on the role of PT. Pt not receptive to efforts to redirect, continues to state this therapist is disruptive. Upon entry to pt's room second session to assist with transfers and ambulation, pt pleasantly agreeable for PT treatment. General:  Overall Orientation Status: Impaired  Orientation Level: Disoriented to place,Oriented to person,Disoriented to time,Disoriented to situation  Follows Commands: Impaired    Vision: Within Functional Limits    Hearing: Exceptions to [facility]  Hearing Exceptions: Hard of hearing/hearing concerns     Pain: denies     Vitals: Blood Pressure: 130/63  Oxygen: 93%  Heart Rate: 104bpm    Social/Functional History:    Type of Home: Assisted living  Home Layout: One level  Home Access: Level entry  Home Equipment: Rolling walker         ADL Assistance: Needs assistance  Homemaking Assistance: Needs assistance  Ambulation Assistance: Needs assistance  Transfer Assistance: Needs assistance        Additional Comments: Pt is a questionable historian d/t hx of dementia. Pt states he was using the RW for short ambulation distances. Pt was living in [place] [address]. Per OT eval however, pt reports he received helped with all ADLs. Pt reported walking short distances wt RW and assist from staff.     OBJECTIVE:  Range of Motion:  Bilateral Lower Extremity: WFL    Strength:  Right Lower Extremity: Hip flexion unable to assess, knee extension 4+/5, knee flexion unable to assess, DF 4+/5, PF 4/5    Left Lower Extremity:Hip flexion unable to assess, knee extension unable to assess, knee flexion unable to assess, DF 4/5, PF 4/5    *Pt with decreased understanding of some cues for MMT assessment in supine, pt refusing therapist to assess L LE stating that \"It is disruptive\"    Balance:  Static Sitting Balance:  Contact Guard Assistance  Static Standing Balance: Minimal Assistance, X 2    Bed Mobility:  Rolling to Left: Dependent   Supine to Sit: Minimal Assistance x1, Maximum Assistance x1, with head of bed raised, with increased time for completion  *Max assist x2 to roll to the L to replace a pillow, pt not receptive to cues to assist with rolling  *Pt required assist of two people to safely complete    Transfers:  Sit to Stand: Minimal Assistance x1, Moderate Assistance x1, with increased time for completion, cues for hand placement  Stand to Sit:Minimal Assistance x1, Moderate Assistance x1, with increased time for completion, cues for hand placement  *Pt required assist of two people to safely complete     Ambulation:  Minimal Assistance, X 2, with verbal cues , with increased time for completion  Distance: 6'  Surface: Level Tile  Device:Rolling Walker  Gait Deviations: Forward Flexed Posture, Slow Giovanna, Decreased Step Length Bilaterally, Decreased Heel Strike Bilaterally, Wide Base of Support, Mild Path Deviations, Unsteady Gait and Decreased Terminal Knee Extension  *Pt required cues for sequencing each \"big step\" to the chair, and to progress RW to approach the chair. Min assist for RW progression and for stability with ambulation. Exercise:  None completed    Functional Outcome Measures: Completed  -PAC Inpatient Mobility without Stair Climbing Raw Score : 6  AM-PAC Inpatient without Stair Climbing T-Scale Score : 26.48    ASSESSMENT:  Activity Tolerance:  Patient tolerance of  treatment: Fair. Pt with decreased participation, stating that this therapist was disruptive and that he did not need to complete mobility first attempt. Pt disoriented and unable to be redirected despite attempts. Second session pt pleasantly agreed for PT, however noted deconditioning and required assist of two for mobility with max cues for safety. Treatment Initiated: Treatment and education initiated within context of evaluation.   Evaluation time included review of current medical information, gathering information related to past medical, social and functional history, completion of standardized testing, formal and informal observation of tasks, assessment of data and development of plan of care and goals. Treatment time included skilled education and facilitation of tasks to increase safety and independence with functional mobility for improved independence and quality of life. Assessment: Body structures, Functions, Activity limitations: Decreased functional mobility ,Decreased strength,Decreased endurance,Decreased safe awareness  Assessment: This patient is a 80 y.o. who presents with hyperkalemia. This is a decline from the patient's baseline status of living in assisted living with assistance and use of RW for mobility. The patient is observed to have deficits in strength, balance, activity tolerance, and safety awareness and would benefit from skilled PT services to progress functional mobility, safety awareness, and to decrease overall risk of falls. Prognosis: Fair    REQUIRES PT FOLLOW UP: Yes    Discharge Recommendations:  Discharge Recommendations: [address]    Patient Education:  PT Education: [facility] Stagers of Care,Functional Mobility Training    Equipment Recommendations:  Equipment Needed: No (defer to next level of care)    Plan:  Times per week: 3-5x GM  Current Treatment Recommendations: Katrina Newell Re-education,Patient/Caregiver Education & Training,Home Exercise Program,Functional Mobility Training,Safety Education & Training    Goals:  Patient goals : None stated  Short term goals  Time Frame for Short term goals: By hospital d/c  Short term goal 1: Pt to demo B rolling with SBA for ease with adjusting himself in bed.   Short term goal 2: Pt to complete x10 B LE exercises with SBA for improved strength and self efficacy regarding his plan of care  Short term goal 3: Pt to demo sit <->stand with LRAD and CGA for improved ability to stand from various surfaces  Short term goal 4: Pt to ambulate >=30' with LRAD with CGA for improved ability to move within his environment. Long term goals  Time Frame for Long term goals : NA due to short ELOS    Following session, patient left in safe position with all fall risk precautions in place.

## 2022-02-25 NOTE — PROGRESS NOTES
[facility]   Nephrology progress note  2/25/2022, 11:45 AM      Pt Name:    Toni Lyons  MRN:     [id number]     [place]:    7/17/1931  Admit Date:    2/22/2022  9:12 AM  Primary Care Physician:  Frannie Perez MD   Room number  5F-29/294-O    Chief Complaint: Nephrology following for STEPHY/hyperkalemia and metabolic acidosis    Subjective:  Patient seen and examined  Not oriented  No acute distress  Urine output improved    Objective:  24HR INTAKE/OUTPUT:      Intake/Output Summary (Last 24 hours) at 2/25/2022 1145  Last data filed at 2/25/2022 4655  Gross per 24 hour   Intake 260 ml   Output 1350 ml   Net -1090 ml     I/O last 3 completed shifts: In: 250 [P.O.:250]  Out: 3150 [Urine:3150]  I/O this shift:  In: 10 [I.V.:10]  Out: 350 [Urine:350]  Admission weight: 212 lb (96.2 kg)  Wt Readings from Last 3 Encounters:   02/25/22 228 lb 9.9 oz (103.7 kg)   01/03/22 212 lb (96.2 kg)   12/06/21 210 lb (95.3 kg)     Body mass index is 36.9 kg/m².     Physical examination  VITALS:     Vitals:    02/24/22 2331 02/25/22 0358 02/25/22 0900 02/25/22 1139   BP: 125/63 (!) 109/51 (!) 110/52 (!) 99/59   Pulse: 67 63 67 67   Resp: 18 16 16 16   Temp: 97.9 °F (36.6 °C) 97.5 °F (36.4 °C) 97.9 °F (36.6 °C) 98.6 °F (37 °C)   TempSrc: Oral Oral Oral Oral   SpO2: 98% 93% 92% 93%   Weight:  228 lb 9.9 oz (103.7 kg)     Height:         General Appearance:  no distress  Mouth/Throat: Oral mucosa dry  Neck: No JVD  Lungs:  no use of accessory muscles  GI: soft, non-tender, no guarding  Extremities: No significant LE edema      Lab Data  CBC:   Recent Labs     02/23/22  0324 02/24/22  0415 02/25/22  0455   WBC 23.2* 15.6* 13.5*   HGB 9.9* 10.0* 9.3*   HCT 30.2* 30.6* 29.1*    232 213     BMP:  Recent Labs     02/23/22  1159 02/24/22  0415 02/24/22  1007 02/25/22  0455   * 138  --  138   K 5.7* 4.7  --  4.2   CL 95* 102  --  100   CO2 29 28  --  27   BUN 42* 33*  --  39*   CREATININE 3.8* 3.4* --  4.0*   GLUCOSE 163* 109*  --  113*   CALCIUM 7.8* 7.4*  --  7.7*   MG  --   --  2.1  --      Hepatic:   No results for input(s): LABALBU, AST, ALT, ALB, BILITOT, ALKPHOS in the last 72 hours. Meds:  Infusion:    sodium chloride 75 mL/hr at 02/24/22 2148    dextrose      sodium chloride      dextrose      Or    dextrose       Meds:    LORazepam  2 mg IntraVENous Once    insulin lispro  0-6 Units SubCUTAneous TID WC    insulin lispro  0-3 Units SubCUTAneous Nightly    pantoprazole  40 mg IntraVENous BID AC    piperacillin-tazobactam  3,375 mg IntraVENous Q12H    Arformoterol Tartrate  15 mcg Nebulization BID    budesonide  0.5 mg Nebulization BID    docusate sodium  100 mg Oral BID    ferrous gluconate  360 mg Oral BID    lactobacillus  1 capsule Oral BID WC    levothyroxine  175 mcg Oral Daily    [Held by provider] lisinopril  2.5 mg Oral Daily    tiotropium  2 puff Inhalation Daily    Vitamin D  500 Units Oral Daily    sodium chloride flush  10 mL IntraVENous 2 times per day    heparin (porcine)  5,000 Units SubCUTAneous 3 times per day     Meds prn: glucose, glucagon (rDNA), dextrose, albuterol sulfate HFA, sodium chloride flush, sodium chloride, ondansetron **OR** ondansetron, polyethylene glycol, acetaminophen **OR** acetaminophen, dextrose **OR** dextrose       Impression and Plan:  1. STEPHY in setting of hypotension/sepsis in setting of lisinopril and Aldactone. Likely has ischemic ATN. Improving. Urine output 1500 mL in last 24 hours  Has already put out 350 mL since morning  Nonoliguric  We will continue to monitor  No need for dialysis today  Continue to monitor interdialytic creatinine  Creatinine little higher today  Will obtain updated US    2. Critical hyperkalemia in setting of STEPHY, lisinopril, Aldactone and exogenous potassium supplementation. Improved with dialysis. No dialysis needed today  3. Borderline hypotension. Improved. Continue with IV fluids  4. Metabolic acidosis. Improved. 5.  Chronic grade 1 diastolic dysfunction. Clinically not in decompensated heart failure  6. Sepsis secondary to UTI. On empiric IV antibiotics. Hospitalist managing.   Still has significant leukocytosis but slowly improving    I have updated Dr. Luis Carlos Pollack (Chloe Crowder)     Shmuel Veliz MD  [facility]

## 2022-02-25 NOTE — PROGRESS NOTES
PROGRESS NOTE      Patient:  Vick Rubio      Unit/Bed:6K-18/018-A    YOB: 1931    MRN: [id number]       Acct: [de-identified]     PCP: Alla Newsome MD    Date of Admission: 2/22/2022    Assessment/Plan:      Active Hospital Problems    Diagnosis Date Noted    Bradycardia [R00.1]      Priority: High    STEPHY (acute kidney injury) ([address] [N17.9]     Hyperkalemia [E87.5]     Leukocytosis [D72.829]     Septicemia ([address] [A41.9]     Suprapubic catheter ([address] [Z93.59]     Sepsis ([address] [A41.9] 02/22/2022    Abdominal pain [R10.9]        1. Acute kidney injury: Stable. This is multifactorial in the setting of hyperkalemia, sepsis.  CT abdomen pelvis was negative for hydronephrosis. On IVF w/ NS. No EKG changes were noted with hyperkalemia.  Patient was on lisinopril and Aldactone these have been stopped. Dose medications to GFR no nonsteroidals are to be given. Nephrology on board, appreciate recs. 2. UTI: Purulent drainage noted to be coming from suprapubic catheter site. CT abdomen pelvis w/o signs of intra-abdominal/pelvic abnormality. Doxazosin discontinued due to suprapubic cath in place. Continue zosyn. 3. Acute hyperkalemia: Resolved. In the setting of acute kidney injury.  No acute EKG changes were noted.  Patient was on lisinopril and Aldactone prior to admission.  2/22/2022 patient did receive hemodialysis. 4. Sepsis:Resolved. No shock at time of admission. [place] Sinner secondary to UTI. Lisandra Mess was continued. MRSA PCR negative, Vanc was discontinued. Lactate now normal. Urine cx shows enterobacter sensitive to zosyn and E Faecalis sensitive to penicillins. Zosyn able to cover both, continue. 5. Hyponatremia: Resolved. likely from hypervolemic in nature.  Will monitor. 6. Anion gap acidosis:Resolved In the setting of sepsis and acute kidney injury.  Bicarb drip was initiated by nephrology and now discontinued.   7. HFpEF: History, echo 6/2021 LVEF 55% with grade 1 diastolic dysfunction  8. Diabetes mellitus type 2: Lantus was not restarted at time of admission this will be restarted and monitor with Accu-Cheks sliding scale insulin. 9. Sinus Pauses noted on Tele yesterday. Cardiology consulted. Diet: Passed MBS. Regular diet with thin liquids  DVT prophylaxis: subq heparin    Code Status: DNR-CCA    Disposition:  Back to ECF once medically stable  ----------------------------------------------------------------------------------------------------    Chief Complaint: Abdominal pain, bilateral LE swelling, and poor PO intake    Hospital Course: Kuldeep Moser is a 68-year-old male admitted on 2/22/2022 with chief complaint of bilateral leg swelling, worsening confusion, and abdominal pain. Patient is a resident at assisted living.  Noted purulent drainage coming from suprapubic catheter site.  No signs of peritonitis at this time.  CT abdomen pelvis revealed no evidence of acute intra-abdominal or intrapelvic abnormalities.  Zosyn was started.  Suprapubic catheter was exchanged while in the ER on 2/22/2022. Nephrology was consulted for acute hyperkalemia and acute kidney injury. 2/22/2022 patient was transferred to the ICU and received dialysis overnight. Patient was transferred to general medicine floor on 2/23. Patient has past medical history significant for lifetime non-smoker, severe COPD, ROBER, PAF, CAD, 6/20/2021 cardiac stress test negative for ischemia, 6/2021 echo LVEF 55% with grade 1 diastolic dysfunction, PAF, thyroid cancer status post thyroid resection, macular degeneration, DVT, diabetes mellitus type 2, detached retina, BPH, anxiety, anemia, dementia. Subjective:  Patient seen this AM. Awake. Mental status unchanged. No complaints at this time. Did have some intermittent hiccups that resolved after a short while per sitter. Denies any abdominal or suprapubic discomfort.     Medications:  Reviewed    Infusion Medications    sodium chloride 75 mL/hr at 02/24/22 2148    dextrose      sodium chloride      dextrose      Or    dextrose       Scheduled Medications    LORazepam  2 mg IntraVENous Once    insulin lispro  0-6 Units SubCUTAneous TID WC    insulin lispro  0-3 Units SubCUTAneous Nightly    pantoprazole  40 mg IntraVENous BID AC    piperacillin-tazobactam  3,375 mg IntraVENous Q12H    Arformoterol Tartrate  15 mcg Nebulization BID    budesonide  0.5 mg Nebulization BID    docusate sodium  100 mg Oral BID    ferrous gluconate  360 mg Oral BID    lactobacillus  1 capsule Oral BID WC    levothyroxine  175 mcg Oral Daily    [Held by provider] lisinopril  2.5 mg Oral Daily    tiotropium  2 puff Inhalation Daily    Vitamin D  500 Units Oral Daily    sodium chloride flush  10 mL IntraVENous 2 times per day    heparin (porcine)  5,000 Units SubCUTAneous 3 times per day     PRN Meds: glucose, glucagon (rDNA), dextrose, albuterol sulfate HFA, sodium chloride flush, sodium chloride, ondansetron **OR** ondansetron, polyethylene glycol, acetaminophen **OR** acetaminophen, dextrose **OR** dextrose      Intake/Output Summary (Last 24 hours) at 2/25/2022 1422  Last data filed at 2/25/2022 1200  Gross per 24 hour   Intake 260 ml   Output 1350 ml   Net -1090 ml       Diet:  ADULT DIET; Regular    Exam:  BP (!) 99/59   Pulse 67   Temp 98.6 °F (37 °C) (Oral)   Resp 16   Ht 5' 6\" (1.676 m)   Wt 228 lb 9.9 oz (103.7 kg)   SpO2 93%   BMI 36.90 kg/m²     Physical Exam:  General appearance: Alert, not in acute distress  HEENT:  Normal cephalic, atraumatic without obvious deformity. Pupils equal, round, and reactive to light. Conjunctivae clear. Clear oral mucosa  Neck: Supple, with full range of motion. No jugular venous distention. Trachea midline. Respiratory:  Normal respiratory effort. Clear to auscultation, bilaterally without Rales/Wheezes/Rhonchi. Cardiovascular: Normal rate, regular rhythm with normal S1/S2 without murmurs, rubs or gallops.   Abdomen: Soft, non-tender, non-distended with normal bowel sounds. Musculoskeletal:  No clubbing, cyanosis or edema bilaterally. Full range of motion without deformity. Skin: No rashes or lesions. Neurological exam reveals alert, oriented, normal speech, no focal findings or movement disorder noted. Exam of extremities: peripheral pulses normal, no pedal or leg edema, no clubbing or cyanosis    Labs:   Recent Labs     02/23/22  0324 02/24/22  0415 02/25/22  0455   WBC 23.2* 15.6* 13.5*   HGB 9.9* 10.0* 9.3*   HCT 30.2* 30.6* 29.1*    232 213     Recent Labs     02/23/22  1159 02/24/22  0415 02/25/22  0455   * 138 138   K 5.7* 4.7 4.2   CL 95* 102 100   CO2 29 28 27   BUN 42* 33* 39*   CREATININE 3.8* 3.4* 4.0*   CALCIUM 7.8* 7.4* 7.7*     No results for input(s): AST, ALT, BILIDIR, BILITOT, ALKPHOS in the last 72 hours. No results for input(s): INR in the last 72 hours. No results for input(s): George Bares in the last 72 hours. Urinalysis:      Lab Results   Component Value Date    NITRU NEGATIVE 02/22/2022    WBCUA > 200 02/22/2022    BACTERIA MANY 02/22/2022    RBCUA > 100 02/22/2022    BLOODU LARGE 02/22/2022    SPECGRAV 1.017 09/22/2021    GLUCOSEU NEGATIVE 02/22/2022       Radiology:  FL MODIFIED BARIUM SWALLOW W VIDEO   Final Result   1. No laryngeal penetration or aspiration of barium. 2. Additional recommendations from the speech therapist will follow. **This report has been created using voice recognition software. It may contain minor errors which are inherent in voice recognition technology. **         Final report electronically signed by Dr. Tamera Elam on 2/24/2022 10:11 AM      CT HEAD WO CONTRAST   Final Result   No acute intracranial findings. Chronic changes as described. This document has been electronically signed by:  Sharmin Evangelista MD on    02/23/2022 05:54 AM      All CTs at this facility use dose modulation techniques and iterative

## 2022-02-25 NOTE — PLAN OF CARE
Problem: Falls - Risk of:  Goal: Will remain free from falls  Description: Will remain free from falls  Outcome: Ongoing     Problem: Falls - Risk of:  Goal: Absence of physical injury  Description: Absence of physical injury  Outcome: Ongoing     Problem: Skin Integrity:  Goal: Will show no infection signs and symptoms  Description: Will show no infection signs and symptoms  Outcome: Ongoing     Problem: Skin Integrity:  Goal: Absence of new skin breakdown  Description: Absence of new skin breakdown  Outcome: Ongoing     Problem: Pain:  Goal: Pain level will decrease  Description: Pain level will decrease  Outcome: Completed     Problem: Pain:  Goal: Control of acute pain  Description: Control of acute pain  Outcome: Completed     Problem: Pain:  Goal: Control of chronic pain  Description: Control of chronic pain  Outcome: Completed     Problem: Discharge Planning:  Goal: Discharged to appropriate level of care  Description: Discharged to appropriate level of care  Outcome: Ongoing     Problem: Urinary Elimination:  Goal: Signs and symptoms of infection will decrease  Description: Signs and symptoms of infection will decrease  Outcome: Ongoing     Problem: Urinary Elimination:  Goal: Complications related to the disease process, condition or treatment will be avoided or minimized  Description: Complications related to the disease process, condition or treatment will be avoided or minimized  Outcome: Ongoing     Problem: Non-Violent Restraints  Goal: Removal from restraints as soon as assessed to be safe  Outcome: Completed     Problem: Non-Violent Restraints  Goal: No harm/injury to patient while restraints in use  Outcome: Completed     Problem: Non-Violent Restraints  Goal: Patient's dignity will be maintained  Outcome: Completed     Problem: DISCHARGE BARRIERS  Goal: Patient's continuum of care needs are met  2/24/2022 1440 by EROS Gee, LSW  Outcome: Ongoing  Note: Patient return to [facility].  See SW notes 2/24/22.

## 2022-02-25 NOTE — FLOWSHEET NOTE
This staff has met patietn during previous admissions. He is disoriented today, forgetting within the conversation where he is and why he is here. Frequently requests a beer. Tech present got him a cup of tea, which he graciously accepted- but still wants the beer.

## 2022-02-26 LAB
ACINETOBACTER BAUMANNII FILM ARRAY: NOT DETECTED
ANION GAP SERPL CALCULATED.3IONS-SCNC: 10 MEQ/L (ref 8–16)
BASOPHILS # BLD: 0.2 %
BASOPHILS ABSOLUTE: 0 THOU/MM3 (ref 0–0.1)
BOTTLE TYPE: ABNORMAL
BUN BLDV-MCNC: 40 MG/DL (ref 7–22)
CALCIUM IONIZED: 0.96 MMOL/L (ref 1.12–1.32)
CALCIUM SERPL-MCNC: 7.9 MG/DL (ref 8.5–10.5)
CANDIDA ALBICANS FILM ARRAY: NOT DETECTED
CANDIDA GLABRATA FILM ARRAY: NOT DETECTED
CANDIDA KRUSEI FILM ARRAY: NOT DETECTED
CANDIDA PARAPSILOSIS FILM ARRAY: NOT DETECTED
CANDIDA TROPICALIS FILM ARRAY: NOT DETECTED
CARBAPENEM RESITANT FILM ARRAY: NOT DETECTED
CHLORIDE BLD-SCNC: 101 MEQ/L (ref 98–111)
CO2: 26 MEQ/L (ref 23–33)
CREAT SERPL-MCNC: 4 MG/DL (ref 0.4–1.2)
EKG ATRIAL RATE: 71 BPM
EKG P-R INTERVAL: 248 MS
EKG Q-T INTERVAL: 370 MS
EKG QRS DURATION: 86 MS
EKG QTC CALCULATION (BAZETT): 402 MS
EKG R AXIS: 54 DEGREES
EKG T AXIS: 41 DEGREES
EKG VENTRICULAR RATE: 71 BPM
ENTERBACTER CLOACAE FILM ARRAY: DETECTED
ENTERBACTERIACEAE FILM ARRAY: DETECTED
ENTEROCOCCUS FILM ARRAY: NOT DETECTED
EOSINOPHIL # BLD: 2 %
EOSINOPHILS ABSOLUTE: 0.2 THOU/MM3 (ref 0–0.4)
ERYTHROCYTE [DISTWIDTH] IN BLOOD BY AUTOMATED COUNT: 16.3 % (ref 11.5–14.5)
ERYTHROCYTE [DISTWIDTH] IN BLOOD BY AUTOMATED COUNT: 49.4 FL (ref 35–45)
ESCHERICHIA COLI FILM ARRAY: NOT DETECTED
GFR SERPL CREATININE-BSD FRML MDRD: 14 ML/MIN/1.73M2
GLUCOSE BLD-MCNC: 119 MG/DL (ref 70–108)
GLUCOSE BLD-MCNC: 127 MG/DL (ref 70–108)
GLUCOSE BLD-MCNC: 127 MG/DL (ref 70–108)
GLUCOSE BLD-MCNC: 135 MG/DL (ref 70–108)
GLUCOSE BLD-MCNC: 137 MG/DL (ref 70–108)
HAEMOPHILUS INFLUENZA FILM ARRAY: NOT DETECTED
HCT VFR BLD CALC: 29.4 % (ref 42–52)
HEMOGLOBIN: 9.3 GM/DL (ref 14–18)
IMMATURE GRANS (ABS): 0.54 THOU/MM3 (ref 0–0.07)
IMMATURE GRANULOCYTES: 4.4 %
KLEBSIELLA OXYTOCA FILM ARRAY: NOT DETECTED
KLEBSIELLA PNEUMONIAE FILM ARRAY: NOT DETECTED
LISTERIA MONOCYTOGENES FILM ARRAY: NOT DETECTED
LYMPHOCYTES # BLD: 9.7 %
LYMPHOCYTES ABSOLUTE: 1.2 THOU/MM3 (ref 1–4.8)
MCH RBC QN AUTO: 26.7 PG (ref 26–33)
MCHC RBC AUTO-ENTMCNC: 31.6 GM/DL (ref 32.2–35.5)
MCV RBC AUTO: 84.5 FL (ref 80–94)
METHICILLIN RESISTANT FILM ARRAY: ABNORMAL
MONOCYTES # BLD: 14.6 %
MONOCYTES ABSOLUTE: 1.8 THOU/MM3 (ref 0.4–1.3)
NEISSERIA MENIGITIDIS FILM ARRAY: NOT DETECTED
NUCLEATED RED BLOOD CELLS: 0 /100 WBC
ORGANISM: ABNORMAL
ORGANISM: ABNORMAL
PLATELET # BLD: 246 THOU/MM3 (ref 130–400)
PLATELET ESTIMATE: ADEQUATE
PMV BLD AUTO: 9.5 FL (ref 9.4–12.4)
POTASSIUM SERPL-SCNC: 4 MEQ/L (ref 3.5–5.2)
PROTEUS FILM ARRAY: NOT DETECTED
PSEUDOMONAS AERUGINOSA FILM ARRAY: NOT DETECTED
RBC # BLD: 3.48 MILL/MM3 (ref 4.7–6.1)
SCAN OF BLOOD SMEAR: NORMAL
SEG NEUTROPHILS: 69.1 %
SEGMENTED NEUTROPHILS ABSOLUTE COUNT: 8.5 THOU/MM3 (ref 1.8–7.7)
SERRATIA MARCESCENS FILM ARRAY: NOT DETECTED
SODIUM BLD-SCNC: 137 MEQ/L (ref 135–145)
SOURCE OF BLOOD CULTURE: ABNORMAL
STAPH AUREUS FILM ARRAY: NOT DETECTED
STAPHYLOCOCCUS FILM ARRAY: NOT DETECTED
STREP AGALACTIAE FILM ARRAY: NOT DETECTED
STREP PNEUMONIAE FILM ARRAY: NOT DETECTED
STREP PYOCGENES FILM ARRAY: NOT DETECTED
STREPTOCOCCUS FILM ARRAY: NOT DETECTED
URINE CULTURE REFLEX: ABNORMAL
URINE CULTURE REFLEX: ABNORMAL
VANCOMYCIN RESISTANT FILM ARRAY: ABNORMAL
WBC # BLD: 12.3 THOU/MM3 (ref 4.8–10.8)

## 2022-02-26 PROCEDURE — 6360000002 HC RX W HCPCS: Performed by: NURSE PRACTITIONER

## 2022-02-26 PROCEDURE — 6370000000 HC RX 637 (ALT 250 FOR IP)

## 2022-02-26 PROCEDURE — 6370000000 HC RX 637 (ALT 250 FOR IP): Performed by: STUDENT IN AN ORGANIZED HEALTH CARE EDUCATION/TRAINING PROGRAM

## 2022-02-26 PROCEDURE — 82948 REAGENT STRIP/BLOOD GLUCOSE: CPT

## 2022-02-26 PROCEDURE — 93010 ELECTROCARDIOGRAM REPORT: CPT | Performed by: INTERNAL MEDICINE

## 2022-02-26 PROCEDURE — 94760 N-INVAS EAR/PLS OXIMETRY 1: CPT

## 2022-02-26 PROCEDURE — 82330 ASSAY OF CALCIUM: CPT

## 2022-02-26 PROCEDURE — 36415 COLL VENOUS BLD VENIPUNCTURE: CPT

## 2022-02-26 PROCEDURE — 99233 SBSQ HOSP IP/OBS HIGH 50: CPT | Performed by: INTERNAL MEDICINE

## 2022-02-26 PROCEDURE — 2580000003 HC RX 258: Performed by: NURSE PRACTITIONER

## 2022-02-26 PROCEDURE — 1200000003 HC TELEMETRY R&B

## 2022-02-26 PROCEDURE — 6360000002 HC RX W HCPCS

## 2022-02-26 PROCEDURE — 94640 AIRWAY INHALATION TREATMENT: CPT

## 2022-02-26 PROCEDURE — 93005 ELECTROCARDIOGRAM TRACING: CPT | Performed by: INTERNAL MEDICINE

## 2022-02-26 PROCEDURE — 80048 BASIC METABOLIC PNL TOTAL CA: CPT

## 2022-02-26 PROCEDURE — 2580000003 HC RX 258

## 2022-02-26 PROCEDURE — 2580000003 HC RX 258: Performed by: PHYSICIAN ASSISTANT

## 2022-02-26 PROCEDURE — 6360000002 HC RX W HCPCS: Performed by: PHYSICIAN ASSISTANT

## 2022-02-26 PROCEDURE — 85025 COMPLETE CBC W/AUTO DIFF WBC: CPT

## 2022-02-26 PROCEDURE — 99232 SBSQ HOSP IP/OBS MODERATE 35: CPT | Performed by: INTERNAL MEDICINE

## 2022-02-26 RX ORDER — CALCIUM GLUCONATE 20 MG/ML
2000 INJECTION, SOLUTION INTRAVENOUS ONCE
Status: COMPLETED | OUTPATIENT
Start: 2022-02-26 | End: 2022-02-26

## 2022-02-26 RX ADMIN — BUDESONIDE 500 MCG: 0.5 INHALANT RESPIRATORY (INHALATION) at 17:41

## 2022-02-26 RX ADMIN — HEPARIN SODIUM 5000 UNITS: 5000 INJECTION INTRAVENOUS; SUBCUTANEOUS at 13:33

## 2022-02-26 RX ADMIN — LEVOTHYROXINE SODIUM 175 MCG: 0.15 TABLET ORAL at 08:59

## 2022-02-26 RX ADMIN — SODIUM CHLORIDE: 9 INJECTION, SOLUTION INTRAVENOUS at 03:27

## 2022-02-26 RX ADMIN — SODIUM CHLORIDE: 9 INJECTION, SOLUTION INTRAVENOUS at 17:06

## 2022-02-26 RX ADMIN — Medication 1 CAPSULE: at 17:05

## 2022-02-26 RX ADMIN — BUDESONIDE 500 MCG: 0.5 INHALANT RESPIRATORY (INHALATION) at 09:08

## 2022-02-26 RX ADMIN — Medication 360 MG: at 21:06

## 2022-02-26 RX ADMIN — TIOTROPIUM BROMIDE INHALATION SPRAY 2 PUFF: 3.12 SPRAY, METERED RESPIRATORY (INHALATION) at 09:11

## 2022-02-26 RX ADMIN — ARFORMOTEROL TARTRATE 15 MCG: 15 SOLUTION RESPIRATORY (INHALATION) at 17:40

## 2022-02-26 RX ADMIN — CALCIUM GLUCONATE 2000 MG: 20 INJECTION, SOLUTION INTRAVENOUS at 08:55

## 2022-02-26 RX ADMIN — DOCUSATE SODIUM 100 MG: 100 CAPSULE, LIQUID FILLED ORAL at 09:00

## 2022-02-26 RX ADMIN — ARFORMOTEROL TARTRATE 15 MCG: 15 SOLUTION RESPIRATORY (INHALATION) at 09:07

## 2022-02-26 RX ADMIN — DOCUSATE SODIUM 100 MG: 100 CAPSULE, LIQUID FILLED ORAL at 21:05

## 2022-02-26 RX ADMIN — HEPARIN SODIUM 5000 UNITS: 5000 INJECTION INTRAVENOUS; SUBCUTANEOUS at 21:06

## 2022-02-26 RX ADMIN — PIPERACILLIN AND TAZOBACTAM 3375 MG: 3; .375 INJECTION, POWDER, LYOPHILIZED, FOR SOLUTION INTRAVENOUS at 15:54

## 2022-02-26 RX ADMIN — PANTOPRAZOLE SODIUM 40 MG: 40 TABLET, DELAYED RELEASE ORAL at 16:01

## 2022-02-26 RX ADMIN — Medication 500 UNITS: at 08:59

## 2022-02-26 RX ADMIN — Medication 360 MG: at 09:00

## 2022-02-26 RX ADMIN — SODIUM CHLORIDE, PRESERVATIVE FREE 10 ML: 5 INJECTION INTRAVENOUS at 21:07

## 2022-02-26 RX ADMIN — PIPERACILLIN AND TAZOBACTAM 3375 MG: 3; .375 INJECTION, POWDER, LYOPHILIZED, FOR SOLUTION INTRAVENOUS at 03:29

## 2022-02-26 RX ADMIN — Medication 1 CAPSULE: at 09:00

## 2022-02-26 ASSESSMENT — ENCOUNTER SYMPTOMS: BACK PAIN: 0

## 2022-02-26 NOTE — CONSULTS
[address] [place] [address]                                  CONSULTATION    PATIENT NAME: Ez Gotti                       :        1931  MED REC NO:   [id number]                           ROOM:       0018  ACCOUNT NO:   [de-identified]                           ADMIT DATE: 2022  PROVIDER:     ADRIANNA Lewis Keel:  2022    CARDIOLOGY CONSULTATION    REASON FOR CONSULTATION:  Bradycardia and pauses. REQUESTING PROVIDER:  Hospitalist service. HISTORY OF PRESENT ILLNESS:  This is a 25-year-old who is a very poor  historian who was quite confused, was not able to give me much history,  who apparently presented with generalized symptoms along with confusion  _____ is being evaluated and treated for septicemia and UTI. The  patient apparently during his stay had some episodes of pauses and had  some intermittent atrial fibrillation. We were consulted in that  regards. History was not obtainable from the patient himself and was  obtained from the son who is a physician. My understanding is the  patient is living in an assisted living and his general status has been  quite deteriorating. No obvious angina that I could find. REVIEW OF SYSTEMS:  As above. Difficult to obtain from the patient, was  obtained mainly from interviewing the family and reviewing the chart. The patient does have a fall risk. Has had generalized symptoms,  general deconditioning and weakness. PAST MEDICAL HISTORY:  1. Hypertension. 2.  Diabetes. 3.  Acid reflux. ALLERGIES:  MACROBID, LEVAQUIN, AND ADHESIVE. CURRENT MEDICATIONS:  Humalog, Prinivil, and Protonix. SOCIAL HISTORY:  No tobacco, no drugs, no alcohol. The patient resides  in a nursing home facility in assisted living. FAMILY HISTORY:  Significant for coronary artery disease.     PHYSICAL EXAMINATION:  VITAL SIGNS:  Showed blood pressure 130/80, heart rate of 60. GENERAL APPEARANCE:  Elderly gentleman, confused. No obvious acute  distress. EYES AND EARS:  No discharge. NECK:  No JVD. No bruits. No masses. LUNGS:  Distant sounds. HEART:  Distant sounds with systolic murmur grade 2/6. ABDOMEN:  Soft, nontender. Positive bowel sounds. No organomegaly. EXTREMITIES:  No significant edema. NEUROLOGIC:  As above, confused with no obvious focal deficits. PSYCH:  No obvious acute psychosis. SKIN:  No rashes. LABORATORY DATA:  Showed sodium 138, potassium 4.2, BUN 39, creatinine  4. White count 15.5, hemoglobin 9.3, hematocrit 29.1, platelets 482. DIAGNOSTIC DATA:  EKG showed sinus rhythm with pauses mainly at night. IMPRESSION:  This is a gentleman who comes in with generalized  symptoms,septicemia with UTI. He did have some pauses and his  underlying cardiac condition is pretty much unknown right now. I did  have a discussion with his son who is a physician and apparently, the  direction right now is to have conservative treatment and not consider a  pacemaker given his general situation and his renal failure as well as  his overall poor prognosis. Currently, we are going to continue to  monitor and watch clinical status. I would recommend discussing  potential hospice or palliative care. My team will be following as  needed from hereon. Thank you for allowing me to participate in his care.         Soledad Hernández M.D.    D: 02/25/2022 17:06:58       T: 02/25/2022 17:09:23     [email or web address]  Job#: [id number]     Doc#: [id number]    CC:

## 2022-02-26 NOTE — PROGRESS NOTES
PROGRESS NOTE      Patient:  Tana Freeman      Unit/Bed:6K-18/018-A    YOB: 1931    MRN: [id number]       Acct: [de-identified]     PCP: Suhas Briones MD    Date of Admission: 2/22/2022    Assessment/Plan:      Active Hospital Problems    Diagnosis Date Noted    Bradycardia [R00.1]      Priority: High    STEPHY (acute kidney injury) ([address] [N17.9]     Hyperkalemia [E87.5]     Leukocytosis [D72.829]     Septicemia ([address] [A41.9]     Suprapubic catheter ([address] [Z93.59]     Sepsis ([address] [A41.9] 02/22/2022    Abdominal pain [R10.9]        1. Acute kidney injury: Stable. This is multifactorial in the setting of hyperkalemia, sepsis.  CT abdomen pelvis was negative for hydronephrosis. On IVF w/ NS. No EKG changes were noted with hyperkalemia.  Patient was on lisinopril and Aldactone these have been stopped. Dose medications to GFR no nonsteroidals are to be given. Nephrology on board, appreciate recs. Kidney u/s w/o hydronephrosis. 2. UTI: Purulent drainage noted to be coming from suprapubic catheter site. CT abdomen pelvis w/o signs of intra-abdominal/pelvic abnormality. Doxazosin discontinued due to suprapubic cath in place. Continue zosyn. 3. Acute hyperkalemia: Resolved. In the setting of acute kidney injury.  No acute EKG changes were noted.  Patient was on lisinopril and Aldactone prior to admission.  2/22/2022 patient did receive hemodialysis. 4. Sepsis:Resolved. No shock at time of admission. Maryse Muff secondary to UTI. Javier  was continued. MRSA PCR negative, Vanc was discontinued. Lactate now normal. Urine cx shows enterobacter sensitive to zosyn and E Faecalis sensitive to penicillins. Zosyn able to cover both, continue. 5. Hyponatremia: Resolved. likely from hypervolemic in nature.  Will monitor. 6. Anion gap acidosis:Resolved In the setting of sepsis and acute kidney injury.  Bicarb drip was initiated by nephrology and now discontinued.   7. HFpEF: History, echo 6/2021 LVEF 55% with grade 1 diastolic dysfunction  8. Diabetes mellitus type 2: Lantus was not restarted at time of admission this will be restarted and monitor with Accu-Cheks sliding scale insulin. 9. Sinus Pauses noted on Tele yesterday. Cardiology consulted. No additional intervention or work up per discussion with family. Diet: Passed MBS. Regular diet with thin liquids  DVT prophylaxis: subq heparin    Code Status: DNR-CCA    Disposition:  Back to ECF once medically stable  ----------------------------------------------------------------------------------------------------    Chief Complaint: Abdominal pain, bilateral LE swelling, and poor PO intake    Hospital Course: Tana Freeman is a 80-year-old male admitted on 2/22/2022 with chief complaint of bilateral leg swelling, worsening confusion, and abdominal pain. Patient is a resident at assisted living.  Noted purulent drainage coming from suprapubic catheter site.  No signs of peritonitis at this time.  CT abdomen pelvis revealed no evidence of acute intra-abdominal or intrapelvic abnormalities.  Zosyn was started.  Suprapubic catheter was exchanged while in the ER on 2/22/2022. Nephrology was consulted for acute hyperkalemia and acute kidney injury. 2/22/2022 patient was transferred to the ICU and received dialysis overnight. Patient was transferred to general medicine floor on 2/23. Patient has past medical history significant for lifetime non-smoker, severe COPD, ROBER, PAF, CAD, 6/20/2021 cardiac stress test negative for ischemia, 6/2021 echo LVEF 55% with grade 1 diastolic dysfunction, PAF, thyroid cancer status post thyroid resection, macular degeneration, DVT, diabetes mellitus type 2, detached retina, BPH, anxiety, anemia, dementia. Subjective:  Patient seen this AM. Awake. Mental status unchanged. No complaints at this time. Hiccups started overnight again per sitter. Seems comfortable.      Medications:  Reviewed    Infusion Medications    sodium chloride 75 mL/hr at 02/26/22 0327    dextrose      sodium chloride      dextrose      Or    dextrose       Scheduled Medications    calcium replacement protocol   Other RX Placeholder    pantoprazole  40 mg Oral BID AC    insulin lispro  0-6 Units SubCUTAneous TID WC    insulin lispro  0-3 Units SubCUTAneous Nightly    piperacillin-tazobactam  3,375 mg IntraVENous Q12H    Arformoterol Tartrate  15 mcg Nebulization BID    budesonide  0.5 mg Nebulization BID    docusate sodium  100 mg Oral BID    ferrous gluconate  360 mg Oral BID    lactobacillus  1 capsule Oral BID     levothyroxine  175 mcg Oral Daily    [Held by provider] lisinopril  2.5 mg Oral Daily    tiotropium  2 puff Inhalation Daily    Vitamin D  500 Units Oral Daily    sodium chloride flush  10 mL IntraVENous 2 times per day    heparin (porcine)  5,000 Units SubCUTAneous 3 times per day     PRN Meds: glucose, glucagon (rDNA), dextrose, albuterol sulfate HFA, sodium chloride flush, sodium chloride, ondansetron **OR** ondansetron, polyethylene glycol, acetaminophen **OR** acetaminophen, dextrose **OR** dextrose      Intake/Output Summary (Last 24 hours) at 2/26/2022 1401  Last data filed at 2/26/2022 1217  Gross per 24 hour   Intake 860 ml   Output 2850 ml   Net -1990 ml       Diet:  ADULT DIET; Regular    Exam:  BP (!) 124/57   Pulse 67   Temp 98.6 °F (37 °C) (Oral)   Resp 18   Ht 5' 6\" (1.676 m)   Wt 229 lb 8 oz (104.1 kg)   SpO2 93%   BMI 37.04 kg/m²     Physical Exam:  General appearance: Alert, not in acute distress  HEENT:  Normal cephalic, atraumatic without obvious deformity. Pupils equal, round, and reactive to light. Conjunctivae clear. Clear oral mucosa  Neck: Supple, with full range of motion. No jugular venous distention. Trachea midline. Respiratory:  Normal respiratory effort. Clear to auscultation, bilaterally without Rales/Wheezes/Rhonchi.   Cardiovascular: Normal rate, regular rhythm with normal S1/S2 without murmurs, rubs or gallops. Abdomen: Soft, non-tender, non-distended with normal bowel sounds. Musculoskeletal:  No clubbing, cyanosis or edema bilaterally. Full range of motion without deformity. Skin: No rashes or lesions. Neurological exam reveals alert, oriented, normal speech, no focal findings or movement disorder noted. Exam of extremities: peripheral pulses normal, no pedal or leg edema, no clubbing or cyanosis    Labs:   Recent Labs     02/24/22 0415 02/25/22 0455 02/26/22 0414   WBC 15.6* 13.5* 12.3*   HGB 10.0* 9.3* 9.3*   HCT 30.6* 29.1* 29.4*    213 246     Recent Labs     02/24/22 0415 02/25/22 0455 02/26/22 0414    138 137   K 4.7 4.2 4.0    100 101   CO2 28 27 26   BUN 33* 39* 40*   CREATININE 3.4* 4.0* 4.0*   CALCIUM 7.4* 7.7* 7.9*     No results for input(s): AST, ALT, BILIDIR, BILITOT, ALKPHOS in the last 72 hours. No results for input(s): INR in the last 72 hours. No results for input(s): Ember Councilman in the last 72 hours. Urinalysis:      Lab Results   Component Value Date    NITRU NEGATIVE 02/22/2022    WBCUA > 200 02/22/2022    BACTERIA MANY 02/22/2022    RBCUA > 100 02/22/2022    BLOODU LARGE 02/22/2022    SPECGRAV 1.017 09/22/2021    GLUCOSEU NEGATIVE 02/22/2022       Radiology:  US RENAL COMPLETE   Final Result      Normal bilateral renal ultrasound. Final report electronically signed by Dr. Olya Pearson on 2/25/2022 2:21 PM      FL MODIFIED BARIUM SWALLOW W VIDEO   Final Result   1. No laryngeal penetration or aspiration of barium. 2. Additional recommendations from the speech therapist will follow. **This report has been created using voice recognition software. It may contain minor errors which are inherent in voice recognition technology. **         Final report electronically signed by Dr. Olya Pearson on 2/24/2022 10:11 AM      CT HEAD WO CONTRAST   Final Result   No acute intracranial findings.    Chronic changes as described. This document has been electronically signed by: Mariam Chilel MD on    02/23/2022 05:54 AM      All CTs at this facility use dose modulation techniques and iterative    reconstructions, and/or weight-based dosing   when appropriate to reduce radiation to a low as reasonably achievable. XR CHEST PORTABLE   Final Result   Impression:   Right IJ line tip over the proximal SVC. This document has been electronically signed by: Mariam Chilel MD on    02/23/2022 12:43 AM      CT ABDOMEN PELVIS WO CONTRAST Additional Contrast? None   Final Result       1. No evidence of acute intra-abdominal or intrapelvic abnormality. 2. Moderate retained stool. 3. Nonobstructive nephrolithiasis on the left. 4. Prostatomegaly. **This report has been created using voice recognition software. It may contain minor errors which are inherent in voice recognition technology. **      Final report electronically signed by Dr. Michelle Rendon MD on 2/22/2022 11:01 AM      XR CHEST PORTABLE   Final Result   Stable radiographic appearance of the chest. No evidence of an acute process. **This report has been created using voice recognition software. It may contain minor errors which are inherent in voice recognition technology. **      Final report electronically signed by Dr. Michelle Rendon MD on 2/22/2022 10:09 AM          Electronically signed by Mallory Guerrero MD on 2/26/2022 at 2:01 PM

## 2022-02-26 NOTE — PLAN OF CARE
Problem: Falls - Risk of:  Goal: Will remain free from falls  Description: Will remain free from falls  Outcome: Ongoing  Note: Call light within reach. Side rails up x2. Bed alarm on. Non skid slippers available.       Problem: Skin Integrity:  Goal: Will show no infection signs and symptoms  Description: Will show no infection signs and symptoms  Outcome: Ongoing

## 2022-02-26 NOTE — PROGRESS NOTES
Patient complained of left sided chest discomfort, 'twisting' sensation ongoing for a few months, states comes and goes, but today is particularly bad. Vital signs as documented. Normal sinus on monitor with intermittent pauses. Dr. Jasmyne Burden notified, EKG ordered. Cardiology Mikaela Moore notified and states cardiology has signed off, Dr. Jasmyne Burden to manage unless the family wants aggressive treatment.

## 2022-02-26 NOTE — PROGRESS NOTES
[facility]   Nephrology progress note  2/26/2022, 11:45 AM      Pt Name:    Lisa Zimmerman  MRN:     [id number]     YOB: 1931  Admit Date:    2/22/2022  9:12 AM  Primary Care Physician:  Eulalia Shannon MD   Room number  4B-88/014-B    Chief Complaint: Nephrology following for STEPHY/hyperkalemia and metabolic acidosis    Subjective:  Patient seen and examined. Seen earlier today during rounds  Poor urine output  Not fully oriented    Objective:  24HR INTAKE/OUTPUT:      Intake/Output Summary (Last 24 hours) at 2/26/2022 1145  Last data filed at 2/26/2022 1032  Gross per 24 hour   Intake 620 ml   Output 2850 ml   Net -2230 ml     I/O last 3 completed shifts: In: 36 [P.O.:810; I.V.:10]  Out: 3150 [Urine:3150]  I/O this shift:  In: 60 [P.O.:60]  Out: 1050 [Urine:1050]  Admission weight: 212 lb (96.2 kg)  Wt Readings from Last 3 Encounters:   02/26/22 229 lb 8 oz (104.1 kg)   01/03/22 212 lb (96.2 kg)   12/06/21 210 lb (95.3 kg)     Body mass index is 37.04 kg/m².     Physical examination  VITALS:     Vitals:    02/26/22 0306 02/26/22 0800 02/26/22 0907 02/26/22 1056   BP: (!) 122/55 (!) 121/57  (!) 124/57   Pulse: 71 76  67   Resp: 18 20 16 18   Temp: 98 °F (36.7 °C) 99.7 °F (37.6 °C)  98.6 °F (37 °C)   TempSrc: Oral Oral  Oral   SpO2: 97%  98% 93%   Weight: 229 lb 8 oz (104.1 kg)      Height:         General Appearance:  no distress  Mouth/Throat: Oral mucosa dry  Neck: No JVD  Lungs:  no use of accessory muscles  GI: soft, non-tender, no guarding  Extremities: No significant LE edema      Lab Data  CBC:   Recent Labs     02/24/22  0415 02/25/22  0455 02/26/22  0414   WBC 15.6* 13.5* 12.3*   HGB 10.0* 9.3* 9.3*   HCT 30.6* 29.1* 29.4*    213 246     BMP:  Recent Labs     02/24/22  0415 02/24/22  1007 02/25/22  0455 02/26/22  0414     --  138 137   K 4.7  --  4.2 4.0     --  100 101   CO2 28  --  27 26   BUN 33*  --  39* 40*   CREATININE 3.4*  --  4.0* 4.0* GLUCOSE 109*  --  113* 127*   CALCIUM 7.4*  --  7.7* 7.9*   MG  --  2.1  --   --      Hepatic:   No results for input(s): LABALBU, AST, ALT, ALB, BILITOT, ALKPHOS in the last 72 hours. Meds:  Infusion:    sodium chloride 75 mL/hr at 02/26/22 0327    dextrose      sodium chloride      dextrose      Or    dextrose       Meds:    calcium replacement protocol   Other RX Placeholder    pantoprazole  40 mg Oral BID AC    insulin lispro  0-6 Units SubCUTAneous TID WC    insulin lispro  0-3 Units SubCUTAneous Nightly    piperacillin-tazobactam  3,375 mg IntraVENous Q12H    Arformoterol Tartrate  15 mcg Nebulization BID    budesonide  0.5 mg Nebulization BID    docusate sodium  100 mg Oral BID    ferrous gluconate  360 mg Oral BID    lactobacillus  1 capsule Oral BID WC    levothyroxine  175 mcg Oral Daily    [Held by provider] lisinopril  2.5 mg Oral Daily    tiotropium  2 puff Inhalation Daily    Vitamin D  500 Units Oral Daily    sodium chloride flush  10 mL IntraVENous 2 times per day    heparin (porcine)  5,000 Units SubCUTAneous 3 times per day     Meds prn: glucose, glucagon (rDNA), dextrose, albuterol sulfate HFA, sodium chloride flush, sodium chloride, ondansetron **OR** ondansetron, polyethylene glycol, acetaminophen **OR** acetaminophen, dextrose **OR** dextrose       Impression and Plan:  1. STEPHY in setting of hypotension/sepsis in setting of lisinopril and Aldactone. Likely has ischemic ATN. Nonoliguric. Anticipate improvement  No need for further dialysis  Ultrasound normal without any hydronephrosis    2. Critical hyperkalemia in setting of STEPHY, lisinopril, Aldactone and exogenous potassium supplementation. Improved with dialysis. 3.  Borderline hypotension. Improved. Continue with IV fluids  4. Metabolic acidosis. Improved. 5.  Chronic grade 1 diastolic dysfunction. Clinically not in decompensated heart failure  6. Sepsis secondary to UTI. On empiric IV antibiotics. Hospitalist managing.  WBC improving      Sampson Abad MD  [facility]

## 2022-02-26 NOTE — PLAN OF CARE
Problem: Falls - Risk of:  Goal: Will remain free from falls  Description: Will remain free from falls  Outcome: Ongoing  Note: No falls noted this shift. Continue falling star program. Bed alarm on, bed in low position. Call light and personal belongings in reach. Patient uses call light appropriately. Goal: Absence of physical injury  Description: Absence of physical injury  Outcome: Ongoing     Problem: Skin Integrity:  Goal: Will show no infection signs and symptoms  Description: Will show no infection signs and symptoms  Outcome: Ongoing  Goal: Absence of new skin breakdown  Description: Absence of new skin breakdown  Outcome: Ongoing  Note: No new skin break down noted at this time. Encouraged patient to reposition self in bed. Problem: Discharge Planning:  Goal: Discharged to appropriate level of care  Description: Discharged to appropriate level of care  Outcome: Ongoing     Problem: Urinary Elimination:  Goal: Signs and symptoms of infection will decrease  Description: Signs and symptoms of infection will decrease  Outcome: Ongoing  Note: Billy care completed. Will continue to monitor. Goal: Complications related to the disease process, condition or treatment will be avoided or minimized  Description: Complications related to the disease process, condition or treatment will be avoided or minimized  Outcome: Ongoing     Problem: DISCHARGE BARRIERS  Goal: Patient's continuum of care needs are met  Outcome: Ongoing     Care plan reviewed with patient. Patient verbalizes understanding of plan of care and contributes to goal setting.

## 2022-02-27 LAB
ANION GAP SERPL CALCULATED.3IONS-SCNC: 12 MEQ/L (ref 8–16)
ATYPICAL LYMPHOCYTES: ABNORMAL %
BASOPHILS # BLD: 0.4 %
BASOPHILS ABSOLUTE: 0 THOU/MM3 (ref 0–0.1)
BUN BLDV-MCNC: 38 MG/DL (ref 7–22)
CALCIUM SERPL-MCNC: 8 MG/DL (ref 8.5–10.5)
CHLORIDE BLD-SCNC: 103 MEQ/L (ref 98–111)
CO2: 25 MEQ/L (ref 23–33)
CREAT SERPL-MCNC: 3.9 MG/DL (ref 0.4–1.2)
DIFFERENTIAL TYPE: ABNORMAL
EOSINOPHIL # BLD: 2.8 %
EOSINOPHILS ABSOLUTE: 0.3 THOU/MM3 (ref 0–0.4)
ERYTHROCYTE [DISTWIDTH] IN BLOOD BY AUTOMATED COUNT: 16 % (ref 11.5–14.5)
ERYTHROCYTE [DISTWIDTH] IN BLOOD BY AUTOMATED COUNT: 48 FL (ref 35–45)
GFR SERPL CREATININE-BSD FRML MDRD: 15 ML/MIN/1.73M2
GLUCOSE BLD-MCNC: 115 MG/DL (ref 70–108)
GLUCOSE BLD-MCNC: 121 MG/DL (ref 70–108)
GLUCOSE BLD-MCNC: 128 MG/DL (ref 70–108)
HCT VFR BLD CALC: 28.3 % (ref 42–52)
HEMOGLOBIN: 9.2 GM/DL (ref 14–18)
IMMATURE GRANS (ABS): 0.57 THOU/MM3 (ref 0–0.07)
IMMATURE GRANULOCYTES: 5.3 %
LYMPHOCYTES # BLD: 12.7 %
LYMPHOCYTES ABSOLUTE: 1.4 THOU/MM3 (ref 1–4.8)
MCH RBC QN AUTO: 27.1 PG (ref 26–33)
MCHC RBC AUTO-ENTMCNC: 32.5 GM/DL (ref 32.2–35.5)
MCV RBC AUTO: 83.2 FL (ref 80–94)
MONOCYTES # BLD: 12.9 %
MONOCYTES ABSOLUTE: 1.4 THOU/MM3 (ref 0.4–1.3)
NUCLEATED RED BLOOD CELLS: 0 /100 WBC
PATHOLOGIST REVIEW: ABNORMAL
PLATELET # BLD: 278 THOU/MM3 (ref 130–400)
PLATELET ESTIMATE: ADEQUATE
PMV BLD AUTO: 9.3 FL (ref 9.4–12.4)
POTASSIUM SERPL-SCNC: 3.9 MEQ/L (ref 3.5–5.2)
RBC # BLD: 3.4 MILL/MM3 (ref 4.7–6.1)
SCAN OF BLOOD SMEAR: NORMAL
SEG NEUTROPHILS: 65.9 %
SEGMENTED NEUTROPHILS ABSOLUTE COUNT: 7.1 THOU/MM3 (ref 1.8–7.7)
SODIUM BLD-SCNC: 140 MEQ/L (ref 135–145)
TOXIC GRANULATION: PRESENT
WBC # BLD: 10.8 THOU/MM3 (ref 4.8–10.8)

## 2022-02-27 PROCEDURE — 80048 BASIC METABOLIC PNL TOTAL CA: CPT

## 2022-02-27 PROCEDURE — 2580000003 HC RX 258: Performed by: NURSE PRACTITIONER

## 2022-02-27 PROCEDURE — 94761 N-INVAS EAR/PLS OXIMETRY MLT: CPT

## 2022-02-27 PROCEDURE — 99233 SBSQ HOSP IP/OBS HIGH 50: CPT | Performed by: INTERNAL MEDICINE

## 2022-02-27 PROCEDURE — 6370000000 HC RX 637 (ALT 250 FOR IP)

## 2022-02-27 PROCEDURE — 99232 SBSQ HOSP IP/OBS MODERATE 35: CPT | Performed by: INTERNAL MEDICINE

## 2022-02-27 PROCEDURE — 6360000002 HC RX W HCPCS

## 2022-02-27 PROCEDURE — 6370000000 HC RX 637 (ALT 250 FOR IP): Performed by: STUDENT IN AN ORGANIZED HEALTH CARE EDUCATION/TRAINING PROGRAM

## 2022-02-27 PROCEDURE — 82948 REAGENT STRIP/BLOOD GLUCOSE: CPT

## 2022-02-27 PROCEDURE — 94640 AIRWAY INHALATION TREATMENT: CPT

## 2022-02-27 PROCEDURE — 36415 COLL VENOUS BLD VENIPUNCTURE: CPT

## 2022-02-27 PROCEDURE — 1200000003 HC TELEMETRY R&B

## 2022-02-27 PROCEDURE — 6360000002 HC RX W HCPCS: Performed by: NURSE PRACTITIONER

## 2022-02-27 PROCEDURE — 85025 COMPLETE CBC W/AUTO DIFF WBC: CPT

## 2022-02-27 RX ADMIN — Medication 1 CAPSULE: at 08:27

## 2022-02-27 RX ADMIN — PANTOPRAZOLE SODIUM 40 MG: 40 TABLET, DELAYED RELEASE ORAL at 06:20

## 2022-02-27 RX ADMIN — BUDESONIDE 500 MCG: 0.5 INHALANT RESPIRATORY (INHALATION) at 17:40

## 2022-02-27 RX ADMIN — Medication 500 UNITS: at 08:27

## 2022-02-27 RX ADMIN — LEVOTHYROXINE SODIUM 175 MCG: 0.15 TABLET ORAL at 06:21

## 2022-02-27 RX ADMIN — ARFORMOTEROL TARTRATE 15 MCG: 15 SOLUTION RESPIRATORY (INHALATION) at 17:40

## 2022-02-27 RX ADMIN — HEPARIN SODIUM 5000 UNITS: 5000 INJECTION INTRAVENOUS; SUBCUTANEOUS at 21:31

## 2022-02-27 RX ADMIN — TIOTROPIUM BROMIDE INHALATION SPRAY 2 PUFF: 3.12 SPRAY, METERED RESPIRATORY (INHALATION) at 08:36

## 2022-02-27 RX ADMIN — BUDESONIDE 500 MCG: 0.5 INHALANT RESPIRATORY (INHALATION) at 08:36

## 2022-02-27 RX ADMIN — Medication 360 MG: at 08:27

## 2022-02-27 RX ADMIN — ARFORMOTEROL TARTRATE 15 MCG: 15 SOLUTION RESPIRATORY (INHALATION) at 08:36

## 2022-02-27 RX ADMIN — DOCUSATE SODIUM 100 MG: 100 CAPSULE, LIQUID FILLED ORAL at 08:27

## 2022-02-27 RX ADMIN — HEPARIN SODIUM 5000 UNITS: 5000 INJECTION INTRAVENOUS; SUBCUTANEOUS at 06:21

## 2022-02-27 RX ADMIN — HEPARIN SODIUM 5000 UNITS: 5000 INJECTION INTRAVENOUS; SUBCUTANEOUS at 15:09

## 2022-02-27 RX ADMIN — Medication 1 CAPSULE: at 16:15

## 2022-02-27 RX ADMIN — DOCUSATE SODIUM 100 MG: 100 CAPSULE, LIQUID FILLED ORAL at 20:22

## 2022-02-27 RX ADMIN — PANTOPRAZOLE SODIUM 40 MG: 40 TABLET, DELAYED RELEASE ORAL at 16:15

## 2022-02-27 RX ADMIN — Medication 360 MG: at 20:22

## 2022-02-27 RX ADMIN — PIPERACILLIN AND TAZOBACTAM 3375 MG: 3; .375 INJECTION, POWDER, LYOPHILIZED, FOR SOLUTION INTRAVENOUS at 15:12

## 2022-02-27 RX ADMIN — PIPERACILLIN AND TAZOBACTAM 3375 MG: 3; .375 INJECTION, POWDER, LYOPHILIZED, FOR SOLUTION INTRAVENOUS at 03:08

## 2022-02-27 NOTE — PROGRESS NOTES
Pharmacy Note  [employer] Result    Esperanza Harvey is a 80 y.o. male, with a positive blood culture result    [employer] message received from Romario Montana, laboratory employee on 2/26/2022 at 7:56 PM    First Gram stain result: gram negative bacilli    BioFire BCID result: Enterobacter cloacae KPC NOT detected    [employer] BCID and gram stain congruent? Yes    Suspected source? urine    Patient chart has been reviewed for signs/symptoms of infection: Yes  BP (!) 129/51   Pulse 61   Temp 98.2 °F (36.8 °C) (Oral)   Resp 18   Ht 5' 6\" (1.676 m)   Wt 229 lb 8 oz (104.1 kg)   SpO2 96%   BMI 37.04 kg/m²   Lab Results   Component Value Date    WBC 12.3 (H) 02/26/2022     Allergies reviewed  Latex, Macrobid [nitrofurantoin monohyd macro], Levaquin [levofloxacin], Brimonidine tartrate, Adhesive tape, and Alphagan [brimonidine tartrate]    Renal function reviewed  Estimated Creatinine Clearance: 14 mL/min (A) (based on SCr of 4 mg/dL [facility])). Current antibiotic regimen: piperacillin/tazobactam    Intervention needed: No    Individual contacted: Nurse Erin Baxter    Recommendations: Continue piperacillin/tazobactam and await sensitivities    Recommendations accepted?  N/A    Ede Frankel [facility]  2/26/2022 7:56 PM

## 2022-02-27 NOTE — PROGRESS NOTES
[facility]   Nephrology progress note  2/27/2022, 12:23 PM      Pt Name:    Darlin Johnson  MRN:     [id number]     [place]:    7/17/1931  Admit Date:    2/22/2022  9:12 AM  Primary Care Physician:  Stella Keyes MD   Room number  5O-51/976-P    Chief Complaint: Nephrology following for STEPHY/hyperkalemia and metabolic acidosis    Subjective:  Patient seen and examined. Seen earlier today during rounds  Not fully oriented  Under one-to-one observation by a sitter in the room    Objective:  24HR INTAKE/OUTPUT:      Intake/Output Summary (Last 24 hours) at 2/27/2022 1223  Last data filed at 2/27/2022 0413  Gross per 24 hour   Intake 1300 ml   Output 1850 ml   Net -550 ml     I/O last 3 completed shifts: In: 2160 [P.O.:1560; I.V.:600]  Out: 4700 [Urine:4700]  No intake/output data recorded. Admission weight: 212 lb (96.2 kg)  Wt Readings from Last 3 Encounters:   02/26/22 229 lb 8 oz (104.1 kg)   01/03/22 212 lb (96.2 kg)   12/06/21 210 lb (95.3 kg)     Body mass index is 37.04 kg/m².     Physical examination  VITALS:     Vitals:    02/26/22 2337 02/27/22 0413 02/27/22 0825 02/27/22 0836   BP: (!) 158/67 (!) 148/64 (!) 128/91    Pulse: 60 59 63    Resp: 18 18 18 20   Temp: 97.7 °F (36.5 °C) 98.1 °F (36.7 °C) 98.1 °F (36.7 °C)    TempSrc: Oral Oral Oral    SpO2: 96% 94% 97% 96%   Weight:       Height:         General Appearance:  no distress  Mouth/Throat: Oral mucosa dry  Neck: No JVD  Lungs:  no use of accessory muscles  GI: soft, non-tender, no guarding  Extremities: No significant LE edema      Lab Data  CBC:   Recent Labs     02/25/22  0455 02/26/22  0414 02/27/22  0544   WBC 13.5* 12.3* 10.8   HGB 9.3* 9.3* 9.2*   HCT 29.1* 29.4* 28.3*    246 278     BMP:  Recent Labs     02/25/22  0455 02/26/22  0414 02/27/22  0544    137 140   K 4.2 4.0 3.9    101 103   CO2 27 26 25   BUN 39* 40* 38*   CREATININE 4.0* 4.0* 3.9*   GLUCOSE 113* 127* 121*   CALCIUM 7.7* 7.9* 8.0* Improved. 5.  Chronic grade 1 diastolic dysfunction. Clinically not in decompensated heart failure  6. Sepsis secondary to UTI. On empiric IV antibiotics. Hospitalist managing.  WBC improving      Dusty Sewell MD  [facility]

## 2022-02-27 NOTE — PROGRESS NOTES
hypervolemic in nature.  Will monitor. 6. Anion gap acidosis:Resolved In the setting of sepsis and acute kidney injury.  Bicarb drip was initiated by nephrology and now discontinued. 7. Chronic HFpEF: Not in acute exacerbation echo 6/2021 LVEF 55% with grade 1 diastolic dysfunction  8. Diabetes mellitus type 2: Lantus was not restarted at time of admission due to AMS, has been controlled. Will monitor with Accu-Cheks sliding scale insulin. 9. Sinus Pauses w/ sinus bradycardia. Cardiology consulted. No additional intervention or work up per discussion with family. Will discontinue telemetry     Diet: Passed MBS. Regular diet with thin liquids  DVT prophylaxis: subq heparin    Code Status: DNR-CCA    Disposition:  Will now need SNF per PT/OT. Was at assisted living.    ----------------------------------------------------------------------------------------------------    Chief Complaint: Abdominal pain, bilateral LE swelling, and poor PO intake    Hospital Course: Oral Harkins is a 49-year-old male admitted on 2/22/2022 with chief complaint of bilateral leg swelling, worsening confusion, and abdominal pain. Patient is a resident at assisted [facility].  Noted purulent drainage coming from suprapubic catheter site.  No signs of peritonitis at this time.  CT abdomen pelvis revealed no evidence of acute intra-abdominal or intrapelvic abnormalities.  Zosyn was started.  Suprapubic catheter was exchanged while in the ER on 2/22/2022. Urine culture obtained and showed multiple bacteria, which were sensitive to zosyn and completed 7 day course on 2/28. Also of note, Nephrology was consulted for acute hyperkalemia and acute kidney injury. 2/22/2022 patient was transferred to the ICU and received dialysis overnight. Patient was transferred to general medicine floor on 2/23. After transfer, did well without acute events. Some sinus bradycardia with pauses.   Consulted cardiology who spoke with Dr. Ya Jacobo, who said not to pursue pacemaker due to other conditions. Continue DNR CCA as can continue medications. Also assessed by PT/OT who are now recommending SNF. Subjective:  Patient seen this AM. Awake. Mental status unchanged. No complaints at this time. Hiccups resolved. Seems comfortable. Medications:  Reviewed    Infusion Medications    sodium chloride 75 mL/hr at 02/26/22 1706    dextrose      sodium chloride      dextrose      Or    dextrose       Scheduled Medications    calcium replacement protocol   Other RX Placeholder    pantoprazole  40 mg Oral BID AC    insulin lispro  0-6 Units SubCUTAneous TID WC    insulin lispro  0-3 Units SubCUTAneous Nightly    piperacillin-tazobactam  3,375 mg IntraVENous Q12H    Arformoterol Tartrate  15 mcg Nebulization BID    budesonide  0.5 mg Nebulization BID    docusate sodium  100 mg Oral BID    ferrous gluconate  360 mg Oral BID    lactobacillus  1 capsule Oral BID WC    levothyroxine  175 mcg Oral Daily    [Held by provider] lisinopril  2.5 mg Oral Daily    tiotropium  2 puff Inhalation Daily    Vitamin D  500 Units Oral Daily    sodium chloride flush  10 mL IntraVENous 2 times per day    heparin (porcine)  5,000 Units SubCUTAneous 3 times per day     PRN Meds: glucose, glucagon (rDNA), dextrose, albuterol sulfate HFA, sodium chloride flush, sodium chloride, ondansetron **OR** ondansetron, polyethylene glycol, acetaminophen **OR** acetaminophen, dextrose **OR** dextrose      Intake/Output Summary (Last 24 hours) at 2/27/2022 0856  Last data filed at 2/27/2022 0413  Gross per 24 hour   Intake 1600 ml   Output 2900 ml   Net -1300 ml       Diet:  ADULT DIET; Regular    Exam:  BP (!) 128/91   Pulse 63   Temp 98.1 °F (36.7 °C) (Oral)   Resp 18   Ht 5' 6\" (1.676 m)   Wt 229 lb 8 oz (104.1 kg)   SpO2 97%   BMI 37.04 kg/m²     Physical Exam:  General appearance: Alert, not in acute distress  HEENT:  Normal cephalic, atraumatic without obvious deformity. Pupils equal, round, and reactive to light. Conjunctivae clear. Clear oral mucosa  Neck: Supple, with full range of motion. No jugular venous distention. Trachea midline. Respiratory:  Normal respiratory effort. Clear to auscultation, bilaterally without Rales/Wheezes/Rhonchi. Cardiovascular: Normal rate, regular rhythm with normal S1/S2 without murmurs, rubs or gallops. Abdomen: Soft, non-tender, non-distended with normal bowel sounds. Musculoskeletal:  No clubbing, cyanosis or edema bilaterally. Full range of motion without deformity. Skin: No rashes or lesions. Neurological exam reveals alert, oriented, normal speech, no focal findings or movement disorder noted. Exam of extremities: peripheral pulses normal, no pedal or leg edema, no clubbing or cyanosis    Labs:   Recent Labs     02/25/22 0455 02/26/22 0414 02/27/22  0544   WBC 13.5* 12.3* 10.8   HGB 9.3* 9.3* 9.2*   HCT 29.1* 29.4* 28.3*    246 278     Recent Labs     02/25/22 0455 02/26/22 0414 02/27/22  0544    137 140   K 4.2 4.0 3.9    101 103   CO2 27 26 25   BUN 39* 40* 38*   CREATININE 4.0* 4.0* 3.9*   CALCIUM 7.7* 7.9* 8.0*     No results for input(s): AST, ALT, BILIDIR, BILITOT, ALKPHOS in the last 72 hours. No results for input(s): INR in the last 72 hours. No results for input(s): Ellbismark Diazier in the last 72 hours. Urinalysis:      Lab Results   Component Value Date    NITRU NEGATIVE 02/22/2022    WBCUA > 200 02/22/2022    BACTERIA MANY 02/22/2022    RBCUA > 100 02/22/2022    BLOODU LARGE 02/22/2022    SPECGRAV 1.017 09/22/2021    GLUCOSEU NEGATIVE 02/22/2022       Radiology:  US RENAL COMPLETE   Final Result      Normal bilateral renal ultrasound. Final report electronically signed by Dr. David Edwards on 2/25/2022 2:21 PM      FL MODIFIED BARIUM SWALLOW W VIDEO   Final Result   1. No laryngeal penetration or aspiration of barium.    2. Additional recommendations from the speech therapist will follow. **This report has been created using voice recognition software. It may contain minor errors which are inherent in voice recognition technology. **         Final report electronically signed by Dr. Ju Anderson on 2/24/2022 10:11 AM      CT HEAD WO CONTRAST   Final Result   No acute intracranial findings. Chronic changes as described. This document has been electronically signed by: Kota Enciso MD on    02/23/2022 05:54 AM      All CTs at this facility use dose modulation techniques and iterative    reconstructions, and/or weight-based dosing   when appropriate to reduce radiation to a low as reasonably achievable. XR CHEST PORTABLE   Final Result   Impression:   Right IJ line tip over the proximal SVC. This document has been electronically signed by: Kota Enciso MD on    02/23/2022 12:43 AM      CT ABDOMEN PELVIS WO CONTRAST Additional Contrast? None   Final Result       1. No evidence of acute intra-abdominal or intrapelvic abnormality. 2. Moderate retained stool. 3. Nonobstructive nephrolithiasis on the left. 4. Prostatomegaly. **This report has been created using voice recognition software. It may contain minor errors which are inherent in voice recognition technology. **      Final report electronically signed by Dr. Jose Angel Sullivan MD on 2/22/2022 11:01 AM      XR CHEST PORTABLE   Final Result   Stable radiographic appearance of the chest. No evidence of an acute process. **This report has been created using voice recognition software. It may contain minor errors which are inherent in voice recognition technology. **      Final report electronically signed by Dr. Jose Angel Sullivan MD on 2/22/2022 10:09 AM          Electronically signed by Tong Corona MD on 2/27/2022 at 8:28 AM

## 2022-02-28 LAB
ANION GAP SERPL CALCULATED.3IONS-SCNC: 12 MEQ/L (ref 8–16)
BASOPHILS # BLD: 0.4 %
BASOPHILS ABSOLUTE: 0 THOU/MM3 (ref 0–0.1)
BLOOD CULTURE, ROUTINE: ABNORMAL
BLOOD CULTURE, ROUTINE: ABNORMAL
BLOOD CULTURE, ROUTINE: NORMAL
BUN BLDV-MCNC: 32 MG/DL (ref 7–22)
CALCIUM SERPL-MCNC: 7.6 MG/DL (ref 8.5–10.5)
CHLORIDE BLD-SCNC: 107 MEQ/L (ref 98–111)
CO2: 25 MEQ/L (ref 23–33)
CREAT SERPL-MCNC: 3.4 MG/DL (ref 0.4–1.2)
EOSINOPHIL # BLD: 2.3 %
EOSINOPHILS ABSOLUTE: 0.2 THOU/MM3 (ref 0–0.4)
ERYTHROCYTE [DISTWIDTH] IN BLOOD BY AUTOMATED COUNT: 15.9 % (ref 11.5–14.5)
ERYTHROCYTE [DISTWIDTH] IN BLOOD BY AUTOMATED COUNT: 49.3 FL (ref 35–45)
GFR SERPL CREATININE-BSD FRML MDRD: 17 ML/MIN/1.73M2
GLUCOSE BLD-MCNC: 107 MG/DL (ref 70–108)
GLUCOSE BLD-MCNC: 110 MG/DL (ref 70–108)
GLUCOSE BLD-MCNC: 117 MG/DL (ref 70–108)
GLUCOSE BLD-MCNC: 129 MG/DL (ref 70–108)
HCT VFR BLD CALC: 28.2 % (ref 42–52)
HEMOGLOBIN: 9 GM/DL (ref 14–18)
IMMATURE GRANS (ABS): 0.59 THOU/MM3 (ref 0–0.07)
IMMATURE GRANULOCYTES: 5.8 %
LYMPHOCYTES # BLD: 13.7 %
LYMPHOCYTES ABSOLUTE: 1.4 THOU/MM3 (ref 1–4.8)
MCH RBC QN AUTO: 27.3 PG (ref 26–33)
MCHC RBC AUTO-ENTMCNC: 31.9 GM/DL (ref 32.2–35.5)
MCV RBC AUTO: 85.5 FL (ref 80–94)
MONOCYTES # BLD: 10.3 %
MONOCYTES ABSOLUTE: 1.1 THOU/MM3 (ref 0.4–1.3)
NUCLEATED RED BLOOD CELLS: 0 /100 WBC
ORGANISM: ABNORMAL
PLATELET # BLD: 281 THOU/MM3 (ref 130–400)
PMV BLD AUTO: 9.3 FL (ref 9.4–12.4)
POTASSIUM SERPL-SCNC: 3.5 MEQ/L (ref 3.5–5.2)
RBC # BLD: 3.3 MILL/MM3 (ref 4.7–6.1)
SEG NEUTROPHILS: 67.5 %
SEGMENTED NEUTROPHILS ABSOLUTE COUNT: 6.9 THOU/MM3 (ref 1.8–7.7)
SODIUM BLD-SCNC: 144 MEQ/L (ref 135–145)
WBC # BLD: 10.2 THOU/MM3 (ref 4.8–10.8)

## 2022-02-28 PROCEDURE — 99232 SBSQ HOSP IP/OBS MODERATE 35: CPT | Performed by: INTERNAL MEDICINE

## 2022-02-28 PROCEDURE — 6360000002 HC RX W HCPCS: Performed by: HOSPITALIST

## 2022-02-28 PROCEDURE — 1200000003 HC TELEMETRY R&B

## 2022-02-28 PROCEDURE — 94760 N-INVAS EAR/PLS OXIMETRY 1: CPT

## 2022-02-28 PROCEDURE — 94640 AIRWAY INHALATION TREATMENT: CPT

## 2022-02-28 PROCEDURE — 6360000002 HC RX W HCPCS

## 2022-02-28 PROCEDURE — 85025 COMPLETE CBC W/AUTO DIFF WBC: CPT

## 2022-02-28 PROCEDURE — 2580000003 HC RX 258: Performed by: NURSE PRACTITIONER

## 2022-02-28 PROCEDURE — 6370000000 HC RX 637 (ALT 250 FOR IP): Performed by: STUDENT IN AN ORGANIZED HEALTH CARE EDUCATION/TRAINING PROGRAM

## 2022-02-28 PROCEDURE — 97530 THERAPEUTIC ACTIVITIES: CPT

## 2022-02-28 PROCEDURE — 80048 BASIC METABOLIC PNL TOTAL CA: CPT

## 2022-02-28 PROCEDURE — 6370000000 HC RX 637 (ALT 250 FOR IP)

## 2022-02-28 PROCEDURE — 99233 SBSQ HOSP IP/OBS HIGH 50: CPT | Performed by: INTERNAL MEDICINE

## 2022-02-28 PROCEDURE — 97116 GAIT TRAINING THERAPY: CPT

## 2022-02-28 PROCEDURE — 97535 SELF CARE MNGMENT TRAINING: CPT

## 2022-02-28 PROCEDURE — 82948 REAGENT STRIP/BLOOD GLUCOSE: CPT

## 2022-02-28 PROCEDURE — 36415 COLL VENOUS BLD VENIPUNCTURE: CPT

## 2022-02-28 PROCEDURE — 6360000002 HC RX W HCPCS: Performed by: NURSE PRACTITIONER

## 2022-02-28 RX ORDER — HYDRALAZINE HYDROCHLORIDE 20 MG/ML
10 INJECTION INTRAMUSCULAR; INTRAVENOUS EVERY 4 HOURS PRN
Status: DISCONTINUED | OUTPATIENT
Start: 2022-02-28 | End: 2022-03-05 | Stop reason: HOSPADM

## 2022-02-28 RX ORDER — IPRATROPIUM BROMIDE AND ALBUTEROL SULFATE 2.5; .5 MG/3ML; MG/3ML
1 SOLUTION RESPIRATORY (INHALATION) EVERY 4 HOURS PRN
Status: DISCONTINUED | OUTPATIENT
Start: 2022-02-28 | End: 2022-03-05 | Stop reason: HOSPADM

## 2022-02-28 RX ADMIN — TIOTROPIUM BROMIDE INHALATION SPRAY 2 PUFF: 3.12 SPRAY, METERED RESPIRATORY (INHALATION) at 08:13

## 2022-02-28 RX ADMIN — ARFORMOTEROL TARTRATE 15 MCG: 15 SOLUTION RESPIRATORY (INHALATION) at 08:14

## 2022-02-28 RX ADMIN — PIPERACILLIN AND TAZOBACTAM 3375 MG: 3; .375 INJECTION, POWDER, LYOPHILIZED, FOR SOLUTION INTRAVENOUS at 15:15

## 2022-02-28 RX ADMIN — Medication 360 MG: at 09:58

## 2022-02-28 RX ADMIN — PANTOPRAZOLE SODIUM 40 MG: 40 TABLET, DELAYED RELEASE ORAL at 15:35

## 2022-02-28 RX ADMIN — HEPARIN SODIUM 5000 UNITS: 5000 INJECTION INTRAVENOUS; SUBCUTANEOUS at 06:06

## 2022-02-28 RX ADMIN — HEPARIN SODIUM 5000 UNITS: 5000 INJECTION INTRAVENOUS; SUBCUTANEOUS at 15:12

## 2022-02-28 RX ADMIN — DOCUSATE SODIUM 100 MG: 100 CAPSULE, LIQUID FILLED ORAL at 09:58

## 2022-02-28 RX ADMIN — BUDESONIDE 500 MCG: 0.5 INHALANT RESPIRATORY (INHALATION) at 17:52

## 2022-02-28 RX ADMIN — HEPARIN SODIUM 5000 UNITS: 5000 INJECTION INTRAVENOUS; SUBCUTANEOUS at 20:30

## 2022-02-28 RX ADMIN — PIPERACILLIN AND TAZOBACTAM 3375 MG: 3; .375 INJECTION, POWDER, LYOPHILIZED, FOR SOLUTION INTRAVENOUS at 03:10

## 2022-02-28 RX ADMIN — ARFORMOTEROL TARTRATE 15 MCG: 15 SOLUTION RESPIRATORY (INHALATION) at 17:52

## 2022-02-28 RX ADMIN — BUDESONIDE 500 MCG: 0.5 INHALANT RESPIRATORY (INHALATION) at 08:12

## 2022-02-28 RX ADMIN — Medication 1 CAPSULE: at 17:27

## 2022-02-28 RX ADMIN — Medication 500 UNITS: at 09:57

## 2022-02-28 RX ADMIN — HYDRALAZINE HYDROCHLORIDE 10 MG: 20 INJECTION INTRAMUSCULAR; INTRAVENOUS at 23:31

## 2022-02-28 RX ADMIN — Medication 1 CAPSULE: at 09:58

## 2022-02-28 ASSESSMENT — PAIN SCALES - GENERAL
PAINLEVEL_OUTOF10: 0

## 2022-02-28 NOTE — PROGRESS NOTES
[facility]   Nephrology progress note  2/28/2022, 10:38 AM      Pt Name:    Toni Lyons  MRN:     [id number]     YOB: 1931  Admit Date:    2/22/2022  9:12 AM  Primary Care Physician:  Frannie Perez MD   Room number  6S-64/255-P    Chief Complaint: Nephrology following for STEPHY/hyperkalemia and metabolic acidosis    Subjective:  Patient seen and examined. Seen earlier today during rounds  Not fully oriented  Good urine output    Objective:  24HR INTAKE/OUTPUT:      Intake/Output Summary (Last 24 hours) at 2/28/2022 1038  Last data filed at 2/28/2022 0933  Gross per 24 hour   Intake 850 ml   Output 2950 ml   Net -2100 ml     I/O last 3 completed shifts: In: 950 [P.O.:950]  Out: 3800 [Urine:3800]  I/O this shift:  In: -   Out: 350 [Urine:350]  Admission weight: 212 lb (96.2 kg)  Wt Readings from Last 3 Encounters:   02/26/22 229 lb 8 oz (104.1 kg)   01/03/22 212 lb (96.2 kg)   12/06/21 210 lb (95.3 kg)     Body mass index is 37.04 kg/m².     Physical examination  VITALS:     Vitals:    02/27/22 1740 02/27/22 1930 02/27/22 2355 02/28/22 0900   BP:  (!) 134/50 (!) 134/55 (!) 159/68   Pulse:  54 51 58   Resp: 16 16 16 18   Temp:  97.6 °F (36.4 °C) 99.5 °F (37.5 °C) 98.6 °F (37 °C)   TempSrc:  Oral Oral Oral   SpO2: 97% 100% 95% 93%   Weight:       Height:         General Appearance:  no distress  Mouth/Throat: Oral mucosa dry  Neck: No JVD  Lungs:  no use of accessory muscles  GI: soft, non-tender, no guarding  Extremities: No significant LE edema      Lab Data  CBC:   Recent Labs     02/26/22  0414 02/27/22  0544 02/28/22  0602   WBC 12.3* 10.8 10.2   HGB 9.3* 9.2* 9.0*   HCT 29.4* 28.3* 28.2*    278 281     BMP:  Recent Labs     02/26/22  0414 02/27/22  0544 02/28/22  0602    140 144   K 4.0 3.9 3.5    103 107   CO2 26 25 25   BUN 40* 38* 32*   CREATININE 4.0* 3.9* 3.4*   GLUCOSE 127* 121* 107   CALCIUM 7.9* 8.0* 7.6*     Hepatic:   No results for input(s): LABALBU, AST, ALT, ALB, BILITOT, ALKPHOS in the last 72 hours. Meds:  Infusion:    sodium chloride 50 mL/hr at 02/27/22 1230    dextrose      sodium chloride      dextrose      Or    dextrose       Meds:    calcium replacement protocol   Other RX Placeholder    pantoprazole  40 mg Oral BID AC    insulin lispro  0-6 Units SubCUTAneous TID WC    insulin lispro  0-3 Units SubCUTAneous Nightly    piperacillin-tazobactam  3,375 mg IntraVENous Q12H    Arformoterol Tartrate  15 mcg Nebulization BID    budesonide  0.5 mg Nebulization BID    docusate sodium  100 mg Oral BID    ferrous gluconate  360 mg Oral BID    lactobacillus  1 capsule Oral BID WC    levothyroxine  175 mcg Oral Daily    [Held by provider] lisinopril  2.5 mg Oral Daily    tiotropium  2 puff Inhalation Daily    Vitamin D  500 Units Oral Daily    sodium chloride flush  10 mL IntraVENous 2 times per day    heparin (porcine)  5,000 Units SubCUTAneous 3 times per day     Meds prn: glucose, glucagon (rDNA), dextrose, albuterol sulfate HFA, sodium chloride flush, sodium chloride, ondansetron **OR** ondansetron, polyethylene glycol, acetaminophen **OR** acetaminophen, dextrose **OR** dextrose       Impression and Plan:  1. STEPHY in setting of hypotension/sepsis in setting of lisinopril and Aldactone. Likely has ischemic ATN. Nonoliguric. Anticipate improvement  No need for further dialysis  Serum creatinine today is down to 3.4  Creatinine improves further by tomorrow then will plan to remove dialysis catheter  Ultrasound normal without any hydronephrosis    2. Critical hyperkalemia in setting of STEPHY, lisinopril, Aldactone and exogenous potassium supplementation. Improved with dialysis. 3.  Borderline hypotension. Improved. Continue with IV fluids  4. Metabolic acidosis. Improved. 5.  Chronic grade 1 diastolic dysfunction. Clinically not in decompensated heart failure  6. Sepsis secondary to UTI.   On empiric IV antibiotics. Hospitalist managing.  WBC improving      Shayan Pace MD  [facility]

## 2022-02-28 NOTE — PROGRESS NOTES
PROGRESS NOTE      Patient:  Mark aBles      Unit/Bed:6K-18/018-A    YOB: 1931    MRN: [id number]       Acct: [de-identified]     PCP: Eligio Stark MD    Date of Admission: 2/22/2022    Assessment/Plan:    Active Hospital Problems    Diagnosis Date Noted    Bradycardia [R00.1]      Priority: High    STEPHY (acute kidney injury) ([address] [N17.9]     Hyperkalemia [E87.5]     Leukocytosis [D72.829]     Septicemia ([address] [A41.9]     Suprapubic catheter ([address] [Z93.59]     Sepsis ([address] [A41.9] 02/22/2022    Abdominal pain [R10.9]      Acute kidney injury on CKD4 - Stable. Likely multifactorial in the setting of hyperkalemia and sepsis.    Imaging was negative for hydronephrosis. No EKG changes were noted with hyperkalemia.  Patient was on lisinopril and Aldactone these have been stopped. IVF NS  Avoid nephrotoxic agents, including patients home lisinopril and aldactone if possible  Nephrology on board, appreciate recs     UTI: Improved. Purulent drainage noted to be coming from suprapubic catheter site. CT abdomen pelvis w/o signs of intra-abdominal/pelvic abnormality. Doxazosin discontinued due to suprapubic cath in place. Urine cx shows enterobacter sensitive to zosyn and E Faecalis sensitive to penicillins. Zosyn able to cover both. Minimum 7 day total course, considering -14 days total      Sepsis: Resolved. Likely secondary to UTI.    MRSA PCR negative, Vanc was discontinued. Zosyn  Monitor vitals     Acute hyperkalemia: Resolved.   In the setting of acute kidney injury.    No acute EKG changes were noted.    Patient was on lisinopril and Aldactone prior to admission.    2/22/2022 patient received hemodialysis. Hyponatremia: Resolved. likely due to hypervolemia  Will monitor BMP and vitals    Anion gap metabolic acidosis: Resolved   In the setting of sepsis and acute kidney injury.    Bicarb drip was initiated by nephrology and recently continued.     Chronic HFpEF: Not in acute exacerbation echo 6/2021 LVEF 55% with grade 1 diastolic dysfunction    Diabetes mellitus type 2: Lantus was not restarted at time of admission due to [address], has been controlled. Will monitor with Accu-Cheks sliding scale insulin. Sinus Pauses w/ sinus bradycardia. Cardiology consulted. No additional intervention or work up per discussion with family. Will discontinue telemetry     Diet: Passed MBS. Regular diet with thin liquids  DVT prophylaxis: subq heparin    Code Status: DNR-CCA    Disposition:  Will now need SNF per PT/OT. Was at assisted living.    ----------------------------------------------------------------------------------------------------    Chief Complaint: Abdominal pain, bilateral LE swelling, and poor PO intake    Hospital Course: Esperanza Harvey is a 79-year-old male admitted on 2/22/2022 with chief complaint of bilateral leg swelling, worsening confusion, and abdominal pain. Patient is a resident at assisted [facility].  Noted purulent drainage coming from suprapubic catheter site.  No signs of peritonitis at this time.  CT abdomen pelvis revealed no evidence of acute intra-abdominal or intrapelvic abnormalities.  Zosyn was started.  Suprapubic catheter was exchanged while in the ER on 2/22/2022. Urine culture obtained and showed multiple bacteria, which were sensitive to zosyn and completed 7 day course on 2/28. Also of note, Nephrology was consulted for acute hyperkalemia and acute kidney injury. 2/22/2022 patient was transferred to the ICU and received dialysis overnight. Patient was transferred to general medicine floor on 2/23. After transfer, did well without acute events. Some sinus bradycardia with pauses. Consulted cardiology who spoke with Dr. Tamiko Hawk, who said not to pursue pacemaker due to other conditions. Continue DNR-CCA as can continue medications. Also assessed by PT/OT who are now recommending SNF.     Subjective:  Patient seen laying comfortable in bedside this morning. Patient alert and pleasant to converse with however not oriented. He is unaware of where he is, the date, or location. He states he knows there is something different: \"I know my memory is not right but I want to say that I'm at [facility]. \"  He appears to be aware that there is cognitive decline and attempting to process it. Patient states he has no current complaints. Patient denies chest pain, headache, shortness of breath, abdominal discomfort. Awaiting placement for discharge. Medications:  Reviewed    Infusion Medications    sodium chloride 50 mL/hr at 02/27/22 1230    dextrose      sodium chloride      dextrose      Or    dextrose       Scheduled Medications    calcium replacement protocol   Other RX Placeholder    pantoprazole  40 mg Oral BID AC    insulin lispro  0-6 Units SubCUTAneous TID WC    insulin lispro  0-3 Units SubCUTAneous Nightly    piperacillin-tazobactam  3,375 mg IntraVENous Q12H    Arformoterol Tartrate  15 mcg Nebulization BID    budesonide  0.5 mg Nebulization BID    docusate sodium  100 mg Oral BID    ferrous gluconate  360 mg Oral BID    lactobacillus  1 capsule Oral BID WC    levothyroxine  175 mcg Oral Daily    [Held by provider] lisinopril  2.5 mg Oral Daily    tiotropium  2 puff Inhalation Daily    Vitamin D  500 Units Oral Daily    sodium chloride flush  10 mL IntraVENous 2 times per day    heparin (porcine)  5,000 Units SubCUTAneous 3 times per day     PRN Meds: glucose, glucagon (rDNA), dextrose, albuterol sulfate HFA, sodium chloride flush, sodium chloride, ondansetron **OR** ondansetron, polyethylene glycol, acetaminophen **OR** acetaminophen, dextrose **OR** dextrose      Intake/Output Summary (Last 24 hours) at 2/28/2022 0847  Last data filed at 2/28/2022 4098  Gross per 24 hour   Intake 850 ml   Output 2600 ml   Net -1750 ml       Diet:  ADULT DIET;  Regular    Exam:  BP (!) 134/55   Pulse 51   Temp 99.5 °F (37.5 °C) (Oral)   Resp 16   Ht 5' 6\" (1.676 m)   Wt 229 lb 8 oz (104.1 kg)   SpO2 95%   BMI 37.04 kg/m²     Physical Exam:  General appearance: Alert, not in acute distress  HEENT:  Normal cephalic, atraumatic without obvious deformity. Pupils equal, round, and reactive to light. Conjunctivae clear. Clear oral mucosa. Neck: Supple, with full range of motion. No jugular venous distention. Trachea midline. Respiratory:  Normal respiratory effort. Clear to auscultation, bilaterally without Rales/Wheezes/Rhonchi. Cardiovascular: Normal rate, regular rhythm with normal S1/S2 without murmurs, rubs or gallops. Abdomen: Soft, non-tender, non-distended with normal bowel sounds. Musculoskeletal:  No clubbing, cyanosis or edema bilaterally. Full range of motion without deformity. Skin: No rashes or lesions. Neurological exam reveals alert, normal speech, no focal findings or movement disorder noted. Exam of extremities: peripheral pulses normal, no pedal or leg edema, no clubbing or cyanosis    Labs:   Recent Labs     02/26/22 0414 02/27/22  0544 02/28/22  0602   WBC 12.3* 10.8 10.2   HGB 9.3* 9.2* 9.0*   HCT 29.4* 28.3* 28.2*    278 281     Recent Labs     02/26/22  0414 02/27/22  0544 02/28/22  0602    140 144   K 4.0 3.9 3.5    103 107   CO2 26 25 25   BUN 40* 38* 32*   CREATININE 4.0* 3.9* 3.4*   CALCIUM 7.9* 8.0* 7.6*     No results for input(s): AST, ALT, BILIDIR, BILITOT, ALKPHOS in the last 72 hours. No results for input(s): INR in the last 72 hours. No results for input(s): Earnie Lent in the last 72 hours. Urinalysis:      Lab Results   Component Value Date    NITRU NEGATIVE 02/22/2022    WBCUA > 200 02/22/2022    BACTERIA MANY 02/22/2022    RBCUA > 100 02/22/2022    BLOODU LARGE 02/22/2022    SPECGRAV 1.017 09/22/2021    GLUCOSEU NEGATIVE 02/22/2022       Radiology:  US RENAL COMPLETE   Final Result      Normal bilateral renal ultrasound.       Final report electronically signed by Dr. Kiara Garcia on 2/25/2022 2:21 PM      FL MODIFIED BARIUM SWALLOW W VIDEO   Final Result   1. No laryngeal penetration or aspiration of barium. 2. Additional recommendations from the speech therapist will follow. **This report has been created using voice recognition software. It may contain minor errors which are inherent in voice recognition technology. **         Final report electronically signed by Dr. Kiara Garcia on 2/24/2022 10:11 AM      CT HEAD WO CONTRAST   Final Result   No acute intracranial findings. Chronic changes as described. This document has been electronically signed by: Kalee Hayward MD on    02/23/2022 05:54 AM      All CTs at this facility use dose modulation techniques and iterative    reconstructions, and/or weight-based dosing   when appropriate to reduce radiation to a low as reasonably achievable. XR CHEST PORTABLE   Final Result   Impression:   Right IJ line tip over the proximal SVC. This document has been electronically signed by: Kalee Hayward MD on    02/23/2022 12:43 AM      CT ABDOMEN PELVIS WO CONTRAST Additional Contrast? None   Final Result       1. No evidence of acute intra-abdominal or intrapelvic abnormality. 2. Moderate retained stool. 3. Nonobstructive nephrolithiasis on the left. 4. Prostatomegaly. **This report has been created using voice recognition software. It may contain minor errors which are inherent in voice recognition technology. **      Final report electronically signed by Dr. Tiffanie Villarreal MD on 2/22/2022 11:01 AM      XR CHEST PORTABLE   Final Result   Stable radiographic appearance of the chest. No evidence of an acute process. **This report has been created using voice recognition software. It may contain minor errors which are inherent in voice recognition technology. **      Final report electronically signed by Dr. Tiffanie Villarreal MD on 2/22/2022 10:09 AM Electronically signed by Johana Luz MD on 2/28/2022 at 8:47 AM

## 2022-02-28 NOTE — PROGRESS NOTES
[facility]  INPATIENT PHYSICAL THERAPY  DAILY NOTE  [facility] RENAL TELEMETRY 6K - 6K-18/018-A    Time In: 1350  Time Out: 1416  Timed Code Treatment Minutes: 26 Minutes  Minutes: 26          Date: 2022  Patient Name: Romina David,  Gender:  male        MRN: [id number]  : 1931  (80 y.o.)     Referring Practitioner: Wade Chang MD  Diagnosis: Hyperkalemia  Additional Pertinent Hx: Per H&P: Fortino Tee is a 59-year-old  male with a past medical history of CHF, COPD, dementia who presents to [facility] ED from a nursing home for the evaluation of abdominal pain, and not eating and drinking. Due to patient's underlying dementia, he is a poor historian. ED provider reports poor history from nursing home nurse, and states that he did not eat well yesterday and had a fall earlier today. This patient is the father of Dr. Trae Omer with oncology, and ED provider reached out to him for more information. Patient's baseline is unclear. He states he has lower abdominal pain around his catheter insertion site, fever, chills, decreased appetite. Upon questioning, patient was able to state his name and date of birth, but was unsure of where he was at and date. Pt presents with UTI, STEPHY, and sepsis. Prior Level of Function:  Type of Home: Assisted living  Home Layout: One level  Home Access: Level entry  Home Equipment: Rolling walker        ADL Assistance: Needs assistance  Homemaking Assistance: Needs assistance  Ambulation Assistance: Needs assistance  Transfer Assistance: Needs assistance  Additional Comments: Pt is a questionable historian d/t hx of dementia. Pt states he was using the RW for short ambulation distances. Pt was living in [place] [address]. Per OT eval however, pt reports he received helped with all ADLs. Pt reported walking short distances wt RW and assist from staff.     Restrictions/Precautions:  Restrictions/Precautions: General Precautions,Fall Risk  Position Activity Restriction  Other position/activity restrictions: Dementia, suprapubic catheter    SUBJECTIVE: RN approved session, pt is supine in bed with sitter present, confused, only oriented to person. Pt cooperative. PAIN: denies    Vitals: Vitals not assessed per clinical judgement, see nursing flowsheet    OBJECTIVE:  Bed Mobility:  Supine to Sit: Moderate Assistance, increased time to complete, cues for technique  Scooting: Minimal Assistance    Transfers:  Sit to Stand: Minimal Assistance, from EOB, cues for hand placement, completes 4 transfers total  Stand to Sit:Minimal Assistance, cues for safety and to back up to bed    Ambulation:  Minimal Assistance  Distance: several steps along bed in each direction, 10 feet x 2  Surface: Level Tile  Device:Rolling Walker  Gait Deviations: Forward Flexed Posture, Slow Giovanna, Decreased Step Length Bilaterally and Decreased Gait Speed  **Cues for upright posture, manual assist to guide walker    Balance:  Static Sitting Balance:  Stand By Assistance  Dynamic Sitting Balance: Stand By Assistance  Static Standing Balance: Minimal Assistance  Dynamic Standing Balance: Minimal Assistance    Functional Outcome Measures: Completed  AM-PAC Inpatient Mobility Raw Score : 15  AM-PAC Inpatient T-Scale Score : 39.45    ASSESSMENT:  Assessment: Patient progressing toward established goals. Activity Tolerance:  Patient tolerance of  treatment: good.       Equipment Recommendations:Equipment Needed: No (defer to next level of care)  Discharge Recommendations: Subacte/Skilled Nursing Facility  Plan: Times per week: 3-5x GM  Current Treatment Recommendations: [place] Re-education,Patient/Caregiver Education & Training,Home Exercise Program,Functional Mobility Training,Safety Education & Training,Balance Training,Gait Training,Transfer Training    Patient Education  Patient Education: [employer], Transfers, Gait    Goals:  Patient goals : None stated  Short term goals  Time Frame for Short term goals: By hospital d/c  Short term goal 1: Pt to demo B rolling with SBA for ease with adjusting himself in bed. Short term goal 2: Pt to complete x10 B LE exercises with SBA for improved strength and self efficacy regarding his plan of care  Short term goal 3: Pt to demo sit <->stand with LRAD and CGA for improved ability to stand from various surfaces  Short term goal 4: Pt to ambulate >=30' with LRAD with CGA for improved ability to move within his environment. Long term goals  Time Frame for Long term goals : NA due to short ELOS    Following session, patient left in safe position with all fall risk precautions in place.

## 2022-02-28 NOTE — PROGRESS NOTES
several days of growth. BALANCE:  Sitting Balance:  Supervision. doing self care sitting on a chair at the sink or scooting at the edge of bed  Standing Balance: [address] preparing to walk    BED MOBILITY:  Rolling to Left: Stand By Assistance, with rail    Supine to Sit: Minimal Assistance, with rail    Scooting: Stand By Assistance using the bedrail    TRANSFERS:  Sit to Stand:  [address] from the edge of bed; from the straight chair with use of the countertop to lift up  Stand to Sit: [address] to the chair with cues to reach back for the armrests    FUNCTIONAL MOBILITY:  Assistive Device: Rolling Walker  Assist Level:  Contact Guard Assistance. Distance: To and from bathroom and 30 ft x 2  Walked with a slow pace and slightly forward posture. No LOB. ADDITIONAL ACTIVITIES:  Pt needed cues to orient to the date. He remained in the chair following the session. Pt refused any reading at this time. Pt agreed to keep working toward his goal of getting stronger and more independent with doing his self care. ASSESSMENT:     Activity Tolerance:  Patient tolerance of  treatment: fair. Pt stood for 50 seconds while preparing to walk in the hallway.        Discharge Recommendations: Subacute/skilled nursing facility  Equipment Recommendations: Equipment Needed: No  Plan: Times per week: 3-5x  Times per day: Daily  Current Treatment Recommendations: Strengthening,Patient/Caregiver Education & Training,Balance Training,Functional Mobility Training,Endurance Training,Safety Education & Training,Self-Care / ADL    Patient Education  Patient Education: Role of OT, ADL's and Orientation    Goals  Short term goals  Time Frame for Short term goals: by discharge  Short term goal 1: Pt will complete functional mobility short distances with CGA and no LOB or safety cues for ease with ADLs  Short term goal 2: Pt will tolerate dynamic standing x3-4min with CGA and unilateral release to increase indep with grooming  Short term goal 3: Pt will complete UB ADLs with Min A and min VC  Short term goal 4: Pt will complete BUE strengthening HEP with light resistance and min VC for technique to increase indep wtih dressing    Following session, patient left in safe position with all fall risk precautions in place.

## 2022-02-28 NOTE — CARE COORDINATION
2/28/22, 11:24 AM EST    DISCHARGE PLANNING EVALUATION      Team meeting this morning, pt needing IV Cephepime daily for 1 week at discharge. Therapies to see pt to see if able to return to [place] or if needing ECF. OZZIE did call Pat with [facility], they are checking to see if [facility] could do the [facility]. OZZIE did let Johan Casanova know waiting to see how patient works with therapy as well.

## 2022-02-28 NOTE — PLAN OF CARE
Problem: Falls - Risk of:  Goal: Will remain free from falls  Description: Will remain free from falls  Outcome: Ongoing  Note: Fall protocols in place

## 2022-03-01 LAB
ANION GAP SERPL CALCULATED.3IONS-SCNC: 12 MEQ/L (ref 8–16)
BASOPHILIA: ABNORMAL
BASOPHILS # BLD: 0.3 %
BASOPHILS ABSOLUTE: 0 THOU/MM3 (ref 0–0.1)
BUN BLDV-MCNC: 28 MG/DL (ref 7–22)
CALCIUM IONIZED: 1.01 MMOL/L (ref 1.12–1.32)
CALCIUM SERPL-MCNC: 7.5 MG/DL (ref 8.5–10.5)
CHLORIDE BLD-SCNC: 106 MEQ/L (ref 98–111)
CO2: 24 MEQ/L (ref 23–33)
CREAT SERPL-MCNC: 3.1 MG/DL (ref 0.4–1.2)
EOSINOPHIL # BLD: 1.6 %
EOSINOPHILS ABSOLUTE: 0.2 THOU/MM3 (ref 0–0.4)
ERYTHROCYTE [DISTWIDTH] IN BLOOD BY AUTOMATED COUNT: 15.8 % (ref 11.5–14.5)
ERYTHROCYTE [DISTWIDTH] IN BLOOD BY AUTOMATED COUNT: 47.7 FL (ref 35–45)
GFR SERPL CREATININE-BSD FRML MDRD: 19 ML/MIN/1.73M2
GLUCOSE BLD-MCNC: 103 MG/DL (ref 70–108)
GLUCOSE BLD-MCNC: 107 MG/DL (ref 70–108)
GLUCOSE BLD-MCNC: 111 MG/DL (ref 70–108)
GLUCOSE BLD-MCNC: 117 MG/DL (ref 70–108)
GLUCOSE BLD-MCNC: 123 MG/DL (ref 70–108)
HCT VFR BLD CALC: 28.1 % (ref 42–52)
HEMOGLOBIN: 9.2 GM/DL (ref 14–18)
IMMATURE GRANS (ABS): 0.64 THOU/MM3 (ref 0–0.07)
IMMATURE GRANULOCYTES: 5.5 %
LYMPHOCYTES # BLD: 10.7 %
LYMPHOCYTES ABSOLUTE: 1.3 THOU/MM3 (ref 1–4.8)
MCH RBC QN AUTO: 27.1 PG (ref 26–33)
MCHC RBC AUTO-ENTMCNC: 32.7 GM/DL (ref 32.2–35.5)
MCV RBC AUTO: 82.9 FL (ref 80–94)
MONOCYTES # BLD: 8.5 %
MONOCYTES ABSOLUTE: 1 THOU/MM3 (ref 0.4–1.3)
NUCLEATED RED BLOOD CELLS: 0 /100 WBC
PLATELET # BLD: 300 THOU/MM3 (ref 130–400)
PLATELET ESTIMATE: ADEQUATE
PMV BLD AUTO: 10.1 FL (ref 9.4–12.4)
POIKILOCYTES: ABNORMAL
POTASSIUM SERPL-SCNC: 3.2 MEQ/L (ref 3.5–5.2)
POTASSIUM SERPL-SCNC: 3.9 MEQ/L (ref 3.5–5.2)
RBC # BLD: 3.39 MILL/MM3 (ref 4.7–6.1)
SCAN OF BLOOD SMEAR: NORMAL
SEG NEUTROPHILS: 73.4 %
SEGMENTED NEUTROPHILS ABSOLUTE COUNT: 8.6 THOU/MM3 (ref 1.8–7.7)
SODIUM BLD-SCNC: 142 MEQ/L (ref 135–145)
WBC # BLD: 11.7 THOU/MM3 (ref 4.8–10.8)

## 2022-03-01 PROCEDURE — 94640 AIRWAY INHALATION TREATMENT: CPT

## 2022-03-01 PROCEDURE — 6360000002 HC RX W HCPCS: Performed by: NURSE PRACTITIONER

## 2022-03-01 PROCEDURE — 1200000003 HC TELEMETRY R&B

## 2022-03-01 PROCEDURE — 80048 BASIC METABOLIC PNL TOTAL CA: CPT

## 2022-03-01 PROCEDURE — 36415 COLL VENOUS BLD VENIPUNCTURE: CPT

## 2022-03-01 PROCEDURE — 6360000002 HC RX W HCPCS

## 2022-03-01 PROCEDURE — 92526 ORAL FUNCTION THERAPY: CPT

## 2022-03-01 PROCEDURE — 82330 ASSAY OF CALCIUM: CPT

## 2022-03-01 PROCEDURE — 2580000003 HC RX 258

## 2022-03-01 PROCEDURE — 6370000000 HC RX 637 (ALT 250 FOR IP): Performed by: HOSPITALIST

## 2022-03-01 PROCEDURE — 99233 SBSQ HOSP IP/OBS HIGH 50: CPT | Performed by: INTERNAL MEDICINE

## 2022-03-01 PROCEDURE — 97535 SELF CARE MNGMENT TRAINING: CPT

## 2022-03-01 PROCEDURE — 94760 N-INVAS EAR/PLS OXIMETRY 1: CPT

## 2022-03-01 PROCEDURE — 97530 THERAPEUTIC ACTIVITIES: CPT

## 2022-03-01 PROCEDURE — 82948 REAGENT STRIP/BLOOD GLUCOSE: CPT

## 2022-03-01 PROCEDURE — 84132 ASSAY OF SERUM POTASSIUM: CPT

## 2022-03-01 PROCEDURE — 6360000002 HC RX W HCPCS: Performed by: STUDENT IN AN ORGANIZED HEALTH CARE EDUCATION/TRAINING PROGRAM

## 2022-03-01 PROCEDURE — 6370000000 HC RX 637 (ALT 250 FOR IP)

## 2022-03-01 PROCEDURE — 85025 COMPLETE CBC W/AUTO DIFF WBC: CPT

## 2022-03-01 PROCEDURE — 99232 SBSQ HOSP IP/OBS MODERATE 35: CPT | Performed by: INTERNAL MEDICINE

## 2022-03-01 PROCEDURE — 2580000003 HC RX 258: Performed by: NURSE PRACTITIONER

## 2022-03-01 RX ORDER — POTASSIUM CHLORIDE 7.45 MG/ML
40 INJECTION INTRAVENOUS ONCE
Status: DISCONTINUED | OUTPATIENT
Start: 2022-03-01 | End: 2022-03-01 | Stop reason: SDUPTHER

## 2022-03-01 RX ORDER — POTASSIUM CHLORIDE 20 MEQ/1
40 TABLET, EXTENDED RELEASE ORAL ONCE
Status: DISCONTINUED | OUTPATIENT
Start: 2022-03-01 | End: 2022-03-01

## 2022-03-01 RX ORDER — POTASSIUM CHLORIDE 7.45 MG/ML
10 INJECTION INTRAVENOUS
Status: DISPENSED | OUTPATIENT
Start: 2022-03-01 | End: 2022-03-01

## 2022-03-01 RX ADMIN — POTASSIUM CHLORIDE 10 MEQ: 7.46 INJECTION, SOLUTION INTRAVENOUS at 14:46

## 2022-03-01 RX ADMIN — BUDESONIDE 500 MCG: 0.5 INHALANT RESPIRATORY (INHALATION) at 19:53

## 2022-03-01 RX ADMIN — HEPARIN SODIUM 5000 UNITS: 5000 INJECTION INTRAVENOUS; SUBCUTANEOUS at 21:52

## 2022-03-01 RX ADMIN — HEPARIN SODIUM 5000 UNITS: 5000 INJECTION INTRAVENOUS; SUBCUTANEOUS at 14:47

## 2022-03-01 RX ADMIN — BUDESONIDE 500 MCG: 0.5 INHALANT RESPIRATORY (INHALATION) at 09:22

## 2022-03-01 RX ADMIN — IPRATROPIUM BROMIDE AND ALBUTEROL SULFATE 1 AMPULE: .5; 3 SOLUTION RESPIRATORY (INHALATION) at 01:05

## 2022-03-01 RX ADMIN — SODIUM CHLORIDE, PRESERVATIVE FREE 10 ML: 5 INJECTION INTRAVENOUS at 21:52

## 2022-03-01 RX ADMIN — ARFORMOTEROL TARTRATE 15 MCG: 15 SOLUTION RESPIRATORY (INHALATION) at 19:53

## 2022-03-01 RX ADMIN — POTASSIUM CHLORIDE 10 MEQ: 7.46 INJECTION, SOLUTION INTRAVENOUS at 16:01

## 2022-03-01 RX ADMIN — ARFORMOTEROL TARTRATE 15 MCG: 15 SOLUTION RESPIRATORY (INHALATION) at 09:22

## 2022-03-01 RX ADMIN — POTASSIUM CHLORIDE 10 MEQ: 7.46 INJECTION, SOLUTION INTRAVENOUS at 12:49

## 2022-03-01 RX ADMIN — PIPERACILLIN AND TAZOBACTAM 3375 MG: 3; .375 INJECTION, POWDER, LYOPHILIZED, FOR SOLUTION INTRAVENOUS at 14:52

## 2022-03-01 RX ADMIN — HEPARIN SODIUM 5000 UNITS: 5000 INJECTION INTRAVENOUS; SUBCUTANEOUS at 05:01

## 2022-03-01 RX ADMIN — SODIUM CHLORIDE, PRESERVATIVE FREE 10 ML: 5 INJECTION INTRAVENOUS at 10:12

## 2022-03-01 RX ADMIN — TIOTROPIUM BROMIDE INHALATION SPRAY 2 PUFF: 3.12 SPRAY, METERED RESPIRATORY (INHALATION) at 09:22

## 2022-03-01 RX ADMIN — IPRATROPIUM BROMIDE AND ALBUTEROL SULFATE 1 AMPULE: .5; 3 SOLUTION RESPIRATORY (INHALATION) at 06:14

## 2022-03-01 RX ADMIN — DOCUSATE SODIUM 100 MG: 100 CAPSULE, LIQUID FILLED ORAL at 21:54

## 2022-03-01 RX ADMIN — PIPERACILLIN AND TAZOBACTAM 3375 MG: 3; .375 INJECTION, POWDER, LYOPHILIZED, FOR SOLUTION INTRAVENOUS at 03:04

## 2022-03-01 NOTE — PROGRESS NOTES
[facility]   Nephrology progress note  3/1/2022, 10:48 AM      Pt Name:    Deborah Hdez  MRN:     [id number]     [place]:    7/17/1931  Admit Date:    2/22/2022  9:12 AM  Primary Care Physician:  Josi Sneed MD   Room number  3N-64/320-Z    Chief Complaint: Nephrology following for STEPHY/hyperkalemia and metabolic acidosis    Subjective:  Patient seen and examined  Patient not fully oriented- oriented to self and place  Has had difficulty swallowing since overnight  Has good urine output  Feels okay, denies having any shortness of breath     Objective:  24HR INTAKE/OUTPUT:      Intake/Output Summary (Last 24 hours) at 3/1/2022 1048  Last data filed at 3/1/2022 9238  Gross per 24 hour   Intake 250 ml   Output 2150 ml   Net -1900 ml     I/O last 3 completed shifts: In: 350 [P.O.:350]  Out: 4300 [Urine:4300]  No intake/output data recorded. Admission weight: 212 lb (96.2 kg)  Wt Readings from Last 3 Encounters:   03/01/22 211 lb 13.8 oz (96.1 kg)   01/03/22 212 lb (96.2 kg)   12/06/21 210 lb (95.3 kg)     Body mass index is 34.2 kg/m².     Physical examination  VITALS:     Vitals:    03/01/22 0310 03/01/22 0337 03/01/22 0614 03/01/22 0940   BP: 132/60   (!) 145/58   Pulse: 64   70   Resp: 18  18 18   Temp: 98 °F (36.7 °C)   98.5 °F (36.9 °C)   TempSrc: Oral   Oral   SpO2: 93%  94% 95%   Weight:  211 lb 13.8 oz (96.1 kg)     Height:         General Appearance: alert and cooperative with exam, appears comfortable, no distress  Mouth/Throat: Oral mucosa dry  Neck: No JVD  Lungs: Air entry B/L, no rales, no use of accessory muscles  Heart:  S1, S2 heard  GI: soft, non-tender, no guarding  Extremities: No significant LE edema      Lab Data  CBC:   Recent Labs     02/27/22  0544 02/28/22  0602 03/01/22  0453   WBC 10.8 10.2 11.7*   HGB 9.2* 9.0* 9.2*   HCT 28.3* 28.2* 28.1*    281 300     BMP:  Recent Labs     02/27/22  0544 02/28/22  0602 03/01/22  0453    144 142   K 3.9 3. 5 3.2*    107 106   CO2 25 25 24   BUN 38* 32* 28*   CREATININE 3.9* 3.4* 3.1*   GLUCOSE 121* 107 111*   CALCIUM 8.0* 7.6* 7.5*     Hepatic: No results for input(s): LABALBU, AST, ALT, ALB, BILITOT, ALKPHOS in the last 72 hours. Meds:  Infusion:    sodium chloride Stopped (03/01/22 0615)    dextrose      sodium chloride      dextrose      Or    dextrose       Meds:    potassium chloride  10 mEq IntraVENous Q2H    calcium replacement protocol   Other RX Placeholder    pantoprazole  40 mg Oral BID AC    insulin lispro  0-6 Units SubCUTAneous TID WC    insulin lispro  0-3 Units SubCUTAneous Nightly    piperacillin-tazobactam  3,375 mg IntraVENous Q12H    Arformoterol Tartrate  15 mcg Nebulization BID    budesonide  0.5 mg Nebulization BID    docusate sodium  100 mg Oral BID    ferrous gluconate  360 mg Oral BID    lactobacillus  1 capsule Oral BID WC    levothyroxine  175 mcg Oral Daily    [Held by provider] lisinopril  2.5 mg Oral Daily    tiotropium  2 puff Inhalation Daily    Vitamin D  500 Units Oral Daily    sodium chloride flush  10 mL IntraVENous 2 times per day    heparin (porcine)  5,000 Units SubCUTAneous 3 times per day     Meds prn: hydrALAZINE, ipratropium-albuterol, glucose, glucagon (rDNA), dextrose, albuterol sulfate HFA, sodium chloride flush, sodium chloride, ondansetron **OR** ondansetron, polyethylene glycol, acetaminophen **OR** acetaminophen, dextrose **OR** dextrose       Impression and Plan:    1. STEPHY due to hypotension/sepsis secondary of Lisinopril and Aldactone use:   -Likely has ischemic ATN   -Nonoliguric   -No need for further dialysis   -Renal ultrasound from 2/25 normal with no hydronephrosis    -Cr level improving to 3.1 today. Plan to remove dialysis catheter    -Continue to hold home Lisinopril & Aldactone and avoid nephrotoxic medications     2.  Critical hyperkalemia in setting of STEPHY, Lisinopril, Aldactone, and exogenous potassium supplementation: -This improved with dialysis    3. Hypokalemia: Patient's potassium level 3.2 today. Potassium chloride 10 meq/100 mL IVPB ordered per Hospitalist team as patient has difficulty swallowing since last night. Repeat Potassium level this afternoon    4. Borderline hypotension: Improving.    -Continue with IV fluids at 50 mL/hr    5. Metabolic acidosis: Improved. -Patient received sodium bicarb initially    6. Chronic grade 1 diastolic dysfunction:   -Currently not in decompensated heart failure     7. Sepsis 2/2 to UTI: Patient currently on IV Zosyn. Hospitalist managing.        Latonia Lin DO  Internal Medicine PGY-1    [facility]

## 2022-03-01 NOTE — CARE COORDINATION
3/1/22, 11:48 AM EST    DISCHARGE PLANNING EVALUATION    OZZIE spoke with Neeru Mejia with [facility] regarding IV ATB. They can accommodate IV ATB with [facility]. Pat to call OZZIE with [facility] preference. OZZIE spoke with RN Garner Dandy, pt is having MBS tomorrow.

## 2022-03-01 NOTE — PROGRESS NOTES
[address] [address]  INPATIENT SPEECH THERAPY  [facility] RENAL TELEMETRY 6K  DAILY NOTE    TIME   SLP Individual Minutes  Time In: 3879  Time Out: 8001  Minutes: 8  Timed Code Treatment Minutes: 0 Minutes       Date: 3/1/2022  Patient Name: Yusuf Elliott      CSN: [id number]   : 1931  (80 y.o.)  Gender: male   Referring Physician:  Dr. Holly Bledsoe  Diagnosis: Hyperkalemia  Secondary Diagnosis: Dysphagia   Precautions: aspiration, fall risk  Current Diet: NPO   Swallowing Strategies: Full Upright Position, Small Bite/Sip, Multiple Swallow, Direct 1:1 Supervision and Alternate Solids and Liquids  Date of Last MBS/FEES: 22    Pain:  No pain reported. Subjective:  ST received novel orders from Yasmeen Martinez MD to complete repeat CSE d/t concerns for aspiration. ST already on patient's case for dysphagia management - ST complete po trials to determine need for instrumental evaluation. SHIRLEY Mckoy approved session this date. Patient sleeping in bed with live sitter at bedside. Easily awakened to name and agreeable to po trials. Pleasant and cooperative throughout. Short-Term Goals:  SHORT TERM GOAL #1:  Goal 1: Patient will consume regular diet with thin liquids without s/s of aspiration to maintain adequate hydration/nutrition  INTERVENTIONS: ST completed po trials of thin liquids via cup this date. Patient demonstrated evidence of adequate labial seal, suspected adequate bolus control/containment, concerns for potentially delayed swallow initiation, and immediate cough occurring 2/2 trials this date. Patient with significant coughing following 2/2 trials this date. Cough appeared strong. Most recent MBS completed on  revealed \"Patient presents with oral phase to be WFL/Modified independent and mild pharyngeal dysphagia. Patient seen in most upright position. Patient with initial refusal of attempts with consistencies; however, complied with min encouragement from Manuel Leslie Dr.  Patient presented with thin liquids via cup and straw, mildly thick liquids via cup and straw, puree, soft solids, and hard solids. Appropriate lip closure, AP movement, textural breakdown and mastication with all consistencies. Appropriate bolus control with appropriate epiglottic inversion, hyoid elevation, thyrohyoid approximation, and pharyngeal constriction for all consistencies. Premature spillage to valleculae; trace residue in valleculae/diffusly throughout for soft and hard solids, mild residue along posterior pharyngeal wall and pyriforms as study continued with additional PO trials. No evidence of aspiration or laryngeal penetration for all consistencies. Hiccupping noted throughout entirety of study; however, baseline. Recommendations for regular diet with thin liquids. \"     However, ST with concerns for potential swallow decline d/t RN reporting increased s/s of aspiration as well as worsening lung sounds. Recommend patient remain STRICT NPO until completion of MBS on 3/2/22    SHORT TERM GOAL #2:  Goal 2: Patient's family and medical staff will demonstrate understanding of compensatory strategies (upright position, oral care) in order to [facility] safety of consumption of PO intake. INTERVENTIONS: Did not address this session d/t focus on other goals. SHORT TERM GOAL #3:  Goal 3: Monitor cognitive functioning, determine baseline and complete further assessment as needed (h/o dementia, from AL facility). INTERVENTIONS: Did not address this session d/t focus on other goals.        Long-Term Goals:  No long term goals recommended d/t short ELOS              EDUCATION:  Learner: Patient  Education:  Reviewed results and recommendations of this evaluation, Reviewed diet and strategies, Reviewed signs, symptoms and risks of aspiration, Reviewed ST goals and Plan of Care, Education Related to Potential Risks and Complications Due to Impairment/Illness/Injury and Education Related to [employer] and Wellness  Evaluation of Education: Verbalizes understanding and Needs further instruction    ASSESSMENT/PLAN:  Activity Tolerance:  Patient tolerance of  treatment: good. Adequate alertness     Assessment/Plan: Patient progressing toward established goals. Continues to require skilled care of licensed speech pathologist to progress toward achievement of established goals and plan of care. .     Plan for Next Session: [facility]) Fay Suh M.A., [address]

## 2022-03-01 NOTE — PROGRESS NOTES
PROGRESS NOTE      Patient:  Kelly Russell      Unit/Bed:6K-18/018-A    YOB: 1931    MRN: [id number]       Acct: [de-identified]     PCP: Judy Bustillos MD    Date of Admission: 2/22/2022    Assessment/Plan:    Active Hospital Problems    Diagnosis Date Noted    Bradycardia [R00.1]      Priority: High    STEPHY (acute kidney injury) ([address] [N17.9]     Hyperkalemia [E87.5]     Leukocytosis [D72.829]     Septicemia ([address] [A41.9]     Suprapubic catheter ([address] [Z93.59]     Sepsis ([address] [A41.9] 02/22/2022    Abdominal pain [R10.9]      Acute kidney injury on CKD4 - Stable. Likely multifactorial in the setting of hyperkalemia and sepsis.    Imaging was negative for hydronephrosis. No EKG changes were noted with hyperkalemia.  Patient was on lisinopril and Aldactone these have been stopped. IVF NS  Avoid nephrotoxic agents, including patients home lisinopril and aldactone if possible  Nephrology on board, appreciate assistance and recommendations    UTI: Improved. Purulent drainage noted to be coming from suprapubic catheter site. CT abdomen pelvis w/o signs of intra-abdominal/pelvic abnormality. Doxazosin discontinued due to suprapubic cath in place. Urine cx shows enterobacter sensitive to zosyn and E Faecalis sensitive to penicillins. Zosyn able to cover both. Will likely treat for 14 day total course of antibiotics    Sepsis: Resolved. Likely secondary to UTI.    MRSA PCR negative, Vanc was discontinued. Zosyn, will likely treat for 14 day total course of antibiotics  Monitor vitals    Acute hyperkalemia: Resolved.   In the setting of acute kidney injury.    No acute EKG changes were noted.    Patient was on lisinopril and Aldactone prior to admission.    2/22/2022 patient received hemodialysis. Hyponatremia: Resolved. likely due to hypervolemia  Will monitor BMP and vitals    Anion gap metabolic acidosis: Resolved   In the setting of sepsis and acute kidney injury.    Bicarb drip was initiated by nephrology and recently continued. Chronic HFpEF:   Not in acute exacerbation echo 6/2021 LVEF 55% with grade 1 diastolic dysfunction    Diabetes mellitus type 2  Lantus was not restarted at time of admission due to [address], has been controlled. Will monitor with Accu-Cheks sliding scale insulin. Sinus Pauses w/ sinus bradycardia  Cardiology consulted. No additional intervention or work up per discussion with family. Will discontinue telemetry     Diet: Passed MBS. Regular diet with thin liquids  DVT prophylaxis: subq heparin    Code Status: DNR-CCA    Disposition:  Will need SNF per PT/OT. Was at assisted living.    ----------------------------------------------------------------------------------------------------    Chief Complaint: Abdominal pain, bilateral LE swelling, and poor PO intake    Hospital Course: Yang Estrella is a 80-year-old male admitted on 2/22/2022 with chief complaint of bilateral leg swelling, worsening confusion, and abdominal pain. Patient is a resident at assisted [facility].  Noted purulent drainage coming from suprapubic catheter site.  No signs of peritonitis at this time.  CT abdomen pelvis revealed no evidence of acute intra-abdominal or intrapelvic abnormalities.  Zosyn was started.  Suprapubic catheter was exchanged while in the ER on 2/22/2022. Urine culture obtained and showed multiple bacteria, which were sensitive to zosyn and completed 7 day course on 2/28. Also of note, Nephrology was consulted for acute hyperkalemia and acute kidney injury. 2/22/2022 patient was transferred to the ICU and received dialysis overnight. Patient was transferred to general medicine floor on 2/23. After transfer, did well without acute events. Some sinus bradycardia with pauses. Consulted cardiology who spoke with Dr. Kaila Tee, who said not to pursue pacemaker due to other conditions. Continue DNR-CCA as can continue medications.   Also assessed by PT/OT who are now recommending SNF.    3/1/2022  Nursing reports patient choking on medications and fluids. SPL consult. Subjective:  Patient seen laying comfortable in bedside this morning. Patient remains alert and pleasant to converse with, however not oriented. Patient states he has no current complaints. Patient denies chest pain, headache, shortness of breath, abdominal discomfort. Awaiting placement for discharge. Nursing reports patient choking on medications and fluids. SPL consult.   IVPB potassium chloride replacement    Medications:  Reviewed    Infusion Medications    sodium chloride Stopped (03/01/22 0615)    dextrose      sodium chloride      dextrose      Or    dextrose       Scheduled Medications    potassium chloride  10 mEq IntraVENous Q2H    calcium replacement protocol   Other RX Placeholder    pantoprazole  40 mg Oral BID AC    insulin lispro  0-6 Units SubCUTAneous TID WC    insulin lispro  0-3 Units SubCUTAneous Nightly    piperacillin-tazobactam  3,375 mg IntraVENous Q12H    Arformoterol Tartrate  15 mcg Nebulization BID    budesonide  0.5 mg Nebulization BID    docusate sodium  100 mg Oral BID    ferrous gluconate  360 mg Oral BID    lactobacillus  1 capsule Oral BID WC    levothyroxine  175 mcg Oral Daily    [Held by provider] lisinopril  2.5 mg Oral Daily    tiotropium  2 puff Inhalation Daily    Vitamin D  500 Units Oral Daily    sodium chloride flush  10 mL IntraVENous 2 times per day    heparin (porcine)  5,000 Units SubCUTAneous 3 times per day     PRN Meds: hydrALAZINE, ipratropium-albuterol, glucose, glucagon (rDNA), dextrose, albuterol sulfate HFA, sodium chloride flush, sodium chloride, ondansetron **OR** ondansetron, polyethylene glycol, acetaminophen **OR** acetaminophen, dextrose **OR** dextrose      Intake/Output Summary (Last 24 hours) at 3/1/2022 1018  Last data filed at 3/1/2022 0337  Gross per 24 hour   Intake 250 ml   Output 2150 ml   Net -1900 ml Diet:  Diet NPO    Exam:  BP (!) 145/58   Pulse 70   Temp 98.5 °F (36.9 °C) (Oral)   Resp 18   Ht 5' 6\" (1.676 m)   Wt 211 lb 13.8 oz (96.1 kg)   SpO2 95%   BMI 34.20 kg/m²     Physical Exam:  General appearance: Alert, not in acute distress  HEENT:  Normal cephalic, atraumatic without obvious deformity. Pupils equal, round, and reactive to light. Conjunctivae clear. Clear oral mucosa. Neck: Supple, with full range of motion. No jugular venous distention. Trachea midline. Respiratory:  Normal respiratory effort. No Rales/Rhonchi. Expiratory wheezes with history of COPD  Cardiovascular: Normal rate, regular rhythm with normal S1/S2 without murmurs, rubs or gallops. Abdomen: Soft, non-tender, non-distended with normal bowel sounds. Musculoskeletal:  No clubbing, cyanosis or edema bilaterally. Full range of motion without deformity. Skin: No rashes or lesions. Neurological exam reveals alert, normal speech, no focal findings or movement disorder noted. Exam of extremities: peripheral pulses normal, mild leg edema, no clubbing or cyanosis    Labs:   Recent Labs     02/27/22  0544 02/28/22  0602 03/01/22  0453   WBC 10.8 10.2 11.7*   HGB 9.2* 9.0* 9.2*   HCT 28.3* 28.2* 28.1*    281 300     Recent Labs     02/27/22  0544 02/28/22  0602 03/01/22  0453    144 142   K 3.9 3.5 3.2*    107 106   CO2 25 25 24   BUN 38* 32* 28*   CREATININE 3.9* 3.4* 3.1*   CALCIUM 8.0* 7.6* 7.5*     No results for input(s): AST, ALT, BILIDIR, BILITOT, ALKPHOS in the last 72 hours. No results for input(s): INR in the last 72 hours. No results for input(s): Sonjia Heather in the last 72 hours.     Urinalysis:      Lab Results   Component Value Date    NITRU NEGATIVE 02/22/2022    WBCUA > 200 02/22/2022    BACTERIA MANY 02/22/2022    RBCUA > 100 02/22/2022    BLOODU LARGE 02/22/2022    SPECGRAV 1.017 09/22/2021    GLUCOSEU NEGATIVE 02/22/2022       Radiology:  US RENAL COMPLETE   Final Result Normal bilateral renal ultrasound. Final report electronically signed by Dr. Stephany Godfrey on 2/25/2022 2:21 PM      FL MODIFIED BARIUM SWALLOW W VIDEO   Final Result   1. No laryngeal penetration or aspiration of barium. 2. Additional recommendations from the speech therapist will follow. **This report has been created using voice recognition software. It may contain minor errors which are inherent in voice recognition technology. **         Final report electronically signed by Dr. Stephany Godfrey on 2/24/2022 10:11 AM      CT HEAD WO CONTRAST   Final Result   No acute intracranial findings. Chronic changes as described. This document has been electronically signed by: Glenn Holley MD on    02/23/2022 05:54 AM      All CTs at this facility use dose modulation techniques and iterative    reconstructions, and/or weight-based dosing   when appropriate to reduce radiation to a low as reasonably achievable. XR CHEST PORTABLE   Final Result   Impression:   Right IJ line tip over the proximal SVC. This document has been electronically signed by: Glenn Holley MD on    02/23/2022 12:43 AM      CT ABDOMEN PELVIS WO CONTRAST Additional Contrast? None   Final Result       1. No evidence of acute intra-abdominal or intrapelvic abnormality. 2. Moderate retained stool. 3. Nonobstructive nephrolithiasis on the left. 4. Prostatomegaly. **This report has been created using voice recognition software. It may contain minor errors which are inherent in voice recognition technology. **      Final report electronically signed by Dr. Tray Roper MD on 2/22/2022 11:01 AM      XR CHEST PORTABLE   Final Result   Stable radiographic appearance of the chest. No evidence of an acute process. **This report has been created using voice recognition software. It may contain minor errors which are inherent in voice recognition technology. **      Final report electronically signed by Dr. Robinson Key MD on 2/22/2022 10:09 AM          Electronically signed by Jayashree Robins MD on 3/1/2022 at 10:18 AM

## 2022-03-01 NOTE — PROGRESS NOTES
[address] RENAL TELEMETRY 6K  Occupational Therapy  Daily Note  Time:   Time In:   Time Out: 1625  Timed Code Treatment Minutes: 30 Minutes  Minutes: 30          Date: 3/1/2022  Patient Name: Yang Estrella,   Gender: male      Room: [facility]18/018-A  MRN: [id number]  : 1931  (80 y.o.)  Referring Practitioner: Dudley Gaytan MD  Diagnosis: Hyperkalemia  Additional Pertinent Hx: Per H&P: Kristin Jones is a 24-year-old  male with a past medical history of CHF, COPD, dementia who presents to [facility] ED from a nursing home for the evaluation of abdominal pain, and not eating and drinking. Due to patient's underlying dementia, he is a poor historian. ED provider reports poor history from nursing home nurse, and states that he did not eat well yesterday and had a fall earlier today. This patient is the father of Dr. Cynthia Jeffery with oncology, and ED provider reached out to him for more information. Patient's baseline is unclear. He states he has lower abdominal pain around his catheter insertion site, fever, chills, decreased appetite. Upon questioning, patient was able to state his name and date of birth, but was unsure of where he was at and date. The patient denies chest pain, shortness of breath, lightheadedness, dizziness, weakness at this time. Restrictions/Precautions:  Restrictions/Precautions: General Precautions,Fall Risk  Position Activity Restriction  Other position/activity restrictions: Dementia, suprapubic catheter     SUBJECTIVE: Pleasant and cooperative  Pt agreed to stand while brushing his teeth at the sink. He also agreed to walk in the hallway. PAIN: Pt did not provide any number: pain reported in his penis from his felipe catheter whenever he urinates. Vitals: Vitals not assessed per clinical judgement, see nursing flowsheet    COGNITION: Slow Processing and Decreased Problem Solving    ADL:   Grooming: Contact Guard Assistance.   pt brushed his teeth and combed his hair while standing at the sink- incidental help provided with the cap of the tube of toothpaste. Emma Fay BALANCE:  Sitting Balance:  Stand By Assistance. scooting forward in the chair  Standing Balance: Contact Guard Assistance. grooming at he sink while holding onto the countertop with 1 hand    BED MOBILITY:  Not Tested    TRANSFERS:  Sit to Stand:  Contact Guard  Assistance. from the recliner chair with pt rocking forward and having B hands on the walker  Stand to Sit: Contact Guard Assistance. to the recliner with cues to reach back for the armrests    FUNCTIONAL MOBILITY:  Assistive Device: Rolling Walker  Assist Level:  Contact Guard Assistance. Distance: To and from bathroom and in the hallway around obstacles  Pt had 1 standing rest break. He showed even steps with B feet- more difficulty picking up his feet while going farther and getting more tired. ADDITIONAL ACTIVITIES:  Pt placed 3 pillowcases on to pillows with CGA while in sitting with min cues to orient to the open end of the pillow case. ASSESSMENT:     Activity Tolerance:  Patient tolerance of  treatment: fair.  Pt stood for 3.5 minute duration while doing his self care      Discharge Recommendations: [facility]  Equipment Recommendations: Equipment Needed: No  Plan: Times per week: 3-5x  Times per day: Daily  Current Treatment Recommendations: Strengthening,Patient/Caregiver Education & Training,Balance Training,Functional Mobility Training,Endurance Training,Safety Education & Training,Self-Care / ADL    Patient Education  Patient Education: Role of OT and importance of pacing himself while doing activities; posture awareness while standing for activities    Goals  Short term goals  Time Frame for Short term goals: by discharge  Short term goal 1: Pt will complete functional mobility short distances with CGA and no LOB or safety cues for ease with ADLs  Short term goal 2: Pt will tolerate dynamic standing x3-4min with CGA and unilateral release to increase indep with grooming  Short term goal 3: Pt will complete UB ADLs with Min A and min VC  Short term goal 4: Pt will complete BUE strengthening HEP with light resistance and min VC for technique to increase indep wtih dressing    Following session, patient left in safe position with all fall risk precautions in place.

## 2022-03-01 NOTE — PROGRESS NOTES
Comprehensive Nutrition Assessment    Type and Reason for Visit:  Initial,RD Nutrition Re-Screen/LOS,NPO/Clear Liquid    Nutrition Recommendations/Plan:   -Diet per SLP, note planning repeat MBS per nursing staff tomorrow. -If continue NPO d/t dysphagia and tubefeedings started would recommend Nepro at 10 ml/hr, increase 10 ml/hr every 4 hours as tolerated to goal of 40 ml/hr (~ 1699 kcals, 78 grams protein, 154 grams CHO, 24 grams fiber, 698 mls water in 960 mls volume. -If/when diet resumed will plan ONS start.  -No BM documented since admit, 7 days, discussed with RN. Colace and glycolax note given d/t NPO status. Consider additional bowel medications. Nutrition Assessment:    Pt. nutritionally compromised AEB NPO d/t dysphagia. At risk for further nutrition compromise r/t poor oral intake since admit 7 days, admit with hyperkalemia, STEPHY on CKD, temporary hemodialysis (HD catheter to be removed soon), sepsis d/t UTI, advanced age,  and underlying medical condition (hx: DB, HTN, CHF, HLD, OA, thyroid cancer, pneumonia, obesity, anxiety, afib, COPD). Malnutrition Assessment:  Malnutrition Status: At risk for malnutrition (Comment)    Context:  Acute Illness     Findings of the 6 clinical characteristics of malnutrition:  Energy Intake:  1 - 75% or less of estimated energy requirements for 7 or more days  Weight Loss:  No significant weight loss (in the last 3 months, note 17.8% loss in the last 9 months-of note hx CHF)     Body Fat Loss:  No significant body fat loss     Muscle Mass Loss:  No significant muscle mass loss    Fluid Accumulation:  1 - Mild Extremities   Strength:  Not Performed    Estimated Daily Nutrient Needs:  Energy (kcal):  2072-7189 kcals (15-18 kcals/kg/day);  Weight Used for Energy Requirements:   (96 kg)     Protein (g):  65-78 grams (1-1.2 grams/kg IBW/day) pending renal status; Weight Used for Protein Requirements:  Ideal (65 kg)        Fluid (ml/day):  ~ 2000 mls/day (hx CHF); Nutrition Related Findings:    Patient seen along with live sitter. Some confusion per staff. Refusing medications through the night. Choking with oral intake. SLP reconsulted planning repeat MBS tomorrow. Previous MBS 2/24/22: regular with thin. Patient reports appetite is good. Nursing states he was just picking at his food previous to NPO status, meal intake 75% or less since admit. No BM documented since admit, 7 days, discussed with RN. Temporary HD this admit, no further treatments planned, plan to remove catheter soon. 3/1/22: Sodium 142, Potassium 3.2, BUN 28, Creatinine 3.1, Glucose 111, Chemstick 123. Rx: colace, humalog, culturelle, synthroid, IVF at 50 ml/hr, prn glycolax. Wounds:  None       Current Nutrition Therapies:    Diet NPO    Anthropometric Measures:  · Height: 5' 6\" (167.6 cm)  · Current Body Weight: 211 lb 13.8 oz (96.1 kg) (3/1 +1 LE edema)   · Admission Body Weight: 237 lb 6.4 oz (107.7 kg) (2/22 +2 LE edema)    · Usual Body Weight:  (Pt unable to state. Per EMR: 5/24/21: 956#29.7BF, 7/14/21: 225#6. 4oz, 11/22/21: 214#9. 6oz, 1/3/22: 212#)     · Ideal Body Weight: 142 lbs;   · BMI: 34.2  · Adjusted Body Weight:  ; No Adjustment   · BMI Categories: Obese Class 1 (BMI 30.0-34. 9)       Nutrition Diagnosis:   · Inadequate oral intake related to inadequate protein-energy intake as evidenced by NPO or clear liquid status due to medical condition (and meal intake prior 75% or less)    Nutrition Interventions:   Food and/or Nutrient Delivery:  Continue NPO (Diet per SLP recommendations-await repeat MBS. If continue NPO status d/t dysphagia consider tubefeedings for nutrition support)  Nutrition Education/Counseling:  Education not appropriate   Coordination of Nutrition Care:  Continue to monitor while inpatient    Goals:  Patient will receive adequate nutrition in 1-3 days.        Nutrition Monitoring and Evaluation:   Behavioral-Environmental Outcomes:  None Identified   Food/Nutrient Intake Outcomes:  Diet Advancement/Tolerance,IVF Intake (Nutrition support needs)  Physical Signs/Symptoms Outcomes:  Biochemical Data,Chewing or Swallowing,GI Status,Fluid Status or Edema,Meal Time Behavior,Nutrition Focused Physical Findings,Weight     Discharge Planning:     Too soon to determine     Electronically signed by Jeremy Collazo RD, LD on 3/1/22 at 11:59 AM EST    Contact: [phone]

## 2022-03-01 NOTE — PLAN OF CARE
Problem: Nutrition  Goal: Optimal nutrition therapy  Outcome: Ongoing  Nutrition Problem #1: Inadequate oral intake  Intervention: Food and/or Nutrient Delivery: Continue NPO (Diet per SLP recommendations-await repeat MBS. If continue NPO status d/t dysphagia consider tubefeedings for nutrition support)  Nutritional Goals: Patient will receive adequate nutrition in 1-3 days.

## 2022-03-01 NOTE — PROGRESS NOTES
Patient refused all PO medications through out the night. Every time this Rn attempted to give patient PO medications, pt wound cough and spit them out. Coughed every time when given thin liquids. Maybe swallow evaluation?      Electronically signed by Bruce Johns RN on 3/1/2022 at 7:21 AM

## 2022-03-01 NOTE — PLAN OF CARE
Problem: Falls - Risk of:  Goal: Will remain free from falls  Description: Will remain free from falls  Outcome: Ongoing   All fall precautions in place. Bed in low position, alarm activated and appropriate use of call light. Pain: Patient denies any pain at this time. Problem: Skin Integrity:  Goal: Will show no infection signs and symptoms  Description: Will show no infection signs and symptoms  Outcome: Ongoing   Skin assessment completed. Patient turned every 2 hours and as needed. No skin breakdown this shift. Problem: Discharge Planning:  Goal: Discharged to appropriate level of care  Description: Discharged to appropriate level of care  3/1/2022 0110 by Kavin Orozco RN  Outcome: Ongoing   Discharge date remains unclear at this time.     Electronically signed by Kavin Orozco RN on 3/1/2022 at 1:11 AM

## 2022-03-02 ENCOUNTER — APPOINTMENT (OUTPATIENT)
Dept: GENERAL RADIOLOGY | Age: 87
DRG: 698 | End: 2022-03-02
Payer: MEDICARE

## 2022-03-02 LAB
ANION GAP SERPL CALCULATED.3IONS-SCNC: 12 MEQ/L (ref 8–16)
BASOPHILS # BLD: 0.5 %
BASOPHILS ABSOLUTE: 0.1 THOU/MM3 (ref 0–0.1)
BUN BLDV-MCNC: 23 MG/DL (ref 7–22)
CALCIUM SERPL-MCNC: 7.8 MG/DL (ref 8.5–10.5)
CHLORIDE BLD-SCNC: 111 MEQ/L (ref 98–111)
CO2: 24 MEQ/L (ref 23–33)
CREAT SERPL-MCNC: 3 MG/DL (ref 0.4–1.2)
EOSINOPHIL # BLD: 1.9 %
EOSINOPHILS ABSOLUTE: 0.2 THOU/MM3 (ref 0–0.4)
ERYTHROCYTE [DISTWIDTH] IN BLOOD BY AUTOMATED COUNT: 15.9 % (ref 11.5–14.5)
ERYTHROCYTE [DISTWIDTH] IN BLOOD BY AUTOMATED COUNT: 49.5 FL (ref 35–45)
GFR SERPL CREATININE-BSD FRML MDRD: 20 ML/MIN/1.73M2
GLUCOSE BLD-MCNC: 103 MG/DL (ref 70–108)
GLUCOSE BLD-MCNC: 109 MG/DL (ref 70–108)
GLUCOSE BLD-MCNC: 114 MG/DL (ref 70–108)
GLUCOSE BLD-MCNC: 124 MG/DL (ref 70–108)
GLUCOSE BLD-MCNC: 99 MG/DL (ref 70–108)
HCT VFR BLD CALC: 29.4 % (ref 42–52)
HEMOGLOBIN: 9.1 GM/DL (ref 14–18)
IMMATURE GRANS (ABS): 0.43 THOU/MM3 (ref 0–0.07)
IMMATURE GRANULOCYTES: 3.6 %
LYMPHOCYTES # BLD: 11.2 %
LYMPHOCYTES ABSOLUTE: 1.3 THOU/MM3 (ref 1–4.8)
MCH RBC QN AUTO: 26.7 PG (ref 26–33)
MCHC RBC AUTO-ENTMCNC: 31 GM/DL (ref 32.2–35.5)
MCV RBC AUTO: 86.2 FL (ref 80–94)
MONOCYTES # BLD: 7.3 %
MONOCYTES ABSOLUTE: 0.9 THOU/MM3 (ref 0.4–1.3)
NUCLEATED RED BLOOD CELLS: 0 /100 WBC
PLATELET # BLD: 358 THOU/MM3 (ref 130–400)
PMV BLD AUTO: 9.2 FL (ref 9.4–12.4)
POTASSIUM SERPL-SCNC: 3.3 MEQ/L (ref 3.5–5.2)
POTASSIUM SERPL-SCNC: 4.1 MEQ/L (ref 3.5–5.2)
RBC # BLD: 3.41 MILL/MM3 (ref 4.7–6.1)
SEG NEUTROPHILS: 75.5 %
SEGMENTED NEUTROPHILS ABSOLUTE COUNT: 9 THOU/MM3 (ref 1.8–7.7)
SODIUM BLD-SCNC: 147 MEQ/L (ref 135–145)
WBC # BLD: 11.9 THOU/MM3 (ref 4.8–10.8)

## 2022-03-02 PROCEDURE — 2580000003 HC RX 258: Performed by: NURSE PRACTITIONER

## 2022-03-02 PROCEDURE — 2500000003 HC RX 250 WO HCPCS: Performed by: INTERNAL MEDICINE

## 2022-03-02 PROCEDURE — 6360000002 HC RX W HCPCS

## 2022-03-02 PROCEDURE — 74230 X-RAY XM SWLNG FUNCJ C+: CPT

## 2022-03-02 PROCEDURE — 80048 BASIC METABOLIC PNL TOTAL CA: CPT

## 2022-03-02 PROCEDURE — 1200000003 HC TELEMETRY R&B

## 2022-03-02 PROCEDURE — 82948 REAGENT STRIP/BLOOD GLUCOSE: CPT

## 2022-03-02 PROCEDURE — 94760 N-INVAS EAR/PLS OXIMETRY 1: CPT

## 2022-03-02 PROCEDURE — 99232 SBSQ HOSP IP/OBS MODERATE 35: CPT | Performed by: INTERNAL MEDICINE

## 2022-03-02 PROCEDURE — 97110 THERAPEUTIC EXERCISES: CPT

## 2022-03-02 PROCEDURE — 6360000002 HC RX W HCPCS: Performed by: STUDENT IN AN ORGANIZED HEALTH CARE EDUCATION/TRAINING PROGRAM

## 2022-03-02 PROCEDURE — 97116 GAIT TRAINING THERAPY: CPT

## 2022-03-02 PROCEDURE — 2580000003 HC RX 258: Performed by: INTERNAL MEDICINE

## 2022-03-02 PROCEDURE — 84132 ASSAY OF SERUM POTASSIUM: CPT

## 2022-03-02 PROCEDURE — 74018 RADEX ABDOMEN 1 VIEW: CPT

## 2022-03-02 PROCEDURE — 36415 COLL VENOUS BLD VENIPUNCTURE: CPT

## 2022-03-02 PROCEDURE — 97535 SELF CARE MNGMENT TRAINING: CPT

## 2022-03-02 PROCEDURE — 94669 MECHANICAL CHEST WALL OSCILL: CPT

## 2022-03-02 PROCEDURE — 2580000003 HC RX 258

## 2022-03-02 PROCEDURE — 92611 MOTION FLUOROSCOPY/SWALLOW: CPT

## 2022-03-02 PROCEDURE — 99233 SBSQ HOSP IP/OBS HIGH 50: CPT | Performed by: FAMILY MEDICINE

## 2022-03-02 PROCEDURE — 92526 ORAL FUNCTION THERAPY: CPT

## 2022-03-02 PROCEDURE — 92612 ENDOSCOPY SWALLOW (FEES) VID: CPT

## 2022-03-02 PROCEDURE — 85025 COMPLETE CBC W/AUTO DIFF WBC: CPT

## 2022-03-02 PROCEDURE — 6370000000 HC RX 637 (ALT 250 FOR IP): Performed by: HOSPITALIST

## 2022-03-02 PROCEDURE — 6360000002 HC RX W HCPCS: Performed by: NURSE PRACTITIONER

## 2022-03-02 PROCEDURE — 94640 AIRWAY INHALATION TREATMENT: CPT

## 2022-03-02 RX ORDER — SODIUM CHLORIDE 450 MG/100ML
INJECTION, SOLUTION INTRAVENOUS CONTINUOUS
Status: DISCONTINUED | OUTPATIENT
Start: 2022-03-02 | End: 2022-03-03

## 2022-03-02 RX ORDER — POTASSIUM CHLORIDE 7.45 MG/ML
10 INJECTION INTRAVENOUS
Status: COMPLETED | OUTPATIENT
Start: 2022-03-02 | End: 2022-03-02

## 2022-03-02 RX ADMIN — POTASSIUM CHLORIDE 10 MEQ: 7.46 INJECTION, SOLUTION INTRAVENOUS at 17:07

## 2022-03-02 RX ADMIN — HEPARIN SODIUM 5000 UNITS: 5000 INJECTION INTRAVENOUS; SUBCUTANEOUS at 14:55

## 2022-03-02 RX ADMIN — POTASSIUM CHLORIDE 10 MEQ: 7.46 INJECTION, SOLUTION INTRAVENOUS at 10:58

## 2022-03-02 RX ADMIN — HEPARIN SODIUM 5000 UNITS: 5000 INJECTION INTRAVENOUS; SUBCUTANEOUS at 22:43

## 2022-03-02 RX ADMIN — BARIUM SULFATE 60 ML: 400 SUSPENSION ORAL at 09:40

## 2022-03-02 RX ADMIN — IPRATROPIUM BROMIDE AND ALBUTEROL SULFATE 1 AMPULE: .5; 3 SOLUTION RESPIRATORY (INHALATION) at 17:00

## 2022-03-02 RX ADMIN — BARIUM SULFATE 100 ML: 0.81 POWDER, FOR SUSPENSION ORAL at 09:40

## 2022-03-02 RX ADMIN — ARFORMOTEROL TARTRATE 15 MCG: 15 SOLUTION RESPIRATORY (INHALATION) at 07:40

## 2022-03-02 RX ADMIN — SODIUM CHLORIDE, PRESERVATIVE FREE 10 ML: 5 INJECTION INTRAVENOUS at 08:36

## 2022-03-02 RX ADMIN — IPRATROPIUM BROMIDE AND ALBUTEROL SULFATE 1 AMPULE: .5; 3 SOLUTION RESPIRATORY (INHALATION) at 07:39

## 2022-03-02 RX ADMIN — TIOTROPIUM BROMIDE INHALATION SPRAY 2 PUFF: 3.12 SPRAY, METERED RESPIRATORY (INHALATION) at 07:49

## 2022-03-02 RX ADMIN — SODIUM CHLORIDE: 4.5 INJECTION, SOLUTION INTRAVENOUS at 10:58

## 2022-03-02 RX ADMIN — BUDESONIDE 500 MCG: 0.5 INHALANT RESPIRATORY (INHALATION) at 16:58

## 2022-03-02 RX ADMIN — POTASSIUM CHLORIDE 10 MEQ: 7.46 INJECTION, SOLUTION INTRAVENOUS at 19:43

## 2022-03-02 RX ADMIN — HEPARIN SODIUM 5000 UNITS: 5000 INJECTION INTRAVENOUS; SUBCUTANEOUS at 06:33

## 2022-03-02 RX ADMIN — BUDESONIDE 500 MCG: 0.5 INHALANT RESPIRATORY (INHALATION) at 07:39

## 2022-03-02 RX ADMIN — PIPERACILLIN AND TAZOBACTAM 3375 MG: 3; .375 INJECTION, POWDER, LYOPHILIZED, FOR SOLUTION INTRAVENOUS at 04:44

## 2022-03-02 RX ADMIN — PIPERACILLIN AND TAZOBACTAM 3375 MG: 3; .375 INJECTION, POWDER, LYOPHILIZED, FOR SOLUTION INTRAVENOUS at 16:42

## 2022-03-02 RX ADMIN — POTASSIUM CHLORIDE 10 MEQ: 7.46 INJECTION, SOLUTION INTRAVENOUS at 13:06

## 2022-03-02 RX ADMIN — BARIUM SULFATE 20 ML: 400 PASTE ORAL at 09:40

## 2022-03-02 RX ADMIN — ARFORMOTEROL TARTRATE 15 MCG: 15 SOLUTION RESPIRATORY (INHALATION) at 16:58

## 2022-03-02 ASSESSMENT — PAIN SCALES - WONG BAKER
WONGBAKER_NUMERICALRESPONSE: 0
WONGBAKER_NUMERICALRESPONSE: 0

## 2022-03-02 ASSESSMENT — PAIN SCALES - GENERAL
PAINLEVEL_OUTOF10: 0
PAINLEVEL_OUTOF10: 0

## 2022-03-02 NOTE — PROGRESS NOTES
[facility]   Nephrology progress note  3/2/2022, 10:29 AM      Pt Name:    Donal Figueroa  MRN:     [id number]     YOB: 1931  Admit Date:    2/22/2022  9:12 AM  Primary Care Physician:  Ashley Beckham MD   Room number  7Y-63/716-E    Chief Complaint: Nephrology following for STEPHY/hyperkalemia and metabolic acidosis     Subjective:  Patient seen and examined. No new complaints. Patient not fully oriented- oriented to self and place  Increased urine output  Feels better    Objective:  24HR INTAKE/OUTPUT:      Intake/Output Summary (Last 24 hours) at 3/2/2022 1029  Last data filed at 3/2/2022 0910  Gross per 24 hour   Intake 0 ml   Output 3100 ml   Net -3100 ml     I/O last 3 completed shifts: In: 0   Out: 3350 [FGHEZ:1017]  I/O this shift:  In: -   Out: 1350 [Urine:1350]  Admission weight: 212 lb (96.2 kg)  Wt Readings from Last 3 Encounters:   03/02/22 212 lb 8.4 oz (96.4 kg)   01/03/22 212 lb (96.2 kg)   12/06/21 210 lb (95.3 kg)     Body mass index is 34.3 kg/m².     Physical examination  VITALS:     Vitals:    03/01/22 2114 03/02/22 0330 03/02/22 0739 03/02/22 0835   BP: (!) 140/75   (!) 146/66   Pulse: 98   75   Resp:   18 18   Temp: 98.2 °F (36.8 °C)   98.1 °F (36.7 °C)   TempSrc: Oral   Oral   SpO2: 95%  96% 98%   Weight:  212 lb 8.4 oz (96.4 kg)     Height:         General Appearance: alert and cooperative with exam, appears comfortable, no distress  Mouth/Throat: Oral mucosa dry  Neck: No JVD  Lungs: Air entry B/L, no rales, no use of accessory muscles  Heart:  S1, S2 heard  GI: soft, non-tender, no guarding  Extremities: No significant LE edema      Lab Data  CBC:   Recent Labs     02/28/22  0602 03/01/22  0453 03/02/22  0500   WBC 10.2 11.7* 11.9*   HGB 9.0* 9.2* 9.1*   HCT 28.2* 28.1* 29.4*    300 358     BMP:  Recent Labs     02/28/22  0602 02/28/22  0602 03/01/22  0453 03/01/22  1627 03/02/22  0500     --  142  --  147*   K 3.5   < > 3.2* 3.9 3.3*   --  106  --  111   CO2 25  --  24  --  24   BUN 32*  --  28*  --  23*   CREATININE 3.4*  --  3.1*  --  3.0*   GLUCOSE 107  --  111*  --  103   CALCIUM 7.6*  --  7.5*  --  7.8*    < > = values in this interval not displayed. Hepatic: No results for input(s): LABALBU, AST, ALT, ALB, BILITOT, ALKPHOS in the last 72 hours. Meds:  Infusion:    sodium chloride Stopped (03/01/22 0615)    dextrose      sodium chloride      dextrose      Or    dextrose       Meds:    potassium chloride  10 mEq IntraVENous Q2H    calcium replacement protocol   Other RX Placeholder    pantoprazole  40 mg Oral BID AC    insulin lispro  0-6 Units SubCUTAneous TID WC    insulin lispro  0-3 Units SubCUTAneous Nightly    piperacillin-tazobactam  3,375 mg IntraVENous Q12H    Arformoterol Tartrate  15 mcg Nebulization BID    budesonide  0.5 mg Nebulization BID    docusate sodium  100 mg Oral BID    ferrous gluconate  360 mg Oral BID    lactobacillus  1 capsule Oral BID WC    levothyroxine  175 mcg Oral Daily    [Held by provider] lisinopril  2.5 mg Oral Daily    tiotropium  2 puff Inhalation Daily    Vitamin D  500 Units Oral Daily    sodium chloride flush  10 mL IntraVENous 2 times per day    heparin (porcine)  5,000 Units SubCUTAneous 3 times per day     Meds prn: hydrALAZINE, ipratropium-albuterol, glucose, glucagon (rDNA), dextrose, albuterol sulfate HFA, sodium chloride flush, sodium chloride, ondansetron **OR** ondansetron, polyethylene glycol, acetaminophen **OR** acetaminophen, dextrose **OR** dextrose       Impression and Plan:    1. STEPHY due to hypotension/sepsis secondary of Lisinopril and Aldactone use:   -Likely has ischemic ATN   -Nonoliguric   -No need for further dialysis   -Renal ultrasound from 2/25 normal with no hydronephrosis    -Cr level improving to 3.0 today.  Plan to remove dialysis catheter in next 1 to 2 days if creatine continues to improve.    -Continue to hold home Lisinopril & Aldactone and avoid nephrotoxic medications     2. Critical hyperkalemia in setting of STEPHY, Lisinopril, Aldactone, and exogenous potassium supplementation:    -Resolved with dialysis    3. Hypokalemia: Patient's potassium level 3.2 on 3/1. Potassium chloride 10 meq/100 mL IVPB ordered per Hospitalist team as patient has difficulty swallowing since last night. 3/2 potassium level 3.3. Will replace potassium and recheck level again. 4. Borderline hypotension: Improving.    -Continue with IV fluids at 50 mL/hr    5. Metabolic acidosis: Improved. -Patient received sodium bicarb initially    6. Chronic grade 1 diastolic dysfunction:   -Currently not in decompensated heart failure     7. Sepsis 2/2 to UTI: Patient currently on IV Zosyn. Hospitalist managing.        Dania Herbert DO  Internal Medicine PGY-1    [facility]

## 2022-03-02 NOTE — PROGRESS NOTES
[address] RENAL TELEMETRY 6K  Occupational Therapy  Daily Note  Time:   Time In: 3666  Time Out: 1056  Timed Code Treatment Minutes: 31 Minutes  Minutes: 31    Date: 3/2/2022  Patient Name: Teodora Hartley,   Gender: male      Room: [facility]18/018-A  MRN: [id number]  : 1931  (80 y.o.)  Referring Practitioner: Emerald Stoner MD  Diagnosis: Hyperkalemia  Additional Pertinent Hx: Per H&P: Jessica Salas is a 80-year-old  male with a past medical history of CHF, COPD, dementia who presents to [address] ED from a nursing home for the evaluation of abdominal pain, and not eating and drinking. Due to patient's underlying dementia, he is a poor historian. ED provider reports poor history from nursing home nurse, and states that he did not eat well yesterday and had a fall earlier today. This patient is the father of Dr. Briana Alegria with oncology, and ED provider reached out to him for more information. Patient's baseline is unclear. He states he has lower abdominal pain around his catheter insertion site, fever, chills, decreased appetite. Upon questioning, patient was able to state his name and date of birth, but was unsure of where he was at and date. The patient denies chest pain, shortness of breath, lightheadedness, dizziness, weakness at this time. Restrictions/Precautions:  Restrictions/Precautions: General Precautions,Fall Risk  Position Activity Restriction  Other position/activity restrictions: Dementia, suprapubic catheter     SUBJECTIVE: RN approved OT session. Pt sitting sinkside with sitter getting cleaned up. Patient agreeable to participating in therapy. PAIN: Pt with no complaints of pain, but does demonstrate facial grimaces and rubs L shoulder with UE exercises    Vitals: Vitals not assessed per clinical judgement, see nursing flowsheet    COGNITION: Decreased Recall, Decreased Insight, Impaired Memory and Decreased Problem Solving    ADL:   Grooming: Minimal Assistance.   for oral cares, and finishing shaving task. - Sitter assisting pt with first half of shaving task upon OT arrival with pt able to complete the rest with SBA and required verbal cues throughout to locate items on counter and not to pull on IV in neck  Bathing: Maximum Assistance. with sitter completing majority of bathing task prior to OT arrival, pt required max A to bathe periarea and folds of stomach  Upper Extremity Dressing: Minimal Assistance. hospital gown. BALANCE:  Sitting Balance:  Stand By Assistance. Pt did require vcs not to pull on IV in neck   Standing Balance: Contact Guard Assistance increasing to min A with increased fatigue and prolonged standing. with 0-1 UE support on counter. Pt tolerates standing for approx 3 minutes before requiring seated rest break prior to return to recliner    BED MOBILITY:  Not Tested    TRANSFERS:  Sit to Stand:  Minimum Assistance completed x2 trials from chair w/o arms with pulling on sink. Pt with increased ease of transfer on second attempt with improved foot placement. Stand to Sit: Contact Guard Assistance. FUNCTIONAL MOBILITY:  Assistive Device: Rolling Walker  Assist Level:  Minimal Assistance and with increased time for completion. Distance: To and from bathroom  Pt with slow pace, no LOB. Pt demos increased fatigue with mobility following dynamic standing task. ADDITIONAL ACTIVITIES:  Guided patient through BUE AROM this date x10 reps x1 set in all joints and planes in order to increase BUE strength and improve activity tolerance for ADLs and homemaking tasks. Patient demos increased fatigue throughout task. ASSESSMENT:     Activity Tolerance:  Patient tolerance of  treatment: fair.        Discharge Recommendations: Subacute/skilled nursing facility and ECF with OT  Equipment Recommendations: Equipment Needed: No  Plan: Times per week: 3-5x  Times per day: Daily  Current Treatment Recommendations: Strengthening,Patient/Caregiver Education & Training,Balance Training,Functional Mobility Training,Endurance Training,Safety Education & Training,Self-Care / ADL    Patient Education  Patient Education: Role of OT, ADL's and Home Exercise Program    Goals  Short term goals  Time Frame for Short term goals: by discharge  Short term goal 1: Pt will complete functional mobility short distances with CGA and no LOB or safety cues for ease with ADLs  Short term goal 2: Pt will tolerate dynamic standing x3-4min with CGA and unilateral release to increase indep with grooming  Short term goal 3: Pt will complete UB ADLs with Min A and min VC  Short term goal 4: Pt will complete BUE strengthening HEP with light resistance and min VC for technique to increase indep wtih dressing    Following session, patient left in safe position with all fall risk precautions in place.

## 2022-03-02 NOTE — PROGRESS NOTES
PROGRESS NOTE      Patient:  Ja Mallory      Unit/Bed:6K-18/018-A    YOB: 1931    MRN: [id number]       Acct: [de-identified]     PCP: Vonnie Briones MD    Date of Admission: 2/22/2022    Assessment/Plan:    Active Hospital Problems    Diagnosis Date Noted    Bradycardia [R00.1]      Priority: High    ATN (acute tubular necrosis) (HCC) [N17.0]     STEPHY (acute kidney injury) ([address] [N17.9]     Hyperkalemia [E87.5]     Leukocytosis [D72.829]     Septicemia ([address] [A41.9]     Suprapubic catheter ([address] [Z93.59]     Sepsis ([address] [A41.9] 02/22/2022    Abdominal pain [R10.9]      Acute kidney injury on CKD4 - Stable. Likely multifactorial in the setting of hyperkalemia and sepsis. Imaging was negative for hydronephrosis. Patient was on lisinopril and Aldactone these have been held. IVF NS  Avoid nephrotoxic agents, including patients home lisinopril and aldactone if possible  Nephrology on board, appreciate assistance and recommendations    Complicated UTI: Improved. Purulent drainage noted to be coming from suprapubic catheter site. CT abdomen pelvis w/o signs of intra-abdominal/pelvic abnormality. Doxazosin discontinued due to suprapubic cath in place. Urine cx enterobacter sensitive to zosyn and E Faecalis sensitive to penicillins. Zosyn able to cover both. Will likely treat for 14 day total course of antibiotics    Dysphagia  SLP consulted    Sepsis: Resolved. Likely secondary to UTI. MRSA PCR negative, Vanc was discontinued. Zosyn, will likely treat for 14 day total course of antibiotics  Monitor vitals    Acute Hypokalemia   Started 3/1/2022  Potassium chloride IVPBx4  Monitor K+    Acute hyperkalemia: Resolved  In the setting of acute kidney injury  No acute EKG changes were noted  Patient was on lisinopril and Aldactone prior to admission  2/22/2022 patient received hemodialysis    Hyponatremia: Resolved.    likely due to hypervolemia  Will monitor BMP and vitals    Anion gap metabolic acidosis: Resolved   In the setting of sepsis and acute kidney injury. Bicarb drip was initiated by nephrology and recently continued. Nephrology following, appreciate the assistance    Chronic HFpEF:   Not in acute exacerbation echo 6/2021 LVEF 55% with grade 1 diastolic dysfunction    Diabetes mellitus type 2  Lantus was not restarted at time of admission due to [address], has been controlled. Monitor glucose    COPD  Arformoterol and budesonide    Sinus Pauses w/ sinus bradycardia  Cardiology consulted. No additional intervention or work up per family. Discontinue telemetry     Diet: Passed MBS. Regular diet with thin liquids  DVT prophylaxis: subq heparin    Code Status: DNR-CCA    Disposition:  Will need SNF per PT/OT. Was at assisted living.    ----------------------------------------------------------------------------------------------------    Chief Complaint: Abdominal pain, bilateral LE swelling, and poor PO intake    Hospital Course: Oral Harkins is a 72-year-old male admitted on 2/22/2022 with chief complaint of bilateral leg swelling, worsening confusion, and abdominal pain. Patient is a resident at assisted [facility]. Noted purulent drainage coming from suprapubic catheter site. No signs of peritonitis at this time. CT abdomen pelvis revealed no evidence of acute intra-abdominal or intrapelvic abnormalities. Zosyn was started. Suprapubic catheter was exchanged while in the ER on 2/22/2022. Urine culture obtained and showed multiple bacteria, which were sensitive to zosyn and completed 7 day course on 2/28. Also of note, Nephrology was consulted for acute hyperkalemia and acute kidney injury. 2/22/2022 patient was transferred to the ICU and received dialysis overnight. Patient was transferred to general medicine floor on 2/23. After transfer, did well without acute events. Some sinus bradycardia with pauses.   Consulted cardiology who spoke with Dr. Deb Sears, [address] [place] [address] who said not to pursue pacemaker due to other conditions. Continue DNR-CCA as can continue medications. Also assessed by PT/OT who are now recommending SNF.    3/1/2022  Nursing reports patient choking on medications and fluids. SPL consult. Hypokalemia KCl IVPB  SLP swallow test - NPO    3/2/2022  Hypokalemia, KCl IVPB  Modified barium swallow planned    Subjective:  Patient seen laying comfortable in bedside this morning. Patient remains alert and pleasant to converse. Patient states he is getting frustrated with testing and being in the hospital. Agreeable to modified barium swallow and treatment plan. Patient denies   Placement for discharge - processing.     Medications:  Reviewed    Infusion Medications    sodium chloride Stopped (03/01/22 0615)    dextrose      sodium chloride      dextrose      Or    dextrose       Scheduled Medications    calcium replacement protocol   Other RX Placeholder    pantoprazole  40 mg Oral BID AC    insulin lispro  0-6 Units SubCUTAneous TID WC    insulin lispro  0-3 Units SubCUTAneous Nightly    piperacillin-tazobactam  3,375 mg IntraVENous Q12H    Arformoterol Tartrate  15 mcg Nebulization BID    budesonide  0.5 mg Nebulization BID    docusate sodium  100 mg Oral BID    ferrous gluconate  360 mg Oral BID    lactobacillus  1 capsule Oral BID WC    levothyroxine  175 mcg Oral Daily    [Held by provider] lisinopril  2.5 mg Oral Daily    tiotropium  2 puff Inhalation Daily    Vitamin D  500 Units Oral Daily    sodium chloride flush  10 mL IntraVENous 2 times per day    heparin (porcine)  5,000 Units SubCUTAneous 3 times per day     PRN Meds: hydrALAZINE, ipratropium-albuterol, glucose, glucagon (rDNA), dextrose, albuterol sulfate HFA, sodium chloride flush, sodium chloride, ondansetron **OR** ondansetron, polyethylene glycol, acetaminophen **OR** acetaminophen, dextrose **OR** dextrose      Intake/Output Summary (Last 24 hours) at 3/2/2022 7898  Last data filed at 3/1/2022 1852  Gross per 24 hour   Intake 0 ml   Output 1750 ml   Net -1750 ml       Diet:  Diet NPO    Exam:  BP (!) 140/75   Pulse 98   Temp 98.2 °F (36.8 °C) (Oral)   Resp 18   Ht 5' 6\" (1.676 m)   Wt 212 lb 8.4 oz (96.4 kg)   SpO2 95%   BMI 34.30 kg/m²     Physical Exam:  General appearance: Alert, not in acute distress  HEENT:  Normal cephalic, atraumatic without obvious deformity. Pupils equal, round, and reactive to light. Conjunctivae clear. Clear oral mucosa. Neck: Supple, with full range of motion. No jugular venous distention. Trachea midline. Respiratory:  Normal respiratory effort. No Rales/Rhonchi. Expiratory wheezes with history of COPD  Cardiovascular: Normal rate, regular rhythm with normal S1/S2 without murmurs, rubs or gallops. Abdomen: Soft, non-tender, non-distended with normal bowel sounds. Musculoskeletal:  No clubbing, cyanosis or edema bilaterally. Full range of motion without deformity. Skin: No rashes or lesions. Neurological exam reveals alert, normal speech, no focal findings or movement disorder noted. Exam of extremities: peripheral pulses normal, mild leg edema, no clubbing or cyanosis    Labs:   Recent Labs     02/28/22  0602 03/01/22  0453 03/02/22  0500   WBC 10.2 11.7* 11.9*   HGB 9.0* 9.2* 9.1*   HCT 28.2* 28.1* 29.4*    300 358     Recent Labs     02/28/22  0602 02/28/22  0602 03/01/22  0453 03/01/22  1627 03/02/22  0500     --  142  --  147*   K 3.5   < > 3.2* 3.9 3.3*     --  106  --  111   CO2 25  --  24  --  24   BUN 32*  --  28*  --  23*   CREATININE 3.4*  --  3.1*  --  3.0*   CALCIUM 7.6*  --  7.5*  --  7.8*    < > = values in this interval not displayed. No results for input(s): AST, ALT, BILIDIR, BILITOT, ALKPHOS in the last 72 hours. No results for input(s): INR in the last 72 hours. No results for input(s): Morales Elie in the last 72 hours.     Urinalysis:      Lab Results   Component Value Date    NITRU NEGATIVE 02/22/2022 WBCUA > 200 02/22/2022    BACTERIA MANY 02/22/2022    RBCUA > 100 02/22/2022    BLOODU LARGE 02/22/2022    SPECGRAV 1.017 09/22/2021    GLUCOSEU NEGATIVE 02/22/2022       Radiology:  US RENAL COMPLETE   Final Result      Normal bilateral renal ultrasound. Final report electronically signed by Dr. Kimberlee Galo on 2/25/2022 2:21 PM      FL MODIFIED BARIUM SWALLOW W VIDEO   Final Result   1. No laryngeal penetration or aspiration of barium. 2. Additional recommendations from the speech therapist will follow. **This report has been created using voice recognition software. It may contain minor errors which are inherent in voice recognition technology. **         Final report electronically signed by Dr. Kimberlee Galo on 2/24/2022 10:11 AM      CT HEAD WO CONTRAST   Final Result   No acute intracranial findings. Chronic changes as described. This document has been electronically signed by: Libertad Cleveland MD on    02/23/2022 05:54 AM      All CTs at this facility use dose modulation techniques and iterative    reconstructions, and/or weight-based dosing   when appropriate to reduce radiation to a low as reasonably achievable. XR CHEST PORTABLE   Final Result   Impression:   Right IJ line tip over the proximal SVC. This document has been electronically signed by: Libertad Cleveland MD on    02/23/2022 12:43 AM      CT ABDOMEN PELVIS WO CONTRAST Additional Contrast? None   Final Result       1. No evidence of acute intra-abdominal or intrapelvic abnormality. 2. Moderate retained stool. 3. Nonobstructive nephrolithiasis on the left. 4. Prostatomegaly. **This report has been created using voice recognition software. It may contain minor errors which are inherent in voice recognition technology. **      Final report electronically signed by Dr. Eamon Joseph MD on 2/22/2022 11:01 AM      XR CHEST PORTABLE   Final Result   Stable radiographic appearance of the chest. No evidence of an acute process. **This report has been created using voice recognition software. It may contain minor errors which are inherent in voice recognition technology. **      Final report electronically signed by Dr. Nellie Grossman MD on 2/22/2022 10:09 AM      FL MODIFIED BARIUM Renuka Washingtoner VIDEO    (Results Pending)       Electronically signed by Anne Flores MD on 3/2/2022 at 7:06 AM

## 2022-03-02 NOTE — PROGRESS NOTES
[facility]  Fiberoptic Endoscopic Evaluation of Swallowing  STRZ RENAL TELEMETRY 6K      SLP Individual Minutes  Time In: 2431  Time Out: 7845  Minutes: 32  Timed Code Treatment Minutes: 0 Minutes       Date: 3/2/2022  Patient Name: Hunter Munoz       CSN: [id number]   : 1931  (80 y.o.)  Gender: male   Referring Physician:  Dr. Marcela Hernández  Diagnosis: Hyperkaliemia   Secondary Diagnosis:  Dysphagia  Date of Last MBS/FEES:  MBS 22 regular diet with thin liquids; MBS 22 recommending NPO + FEES  Diet:  NPO; no means of nutrition at this time     History of Present Illness/Injury: Patient admitted to [facility] with above dx. See physician H&P for full report. ST following patient POC targeting dysphagia; MBS completed 22 recommending regular diet with thin liquids. Patient with new overt s/s of aspiration noted at bedside within subsequent treatment sessions; therefore, repeat MBS recommended to be completed. Repeat MBS completed  recommending NPO d/t evidence of silent aspiration of thin and mildly thick liquids. ST recommending FEES to be completed to further evaluate pharyngeal function of the swallow and determine safety of PO intake and assist with management of dysphagia POC. Patient has a past medical history of Anemia, Anxiety, BPH (benign prostatic hyperplasia), CAD (coronary artery disease), Chronic back pain, COPD (chronic obstructive pulmonary disease) ([address] Detached retina, Diabetes mellitus ([address] DVT (deep venous thrombosis) ([address] DVT (deep venous thrombosis) ([address] Dysphagia, Glaucoma, Hyperlipidemia, Hypertension, Macular degeneration, Mass of chest, Movement disorder, Obesity, Osteoarthritis, Paroxysmal atrial fibrillation ([address] Pneumonia, Primary thyroid follicular carcinoma, Sleep apnea, Testicular hypofunction, Thyroid cancer ([address] and Urinary incontinence. \",\"Please see medical history questionnaire for information related to prior medical history, allergies and medications\"}    Reason for Study: Rule out pharyngeal dysfunction  Prior Swallowing Evaluation/Results: [facility] 02/24/22 regular diet with thin liquids; [facility] 03/02/22 recommending NPO + FEES  Pain:  0/10    Current Diet: NPO    Respiratory Status: Independent    Behavioral Observation:alert, cooperative and compliant; memory deficits noted with limited command following (Hx of dementia)     Cognition: Exam not limited by cognition    ORAL MECHANISM EXAM:  Facial Symmetry WFL    Labial/Facial WFL    Lingual WFL    Oral Mucosa WFL    Mandibular Movement WFL    Sensation Impaired    Cough/Reflexes Impaired      FEES PROCEDURE DETAILS:  Procedure performed by: Juan Faye M.S. CCC-SLP [id number]  Feeder/Assistant: Edwardo Jay, Student Intern   Scope/Serial Number: Chip Tip Scope - Red #3; Serial Number: [id number]; Model Number: [id number]  Topical Anesthetic Used: No  Patient Positioning: Chair/90 degrees    Procedure explained and education provided to patient including purpose, benefits and risks of testing. Understanding and agreement with testing was verbalized. A flexible fiber-optic nasoendoscope was passed transnasally to view the nasopharynx, oropharynx, hypopharynx, larynx through the left nares without difficuty.     Patient tolerance of procedure: No complications or complaints observed or reported    ANATOMIC/PHYSIOLIGIC ASSESSMENT:  Nasal Cavity [facility]    Velopharyngeal [facility]    Base of Tongue [facility]    Lingual Tonsils WFL    Vocal Fold Margin - Right WFL    Vocal Fold Margin - Left WFL    Abductory/Adductory Movement Impaired    Ventricular Folds Impaired    Pharyngeal Contraction for Pitch Glide Impaired    Epiglottis   WFL    Valleculae WFL    Secretions - Appearance WFL    Secretions - Location    WFL    Pyriform Sinus   WFL    Glottal Closure   Impaired    Arytenoids Impaired    Posterior Commisure WFL      PATIENT WAS EVALUATED USING: Moderately Thick, Puree and Soft    SWALLOW OBSERVATION: Premature spillage, Incomplete epiglottic inversion, Residue - Vallecular, Residue - Pyriform, Residue - Base of tongue and Residue - Posterior pharyngeal wall    LARYNGEAL PENETRATION/ASPIRATION: Laryngeal penetration evident during and after swallow and Silent aspiration evident during and after swallow    PHARYNGEAL PHASE RUBEN SCORE (dysphagia outcome and severity score)  2 = Moderately Severe Dysphagia - Tolerates at least one consistency safely with total use of strategies - Non-oral nutrition - Severe residue unable to clear, Aspiration with two or more consistencies with no cough, Aspiration with one or more consistency, no cough and airway penetration to the vocal folds with one or more consistency, no cough    PENETRATION-ASPIRATION SCALE (PAS)  Moderately Thick Liquids: 8 = Material enters the airway, passes below the vocal folds, and no effort is made to eject  Puree:  3 = Material enters the airway, remains above the vocal folds, and is not ejected from the airway  Soft Solid:  5 = Material enters the airway, contacts the vocal folds, and is not ejected from the airway    ESOPHAGEAL PHASE: No significant findings     ATTEMPTED TECHNIQUES:  Small Bolus Size Ineffective    Straw Ineffective    Cup Ineffective    Chin Tuck Not Attempted    Head Turn Ineffective Patient unable to follow commands to complete head turn; attempted not effective    Spoon Presentations Ineffective    Volitional Cough Ineffective    Spontaneous Cough Not Attempted Poor sensation           DIAGNOSTIC IMPRESSIONS:  Patient presents with moderate severe pharyngeal dysphagia as evidence by the findings outlined above. Patient seen sitting in most upright position in chair. Per MBS completed 03/03 AM documenting silent aspiration of thin and mildly thick liquids.  Within completion of FEES ST completed PO trials of moderately thick liquids via spoon, cup and straw; premature spillage noted with diffuse pooling of moderately thick material throughout the laryngeal vestibule, eventual contact with the vocal folds and patient with significantly impaired sensation as no evidence to cough or eject material. Provided verbal cues to \"cough\" patient able to eject part of material; ST also suspecting ejection of material from material that did eventually pass through to the trachea. PO trials of puree completed; improved bolus control with premature spillage, shallow penetration noted with material remaining stagnant within completion of additional spontaneous swallows. PO trials of soft solids (peaches) completed; functional oral mastication, premature spillage noted with material in which contacted the vocal folds and again remained stagnant. Impaired bolus control, TBR, pharyngeal constriction noted with reduced epiglottic inversion. ST attempted multiple compensatory strategies (double swallow and head turn) in attempt to assist to protect airway and clear pharyngeal residue; patient unable to follow/complete d/t baseline dementia. Head turn attempted d/t notable reduced movement of the LEFT arytenoid. Patient also noted with hyperfunction of the false folds. Patient remains at [address] [place] [address] for aspiration with any PO intake d/t severely impaired pharyngeal sensation and evidence of copious amounts of material pooling into the laryngeal vestibule and remaining stagnant. Recommend NPO + consideration of NG tube and ENT consult d/t concerns for limited movement of LEFT artnyoid.          Diet Recommendations:  NPO; consider NG tube   Strategies:  Recommend NPO   Rehabilitation Potential: fair    EDUCATION:  Learner: Patient  Education:  Reviewed results and recommendations of this evaluation, Reviewed diet and strategies, Reviewed signs, symptoms and risks of aspiration, Reviewed ST goals and Plan of Care, Reviewed recommendations for follow-up and Education Related to Potential Risks and Complications Due to Impairment/Illness/Injury  Evaluation of Education: Verbalizes understanding, Needs further instruction, No evidence of learning and Family not present    PLAN:  Skilled SLP intervention on acute care 3-5 x per week or until goals met and/or pt plateaus in function. Specific interventions for next session may include: dysphagia tx. PATIENT GOAL:    Did not state. Will further assess during treatment. SHORT TERM GOALS:  Short-term Goals  Timeframe for Short-term Goals: 2 weeks  Goal 1: Following oral care patient will complete PO Trials of puree ONLY (NO liquids-hx of SILENT aspiration of thin, mildly thick, moderatly thick) demonstrating with double swallow x5 trials in order to improve overall pharyngeal function of the swallow and assist with eliciting completion of pharyngeal exercises  Goal 2: Patient will complete pharyngeal strengthening exercises (effortful, CTAR, shaker) x10-15 with good success to improve timeliness of swallow and overall airway protection  Goal 3: ST to collabroate with ENT and determine goals as indicated. Goal 4: Complete repeat instrumental evaluation in 1 week in order to re-evaluate pharyngeal function of the swallow and determine readiness to initiate PO diet.   Goal 5: ST to assess need for ljyleq-dewouaym-oenerujjh evaluation to determine need/appropriateness for cognitive-linguistic skills (h/o dementia, from assisted)      LONG TERM GOALS:  No LTGs due to J[address]

## 2022-03-02 NOTE — PROGRESS NOTES
[address] [address]  SPEECH THERAPY  STRZ RENAL TELEMETRY 6K  Modified Barium Swallow + Dysphagia tx    SLP Individual Minutes  Time In: 0328  Time Out: 5009  Minutes: 24  Timed Code Treatment Minutes: 0 Minutes     MBS: 15 minutes   Dysphagia tx: 9 minutes     Date: 3/2/2022  Patient Name: Esperanza Harvey      CSN: [id number]   : 1931  (80 y.o.)  Gender: male   Referring Physician:  Ana Laura Osborn DO   Diagnosis: Hyperkalemia  Secondary Diagnosis: Dysphagia   Precautions: Aspiration  History of Present Illness/Injury: Patient admitted with above diagnosis. Per chart review, \"Romario is a 79-year-old  male with a past medical history of CHF, COPD, dementia who presents to [address] ED from a nursing home for the evaluation of abdominal pain, and not eating and drinking.  Due to patient's underlying dementia, he is a poor historian.  ED provider reports poor history from nursing home nurse, and states that he did not eat well yesterday and had a fall earlier today.  This patient is the father of Dr. Kathy Grady with oncology, and ED provider reached out to him for more information.  Patient's baseline is unclear. [facility] states he has lower abdominal pain around his catheter insertion site, fever, chills, decreased appetite.  Upon questioning, patient was able to state his name and date of birth, but was unsure of where he was at and date.  The patient denies chest pain, shortness of breath, lightheadedness, dizziness, weakness at this time. \" ST consulted due to dementia baseline and concerns for aspiration, per chart review, \"Dementia: Baseline unknown at this time. \" ST recommended completion of repeat instrumental evaluation to re-assess pharyngeal swallow function and determine safest level of po intake d/t overt s/s of aspiration at the bedside.      has a past medical history of Anemia, Anxiety, BPH (benign prostatic hyperplasia), CAD (coronary artery disease), Chronic back pain, COPD (chronic obstructive pulmonary disease) ([address] Detached retina, Diabetes mellitus ([address] DVT (deep venous thrombosis) ([address] DVT (deep venous thrombosis) ([address] Dysphagia, Glaucoma, Hyperlipidemia, Hypertension, Macular degeneration, Mass of chest, Movement disorder, Obesity, Osteoarthritis, Paroxysmal atrial fibrillation ([address] Pneumonia, Primary thyroid follicular carcinoma, Sleep apnea, Testicular hypofunction, Thyroid cancer ([address] and Urinary incontinence. Current Diet: NPO pending completion of MBS     Pain: No pain reported. SUBJECTIVE:  SHIRLEY Ayala approved completion of MBS this AM. Patient arrived to fluoro suite via bed. Patient alert and engaged throughout with intermittent confusion. OBJECTIVE:    Respiratory Status:  Independent    Behavioral Observation:  Alert and Confused    PATIENT WAS EVALUATED USING:  BARIUM: thin liquids via tsp/cup/straw, mildly thick liquids via tsp/cup/straw, puree, soft texture, mixed consistency, and hard/coarse texture     ORAL PREPARATION PHASE:  Impaired:  Slow Mastication, Uncoordinated Mastication, Decreased Bolus Control and Decreased Bolus Formation    ORAL PHASE: Uncontrolled Bolus/Diffuse Fall Over Tongue Base     ORAL PHASE RUBEN SCORE: (Dysphagia outcome and severity scale)  6 = WFL/Modified Independent - Normal Diet - May have mild oral delay    PHARYNGEAL PHASE:  Impaired: Delayed Swallow, Decreased Airway Protection, Residue in the Pyriform Sinus and Decreased Hyolaryngeal Anterior Excursion      PHARYNGEAL PHASE RUBEN SCORE: (Dysphagia outcome and severity scale)  3 = Moderate Dysphagia - Two or more diet consistencies restricted - May exhibit one or more of the following:  Moderate residue clears with cue, Airway penetration to the level of the vocal folds wihtout cough with tow or more consistencies, Aspiration with two consistencies with weak or no reflexive cough, Aspiration of one consistency, no cough and airway penetration with one consistency, no cough    EVIDENCE FOR LARYNGEAL PENETRATION AND/OR ASPIRATION:  Laryngeal penetration evident with thin liquids and mildly thick liquids   Silent aspiration evident with thin liquids and mildly thick liquids     PENETRATION-ASPIRATION SCALE (PAS): Thin Liquids: 8 = Material enters the airway, passes below the vocal folds, and no effort is made to eject  Mildly Thick Liquids:  8 = Material enters the airway, passes below the vocal folds, and no effort is made to eject  Puree:  1 = Material does not enter the airway  Soft Solid:  1 = Material does not enter the airway  Hard Solid: 1 = Material does not enter the airway    ESOPHAGEAL PHASE:   No significant findings    ATTEMPTED TECHNIQUES:  Small Bolus Size Effective    Straw Ineffective    Cup Effective and Ineffective    Chin Tuck Not Attempted    Head Turn Not Attempted    Spoon Presentations Effective    Volitional Cough Not Attempted    Spontaneous Cough Ineffective           DIAGNOSTIC IMPRESSIONS:  Patient presents with a WFL/MOD I oral swallow function and a moderate pharyngeal dysphagia based on skilled clinical findings outlined above. Patient with slightly prolonged/uncoordinated mastication (munch chew pattern) with effective textural breakdown, decreased bolus formation and timely/coordinated ap transit resulting in complete bolus clearance. Patient with decreased bolus control/containment resulting in premature spillage to the level of the vallecula and pyriforms which is further compounded by delayed swallow initiation. Patient with adequate hyolaryngeal elevation resulting in complete epiglottic inversion; however, patient with decreased hyolaryngeal anterior excursion. Patient with adequate base of tongue retraction, thyrohyoid approximation, and pharyngeal constriction resulting in trace/mild residue remaining in pyriforms.  Patient with decreased airway protection resulting in deep penetration of thin and mildly thick liquids occurring during the swallow leading to anterior SILENT aspiration of thin liquids and mildly thick liquids during the swallow that does not clear. It is recommended patient remain NPO until completion of FEES to determine safest level of po intake and update POC as clinically indicated. *Of note, patient completed most recent MBS on 2/24/22 with no penetration or aspiration and recommendations for a regular texture diet and thin liquids. Current MBS results demonstrating a significant decline in airway protection since most recent MBS completed on 2/24/22    Dysphagia tx: Following completion of MBS, ST completed review of swallow anatomy and physiology via use of computer monitor. ST defined aspiration and penetration and explained risk factors associated with aspiration (pneumonia, pulmonary decline, respiratory failure, extended/frequent hospitalizations, etc). ST then reviewed results from [facility]; verbal receptiveness noted. Diet Recommendations:  NPO pending completion of FEES   Strategies:  Recommend NPO   Rehabilitation Potential: fair    EDUCATION:  Learner: Patient  Education:  Reviewed results and recommendations of this evaluation, Reviewed diet and strategies, Reviewed signs, symptoms and risks of aspiration, Reviewed ST goals and Plan of Care, Education Related to Potential Risks and Complications Due to Impairment/Illness/Injury, Education Related to Prevention of Recurrence of Impairment/Illness/Injury and Education Related to [employer] [facility]ness  Evaluation of Education: Verbalizes understanding, Needs further instruction and Family not present    PLAN:  Skilled SLP intervention on acute care 3-5 x per week or until goals met and/or pt plateaus in function. Specific interventions for next session may include: FEES. PATIENT GOAL:    Did not state. Will further assess during treatment.     SHORT TERM GOALS:  Short-term Goals  Timeframe for Short-term Goals: 2 weeks  Goal 1: Patient will complete FEES to further assess pharyngeal swallow function and determine safest level of po intake  Goal 2: Patient will complete pharyngeal strengthening exercises (effortful, CTAR, shaker) x10-15 with good success to improve timeliness of swallow and overall airway protection  Goal 3: ST to assess need for ubdaap-lkqodwoj-vryhqqcru evaluation to determine need/appropriateness for cognitive-linguistic skills (h/o dementia, from ERICK)      LONG TERM GOALS:  No long term goals recommended d/t short LUDY Jesus ([facility]) [address]

## 2022-03-02 NOTE — PROGRESS NOTES
[address] [address]  INPATIENT PHYSICAL THERAPY  DAILY NOTE  STRZ RENAL TELEMETRY 6K - 6K-18/018-A      Time In: 4018  Time Out: 1311  Timed Code Treatment Minutes: 23 Minutes  Minutes: 23          Date: 3/2/2022  Patient Name: Mellissa Ly,  Gender:  male        MRN: [id number]  : 1931  (80 y.o.)     Referring Practitioner: Kimmie Saucedo MD  Diagnosis: Hyperkalemia  Additional Pertinent Hx: Per H&P: Nevaeh Leung is a 35-year-old  male with a past medical history of CHF, COPD, dementia who presents [facility] [facility] ED from a nursing home for the evaluation of abdominal pain, and not eating and drinking. Due to patient's underlying dementia, he is a poor historian. ED provider reports poor history from nursing home nurse, and states that he did not eat well yesterday and had a fall earlier today. This patient is the father of Dr. Jason Littlejohn with oncology, and ED provider reached out to him for more information. Patient's baseline is unclear. He states he has lower abdominal pain around his catheter insertion site, fever, chills, decreased appetite. Upon questioning, patient was able to state his name and date of birth, but was unsure of where he was at and date. Pt presents with UTI, STEPHY, and sepsis. Prior Level of Function:  Type of Home: Assisted living  Home Layout: One level  Home Access: Level entry  Home Equipment: Rolling walker        ADL Assistance: Needs assistance  Homemaking Assistance: Needs assistance  Ambulation Assistance: Needs assistance  Transfer Assistance: Needs assistance  Additional Comments: Pt is a questionable historian d/t hx of dementia. Pt states he was using the RW for short ambulation distances. Pt was living in [place] [address]. Per OT eval however, pt reports he received helped with all ADLs. Pt reported walking short distances [facility] RW and assist from staff.     Restrictions/Precautions:  Restrictions/Precautions: General Precautions,Fall Risk  Position Activity Restriction  Other position/activity restrictions: Dementia, suprapubic catheter       SUBJECTIVE: nursing ok'd therapy- pt had a sitter at bedside - he was cooperative and followed commands     PAIN: 0/10:       Vitals: Vitals not assessed per clinical judgement, see nursing flowsheet    OBJECTIVE:  Bed Mobility:  Not Tested    Transfers:  Sit to Stand: Contact Guard Assistance  Stand to [address] for hand placement   Ambulation:  Contact Guard Assistance  Distance: 32x1  Surface: Level Tile  Device:Rolling Walker  Gait Deviations:  Slow Giovanna, Decreased Step Length Bilaterally, Wide Base of Support and forward flexed posture - cues to stay within the walker   Exercise:  Patient was guided in 1 set(s) 10-15 reps of exercise to both lower extremities. seated LE ex at sherrell LEs iwth cues for technique  . Exercises were completed for increased independence with functional mobility. Functional Outcome Measures: Completed  AM-PAC Inpatient Mobility Raw Score : 16  AM-PAC Inpatient T-Scale Score : 40.78    ASSESSMENT:  Assessment: Patient progressing toward established goals. Activity Tolerance:  Patient tolerance of  treatment: fair. Equipment Recommendations:Equipment Needed: No (defer to next level of care)  Discharge Recommendations: [address] and vs return to AL he may need increased services along with home health   Plan: Times per week: 3-5x GM  Current Treatment Recommendations: Brenda Books Re-education,Patient/Caregiver Education & Training,Home Exercise Program,Functional Mobility Training,Safety Education & Training,Balance Training,Gait Training,Transfer Training    Patient Education  Patient Education: Plan of Care, Transfers    Goals:  Patient goals : None stated  Previous Short term goals  Time Frame for Short term goals:  By hospital d/c  Short term goal 1: Pt to demo B rolling with SBA for ease with adjusting himself in bed. Short term goal 2: Pt to complete x10 B LE exercises with SBA for improved strength and self efficacy regarding his plan of care  Short term goal 3: Pt to demo sit <->stand with LRAD and CGA for improved ability to stand from various surfaces MET   Short term goal 4: Pt to ambulate >=30' with LRAD with CGA for improved ability to move within his environment. MET  Long term goals  Time Frame for Long term goals : NA due to short ELOS    Updated Short Term Goals:  Time Frame for Short term goals: By hospital d/c  Short term goal 1: Pt to demo B rolling with SBA for ease with adjusting himself in bed. Short term goal 2: Pt to complete x10 B LE exercises with SBA for improved strength and self efficacy regarding his plan of care  Short term goal 3: Pt to demo sit <->stand with LRAD and S for improved ability to stand from various surfaces safely   Short term goal 4: Pt to ambulate >=50' with LRAD with S for improved ability to move within his environment. Short term goal 5: Pt to demo supine <->sit mod I for ability to get in and out of bed with ease. Long term goals  Time Frame for Long term goals : NA due to short ELOS    Following session, patient left in safe position with all fall risk precautions in place.

## 2022-03-02 NOTE — CARE COORDINATION
Discharge Planning Update:    Creat 3.0, Na+ 147. Failed MBS, keep NPO and plan for FEES eval.  Nephrology plans to remove temp HD cath in 1-2 days if creat continues to improve. Plans return to [facility] at discharge. If IV atb are not completed at that time (day 9 of 14), will arrange for HI at AL.   Electronically signed by Eugenio Gilman RN on 3/2/2022 at 12:13 PM

## 2022-03-03 ENCOUNTER — APPOINTMENT (OUTPATIENT)
Dept: CT IMAGING | Age: 87
DRG: 698 | End: 2022-03-03
Payer: MEDICARE

## 2022-03-03 LAB
ALBUMIN SERPL-MCNC: 2.6 G/DL (ref 3.5–5.1)
ALP BLD-CCNC: 49 U/L (ref 38–126)
ALT SERPL-CCNC: 9 U/L (ref 11–66)
ANION GAP SERPL CALCULATED.3IONS-SCNC: 13 MEQ/L (ref 8–16)
ANION GAP SERPL CALCULATED.3IONS-SCNC: 13 MEQ/L (ref 8–16)
AST SERPL-CCNC: 11 U/L (ref 5–40)
BASOPHILS # BLD: 0.4 %
BASOPHILS ABSOLUTE: 0.1 THOU/MM3 (ref 0–0.1)
BILIRUB SERPL-MCNC: 0.2 MG/DL (ref 0.3–1.2)
BILIRUBIN DIRECT: < 0.2 MG/DL (ref 0–0.3)
BUN BLDV-MCNC: 18 MG/DL (ref 7–22)
BUN BLDV-MCNC: 19 MG/DL (ref 7–22)
CALCIUM SERPL-MCNC: 7.6 MG/DL (ref 8.5–10.5)
CALCIUM SERPL-MCNC: 7.7 MG/DL (ref 8.5–10.5)
CHLORIDE BLD-SCNC: 111 MEQ/L (ref 98–111)
CHLORIDE BLD-SCNC: 114 MEQ/L (ref 98–111)
CO2: 20 MEQ/L (ref 23–33)
CO2: 23 MEQ/L (ref 23–33)
CREAT SERPL-MCNC: 2.5 MG/DL (ref 0.4–1.2)
CREAT SERPL-MCNC: 2.7 MG/DL (ref 0.4–1.2)
EOSINOPHIL # BLD: 2 %
EOSINOPHILS ABSOLUTE: 0.3 THOU/MM3 (ref 0–0.4)
ERYTHROCYTE [DISTWIDTH] IN BLOOD BY AUTOMATED COUNT: 16.2 % (ref 11.5–14.5)
ERYTHROCYTE [DISTWIDTH] IN BLOOD BY AUTOMATED COUNT: 51.5 FL (ref 35–45)
GFR SERPL CREATININE-BSD FRML MDRD: 22 ML/MIN/1.73M2
GFR SERPL CREATININE-BSD FRML MDRD: 24 ML/MIN/1.73M2
GLUCOSE BLD-MCNC: 100 MG/DL (ref 70–108)
GLUCOSE BLD-MCNC: 103 MG/DL (ref 70–108)
GLUCOSE BLD-MCNC: 105 MG/DL (ref 70–108)
GLUCOSE BLD-MCNC: 108 MG/DL (ref 70–108)
GLUCOSE BLD-MCNC: 110 MG/DL (ref 70–108)
GLUCOSE BLD-MCNC: 90 MG/DL (ref 70–108)
HCT VFR BLD CALC: 30.4 % (ref 42–52)
HEMOGLOBIN: 9.3 GM/DL (ref 14–18)
IMMATURE GRANS (ABS): 0.28 THOU/MM3 (ref 0–0.07)
IMMATURE GRANULOCYTES: 2 %
LYMPHOCYTES # BLD: 9.2 %
LYMPHOCYTES ABSOLUTE: 1.3 THOU/MM3 (ref 1–4.8)
MCH RBC QN AUTO: 26.8 PG (ref 26–33)
MCHC RBC AUTO-ENTMCNC: 30.6 GM/DL (ref 32.2–35.5)
MCV RBC AUTO: 87.6 FL (ref 80–94)
MONOCYTES # BLD: 7 %
MONOCYTES ABSOLUTE: 1 THOU/MM3 (ref 0.4–1.3)
NUCLEATED RED BLOOD CELLS: 0 /100 WBC
PLATELET # BLD: 399 THOU/MM3 (ref 130–400)
PMV BLD AUTO: 9.5 FL (ref 9.4–12.4)
POTASSIUM SERPL-SCNC: 3.2 MEQ/L (ref 3.5–5.2)
POTASSIUM SERPL-SCNC: 3.8 MEQ/L (ref 3.5–5.2)
RBC # BLD: 3.47 MILL/MM3 (ref 4.7–6.1)
REASON FOR REJECTION: NORMAL
REJECTED TEST: NORMAL
SEG NEUTROPHILS: 79.4 %
SEGMENTED NEUTROPHILS ABSOLUTE COUNT: 11 THOU/MM3 (ref 1.8–7.7)
SODIUM BLD-SCNC: 144 MEQ/L (ref 135–145)
SODIUM BLD-SCNC: 150 MEQ/L (ref 135–145)
TOTAL PROTEIN: 5.9 G/DL (ref 6.1–8)
WBC # BLD: 13.8 THOU/MM3 (ref 4.8–10.8)

## 2022-03-03 PROCEDURE — 6360000002 HC RX W HCPCS: Performed by: NURSE PRACTITIONER

## 2022-03-03 PROCEDURE — 82948 REAGENT STRIP/BLOOD GLUCOSE: CPT

## 2022-03-03 PROCEDURE — 6360000002 HC RX W HCPCS: Performed by: STUDENT IN AN ORGANIZED HEALTH CARE EDUCATION/TRAINING PROGRAM

## 2022-03-03 PROCEDURE — 99233 SBSQ HOSP IP/OBS HIGH 50: CPT | Performed by: FAMILY MEDICINE

## 2022-03-03 PROCEDURE — 94760 N-INVAS EAR/PLS OXIMETRY 1: CPT

## 2022-03-03 PROCEDURE — 6360000002 HC RX W HCPCS

## 2022-03-03 PROCEDURE — 1200000003 HC TELEMETRY R&B

## 2022-03-03 PROCEDURE — 2580000003 HC RX 258: Performed by: STUDENT IN AN ORGANIZED HEALTH CARE EDUCATION/TRAINING PROGRAM

## 2022-03-03 PROCEDURE — 70490 CT SOFT TISSUE NECK W/O DYE: CPT

## 2022-03-03 PROCEDURE — 85025 COMPLETE CBC W/AUTO DIFF WBC: CPT

## 2022-03-03 PROCEDURE — 6360000002 HC RX W HCPCS: Performed by: HOSPITALIST

## 2022-03-03 PROCEDURE — 94640 AIRWAY INHALATION TREATMENT: CPT

## 2022-03-03 PROCEDURE — 99221 1ST HOSP IP/OBS SF/LOW 40: CPT | Performed by: NURSE PRACTITIONER

## 2022-03-03 PROCEDURE — 2580000003 HC RX 258: Performed by: NURSE PRACTITIONER

## 2022-03-03 PROCEDURE — 82248 BILIRUBIN DIRECT: CPT

## 2022-03-03 PROCEDURE — 2580000003 HC RX 258: Performed by: INTERNAL MEDICINE

## 2022-03-03 PROCEDURE — 80053 COMPREHEN METABOLIC PANEL: CPT

## 2022-03-03 PROCEDURE — 97535 SELF CARE MNGMENT TRAINING: CPT

## 2022-03-03 PROCEDURE — 94669 MECHANICAL CHEST WALL OSCILL: CPT

## 2022-03-03 PROCEDURE — 36415 COLL VENOUS BLD VENIPUNCTURE: CPT

## 2022-03-03 PROCEDURE — 97530 THERAPEUTIC ACTIVITIES: CPT

## 2022-03-03 PROCEDURE — 99232 SBSQ HOSP IP/OBS MODERATE 35: CPT | Performed by: INTERNAL MEDICINE

## 2022-03-03 RX ORDER — DEXTROSE MONOHYDRATE 50 MG/ML
INJECTION, SOLUTION INTRAVENOUS CONTINUOUS
Status: DISCONTINUED | OUTPATIENT
Start: 2022-03-03 | End: 2022-03-05 | Stop reason: HOSPADM

## 2022-03-03 RX ORDER — POTASSIUM CHLORIDE 7.45 MG/ML
10 INJECTION INTRAVENOUS
Status: COMPLETED | OUTPATIENT
Start: 2022-03-03 | End: 2022-03-03

## 2022-03-03 RX ORDER — BISACODYL 10 MG
10 SUPPOSITORY, RECTAL RECTAL DAILY PRN
Status: DISCONTINUED | OUTPATIENT
Start: 2022-03-03 | End: 2022-03-05 | Stop reason: HOSPADM

## 2022-03-03 RX ADMIN — POTASSIUM CHLORIDE 10 MEQ: 7.46 INJECTION, SOLUTION INTRAVENOUS at 14:35

## 2022-03-03 RX ADMIN — PIPERACILLIN AND TAZOBACTAM 3375 MG: 3; .375 INJECTION, POWDER, LYOPHILIZED, FOR SOLUTION INTRAVENOUS at 16:04

## 2022-03-03 RX ADMIN — TIOTROPIUM BROMIDE INHALATION SPRAY 2 PUFF: 3.12 SPRAY, METERED RESPIRATORY (INHALATION) at 08:50

## 2022-03-03 RX ADMIN — ARFORMOTEROL TARTRATE 15 MCG: 15 SOLUTION RESPIRATORY (INHALATION) at 17:30

## 2022-03-03 RX ADMIN — BUDESONIDE 500 MCG: 0.5 INHALANT RESPIRATORY (INHALATION) at 08:43

## 2022-03-03 RX ADMIN — HYDRALAZINE HYDROCHLORIDE 10 MG: 20 INJECTION INTRAMUSCULAR; INTRAVENOUS at 18:43

## 2022-03-03 RX ADMIN — POTASSIUM CHLORIDE 10 MEQ: 7.46 INJECTION, SOLUTION INTRAVENOUS at 16:41

## 2022-03-03 RX ADMIN — ARFORMOTEROL TARTRATE 15 MCG: 15 SOLUTION RESPIRATORY (INHALATION) at 08:43

## 2022-03-03 RX ADMIN — POTASSIUM CHLORIDE 10 MEQ: 7.46 INJECTION, SOLUTION INTRAVENOUS at 11:52

## 2022-03-03 RX ADMIN — POTASSIUM CHLORIDE 10 MEQ: 7.46 INJECTION, SOLUTION INTRAVENOUS at 13:18

## 2022-03-03 RX ADMIN — HEPARIN SODIUM 5000 UNITS: 5000 INJECTION INTRAVENOUS; SUBCUTANEOUS at 15:54

## 2022-03-03 RX ADMIN — DEXTROSE MONOHYDRATE: 50 INJECTION, SOLUTION INTRAVENOUS at 12:04

## 2022-03-03 RX ADMIN — SODIUM CHLORIDE: 4.5 INJECTION, SOLUTION INTRAVENOUS at 06:00

## 2022-03-03 RX ADMIN — BUDESONIDE 500 MCG: 0.5 INHALANT RESPIRATORY (INHALATION) at 17:30

## 2022-03-03 RX ADMIN — HEPARIN SODIUM 5000 UNITS: 5000 INJECTION INTRAVENOUS; SUBCUTANEOUS at 06:01

## 2022-03-03 RX ADMIN — PIPERACILLIN AND TAZOBACTAM 3375 MG: 3; .375 INJECTION, POWDER, LYOPHILIZED, FOR SOLUTION INTRAVENOUS at 03:53

## 2022-03-03 ASSESSMENT — PAIN SCALES - WONG BAKER

## 2022-03-03 NOTE — PLAN OF CARE
Problem: Nutrition  Goal: Optimal nutrition therapy  3/3/2022 1130 by Kayla King RD, LD  Outcome: Ongoing   Nutrition Problem #1: Inadequate oral intake  Intervention: Food and/or Nutrient Delivery: Continue NPO,Start Tube Feeding  Nutritional Goals: Patient will receive adequate nutrition in 1-3 days.

## 2022-03-03 NOTE — PROGRESS NOTES
[facility] [address] RENAL TELEMETRY 6K  Occupational Therapy  Daily Note  Time:   Time In: 0  Time Out: 4042  Timed Code Treatment Minutes: 44 Minutes  Minutes: 39          Date: 3/3/2022  Patient Name: Carlene Prajapati,   Gender: male      Room: [facility]18/018-A  MRN: [id number]  : 1931  (80 y.o.)  Referring Practitioner: Jai Sellers MD  Diagnosis: Hyperkalemia  Additional Pertinent Hx: Per H&P: Rosalva Nunez is a 25-year-old  male with a past medical history of CHF, COPD, dementia who presents to [address] ED from a nursing home for the evaluation of abdominal pain, and not eating and drinking. Due to patient's underlying dementia, he is a poor historian. ED provider reports poor history from nursing home nurse, and states that he did not eat well yesterday and had a fall earlier today. This patient is the father of Dr. Fatmata Georges with oncology, and ED provider reached out to him for more information. Patient's baseline is unclear. He states he has lower abdominal pain around his catheter insertion site, fever, chills, decreased appetite. Upon questioning, patient was able to state his name and date of birth, but was unsure of where he was at and date. The patient denies chest pain, shortness of breath, lightheadedness, dizziness, weakness at this time. Restrictions/Precautions:  Restrictions/Precautions: General Precautions,Fall Risk  Position Activity Restriction  Other position/activity restrictions: Dementia, suprapubic catheter     SUBJECTIVE: pt sitting in recliner with live sitter present. Pt agreeable to OT and getting a bath     PAIN: pt did not state any pain during session     Vitals: Vitals not assessed per clinical judgement, see nursing flowsheet    COGNITION: Decreased Problem Solving and Decreased Safety Awareness    ADL:   Bathing: Minimal Assistance, Moderate Assistance, with verbal cues  and with increased time for completion.   for bottom thoroughness and lower legs and feet.    Upper Extremity Dressing: Minimal Assistance. to tie gown   Lower Extremity Dressing: Maximum Assistance. to don and doff slipper socks . BALANCE:  Sitting Balance:  Supervision. sitting edge of recliner   Standing Balance: Contact Guard Assistance, Minimal Assistance, with cues for safety, with verbal cues . at [employer] for greater then 2 min     BED MOBILITY:  Not Tested    TRANSFERS:  Sit to Stand:  Contact Guard Assistance. from recliner wiht verbal cues for hand placment   Stand to Sit: Contact Guard Assistance, cues for hand placement. to recliner     FUNCTIONAL MOBILITY:  Did not complete this session     ADDITIONAL ACTIVITIES:  .Patient completed dynamic standing task that facilitated 1 hand release from RW to wipe bottom . Patient required CGA-YUNG , and demo'ed an endurance of 1-2 minutes. Demo good  tolerance with x1  rest breaks. Standing task completed to challenge endurance and balance required for ADL and IADL skills. ASSESSMENT:     Activity Tolerance:  Patient tolerance of  treatment: good.        Discharge Recommendations: Subacute/skilled nursing facility and ECF with OT  Equipment Recommendations: Equipment Needed: No  Plan: Times per week: 3-5x  Times per day: Daily  Current Treatment Recommendations: Strengthening,Patient/Caregiver Education & Training,Balance Training,Functional Mobility Training,Endurance Training,Safety Education & Training,Self-Care / ADL    Patient Education  Patient Education: ADL's and Importance of Increasing Activity    Goals  Short term goals  Time Frame for Short term goals: by discharge  Short term goal 1: Pt will complete functional mobility short distances with CGA and no LOB or safety cues for ease with ADLs  Short term goal 2: Pt will tolerate dynamic standing x3-4min with CGA and unilateral release to increase indep with grooming  Short term goal 3: Pt will complete UB ADLs with Min A and min VC  Short term goal 4: Pt will complete BUE strengthening HEP with light resistance and min VC for technique to increase indep wtih dressing    Following session, patient left in safe position with all fall risk precautions in place.

## 2022-03-03 NOTE — PROGRESS NOTES
[facility]   Nephrology progress note  3/3/2022, 9:48 AM      Pt Name:    Yelitza Armendariz  MRN:     [id number]     YOB: 1931  Admit Date:    2/22/2022  9:12 AM  Primary Care Physician:  Antonio Carrizales MD   Room number  5D-12/929-J    Chief Complaint: Nephrology following for STEPHY/hyperkalemia and metabolic acidosis    Subjective:  Patient seen and examined. Seen earlier today during rounds  No acute distress  No chest pain no shortness of breath      Objective:  24HR INTAKE/OUTPUT:      Intake/Output Summary (Last 24 hours) at 3/3/2022 0948  Last data filed at 3/3/2022 0256  Gross per 24 hour   Intake --   Output 1350 ml   Net -1350 ml     I/O last 3 completed shifts:  In: -   Out: 2700 [Urine:2700]  No intake/output data recorded. Admission weight: 212 lb (96.2 kg)  Wt Readings from Last 3 Encounters:   03/03/22 214 lb 1.1 oz (97.1 kg)   01/03/22 212 lb (96.2 kg)   12/06/21 210 lb (95.3 kg)     Body mass index is 34.55 kg/m².     Physical examination  VITALS:     Vitals:    03/02/22 2353 03/03/22 0339 03/03/22 0605 03/03/22 0844   BP: (!) 151/84 (!) 167/75     Pulse: 66 61     Resp: 16 16  18   Temp: 98.2 °F (36.8 °C) 98.1 °F (36.7 °C)     TempSrc: Oral Oral     SpO2: 98% 97%  96%   Weight:   214 lb 1.1 oz (97.1 kg)    Height:         General Appearance:  no distress  Mouth/Throat: Oral mucosa dry  Neck: No JVD  Lungs:  no use of accessory muscles  GI: soft, non-tender, no guarding  Extremities: No significant LE edema      Lab Data  CBC:   Recent Labs     03/01/22  0453 03/02/22  0500 03/03/22  0555   WBC 11.7* 11.9* 13.8*   HGB 9.2* 9.1* 9.3*   HCT 28.1* 29.4* 30.4*    358 399     BMP:  Recent Labs     03/01/22  0453 03/01/22  1627 03/02/22  0500 03/02/22  1602 03/03/22  0555     --  147*  --  150*   K 3.2*   < > 3.3* 4.1 3.2*     --  111  --  114*   CO2 24  --  24  --  23   BUN 28*  --  23*  --  19   CREATININE 3.1*  --  3.0*  --  2.7*   GLUCOSE 111* --  103  --  105   CALCIUM 7.5*  --  7.8*  --  7.7*    < > = values in this interval not displayed. Hepatic:   No results for input(s): LABALBU, AST, ALT, ALB, BILITOT, ALKPHOS in the last 72 hours. Meds:  Infusion:    sodium chloride 50 mL/hr at 03/03/22 0602    dextrose      sodium chloride      dextrose      Or    dextrose       Meds:    calcium replacement protocol   Other RX Placeholder    pantoprazole  40 mg Oral BID AC    insulin lispro  0-6 Units SubCUTAneous TID WC    insulin lispro  0-3 Units SubCUTAneous Nightly    piperacillin-tazobactam  3,375 mg IntraVENous Q12H    Arformoterol Tartrate  15 mcg Nebulization BID    budesonide  0.5 mg Nebulization BID    docusate sodium  100 mg Oral BID    ferrous gluconate  360 mg Oral BID    lactobacillus  1 capsule Oral BID WC    levothyroxine  175 mcg Oral Daily    [Held by provider] lisinopril  2.5 mg Oral Daily    tiotropium  2 puff Inhalation Daily    Vitamin D  500 Units Oral Daily    sodium chloride flush  10 mL IntraVENous 2 times per day    heparin (porcine)  5,000 Units SubCUTAneous 3 times per day     Meds prn: hydrALAZINE, ipratropium-albuterol, glucose, glucagon (rDNA), dextrose, albuterol sulfate HFA, sodium chloride flush, sodium chloride, ondansetron **OR** ondansetron, polyethylene glycol, acetaminophen **OR** acetaminophen, dextrose **OR** dextrose       Impression and Plan:  1. STEPHY in setting of hypotension/sepsis in setting of lisinopril and Aldactone. Likely has ischemic ATN. Nonoliguric. Anticipate improvement  No need for further dialysis  Serum creatinine down to 2.7  Ultrasound normal without any hydronephrosis  Will remove HD catheter tomorrow    2. Hypernatremia likely post ATN diuresis: sodium is now upto 150  Stop normal saline and start patient on D5W    3. Mild hypokalemia will replace and recheck  4. Borderline hypotension. Improved. Continue with IV fluids  5. Metabolic acidosis. Improved.   5. Chronic grade 1 diastolic dysfunction. Clinically not in decompensated heart failure  6. Sepsis secondary to UTI. On empiric IV antibiotics. Hospitalist managing, WBC slightly higher today. Patient is on IV antibiotics  7. Aspiration.   ENT was consulted by hospitalist services    I have updated [address]  Nephrology will continue to follow      Shmuel Veliz MD  [facility]

## 2022-03-03 NOTE — FLOWSHEET NOTE
03/03/22 1340   Treatment Team Notification   Reason for Communication Evaluate   Team Member Name CT tech   Treatment Team Role Other (Comment)   Method of Communication Call     Call placed to CT to find out time frame for patient to go down for scan. Per Tech patient is 4th in line and will get as soon as can. Per Tracee WHARTON NP states to hold off on placing the NG tube until after scan done.

## 2022-03-03 NOTE — PROGRESS NOTES
PROGRESS NOTE      Patient:  Yang Estrella      Unit/Bed:6K-18/018-A    YOB: 1931    MRN: [id number]       Acct: [de-identified]     PCP: Fransisco Hassan MD    Date of Admission: 2/22/2022    Assessment/Plan:    Active Hospital Problems    Diagnosis Date Noted    Bradycardia [R00.1]      Priority: High    Hypernatremia [E87.0]     ATN (acute tubular necrosis) ([facility]) [N17.0]     STEPHY (acute kidney injury) ([address] [N17.9]     Hyperkalemia [E87.5]     Leukocytosis [D72.829]     Septicemia ([address] [A41.9]     Suprapubic catheter ([address] [Z93.59]     Sepsis ([address] [A41.9] 86/42/5766    Complicated UTI (urinary tract infection) [N39.0]     Abdominal pain [R10.9]      Acute kidney injury on CKD4 - Stable. Likely multifactorial in the setting of hyperkalemia and sepsis. Imaging was negative for hydronephrosis. Patient was on lisinopril and Aldactone these have been held. IVF NS  Avoid nephrotoxic agents if possible  Nephrology on board, appreciate assistance and recommendations    Complicated UTI: Improved. Purulent drainage noted to be coming from suprapubic catheter site on admission. CT abdomen pelvis w/o signs of intra-abdominal/pelvic abnormality. Doxazosin discontinued due to suprapubic cath in place. Urine cx enterobacter sensitive to zosyn and E Faecalis sensitive to penicillins. Zosyn able to cover both. Will likely treat for 14 day total course of antibiotics    Dysphagia  SLP consulted  ENT consulted    Sepsis: resolved   Likely secondary to UTI. MRSA PCR negative, Vanc was discontinued. Zosyn, will likely treat for 14 day total course of antibiotics  Monitor vitals    Acute hyperkalemia: Resolved.  3/1/2022 acute hypokalemia  Hyperkalemia in the setting of acute kidney injury  No acute EKG changes were noted  Patient was on lisinopril and Aldactone prior to admission  2/22/2022 patient received hemodialysis  Hypokalemia Started 3/1/2022  Potassium chloride IV  Monitor K+    Hyponatremia: Resolved now hypernatremic  Hyponatremia was likely due to hypervolemia  Hypernatremia on 3/3/22, was on . 45 NS, increase rate to 75 mL/hr  Will monitor BMP and vitals    Anion gap metabolic acidosis: Resolved   In the setting of sepsis and acute kidney injury. Bicarb drip was initiated by nephrology and recently continued. Nephrology following, appreciate the assistance    Chronic HFpEF:   Not in acute exacerbation echo 6/2021 LVEF 55% with grade 1 diastolic dysfunction    Diabetes mellitus type 2  Lantus was not restarted at time of admission due to [address], has been controlled. Monitor glucose    COPD  Arformoterol and budesonide    Sinus Pauses w/ sinus bradycardia  Cardiology consulted. No additional intervention or work up per family. Discontinue telemetry     Diet: Passed MBS. Regular diet with thin liquids  DVT prophylaxis: subq heparin    Code Status: DNR-CCA    Disposition:  Will need SNF per PT/OT. Was at assisted living.    ----------------------------------------------------------------------------------------------------    Chief Complaint: Abdominal pain, bilateral LE swelling, and poor PO intake    Hospital Course: Grecia Frye is a 80-year-old male admitted on 2/22/2022 with chief complaint of bilateral leg swelling, worsening confusion, and abdominal pain. Patient is a resident at assisted living. Noted purulent drainage coming from suprapubic catheter site. No signs of peritonitis at this time. CT abdomen pelvis revealed no evidence of acute intra-abdominal or intrapelvic abnormalities. Zosyn was started. Suprapubic catheter was exchanged while in the ER on 2/22/2022. Urine culture obtained and showed multiple bacteria, which were sensitive to zosyn and completed 7 day course on 2/28. Also of note, Nephrology was consulted for acute hyperkalemia and acute kidney injury. 2/22/2022 patient was transferred to the ICU and received dialysis overnight.  Patient was transferred to general medicine floor on 2/23. After transfer, did well without acute events. Some sinus bradycardia with pauses. Consulted cardiology who spoke with Dr. Lamin Salamanca, who said not to pursue pacemaker due to other conditions. Continue DNR-CCA as can continue medications. Also assessed by PT/OT who are now recommending SNF.    3/1/2022  Nursing reports patient choking on medications and fluids. SPL consult. Hypokalemia KCl IVPB  SLP swallow test - NPO    3/2/2022  Hypokalemia, KCl IVPB  Modified barium swallow planned    3/3  Hypernatremia  Start NG feeds    Subjective:  Patient seen laying comfortable in bedside this morning, sitter in the room. Patient remains alert and pleasant to converse with, not oriented. Patient states he is getting frustrated with testing and being in the hospital. NG tube fell out. Will replace NG tube and start tube feeds for now after talking with ANANDA Ng. Will talk with POA/decision maker, patient's son, Dr. Paulina Ng about status of patient and goals of care moving forward later this evening. Placement for discharge processing pending clinical course.     Medications:  Reviewed    Infusion Medications    sodium chloride 50 mL/hr at 03/03/22 0602    dextrose      sodium chloride      dextrose      Or    dextrose       Scheduled Medications    calcium replacement protocol   Other RX Placeholder    pantoprazole  40 mg Oral BID AC    insulin lispro  0-6 Units SubCUTAneous TID WC    insulin lispro  0-3 Units SubCUTAneous Nightly    piperacillin-tazobactam  3,375 mg IntraVENous Q12H    Arformoterol Tartrate  15 mcg Nebulization BID    budesonide  0.5 mg Nebulization BID    docusate sodium  100 mg Oral BID    ferrous gluconate  360 mg Oral BID    lactobacillus  1 capsule Oral BID WC    levothyroxine  175 mcg Oral Daily    [Held by provider] lisinopril  2.5 mg Oral Daily    tiotropium  2 puff Inhalation Daily    Vitamin D  500 Units Oral Daily    sodium chloride flush  10 mL IntraVENous 2 times per day    heparin (porcine)  5,000 Units SubCUTAneous 3 times per day     PRN Meds: hydrALAZINE, ipratropium-albuterol, glucose, glucagon (rDNA), dextrose, albuterol sulfate HFA, sodium chloride flush, sodium chloride, ondansetron **OR** ondansetron, polyethylene glycol, acetaminophen **OR** acetaminophen, dextrose **OR** dextrose      Intake/Output Summary (Last 24 hours) at 3/3/2022 0719  Last data filed at 3/3/2022 0256  Gross per 24 hour   Intake --   Output 2700 ml   Net -2700 ml       Diet:  Diet NPO    Exam:  BP (!) 167/75   Pulse 61   Temp 98.1 °F (36.7 °C) (Oral)   Resp 16   Ht 5' 6\" (1.676 m)   Wt 214 lb 1.1 oz (97.1 kg)   SpO2 97%   BMI 34.55 kg/m²     Physical Exam:  General appearance: Alert, not in acute distress  HEENT:  Normal cephalic, atraumatic without obvious deformity. Pupils equal, round, and reactive to light. Conjunctivae clear. Clear oral mucosa. Neck: Supple, with full range of motion. No jugular venous distention. Trachea midline. Respiratory:  Normal respiratory effort. No Rales/Rhonchi. Expiratory wheezes with history of COPD  Cardiovascular: Normal rate, regular rhythm with normal S1/S2 without murmurs, rubs or gallops. Abdomen: Soft, non-tender, non-distended with normal bowel sounds. Musculoskeletal:  No clubbing, cyanosis or edema bilaterally. Full range of motion without deformity. Skin: No rashes or lesions. Neurological exam reveals alert, normal speech, no focal findings or movement disorder noted.   Exam of extremities: peripheral pulses normal, mild leg edema, no clubbing or cyanosis    Labs:   Recent Labs     03/01/22  0453 03/02/22  0500 03/03/22  0555   WBC 11.7* 11.9* 13.8*   HGB 9.2* 9.1* 9.3*   HCT 28.1* 29.4* 30.4*    358 399     Recent Labs     03/01/22  0453 03/01/22  1627 03/02/22  0500 03/02/22  1602 03/03/22  0555     --  147*  --  150*   K 3.2*   < > 3.3* 4.1 3.2*    --  111  --  114*   CO2 24  --  24  --  23   BUN 28*  --  23*  --  19   CREATININE 3.1*  --  3.0*  --  2.7*   CALCIUM 7.5*  --  7.8*  --  7.7*    < > = values in this interval not displayed. No results for input(s): AST, ALT, BILIDIR, BILITOT, ALKPHOS in the last 72 hours. No results for input(s): INR in the last 72 hours. No results for input(s): Ulrich Sol in the last 72 hours. Urinalysis:      Lab Results   Component Value Date    NITRU NEGATIVE 02/22/2022    WBCUA > 200 02/22/2022    BACTERIA MANY 02/22/2022    RBCUA > 100 02/22/2022    BLOODU LARGE 02/22/2022    SPECGRAV 1.017 09/22/2021    GLUCOSEU NEGATIVE 02/22/2022       Radiology:  XR ABDOMEN FOR NG/OG/NE TUBE PLACEMENT   Final Result      Nasogastric tube as above. Final report electronically signed by Dr. Marek Bowen on 3/2/2022 4:47 PM      FL MODIFIED BARIUM SWALLOW W VIDEO   Final Result   Worsening since the prior exam. There is laryngeal penetration with aspiration with thin liquids. There is laryngeal penetration without aspiration with nectar thick. Recommendations and comments are available from speech therapy. **This report has been created using voice recognition software. It may contain minor errors which are inherent in voice recognition technology. **      Final report electronically signed by Dr. Kiarra Davalos on 3/2/2022 10:56 AM      US RENAL COMPLETE   Final Result      Normal bilateral renal ultrasound. Final report electronically signed by Dr. Marek Bowen on 2/25/2022 2:21 PM      FL MODIFIED BARIUM SWALLOW W VIDEO   Final Result   1. No laryngeal penetration or aspiration of barium. 2. Additional recommendations from the speech therapist will follow. **This report has been created using voice recognition software. It may contain minor errors which are inherent in voice recognition technology. **         Final report electronically signed by Dr. Marek Bowen on 2/24/2022 10:11 AM      CT HEAD WO CONTRAST   Final Result   No acute intracranial findings. Chronic changes as described. This document has been electronically signed by: Dayami Lam MD on    02/23/2022 05:54 AM      All CTs at this facility use dose modulation techniques and iterative    reconstructions, and/or weight-based dosing   when appropriate to reduce radiation to a low as reasonably achievable. XR CHEST PORTABLE   Final Result   Impression:   Right IJ line tip over the proximal SVC. This document has been electronically signed by: Dayami Lam MD on    02/23/2022 12:43 AM      CT ABDOMEN PELVIS WO CONTRAST Additional Contrast? None   Final Result       1. No evidence of acute intra-abdominal or intrapelvic abnormality. 2. Moderate retained stool. 3. Nonobstructive nephrolithiasis on the left. 4. Prostatomegaly. **This report has been created using voice recognition software. It may contain minor errors which are inherent in voice recognition technology. **      Final report electronically signed by Dr. Jose Lane MD on 2/22/2022 11:01 AM      XR CHEST PORTABLE   Final Result   Stable radiographic appearance of the chest. No evidence of an acute process. **This report has been created using voice recognition software. It may contain minor errors which are inherent in voice recognition technology. **      Final report electronically signed by Dr. Jose Lane MD on 2/22/2022 10:09 AM          Electronically signed by Ifeoma Orozco MD on 3/3/2022 at 7:19 AM

## 2022-03-03 NOTE — CONSULTS
Department of Otolaryngology  Consult Note    Reason for Consult:  Dysphagia  Requesting Physician:  Dr Petersen Corner:  Dysphagia, abnormality on FEES    History Obtained From:  patient, electronic medical record, nurse    HISTORY OF PRESENT ILLNESS:                The patient is a 80 y.o. male who was admitted to [facility] 2/22/2022 for lower abdominal subsequently treated for sepsis secondary to suprapubic catheter infection. Patient currently resides at [facility] secondary to dementia. ENT consulted for dysphagia and abnormal FEES. Chart review shows consult to speech pathology on 2/23/22 for evaluation of dysphagia and concern for aspiration; recommendation for NPO and modified barium swallow. MBS completed 2/24/22 normal and patient placed on regular diet with thin liquids. On 3/1, there were concerns again for dysphagia and patient re-evaluated. MBS 3/2 shows significant decline in airway protection with silent aspiration. FEES completed 3/2 as well showed impaired bolus control, TBR, pharyngeal constriction noted with reduced epiglottic inversion and noted reduced mobility of the left arytenoid. NG placed yesterday, but it came out early this am.  Patient has limited insight, but denies any odynophagia. He is unsure how long he has had difficulty with swallowing. He initially reports that his voice is unchanged, but later states it sounds raspy. Per chart, patient has had prior thyroidectomy but unsure of timing. He has not had any recent or prolonged intubations. He is a never smoker.      Past Medical History:        Diagnosis Date    Anemia     Anxiety     BPH (benign prostatic hyperplasia)     CAD (coronary artery disease)     leaking valve    Chronic back pain     [facility]ch[facility] obstructive [facility]     Detached retina     Diabetes mellitus (HCC)     NIDDM    DVT (deep venous thrombosis) ([facility]     RLE    DVT (deep venous thrombosis) ([facility] [address] 11/9/15    LLE    Dysphagia     Glaucoma     Hyperlipidemia     Hypertension     Macular degeneration     Mass of chest     benign chest mass, Dr Jones Choi    Movement disorder     spinal stenosis    Obesity     Osteoarthritis     right ankle, right hand    Paroxysmal atrial fibrillation ([facility] [address]     Pneumonia     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)     s/p thyroid resection    Urinary incontinence        Past Surgical History:        Procedure Laterality Date   [facility] SURGERY  2006,2011, 2014    BRONCHOSCOPY N/A 8/29/2017    BRONCHOSCOPY AIRWAY ONLY performed by Yamilet Astudillo MD at [address] [facility] [address] N/A 6/17/2021    CYSTOSCOPY SUPRAPUBIC TUBE PLACEMENT performed by Rich Cevallos MD at [address] [facility] 8/12/2021    CYSTOSCOPY, ATTEMPTED SUPRAPUBIC TUBE PLACEMENT, ATTEMPTED PAINTER CATH performed by Saul Yanes MD at [address] EKG 12-LEAD  11/10/2015         FRACTURE SURGERY      right hand    OTHER SURGICAL HISTORY      spinal epidurals    RETINAL DETACHMENT SURGERY      SPINE SURGERY  2004    Lumbar laminectomy    THYROIDECTOMY      TURP N/A 3/17/2020    CYSTOSCOPY WITH GREENLIGHT PHOTOVAPORIZATION OF PROSTATE performed by Rich Cevallos MD at [address] TURP N/A 3/24/2020    PLASMA BUTTON TURP WITH CLOT EVACUATION [facility]       Current Medications:   Current Facility-Administered Medications: dextrose 5 % solution, , IntraVENous, Continuous  hydrALAZINE (APRESOLINE) injection 10 mg, 10 mg, IntraVENous, Q4H PRN  ipratropium-albuterol (DUONEB) nebulizer solution 1 ampule, 1 ampule, Inhalation, Q4H PRN  calcium replacement protocol, , Other, RX Placeholder  pantoprazole (PROTONIX) tablet 40 mg, 40 mg, Oral, BID AC  insulin lispro (HUMALOG) injection vial 0-6 Units, 0-6 Units, SubCUTAneous, TID WC  insulin lispro (HUMALOG) injection vial 0-3 Units, 0-3 Units, SubCUTAneous, Nightly  piperacillin-tazobactam (ZOSYN) 3,375 mg in dextrose 5 % 50 mL IVPB (mini-bag), 3,375 mg, IntraVENous, Q12H  glucose (GLUTOSE) 40 % oral gel 15 g, 15 g, Oral, PRN  glucagon (rDNA) injection 1 mg, 1 mg, IntraMUSCular, PRN  dextrose 5 % solution, 100 mL/hr, IntraVENous, PRN  albuterol sulfate  (90 Base) MCG/ACT inhaler 2 puff, 2 puff, Inhalation, Q6H PRN  Arformoterol Tartrate (BROVANA) nebulizer solution 15 mcg, 15 mcg, Nebulization, BID  budesonide (PULMICORT) nebulizer suspension 500 mcg, 0.5 mg, Nebulization, BID  docusate sodium (COLACE) capsule 100 mg, 100 mg, Oral, BID  ferrous gluconate (FERGON) tablet 360 mg, 360 mg, Oral, BID  lactobacillus (CULTURELLE) capsule 1 capsule, 1 capsule, Oral, BID WC  levothyroxine (SYNTHROID) tablet 175 mcg, 175 mcg, Oral, Daily  [Held by provider] lisinopril (PRINIVIL;ZESTRIL) tablet 2.5 mg, 2.5 mg, Oral, Daily  tiotropium (SPIRIVA RESPIMAT) 2.5 MCG/ACT inhaler 2 puff, 2 puff, Inhalation, Daily  Vitamin D (CHOLECALCIFEROL) tablet 500 Units, 500 Units, Oral, Daily  sodium chloride flush 0.9 % injection 10 mL, 10 mL, IntraVENous, 2 times per day  sodium chloride flush 0.9 % injection 10 mL, 10 mL, IntraVENous, PRN  0.9 % sodium chloride infusion, 25 mL, IntraVENous, PRN  heparin (porcine) injection 5,000 Units, 5,000 Units, SubCUTAneous, 3 times per day  ondansetron (ZOFRAN-ODT) disintegrating tablet 4 mg, 4 mg, Oral, Q8H PRN **OR** ondansetron (ZOFRAN) injection 4 mg, 4 mg, IntraVENous, Q6H PRN  polyethylene glycol (GLYCOLAX) packet 17 g, 17 g, Oral, Daily PRN  acetaminophen (TYLENOL) tablet 650 mg, 650 mg, Oral, Q6H PRN **OR** acetaminophen (TYLENOL) suppository 650 mg, 650 mg, Rectal, Q6H PRN  dextrose 10 % infusion, 125 mL, IntraVENous, PRN **OR** dextrose 10 % infusion, 250 mL, IntraVENous, PRN    Allergies:  Review of patient's allergies indicates no known allergies.     Social History:    TOBACCO:   reports that he has never smoked. He has never used smokeless tobacco.  ETOH:   reports current alcohol use of about 1.0 standard drink of alcohol per week. DRUGS:   reports no history of drug use. Family History:       Problem Relation Age of Onset    Other Mother         Complications of Childbirth    Other Father [de-identified]        Natural causes     REVIEW OF SYSTEMS:    A complete multi-organ review of systems was performed using a new patient questionnaire, and reviewed by me. ENT:  negative except as noted in HPI  CONSTITUTIONAL:  negative except as noted in HPI  EYES:  negative except as noted in HPI  RESPIRATORY:  negative except as noted in HPI  CARDIOVASCULAR:  negative except as noted in HPI  GASTROINTESTINAL:  negative except as noted in HPI  GENITOURINARY:  negative except as noted in HPI  MUSCULOSKELETAL:  negative except as noted in HPI  SKIN:  negative except as noted in HPI  ENDOCRINE/METABOLIC: negative except as noted in HPI  HEMATOLOGIC/LYMPHATIC:  negative except as noted in HPI  ALLERGY/IMMUN: negative except as noted in HPI  NEUROLOGICAL:  negative except as noted in HPI  BEHAVIOR/PSYCH:  negative except as noted in HPI    PHYSICAL EXAM:    VITALS:  BP (!) 139/56   Pulse 58   Temp 97.6 °F (36.4 °C) (Oral)   Resp 16   Ht 5' 6\" (1.676 m)   Wt 214 lb 1.1 oz (97.1 kg)   SpO2 97%   BMI 34.55 kg/m²     Alert and cooperative, very pleasant. Answers some questions appropriately. Voice with mild hoarseness, otherwise no breathy vocal quality or significant abnormality of pitch. Fair dentition with blue-green staining on teeth and tongue. Oropharynx unremarkable. Neck supple without any palpable adenopathy or masses. There is healed horizontal anterior neck scar, assuming from prior thyroidectomy. There is vertical healed scar down the sternum. Asymmetry of the clavicles. No stridor. No distress. Auscultation of neck with excellent laminar airflow.     DATA:    FEES imaging reviewed independently and with Dr Jono Potter. IMPRESSION/RECOMMENDATIONS:      Dysphagia; Left vocal cord paresis - ? Acute process  Will obtain CT neck for further evaluation and to assess for mass/lesion compressing the recurrent laryngeal nerve; unable to obtain contrast study secondary to STEPHY. ? Acute versus chronic intermittent issue. History of thyroidectomy. Likely need to replace NG tube after CT for nutrition and medications. Further recommendations pending imaging. Management of patient's care was collaborated with Dr Jono Potter today.        Electronically signed by MAEGAN Decker CNP on 3/3/2022 at 11:24 AM

## 2022-03-03 NOTE — SIGNIFICANT EVENT
Discussed goals of care with patient's POA and son  Agnieszka Hernández on the phone this evening, 3/3/2022 at 6:30. Dr. Toni Morrow requested code status change to [facility] home to hospice care. He also requested no NG or PEG tubes, wanting oral comfort feeds knowing risk of aspiration pneumonitis as a high probability. Dr. Toni Morrow also request dialysis catheter to be removed as they will no longer be pursuing dialysis even if needed. He expressed concerns as patient currently living in an apartment at [facility] with personal furnishings and property. He understands that they may not be able to care for him with the CODE STATUS change and requesting to talk with social work and palliative care tomorrow 3/4/2022 around 4-5 PM to discuss how to move forward with the [address] [place] [address] and living arrangements/placement. Dr. Toni Morrow did make request for [facility] if [facility] is unable to continue with caring for patient. CODE STATUS will be changed to DNR CC. Consults made to social work and palliative care with request to talk with Dr. Toni Morrow on 3/4/2022 around 4-5 PM for home to hospice assistance.     Electronically signed by Charity Negro MD on 3/3/2022 at 6:58 PM

## 2022-03-03 NOTE — PROGRESS NOTES
This nurse observes once in to assess patient for the morningthat his NG tube was out. Per sitter patient sat up and it \"fell out\". Dr. Beti Williamson.

## 2022-03-03 NOTE — PROGRESS NOTES
Patient complaining of pain at IV site where potassium is running, rate reduced to 40 due to patient not being able to tolerate infusion. Infusion being diluted with maintaince fluid. IV site without deficits, ice applied. Patient states site feeling better.

## 2022-03-03 NOTE — PROGRESS NOTES
Comprehensive Nutrition Assessment    Type and Reason for Visit:  Reassess,Consult (TF ordering and management)    Nutrition Recommendations/Plan:   *NPO for SLP following FEES on 3/2/22  *Once feeding route established (NGT) and placement verified recommend start Nepro Carb Steady at 10ml/hr, increase 10ml every 4 hours until goal of 40ml/hr is reached. *Additional free water per MD  *Recommend additional bowel meds - last documented BM was on 2/22/22. Nutrition Assessment:    Pt. With no improvement in nutritional status AEB NPO d/t dysphagia, await EN feeding access. At risk for further nutrition compromise r/t poor oral intake since admit  For 7 days, admit with hyperkalemia, STEPHY on CKD, temporary hemodialysis (HD catheter to be removed soon), sepsis d/t UTI, advanced age, and underlying medical condition (hx: DB, HTN, CHF, HLD, OA, thyroid cancer, pneumonia, obesity, anxiety, afib, COPD). Malnutrition Assessment:  Malnutrition Status: At risk for malnutrition (Comment)    Context:  Acute Illness     Findings of the 6 clinical characteristics of malnutrition:  Energy Intake:  1 - 75% or less of estimated energy requirements for 7 or more days  Weight Loss:  No significant weight loss (in the last 3 months, note 17.8% loss in the last 9 months-of note hx CHF)     Body Fat Loss:  No significant body fat loss     Muscle Mass Loss:  No significant muscle mass loss    Fluid Accumulation:  1 - Mild Extremities   Strength:  Not Performed    Estimated Daily Nutrient Needs:  Energy (kcal):  ~1746+ (~18+ kcals/kg); Weight Used for Energy Requirements:  Current (97kg - bedscale weight on 3/3 (+1 non-pitting edema))     Protein (g):  65-78 grams (1-1.2 grams/kg IBW/day) pending renal status; Weight Used for Protein Requirements:  Ideal (65 kg)        Fluid (ml/day):  ~ 2000 mls/day (hx CHF); Nutrition Related Findings:   Patient seen along with live sitter. Some confusion per staff.   Refusing medications through the night. Choking with oral intake. SLP reconsulted planning repeat MBS tomorrow. Previous MBS 2/24/22: regular with thin. Patient reports appetite is good. Nursing states he was just picking at his food previous to NPO status, meal intake 75% or less since admit. No BM documented since admit, 7 days, discussed with RN. Temporary HD this admit, no further treatments planned, plan to remove catheter soon. 3/1/22: Sodium 142, Potassium 3.2, BUN 28, Creatinine 3.1, Glucose 111, Chemstick 123. Rx: colace, humalog, culturelle, synthroid, IVF at 50 ml/hr, prn glycolax. Pt. Report/Treatments/Miscellaneous: MBS 02/24/22 regular diet with thin liquids; MBS 03/02/22 recommending NPO + FEES; FEES 3/2/22 recommend NPO. GI Status: last documented BM was 2/22/22  Pertinent Labs: sodium: 150, potassium: 3.2, Creatinine: 2.7, POC: 90  Pertinent Meds: colace, vitamin D, humalog, culturelle, ATB, prn glycolax (not noted to have been given)    Wounds:  None       Current Nutrition Therapies:    Diet NPO  ADULT TUBE FEEDING; Nasogastric; Renal Formula; Continuous; 10; Yes; 10; Q 4 hours; 40; 30; Q 4 hours  Current Tube Feeding (TF) Orders:  · Feeding Route: Nasogastric (NGT fell out, await replacement and placement verification)  · Formula: Renal Formula (Nepro Carb Steady)  · Schedule: Continuous (goal 40ml/hr)  · Water Flushes: per MD  · Goal TF & Flush Orders Provides: ~1700 kcals, 78 grams protein, 154 grams CHO, 24 grams fiber, 698 ml water in 960ml total volume/24 hours    Anthropometric Measures:  · Height: 5' 6\" (167.6 cm)  · Current Body Weight: 214 lb (97.1 kg) (3/3/22, bedscale, +1 non-pitting edema)   · Admission Body Weight: 237 lb 6.4 oz (107.7 kg) (2/22 +2 LE edema)    · Usual Body Weight:  (Pt unable to state. Per EMR: 5/24/21: 111#89.9DJ, 7/14/21: 225#6. 4oz, 11/22/21: 214#9. 6oz, 1/3/22: 212#)     · Ideal Body Weight: 142 lbs;   · BMI: 34.6  · Adjusted Body Weight:  ; No Adjustment · BMI Categories: Normal Weight (BMI 18.5-24. 9)       Nutrition Diagnosis:   · Inadequate oral intake related to swallowing difficulty as evidenced by NPO or clear liquid status due to medical condition,swallow study results    Nutrition Interventions:   Food and/or Nutrient Delivery:  Continue NPO,Start Tube Feeding  Nutrition Education/Counseling:  No recommendation at this time   Coordination of Nutrition Care:  Continue to monitor while inpatient,Speech Therapy    Goals:  Patient will receive adequate nutrition in 1-3 days. Nutrition Monitoring and Evaluation:   Behavioral-Environmental Outcomes:  None Identified   Food/Nutrient Intake Outcomes:  Enteral Nutrition Intake/Tolerance  Physical Signs/Symptoms Outcomes:  Biochemical Data,Chewing or Swallowing,GI Status,Fluid Status or Edema,Hemodynamic Status,Nutrition Focused Physical Findings,Skin,Weight     Discharge Planning:     Too soon to determine     Electronically signed by Mona Son RD, LD on 3/3/22 at 11:17 AM EST    Contact: [phone]

## 2022-03-03 NOTE — DISCHARGE INSTR - COC
Continuity of Care Form    Patient Name: Yusuf Elliott   :  1931  MRN:  [id number]    Admit date:  2022  Discharge date:  2022    Code Status Order: DNR-CCA   Advance Directives:      Admitting Physician:  Galina Smallwood MD  PCP: Darryle Dixons, MD    Discharging Nurse: Alejandro Seaman RN  [address] Unit/Room#: 9Z-10/296-E  Discharging Unit Phone Number: [phone]    Emergency Contact:   Extended Emergency Contact Information  Primary Emergency Contact: [facility] [address] Phone: [phone]  Mobile Phone: [phone]  Relation: Child  Hearing or visual needs: None  Other needs: None  Preferred language: English   needed? No  Secondary Emergency Contact: Jonah Gillespie   [facility] [address] [facility] Phone: [phone]  Mobile Phone: [phone]  Relation: Child  Hearing or visual needs: None  Other needs: None  Preferred language: English   needed?  No    Past Surgical History:  Past Surgical History:   Procedure Laterality Date    BACK SURGERY  5003,5255, 2014    BRONCHOSCOPY N/A 2017    BRONCHOSCOPY AIRWAY ONLY performed by Darius Morrison MD at [address] [place] [address] N/A 2021    CYSTOSCOPY SUPRAPUBIC TUBE PLACEMENT performed by Ally Guerrero MD at [place] [address] N/A 2021    CYSTOSCOPY, ATTEMPTED SUPRAPUBIC TUBE PLACEMENT, ATTEMPTED PAINTER CATH performed by Chrissy Clayton MD at [facility]    EKG 12-LEAD  11/10/2015         FRACTURE SURGERY      right hand    OTHER SURGICAL HISTORY      spinal epidurals    RETINAL DETACHMENT SURGERY      SPINE SURGERY  2004    Lumbar laminectomy    THYROIDECTOMY      TURP N/A 3/17/2020    CYSTOSCOPY WITH GREENLIGHT PHOTOVAPORIZATION OF PROSTATE performed by Ally Guerrero MD at [facility]    TURP N/A 3/24/2020    PLASMA BUTTON TURP WITH CLOT EVACUATION performed by Ally Guerrero MD at [facility]       Immunization History:   Immunization History   Administered Date(s) Administered    COVID-19, Pfizer Purple top, DILUTE for use, 12+ yrs, 30mcg/0.3mL dose 01/22/2021, 02/12/2021, 10/12/2021    Influenza 10/31/2013    Influenza Virus Vaccine 11/01/2011, 11/05/2012, 11/11/2014, 09/29/2015    Influenza Whole 10/01/2010    Influenza, Quadv, 6 mo and older, IM, PF (Flulaval, Fluarix) 09/27/2018    Influenza, Quadv, IM, (6 mo and older Fluzone, Flulaval, Fluarix and 3 yrs and older Afluria) 11/17/2017    Influenza, Quadv, IM, PF (6 mo and older Fluzone, Flulaval, Fluarix, and 3 yrs and older Afluria) 10/01/2019    Influenza, Quadv, adjuvanted, 65 yrs +, IM, PF (Fluad) 10/19/2020    Influenza, Triv, 3 Years and older, IM (Afluria (5 yrs and older) 09/26/2016    Pneumococcal Conjugate 13-valent ([id number]) 12/19/2014    Pneumococcal Polysaccharide ([id number]) 01/01/2003    Td, unspecified formulation 06/08/2010    Zoster Live (Zostavax) 08/05/2014       Active Problems:  Patient Active Problem List   Diagnosis Code    Diabetes mellitus ([facility] [address] E11.9    Chronic anemia D64.9    Acquired hypothyroidism E03.9    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 E29.1    Breast lump N63.0    BPH (benign prostatic hyperplasia) N40.0    ROBER on CPAP G47.33, Z99.89    Essential hypertension I10    Hx of deep venous thrombosis Z86.718    Pneumonia due to infectious organism J18.9    Shortness of breath R06.02    Chronic respiratory failure with hypoxia (HCC) J96.11    Mixed hyperlipidemia E78.2    Chronic deep vein thrombosis (DVT) of right lower extremity ([facility]) I82.501    Anticoagulated on Coumadin Z79.01    Type 2 diabetes mellitus with complication, with long-term current use of insulin (HCC) E11.8, Z79.4    Muscle weakness M62.81    Chronic bronchitis (HCC) J42    COPD without exacerbation (HCC) J44.9    Paroxysmal atrial fibrillation (HCC) G24.1    Acute diastolic congestive heart failure (HCC) I50.31    Paroxysmal atrial flutter (HCC) I48.92    Urinary incontinence R32    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    Chronic congestive heart failure with left ventricular diastolic dysfunction ([facility]) I50.32    Morbid obesity ([facility]) E66.01    Urinary retention R33.9    Urine retention R33.9    Abdominal pain R10.9    Altered mental status R41.82    Chronic indwelling Billy catheter W66.6    Complicated UTI (urinary tract infection) N39.0    Hypokalemia E87.6    Weakness of left lower extremity R29.898    Pill dysphagia R13.10    Hx of thyroid cancer Z85.850    Gross hematuria R31.0    Constipation K59.00    Acute urinary retention R33.8    COPD exacerbation ([facility]) J44.1    Acute on chronic respiratory failure with hypoxia and hypercapnia ([facility]) J96.21, J96.22    Acute on chronic diastolic congestive heart failure ([facility]) I50.33    Bilateral leg edema +2 now +1 R60.0    Community acquired bacterial pneumonia J15.9    Scrotal pain N50.82    Acute kidney injury superimposed on CKD ([facility] [address] N17.9, N18.9    Catheter-associated urinary tract infection ([facility]) T83.511A, Q16.4    Acute metabolic encephalopathy L76.64    History of BPH Z87.438    First degree AV block I44.0    Hx of coronary artery disease Z86.79    Chronic back pain M54.9, G89.29    Suprapubic catheter dysfunction ([facility]) T83.010A    History of recurrent UTI (urinary tract infection) Z87.440    Sepsis ([facility]) A41.9    STEPHY (acute kidney injury) ([facility] [address] N17.9    Hyperkalemia E87.5    Leukocytosis D72.829    Septicemia ([facility]) A41.9    Suprapubic catheter ([facility]) Z93.59    Bradycardia R00.1    ATN (acute tubular necrosis) ([facility]) N17.0    Hypernatremia E87.0       Isolation/Infection:   Isolation            No Isolation          Patient Infection Status       Infection Onset Added Last Indicated Last Indicated By Review Planned Expiration Resolved Resolved By    None active    Resolved    COVID-19 (Rule Out) 02/22/22 02/22/22 02/22/22 COVID-19 & Influenza Combo (Ordered)   02/22/22 Rule-Out Test Resulted    C-diff Rule Out 08/14/21 08/14/21 08/14/21 Gastrointestinal Panel, Molecular (Ordered)   08/23/21 Makayla Gallagher RN    COVID-19 (Rule Out) 05/19/21 05/19/21 05/19/21 COVID-19, Rapid (Ordered)   05/19/21 Rule-Out Test Resulted    COVID-19 (Rule Out) 12/05/20 12/05/20 12/05/20 COVID-19 (Ordered)   12/05/20 Rule-Out Test Resulted    COVID-19 (Rule Out) 10/03/20 10/03/20 10/03/20 COVID-19 (Ordered)   10/03/20 Rule-Out Test Resulted    COVID-19 (Rule Out) 05/18/20 05/18/20 05/18/20 COVID-19 (Ordered)   05/18/20 Rule-Out Test Resulted    MDRO (multi-drug resistant organism) 05/05/20 05/17/20 05/05/20 Culture, Reflexed, Urine   05/18/20 Zan Miller RN    ESBL (Extended Spectrum Beta Lactamase) 05/05/20 05/08/20 08/18/20 Culture, Reflexed, Urine   06/17/21 Dorothea Vázquez RN    Klebsiella Pneumonia in urine 5/2020-Amp C confirmed by [address]  E coli in urine 7/2020, 8/2020    MDRO (multi-drug resistant organism) 05/05/20 05/08/20 05/05/20 Culture, Reflexed, Urine   05/10/20 Zan Miller RN    COVID-19 (Rule Out) 05/05/20 05/05/20 05/05/20 Covid-19 Ambulatory (Ordered)   05/07/20 Rule-Out Test Resulted            Nurse Assessment:  Last Vital Signs: BP (!) 151/84   Pulse 66   Temp 98.4 °F (36.9 °C) (Oral)   Resp 16   Ht 5' 6\" (1.676 m)   Wt 212 lb 8.4 oz (96.4 kg)   SpO2 98%   BMI 34.30 kg/m²     Last documented pain score (0-10 scale): Pain Level: 0  Last Weight:   Wt Readings from Last 1 Encounters:   03/02/22 212 lb 8.4 oz (96.4 kg)     Mental Status:  alert    IV Access:  - None    Nursing Mobility/ADLs:  Walking   Assisted  Transfer  Assisted  Bathing  Assisted  Dressing  Assisted  Toileting  Assisted  Feeding  Assisted  Med Admin  Assisted  Med Delivery   whole    Wound Care Documentation and Therapy:        Elimination:  Continence: Bowel: Yes  Bladder: Yes  Urinary Catheter: Indication for Use of Catheter: Urology/Urologist seeing this patient or inserted indwelling catheter   Colostomy/Ileostomy/Ileal Conduit: No       Date of Last BM: 03/05/2022    Intake/Output Summary (Last 24 hours) at 3/3/2022 0153  Last data filed at 3/2/2022 1343  Gross per 24 hour   Intake --   Output 1850 ml   Net -1850 ml     I/O last 3 completed shifts: In: 0   Out: 3600 [Urine:3600]    Safety Concerns:     Sundowners [place] and At Risk for Falls    Impairments/Disabilities:      Vision and Hearing    Nutrition Therapy:  Current Nutrition Therapy:   - Oral Diet:  General    Routes of Feeding: Oral  Liquids: Thin Liquids  Daily Fluid Restriction: no  Last Modified Barium Swallow with Video (Video Swallowing Test): done on 03/2022    Treatments at the Time of Hospital Discharge:   Respiratory Treatments: 03/05/2022  Oxygen Therapy:  is not on home oxygen therapy.   Ventilator:    - No ventilator support    Rehab Therapies: Physical Therapy  Weight Bearing Status/Restrictions: No weight bearing restirctions  Other Medical Equipment (for information only, NOT a DME order):  walker  Other Treatments: none    Patient's personal belongings (please select all that are sent with patient):  Glasses    RN SIGNATURE:  Electronically signed by Nohemi Delaney RN on 3/5/22 at 2:07 PM EST    CASE MANAGEMENT/SOCIAL WORK SECTION    Inpatient Status Date: 3/4/22    Readmission Risk Assessment Score:  Readmission Risk              Risk of Unplanned Readmission:  47           Discharging to Facility/ Agency   Name: [facility]  Address:    [facility] [place] [address]         Phone: [phone]       Fax: [phone]            Dialysis Facility (if applicable)   Name:  Address:  Dialysis Schedule:  Phone:  Fax:    / signature: Electronically signed by EROS Burk, SANDYW on 3/4/22 at 1:41 PM EST    PHYSICIAN SECTION    Prognosis: Poor    Condition at Discharge: Guarded    Rehab Potential (if transferring to Rehab): Guarded    Recommended Labs or Other Treatments After Discharge:     Physician Certification: I certify the above information and transfer of Kelly Russell  is necessary for the continuing treatment of the diagnosis listed and that he requires [facility] for greater 30 days.      Update Admission H&P: No change in H&P    PHYSICIAN SIGNATURE:  Electronically signed by Shashank Rios MD on 3/5/22 at 2:12 PM EST

## 2022-03-03 NOTE — FLOWSHEET NOTE
[place] [address] PROGRESS NOTE      Patient: Kuldepe Moser  Room #: 1O-77/890-Q            YOB: 1931  Age: 80 y.o. Gender: male            Admit Date & Time: 2/22/2022  9:12 AM    Assessment:  Tobin Adams acknowledged that rest is important for him today. He also spoke about the medical team doing everything they need to do for him. Interventions:  I offered space for Tobin Adams to share about what was most important for him today. I offered a blessing and let him know that  support is available for conversations and spiritual support. Outcomes:  Tobin Adams acknowledged his gratitude for the visit and knows that  support is available. Plan:    1. Continued presence and offering space for conversation and prayer.     Electronically signed by Hair Kerr on 3/3/2022 at 1:51 PM.  [facility]  [phone]

## 2022-03-03 NOTE — CARE COORDINATION
Discharge Planning Update:    NG fell out. ENT has been consulted as patient failed FEE, CT of neck ordered. D5 gtt ordered. Plan to remove temp HD cath tomorrow. Remains on IV Zosyn.   Electronically signed by Rigoberto Bragg RN on 3/3/2022 at 2:15 PM

## 2022-03-03 NOTE — PROGRESS NOTES
Patient's son asking that SW calls him to talk with him regarding future planning. Will pass on in report.

## 2022-03-03 NOTE — PLAN OF CARE
Problem: Falls - Risk of:  Goal: Will remain free from falls  Description: Will remain free from falls  Outcome: Ongoing  Goal: Absence of physical injury  Description: Absence of physical injury  Outcome: Ongoing     Problem: Skin Integrity:  Goal: Will show no infection signs and symptoms  Description: Will show no infection signs and symptoms  Outcome: Ongoing  Goal: Absence of new skin breakdown  Description: Absence of new skin breakdown  Outcome: Ongoing     Problem: Discharge Planning:  Goal: Discharged to appropriate level of care  Description: Discharged to appropriate level of care  Outcome: Ongoing     Problem: Nutrition  Goal: Optimal nutrition therapy  Outcome: Ongoing     Problem: Urinary Elimination:  Goal: Signs and symptoms of infection will decrease  Description: Signs and symptoms of infection will decrease  Outcome: Ongoing  Goal: Complications related to the disease process, condition or treatment will be avoided or minimized  Description: Complications related to the disease process, condition or treatment will be avoided or minimized  Outcome: Ongoing     Problem: DISCHARGE BARRIERS  Goal: Patient's continuum of care needs are met  Outcome: Ongoing     Care plan reviewed with patient. Patient unable verbalized understanding of the plan of care and contribute to goal setting.

## 2022-03-04 LAB
ANION GAP SERPL CALCULATED.3IONS-SCNC: 11 MEQ/L (ref 8–16)
BASOPHILS # BLD: 0.7 %
BASOPHILS ABSOLUTE: 0.1 THOU/MM3 (ref 0–0.1)
BUN BLDV-MCNC: 17 MG/DL (ref 7–22)
CALCIUM SERPL-MCNC: 7.8 MG/DL (ref 8.5–10.5)
CHLORIDE BLD-SCNC: 112 MEQ/L (ref 98–111)
CO2: 23 MEQ/L (ref 23–33)
CREAT SERPL-MCNC: 2.5 MG/DL (ref 0.4–1.2)
EOSINOPHIL # BLD: 2.1 %
EOSINOPHILS ABSOLUTE: 0.3 THOU/MM3 (ref 0–0.4)
ERYTHROCYTE [DISTWIDTH] IN BLOOD BY AUTOMATED COUNT: 16.1 % (ref 11.5–14.5)
ERYTHROCYTE [DISTWIDTH] IN BLOOD BY AUTOMATED COUNT: 51.9 FL (ref 35–45)
GFR SERPL CREATININE-BSD FRML MDRD: 24 ML/MIN/1.73M2
GLUCOSE BLD-MCNC: 106 MG/DL (ref 70–108)
GLUCOSE BLD-MCNC: 116 MG/DL (ref 70–108)
GLUCOSE BLD-MCNC: 119 MG/DL (ref 70–108)
GLUCOSE BLD-MCNC: 119 MG/DL (ref 70–108)
GLUCOSE BLD-MCNC: 123 MG/DL (ref 70–108)
GLUCOSE BLD-MCNC: 95 MG/DL (ref 70–108)
HCT VFR BLD CALC: 32.8 % (ref 42–52)
HEMOGLOBIN: 10 GM/DL (ref 14–18)
IMMATURE GRANS (ABS): 0.18 THOU/MM3 (ref 0–0.07)
IMMATURE GRANULOCYTES: 1.4 %
LYMPHOCYTES # BLD: 11 %
LYMPHOCYTES ABSOLUTE: 1.4 THOU/MM3 (ref 1–4.8)
MCH RBC QN AUTO: 26.8 PG (ref 26–33)
MCHC RBC AUTO-ENTMCNC: 30.5 GM/DL (ref 32.2–35.5)
MCV RBC AUTO: 87.9 FL (ref 80–94)
MONOCYTES # BLD: 6.4 %
MONOCYTES ABSOLUTE: 0.8 THOU/MM3 (ref 0.4–1.3)
NUCLEATED RED BLOOD CELLS: 0 /100 WBC
PLATELET # BLD: 415 THOU/MM3 (ref 130–400)
PMV BLD AUTO: 9.2 FL (ref 9.4–12.4)
POTASSIUM SERPL-SCNC: 3.2 MEQ/L (ref 3.5–5.2)
RBC # BLD: 3.73 MILL/MM3 (ref 4.7–6.1)
SEG NEUTROPHILS: 78.4 %
SEGMENTED NEUTROPHILS ABSOLUTE COUNT: 10 THOU/MM3 (ref 1.8–7.7)
SODIUM BLD-SCNC: 146 MEQ/L (ref 135–145)
WBC # BLD: 12.8 THOU/MM3 (ref 4.8–10.8)

## 2022-03-04 PROCEDURE — APPNB45 APP NON BILLABLE 31-45 MINUTES: Performed by: NURSE PRACTITIONER

## 2022-03-04 PROCEDURE — 6370000000 HC RX 637 (ALT 250 FOR IP): Performed by: STUDENT IN AN ORGANIZED HEALTH CARE EDUCATION/TRAINING PROGRAM

## 2022-03-04 PROCEDURE — 6370000000 HC RX 637 (ALT 250 FOR IP)

## 2022-03-04 PROCEDURE — 1200000003 HC TELEMETRY R&B

## 2022-03-04 PROCEDURE — 36415 COLL VENOUS BLD VENIPUNCTURE: CPT

## 2022-03-04 PROCEDURE — 6360000002 HC RX W HCPCS

## 2022-03-04 PROCEDURE — 99232 SBSQ HOSP IP/OBS MODERATE 35: CPT | Performed by: INTERNAL MEDICINE

## 2022-03-04 PROCEDURE — 99232 SBSQ HOSP IP/OBS MODERATE 35: CPT | Performed by: FAMILY MEDICINE

## 2022-03-04 PROCEDURE — 6360000002 HC RX W HCPCS: Performed by: STUDENT IN AN ORGANIZED HEALTH CARE EDUCATION/TRAINING PROGRAM

## 2022-03-04 PROCEDURE — 2580000003 HC RX 258: Performed by: STUDENT IN AN ORGANIZED HEALTH CARE EDUCATION/TRAINING PROGRAM

## 2022-03-04 PROCEDURE — 94640 AIRWAY INHALATION TREATMENT: CPT

## 2022-03-04 PROCEDURE — 94760 N-INVAS EAR/PLS OXIMETRY 1: CPT

## 2022-03-04 PROCEDURE — 94669 MECHANICAL CHEST WALL OSCILL: CPT

## 2022-03-04 PROCEDURE — 85025 COMPLETE CBC W/AUTO DIFF WBC: CPT

## 2022-03-04 PROCEDURE — 80048 BASIC METABOLIC PNL TOTAL CA: CPT

## 2022-03-04 PROCEDURE — 82948 REAGENT STRIP/BLOOD GLUCOSE: CPT

## 2022-03-04 RX ORDER — POTASSIUM CHLORIDE 7.45 MG/ML
10 INJECTION INTRAVENOUS
Status: COMPLETED | OUTPATIENT
Start: 2022-03-04 | End: 2022-03-05

## 2022-03-04 RX ORDER — INSULIN GLARGINE 100 [IU]/ML
18 INJECTION, SOLUTION SUBCUTANEOUS NIGHTLY
Status: DISCONTINUED | OUTPATIENT
Start: 2022-03-04 | End: 2022-03-05 | Stop reason: HOSPADM

## 2022-03-04 RX ORDER — HALOPERIDOL 5 MG/ML
2 INJECTION INTRAMUSCULAR EVERY 6 HOURS PRN
Status: DISCONTINUED | OUTPATIENT
Start: 2022-03-04 | End: 2022-03-05 | Stop reason: HOSPADM

## 2022-03-04 RX ADMIN — PANTOPRAZOLE SODIUM 40 MG: 40 TABLET, DELAYED RELEASE ORAL at 16:04

## 2022-03-04 RX ADMIN — BUDESONIDE 500 MCG: 0.5 INHALANT RESPIRATORY (INHALATION) at 08:04

## 2022-03-04 RX ADMIN — BUDESONIDE 500 MCG: 0.5 INHALANT RESPIRATORY (INHALATION) at 15:13

## 2022-03-04 RX ADMIN — Medication 500 UNITS: at 15:30

## 2022-03-04 RX ADMIN — HEPARIN SODIUM 5000 UNITS: 5000 INJECTION INTRAVENOUS; SUBCUTANEOUS at 21:58

## 2022-03-04 RX ADMIN — PIPERACILLIN AND TAZOBACTAM 3375 MG: 3; .375 INJECTION, POWDER, LYOPHILIZED, FOR SOLUTION INTRAVENOUS at 02:32

## 2022-03-04 RX ADMIN — TIOTROPIUM BROMIDE INHALATION SPRAY 2 PUFF: 3.12 SPRAY, METERED RESPIRATORY (INHALATION) at 08:11

## 2022-03-04 RX ADMIN — POTASSIUM CHLORIDE 10 MEQ: 7.46 INJECTION, SOLUTION INTRAVENOUS at 21:50

## 2022-03-04 RX ADMIN — DOCUSATE SODIUM 100 MG: 100 CAPSULE, LIQUID FILLED ORAL at 21:53

## 2022-03-04 RX ADMIN — HEPARIN SODIUM 5000 UNITS: 5000 INJECTION INTRAVENOUS; SUBCUTANEOUS at 15:31

## 2022-03-04 RX ADMIN — POTASSIUM CHLORIDE 10 MEQ: 7.46 INJECTION, SOLUTION INTRAVENOUS at 23:57

## 2022-03-04 RX ADMIN — ARFORMOTEROL TARTRATE 15 MCG: 15 SOLUTION RESPIRATORY (INHALATION) at 08:04

## 2022-03-04 RX ADMIN — Medication 360 MG: at 21:53

## 2022-03-04 RX ADMIN — ARFORMOTEROL TARTRATE 15 MCG: 15 SOLUTION RESPIRATORY (INHALATION) at 15:13

## 2022-03-04 RX ADMIN — PIPERACILLIN AND TAZOBACTAM 3375 MG: 3; .375 INJECTION, POWDER, LYOPHILIZED, FOR SOLUTION INTRAVENOUS at 16:03

## 2022-03-04 RX ADMIN — Medication 1 CAPSULE: at 15:30

## 2022-03-04 ASSESSMENT — PAIN SCALES - WONG BAKER

## 2022-03-04 ASSESSMENT — PAIN SCALES - GENERAL: PAINLEVEL_OUTOF10: 0

## 2022-03-04 NOTE — PROGRESS NOTES
[facility]   Nephrology progress note  3/4/2022, 9:31 AM      Pt Name:    Ashlee Arce  MRN:     [id number]     YOB: 1931  Admit Date:    2/22/2022  9:12 AM  Primary Care Physician:  Aniyah Rosario MD   Room number  3F-15/572-F    Chief Complaint: Nephrology following for STEPHY/hyperkalemia and metabolic acidosis    Subjective:  Patient seen and examined. More awake and alert  Seen earlier today during rounds  No acute distress  Urine output improved    Objective:  24HR INTAKE/OUTPUT:      Intake/Output Summary (Last 24 hours) at 3/4/2022 0931  Last data filed at 3/4/2022 0928  Gross per 24 hour   Intake 820 ml   Output 3450 ml   Net -2630 ml     I/O last 3 completed shifts: In: 0   Out: 3800 [Urine:3800]  I/O this shift: In: 820 [P.O.:820]  Out: 500 [Urine:500]  Admission weight: 212 lb (96.2 kg)  Wt Readings from Last 3 Encounters:   03/04/22 213 lb (96.6 kg)   01/03/22 212 lb (96.2 kg)   12/06/21 210 lb (95.3 kg)     Body mass index is 34.38 kg/m².     Physical examination  VITALS:     Vitals:    03/04/22 0044 03/04/22 0612 03/04/22 0805 03/04/22 0906   BP: (!) 163/72 (!) 165/75  (!) 142/88   Pulse: 70 63  91   Resp: 16 16  16   Temp: 97.9 °F (36.6 °C) 98.7 °F (37.1 °C)  98.2 °F (36.8 °C)   TempSrc: Axillary Axillary  Oral   SpO2: 96% 97% 96% 95%   Weight:  213 lb (96.6 kg)     Height:         General Appearance:  no distress  Mouth/Throat: Oral mucosa dry  Neck: No JVD  Lungs:  no use of accessory muscles  GI: soft, non-tender, no guarding  Extremities: No significant LE edema      Lab Data  CBC:   Recent Labs     03/02/22  0500 03/03/22  0555 03/04/22  0512   WBC 11.9* 13.8* 12.8*   HGB 9.1* 9.3* 10.0*   HCT 29.4* 30.4* 32.8*    399 415*     BMP:  Recent Labs     03/03/22  0555 03/03/22  1813 03/04/22  0512   * 144 146*   K 3.2* 3.8 3.2*   * 111 112*   CO2 23 20* 23   BUN 19 18 17   CREATININE 2.7* 2.5* 2.5*   GLUCOSE 105 103 119*   CALCIUM 7.7* 7. 6* 7.8*     Hepatic:   Recent Labs     03/03/22  0555   LABALBU 2.6*   AST 11   ALT 9*   BILITOT 0.2*   ALKPHOS 49         Meds:  Infusion:    dextrose 50 mL/hr at 03/03/22 1204    dextrose      sodium chloride      dextrose      Or    dextrose       Meds:    calcium replacement protocol   Other RX Placeholder    pantoprazole  40 mg Oral BID AC    insulin lispro  0-6 Units SubCUTAneous TID WC    insulin lispro  0-3 Units SubCUTAneous Nightly    piperacillin-tazobactam  3,375 mg IntraVENous Q12H    Arformoterol Tartrate  15 mcg Nebulization BID    budesonide  0.5 mg Nebulization BID    docusate sodium  100 mg Oral BID    ferrous gluconate  360 mg Oral BID    lactobacillus  1 capsule Oral BID WC    levothyroxine  175 mcg Oral Daily    [Held by provider] lisinopril  2.5 mg Oral Daily    tiotropium  2 puff Inhalation Daily    Vitamin D  500 Units Oral Daily    sodium chloride flush  10 mL IntraVENous 2 times per day    heparin (porcine)  5,000 Units SubCUTAneous 3 times per day     Meds prn: bisacodyl, hydrALAZINE, ipratropium-albuterol, glucose, glucagon (rDNA), dextrose, albuterol sulfate HFA, sodium chloride flush, sodium chloride, ondansetron **OR** ondansetron, polyethylene glycol, acetaminophen **OR** acetaminophen, dextrose **OR** dextrose       Impression and Plan:  1. STEPHY in setting of hypotension/sepsis in setting of lisinopril and Aldactone. Likely has ischemic ATN. Nonoliguric. Anticipate improvement  No need for further dialysis  Serum creatinine stable  Okay to remove dialysis catheter    2. Hypernatremia likely post ATN diuresis:  Continue with D5W, improving    3. Mild hypokalemia  4. Borderline hypotension. Improved  5. Metabolic acidosis. Improved. 5.  Chronic grade 1 diastolic dysfunction. Clinically not in decompensated heart failure  6. Sepsis secondary to UTI. On IV antibiotics  7. Aspiration.   ENT was consulted by hospitalist services      Vinh Rosario MD  [facility]

## 2022-03-04 NOTE — PROGRESS NOTES
PROGRESS NOTE      Patient:  [facility]      Unit/Bed:6K-18/018-A    YOB: 1931    MRN: [id number]       Acct: [de-identified]     PCP: Noel Deng MD    Date of Admission: 2/22/2022    Assessment/Plan:    Active Hospital Problems    Diagnosis Date Noted    Bradycardia [R00.1]      Priority: High    Vocal cord paralysis [J38.00]     Hypernatremia [E87.0]     ATN (acute tubular necrosis) (HCC) [N17.0]     STEPHY (acute kidney injury) ([address] [N17.9]     Hyperkalemia [E87.5]     Leukocytosis [D72.829]     Septicemia ([address] [A41.9]     Suprapubic catheter ([address] [Z93.59]     Sepsis ([address] [A41.9] 02/22/2022    Dysphagia [L14.27]     Complicated UTI (urinary tract infection) [N39.0]     Abdominal pain [R10.9]      Sepsis: resolved   Likely secondary to UTI. MRSA PCR negative, Vanc was discontinued. Discontinue zosyn on discharge, completed 10 days total course as of 3/4/2022  Monitor vitals    Acute kidney injury on CKD4 - Stable. Likely multifactorial in the setting of hyperkalemia and sepsis. Imaging was negative for hydronephrosis. Patient was on lisinopril and Aldactone these have been held. IVF NS  Avoid nephrotoxic agents if possible  Nephrology on board, appreciate assistance and recommendations  Removal of Dialysis catheter per POA discussion evening of 4/1/3602    Complicated UTI: Improved. Purulent drainage noted to be coming from suprapubic catheter site on admission. CT abdomen pelvis w/o signs of intra-abdominal/pelvic abnormality. Doxazosin discontinued due to suprapubic cath in place. Urine cx enterobacter sensitive to zosyn and E Faecalis sensitive to penicillins. Zosyn able to cover both. Discontinue zosyn on discharge, completed 10 days total course as of 3/4/2022    Dysphagia  SLP consulted  ENT consulted  Pleasure feeds, code status changed to [facility], home with hospice    Acute hyperkalemia: Resolved.  3/1/2022 acute hypokalemia  Hyperkalemia in the setting of acute kidney injury  No acute EKG changes were noted  Patient was on lisinopril and Aldactone prior to admission  2/22/2022 patient received hemodialysis  Hypokalemia Started 3/1/2022  Potassium chloride IV replacement until discharge  Monitor K+ on morning BMP    Hyponatremia: Resolved now hypernatremic - improving  Will monitor BMP and vitals  Continue IVF    Anion gap metabolic acidosis: Resolved   In the setting of sepsis and acute kidney injury. Bicarb drip was initiated by nephrology and recently continued. Nephrology following, appreciate the assistance    Dementia  Haldol 2mg q6 prn for aggitation at request of family POA    Chronic HFpEF:   Not in acute exacerbation echo 6/2021 LVEF 55% with grade 1 diastolic dysfunction    Diabetes mellitus type 2  Lantus was not restarted at time of admission due to [address], has been controlled. Monitor glucose    COPD  Arformoterol and budesonide    Sinus Pauses w/ sinus bradycardia  Cardiology consulted. No additional intervention or work up per family. Discontinue telemetry     Diet: Comfort feeds  DVT prophylaxis: subq heparin    Code Status: DNR-CC    Disposition:  Likely discharge tomorrow and home with hospice  ----------------------------------------------------------------------------------------------------    Chief Complaint: Abdominal pain, bilateral LE swelling, and poor PO intake    Hospital Course: Sander Khan is a 61-year-old male admitted on 2/22/2022 with chief complaint of bilateral leg swelling, worsening confusion, and abdominal pain. Patient is a resident at assisted living. Noted purulent drainage coming from suprapubic catheter site. No signs of peritonitis at this time. CT abdomen pelvis revealed no evidence of acute intra-abdominal or intrapelvic abnormalities. Zosyn was started. Suprapubic catheter was exchanged while in the ER on 2/22/2022.  Urine culture obtained and showed multiple bacteria, which were sensitive to zosyn and completed 7 day course on 2/28. Also of note, Nephrology was consulted for acute hyperkalemia and acute kidney injury. 2/22/2022 patient was transferred to the ICU and received dialysis overnight. Patient was transferred to general medicine floor on 2/23. After transfer, did well without acute events. Some sinus bradycardia with pauses. Consulted cardiology who spoke with Dr. Celio Lundberg, who said not to pursue pacemaker due to other conditions. Continue DNR-CCA as can continue medications. Also assessed by PT/OT who are now recommending SNF.    3/1/2022  Nursing reports patient choking on medications and fluids. SPL consult. Hypokalemia KCl IVPB  SLP swallow test - NPO    3/2/2022  Hypokalemia, KCl IVPB  Modified barium swallow planned    3/3  Hypernatremia  Start NG feeds  Code status changed to [facility] and home with hospice     3/4  Family meeting with POA, hospice, social work    Subjective:  Patient seen laying comfortable in bedside this morning, sitter in the room. Patient remains alert and pleasant to talk to. Patient states he has no complaints aside from the dialysis line in his neck. Discussion with POA last night, see significant event for details. DNR-CC code status change and home with hospice. Request for PRN haldol on discharge for agitation. Hypokalemic, potassium replacement IV this evening. Placement for discharge processed - likely discharge to [facility].     Medications:  Reviewed    Infusion Medications    dextrose 50 mL/hr at 03/03/22 1204    dextrose      sodium chloride      dextrose      Or    dextrose       Scheduled Medications    calcium replacement protocol   Other RX Placeholder    pantoprazole  40 mg Oral BID AC    insulin lispro  0-6 Units SubCUTAneous TID WC    insulin lispro  0-3 Units SubCUTAneous Nightly    piperacillin-tazobactam  3,375 mg IntraVENous Q12H    Arformoterol Tartrate  15 mcg Nebulization BID    budesonide  0.5 mg Nebulization BID    docusate sodium  100 mg Oral BID    ferrous gluconate  360 mg Oral BID    lactobacillus  1 capsule Oral BID     levothyroxine  175 mcg Oral Daily    [Held by provider] lisinopril  2.5 mg Oral Daily    tiotropium  2 puff Inhalation Daily    Vitamin D  500 Units Oral Daily    sodium chloride flush  10 mL IntraVENous 2 times per day    heparin (porcine)  5,000 Units SubCUTAneous 3 times per day     PRN Meds: bisacodyl, hydrALAZINE, ipratropium-albuterol, glucose, glucagon (rDNA), dextrose, albuterol sulfate HFA, sodium chloride flush, sodium chloride, ondansetron **OR** ondansetron, polyethylene glycol, acetaminophen **OR** acetaminophen, dextrose **OR** dextrose      Intake/Output Summary (Last 24 hours) at 3/4/2022 0716  Last data filed at 3/4/2022 0507  Gross per 24 hour   Intake 0 ml   Output 2950 ml   Net -2950 ml       Diet:  ADULT DIET; Regular    Exam:  BP (!) 165/75   Pulse 63   Temp 98.7 °F (37.1 °C) (Axillary)   Resp 16   Ht 5' 6\" (1.676 m)   Wt 213 lb (96.6 kg)   SpO2 97%   BMI 34.38 kg/m²     Physical Exam:  General appearance: Alert, not in acute distress  HEENT:  Normal cephalic, atraumatic without obvious deformity. Pupils equal, round, and reactive to light. Conjunctivae clear. Clear oral mucosa. Neck: Supple, with full range of motion. No jugular venous distention. Trachea midline. Respiratory:  Normal respiratory effort. No Rales/Rhonchi. Expiratory wheezes with history of COPD  Cardiovascular: Normal rate, regular rhythm with normal S1/S2 without murmurs, rubs or gallops. Abdomen: Soft, non-tender, non-distended with normal bowel sounds. Musculoskeletal:  No clubbing, cyanosis or edema bilaterally. Full range of motion without deformity. Skin: No rashes or lesions. Neurological exam reveals alert, normal speech, no focal findings or movement disorder noted.   Exam of extremities: peripheral pulses normal, mild leg edema, no clubbing or cyanosis    Labs: Recent Labs     03/02/22  0500 03/03/22  0555 03/04/22  0512   WBC 11.9* 13.8* 12.8*   HGB 9.1* 9.3* 10.0*   HCT 29.4* 30.4* 32.8*    399 415*     Recent Labs     03/03/22  0555 03/03/22  1813 03/04/22  0512   * 144 146*   K 3.2* 3.8 3.2*   * 111 112*   CO2 23 20* 23   BUN 19 18 17   CREATININE 2.7* 2.5* 2.5*   CALCIUM 7.7* 7.6* 7.8*     Recent Labs     03/03/22  0555   AST 11   ALT 9*   BILIDIR <0.2   BILITOT 0.2*   ALKPHOS 49     No results for input(s): INR in the last 72 hours. No results for input(s): Andrew Grates in the last 72 hours. Urinalysis:      Lab Results   Component Value Date    NITRU NEGATIVE 02/22/2022    WBCUA > 200 02/22/2022    BACTERIA MANY 02/22/2022    RBCUA > 100 02/22/2022    BLOODU LARGE 02/22/2022    SPECGRAV 1.017 09/22/2021    GLUCOSEU NEGATIVE 02/22/2022       Radiology:  CT SOFT TISSUE NECK WO CONTRAST   Final Result   Impression:   Degenerative changes within the cervical spine. This document has been electronically signed by: Molly Duff MD on    03/03/2022 07:11 PM      All CTs at this facility use dose modulation techniques and iterative    reconstructions, and/or weight-based dosing   when appropriate to reduce radiation to a low as reasonably achievable. XR ABDOMEN FOR NG/OG/NE TUBE PLACEMENT   Final Result      Nasogastric tube as above. Final report electronically signed by Dr. Sidney Ruiz on 3/2/2022 4:47 PM      FL MODIFIED BARIUM SWALLOW W VIDEO   Final Result   Worsening since the prior exam. There is laryngeal penetration with aspiration with thin liquids. There is laryngeal penetration without aspiration with nectar thick. Recommendations and comments are available from speech therapy. **This report has been created using voice recognition software. It may contain minor errors which are inherent in voice recognition technology. **      Final report electronically signed by Dr. Jelena Bird on 3/2/2022 10:56 AM      US RENAL COMPLETE   Final Result      Normal bilateral renal ultrasound. Final report electronically signed by Dr. Sandra Harper on 2/25/2022 2:21 PM      FL MODIFIED BARIUM SWALLOW W VIDEO   Final Result   1. No laryngeal penetration or aspiration of barium. 2. Additional recommendations from the speech therapist will follow. **This report has been created using voice recognition software. It may contain minor errors which are inherent in voice recognition technology. **         Final report electronically signed by Dr. Sandra Harper on 2/24/2022 10:11 AM      CT HEAD WO CONTRAST   Final Result   No acute intracranial findings. Chronic changes as described. This document has been electronically signed by: Mariam Chilel MD on    02/23/2022 05:54 AM      All CTs at this facility use dose modulation techniques and iterative    reconstructions, and/or weight-based dosing   when appropriate to reduce radiation to a low as reasonably achievable. XR CHEST PORTABLE   Final Result   Impression:   Right IJ line tip over the proximal SVC. This document has been electronically signed by: Mariam Chilel MD on    02/23/2022 12:43 AM      CT ABDOMEN PELVIS WO CONTRAST Additional Contrast? None   Final Result       1. No evidence of acute intra-abdominal or intrapelvic abnormality. 2. Moderate retained stool. 3. Nonobstructive nephrolithiasis on the left. 4. Prostatomegaly. **This report has been created using voice recognition software. It may contain minor errors which are inherent in voice recognition technology. **      Final report electronically signed by Dr. Michelle Rendon MD on 2/22/2022 11:01 AM      XR CHEST PORTABLE   Final Result   Stable radiographic appearance of the chest. No evidence of an acute process. **This report has been created using voice recognition software.  It may contain minor errors which are inherent in voice recognition technology. **      Final report electronically signed by Dr. Deborah June MD on 2/22/2022 10:09 AM          Electronically signed by Dariana Davis MD on 3/4/2022 at 7:26 AM

## 2022-03-04 NOTE — PROGRESS NOTES
Hospice referral completed with Dr. Radha Ledesma and his wife Myah Benitez in conference room(did not wish to discuss in front of Flynn Jc. Patient sitting up in bed, alert x 2. No s/s of distress or discomfort noted. POA aware of hospice care as he follows his patient's with hospice care. Did tell nurse that will want patient to go to Memory unit at [facility]- knows that care unable to be provided at [facility]. Let him know that SW has spoke to [facility] and be available on Memory unit. Told him that nurse will update SW that he is present so she can talk with him. POA told nurse that patient has pulled out suprapubic catheter often and he knows that he would do the same thing with PEG tube, does not want to put his father through this. Understands risk of aspiration and wishes for pleasure feeds with NO restrictions, if Flynn Jc would wish for something- he wants him to have it. Discussed frequent UTIs (wishes to continue ATB therapy) and worsening sundowners. POA does NOT want any opiates(spoke with him about renal toxicity with creatinine at 2.5mg he will want oxycodone when needed) ordered or given without him being consulted. Would be agreeable to Haldol IM for agitation if needed but NO benzodiazepines ordered unless discussed with him. Did let Dr. Kathy Grady know that unsure if haldol will be option as antipsychotic and ECFs are not aloud at some unless diagnosis for or provider agreeable that patient needs. Told POA that will check with provider at facility. Did offer choice of agency and Dr. Kathy Grady wishes for hospice service through [facility]. DNR-CC signed by POA and completed by Dr. Jeffrey Alvarez. discussed with Dr. Jeffrey Alvarez and he is in agreement for discharge to [facility] with hospice care on 03.05.22. Dr. Jeffrey Alvarez did confirm with Dr. Twan Patrick that haldol can be used at [facility] if needed. Dr. Jeffrey Alvarez will order IM prn for agitation to be continued on discharge orders.     Talked with Carlos Starr and

## 2022-03-04 NOTE — PROGRESS NOTES
CT imaging was reviewed with Dr Jono Potter - no definite mass found within limits of non contrast study. Chart notes indicate plan for discharge with hospice. ENT will sign off and family does not wish to pursue further interventions.     Electronically signed by MAEGAN Decker CNP on 3/4/2022 at 1:59 PM

## 2022-03-04 NOTE — CARE COORDINATION
3/4/22, 1:47 PM EST    Patient goals/plan/ treatment preferences discussed by  and . Patient goals/plan/ treatment preferences reviewed with patient/ family. Patient/ family verbalize understanding of discharge plan and are in agreement with goal/plan/treatment preferences. Understanding was demonstrated using the teach back method. AVS provided by RN at time of discharge, which includes all necessary medical information pertaining to the patients current course of illness, treatment, post-discharge goals of care, and treatment preferences. IMM Letter  IMM Letter given to Patient/Family/Significant other/Guardian/POA/by[de-identified] Staff  IMM Letter date given[de-identified] 02/22/22  IMM Letter time given[de-identified] 7200     Patient discharge tomorrow at 2pm by Arsenio Gonzalez to [facility] with Jing. RN updated and blue envelope placed on chart.

## 2022-03-04 NOTE — PLAN OF CARE
Problem: Falls - Risk of:  Goal: Will remain free from falls  Description: Will remain free from falls  3/3/2022 2321 by Marek Godfrey RN  Outcome: Met This Shift  3/3/2022 1755 by Denice Cranker, RN  Outcome: Ongoing  Note: Call light in reach, fall band on, bed alarm on. Non-skid socks on    Goal: Absence of physical injury  Description: Absence of physical injury  3/3/2022 2321 by Marek Godfrey RN  Outcome: Met This Shift  3/3/2022 1755 by Denice Cranker, RN  Outcome: Ongoing     Problem: Skin Integrity:  Goal: Will show no infection signs and symptoms  Description: Will show no infection signs and symptoms  3/3/2022 2321 by Marek Godfrey RN  Outcome: Met This Shift  3/3/2022 1755 by Denice Cranker, RN  Outcome: Ongoing  Goal: Absence of new skin breakdown  Description: Absence of new skin breakdown  3/3/2022 2321 by Marek Godfrey RN  Outcome: Met This Shift  3/3/2022 1755 by Denice Cranker, RN  Outcome: Ongoing  Note: No signs of new skin breakdown noted with each assessment this shift. Skin warm, dry, and intact except where otherwise noted in head-to-toe assessment. Mucous membranes pink and moist. Assistance with turns/ambulation given as needed.          Problem: Urinary Elimination:  Goal: Signs and symptoms of infection will decrease  Description: Signs and symptoms of infection will decrease  3/3/2022 2321 by Marek Godfrey RN  Outcome: Met This Shift  3/3/2022 1755 by Denice Cranker, RN  Outcome: Ongoing  Goal: Complications related to the disease process, condition or treatment will be avoided or minimized  Description: Complications related to the disease process, condition or treatment will be avoided or minimized  3/3/2022 2321 by Marek Godfrey RN  Outcome: Met This Shift  3/3/2022 1755 by Denice Cranker, RN  Outcome: Ongoing     Problem: DISCHARGE BARRIERS  Goal: Patient's continuum of care needs are met  3/3/2022 2321 by Marek Godfrey RN  Outcome: Met This Shift  3/3/2022 1755 by Cooper Wellington RN  Outcome: Ongoing     Problem: Discharge Planning:  Goal: Discharged to appropriate level of care  Description: Discharged to appropriate level of care  3/3/2022 2321 by Radha Enriquez RN  Outcome: Ongoing  3/3/2022 1755 by Cooper Wellington RN  Outcome: Ongoing  Note: The current medical regimen is effective;  continue present plan and medications.        Problem: Nutrition  Goal: Optimal nutrition therapy  3/3/2022 2321 by Radha Enriquez RN  Outcome: Not Met This Shift  3/3/2022 1755 by Cooper Wellington RN  Outcome: Ongoing  Note: Patient continues to be NPO at this time and does not have and NG.   3/3/2022 1130 by Lissette Diaz RD, LD  Outcome: Ongoing

## 2022-03-04 NOTE — PROGRESS NOTES
Temporary dialysis catheter removed and pressure held with quick clot for 5 minutes, sterile dressing in place. Educated patient and staff at the bedside to have the patient remain flat for at least 20 minutes.  Patient tolerated procedure well

## 2022-03-05 VITALS
WEIGHT: 219.14 LBS | RESPIRATION RATE: 18 BRPM | TEMPERATURE: 97.9 F | DIASTOLIC BLOOD PRESSURE: 52 MMHG | HEIGHT: 66 IN | BODY MASS INDEX: 35.22 KG/M2 | HEART RATE: 63 BPM | OXYGEN SATURATION: 99 % | SYSTOLIC BLOOD PRESSURE: 123 MMHG

## 2022-03-05 LAB
ANION GAP SERPL CALCULATED.3IONS-SCNC: 9 MEQ/L (ref 8–16)
BASOPHILS # BLD: 0.6 %
BASOPHILS ABSOLUTE: 0.1 THOU/MM3 (ref 0–0.1)
BUN BLDV-MCNC: 14 MG/DL (ref 7–22)
CALCIUM SERPL-MCNC: 7.4 MG/DL (ref 8.5–10.5)
CHLORIDE BLD-SCNC: 113 MEQ/L (ref 98–111)
CO2: 23 MEQ/L (ref 23–33)
CREAT SERPL-MCNC: 2.3 MG/DL (ref 0.4–1.2)
EOSINOPHIL # BLD: 1.9 %
EOSINOPHILS ABSOLUTE: 0.3 THOU/MM3 (ref 0–0.4)
ERYTHROCYTE [DISTWIDTH] IN BLOOD BY AUTOMATED COUNT: 16 % (ref 11.5–14.5)
ERYTHROCYTE [DISTWIDTH] IN BLOOD BY AUTOMATED COUNT: 50.9 FL (ref 35–45)
GFR SERPL CREATININE-BSD FRML MDRD: 27 ML/MIN/1.73M2
GLUCOSE BLD-MCNC: 100 MG/DL (ref 70–108)
GLUCOSE BLD-MCNC: 109 MG/DL (ref 70–108)
GLUCOSE BLD-MCNC: 135 MG/DL (ref 70–108)
HCT VFR BLD CALC: 30.9 % (ref 42–52)
HEMOGLOBIN: 9.6 GM/DL (ref 14–18)
IMMATURE GRANS (ABS): 0.15 THOU/MM3 (ref 0–0.07)
IMMATURE GRANULOCYTES: 1.1 %
LYMPHOCYTES # BLD: 11.7 %
LYMPHOCYTES ABSOLUTE: 1.6 THOU/MM3 (ref 1–4.8)
MCH RBC QN AUTO: 27 PG (ref 26–33)
MCHC RBC AUTO-ENTMCNC: 31.1 GM/DL (ref 32.2–35.5)
MCV RBC AUTO: 86.8 FL (ref 80–94)
MONOCYTES # BLD: 6.9 %
MONOCYTES ABSOLUTE: 0.9 THOU/MM3 (ref 0.4–1.3)
NUCLEATED RED BLOOD CELLS: 0 /100 WBC
PLATELET # BLD: 403 THOU/MM3 (ref 130–400)
PMV BLD AUTO: 9.4 FL (ref 9.4–12.4)
POTASSIUM SERPL-SCNC: 3.4 MEQ/L (ref 3.5–5.2)
RBC # BLD: 3.56 MILL/MM3 (ref 4.7–6.1)
SEG NEUTROPHILS: 77.8 %
SEGMENTED NEUTROPHILS ABSOLUTE COUNT: 10.6 THOU/MM3 (ref 1.8–7.7)
SODIUM BLD-SCNC: 145 MEQ/L (ref 135–145)
WBC # BLD: 13.6 THOU/MM3 (ref 4.8–10.8)

## 2022-03-05 PROCEDURE — 94640 AIRWAY INHALATION TREATMENT: CPT

## 2022-03-05 PROCEDURE — 6360000002 HC RX W HCPCS: Performed by: STUDENT IN AN ORGANIZED HEALTH CARE EDUCATION/TRAINING PROGRAM

## 2022-03-05 PROCEDURE — 85025 COMPLETE CBC W/AUTO DIFF WBC: CPT

## 2022-03-05 PROCEDURE — 6370000000 HC RX 637 (ALT 250 FOR IP)

## 2022-03-05 PROCEDURE — 82948 REAGENT STRIP/BLOOD GLUCOSE: CPT

## 2022-03-05 PROCEDURE — 2580000003 HC RX 258

## 2022-03-05 PROCEDURE — 2580000003 HC RX 258: Performed by: STUDENT IN AN ORGANIZED HEALTH CARE EDUCATION/TRAINING PROGRAM

## 2022-03-05 PROCEDURE — 94669 MECHANICAL CHEST WALL OSCILL: CPT

## 2022-03-05 PROCEDURE — 99238 HOSP IP/OBS DSCHRG MGMT 30/<: CPT | Performed by: FAMILY MEDICINE

## 2022-03-05 PROCEDURE — 6360000002 HC RX W HCPCS

## 2022-03-05 PROCEDURE — 6370000000 HC RX 637 (ALT 250 FOR IP): Performed by: HOSPITALIST

## 2022-03-05 PROCEDURE — 36415 COLL VENOUS BLD VENIPUNCTURE: CPT

## 2022-03-05 PROCEDURE — 80048 BASIC METABOLIC PNL TOTAL CA: CPT

## 2022-03-05 PROCEDURE — 94760 N-INVAS EAR/PLS OXIMETRY 1: CPT

## 2022-03-05 RX ORDER — HALOPERIDOL 5 MG/ML
2 INJECTION INTRAMUSCULAR EVERY 6 HOURS PRN
Qty: 2 ML | Refills: 0 | DISCHARGE
Start: 2022-03-05

## 2022-03-05 RX ADMIN — POTASSIUM CHLORIDE 10 MEQ: 7.46 INJECTION, SOLUTION INTRAVENOUS at 04:21

## 2022-03-05 RX ADMIN — POTASSIUM CHLORIDE 10 MEQ: 7.46 INJECTION, SOLUTION INTRAVENOUS at 01:59

## 2022-03-05 RX ADMIN — IPRATROPIUM BROMIDE AND ALBUTEROL SULFATE 1 AMPULE: .5; 3 SOLUTION RESPIRATORY (INHALATION) at 08:57

## 2022-03-05 RX ADMIN — Medication 360 MG: at 08:29

## 2022-03-05 RX ADMIN — TIOTROPIUM BROMIDE INHALATION SPRAY 2 PUFF: 3.12 SPRAY, METERED RESPIRATORY (INHALATION) at 09:06

## 2022-03-05 RX ADMIN — DOCUSATE SODIUM 100 MG: 100 CAPSULE, LIQUID FILLED ORAL at 08:29

## 2022-03-05 RX ADMIN — Medication 500 UNITS: at 08:29

## 2022-03-05 RX ADMIN — PIPERACILLIN AND TAZOBACTAM 3375 MG: 3; .375 INJECTION, POWDER, LYOPHILIZED, FOR SOLUTION INTRAVENOUS at 04:27

## 2022-03-05 RX ADMIN — SODIUM CHLORIDE 25 ML: 9 INJECTION, SOLUTION INTRAVENOUS at 04:19

## 2022-03-05 RX ADMIN — ARFORMOTEROL TARTRATE 15 MCG: 15 SOLUTION RESPIRATORY (INHALATION) at 08:57

## 2022-03-05 RX ADMIN — SODIUM CHLORIDE, PRESERVATIVE FREE 10 ML: 5 INJECTION INTRAVENOUS at 08:29

## 2022-03-05 RX ADMIN — Medication 1 CAPSULE: at 08:29

## 2022-03-05 RX ADMIN — BUDESONIDE 500 MCG: 0.5 INHALANT RESPIRATORY (INHALATION) at 08:57

## 2022-03-05 ASSESSMENT — PAIN SCALES - GENERAL: PAINLEVEL_OUTOF10: 0

## 2022-03-05 NOTE — PLAN OF CARE
Problem: Falls - Risk of:  Goal: Will remain free from falls  Description: Will remain free from falls  Outcome: Met This Shift  Goal: Absence of physical injury  Description: Absence of physical injury  Outcome: Met This Shift     Problem: Skin Integrity:  Goal: Will show no infection signs and symptoms  Description: Will show no infection signs and symptoms  Outcome: Met This Shift  Goal: Absence of new skin breakdown  Description: Absence of new skin breakdown  Outcome: Met This Shift     Problem: Nutrition  Goal: Optimal nutrition therapy  Outcome: Met This Shift     Problem: Urinary Elimination:  Goal: Signs and symptoms of infection will decrease  Description: Signs and symptoms of infection will decrease  Outcome: Met This Shift  Goal: Complications related to the disease process, condition or treatment will be avoided or minimized  Description: Complications related to the disease process, condition or treatment will be avoided or minimized  Outcome: Met This Shift     Problem: DISCHARGE BARRIERS  Goal: Patient's continuum of care needs are met  Outcome: Met This Shift     Problem: Discharge Planning:  Goal: Discharged to appropriate level of care  Description: Discharged to appropriate level of care  Outcome: Ongoing

## 2022-03-05 NOTE — DISCHARGE SUMMARY
DISCHARGE SUMMARY      Patient Identification:   Yang Estrella   : 1931  MRN: [id number]   Account: [de-identified]      Patient's PCP: Fransisco Hassan MD    Admit Date: 2022     Discharge Date:   3/5/2022    Admitting Physician: Jacqui Holloway MD     Discharge Physician: Earline White MD     Discharge Diagnoses: Active Hospital Problems    Diagnosis Date Noted    Bradycardia [R00.1]      Priority: High    Vocal cord paralysis [J38.00]     Hypernatremia [E87.0]     ATN (acute tubular necrosis) (HCC) [N17.0]     STEPHY (acute kidney injury) ([address] [N17.9]     Hyperkalemia [E87.5]     Leukocytosis [D72.829]     Septicemia (HCC) [A41.9]     Suprapubic catheter ([address] [Z93.59]     Sepsis ([address] [A41.9] 2022    Dysphagia [M52.86]     Complicated UTI (urinary tract infection) [N39.0]     Abdominal pain [R10.9]      Sepsis: resolved   Likely secondary to UTI.    MRSA PCR negative, Vanc was discontinued. Discontinue zosyn on discharge, completed 10 days total course as of 3/4/2022  Monitor vitals     Acute kidney injury on CKD4 - Stable.   Likely multifactorial in the setting of hyperkalemia and sepsis.    Imaging was negative for hydronephrosis. Patient was on lisinopril and Aldactone these have been held. IVF NS  Avoid nephrotoxic agents if possible  Nephrology on board, appreciate assistance and recommendations  Removal of Dialysis catheter per POA discussion evening of      Complicated UTI: Improved. Purulent drainage noted to be coming from suprapubic catheter site on admission. CT abdomen pelvis w/o signs of intra-abdominal/pelvic abnormality. Doxazosin discontinued due to suprapubic cath in place. Urine cx enterobacter sensitive to zosyn and E Faecalis sensitive to penicillins. Zosyn able to cover both.   Discontinue zosyn on discharge, completed 10 days total course as of 3/4/2022     Dysphagia  SLP consulted  ENT consulted  Pleasure feeds, code status changed to [facility], home with hospice     Acute hyperkalemia: Resolved. 3/1/2022 acute hypokalemia  Hyperkalemia in the setting of acute kidney injury  No acute EKG changes were noted  Patient was on lisinopril and Aldactone prior to admission  2/22/2022 patient received hemodialysis  Hypokalemia Started 3/1/2022  Potassium chloride IV replacement until discharge  Monitor K+ on morning BMP     Hyponatremia: Resolved now hypernatremic - improving  Will monitor BMP and vitals  Continue IVF     Anion gap metabolic acidosis: Resolved   In the setting of sepsis and acute kidney injury.    Bicarb drip was initiated by nephrology and recently continued. Nephrology following, appreciate the assistance     Dementia  Haldol 2mg q6 prn for aggitation at request of family POA     Chronic HFpEF:   Not in acute exacerbation echo 6/2021 LVEF 55% with grade 1 diastolic dysfunction     Diabetes mellitus type 2  Lantus was not restarted at time of admission due to [address], has been controlled. Monitor glucose     COPD  Arformoterol and budesonide     Sinus Pauses w/ sinus bradycardia  Cardiology consulted. No additional intervention or work up per family. Discontinue telemetry      Diet: Comfort feeds     Code Status: DNR-CC    The patient was seen and examined on day of discharge and this discharge summary is in conjunction with any daily progress note from day of discharge. Hospital Course:   Oral Harkins is a 80 y.o. male admitted to [address] [place] [address] on 2/22/2022 for Sepsis. From HPI:  \"Med Sears is a 70-year-old male admitted on 2/22/2022 with chief complaint of bilateral leg swelling, worsening confusion, and abdominal pain. Patient is a resident at assisted living.  Noted purulent drainage coming from suprapubic catheter site.  No signs of peritonitis at this time.  CT abdomen pelvis revealed no evidence of acute intra-abdominal or intrapelvic abnormalities.  Zosyn was started.  Suprapubic catheter was exchanged while in the ER on 2/22/2022. Urine culture obtained and showed multiple bacteria, which were sensitive to zosyn and completed 7 day course on 2/28. Also of note, Nephrology was consulted for acute hyperkalemia and acute kidney injury. 2/22/2022 patient was transferred to the ICU and received dialysis overnight. Patient was transferred to general medicine floor on 2/23.      After transfer, did well without acute events. Some sinus bradycardia with pauses. Consulted cardiology who spoke with Dr. Krystal Vaz, who said not to pursue pacemaker due to other conditions. Continue DNR-CCA as can continue medications. Also assessed by PT/OT who are now recommending SNF. \"     3/1/2022  Nursing reports patient choking on medications and fluids. SPL consult. Hypokalemia KCl IVPB  SLP swallow test - NPO     3/2/2022  Hypokalemia, KCl IVPB  Modified barium swallow planned     3/3  Hypernatremia  Start NG feeds  Code status changed to [facility] and home with hospice      3/4  Family meeting with POA, hospice, social work  Removal of Dialysis catheter, removal of NG tube  Comfort feeds      Aside from the above HPI the following summery is as follows: It was noted 3/1/2022 the patient showing signs of aspiration and choking where SLP was consulted where patient had an abnormal modified barium swallow and abnormalities identified on FEES exam on 3/2/2022. ENT was consulted. FEES and ENT exams showed significant decline in airway protection with silent aspiration and impaired bolus control. There was also reduced epiglottic inversion and reduced mobility of the left arytenoid. NG tube was placed, no masses or lesions were found on CT of neck. Discussion was had with POA, patient's son, Dr. Wing Mg on 3/4/2022 with update of patient's condition. It was requested by the POA that patient CODE STATUS be changed to [facility].  Request from POA to remove NG tube and dialysis catheter, nutrition with comfort feeds with understanding of potential and likely aspiration with aspiration pneumonitis, and discharged home to hospice. Patient was accepted at [facility] and discharged in stable condition. Request also made from Dr. John Lassiter to discharge patient to ECF on Haldol 2 mg IM q6 hr PRN for agitation. Exam:     Vitals:  Vitals:    03/05/22 0413 03/05/22 0745 03/05/22 0859 03/05/22 1155   BP: (!) 170/76 (!) 154/86  (!) 123/52   Pulse: 65 66  63   Resp: 16 20 20 18   Temp: 97.4 °F (36.3 °C) 97.8 °F (36.6 °C)  97.9 °F (36.6 °C)   TempSrc: Oral Oral  Oral   SpO2: 98% 97% 97% 99%   Weight: 219 lb 2.2 oz (99.4 kg)      Height:         Weight: Weight: 219 lb 2.2 oz (99.4 kg)     24 hour intake/output:    Intake/Output Summary (Last 24 hours) at 3/5/2022 1159  Last data filed at 3/5/2022 1039  Gross per 24 hour   Intake 730 ml   Output 1800 ml   Net -1070 ml       Physical Exam  Vitals and nursing note reviewed. Constitutional:       General: He is not in acute distress. Appearance: He is well-developed. He is not diaphoretic. HENT:      Head: Normocephalic and atraumatic. Right Ear: External ear normal.      Left Ear: External ear normal.      Nose: Nose normal.   Eyes:      General: No scleral icterus. Right eye: No discharge. Left eye: No discharge. Conjunctiva/sclera: Conjunctivae normal.   Neck:      Comments: Bandage where dialysis catheter was removed, clean and healing well. Cardiovascular:      Rate and Rhythm: Normal rate and regular rhythm. Heart sounds: Normal heart sounds. No murmur heard. Pulmonary:      Effort: Pulmonary effort is normal.      Breath sounds: Normal breath sounds. Abdominal:      General: There is no distension. Palpations: Abdomen is soft. Tenderness: There is no abdominal tenderness. Musculoskeletal:      Cervical back: Normal range of motion. Comments: Mild swelling of lower extremities   Skin:     General: Skin is warm and dry. Findings: No erythema or rash. Neurological:      Mental Status: He is alert and oriented to person, place, and time. Cranial Nerves: No cranial nerve deficit. Psychiatric:         Behavior: Behavior normal.         Thought Content: Thought content normal.         Judgment: Judgment normal.           Labs: For convenience and continuity at follow-up the following most recent labs are provided:      CBC:    Lab Results   Component Value Date    WBC 13.6 03/05/2022    HGB 9.6 03/05/2022    HCT 30.9 03/05/2022     03/05/2022       Renal:    Lab Results   Component Value Date     03/05/2022    K 3.4 03/05/2022    K 5.5 02/23/2022     03/05/2022    CO2 23 03/05/2022    BUN 14 03/05/2022    CREATININE 2.3 03/05/2022    CALCIUM 7.4 03/05/2022    PHOS 3.4 03/14/2019         Significant Diagnostic Studies    Radiology:   CT SOFT TISSUE NECK WO CONTRAST   Final Result   Impression:   Degenerative changes within the cervical spine. This document has been electronically signed by: Hanny Charles MD on    03/03/2022 07:11 PM      All CTs at this facility use dose modulation techniques and iterative    reconstructions, and/or weight-based dosing   when appropriate to reduce radiation to a low as reasonably achievable. XR ABDOMEN FOR NG/OG/NE TUBE PLACEMENT   Final Result      Nasogastric tube as above. Final report electronically signed by Dr. Najma Padilla on 3/2/2022 4:47 PM      FL MODIFIED BARIUM SWALLOW W VIDEO   Final Result   Worsening since the prior exam. There is laryngeal penetration with aspiration with thin liquids. There is laryngeal penetration without aspiration with nectar thick. Recommendations and comments are available from speech therapy. **This report has been created using voice recognition software. It may contain minor errors which are inherent in voice recognition technology. **      Final report electronically signed by Dr. Lurdes Dunham on 3/2/2022 10:56 AM      US RENAL COMPLETE   Final Result      Normal bilateral renal ultrasound. Final report electronically signed by Dr. Austin Selby on 2/25/2022 2:21 PM      FL MODIFIED BARIUM SWALLOW W VIDEO   Final Result   1. No laryngeal penetration or aspiration of barium. 2. Additional recommendations from the speech therapist will follow. **This report has been created using voice recognition software. It may contain minor errors which are inherent in voice recognition technology. **         Final report electronically signed by Dr. Austin Selby on 2/24/2022 10:11 AM      CT HEAD WO CONTRAST   Final Result   No acute intracranial findings. Chronic changes as described. This document has been electronically signed by: Juliana Echavarria MD on    02/23/2022 05:54 AM      All CTs at this facility use dose modulation techniques and iterative    reconstructions, and/or weight-based dosing   when appropriate to reduce radiation to a low as reasonably achievable. XR CHEST PORTABLE   Final Result   Impression:   Right IJ line tip over the proximal SVC. This document has been electronically signed by: Juliana Echavarria MD on    02/23/2022 12:43 AM      CT ABDOMEN PELVIS WO CONTRAST Additional Contrast? None   Final Result       1. No evidence of acute intra-abdominal or intrapelvic abnormality. 2. Moderate retained stool. 3. Nonobstructive nephrolithiasis on the left. 4. Prostatomegaly. **This report has been created using voice recognition software. It may contain minor errors which are inherent in voice recognition technology. **      Final report electronically signed by Dr. Fara Harris MD on 2/22/2022 11:01 AM      XR CHEST PORTABLE   Final Result   Stable radiographic appearance of the chest. No evidence of an acute process. **This report has been created using voice recognition software.  It may contain minor errors which are inherent in voice recognition technology. **      Final report electronically signed by Dr. Talon Arroyo MD on 2/22/2022 10:09 AM             Consults:     STR ED TO IP CONSULT  PHARMACY TO DOSE VANCOMYCIN  IP CONSULT TO SOCIAL WORK  IP CONSULT TO CARDIOLOGY  IP CONSULT TO OTOLARYNGOLOGY  IP CONSULT TO DIETITIAN  IP CONSULT TO SOCIAL WORK  PALLIATIVE CARE EVAL  IP CONSULT TO HOSPICE    Disposition:    [] Home       [] TCU       [] Rehab       [] Psych       [] SNF       [x] [facility]       [] Other-    Condition at Discharge: Stable    Code Status:  DNR-CC     Patient Instructions: Activity: activity as tolerated  Diet: ADULT DIET; Regular, comfort feeds with understanding of potential and likely aspiration pneumonitis    Follow-up visits:   MAEGAN Poole CNP  [address] [facility] [address]  [phone]    On 3/14/2022  your appointment time is at 1:00p, W/// MAEGAN Cole CNP, Please arrive 15mins early, Bring insurance card    [facility]  [address] [facility]  [phone]             Discharge Medications:        Medication List      START taking these medications    haloperidol lactate 5 MG/ML injection  Commonly known as: HALDOL  Inject 0.4 mLs into the muscle every 6 hours as needed for Agitation        CONTINUE taking these medications    Accu-Chek Meryl Plus strip  Generic drug: blood glucose test strips  Accu-check Meryl Plus Test Strips (or brand per pt preference). Sig: test sugar BID. Dx: E11.8     acetaminophen 325 MG tablet  Commonly known as: TYLENOL     albuterol sulfate  (90 Base) MCG/ACT inhaler  Inhale 2 puffs into the lungs every 6 hours as needed for Wheezing     Alcohol Prep 70 % Pads  Alcohol pads (brand per preference). Sig: use to test sugar twice per day.  Dx: E11.8     aluminum & magnesium hydroxide-simethicone 273-981-13 MG/5ML Susp  Commonly known as: MYLANTA     Anoro Ellipta 62.5-25 MCG/INH Aepb release tablet  Commonly known as: Ditropan XL     oxybutynin 5 MG tablet  Commonly known as: DITROPAN     spironolactone 25 MG tablet  Commonly known as: ALDACTONE           Where to Get Your Medications      Information about where to get these medications is not yet available    Ask your nurse or doctor about these medications  · haloperidol lactate 5 MG/ML injection         Time Spent on discharge is more than 20 minutes in the examination, evaluation, counseling and review of medications and discharge plan. Signed: Thank you Scott Youngblood MD for the opportunity to be involved in this patient's care.     Electronically signed by Julio Gates MD on 3/5/2022 at 11:59 AM

## 2022-03-06 PROBLEM — J15.9 COMMUNITY ACQUIRED BACTERIAL PNEUMONIA: Status: RESOLVED | Noted: 2021-05-19 | Resolved: 2022-03-06

## 2022-03-06 PROBLEM — R33.8 ACUTE URINARY RETENTION: Status: RESOLVED | Noted: 2020-05-17 | Resolved: 2022-03-06

## 2022-03-06 PROBLEM — M62.81 MUSCLE WEAKNESS: Status: RESOLVED | Noted: 2017-07-06 | Resolved: 2022-03-06

## 2022-03-06 PROBLEM — J44.1 COPD EXACERBATION (HCC): Status: RESOLVED | Noted: 2020-12-05 | Resolved: 2022-03-06

## 2022-03-06 PROBLEM — Z87.440 HISTORY OF RECURRENT UTI (URINARY TRACT INFECTION): Status: RESOLVED | Noted: 2021-10-25 | Resolved: 2022-03-06

## 2022-03-06 PROBLEM — E66.01 MORBID OBESITY (HCC): Status: RESOLVED | Noted: 2019-11-04 | Resolved: 2022-03-06

## 2022-03-06 PROBLEM — A41.9 SEPSIS (HCC): Status: RESOLVED | Noted: 2022-02-22 | Resolved: 2022-03-06

## 2022-03-07 PROBLEM — G30.9 ALZHEIMER'S DISEASE, UNSPECIFIED (HCC): Status: ACTIVE | Noted: 2022-03-07

## 2022-03-07 PROBLEM — Z51.5 HOSPICE CARE PATIENT: Status: ACTIVE | Noted: 2022-03-07

## 2022-03-07 PROBLEM — F02.80 ALZHEIMER'S DISEASE, UNSPECIFIED (HCC): Status: ACTIVE | Noted: 2022-03-07

## 2022-07-30 NOTE — PLAN OF CARE
Problem: Falls - Risk of:  Goal: Will remain free from falls  Description: Will remain free from falls  3/3/2022 1755 by Gayathri Downey RN  Outcome: Ongoing  Note: Call light in reach, fall band on, bed alarm on. Non-skid socks on    3/3/2022 0545 by Hola CALLOWAY  Outcome: Ongoing  Goal: Absence of physical injury  Description: Absence of physical injury  3/3/2022 1755 by Gayathri Downey RN  Outcome: Ongoing  3/3/2022 0545 by Hola CALLOWAY  Outcome: Ongoing     Problem: Skin Integrity:  Goal: Will show no infection signs and symptoms  Description: Will show no infection signs and symptoms  3/3/2022 1755 by Gayathri Downey RN  Outcome: Ongoing  3/3/2022 0545 by Michele Park  Outcome: Ongoing  Goal: Absence of new skin breakdown  Description: Absence of new skin breakdown  3/3/2022 1755 by Gayathri Downey RN  Outcome: Ongoing  Note: No signs of new skin breakdown noted with each assessment this shift. Skin warm, dry, and intact except where otherwise noted in head-to-toe assessment. Mucous membranes pink and moist. Assistance with turns/ambulation given as needed. 3/3/2022 0545 by Hola CALLOWAY  Outcome: Ongoing     Problem: Discharge Planning:  Goal: Discharged to appropriate level of care  Description: Discharged to appropriate level of care  3/3/2022 1755 by Gayathri Downey RN  Outcome: Ongoing  Note: The current medical regimen is effective;  continue present plan and medications.     3/3/2022 0545 by Hola CALLOWAY  Outcome: Ongoing     Problem: Nutrition  Goal: Optimal nutrition therapy  3/3/2022 1755 by Gayathri Downey RN  Outcome: Ongoing  Note: Patient continues to be NPO at this time and does not have and NG.   3/3/2022 1130 by Sekou Kidd RD, LD  Outcome: Ongoing  3/3/2022 0545 by Hola CALLOWAY  Outcome: Ongoing     Problem: Urinary Elimination:  Goal: Signs and symptoms of infection will decrease  Description: Signs and symptoms of infection will decrease  3/3/2022 1755 by Jack Flores RN  Outcome: Ongoing  3/3/2022 0545 by Jose Mesa  Outcome: Ongoing  Goal: Complications related to the disease process, condition or treatment will be avoided or minimized  Description: Complications related to the disease process, condition or treatment will be avoided or minimized  3/3/2022 1755 by Jack Flores RN  Outcome: Ongoing  3/3/2022 0545 by Vanessa CALLOWAY  Outcome: Ongoing     Problem: DISCHARGE BARRIERS  Goal: Patient's continuum of care needs are met  3/3/2022 1755 by Jack Flores RN  Outcome: Ongoing  3/3/2022 0545 by Jose Mesa  Outcome: Ongoing    Care plan reviewed with patient and son. Patient and son verbalize understanding of the plan of care and contribute to goal setting. Negative

## 2022-08-26 NOTE — TELEPHONE ENCOUNTER
Bob Baron called requesting a refill on the following medications:  Requested Prescriptions     Pending Prescriptions Disp Refills    atorvastatin (LIPITOR) 80 MG tablet       Sig: Take 1 tablet by mouth nightly     Pharmacy verified:  [facility]      Date of last visit: 8/15/19  Date of next visit (if applicable): 18/04/3957 Please advise.

## 2022-10-20 NOTE — ED NOTES
Supra pubic cath noted intact on assessment, urine noted in remainder of tubing- pt appears to have cut collection tubing. Will replace tubing/bag. Pt otherwise appears in no acute distress. VSS.       Wily Zapata RN  09/30/21 1421 No

## 2023-01-01 NOTE — ANESTHESIA POSTPROCEDURE EVALUATION
Department of Anesthesiology  Postprocedure Note    Patient: Iraj Shoulders  MRN: [id number]  YOB: 1931  Date of evaluation: 3/24/2020  Time:  11:08 AM     Procedure Summary     Date:  03/24/20 Room / Location:  [facility] / [place] [facility]    Anesthesia Start:  6908 Anesthesia Stop:  [address]    Procedure:  PLASMA BUTTON TURP WITH CLOT EVACUATION (N/A ) Diagnosis:  (ACQUIRED HYPOTHYROIDISM)    Surgeon:  Connie Steiner MD Responsible Provider:  Kishor Hernandez DO    Anesthesia Type:  general ASA Status:  3          Anesthesia Type: general    Alejandra Phase I: Alejandra Score: 9    Alejandra Phase II:      Last vitals: Reviewed and per EMR flowsheets. Anesthesia Post Evaluation    Patient location during evaluation: PACU  Patient participation: complete - patient participated  Level of consciousness: awake, responsive to verbal stimuli, responsive to light touch and confused (Pt alert and oriented to self and place. Not oriented to time. No other complaints.)  Pain score: 2  Airway patency: patent  Nausea & Vomiting: no nausea and no vomiting  Complications: no  Cardiovascular status: hemodynamically stable and blood pressure returned to baseline  Respiratory status: spontaneous ventilation, acceptable and nasal cannula  Hydration status: stable  Comments: Pt with mild confusion that has improved from the start of admission in PACU until discharge. Pt likely confused secondary to the narcotics and anesthetic inhalation agents used during the procedure. Pt with no other risk factors or signs and symptoms of any other acute pathology to explain this confusion. PACU RN report given to RN on the floor and updated on patient's state of mind, etc.  Continue to monitor. Please call if no improvement.
Peds Hospitalist

## 2023-08-18 NOTE — PLAN OF CARE
Problem: Skin Integrity:  Goal: Will show no infection signs and symptoms  Description: Will show no infection signs and symptoms  7/12/2021 1332 by Kandy Weaver RN  Outcome: Ongoing     Problem: Skin Integrity:  Goal: Absence of new skin breakdown  Description: Absence of new skin breakdown  7/12/2021 1332 by Kandy Weaver RN  Outcome: Ongoing  Note: No new skin breakdown noted this shift. Patient currently up in chair. Chair cushion in place. Will continue to turn per protocol. Problem: Falls - Risk of:  Goal: Will remain free from falls  Description: Will remain free from falls  7/12/2021 1332 by Kandy Weaver RN  Outcome: Ongoing  Note: Patient remains on fall precautions. Falling star in place. Fall band on. Non-skid slippers on when ambulating. Side rails up x 2. Bed and chair alarm on. Call light within reach. Patient uses call light appropriately. Hourly rounding in place. Problem: Falls - Risk of:  Goal: Absence of physical injury  Description: Absence of physical injury  7/12/2021 1332 by Kandy Weaver RN  Outcome: Ongoing     Problem: Discharge Planning:  Goal: Discharged to appropriate level of care  Description: Discharged to appropriate level of care  7/12/2021 1332 by Kandy Weaver RN  Outcome: Ongoing  Note: Discharge planning in process.  consulted.  aware of any additional discharge needs. From assisted living facility, plan is potential skilled nursing upon discharge. Problem: Urinary Elimination:  Goal: Signs and symptoms of infection will decrease  Description: Signs and symptoms of infection will decrease  7/12/2021 1332 by Kandy Weaver RN  Outcome: Ongoing  Note: Suprapubic catheter exchanged this shift. Site remains red and swollen. Culture sent. Will continue to monitor. Site.       Problem: Urinary Elimination:  Goal: Complications related to the disease process, condition or treatment will be avoided or minimized  Description: Complications 3pt pinch            2pt pinch              Lateral pinch  Right 40 10 9 12   Right 7/19 42 12 10 13   Right 8/18 40 10 11 14   Left 45 10 11 14   Left 7/19 46 12 9 14   Left 8/18 46 10 11 14         Range of Motion: BUE       R UE   AROM R UE  AROM  7/19 R UE AROM 8/18 *Goal L UE AROM L UE  AROM  7/19 L UE AROM 8/18 *Goal   Forearm supination  60 62 72 70 40 50 70 60   Forearm pronation                  Wrist flexion  74 68 76 74 70 74 76 70   Wrist extension  46 50 44 55 44 52 56 50   Radial Dev    12 16 12 18 18 22 20 18   Ulnar Deviation   38 40 38 38 34 38 34 34   Any spaces left blank were not tested. Pain Level at end of session (0-10 scale): 0/10    Assessment   Patient seen this date for occupational therapy reassessment visit. Patient presents with no pain, reports not feeling 100%. Patient BP low at 97/63 upon arrival to OT office. Patient reports he was outside working in the yard all day yesterday and is fatigued today. Patient demonstrates achievement of OT goals for discharge. Patient maintains  strength in right hand, reports likely due to arthritis. Patient demonstrates ability to hold and carry 10#, 15#, and 20# bar with bilateral hands for 10+ feet. Patient reports he will continue to work outside and complete tasks as needed, working his UE and hands daily. Patient tolerated treatment without complaints of pain, some fatigue at end of visit. Patient with no further OT needs at this time. Progress toward goals / Updated goals:   Short Term Goals, to be met within 6 treatments:  Patient will demonstrate independence and compliance with HEP in order to increase mobility and assist with carryover from OT services. Status at last Eval/Progress Note: Patient unable to demonstrate independence with HEP; he reports he has been completing his home exercises  Current Status: completing HEP or similar exercises daily per patient  Goal Met? YES     2.   Patient will increase R related to the disease process, condition or treatment will be avoided or minimized  7/12/2021 1332 by Ashley Fonseca RN  Outcome: Ongoing     Problem: Metabolic:  Goal: Ability to maintain appropriate glucose levels will improve  Description: Ability to maintain appropriate glucose levels will improve  7/12/2021 1332 by Ashley Fonseca RN  Outcome: Ongoing  Note: Glucose remains within normal limits this shift. Checked ACHS. Will provide insulin coverage as needed. Care plan reviewed with patient. Patient verbalizes understanding of the plan of care and contribute to goal setting.

## 2023-11-07 NOTE — PROGRESS NOTES
Medic Alert necklace has been placed with the patient's clothing in his room.  [facility] has been contacted to send current Mar. Billing Type: Third-Party Bill

## 2024-06-11 NOTE — PROGRESS NOTES
Medication Management [facility]  Anticoagulation Clinic  [phone] (phone)  [phone] (fax)      Mr. Tomasa Hopson is a 80 y.o.  male with history of recurrent DVT, per Dr. Cecilia Espinosa referral, who presents today for Warfarin monitoring and adjustment (3 week visit). Patient verifies current dosing regimen and tablet strength. No missed or extra doses. Patient denies bleeding/bruising/swelling/chest pain. Has usual SOB. No blood in urine or stool. No dietary changes. No changes in medication/OTC agents/herbals. No change in alcohol use or tobacco use. Change in activity level: increased - doing PT 3 times/week. Patient denies headaches/dizziness/lightheadedness/falls. No vomiting/diarrhea or acute illness. No procedures scheduled in the future at this time. Assessment:   Lab Results   Component Value Date    INR 3.83 (H) 09/21/2018    INR 2.00 (H) 08/30/2018    INR 1.80 (H) 08/16/2018   POCT INR 4.6. INR supratherapeutic - goal 2-3. Today's H&H: 13.8/41.7 (14.2/42.5 last month). Recent Labs      09/21/18   0915   INR  3.83*     Plan:  POCT INR ordered/performed/result reviewed. Blood for INR/H&H drawn left antcubital per lab staff - sent to lab. Hold today and tomorrow, F/Sat., then continue PO Coumadin 7.5 mg MWF, 5 mg TThSS. Recheck INR in 1 week. (Report given - orders entered by OLEGARIO Cortés., PharmD.)  Patient reminded to call the Anticoagulation Clinic with any signs or symptoms of bleeding or with any medication changes. Patient given instructions utilizing the teach back method. Discharged via transport chair in no apparent distress. After visit summary printed and reviewed with patient.       Medications reviewed and updated on home medication list.    Influenza vaccine:     [] given    [] declined   [] received previously   [] plans to receive at a later time   [] refused    [] documented in EPIC
no

## (undated) DEVICE — SHEET,DRAPE,3/4,53X77,STERILE: Brand: MEDLINE

## (undated) DEVICE — DRAINBAG,ANTI-REFLUX TOWER,L/F,2000ML,LL: Brand: MEDLINE

## (undated) DEVICE — IV START KIT: Brand: MEDLINE INDUSTRIES, INC.

## (undated) DEVICE — NEEDLE SPINAL 22GA L3.5IN SPINOCAN

## (undated) DEVICE — MEDI-VAC NON-CONDUCTIVE SUCTION TUBING 6MM X 1.8M (6FT.) L: Brand: CARDINAL HEALTH

## (undated) DEVICE — Z DUP USE 2217550 KIT ENDSCPC CLNNG BOWL PURE PRMM WATER BASIN ENZMTC SPNGE DT

## (undated) DEVICE — SOLUTION,WATER,IRRIGATION,250ML,STRL: Brand: MEDLINE

## (undated) DEVICE — SPONGE LAP W18XL18IN WHT COT 4 PLY FLD STRUNG RADPQ DISP ST

## (undated) DEVICE — 2000CC GUARDIAN II: Brand: GUARDIAN

## (undated) DEVICE — 4-PORT MANIFOLD: Brand: NEPTUNE 2

## (undated) DEVICE — Device: Brand: JELCO

## (undated) DEVICE — Z INACTIVE USE 2660664 SOLUTION IRRIG 3000ML 0.9% SOD CHL USP UROMATIC PLAS CONT

## (undated) DEVICE — S-CURVE URETHRAL DILATOR SET WITH AQ, HYDROPHILIC COATING: Brand: S~CURVE

## (undated) DEVICE — HF-RESECTION ELECTRODE PLASMALOOP LOOP, MEDIUM, 24 FR., 12°-30°, ESG TURIS: Brand: OLYMPUS

## (undated) DEVICE — CATHETER URETH 22FR 30CC BLLN F 3 W SPEC M RND TIP TWO

## (undated) DEVICE — HF-RESECTION ELECTRODE PLASMABUTTON BUTTON, 24 FR., 12°-30°, ESG TURIS: Brand: OLYMPUS

## (undated) DEVICE — 100% SILICONE FOLEY CATHETER, 30 CC, 3-WAY, 22 FR (7.3 MM): Brand: DOVER

## (undated) DEVICE — SUTURE PERMA-HAND SZ 2-0 L30IN NONABSORBABLE BLK L26MM SH K833H

## (undated) DEVICE — SINGLE USE SUCTION VALVE MAJ-209: Brand: SINGLE USE SUCTION VALVE (STERILE)

## (undated) DEVICE — GLOVE ORANGE PI 7 1/2   MSG9075

## (undated) DEVICE — GUIDEWIRE URO L150CM DIA0.035IN STIFF NIT HYDRPHLC STR TIP

## (undated) DEVICE — ADAPTER URO SCP UROLOK LL

## (undated) DEVICE — CATHETER,FOLEY,100%SILICONE,16FR,10ML,LF: Brand: MEDLINE

## (undated) DEVICE — SYRINGE CATH TIP 50ML

## (undated) DEVICE — SOLUTION IV 1000ML 0.45% SOD CHL PH 5 INJ USP VIAFLX PLAS

## (undated) DEVICE — STRAP,CATHETER,ELASTIC,HOOK&LOOP: Brand: MEDLINE

## (undated) DEVICE — SYRINGE IRRIG 60ML SFT PLIABLE BLB EZ TO GRP 1 HND USE W/

## (undated) DEVICE — BAG DRNGE (SEE COMMENT) UROLOGY TBL 15.5X31 IN W/HOSE

## (undated) DEVICE — SPONGE GZ W4XL4IN COT 12 PLY TYP VII WVN C FLD DSGN

## (undated) DEVICE — CYSTO PACK: Brand: MEDLINE INDUSTRIES, INC.

## (undated) DEVICE — NEEDLE SPNL 22GA L35IN PNCL PNT ATRAUM TIP PENCAN

## (undated) DEVICE — Z INACTIVE USE 2635503 SOLUTION IRRIG 3000ML ST H2O USP UROMATIC PLAS CONT

## (undated) DEVICE — MEDI-VAC NON-CONDUCTIVE SUCTION TUBING 6MM X 6.1M (20 FT.) L: Brand: CARDINAL HEALTH

## (undated) DEVICE — MEDI-VAC YANKAUER SUCTION HANDLE W/BULBOUS TIP: Brand: CARDINAL HEALTH

## (undated) DEVICE — INTENDED FOR TISSUE SEPARATION, AND OTHER PROCEDURES THAT REQUIRE A SHARP SURGICAL BLADE TO PUNCTURE OR CUT.: Brand: BARD-PARKER ® CARBON RIB-BACK BLADES

## (undated) DEVICE — SINGLE USE BIOPSY VALVE MAJ-210: Brand: SINGLE USE BIOPSY VALVE (STERILE)

## (undated) DEVICE — GLOVE ORANGE PI 7   MSG9070

## (undated) DEVICE — Y-TYPE TUR/BLADDER IRRIGATION SET, REGULATING CLAMP

## (undated) DEVICE — Device

## (undated) DEVICE — SOLUTION IV IRRIG WATER 1000ML POUR BRL 2F7114

## (undated) DEVICE — SET ADMIN 25ML L117IN PMP MOD CK VLV RLER CLMP 2 SMRTSITE

## (undated) DEVICE — CONTAINER,SPECIMEN,PNEU TUBE,4OZ,OR STRL: Brand: MEDLINE

## (undated) DEVICE — SOLUTION SCRB 4OZ 4% CHG H2O AIDED FOR PREOPERATIVE SKIN

## (undated) DEVICE — SOLUTION IV IRRIG POUR BRL 0.9% SODIUM CHL 2F7124